# Patient Record
Sex: MALE | Race: WHITE | NOT HISPANIC OR LATINO
[De-identification: names, ages, dates, MRNs, and addresses within clinical notes are randomized per-mention and may not be internally consistent; named-entity substitution may affect disease eponyms.]

---

## 2017-05-12 ENCOUNTER — RX RENEWAL (OUTPATIENT)
Age: 57
End: 2017-05-12

## 2017-06-02 ENCOUNTER — MEDICATION RENEWAL (OUTPATIENT)
Age: 57
End: 2017-06-02

## 2017-08-15 ENCOUNTER — RX RENEWAL (OUTPATIENT)
Age: 57
End: 2017-08-15

## 2017-08-18 ENCOUNTER — MEDICATION RENEWAL (OUTPATIENT)
Age: 57
End: 2017-08-18

## 2017-09-01 ENCOUNTER — LABORATORY RESULT (OUTPATIENT)
Age: 57
End: 2017-09-01

## 2017-09-05 ENCOUNTER — APPOINTMENT (OUTPATIENT)
Dept: CARDIOLOGY | Facility: CLINIC | Age: 57
End: 2017-09-05
Payer: MEDICAID

## 2017-09-05 ENCOUNTER — NON-APPOINTMENT (OUTPATIENT)
Age: 57
End: 2017-09-05

## 2017-09-05 VITALS
SYSTOLIC BLOOD PRESSURE: 149 MMHG | OXYGEN SATURATION: 96 % | HEIGHT: 68 IN | HEART RATE: 86 BPM | WEIGHT: 315 LBS | DIASTOLIC BLOOD PRESSURE: 93 MMHG | RESPIRATION RATE: 16 BRPM | BODY MASS INDEX: 47.74 KG/M2

## 2017-09-05 PROCEDURE — 93000 ELECTROCARDIOGRAM COMPLETE: CPT

## 2017-09-05 PROCEDURE — 99215 OFFICE O/P EST HI 40 MIN: CPT

## 2017-09-08 ENCOUNTER — MEDICATION RENEWAL (OUTPATIENT)
Age: 57
End: 2017-09-08

## 2017-09-11 ENCOUNTER — APPOINTMENT (OUTPATIENT)
Dept: CARDIOLOGY | Facility: CLINIC | Age: 57
End: 2017-09-11

## 2017-09-14 ENCOUNTER — RX RENEWAL (OUTPATIENT)
Age: 57
End: 2017-09-14

## 2017-11-14 ENCOUNTER — APPOINTMENT (OUTPATIENT)
Dept: CARDIOLOGY | Facility: CLINIC | Age: 57
End: 2017-11-14

## 2017-12-11 ENCOUNTER — RX RENEWAL (OUTPATIENT)
Age: 57
End: 2017-12-11

## 2018-02-12 ENCOUNTER — RX RENEWAL (OUTPATIENT)
Age: 58
End: 2018-02-12

## 2018-02-15 ENCOUNTER — RX RENEWAL (OUTPATIENT)
Age: 58
End: 2018-02-15

## 2018-02-16 ENCOUNTER — RX RENEWAL (OUTPATIENT)
Age: 58
End: 2018-02-16

## 2018-04-11 ENCOUNTER — CLINICAL ADVICE (OUTPATIENT)
Age: 58
End: 2018-04-11

## 2018-06-18 ENCOUNTER — RX RENEWAL (OUTPATIENT)
Age: 58
End: 2018-06-18

## 2018-06-22 ENCOUNTER — MEDICATION RENEWAL (OUTPATIENT)
Age: 58
End: 2018-06-22

## 2018-07-09 ENCOUNTER — RX RENEWAL (OUTPATIENT)
Age: 58
End: 2018-07-09

## 2018-07-11 ENCOUNTER — MEDICATION RENEWAL (OUTPATIENT)
Age: 58
End: 2018-07-11

## 2018-07-16 ENCOUNTER — APPOINTMENT (OUTPATIENT)
Dept: CARDIOLOGY | Facility: CLINIC | Age: 58
End: 2018-07-16
Payer: MEDICAID

## 2018-07-16 ENCOUNTER — NON-APPOINTMENT (OUTPATIENT)
Age: 58
End: 2018-07-16

## 2018-07-16 VITALS
WEIGHT: 315 LBS | OXYGEN SATURATION: 95 % | DIASTOLIC BLOOD PRESSURE: 86 MMHG | HEART RATE: 78 BPM | RESPIRATION RATE: 17 BRPM | SYSTOLIC BLOOD PRESSURE: 163 MMHG | BODY MASS INDEX: 47.74 KG/M2 | HEIGHT: 68 IN

## 2018-07-16 PROCEDURE — 99214 OFFICE O/P EST MOD 30 MIN: CPT

## 2018-07-16 PROCEDURE — 93000 ELECTROCARDIOGRAM COMPLETE: CPT

## 2018-08-06 ENCOUNTER — FORM ENCOUNTER (OUTPATIENT)
Age: 58
End: 2018-08-06

## 2018-08-07 ENCOUNTER — LABORATORY RESULT (OUTPATIENT)
Age: 58
End: 2018-08-07

## 2018-08-07 ENCOUNTER — OUTPATIENT (OUTPATIENT)
Dept: OUTPATIENT SERVICES | Facility: HOSPITAL | Age: 58
LOS: 1 days | End: 2018-08-07
Payer: MEDICAID

## 2018-08-07 DIAGNOSIS — I25.10 ATHEROSCLEROTIC HEART DISEASE OF NATIVE CORONARY ARTERY WITHOUT ANGINA PECTORIS: ICD-10-CM

## 2018-08-07 DIAGNOSIS — I50.22 CHRONIC SYSTOLIC (CONGESTIVE) HEART FAILURE: ICD-10-CM

## 2018-08-07 PROCEDURE — A9560: CPT

## 2018-08-07 PROCEDURE — 78472 GATED HEART PLANAR SINGLE: CPT

## 2018-08-14 ENCOUNTER — RX RENEWAL (OUTPATIENT)
Age: 58
End: 2018-08-14

## 2018-08-20 ENCOUNTER — APPOINTMENT (OUTPATIENT)
Dept: CARDIOLOGY | Facility: CLINIC | Age: 58
End: 2018-08-20
Payer: MEDICAID

## 2018-08-20 ENCOUNTER — NON-APPOINTMENT (OUTPATIENT)
Age: 58
End: 2018-08-20

## 2018-08-20 VITALS
HEART RATE: 83 BPM | WEIGHT: 315 LBS | BODY MASS INDEX: 47.74 KG/M2 | SYSTOLIC BLOOD PRESSURE: 151 MMHG | RESPIRATION RATE: 17 BRPM | DIASTOLIC BLOOD PRESSURE: 88 MMHG | OXYGEN SATURATION: 96 % | HEIGHT: 68 IN

## 2018-08-20 PROCEDURE — 93000 ELECTROCARDIOGRAM COMPLETE: CPT

## 2018-08-20 PROCEDURE — 99215 OFFICE O/P EST HI 40 MIN: CPT

## 2018-08-20 RX ORDER — CARVEDILOL 6.25 MG/1
6.25 TABLET, FILM COATED ORAL
Qty: 60 | Refills: 3 | Status: DISCONTINUED | COMMUNITY
Start: 2017-09-05 | End: 2018-08-20

## 2018-08-20 RX ORDER — METOPROLOL SUCCINATE 25 MG/1
25 TABLET, EXTENDED RELEASE ORAL DAILY
Qty: 30 | Refills: 1 | Status: DISCONTINUED | COMMUNITY
Start: 2018-07-16 | End: 2018-08-20

## 2018-08-20 RX ORDER — LISINOPRIL 40 MG/1
40 TABLET ORAL DAILY
Qty: 90 | Refills: 0 | Status: DISCONTINUED | COMMUNITY
Start: 2017-11-15 | End: 2018-08-20

## 2018-08-23 ENCOUNTER — RX RENEWAL (OUTPATIENT)
Age: 58
End: 2018-08-23

## 2018-10-01 ENCOUNTER — OUTPATIENT (OUTPATIENT)
Dept: OUTPATIENT SERVICES | Facility: HOSPITAL | Age: 58
LOS: 1 days | End: 2018-10-01
Payer: MEDICAID

## 2018-10-18 ENCOUNTER — RX RENEWAL (OUTPATIENT)
Age: 58
End: 2018-10-18

## 2018-10-22 ENCOUNTER — RX RENEWAL (OUTPATIENT)
Age: 58
End: 2018-10-22

## 2018-10-23 ENCOUNTER — MEDICATION RENEWAL (OUTPATIENT)
Age: 58
End: 2018-10-23

## 2018-10-24 ENCOUNTER — INPATIENT (INPATIENT)
Facility: HOSPITAL | Age: 58
LOS: 10 days | Discharge: HOME | DRG: 228 | End: 2018-11-04
Attending: HOSPITALIST | Admitting: HOSPITALIST
Payer: MEDICAID

## 2018-10-24 VITALS
WEIGHT: 311.95 LBS | HEART RATE: 129 BPM | TEMPERATURE: 98 F | HEIGHT: 68 IN | SYSTOLIC BLOOD PRESSURE: 145 MMHG | RESPIRATION RATE: 22 BRPM | DIASTOLIC BLOOD PRESSURE: 112 MMHG

## 2018-10-24 DIAGNOSIS — Z29.9 ENCOUNTER FOR PROPHYLACTIC MEASURES, UNSPECIFIED: ICD-10-CM

## 2018-10-24 DIAGNOSIS — I48.92 UNSPECIFIED ATRIAL FLUTTER: ICD-10-CM

## 2018-10-24 DIAGNOSIS — I50.22 CHRONIC SYSTOLIC (CONGESTIVE) HEART FAILURE: ICD-10-CM

## 2018-10-24 DIAGNOSIS — I25.10 ATHEROSCLEROTIC HEART DISEASE OF NATIVE CORONARY ARTERY WITHOUT ANGINA PECTORIS: ICD-10-CM

## 2018-10-24 DIAGNOSIS — I10 ESSENTIAL (PRIMARY) HYPERTENSION: ICD-10-CM

## 2018-10-24 DIAGNOSIS — N18.3 CHRONIC KIDNEY DISEASE, STAGE 3 (MODERATE): ICD-10-CM

## 2018-10-24 LAB
ALBUMIN SERPL ELPH-MCNC: 3.8 G/DL — SIGNIFICANT CHANGE UP (ref 3.3–5)
ALP SERPL-CCNC: 87 U/L — SIGNIFICANT CHANGE UP (ref 40–120)
ALT FLD-CCNC: 22 U/L — SIGNIFICANT CHANGE UP (ref 10–45)
ANION GAP SERPL CALC-SCNC: 13 MMOL/L — SIGNIFICANT CHANGE UP (ref 5–17)
APTT BLD: 28.5 SEC — SIGNIFICANT CHANGE UP (ref 27.5–37.4)
AST SERPL-CCNC: 23 U/L — SIGNIFICANT CHANGE UP (ref 10–40)
BASOPHILS # BLD AUTO: 0 K/UL — SIGNIFICANT CHANGE UP (ref 0–0.2)
BASOPHILS NFR BLD AUTO: 0 % — SIGNIFICANT CHANGE UP (ref 0–2)
BILIRUB SERPL-MCNC: 1.2 MG/DL — SIGNIFICANT CHANGE UP (ref 0.2–1.2)
BUN SERPL-MCNC: 25 MG/DL — HIGH (ref 7–23)
CALCIUM SERPL-MCNC: 9.1 MG/DL — SIGNIFICANT CHANGE UP (ref 8.4–10.5)
CHLORIDE SERPL-SCNC: 100 MMOL/L — SIGNIFICANT CHANGE UP (ref 96–108)
CO2 SERPL-SCNC: 28 MMOL/L — SIGNIFICANT CHANGE UP (ref 22–31)
CREAT SERPL-MCNC: 1.42 MG/DL — HIGH (ref 0.5–1.3)
EOSINOPHIL # BLD AUTO: 0.2 K/UL — SIGNIFICANT CHANGE UP (ref 0–0.5)
EOSINOPHIL NFR BLD AUTO: 2.6 % — SIGNIFICANT CHANGE UP (ref 0–6)
GAS PNL BLDV: SIGNIFICANT CHANGE UP
GLUCOSE SERPL-MCNC: 135 MG/DL — HIGH (ref 70–99)
HCT VFR BLD CALC: 48.2 % — SIGNIFICANT CHANGE UP (ref 39–50)
HGB BLD-MCNC: 15.8 G/DL — SIGNIFICANT CHANGE UP (ref 13–17)
INR BLD: 1.3 RATIO — HIGH (ref 0.88–1.16)
LYMPHOCYTES # BLD AUTO: 0.9 K/UL — LOW (ref 1–3.3)
LYMPHOCYTES # BLD AUTO: 12.7 % — LOW (ref 13–44)
MCHC RBC-ENTMCNC: 30.4 PG — SIGNIFICANT CHANGE UP (ref 27–34)
MCHC RBC-ENTMCNC: 32.8 GM/DL — SIGNIFICANT CHANGE UP (ref 32–36)
MCV RBC AUTO: 92.6 FL — SIGNIFICANT CHANGE UP (ref 80–100)
MONOCYTES # BLD AUTO: 0.8 K/UL — SIGNIFICANT CHANGE UP (ref 0–0.9)
MONOCYTES NFR BLD AUTO: 10.9 % — SIGNIFICANT CHANGE UP (ref 2–14)
NEUTROPHILS # BLD AUTO: 5.1 K/UL — SIGNIFICANT CHANGE UP (ref 1.8–7.4)
NEUTROPHILS NFR BLD AUTO: 73.8 % — SIGNIFICANT CHANGE UP (ref 43–77)
NT-PROBNP SERPL-SCNC: 2043 PG/ML — HIGH (ref 0–300)
PLATELET # BLD AUTO: 229 K/UL — SIGNIFICANT CHANGE UP (ref 150–400)
POTASSIUM SERPL-MCNC: 4.2 MMOL/L — SIGNIFICANT CHANGE UP (ref 3.5–5.3)
POTASSIUM SERPL-SCNC: 4.2 MMOL/L — SIGNIFICANT CHANGE UP (ref 3.5–5.3)
PROT SERPL-MCNC: 7.3 G/DL — SIGNIFICANT CHANGE UP (ref 6–8.3)
PROTHROM AB SERPL-ACNC: 14.2 SEC — HIGH (ref 9.8–12.7)
RBC # BLD: 5.2 M/UL — SIGNIFICANT CHANGE UP (ref 4.2–5.8)
RBC # FLD: 13.2 % — SIGNIFICANT CHANGE UP (ref 10.3–14.5)
SODIUM SERPL-SCNC: 141 MMOL/L — SIGNIFICANT CHANGE UP (ref 135–145)
TROPONIN T, HIGH SENSITIVITY RESULT: 37 NG/L — SIGNIFICANT CHANGE UP (ref 0–51)
TROPONIN T, HIGH SENSITIVITY RESULT: 40 NG/L — SIGNIFICANT CHANGE UP (ref 0–51)
WBC # BLD: 7 K/UL — SIGNIFICANT CHANGE UP (ref 3.8–10.5)
WBC # FLD AUTO: 7 K/UL — SIGNIFICANT CHANGE UP (ref 3.8–10.5)

## 2018-10-24 PROCEDURE — 99285 EMERGENCY DEPT VISIT HI MDM: CPT

## 2018-10-24 PROCEDURE — 71045 X-RAY EXAM CHEST 1 VIEW: CPT | Mod: 26

## 2018-10-24 PROCEDURE — 99223 1ST HOSP IP/OBS HIGH 75: CPT

## 2018-10-24 RX ORDER — METOPROLOL TARTRATE 50 MG
1 TABLET ORAL
Qty: 0 | Refills: 0 | COMMUNITY

## 2018-10-24 RX ORDER — ASPIRIN/CALCIUM CARB/MAGNESIUM 324 MG
81 TABLET ORAL DAILY
Qty: 0 | Refills: 0 | Status: DISCONTINUED | OUTPATIENT
Start: 2018-10-24 | End: 2018-11-04

## 2018-10-24 RX ORDER — LISINOPRIL 2.5 MG/1
20 TABLET ORAL DAILY
Qty: 0 | Refills: 0 | Status: DISCONTINUED | OUTPATIENT
Start: 2018-10-24 | End: 2018-10-26

## 2018-10-24 RX ORDER — LISINOPRIL 2.5 MG/1
1 TABLET ORAL
Qty: 0 | Refills: 0 | COMMUNITY

## 2018-10-24 RX ORDER — SIMVASTATIN 20 MG/1
20 TABLET, FILM COATED ORAL AT BEDTIME
Qty: 0 | Refills: 0 | Status: DISCONTINUED | OUTPATIENT
Start: 2018-10-24 | End: 2018-11-04

## 2018-10-24 RX ORDER — FUROSEMIDE 40 MG
40 TABLET ORAL DAILY
Qty: 0 | Refills: 0 | Status: DISCONTINUED | OUTPATIENT
Start: 2018-10-24 | End: 2018-10-26

## 2018-10-24 RX ORDER — HYDRALAZINE HCL 50 MG
1 TABLET ORAL
Qty: 0 | Refills: 0 | COMMUNITY

## 2018-10-24 RX ORDER — APIXABAN 2.5 MG/1
5 TABLET, FILM COATED ORAL EVERY 12 HOURS
Qty: 0 | Refills: 0 | Status: DISCONTINUED | OUTPATIENT
Start: 2018-10-24 | End: 2018-10-27

## 2018-10-24 RX ORDER — HYDRALAZINE HCL 50 MG
50 TABLET ORAL THREE TIMES A DAY
Qty: 0 | Refills: 0 | Status: DISCONTINUED | OUTPATIENT
Start: 2018-10-24 | End: 2018-11-03

## 2018-10-24 RX ADMIN — SIMVASTATIN 20 MILLIGRAM(S): 20 TABLET, FILM COATED ORAL at 22:22

## 2018-10-24 RX ADMIN — Medication 50 MILLIGRAM(S): at 22:22

## 2018-10-24 RX ADMIN — Medication 81 MILLIGRAM(S): at 22:22

## 2018-10-24 NOTE — H&P ADULT - PROBLEM SELECTOR PLAN 5
CKD stage 3 - Cr 1.4-1.6 as outpt   monitor Cr function closely   avoid nephrotoxins and renally dose medications   counseled on stopping NSAIDs

## 2018-10-24 NOTE — H&P ADULT - PROBLEM SELECTOR PLAN 4
BP at goal  c/w lisinopril 20 BID and hydralazine 50 TID  adding on Cardizem for rate control - monitor BP closely in setting of another antiHTN, can decrease hydralazine dosing if needed

## 2018-10-24 NOTE — ED PROVIDER NOTE - PROGRESS NOTE DETAILS
Spoke w/ Dr. Zimmer admitting MD regarding admission for aflutter. Discussed w/ him anticoagulating pt at this time given risk factors, he advised holding off for now and his team will decide. MD aware. Admission in. - Randy Oliveira PA-C

## 2018-10-24 NOTE — ED ADULT NURSE NOTE - OBJECTIVE STATEMENT
58 year old male presents to the ED ambulatory through waiting room complaining of 4 days of SOB and palpitations. PMH of HTN, CAD s/p cardiac cath, hypercholesteremia. Patient also reports abdominal discomfort/bloating for a week and a half. Patient states he was monitoring his HR at home, noticed it was high, took "beta blockers," which lowered it but worsened his SOB. Pt. denies chest pain, dizziness, fever, chills, nausea, vomiting, dysuria, recent travel. 20g peripheral IV placed in L ac and labs drawn. Patient undressed and placed into gown, call bell in hand and side rails up with bed in lowest position for safety. blanket provided. Comfort and safety provided.

## 2018-10-24 NOTE — ED PROVIDER NOTE - LOWER EXTREMITY EXAM, LEFT
EDY is juliano LLE with +swelling and erythema without calf tenderness or asymmetry. 2+ pitting edema, 2+ DP pulses.

## 2018-10-24 NOTE — H&P ADULT - NSHPPHYSICALEXAM_GEN_ALL_CORE
Vital Signs Last 24 Hrs  T(C): 36.8 (24 Oct 2018 18:50), Max: 36.9 (24 Oct 2018 14:48)  T(F): 98.2 (24 Oct 2018 18:50), Max: 98.5 (24 Oct 2018 15:23)  HR: 98 (24 Oct 2018 17:23) (82 - 129)  BP: 110/70 (24 Oct 2018 18:50) (106/75 - 150/115)  BP(mean): --  RR: 18 (24 Oct 2018 18:50) (18 - 24)  SpO2: 98% (24 Oct 2018 18:50) (96% - 98%) Vital Signs Last 24 Hrs  T(C): 36.8 (24 Oct 2018 18:50), Max: 36.9 (24 Oct 2018 14:48)  T(F): 98.2 (24 Oct 2018 18:50), Max: 98.5 (24 Oct 2018 15:23)  HR: 98 (24 Oct 2018 17:23) (82 - 129)  BP: 110/70 (24 Oct 2018 18:50) (106/75 - 150/115)  BP(mean): --  RR: 18 (24 Oct 2018 18:50) (18 - 24)  SpO2: 98% (24 Oct 2018 18:50) (96% - 98%)    PHYSICAL EXAM:  GENERAL: NAD, well-developed  HEAD:  Atraumatic, normocephalic  EYES: EOMI, sclera clear  NECK: Supple, no JVD  CHEST/LUNG: Clear to auscultation bilaterally; no wheezing or rales  HEART: irregular, +tachycardic, S1 S2, no murmurs   ABDOMEN: Soft, nontender, +distended, bowel sounds present  EXTREMITIES: 2-3+ pitting edema in b/l LE with chronic skin changes, open lesions on RLE   PSYCH: calm affect, not anxious  NEUROLOGY: non-focal, AAOx3  SKIN: chronic skin discoloration of b/l LE with 3 lesions on RLE    MUSCULOSKELETAL: no back pain, moving all extremities

## 2018-10-24 NOTE — ED PROVIDER NOTE - MEDICAL DECISION MAKING DETAILS
GLORIA Mo MD: Pt is a 57 yo male with PMHx HTN, HLD, CAD s/p CABGx 3-4V?, lichen planus, chronic LE edema who presents to ED c/o dyspnea on exertion progressively worsening over the last week with tachycardia and palpitations GLORIA Mo MD: Pt is a 57 yo male with PMHx HTN, HLD, CAD s/p CABGx 3-4V?, lichen planus, chronic LE edema who presents to ED c/o dyspnea and palpitations. States that he began having Dyspnea on exertion and palpitations 4-5 days ago, progressively worsening over the last week. Noticed HR seemed elevated, took an extra B-blocker a few days ago which transiently helped sx, then sx recurred. No recent medication changes. Has chronic LE edema which he says is at his baseline. Denies LE pain, travel, trauma, immobilization, CP. States he takes lasix and has been urinating less than usual. Admits to increased abdominal "distension" without abd pain, no n/v/d. Ddx: arrhythmia, chf exacerbation, acs, metabolic d/o, electrolyte abnormality, infectious d/o. Less likely PE as no provoking factors, however, this can be explored as a dx if no other explanation of his sx and no improvement with tx. Plan: basic labs, cardiac labs, cxr, cardizem for rate control of AFlutter on EKG, likely TBA, begin AC

## 2018-10-24 NOTE — H&P ADULT - PROBLEM SELECTOR PLAN 2
CHF with EF 40%, chronic LE edema and no rales on exam, not acutely exacerbated   will c/w lasix 40 PO for now  MUGA in 8/2018  cardiology c/s in AM  c/w ACEi, pt intolerant of BB

## 2018-10-24 NOTE — H&P ADULT - NSHPLABSRESULTS_GEN_ALL_CORE
Labs personally reviewed and interpreted by me - normal WBC, Hb, Cr 1.42 (baseline ~1.5), glucose 135. trop 40-->37, BNP 2043  Imaging personally reviewed and interpreted by me - cardiomegaly, no focal consolidation.   EKG personally reviewed and interpreted by me -                           15.8   7.0   )-----------( 229      ( 24 Oct 2018 15:34 )             48.2     10-24    141  |  100  |  25<H>  ----------------------------<  135<H>  4.2   |  28  |  1.42<H>    Ca    9.1      24 Oct 2018 15:34    TPro  7.3  /  Alb  3.8  /  TBili  1.2  /  DBili  x   /  AST  23  /  ALT  22  /  AlkPhos  87  10-24      PT/INR - ( 24 Oct 2018 15:39 )   PT: 14.2 sec;   INR: 1.30 ratio    PTT - ( 24 Oct 2018 15:39 )  PTT:28.5 sec    Serum Pro-Brain Natriuretic Peptide: 2043 pg/mL (10.24.18 @ 15:34)    Troponin T, High Sensitivity (10.24.18 @ 15:34)    Troponin T, High Sensitivity Result: 40 --> 37    < from: Xray Chest 1 View- PORTABLE-Urgent (10.24.18 @ 15:44) >  Impression:  The heart is markedlyenlarged. The lungs are clear. Status post   sternotomy. Degenerative changes of the left shoulder. The right shoulder   is only partially seen on the current study.    < from: NM MUGA Scan (08.07.18 @ 12:54) >  Findings:  The left ventricular cavity was normal in size and shape.  There is hypokinesis of the distal anterior wall and anteroapex and apex   of the left ventricle.  Global left ventricular ejection fraction is 40%.  Right ventricular systolic function appears normal.    IMPRESSION:  Abnormal LV ejection fraction at rest  LVEF  40  %   with   regional wall motion abnormalities noted, compatible with CAD. Labs personally reviewed and interpreted by me - normal WBC, Hb, Cr 1.42 (baseline ~1.5), glucose 135. trop 40-->37, BNP 2043  Imaging personally reviewed and interpreted by me - cardiomegaly, no focal consolidation.   EKG personally reviewed and interpreted by me - atrial flutter to 130s, no acute ischemic changes notes.                            15.8   7.0   )-----------( 229      ( 24 Oct 2018 15:34 )             48.2     10-24    141  |  100  |  25<H>  ----------------------------<  135<H>  4.2   |  28  |  1.42<H>    Ca    9.1      24 Oct 2018 15:34    TPro  7.3  /  Alb  3.8  /  TBili  1.2  /  DBili  x   /  AST  23  /  ALT  22  /  AlkPhos  87  10-24      PT/INR - ( 24 Oct 2018 15:39 )   PT: 14.2 sec;   INR: 1.30 ratio    PTT - ( 24 Oct 2018 15:39 )  PTT:28.5 sec    Serum Pro-Brain Natriuretic Peptide: 2043 pg/mL (10.24.18 @ 15:34)    Troponin T, High Sensitivity (10.24.18 @ 15:34)    Troponin T, High Sensitivity Result: 40 --> 37    < from: Xray Chest 1 View- PORTABLE-Urgent (10.24.18 @ 15:44) >  Impression:  The heart is markedlyenlarged. The lungs are clear. Status post   sternotomy. Degenerative changes of the left shoulder. The right shoulder   is only partially seen on the current study.    < from: NM MUGA Scan (08.07.18 @ 12:54) >  Findings:  The left ventricular cavity was normal in size and shape.  There is hypokinesis of the distal anterior wall and anteroapex and apex   of the left ventricle.  Global left ventricular ejection fraction is 40%.  Right ventricular systolic function appears normal.    IMPRESSION:  Abnormal LV ejection fraction at rest  LVEF  40  %   with   regional wall motion abnormalities noted, compatible with CAD.

## 2018-10-24 NOTE — H&P ADULT - HISTORY OF PRESENT ILLNESS
58M with PMH of HTN, CAD s/p CABG, lichen planus, chronic LE edema p/w VELÁSQUEZ. 58M with PMH of CAD s/p CABG/stents (stents 2000, CABG 2012), HFrEF (EF 40%), HTN, lichen planus with chronic LE edema p/w VELÁSQUEZ and palpitations. Pt states he was in his usual state until about 4d ago, when he started noticing intermittent VELÁSQUEZ; felt no SOB at rest, but any exertional activity would leave him winded. Associated with feeling like his heart was racing for same period of time; about 2-3d ago, felt his heart racing very fast and decided to take old metorpolol he had (50mg ER), which helped with his symptoms. The day prior to admission, pt was out doing grocery shopping and couldn't walk even 15 feet without having to stop 2/2 SOB, felt lightheaded and again had palpitations and heart racing. He felt slightly better after rest, but symptoms were not abating and decided to come to ED. No CP, no headache, +lightheadedness during one episode of VELÁSQUEZ, no confusion, syncope, no cough, URI symptoms, +abdominal bloating, no nausea/vomiting, no abd pain, no dysuria, no diarrhea, +decreased urination, +LE edema (chronic, not currently worse than usual), no changes in skin, no new rash. Has never been told he had an abnormal heart rhythm in the past. Saw cardiology (Dr Miranda) in 8/2018 - was supposed to be on BB (metoprolol 25ER) but pt has orthopnea whenever he takes it, so has not been taking it for months; had MUGA in 8/2018 showing EF 40%.     Family history (father) of CAD with CABG, no family history of abnormal heart rhythms.

## 2018-10-24 NOTE — H&P ADULT - ASSESSMENT
58M with PMH of CAD s/p CABG/stents (stents 2000, CABG 2012), HFrEF (EF 40%), HTN, lichen planus with chronic LE edema p/w VELÁSQUEZ and palpitations, admitted with new aflutter.

## 2018-10-24 NOTE — ED PROVIDER NOTE - MUSCULOSKELETAL MINIMAL EXAM
Full AROM all upper and lower extremities bilaterally with 5/5 muscle strength/atraumatic/normal range of motion

## 2018-10-24 NOTE — ED PROVIDER NOTE - NS ED NOTE AC HIGH RISK COUNTRIES
1. Drink plenty of fluids.  2. May use tylenol 1000 mg every 8 hours or ibuprofen 600 mg every 6 hours for any discomfort you are having.  3. Use Neti pot or nasal saline if you are having nasal or sinus congestion  4. For cough, dextromethorphan/guaifenesin combinations help loosen secretions and suppress cough safely without significant risk of sedation.   5. For nasal congestion and sinus pressure, pseudoephedrine (Sudafed) or phenylephrine is often helpful but it can cause elevations in blood pressure and insomnia.  Use Coricidin as a decongestant if you have a history of hypertension.  6. For runny nose and nasal congestion, use over-the-counter oxymetazoline (i.e. Afrin - use 2 sprays of 0.05% in each nostril every 12 hours; stop after 3-5 days)  7. Suggest humidifier in room at night  8. Call clinic if no improvement in 1 week        Thank you for choosing [facility].  You may be receiving a survey in the mail from [facility] regarding your visit today.  Please take a few minutes to complete and return the survey to let us know how we are doing.      If you have questions or concerns, please contact us via [employer] or you can contact your care team at [phone].    Our Clinic hours are:  Monday 6:40 am  to 7:00 pm  Tuesday -Friday 6:40 am to 5:00 pm    The [place] outpatient lab hours are:  Monday - Friday 6:10 am to 4:45 pm  Saturdays 7:00 am to 11:00 am  Appointments are required, call [phone]    If you have clinical questions after hours or would like to schedule an appointment,  call the clinic at [phone].     No

## 2018-10-24 NOTE — H&P ADULT - PROBLEM SELECTOR PLAN 1
pt with VELÁSQUEZ and palpitations, EKG showing new Aflutter with RVR to 130s, s/p cardizem in ED with symptomatic relief   hs trop (-) x2   monitor on tele - currently still aflutter 110-130s   pt with intolerance to BB - will start on cardizem 60 q8h, titrate up as needed   vitals q4h for now   CHADSVASC pt with VELÁSQUEZ and palpitations, EKG showing new Aflutter with RVR to 130s, s/p cardizem in ED with symptomatic relief   hs trop (-) x2   monitor on tele - currently still aflutter 110-130s   pt with intolerance to BB - will start on cardizem 60 q8h, titrate up as needed   vitals q4h for now   CHADSVASC 3 - would benefit from AC; discussed risks/benefits of starting full AC with pt, pt understands and is agreeable to starting - will place on Eliquis for now   cardiology consult in AM  check TSH  check TTE  f/u with CM in AM regarding insurance coverage for AC   will decrease ASA full dose to 81mg   pt counseled on stopping daily NSAID use pt with VELÁSQUEZ and palpitations, EKG showing new Aflutter with RVR to 130s, s/p cardizem in ED with symptomatic relief   hs trop (-) x2   monitor on tele - currently still aflutter 110-130s   pt with intolerance to BB - will start on cardizem 30 q6h, titrate up as needed   vitals q4h for now   CHADSVASC 3 - would benefit from AC; discussed risks/benefits of starting full AC with pt, pt understands and is agreeable to starting - will place on Eliquis for now   cardiology consult in AM  check TSH  check TTE  f/u with CM in AM regarding insurance coverage for AC   will decrease ASA full dose to 81mg   pt counseled on stopping daily NSAID use

## 2018-10-24 NOTE — ED PROVIDER NOTE - SKIN WOUND TYPE
+medium sized palpable purple papules noted to distal bilateral LE with + superficial ulceritic wound noted to distal anterior right tibia +medium sized papular purpura noted to distal bilateral LE with + superficial ulceritic wound noted to distal anterior right tibia

## 2018-10-24 NOTE — H&P ADULT - NSHPREVIEWOFSYSTEMS_GEN_ALL_CORE
REVIEW OF SYSTEMS:    CONSTITUTIONAL: No weakness, fevers, chills  EYES/ENT: No visual changes;  no vertigo or throat pain   NECK: No pain or stiffness  RESPIRATORY: No cough, wheezing, +shortness of breath on exertion   CARDIOVASCULAR: No chest pain, +palpitations (as per HPI)  GASTROINTESTINAL: no nausea, vomiting, no abdominal pain, +bloating   GENITOURINARY: no polyuria, no dysuria  NEUROLOGICAL: no numbness, no headaches, no confusion   MUSCULOSKELETAL: +chronic back pain, no weakness   SKIN: No itching, burning, +RLE skin lesions 2/2 lichen planus   PSYCH: no anxiety, depression  HEME: no gum bleeding, no bruising

## 2018-10-24 NOTE — ED PROVIDER NOTE - OBJECTIVE STATEMENT
57 yo male PMHx HTN, HLD, CAD s/p CABGx 3-4? presents to ED c/o dyspnea on exertion progressively worsening over the last week with tachycardia and palpitations 57 yo male PMHx HTN, HLD, CAD s/p CABGx 3-4? presents to ED c/o dyspnea on exertion progressively worsening over the last week with tachycardia and palpitations. 59 yo male PMHx HTN, HLD, lichen planus w/ chronic lower extremity swelling CAD s/p CABGx 3-4? presents to ED c/o dyspnea on exertion progressively worsening over the last week with tachycardia and palpitations. States has been told in the past he has a fast heart rate, is on metoprolol daily and took an extra dose 2 days prior with symptoms and had mild relief. Today symptoms continued prompting ED visit. Additionally feels his abdomen is bloated. Denies chest pain, dizziness, fever/chills, cough, hemoptysis, abdominal pain, fatigue, orthopnea,

## 2018-10-24 NOTE — ED PROVIDER NOTE - LOWER EXTREMITY EXAM, RIGHT
RLE with + swelling and erythema without calf tenderness or asymmetry. 2+pitting edema. 2+ DP pulses bilaterally.

## 2018-10-25 LAB
ANION GAP SERPL CALC-SCNC: 13 MMOL/L — SIGNIFICANT CHANGE UP (ref 5–17)
BUN SERPL-MCNC: 24 MG/DL — HIGH (ref 7–23)
CALCIUM SERPL-MCNC: 9 MG/DL — SIGNIFICANT CHANGE UP (ref 8.4–10.5)
CHLORIDE SERPL-SCNC: 100 MMOL/L — SIGNIFICANT CHANGE UP (ref 96–108)
CO2 SERPL-SCNC: 26 MMOL/L — SIGNIFICANT CHANGE UP (ref 22–31)
CREAT SERPL-MCNC: 1.36 MG/DL — HIGH (ref 0.5–1.3)
GLUCOSE SERPL-MCNC: 103 MG/DL — HIGH (ref 70–99)
HCT VFR BLD CALC: 46.9 % — SIGNIFICANT CHANGE UP (ref 39–50)
HGB BLD-MCNC: 15 G/DL — SIGNIFICANT CHANGE UP (ref 13–17)
MAGNESIUM SERPL-MCNC: 2.1 MG/DL — SIGNIFICANT CHANGE UP (ref 1.6–2.6)
MCHC RBC-ENTMCNC: 30.4 PG — SIGNIFICANT CHANGE UP (ref 27–34)
MCHC RBC-ENTMCNC: 32 GM/DL — SIGNIFICANT CHANGE UP (ref 32–36)
MCV RBC AUTO: 94.9 FL — SIGNIFICANT CHANGE UP (ref 80–100)
PHOSPHATE SERPL-MCNC: 3.2 MG/DL — SIGNIFICANT CHANGE UP (ref 2.5–4.5)
PLATELET # BLD AUTO: 232 K/UL — SIGNIFICANT CHANGE UP (ref 150–400)
POTASSIUM SERPL-MCNC: 4.1 MMOL/L — SIGNIFICANT CHANGE UP (ref 3.5–5.3)
POTASSIUM SERPL-SCNC: 4.1 MMOL/L — SIGNIFICANT CHANGE UP (ref 3.5–5.3)
RBC # BLD: 4.94 M/UL — SIGNIFICANT CHANGE UP (ref 4.2–5.8)
RBC # FLD: 13.8 % — SIGNIFICANT CHANGE UP (ref 10.3–14.5)
SODIUM SERPL-SCNC: 139 MMOL/L — SIGNIFICANT CHANGE UP (ref 135–145)
TSH SERPL-MCNC: 0.89 UIU/ML — SIGNIFICANT CHANGE UP (ref 0.27–4.2)
WBC # BLD: 7.62 K/UL — SIGNIFICANT CHANGE UP (ref 3.8–10.5)
WBC # FLD AUTO: 7.62 K/UL — SIGNIFICANT CHANGE UP (ref 3.8–10.5)

## 2018-10-25 PROCEDURE — 93306 TTE W/DOPPLER COMPLETE: CPT | Mod: 26

## 2018-10-25 PROCEDURE — 99222 1ST HOSP IP/OBS MODERATE 55: CPT

## 2018-10-25 PROCEDURE — 99223 1ST HOSP IP/OBS HIGH 75: CPT | Mod: GC

## 2018-10-25 PROCEDURE — 99233 SBSQ HOSP IP/OBS HIGH 50: CPT

## 2018-10-25 RX ORDER — POLYETHYLENE GLYCOL 3350 17 G/17G
17 POWDER, FOR SOLUTION ORAL
Qty: 0 | Refills: 0 | Status: DISCONTINUED | OUTPATIENT
Start: 2018-10-25 | End: 2018-11-04

## 2018-10-25 RX ORDER — DOCUSATE SODIUM 100 MG
100 CAPSULE ORAL DAILY
Qty: 0 | Refills: 0 | Status: DISCONTINUED | OUTPATIENT
Start: 2018-10-25 | End: 2018-11-04

## 2018-10-25 RX ADMIN — APIXABAN 5 MILLIGRAM(S): 2.5 TABLET, FILM COATED ORAL at 12:45

## 2018-10-25 RX ADMIN — APIXABAN 5 MILLIGRAM(S): 2.5 TABLET, FILM COATED ORAL at 00:35

## 2018-10-25 RX ADMIN — POLYETHYLENE GLYCOL 3350 17 GRAM(S): 17 POWDER, FOR SOLUTION ORAL at 18:04

## 2018-10-25 RX ADMIN — Medication 50 MILLIGRAM(S): at 14:00

## 2018-10-25 RX ADMIN — APIXABAN 5 MILLIGRAM(S): 2.5 TABLET, FILM COATED ORAL at 23:42

## 2018-10-25 RX ADMIN — Medication 10 MILLIGRAM(S): at 23:42

## 2018-10-25 RX ADMIN — SIMVASTATIN 20 MILLIGRAM(S): 20 TABLET, FILM COATED ORAL at 21:23

## 2018-10-25 RX ADMIN — LISINOPRIL 20 MILLIGRAM(S): 2.5 TABLET ORAL at 05:31

## 2018-10-25 RX ADMIN — Medication 50 MILLIGRAM(S): at 05:31

## 2018-10-25 RX ADMIN — Medication 40 MILLIGRAM(S): at 05:31

## 2018-10-25 RX ADMIN — Medication 81 MILLIGRAM(S): at 12:45

## 2018-10-25 RX ADMIN — Medication 50 MILLIGRAM(S): at 21:23

## 2018-10-25 NOTE — CONSULT NOTE ADULT - ATTENDING COMMENTS
58 year old man with ischemic cardiomyopathy, coronary revascularization surgery 5 years ago and EF 35-40% at that time. Has few days of breathlessness, palpitations and admitted with atrial flutter ventricular rate 130 and with volume overload congestive heart failure. Rates better controlled and symptomatically improved and anticoagulated with apixaban. Discussed with EP and plan for electrical cardioversion tomorrow, maintaining rate control with diltiazem. After cardioversion need to stop diltiazem and replace with beta blocker, probably carvedilol and optimize LV dysfunction management.

## 2018-10-25 NOTE — PROGRESS NOTE ADULT - PROBLEM SELECTOR PLAN 4
Diastolic BP above goal  c/w lisinopril 20 BID and hydralazine 50 TID  c/w Cardizem for rate control - monitor BP closely in setting of another antiHTN, can decrease hydralazine dosing if needed

## 2018-10-25 NOTE — PROGRESS NOTE ADULT - PROBLEM SELECTOR PLAN 1
EKG showing new Aflutter with RVR to 130s, s/p cardizem in ED with symptomatic relief and placed on Cardizem 30mg Q6. Currently still aflutter 110-130s.   Uptitrate Cardizem to 60mg Q6. Increase as needed.   CHADSVASC 3 started on Eliquis   cardiology consult.   TSH pending  check TTE  c/w tele monitoring  Maintain K>4, Mg>2 EKG showing new Aflutter with RVR to 130s, s/p cardizem in ED with symptomatic relief and placed on Cardizem 30mg Q6. Currently still aflutter 110-130s.   Uptitrate Cardizem to 60mg Q6. Increase as needed.   CHADSVASC 3 started on Eliquis   cardiology consult. May require dccv cardioversion  TSH pending  check TTE  c/w tele monitoring  Maintain K>4, Mg>2

## 2018-10-25 NOTE — CONSULT NOTE ADULT - SUBJECTIVE AND OBJECTIVE BOX
Patient seen and evaluated at bedside    Chief Complaint:    HPI:  58M with PMH of CAD s/p CABG/stents (stents 2000, CABG 2012), HFrEF (EF 40%), HTN, lichen planus with chronic LE edema p/w VELÁSQUEZ and palpitations. Pt states he was in his usual state until about 4d ago, when he started noticing intermittent VELÁSQUEZ; felt no SOB at rest, but any exertional activity would leave him winded. Associated with feeling like his heart was racing for same period of time; about 2-3d ago, felt his heart racing very fast and decided to take old metorpolol he had (50mg ER), which helped with his symptoms. The day prior to admission, pt was out doing grocery shopping and couldn't walk even 15 feet without having to stop 2/2 SOB, felt lightheaded and again had palpitations and heart racing. He felt slightly better after rest, but symptoms were not abating and decided to come to ED. No CP, no headache, +lightheadedness during one episode of VELÁSQUEZ, no confusion, syncope, no cough, URI symptoms, +abdominal bloating, no nausea/vomiting, no abd pain, no dysuria, no diarrhea, +decreased urination, +LE edema (chronic, not currently worse than usual), no changes in skin, no new rash. Has never been told he had an abnormal heart rhythm in the past. Saw cardiology (Dr Miranda) in 8/2018 - was supposed to be on BB (metoprolol 25ER) but pt has orthopnea whenever he takes it, so has not been taking it for months; had MUGA in 8/2018 showing EF 40%.     Family history (father) of CAD with CABG, no family history of abnormal heart rhythms. (24 Oct 2018 20:15)      PMH:   Hypercholesteremia  Obesity  H/O heart artery stent  CAD (coronary artery disease)      PSH:   History of elbow surgery  History of tonsillectomy      Medications:   apixaban 5 milliGRAM(s) Oral every 12 hours  aspirin  chewable 81 milliGRAM(s) Oral daily  diltiazem    Tablet 60 milliGRAM(s) Oral every 6 hours  furosemide    Tablet 40 milliGRAM(s) Oral daily  hydrALAZINE 50 milliGRAM(s) Oral three times a day  lisinopril 20 milliGRAM(s) Oral daily  simvastatin 20 milliGRAM(s) Oral at bedtime      Allergies:  No Known Allergies      FAMILY HISTORY:  No pertinent family history in first degree relatives      Social History:  Smoking History:  Alcohol Use:  Drug Use:    Review of Systems:  REVIEW OF SYSTEMS:  CONSTITUTIONAL: No weakness, fevers or chills  EYES/ENT: No visual changes;  No dysphagia  NECK: No pain or stiffness  RESPIRATORY: No cough, wheezing, hemoptysis; No shortness of breath  CARDIOVASCULAR: No chest pain or palpitations; No lower extremity edema  GASTROINTESTINAL: No abdominal or epigastric pain. No nausea, vomiting, or hematemesis; No diarrhea or constipation. No melena or hematochezia.  BACK: No back pain  GENITOURINARY: No dysuria, frequency or hematuria  NEUROLOGICAL: No numbness or weakness  SKIN: No itching, burning, rashes, or lesions   All other review of systems is negative unless indicated above.    Physical Exam:  T(F): 97.7 (10-25), Max: 98.5 (10-24)  HR: 120 (10-25) (82 - 129)  BP: 149/106 (10-25) (106/75 - 150/115)  RR: 16 (10-25)  SpO2: 96% (10-25)  GENERAL: No acute distress, well-developed  HEAD:  Atraumatic, Normocephalic  ENT: EOMI, PERRLA, conjunctiva and sclera clear, Neck supple, No JVD, moist mucosa  CHEST/LUNG: Clear to auscultation bilaterally; No wheeze, equal breath sounds bilaterally   BACK: No spinal tenderness  HEART: Regular rate and rhythm; No murmurs, rubs, or gallops  ABDOMEN: Soft, Nontender, Nondistended; Bowel sounds present  EXTREMITIES:  No clubbing, cyanosis, or edema  PSYCH: Nl behavior, nl affect  NEUROLOGY: AAOx3, non-focal, cranial nerves intact  SKIN: Normal color, No rashes or lesions  LINES:    Cardiovascular Diagnostic Testing:    ECG: Personally reviewed    Echo:    Stress Testing:    Cath:    Interpretation of Telemetry:    Imaging:    Labs: Personally reviewed                        15.0   7.62  )-----------( 232      ( 25 Oct 2018 07:42 )             46.9     10-25    139  |  100  |  24<H>  ----------------------------<  103<H>  4.1   |  26  |  1.36<H>    Ca    9.0      25 Oct 2018 05:45  Phos  3.2     10-25  Mg     2.1     10-25    TPro  7.3  /  Alb  3.8  /  TBili  1.2  /  DBili  x   /  AST  23  /  ALT  22  /  AlkPhos  87  10-24    PT/INR - ( 24 Oct 2018 15:39 )   PT: 14.2 sec;   INR: 1.30 ratio         PTT - ( 24 Oct 2018 15:39 )  PTT:28.5 sec      Serum Pro-Brain Natriuretic Peptide: 2043 pg/mL (10-24 @ 15:34) Chief Complaint:  VELÁSQUEZ    HPI:  58M with PMH of CAD s/p CABG/stents (stents 2000, CABG 2012), HFrEF (EF 40%), HTN, lichen planus with chronic LE edema p/w VELÁSQUEZ and palpitations. Pt states he was in his usual state until about 4d ago, when he started noticing intermittent VELÁSQUEZ; felt no SOB at rest, but any exertional activity would leave him winded. Associated with feeling like his heart was racing for same period of time; about 2-3d ago, felt his heart racing very fast and decided to take old metorpolol he had (50mg ER), which helped with his symptoms. The day prior to admission, pt was out doing grocery shopping and couldn't walk even 15 feet without having to stop 2/2 SOB, felt lightheaded and again had palpitations and heart racing. He felt slightly better after rest, but symptoms were not abating and decided to come to ED. No CP, no headache, +lightheadedness during one episode of VELÁSQUEZ, no confusion, syncope, no cough, URI symptoms, +abdominal bloating, no nausea/vomiting, no abd pain, no dysuria, no diarrhea, +decreased urination, +LE edema (chronic, not currently worse than usual), no changes in skin, no new rash. Has never been told he had an abnormal heart rhythm in the past. Saw cardiology (Dr Miranda) in 8/2018 - was supposed to be on BB (metoprolol 25ER) but pt has orthopnea whenever he takes it, so has not been taking it for months; had MUGA in 8/2018 showing EF 40%.     Family history (father) of CAD with CABG, no family history of abnormal heart rhythms. (24 Oct 2018 20:15)      PMH:   Hypercholesteremia  Obesity  H/O heart artery stent  CAD (coronary artery disease)      PSH:   History of elbow surgery  History of tonsillectomy      Medications:   apixaban 5 milliGRAM(s) Oral every 12 hours  aspirin  chewable 81 milliGRAM(s) Oral daily  diltiazem    Tablet 60 milliGRAM(s) Oral every 6 hours  furosemide    Tablet 40 milliGRAM(s) Oral daily  hydrALAZINE 50 milliGRAM(s) Oral three times a day  lisinopril 20 milliGRAM(s) Oral daily  simvastatin 20 milliGRAM(s) Oral at bedtime      Allergies:  No Known Allergies      FAMILY HISTORY:  No pertinent family history in first degree relatives      Social History:  Smoking History:  Alcohol Use:  Drug Use:    Review of Systems:  REVIEW OF SYSTEMS:  CONSTITUTIONAL: No weakness, fevers or chills  EYES/ENT: No visual changes;  No dysphagia  NECK: No pain or stiffness  RESPIRATORY: No cough, wheezing, hemoptysis; No shortness of breath  CARDIOVASCULAR: No chest pain or palpitations; No lower extremity edema  GASTROINTESTINAL: No abdominal or epigastric pain. No nausea, vomiting, or hematemesis; No diarrhea or constipation. No melena or hematochezia.  BACK: No back pain  GENITOURINARY: No dysuria, frequency or hematuria  NEUROLOGICAL: No numbness or weakness  SKIN: No itching, burning, rashes, or lesions   All other review of systems is negative unless indicated above.    Physical Exam:  T(F): 97.7 (10-25), Max: 98.5 (10-24)  HR: 120 (10-25) (82 - 129)  BP: 149/106 (10-25) (106/75 - 150/115)  RR: 16 (10-25)  SpO2: 96% (10-25)  GENERAL: No acute distress, well-developed  HEAD:  Atraumatic, Normocephalic  ENT: EOMI, PERRLA, conjunctiva and sclera clear, Neck supple, No JVD, moist mucosa  CHEST/LUNG: Clear to auscultation bilaterally; No wheeze, equal breath sounds bilaterally   BACK: No spinal tenderness  HEART: Regular rate and rhythm; No murmurs, rubs, or gallops  ABDOMEN: Soft, Nontender, Nondistended; Bowel sounds present  EXTREMITIES:  Severe LE edema, changes of chronic edema  PSYCH: Nl behavior, nl affect  NEUROLOGY: AAOx3, non-focal, cranial nerves intact    Cardiovascular Diagnostic Testing:    TTE:  Conclusions:  1. Aortic valve not well visualized; appears calcified.  Peak transaortic valve gradient equals 21 mm Hg, aortic  valve velocity time integral equals 39 cm. Dimensionless  Index = 0.5, consistent with mild aortic stenosis. The LVOT  diameter is not well visualized. Minimal aortic  regurgitation.  2. Mild concentric left ventricular hypertrophy.  3. Severe global left ventricular systolic dysfunction.  Endocardial visualization enhanced with intravenous  injection of Ultrasonic Enhancing Agent (Definity). No left  ventricular thrombus.  4. The right ventricle is not well visualized.  *** Compared with echocardiogram of 6/11/2013, mild aortic  stenosis is now seen.    Labs: Personally reviewed                        15.0   7.62  )-----------( 232      ( 25 Oct 2018 07:42 )             46.9     10-25    139  |  100  |  24<H>  ----------------------------<  103<H>  4.1   |  26  |  1.36<H>    Ca    9.0      25 Oct 2018 05:45  Phos  3.2     10-25  Mg     2.1     10-25    TPro  7.3  /  Alb  3.8  /  TBili  1.2  /  DBili  x   /  AST  23  /  ALT  22  /  AlkPhos  87  10-24    PT/INR - ( 24 Oct 2018 15:39 )   PT: 14.2 sec;   INR: 1.30 ratio         PTT - ( 24 Oct 2018 15:39 )  PTT:28.5 sec      Serum Pro-Brain Natriuretic Peptide: 2043 pg/mL (10-24 @ 15:34)

## 2018-10-25 NOTE — PROGRESS NOTE ADULT - SUBJECTIVE AND OBJECTIVE BOX
Aidan Morel MD  Division of Hospital Medicine  Pager [phone]      BERTHA WARD  58y  Male      Patient is a 58y old  Male who presents with a chief complaint of VELÁSQUEZ (24 Oct 2018 20:15)      INTERVAL HPI/OVERNIGHT EVENTS:  HR remains 100-120. Reported orthopnea overnight. No chest pain       REVIEW OF SYSTEMS: 14 point ROS negative unless listed above    T(C): 36.5 (10-25-18 @ 11:24), Max: 36.9 (10-24-18 @ 14:48)  HR: 120 (10-25-18 @ 11:24) (82 - 129)  BP: 149/106 (10-25-18 @ 11:24) (106/75 - 150/115)  RR: 16 (10-25-18 @ 11:24) (16 - 24)  SpO2: 96% (10-25-18 @ 11:24) (93% - 98%)  Wt(kg): --Vital Signs Last 24 Hrs  T(C): 36.5 (25 Oct 2018 11:24), Max: 36.9 (24 Oct 2018 14:48)  T(F): 97.7 (25 Oct 2018 11:24), Max: 98.5 (24 Oct 2018 15:23)  HR: 120 (25 Oct 2018 11:24) (82 - 129)  BP: 149/106 (25 Oct 2018 11:24) (106/75 - 150/115)  BP(mean): --  RR: 16 (25 Oct 2018 11:24) (16 - 24)  SpO2: 96% (25 Oct 2018 11:24) (93% - 98%)    PHYSICAL EXAM:  GENERAL: NAD, well-developed  HEAD:  Atraumatic, normocephalic  EYES: EOMI, sclera clear  NECK: Supple, no JVD  CHEST/LUNG: Clear to auscultation bilaterally; no wheezing or rales  HEART: irregular, +tachycardic, S1 S2, no murmurs   ABDOMEN: Soft, nontender, +distended, bowel sounds present  EXTREMITIES: 2-3+ pitting edema in b/l LE with chronic skin changes, open lesions on RLE   PSYCH: calm affect, not anxious  NEUROLOGY: non-focal, AAOx3  SKIN: chronic skin discoloration of b/l LE with 3 lesions on RLE     Consultant(s) Notes Reviewed:  [x ] YES  [ ] NO  Care Discussed with Consultants/Other Providers [ x] YES  [ ] NO    LABS:                        15.0   7.62  )-----------( 232      ( 25 Oct 2018 07:42 )             46.9     10-25    139  |  100  |  24<H>  ----------------------------<  103<H>  4.1   |  26  |  1.36<H>    Ca    9.0      25 Oct 2018 05:45  Phos  3.2     10-25  Mg     2.1     10-25    TPro  7.3  /  Alb  3.8  /  TBili  1.2  /  DBili  x   /  AST  23  /  ALT  22  /  AlkPhos  87  10-24    PT/INR - ( 24 Oct 2018 15:39 )   PT: 14.2 sec;   INR: 1.30 ratio         PTT - ( 24 Oct 2018 15:39 )  PTT:28.5 sec    CAPILLARY BLOOD GLUCOSE                RADIOLOGY & ADDITIONAL TESTS:    Imaging Personally Reviewed:  [x ] YES  [ ] NO

## 2018-10-25 NOTE — CONSULT NOTE ADULT - SUBJECTIVE AND OBJECTIVE BOX
CHIEF COMPLAINT: AFL    HISTORY OF PRESENT ILLNESS:      Allergies  No Known Allergies  	    MEDICATIONS:  apixaban 5 milliGRAM(s) Oral every 12 hours  aspirin  chewable 81 milliGRAM(s) Oral daily  diltiazem    Tablet 60 milliGRAM(s) Oral every 6 hours  furosemide    Tablet 40 milliGRAM(s) Oral daily  hydrALAZINE 50 milliGRAM(s) Oral three times a day  lisinopril 20 milliGRAM(s) Oral daily  simvastatin 20 milliGRAM(s) Oral at bedtime        PAST MEDICAL & SURGICAL HISTORY:  Hypercholesteremia  Obesity  H/O heart artery stent  CAD (coronary artery disease)  History of elbow surgery  History of tonsillectomy      FAMILY HISTORY:  No pertinent family history in first degree relatives      SOCIAL HISTORY:    No toxic habits      REVIEW OF SYSTEMS:  See HPI. Otherwise, 10 point ROS done and otherwise negative.    PHYSICAL EXAM:  T(C): 36.5 (10-25-18 @ 11:24), Max: 36.8 (10-24-18 @ 18:50)  HR: 120 (10-25-18 @ 11:24) (98 - 120)  BP: 149/106 (10-25-18 @ 11:24) (110/70 - 149/106)  RR: 16 (10-25-18 @ 11:24) (16 - 22)  SpO2: 96% (10-25-18 @ 11:24) (93% - 98%)  Wt(kg): --  I&O's Summary    25 Oct 2018 07:01  -  25 Oct 2018 17:06  --------------------------------------------------------  IN: 840 mL / OUT: 0 mL / NET: 840 mL        Appearance: Alert. Obese. NAD	  HEENT:   NC/AT	  Cardiovascular: +S1S2 reg, tachy  Respiratory: Decreased BS 	  Psychiatry: A & O x 3, Mood & affect appropriate  Gastrointestinal:  Soft, NT.ND., + BS	  Neurologic: Non-focal  Extremities: 2+ pitting edema BLE; chronic venous stasis changes; +lichen planus lesions  Vascular: Peripheral pulses palpable 2+ bilaterally      LABS:	 	    CBC Full  -  ( 25 Oct 2018 07:42 )  WBC Count : 7.62 K/uL  Hemoglobin : 15.0 g/dL  Hematocrit : 46.9 %  Platelet Count - Automated : 232 K/uL  Mean Cell Volume : 94.9 fl  Mean Cell Hemoglobin : 30.4 pg  Mean Cell Hemoglobin Concentration : 32.0 gm/dL    10-25    139  |  100  |  24<H>  ----------------------------<  103<H>  4.1   |  26  |  1.36<H>  10-24    141  |  100  |  25<H>  ----------------------------<  135<H>  4.2   |  28  |  1.42<H>    Ca    9.0      25 Oct 2018 05:45  Ca    9.1      24 Oct 2018 15:34  Phos  3.2     10-25  Mg     2.1     10-25    TPro  7.3  /  Alb  3.8  /  TBili  1.2  /  DBili  x   /  AST  23  /  ALT  22  /  AlkPhos  87  10-24    TELEMETRY: 	    ECG:  	  RADIOLOGY:  OTHER: 	    PREVIOUS DIAGNOSTIC TESTING:    Echocardiogram:    Catheterization:    Stress Test:  	  	  ASSESSMENT/PLAN: CHIEF COMPLAINT: AFL    HISTORY OF PRESENT ILLNESS:  59y/o male h/o morbid obesity, CAD s/p PCI (2000) s/p CABG (2012), HTN, lichen planus with chronic LE edema, systolic CHF w/ EF currently 25-30% (was 40% earlier this year) p/w acute CHF exacerbation in setting of newly diagnosed typical AFL w/ RVR. Patient reports increase abdominal girth and LE edema with worsening SOB and palps for ~1week. Patient denies CP, near syncope or syncope. He states in the past post CABG he was on Toprol but self d/c'ed it secondary to him stating it made him orthopneic.  EKG/telemetry shows typical AFL w/ VR ~ bpm.    Allergies  No Known Allergies  	    MEDICATIONS:  apixaban 5 milliGRAM(s) Oral every 12 hours  aspirin  chewable 81 milliGRAM(s) Oral daily  diltiazem    Tablet 60 milliGRAM(s) Oral every 6 hours  furosemide    Tablet 40 milliGRAM(s) Oral daily  hydrALAZINE 50 milliGRAM(s) Oral three times a day  lisinopril 20 milliGRAM(s) Oral daily  simvastatin 20 milliGRAM(s) Oral at bedtime        PAST MEDICAL & SURGICAL HISTORY:  Hypercholesteremia  Obesity  H/O heart artery stent  CAD (coronary artery disease)  History of elbow surgery  History of tonsillectomy      FAMILY HISTORY:  No pertinent family history in first degree relatives      SOCIAL HISTORY:    No toxic habits      REVIEW OF SYSTEMS:  See HPI. Otherwise, 10 point ROS done and otherwise negative.    PHYSICAL EXAM:  T(C): 36.5 (10-25-18 @ 11:24), Max: 36.8 (10-24-18 @ 18:50)  HR: 120 (10-25-18 @ 11:24) (98 - 120)  BP: 149/106 (10-25-18 @ 11:24) (110/70 - 149/106)  RR: 16 (10-25-18 @ 11:24) (16 - 22)  SpO2: 96% (10-25-18 @ 11:24) (93% - 98%)  Wt(kg): --  I&O's Summary    25 Oct 2018 07:01  -  25 Oct 2018 17:06  --------------------------------------------------------  IN: 840 mL / OUT: 0 mL / NET: 840 mL        Appearance: Alert. Obese. NAD	  HEENT:   NC/AT	  Cardiovascular: +S1S2 reg, tachy; +JVP  Respiratory: Decreased BS B/L 	  Psychiatry: A & O x 3, Mood & affect appropriate  Gastrointestinal:  Soft, NT. +distention, +BS	  Neurologic: Non-focal  Extremities: 2+ pitting edema BLE; chronic venous stasis changes; +lichen planus lesions  Vascular: Peripheral pulses palpable 2+ bilaterally      LABS:	 	    CBC Full  -  ( 25 Oct 2018 07:42 )  WBC Count : 7.62 K/uL  Hemoglobin : 15.0 g/dL  Hematocrit : 46.9 %  Platelet Count - Automated : 232 K/uL  Mean Cell Volume : 94.9 fl  Mean Cell Hemoglobin : 30.4 pg  Mean Cell Hemoglobin Concentration : 32.0 gm/dL    10-25    139  |  100  |  24<H>  ----------------------------<  103<H>  4.1   |  26  |  1.36<H>  10-24    141  |  100  |  25<H>  ----------------------------<  135<H>  4.2   |  28  |  1.42<H>    Ca    9.0      25 Oct 2018 05:45  Ca    9.1      24 Oct 2018 15:34  Phos  3.2     10-25  Mg     2.1     10-25    TPro  7.3  /  Alb  3.8  /  TBili  1.2  /  DBili  x   /  AST  23  /  ALT  22  /  AlkPhos  87  10-24    TELEMETRY: AFL w/ VR 90-140bpm  	    ECG:  typical AFL w/ VR ~140bpm  	  CXR:  The heart is markedly enlarged. The lungs are clear. Status post   sternotomy. Degenerative changes of the left shoulder. The right shoulder   is only partially seen on the current study.    Reviewed CXR--+pulmonary congestion on CXR    PREVIOUS DIAGNOSTIC TESTING:    Echocardiogram(10/25/18):  Conclusions:  1. Aortic valve not well visualized; appears calcified.  Peak transaortic valve gradient equals 21 mm Hg, aortic  valve velocity time integral equals 39 cm. Dimensionless  Index = 0.5, consistent with mild aortic stenosis. The LVOT  diameter is not well visualized. Minimal aortic  regurgitation.  2. Mild concentric left ventricular hypertrophy.  3. Severe global left ventricular systolic dysfunction.  Endocardial visualization enhanced with intravenous  injection of Ultrasonic Enhancing Agent (Definity). No left  ventricular thrombus.  4. The right ventricle is not well visualized.  *** Compared with echocardiogram of 6/11/2013, mild aortic  stenosis is now seen.    MUGA Scan (8/2018):  Findings:  The left ventricular cavity was normal in size and shape.    There is hypokinesis of the distal anterior wall and anteroapex and apex   of the left ventricle.    Global left ventricular ejection fraction is 40%.    Right ventricular systolic function appears normal.    	  	  ASSESSMENT/PLAN: 	  59y/o male h/o morbid obesity, CAD s/p PCI (2000) s/p CABG (2012), HTN, lichen planus with chronic LE edema, systolic CHF w/ EF currently 25-30% (was 40% earlier this year) p/w acute CHF exacerbation in setting of newly diagnosed typical AFL w/ RVR  --Recommend continue diureses. Would change lasix from po to IV  --Continue Eliquis  --Given patient in acute CHF and to allow for better diuresis, recommend STANISLAW/DCCV tomorrow. Once in SR and more euvolemic then recommend an AFL ablation--possibly Monday if patient amenable, otherwise as outpatient. Discussed with patient plan including risks, benefits and alternatives. All questions asked and answered.  --Keep NPO for STANISLAW/DCCV tomorrow. Please make sure patient receives his Eliquis in am  --Continue to monitor on telemetry    Rosa Cervantes PA-C  [id number]

## 2018-10-25 NOTE — PROGRESS NOTE ADULT - PROBLEM SELECTOR PLAN 2
CHF with EF 40%, chronic LE edema and no rales on exam, not in exacerbation currently   c/w lasix 40 PO  cardiology consult  c/w ACEi, pt reports intolerance to BB

## 2018-10-26 DIAGNOSIS — I50.23 ACUTE ON CHRONIC SYSTOLIC (CONGESTIVE) HEART FAILURE: ICD-10-CM

## 2018-10-26 LAB
ANION GAP SERPL CALC-SCNC: 12 MMOL/L — SIGNIFICANT CHANGE UP (ref 5–17)
BUN SERPL-MCNC: 24 MG/DL — HIGH (ref 7–23)
CALCIUM SERPL-MCNC: 9.3 MG/DL — SIGNIFICANT CHANGE UP (ref 8.4–10.5)
CHLORIDE SERPL-SCNC: 96 MMOL/L — SIGNIFICANT CHANGE UP (ref 96–108)
CO2 SERPL-SCNC: 28 MMOL/L — SIGNIFICANT CHANGE UP (ref 22–31)
CREAT SERPL-MCNC: 1.23 MG/DL — SIGNIFICANT CHANGE UP (ref 0.5–1.3)
GLUCOSE SERPL-MCNC: 110 MG/DL — HIGH (ref 70–99)
HCT VFR BLD CALC: 46.6 % — SIGNIFICANT CHANGE UP (ref 39–50)
HGB BLD-MCNC: 15.5 G/DL — SIGNIFICANT CHANGE UP (ref 13–17)
MAGNESIUM SERPL-MCNC: 2 MG/DL — SIGNIFICANT CHANGE UP (ref 1.6–2.6)
MCHC RBC-ENTMCNC: 30.3 PG — SIGNIFICANT CHANGE UP (ref 27–34)
MCHC RBC-ENTMCNC: 33.3 GM/DL — SIGNIFICANT CHANGE UP (ref 32–36)
MCV RBC AUTO: 91 FL — SIGNIFICANT CHANGE UP (ref 80–100)
PHOSPHATE SERPL-MCNC: 2.9 MG/DL — SIGNIFICANT CHANGE UP (ref 2.5–4.5)
PLATELET # BLD AUTO: 249 K/UL — SIGNIFICANT CHANGE UP (ref 150–400)
POTASSIUM SERPL-MCNC: 3.8 MMOL/L — SIGNIFICANT CHANGE UP (ref 3.5–5.3)
POTASSIUM SERPL-SCNC: 3.8 MMOL/L — SIGNIFICANT CHANGE UP (ref 3.5–5.3)
RBC # BLD: 5.12 M/UL — SIGNIFICANT CHANGE UP (ref 4.2–5.8)
RBC # FLD: 13.7 % — SIGNIFICANT CHANGE UP (ref 10.3–14.5)
SODIUM SERPL-SCNC: 136 MMOL/L — SIGNIFICANT CHANGE UP (ref 135–145)
WBC # BLD: 8.17 K/UL — SIGNIFICANT CHANGE UP (ref 3.8–10.5)
WBC # FLD AUTO: 8.17 K/UL — SIGNIFICANT CHANGE UP (ref 3.8–10.5)

## 2018-10-26 PROCEDURE — 99233 SBSQ HOSP IP/OBS HIGH 50: CPT

## 2018-10-26 PROCEDURE — 93325 DOPPLER ECHO COLOR FLOW MAPG: CPT | Mod: 26

## 2018-10-26 PROCEDURE — 99232 SBSQ HOSP IP/OBS MODERATE 35: CPT

## 2018-10-26 PROCEDURE — 93010 ELECTROCARDIOGRAM REPORT: CPT

## 2018-10-26 PROCEDURE — 93320 DOPPLER ECHO COMPLETE: CPT | Mod: 26

## 2018-10-26 PROCEDURE — 93312 ECHO TRANSESOPHAGEAL: CPT | Mod: 26

## 2018-10-26 PROCEDURE — 99233 SBSQ HOSP IP/OBS HIGH 50: CPT | Mod: GC

## 2018-10-26 RX ORDER — LISINOPRIL 2.5 MG/1
20 TABLET ORAL
Qty: 0 | Refills: 0 | Status: DISCONTINUED | OUTPATIENT
Start: 2018-10-26 | End: 2018-11-04

## 2018-10-26 RX ORDER — METOPROLOL TARTRATE 50 MG
25 TABLET ORAL DAILY
Qty: 0 | Refills: 0 | Status: DISCONTINUED | OUTPATIENT
Start: 2018-10-26 | End: 2018-10-26

## 2018-10-26 RX ORDER — FAMOTIDINE 10 MG/ML
20 INJECTION INTRAVENOUS
Qty: 0 | Refills: 0 | Status: DISCONTINUED | OUTPATIENT
Start: 2018-10-26 | End: 2018-11-04

## 2018-10-26 RX ORDER — DIGOXIN 250 MCG
0.12 TABLET ORAL DAILY
Qty: 0 | Refills: 0 | Status: DISCONTINUED | OUTPATIENT
Start: 2018-10-26 | End: 2018-11-04

## 2018-10-26 RX ORDER — INFLUENZA VIRUS VACCINE 15; 15; 15; 15 UG/.5ML; UG/.5ML; UG/.5ML; UG/.5ML
0.5 SUSPENSION INTRAMUSCULAR ONCE
Qty: 0 | Refills: 0 | Status: DISCONTINUED | OUTPATIENT
Start: 2018-10-26 | End: 2018-11-04

## 2018-10-26 RX ORDER — FUROSEMIDE 40 MG
40 TABLET ORAL
Qty: 0 | Refills: 0 | Status: DISCONTINUED | OUTPATIENT
Start: 2018-10-26 | End: 2018-10-30

## 2018-10-26 RX ADMIN — Medication 0.12 MILLIGRAM(S): at 18:42

## 2018-10-26 RX ADMIN — APIXABAN 5 MILLIGRAM(S): 2.5 TABLET, FILM COATED ORAL at 23:03

## 2018-10-26 RX ADMIN — LISINOPRIL 20 MILLIGRAM(S): 2.5 TABLET ORAL at 05:23

## 2018-10-26 RX ADMIN — Medication 81 MILLIGRAM(S): at 12:14

## 2018-10-26 RX ADMIN — LISINOPRIL 20 MILLIGRAM(S): 2.5 TABLET ORAL at 17:35

## 2018-10-26 RX ADMIN — Medication 50 MILLIGRAM(S): at 17:35

## 2018-10-26 RX ADMIN — Medication 40 MILLIGRAM(S): at 18:42

## 2018-10-26 RX ADMIN — SIMVASTATIN 20 MILLIGRAM(S): 20 TABLET, FILM COATED ORAL at 23:03

## 2018-10-26 RX ADMIN — Medication 40 MILLIGRAM(S): at 05:23

## 2018-10-26 RX ADMIN — FAMOTIDINE 20 MILLIGRAM(S): 10 INJECTION INTRAVENOUS at 18:42

## 2018-10-26 RX ADMIN — APIXABAN 5 MILLIGRAM(S): 2.5 TABLET, FILM COATED ORAL at 12:14

## 2018-10-26 RX ADMIN — Medication 50 MILLIGRAM(S): at 05:23

## 2018-10-26 RX ADMIN — Medication 50 MILLIGRAM(S): at 23:03

## 2018-10-26 NOTE — PROGRESS NOTE ADULT - SUBJECTIVE AND OBJECTIVE BOX
Overnight Events:   Had 3.8s pause on tele, asymptomatic.    Review of Systems:  REVIEW OF SYSTEMS:  CONSTITUTIONAL: No weakness, fevers or chills  EYES/ENT: No visual changes;  No dysphagia  NECK: No pain or stiffness  RESPIRATORY: No cough, wheezing, hemoptysis; No shortness of breath  CARDIOVASCULAR: No chest pain or palpitations; No lower extremity edema  GASTROINTESTINAL: No abdominal or epigastric pain. No nausea, vomiting, or hematemesis; No diarrhea or constipation. No melena or hematochezia.  BACK: No back pain  GENITOURINARY: No dysuria, frequency or hematuria  NEUROLOGICAL: No numbness or weakness  SKIN: No itching, burning, rashes, or lesions   All other review of systems is negative unless indicated above.            Medications:  apixaban 5 milliGRAM(s) Oral every 12 hours  aspirin  chewable 81 milliGRAM(s) Oral daily  diltiazem    Tablet 30 milliGRAM(s) Oral every 6 hours  docusate sodium 100 milliGRAM(s) Oral daily  furosemide    Tablet 40 milliGRAM(s) Oral daily  hydrALAZINE 50 milliGRAM(s) Oral three times a day  influenza   Vaccine 0.5 milliLiter(s) IntraMuscular once  lisinopril 20 milliGRAM(s) Oral daily  polyethylene glycol 3350 17 Gram(s) Oral two times a day  simvastatin 20 milliGRAM(s) Oral at bedtime      PAST MEDICAL & SURGICAL HISTORY:  Hypercholesteremia  Obesity  H/O heart artery stent  CAD (coronary artery disease)  History of elbow surgery  History of tonsillectomy      Vitals:  T(F): 98 (10-26), Max: 98.1 (10-25)  HR: 89 (10-26) (89 - 120)  BP: 125/90 (10-26) (125/90 - 159/98)  RR: 18 (10-26)  SpO2: 93% (10-26)  I&O's Summary    25 Oct 2018 07:01  -  26 Oct 2018 06:46  --------------------------------------------------------  IN: 1080 mL / OUT: 0 mL / NET: 1080 mL        Physical Exam:  Appearance: No acute distress; well appearing  Eyes: PERRL, EOMI, pink conjunctiva  HENT: Normal oral muscosa  Cardiovascular: RRR, S1, S2, no murmurs, rubs, or gallops; no edema; no JVD  Respiratory: Clear to auscultation bilaterally  Gastrointestinal: soft, non-tender, non-distended with normal bowel sounds  Musculoskeletal: No clubbing; no joint deformity   Neurologic: Non-focal  Lymphatic: No lymphadenopathy  Psychiatry: AAOx3, mood & affect appropriate  Skin: No rashes, ecchymoses, or cyanosis                          15.0   7.62  )-----------( 232      ( 25 Oct 2018 07:42 )             46.9     10-25    139  |  100  |  24<H>  ----------------------------<  103<H>  4.1   |  26  |  1.36<H>    Ca    9.0      25 Oct 2018 05:45  Phos  3.2     10-25  Mg     2.1     10-25    TPro  7.3  /  Alb  3.8  /  TBili  1.2  /  DBili  x   /  AST  23  /  ALT  22  /  AlkPhos  87  10-24    PT/INR - ( 24 Oct 2018 15:39 )   PT: 14.2 sec;   INR: 1.30 ratio         PTT - ( 24 Oct 2018 15:39 )  PTT:28.5 sec      Serum Pro-Brain Natriuretic Peptide: 2043 pg/mL (10-24 @ 15:34)          New ECG(s): Personally reviewed    Echo:    Stress Testing:     Cath:    Imaging:    Interpretation of Telemetry: Overnight Events:   Had 3.8s pause on tele, asymptomatic. Feeling well, no chest pain or dyspnea.    Review of Systems:  REVIEW OF SYSTEMS:  CONSTITUTIONAL: No weakness, fevers or chills  EYES/ENT: No visual changes;  No dysphagia  NECK: No pain or stiffness  RESPIRATORY: No cough, wheezing, hemoptysis; No shortness of breath  CARDIOVASCULAR: No chest pain or palpitations; No lower extremity edema  GASTROINTESTINAL: No abdominal or epigastric pain. No nausea, vomiting, or hematemesis; No diarrhea or constipation. No melena or hematochezia.  BACK: No back pain  GENITOURINARY: No dysuria, frequency or hematuria  NEUROLOGICAL: No numbness or weakness  SKIN: No itching, burning, rashes, or lesions   All other review of systems is negative unless indicated above.            Medications:  apixaban 5 milliGRAM(s) Oral every 12 hours  aspirin  chewable 81 milliGRAM(s) Oral daily  diltiazem    Tablet 30 milliGRAM(s) Oral every 6 hours  docusate sodium 100 milliGRAM(s) Oral daily  furosemide    Tablet 40 milliGRAM(s) Oral daily  hydrALAZINE 50 milliGRAM(s) Oral three times a day  influenza   Vaccine 0.5 milliLiter(s) IntraMuscular once  lisinopril 20 milliGRAM(s) Oral daily  polyethylene glycol 3350 17 Gram(s) Oral two times a day  simvastatin 20 milliGRAM(s) Oral at bedtime      PAST MEDICAL & SURGICAL HISTORY:  Hypercholesteremia  Obesity  H/O heart artery stent  CAD (coronary artery disease)  History of elbow surgery  History of tonsillectomy      Vitals:  T(F): 98 (10-26), Max: 98.1 (10-25)  HR: 89 (10-26) (89 - 120)  BP: 125/90 (10-26) (125/90 - 159/98)  RR: 18 (10-26)  SpO2: 93% (10-26)  I&O's Summary    25 Oct 2018 07:01  -  26 Oct 2018 06:46  --------------------------------------------------------  IN: 1080 mL / OUT: 0 mL / NET: 1080 mL        Physical Exam:  Appearance: No acute distress; well appearing  Eyes: PERRL, EOMI, pink conjunctiva  HENT: Normal oral muscosa  Cardiovascular: RRR, S1, S2, no murmurs, rubs, or gallops; no edema; no JVD  Respiratory: Clear to auscultation bilaterally  Gastrointestinal: soft, non-tender, non-distended with normal bowel sounds  Musculoskeletal: No clubbing; no joint deformity   Neurologic: Non-focal  Lymphatic: No lymphadenopathy  Psychiatry: AAOx3, mood & affect appropriate  Skin: No rashes, ecchymoses, or cyanosis                          15.0   7.62  )-----------( 232      ( 25 Oct 2018 07:42 )             46.9     10-25    139  |  100  |  24<H>  ----------------------------<  103<H>  4.1   |  26  |  1.36<H>    Ca    9.0      25 Oct 2018 05:45  Phos  3.2     10-25  Mg     2.1     10-25    TPro  7.3  /  Alb  3.8  /  TBili  1.2  /  DBili  x   /  AST  23  /  ALT  22  /  AlkPhos  87  10-24    PT/INR - ( 24 Oct 2018 15:39 )   PT: 14.2 sec;   INR: 1.30 ratio         PTT - ( 24 Oct 2018 15:39 )  PTT:28.5 sec      Serum Pro-Brain Natriuretic Peptide: 2043 pg/mL (10-24 @ 15:34)          New ECG(s): Personally reviewed    Echo:    Stress Testing:     Cath:    Imaging:    Interpretation of Telemetry:

## 2018-10-26 NOTE — CHART NOTE - NSCHARTNOTEFT_GEN_A_CORE
RN notified PA that patient is refusing Toprol XL. Pt seen and assessed at bedside, reports that he is intolerant to beta blockers. States that in the past while taking BB and lying flat he experiences severe difficulty breathing and SOB which persists for days after taking and stopping the medication.     Vital Signs Last 24 Hrs  T(C): 36.7 (26 Oct 2018 17:49), Max: 36.7 (26 Oct 2018 04:26)  T(F): 98.1 (26 Oct 2018 17:49), Max: 98.1 (26 Oct 2018 17:49)  HR: 125 (26 Oct 2018 18:40) (85 - 125)  BP: 133/88 (26 Oct 2018 18:40) (120/85 - 159/98)  BP(mean): --  RR: 18 (26 Oct 2018 17:49) (18 - 18)  SpO2: 92% (26 Oct 2018 17:49) (91% - 98%)    Mr. Waller is a 57 y/o M with PMHx CHF on PO Lasix; EF: 25-30%, CAD s/p stents 2000, CABG in 2012, HTN, chronic LE edema, lichen planus presenting with new Aflutter w/ RVR. Pt was planned for STANISLAW/DCCV today but upon reviewing STANISLAW results, pt was noted to have LA appendage thrombus. Therefore, DCCV cancelled along with possible ablation. EP recommended starting .125mg Digoxin and 25mg Toprol XL.     Discussed case with Esperanza JAY) from EP   - D/C beta blocker at this time.   - Continue Cardizem 30mg q6hrs and .125mg Digoxin QD.   - f/u Cardiology recs    Kiley Vega PA-C   Dept of Medicine   Spectra: [phone]

## 2018-10-26 NOTE — PROGRESS NOTE ADULT - SUBJECTIVE AND OBJECTIVE BOX
24H hour events: No over night events. Denies c/o CP, palpitations or SOB.     MEDICATIONS:  apixaban 5 milliGRAM(s) Oral every 12 hours  aspirin  chewable 81 milliGRAM(s) Oral daily  diltiazem    Tablet 30 milliGRAM(s) Oral every 6 hours  furosemide   Injectable 40 milliGRAM(s) IV Push two times a day  hydrALAZINE 50 milliGRAM(s) Oral three times a day  lisinopril 20 milliGRAM(s) Oral daily    docusate sodium 100 milliGRAM(s) Oral daily  polyethylene glycol 3350 17 Gram(s) Oral two times a day    simvastatin 20 milliGRAM(s) Oral at bedtime    influenza   Vaccine 0.5 milliLiter(s) IntraMuscular once      REVIEW OF SYSTEMS:  Complete 10point ROS negative.    PHYSICAL EXAM:  T(C): 36.4 (10-26-18 @ 08:39), Max: 36.7 (10-26-18 @ 04:26)  HR: 85 (10-26-18 @ 08:39) (85 - 120)  BP: 120/85 (10-26-18 @ 08:39) (120/85 - 159/98)  RR: 18 (10-26-18 @ 08:39) (16 - 18)  SpO2: 98% (10-26-18 @ 08:39) (93% - 98%)    25 Oct 2018 07:01  -  26 Oct 2018 07:00  --------------------------------------------------------  IN: 1080 mL / OUT: 0 mL / NET: 1080 mL    Appearance: Normal	  Cardiovascular: Normal S1 S2, regular. No JVD, No murmurs   Respiratory: Lungs clear to auscultation	  Psychiatry: A & O x 3, Mood & affect appropriate  Gastrointestinal:  Soft, Non-tender, + BS	  Skin: No rashes, No ecchymoses, No cyanosis	  Neurologic: Non-focal  Extremities: +3 BL/LE edema with chronic venous stasis discoloration.    Vascular: Peripheral pulses palpable 2+ bilaterally    LABS:	 	    CBC Full  -  ( 26 Oct 2018 07:57 )  WBC Count : 8.17 K/uL  Hemoglobin : 15.5 g/dL  Hematocrit : 46.6 %  Platelet Count - Automated : 249 K/uL  Mean Cell Volume : 91.0 fl  Mean Cell Hemoglobin : 30.3 pg  Mean Cell Hemoglobin Concentration : 33.3 gm/dL  Auto Neutrophil # : x  Auto Lymphocyte # : x  Auto Monocyte # : x  Auto Eosinophil # : x  Auto Basophil # : x  Auto Neutrophil % : x  Auto Lymphocyte % : x  Auto Monocyte % : x  Auto Eosinophil % : x  Auto Basophil % : x    10-26    136  |  96  |  24<H>  ----------------------------<  110<H>  3.8   |  28  |  1.23  10-25    139  |  100  |  24<H>  ----------------------------<  103<H>  4.1   |  26  |  1.36<H>    Ca    9.3      26 Oct 2018 06:27  Ca    9.0      25 Oct 2018 05:45  Phos  2.9     10-26  Phos  3.2     10-25  Mg     2.0     10-26  Mg     2.1     10-25    TPro  7.3  /  Alb  3.8  /  TBili  1.2  /  DBili  x   /  AST  23  /  ALT  22  /  AlkPhos  87  10-24      proBNP: Serum Pro-Brain Natriuretic Peptide: 2043 pg/mL (10-24 @ 15:34)    TELEMETRY: Atrial Flutter 's

## 2018-10-26 NOTE — PROGRESS NOTE ADULT - PROBLEM SELECTOR PLAN 4
c/w lisinopril 20 BID and hydralazine 50 TID  c/w Cardizem for rate control - monitor BP closely in setting of another antiHTN, can decrease hydralazine dosing if needed

## 2018-10-26 NOTE — PROGRESS NOTE ADULT - PROBLEM SELECTOR PLAN 2
CHF with EF 40%, chronic LE edema and no rales on exam, BNP elevated c/w likely exacerbation  start lasix 40mg IV BID  Strict I/O  Daily Weight  cardiology following  c/w ACEi, plan to possibly add beta blocker post DCCV cardioversion

## 2018-10-26 NOTE — PROGRESS NOTE ADULT - PROBLEM SELECTOR PLAN 1
With new Aflutter with RVR to 130s in ED  Currently still aflutter.   On Cardizem to 30mg Q6. Will likely plan to transition to Beta blocker after ablation given CHF  CHADSVASC 3 started on Eliquis   cardiology and EP consulted. Plan for dccv cardioversion today. Possible ablation once more euvolemic possibly Monday or as outpatient  TTE EF 25-30%  c/w tele monitoring  TSH 0.89  Maintain K>4, Mg>2

## 2018-10-26 NOTE — CHART NOTE - NSCHARTNOTEFT_GEN_A_CORE
CC: Sinus Pause    Notified by RN; pt. had a 3.8 second pause on telemetry. Pt. seen and examined at bedside. Pt. was briefly bradycardic as well but HR returned back to 80's-110's. Pt. denies all complaints. Denies chest pain, sob, abdominal pain, lightheadedness/dizziness, HA, palpitations, N/V, and syncope. Pt. originally admitted w/ Aflutter w/ RVR and is scheduled for cardioversion in AM. Pt. is on Cardizem 60mg q6h which was uptritrated from 30mg in AM secondary to continued elevated HR.     Allergies  No Known Allergies    FAMILY HISTORY:  No pertinent family history in first degree relatives    PAST MEDICAL & SURGICAL HISTORY:  Hypercholesteremia  Obesity  H/O heart artery stent  CAD (coronary artery disease)  History of elbow surgery  History of tonsillectomy    REVIEW OF SYSTEMS:  CONSTITUTIONAL: No fever, weight loss, or fatigue  EYES: No eye pain, visual disturbances, or discharge  ENMT:  No difficulty hearing, tinnitus, vertigo; No sinus or throat pain  NECK: No pain or stiffness  BREASTS: No pain, masses, or nipple discharge  RESPIRATORY: No cough, wheezing, chills or hemoptysis; No shortness of breath  CARDIOVASCULAR: No chest pain, palpitations, dizziness, or leg swelling  GASTROINTESTINAL: No abdominal or epigastric pain. No nausea, vomiting, or hematemesis; No diarrhea or constipation. No melena or hematochezia.  GENITOURINARY: No dysuria, frequency, hematuria, or incontinence  NEUROLOGICAL: No headaches, memory loss, loss of strength, numbness, or tremors  SKIN: No itching, burning, rashes, or lesions   LYMPH NODES: No enlarged glands  ENDOCRINE: No heat or cold intolerance; No hair loss  MUSCULOSKELETAL: No joint pain or swelling; No muscle, back, or extremity pain  PSYCHIATRIC: No depression, anxiety, mood swings, or difficulty sleeping  HEME/LYMPH: No easy bruising, or bleeding gums  ALLERGY AND IMMUNOLOGIC: No hives or eczema    Vital Signs Last 24 Hrs  T(C): 36.6 (25 Oct 2018 21:23), Max: 36.8 (25 Oct 2018 05:18)  T(F): 97.8 (25 Oct 2018 21:23), Max: 98.3 (25 Oct 2018 05:18)  HR: 92 (25 Oct 2018 21:23) (92 - 120)  BP: 159/98 (25 Oct 2018 21:23) (136/85 - 159/98)  RR: 18 (25 Oct 2018 21:23) (16 - 18)  SpO2: 94% (25 Oct 2018 21:23) (93% - 96%)    PHYSICAL EXAM:  GENERAL: NAD, well-groomed, well-developed  HEAD:  Atraumatic, Normocephalic  EYES: EOMI, PERRLA, conjunctiva and sclera clear  ENMT: No tonsillar erythema, exudates, or enlargement; Moist mucous membranes, Good dentition, No lesions  NECK: Supple, No JVD, Normal thyroid  NERVOUS SYSTEM:  Alert & Oriented X3, Good concentration; Motor Strength 5/5 B/L upper and lower extremities; DTRs 2+ intact and symmetric  CHEST/LUNG: Clear to percussion bilaterally; No rales, rhonchi, wheezing, or rubs  HEART: Tachycardic/irregular; No murmurs, rubs, or gallops  ABDOMEN: Soft, Nontender, Nondistended; Bowel sounds present  EXTREMITIES:  2+ Peripheral Pulses, No clubbing, cyanosis, or edema  LYMPH: No lymphadenopathy noted  SKIN: No rashes or lesions    LABS:                        15.0   7.62  )-----------( 232      ( 25 Oct 2018 07:42 )             46.9     10-25    139  |  100  |  24<H>  ----------------------------<  103<H>  4.1   |  26  |  1.36<H>    Ca    9.0      25 Oct 2018 05:45  Phos  3.2     10-25  Mg     2.1     10-25    TPro  7.3  /  Alb  3.8  /  TBili  1.2  /  DBili  x   /  AST  23  /  ALT  22  /  AlkPhos  87  10-24    PT/INR - ( 24 Oct 2018 15:39 )   PT: 14.2 sec;   INR: 1.30 ratio         PTT - ( 24 Oct 2018 15:39 )  PTT:28.5 sec      ASSESSMENT/PLAN  Pt. is a 58M with PMH of CAD s/p CABG/stents (stents 2000, CABG 2012), HFrEF (EF 40%), HTN, lichen planus with chronic LE edema that was originally admitted w/ Aflutter w/ RVR is now being seen for a sinus pause on telemetry    1)Sinus Pause x 3.8 sec; secondary to Cardizem   -Pt. had one episode of bradycardia in 30's and one pause for 3.8 sec briefly.   -Current rhythm is Aflutter 80's-110s on telemetry   -EKG performed shows Aflutter w/ HR at 85bpm.   -Currently, pt. is on Cardizem 60mg q6h. Will decrease dose back to 30mg q6h for now. F/u morning's HR and telemetry trend.   -Pt. is scheduled for STANISLAW/DCCV in AM.  -F/u cardiology reccs in AM.  -F/u EP reccs in AM.  -Continue to monitor pt. throughout the night on telemetry.  -F/u w/ primary team in AM.     -Tomasz Colby PA-C. #[id number]

## 2018-10-26 NOTE — PROGRESS NOTE ADULT - ASSESSMENT
58M with PMH of CAD s/p CABG/stents (stents 2000, CABG 2012), HFrEF (EF 40%), HTN, lichen planus with chronic LE edema p/w VELÁSQUEZ and palpitations, found to be in typical aflutter. Now rate controlled with diltiazem PO TID. Patient has not been taking metoprolol at home. On apixaban already.    -continue apixaban  -plan for TTE/DCCV in AM, appreciate EP recs  -would continue diltiazem overnight and after cardioversion would transition from diltiazem to BB  -agree with IV diuresis    Terri Lawrence MD  Cardiology Fellow PGY-5  [id number] 58M with PMH of CAD s/p CABG/stents (stents 2000, CABG 2012), HFrEF (EF 40%), HTN, lichen planus with chronic LE edema p/w VELÁSQUEZ and palpitations, found to be in typical aflutter. Now rate controlled with diltiazem PO TID. Patient has not been taking metoprolol at home. On apixaban already.    -continue apixaban  -plan for TTE/DCCV in AM, appreciate EP recs  -would continue diltiazem TID (decreased to 30mg TID due to pause) and after cardioversion would transition from diltiazem to BB  -agree with IV diuresis    Terri Lawrence MD  Cardiology Fellow PGY-5  [phone]

## 2018-10-26 NOTE — PROGRESS NOTE ADULT - PROBLEM SELECTOR PLAN 2
-Maintain NPO for STANISLAW/DCCV today  -Continue on Eliquis  -Once in NSR and more euvolemic then recommend an Aflutter ablation--possibly Monday if patient is euvolemic and able to lay flat.     BRENDA Campoverde NP [id number] -Maintain NPO for STANISLAW/DCCV today  -Continue on Eliquis  -Once in NSR and more euvolemic then recommend an Aflutter ablation--possibly Monday if patient is euvolemic and able to lay flat.   -Patient will need repeat TTE to monitor EF once in NSR with normal ventricular rates    BRENDA Campoverde NP [id number] -Maintain NPO for STANISLAW/DCCV today  -Continue on Eliquis  -Once in NSR and more euvolemic then recommend an Aflutter ablation--possibly Monday if patient is euvolemic and able to lay flat.   -Patient will need repeat TTE to monitor EF once in NSR with normal ventricular rates    BRENDA Nirali NP [id number]    Addendum 1758: Patient is s/p STANISLAW revealing Spontaneous echo contrast in the left atrial appendage which would not clear.  Administration of Definity continued to show "filling defect" within the left atrial appendage suggestive of thrombus.  Plan is to continue Eliquis and would recommend adding Digoxin and Betablocker for better rate control.  Above discussed with Medicine PA.

## 2018-10-26 NOTE — PROGRESS NOTE ADULT - ASSESSMENT
58 year old male with h/o morbid obesity, CAD s/p PCI (2000) s/p CABG (2012), HTN, lichen planus with chronic LE edema, systolic CHF w/ EF currently 25-30% (was 40% earlier this year) p/w acute on chronic systolic HF exacerbation in setting of newly diagnosed typical Aflutter w/ RVR.

## 2018-10-26 NOTE — PROGRESS NOTE ADULT - SUBJECTIVE AND OBJECTIVE BOX
Aidan Morel MD  Division of Hospital Medicine  Pager [phone]      BERTHA WARD  58y  Male      Patient is a 58y old  Male who presents with a chief complaint of VELÁSQUEZ (26 Oct 2018 11:19)      INTERVAL HPI/OVERNIGHT EVENTS:  Seen at bedside. Stated that he was able to sleep better last night.       REVIEW OF SYSTEMS: 14 point ROS negative unless listed above    T(C): 36.6 (10-26-18 @ 11:32), Max: 36.7 (10-26-18 @ 04:26)  HR: 120 (10-26-18 @ 11:32) (85 - 120)  BP: 132/91 (10-26-18 @ 11:32) (120/85 - 159/98)  RR: 18 (10-26-18 @ 11:32) (18 - 18)  SpO2: 91% (10-26-18 @ 11:32) (91% - 98%)  Wt(kg): --Vital Signs Last 24 Hrs  T(C): 36.6 (26 Oct 2018 11:32), Max: 36.7 (26 Oct 2018 04:26)  T(F): 97.9 (26 Oct 2018 11:32), Max: 98 (26 Oct 2018 04:26)  HR: 120 (26 Oct 2018 11:32) (85 - 120)  BP: 132/91 (26 Oct 2018 11:32) (120/85 - 159/98)  BP(mean): --  RR: 18 (26 Oct 2018 11:32) (18 - 18)  SpO2: 91% (26 Oct 2018 11:32) (91% - 98%)    PHYSICAL EXAM:  GENERAL: NAD, well-developed  HEAD:  Atraumatic, normocephalic  EYES: EOMI, sclera clear  NECK: Supple, no JVD  CHEST/LUNG: Clear to auscultation bilaterally; no wheezing or rales  HEART: irregular, S1 S2, no murmurs   ABDOMEN: Soft, nontender, +distended, bowel sounds present  EXTREMITIES: 2-3+ pitting edema in b/l LE with chronic skin changes, open lesions on RLE   PSYCH: calm affect, not anxious  NEUROLOGY: non-focal, AAOx3  SKIN: chronic skin discoloration of b/l LE with 3 lesions on RLE    Consultant(s) Notes Reviewed:  [x ] YES  [ ] NO  Care Discussed with Consultants/Other Providers [ x] YES  [ ] NO    LABS:                        15.5   8.17  )-----------( 249      ( 26 Oct 2018 07:57 )             46.6     10-26    136  |  96  |  24<H>  ----------------------------<  110<H>  3.8   |  28  |  1.23    Ca    9.3      26 Oct 2018 06:27  Phos  2.9     10-26  Mg     2.0     10-26    TPro  7.3  /  Alb  3.8  /  TBili  1.2  /  DBili  x   /  AST  23  /  ALT  22  /  AlkPhos  87  10-24    PT/INR - ( 24 Oct 2018 15:39 )   PT: 14.2 sec;   INR: 1.30 ratio         PTT - ( 24 Oct 2018 15:39 )  PTT:28.5 sec    CAPILLARY BLOOD GLUCOSE                RADIOLOGY & ADDITIONAL TESTS:    Imaging Personally Reviewed:  [x ] YES  [ ] NO

## 2018-10-26 NOTE — PROVIDER CONTACT NOTE (OTHER) - ACTION/TREATMENT ORDERED:
EKG done . will  continue to monitor and assess pt HR before AM cardizem and reassess dosage strength.

## 2018-10-27 LAB
ANION GAP SERPL CALC-SCNC: 14 MMOL/L — SIGNIFICANT CHANGE UP (ref 5–17)
BUN SERPL-MCNC: 23 MG/DL — SIGNIFICANT CHANGE UP (ref 7–23)
CALCIUM SERPL-MCNC: 8.8 MG/DL — SIGNIFICANT CHANGE UP (ref 8.4–10.5)
CHLORIDE SERPL-SCNC: 98 MMOL/L — SIGNIFICANT CHANGE UP (ref 96–108)
CO2 SERPL-SCNC: 26 MMOL/L — SIGNIFICANT CHANGE UP (ref 22–31)
CREAT SERPL-MCNC: 1.25 MG/DL — SIGNIFICANT CHANGE UP (ref 0.5–1.3)
GLUCOSE SERPL-MCNC: 102 MG/DL — HIGH (ref 70–99)
HCT VFR BLD CALC: 45.5 % — SIGNIFICANT CHANGE UP (ref 39–50)
HGB BLD-MCNC: 14.8 G/DL — SIGNIFICANT CHANGE UP (ref 13–17)
MAGNESIUM SERPL-MCNC: 2 MG/DL — SIGNIFICANT CHANGE UP (ref 1.6–2.6)
MCHC RBC-ENTMCNC: 29.8 PG — SIGNIFICANT CHANGE UP (ref 27–34)
MCHC RBC-ENTMCNC: 32.5 GM/DL — SIGNIFICANT CHANGE UP (ref 32–36)
MCV RBC AUTO: 91.7 FL — SIGNIFICANT CHANGE UP (ref 80–100)
PHOSPHATE SERPL-MCNC: 2.9 MG/DL — SIGNIFICANT CHANGE UP (ref 2.5–4.5)
PLATELET # BLD AUTO: 244 K/UL — SIGNIFICANT CHANGE UP (ref 150–400)
POTASSIUM SERPL-MCNC: 3.5 MMOL/L — SIGNIFICANT CHANGE UP (ref 3.5–5.3)
POTASSIUM SERPL-SCNC: 3.5 MMOL/L — SIGNIFICANT CHANGE UP (ref 3.5–5.3)
RBC # BLD: 4.96 M/UL — SIGNIFICANT CHANGE UP (ref 4.2–5.8)
RBC # FLD: 13.9 % — SIGNIFICANT CHANGE UP (ref 10.3–14.5)
SODIUM SERPL-SCNC: 138 MMOL/L — SIGNIFICANT CHANGE UP (ref 135–145)
WBC # BLD: 8.24 K/UL — SIGNIFICANT CHANGE UP (ref 3.8–10.5)
WBC # FLD AUTO: 8.24 K/UL — SIGNIFICANT CHANGE UP (ref 3.8–10.5)

## 2018-10-27 PROCEDURE — 99233 SBSQ HOSP IP/OBS HIGH 50: CPT | Mod: GC

## 2018-10-27 PROCEDURE — 99232 SBSQ HOSP IP/OBS MODERATE 35: CPT

## 2018-10-27 PROCEDURE — 99233 SBSQ HOSP IP/OBS HIGH 50: CPT

## 2018-10-27 RX ORDER — WARFARIN SODIUM 2.5 MG/1
5 TABLET ORAL ONCE
Qty: 0 | Refills: 0 | Status: DISCONTINUED | OUTPATIENT
Start: 2018-10-27 | End: 2018-10-27

## 2018-10-27 RX ORDER — HEPARIN SODIUM 5000 [USP'U]/ML
5000 INJECTION INTRAVENOUS; SUBCUTANEOUS EVERY 6 HOURS
Qty: 0 | Refills: 0 | Status: DISCONTINUED | OUTPATIENT
Start: 2018-10-27 | End: 2018-10-27

## 2018-10-27 RX ORDER — HEPARIN SODIUM 5000 [USP'U]/ML
10000 INJECTION INTRAVENOUS; SUBCUTANEOUS EVERY 6 HOURS
Qty: 0 | Refills: 0 | Status: DISCONTINUED | OUTPATIENT
Start: 2018-10-27 | End: 2018-10-27

## 2018-10-27 RX ORDER — HEPARIN SODIUM 5000 [USP'U]/ML
5000 INJECTION INTRAVENOUS; SUBCUTANEOUS EVERY 6 HOURS
Qty: 0 | Refills: 0 | Status: DISCONTINUED | OUTPATIENT
Start: 2018-10-27 | End: 2018-11-01

## 2018-10-27 RX ORDER — HEPARIN SODIUM 5000 [USP'U]/ML
10000 INJECTION INTRAVENOUS; SUBCUTANEOUS EVERY 6 HOURS
Qty: 0 | Refills: 0 | Status: DISCONTINUED | OUTPATIENT
Start: 2018-10-27 | End: 2018-11-01

## 2018-10-27 RX ORDER — HEPARIN SODIUM 5000 [USP'U]/ML
INJECTION INTRAVENOUS; SUBCUTANEOUS
Qty: 25000 | Refills: 0 | Status: DISCONTINUED | OUTPATIENT
Start: 2018-10-27 | End: 2018-10-27

## 2018-10-27 RX ORDER — HEPARIN SODIUM 5000 [USP'U]/ML
INJECTION INTRAVENOUS; SUBCUTANEOUS
Qty: 25000 | Refills: 0 | Status: DISCONTINUED | OUTPATIENT
Start: 2018-10-28 | End: 2018-10-30

## 2018-10-27 RX ADMIN — SIMVASTATIN 20 MILLIGRAM(S): 20 TABLET, FILM COATED ORAL at 23:10

## 2018-10-27 RX ADMIN — Medication 0.12 MILLIGRAM(S): at 05:24

## 2018-10-27 RX ADMIN — Medication 50 MILLIGRAM(S): at 13:08

## 2018-10-27 RX ADMIN — APIXABAN 5 MILLIGRAM(S): 2.5 TABLET, FILM COATED ORAL at 11:40

## 2018-10-27 RX ADMIN — Medication 50 MILLIGRAM(S): at 23:10

## 2018-10-27 RX ADMIN — LISINOPRIL 20 MILLIGRAM(S): 2.5 TABLET ORAL at 05:24

## 2018-10-27 RX ADMIN — Medication 40 MILLIGRAM(S): at 05:25

## 2018-10-27 RX ADMIN — Medication 81 MILLIGRAM(S): at 11:40

## 2018-10-27 RX ADMIN — FAMOTIDINE 20 MILLIGRAM(S): 10 INJECTION INTRAVENOUS at 05:24

## 2018-10-27 RX ADMIN — POLYETHYLENE GLYCOL 3350 17 GRAM(S): 17 POWDER, FOR SOLUTION ORAL at 11:39

## 2018-10-27 RX ADMIN — Medication 50 MILLIGRAM(S): at 05:24

## 2018-10-27 RX ADMIN — FAMOTIDINE 20 MILLIGRAM(S): 10 INJECTION INTRAVENOUS at 17:29

## 2018-10-27 RX ADMIN — Medication 40 MILLIGRAM(S): at 17:29

## 2018-10-27 RX ADMIN — LISINOPRIL 20 MILLIGRAM(S): 2.5 TABLET ORAL at 17:29

## 2018-10-27 RX ADMIN — Medication 100 MILLIGRAM(S): at 11:39

## 2018-10-27 NOTE — PROGRESS NOTE ADULT - SUBJECTIVE AND OBJECTIVE BOX
24H hour events: No acute events overnight. Pt did have several pauses, longest ~ 5.5 seconds, for which he was asymptomatic.     MEDICATIONS:  apixaban 5 milliGRAM(s) Oral every 12 hours  aspirin  chewable 81 milliGRAM(s) Oral daily  digoxin     Tablet 0.125 milliGRAM(s) Oral daily  diltiazem    Tablet 30 milliGRAM(s) Oral every 6 hours  furosemide   Injectable 40 milliGRAM(s) IV Push two times a day  hydrALAZINE 50 milliGRAM(s) Oral three times a day  lisinopril 20 milliGRAM(s) Oral two times a day          docusate sodium 100 milliGRAM(s) Oral daily  famotidine    Tablet 20 milliGRAM(s) Oral two times a day  polyethylene glycol 3350 17 Gram(s) Oral two times a day    simvastatin 20 milliGRAM(s) Oral at bedtime    influenza   Vaccine 0.5 milliLiter(s) IntraMuscular once      REVIEW OF SYSTEMS:  General: no fatigue/malaise, weight loss/gain.  Skin: no rashes.  Ophthalmologic: no blurred vision, no loss of vision. 	  ENT: no sore throat, rhinorrhea, sinus congestion.  Respiratory: no SOB, cough or wheeze.  Gastrointestinal:  no N/V/D, no melena/hematemesis/hematochezia.  Genitourinary: no dysuria/hesitancy or hematuria.  Musculoskeletal: no myalgias or arthralgias.  Neurological: no changes in vision or hearing, no lightheadedness/dizziness, no syncope/near syncope	  Psychiatric: no unusual stress/anxiety.   Hematology/Lymphatics: no unusual bleeding, bruising and no lymphadenopathy.  Endocrine: no unusual thirst.   All others negative except as stated above and in HPI.    PHYSICAL EXAM:  T(C): 36.6 (10-27-18 @ 03:46), Max: 36.7 (10-26-18 @ 17:49)  HR: 125 (10-27-18 @ 06:35) (118 - 126)  BP: 122/66 (10-27-18 @ 06:35) (119/76 - 145/98)  RR: 18 (10-27-18 @ 06:35) (17 - 18)  SpO2: 91% (10-27-18 @ 06:35) (91% - 92%)  Wt(kg): --  I&O's Summary    26 Oct 2018 07:01  -  27 Oct 2018 07:00  --------------------------------------------------------  IN: 480 mL / OUT: 300 mL / NET: 180 mL        Appearance: Normal	  HEENT:   Normal oral mucosa  Cardiovascular: normal rate, irregularly irregular rhythm, audible S1 and S2, elevated JVP   Respiratory: decreased BS	  Psychiatry: Mood & affect appropriate  Gastrointestinal:  Soft  Skin: No rashes, No ecchymoses, No cyanosis	  Neurologic: Non-focal  Extremities: 2+ LE pitting edema with chronic skin changes suggestive of Venous Stasis   Vascular: Peripheral pulses palpable         LABS:	 	    CBC Full  -  ( 27 Oct 2018 08:33 )  WBC Count : 8.24 K/uL  Hemoglobin : 14.8 g/dL  Hematocrit : 45.5 %  Platelet Count - Automated : 244 K/uL  Mean Cell Volume : 91.7 fl  Mean Cell Hemoglobin : 29.8 pg  Mean Cell Hemoglobin Concentration : 32.5 gm/dL  Auto Neutrophil # : x  Auto Lymphocyte # : x  Auto Monocyte # : x  Auto Eosinophil # : x  Auto Basophil # : x  Auto Neutrophil % : x  Auto Lymphocyte % : x  Auto Monocyte % : x  Auto Eosinophil % : x  Auto Basophil % : x    10-27    138  |  98  |  23  ----------------------------<  102<H>  3.5   |  26  |  1.25  10-26    136  |  96  |  24<H>  ----------------------------<  110<H>  3.8   |  28  |  1.23    Ca    8.8      27 Oct 2018 06:12  Ca    9.3      26 Oct 2018 06:27  Phos  2.9     10-27  Phos  2.9     10-26  Mg     2.0     10-27  Mg     2.0     10-26    proBNP: Serum Pro-Brain Natriuretic Peptide: 2043 pg/mL (10-24-18 @ 15:34)    TELEMETRY: A-Flutter in 100s - 130s, several pauses (longest ~ 5.5 seconds)    	  ASSESSMENT/PLAN: 57 y/o man with h/o CAD s/p CABG/stents (stents 2000, CABG 2012), HFrEF (EF 40%), HTN, lichen planus with chronic LE edema p/w VELÁSQUEZ and palpitations, found to be in typical A-Flutter,    1) A-Flutter complicated by LA Thrombus - continue AC with Apixaban  - DCCV/Ablation deferred for now in setting of Thrombus  - would continue Diltiazem 30 mg PO Q6H (would not increase this dose), though if he continues to have long pauses, may need to stop, in which case BB could be an alternative if HR remains persistently elevated  - continue Digoxin  - appreciate EP input regarding rate control strategy     1) Acute on Chronic Systolic HF - still with evidence of elevated filling pressures  - continue diuresis with Furosemide 40 mg IV BID and check I/Os/daily weights -> if not 1 - 2 L neg/day, would increase Furosemide to 80 IV BID	        Owen Crawford  Cardiology Fellow  Consult Fellow carries in-house phone [phone]) from 7:30 am - 5:00 pm M - F  For non-urgent issues outside of the above time period, please feel free to contact me directly by text or call at [phone]

## 2018-10-27 NOTE — PROVIDER CONTACT NOTE (OTHER) - BACKGROUND
Patient admitted with new onset Aflutter on cardizem for rate control. H/o 3.8 sec pause (cardizem was reduced from 60mg Q6 to 30 mg Q6).

## 2018-10-27 NOTE — PROGRESS NOTE ADULT - PROBLEM SELECTOR PLAN 2
CHF with EF 40%, chronic LE edema and no rales on exam, BNP elevated c/w likely exacerbation  c/w lasix 40mg IV BID  Strict I/O, pt has not been accurately recording urine, he is now aware that he should be using the urinal and not the toilet.  Daily Weight  cardiology following  c/w ACEi, plan to possibly add beta blocker post DCCV cardioversion

## 2018-10-27 NOTE — PROGRESS NOTE ADULT - PROBLEM SELECTOR PLAN 2
-Unable to perform Cardioversion secondary to thrombus seen in MARV on STANISLAW  -Recommend stop Eliquis and start Coumadin with IV Heparin with goal PTT 80 in attempt to dissolve clot.  -Will repeat STANISLAW likely Thursday to assess clot, if clot is no longer seen would perform cardioversion Friday   -Patient will need repeat TTE to evaluate EF once in NSR with normal ventricular rates  -Monitor creatine closely on Digoxin 0.125mg daily, Continue Cardizem at dose of 30mg Q 6.  Would not uptitrate as patient has low EF and pauses.   Unable to take Betablocker as patient has intolerance/ dyspnea in the past on BB.   -EP will continue to monitor     BRENDA Campoverde -[phone] -Unable to perform Cardioversion secondary to thrombus seen in MARV on STANISLAW  -Recommend stop Eliquis and start Coumadin with IV Heparin with goal PTT 80 in attempt to dissolve clot.  -Will repeat STANISLAW likely Thursday to assess clot, if clot is no longer seen would perform cardioversion Friday   -Patient will need repeat TTE to evaluate EF once in NSR with normal ventricular rates  -Monitor creatine closely on Digoxin 0.125mg daily, Continue Cardizem at dose of 30mg Q 6.  Would not uptitrate as patient has low EF and pauses.   Unable to take Betablocker as patient has intolerance/ dyspnea in the past on BB.   -Would obtain Pulmonary Eval. for Sleep Apnea/ CPAP evaluation for HS as this is likely cause of pauses seen on telemetry   -EP will continue to monitor     BRENDA Campoverde -[phone]

## 2018-10-27 NOTE — PROVIDER CONTACT NOTE (OTHER) - ASSESSMENT
Patient experienced 5.5 sec pause on tele. Patient VSS. Asymptomatic. No c/o chest pain / palpitations.

## 2018-10-27 NOTE — CHART NOTE - NSCHARTNOTEFT_GEN_A_CORE
Notified by RN; pt. had a 5 second pause on telemetry. Pt. seen and examined at bedside. Pt. is asymptomatic and denies all complaints. Denies chest pain, sob, abdominal pain, lightheadedness/dizziness, HA, palpitations, N/V, and syncope. Pt. was originally admitted w/ Aflutter and was on Cardizem 60mg q6h. Pt. had a 3 second pause the previous day. Cardizem was then decreased to 30mg q6h. Pt. was scheduled for DCCV but STANISLAW was positive for LA thrombus. Digoxin added as pt. has a BB intolerance and HR is still elevated. HR returned back to 120's AF s/p pause. Labs drawn and will follow up electrolytes. HR still elevated and uncontrolled despite brief pause episodes. Will continue current dosage of medication and follow up cardiology reccs in AM. Continue to monitor pt. throughout the night on telemetry. F/u w/ primary team in AM.    -Tomasz Colby PA-C. #[id number]

## 2018-10-27 NOTE — PROGRESS NOTE ADULT - SUBJECTIVE AND OBJECTIVE BOX
24H hour events: Patient with asymptomatic 3.2 and 5.5 second pause over night during sleep. Denies c/o CP, palpitations, dizziness, lightheadedness, or SOB.     MEDICATIONS:  apixaban 5 milliGRAM(s) Oral every 12 hours  aspirin  chewable 81 milliGRAM(s) Oral daily  digoxin     Tablet 0.125 milliGRAM(s) Oral daily  diltiazem    Tablet 30 milliGRAM(s) Oral every 6 hours  furosemide   Injectable 40 milliGRAM(s) IV Push two times a day  hydrALAZINE 50 milliGRAM(s) Oral three times a day  lisinopril 20 milliGRAM(s) Oral two times a day      docusate sodium 100 milliGRAM(s) Oral daily  famotidine    Tablet 20 milliGRAM(s) Oral two times a day  polyethylene glycol 3350 17 Gram(s) Oral two times a day    simvastatin 20 milliGRAM(s) Oral at bedtime    influenza   Vaccine 0.5 milliLiter(s) IntraMuscular once      REVIEW OF SYSTEMS:  Complete 10point ROS negative.    PHYSICAL EXAM:  T(C): 36.4 (10-27-18 @ 11:06), Max: 36.7 (10-26-18 @ 17:49)  HR: 127 (10-27-18 @ 11:06) (118 - 127)  BP: 128/88 (10-27-18 @ 11:06) (119/76 - 145/98)  RR: 18 (10-27-18 @ 11:06) (17 - 18)  SpO2: 91% (10-27-18 @ 11:06) (91% - 92%)    26 Oct 2018 07:01  -  27 Oct 2018 07:00  --------------------------------------------------------  IN: 480 mL / OUT: 300 mL / NET: 180 mL    27 Oct 2018 07:01  -  27 Oct 2018 12:18  --------------------------------------------------------  IN: 240 mL / OUT: 0 mL / NET: 240 mL      Appearance: Normal	  Cardiovascular: Normal S1 S2, regular.   Respiratory: Lungs clear to auscultation	  Psychiatry: A & O x 3, Mood & affect appropriate  Gastrointestinal:  Soft, Obese, Non-tender, + BS	  Extremities: + 3 BL/LE edema, chronic venous stasis discoloration, Normal range of motion   Vascular: Peripheral pulses palpable 2+ bilaterally      LABS:	 	    CBC Full  -  ( 27 Oct 2018 08:33 )  WBC Count : 8.24 K/uL  Hemoglobin : 14.8 g/dL  Hematocrit : 45.5 %  Platelet Count - Automated : 244 K/uL  Mean Cell Volume : 91.7 fl  Mean Cell Hemoglobin : 29.8 pg  Mean Cell Hemoglobin Concentration : 32.5 gm/dL  Auto Neutrophil # : x  Auto Lymphocyte # : x  Auto Monocyte # : x  Auto Eosinophil # : x  Auto Basophil # : x  Auto Neutrophil % : x  Auto Lymphocyte % : x  Auto Monocyte % : x  Auto Eosinophil % : x  Auto Basophil % : x    10-27    138  |  98  |  23  ----------------------------<  102<H>  3.5   |  26  |  1.25  10-26    136  |  96  |  24<H>  ----------------------------<  110<H>  3.8   |  28  |  1.23    Ca    8.8      27 Oct 2018 06:12  Ca    9.3      26 Oct 2018 06:27  Phos  2.9     10-27  Phos  2.9     10-26  Mg     2.0     10-27  Mg     2.0     10-26        proBNP: Serum Pro-Brain Natriuretic Peptide: 2043 pg/mL (10-24 @ 15:34)      TELEMETRY: 	  Atrial flutter 100- 120's

## 2018-10-27 NOTE — PROGRESS NOTE ADULT - PROBLEM SELECTOR PLAN 1
CHaDVasc - 3.  Cardioversion/Ablation attempted on 10/26 but unable to be completed because of L atrial appendage thrombus.  Pt now started on a Hep gtt and will also start on coumadin per EP recommendations.

## 2018-10-27 NOTE — PROGRESS NOTE ADULT - SUBJECTIVE AND OBJECTIVE BOX
Patient is a 58y old  Male who presents with a chief complaint of VELÁSQUEZ (27 Oct 2018 12:17)      SUBJECTIVE / OVERNIGHT EVENTS:    No acute overnight events. Patient without any complaints. Pt reports urinating in the toilet and not the Urinal so he has not been accurately been recording the values.   tele: aflutter, 5.5 sec pause, 3 sec pause.  ROS: ( - ) Fever, ( - )Chills,  ( - )Nausea/Vomiting, ( - ) Cough, ( - )Shortness of breath, ( - )Chest Pain    MEDICATIONS  (STANDING):  aspirin  chewable 81 milliGRAM(s) Oral daily  digoxin     Tablet 0.125 milliGRAM(s) Oral daily  diltiazem    Tablet 30 milliGRAM(s) Oral every 6 hours  docusate sodium 100 milliGRAM(s) Oral daily  famotidine    Tablet 20 milliGRAM(s) Oral two times a day  furosemide   Injectable 40 milliGRAM(s) IV Push two times a day  hydrALAZINE 50 milliGRAM(s) Oral three times a day  influenza   Vaccine 0.5 milliLiter(s) IntraMuscular once  lisinopril 20 milliGRAM(s) Oral two times a day  polyethylene glycol 3350 17 Gram(s) Oral two times a day  simvastatin 20 milliGRAM(s) Oral at bedtime    MEDICATIONS  (PRN):  heparin  Injectable 11511 Unit(s) IV Push every 6 hours PRN For aPTT less than 40  heparin  Injectable 5000 Unit(s) IV Push every 6 hours PRN For aPTT between 40 - 57      T(C): 36.4 (10-27 @ 11:06), Max: 36.7 (10-27 @ 00:12)   HR: 99   BP: 124/69   RR: 18   SpO2: 91%    GENERAL: NAD, well-developed  HEAD:  Atraumatic, normocephalic  EYES: EOMI, sclera clear  NECK: Supple, no JVD  CHEST/LUNG: Clear to auscultation bilaterally; no wheezing or rales  HEART: irregular, S1 S2, no murmurs   ABDOMEN: Soft, nontender, +distended, bowel sounds present  EXTREMITIES: 2-3+ pitting edema in b/l LE with chronic skin changes, open lesions on RLE   PSYCH: calm affect, not anxious  NEUROLOGY: non-focal, AAOx3  SKIN: chronic skin discoloration of b/l LE with 3 lesions on RLE    LABS:                        14.8   8.24  )-----------( 244      ( 27 Oct 2018 08:33 )             45.5      10-27    138  |  98  |  23  ----------------------------<  102<H>  3.5   |  26  |  1.25    Ca    8.8      27 Oct 2018 06:12  Phos  2.9     10-27  Mg     2.0     10-27         CAPILLARY BLOOD GLUCOSE          RADIOLOGY & ADDITIONAL TESTS:    Imaging Personally Reviewed:  Consultant(s) Notes Reviewed:  Cardiology and EP  Care Discussed with Consultants/Other Providers:

## 2018-10-28 LAB
ANION GAP SERPL CALC-SCNC: 12 MMOL/L — SIGNIFICANT CHANGE UP (ref 5–17)
APTT BLD: 70.8 SEC — HIGH (ref 27.5–37.4)
APTT BLD: 96.6 SEC — HIGH (ref 27.5–37.4)
BUN SERPL-MCNC: 25 MG/DL — HIGH (ref 7–23)
CALCIUM SERPL-MCNC: 9 MG/DL — SIGNIFICANT CHANGE UP (ref 8.4–10.5)
CHLORIDE SERPL-SCNC: 95 MMOL/L — LOW (ref 96–108)
CO2 SERPL-SCNC: 29 MMOL/L — SIGNIFICANT CHANGE UP (ref 22–31)
CREAT SERPL-MCNC: 1.31 MG/DL — HIGH (ref 0.5–1.3)
GLUCOSE SERPL-MCNC: 136 MG/DL — HIGH (ref 70–99)
HCT VFR BLD CALC: 48.9 % — SIGNIFICANT CHANGE UP (ref 39–50)
HGB BLD-MCNC: 15.1 G/DL — SIGNIFICANT CHANGE UP (ref 13–17)
INR BLD: 1.4 RATIO — HIGH (ref 0.88–1.16)
MCHC RBC-ENTMCNC: 28.6 PG — SIGNIFICANT CHANGE UP (ref 27–34)
MCHC RBC-ENTMCNC: 31 GM/DL — LOW (ref 32–36)
MCV RBC AUTO: 92.4 FL — SIGNIFICANT CHANGE UP (ref 80–100)
PLATELET # BLD AUTO: 248 K/UL — SIGNIFICANT CHANGE UP (ref 150–400)
POTASSIUM SERPL-MCNC: 3.4 MMOL/L — LOW (ref 3.5–5.3)
POTASSIUM SERPL-SCNC: 3.4 MMOL/L — LOW (ref 3.5–5.3)
PROTHROM AB SERPL-ACNC: 15.9 SEC — HIGH (ref 10–13.1)
RBC # BLD: 5.3 M/UL — SIGNIFICANT CHANGE UP (ref 4.2–5.8)
RBC # FLD: 13.2 % — SIGNIFICANT CHANGE UP (ref 10.3–14.5)
SODIUM SERPL-SCNC: 136 MMOL/L — SIGNIFICANT CHANGE UP (ref 135–145)
WBC # BLD: 7.1 K/UL — SIGNIFICANT CHANGE UP (ref 3.8–10.5)
WBC # FLD AUTO: 7.1 K/UL — SIGNIFICANT CHANGE UP (ref 3.8–10.5)

## 2018-10-28 PROCEDURE — 99233 SBSQ HOSP IP/OBS HIGH 50: CPT | Mod: GC

## 2018-10-28 PROCEDURE — 99233 SBSQ HOSP IP/OBS HIGH 50: CPT

## 2018-10-28 PROCEDURE — 93010 ELECTROCARDIOGRAM REPORT: CPT

## 2018-10-28 PROCEDURE — 99223 1ST HOSP IP/OBS HIGH 75: CPT

## 2018-10-28 RX ORDER — POTASSIUM CHLORIDE 20 MEQ
20 PACKET (EA) ORAL
Qty: 0 | Refills: 0 | Status: COMPLETED | OUTPATIENT
Start: 2018-10-28 | End: 2018-10-28

## 2018-10-28 RX ORDER — METOPROLOL TARTRATE 50 MG
12.5 TABLET ORAL
Qty: 0 | Refills: 0 | Status: DISCONTINUED | OUTPATIENT
Start: 2018-10-28 | End: 2018-10-28

## 2018-10-28 RX ORDER — GLYCERIN ADULT
1 SUPPOSITORY, RECTAL RECTAL ONCE
Qty: 0 | Refills: 0 | Status: DISCONTINUED | OUTPATIENT
Start: 2018-10-28 | End: 2018-11-04

## 2018-10-28 RX ORDER — WARFARIN SODIUM 2.5 MG/1
7.5 TABLET ORAL ONCE
Qty: 0 | Refills: 0 | Status: COMPLETED | OUTPATIENT
Start: 2018-10-28 | End: 2018-10-28

## 2018-10-28 RX ADMIN — HEPARIN SODIUM 2400 UNIT(S)/HR: 5000 INJECTION INTRAVENOUS; SUBCUTANEOUS at 07:15

## 2018-10-28 RX ADMIN — FAMOTIDINE 20 MILLIGRAM(S): 10 INJECTION INTRAVENOUS at 06:23

## 2018-10-28 RX ADMIN — Medication 20 MILLIEQUIVALENT(S): at 17:19

## 2018-10-28 RX ADMIN — Medication 40 MILLIGRAM(S): at 06:23

## 2018-10-28 RX ADMIN — FAMOTIDINE 20 MILLIGRAM(S): 10 INJECTION INTRAVENOUS at 17:20

## 2018-10-28 RX ADMIN — Medication 0.12 MILLIGRAM(S): at 06:23

## 2018-10-28 RX ADMIN — POLYETHYLENE GLYCOL 3350 17 GRAM(S): 17 POWDER, FOR SOLUTION ORAL at 06:23

## 2018-10-28 RX ADMIN — Medication 20 MILLIEQUIVALENT(S): at 11:34

## 2018-10-28 RX ADMIN — Medication 50 MILLIGRAM(S): at 14:35

## 2018-10-28 RX ADMIN — HEPARIN SODIUM 2400 UNIT(S)/HR: 5000 INJECTION INTRAVENOUS; SUBCUTANEOUS at 00:10

## 2018-10-28 RX ADMIN — HEPARIN SODIUM 2400 UNIT(S)/HR: 5000 INJECTION INTRAVENOUS; SUBCUTANEOUS at 14:37

## 2018-10-28 RX ADMIN — Medication 40 MILLIGRAM(S): at 17:19

## 2018-10-28 RX ADMIN — SIMVASTATIN 20 MILLIGRAM(S): 20 TABLET, FILM COATED ORAL at 22:16

## 2018-10-28 RX ADMIN — Medication 100 MILLIGRAM(S): at 11:34

## 2018-10-28 RX ADMIN — LISINOPRIL 20 MILLIGRAM(S): 2.5 TABLET ORAL at 06:23

## 2018-10-28 RX ADMIN — LISINOPRIL 20 MILLIGRAM(S): 2.5 TABLET ORAL at 17:20

## 2018-10-28 RX ADMIN — WARFARIN SODIUM 7.5 MILLIGRAM(S): 2.5 TABLET ORAL at 22:16

## 2018-10-28 RX ADMIN — Medication 81 MILLIGRAM(S): at 11:34

## 2018-10-28 RX ADMIN — POLYETHYLENE GLYCOL 3350 17 GRAM(S): 17 POWDER, FOR SOLUTION ORAL at 17:20

## 2018-10-28 RX ADMIN — Medication 50 MILLIGRAM(S): at 06:23

## 2018-10-28 RX ADMIN — Medication 20 MILLIEQUIVALENT(S): at 14:35

## 2018-10-28 RX ADMIN — Medication 50 MILLIGRAM(S): at 22:16

## 2018-10-28 NOTE — CONSULT NOTE ADULT - SUBJECTIVE AND OBJECTIVE BOX
CHIEF COMPLAINT: Dyspnea    HPI: 59 y/o morbidly obese M, lifelong nonsmoker, w/CAD s/p CABG, HFrEF admitted for dyspnea and palpitations. Patient found to have new onset Aflutter w/RVR and breathing has improved following rate control and diuresis. Patient currently planned for cardioversion, however awaiting resolution of LA thrombus prior to cardioversion. Pulmonary consulted for evaluation for possible sleep apnea. Patient reports poor sleep at night. Has difficulty falling asleep. Tired during the day and occasionally falls asleep in the afternoon. Reports snoring at night. Does not think he ever stops breathing at night. Occasionally wakes up gasping for air, however attributes this to BBlocker therapy.     Review of Systems: See above. ROS otherwise negative except for lower extremity swelling    PAST MEDICAL & SURGICAL HISTORY:  Hypercholesteremia  Morbid Obesity  H/O heart artery stent  CAD (coronary artery disease)  History of elbow surgery  History of tonsillectomy      FAMILY HISTORY:  No pertinent family history in first degree relatives      SOCIAL HISTORY:  Nonsmoker    Allergies    No Known Allergies    Intolerances        HOME MEDICATIONS:  Home Medications:  aspirin 325 mg oral tablet: 1 tab(s) orally once a day (24 Oct 2018 21:21)  furosemide 40 mg oral tablet: 1 tab(s) orally once a day (24 Oct 2018 21:21)  hydrALAZINE 50 mg oral tablet: 1 tab(s) orally 3 times a day (24 Oct 2018 21:21)  lisinopril 20 mg oral tablet: 1 tab(s) orally 2 times a day (24 Oct 2018 21:21)  simvastatin 20 mg oral tablet: 1 tab(s) orally once a day (at bedtime) (24 Oct 2018 21:21)          OBJECTIVE:  ICU Vital Signs Last 24 Hrs  T(C): 36.6 (28 Oct 2018 04:09), Max: 36.6 (27 Oct 2018 20:44)  T(F): 97.9 (28 Oct 2018 04:09), Max: 97.9 (28 Oct 2018 04:09)  HR: 126 (28 Oct 2018 04:09) (99 - 127)  BP: 126/79 (28 Oct 2018 04:09) (121/88 - 128/89)  BP(mean): --  ABP: --  ABP(mean): --  RR: 18 (28 Oct 2018 04:09) (18 - 18)  SpO2: 92% (28 Oct 2018 04:09) (91% - 93%)        10-27 @ 07:01  -  10-28 @ 07:00  --------------------------------------------------------  IN: 1104 mL / OUT: 2700 mL / NET: -1596 mL    10-28 @ 07:01  -  10-28 @ 09:45  --------------------------------------------------------  IN: 240 mL / OUT: 300 mL / NET: -60 mL      CAPILLARY BLOOD GLUCOSE          PHYSICAL EXAM:  General: Morbidly obese male sitting comfortably in chair, NAD  HEENT: NC/AT sclerae anicteric  Neck: Supple  Respiratory: No increased WOB, CTAB  Cardiovascular: Tachycardic, S1, S2  Abdomen: Soft, +BS  Extremities: + edema  Skin: Venous stasis changes and sores on lower extremities  Neurological: Awake, alert, follows commands, moves all extremities  Psychiatry: Appropriate affect    HOSPITAL MEDICATIONS:  Standing Meds:  aspirin  chewable 81 milliGRAM(s) Oral daily  digoxin     Tablet 0.125 milliGRAM(s) Oral daily  diltiazem    Tablet 30 milliGRAM(s) Oral every 6 hours  docusate sodium 100 milliGRAM(s) Oral daily  famotidine    Tablet 20 milliGRAM(s) Oral two times a day  furosemide   Injectable 40 milliGRAM(s) IV Push two times a day  heparin  Infusion.  Unit(s)/Hr IV Continuous <Continuous>  hydrALAZINE 50 milliGRAM(s) Oral three times a day  influenza   Vaccine 0.5 milliLiter(s) IntraMuscular once  lisinopril 20 milliGRAM(s) Oral two times a day  polyethylene glycol 3350 17 Gram(s) Oral two times a day  simvastatin 20 milliGRAM(s) Oral at bedtime      PRN Meds:  glycerin Suppository - Adult 1 Suppository(s) Rectal once PRN  heparin  Injectable 08287 Unit(s) IV Push every 6 hours PRN  heparin  Injectable 5000 Unit(s) IV Push every 6 hours PRN      LABS:                        15.1   7.1   )-----------( 248      ( 28 Oct 2018 06:40 )             48.9     Hgb Trend: 15.1<--, 14.8<--, 15.5<--, 15.0<--, 15.8<--  10-28    136  |  95<L>  |  25<H>  ----------------------------<  136<H>  3.4<L>   |  29  |  1.31<H>    Ca    9.0      28 Oct 2018 06:40  Phos  2.9     10-27  Mg     2.0     10-27      Creatinine Trend: 1.31<--, 1.25<--, 1.23<--, 1.36<--, 1.42<--  PT/INR - ( 28 Oct 2018 08:37 )   PT: 15.9 sec;   INR: 1.40 ratio         PTT - ( 28 Oct 2018 06:40 )  PTT:70.8 sec          MICROBIOLOGY:       RADIOLOGY:  [x ] Reviewed and interpreted by me    PULMONARY FUNCTION TESTS:    EKG:

## 2018-10-28 NOTE — PROVIDER CONTACT NOTE (OTHER) - BACKGROUND
Patient admitted with new onset Aflutter. Had pauses yesterday morning (10/27) as well with longest pause being 5.5 sec.

## 2018-10-28 NOTE — PROGRESS NOTE ADULT - SUBJECTIVE AND OBJECTIVE BOX
Patient is a 58y old  Male who presents with a chief complaint of VELÁSQUEZ (28 Oct 2018 09:57)      SUBJECTIVE / OVERNIGHT EVENTS:    No new complaints. pt continuing to have cardiac pause on telemetry the longest being 4.8 sec  tele: Aflutter     ROS: ( - ) Fever, ( - )Chills,  ( - )Nausea/Vomiting, ( - ) Cough, ( - )Shortness of breath, ( - )Chest Pain    MEDICATIONS  (STANDING):  aspirin  chewable 81 milliGRAM(s) Oral daily  digoxin     Tablet 0.125 milliGRAM(s) Oral daily  diltiazem    Tablet 30 milliGRAM(s) Oral every 6 hours  docusate sodium 100 milliGRAM(s) Oral daily  famotidine    Tablet 20 milliGRAM(s) Oral two times a day  furosemide   Injectable 40 milliGRAM(s) IV Push two times a day  heparin  Infusion.  Unit(s)/Hr (24 mL/Hr) IV Continuous <Continuous>  hydrALAZINE 50 milliGRAM(s) Oral three times a day  influenza   Vaccine 0.5 milliLiter(s) IntraMuscular once  lisinopril 20 milliGRAM(s) Oral two times a day  polyethylene glycol 3350 17 Gram(s) Oral two times a day  simvastatin 20 milliGRAM(s) Oral at bedtime  warfarin 7.5 milliGRAM(s) Oral once    MEDICATIONS  (PRN):  glycerin Suppository - Adult 1 Suppository(s) Rectal once PRN Constipation  heparin  Injectable 94485 Unit(s) IV Push every 6 hours PRN For aPTT less than 40  heparin  Injectable 5000 Unit(s) IV Push every 6 hours PRN For aPTT between 40 - 57      T(C): 37 (10-28 @ 16:22), Max: 37 (10-28 @ 16:22)   HR: 91   BP: 111/80   RR: 16   SpO2: 93%    GENERAL: NAD, well-developed  HEAD:  Atraumatic, normocephalic  EYES: EOMI, sclera clear  NECK: Supple, no JVD  CHEST/LUNG: Clear to auscultation bilaterally; no wheezing or rales  HEART: irregular, S1 S2, no murmurs   ABDOMEN: Soft, nontender, +distended, bowel sounds present  EXTREMITIES: 2+ pitting edema in b/l LE with chronic skin changes, open lesions on RLE   PSYCH: calm affect, not anxious  NEUROLOGY: non-focal, AAOx3  SKIN: chronic skin discoloration of b/l LE with 3 lesions on RLE    LABS:                        15.1   7.1   )-----------( 248      ( 28 Oct 2018 06:40 )             48.9      10-28    136  |  95<L>  |  25<H>  ----------------------------<  136<H>  3.4<L>   |  29  |  1.31<H>    Ca    9.0      28 Oct 2018 06:40  Phos  2.9     10-27  Mg     2.0     10-27         CAPILLARY BLOOD GLUCOSE          RADIOLOGY & ADDITIONAL TESTS:    Imaging Personally Reviewed:  Consultant(s) Notes Reviewed:    Care Discussed with Consultants/Other Providers:

## 2018-10-28 NOTE — PROVIDER CONTACT NOTE (OTHER) - RECOMMENDATIONS
Administer morning medications as per order, perform EKG and place R2 pads on patient as per NP request.

## 2018-10-28 NOTE — CHART NOTE - NSCHARTNOTEFT_GEN_A_CORE
MEDICINE NP    BERTHA WARD  58y Male    Patient is a 58y old  Male who presents with a chief complaint of VELÁSQUEZ (27 Oct 2018 18:11)       > Event Summary:  Notified by RN, Patient with recurrent pause with multiple episodes on Tele </= 4.8ses (4.82s /3.8s / 3.8s/ 3.2secs), HR 40-50's, now back to -120's.    RN states Patient was asleep and asymptomatic.   Patient seen at bedside, AAOX3, Denies chest pain, palpitations, dizziness, sob.    Patient also c/o constipation for 3 days.      -Vital Signs Last 24 Hrs  T(C): 36.6 (28 Oct 2018 04:09), Max: 36.6 (27 Oct 2018 20:44)  T(F): 97.9 (28 Oct 2018 04:09), Max: 97.9 (28 Oct 2018 04:09)  HR: 126 (28 Oct 2018 04:09) (99 - 127)  BP: 126/79 (28 Oct 2018 04:09) (121/88 - 128/89)  RR: 18 (28 Oct 2018 04:09) (18 - 18)  SpO2: 92% (28 Oct 2018 04:09) (91% - 93%)    > Physical Assessment:  General: A&Ox3, nonfocal  CV: +S1S2, RRR, Tachy, Irregular.  +chronic LE edema  Respiratory: Even, unlabored.  CTA B/L.    Abdomen:  +Obese abdomen.  +BS.  Soft, NT, ND.  No palpable mass  MSK: MITCHELL x4.   Skin: Warm and Dry         > Assessment & Plan:  - HPI: 58 year old Male with h/o morbid Obesity, CAD s/p PCI (2000) s/p CABG (2012), HTN, Lichen planus with chronic LE edema, systolic CHF w/ EF currently 25-30% (was 40% earlier this year).  Admitted with acute on chronic systolic HF exacerbation in setting of newly diagnosed typical Aflutter w/ RVR c/b MARV Thrombus on STANISLAW on Heparin gtt.  Now with recurrent Pauses.    1) Multiple Pauses on Tele : </= 4.82 secs   -ECG: Aflutter, 127bpm.  No acute st changes  -D/W EPS above, Dr. Lozoya, and recommends to c/w current management of Digoxin and Cardizem and team to f/u today  -F/u Pulmonary eval for concern of WILFRED  -Planced on continuous Pulse Ox overnight  -R2 Pads on patient ordered  -Attending to follow  -Will endorse to day team           Chasity Boyce, BILLY-BC  Medicine Department  #[id number]

## 2018-10-28 NOTE — CONSULT NOTE ADULT - ASSESSMENT
59 y/o morbidly obese M w/CAD s/p CABG, HFrEF admitted for dyspnea and palpitations. Dyspnea likely primarily secondary to exacerbation of HFrEF in setting of A flutter w/RVR. Patient improving w/diuresis. Patient is at high risk for having undiagnosed WILFRED. It is reasonable to try empiric treatment with CPAP while patient is in the hospital.    - Continue diuresis  - Rate control/cardioversion as per Cardiology  - Trial of CPAP at 8cm H2O  - Outpatient sleep study for evaluation for WILFRED and CPAP titration if needed  - Weight loss
58M with PMH of CAD s/p CABG/stents (stents 2000, CABG 2012), HFrEF (EF 40%), HTN, lichen planus with chronic LE edema p/w VELÁSQUEZ and palpitations, found to be in typical aflutter. Now rate controlled with diltiazem PO TID. Patient has not been taking metoprolol at home. On apixaban already.    -continue apixaban  -plan for TTE/DCCV in AM, appreciate EP recs  -would continue diltiazem overnight and after cardioversion would transition from diltiazem to BB  -will reassess for need for diuresis after cardioversion    Terri Lawrence MD  Cardiology Fellow PGY-5  [phone]

## 2018-10-28 NOTE — PROGRESS NOTE ADULT - PROBLEM SELECTOR PLAN 2
CHF with EF 40%, chronic LE edema and no rales on exam, BNP elevated c/w likely exacerbation  c/w lasix 40mg IV BID  Strict I/O,--> net negative 1.6 liters today.  Daily Weight  cardiology following  c/w ACEi, plan to possibly add beta blocker post DCCV cardioversion

## 2018-10-28 NOTE — PROGRESS NOTE ADULT - SUBJECTIVE AND OBJECTIVE BOX
No acute events overnight. Pt did have, asymptomatic, pauses, longest ~ 4.8 this morning.  No other new/acute complaints      MEDICATIONS:  apixaban 5 milliGRAM(s) Oral every 12 hours  aspirin  chewable 81 milliGRAM(s) Oral daily  digoxin     Tablet 0.125 milliGRAM(s) Oral daily  diltiazem    Tablet 30 milliGRAM(s) Oral every 6 hours  furosemide   Injectable 40 milliGRAM(s) IV Push two times a day  hydrALAZINE 50 milliGRAM(s) Oral three times a day  lisinopril 20 milliGRAM(s) Oral two times a day          docusate sodium 100 milliGRAM(s) Oral daily  famotidine    Tablet 20 milliGRAM(s) Oral two times a day  polyethylene glycol 3350 17 Gram(s) Oral two times a day    simvastatin 20 milliGRAM(s) Oral at bedtime    influenza   Vaccine 0.5 milliLiter(s) IntraMuscular once      REVIEW OF SYSTEMS:  General: no fatigue/malaise, weight loss/gain.  Skin: no rashes.  Ophthalmologic: no blurred vision, no loss of vision. 	  ENT: no sore throat, rhinorrhea, sinus congestion.  Respiratory: no SOB, cough or wheeze.  Gastrointestinal:  no N/V/D, no melena/hematemesis/hematochezia.  Genitourinary: no dysuria/hesitancy or hematuria.  Musculoskeletal: no myalgias or arthralgias.  Neurological: no changes in vision or hearing, no lightheadedness/dizziness, no syncope/near syncope	  Psychiatric: no unusual stress/anxiety.   Hematology/Lymphatics: no unusual bleeding, bruising and no lymphadenopathy.  Endocrine: no unusual thirst.   All others negative except as stated above and in HPI.    PHYSICAL EXAM:  T(C): 36.6 (10-27-18 @ 03:46), Max: 36.7 (10-26-18 @ 17:49)  HR: 125 (10-27-18 @ 06:35) (118 - 126)  BP: 122/66 (10-27-18 @ 06:35) (119/76 - 145/98)  RR: 18 (10-27-18 @ 06:35) (17 - 18)  SpO2: 91% (10-27-18 @ 06:35) (91% - 92%)  Wt(kg): --  I&O's Summary    26 Oct 2018 07:01  -  27 Oct 2018 07:00  --------------------------------------------------------  IN: 480 mL / OUT: 300 mL / NET: 180 mL        Appearance: Normal	  HEENT:   Normal oral mucosa  Cardiovascular: normal rate, irregularly irregular rhythm, audible S1 and S2, elevated JVP ~ 11 cm at 30 deg    Respiratory: decreased BS w/ crackles 	  Psychiatry: Mood & affect appropriate  Gastrointestinal:  Soft  Skin: No rashes, No ecchymoses, No cyanosis	  Neurologic: Non-focal  Extremities: 2+ LE pitting edema with chronic skin changes suggestive of Venous Stasis   Vascular: Peripheral pulses palpable         LABS:	 	    CBC Full  -  ( 27 Oct 2018 08:33 )  WBC Count : 8.24 K/uL  Hemoglobin : 14.8 g/dL  Hematocrit : 45.5 %  Platelet Count - Automated : 244 K/uL  Mean Cell Volume : 91.7 fl  Mean Cell Hemoglobin : 29.8 pg  Mean Cell Hemoglobin Concentration : 32.5 gm/dL  Auto Neutrophil # : x  Auto Lymphocyte # : x  Auto Monocyte # : x  Auto Eosinophil # : x  Auto Basophil # : x  Auto Neutrophil % : x  Auto Lymphocyte % : x  Auto Monocyte % : x  Auto Eosinophil % : x  Auto Basophil % : x    10-27    138  |  98  |  23  ----------------------------<  102<H>  3.5   |  26  |  1.25  10-26    136  |  96  |  24<H>  ----------------------------<  110<H>  3.8   |  28  |  1.23    Ca    8.8      27 Oct 2018 06:12  Ca    9.3      26 Oct 2018 06:27  Phos  2.9     10-27  Phos  2.9     10-26  Mg     2.0     10-27  Mg     2.0     10-26    proBNP: Serum Pro-Brain Natriuretic Peptide: 2043 pg/mL (10-24-18 @ 15:34)    TELEMETRY: A-Flutter in 100s - 130s, several pauses (longest ~ 5.5 seconds)    	  ASSESSMENT/PLAN: 59 y/o man with h/o CAD s/p CABG/stents (stents 2000, CABG 2012), HFrEF (EF 40%), HTN, lichen planus with chronic LE edema p/w VELÁSQUEZ and palpitations, found to be in typical A-Flutter.  Now w/ MARV thrombus.    Still in rapid aflutter.  On diltiazem, which could be dangerous in the setting of severe LV dysfunction (neg ionotropic therapy)  Spoke to patient regarding BB side effects and specified that he experienced fatigue/dyspnea but no bronchospasms or anaphylactic reaction     //Rapid A-Flutter complicated by MARV Thrombus   - DCCV/Ablation aborted in the setting of MARV Thrombus  - cont hep GTT  - start coumadin 7.5 mg daily (give dose tonight) as recommended by EP service as well w/ INR goal 2-3   - repeat STANISLAW later on this upcoming week  - continue Digoxin  - d/c diltiazem; start low dose BB (metoprolol tartrate 12.5 mg BID) and uptitrate slowly         1) Acute on Chronic Systolic HF - still with evidence of elevated filling pressures  - continue diuresis with Furosemide 40 mg IV BID and check I/Os/daily weights   - net neg now (-1.5 L) 	        LINNEA Estrada MD   [facility] [id number] Cardiology Consult Progress Note  Referring: Dr. Bower  Reason for Consult: HF/Rapid HR      No acute events overnight. Pt did have, asymptomatic, pauses, longest ~ 4.8 this morning.  No other new/acute complaints      MEDICATIONS:  apixaban 5 milliGRAM(s) Oral every 12 hours  aspirin  chewable 81 milliGRAM(s) Oral daily  digoxin     Tablet 0.125 milliGRAM(s) Oral daily  diltiazem    Tablet 30 milliGRAM(s) Oral every 6 hours  furosemide   Injectable 40 milliGRAM(s) IV Push two times a day  hydrALAZINE 50 milliGRAM(s) Oral three times a day  lisinopril 20 milliGRAM(s) Oral two times a day          docusate sodium 100 milliGRAM(s) Oral daily  famotidine    Tablet 20 milliGRAM(s) Oral two times a day  polyethylene glycol 3350 17 Gram(s) Oral two times a day    simvastatin 20 milliGRAM(s) Oral at bedtime    influenza   Vaccine 0.5 milliLiter(s) IntraMuscular once      REVIEW OF SYSTEMS:  General: no fatigue/malaise, weight loss/gain.  Skin: no rashes.  Ophthalmologic: no blurred vision, no loss of vision. 	  ENT: no sore throat, rhinorrhea, sinus congestion.  Respiratory: no SOB, cough or wheeze.  Gastrointestinal:  no N/V/D, no melena/hematemesis/hematochezia.  Genitourinary: no dysuria/hesitancy or hematuria.  Musculoskeletal: no myalgias or arthralgias.  Neurological: no changes in vision or hearing, no lightheadedness/dizziness, no syncope/near syncope	  Psychiatric: no unusual stress/anxiety.   Hematology/Lymphatics: no unusual bleeding, bruising and no lymphadenopathy.  Endocrine: no unusual thirst.   All others negative except as stated above and in HPI.    PHYSICAL EXAM:  T(C): 36.6 (10-27-18 @ 03:46), Max: 36.7 (10-26-18 @ 17:49)  HR: 125 (10-27-18 @ 06:35) (118 - 126)  BP: 122/66 (10-27-18 @ 06:35) (119/76 - 145/98)  RR: 18 (10-27-18 @ 06:35) (17 - 18)  SpO2: 91% (10-27-18 @ 06:35) (91% - 92%)  Wt(kg): --  I&O's Summary    26 Oct 2018 07:01  -  27 Oct 2018 07:00  --------------------------------------------------------  IN: 480 mL / OUT: 300 mL / NET: 180 mL        Appearance: Normal	  HEENT:   Normal oral mucosa  Cardiovascular: normal rate, irregularly irregular rhythm, audible S1 and S2, elevated JVP ~ 11 cm at 30 deg    Respiratory: decreased BS w/ crackles 	  Psychiatry: Mood & affect appropriate  Gastrointestinal:  Soft  Skin: No rashes, No ecchymoses, No cyanosis	  Neurologic: Non-focal  Extremities: 2+ LE pitting edema with chronic skin changes suggestive of Venous Stasis   Vascular: Peripheral pulses palpable         LABS:	 	Labs Personally Reviewed 10/28 /2018      CBC Full  -  ( 27 Oct 2018 08:33 )  WBC Count : 8.24 K/uL  Hemoglobin : 14.8 g/dL  Hematocrit : 45.5 %  Platelet Count - Automated : 244 K/uL  Mean Cell Volume : 91.7 fl  Mean Cell Hemoglobin : 29.8 pg  Mean Cell Hemoglobin Concentration : 32.5 gm/dL  Auto Neutrophil # : x  Auto Lymphocyte # : x  Auto Monocyte # : x  Auto Eosinophil # : x  Auto Basophil # : x  Auto Neutrophil % : x  Auto Lymphocyte % : x  Auto Monocyte % : x  Auto Eosinophil % : x  Auto Basophil % : x    10-27    138  |  98  |  23  ----------------------------<  102<H>  3.5   |  26  |  1.25  10-26    136  |  96  |  24<H>  ----------------------------<  110<H>  3.8   |  28  |  1.23    Ca    8.8      27 Oct 2018 06:12  Ca    9.3      26 Oct 2018 06:27  Phos  2.9     10-27  Phos  2.9     10-26  Mg     2.0     10-27  Mg     2.0     10-26    proBNP: Serum Pro-Brain Natriuretic Peptide: 2043 pg/mL (10-24-18 @ 15:34)    TELEMETRY: A-Flutter in 100s - 130s, several pauses (longest ~ 5.5 seconds)    	  ASSESSMENT/PLAN: 57 y/o man with h/o CAD s/p CABG/stents (stents 2000, CABG 2012), HFrEF (EF 40%), HTN, lichen planus with chronic LE edema p/w VELÁSQUEZ and palpitations, found to be in typical A-Flutter.  Now w/ MARV thrombus.    Still in rapid aflutter.  On diltiazem, which could be dangerous in the setting of severe LV dysfunction (neg ionotropic therapy)  Spoke to patient regarding BB side effects and specified that he experienced fatigue/dyspnea but no bronchospasms or anaphylactic reaction     //Rapid A-Flutter complicated by MARV Thrombus   - DCCV/Ablation aborted in the setting of MARV Thrombus  - cont hep GTT  - start coumadin 7.5 mg daily (give dose tonight) as recommended by EP service as well w/ INR goal 2-3   - repeat STANISLAW later on this upcoming week  - continue Digoxin  - d/c diltiazem; start low dose BB (metoprolol tartrate 12.5 mg BID) and uptitrate slowly         1) Acute on Chronic Systolic HF - still with evidence of elevated filling pressures  - continue diuresis with Furosemide 40 mg IV BID and check I/Os/daily weights   - net neg now (-1.5 L) 	        LINNEA Estrada MD   [place] [facility] [id number]

## 2018-10-28 NOTE — PROGRESS NOTE ADULT - PROBLEM SELECTOR PLAN 1
CHaDVasc - 3.  Cardioversion/Ablation attempted on 10/26 but unable to be completed because of L atrial appendage thrombus.  c/w Hep gtt and coumadin per EP recommendations.  pt having cardiac pauses on telemetry  - Pulm Eval appreciated - will try patient on CPAP overnight to see of WILFRED is a cause of the patients pauses overnight.  - cardiology would like to change patient over to lopressor BID but patient is reluctant to changing cardizem today, he would first like to see how the CPAP helps with his Cardiac pauses first before making any medication adjustments. Pt did not like how lopressor made him feel in the past.

## 2018-10-28 NOTE — PROVIDER CONTACT NOTE (OTHER) - ACTION/TREATMENT ORDERED:
EKG performed and placed in chart, discussed with NP. R2 pads placed on patient. Medications given. Day RN notified.

## 2018-10-29 DIAGNOSIS — Z71.89 OTHER SPECIFIED COUNSELING: ICD-10-CM

## 2018-10-29 LAB
APTT BLD: 150.4 SEC — CRITICAL HIGH (ref 27.5–37.4)
APTT BLD: 86.1 SEC — HIGH (ref 27.5–37.4)
HCT VFR BLD CALC: 50.3 % — HIGH (ref 39–50)
HGB BLD-MCNC: 16.5 G/DL — SIGNIFICANT CHANGE UP (ref 13–17)
INR BLD: 1.34 RATIO — HIGH (ref 0.88–1.16)
MCHC RBC-ENTMCNC: 30.7 PG — SIGNIFICANT CHANGE UP (ref 27–34)
MCHC RBC-ENTMCNC: 32.8 GM/DL — SIGNIFICANT CHANGE UP (ref 32–36)
MCV RBC AUTO: 93.5 FL — SIGNIFICANT CHANGE UP (ref 80–100)
PLATELET # BLD AUTO: 265 K/UL — SIGNIFICANT CHANGE UP (ref 150–400)
PROTHROM AB SERPL-ACNC: 14.6 SEC — HIGH (ref 9.8–12.7)
RBC # BLD: 5.38 M/UL — SIGNIFICANT CHANGE UP (ref 4.2–5.8)
RBC # FLD: 13.8 % — SIGNIFICANT CHANGE UP (ref 10.3–14.5)
WBC # BLD: 7.51 K/UL — SIGNIFICANT CHANGE UP (ref 3.8–10.5)
WBC # FLD AUTO: 7.51 K/UL — SIGNIFICANT CHANGE UP (ref 3.8–10.5)

## 2018-10-29 PROCEDURE — 99233 SBSQ HOSP IP/OBS HIGH 50: CPT

## 2018-10-29 RX ORDER — METOPROLOL TARTRATE 50 MG
12.5 TABLET ORAL DAILY
Qty: 0 | Refills: 0 | Status: DISCONTINUED | OUTPATIENT
Start: 2018-10-29 | End: 2018-10-30

## 2018-10-29 RX ORDER — WARFARIN SODIUM 2.5 MG/1
5 TABLET ORAL ONCE
Qty: 0 | Refills: 0 | Status: COMPLETED | OUTPATIENT
Start: 2018-10-29 | End: 2018-10-29

## 2018-10-29 RX ADMIN — Medication 12.5 MILLIGRAM(S): at 14:40

## 2018-10-29 RX ADMIN — LISINOPRIL 20 MILLIGRAM(S): 2.5 TABLET ORAL at 09:59

## 2018-10-29 RX ADMIN — Medication 40 MILLIGRAM(S): at 17:58

## 2018-10-29 RX ADMIN — Medication 50 MILLIGRAM(S): at 22:18

## 2018-10-29 RX ADMIN — Medication 0.12 MILLIGRAM(S): at 06:12

## 2018-10-29 RX ADMIN — HEPARIN SODIUM 0 UNIT(S)/HR: 5000 INJECTION INTRAVENOUS; SUBCUTANEOUS at 22:19

## 2018-10-29 RX ADMIN — FAMOTIDINE 20 MILLIGRAM(S): 10 INJECTION INTRAVENOUS at 06:12

## 2018-10-29 RX ADMIN — Medication 40 MILLIGRAM(S): at 06:12

## 2018-10-29 RX ADMIN — Medication 50 MILLIGRAM(S): at 17:58

## 2018-10-29 RX ADMIN — HEPARIN SODIUM 2400 UNIT(S)/HR: 5000 INJECTION INTRAVENOUS; SUBCUTANEOUS at 12:53

## 2018-10-29 RX ADMIN — LISINOPRIL 20 MILLIGRAM(S): 2.5 TABLET ORAL at 17:59

## 2018-10-29 RX ADMIN — FAMOTIDINE 20 MILLIGRAM(S): 10 INJECTION INTRAVENOUS at 17:58

## 2018-10-29 RX ADMIN — SIMVASTATIN 20 MILLIGRAM(S): 20 TABLET, FILM COATED ORAL at 22:18

## 2018-10-29 RX ADMIN — Medication 81 MILLIGRAM(S): at 14:37

## 2018-10-29 RX ADMIN — WARFARIN SODIUM 5 MILLIGRAM(S): 2.5 TABLET ORAL at 22:18

## 2018-10-29 RX ADMIN — Medication 50 MILLIGRAM(S): at 09:59

## 2018-10-29 NOTE — PROGRESS NOTE ADULT - SUBJECTIVE AND OBJECTIVE BOX
CHIEF COMPLAINT: Arrhythmia     Interval Events: Pt seen and examined at bedside. No acute events overnight. Used CPAP for a few hours but had a lot of trouble with the "machine beeping" and air leaking around the face mask.     REVIEW OF SYSTEMS:  Constitutional: [x ] negative [ ] fevers [ ] chills [ ] weight loss [ ] weight gain  HEENT: [ x] negative [ ] dry eyes [ ] eye irritation [ ] postnasal drip [ ] nasal congestion  CV: [ ] negative  [ ] chest pain [ ] orthopnea [x ] palpitations [ ] murmur  Resp: [ ] negative [ ] cough [ ] shortness of breath [ ] dyspnea [ ] wheezing [ ] sputum [ ] hemoptysis  GI: [ ] negative [ ] nausea [ ] vomiting [ ] diarrhea [ ] constipation [ ] abd pain [ ] dysphagia   : [ ] negative [ ] dysuria [ ] nocturia [ ] hematuria [ ] increased urinary frequency  Musculoskeletal: [ ] negative [ ] back pain [ ] myalgias [ ] arthralgias [ ] fracture  Skin: [ ] negative [ ] rash [ ] itch  Neurological: [ ] negative [ ] headache [ ] dizziness [ ] syncope [ ] weakness [ ] numbness  Psychiatric: [ ] negative [ ] anxiety [ ] depression  Endocrine: [ ] negative [ ] diabetes [ ] thyroid problem  Hematologic/Lymphatic: [ ] negative [ ] anemia [ ] bleeding problem  Allergic/Immunologic: [ ] negative [ ] itchy eyes [ ] nasal discharge [ ] hives [ ] angioedema  [x ] All other systems negative  [ ] Unable to assess ROS because ________    OBJECTIVE:  ICU Vital Signs Last 24 Hrs  T(C): 36.9 (29 Oct 2018 05:57), Max: 37.1 (29 Oct 2018 01:04)  T(F): 98.4 (29 Oct 2018 05:57), Max: 98.7 (29 Oct 2018 01:04)  HR: 130 (29 Oct 2018 09:45) (57 - 130)  BP: 118/85 (29 Oct 2018 09:45) (101/83 - 128/88)  BP(mean): --  ABP: --  ABP(mean): --  RR: 19 (29 Oct 2018 04:21) (16 - 19)  SpO2: 95% (29 Oct 2018 09:03) (93% - 95%)        10-28 @ 07:01  -  10-29 @ 07:00  --------------------------------------------------------  IN: 1498 mL / OUT: 3350 mL / NET: -1852 mL      CAPILLARY BLOOD GLUCOSE    General: Morbidly obese male sitting comfortably in chair, NAD  HEENT: NC/AT, JARRET/EOMI  Neck: Supple, (-) thyromegaly or tracheal deviation   Respiratory: No increased WOB, CTAB  Cardiovascular: RRR, S1, S2  Abdomen: Soft, +BS  Extremities: + b/l LE edema with chronic skin hyperpigmentation and lichenification   Skin: Venous stasis changes and sores on lower extremities  Neurological: Nonfocal findings   Psychiatry: Appropriate affect    HOSPITAL MEDICATIONS:  MEDICATIONS  (STANDING):  aspirin  chewable 81 milliGRAM(s) Oral daily  digoxin     Tablet 0.125 milliGRAM(s) Oral daily  diltiazem    Tablet 30 milliGRAM(s) Oral every 6 hours  docusate sodium 100 milliGRAM(s) Oral daily  famotidine    Tablet 20 milliGRAM(s) Oral two times a day  furosemide   Injectable 40 milliGRAM(s) IV Push two times a day  heparin  Infusion.  Unit(s)/Hr (24 mL/Hr) IV Continuous <Continuous>  hydrALAZINE 50 milliGRAM(s) Oral three times a day  influenza   Vaccine 0.5 milliLiter(s) IntraMuscular once  lisinopril 20 milliGRAM(s) Oral two times a day  polyethylene glycol 3350 17 Gram(s) Oral two times a day  simvastatin 20 milliGRAM(s) Oral at bedtime    MEDICATIONS  (PRN):  glycerin Suppository - Adult 1 Suppository(s) Rectal once PRN Constipation  heparin  Injectable 07259 Unit(s) IV Push every 6 hours PRN For aPTT less than 40  heparin  Injectable 5000 Unit(s) IV Push every 6 hours PRN For aPTT between 40 - 57      LABS:                        16.5   7.51  )-----------( 265      ( 29 Oct 2018 09:33 )             50.3     Hgb Trend: 16.5<--, 15.1<--, 14.8<--, 15.5<--, 15.0<--  10-28    136  |  95<L>  |  25<H>  ----------------------------<  136<H>  3.4<L>   |  29  |  1.31<H>    Ca    9.0      28 Oct 2018 06:40      Creatinine Trend: 1.31<--, 1.25<--, 1.23<--, 1.36<--, 1.42<--  PT/INR - ( 28 Oct 2018 08:37 )   PT: 15.9 sec;   INR: 1.40 ratio         PTT - ( 29 Oct 2018 09:29 )  PTT:86.1 sec          MICROBIOLOGY:       RADIOLOGY:  [x ] Reviewed and interpreted by me    PULMONARY FUNCTION TESTS: None    EKG:

## 2018-10-29 NOTE — PROGRESS NOTE ADULT - PROBLEM SELECTOR PLAN 1
CHADVasc - 3.  Cardioversion/Ablation attempted on 10/26 but unable to be completed because of L atrial appendage thrombus.  c/w Hep gtt and coumadin per EP recommendations.  had cardiac pauses on telemetry, believed to be 2/2 WILFRED, started on CPAP. No pauses overnight  - Pulm Eval appreciated - trialing patient on CPAP overnight to see of WILFRED is a cause of the patients pauses overnight. Will need outpatient sleep study  - Given negative ionotropic effects of cardizem will transition to Metoprolol 12.5mg and assess response. Tolerated medication in past but reported fatigue after CABG when taking Metoprolol  - Plan for repeat TTE on Thursday to assess thrombus. If negative will plan for cardioversion Friday  - c/w digoxin

## 2018-10-29 NOTE — PROGRESS NOTE ADULT - PROBLEM SELECTOR PLAN 4
c/w lisinopril 20 BID and hydralazine 50 TID  Will transition Cardizem to metoprolol   can decrease hydralazine dosing if needed

## 2018-10-29 NOTE — PROGRESS NOTE ADULT - PROBLEM SELECTOR PLAN 2
CHF with EF 40%, chronic LE edema and no rales on exam, BNP elevated c/w likely exacerbation  c/w lasix 40mg IV BID  Strict I/O,--> net negative 1.8 liters today.  Daily Weight  cardiology following  c/w ACEi, trial Metoprolol today. Uptitrate slowly as tolerated

## 2018-10-29 NOTE — PROVIDER CONTACT NOTE (OTHER) - BACKGROUND
Pt admitted with Aflutter w/RVR. Dyspnea on exertion progressively worsening with tachycardia and palpitations.

## 2018-10-29 NOTE — PROGRESS NOTE ADULT - PROBLEM SELECTOR PLAN 2
-Continue on Coumadin/ IV Heparin with goal PTT 80 in attempt to dissolve thrombus.  -Will repeat STANISLAW likely Thursday to assess thrombus, if clot is no longer seen would perform cardioversion Friday   -Patient will need repeat TTE to evaluate EF once in NSR with normal ventricular rates  -Monitor creatine closely on Digoxin 0.125mg daily, Continue Cardizem at dose of 30mg Q 6.  Would not uptitrate as patient has low EF and pauses.   -Would obtain Pulmonary Eval. for Sleep Apnea/ currently using CPAP QHS   -EP will continue to monitor     [place] [id number]

## 2018-10-29 NOTE — PROGRESS NOTE ADULT - SUBJECTIVE AND OBJECTIVE BOX
24H hour events: Resting comfortably in chair, denies c/o CP, palpitations or SOB.     MEDICATIONS:  aspirin  chewable 81 milliGRAM(s) Oral daily  digoxin     Tablet 0.125 milliGRAM(s) Oral daily  diltiazem    Tablet 30 milliGRAM(s) Oral every 6 hours  furosemide   Injectable 40 milliGRAM(s) IV Push two times a day  heparin  Infusion.  Unit(s)/Hr IV Continuous <Continuous>  heparin  Injectable 28677 Unit(s) IV Push every 6 hours PRN  heparin  Injectable 5000 Unit(s) IV Push every 6 hours PRN  hydrALAZINE 50 milliGRAM(s) Oral three times a day  lisinopril 20 milliGRAM(s) Oral two times a day    docusate sodium 100 milliGRAM(s) Oral daily  famotidine    Tablet 20 milliGRAM(s) Oral two times a day  glycerin Suppository - Adult 1 Suppository(s) Rectal once PRN  polyethylene glycol 3350 17 Gram(s) Oral two times a day    simvastatin 20 milliGRAM(s) Oral at bedtime    influenza   Vaccine 0.5 milliLiter(s) IntraMuscular once      REVIEW OF SYSTEMS:  Complete 10point ROS negative.    PHYSICAL EXAM:  T(C): 36.9 (10-29-18 @ 05:57), Max: 37.1 (10-29-18 @ 01:04)  HR: 78 (10-29-18 @ 09:03) (57 - 129)  BP: 104/75 (10-29-18 @ 05:57) (101/83 - 128/88)  RR: 19 (10-29-18 @ 04:21) (16 - 19)  SpO2: 95% (10-29-18 @ 09:03) (93% - 95%)    28 Oct 2018 07:01  -  29 Oct 2018 07:00  --------------------------------------------------------  IN: 1498 mL / OUT: 3350 mL / NET: -1852 mL    Appearance: Normal	  Cardiovascular: Normal S1 S2, regular.    Respiratory: Lungs clear to auscultation	  Psychiatry: A & O x 3, Mood & affect appropriate  Gastrointestinal:  Obese, Soft, Non-tender, + BS	  Skin: No rashes, No ecchymoses, No cyanosis	  Neurologic: Non-focal  Extremities: +3 with chronic venous stasis changes BL/LE. Normal range of motion       LABS:	 	    CBC Full  -  ( 29 Oct 2018 09:33 )  WBC Count : 7.51 K/uL  Hemoglobin : 16.5 g/dL  Hematocrit : 50.3 %  Platelet Count - Automated : 265 K/uL  Mean Cell Volume : 93.5 fl  Mean Cell Hemoglobin : 30.7 pg  Mean Cell Hemoglobin Concentration : 32.8 gm/dL  Auto Neutrophil # : x  Auto Lymphocyte # : x  Auto Monocyte # : x  Auto Eosinophil # : x  Auto Basophil # : x  Auto Neutrophil % : x  Auto Lymphocyte % : x  Auto Monocyte % : x  Auto Eosinophil % : x  Auto Basophil % : x    10-28    136  |  95<L>  |  25<H>  ----------------------------<  136<H>  3.4<L>   |  29  |  1.31<H>    Ca    9.0      28 Oct 2018 06:40        proBNP: Serum Pro-Brain Natriuretic Peptide: 2043 pg/mL (10-24 @ 15:34)        TELEMETRY: 	  Atrial flutter 110-130. Bradycardia briefly 30's during sleep. Patient had 4.8 second pause during sleep

## 2018-10-29 NOTE — PROGRESS NOTE ADULT - ASSESSMENT
58M with PMHx morbid obesity, CAD s/p CABG, and HFrEF admitted for dyspnea and palpitations. Dyspnea likely primarily secondary to exacerbation of HFrEF in setting of Aflutter w/RVR. Patient improving w/diuresis. Pulmonary consulted for r/o WILFRED. He does not show evidence of significant CO2 retention and has a low [place] Sleepiness Scale.  -Although patient is at high risk for having undiagnosed WILFRED and untreated WILFRED could further exacerbate arrhythmias and CHF, we would need an outpatient sleep study with CPAP titration to appropriate diagnose and treat.    -Please perform overnight pulse oximetry while in hospital off of non-invasive ventilation 58M with PMHx morbid obesity, CAD s/p CABG, and HFrEF admitted for dyspnea and palpitations. Dyspnea likely primarily secondary to exacerbation of HFrEF in setting of Aflutter w/RVR. Patient improving w/diuresis. Pulmonary consulted for r/o WILFRED. He does not show evidence of significant CO2 retention and has a low [place] Sleepiness Scale.  -Although patient is at high risk for having undiagnosed WILFRED and untreated WILFRED could further exacerbate arrhythmias and CHF, we would need an outpatient sleep study with CPAP titration to appropriate diagnose and treat.    -Please perform overnight pulse oximetry while in hospital off of non-invasive ventilation  -ABG prior to discharge 58M with PMHx morbid obesity, CAD s/p CABG, and HFrEF admitted for dyspnea and palpitations. Dyspnea likely primarily secondary to exacerbation of HFrEF in setting of Aflutter w/RVR. Patient improving w/diuresis. Pulmonary consulted for r/o WILFRED. He does not show evidence of significant CO2 retention and has a low [place] Sleepiness Scale.  -Although patient is at high risk for having undiagnosed WILFRED and untreated WILFRED could further exacerbate arrhythmias and CHF, we would need an outpatient sleep study with CPAP titration to appropriate diagnose and treat.    -No indication for CPAP at this time. Please d/c as likely not helping   -Please perform overnight pulse oximetry while in hospital off of non-invasive ventilation  -ABG prior to discharge

## 2018-10-29 NOTE — PROGRESS NOTE ADULT - ASSESSMENT
59 y/o man with h/o CAD s/p CABG/stents (stents 2000, CABG 2012), HFrEF (EF 40%), HTN, lichen planus with chronic LE edema p/w VELÁSQUEZ and palpitations, found to be in typical A-Flutter.  Now w/ MARV thrombus.    Still in rapid aflutter.  On diltiazem, which could be dangerous in the setting of severe LV dysfunction (neg ionotropic therapy)  Spoke to patient regarding BB side effects and specified that he experienced fatigue/dyspnea but no bronchospasms or anaphylactic reaction     //Rapid A-Flutter complicated by MARV Thrombus   - DCCV/Ablation aborted in the setting of MARV Thrombus  - cont hep gtt with bridge to   - repeat STANISLAW later on this week (Thursday per EP)  - continue Digoxin  - would favor transitioning dilt to metoprolol, however patient with fatigue/dyspnea with BB in past      1) Acute on Chronic Systolic HF - still with evidence of elevated filling pressures  - check I/Os/daily weights, K>4, Mg>2

## 2018-10-29 NOTE — PROGRESS NOTE ADULT - SUBJECTIVE AND OBJECTIVE BOX
Overnight Events:     Review of Systems:  REVIEW OF SYSTEMS:  CONSTITUTIONAL: No weakness, fevers or chills  EYES/ENT: No visual changes;  No dysphagia  NECK: No pain or stiffness  RESPIRATORY: No cough, wheezing, hemoptysis; No shortness of breath  CARDIOVASCULAR: No chest pain or palpitations; No lower extremity edema  GASTROINTESTINAL: No abdominal or epigastric pain. No nausea, vomiting, or hematemesis; No diarrhea or constipation. No melena or hematochezia.  BACK: No back pain  GENITOURINARY: No dysuria, frequency or hematuria  NEUROLOGICAL: No numbness or weakness  SKIN: No itching, burning, rashes, or lesions   All other review of systems is negative unless indicated above.            Medications:  aspirin  chewable 81 milliGRAM(s) Oral daily  digoxin     Tablet 0.125 milliGRAM(s) Oral daily  diltiazem    Tablet 30 milliGRAM(s) Oral every 6 hours  docusate sodium 100 milliGRAM(s) Oral daily  famotidine    Tablet 20 milliGRAM(s) Oral two times a day  furosemide   Injectable 40 milliGRAM(s) IV Push two times a day  glycerin Suppository - Adult 1 Suppository(s) Rectal once PRN  heparin  Infusion.  Unit(s)/Hr IV Continuous <Continuous>  heparin  Injectable 86160 Unit(s) IV Push every 6 hours PRN  heparin  Injectable 5000 Unit(s) IV Push every 6 hours PRN  hydrALAZINE 50 milliGRAM(s) Oral three times a day  influenza   Vaccine 0.5 milliLiter(s) IntraMuscular once  lisinopril 20 milliGRAM(s) Oral two times a day  polyethylene glycol 3350 17 Gram(s) Oral two times a day  simvastatin 20 milliGRAM(s) Oral at bedtime      PAST MEDICAL & SURGICAL HISTORY:  Hypercholesteremia  Obesity  H/O heart artery stent  CAD (coronary artery disease)  History of elbow surgery  History of tonsillectomy      Vitals:  T(F): 98.4 (10-29), Max: 98.7 (10-29)  HR: 57 (10-29) (57 - 129)  BP: 104/75 (10-29) (101/83 - 139/78)  RR: 19 (10-29)  SpO2: 94% (10-29)  I&O's Summary    28 Oct 2018 07:01  -  29 Oct 2018 07:00  --------------------------------------------------------  IN: 1498 mL / OUT: 3350 mL / NET: -1852 mL        Physical Exam:  Appearance: No acute distress; well appearing  Eyes: PERRL, EOMI, pink conjunctiva  HENT: Normal oral muscosa  Cardiovascular: RRR, S1, S2, no murmurs, rubs, or gallops; no edema; no JVD  Respiratory: Clear to auscultation bilaterally  Gastrointestinal: soft, non-tender, non-distended with normal bowel sounds  Musculoskeletal: No clubbing; no joint deformity   Neurologic: Non-focal  Lymphatic: No lymphadenopathy  Psychiatry: AAOx3, mood & affect appropriate  Skin: No rashes, ecchymoses, or cyanosis                          15.1   7.1   )-----------( 248      ( 28 Oct 2018 06:40 )             48.9     10-28    136  |  95<L>  |  25<H>  ----------------------------<  136<H>  3.4<L>   |  29  |  1.31<H>    Ca    9.0      28 Oct 2018 06:40      PT/INR - ( 28 Oct 2018 08:37 )   PT: 15.9 sec;   INR: 1.40 ratio         PTT - ( 28 Oct 2018 13:10 )  PTT:96.6 sec      Serum Pro-Brain Natriuretic Peptide: 2043 pg/mL (10-24 @ 15:34)

## 2018-10-29 NOTE — PROGRESS NOTE ADULT - SUBJECTIVE AND OBJECTIVE BOX
Aidan Morel MD  Division of Hospital Medicine  Pager [phone]      BERTHA WARD  58y  Male      Patient is a 58y old  Male who presents with a chief complaint of VELÁSQUEZ (29 Oct 2018 11:45)      INTERVAL HPI/OVERNIGHT EVENTS:  Seen at bedside. Slept with CPAP on overnight, stated he slept well. Denied chest pain/palpitations.       REVIEW OF SYSTEMS: 14 point ROS negative unless listed above    T(C): 36.9 (10-29-18 @ 05:57), Max: 37.1 (10-29-18 @ 01:04)  HR: 130 (10-29-18 @ 09:45) (57 - 130)  BP: 118/85 (10-29-18 @ 09:45) (101/83 - 128/88)  RR: 19 (10-29-18 @ 04:21) (16 - 19)  SpO2: 95% (10-29-18 @ 09:03) (93% - 95%)  Wt(kg): --Vital Signs Last 24 Hrs  T(C): 36.9 (29 Oct 2018 05:57), Max: 37.1 (29 Oct 2018 01:04)  T(F): 98.4 (29 Oct 2018 05:57), Max: 98.7 (29 Oct 2018 01:04)  HR: 130 (29 Oct 2018 09:45) (57 - 130)  BP: 118/85 (29 Oct 2018 09:45) (101/83 - 128/88)  BP(mean): --  RR: 19 (29 Oct 2018 04:21) (16 - 19)  SpO2: 95% (29 Oct 2018 09:03) (93% - 95%)    PHYSICAL EXAM:  GENERAL: NAD, well-developed  HEAD:  Atraumatic, normocephalic  EYES: EOMI, sclera clear  NECK: Supple, no JVD  CHEST/LUNG: Clear to auscultation bilaterally; no wheezing or rales  HEART: irregular, S1 S2, no murmurs   ABDOMEN: Soft, nontender, +distended, bowel sounds present  EXTREMITIES: 2+ pitting edema in b/l LE with chronic skin changes, open lesions on RLE   PSYCH: calm affect, not anxious  NEUROLOGY: non-focal, AAOx3  SKIN: chronic skin discoloration of b/l LE with 3 lesions on RLE    Consultant(s) Notes Reviewed:  [x ] YES  [ ] NO  Care Discussed with Consultants/Other Providers [ x] YES  [ ] NO    LABS:                        16.5   7.51  )-----------( 265      ( 29 Oct 2018 09:33 )             50.3     10-28    136  |  95<L>  |  25<H>  ----------------------------<  136<H>  3.4<L>   |  29  |  1.31<H>    Ca    9.0      28 Oct 2018 06:40      PT/INR - ( 28 Oct 2018 08:37 )   PT: 15.9 sec;   INR: 1.40 ratio         PTT - ( 29 Oct 2018 09:29 )  PTT:86.1 sec    CAPILLARY BLOOD GLUCOSE                RADIOLOGY & ADDITIONAL TESTS:    Imaging Personally Reviewed:  [x ] YES  [ ] NO

## 2018-10-30 LAB
ANION GAP SERPL CALC-SCNC: 11 MMOL/L — SIGNIFICANT CHANGE UP (ref 5–17)
APTT BLD: 121.1 SEC — CRITICAL HIGH (ref 27.5–36.3)
APTT BLD: 63.7 SEC — HIGH (ref 27.5–36.3)
BUN SERPL-MCNC: 29 MG/DL — HIGH (ref 7–23)
CALCIUM SERPL-MCNC: 9 MG/DL — SIGNIFICANT CHANGE UP (ref 8.4–10.5)
CHLORIDE SERPL-SCNC: 95 MMOL/L — LOW (ref 96–108)
CO2 SERPL-SCNC: 30 MMOL/L — SIGNIFICANT CHANGE UP (ref 22–31)
CREAT SERPL-MCNC: 1.39 MG/DL — HIGH (ref 0.5–1.3)
GLUCOSE SERPL-MCNC: 103 MG/DL — HIGH (ref 70–99)
HCT VFR BLD CALC: 45 % — SIGNIFICANT CHANGE UP (ref 39–50)
HGB BLD-MCNC: 15.4 G/DL — SIGNIFICANT CHANGE UP (ref 13–17)
INR BLD: 1.33 RATIO — HIGH (ref 0.88–1.16)
MAGNESIUM SERPL-MCNC: 2.2 MG/DL — SIGNIFICANT CHANGE UP (ref 1.6–2.6)
MCHC RBC-ENTMCNC: 31.2 PG — SIGNIFICANT CHANGE UP (ref 27–34)
MCHC RBC-ENTMCNC: 34.2 GM/DL — SIGNIFICANT CHANGE UP (ref 32–36)
MCV RBC AUTO: 91.3 FL — SIGNIFICANT CHANGE UP (ref 80–100)
PHOSPHATE SERPL-MCNC: 3.5 MG/DL — SIGNIFICANT CHANGE UP (ref 2.5–4.5)
PLATELET # BLD AUTO: 249 K/UL — SIGNIFICANT CHANGE UP (ref 150–400)
POTASSIUM SERPL-MCNC: 3.5 MMOL/L — SIGNIFICANT CHANGE UP (ref 3.5–5.3)
POTASSIUM SERPL-SCNC: 3.5 MMOL/L — SIGNIFICANT CHANGE UP (ref 3.5–5.3)
PROTHROM AB SERPL-ACNC: 15 SEC — HIGH (ref 10–13.1)
RBC # BLD: 4.93 M/UL — SIGNIFICANT CHANGE UP (ref 4.2–5.8)
RBC # FLD: 14 % — SIGNIFICANT CHANGE UP (ref 10.3–14.5)
SODIUM SERPL-SCNC: 136 MMOL/L — SIGNIFICANT CHANGE UP (ref 135–145)
WBC # BLD: 6.74 K/UL — SIGNIFICANT CHANGE UP (ref 3.8–10.5)
WBC # FLD AUTO: 6.74 K/UL — SIGNIFICANT CHANGE UP (ref 3.8–10.5)

## 2018-10-30 PROCEDURE — 99232 SBSQ HOSP IP/OBS MODERATE 35: CPT

## 2018-10-30 PROCEDURE — 99233 SBSQ HOSP IP/OBS HIGH 50: CPT | Mod: GC

## 2018-10-30 PROCEDURE — 99233 SBSQ HOSP IP/OBS HIGH 50: CPT

## 2018-10-30 RX ORDER — METOPROLOL TARTRATE 50 MG
25 TABLET ORAL DAILY
Qty: 0 | Refills: 0 | Status: DISCONTINUED | OUTPATIENT
Start: 2018-10-31 | End: 2018-10-31

## 2018-10-30 RX ORDER — WARFARIN SODIUM 2.5 MG/1
7.5 TABLET ORAL ONCE
Qty: 0 | Refills: 0 | Status: COMPLETED | OUTPATIENT
Start: 2018-10-30 | End: 2018-10-30

## 2018-10-30 RX ORDER — HEPARIN SODIUM 5000 [USP'U]/ML
2000 INJECTION INTRAVENOUS; SUBCUTANEOUS
Qty: 25000 | Refills: 0 | Status: DISCONTINUED | OUTPATIENT
Start: 2018-10-30 | End: 2018-11-01

## 2018-10-30 RX ORDER — FUROSEMIDE 40 MG
40 TABLET ORAL
Qty: 0 | Refills: 0 | Status: DISCONTINUED | OUTPATIENT
Start: 2018-10-30 | End: 2018-11-04

## 2018-10-30 RX ADMIN — Medication 81 MILLIGRAM(S): at 11:17

## 2018-10-30 RX ADMIN — FAMOTIDINE 20 MILLIGRAM(S): 10 INJECTION INTRAVENOUS at 17:30

## 2018-10-30 RX ADMIN — POLYETHYLENE GLYCOL 3350 17 GRAM(S): 17 POWDER, FOR SOLUTION ORAL at 17:30

## 2018-10-30 RX ADMIN — Medication 50 MILLIGRAM(S): at 22:24

## 2018-10-30 RX ADMIN — Medication 100 MILLIGRAM(S): at 11:17

## 2018-10-30 RX ADMIN — HEPARIN SODIUM 1700 UNIT(S)/HR: 5000 INJECTION INTRAVENOUS; SUBCUTANEOUS at 19:33

## 2018-10-30 RX ADMIN — LISINOPRIL 20 MILLIGRAM(S): 2.5 TABLET ORAL at 17:31

## 2018-10-30 RX ADMIN — Medication 40 MILLIGRAM(S): at 11:18

## 2018-10-30 RX ADMIN — WARFARIN SODIUM 7.5 MILLIGRAM(S): 2.5 TABLET ORAL at 22:24

## 2018-10-30 RX ADMIN — Medication 40 MILLIGRAM(S): at 22:23

## 2018-10-30 RX ADMIN — Medication 50 MILLIGRAM(S): at 11:17

## 2018-10-30 RX ADMIN — HEPARIN SODIUM 2000 UNIT(S)/HR: 5000 INJECTION INTRAVENOUS; SUBCUTANEOUS at 17:26

## 2018-10-30 RX ADMIN — Medication 12.5 MILLIGRAM(S): at 06:07

## 2018-10-30 RX ADMIN — Medication 0.12 MILLIGRAM(S): at 06:07

## 2018-10-30 RX ADMIN — HEPARIN SODIUM 0 UNIT(S)/HR: 5000 INJECTION INTRAVENOUS; SUBCUTANEOUS at 13:54

## 2018-10-30 RX ADMIN — FAMOTIDINE 20 MILLIGRAM(S): 10 INJECTION INTRAVENOUS at 06:07

## 2018-10-30 RX ADMIN — SIMVASTATIN 20 MILLIGRAM(S): 20 TABLET, FILM COATED ORAL at 22:24

## 2018-10-30 NOTE — PROGRESS NOTE ADULT - PROBLEM SELECTOR PLAN 2
CHF with EF 40%, chronic LE edema and no rales on exam, BNP elevated c/w likely exacerbation  Appears more euvolemic will transition to PO Lasix today  Strict I/O.  Daily Weight  cardiology following  c/w ACEi, c/w Toprol XL uptitrate as tolerated

## 2018-10-30 NOTE — DIETITIAN INITIAL EVALUATION ADULT. - NS AS NUTRI INTERV ED CONTENT
Discussed healthful eating and low salt for CHF, as well as for weight control./Recommended modifications

## 2018-10-30 NOTE — PROGRESS NOTE ADULT - SUBJECTIVE AND OBJECTIVE BOX
24H hour events: No over night events. Denies c/o CP, palpitations, dizziness or lightheadedness.     MEDICATIONS:  aspirin  chewable 81 milliGRAM(s) Oral daily  digoxin     Tablet 0.125 milliGRAM(s) Oral daily  furosemide    Tablet 40 milliGRAM(s) Oral two times a day  heparin  Infusion.  Unit(s)/Hr IV Continuous <Continuous>  heparin  Injectable 19927 Unit(s) IV Push every 6 hours PRN  heparin  Injectable 5000 Unit(s) IV Push every 6 hours PRN  hydrALAZINE 50 milliGRAM(s) Oral three times a day  lisinopril 20 milliGRAM(s) Oral two times a day  warfarin 7.5 milliGRAM(s) Oral once    docusate sodium 100 milliGRAM(s) Oral daily  famotidine    Tablet 20 milliGRAM(s) Oral two times a day  glycerin Suppository - Adult 1 Suppository(s) Rectal once PRN  polyethylene glycol 3350 17 Gram(s) Oral two times a day    simvastatin 20 milliGRAM(s) Oral at bedtime    influenza   Vaccine 0.5 milliLiter(s) IntraMuscular once    REVIEW OF SYSTEMS:  Complete 10point ROS negative.    PHYSICAL EXAM:  T(C): 36.9 (10-30-18 @ 11:06), Max: 36.9 (10-29-18 @ 21:42)  HR: 116 (10-30-18 @ 11:06) (116 - 131)  BP: 103/68 (10-30-18 @ 11:06) (103/68 - 126/95)  RR: 18 (10-30-18 @ 11:06) (17 - 18)  SpO2: 94% (10-30-18 @ 11:06) (91% - 96%)    29 Oct 2018 07:01  -  30 Oct 2018 07:00  --------------------------------------------------------  IN: 1020 mL / OUT: 950 mL / NET: 70 mL    30 Oct 2018 07:01  -  30 Oct 2018 12:19  --------------------------------------------------------  IN: 0 mL / OUT: 300 mL / NET: -300 mL    Appearance: Normal	  Cardiovascular: Normal S1 S2, regular. No JVD, No murmurs, No edema  Respiratory: Lungs clear to auscultation	  Psychiatry: A & O x 3, Mood & affect appropriate  Gastrointestinal:  Soft, Non-tender, + BS	  Skin: No rashes, No ecchymoses, No cyanosis	  Neurologic: Non-focal  Extremities: +3 BL/LE edema. Normal range of motion   Vascular: Peripheral pulses palpable 2+ bilaterally    LABS:	 	    CBC Full  -  ( 30 Oct 2018 08:07 )  WBC Count : 6.74 K/uL  Hemoglobin : 15.4 g/dL  Hematocrit : 45.0 %  Platelet Count - Automated : 249 K/uL  Mean Cell Volume : 91.3 fl  Mean Cell Hemoglobin : 31.2 pg  Mean Cell Hemoglobin Concentration : 34.2 gm/dL  Auto Neutrophil # : x  Auto Lymphocyte # : x  Auto Monocyte # : x  Auto Eosinophil # : x  Auto Basophil # : x  Auto Neutrophil % : x  Auto Lymphocyte % : x  Auto Monocyte % : x  Auto Eosinophil % : x  Auto Basophil % : x    10-30    136  |  95<L>  |  29<H>  ----------------------------<  103<H>  3.5   |  30  |  1.39<H>    Ca    9.0      30 Oct 2018 07:03  Phos  3.5     10-30  Mg     2.2     10-30      TELEMETRY: 	  Phyllis 100-130

## 2018-10-30 NOTE — DIETITIAN INITIAL EVALUATION ADULT. - ENERGY NEEDS
HT 68 inches,  pounds,  pounds.  BMI 47,   Dxd new Afib on coumadin, Systolic HF, chronic b/l leg edema. Fluids per team.   Skin is noted with b/l discoloration, 3 lesions noted to right leg.  Generally well nourished.  Obesity

## 2018-10-30 NOTE — PROGRESS NOTE ADULT - PROBLEM SELECTOR PLAN 4
c/w lisinopril 20 BID and hydralazine 50 TID  Uptitrate metoprolol as tolerated  can decrease hydralazine dosing if needed

## 2018-10-30 NOTE — DIETITIAN INITIAL EVALUATION ADULT. - PROBLEM SELECTOR PLAN 1
pt with VELÁSQUEZ and palpitations, EKG showing new Aflutter with RVR to 130s, s/p cardizem in ED with symptomatic relief   hs trop (-) x2   monitor on tele - currently still aflutter 110-130s   pt with intolerance to BB - will start on cardizem 30 q6h, titrate up as needed   vitals q4h for now   CHADSVASC 3 - would benefit from AC; discussed risks/benefits of starting full AC with pt, pt understands and is agreeable to starting - will place on Eliquis for now   cardiology consult in AM  check TSH  check TTE  f/u with CM in AM regarding insurance coverage for AC   will decrease ASA full dose to 81mg   pt counseled on stopping daily NSAID use

## 2018-10-30 NOTE — DIETITIAN INITIAL EVALUATION ADULT. - NS AS NUTRI INTERV ED CONTENT3
Discussed food and drug interactions with coumadin and vit K/Nutrition relationship to health/disease

## 2018-10-30 NOTE — PROGRESS NOTE ADULT - ASSESSMENT
58 year old male with h/o morbid obesity, CAD s/p PCI (2000) s/p CABG (2012), HTN, lichen planus with chronic LE edema, systolic CHF w/ EF currently 25-30% (was 40% earlier this year) p/w acute on chronic systolic HF exacerbation in setting of newly diagnosed typical Aflutter w/ RVR.  Telemetry with no further pauses noted since 10/28.

## 2018-10-30 NOTE — DIETITIAN INITIAL EVALUATION ADULT. - SIGNS/SYMPTOMS
BMI 47, inconsistent meal planning, skipping meals.  lack of understanding of healthful diet. New Afib with coumadin

## 2018-10-30 NOTE — PROGRESS NOTE ADULT - SUBJECTIVE AND OBJECTIVE BOX
Aidan Morel MD  Division of Hospital Medicine  Pager [phone]      BERTHA WARD  58y  Male      Patient is a 58y old  Male who presents with a chief complaint of VELÁSQUEZ (30 Oct 2018 09:41)      INTERVAL HPI/OVERNIGHT EVENTS:  Seen at bedside. Tolerated beta blocker yesterday. Denies palpitations. Reports some fatigue when walking otherwise is without complaints.       REVIEW OF SYSTEMS: 14 point ROS negative unless listed above    T(C): 36.9 (10-30-18 @ 11:06), Max: 36.9 (10-29-18 @ 21:42)  HR: 116 (10-30-18 @ 11:06) (116 - 131)  BP: 103/68 (10-30-18 @ 11:06) (103/68 - 126/95)  RR: 18 (10-30-18 @ 11:06) (17 - 18)  SpO2: 94% (10-30-18 @ 11:06) (91% - 96%)  Wt(kg): --Vital Signs Last 24 Hrs  T(C): 36.9 (30 Oct 2018 11:06), Max: 36.9 (29 Oct 2018 21:42)  T(F): 98.4 (30 Oct 2018 11:06), Max: 98.4 (29 Oct 2018 21:42)  HR: 116 (30 Oct 2018 11:06) (116 - 131)  BP: 103/68 (30 Oct 2018 11:06) (103/68 - 126/95)  BP(mean): --  RR: 18 (30 Oct 2018 11:06) (17 - 18)  SpO2: 94% (30 Oct 2018 11:06) (91% - 96%)    PHYSICAL EXAM:  GENERAL: NAD, well-developed  HEAD:  Atraumatic, normocephalic  EYES: EOMI, sclera clear  NECK: Supple, no JVD  CHEST/LUNG: Clear to auscultation bilaterally; no wheezing or rales  HEART: irregular, S1 S2, no murmurs   ABDOMEN: Soft, nontender, +distended, bowel sounds present  EXTREMITIES: 1+ pitting edema in b/l LE with chronic skin changes, open lesions on RLE   PSYCH: calm affect, not anxious  NEUROLOGY: non-focal, AAOx3  SKIN: chronic skin discoloration of b/l LE with 3 lesions on RLE    Consultant(s) Notes Reviewed:  [x ] YES  [ ] NO  Care Discussed with Consultants/Other Providers [ x] YES  [ ] NO    LABS:                        15.4   6.74  )-----------( 249      ( 30 Oct 2018 08:07 )             45.0     10-30    136  |  95<L>  |  29<H>  ----------------------------<  103<H>  3.5   |  30  |  1.39<H>    Ca    9.0      30 Oct 2018 07:03  Phos  3.5     10-30  Mg     2.2     10-30      PT/INR - ( 30 Oct 2018 09:51 )   PT: 15.0 sec;   INR: 1.33 ratio         PTT - ( 30 Oct 2018 09:51 )  PTT:63.7 sec    CAPILLARY BLOOD GLUCOSE                RADIOLOGY & ADDITIONAL TESTS:    Imaging Personally Reviewed:  [ x] YES  [ ] NO

## 2018-10-30 NOTE — CHART NOTE - NSCHARTNOTEFT_GEN_A_CORE
Upon Nutritional Assessment by the Registered Dietitian your patient was determined to meet criteria / has evidence of the following diagnosis/diagnoses:          [ ]  Mild Protein Calorie Malnutrition        [ ]  Moderate Protein Calorie Malnutrition        [ ] Severe Protein Calorie Malnutrition        [ ] Unspecified Protein Calorie Malnutrition        [ ] Underweight / BMI <19        [ x] Morbid Obesity / BMI > 40      Findings as based on:  [ x] Comprehensive nutrition assessment   [ ] Nutrition Focused Physical Exam  [ x] Other: 47.4      Nutrition Plan/Recommendations:  DASH diet, suggest checking Vit D levels, nutrition education.        PROVIDER Section:     By signing this assessment you are acknowledging and agree with the diagnosis/diagnoses assigned by the Registered Dietitian    Comments: Attempted to contact Aarti at [phone] but unable.

## 2018-10-30 NOTE — PROGRESS NOTE ADULT - SUBJECTIVE AND OBJECTIVE BOX
[facility] Progress Note    Interval History:   Mildly hypotensive with SBP in low 100s this AM. Patient is otherwise feeling well. Denies lightheadedness, chest pain, dyspnea.     Review of Systems:  CONSTITUTIONAL: No fevers, chills, fatigue, recent weight change  HEENT: No visual changes, vertigo or throat pain  NECK: No pain or stiffness  RESPIRATORY: No cough, wheezing, hemoptysis or shortness of breath  CARDIOVASCULAR: No chest pain or palpitations  GASTROINTESTINAL: No abdominal or epigastric pain; no nausea, vomiting, or hematemesis; no diarrhea or constipation; no melena or hematochezia  GENITOURINARY: No dysuria, urinary frequency or hematuria  NEUROLOGICAL: No headache, no numbness or weakness  SKIN: No rashes or lesions   All other review of systems is negative unless indicated above.     CURRENT MEDICATIONS:   MEDICATIONS  (STANDING):  aspirin  chewable 81 milliGRAM(s) Oral daily  digoxin     Tablet 0.125 milliGRAM(s) Oral daily  docusate sodium 100 milliGRAM(s) Oral daily  famotidine    Tablet 20 milliGRAM(s) Oral two times a day  furosemide   Injectable 40 milliGRAM(s) IV Push two times a day  heparin  Infusion.  Unit(s)/Hr (24 mL/Hr) IV Continuous <Continuous>  hydrALAZINE 50 milliGRAM(s) Oral three times a day  influenza   Vaccine 0.5 milliLiter(s) IntraMuscular once  lisinopril 20 milliGRAM(s) Oral two times a day  metoprolol succinate ER 12.5 milliGRAM(s) Oral daily  polyethylene glycol 3350 17 Gram(s) Oral two times a day  simvastatin 20 milliGRAM(s) Oral at bedtime    MEDICATIONS  (PRN):  glycerin Suppository - Adult 1 Suppository(s) Rectal once PRN Constipation  heparin  Injectable 25477 Unit(s) IV Push every 6 hours PRN For aPTT less than 40  heparin  Injectable 5000 Unit(s) IV Push every 6 hours PRN For aPTT between 40 - 57    ALLERGIES:   No Known Allergies    VITAL SIGNS:   T(C): 35.4 (30 Oct 2018 06:00), Max: 36.9 (29 Oct 2018 21:42)  T(F): 95.7 (30 Oct 2018 06:00), Max: 98.4 (29 Oct 2018 21:42)  HR: 128 (30 Oct 2018 09:10) (128 - 131)  BP: 103/69 (30 Oct 2018 06:00) (103/69 - 126/95)  RR: 18 (30 Oct 2018 06:00) (17 - 18)  SpO2: 94% (30 Oct 2018 09:10) (91% - 96%)    I&O's Summary    29 Oct 2018 07:01  -  30 Oct 2018 07:00  --------------------------------------------------------  IN: 1020 mL / OUT: 950 mL / NET: 70 mL    PHYSICAL EXAM:  General: Well-appearing, NAD  HEENT: PERRL, EOMI, normal oral mucosa  Neck: Supple, no JVD, normal thyroid  Lungs: CTA b/l, no wheezing, rales or rhonchi  Heart: RRR, normal S1, S2, no murmurs  Abdomen: Soft, ND, NT, normoactive bowel sounds  Skin: Warm, well-perfused, no rashes or lesions  Neurologic: A&Ox3, no focal deficits  Extremities: Peripheral pulses 2+ b/l, no edema, cyanosis or clubbing    LABS:   CBC Full  -  ( 30 Oct 2018 08:07 )  WBC Count : 6.74 K/uL  Hemoglobin : 15.4 g/dL  Hematocrit : 45.0 %  Platelet Count - Automated : 249 K/uL  Mean Cell Volume : 91.3 fl  Mean Cell Hemoglobin : 31.2 pg  Mean Cell Hemoglobin Concentration : 34.2 gm/dL  Auto Neutrophil # : x  Auto Lymphocyte # : x  Auto Monocyte # : x  Auto Eosinophil # : x  Auto Basophil # : x  Auto Neutrophil % : x  Auto Lymphocyte % : x  Auto Monocyte % : x  Auto Eosinophil % : x  Auto Basophil % : x    10-30    136  |  95<L>  |  29<H>  ----------------------------<  103<H>  3.5   |  30  |  1.39<H>    Ca    9.0      30 Oct 2018 07:03  Phos  3.5     10-30  Mg     2.2     10-30    TELEMETRY: Aflutter 100s-120s    ECG: N/a    RADIOLOGY: N/a [facility] Progress Note    Interval History:   Hypotensive with SBP in low 100s this AM. Patient is otherwise feeling well. Denies lightheadedness, chest pain, dyspnea.     Review of Systems:  CONSTITUTIONAL: No fevers, chills, fatigue, recent weight change  HEENT: No visual changes, vertigo or throat pain  NECK: No pain or stiffness  RESPIRATORY: No cough, wheezing, hemoptysis or shortness of breath  CARDIOVASCULAR: No chest pain or palpitations  GASTROINTESTINAL: No abdominal or epigastric pain; no nausea, vomiting, or hematemesis; no diarrhea or constipation; no melena or hematochezia  GENITOURINARY: No dysuria, urinary frequency or hematuria  NEUROLOGICAL: No headache, no numbness or weakness  SKIN: No rashes or lesions   All other review of systems is negative unless indicated above.     CURRENT MEDICATIONS:   MEDICATIONS  (STANDING):  aspirin  chewable 81 milliGRAM(s) Oral daily  digoxin     Tablet 0.125 milliGRAM(s) Oral daily  docusate sodium 100 milliGRAM(s) Oral daily  famotidine    Tablet 20 milliGRAM(s) Oral two times a day  furosemide   Injectable 40 milliGRAM(s) IV Push two times a day  heparin  Infusion.  Unit(s)/Hr (24 mL/Hr) IV Continuous <Continuous>  hydrALAZINE 50 milliGRAM(s) Oral three times a day  influenza   Vaccine 0.5 milliLiter(s) IntraMuscular once  lisinopril 20 milliGRAM(s) Oral two times a day  metoprolol succinate ER 12.5 milliGRAM(s) Oral daily  polyethylene glycol 3350 17 Gram(s) Oral two times a day  simvastatin 20 milliGRAM(s) Oral at bedtime    MEDICATIONS  (PRN):  glycerin Suppository - Adult 1 Suppository(s) Rectal once PRN Constipation  heparin  Injectable 31005 Unit(s) IV Push every 6 hours PRN For aPTT less than 40  heparin  Injectable 5000 Unit(s) IV Push every 6 hours PRN For aPTT between 40 - 57    ALLERGIES:   No Known Allergies    VITAL SIGNS:   T(C): 35.4 (30 Oct 2018 06:00), Max: 36.9 (29 Oct 2018 21:42)  T(F): 95.7 (30 Oct 2018 06:00), Max: 98.4 (29 Oct 2018 21:42)  HR: 128 (30 Oct 2018 09:10) (128 - 131)  BP: 103/69 (30 Oct 2018 06:00) (103/69 - 126/95)  RR: 18 (30 Oct 2018 06:00) (17 - 18)  SpO2: 94% (30 Oct 2018 09:10) (91% - 96%)    I&O's Summary    29 Oct 2018 07:01  -  30 Oct 2018 07:00  --------------------------------------------------------  IN: 1020 mL / OUT: 950 mL / NET: 70 mL    PHYSICAL EXAM:  General: Well-appearing, NAD  HEENT: PERRL, EOMI, normal oral mucosa  Neck: Supple, no JVD, normal thyroid  Lungs: CTA b/l, no wheezing, rales or rhonchi  Heart: RRR, normal S1, S2, no murmurs  Abdomen: Soft, ND, NT, normoactive bowel sounds  Skin: Warm, well-perfused, no rashes or lesions  Neurologic: A&Ox3, no focal deficits  Extremities: Peripheral pulses 2+ b/l, no edema, cyanosis or clubbing    LABS:   CBC Full  -  ( 30 Oct 2018 08:07 )  WBC Count : 6.74 K/uL  Hemoglobin : 15.4 g/dL  Hematocrit : 45.0 %  Platelet Count - Automated : 249 K/uL  Mean Cell Volume : 91.3 fl  Mean Cell Hemoglobin : 31.2 pg  Mean Cell Hemoglobin Concentration : 34.2 gm/dL  Auto Neutrophil # : x  Auto Lymphocyte # : x  Auto Monocyte # : x  Auto Eosinophil # : x  Auto Basophil # : x  Auto Neutrophil % : x  Auto Lymphocyte % : x  Auto Monocyte % : x  Auto Eosinophil % : x  Auto Basophil % : x    10-30    136  |  95<L>  |  29<H>  ----------------------------<  103<H>  3.5   |  30  |  1.39<H>    Ca    9.0      30 Oct 2018 07:03  Phos  3.5     10-30  Mg     2.2     10-30    TELEMETRY: Aflutter 120s-130s    ECG: N/a    RADIOLOGY: N/a

## 2018-10-30 NOTE — DIETITIAN INITIAL EVALUATION ADULT. - PERTINENT MEDS FT
MEDICATIONS  (STANDING):  aspirin  chewable 81 milliGRAM(s) Oral daily  digoxin     Tablet 0.125 milliGRAM(s) Oral daily  docusate sodium 100 milliGRAM(s) Oral daily  famotidine    Tablet 20 milliGRAM(s) Oral two times a day  furosemide    Tablet 40 milliGRAM(s) Oral two times a day  heparin  Infusion. 2000 Unit(s)/Hr (20 mL/Hr) IV Continuous <Continuous>  hydrALAZINE 50 milliGRAM(s) Oral three times a day  influenza   Vaccine 0.5 milliLiter(s) IntraMuscular once  lisinopril 20 milliGRAM(s) Oral two times a day  polyethylene glycol 3350 17 Gram(s) Oral two times a day  simvastatin 20 milliGRAM(s) Oral at bedtime  warfarin 7.5 milliGRAM(s) Oral once    MEDICATIONS  (PRN):  glycerin Suppository - Adult 1 Suppository(s) Rectal once PRN Constipation  heparin  Injectable 40654 Unit(s) IV Push every 6 hours PRN For aPTT less than 40  heparin  Injectable 5000 Unit(s) IV Push every 6 hours PRN For aPTT between 40 - 57

## 2018-10-30 NOTE — PROGRESS NOTE ADULT - PROBLEM SELECTOR PLAN 1
Cardioversion/Ablation attempted on 10/26 but unable to be completed because of L atrial appendage thrombus.  c/w Hep gtt and coumadin per EP recommendation  - Pulm Eval appreciated - trialing patient on CPAP overnight to see of WILFRED is a cause of the patients pauses overnight. WILFRED likely culprit as pauses resolved with CPAP. Will need outpatient sleep study  - Transitioned to Metoprolol. Tolerating medication, Uptitrate to Toprol XL 25mg   - Plan for repeat TTE on Thursday to assess thrombus. If negative will plan for cardioversion Friday  - c/w digoxin

## 2018-10-30 NOTE — DIETITIAN INITIAL EVALUATION ADULT. - OTHER INFO
Patient seen for routine LOS assessment.  Patient noted with a BMI of 47.  Also noted with Lichen Planus and with 3 lesions on right leg. Patient reports to be eating fine. No GI complaints.  New to coumadin.  Patient reports that he is familiar with coumadin as his mother was on it.  Patient admits to not being a great meal planner.  Lives alone and does own shopping and cooking.  Breakfast is minimal and skip lunch and eats some chicken and vegetables at dinner. Reinforced need for consistent meal planning and importance of protein for healing.  Reviewed Vit K foods and patient to eat a consistent amount at every dinner. Noted with positive fluid weight loss in house and now down to 301 pounds.  Reinforced need for strict compliance to low salt diet and CHF.

## 2018-10-30 NOTE — PROGRESS NOTE ADULT - PROBLEM SELECTOR PLAN 2
-Continue on Coumadin/ IV Heparin with goal PTT 80 in attempt to dissolve thrombus.  -Will repeat STANISLAW likely Thursday to assess thrombus, if clot is no longer seen would perform cardioversion Friday   -Monitor creatinine closely on Digoxin   -Patient will need repeat TTE to evaluate EF once in NSR with normal ventricular rates  -Would obtain out patient Sleep Study to evaluate for Sleep Apnea   -EP will continue to monitor     BRENDA Campoverde -[phone]

## 2018-10-30 NOTE — PROGRESS NOTE ADULT - ASSESSMENT
58M with PMH CAD s/p CABG/stents (stents 2000, CABG 2012), HFrEF (EF 40%), HTN, lichen planus with chronic LE edema p/w VELÁSQUEZ and palpitations, found to be in typical A-Flutter and now with MARV thrombus.     1. Rapid A-Flutter complicated by MARV Thrombus:   - Remains in rapid aflutter on telemetry with rates in 120s. Currently asymptomatic.   - Diltiazem discontinued in setting of severe LV dysfunction  - Continue metoprolol succinate, titrate as BP tolerates  - Continue digoxin  - DCCV/Ablation aborted in the setting of MARV Thrombus  - Continue heparin gtt with bridge to Coumadin; monitor INR  - Will need repeat STANISLAW later on this week (Thursday per EP)    2. Acute on Chronic Systolic HF  - Appears more euvolemic  - Transition to PO Lasix  - Check I/Os/daily weights; monitor electrolytes to maintain K>4, Mg>2 58M with PMH CAD s/p CABG/stents (stents 2000, CABG 2012), HFrEF (EF 40%), HTN, lichen planus with chronic LE edema p/w VELÁSQUEZ and palpitations, found to be in typical A-Flutter and now with MARV thrombus.     1. Rapid A-Flutter complicated by MARV Thrombus:   - Remains in rapid aflutter on telemetry with rates in 120s-130s. Currently asymptomatic, however, BP on lower end.   - Diltiazem discontinued in setting of severe LV dysfunction  - Continue metoprolol succinate, titrate as BP tolerates  - Continue digoxin  - DCCV/Ablation aborted in the setting of MARV Thrombus  - Continue heparin gtt with bridge to Coumadin; monitor INR  - Will need repeat STANISLAW later on this week (Thursday per EP)    2. Acute on Chronic Systolic HF  - Appears more euvolemic  - Transition to PO Lasix  - Check I/Os/daily weights; monitor electrolytes to maintain K>4, Mg>2

## 2018-10-31 LAB
ANION GAP SERPL CALC-SCNC: 11 MMOL/L — SIGNIFICANT CHANGE UP (ref 5–17)
APTT BLD: 69.4 SEC — HIGH (ref 27.5–36.3)
APTT BLD: 76.2 SEC — HIGH (ref 27.5–36.3)
BUN SERPL-MCNC: 28 MG/DL — HIGH (ref 7–23)
CALCIUM SERPL-MCNC: 8.8 MG/DL — SIGNIFICANT CHANGE UP (ref 8.4–10.5)
CHLORIDE SERPL-SCNC: 96 MMOL/L — SIGNIFICANT CHANGE UP (ref 96–108)
CO2 SERPL-SCNC: 29 MMOL/L — SIGNIFICANT CHANGE UP (ref 22–31)
CREAT SERPL-MCNC: 1.37 MG/DL — HIGH (ref 0.5–1.3)
GLUCOSE SERPL-MCNC: 99 MG/DL — SIGNIFICANT CHANGE UP (ref 70–99)
HCT VFR BLD CALC: 44.9 % — SIGNIFICANT CHANGE UP (ref 39–50)
HCT VFR BLD CALC: 47.2 % — SIGNIFICANT CHANGE UP (ref 39–50)
HGB BLD-MCNC: 14.8 G/DL — SIGNIFICANT CHANGE UP (ref 13–17)
HGB BLD-MCNC: 15.4 G/DL — SIGNIFICANT CHANGE UP (ref 13–17)
INR BLD: 1.56 RATIO — HIGH (ref 0.88–1.16)
MCHC RBC-ENTMCNC: 30.1 PG — SIGNIFICANT CHANGE UP (ref 27–34)
MCHC RBC-ENTMCNC: 30.1 PG — SIGNIFICANT CHANGE UP (ref 27–34)
MCHC RBC-ENTMCNC: 32.7 GM/DL — SIGNIFICANT CHANGE UP (ref 32–36)
MCHC RBC-ENTMCNC: 33 GM/DL — SIGNIFICANT CHANGE UP (ref 32–36)
MCV RBC AUTO: 91.3 FL — SIGNIFICANT CHANGE UP (ref 80–100)
MCV RBC AUTO: 92.2 FL — SIGNIFICANT CHANGE UP (ref 80–100)
PLATELET # BLD AUTO: 252 K/UL — SIGNIFICANT CHANGE UP (ref 150–400)
PLATELET # BLD AUTO: 255 K/UL — SIGNIFICANT CHANGE UP (ref 150–400)
POTASSIUM SERPL-MCNC: 3.9 MMOL/L — SIGNIFICANT CHANGE UP (ref 3.5–5.3)
POTASSIUM SERPL-SCNC: 3.9 MMOL/L — SIGNIFICANT CHANGE UP (ref 3.5–5.3)
PROTHROM AB SERPL-ACNC: 18 SEC — HIGH (ref 10–12.9)
RBC # BLD: 4.92 M/UL — SIGNIFICANT CHANGE UP (ref 4.2–5.8)
RBC # BLD: 5.12 M/UL — SIGNIFICANT CHANGE UP (ref 4.2–5.8)
RBC # FLD: 12.5 % — SIGNIFICANT CHANGE UP (ref 10.3–14.5)
RBC # FLD: 13.6 % — SIGNIFICANT CHANGE UP (ref 10.3–14.5)
SODIUM SERPL-SCNC: 136 MMOL/L — SIGNIFICANT CHANGE UP (ref 135–145)
WBC # BLD: 6.67 K/UL — SIGNIFICANT CHANGE UP (ref 3.8–10.5)
WBC # BLD: 7.3 K/UL — SIGNIFICANT CHANGE UP (ref 3.8–10.5)
WBC # FLD AUTO: 6.67 K/UL — SIGNIFICANT CHANGE UP (ref 3.8–10.5)
WBC # FLD AUTO: 7.3 K/UL — SIGNIFICANT CHANGE UP (ref 3.8–10.5)

## 2018-10-31 PROCEDURE — 99232 SBSQ HOSP IP/OBS MODERATE 35: CPT

## 2018-10-31 PROCEDURE — 99233 SBSQ HOSP IP/OBS HIGH 50: CPT

## 2018-10-31 PROCEDURE — 99233 SBSQ HOSP IP/OBS HIGH 50: CPT | Mod: GC

## 2018-10-31 RX ORDER — METOPROLOL TARTRATE 50 MG
50 TABLET ORAL DAILY
Qty: 0 | Refills: 0 | Status: DISCONTINUED | OUTPATIENT
Start: 2018-11-01 | End: 2018-11-01

## 2018-10-31 RX ORDER — WARFARIN SODIUM 2.5 MG/1
7.5 TABLET ORAL ONCE
Qty: 0 | Refills: 0 | Status: COMPLETED | OUTPATIENT
Start: 2018-10-31 | End: 2018-10-31

## 2018-10-31 RX ADMIN — Medication 100 MILLIGRAM(S): at 14:13

## 2018-10-31 RX ADMIN — WARFARIN SODIUM 7.5 MILLIGRAM(S): 2.5 TABLET ORAL at 21:45

## 2018-10-31 RX ADMIN — POLYETHYLENE GLYCOL 3350 17 GRAM(S): 17 POWDER, FOR SOLUTION ORAL at 18:04

## 2018-10-31 RX ADMIN — LISINOPRIL 20 MILLIGRAM(S): 2.5 TABLET ORAL at 05:54

## 2018-10-31 RX ADMIN — LISINOPRIL 20 MILLIGRAM(S): 2.5 TABLET ORAL at 19:05

## 2018-10-31 RX ADMIN — Medication 40 MILLIGRAM(S): at 05:54

## 2018-10-31 RX ADMIN — HEPARIN SODIUM 1700 UNIT(S)/HR: 5000 INJECTION INTRAVENOUS; SUBCUTANEOUS at 03:20

## 2018-10-31 RX ADMIN — Medication 50 MILLIGRAM(S): at 05:54

## 2018-10-31 RX ADMIN — Medication 50 MILLIGRAM(S): at 14:13

## 2018-10-31 RX ADMIN — Medication 25 MILLIGRAM(S): at 05:54

## 2018-10-31 RX ADMIN — FAMOTIDINE 20 MILLIGRAM(S): 10 INJECTION INTRAVENOUS at 18:05

## 2018-10-31 RX ADMIN — SIMVASTATIN 20 MILLIGRAM(S): 20 TABLET, FILM COATED ORAL at 21:46

## 2018-10-31 RX ADMIN — Medication 0.12 MILLIGRAM(S): at 05:54

## 2018-10-31 RX ADMIN — HEPARIN SODIUM 1700 UNIT(S)/HR: 5000 INJECTION INTRAVENOUS; SUBCUTANEOUS at 11:09

## 2018-10-31 RX ADMIN — Medication 40 MILLIGRAM(S): at 18:05

## 2018-10-31 RX ADMIN — Medication 50 MILLIGRAM(S): at 21:45

## 2018-10-31 RX ADMIN — FAMOTIDINE 20 MILLIGRAM(S): 10 INJECTION INTRAVENOUS at 05:54

## 2018-10-31 RX ADMIN — Medication 81 MILLIGRAM(S): at 14:13

## 2018-10-31 NOTE — PROGRESS NOTE ADULT - PROBLEM SELECTOR PLAN 1
Cardioversion/Ablation attempted on 10/26 but unable to be completed because of L atrial appendage thrombus.    - c/w Hep gtt and coumadin per EP recommendation  - Transitioned to Metoprolol. Tolerating medication, c/w Toprol XL 25mg, uptitrate as tolerated   - Plan for repeat TTE on Thursday to assess thrombus. If negative will plan for cardioversion Friday. NPO after midnight tonight  - c/w digoxin

## 2018-10-31 NOTE — PROGRESS NOTE ADULT - SUBJECTIVE AND OBJECTIVE BOX
24H hour events:   Seen sitting at bedside; no c/o noted at this time; no events overnight    MEDICATIONS:  aspirin  chewable 81 milliGRAM(s) Oral daily  digoxin   Tablet 0.125 milliGRAM(s) Oral daily  furosemide  Tablet 40 milliGRAM(s) Oral two times a day  heparin  Infusion. 2000 Unit(s)/Hr IV Continuous <Continuous>  heparin  Injectable 06272 Unit(s) IV Push every 6 hours PRN  heparin  Injectable 5000 Unit(s) IV Push every 6 hours PRN  hydrALAZINE 50 milliGRAM(s) Oral three times a day  lisinopril 20 milliGRAM(s) Oral two times a day  metoprolol succinate ER 25 milliGRAM(s) Oral daily  docusate sodium 100 milliGRAM(s) Oral daily  famotidine    Tablet 20 milliGRAM(s) Oral two times a day  glycerin Suppository - Adult 1 Suppository(s) Rectal once PRN  polyethylene glycol 3350 17 Gram(s) Oral two times a day  simvastatin 20 milliGRAM(s) Oral at bedtime  influenza   Vaccine 0.5 milliLiter(s) IntraMuscular once      REVIEW OF SYSTEMS:  Constitutional: no fever or chills  Neuro: no dizziness or lightheadedness  Respiratory: no sob or cough  Cardiac: no chest pain or palpitations; occ tachycardia  GI: no nausea or vomiting  : no dysuria or hematuria  Ext: no tingling or numbness    PHYSICAL EXAM:  T(C): 36.6 (10-31-18 @ 08:14), Max: 37 (10-30-18 @ 22:14)  HR: 110 (10-31-18 @ 08:14) (110 - 121)  BP: 130/96 (10-31-18 @ 08:14) (94/67 - 130/96)  RR: 18 (10-31-18 @ 08:14) (18 - 18)  SpO2: 96% (10-31-18 @ 08:14) (94% - 96%)  Wt(kg): --  I&O's Summary    30 Oct 2018 07:01  -  31 Oct 2018 07:00  --------------------------------------------------------  IN: 780 mL / OUT: 1450 mL / NET: -670 mL        Appearance: Normal, well develop  HEENT: 	Normocephalic, atraumatic, normal oral mucosa  Lymphatic: No lymphadenopathy  Cardiovascular: irregular S1S2, No murmurs, (+) BL LE edema  Respiratory: Lungs clear to auscultation	  Psychiatry: A & O x 3, Mood & affect appropriate  Gastrointestinal:  Soft, Non-tender, + BS	  Skin: No rashes, No ecchymoses, No cyanosis	  Neurologic: Non-focal  Extremities: Normal range of motion, No clubbing, cyanosis   Vascular: Peripheral pulses palpable 2+ bilaterally        LABS:	 	    CBC Full  -  ( 31 Oct 2018 07:56 )  WBC Count : 6.67 K/uL  Hemoglobin : 14.8 g/dL  Hematocrit : 44.9 %  Platelet Count - Automated : 252 K/uL  Mean Cell Volume : 91.3 fl  Mean Cell Hemoglobin : 30.1 pg  Mean Cell Hemoglobin Concentration : 33.0 gm/dL  Auto Neutrophil # : x  Auto Lymphocyte # : x  Auto Monocyte # : x  Auto Eosinophil # : x  Auto Basophil # : x  Auto Neutrophil % : x  Auto Lymphocyte % : x  Auto Monocyte % : x  Auto Eosinophil % : x  Auto Basophil % : x    10-31    136  |  96  |  28<H>  ----------------------------<  99  3.9   |  29  |  1.37<H>  10-30    136  |  95<L>  |  29<H>  ----------------------------<  103<H>  3.5   |  30  |  1.39<H>    Ca    8.8      31 Oct 2018 06:01  Ca    9.0      30 Oct 2018 07:03  Phos  3.5     10-30  Mg     2.2     10-30      TELEMETRY: Atrial Fibrillation 's      Study Date: 10/26/2018      PROCEDURE: Transesophageal (STANISLAW) was performed.  Informed  consent was first obtained for STANISLAW. The patient was sedated  - see anesthesia record.  The procedure was monitored with  automatic blood pressure monitoring, ECG tracings and pulse  oximetry. The transesophageal probe was placed in the  esophagus posterior to the heart without complications.  INDICATION: Unspecified atrial flutter (I48.92)  ------------------------------------------------------------------------  Observations:  Mitral Valve: Normal mitral valve.  Aortic Valve/Aorta: Calcified trileaflet aortic valve with  decreased opening. Estimated aortic valve area equals 1.5  sqcm (by planimetry), consistent with moderate aortic  stenosis. Mild aortic regurgitation.  LVOT diameter: 2.4 cm.  Left Atrium: Spontaneous echo contrast in the left atrial  appendage which would not clear.  Administration of  Definity continued to show "filling defect" within the left  atrial appendage suggestive of thrombus.  Cardioversion cancelled.  Left Ventricle: Severe global left ventricular systolic  dysfunction. Left ventricular enlargement.  Right Heart: Right atrial enlargement. Right ventricular  enlargement with decreased right ventricular systolic  function. Normal tricuspid valve. Mild-moderate tricuspid  regurgitation. Normal pulmonic valve.  Pericardium/Pleura: Normal pericardium with no pericardial  effusion.  Hemodynamic: Estimated right atrial pressure is 8 mm Hg.  Estimated right ventricular systolic pressure equals 54 mm  Hg, assuming right atrial pressure equals 8 mm Hg,  consistent with moderate pulmonary hypertension. Agitated  saline injection and color flow Doppler demonstrates no  evidence of a patent foramen ovale.  ------------------------------------------------------------------------  Conclusions:  1. Calcified trileaflet aortic valve with decreased  opening. Estimated aortic valve area equals 1.5 sqcm (by  planimetry), consistent with moderate aortic stenosis.  2. Spontaneous echo contrast in the left atrial appendage  which would not clear.  Administration of Definity  continued to show "filling defect" within the left atrial  appendage suggestive of thrombus.  Cardioversion cancelled.  3. Left ventricular enlargement.  4. Severe global left ventricular systolic dysfunction.  5. Right atrial enlargement.  6. Right ventricular enlargement with decreased right  ventricular systolic function.  7. Normal tricuspid valve. Mild-moderate tricuspid  regurgitation.  8. Estimated pulmonary artery systolic pressure equals 54  mm Hg, assuming right atrial pressure equals 8 mm Hg,  consistent with moderate pulmonary pressures.  9. Agitated saline injection and color flow Doppler  demonstrates no evidence of a patent foramen ovale.  ------------------------------------------------------------------------  Confirmed on  10/26/2018 - 17:27:39 by Rahat Carl M.D.  ------------------------------------------------------------------------

## 2018-10-31 NOTE — PROGRESS NOTE ADULT - PROBLEM SELECTOR PLAN 2
Bridge Heparin to Coumadin, monitor coags, keep PTT 80  Continue Metoprolol 25mg qd, may gently uptitrate for rate control as BP can tolerate  On Digoxin 0.125mg qd  Plan for repeat STANISLAW tomorrow 11/1/18  If persistent clot, will plan for Micra PPM to allow for medication adjustment   If clot not visible on STANISLAW, will plan for CV friday 11/2/18  Rec outpatient Sleep Study to evaluate for Sleep Apnea

## 2018-10-31 NOTE — PROGRESS NOTE ADULT - ASSESSMENT
58M with PMHx morbid obesity, CAD s/p CABG, and HFrEF admitted for dyspnea and palpitations. Dyspnea likely primarily secondary to exacerbation of HFrEF in setting of Aflutter w/RVR. Pulmonary consulted for r/o WILFRED. He does not show evidence of significant CO2 retention and has a low [place] Sleepiness Scale. However, pt is at high risk for having undiagnosed WILFRED and untreated WILFRED could further exacerbate arrhythmias and CHF, especially if he is to undergo cardioversion this week. Untreated WILFRED can increase risk of arrhythmia recurrence. 58M with PMHx morbid obesity, CAD s/p CABG, and HFrEF admitted for dyspnea and palpitations. Dyspnea likely primarily secondary to exacerbation of HFrEF in setting of Aflutter w/RVR. Pulmonary consulted for r/o WILFRED. He does not show evidence of significant CO2 retention and has a low [place] Sleepiness Scale. However, pt is at high risk for having undiagnosed WILFRED and untreated WILFRED could further exacerbate arrhythmias and CHF, especially if he is to undergo cardioversion this week. Untreated WILFRED can increase risk of arrhythmia recurrence.   -Provided pt with at bedside sleep study machine from office today and taught both pt and RN how to put it on and use it. Pt should put it on ~9pm today and leave it on overnight. We will  and analyze tomorrow.

## 2018-10-31 NOTE — PROGRESS NOTE ADULT - SUBJECTIVE AND OBJECTIVE BOX
Aidan Morel MD  Division of Hospital Medicine  Pager [phone]      BERTHA WARD  58y  Male      Patient is a 58y old  Male who presents with a chief complaint of VELÁSQUEZ (31 Oct 2018 10:13)      INTERVAL HPI/OVERNIGHT EVENTS:  Seen sitting in chair. Had difficulty sleeping in bed last night, slept in chair. Denies palpitations, chest pain.        REVIEW OF SYSTEMS: 14 point ROS negative unless listed above    T(C): 36.6 (10-31-18 @ 08:14), Max: 37 (10-30-18 @ 22:14)  HR: 110 (10-31-18 @ 08:14) (110 - 121)  BP: 130/96 (10-31-18 @ 08:14) (94/67 - 130/96)  RR: 18 (10-31-18 @ 08:14) (18 - 18)  SpO2: 96% (10-31-18 @ 08:14) (94% - 96%)  Wt(kg): --Vital Signs Last 24 Hrs  T(C): 36.6 (31 Oct 2018 08:14), Max: 37 (30 Oct 2018 22:14)  T(F): 97.8 (31 Oct 2018 08:14), Max: 98.6 (30 Oct 2018 22:14)  HR: 110 (31 Oct 2018 08:14) (110 - 121)  BP: 130/96 (31 Oct 2018 08:14) (94/67 - 130/96)  BP(mean): --  RR: 18 (31 Oct 2018 08:14) (18 - 18)  SpO2: 96% (31 Oct 2018 08:14) (94% - 96%)    PHYSICAL EXAM:  GENERAL: NAD, well-developed  HEAD:  Atraumatic, normocephalic  EYES: EOMI, sclera clear  NECK: Supple, no JVD  CHEST/LUNG: Clear to auscultation bilaterally; no wheezing or rales  HEART: irregular, S1 S2, no murmurs   ABDOMEN: Soft, nontender, +distended, bowel sounds present  EXTREMITIES: 1+ pitting edema in b/l LE with chronic skin changes, open lesions on RLE   PSYCH: calm affect, not anxious  NEUROLOGY: non-focal, AAOx3  SKIN: chronic skin discoloration of b/l LE with 3 lesions on RLE    Consultant(s) Notes Reviewed:  [x ] YES  [ ] NO  Care Discussed with Consultants/Other Providers [ x] YES  [ ] NO    LABS:                        14.8   6.67  )-----------( 252      ( 31 Oct 2018 07:56 )             44.9     10-31    136  |  96  |  28<H>  ----------------------------<  99  3.9   |  29  |  1.37<H>    Ca    8.8      31 Oct 2018 06:01  Phos  3.5     10-30  Mg     2.2     10-30      PT/INR - ( 30 Oct 2018 09:51 )   PT: 15.0 sec;   INR: 1.33 ratio         PTT - ( 31 Oct 2018 01:18 )  PTT:76.2 sec    CAPILLARY BLOOD GLUCOSE                RADIOLOGY & ADDITIONAL TESTS:    Imaging Personally Reviewed:  [x ] YES  [ ] NO

## 2018-10-31 NOTE — PROGRESS NOTE ADULT - ASSESSMENT
58M with PMH CAD s/p CABG/stents (stents 2000, CABG 2012), HFrEF (EF 40%), HTN, lichen planus with chronic LE edema p/w VELÁSQUEZ and palpitations, found to be in typical A-Flutter and now with MARV thrombus on heparin bridge to Coumadin    1. Rapid AFlutter complicated by MARV Thrombus:   - Remains in rapid aflutter on telemetry with rates in 110-130. Currently asymptomatic, hemodynamically stable.   - Diltiazem initially started on admission due to history of intolerance to BB (dyspnea), however, now discontinued in setting of severe LV dysfunction  - Continue metoprolol succinate  - Continue digoxin  - DCCV/Ablation aborted in the setting of MARV Thrombus  - Continue heparin gtt with bridge to Coumadin; monitor INR  - Will need repeat STANISLAW, likely Thursday per EP    2. Acute on Chronic Systolic HF:  - Continue Lasix PO BID  - Check I/Os/daily weights; monitor electrolytes to maintain K>4, Mg>2 58M with PMH CAD s/p CABG/stents (stents 2000, CABG 2012), HFrEF (EF 40%), HTN, lichen planus with chronic LE edema p/w VELÁSQUEZ and palpitations, found to be in typical A-Flutter with RVR. Course c/b discovery of MARV thrombus, on heparin bridge to Coumadin.     1. AFlutter with RVR complicated by MARV Thrombus:   - Remains in rapid aflutter on telemetry with rates in 110-130. Currently asymptomatic, hemodynamically stable.   - Diltiazem initially started on admission due to history of intolerance to BB (dyspnea), however, now discontinued in setting of severe LV dysfunction  - Continue metoprolol succinate, titrate as tolerated for rate control  - Continue digoxin  - Cardioversion/ablation aborted in setting of MARV thrombus on 10/26  - To go for repeat STANISLAW Thursday 10/31 (tentative) to assess thrombus; possible cardioversion Friday per EP if clot dissolved  - Continue heparin gtt with bridge to Coumadin; monitor INR and PTT    2. Acute on Chronic Systolic HF:  - Continue Lasix PO daily  - Continue ACEi, BB  - Check I/Os/daily weights; monitor electrolytes to maintain K>4, Mg>2 venita iqbal

## 2018-10-31 NOTE — PROGRESS NOTE ADULT - SUBJECTIVE AND OBJECTIVE BOX
[facility] Progress Note    Interval History:   No events overnight. Remains in rapid aflutter, however, currently asymptomatic with stable BP. Denies lightheadedness, chest pain, shortness of breath.     Review of Systems:  Constitutional: No fever, chills, fatigue, recent weight change  HEENT: No vision changes, eye pain, vertigo, sore throat  Respiratory: No cough, wheezing, shortness of breath  Cardiovascular: No chest pain, palpitations, VELÁSQUEZ, PND, orthopnea  Gastrointestinal: No abdominal pain, nausea, vomiting, diarrhea, constipation, melena  Genitourinary: No hematuria, dysuria, nocturia, incontinence  Extremities: No swelling, joint pain, muscle weakness  Neurologic: No focal weakness, paresthesias, headache, syncope  Skin: No rashes, ecchymoses, wounds    CURRENT MEDICATIONS:   MEDICATIONS  (STANDING):  aspirin  chewable 81 milliGRAM(s) Oral daily  digoxin     Tablet 0.125 milliGRAM(s) Oral daily  docusate sodium 100 milliGRAM(s) Oral daily  famotidine    Tablet 20 milliGRAM(s) Oral two times a day  furosemide    Tablet 40 milliGRAM(s) Oral two times a day  heparin  Infusion. 2000 Unit(s)/Hr (20 mL/Hr) IV Continuous <Continuous>  hydrALAZINE 50 milliGRAM(s) Oral three times a day  influenza   Vaccine 0.5 milliLiter(s) IntraMuscular once  lisinopril 20 milliGRAM(s) Oral two times a day  metoprolol succinate ER 25 milliGRAM(s) Oral daily  polyethylene glycol 3350 17 Gram(s) Oral two times a day  simvastatin 20 milliGRAM(s) Oral at bedtime    MEDICATIONS  (PRN):  glycerin Suppository - Adult 1 Suppository(s) Rectal once PRN Constipation  heparin  Injectable 96219 Unit(s) IV Push every 6 hours PRN For aPTT less than 40  heparin  Injectable 5000 Unit(s) IV Push every 6 hours PRN For aPTT between 40 - 57    ALLERGIES:   No Known Allergies    VITAL SIGNS:   T(C): 36.6 (31 Oct 2018 08:14), Max: 37 (30 Oct 2018 22:14)  T(F): 97.8 (31 Oct 2018 08:14), Max: 98.6 (30 Oct 2018 22:14)  HR: 110 (31 Oct 2018 08:14) (110 - 121)  BP: 130/96 (31 Oct 2018 08:14) (94/67 - 130/96)  RR: 18 (31 Oct 2018 08:14) (18 - 18)  SpO2: 96% (31 Oct 2018 08:14) (94% - 96%)    I&O's Summary    30 Oct 2018 07:01  -  31 Oct 2018 07:00  --------------------------------------------------------  IN: 780 mL / OUT: 1450 mL / NET: -670 mL    PHYSICAL EXAM:  General: Well-appearing, NAD  HEENT: PERRL, EOMI, normal oral mucosa  Neck: Supple, no JVD, normal thyroid  Lungs: CTA b/l, no wheezing, rales or rhonchi  Heart: Tachycardic, regular rhythm, no murmurs  Abdomen: Soft, ND, NT, normoactive bowel sounds  Skin: Warm, well-perfused, no rashes or lesions  Neurologic: A&Ox3, no focal deficits  Extremities: Peripheral pulses 2+ b/l, no edema, cyanosis or clubbing    LABS:   CBC Full  -  ( 31 Oct 2018 07:56 )  WBC Count : 6.67 K/uL  Hemoglobin : 14.8 g/dL  Hematocrit : 44.9 %  Platelet Count - Automated : 252 K/uL  Mean Cell Volume : 91.3 fl  Mean Cell Hemoglobin : 30.1 pg  Mean Cell Hemoglobin Concentration : 33.0 gm/dL  Auto Neutrophil # : x  Auto Lymphocyte # : x  Auto Monocyte # : x  Auto Eosinophil # : x  Auto Basophil # : x  Auto Neutrophil % : x  Auto Lymphocyte % : x  Auto Monocyte % : x  Auto Eosinophil % : x  Auto Basophil % : x    10-31    136  |  96  |  28<H>  ----------------------------<  99  3.9   |  29  |  1.37<H>    Ca    8.8      31 Oct 2018 06:01  Phos  3.5     10-30  Mg     2.2     10-30    TELEMETRY: aflutter 110-130    ECG:     RADIOLOGY: [facility] Progress Note    Interval History:   No events overnight. Remains in rapid aflutter, however, currently asymptomatic with stable BP. Denies lightheadedness, chest pain, shortness of breath.     Review of Systems:  Constitutional: No fever, chills, fatigue, recent weight change  HEENT: No vision changes, eye pain, vertigo, sore throat  Respiratory: No cough, wheezing, shortness of breath  Cardiovascular: No chest pain, palpitations, VELÁSQUEZ, PND, orthopnea  Gastrointestinal: No abdominal pain, nausea, vomiting, diarrhea, constipation, melena  Genitourinary: No hematuria, dysuria, nocturia, incontinence  Extremities: No swelling, joint pain, muscle weakness  Neurologic: No focal weakness, paresthesias, headache, syncope  Skin: No rashes, ecchymoses, wounds    CURRENT MEDICATIONS:   MEDICATIONS  (STANDING):  aspirin  chewable 81 milliGRAM(s) Oral daily  digoxin     Tablet 0.125 milliGRAM(s) Oral daily  docusate sodium 100 milliGRAM(s) Oral daily  famotidine    Tablet 20 milliGRAM(s) Oral two times a day  furosemide    Tablet 40 milliGRAM(s) Oral two times a day  heparin  Infusion. 2000 Unit(s)/Hr (20 mL/Hr) IV Continuous <Continuous>  hydrALAZINE 50 milliGRAM(s) Oral three times a day  influenza   Vaccine 0.5 milliLiter(s) IntraMuscular once  lisinopril 20 milliGRAM(s) Oral two times a day  metoprolol succinate ER 25 milliGRAM(s) Oral daily  polyethylene glycol 3350 17 Gram(s) Oral two times a day  simvastatin 20 milliGRAM(s) Oral at bedtime    MEDICATIONS  (PRN):  glycerin Suppository - Adult 1 Suppository(s) Rectal once PRN Constipation  heparin  Injectable 28359 Unit(s) IV Push every 6 hours PRN For aPTT less than 40  heparin  Injectable 5000 Unit(s) IV Push every 6 hours PRN For aPTT between 40 - 57    ALLERGIES:   No Known Allergies    VITAL SIGNS:   T(C): 36.6 (31 Oct 2018 08:14), Max: 37 (30 Oct 2018 22:14)  T(F): 97.8 (31 Oct 2018 08:14), Max: 98.6 (30 Oct 2018 22:14)  HR: 110 (31 Oct 2018 08:14) (110 - 121)  BP: 130/96 (31 Oct 2018 08:14) (94/67 - 130/96)  RR: 18 (31 Oct 2018 08:14) (18 - 18)  SpO2: 96% (31 Oct 2018 08:14) (94% - 96%)    I&O's Summary    30 Oct 2018 07:01  -  31 Oct 2018 07:00  --------------------------------------------------------  IN: 780 mL / OUT: 1450 mL / NET: -670 mL    PHYSICAL EXAM:  General: Well-appearing, NAD  HEENT: PERRL, EOMI, normal oral mucosa  Neck: Supple, no JVD, normal thyroid  Lungs: CTA b/l, no wheezing, rales or rhonchi  Heart: Tachycardic, regular rhythm, no murmurs  Abdomen: Soft, ND, NT, normoactive bowel sounds  Skin: Warm, well-perfused  Neurologic: A&Ox3, no focal deficits  Extremities: Peripheral pulses 2+ b/l, 1+ pitting LE edema with chronic skin discoloration of overlying skin    LABS:   CBC Full  -  ( 31 Oct 2018 07:56 )  WBC Count : 6.67 K/uL  Hemoglobin : 14.8 g/dL  Hematocrit : 44.9 %  Platelet Count - Automated : 252 K/uL  Mean Cell Volume : 91.3 fl  Mean Cell Hemoglobin : 30.1 pg  Mean Cell Hemoglobin Concentration : 33.0 gm/dL  Auto Neutrophil # : x  Auto Lymphocyte # : x  Auto Monocyte # : x  Auto Eosinophil # : x  Auto Basophil # : x  Auto Neutrophil % : x  Auto Lymphocyte % : x  Auto Monocyte % : x  Auto Eosinophil % : x  Auto Basophil % : x    10-31    136  |  96  |  28<H>  ----------------------------<  99  3.9   |  29  |  1.37<H>    Ca    8.8      31 Oct 2018 06:01  Phos  3.5     10-30  Mg     2.2     10-30    TELEMETRY: aflutter 110-130    ECG: --    < from: Transesophageal Echocardiogram w/o TTE (10.26.18 @ 16:37) >  Patient name: BERTHA WARD  YOB: 1960   Age: 58 (M)   MR#: [id number]  Study Date: 10/26/2018  Location: [address] [place], [place] [id number]: Windy Palacios M.D.  Study quality: Technically difficult  Referring Physician: Jh Morel MD  Blood Pressure: 133/99 mmHg  Height: 173 cm  Weight: 142 kg  BSA: 2.5 m2  ------------------------------------------------------------------------  PROCEDURE: Transesophageal (STANISLAW) was performed.  Informed  consent was first obtained for STANISLAW. The patient was sedated  - see anesthesia record.  The procedure was monitored with  automatic blood pressure monitoring, ECG tracings and pulse  oximetry. The transesophageal probe was placed in the  esophagus posterior to the heart without complications.  INDICATION: Unspecified atrial flutter (I48.92)  ------------------------------------------------------------------------  Observations:  Mitral Valve: Normal mitral valve.  Aortic Valve/Aorta: Calcified trileaflet aortic valve with  decreased opening. Estimated aortic valve area equals 1.5  sqcm (by planimetry), consistent with moderate aortic  stenosis. Mild aortic regurgitation.  LVOT diameter: 2.4 cm.  Left Atrium: Spontaneous echo contrast in the left atrial  appendage which would not clear.  Administration of  Definity continued to show "filling defect" within the left  atrial appendage suggestive of thrombus.  Cardioversion cancelled.  Left Ventricle: Severe global left ventricular systolic  dysfunction. Left ventricular enlargement.  Right Heart: Right atrial enlargement. Right ventricular  enlargement with decreased right ventricular systolic  function. Normal tricuspid valve. Mild-moderate tricuspid  regurgitation. Normal pulmonic valve.  Pericardium/Pleura: Normal pericardium with no pericardial  effusion.  Hemodynamic: Estimated right atrial pressure is 8 mm Hg.  Estimated right ventricular systolic pressure equals 54 mm  Hg, assuming right atrial pressure equals 8 mm Hg,  consistent with moderate pulmonary hypertension. Agitated  saline injection and color flow Doppler demonstrates no  evidence of a patent foramen ovale.  ------------------------------------------------------------------------  Conclusions:  1. Calcified trileaflet aortic valve with decreased  opening. Estimated aortic valve area equals 1.5 sqcm (by  planimetry), consistent with moderate aortic stenosis.  2. Spontaneous echo contrast in the left atrial appendage  which would not clear.  Administration of Definity  continued to show "filling defect" within the left atrial  appendage suggestive of thrombus.  Cardioversion cancelled.  3. Left ventricular enlargement.  4. Severe global left ventricular systolic dysfunction.  5. Right atrial enlargement.  6. Right ventricular enlargement with decreased right  ventricular systolic function.  7. Normal tricuspid valve. Mild-moderate tricuspid  regurgitation.  8. Estimated pulmonary artery systolic pressure equals 54  mm Hg, assuming right atrial pressure equals 8 mm Hg,  consistent with moderate pulmonary pressures.  9. Agitated saline injection and color flow Doppler  demonstrates no evidence of a patent foramen ovale.  ------------------------------------------------------------------------  Confirmed on  10/26/2018 - 17:27:39 by Rahat Carl M.D.  ------------------------------------------------------------------------

## 2018-10-31 NOTE — PROGRESS NOTE ADULT - PROBLEM SELECTOR PLAN 2
CHF with EF 40%, chronic LE edema and no rales on exam, BNP elevated c/w likely exacerbation  c/w PO Lasix 40mg BID  Strict I/O.  Daily Weight  cardiology following  c/w ACEi, c/w Toprol XL uptitrate as tolerated

## 2018-10-31 NOTE — PROGRESS NOTE ADULT - SUBJECTIVE AND OBJECTIVE BOX
CHIEF COMPLAINT: AFlutter    Interval Events: Pt seen and examined at bedside. Overnight pulse ox performed on 2LNC.    REVIEW OF SYSTEMS:  Constitutional: [x ] negative [ ] fevers [ ] chills [ ] weight loss [ ] weight gain  HEENT: [ x] negative [ ] dry eyes [ ] eye irritation [ ] postnasal drip [ ] nasal congestion  CV: [ ] negative  [ ] chest pain [ ] orthopnea [x ] palpitations [ ] murmur  Resp: [ ] negative [ ] cough [ ] shortness of breath [ ] dyspnea [ ] wheezing [ ] sputum [ ] hemoptysis  GI: [ ] negative [ ] nausea [ ] vomiting [ ] diarrhea [ ] constipation [ ] abd pain [ ] dysphagia   : [ ] negative [ ] dysuria [ ] nocturia [ ] hematuria [ ] increased urinary frequency  Musculoskeletal: [ ] negative [ ] back pain [ ] myalgias [ ] arthralgias [ ] fracture  Skin: [ ] negative [ ] rash [ ] itch  Neurological: [ ] negative [ ] headache [ ] dizziness [ ] syncope [ ] weakness [ ] numbness  Psychiatric: [ ] negative [ ] anxiety [ ] depression  Endocrine: [ ] negative [ ] diabetes [ ] thyroid problem  Hematologic/Lymphatic: [ ] negative [ ] anemia [ ] bleeding problem  Allergic/Immunologic: [ ] negative [ ] itchy eyes [ ] nasal discharge [ ] hives [ ] angioedema  [x ] All other systems negative  [ ] Unable to assess ROS because ________      OBJECTIVE:  ICU Vital Signs Last 24 Hrs  T(C): 36.6 (31 Oct 2018 08:14), Max: 37 (30 Oct 2018 22:14)  T(F): 97.8 (31 Oct 2018 08:14), Max: 98.6 (30 Oct 2018 22:14)  HR: 110 (31 Oct 2018 08:14) (110 - 128)  BP: 130/96 (31 Oct 2018 08:14) (94/67 - 130/96)  BP(mean): --  ABP: --  ABP(mean): --  RR: 18 (31 Oct 2018 08:14) (18 - 18)  SpO2: 96% (31 Oct 2018 08:14) (94% - 96%)        10-30 @ 07:01  -  10-31 @ 07:00  --------------------------------------------------------  IN: 780 mL / OUT: 1450 mL / NET: -670 mL      CAPILLARY BLOOD GLUCOSE        General: Morbidly obese male sitting comfortably in chair, NAD  HEENT: NC/AT, JARRET/EOMI  Neck: Supple, (-) thyromegaly or tracheal deviation   Respiratory: No increased WOB, CTAB  Cardiovascular: RRR, S1, S2  Abdomen: Soft, +BS  Extremities: + b/l LE edema with chronic skin hyperpigmentation and lichenification   Skin: Venous stasis changes and sores on lower extremities  Neurological: Nonfocal findings   Psychiatry: Appropriate affect    HOSPITAL MEDICATIONS:  MEDICATIONS  (STANDING):  aspirin  chewable 81 milliGRAM(s) Oral daily  digoxin     Tablet 0.125 milliGRAM(s) Oral daily  docusate sodium 100 milliGRAM(s) Oral daily  famotidine    Tablet 20 milliGRAM(s) Oral two times a day  furosemide    Tablet 40 milliGRAM(s) Oral two times a day  heparin  Infusion. 2000 Unit(s)/Hr (20 mL/Hr) IV Continuous <Continuous>  hydrALAZINE 50 milliGRAM(s) Oral three times a day  influenza   Vaccine 0.5 milliLiter(s) IntraMuscular once  lisinopril 20 milliGRAM(s) Oral two times a day  metoprolol succinate ER 25 milliGRAM(s) Oral daily  polyethylene glycol 3350 17 Gram(s) Oral two times a day  simvastatin 20 milliGRAM(s) Oral at bedtime    MEDICATIONS  (PRN):  glycerin Suppository - Adult 1 Suppository(s) Rectal once PRN Constipation  heparin  Injectable 96384 Unit(s) IV Push every 6 hours PRN For aPTT less than 40  heparin  Injectable 5000 Unit(s) IV Push every 6 hours PRN For aPTT between 40 - 57      LABS:                        14.8   6.67  )-----------( 252      ( 31 Oct 2018 07:56 )             44.9     Hgb Trend: 14.8<--, 15.4<--, 15.4<--, 16.5<--, 15.1<--  10-31    136  |  96  |  28<H>  ----------------------------<  99  3.9   |  29  |  1.37<H>    Ca    8.8      31 Oct 2018 06:01  Phos  3.5     10-30  Mg     2.2     10-30      Creatinine Trend: 1.37<--, 1.39<--, 1.31<--, 1.25<--, 1.23<--, 1.36<--  PT/INR - ( 30 Oct 2018 09:51 )   PT: 15.0 sec;   INR: 1.33 ratio         PTT - ( 31 Oct 2018 01:18 )  PTT:76.2 sec          MICROBIOLOGY:       RADIOLOGY:  [ ] Reviewed and interpreted by me    PULMONARY FUNCTION TESTS:    EKG:

## 2018-11-01 LAB
ANION GAP SERPL CALC-SCNC: 10 MMOL/L — SIGNIFICANT CHANGE UP (ref 5–17)
APTT BLD: 192.6 SEC — CRITICAL HIGH (ref 27.5–36.3)
APTT BLD: 43 SEC — HIGH (ref 27.5–36.3)
BUN SERPL-MCNC: 27 MG/DL — HIGH (ref 7–23)
CALCIUM SERPL-MCNC: 9 MG/DL — SIGNIFICANT CHANGE UP (ref 8.4–10.5)
CHLORIDE SERPL-SCNC: 95 MMOL/L — LOW (ref 96–108)
CO2 SERPL-SCNC: 29 MMOL/L — SIGNIFICANT CHANGE UP (ref 22–31)
CREAT SERPL-MCNC: 1.37 MG/DL — HIGH (ref 0.5–1.3)
GLUCOSE SERPL-MCNC: 99 MG/DL — SIGNIFICANT CHANGE UP (ref 70–99)
HCT VFR BLD CALC: 45.4 % — SIGNIFICANT CHANGE UP (ref 39–50)
HGB BLD-MCNC: 14.6 G/DL — SIGNIFICANT CHANGE UP (ref 13–17)
INR BLD: 1.64 RATIO — HIGH (ref 0.88–1.16)
INR BLD: 2.03 RATIO — HIGH (ref 0.88–1.16)
MAGNESIUM SERPL-MCNC: 2 MG/DL — SIGNIFICANT CHANGE UP (ref 1.6–2.6)
MCHC RBC-ENTMCNC: 30.4 PG — SIGNIFICANT CHANGE UP (ref 27–34)
MCHC RBC-ENTMCNC: 32.2 GM/DL — SIGNIFICANT CHANGE UP (ref 32–36)
MCV RBC AUTO: 94.4 FL — SIGNIFICANT CHANGE UP (ref 80–100)
PHOSPHATE SERPL-MCNC: 3.5 MG/DL — SIGNIFICANT CHANGE UP (ref 2.5–4.5)
PLATELET # BLD AUTO: 244 K/UL — SIGNIFICANT CHANGE UP (ref 150–400)
POTASSIUM SERPL-MCNC: 4 MMOL/L — SIGNIFICANT CHANGE UP (ref 3.5–5.3)
POTASSIUM SERPL-SCNC: 4 MMOL/L — SIGNIFICANT CHANGE UP (ref 3.5–5.3)
PROTHROM AB SERPL-ACNC: 18.6 SEC — HIGH (ref 10–13.1)
PROTHROM AB SERPL-ACNC: 23.7 SEC — HIGH (ref 10–12.9)
RBC # BLD: 4.81 M/UL — SIGNIFICANT CHANGE UP (ref 4.2–5.8)
RBC # FLD: 13.5 % — SIGNIFICANT CHANGE UP (ref 10.3–14.5)
SODIUM SERPL-SCNC: 134 MMOL/L — LOW (ref 135–145)
WBC # BLD: 6.23 K/UL — SIGNIFICANT CHANGE UP (ref 3.8–10.5)
WBC # FLD AUTO: 6.23 K/UL — SIGNIFICANT CHANGE UP (ref 3.8–10.5)

## 2018-11-01 PROCEDURE — 99233 SBSQ HOSP IP/OBS HIGH 50: CPT

## 2018-11-01 PROCEDURE — 93312 ECHO TRANSESOPHAGEAL: CPT | Mod: 26

## 2018-11-01 PROCEDURE — 93325 DOPPLER ECHO COLOR FLOW MAPG: CPT | Mod: 26

## 2018-11-01 PROCEDURE — 99232 SBSQ HOSP IP/OBS MODERATE 35: CPT

## 2018-11-01 PROCEDURE — 92960 CARDIOVERSION ELECTRIC EXT: CPT

## 2018-11-01 PROCEDURE — 99233 SBSQ HOSP IP/OBS HIGH 50: CPT | Mod: GC

## 2018-11-01 PROCEDURE — 93320 DOPPLER ECHO COMPLETE: CPT | Mod: 26

## 2018-11-01 RX ORDER — WARFARIN SODIUM 2.5 MG/1
5 TABLET ORAL ONCE
Qty: 0 | Refills: 0 | Status: COMPLETED | OUTPATIENT
Start: 2018-11-01 | End: 2018-11-01

## 2018-11-01 RX ORDER — HEPARIN SODIUM 5000 [USP'U]/ML
5000 INJECTION INTRAVENOUS; SUBCUTANEOUS ONCE
Qty: 0 | Refills: 0 | Status: COMPLETED | OUTPATIENT
Start: 2018-11-01 | End: 2018-11-01

## 2018-11-01 RX ORDER — HEPARIN SODIUM 5000 [USP'U]/ML
5000 INJECTION INTRAVENOUS; SUBCUTANEOUS EVERY 6 HOURS
Qty: 0 | Refills: 0 | Status: DISCONTINUED | OUTPATIENT
Start: 2018-11-01 | End: 2018-11-02

## 2018-11-01 RX ORDER — HEPARIN SODIUM 5000 [USP'U]/ML
10000 INJECTION INTRAVENOUS; SUBCUTANEOUS EVERY 6 HOURS
Qty: 0 | Refills: 0 | Status: DISCONTINUED | OUTPATIENT
Start: 2018-11-01 | End: 2018-11-02

## 2018-11-01 RX ORDER — METOPROLOL TARTRATE 50 MG
75 TABLET ORAL DAILY
Qty: 0 | Refills: 0 | Status: DISCONTINUED | OUTPATIENT
Start: 2018-11-02 | End: 2018-11-02

## 2018-11-01 RX ORDER — HEPARIN SODIUM 5000 [USP'U]/ML
1800 INJECTION INTRAVENOUS; SUBCUTANEOUS
Qty: 25000 | Refills: 0 | Status: DISCONTINUED | OUTPATIENT
Start: 2018-11-01 | End: 2018-11-02

## 2018-11-01 RX ADMIN — Medication 50 MILLIGRAM(S): at 21:24

## 2018-11-01 RX ADMIN — FAMOTIDINE 20 MILLIGRAM(S): 10 INJECTION INTRAVENOUS at 21:23

## 2018-11-01 RX ADMIN — LISINOPRIL 20 MILLIGRAM(S): 2.5 TABLET ORAL at 21:22

## 2018-11-01 RX ADMIN — Medication 50 MILLIGRAM(S): at 05:59

## 2018-11-01 RX ADMIN — Medication 40 MILLIGRAM(S): at 05:59

## 2018-11-01 RX ADMIN — Medication 81 MILLIGRAM(S): at 11:30

## 2018-11-01 RX ADMIN — LISINOPRIL 20 MILLIGRAM(S): 2.5 TABLET ORAL at 05:59

## 2018-11-01 RX ADMIN — FAMOTIDINE 20 MILLIGRAM(S): 10 INJECTION INTRAVENOUS at 05:59

## 2018-11-01 RX ADMIN — Medication 50 MILLIGRAM(S): at 15:32

## 2018-11-01 RX ADMIN — Medication 50 MILLIGRAM(S): at 06:02

## 2018-11-01 RX ADMIN — Medication 40 MILLIGRAM(S): at 21:22

## 2018-11-01 RX ADMIN — Medication 0.12 MILLIGRAM(S): at 05:59

## 2018-11-01 RX ADMIN — Medication 100 MILLIGRAM(S): at 21:22

## 2018-11-01 RX ADMIN — WARFARIN SODIUM 5 MILLIGRAM(S): 2.5 TABLET ORAL at 21:22

## 2018-11-01 RX ADMIN — HEPARIN SODIUM 1800 UNIT(S)/HR: 5000 INJECTION INTRAVENOUS; SUBCUTANEOUS at 21:04

## 2018-11-01 RX ADMIN — POLYETHYLENE GLYCOL 3350 17 GRAM(S): 17 POWDER, FOR SOLUTION ORAL at 21:23

## 2018-11-01 RX ADMIN — HEPARIN SODIUM 5000 UNIT(S): 5000 INJECTION INTRAVENOUS; SUBCUTANEOUS at 11:29

## 2018-11-01 RX ADMIN — HEPARIN SODIUM 2000 UNIT(S)/HR: 5000 INJECTION INTRAVENOUS; SUBCUTANEOUS at 11:27

## 2018-11-01 RX ADMIN — SIMVASTATIN 20 MILLIGRAM(S): 20 TABLET, FILM COATED ORAL at 21:22

## 2018-11-01 NOTE — PROGRESS NOTE ADULT - SUBJECTIVE AND OBJECTIVE BOX
[facility] Progress Note    Interval History:       Review of Systems:  Constitutional: No fever, chills, fatigue, recent weight change  HEENT: No vision changes, eye pain, vertigo, sore throat  Respiratory: No cough, wheezing, shortness of breath  Cardiovascular: No chest pain, palpitations, VELÁSQUEZ, PND, orthopnea  Gastrointestinal: No abdominal pain, nausea, vomiting, diarrhea, constipation, melena  Genitourinary: No hematuria, dysuria, nocturia, incontinence  Extremities: No swelling, joint pain, muscle weakness  Neurologic: No focal weakness, paresthesias, headache, syncope  Skin: No rashes, ecchymoses, wounds    CURRENT MEDICATIONS:   MEDICATIONS  (STANDING):  aspirin  chewable 81 milliGRAM(s) Oral daily  digoxin     Tablet 0.125 milliGRAM(s) Oral daily  docusate sodium 100 milliGRAM(s) Oral daily  famotidine    Tablet 20 milliGRAM(s) Oral two times a day  furosemide    Tablet 40 milliGRAM(s) Oral two times a day  heparin  Infusion. 2000 Unit(s)/Hr (20 mL/Hr) IV Continuous <Continuous>  hydrALAZINE 50 milliGRAM(s) Oral three times a day  influenza   Vaccine 0.5 milliLiter(s) IntraMuscular once  lisinopril 20 milliGRAM(s) Oral two times a day  metoprolol succinate ER 50 milliGRAM(s) Oral daily  polyethylene glycol 3350 17 Gram(s) Oral two times a day  simvastatin 20 milliGRAM(s) Oral at bedtime    MEDICATIONS  (PRN):  glycerin Suppository - Adult 1 Suppository(s) Rectal once PRN Constipation  heparin  Injectable 60037 Unit(s) IV Push every 6 hours PRN For aPTT less than 40  heparin  Injectable 5000 Unit(s) IV Push every 6 hours PRN For aPTT between 40 - 57    ALLERGIES:   No Known Allergies    VITAL SIGNS:   T(C): 36.4 (01 Nov 2018 09:05), Max: 37.6 (31 Oct 2018 12:05)  T(F): 97.5 (01 Nov 2018 09:05), Max: 99.6 (31 Oct 2018 12:05)  HR: 120 (01 Nov 2018 09:05) (120 - 127)  BP: 106/78 (01 Nov 2018 09:05) (106/71 - 124/88)  RR: 18 (01 Nov 2018 09:05) (18 - 18)  SpO2: 98% (01 Nov 2018 09:05) (93% - 98%)    I&O's Summary    31 Oct 2018 07:01  -  01 Nov 2018 07:00  --------------------------------------------------------  IN: 684 mL / OUT: 900 mL / NET: -216 mL    PHYSICAL EXAM:  General: Well-appearing, NAD  HEENT: PERRL, EOMI, normal oral mucosa  Neck: Supple, no JVD, normal thyroid  Lungs: CTA b/l, no wheezing, rales or rhonchi  Heart: RRR, normal S1, S2, no murmurs  Abdomen: Soft, ND, NT, normoactive bowel sounds  Skin: Warm, well-perfused, no rashes or lesions  Neurologic: A&Ox3, no focal deficits  Extremities: Peripheral pulses 2+ b/l, no edema, cyanosis or clubbing    LABS:   CBC Full  -  ( 01 Nov 2018 06:57 )  WBC Count : 6.23 K/uL  Hemoglobin : 14.6 g/dL  Hematocrit : 45.4 %  Platelet Count - Automated : 244 K/uL  Mean Cell Volume : 94.4 fl  Mean Cell Hemoglobin : 30.4 pg  Mean Cell Hemoglobin Concentration : 32.2 gm/dL  Auto Neutrophil # : x  Auto Lymphocyte # : x  Auto Monocyte # : x  Auto Eosinophil # : x  Auto Basophil # : x  Auto Neutrophil % : x  Auto Lymphocyte % : x  Auto Monocyte % : x  Auto Eosinophil % : x  Auto Basophil % : x    11-01    134<L>  |  95<L>  |  27<H>  ----------------------------<  99  4.0   |  29  |  1.37<H>    Ca    9.0      01 Nov 2018 05:35  Phos  3.5     11-01  Mg     2.0     11-01    TELEMETRY:     ECG:     RADIOLOGY: [facility] Progress Note    Interval History:   Doing well this morning. Denies complaints.     Review of Systems:  Constitutional: No fever, chills, fatigue, recent weight change  HEENT: No vision changes, eye pain, vertigo, sore throat  Respiratory: No cough, wheezing, shortness of breath  Cardiovascular: No chest pain, palpitations, VELÁSQUEZ, PND, orthopnea  Gastrointestinal: No abdominal pain, nausea, vomiting, diarrhea, constipation, melena  Genitourinary: No hematuria, dysuria, nocturia, incontinence  Extremities: No swelling, joint pain, muscle weakness  Neurologic: No focal weakness, paresthesias, headache, syncope  Skin: No rashes, ecchymoses, wounds    CURRENT MEDICATIONS:   MEDICATIONS  (STANDING):  aspirin  chewable 81 milliGRAM(s) Oral daily  digoxin     Tablet 0.125 milliGRAM(s) Oral daily  docusate sodium 100 milliGRAM(s) Oral daily  famotidine    Tablet 20 milliGRAM(s) Oral two times a day  furosemide    Tablet 40 milliGRAM(s) Oral two times a day  heparin  Infusion. 2000 Unit(s)/Hr (20 mL/Hr) IV Continuous <Continuous>  hydrALAZINE 50 milliGRAM(s) Oral three times a day  influenza   Vaccine 0.5 milliLiter(s) IntraMuscular once  lisinopril 20 milliGRAM(s) Oral two times a day  metoprolol succinate ER 50 milliGRAM(s) Oral daily  polyethylene glycol 3350 17 Gram(s) Oral two times a day  simvastatin 20 milliGRAM(s) Oral at bedtime    MEDICATIONS  (PRN):  glycerin Suppository - Adult 1 Suppository(s) Rectal once PRN Constipation  heparin  Injectable 81872 Unit(s) IV Push every 6 hours PRN For aPTT less than 40  heparin  Injectable 5000 Unit(s) IV Push every 6 hours PRN For aPTT between 40 - 57    ALLERGIES:   No Known Allergies    VITAL SIGNS:   T(C): 36.4 (01 Nov 2018 09:05), Max: 37.6 (31 Oct 2018 12:05)  T(F): 97.5 (01 Nov 2018 09:05), Max: 99.6 (31 Oct 2018 12:05)  HR: 120 (01 Nov 2018 09:05) (120 - 127)  BP: 106/78 (01 Nov 2018 09:05) (106/71 - 124/88)  RR: 18 (01 Nov 2018 09:05) (18 - 18)  SpO2: 98% (01 Nov 2018 09:05) (93% - 98%)    I&O's Summary    31 Oct 2018 07:01  -  01 Nov 2018 07:00  --------------------------------------------------------  IN: 684 mL / OUT: 900 mL / NET: -216 mL    PHYSICAL EXAM:  General: Obese middle-aged gentleman resting comfortably in chair, NAD  HEENT: PERRL, EOMI, normal oral mucosa  Neck: Supple, no JVD  Lungs: CTA b/l, no wheezing, rales or rhonchi  Heart: RRR, normal S1, S2, no murmurs  Abdomen: Soft, ND, NT, normoactive bowel sounds  Skin: Warm, well-perfused, chronic skin changes on LE bilaterally  Neurologic: A&Ox3, no focal deficits  Extremities: Peripheral pulses 2+ b/l, +1 LE edema bilaterally    LABS:   CBC Full  -  ( 01 Nov 2018 06:57 )  WBC Count : 6.23 K/uL  Hemoglobin : 14.6 g/dL  Hematocrit : 45.4 %  Platelet Count - Automated : 244 K/uL  Mean Cell Volume : 94.4 fl  Mean Cell Hemoglobin : 30.4 pg  Mean Cell Hemoglobin Concentration : 32.2 gm/dL  Auto Neutrophil # : x  Auto Lymphocyte # : x  Auto Monocyte # : x  Auto Eosinophil # : x  Auto Basophil # : x  Auto Neutrophil % : x  Auto Lymphocyte % : x  Auto Monocyte % : x  Auto Eosinophil % : x  Auto Basophil % : x    11-01    134<L>  |  95<L>  |  27<H>  ----------------------------<  99  4.0   |  29  |  1.37<H>    Ca    9.0      01 Nov 2018 05:35  Phos  3.5     11-01  Mg     2.0     11-01    TELEMETRY: Aflutter with HR 110s-130s    ECG: Personally reviewed    RADIOLOGY: --

## 2018-11-01 NOTE — PROGRESS NOTE ADULT - SUBJECTIVE AND OBJECTIVE BOX
Aidan Morel MD  Division of Hospital Medicine  Pager [phone]      BERTHA WARD  58y  Male      Patient is a 58y old  Male who presents with a chief complaint of VELÁSQUEZ (01 Nov 2018 10:12)      INTERVAL HPI/OVERNIGHT EVENTS:  Seen at bedside. Went for STANISLAW this am, however PTT not therapeutic so procedure was not performed. Is otherwise without complaints      REVIEW OF SYSTEMS: 14 point ROS negative unless listed above    T(C): 36.6 (11-01-18 @ 11:27), Max: 37.5 (10-31-18 @ 18:02)  HR: 117 (11-01-18 @ 11:27) (117 - 127)  BP: 114/82 (11-01-18 @ 11:27) (106/71 - 124/88)  RR: 18 (11-01-18 @ 11:27) (18 - 18)  SpO2: 93% (11-01-18 @ 11:27) (93% - 98%)  Wt(kg): --Vital Signs Last 24 Hrs  T(C): 36.6 (01 Nov 2018 11:27), Max: 37.5 (31 Oct 2018 18:02)  T(F): 97.9 (01 Nov 2018 11:27), Max: 99.5 (31 Oct 2018 18:02)  HR: 117 (01 Nov 2018 11:27) (117 - 127)  BP: 114/82 (01 Nov 2018 11:27) (106/71 - 124/88)  BP(mean): --  RR: 18 (01 Nov 2018 11:27) (18 - 18)  SpO2: 93% (01 Nov 2018 11:27) (93% - 98%)    PHYSICAL EXAM:  GENERAL: NAD, well-developed  HEAD:  Atraumatic, normocephalic  EYES: EOMI, sclera clear  NECK: Supple, no JVD  CHEST/LUNG: Clear to auscultation bilaterally; no wheezing or rales  HEART: irregular, S1 S2, no murmurs   ABDOMEN: Soft, nontender, +distended, bowel sounds present  EXTREMITIES: 1+ pitting edema in b/l LE with chronic skin changes, open lesions on RLE   PSYCH: calm affect, not anxious  NEUROLOGY: non-focal, AAOx3  SKIN: chronic skin discoloration of b/l LE with 3 lesions on RLE    Consultant(s) Notes Reviewed:  [x ] YES  [ ] NO  Care Discussed with Consultants/Other Providers [ x] YES  [ ] NO    LABS:                        14.6   6.23  )-----------( 244      ( 01 Nov 2018 06:57 )             45.4     11-01    134<L>  |  95<L>  |  27<H>  ----------------------------<  99  4.0   |  29  |  1.37<H>    Ca    9.0      01 Nov 2018 05:35  Phos  3.5     11-01  Mg     2.0     11-01      PT/INR - ( 01 Nov 2018 07:47 )   PT: 18.6 sec;   INR: 1.64 ratio         PTT - ( 01 Nov 2018 07:47 )  PTT:43.0 sec    CAPILLARY BLOOD GLUCOSE                RADIOLOGY & ADDITIONAL TESTS:    Imaging Personally Reviewed:  [x ] YES  [ ] NO

## 2018-11-01 NOTE — PROGRESS NOTE ADULT - PROBLEM SELECTOR PLAN 1
Bridge Heparin to Coumadin, monitor coags, goal PTT 80  PTT this morning 43, STANISLAW this morning cancelled  Heparin drip increased and bolus of 5,000 Units IVP given  Check repeat PT/INR in 3 hrs for poss STANISLAW again this afternoon  Continue Metoprolol 25mg qd, may gently uptitrate for rate control as BP can tolerate  On Digoxin 0.125mg qd  Will plan for Micra PPM to allow for medication adjustment if persistent clot vs DC CV tomorrow 11/2/18 if clot not visible on STANISLAW  Rec outpatient Sleep Study to evaluate for Sleep Apnea.

## 2018-11-01 NOTE — PROGRESS NOTE ADULT - ASSESSMENT
58M with PMH CAD s/p CABG/stents (stents 2000, CABG 2012), HFrEF (EF 40%), HTN, lichen planus with chronic LE edema p/w VELÁSQUEZ and palpitations, found to be in typical A-Flutter with RVR. Course c/b discovery of MARV thrombus, on heparin bridge to Coumadin.     1. AFlutter with RVR complicated by MARV Thrombus:   - Remains in rapid aflutter on telemetry with rates in 110-130. Currently asymptomatic, hemodynamically stable.   - Diltiazem initially started on admission due to history of intolerance to BB (dyspnea), however, now discontinued in setting of severe LV dysfunction  - Continue metoprolol succinate, titrate as tolerated for rate control  - Continue digoxin  - Cardioversion/ablation aborted in setting of MARV thrombus on 10/26; currently NPO for repeat STANISLAW today (10/31) to assess thrombus; possible cardioversion Friday per EP if clot dissolved  - Continue heparin gtt with bridge to Coumadin; monitor INR and PTT    2. Acute on Chronic Systolic HF:  - Continue Lasix PO daily  - Continue ACEi, BB  - Check I/Os/daily weights; monitor electrolytes to maintain K>4, Mg>2 58M with PMH CAD s/p CABG/stents (stents 2000, CABG 2012), HFrEF (EF 40%), HTN, lichen planus with chronic LE edema p/w VELÁSQUEZ and palpitations, found to be in typical A-Flutter with RVR. Course c/b discovery of MARV thrombus, on heparin bridge to Coumadin.     1. AFlutter with RVR complicated by MARV Thrombus:   - Remains in aflutter  with RVR on telemetry although asymptomatic, hemodynamically stable  - Diltiazem initially started on admission due to history of intolerance to BB (dyspnea), however, now discontinued in setting of severe LV dysfunction  - Continue metoprolol succinate, titrate as tolerated for rate control  - Continue digoxin  - Cardioversion/ablation aborted in setting of MARV thrombus on 10/26; currently NPO for repeat STANISLAW today (10/31) to assess thrombus; possible cardioversion Friday per EP if clot dissolved  - Continue heparin gtt with bridge to Coumadin; monitor INR and PTT    2. Acute on Chronic Systolic HF:  - Continue Lasix PO daily  - Continue ACEi, BB  - Check I/Os/daily weights; monitor electrolytes to maintain K>4, Mg>2

## 2018-11-01 NOTE — PROGRESS NOTE ADULT - SUBJECTIVE AND OBJECTIVE BOX
CHIEF COMPLAINT: AFlutter    Interval Events: Pt seen and examined at bedside. Pt wore home sleep study machine overnight.     REVIEW OF SYSTEMS:  Constitutional: [x ] negative [ ] fevers [ ] chills [ ] weight loss [ ] weight gain  HEENT: [ x] negative [ ] dry eyes [ ] eye irritation [ ] postnasal drip [ ] nasal congestion  CV: [ ] negative  [ ] chest pain [ ] orthopnea [x ] palpitations [ ] murmur  Resp: [ ] negative [ ] cough [ ] shortness of breath [ ] dyspnea [ ] wheezing [ ] sputum [ ] hemoptysis  GI: [ ] negative [ ] nausea [ ] vomiting [ ] diarrhea [ ] constipation [ ] abd pain [ ] dysphagia   : [ ] negative [ ] dysuria [ ] nocturia [ ] hematuria [ ] increased urinary frequency  Musculoskeletal: [ ] negative [ ] back pain [ ] myalgias [ ] arthralgias [ ] fracture  Skin: [ ] negative [ ] rash [ ] itch  Neurological: [ ] negative [ ] headache [ ] dizziness [ ] syncope [ ] weakness [ ] numbness  Psychiatric: [ ] negative [ ] anxiety [ ] depression  Endocrine: [ ] negative [ ] diabetes [ ] thyroid problem  Hematologic/Lymphatic: [ ] negative [ ] anemia [ ] bleeding problem  Allergic/Immunologic: [ ] negative [ ] itchy eyes [ ] nasal discharge [ ] hives [ ] angioedema  [x ] All other systems negative  [ ] Unable to assess ROS because ________      OBJECTIVE:  ICU Vital Signs Last 24 Hrs  T(C): 36.4 (01 Nov 2018 09:05), Max: 37.6 (31 Oct 2018 12:05)  T(F): 97.5 (01 Nov 2018 09:05), Max: 99.6 (31 Oct 2018 12:05)  HR: 120 (01 Nov 2018 09:05) (120 - 127)  BP: 106/78 (01 Nov 2018 09:05) (106/71 - 124/88)  BP(mean): --  ABP: --  ABP(mean): --  RR: 18 (01 Nov 2018 09:05) (18 - 18)  SpO2: 98% (01 Nov 2018 09:05) (93% - 98%)        10-31 @ 07:01  -  11-01 @ 07:00  --------------------------------------------------------  IN: 684 mL / OUT: 900 mL / NET: -216 mL      CAPILLARY BLOOD GLUCOSE      General: Morbidly obese male sitting comfortably in chair, NAD  HEENT: NC/AT, JARRET/EOMI  Neck: Supple, (-) thyromegaly or tracheal deviation   Respiratory: No increased WOB, CTAB  Cardiovascular: RRR, S1, S2  Abdomen: Soft, +BS  Extremities: + b/l LE edema with chronic skin hyperpigmentation and lichenification   Skin: Venous stasis changes and sores on lower extremities  Neurological: Nonfocal findings   Psychiatry: Appropriate affect    HOSPITAL MEDICATIONS:  MEDICATIONS  (STANDING):  aspirin  chewable 81 milliGRAM(s) Oral daily  digoxin     Tablet 0.125 milliGRAM(s) Oral daily  docusate sodium 100 milliGRAM(s) Oral daily  famotidine    Tablet 20 milliGRAM(s) Oral two times a day  furosemide    Tablet 40 milliGRAM(s) Oral two times a day  heparin  Infusion. 2000 Unit(s)/Hr (20 mL/Hr) IV Continuous <Continuous>  hydrALAZINE 50 milliGRAM(s) Oral three times a day  influenza   Vaccine 0.5 milliLiter(s) IntraMuscular once  lisinopril 20 milliGRAM(s) Oral two times a day  metoprolol succinate ER 50 milliGRAM(s) Oral daily  polyethylene glycol 3350 17 Gram(s) Oral two times a day  simvastatin 20 milliGRAM(s) Oral at bedtime    MEDICATIONS  (PRN):  glycerin Suppository - Adult 1 Suppository(s) Rectal once PRN Constipation  heparin  Injectable 38749 Unit(s) IV Push every 6 hours PRN For aPTT less than 40  heparin  Injectable 5000 Unit(s) IV Push every 6 hours PRN For aPTT between 40 - 57      LABS:                        14.6   6.23  )-----------( 244      ( 01 Nov 2018 06:57 )             45.4     Hgb Trend: 14.6<--, 14.8<--, 15.4<--, 15.4<--, 16.5<--  11-01    134<L>  |  95<L>  |  27<H>  ----------------------------<  99  4.0   |  29  |  1.37<H>    Ca    9.0      01 Nov 2018 05:35  Phos  3.5     11-01  Mg     2.0     11-01      Creatinine Trend: 1.37<--, 1.37<--, 1.39<--, 1.31<--, 1.25<--, 1.23<--  PT/INR - ( 31 Oct 2018 18:29 )   PT: 18.0 sec;   INR: 1.56 ratio         PTT - ( 31 Oct 2018 10:28 )  PTT:69.4 sec          MICROBIOLOGY:       RADIOLOGY:  [ ] Reviewed and interpreted by me    PULMONARY FUNCTION TESTS:    EKG:

## 2018-11-01 NOTE — PROGRESS NOTE ADULT - PROBLEM SELECTOR PLAN 1
Cardioversion attempted on 10/26 but unable to be completed because of L atrial appendage thrombus. Remains on Heparin with bridge to coumadin    - Transitioned to Metoprolol. Tolerating medication, c/w Toprol XL, uptitrate to 75mg tomorrow  - c/w Hep gtt and coumadin per EP recommendation  - Plan for repeat TTE on Today to assess thrombus. Given Heparin bolus, to recheck PTT prior to STANISLAW.   - If STANISLAW negative for thrombus will plan for cardioversion Friday. If thrombus remains will plan for micra PPM  - c/w digoxin

## 2018-11-01 NOTE — PROGRESS NOTE ADULT - PROBLEM SELECTOR PLAN 2
CHF with EF 40%, chronic LE edema and no rales on exam, BNP elevated c/w likely exacerbation. Transitioned to oral diuretics  c/w PO Lasix 40mg BID  Strict I/O.  Daily Weight  cardiology following  c/w ACEi, c/w Toprol XL uptitrate as tolerated

## 2018-11-01 NOTE — PROVIDER CONTACT NOTE (CRITICAL VALUE NOTIFICATION) - ACTION/TREATMENT ORDERED:
PA made aware, Followed heparin nomogram,  decrease to 17ml/hr . Next PTT in 6 hrs. will continue to monitor.
Np made aware, Followed heparin nomogram, stopped for 1 hour then decrease to 20ml/hr from 24ml/hr
Inform RN in echo lab of result. Await patient return from STANISLAW procedure.

## 2018-11-01 NOTE — PROVIDER CONTACT NOTE (CRITICAL VALUE NOTIFICATION) - ASSESSMENT
pt is asymptomatic, denies cp, sob, dizziness.
pt is asymptomatic, denies cp, sob, dizziness.
Patient in echo lab for STANISLAW procedure.

## 2018-11-01 NOTE — PROVIDER CONTACT NOTE (CRITICAL VALUE NOTIFICATION) - BACKGROUND
pt admitted with Atrial Flutter, Dyspnea on exertion progressively worsening with tachycardia and palpitations.
pt admitted with Atrial Flutter, Dyspnea on exertion progressively worsening with tchycardia and palpitations.
Admitted for AFlutter with RVR

## 2018-11-01 NOTE — PROGRESS NOTE ADULT - SUBJECTIVE AND OBJECTIVE BOX
24H hour events:   Ambulating at bedside, no c/o noted at this time    MEDICATIONS:  aspirin  chewable 81 milliGRAM(s) Oral daily  digoxin  Tablet 0.125 milliGRAM(s) Oral daily  furosemide  Tablet 40 milliGRAM(s) Oral two times a day  heparin  Infusion. 2000 Unit(s)/Hr IV Continuous <Continuous>  heparin  Injectable 63408 Unit(s) IV Push every 6 hours PRN  heparin  Injectable 5000 Unit(s) IV Push every 6 hours PRN  hydrALAZINE 50 milliGRAM(s) Oral three times a day  lisinopril 20 milliGRAM(s) Oral two times a day  docusate sodium 100 milliGRAM(s) Oral daily  famotidine   Tablet 20 milliGRAM(s) Oral two times a day  glycerin Suppository - Adult 1 Suppository(s) Rectal once PRN  polyethylene glycol 3350 17 Gram(s) Oral two times a day  simvastatin 20 milliGRAM(s) Oral at bedtime  influenza   Vaccine 0.5 milliLiter(s) IntraMuscular once      REVIEW OF SYSTEMS:  Constitutional: in NAD, no fever or chills; ambulating without difficulty  Neuro:  no dizziness or lightheadedness  Respiratory: no sob or cough  Cardiac: no chest pain or palpitations, tachycardia  GI: no N/V, abdominal pain  : no dysuria or hematuria  Ext: no tingling or numbness    PHYSICAL EXAM:  T(C): 36.6 (11-01-18 @ 11:27), Max: 37.5 (10-31-18 @ 18:02)  HR: 117 (11-01-18 @ 11:27) (117 - 127)  BP: 114/82 (11-01-18 @ 11:27) (106/71 - 124/88)  RR: 18 (11-01-18 @ 11:27) (18 - 18)  SpO2: 93% (11-01-18 @ 11:27) (93% - 98%)  Wt(kg): --  I&O's Summary    31 Oct 2018 07:01  -  01 Nov 2018 07:00  --------------------------------------------------------  IN: 684 mL / OUT: 900 mL / NET: -216 mL    01 Nov 2018 07:01  -  01 Nov 2018 12:55  --------------------------------------------------------  IN: 0 mL / OUT: 500 mL / NET: -500 mL        Appearance: Normal, well develop  HEENT: Normocephalic, atraumatic, normal oral mucosa	  Lymphatic: No lymphadenopathy  Cardiovascular: Normal S1 S2, No JVD, No murmurs, No edema  Respiratory: Lungs clear to auscultation	  Psychiatry: A & O x 3, Mood & affect appropriate  Gastrointestinal:  Soft, Non-tender, + BS	  Skin: No rashes, No ecchymoses, No cyanosis	  Neurologic: Non-focal  Extremities: Normal range of motion, No clubbing, cyanosis or edema  Vascular: Peripheral pulses palpable 2+ bilaterally        LABS:	 	    CBC Full  -  ( 01 Nov 2018 06:57 )  WBC Count : 6.23 K/uL  Hemoglobin : 14.6 g/dL  Hematocrit : 45.4 %  Platelet Count - Automated : 244 K/uL  Mean Cell Volume : 94.4 fl  Mean Cell Hemoglobin : 30.4 pg  Mean Cell Hemoglobin Concentration : 32.2 gm/dL  Auto Neutrophil # : x  Auto Lymphocyte # : x  Auto Monocyte # : x  Auto Eosinophil # : x  Auto Basophil # : x  Auto Neutrophil % : x  Auto Lymphocyte % : x  Auto Monocyte % : x  Auto Eosinophil % : x  Auto Basophil % : x    11-01    134<L>  |  95<L>  |  27<H>  ----------------------------<  99  4.0   |  29  |  1.37<H>  10-31    136  |  96  |  28<H>  ----------------------------<  99  3.9   |  29  |  1.37<H>    Ca    9.0      01 Nov 2018 05:35  Ca    8.8      31 Oct 2018 06:01  Phos  3.5     11-01  Mg     2.0     11-01      TELEMETRY: Atrial Flutter 100-120's        Study Date: 10/26/2018    PROCEDURE: Transesophageal (STANISLAW) was performed.  Informed  consent was first obtained for STANISLAW. The patient was sedated  - see anesthesia record.  The procedure was monitored with  automatic blood pressure monitoring, ECG tracings and pulse  oximetry. The transesophageal probe was placed in the  esophagus posterior to the heart without complications.  INDICATION: Unspecified atrial flutter (I48.92)  ------------------------------------------------------------------------  Observations:  Mitral Valve: Normal mitral valve.  Aortic Valve/Aorta: Calcified trileaflet aortic valve with  decreased opening. Estimated aortic valve area equals 1.5  sqcm (by planimetry), consistent with moderate aortic  stenosis. Mild aortic regurgitation.  LVOT diameter: 2.4 cm.  Left Atrium: Spontaneous echo contrast in the left atrial  appendage which would not clear.  Administration of  Definity continued to show "filling defect" within the left  atrial appendage suggestive of thrombus.  Cardioversion cancelled.  Left Ventricle: Severe global left ventricular systolic  dysfunction. Left ventricular enlargement.  Right Heart: Right atrial enlargement. Right ventricular  enlargement with decreased right ventricular systolic  function. Normal tricuspid valve. Mild-moderate tricuspid  regurgitation. Normal pulmonic valve.  Pericardium/Pleura: Normal pericardium with no pericardial  effusion.  Hemodynamic: Estimated right atrial pressure is 8 mm Hg.  Estimated right ventricular systolic pressure equals 54 mm  Hg, assuming right atrial pressure equals 8 mm Hg,  consistent with moderate pulmonary hypertension. Agitated  saline injection and color flow Doppler demonstrates no  evidence of a patent foramen ovale.  ------------------------------------------------------------------------  Conclusions:  1. Calcified trileaflet aortic valve with decreased  opening. Estimated aortic valve area equals 1.5 sqcm (by  planimetry), consistent with moderate aortic stenosis.  2. Spontaneous echo contrast in the left atrial appendage  which would not clear.  Administration of Definity  continued to show "filling defect" within the left atrial  appendage suggestive of thrombus.  Cardioversion cancelled.  3. Left ventricular enlargement.  4. Severe global left ventricular systolic dysfunction.  5. Right atrial enlargement.  6. Right ventricular enlargement with decreased right  ventricular systolic function.  7. Normal tricuspid valve. Mild-moderate tricuspid  regurgitation.  8. Estimated pulmonary artery systolic pressure equals 54  mm Hg, assuming right atrial pressure equals 8 mm Hg,  consistent with moderate pulmonary pressures.  9. Agitated saline injection and color flow Doppler  demonstrates no evidence of a patent foramen ovale.  ------------------------------------------------------------------------  Confirmed on  10/26/2018 - 17:27:39 by Rahat Carl M.D.

## 2018-11-01 NOTE — PROGRESS NOTE ADULT - ASSESSMENT
58M with PMHx morbid obesity, CAD s/p CABG, and HFrEF admitted for dyspnea and palpitations. Dyspnea likely primarily secondary to exacerbation of HFrEF in setting of Aflutter w/RVR. Pulmonary consulted for r/o WILFRED. He does not show evidence of significant CO2 retention and has a low Barry Sleepiness Scale. However, pt is at high risk for having undiagnosed WILFRED and untreated WILFRED could further exacerbate arrhythmias and CHF, especially if he is to undergo cardioversion this week. Untreated WILFRED can increase risk of arrhythmia recurrence. Provided pt with at bedside home sleep apnea testing equipment from office yesterday. Will analyze data today for the presence and severity of WILFRED. If present, will titration AVAPS for initiation of therapy prior to planned cardioversion.

## 2018-11-02 LAB
ANION GAP SERPL CALC-SCNC: 12 MMOL/L — SIGNIFICANT CHANGE UP (ref 5–17)
APTT BLD: 59.7 SEC — HIGH (ref 27.5–36.3)
BLD GP AB SCN SERPL QL: NEGATIVE — SIGNIFICANT CHANGE UP
BUN SERPL-MCNC: 32 MG/DL — HIGH (ref 7–23)
CALCIUM SERPL-MCNC: 9.2 MG/DL — SIGNIFICANT CHANGE UP (ref 8.4–10.5)
CHLORIDE SERPL-SCNC: 96 MMOL/L — SIGNIFICANT CHANGE UP (ref 96–108)
CO2 SERPL-SCNC: 27 MMOL/L — SIGNIFICANT CHANGE UP (ref 22–31)
CREAT SERPL-MCNC: 1.51 MG/DL — HIGH (ref 0.5–1.3)
GLUCOSE SERPL-MCNC: 100 MG/DL — HIGH (ref 70–99)
HCT VFR BLD CALC: 41.6 % — SIGNIFICANT CHANGE UP (ref 39–50)
HGB BLD-MCNC: 13.8 G/DL — SIGNIFICANT CHANGE UP (ref 13–17)
INR BLD: 2.5 RATIO — HIGH (ref 0.88–1.16)
MAGNESIUM SERPL-MCNC: 2 MG/DL — SIGNIFICANT CHANGE UP (ref 1.6–2.6)
MCHC RBC-ENTMCNC: 30.5 PG — SIGNIFICANT CHANGE UP (ref 27–34)
MCHC RBC-ENTMCNC: 33.1 GM/DL — SIGNIFICANT CHANGE UP (ref 32–36)
MCV RBC AUTO: 92.2 FL — SIGNIFICANT CHANGE UP (ref 80–100)
PHOSPHATE SERPL-MCNC: 3.9 MG/DL — SIGNIFICANT CHANGE UP (ref 2.5–4.5)
PLATELET # BLD AUTO: 252 K/UL — SIGNIFICANT CHANGE UP (ref 150–400)
POTASSIUM SERPL-MCNC: 4 MMOL/L — SIGNIFICANT CHANGE UP (ref 3.5–5.3)
POTASSIUM SERPL-SCNC: 4 MMOL/L — SIGNIFICANT CHANGE UP (ref 3.5–5.3)
PROTHROM AB SERPL-ACNC: 29.3 SEC — HIGH (ref 10–12.9)
RBC # BLD: 4.52 M/UL — SIGNIFICANT CHANGE UP (ref 4.2–5.8)
RBC # FLD: 12.7 % — SIGNIFICANT CHANGE UP (ref 10.3–14.5)
RH IG SCN BLD-IMP: POSITIVE — SIGNIFICANT CHANGE UP
SODIUM SERPL-SCNC: 135 MMOL/L — SIGNIFICANT CHANGE UP (ref 135–145)
WBC # BLD: 7.2 K/UL — SIGNIFICANT CHANGE UP (ref 3.8–10.5)
WBC # FLD AUTO: 7.2 K/UL — SIGNIFICANT CHANGE UP (ref 3.8–10.5)

## 2018-11-02 PROCEDURE — 99233 SBSQ HOSP IP/OBS HIGH 50: CPT | Mod: GC

## 2018-11-02 PROCEDURE — 99233 SBSQ HOSP IP/OBS HIGH 50: CPT

## 2018-11-02 PROCEDURE — 33212 INSERT PULSE GEN SNGL LEAD: CPT

## 2018-11-02 PROCEDURE — 71045 X-RAY EXAM CHEST 1 VIEW: CPT | Mod: 26

## 2018-11-02 RX ORDER — METOPROLOL TARTRATE 50 MG
50 TABLET ORAL AT BEDTIME
Qty: 0 | Refills: 0 | Status: DISCONTINUED | OUTPATIENT
Start: 2018-11-02 | End: 2018-11-04

## 2018-11-02 RX ORDER — METOPROLOL TARTRATE 50 MG
100 TABLET ORAL DAILY
Qty: 0 | Refills: 0 | Status: DISCONTINUED | OUTPATIENT
Start: 2018-11-02 | End: 2018-11-04

## 2018-11-02 RX ORDER — CEFAZOLIN SODIUM 1 G
1000 VIAL (EA) INJECTION EVERY 8 HOURS
Qty: 0 | Refills: 0 | Status: COMPLETED | OUTPATIENT
Start: 2018-11-02 | End: 2018-11-03

## 2018-11-02 RX ORDER — WARFARIN SODIUM 2.5 MG/1
4 TABLET ORAL ONCE
Qty: 0 | Refills: 0 | Status: COMPLETED | OUTPATIENT
Start: 2018-11-02 | End: 2018-11-02

## 2018-11-02 RX ADMIN — Medication 40 MILLIGRAM(S): at 06:20

## 2018-11-02 RX ADMIN — WARFARIN SODIUM 4 MILLIGRAM(S): 2.5 TABLET ORAL at 22:06

## 2018-11-02 RX ADMIN — POLYETHYLENE GLYCOL 3350 17 GRAM(S): 17 POWDER, FOR SOLUTION ORAL at 22:04

## 2018-11-02 RX ADMIN — Medication 50 MILLIGRAM(S): at 06:19

## 2018-11-02 RX ADMIN — Medication 100 MILLIGRAM(S): at 16:05

## 2018-11-02 RX ADMIN — FAMOTIDINE 20 MILLIGRAM(S): 10 INJECTION INTRAVENOUS at 18:35

## 2018-11-02 RX ADMIN — FAMOTIDINE 20 MILLIGRAM(S): 10 INJECTION INTRAVENOUS at 06:20

## 2018-11-02 RX ADMIN — LISINOPRIL 20 MILLIGRAM(S): 2.5 TABLET ORAL at 22:05

## 2018-11-02 RX ADMIN — HEPARIN SODIUM 1800 UNIT(S)/HR: 5000 INJECTION INTRAVENOUS; SUBCUTANEOUS at 06:21

## 2018-11-02 RX ADMIN — Medication 81 MILLIGRAM(S): at 16:05

## 2018-11-02 RX ADMIN — Medication 0.12 MILLIGRAM(S): at 06:20

## 2018-11-02 RX ADMIN — Medication 50 MILLIGRAM(S): at 16:06

## 2018-11-02 RX ADMIN — LISINOPRIL 20 MILLIGRAM(S): 2.5 TABLET ORAL at 06:19

## 2018-11-02 RX ADMIN — Medication 50 MILLIGRAM(S): at 22:06

## 2018-11-02 RX ADMIN — SIMVASTATIN 20 MILLIGRAM(S): 20 TABLET, FILM COATED ORAL at 22:05

## 2018-11-02 RX ADMIN — Medication 75 MILLIGRAM(S): at 06:19

## 2018-11-02 RX ADMIN — Medication 40 MILLIGRAM(S): at 18:35

## 2018-11-02 RX ADMIN — Medication 100 MILLIGRAM(S): at 22:05

## 2018-11-02 NOTE — PROGRESS NOTE ADULT - PROBLEM SELECTOR PLAN 1
Cardioversion attempted on 10/26 but unable to be completed because of L atrial appendage thrombus. Bridged to coumadin  - Repeat TTE with LA thrombus, has not dissolved. Thus cardioversion again unable to be performed.  - Plan for Micra ppm placement today to allow for medication titration given previous pauses seen on tele  - Transitioned to Metoprolol. Reporting some SOB this am, has had this in past with Metoprolol. Will decrease dose to 50mg QD  - c/w  coumadin per EP recommendation  - c/w digoxin Cardioversion attempted on 10/26 but unable to be completed because of L atrial appendage thrombus. Bridged to coumadin  - Repeat TTE with LA thrombus, has not dissolved. Thus cardioversion again unable to be performed.  - Plan for Micra ppm placement today to allow for medication titration given previous pauses seen on tele  - Transitioned to Metoprolol XL. Appears to be tolerating. Uptitrated to 75mg today. Continue to uptitrate as tolerated  - c/w coumadin per EP recommendation  - c/w digoxin

## 2018-11-02 NOTE — PROGRESS NOTE ADULT - PROBLEM SELECTOR PLAN 4
c/w lisinopril 20 BID and hydralazine 50 TID  c/w metoprolol  can decrease hydralazine dosing if needed c/w lisinopril 20 BID and hydralazine 50 TID  c/w metoprolol xl  can decrease hydralazine dosing if needed

## 2018-11-02 NOTE — PROGRESS NOTE ADULT - SUBJECTIVE AND OBJECTIVE BOX
CHIEF COMPLAINT: Dyspnea    Interval Events: Pt seen and examined at bedside. No acute events overnight. Pt used CPAP ~4 hrs and slept well.     REVIEW OF SYSTEMS:  Constitutional: [x ] negative [ ] fevers [ ] chills [ ] weight loss [ ] weight gain  HEENT: [ x] negative [ ] dry eyes [ ] eye irritation [ ] postnasal drip [ ] nasal congestion  CV: [ ] negative  [ ] chest pain [ ] orthopnea [x ] palpitations [ ] murmur  Resp: [ ] negative [ ] cough [ ] shortness of breath [ ] dyspnea [ ] wheezing [ ] sputum [ ] hemoptysis  GI: [ ] negative [ ] nausea [ ] vomiting [ ] diarrhea [ ] constipation [ ] abd pain [ ] dysphagia   : [ ] negative [ ] dysuria [ ] nocturia [ ] hematuria [ ] increased urinary frequency  Musculoskeletal: [ ] negative [ ] back pain [ ] myalgias [ ] arthralgias [ ] fracture  Skin: [ ] negative [ ] rash [ ] itch  Neurological: [ ] negative [ ] headache [ ] dizziness [ ] syncope [ ] weakness [ ] numbness  Psychiatric: [ ] negative [ ] anxiety [ ] depression  Endocrine: [ ] negative [ ] diabetes [ ] thyroid problem  Hematologic/Lymphatic: [ ] negative [ ] anemia [ ] bleeding problem  Allergic/Immunologic: [ ] negative [ ] itchy eyes [ ] nasal discharge [ ] hives [ ] angioedema  [x ] All other systems negative  [ ] Unable to assess ROS because ________    OBJECTIVE:  ICU Vital Signs Last 24 Hrs  T(C): 37.1 (02 Nov 2018 04:17), Max: 37.1 (02 Nov 2018 04:17)  T(F): 98.8 (02 Nov 2018 04:17), Max: 98.8 (02 Nov 2018 04:17)  HR: 125 (02 Nov 2018 04:17) (117 - 125)  BP: 144/90 (02 Nov 2018 04:17) (109/65 - 144/90)  BP(mean): --  ABP: --  ABP(mean): --  RR: 18 (02 Nov 2018 04:17) (18 - 19)  SpO2: 94% (02 Nov 2018 04:17) (93% - 94%)        11-01 @ 07:01  -  11-02 @ 07:00  --------------------------------------------------------  IN: 565 mL / OUT: 1400 mL / NET: -835 mL      CAPILLARY BLOOD GLUCOSE        General: Morbidly obese male sitting comfortably in chair, NAD  HEENT: NC/AT, JARRET/EOMI  Neck: Supple, (-) thyromegaly or tracheal deviation   Respiratory: No increased WOB, CTAB  Cardiovascular: RRR, S1, S2  Abdomen: Soft, +BS  Extremities: + b/l LE edema with chronic skin hyperpigmentation and lichenification   Skin: Venous stasis changes and sores on lower extremities  Neurological: Nonfocal findings   Psychiatry: Appropriate affect    HOSPITAL MEDICATIONS:  MEDICATIONS  (STANDING):  aspirin  chewable 81 milliGRAM(s) Oral daily  digoxin     Tablet 0.125 milliGRAM(s) Oral daily  docusate sodium 100 milliGRAM(s) Oral daily  famotidine    Tablet 20 milliGRAM(s) Oral two times a day  furosemide    Tablet 40 milliGRAM(s) Oral two times a day  heparin  Infusion. 1800 Unit(s)/Hr (18 mL/Hr) IV Continuous <Continuous>  hydrALAZINE 50 milliGRAM(s) Oral three times a day  influenza   Vaccine 0.5 milliLiter(s) IntraMuscular once  lisinopril 20 milliGRAM(s) Oral two times a day  metoprolol succinate ER 75 milliGRAM(s) Oral daily  polyethylene glycol 3350 17 Gram(s) Oral two times a day  simvastatin 20 milliGRAM(s) Oral at bedtime    MEDICATIONS  (PRN):  glycerin Suppository - Adult 1 Suppository(s) Rectal once PRN Constipation  heparin  Injectable 80484 Unit(s) IV Push every 6 hours PRN For aPTT less than 40  heparin  Injectable 5000 Unit(s) IV Push every 6 hours PRN For aPTT between 40 - 57      LABS:                        13.8   7.2   )-----------( 252      ( 02 Nov 2018 05:07 )             41.6     Hgb Trend: 13.8<--, 14.6<--, 14.8<--, 15.4<--, 15.4<--  11-02    135  |  96  |  32<H>  ----------------------------<  100<H>  4.0   |  27  |  1.51<H>    Ca    9.2      02 Nov 2018 05:07  Phos  3.9     11-02  Mg     2.0     11-02      Creatinine Trend: 1.51<--, 1.37<--, 1.37<--, 1.39<--, 1.31<--, 1.25<--  PT/INR - ( 02 Nov 2018 05:07 )   PT: 29.3 sec;   INR: 2.50 ratio         PTT - ( 02 Nov 2018 05:07 )  PTT:59.7 sec          MICROBIOLOGY:       RADIOLOGY:  [ ] Reviewed and interpreted by me    PULMONARY FUNCTION TESTS:    EKG:

## 2018-11-02 NOTE — PROGRESS NOTE ADULT - PROBLEM SELECTOR PLAN 1
3 Bridge Heparin to Coumadin, monitor coags, goal PTT 80  PTT this morning 43, STANISLAW this morning cancelled  Heparin drip increased and bolus of 5,000 Units IVP given  Check repeat PT/INR in 3 hrs for poss STANISLAW again this afternoon  Continue Metoprolol 25mg qd, may gently uptitrate for rate control as BP can tolerate  On Digoxin 0.125mg qd  Will plan for Micra PPM to allow for medication adjustment if persistent clot vs DC CV tomorrow 11/2/18 if clot not visible on STANISLAW  Rec outpatient Sleep Study to evaluate for Sleep Apnea. Tele: AFL, HR 80- 110s up to 130 overnight.  Maintain NPO status pt for Micra implantation today.  Continue with Coumadin regimen, INR 2.5 today  Continue Metoprolol 75mg qd for rate control  On Digoxin 0.125mg qd        [phone]

## 2018-11-02 NOTE — PROGRESS NOTE ADULT - SUBJECTIVE AND OBJECTIVE BOX
INTERVAL HPI/OVERNIGHT EVENTS: Pt seen resting comfortably  out in chair. Tele: AFL, HR 80- 110s up to 130 overnight.    MEDICATIONS  (STANDING):  aspirin  chewable 81 milliGRAM(s) Oral daily  digoxin     Tablet 0.125 milliGRAM(s) Oral daily  docusate sodium 100 milliGRAM(s) Oral daily  famotidine    Tablet 20 milliGRAM(s) Oral two times a day  furosemide    Tablet 40 milliGRAM(s) Oral two times a day  heparin  Infusion. 1800 Unit(s)/Hr (18 mL/Hr) IV Continuous <Continuous>  hydrALAZINE 50 milliGRAM(s) Oral three times a day  influenza   Vaccine 0.5 milliLiter(s) IntraMuscular once  lisinopril 20 milliGRAM(s) Oral two times a day  metoprolol succinate ER 75 milliGRAM(s) Oral daily  polyethylene glycol 3350 17 Gram(s) Oral two times a day  simvastatin 20 milliGRAM(s) Oral at bedtime    MEDICATIONS  (PRN):  glycerin Suppository - Adult 1 Suppository(s) Rectal once PRN Constipation  heparin  Injectable 05915 Unit(s) IV Push every 6 hours PRN For aPTT less than 40  heparin  Injectable 5000 Unit(s) IV Push every 6 hours PRN For aPTT between 40 - 57      Allergies    No Known Allergies    Intolerances      ROS:  General: Pt denies fever/chills  Cardiovascular: denies chest pain/palpitations/leg edema  Respiratory and Thorax: denies SOB/cough/wheezing  Gastrointestinal: denies abdominal pain/diarrhea/constipation/bloody stool  Musculoskeletal: denies joint pain or swelling, denies restricted motion  Hematologic: denies abnormal bleeding    Vital Signs Last 24 Hrs  T(C): 37.1 (02 Nov 2018 04:17), Max: 37.1 (02 Nov 2018 04:17)  T(F): 98.8 (02 Nov 2018 04:17), Max: 98.8 (02 Nov 2018 04:17)  HR: 125 (02 Nov 2018 04:17) (117 - 125)  BP: 144/90 (02 Nov 2018 04:17) (109/65 - 144/90)  BP(mean): --  RR: 18 (02 Nov 2018 04:17) (18 - 19)  SpO2: 94% (02 Nov 2018 04:17) (93% - 94%)    Physical Exam:  Neurological: Alert & Oriented x 3,  no focal deficits  Respiratory: CTA B/L, No wheezing/crackles/rhonchi  Cardiovascular: (+) S1 & S2, RRR  Gastrointestinal: soft, NT, , obese abdomen + BS x 4   Extremities: Chronic bilateral pedal edema,             LABS:                        13.8   7.2   )-----------( 252      ( 02 Nov 2018 05:07 )             41.6     11-02    135  |  96  |  32<H>  ----------------------------<  100<H>  4.0   |  27  |  1.51<H>    Ca    9.2      02 Nov 2018 05:07  Phos  3.9     11-02  Mg     2.0     11-02      PT/INR - ( 02 Nov 2018 05:07 )   PT: 29.3 sec;   INR: 2.50 ratio         PTT - ( 02 Nov 2018 05:07 )  PTT:59.7 sec INTERVAL HPI/OVERNIGHT EVENTS: Pt seen resting comfortably  out in chair. Tele: AFL, HR 80- 110s up to 130 overnight.    MEDICATIONS  (STANDING):  aspirin  chewable 81 milliGRAM(s) Oral daily  digoxin     Tablet 0.125 milliGRAM(s) Oral daily  docusate sodium 100 milliGRAM(s) Oral daily  famotidine    Tablet 20 milliGRAM(s) Oral two times a day  furosemide    Tablet 40 milliGRAM(s) Oral two times a day  heparin  Infusion. 1800 Unit(s)/Hr (18 mL/Hr) IV Continuous <Continuous>  hydrALAZINE 50 milliGRAM(s) Oral three times a day  influenza   Vaccine 0.5 milliLiter(s) IntraMuscular once  lisinopril 20 milliGRAM(s) Oral two times a day  metoprolol succinate ER 75 milliGRAM(s) Oral daily  polyethylene glycol 3350 17 Gram(s) Oral two times a day  simvastatin 20 milliGRAM(s) Oral at bedtime    MEDICATIONS  (PRN):  glycerin Suppository - Adult 1 Suppository(s) Rectal once PRN Constipation  heparin  Injectable 69620 Unit(s) IV Push every 6 hours PRN For aPTT less than 40  heparin  Injectable 5000 Unit(s) IV Push every 6 hours PRN For aPTT between 40 - 57      Allergies    No Known Allergies    Intolerances      ROS:  General: Pt denies fever/chills  Cardiovascular: denies chest pain/palpitations/leg edema  Respiratory and Thorax: denies SOB/cough/wheezing  Gastrointestinal: denies abdominal pain/diarrhea/constipation/bloody stool  Musculoskeletal: denies joint pain or swelling, denies restricted motion  Hematologic: denies abnormal bleeding    Vital Signs Last 24 Hrs  T(C): 37.1 (02 Nov 2018 04:17), Max: 37.1 (02 Nov 2018 04:17)  T(F): 98.8 (02 Nov 2018 04:17), Max: 98.8 (02 Nov 2018 04:17)  HR: 125 (02 Nov 2018 04:17) (117 - 125)  BP: 144/90 (02 Nov 2018 04:17) (109/65 - 144/90)  BP(mean): --  RR: 18 (02 Nov 2018 04:17) (18 - 19)  SpO2: 94% (02 Nov 2018 04:17) (93% - 94%)    Physical Exam:  Neurological: Alert & Oriented x 3,  no focal deficits  Respiratory: CTA B/L, No wheezing/crackles/rhonchi  Cardiovascular: (+) S1 & S2, RRR  Gastrointestinal: soft, NT, , obese abdomen + BS x 4   Extremities: Chronic bilateral pedal edema            LABS:                        13.8   7.2   )-----------( 252      ( 02 Nov 2018 05:07 )             41.6     11-02    135  |  96  |  32<H>  ----------------------------<  100<H>  4.0   |  27  |  1.51<H>    Ca    9.2      02 Nov 2018 05:07  Phos  3.9     11-02  Mg     2.0     11-02      PT/INR - ( 02 Nov 2018 05:07 )   PT: 29.3 sec;   INR: 2.50 ratio         PTT - ( 02 Nov 2018 05:07 )  PTT:59.7 sec

## 2018-11-02 NOTE — PROGRESS NOTE ADULT - SUBJECTIVE AND OBJECTIVE BOX
Aidan Morel MD  Division of Hospital Medicine  Pager [phone]      BERTHA WARD  58y  Male      Patient is a 58y old  Male who presents with a chief complaint of VELÁSQUEZ (02 Nov 2018 10:15)      INTERVAL HPI/OVERNIGHT EVENTS:  Sitting at bedside. Went for STANISLAW yesterday, still with clot. Reporting some SOB this am. NPO for micra implantation       REVIEW OF SYSTEMS: 14 point ROS negative unless listed above    T(C): 37.1 (11-02-18 @ 04:17), Max: 37.1 (11-02-18 @ 04:17)  HR: 125 (11-02-18 @ 04:17) (125 - 125)  BP: 144/90 (11-02-18 @ 04:17) (109/65 - 144/90)  RR: 18 (11-02-18 @ 04:17) (18 - 19)  SpO2: 94% (11-02-18 @ 04:17) (93% - 94%)  Wt(kg): --Vital Signs Last 24 Hrs  T(C): 37.1 (02 Nov 2018 04:17), Max: 37.1 (02 Nov 2018 04:17)  T(F): 98.8 (02 Nov 2018 04:17), Max: 98.8 (02 Nov 2018 04:17)  HR: 125 (02 Nov 2018 04:17) (125 - 125)  BP: 144/90 (02 Nov 2018 04:17) (109/65 - 144/90)  BP(mean): --  RR: 18 (02 Nov 2018 04:17) (18 - 19)  SpO2: 94% (02 Nov 2018 04:17) (93% - 94%)    PHYSICAL EXAM:  GENERAL: NAD, well-developed  HEAD:  Atraumatic, normocephalic  EYES: EOMI, sclera clear  NECK: Supple, no JVD  CHEST/LUNG: Clear to auscultation bilaterally; no wheezing or rales  HEART: irregular, S1 S2, no murmurs   ABDOMEN: Soft, nontender, +distended, bowel sounds present  EXTREMITIES: 1+ pitting edema in b/l LE with chronic skin changes, open lesions on RLE   PSYCH: calm affect, not anxious  NEUROLOGY: non-focal, AAOx3  SKIN: chronic skin discoloration of b/l LE with 3 lesions on RLE      Consultant(s) Notes Reviewed:  [x ] YES  [ ] NO  Care Discussed with Consultants/Other Providers [ x] YES  [ ] NO    LABS:                        13.8   7.2   )-----------( 252      ( 02 Nov 2018 05:07 )             41.6     11-02    135  |  96  |  32<H>  ----------------------------<  100<H>  4.0   |  27  |  1.51<H>    Ca    9.2      02 Nov 2018 05:07  Phos  3.9     11-02  Mg     2.0     11-02      PT/INR - ( 02 Nov 2018 05:07 )   PT: 29.3 sec;   INR: 2.50 ratio         PTT - ( 02 Nov 2018 05:07 )  PTT:59.7 sec    CAPILLARY BLOOD GLUCOSE                RADIOLOGY & ADDITIONAL TESTS:    Imaging Personally Reviewed:  [x ] YES  [ ] NO

## 2018-11-02 NOTE — PROGRESS NOTE ADULT - ASSESSMENT
58M with PMHx morbid obesity, CAD s/p CABG, and HFrEF admitted for dyspnea and palpitations. Dyspnea likely primarily secondary to exacerbation of HFrEF in setting of Aflutter w/RVR. Pulmonary consulted for r/o WILFRDE. He does not show evidence of significant CO2 retention and has a low Kake Sleepiness Scale. However, pt is at high risk for having undiagnosed WILFRED and untreated WILFRED could further exacerbate arrhythmias and CHF, especially if he is to undergo cardioversion this week. Untreated WILFRED can increase risk of arrhythmia recurrence. Provided pt with at bedside home sleep apnea testing equipment from office yesterday. Performed in house sleep titration study but pt did not wear it for long enough to gather enough data.   -Pt no longer undergoing cardioversion today, will receive Micra implantation with EP.   -Since not enough data was obtained for accurate CPAP titration while in hospital, pt will need outpatient in-lab sleep study

## 2018-11-02 NOTE — PROGRESS NOTE ADULT - PROBLEM SELECTOR PLAN 2
CHF with EF 40%, chronic LE edema and no rales on exam, BNP elevated c/w likely exacerbation. Transitioned to oral diuretics  c/w PO Lasix 40mg BID  Strict I/O.  Daily Weight  cardiology following  c/w ACEi, c/w Toprol XL

## 2018-11-02 NOTE — PROGRESS NOTE ADULT - ATTENDING COMMENTS
Home sleep apnea test confirms presence of moderate WILFRED with an SRUTHI (surrogate for AHI) of 21.3/hr. Salazar saturation was 75% and T90 was 9.2% (report is in chart). Patient warrants treatment for WILFRED given his underlying co-morbidities, particularly afib/flutter and is planned for cardioversion. Will perform APAP testing today to determine optimal CPAP pressure.
Patient used CPAP for 3.5 hours but insufficient time to make an accurate assessment of pressure requirements.   Residual AHI was 19.6/hr. He will need an in-lab CPAP titration once discharged from the hospital.   He can follow up with me on discharge at [address] [place] [address], [address], [phone].  Please call with questions.
Home sleep apnea testing equipment delivered to patient's bedside and instructed patient and RN on how to use.  Will review data in AM an assess for the presence and severity of WILFRED.  If present, will perform APAP titration to initiate therapy as patient is planned for cardioversion. Untreated WILFRED leads to higher risk of reverting to a-fib/flutter after cardioversion.
58 year old man with ischemic cardiomyopathy, coronary revascularization surgery 5 years ago and EF 35-40% at that time. Has few days of breathlessness, palpitations and admitted with atrial flutter ventricular rate 130 and with volume overload congestive heart failure. Rates better controlled and symptomatically improved and anticoagulated with apixaban. Discussed with EP and plan for electrical cardioversion today, maintaining rate control with diltiazem. After cardioversion need to stop diltiazem and replace with beta blocker, probably carvedilol and optimize LV dysfunction management. Possible atrial flutter ablation next week, or possible discharge and return for procedure electively.
Patient seen and examined. Agree with assessment and plan as outlined above.
Patient seen and examined. Agree with assessment and plan as outlined above. Plan for repeat STANISLAW today. Continue present therapies for now. Next steps determined by results of STANISLAW.
Patient seen and examined. Agree with assessment and plan as outlined above. Volume status improving. Continue PO lasix. Strict I and O. Started on metoprolol and tolerating this far. Rates still elevated at times. Continue present medical therapy for now. Plan for repeat STANISLAW to assess LA thrombus. If resolved will attempt cardioversion.
Patient with probable WILFRED with possible OHS.  Was empirically initiated on CPAP over the weekend but pressure was likely insufficient.  In addition, would not be able to qualify him for CPAP without sleep study.   Can check nocturnal oximetry on 2L and ABG (on RA) to assess for OHS and may be able to qualify for bilevel.   There are plans for cardioversion but there are higher rates of recurrence if WILFRED is untreated.   Will discuss with primary team/cardiology.   Will follow with you.
Andre Reyes, M.D.  [facility] Medicine Attending  Office: [phone]   Pager: [phone]
Andre Reyes, M.D.  [facility] Medicine Attending  Office: [phone]   Pager: [phone]

## 2018-11-03 ENCOUNTER — TRANSCRIPTION ENCOUNTER (OUTPATIENT)
Age: 58
End: 2018-11-03

## 2018-11-03 LAB
ALBUMIN SERPL ELPH-MCNC: 3.2 G/DL — LOW (ref 3.3–5)
ALP SERPL-CCNC: 85 U/L — SIGNIFICANT CHANGE UP (ref 40–120)
ALT FLD-CCNC: 16 U/L — SIGNIFICANT CHANGE UP (ref 10–45)
ANION GAP SERPL CALC-SCNC: 12 MMOL/L — SIGNIFICANT CHANGE UP (ref 5–17)
AST SERPL-CCNC: 17 U/L — SIGNIFICANT CHANGE UP (ref 10–40)
BILIRUB SERPL-MCNC: 0.5 MG/DL — SIGNIFICANT CHANGE UP (ref 0.2–1.2)
BUN SERPL-MCNC: 30 MG/DL — HIGH (ref 7–23)
CALCIUM SERPL-MCNC: 9.3 MG/DL — SIGNIFICANT CHANGE UP (ref 8.4–10.5)
CHLORIDE SERPL-SCNC: 97 MMOL/L — SIGNIFICANT CHANGE UP (ref 96–108)
CO2 SERPL-SCNC: 27 MMOL/L — SIGNIFICANT CHANGE UP (ref 22–31)
CREAT SERPL-MCNC: 1.35 MG/DL — HIGH (ref 0.5–1.3)
GLUCOSE SERPL-MCNC: 89 MG/DL — SIGNIFICANT CHANGE UP (ref 70–99)
HCT VFR BLD CALC: 43.1 % — SIGNIFICANT CHANGE UP (ref 39–50)
HGB BLD-MCNC: 13.9 G/DL — SIGNIFICANT CHANGE UP (ref 13–17)
INR BLD: 1.98 RATIO — HIGH (ref 0.88–1.16)
MCHC RBC-ENTMCNC: 29.6 PG — SIGNIFICANT CHANGE UP (ref 27–34)
MCHC RBC-ENTMCNC: 32.3 GM/DL — SIGNIFICANT CHANGE UP (ref 32–36)
MCV RBC AUTO: 91.7 FL — SIGNIFICANT CHANGE UP (ref 80–100)
PLATELET # BLD AUTO: 222 K/UL — SIGNIFICANT CHANGE UP (ref 150–400)
POTASSIUM SERPL-MCNC: 4.1 MMOL/L — SIGNIFICANT CHANGE UP (ref 3.5–5.3)
POTASSIUM SERPL-SCNC: 4.1 MMOL/L — SIGNIFICANT CHANGE UP (ref 3.5–5.3)
PROT SERPL-MCNC: 7.3 G/DL — SIGNIFICANT CHANGE UP (ref 6–8.3)
PROTHROM AB SERPL-ACNC: 23.1 SEC — HIGH (ref 10–13.1)
RBC # BLD: 4.7 M/UL — SIGNIFICANT CHANGE UP (ref 4.2–5.8)
RBC # FLD: 13.4 % — SIGNIFICANT CHANGE UP (ref 10.3–14.5)
SODIUM SERPL-SCNC: 136 MMOL/L — SIGNIFICANT CHANGE UP (ref 135–145)
WBC # BLD: 6.53 K/UL — SIGNIFICANT CHANGE UP (ref 3.8–10.5)
WBC # FLD AUTO: 6.53 K/UL — SIGNIFICANT CHANGE UP (ref 3.8–10.5)

## 2018-11-03 PROCEDURE — 93010 ELECTROCARDIOGRAM REPORT: CPT

## 2018-11-03 PROCEDURE — 99233 SBSQ HOSP IP/OBS HIGH 50: CPT

## 2018-11-03 PROCEDURE — 71046 X-RAY EXAM CHEST 2 VIEWS: CPT | Mod: 26

## 2018-11-03 RX ORDER — WARFARIN SODIUM 2.5 MG/1
7.5 TABLET ORAL ONCE
Qty: 0 | Refills: 0 | Status: COMPLETED | OUTPATIENT
Start: 2018-11-03 | End: 2018-11-03

## 2018-11-03 RX ORDER — HYDRALAZINE HCL 50 MG
25 TABLET ORAL EVERY 8 HOURS
Qty: 0 | Refills: 0 | Status: DISCONTINUED | OUTPATIENT
Start: 2018-11-03 | End: 2018-11-04

## 2018-11-03 RX ADMIN — Medication 81 MILLIGRAM(S): at 12:41

## 2018-11-03 RX ADMIN — LISINOPRIL 20 MILLIGRAM(S): 2.5 TABLET ORAL at 05:54

## 2018-11-03 RX ADMIN — WARFARIN SODIUM 7.5 MILLIGRAM(S): 2.5 TABLET ORAL at 23:19

## 2018-11-03 RX ADMIN — Medication 50 MILLIGRAM(S): at 23:20

## 2018-11-03 RX ADMIN — Medication 50 MILLIGRAM(S): at 05:54

## 2018-11-03 RX ADMIN — FAMOTIDINE 20 MILLIGRAM(S): 10 INJECTION INTRAVENOUS at 05:54

## 2018-11-03 RX ADMIN — Medication 0.12 MILLIGRAM(S): at 05:54

## 2018-11-03 RX ADMIN — Medication 100 MILLIGRAM(S): at 12:41

## 2018-11-03 RX ADMIN — Medication 100 MILLIGRAM(S): at 12:40

## 2018-11-03 RX ADMIN — Medication 100 MILLIGRAM(S): at 05:10

## 2018-11-03 RX ADMIN — SIMVASTATIN 20 MILLIGRAM(S): 20 TABLET, FILM COATED ORAL at 23:19

## 2018-11-03 RX ADMIN — FAMOTIDINE 20 MILLIGRAM(S): 10 INJECTION INTRAVENOUS at 18:42

## 2018-11-03 RX ADMIN — LISINOPRIL 20 MILLIGRAM(S): 2.5 TABLET ORAL at 18:42

## 2018-11-03 RX ADMIN — Medication 40 MILLIGRAM(S): at 18:42

## 2018-11-03 RX ADMIN — Medication 40 MILLIGRAM(S): at 05:53

## 2018-11-03 RX ADMIN — Medication 25 MILLIGRAM(S): at 23:20

## 2018-11-03 RX ADMIN — Medication 50 MILLIGRAM(S): at 00:17

## 2018-11-03 NOTE — PROGRESS NOTE ADULT - SUBJECTIVE AND OBJECTIVE BOX
Patient is a 58y old  Male who presents with a chief complaint of VELÁSQUEZ (03 Nov 2018 13:57)      SUBJECTIVE / OVERNIGHT EVENTS: No overnight events. Feels well. no cp, sob, lightheadedness.     MEDICATIONS  (STANDING):  aspirin  chewable 81 milliGRAM(s) Oral daily  digoxin     Tablet 0.125 milliGRAM(s) Oral daily  docusate sodium 100 milliGRAM(s) Oral daily  famotidine    Tablet 20 milliGRAM(s) Oral two times a day  furosemide    Tablet 40 milliGRAM(s) Oral two times a day  hydrALAZINE 50 milliGRAM(s) Oral three times a day  influenza   Vaccine 0.5 milliLiter(s) IntraMuscular once  lisinopril 20 milliGRAM(s) Oral two times a day  metoprolol succinate  milliGRAM(s) Oral daily  metoprolol succinate ER 50 milliGRAM(s) Oral at bedtime  polyethylene glycol 3350 17 Gram(s) Oral two times a day  simvastatin 20 milliGRAM(s) Oral at bedtime  warfarin 7.5 milliGRAM(s) Oral once    MEDICATIONS  (PRN):  glycerin Suppository - Adult 1 Suppository(s) Rectal once PRN Constipation      Vital Signs Last 24 Hrs  T(C): 36.9 (03 Nov 2018 11:11), Max: 37 (02 Nov 2018 15:27)  T(F): 98.4 (03 Nov 2018 11:11), Max: 98.6 (02 Nov 2018 15:27)  HR: 94 (03 Nov 2018 12:42) (94 - 105)  BP: 111/76 (03 Nov 2018 12:42) (92/64 - 127/86)  BP(mean): --  RR: 17 (03 Nov 2018 11:11) (17 - 18)  SpO2: 92% (03 Nov 2018 11:11) (91% - 97%)  CAPILLARY BLOOD GLUCOSE        I&O's Summary    02 Nov 2018 07:01  -  03 Nov 2018 07:00  --------------------------------------------------------  IN: 100 mL / OUT: 1275 mL / NET: -1175 mL    03 Nov 2018 08:01  -  03 Nov 2018 14:48  --------------------------------------------------------  IN: 980 mL / OUT: 0 mL / NET: 980 mL        PHYSICAL EXAM:  GENERAL: NAD, well-developed  HEAD:  Atraumatic, Normocephalic  NECK: Supple, No JVD  CHEST/LUNG: Clear to auscultation bilaterally; No wheeze  HEART: Irregular  ABDOMEN: Soft, Nontender, Nondistended; Bowel sounds present  EXTREMITIES:  2+ Peripheral Pulses, No clubbing, cyanosis, or edema  PSYCH: AAOx3      LABS:                        13.9   6.53  )-----------( 222      ( 03 Nov 2018 08:44 )             43.1     11-03    136  |  97  |  30<H>  ----------------------------<  89  4.1   |  27  |  1.35<H>    Ca    9.3      03 Nov 2018 07:01  Phos  3.9     11-02  Mg     2.0     11-02    TPro  7.3  /  Alb  3.2<L>  /  TBili  0.5  /  DBili  x   /  AST  17  /  ALT  16  /  AlkPhos  85  11-03    PT/INR - ( 03 Nov 2018 08:35 )   PT: 23.1 sec;   INR: 1.98 ratio         PTT - ( 02 Nov 2018 05:07 )  PTT:59.7 sec              RADIOLOGY & ADDITIONAL TESTS:    tele reviewed: Aflutter 90-120s    Imaging Personally Reviewed:    Consultant(s) Notes Reviewed:  EP, cards, pulm    Care Discussed with Consultants/Other Providers: EP

## 2018-11-03 NOTE — DISCHARGE NOTE ADULT - ADDITIONAL INSTRUCTIONS
Follow up in the EP clinic for device and access site check on 11/21/18 at 8:35 am  (the appointment has already been made for you) Follow up in the EP clinic for device and access site check on 11/21/18 at 8:35 am  (the appointment has already been made for you)  You must have your INR blood level monitored regularly - Have one of your primary physicians (primary or cardiologist) monitor it - Have it checked by the middle of next week (the week of 11/5/18 - 11/9/18  Bring all discharge paperwork including discharge medication list to follow up appointments  Any new or changed prescriptions have already been sent to your pharmacy - if you require refills on previous prescriptions or the current medications - contact your private physician Follow up in the EP clinic for device and access site check on 11/21/18 at 8:35 am  (the appointment has already been made for you)  You must have your INR blood level monitored regularly - Have one of your primary physicians (primary or cardiologist) monitor it - Have it checked by the middle of next week (the week of 11/5/18 - 11/9/18  Bring all discharge paperwork including discharge medication list to follow up appointments  Any new or changed prescriptions have already been sent to the [facility] ([facility]) - 30 day supply of each - if you require refills on previous prescriptions or the current medications - contact your private physician Follow up in the EP clinic for device and access site check on 11/21/18 at 8:35 am  (the appointment has already been made for you)  You must have your INR blood level monitored regularly - Have one of your primary physicians (primary or cardiologist) monitor it - Have it checked by TUESDAY 11/6.  Bring all discharge paperwork including discharge medication list to follow up appointments  Any new or changed prescriptions have already been sent to the [facility] ([facility] - 30 day supply of each - if you require refills on previous prescriptions or the current medications - contact your private physician

## 2018-11-03 NOTE — DISCHARGE NOTE ADULT - CARE PLAN
Principal Discharge DX:	Atrial flutter with rapid ventricular response  Goal:	rate control  Assessment and plan of treatment:	Follow up with cardiology / Electrophysiology  Take all medications as prescribed  Secondary Diagnosis:	Left atrial thrombus  Goal:	resolved  Assessment and plan of treatment:	continue anticoagulation with coumadin  This medication is used to thin the blood, to prevent and treat blood clots.  Take this medication Daily as prescribed by your Health Care Provider.  Tell your Dentist, Surgeon, and other Doctors that you are on this drug.  This medication requires PT/INR Blood work monitoring,  Secondary Diagnosis:	Chronic systolic heart failure  Goal:	symptom management  Assessment and plan of treatment:	Weigh yourself daily.  If you gain 3lbs in 3 days, or 5lbs in a week call your Health Care Provider.  Do not eat or drink foods containing more than 2000mg of salt (sodium) in your diet every day.  Call your Health Care Provider if you have any swelling or increased swelling in your feet, ankles, and/or stomach.  Take all of your medication as directed.  If you become dizzy call your Health Care Provider. Principal Discharge DX:	Atrial flutter with rapid ventricular response  Goal:	rate control  Assessment and plan of treatment:	Follow up with cardiology / Electrophysiology  Take all medications as prescribed  TAKE COUMADIN 6MG EACH NIGHT UNTIL YOUR INR CHECK  You MUST GET YOU INR CHECKED WITH YOUR CARDIOLOGIST OR PRIMARY CARE BY TUESDAY 11/6/18.  Secondary Diagnosis:	Left atrial thrombus  Goal:	resolved  Assessment and plan of treatment:	continue anticoagulation with coumadin  This medication is used to thin the blood, to prevent and treat blood clots.  Take this medication Daily as prescribed by your Health Care Provider.  Tell your Dentist, Surgeon, and other Doctors that you are on this drug.  This medication requires PT/INR Blood work monitoring,  TAKE COUMADIN 6MG EACH NIGHT UNTIL YOUR INR CHECK  You MUST GET YOU INR CHECKED WITH YOUR CARDIOLOGIST OR PRIMARY CARE BY TUESDAY 11/6/18.  Secondary Diagnosis:	Chronic systolic heart failure  Goal:	symptom management  Assessment and plan of treatment:	Weigh yourself daily.  If you gain 3lbs in 3 days, or 5lbs in a week call your Health Care Provider.  Do not eat or drink foods containing more than 2000mg of salt (sodium) in your diet every day.  Call your Health Care Provider if you have any swelling or increased swelling in your feet, ankles, and/or stomach.  Take all of your medication as directed.  If you become dizzy call your Health Care Provider.

## 2018-11-03 NOTE — DISCHARGE NOTE ADULT - HOSPITAL COURSE
58M with PMH of CAD s/p CABG/stents (stents 2000, CABG 2012), HFrEF (EF 40%), HTN, lichen planus with chronic LE edema p/w VELÁSQUEZ and palpitations, admitted with new aflutter.    Atrial flutter with rapid ventricular response -  Cardioversion attempted on 10/26 but aborted due to L atrial appendage thrombus. Bridged to coumadin  - Repeat TTE with LA thrombus, has not dissolved. Thus cardioversion again unable to be performed.  - had pauses on tele now s/p micra PPM on 11/2, HR uncontrolled overnight to 120s, discussed with EP, rec increasing Toprol to 100mg AM, 50mg pm uptitrate as tolerated  CHF with EF 40%, chronic LE edema and no rales on exam, BNP elevated c/w likely exacerbation. Transitioned to oral diuretics  c/w ACEi, c/w Toprol XL.      Problem/Plan - 3:  ·  Problem: Coronary artery disease involving native coronary artery of native heart without angina pectoris.  Plan: CAD s/p stents, CABG  c/w ASA 81mg, c/w statin.    Essential hypertension - BP low side in hospital,   decrease hydralazine to 25mg q8 as Betablocker uptitrated and bp soft  c/w lisinopril 20 BID   c/w metoprolol xl as above.    CKD (chronic kidney disease) stage 3, GFR 30-59 ml/min.  Plan: CKD stage 3 - Cr 1.4-1.6 as outpt 58M with PMH of CAD s/p CABG/stents (stents 2000, CABG 2012), HFrEF (EF 40%), HTN, lichen planus with chronic LE edema p/w VELÁSQUEZ and palpitations, admitted with new aflutter.    Atrial flutter with rapid ventricular response -  Cardioversion attempted on 10/26 but aborted due to L atrial appendage thrombus. Bridged to coumadin  - Repeat TTE with LA thrombus, has not dissolved. Thus cardioversion again unable to be performed.  - had pauses on tele now s/p micra PPM on 11/2, HR uncontrolled overnight to 120s, discussed with EP, rec increasing Toprol to 100mg AM, 50mg pm uptitrate as tolerated  CHF with EF 40%, chronic LE edema and no rales on exam, BNP elevated c/w likely exacerbation. Transitioned to oral diuretics  c/w ACEi, c/w Toprol XL.      Coronary artery disease involving native coronary artery of native heart without angina pectoris.CAD s/p stents, CABG  c/w ASA 81mg, c/w statin.    Essential hypertension - BP low side in hospital,   decrease hydralazine to 25mg q8 as Betablocker uptitrated and bp soft  c/w lisinopril 20 BID   c/w metoprolol xl as above.    CKD (chronic kidney disease) stage 3, GFR 30-59 ml/min.  Plan: CKD stage 3 - Cr 1.4-1.6 as outpt 58M with PMH of CAD s/p CABG/stents (stents 2000, CABG 2012), HFrEF (EF 40%), HTN, lichen planus with chronic LE edema p/w VELÁSQUEZ and palpitations, admitted with new aflutter.    Atrial flutter with rapid ventricular response -  Cardioversion attempted on 10/26 but aborted due to L atrial appendage thrombus. Bridged to coumadin  - Repeat TTE with LA thrombus, has not dissolved. Thus cardioversion again unable to be performed.  - had pauses on tele now s/p micra PPM on 11/2, HR uncontrolled overnight to 120s, discussed with EP, rec increasing Toprol to 100mg AM, 50mg pm uptitrate as tolerated  CHF with EF 40%, chronic LE edema and no rales on exam, BNP elevated c/w likely exacerbation. Transitioned to oral diuretics  c/w ACEi, c/w Toprol XL.      Coronary artery disease involving native coronary artery of native heart without angina pectoris.CAD s/p stents, CABG  c/w ASA 81mg, c/w statin.    Essential hypertension - BP low side in hospital,   C/W hydralazine to 25mg q8   c/w lisinopril 20 BID   c/w metoprolol xl as above.    CKD (chronic kidney disease) stage 3, GFR 30-59 ml/min.  Plan: CKD stage 3 - Cr 1.4-1.6 as outpt     For afib and left atrial thrombus INR 2.39 on discharge, will discharge on coumadin 6mg nightly, must get INR check by 11/6 with cards or primary care - patient full aware and adamant about being discharged today. Discussed with cards, can discharge today with outpatient cards, EP, PCP

## 2018-11-03 NOTE — DISCHARGE NOTE ADULT - PROVIDER TOKENS
TOKEN:'[id number]:'[id number]' TOKEN:'[id number]:'[id number]'

## 2018-11-03 NOTE — PROGRESS NOTE ADULT - PROBLEM SELECTOR PLAN 1
Cardioversion attempted on 10/26 but aborted due to L atrial appendage thrombus. Bridged to coumadin  - Repeat TTE with LA thrombus, has not dissolved. Thus cardioversion again unable to be performed.  - INR 1.96 downtrending, dose coumadin 7.5mg daily, monitor INR in AM  - had pauses on tele now s/p micra PPM on 11/2, HR uncontrolled overnight to 120s, discussed with EP, rec increasing Toprol to 100mg AM, 50mg pm uptitrate as tolerated  -monitor on tele  - c/w digoxin

## 2018-11-03 NOTE — DISCHARGE NOTE ADULT - CARE PROVIDERS DIRECT ADDRESSES
,[email or web address]

## 2018-11-03 NOTE — PROGRESS NOTE ADULT - PROBLEM SELECTOR PLAN 4
BP low side,   decrease hydralazine to 25mg q8 as Betablocker uptitrated and bp soft  c/w lisinopril 20 BID   c/w metoprolol xl as above

## 2018-11-03 NOTE — DISCHARGE NOTE ADULT - CARE PROVIDER_API CALL
Kevyn Philip), Cardiac Electrophysiology; Cardiology; Internal Medicine  [address] [place] [address]  Phone: [phone]    Russel Wheeler), Cardiovascular Disease; Internal Medicine; Nuclear Cardiology  [address] [place] [address]  Phone: [phone]  Fax: [phone] Kevyn Philip), Cardiac Electrophysiology; Cardiology; Internal Medicine  [address] [place] [address]  Phone: [phone]    Russel Wheeler), Cardiovascular Disease; Internal Medicine; Nuclear Cardiology  [address] [place] [address]  Phone: [phone]  Fax: [phone]    Luis Sorto), Medicine  [address] [id number]  Phone: [phone]  Fax: [phone]

## 2018-11-03 NOTE — DISCHARGE NOTE ADULT - PATIENT PORTAL LINK FT
You can access the [facility] Patient Portal, offered by [facility], by registering with the following website: [email or web address]

## 2018-11-03 NOTE — PROGRESS NOTE ADULT - SUBJECTIVE AND OBJECTIVE BOX
INTERVAL HPI/OVERNIGHT EVENTS: No significant overnight cardiac events reported.     MEDICATIONS  (STANDING):  aspirin  chewable 81 milliGRAM(s) Oral daily  digoxin     Tablet 0.125 milliGRAM(s) Oral daily  docusate sodium 100 milliGRAM(s) Oral daily  famotidine    Tablet 20 milliGRAM(s) Oral two times a day  furosemide    Tablet 40 milliGRAM(s) Oral two times a day  hydrALAZINE 50 milliGRAM(s) Oral three times a day  influenza   Vaccine 0.5 milliLiter(s) IntraMuscular once  lisinopril 20 milliGRAM(s) Oral two times a day  metoprolol succinate  milliGRAM(s) Oral daily  metoprolol succinate ER 50 milliGRAM(s) Oral at bedtime  polyethylene glycol 3350 17 Gram(s) Oral two times a day  simvastatin 20 milliGRAM(s) Oral at bedtime    MEDICATIONS  (PRN):  glycerin Suppository - Adult 1 Suppository(s) Rectal once PRN Constipation      Allergies  No Known Allergies    ROS:  General: Pt denies fever/chills  Cardiovascular: denies chest pain/palpitations/dizziness.   Respiratory and Thorax: episode of sob overnight; denies current SOB/cough.   Gastrointestinal: denies abdominal pain/diarrhea/constipation/bloody stool  Genitourinary: denies dysuria/ hematuria.   Hematologic: denies abnormal bleeding    Vital Signs Last 24 Hrs  T(C): 36.9 (03 Nov 2018 11:11), Max: 37 (02 Nov 2018 15:27)  T(F): 98.4 (03 Nov 2018 11:11), Max: 98.6 (02 Nov 2018 15:27)  HR: 100 (03 Nov 2018 11:11) (95 - 105)  BP: 96/69 (03 Nov 2018 11:11) (92/64 - 127/86)  BP(mean): --  RR: 17 (03 Nov 2018 11:11) (17 - 18)  SpO2: 92% (03 Nov 2018 11:11) (91% - 97%)    Physical Exam:  Constitutional: well developed, well nourished and in  no acute distress  Neurological: Alert & Oriented x 3,  no focal deficits  Respiratory: Breathing nonlabored; CTA bilaterally   Cardiovascular: (+) S1 & S2, RRR  Gastrointestinal: soft, obese,  NT/ no rebound,  (+) BS  Extremities: +2 bilateral radial, +1 bilateral DP pulses.   Skin: Right groin access site soft/ no hematoma. No active external bleed. very mild ecchymosis. No surrounding erythema.           + bilateral lower extremity hyperpigmentation.         LABS:                        13.9   6.53  )-----------( 222      ( 03 Nov 2018 08:44 )             43.1     11-03    136  |  97  |  30<H>  ----------------------------<  89  4.1   |  27  |  1.35<H>    Ca    9.3      03 Nov 2018 07:01  Phos  3.9     11-02  Mg     2.0     11-02    TPro  7.3  /  Alb  3.2<L>  /  TBili  0.5  /  DBili  x   /  AST  17  /  ALT  16  /  AlkPhos  85  11-03    PT/INR - ( 03 Nov 2018 08:35 )   PT: 23.1 sec;   INR: 1.98 ratio         PTT - ( 02 Nov 2018 05:07 )  PTT:59.7 sec      TELE: Aflutter 90's-low 100's  up to 120's at times

## 2018-11-03 NOTE — DISCHARGE NOTE ADULT - MEDICATION SUMMARY - MEDICATIONS TO CHANGE
I will SWITCH the dose or number of times a day I take the medications listed below when I get home from the hospital:    furosemide 40 mg oral tablet  -- 1 tab(s) by mouth once a day    hydrALAZINE 50 mg oral tablet  -- 1 tab(s) by mouth 3 times a day I will SWITCH the dose or number of times a day I take the medications listed below when I get home from the hospital:    furosemide 40 mg oral tablet  -- 1 tab(s) by mouth once a day    hydrALAZINE 50 mg oral tablet  -- 1 tab(s) by mouth 3 times a day    aspirin 325 mg oral tablet  -- 1 tab(s) by mouth once a day

## 2018-11-03 NOTE — DISCHARGE NOTE ADULT - MEDICATION SUMMARY - MEDICATIONS TO TAKE
I will START or STAY ON the medications listed below when I get home from the hospital:    aspirin 325 mg oral tablet  -- 1 tab(s) by mouth once a day  -- Indication: For Coronary artery disease       lisinopril 20 mg oral tablet  -- 1 tab(s) by mouth 2 times a day  -- Indication: For Chronic systolic heart failure    digoxin 125 mcg (0.125 mg) oral tablet  -- 1 tab(s) by mouth once a day  -- Indication: For Atrial flutter    Coumadin 7.5 mg oral tablet  -- 1 tab(s) by mouth once a day   -- Do not take this drug if you are pregnant.  It is very important that you take or use this exactly as directed.  Do not skip doses or discontinue unless directed by your doctor.  Obtain medical advice before taking any non-prescription drugs as some may affect the action of this medication.    -- Indication: For Atrial flutter    simvastatin 20 mg oral tablet  -- 1 tab(s) by mouth once a day (at bedtime)  -- Indication: For hyperlipidemia    metoprolol succinate 100 mg oral tablet, extended release  -- 1 tab(s) by mouth once a day  -- Indication: For Chronic systolic heart failure    metoprolol succinate 50 mg oral tablet, extended release  -- 1 tab(s) by mouth once a day (at bedtime)  -- Indication: For Chronic systolic heart failure    furosemide 40 mg oral tablet  -- 1 tab(s) by mouth 2 times a day  -- Indication: For diuretic    famotidine 20 mg oral tablet  -- 1 tab(s) by mouth 2 times a day  -- Indication: For Need for prophylactic measure    docusate sodium 100 mg oral capsule  -- 1 cap(s) by mouth once a day  -- Indication: For Laxative    hydrALAZINE 25 mg oral tablet  -- 1 tab(s) by mouth every 8 hours  -- Indication: For Cardiovascular disease I will START or STAY ON the medications listed below when I get home from the hospital:    aspirin 81 mg oral tablet, chewable  -- 1 tab(s) by mouth once a day  -- Indication: For Coronary artery disease involving native coronary artery of native heart without angina pectoris    lisinopril 20 mg oral tablet  -- 1 tab(s) by mouth 2 times a day  -- Indication: For Chronic systolic heart failure    digoxin 125 mcg (0.125 mg) oral tablet  -- 1 tab(s) by mouth once a day  -- Indication: For Atrial flutter    Coumadin 6 mg oral tablet  -- 1 tab(s) by mouth once a day (at bedtime)   -- Do not take this drug if you are pregnant.  It is very important that you take or use this exactly as directed.  Do not skip doses or discontinue unless directed by your doctor.  Obtain medical advice before taking any non-prescription drugs as some may affect the action of this medication.    -- Indication: For Atrial flutter    simvastatin 20 mg oral tablet  -- 1 tab(s) by mouth once a day (at bedtime)  -- Indication: For hyperlipidemia    metoprolol succinate 100 mg oral tablet, extended release  -- 1 tab(s) by mouth once a day  -- Indication: For Chronic systolic heart failure    metoprolol succinate 50 mg oral tablet, extended release  -- 1 tab(s) by mouth once a day (at bedtime)  -- Indication: For Chronic systolic heart failure    furosemide 40 mg oral tablet  -- 1 tab(s) by mouth 2 times a day  -- Indication: For diuretic    famotidine 20 mg oral tablet  -- 1 tab(s) by mouth 2 times a day  -- Indication: For Need for prophylactic measure    docusate sodium 100 mg oral capsule  -- 1 cap(s) by mouth once a day  -- Indication: For Laxative    hydrALAZINE 25 mg oral tablet  -- 1 tab(s) by mouth every 8 hours  -- Indication: For Cardiovascular disease

## 2018-11-03 NOTE — PROGRESS NOTE ADULT - ASSESSMENT
57 y/o  male with h/o morbid obesity, CAD s/p PCI (2000) s/p CABG (2012), HTN, lichen planus with chronic LE edema, systolic CHF w/ EF currently 25-30% (was 40% earlier this year) p/w acute on chronic systolic HF exacerbation in setting of newly diagnosed typical Aflutter w/ RVR with episodes of pauses up to 5.5 seconds on telemetry.  Telemetry with no further pauses noted since 10/28. Patient now s/p leadless PPM on 11/2/18.    # Tachybrady syndrome (Aflutter w/ RVR and pause episodes)  #MARV Thrombus  # HFrEf    - Device check and pairing performed by MDT rep and revealed normal fxn  - Post procedure Abx course completed.   - Continue with Toprol 100mg Qam and 50mg Qpm for heart rate control   - Continue coumadin for goal INR 2-3.   - PA/ LAT Cxray pending   - Post micra PPM  teaching reinforced with patient who verbalizes understanding   - Patient to f/u in EP clinic for device and access site check on 11/21/18 at 8:35 am    -He was advised that if fever or swelling/redness/discharge from surgical site, he should call EP clinic at [phone]. [phone] 59 y/o  male with h/o morbid obesity, CAD s/p PCI (2000) s/p CABG (2012), HTN, lichen planus with chronic LE edema, systolic CHF w/ EF currently 25-30% (was 40% earlier this year) p/w acute on chronic systolic HF exacerbation in setting of newly diagnosed typical Aflutter w/ RVR with episodes of pauses up to 5.5 seconds on telemetry.  Telemetry with no further pauses noted since 10/28. Patient now s/p leadless PPM on 11/2/18.    # Tachybrady syndrome (Aflutter w/ RVR and pause episodes)  #MARV Thrombus  # HFrEf    - Device check and pairing performed by MDT rep and revealed normal fxn  - Post procedure Abx course completed.   - Continue with Toprol 100mg Qam and 50mg Qpm for heart rate control   - Continue coumadin for goal INR 2-3.   - PA/ LAT Cxray reveals micra in good position  - Post micra PPM  teaching reinforced with patient who verbalizes understanding   - Patient to f/u in EP clinic for device and access site check on 11/21/18 at 8:35 am    -He was advised that if fever or swelling/redness/discharge from surgical site, he should call EP clinic at [phone]. [phone]

## 2018-11-03 NOTE — DISCHARGE NOTE ADULT - PLAN OF CARE
rate control Follow up with cardiology / Electrophysiology  Take all medications as prescribed resolved continue anticoagulation with coumadin  This medication is used to thin the blood, to prevent and treat blood clots.  Take this medication Daily as prescribed by your Health Care Provider.  Tell your Dentist, Surgeon, and other Doctors that you are on this drug.  This medication requires PT/INR Blood work monitoring, symptom management Weigh yourself daily.  If you gain 3lbs in 3 days, or 5lbs in a week call your Health Care Provider.  Do not eat or drink foods containing more than 2000mg of salt (sodium) in your diet every day.  Call your Health Care Provider if you have any swelling or increased swelling in your feet, ankles, and/or stomach.  Take all of your medication as directed.  If you become dizzy call your Health Care Provider. Follow up with cardiology / Electrophysiology  Take all medications as prescribed  TAKE COUMADIN 6MG EACH NIGHT UNTIL YOUR INR CHECK  You MUST GET YOU INR CHECKED WITH YOUR CARDIOLOGIST OR PRIMARY CARE BY TUESDAY 11/6/18. continue anticoagulation with coumadin  This medication is used to thin the blood, to prevent and treat blood clots.  Take this medication Daily as prescribed by your Health Care Provider.  Tell your Dentist, Surgeon, and other Doctors that you are on this drug.  This medication requires PT/INR Blood work monitoring,  TAKE COUMADIN 6MG EACH NIGHT UNTIL YOUR INR CHECK  You MUST GET YOU INR CHECKED WITH YOUR CARDIOLOGIST OR PRIMARY CARE BY TUESDAY 11/6/18.

## 2018-11-04 VITALS — HEART RATE: 91 BPM | DIASTOLIC BLOOD PRESSURE: 88 MMHG | SYSTOLIC BLOOD PRESSURE: 125 MMHG

## 2018-11-04 LAB
ANION GAP SERPL CALC-SCNC: 10 MMOL/L — SIGNIFICANT CHANGE UP (ref 5–17)
APTT BLD: 37.9 SEC — HIGH (ref 27.5–36.3)
BUN SERPL-MCNC: 30 MG/DL — HIGH (ref 7–23)
CALCIUM SERPL-MCNC: 9.4 MG/DL — SIGNIFICANT CHANGE UP (ref 8.4–10.5)
CHLORIDE SERPL-SCNC: 97 MMOL/L — SIGNIFICANT CHANGE UP (ref 96–108)
CO2 SERPL-SCNC: 30 MMOL/L — SIGNIFICANT CHANGE UP (ref 22–31)
CREAT SERPL-MCNC: 1.29 MG/DL — SIGNIFICANT CHANGE UP (ref 0.5–1.3)
GLUCOSE SERPL-MCNC: 94 MG/DL — SIGNIFICANT CHANGE UP (ref 70–99)
HCT VFR BLD CALC: 42.8 % — SIGNIFICANT CHANGE UP (ref 39–50)
HGB BLD-MCNC: 14.4 G/DL — SIGNIFICANT CHANGE UP (ref 13–17)
INR BLD: 2.39 RATIO — HIGH (ref 0.88–1.16)
MAGNESIUM SERPL-MCNC: 2 MG/DL — SIGNIFICANT CHANGE UP (ref 1.6–2.6)
MCHC RBC-ENTMCNC: 30.6 PG — SIGNIFICANT CHANGE UP (ref 27–34)
MCHC RBC-ENTMCNC: 33.6 GM/DL — SIGNIFICANT CHANGE UP (ref 32–36)
MCV RBC AUTO: 91.1 FL — SIGNIFICANT CHANGE UP (ref 80–100)
PLATELET # BLD AUTO: 236 K/UL — SIGNIFICANT CHANGE UP (ref 150–400)
POTASSIUM SERPL-MCNC: 4.1 MMOL/L — SIGNIFICANT CHANGE UP (ref 3.5–5.3)
POTASSIUM SERPL-SCNC: 4.1 MMOL/L — SIGNIFICANT CHANGE UP (ref 3.5–5.3)
PROTHROM AB SERPL-ACNC: 27.5 SEC — HIGH (ref 10–13.1)
RBC # BLD: 4.7 M/UL — SIGNIFICANT CHANGE UP (ref 4.2–5.8)
RBC # FLD: 13.1 % — SIGNIFICANT CHANGE UP (ref 10.3–14.5)
SODIUM SERPL-SCNC: 137 MMOL/L — SIGNIFICANT CHANGE UP (ref 135–145)
WBC # BLD: 5.99 K/UL — SIGNIFICANT CHANGE UP (ref 3.8–10.5)
WBC # FLD AUTO: 5.99 K/UL — SIGNIFICANT CHANGE UP (ref 3.8–10.5)

## 2018-11-04 PROCEDURE — 99239 HOSP IP/OBS DSCHRG MGMT >30: CPT

## 2018-11-04 RX ORDER — FAMOTIDINE 10 MG/ML
1 INJECTION INTRAVENOUS
Qty: 60 | Refills: 0 | OUTPATIENT
Start: 2018-11-04 | End: 2018-12-03

## 2018-11-04 RX ORDER — WARFARIN SODIUM 2.5 MG/1
1 TABLET ORAL
Qty: 0 | Refills: 0 | COMMUNITY
Start: 2018-11-04

## 2018-11-04 RX ORDER — ASPIRIN/CALCIUM CARB/MAGNESIUM 324 MG
1 TABLET ORAL
Qty: 0 | Refills: 0 | COMMUNITY
Start: 2018-11-04

## 2018-11-04 RX ORDER — WARFARIN SODIUM 2.5 MG/1
1 TABLET ORAL
Qty: 5 | Refills: 0 | OUTPATIENT
Start: 2018-11-04

## 2018-11-04 RX ORDER — FUROSEMIDE 40 MG
1 TABLET ORAL
Qty: 0 | Refills: 0 | DISCHARGE
Start: 2018-11-04 | End: 2018-12-03

## 2018-11-04 RX ORDER — DIGOXIN 250 MCG
1 TABLET ORAL
Qty: 0 | Refills: 0 | COMMUNITY
Start: 2018-11-04 | End: 2018-12-03

## 2018-11-04 RX ORDER — DOCUSATE SODIUM 100 MG
1 CAPSULE ORAL
Qty: 0 | Refills: 0 | COMMUNITY
Start: 2018-11-04

## 2018-11-04 RX ORDER — FUROSEMIDE 40 MG
1 TABLET ORAL
Qty: 60 | Refills: 0 | OUTPATIENT
Start: 2018-11-04 | End: 2018-12-03

## 2018-11-04 RX ORDER — METOPROLOL TARTRATE 50 MG
1 TABLET ORAL
Qty: 30 | Refills: 0 | OUTPATIENT
Start: 2018-11-04 | End: 2018-12-03

## 2018-11-04 RX ORDER — ASPIRIN/CALCIUM CARB/MAGNESIUM 324 MG
1 TABLET ORAL
Qty: 0 | Refills: 0 | COMMUNITY

## 2018-11-04 RX ORDER — FUROSEMIDE 40 MG
1 TABLET ORAL
Qty: 0 | Refills: 0 | COMMUNITY

## 2018-11-04 RX ORDER — HYDRALAZINE HCL 50 MG
1 TABLET ORAL
Qty: 0 | Refills: 0 | COMMUNITY

## 2018-11-04 RX ORDER — DIGOXIN 250 MCG
1 TABLET ORAL
Qty: 0 | Refills: 0 | DISCHARGE
Start: 2018-11-04 | End: 2018-12-03

## 2018-11-04 RX ORDER — DIGOXIN 250 MCG
1 TABLET ORAL
Qty: 30 | Refills: 0 | OUTPATIENT
Start: 2018-11-04 | End: 2018-12-03

## 2018-11-04 RX ORDER — WARFARIN SODIUM 2.5 MG/1
1 TABLET ORAL
Qty: 30 | Refills: 0 | OUTPATIENT
Start: 2018-11-04 | End: 2018-12-03

## 2018-11-04 RX ORDER — HYDRALAZINE HCL 50 MG
1 TABLET ORAL
Qty: 90 | Refills: 0
Start: 2018-11-04 | End: 2018-12-03

## 2018-11-04 RX ORDER — WARFARIN SODIUM 2.5 MG/1
7.5 TABLET ORAL ONCE
Qty: 0 | Refills: 0 | Status: DISCONTINUED | OUTPATIENT
Start: 2018-11-04 | End: 2018-11-04

## 2018-11-04 RX ADMIN — Medication 81 MILLIGRAM(S): at 12:14

## 2018-11-04 RX ADMIN — POLYETHYLENE GLYCOL 3350 17 GRAM(S): 17 POWDER, FOR SOLUTION ORAL at 05:31

## 2018-11-04 RX ADMIN — LISINOPRIL 20 MILLIGRAM(S): 2.5 TABLET ORAL at 05:31

## 2018-11-04 RX ADMIN — Medication 25 MILLIGRAM(S): at 14:31

## 2018-11-04 RX ADMIN — Medication 100 MILLIGRAM(S): at 12:15

## 2018-11-04 RX ADMIN — Medication 40 MILLIGRAM(S): at 05:31

## 2018-11-04 RX ADMIN — Medication 100 MILLIGRAM(S): at 12:14

## 2018-11-04 RX ADMIN — Medication 0.12 MILLIGRAM(S): at 05:31

## 2018-11-04 RX ADMIN — Medication 40 MILLIGRAM(S): at 16:44

## 2018-11-04 RX ADMIN — LISINOPRIL 20 MILLIGRAM(S): 2.5 TABLET ORAL at 16:44

## 2018-11-04 RX ADMIN — Medication 25 MILLIGRAM(S): at 05:31

## 2018-11-04 RX ADMIN — FAMOTIDINE 20 MILLIGRAM(S): 10 INJECTION INTRAVENOUS at 05:31

## 2018-11-04 RX ADMIN — FAMOTIDINE 20 MILLIGRAM(S): 10 INJECTION INTRAVENOUS at 16:44

## 2018-11-04 NOTE — PROGRESS NOTE ADULT - REASON FOR ADMISSION
VELÁSQUEZ

## 2018-11-04 NOTE — PROGRESS NOTE ADULT - PROBLEM SELECTOR PLAN 6
on therapeutic anticoagulation.  DASH diet  dispo: consider discharge home shilo if HR controlled and INR not downtrending
on therapeutic anticoagulation.  DASH diet  NPO after midnight for STANISLAW
therapeutic on Eliquis
on therapeutic anticoagulation.
on therapeutic anticoagulation.  DASH diet  NPO Micra PPM placement today
on therapeutic anticoagulation.  DASH diet  NPO for STANISLAW
on therapeutic anticoagulation.  DASH diet  dispo: per EP, can discharge home today with outpatient cards, EP followup. Needs INR check by 11/6 Tuesday, pt will go to his cardiologist office.     spent 45 minutes on discharge process time
therapeutic on Eliquis

## 2018-11-04 NOTE — PROGRESS NOTE ADULT - SUBJECTIVE AND OBJECTIVE BOX
Patient is a 58y old  Male who presents with a chief complaint of VELÁSQUEZ (03 Nov 2018 14:48)      SUBJECTIVE / OVERNIGHT EVENTS: No overnight events. FEels well, denies cp, sob, palpitations, n/v. wants to go home today.     MEDICATIONS  (STANDING):  aspirin  chewable 81 milliGRAM(s) Oral daily  digoxin     Tablet 0.125 milliGRAM(s) Oral daily  docusate sodium 100 milliGRAM(s) Oral daily  famotidine    Tablet 20 milliGRAM(s) Oral two times a day  furosemide    Tablet 40 milliGRAM(s) Oral two times a day  hydrALAZINE 25 milliGRAM(s) Oral every 8 hours  influenza   Vaccine 0.5 milliLiter(s) IntraMuscular once  lisinopril 20 milliGRAM(s) Oral two times a day  metoprolol succinate  milliGRAM(s) Oral daily  metoprolol succinate ER 50 milliGRAM(s) Oral at bedtime  polyethylene glycol 3350 17 Gram(s) Oral two times a day  simvastatin 20 milliGRAM(s) Oral at bedtime  warfarin 7.5 milliGRAM(s) Oral once    MEDICATIONS  (PRN):  glycerin Suppository - Adult 1 Suppository(s) Rectal once PRN Constipation      Vital Signs Last 24 Hrs  T(C): 36.4 (04 Nov 2018 11:26), Max: 36.8 (04 Nov 2018 00:40)  T(F): 97.6 (04 Nov 2018 11:26), Max: 98.3 (04 Nov 2018 00:40)  HR: 101 (04 Nov 2018 14:32) (83 - 101)  BP: 141/96 (04 Nov 2018 14:32) (102/71 - 141/96)  BP(mean): --  RR: 16 (04 Nov 2018 11:26) (16 - 17)  SpO2: 93% (04 Nov 2018 11:26) (93% - 98%)  CAPILLARY BLOOD GLUCOSE        I&O's Summary    03 Nov 2018 08:01  -  04 Nov 2018 07:00  --------------------------------------------------------  IN: 1220 mL / OUT: 1825 mL / NET: -605 mL    04 Nov 2018 07:01  -  04 Nov 2018 14:46  --------------------------------------------------------  IN: 980 mL / OUT: 950 mL / NET: 30 mL            PHYSICAL EXAM:  GENERAL: NAD, well-developed  HEAD:  Atraumatic, Normocephalic  NECK: Supple, No JVD  CHEST/LUNG: Clear to auscultation bilaterally; No wheeze  HEART: Irregular  ABDOMEN: Soft, Nontender, Nondistended; Bowel sounds present  EXTREMITIES:  2+ Peripheral Pulses, No clubbing, cyanosis, or edema  PSYCH: AAOx3  Skin: b/l LE chronic venous changes    LABS:                        14.4   5.99  )-----------( 236      ( 04 Nov 2018 08:14 )             42.8     11-04    137  |  97  |  30<H>  ----------------------------<  94  4.1   |  30  |  1.29    Ca    9.4      04 Nov 2018 06:01  Mg     2.0     11-04    TPro  7.3  /  Alb  3.2<L>  /  TBili  0.5  /  DBili  x   /  AST  17  /  ALT  16  /  AlkPhos  85  11-03    PT/INR - ( 04 Nov 2018 08:53 )   PT: 27.5 sec;   INR: 2.39 ratio         PTT - ( 04 Nov 2018 08:53 )  PTT:37.9 sec              RADIOLOGY & ADDITIONAL TESTS:    tele reviewed: Aflutter 80-110s, pvcs    Imaging Personally Reviewed:    Consultant(s) Notes Reviewed:  cards/EP     Care Discussed with Consultants/Other Providers: EP below

## 2018-11-04 NOTE — PROGRESS NOTE ADULT - PROBLEM SELECTOR PLAN 5
CKD stage 3 - Cr 1.4-1.6 as outpt   monitor Cr function closely   avoid nephrotoxins and renally dose medications

## 2018-11-04 NOTE — PROGRESS NOTE ADULT - PROBLEM SELECTOR PROBLEM 5
CKD (chronic kidney disease) stage 3, GFR 30-59 ml/min

## 2018-11-04 NOTE — PROGRESS NOTE ADULT - PROVIDER SPECIALTY LIST ADULT
Cardiology
Electrophysiology
Hospitalist
Internal Medicine
Pulmonology
Hospitalist
Internal Medicine

## 2018-11-04 NOTE — PROGRESS NOTE ADULT - PROBLEM SELECTOR PLAN 3
CAD s/p stents, CABG  c/w ASA 81mg, c/w statin
CAD s/p stents, CABG  c/w ASA 81mg, c/w statin
CAD s/p stents, CABG  decreased to ASA 81mg, c/w statin
CAD s/p stents, CABG  c/w ASA 81mg, c/w statin
CAD s/p stents, CABG  decreased to ASA 81mg, c/w statin

## 2018-11-04 NOTE — PROGRESS NOTE ADULT - ASSESSMENT
58M with PMH of CAD s/p CABG/stents (stents 2000, CABG 2012), HFrEF (EF 40%), HTN, lichen planus with chronic LE edema p/w VELÁSQUEZ and palpitations, admitted with new aflutter found to have pauses now s/p PPM

## 2018-11-04 NOTE — PROGRESS NOTE ADULT - PROBLEM SELECTOR PROBLEM 1
Acute on chronic systolic (congestive) heart failure
Atrial flutter with rapid ventricular response

## 2018-11-04 NOTE — PROGRESS NOTE ADULT - PROBLEM SELECTOR PROBLEM 2
Atrial flutter with rapid ventricular response
Chronic systolic heart failure

## 2018-11-04 NOTE — CHART NOTE - NSCHARTNOTEFT_GEN_A_CORE
Informed by Dr Ponce that the patient is medically cleared for discharge - instructed to complete discharge paperwork.  Following paperwork completion, Coumadin dose was changed by Dr Ponce - [facility] was contacted by me to confirm new Coumadin dose (6mg) is only dose patient will receive at this time.

## 2018-11-04 NOTE — PROGRESS NOTE ADULT - NSHPATTENDINGPLANDISCUSS_GEN_ALL_CORE
patient
patient
Patient, RN
To reach Cardiology Attending call during weekdays [employer] [phone] or Fellow [phone].
patient
Salvador Naik

## 2018-11-04 NOTE — PROGRESS NOTE ADULT - PROBLEM SELECTOR PLAN 1
Cardioversion attempted on 10/26 but aborted due to L atrial appendage thrombus. Bridged to coumadin  - Repeat TTE with LA thrombus, has not dissolved. Thus cardioversion again unable to be performed.  - INR 2.3, will discharge on coumadin 6mg qhs, explained risk of bleeding, diet interactions and that he must get his INR checked with cardiologist by Tuesday 11/6  - had pauses on tele now s/p micra PPM on 11/2  -discussed with EP fellow, can discharge on Toprol to 100mg AM, 50mg pm, no further need for monitoring can be discharged today  - c/w digoxin

## 2018-11-06 ENCOUNTER — APPOINTMENT (OUTPATIENT)
Dept: CARDIOLOGY | Facility: CLINIC | Age: 58
End: 2018-11-06
Payer: MEDICAID

## 2018-11-06 ENCOUNTER — NON-APPOINTMENT (OUTPATIENT)
Age: 58
End: 2018-11-06

## 2018-11-06 VITALS
HEART RATE: 83 BPM | BODY MASS INDEX: 45.47 KG/M2 | DIASTOLIC BLOOD PRESSURE: 82 MMHG | WEIGHT: 300 LBS | SYSTOLIC BLOOD PRESSURE: 121 MMHG | OXYGEN SATURATION: 94 % | RESPIRATION RATE: 17 BRPM | HEIGHT: 68 IN

## 2018-11-06 LAB
INR PPP: 3.5 RATIO
POCT-PROTHROMBIN TIME: 41.6 SECS
QUALITY CONTROL: YES

## 2018-11-06 PROCEDURE — 99215 OFFICE O/P EST HI 40 MIN: CPT

## 2018-11-06 PROCEDURE — 85610 PROTHROMBIN TIME: CPT | Mod: QW

## 2018-11-06 PROCEDURE — ZZZZZ: CPT

## 2018-11-06 PROCEDURE — 93000 ELECTROCARDIOGRAM COMPLETE: CPT

## 2018-11-06 NOTE — DISCUSSION/SUMMARY
[FreeTextEntry1] : 58-year-old man with complex cardiac history. CAD, status post remote CABG. He has known ischemic cardiomyopathy. Status post recent hospitalization for symptomatic palpitations and dyspnea on exertion, new diagnosis of atrial flutter. Atrial flutter was rate controlled with metoprolol and digoxin... though he did have pauses and a leadless pacemaker was placed. There were plans for cardioversion, however, STANISLAW demonstrated left atrial appendage thrombus and cardioversion plans were subsequently canceled. The plan now is for him to be anticoagulated for one month, with repeat attempt at STANISLAW guided cardioversion and possible ablation. In addition he has been diagnosed with sleep apnea.\par Fingerstick INR is 3.5\par The following is recommended.\par \par Plan\par 1. Current medication list is reviewed and appears to be appropriate. He is on long acting beta blocker and ACE-I and warfarin .\par 2. Target INR between 2 and 3. Dose of warfarin is adjusted, and the patient will come to the office later this week for repeat INR.\par 3. Followup in the office in 2 weeks.\par 4. I will discuss further with electrophysiologist regarding future plans for atrial flutter.\par 5. Cardiac issues were discussed with him in detail. All of his questions were answered to his satisfaction.\par

## 2018-11-06 NOTE — HISTORY OF PRESENT ILLNESS
[FreeTextEntry1] : Since his last visit with me, 2 weeks ago, he was hospitalized at [facility]. He had complaints of palpitations, felt that his heart was beating fast and irregularly. He took extra metoprolol at  home, and felt that his heart rate did slow down but he was having symptoms of dyspnea with mild to moderate activities. After 2 days,he became concerned, and his brother drove him to the emergency room at [facility]. The complete details of the hospitalization are currently not known to me. I was not notified of his hospitalization at that time.\par He was diagnosed with rapid atrial flutter. Heart rate was slowed with beta blockers and digoxin. Electrophysiology consultation was obtained, and there were plans made for STANISLAW guided cardioversion. Anticoagulation with heparin and warfarin was initiated. STANISLAW, however, demonstrated left atrial appendage clot and severe cardiomyopathy. Plans for cardioversion were then canceled and new plans were made for him to have repeat STANISLAW guided cardioversion in about one month after full anticoagulation. He was discharged home on warfarin. He had prolonged pauses, possibly related to sleep apnea and he had a leadless pacemaker placed.\par Symptomatically, he did improve though he does not feel "back to normal". He does occasionally get dyspneic after moderate activities. Denies any resting dyspnea. He is not aware of tachycardia. He continues to have chronic lower extremity edema. No complaints of chest pain or chest pressure. No dizziness, lightheadedness, syncope or near syncope.\par He was also diagnosed with sleep apnea during hospitalization and he has plans to follow up with a sleep specialist.\par Fingerstick INR is 3.5\par \par

## 2018-11-06 NOTE — REASON FOR VISIT
[FreeTextEntry1] : Cardiology followup visit for evaluation and management, status post recent hospital discharge for symptomatic atrial flutter. He has history of CAD, status post remote CABG, ischemic cardiomyopathy, hyperlipidemia, hypertension.\par \par

## 2018-11-06 NOTE — REVIEW OF SYSTEMS
[Feeling Fatigued] : feeling fatigued [Shortness Of Breath] : shortness of breath [Lower Ext Edema] : lower extremity edema [Palpitations] : palpitations [Negative] : Heme/Lymph

## 2018-11-06 NOTE — PHYSICAL EXAM
[General Appearance - Well Developed] : well developed [Well Groomed] : well groomed [General Appearance - Well Nourished] : well nourished [No Deformities] : no deformities [General Appearance - In No Acute Distress] : no acute distress [Normal Conjunctiva] : the conjunctiva exhibited no abnormalities [No Oral Pallor] : no oral pallor [Normal Oropharynx] : normal oropharynx [Normal Jugular Venous V Waves Present] : normal jugular venous V waves present [Respiration, Rhythm And Depth] : normal respiratory rhythm and effort [Auscultation Breath Sounds / Voice Sounds] : lungs were clear to auscultation bilaterally [No Precordial Heave] : no precordial heave was noted [Normal Rate] : normal [Irregularly Irregular] : irregularly irregular [Normal S1] : normal S1 [Normal S2] : normal S2 [No Gallop] : no gallop heard [I] : a grade 1 [2+] : left 2+ [___ +] : bilateral [unfilled]U+ pitting edema to the ankles [Bowel Sounds] : normal bowel sounds [Abdomen Soft] : soft [Abdomen Tenderness] : non-tender [Abnormal Walk] : normal gait [Nail Clubbing] : no clubbing of the fingernails [Cyanosis, Localized] : no localized cyanosis [Skin Color & Pigmentation] : normal skin color and pigmentation [] : no rash [Oriented To Time, Place, And Person] : oriented to person, place, and time [No Anxiety] : not feeling anxious [FreeTextEntry1] : obese  [S3] : no S3 [S4] : no S4 [Click] : no click [Pericardial Rub] : no pericardial rub [Right Carotid Bruit] : no bruit heard over the right carotid [Left Carotid Bruit] : no bruit heard over the left carotid

## 2018-11-09 ENCOUNTER — MEDICATION RENEWAL (OUTPATIENT)
Age: 58
End: 2018-11-09

## 2018-11-09 ENCOUNTER — APPOINTMENT (OUTPATIENT)
Dept: CARDIOLOGY | Facility: CLINIC | Age: 58
End: 2018-11-09
Payer: MEDICAID

## 2018-11-09 PROCEDURE — 93793 ANTICOAG MGMT PT WARFARIN: CPT

## 2018-11-09 PROCEDURE — 85610 PROTHROMBIN TIME: CPT | Mod: QW

## 2018-11-12 ENCOUNTER — MEDICATION RENEWAL (OUTPATIENT)
Age: 58
End: 2018-11-12

## 2018-11-12 ENCOUNTER — APPOINTMENT (OUTPATIENT)
Dept: PULMONOLOGY | Facility: CLINIC | Age: 58
End: 2018-11-12
Payer: MEDICAID

## 2018-11-12 VITALS
RESPIRATION RATE: 15 BRPM | BODY MASS INDEX: 45.47 KG/M2 | WEIGHT: 300 LBS | OXYGEN SATURATION: 95 % | TEMPERATURE: 97.3 F | SYSTOLIC BLOOD PRESSURE: 114 MMHG | DIASTOLIC BLOOD PRESSURE: 80 MMHG | HEIGHT: 68 IN | HEART RATE: 94 BPM

## 2018-11-12 PROCEDURE — 99214 OFFICE O/P EST MOD 30 MIN: CPT

## 2018-11-13 ENCOUNTER — APPOINTMENT (OUTPATIENT)
Dept: CARDIOLOGY | Facility: CLINIC | Age: 58
End: 2018-11-13
Payer: MEDICAID

## 2018-11-13 PROCEDURE — 93793 ANTICOAG MGMT PT WARFARIN: CPT

## 2018-11-13 PROCEDURE — 85610 PROTHROMBIN TIME: CPT | Mod: QW

## 2018-11-16 LAB
INR PPP: 4.4 RATIO
POCT-PROTHROMBIN TIME: 52.7 SECS
QUALITY CONTROL: YES

## 2018-11-19 ENCOUNTER — APPOINTMENT (OUTPATIENT)
Dept: CARDIOLOGY | Facility: CLINIC | Age: 58
End: 2018-11-19
Payer: MEDICAID

## 2018-11-19 LAB
INR PPP: 2.6 RATIO
POCT-PROTHROMBIN TIME: 31.3 SECS
QUALITY CONTROL: YES

## 2018-11-19 PROCEDURE — 93793 ANTICOAG MGMT PT WARFARIN: CPT

## 2018-11-19 PROCEDURE — 85610 PROTHROMBIN TIME: CPT | Mod: QW

## 2018-11-20 ENCOUNTER — APPOINTMENT (OUTPATIENT)
Dept: CARDIOLOGY | Facility: CLINIC | Age: 58
End: 2018-11-20
Payer: MEDICAID

## 2018-11-20 ENCOUNTER — NON-APPOINTMENT (OUTPATIENT)
Age: 58
End: 2018-11-20

## 2018-11-20 VITALS
OXYGEN SATURATION: 95 % | HEART RATE: 81 BPM | WEIGHT: 304 LBS | DIASTOLIC BLOOD PRESSURE: 67 MMHG | HEIGHT: 68 IN | SYSTOLIC BLOOD PRESSURE: 113 MMHG | RESPIRATION RATE: 17 BRPM | BODY MASS INDEX: 46.07 KG/M2

## 2018-11-20 DIAGNOSIS — I51.3 INTRACARDIAC THROMBOSIS, NOT ELSEWHERE CLASSIFIED: ICD-10-CM

## 2018-11-20 PROCEDURE — 99215 OFFICE O/P EST HI 40 MIN: CPT

## 2018-11-20 PROCEDURE — 93000 ELECTROCARDIOGRAM COMPLETE: CPT

## 2018-11-21 ENCOUNTER — NON-APPOINTMENT (OUTPATIENT)
Age: 58
End: 2018-11-21

## 2018-11-21 ENCOUNTER — APPOINTMENT (OUTPATIENT)
Dept: ELECTROPHYSIOLOGY | Facility: CLINIC | Age: 58
End: 2018-11-21
Payer: MEDICAID

## 2018-11-21 VITALS
BODY MASS INDEX: 46.07 KG/M2 | WEIGHT: 304 LBS | HEIGHT: 68 IN | HEART RATE: 101 BPM | DIASTOLIC BLOOD PRESSURE: 85 MMHG | SYSTOLIC BLOOD PRESSURE: 124 MMHG | OXYGEN SATURATION: 95 %

## 2018-11-21 PROCEDURE — 99024 POSTOP FOLLOW-UP VISIT: CPT

## 2018-11-27 ENCOUNTER — CHART COPY (OUTPATIENT)
Age: 58
End: 2018-11-27

## 2018-12-03 ENCOUNTER — APPOINTMENT (OUTPATIENT)
Dept: CARDIOLOGY | Facility: CLINIC | Age: 58
End: 2018-12-03
Payer: MEDICAID

## 2018-12-04 ENCOUNTER — APPOINTMENT (OUTPATIENT)
Dept: CARDIOLOGY | Facility: CLINIC | Age: 58
End: 2018-12-04
Payer: MEDICAID

## 2018-12-04 VITALS — SYSTOLIC BLOOD PRESSURE: 135 MMHG | HEART RATE: 90 BPM | OXYGEN SATURATION: 96 % | DIASTOLIC BLOOD PRESSURE: 99 MMHG

## 2018-12-04 LAB
INR PPP: 2.3 RATIO
POCT-PROTHROMBIN TIME: 27.9 SECS
QUALITY CONTROL: YES

## 2018-12-04 PROCEDURE — 85610 PROTHROMBIN TIME: CPT | Mod: QW

## 2018-12-04 PROCEDURE — 93793 ANTICOAG MGMT PT WARFARIN: CPT

## 2018-12-06 ENCOUNTER — APPOINTMENT (OUTPATIENT)
Dept: FAMILY MEDICINE | Facility: CLINIC | Age: 58
End: 2018-12-06

## 2018-12-11 ENCOUNTER — APPOINTMENT (OUTPATIENT)
Dept: CV DIAGNOSITCS | Facility: HOSPITAL | Age: 58
End: 2018-12-11

## 2018-12-11 ENCOUNTER — OUTPATIENT (OUTPATIENT)
Dept: OUTPATIENT SERVICES | Facility: HOSPITAL | Age: 58
LOS: 1 days | End: 2018-12-11
Payer: MEDICAID

## 2018-12-11 VITALS
OXYGEN SATURATION: 96 % | TEMPERATURE: 98 F | RESPIRATION RATE: 20 BRPM | HEIGHT: 68 IN | SYSTOLIC BLOOD PRESSURE: 115 MMHG | WEIGHT: 199.96 LBS | DIASTOLIC BLOOD PRESSURE: 83 MMHG | HEART RATE: 113 BPM

## 2018-12-11 DIAGNOSIS — I48.91 UNSPECIFIED ATRIAL FIBRILLATION: Chronic | ICD-10-CM

## 2018-12-11 DIAGNOSIS — G47.33 OBSTRUCTIVE SLEEP APNEA (ADULT) (PEDIATRIC): Chronic | ICD-10-CM

## 2018-12-11 DIAGNOSIS — Z90.89 ACQUIRED ABSENCE OF OTHER ORGANS: Chronic | ICD-10-CM

## 2018-12-11 DIAGNOSIS — I49.9 CARDIAC ARRHYTHMIA, UNSPECIFIED: ICD-10-CM

## 2018-12-11 DIAGNOSIS — Z95.0 PRESENCE OF CARDIAC PACEMAKER: Chronic | ICD-10-CM

## 2018-12-11 DIAGNOSIS — Z95.1 PRESENCE OF AORTOCORONARY BYPASS GRAFT: Chronic | ICD-10-CM

## 2018-12-11 DIAGNOSIS — Z95.5 PRESENCE OF CORONARY ANGIOPLASTY IMPLANT AND GRAFT: Chronic | ICD-10-CM

## 2018-12-11 DIAGNOSIS — I42.9 CARDIOMYOPATHY, UNSPECIFIED: Chronic | ICD-10-CM

## 2018-12-11 DIAGNOSIS — E66.01 MORBID (SEVERE) OBESITY DUE TO EXCESS CALORIES: Chronic | ICD-10-CM

## 2018-12-11 LAB
ANION GAP SERPL CALC-SCNC: 14 MMOL/L — SIGNIFICANT CHANGE UP (ref 5–17)
BUN SERPL-MCNC: 22 MG/DL — SIGNIFICANT CHANGE UP (ref 7–23)
CALCIUM SERPL-MCNC: 9 MG/DL — SIGNIFICANT CHANGE UP (ref 8.4–10.5)
CHLORIDE SERPL-SCNC: 96 MMOL/L — SIGNIFICANT CHANGE UP (ref 96–108)
CO2 SERPL-SCNC: 29 MMOL/L — SIGNIFICANT CHANGE UP (ref 22–31)
CREAT SERPL-MCNC: 1.37 MG/DL — HIGH (ref 0.5–1.3)
GLUCOSE SERPL-MCNC: 115 MG/DL — HIGH (ref 70–99)
HCT VFR BLD CALC: 44.7 % — SIGNIFICANT CHANGE UP (ref 39–50)
HGB BLD-MCNC: 14.9 G/DL — SIGNIFICANT CHANGE UP (ref 13–17)
INR BLD: 2.53 RATIO — HIGH (ref 0.88–1.16)
MCHC RBC-ENTMCNC: 30.3 PG — SIGNIFICANT CHANGE UP (ref 27–34)
MCHC RBC-ENTMCNC: 33.4 GM/DL — SIGNIFICANT CHANGE UP (ref 32–36)
MCV RBC AUTO: 90.8 FL — SIGNIFICANT CHANGE UP (ref 80–100)
PLATELET # BLD AUTO: 287 K/UL — SIGNIFICANT CHANGE UP (ref 150–400)
POTASSIUM SERPL-MCNC: 3.8 MMOL/L — SIGNIFICANT CHANGE UP (ref 3.5–5.3)
POTASSIUM SERPL-SCNC: 3.8 MMOL/L — SIGNIFICANT CHANGE UP (ref 3.5–5.3)
PROTHROM AB SERPL-ACNC: 29.9 SEC — HIGH (ref 10–12.9)
RBC # BLD: 4.92 M/UL — SIGNIFICANT CHANGE UP (ref 4.2–5.8)
RBC # FLD: 13.5 % — SIGNIFICANT CHANGE UP (ref 10.3–14.5)
SODIUM SERPL-SCNC: 139 MMOL/L — SIGNIFICANT CHANGE UP (ref 135–145)
WBC # BLD: 7 K/UL — SIGNIFICANT CHANGE UP (ref 3.8–10.5)
WBC # FLD AUTO: 7 K/UL — SIGNIFICANT CHANGE UP (ref 3.8–10.5)

## 2018-12-11 PROCEDURE — 92960 CARDIOVERSION ELECTRIC EXT: CPT

## 2018-12-11 PROCEDURE — 85610 PROTHROMBIN TIME: CPT

## 2018-12-11 PROCEDURE — 93320 DOPPLER ECHO COMPLETE: CPT

## 2018-12-11 PROCEDURE — 93312 ECHO TRANSESOPHAGEAL: CPT

## 2018-12-11 PROCEDURE — 93010 ELECTROCARDIOGRAM REPORT: CPT | Mod: 59

## 2018-12-11 PROCEDURE — 93325 DOPPLER ECHO COLOR FLOW MAPG: CPT

## 2018-12-11 PROCEDURE — 93005 ELECTROCARDIOGRAM TRACING: CPT

## 2018-12-11 PROCEDURE — 99203 OFFICE O/P NEW LOW 30 MIN: CPT

## 2018-12-11 PROCEDURE — 85027 COMPLETE CBC AUTOMATED: CPT

## 2018-12-11 PROCEDURE — 93312 ECHO TRANSESOPHAGEAL: CPT | Mod: 26

## 2018-12-11 PROCEDURE — 80048 BASIC METABOLIC PNL TOTAL CA: CPT

## 2018-12-11 PROCEDURE — 93320 DOPPLER ECHO COMPLETE: CPT | Mod: 26

## 2018-12-11 PROCEDURE — 93010 ELECTROCARDIOGRAM REPORT: CPT | Mod: 77,59

## 2018-12-11 PROCEDURE — 93325 DOPPLER ECHO COLOR FLOW MAPG: CPT | Mod: 26

## 2018-12-11 RX ORDER — AMIODARONE HYDROCHLORIDE 400 MG/1
200 TABLET ORAL
Qty: 0 | Refills: 0 | Status: DISCONTINUED | OUTPATIENT
Start: 2018-12-18 | End: 2018-12-26

## 2018-12-11 RX ORDER — SODIUM CHLORIDE 9 MG/ML
3 INJECTION INTRAMUSCULAR; INTRAVENOUS; SUBCUTANEOUS EVERY 8 HOURS
Qty: 0 | Refills: 0 | Status: DISCONTINUED | OUTPATIENT
Start: 2018-12-11 | End: 2018-12-26

## 2018-12-11 RX ORDER — AMIODARONE HYDROCHLORIDE 400 MG/1
400 TABLET ORAL
Qty: 0 | Refills: 0 | Status: DISCONTINUED | OUTPATIENT
Start: 2018-12-11 | End: 2018-12-26

## 2018-12-11 RX ORDER — AMIODARONE HYDROCHLORIDE 400 MG/1
200 TABLET ORAL DAILY
Qty: 0 | Refills: 0 | Status: DISCONTINUED | OUTPATIENT
Start: 2018-12-25 | End: 2018-12-26

## 2018-12-11 NOTE — H&P CARDIOLOGY - HISTORY OF PRESENT ILLNESS
57y/o obese M with PMH of CAD s/p CABG/stents (stents 2000, CABG 2012), HFrEF (EF 40%), HTN, HLD, Afib on warfarin, MARV thrombus- resolved, leadless PPM, ICM, lichen planus with chronic LE edema, WILFRED, p/w VELÁSQUEZ and palpitations, fatigue, denies chest pain or dizziness, seen & evaluated by cardiologist & now referred for TTE/ cardioversion.

## 2018-12-11 NOTE — H&P CARDIOLOGY - PSH
Cardiac pacemaker  leadless  History of elbow surgery    History of tonsillectomy    S/P CABG (coronary artery bypass graft) Cardiac pacemaker  leadless  History of adenoidectomy    History of coronary artery stent placement    History of elbow surgery    S/P CABG (coronary artery bypass graft)

## 2018-12-11 NOTE — H&P CARDIOLOGY - PMH
Atrial fibrillation    CAD (coronary artery disease)    CHF (congestive heart failure)    HTN (hypertension)    Hypercholesteremia    Ischemic cardiomyopathy    Obesity    WILFRED (obstructive sleep apnea)    Peripheral edema  chronic  Thrombus of left atrial appendage Atrial fibrillation    CAD (coronary artery disease)    CHF (congestive heart failure)    HTN (hypertension)    Hypercholesteremia    Ischemic cardiomyopathy    Lichen planus    Obesity    WILFRED (obstructive sleep apnea)    Peripheral edema  chronic  Thrombus of left atrial appendage

## 2018-12-13 ENCOUNTER — APPOINTMENT (OUTPATIENT)
Dept: FAMILY MEDICINE | Facility: CLINIC | Age: 58
End: 2018-12-13

## 2018-12-13 PROBLEM — R60.9 EDEMA, UNSPECIFIED: Chronic | Status: ACTIVE | Noted: 2018-12-11

## 2018-12-13 PROBLEM — I25.5 ISCHEMIC CARDIOMYOPATHY: Chronic | Status: ACTIVE | Noted: 2018-12-11

## 2018-12-13 PROBLEM — I10 ESSENTIAL (PRIMARY) HYPERTENSION: Chronic | Status: ACTIVE | Noted: 2018-12-11

## 2018-12-17 ENCOUNTER — APPOINTMENT (OUTPATIENT)
Dept: CARDIOLOGY | Facility: CLINIC | Age: 58
End: 2018-12-17

## 2018-12-17 ENCOUNTER — OUTPATIENT (OUTPATIENT)
Dept: OUTPATIENT SERVICES | Facility: HOSPITAL | Age: 58
LOS: 1 days | End: 2018-12-17
Payer: MEDICAID

## 2018-12-17 VITALS
SYSTOLIC BLOOD PRESSURE: 122 MMHG | HEART RATE: 63 BPM | RESPIRATION RATE: 16 BRPM | DIASTOLIC BLOOD PRESSURE: 85 MMHG | WEIGHT: 300.05 LBS | TEMPERATURE: 98 F | HEIGHT: 68 IN | OXYGEN SATURATION: 93 %

## 2018-12-17 DIAGNOSIS — Z01.818 ENCOUNTER FOR OTHER PREPROCEDURAL EXAMINATION: ICD-10-CM

## 2018-12-17 DIAGNOSIS — Z95.5 PRESENCE OF CORONARY ANGIOPLASTY IMPLANT AND GRAFT: Chronic | ICD-10-CM

## 2018-12-17 DIAGNOSIS — Z95.0 PRESENCE OF CARDIAC PACEMAKER: Chronic | ICD-10-CM

## 2018-12-17 DIAGNOSIS — Z90.89 ACQUIRED ABSENCE OF OTHER ORGANS: Chronic | ICD-10-CM

## 2018-12-17 DIAGNOSIS — I48.4 ATYPICAL ATRIAL FLUTTER: ICD-10-CM

## 2018-12-17 DIAGNOSIS — Z95.1 PRESENCE OF AORTOCORONARY BYPASS GRAFT: Chronic | ICD-10-CM

## 2018-12-17 LAB
ANION GAP SERPL CALC-SCNC: 12 MMOL/L — SIGNIFICANT CHANGE UP (ref 5–17)
APTT BLD: 38.6 SEC — HIGH (ref 27.5–36.3)
BLD GP AB SCN SERPL QL: NEGATIVE — SIGNIFICANT CHANGE UP
BUN SERPL-MCNC: 22 MG/DL — SIGNIFICANT CHANGE UP (ref 7–23)
CALCIUM SERPL-MCNC: 9.3 MG/DL — SIGNIFICANT CHANGE UP (ref 8.4–10.5)
CHLORIDE SERPL-SCNC: 97 MMOL/L — SIGNIFICANT CHANGE UP (ref 96–108)
CO2 SERPL-SCNC: 29 MMOL/L — SIGNIFICANT CHANGE UP (ref 22–31)
CREAT SERPL-MCNC: 1.38 MG/DL — HIGH (ref 0.5–1.3)
GLUCOSE SERPL-MCNC: 113 MG/DL — HIGH (ref 70–99)
HCT VFR BLD CALC: 48 % — SIGNIFICANT CHANGE UP (ref 39–50)
HGB BLD-MCNC: 15.6 G/DL — SIGNIFICANT CHANGE UP (ref 13–17)
INR BLD: 2.36 RATIO — HIGH (ref 0.88–1.16)
MCHC RBC-ENTMCNC: 29.6 PG — SIGNIFICANT CHANGE UP (ref 27–34)
MCHC RBC-ENTMCNC: 32.6 GM/DL — SIGNIFICANT CHANGE UP (ref 32–36)
MCV RBC AUTO: 90.8 FL — SIGNIFICANT CHANGE UP (ref 80–100)
PLATELET # BLD AUTO: 283 K/UL — SIGNIFICANT CHANGE UP (ref 150–400)
POTASSIUM SERPL-MCNC: 4.1 MMOL/L — SIGNIFICANT CHANGE UP (ref 3.5–5.3)
POTASSIUM SERPL-SCNC: 4.1 MMOL/L — SIGNIFICANT CHANGE UP (ref 3.5–5.3)
PROTHROM AB SERPL-ACNC: 27.6 SEC — HIGH (ref 10–12.9)
RBC # BLD: 5.28 M/UL — SIGNIFICANT CHANGE UP (ref 4.2–5.8)
RBC # FLD: 13.9 % — SIGNIFICANT CHANGE UP (ref 10.3–14.5)
RH IG SCN BLD-IMP: POSITIVE — SIGNIFICANT CHANGE UP
SODIUM SERPL-SCNC: 138 MMOL/L — SIGNIFICANT CHANGE UP (ref 135–145)
WBC # BLD: 8.6 K/UL — SIGNIFICANT CHANGE UP (ref 3.8–10.5)
WBC # FLD AUTO: 8.6 K/UL — SIGNIFICANT CHANGE UP (ref 3.8–10.5)

## 2018-12-17 PROCEDURE — 86901 BLOOD TYPING SEROLOGIC RH(D): CPT

## 2018-12-17 PROCEDURE — 80048 BASIC METABOLIC PNL TOTAL CA: CPT

## 2018-12-17 PROCEDURE — 93010 ELECTROCARDIOGRAM REPORT: CPT

## 2018-12-17 PROCEDURE — 86850 RBC ANTIBODY SCREEN: CPT

## 2018-12-17 PROCEDURE — 85027 COMPLETE CBC AUTOMATED: CPT

## 2018-12-17 PROCEDURE — 85610 PROTHROMBIN TIME: CPT

## 2018-12-17 PROCEDURE — 85730 THROMBOPLASTIN TIME PARTIAL: CPT

## 2018-12-17 PROCEDURE — 93005 ELECTROCARDIOGRAM TRACING: CPT

## 2018-12-17 PROCEDURE — 86900 BLOOD TYPING SEROLOGIC ABO: CPT

## 2018-12-17 RX ORDER — WARFARIN SODIUM 2.5 MG/1
1 TABLET ORAL
Qty: 0 | Refills: 0 | COMMUNITY

## 2018-12-17 NOTE — H&P CARDIOLOGY - PSH
Cardiac pacemaker  leadless  History of adenoidectomy    History of coronary artery stent placement    History of elbow surgery    S/P CABG (coronary artery bypass graft)

## 2018-12-17 NOTE — H&P CARDIOLOGY - HISTORY OF PRESENT ILLNESS
58 year old  male with PMH of WILFRED, HTN, HLD, CAD s/p CABG stents  ischemic cardiomyopathy, A Fib/Flutter -on warfarin, s/p DCCV , PPM presents today for PST for A Flutter ablation scheduled for 12/18/18. 58 year old  male with PMH of WILFRED, obesity, HTN, HLD, MI, CAD s/p CABG 3 vessel in 2012, stents x 3 in  2010,  ischemic cardiomyopathy, A Fib/Flutter -on warfarin, PPM and Lichens Planus presents today for PST for A Flutter ablation scheduled for 12/18/18. Patient reports admission to hospital with shortness of breath and palpitations 10/20/18 where he was found to have new onset A Fib/flutter. STANISLAW was done showing decreased LA appendage velocities noted but no obvious LA appendage thrombus seen. Severe LV global dysfunction. Started on Heparin and discharged on warfarin. Returned for DCCV 1 week ago and converted to NSR. On Digoxin, Metoprolol and Amiodarone. Now scheduled for A Flutter ablation. Patient feeling better but still reports VELÁSQUEZ. No chest pain 58 year old  male with PMH of WILFRED, obesity, HTN, HLD, MI, CAD s/p CABG 3 vessel in 2012, stents x 3 in  2010,  ischemic cardiomyopathy, HFrEF 40% w chronic LE edema, A Fib/Flutter -on warfarin, micra PPM 11/18 and Lichens Planus presents today for PST for A Flutter ablation scheduled for 12/18/18. Patient reports admission to hospital with shortness of breath and palpitations 10/20/18 where he was found to have new onset A Fib/flutter. STANISLAW was done showing decreased LA appendage velocities noted but no obvious LA appendage thrombus seen. Severe LV global dysfunction. Started on Heparin and discharged on warfarin. Returned for DCCV 1 week ago and converted to NSR. On Digoxin, Metoprolol and Amiodarone. Now scheduled for A Flutter ablation. Patient feeling better but still reports VELÁSQUEZ. No chest pain.

## 2018-12-17 NOTE — H&P CARDIOLOGY - PMH
Atrial fibrillation    Atrial flutter    CAD (coronary artery disease)    CHF (congestive heart failure)    HTN (hypertension)    Hypercholesteremia    Ischemic cardiomyopathy    Lichen planus    Obesity    WILFRED (obstructive sleep apnea)    Peripheral edema  chronic  Thrombus of left atrial appendage Atrial fibrillation    Atrial flutter    CAD (coronary artery disease)    CHF (congestive heart failure)    HTN (hypertension)    Hypercholesteremia    Ischemic cardiomyopathy    Lichen planus    MI (myocardial infarction)    Obesity    WILFRED (obstructive sleep apnea)    Peripheral edema  chronic  Thrombus of left atrial appendage

## 2018-12-18 ENCOUNTER — INPATIENT (INPATIENT)
Facility: HOSPITAL | Age: 58
LOS: 0 days | Discharge: HOME | DRG: 274 | End: 2018-12-19
Attending: INTERNAL MEDICINE | Admitting: INTERNAL MEDICINE
Payer: MEDICAID

## 2018-12-18 VITALS
DIASTOLIC BLOOD PRESSURE: 74 MMHG | HEART RATE: 77 BPM | TEMPERATURE: 98 F | SYSTOLIC BLOOD PRESSURE: 112 MMHG | RESPIRATION RATE: 15 BRPM | OXYGEN SATURATION: 95 %

## 2018-12-18 DIAGNOSIS — Z01.818 ENCOUNTER FOR OTHER PREPROCEDURAL EXAMINATION: ICD-10-CM

## 2018-12-18 DIAGNOSIS — Z90.89 ACQUIRED ABSENCE OF OTHER ORGANS: Chronic | ICD-10-CM

## 2018-12-18 DIAGNOSIS — Z95.5 PRESENCE OF CORONARY ANGIOPLASTY IMPLANT AND GRAFT: Chronic | ICD-10-CM

## 2018-12-18 DIAGNOSIS — Z95.0 PRESENCE OF CARDIAC PACEMAKER: Chronic | ICD-10-CM

## 2018-12-18 DIAGNOSIS — Z95.1 PRESENCE OF AORTOCORONARY BYPASS GRAFT: Chronic | ICD-10-CM

## 2018-12-18 DIAGNOSIS — I48.4 ATYPICAL ATRIAL FLUTTER: ICD-10-CM

## 2018-12-18 LAB
INR BLD: 2.13 RATIO — HIGH (ref 0.88–1.16)
PROTHROM AB SERPL-ACNC: 24.9 SEC — HIGH (ref 10–12.9)

## 2018-12-18 PROCEDURE — 93613 INTRACARDIAC EPHYS 3D MAPG: CPT | Mod: 78

## 2018-12-18 PROCEDURE — 93653 COMPRE EP EVAL TX SVT: CPT | Mod: 78

## 2018-12-18 PROCEDURE — 93623 PRGRMD STIMJ&PACG IV RX NFS: CPT | Mod: 26

## 2018-12-18 PROCEDURE — 93010 ELECTROCARDIOGRAM REPORT: CPT

## 2018-12-18 RX ORDER — ONDANSETRON 8 MG/1
4 TABLET, FILM COATED ORAL ONCE
Qty: 0 | Refills: 0 | Status: DISCONTINUED | OUTPATIENT
Start: 2018-12-18 | End: 2018-12-18

## 2018-12-18 RX ORDER — WARFARIN SODIUM 2.5 MG/1
3 TABLET ORAL ONCE
Qty: 0 | Refills: 0 | Status: COMPLETED | OUTPATIENT
Start: 2018-12-18 | End: 2018-12-18

## 2018-12-18 RX ORDER — METOPROLOL TARTRATE 50 MG
25 TABLET ORAL
Qty: 0 | Refills: 0 | Status: DISCONTINUED | OUTPATIENT
Start: 2018-12-18 | End: 2018-12-19

## 2018-12-18 RX ORDER — FUROSEMIDE 40 MG
40 TABLET ORAL DAILY
Qty: 0 | Refills: 0 | Status: DISCONTINUED | OUTPATIENT
Start: 2018-12-19 | End: 2018-12-19

## 2018-12-18 RX ORDER — LISINOPRIL 2.5 MG/1
20 TABLET ORAL EVERY 12 HOURS
Qty: 0 | Refills: 0 | Status: DISCONTINUED | OUTPATIENT
Start: 2018-12-18 | End: 2018-12-19

## 2018-12-18 RX ORDER — DIGOXIN 250 MCG
0.12 TABLET ORAL DAILY
Qty: 0 | Refills: 0 | Status: DISCONTINUED | OUTPATIENT
Start: 2018-12-18 | End: 2018-12-19

## 2018-12-18 RX ORDER — FUROSEMIDE 40 MG
20 TABLET ORAL ONCE
Qty: 0 | Refills: 0 | Status: COMPLETED | OUTPATIENT
Start: 2018-12-18 | End: 2018-12-18

## 2018-12-18 RX ORDER — HYDRALAZINE HCL 50 MG
25 TABLET ORAL EVERY 8 HOURS
Qty: 0 | Refills: 0 | Status: DISCONTINUED | OUTPATIENT
Start: 2018-12-18 | End: 2018-12-19

## 2018-12-18 RX ORDER — AMIODARONE HYDROCHLORIDE 400 MG/1
400 TABLET ORAL EVERY 12 HOURS
Qty: 0 | Refills: 0 | Status: DISCONTINUED | OUTPATIENT
Start: 2018-12-18 | End: 2018-12-19

## 2018-12-18 RX ORDER — SIMVASTATIN 20 MG/1
20 TABLET, FILM COATED ORAL AT BEDTIME
Qty: 0 | Refills: 0 | Status: DISCONTINUED | OUTPATIENT
Start: 2018-12-18 | End: 2018-12-19

## 2018-12-18 RX ORDER — FAMOTIDINE 10 MG/ML
20 INJECTION INTRAVENOUS
Qty: 0 | Refills: 0 | Status: DISCONTINUED | OUTPATIENT
Start: 2018-12-18 | End: 2018-12-19

## 2018-12-18 RX ADMIN — SIMVASTATIN 20 MILLIGRAM(S): 20 TABLET, FILM COATED ORAL at 21:24

## 2018-12-18 RX ADMIN — FAMOTIDINE 20 MILLIGRAM(S): 10 INJECTION INTRAVENOUS at 18:21

## 2018-12-18 RX ADMIN — Medication 25 MILLIGRAM(S): at 18:21

## 2018-12-18 RX ADMIN — Medication 20 MILLIGRAM(S): at 20:28

## 2018-12-18 RX ADMIN — LISINOPRIL 20 MILLIGRAM(S): 2.5 TABLET ORAL at 18:21

## 2018-12-18 RX ADMIN — AMIODARONE HYDROCHLORIDE 400 MILLIGRAM(S): 400 TABLET ORAL at 18:21

## 2018-12-18 RX ADMIN — WARFARIN SODIUM 3 MILLIGRAM(S): 2.5 TABLET ORAL at 21:24

## 2018-12-18 RX ADMIN — Medication 25 MILLIGRAM(S): at 21:24

## 2018-12-18 RX ADMIN — Medication 0.12 MILLIGRAM(S): at 18:21

## 2018-12-18 NOTE — CHART NOTE - NSCHARTNOTEFT_GEN_A_CORE
s/p a flutter ablation       tolerated procedure well-no CP, shortness of breath, palps or dizziness  tele- SR 60- 70  /77    right groin - soft nontender,   no hematoma, no bleeding +DP  suture removed as per protocol    to resume coumadin tonight as per Dr Philip . PT/INR in am  IV lasix 20 mg IVP x 1 after pt gets OOB this evening

## 2018-12-19 ENCOUNTER — TRANSCRIPTION ENCOUNTER (OUTPATIENT)
Age: 58
End: 2018-12-19

## 2018-12-19 VITALS
SYSTOLIC BLOOD PRESSURE: 120 MMHG | TEMPERATURE: 98 F | OXYGEN SATURATION: 98 % | HEART RATE: 62 BPM | DIASTOLIC BLOOD PRESSURE: 73 MMHG | RESPIRATION RATE: 17 BRPM

## 2018-12-19 LAB
ANION GAP SERPL CALC-SCNC: 12 MMOL/L — SIGNIFICANT CHANGE UP (ref 5–17)
APTT BLD: 35.6 SEC — SIGNIFICANT CHANGE UP (ref 27.5–36.3)
BUN SERPL-MCNC: 22 MG/DL — SIGNIFICANT CHANGE UP (ref 7–23)
CALCIUM SERPL-MCNC: 8.6 MG/DL — SIGNIFICANT CHANGE UP (ref 8.4–10.5)
CHLORIDE SERPL-SCNC: 99 MMOL/L — SIGNIFICANT CHANGE UP (ref 96–108)
CO2 SERPL-SCNC: 26 MMOL/L — SIGNIFICANT CHANGE UP (ref 22–31)
CREAT SERPL-MCNC: 1.41 MG/DL — HIGH (ref 0.5–1.3)
GLUCOSE SERPL-MCNC: 115 MG/DL — HIGH (ref 70–99)
HCT VFR BLD CALC: 44.5 % — SIGNIFICANT CHANGE UP (ref 39–50)
HGB BLD-MCNC: 14.4 G/DL — SIGNIFICANT CHANGE UP (ref 13–17)
INR BLD: 2.44 RATIO — HIGH (ref 0.88–1.16)
MCHC RBC-ENTMCNC: 30 PG — SIGNIFICANT CHANGE UP (ref 27–34)
MCHC RBC-ENTMCNC: 32.4 GM/DL — SIGNIFICANT CHANGE UP (ref 32–36)
MCV RBC AUTO: 92.3 FL — SIGNIFICANT CHANGE UP (ref 80–100)
PLATELET # BLD AUTO: 261 K/UL — SIGNIFICANT CHANGE UP (ref 150–400)
POTASSIUM SERPL-MCNC: 4.5 MMOL/L — SIGNIFICANT CHANGE UP (ref 3.5–5.3)
POTASSIUM SERPL-SCNC: 4.5 MMOL/L — SIGNIFICANT CHANGE UP (ref 3.5–5.3)
PROTHROM AB SERPL-ACNC: 28.9 SEC — HIGH (ref 10–12.9)
RBC # BLD: 4.82 M/UL — SIGNIFICANT CHANGE UP (ref 4.2–5.8)
RBC # FLD: 13.4 % — SIGNIFICANT CHANGE UP (ref 10.3–14.5)
SODIUM SERPL-SCNC: 137 MMOL/L — SIGNIFICANT CHANGE UP (ref 135–145)
WBC # BLD: 8.8 K/UL — SIGNIFICANT CHANGE UP (ref 3.8–10.5)
WBC # FLD AUTO: 8.8 K/UL — SIGNIFICANT CHANGE UP (ref 3.8–10.5)

## 2018-12-19 PROCEDURE — 93306 TTE W/DOPPLER COMPLETE: CPT | Mod: 26,77

## 2018-12-19 PROCEDURE — 93306 TTE W/DOPPLER COMPLETE: CPT | Mod: 26

## 2018-12-19 PROCEDURE — 93010 ELECTROCARDIOGRAM REPORT: CPT

## 2018-12-19 RX ORDER — AMIODARONE HYDROCHLORIDE 400 MG/1
0 TABLET ORAL
Qty: 0 | Refills: 0 | COMMUNITY

## 2018-12-19 RX ORDER — AMIODARONE HYDROCHLORIDE 400 MG/1
200 TABLET ORAL EVERY 12 HOURS
Qty: 0 | Refills: 0 | Status: DISCONTINUED | OUTPATIENT
Start: 2018-12-19 | End: 2018-12-19

## 2018-12-19 RX ORDER — FAMOTIDINE 10 MG/ML
1 INJECTION INTRAVENOUS
Qty: 60 | Refills: 3
Start: 2018-12-19 | End: 2019-04-17

## 2018-12-19 RX ORDER — AMIODARONE HYDROCHLORIDE 400 MG/1
1 TABLET ORAL
Qty: 0 | Refills: 0 | DISCHARGE
Start: 2018-12-19

## 2018-12-19 RX ADMIN — AMIODARONE HYDROCHLORIDE 200 MILLIGRAM(S): 400 TABLET ORAL at 18:13

## 2018-12-19 RX ADMIN — Medication 0.12 MILLIGRAM(S): at 06:23

## 2018-12-19 RX ADMIN — AMIODARONE HYDROCHLORIDE 400 MILLIGRAM(S): 400 TABLET ORAL at 06:23

## 2018-12-19 RX ADMIN — LISINOPRIL 20 MILLIGRAM(S): 2.5 TABLET ORAL at 18:13

## 2018-12-19 RX ADMIN — Medication 40 MILLIGRAM(S): at 06:23

## 2018-12-19 RX ADMIN — Medication 25 MILLIGRAM(S): at 18:13

## 2018-12-19 RX ADMIN — FAMOTIDINE 20 MILLIGRAM(S): 10 INJECTION INTRAVENOUS at 18:13

## 2018-12-19 RX ADMIN — Medication 25 MILLIGRAM(S): at 06:23

## 2018-12-19 RX ADMIN — LISINOPRIL 20 MILLIGRAM(S): 2.5 TABLET ORAL at 06:23

## 2018-12-19 RX ADMIN — Medication 25 MILLIGRAM(S): at 13:53

## 2018-12-19 RX ADMIN — FAMOTIDINE 20 MILLIGRAM(S): 10 INJECTION INTRAVENOUS at 06:23

## 2018-12-19 NOTE — DISCHARGE NOTE ADULT - MEDICATION SUMMARY - MEDICATIONS TO TAKE
I will START or STAY ON the medications listed below when I get home from the hospital:    lisinopril 20 mg oral tablet  -- 1 tab(s) by mouth 2 times a day  -- Indication: For Hypertension    digoxin 125 mcg (0.125 mg) oral tablet  -- 1 tab(s) by mouth once a day  -- Indication: For Antiarrhythmia    amiodarone 200 mg oral tablet  -- 1 tab(s) by mouth once a day start on 12/26  -- Indication: For Antiarrhythmia    amiodarone 200 mg oral tablet  -- 1 tab(s) by mouth every 12 hours x 7 days, first dose tonight  -- Indication: For Antiarrhythmia    Coumadin 3 mg oral tablet  -- 1 tab(s) by mouth once a day (at bedtime)  -- Indication: For blood thinner for afib    simvastatin 20 mg oral tablet  -- 1 tab(s) by mouth once a day (at bedtime)  -- Indication: For Hyperlipidemia    Metoprolol Tartrate 25 mg oral tablet  -- 1 tab(s) by mouth 2 times a day  -- Indication: For blood pressure    furosemide 40 mg oral tablet  -- 1 tab(s) by mouth once a day  -- Indication: For diuretic    famotidine 20 mg oral tablet  -- 1 tab(s) by mouth 2 times a day  -- Indication: For gerd    hydrALAZINE 25 mg oral tablet  -- 1 tab(s) by mouth every 8 hours  -- Indication: For blood pressure I will START or STAY ON the medications listed below when I get home from the hospital:    lisinopril 20 mg oral tablet  -- 1 tab(s) by mouth 2 times a day  -- Indication: For Hypertension    digoxin 125 mcg (0.125 mg) oral tablet  -- 1 tab(s) by mouth once a day  -- Indication: For Antiarrhythmia    amiodarone 200 mg oral tablet  -- 1 tab(s) by mouth once a day start on 12/26  -- Indication: For Antiarrhythmia    amiodarone 200 mg oral tablet  -- 1 tab(s) by mouth every 12 hours x 7 days, first dose tonight  -- Indication: For Antiarrhythmia    Coumadin 3 mg oral tablet  -- 1 tab(s) by mouth once a day (at bedtime)  -- Indication: For blood thinner for afib    simvastatin 20 mg oral tablet  -- 1 tab(s) by mouth once a day (at bedtime)  -- Indication: For Hyperlipidemia    Metoprolol Tartrate 25 mg oral tablet  -- 1 tab(s) by mouth 2 times a day  -- Indication: For blood pressure    furosemide 40 mg oral tablet  -- 1 tab(s) by mouth once a day  -- Indication: For diuretic    famotidine 20 mg oral tablet  -- 1 tab(s) by mouth 2 times a day  -- Indication: For GERD    hydrALAZINE 25 mg oral tablet  -- 1 tab(s) by mouth every 8 hours  -- Indication: For blood pressure

## 2018-12-19 NOTE — DISCHARGE NOTE ADULT - ADDITIONAL INSTRUCTIONS
No heavy lifting, strenuous activity, and driving for 2 days. You may shower 24 hours following procedure but avoid baths and swimming for 1 week. Check groin site for bleeding and/or swelling daily following procedure. Call your doctor/cardiologist immediately for bleeding or swelling or if you have increased/persistent pain or drainage at the site.

## 2018-12-19 NOTE — DISCHARGE NOTE ADULT - PLAN OF CARE
Your heart rate and rhythm will be controlled. Continue with your cardiologist and primary care MD. Continue your current medications. Call your physician for palpitations, feelings of rapid heart beat, lightheadedness, or dizziness. If you are on warfarin (Coumadin), have your blood work drawn (prescription given) on ________________. Ask your nurse for written material about Coumadin and to provide patient education before discharge. Your blood pressure will be controlled. Continue with your blood pressure medications; eat a heart healthy diet with low salt diet; exercise regularly (consult with your physician or cardiologist first); maintain a heart healthy weight; if you smoke - quit (A resource to help you stop smoking is the [facility] – phone number [phone].); include healthy ways to manage stress. Continue to follow with your primary care physician or cardiologist.

## 2018-12-19 NOTE — DISCHARGE NOTE ADULT - PATIENT PORTAL LINK FT
You can access the [facility], offered by [facility], by registering with the following website: [email or web address]

## 2018-12-19 NOTE — DISCHARGE NOTE ADULT - CARE PROVIDER_API CALL
Kevyn Philip), Cardiac Electrophysiology; Cardiology; Internal Medicine  [address] [place] [address]  Phone: [phone] Kevyn Philip), Cardiac Electrophysiology; Cardiology; Internal Medicine  [address] [place] [address]  Phone: [phone]    Bobby Miranda), Cardiology  [address] [facility] [address]  [place] [address]  Phone: [phone]  Fax: [phone]

## 2018-12-19 NOTE — PROGRESS NOTE ADULT - SUBJECTIVE AND OBJECTIVE BOX
Patient is a 58y old  Male who presents with  aflutter (19 Dec 2018 06:45) patient is now s/p aflutter ablation via right femoral vein access.           Allergies    No Known Allergies    Intolerances        Medications:  amiodarone    Tablet 400 milliGRAM(s) Oral every 12 hours  digoxin     Tablet 0.125 milliGRAM(s) Oral daily  famotidine    Tablet 20 milliGRAM(s) Oral two times a day  furosemide    Tablet 40 milliGRAM(s) Oral daily  hydrALAZINE 25 milliGRAM(s) Oral every 8 hours  lisinopril 20 milliGRAM(s) Oral every 12 hours  metoprolol tartrate 25 milliGRAM(s) Oral two times a day  simvastatin 20 milliGRAM(s) Oral at bedtime      Vitals:  T(C): 36.7 (12-19-18 @ 05:05), Max: 36.9 (12-18-18 @ 20:55)  HR: 62 (12-19-18 @ 05:05) (53 - 78)  BP: 113/70 (12-19-18 @ 05:05) (92/57 - 124/81)  BP(mean): 79 (12-18-18 @ 15:00) (78 - 87)  RR: 17 (12-19-18 @ 05:05) (14 - 18)  SpO2: 97% (12-19-18 @ 05:05) (92% - 100%)  Wt(kg): --  Daily Height in cm: 172.72 (18 Dec 2018 15:55)    Daily   I&O's Summary    18 Dec 2018 07:01  -  19 Dec 2018 06:49  --------------------------------------------------------  IN: 720 mL / OUT: 1200 mL / NET: -480 mL          Physical Exam:  Appearance: Normal  HENT: Normal oral mus[place]a, NC/AT  Cardiovascular: S1S2, RRR, No M/R/G, no JVD, No Lower extremity edema  Procedural Access Site: Right femoral vein access. No hematoma, Non-tender to palpation, 2+ pulse, No bruit, No Ecchymosis  Respiratory: Clear to auscultation bilaterally  Gastrointestinal: Soft, Non tender, Normal Bowel Sounds  Musculoskeletal: No clubbing, No joint deformity   Neurologic: Non-focal  Psychiatry: AAOx3, Mood & affect appropriate  Skin: No rashes, No ecchymoses, No cyanosis    12-19    137  |  99  |  22  ----------------------------<  115<H>  4.5   |  26  |  1.41<H>    Ca    8.6      19 Dec 2018 04:00      PT/INR - ( 19 Dec 2018 04:00 )   PT: 28.9 sec;   INR: 2.44 ratio         PTT - ( 19 Dec 2018 04:00 )  PTT:35.6 sec      Interpretation of Telemetry: SB 45-50bpm     ASSESSMENT/PLAN   Patient is a 58y old  Male who presents with  aflutter (19 Dec 2018 06:45) patient is now s/p aflutter ablation via right femoral vein access. Pt tolerated the procedure well, cardiac cath site benign. Post-procedure discharge instructions discussed and questions addressed

## 2018-12-19 NOTE — DISCHARGE NOTE ADULT - CARE PROVIDERS DIRECT ADDRESSES
,[email or web address], [email or web address], [email or web address], [email or web address] by [email or web address], [email or web address], operated by [email or web address]

## 2018-12-19 NOTE — DISCHARGE NOTE ADULT - HOSPITAL COURSE
58 year old  male with PMH of WILFRED, obesity, HTN, HLD, MI, CAD s/p CABG 3 vessel in 2012, stents x 3 in  2010,  ischemic cardiomyopathy, HFrEF 40% w chronic LE edema, A Fib/Flutter -on warfarin, micra PPM 11/18 and Lichens Planus presents today for PST for A Flutter ablation scheduled for 12/18/18. Patient reports admission to hospital with shortness of breath and palpitations 10/20/18 where he was found to have new onset A Fib/flutter. STANISLAW was done showing decreased LA appendage velocities noted but no obvious LA appendage thrombus seen. Severe LV global dysfunction. Started on Heparin and discharged on warfarin. Returned for DCCV 1 week ago and converted to NSR. On Digoxin, Metoprolol and Amiodarone. Now scheduled for A Flutter ablation. Patient feeling better but still reports VELÁSQUEZ. No chest pain.   s/p aflutter ablation on 12/18, patient tolerated procedure well, right groin - soft nontender, no hematoma, no bleeding +DP,suture removed as per protocol. TTE post procedure: no pericardial effusion EF  Case discussed with Dr. Philip, patient stable for discharge today 58 year old  male with PMH of WILFRED, obesity, HTN, HLD, MI, CAD s/p CABG 3 vessel in 2012, stents x 3 in  2010,  ischemic cardiomyopathy, HFrEF 40% w chronic LE edema, A Fib/Flutter -on warfarin, micra PPM 11/18 and Lichens Planus presents today for PST for A Flutter ablation scheduled for 12/18/18. Patient reports admission to hospital with shortness of breath and palpitations 10/20/18 where he was found to have new onset A Fib/flutter. STANISLAW was done showing decreased LA appendage velocities noted but no obvious LA appendage thrombus seen. Severe LV global dysfunction. Started on Heparin and discharged on warfarin. Returned for DCCV 1 week ago and converted to NSR. On Digoxin, Metoprolol and Amiodarone. Now scheduled for A Flutter ablation. Patient feeling better but still reports VELÁSQUEZ. No chest pain.   s/p aflutter ablation on 12/18, patient tolerated procedure well, right groin - soft nontender, no hematoma, no bleeding +DP,suture removed as per protocol. TTE post procedure: no pericardial effusion.  Case discussed with Dr. Philip, TTE reviewed- as per Cedrick, patient is stable for discharge today

## 2018-12-21 ENCOUNTER — MOBILE ON CALL (OUTPATIENT)
Age: 58
End: 2018-12-21

## 2018-12-24 ENCOUNTER — APPOINTMENT (OUTPATIENT)
Dept: FAMILY MEDICINE | Facility: CLINIC | Age: 58
End: 2018-12-24
Payer: MEDICAID

## 2018-12-24 VITALS — BODY MASS INDEX: 44.85 KG/M2 | RESPIRATION RATE: 16 BRPM | HEIGHT: 68 IN

## 2018-12-24 VITALS
HEART RATE: 80 BPM | SYSTOLIC BLOOD PRESSURE: 142 MMHG | OXYGEN SATURATION: 91 % | BODY MASS INDEX: 44.85 KG/M2 | DIASTOLIC BLOOD PRESSURE: 86 MMHG | WEIGHT: 295 LBS | TEMPERATURE: 98.2 F

## 2018-12-24 PROCEDURE — 99205 OFFICE O/P NEW HI 60 MIN: CPT

## 2018-12-24 RX ORDER — METOPROLOL SUCCINATE 100 MG/1
100 TABLET, EXTENDED RELEASE ORAL
Refills: 0 | Status: COMPLETED | COMMUNITY
End: 2018-12-24

## 2018-12-24 RX ORDER — HYDRALAZINE HYDROCHLORIDE 50 MG/1
50 TABLET ORAL
Qty: 90 | Refills: 0 | Status: COMPLETED | COMMUNITY
Start: 2018-08-20

## 2018-12-24 RX ORDER — AMIODARONE HYDROCHLORIDE 200 MG/1
200 TABLET ORAL
Qty: 60 | Refills: 1 | Status: COMPLETED | COMMUNITY
End: 2018-12-24

## 2018-12-24 NOTE — REVIEW OF SYSTEMS
[Fatigue] : fatigue [Chest Pain] : no chest pain [Palpitations] : palpitations [Lower Ext Edema] : lower extremity edema [Orthopena] : no orthopnea [Paroysmal Nocturnal Dyspnea] : no paroysmal nocturnal dyspnea [Shortness Of Breath] : no shortness of breath [Wheezing] : no wheezing [Cough] : cough [Dyspnea on Exertion] : dyspnea on exertion [Itching] : itching [Negative] : Heme/Lymph [de-identified] : Stasis dermatitis

## 2018-12-24 NOTE — HEALTH RISK ASSESSMENT
[Fair] : ~his/her~ current health as fair  [Good] : ~his/her~  mood as  good [] : Yes [No falls in past year] : Patient reported no falls in the past year [0] : 2) Feeling down, depressed, or hopeless: Not at all (0) [de-identified] : Social event [[id number]] : 0 [Patient declined colonoscopy] : Patient declined colonoscopy [Change in mental status noted] : No change in mental status noted [Language] : denies difficulty with language [Behavior] : denies difficulty with behavior [Learning/Retaining New Information] : denies difficulty learning/retaining new information [Handling Complex Tasks] : denies difficulty handling complex tasks [Reasoning] : denies difficulty with reasoning [Spatial Ability and Orientation] : denies difficulty with spatial ability and orientation [Behavioral] : behavioral [Alone] : lives alone [Unemployed] : unemployed [College] : College [Single] : single [Feels Safe at Home] : Feels safe at home [Independent] : managing finances [Some assistance needed] : housekeeping [Discussed at today's visit] : Advance Directives Discussed at today's visit [Patient's preferences updated] : Patient's preferences updated  [Designated Healthcare Proxy] : Designated healthcare proxy [Name: ___] : Health Care Proxy's Name: [unfilled]  [Relationship: ___] : Relationship: [unfilled] [Aggressive treatment] : aggressive treatment

## 2018-12-24 NOTE — ASSESSMENT
[FreeTextEntry1] : Patient is a 58-year-old gentleman multiple medical problems. We will continue present medical management. Unfortunately, patient has chronic dyspnea, especially dyspnea with exertion, palpitations unable to go upstairs without assistance. Patient has history of ischemic heart disease, paroxysmal atrial flutter, fibrillation, status post ablation, chronic systolic congestive heart failure. Continue furosemide, and hypertension. Excellent blood pressure control. No change in present medical management. Patient will be following up with cardiology. In my opinion. Patient presently cannot maintain a full time job due to to his medical condition, which are multiple.Face to face with patient 60 minutes.

## 2018-12-24 NOTE — HISTORY OF PRESENT ILLNESS
[FreeTextEntry1] : See history of present illness [de-identified] : Patient is a 58-year-old gentleman, who presents today to establish himself as a patient patient recently had new onset atrial fibrillation, flutter patient also had atrial clot. Patient is presently anticoagulated followed very closely by cardiology. History is significant for ischemic heart disease, status post bypass surgery, chronic systolic congestive heart failure. Presently, the patient is doing well with present medical management, hyperlipidemia, hypertension, and elevated BMI.\par \par Patient is awake, alert, and oriented x3, presently in no acute distress. He is calm, cooperative, well-groomed, and nourished.

## 2018-12-24 NOTE — PHYSICAL EXAM
[No Acute Distress] : no acute distress [Well Nourished] : well nourished [Well Developed] : well developed [Well-Appearing] : well-appearing [Normal Voice/Communication] : normal voice/communication [Normal Sclera/Conjunctiva] : normal sclera/conjunctiva [PERRL] : pupils equal round and reactive to light [EOMI] : extraocular movements intact [Normal Outer Ear/Nose] : the outer ears and nose were normal in appearance [Normal Oropharynx] : the oropharynx was normal [No JVD] : no jugular venous distention [Supple] : supple [No Lymphadenopathy] : no lymphadenopathy [Thyroid Normal, No Nodules] : the thyroid was normal and there were no nodules present [No Respiratory Distress] : no respiratory distress  [Clear to Auscultation] : lungs were clear to auscultation bilaterally [No Accessory Muscle Use] : no accessory muscle use [Normal Rate] : normal rate  [Regular Rhythm] : with a regular rhythm [Normal S1, S2] : normal S1 and S2 [No Carotid Bruits] : no carotid bruits [No Abdominal Bruit] : a ~M bruit was not heard ~T in the abdomen [No Palpable Aorta] : no palpable aorta [Soft] : abdomen soft [Non Tender] : non-tender [No Masses] : no abdominal mass palpated [No HSM] : no HSM [Normal Bowel Sounds] : normal bowel sounds [Normal Posterior Cervical Nodes] : no posterior cervical lymphadenopathy [Normal Anterior Cervical Nodes] : no anterior cervical lymphadenopathy [No CVA Tenderness] : no CVA  tenderness [No Spinal Tenderness] : no spinal tenderness [No Joint Swelling] : no joint swelling [Grossly Normal Strength/Tone] : grossly normal strength/tone [Normal Gait] : normal gait [Coordination Grossly Intact] : coordination grossly intact [No Focal Deficits] : no focal deficits [Deep Tendon Reflexes (DTR)] : deep tendon reflexes were 2+ and symmetric [Speech Grossly Normal] : speech grossly normal [Memory Grossly Normal] : memory grossly normal [Normal Affect] : the affect was normal [Alert and Oriented x3] : oriented to person, place, and time [Normal Mood] : the mood was normal [Normal Insight/Judgement] : insight and judgment were intact [de-identified] : Systolic ejection murmur 2/6 [de-identified] : Peripheral vascular disease, stasis dermatitis lichen planus [de-identified] : obese [de-identified] : Lichen planus

## 2018-12-27 ENCOUNTER — OUTPATIENT (OUTPATIENT)
Dept: OUTPATIENT SERVICES | Facility: HOSPITAL | Age: 58
LOS: 1 days | End: 2018-12-27
Payer: MEDICAID

## 2018-12-27 ENCOUNTER — APPOINTMENT (OUTPATIENT)
Dept: SLEEP CENTER | Facility: CLINIC | Age: 58
End: 2018-12-27
Payer: MEDICAID

## 2018-12-27 DIAGNOSIS — Z95.5 PRESENCE OF CORONARY ANGIOPLASTY IMPLANT AND GRAFT: Chronic | ICD-10-CM

## 2018-12-27 DIAGNOSIS — Z95.0 PRESENCE OF CARDIAC PACEMAKER: Chronic | ICD-10-CM

## 2018-12-27 DIAGNOSIS — Z95.1 PRESENCE OF AORTOCORONARY BYPASS GRAFT: Chronic | ICD-10-CM

## 2018-12-27 DIAGNOSIS — Z90.89 ACQUIRED ABSENCE OF OTHER ORGANS: Chronic | ICD-10-CM

## 2018-12-27 PROCEDURE — 95811 POLYSOM 6/>YRS CPAP 4/> PARM: CPT | Mod: 26

## 2018-12-27 PROCEDURE — 95811 POLYSOM 6/>YRS CPAP 4/> PARM: CPT

## 2018-12-28 DIAGNOSIS — G47.33 OBSTRUCTIVE SLEEP APNEA (ADULT) (PEDIATRIC): ICD-10-CM

## 2019-01-07 ENCOUNTER — NON-APPOINTMENT (OUTPATIENT)
Age: 59
End: 2019-01-07

## 2019-01-07 ENCOUNTER — APPOINTMENT (OUTPATIENT)
Dept: CARDIOLOGY | Facility: CLINIC | Age: 59
End: 2019-01-07
Payer: MEDICAID

## 2019-01-07 VITALS
RESPIRATION RATE: 15 BRPM | OXYGEN SATURATION: 93 % | SYSTOLIC BLOOD PRESSURE: 150 MMHG | BODY MASS INDEX: 44.56 KG/M2 | DIASTOLIC BLOOD PRESSURE: 99 MMHG | HEIGHT: 68 IN | HEART RATE: 80 BPM | WEIGHT: 294 LBS

## 2019-01-07 LAB
INR PPP: 2 RATIO
POCT-PROTHROMBIN TIME: 24.3 SECS
QUALITY CONTROL: YES

## 2019-01-07 PROCEDURE — 85610 PROTHROMBIN TIME: CPT | Mod: QW

## 2019-01-07 PROCEDURE — 93000 ELECTROCARDIOGRAM COMPLETE: CPT

## 2019-01-07 PROCEDURE — 99215 OFFICE O/P EST HI 40 MIN: CPT

## 2019-01-09 PROCEDURE — C1894: CPT

## 2019-01-09 PROCEDURE — 84132 ASSAY OF SERUM POTASSIUM: CPT

## 2019-01-09 PROCEDURE — 93325 DOPPLER ECHO COLOR FLOW MAPG: CPT

## 2019-01-09 PROCEDURE — 82435 ASSAY OF BLOOD CHLORIDE: CPT

## 2019-01-09 PROCEDURE — C1786: CPT

## 2019-01-09 PROCEDURE — 84100 ASSAY OF PHOSPHORUS: CPT

## 2019-01-09 PROCEDURE — C8925: CPT

## 2019-01-09 PROCEDURE — 33212 INSERT PULSE GEN SNGL LEAD: CPT

## 2019-01-09 PROCEDURE — 84443 ASSAY THYROID STIM HORMONE: CPT

## 2019-01-09 PROCEDURE — 85610 PROTHROMBIN TIME: CPT

## 2019-01-09 PROCEDURE — 85014 HEMATOCRIT: CPT

## 2019-01-09 PROCEDURE — 83735 ASSAY OF MAGNESIUM: CPT

## 2019-01-09 PROCEDURE — 94660 CPAP INITIATION&MGMT: CPT

## 2019-01-09 PROCEDURE — 93320 DOPPLER ECHO COMPLETE: CPT

## 2019-01-09 PROCEDURE — 82803 BLOOD GASES ANY COMBINATION: CPT

## 2019-01-09 PROCEDURE — 71046 X-RAY EXAM CHEST 2 VIEWS: CPT

## 2019-01-09 PROCEDURE — 80048 BASIC METABOLIC PNL TOTAL CA: CPT

## 2019-01-09 PROCEDURE — 71045 X-RAY EXAM CHEST 1 VIEW: CPT

## 2019-01-09 PROCEDURE — 86850 RBC ANTIBODY SCREEN: CPT

## 2019-01-09 PROCEDURE — 92960 CARDIOVERSION ELECTRIC EXT: CPT

## 2019-01-09 PROCEDURE — 82330 ASSAY OF CALCIUM: CPT

## 2019-01-09 PROCEDURE — 83605 ASSAY OF LACTIC ACID: CPT

## 2019-01-09 PROCEDURE — 83880 ASSAY OF NATRIURETIC PEPTIDE: CPT

## 2019-01-09 PROCEDURE — 85730 THROMBOPLASTIN TIME PARTIAL: CPT

## 2019-01-09 PROCEDURE — 80053 COMPREHEN METABOLIC PANEL: CPT

## 2019-01-09 PROCEDURE — 93005 ELECTROCARDIOGRAM TRACING: CPT

## 2019-01-09 PROCEDURE — 85027 COMPLETE CBC AUTOMATED: CPT

## 2019-01-09 PROCEDURE — 96374 THER/PROPH/DIAG INJ IV PUSH: CPT

## 2019-01-09 PROCEDURE — C8929: CPT

## 2019-01-09 PROCEDURE — C1769: CPT

## 2019-01-09 PROCEDURE — C1730: CPT

## 2019-01-09 PROCEDURE — C1766: CPT

## 2019-01-09 PROCEDURE — 93306 TTE W/DOPPLER COMPLETE: CPT

## 2019-01-09 PROCEDURE — 86901 BLOOD TYPING SEROLOGIC RH(D): CPT

## 2019-01-09 PROCEDURE — 86900 BLOOD TYPING SEROLOGIC ABO: CPT

## 2019-01-09 PROCEDURE — 93312 ECHO TRANSESOPHAGEAL: CPT

## 2019-01-09 PROCEDURE — 82947 ASSAY GLUCOSE BLOOD QUANT: CPT

## 2019-01-09 PROCEDURE — C1732: CPT

## 2019-01-09 PROCEDURE — 84295 ASSAY OF SERUM SODIUM: CPT

## 2019-01-09 PROCEDURE — 84484 ASSAY OF TROPONIN QUANT: CPT

## 2019-01-09 PROCEDURE — 99285 EMERGENCY DEPT VISIT HI MDM: CPT | Mod: 25

## 2019-01-21 ENCOUNTER — APPOINTMENT (OUTPATIENT)
Dept: CARDIOLOGY | Facility: CLINIC | Age: 59
End: 2019-01-21
Payer: MEDICAID

## 2019-01-21 LAB
INR PPP: 1.8 RATIO
POCT-PROTHROMBIN TIME: 21.8 SECS
QUALITY CONTROL: YES

## 2019-01-21 PROCEDURE — 85610 PROTHROMBIN TIME: CPT | Mod: QW

## 2019-01-21 PROCEDURE — 93793 ANTICOAG MGMT PT WARFARIN: CPT

## 2019-02-04 ENCOUNTER — APPOINTMENT (OUTPATIENT)
Dept: CARDIOLOGY | Facility: CLINIC | Age: 59
End: 2019-02-04
Payer: MEDICAID

## 2019-02-04 LAB
INR PPP: 1.8 RATIO
POCT-PROTHROMBIN TIME: 21.7 SECS
QUALITY CONTROL: YES

## 2019-02-04 PROCEDURE — 99211 OFF/OP EST MAY X REQ PHY/QHP: CPT | Mod: 25

## 2019-02-04 PROCEDURE — 85610 PROTHROMBIN TIME: CPT | Mod: QW

## 2019-02-06 ENCOUNTER — NON-APPOINTMENT (OUTPATIENT)
Age: 59
End: 2019-02-06

## 2019-02-06 ENCOUNTER — APPOINTMENT (OUTPATIENT)
Dept: ELECTROPHYSIOLOGY | Facility: CLINIC | Age: 59
End: 2019-02-06
Payer: MEDICAID

## 2019-02-06 VITALS
OXYGEN SATURATION: 90 % | WEIGHT: 294 LBS | HEART RATE: 95 BPM | SYSTOLIC BLOOD PRESSURE: 158 MMHG | BODY MASS INDEX: 44.56 KG/M2 | HEIGHT: 68 IN | DIASTOLIC BLOOD PRESSURE: 101 MMHG

## 2019-02-06 PROCEDURE — 99214 OFFICE O/P EST MOD 30 MIN: CPT

## 2019-02-06 PROCEDURE — 93000 ELECTROCARDIOGRAM COMPLETE: CPT | Mod: 59

## 2019-02-06 PROCEDURE — 93288 INTERROG EVL PM/LDLS PM IP: CPT

## 2019-02-06 NOTE — HISTORY OF PRESENT ILLNESS
[FreeTextEntry1] : Referring Physician: Bobby Miranda MD\par \par Dear Bobby:\par \par Mr. Luis Waller was seen in the [facility] today. For our records, please allow me to summarize the history and my findings.\par \par This pleasant 58 year old man has a cardiovascular history significant for  HTN, HL, morbid obesity, and mild non-ischemic LV dysfunction who initially  presented in October 2018 with worsening shortness of breath and edema and found to be in rapid atrial flutter. He was brought for STANISLAW/DCCV but was found to have a MARV thrombus. He was heparinized with repeat STANISLAW 1 week later with persistent thrombus. He then underwent leadless PPM implant for backup pacing during meds titration due to 6-7 second nocturnal pauses. One month later he retured for STANISLAW/DCCV following 1 month of OAC, and subsequently for atrial flutter ablation.\par \par He has since done well without further palpitations. He is awaiting repeat TTE for LV function assessment. Mr. Waller denies any recent history of chest pain, shortness of breath, dizziness, or syncope.

## 2019-02-06 NOTE — DISCUSSION/SUMMARY
[FreeTextEntry1] : In summary, this is a 58 year old man with a severe tachycardia-induced cardiomyopathy complicated by MARV thrombus and long nocturnal pauses during sleep requiring Micra implantation for titration of AV karlee blockers. He is now s/p flutter ablation and maintaining sinus rhythm. I suspect on the basis of clinical response that there has been some improvement in LV function, though we discussed the possibility of ICD implantation should LV function remain severely reduced. We also discussed referral the bariatric surgery clinic for further evaluation, though he would like further time to consider his options.\par \par Mr. Waller appeared to understand the whole discussion and verbalized that all of his questions were answered to his satisfaction. He will follow up in 3 months.\par \par Thank you for allowing me to be involved in the care of this pleasant man. Please feel free to contact me with any questions.

## 2019-02-21 ENCOUNTER — APPOINTMENT (OUTPATIENT)
Dept: CARDIOLOGY | Facility: CLINIC | Age: 59
End: 2019-02-21
Payer: MEDICAID

## 2019-02-21 LAB
INR PPP: 1.9 RATIO
POCT-PROTHROMBIN TIME: 22.7 SECS
QUALITY CONTROL: YES

## 2019-02-21 PROCEDURE — 85610 PROTHROMBIN TIME: CPT | Mod: QW

## 2019-02-21 PROCEDURE — 99211 OFF/OP EST MAY X REQ PHY/QHP: CPT | Mod: 25

## 2019-02-22 ENCOUNTER — APPOINTMENT (OUTPATIENT)
Dept: FAMILY MEDICINE | Facility: CLINIC | Age: 59
End: 2019-02-22
Payer: MEDICAID

## 2019-02-22 VITALS
DIASTOLIC BLOOD PRESSURE: 92 MMHG | BODY MASS INDEX: 44.09 KG/M2 | SYSTOLIC BLOOD PRESSURE: 144 MMHG | WEIGHT: 290 LBS | HEART RATE: 81 BPM | OXYGEN SATURATION: 90 % | TEMPERATURE: 97.8 F

## 2019-02-22 VITALS — RESPIRATION RATE: 16 BRPM

## 2019-02-22 DIAGNOSIS — G47.33 OBSTRUCTIVE SLEEP APNEA (ADULT) (PEDIATRIC): ICD-10-CM

## 2019-02-22 DIAGNOSIS — I87.2 VENOUS INSUFFICIENCY (CHRONIC) (PERIPHERAL): ICD-10-CM

## 2019-02-22 PROCEDURE — 36415 COLL VENOUS BLD VENIPUNCTURE: CPT

## 2019-02-22 PROCEDURE — 99214 OFFICE O/P EST MOD 30 MIN: CPT | Mod: 25

## 2019-02-22 RX ORDER — DIGOXIN 125 UG/1
125 TABLET ORAL
Qty: 30 | Refills: 3 | Status: COMPLETED | COMMUNITY
End: 2019-02-22

## 2019-02-22 NOTE — HISTORY OF PRESENT ILLNESS
[FreeTextEntry1] : See history of present illness [de-identified] : She is a 58-year-old gentleman, who presents today for followup appointment. Patient does have multiple medical problems ischemic heart disease, chronic systolic congestive heart failure, atrial flutter recently had ablation hyperlipidemia, hypertension, ischemic cardiomyopathy, elevated BMI. Obstructive sleep apnea, and peripheral vascular disease.\par \par Patient is awake, alert, and oriented x3, presently in no acute distress. He denies any chest pain at this time, but does admit to dyspnea with exertion.

## 2019-02-22 NOTE — HEALTH RISK ASSESSMENT
[] : No [No falls in past year] : Patient reported no falls in the past year [0] : 2) Feeling down, depressed, or hopeless: Not at all (0) [de-identified] : Only at social events [[id number]] : 0

## 2019-02-22 NOTE — ASSESSMENT
[FreeTextEntry1] : Assessment and plan:\par \par 1. Flutter presently, the patient is in sinus rhythm, status post ablation.\par \par 2. Hypertension. Repeat blood pressure 138/82. Continue present medical management without change.\par \par 3. Chronic systolic congestive heart failure appears to have improved. Status post ablation. Continue furosemide 40 mg p.o. daily.\par \par 4. Hyperlipidemia. Continue simvastatin 20 mg p.o. daily at bedtime.\par \par 5. Obstructive sleep apnea continue BiPAP. Patient tolerating and doing well.\par \par 6. Comprehensive blood work obtained at this visit

## 2019-02-22 NOTE — PHYSICAL EXAM
[No Acute Distress] : no acute distress [Well Nourished] : well nourished [Well Developed] : well developed [Well-Appearing] : well-appearing [Normal Voice/Communication] : normal voice/communication [Normal Sclera/Conjunctiva] : normal sclera/conjunctiva [PERRL] : pupils equal round and reactive to light [EOMI] : extraocular movements intact [Normal Outer Ear/Nose] : the outer ears and nose were normal in appearance [Normal Oropharynx] : the oropharynx was normal [No JVD] : no jugular venous distention [Supple] : supple [No Lymphadenopathy] : no lymphadenopathy [Thyroid Normal, No Nodules] : the thyroid was normal and there were no nodules present [No Respiratory Distress] : no respiratory distress  [Clear to Auscultation] : lungs were clear to auscultation bilaterally [No Accessory Muscle Use] : no accessory muscle use [Normal Rate] : normal rate  [Regular Rhythm] : with a regular rhythm [Normal S1, S2] : normal S1 and S2 [No Carotid Bruits] : no carotid bruits [No Abdominal Bruit] : a ~M bruit was not heard ~T in the abdomen [No Palpable Aorta] : no palpable aorta [Soft] : abdomen soft [Non Tender] : non-tender [No Masses] : no abdominal mass palpated [No HSM] : no HSM [Normal Bowel Sounds] : normal bowel sounds [Normal Posterior Cervical Nodes] : no posterior cervical lymphadenopathy [Normal Anterior Cervical Nodes] : no anterior cervical lymphadenopathy [No CVA Tenderness] : no CVA  tenderness [No Spinal Tenderness] : no spinal tenderness [No Joint Swelling] : no joint swelling [Grossly Normal Strength/Tone] : grossly normal strength/tone [Normal Gait] : normal gait [Coordination Grossly Intact] : coordination grossly intact [No Focal Deficits] : no focal deficits [Deep Tendon Reflexes (DTR)] : deep tendon reflexes were 2+ and symmetric [Speech Grossly Normal] : speech grossly normal [Memory Grossly Normal] : memory grossly normal [Normal Affect] : the affect was normal [Alert and Oriented x3] : oriented to person, place, and time [Normal Mood] : the mood was normal [Normal Insight/Judgement] : insight and judgment were intact [de-identified] : Systolic ejection murmur 2/6 [de-identified] : Peripheral vascular disease, stasis dermatitis lichen planus [de-identified] : obese [de-identified] : Lichen planus,stasis dermatitis

## 2019-02-22 NOTE — REVIEW OF SYSTEMS
[Fatigue] : fatigue [Chest Pain] : no chest pain [Palpitations] : no palpitations [Lower Ext Edema] : lower extremity edema [Orthopena] : no orthopnea [Paroysmal Nocturnal Dyspnea] : no paroysmal nocturnal dyspnea [Shortness Of Breath] : no shortness of breath [Wheezing] : no wheezing [Cough] : no cough [Dyspnea on Exertion] : dyspnea on exertion [Itching] : itching [Negative] : Heme/Lymph [de-identified] : Stasis dermatitis

## 2019-02-22 NOTE — COUNSELING
[Weight management counseling provided] : Weight management [Healthy eating counseling provided] : healthy eating [Activity counseling provided] : activity [Behavioral health counseling provided] : behavioral health  [Fall prevention counseling provided] : fall prevention  [Sleep ___ hours/day] : Sleep [unfilled] hours/day [Engage in a relaxing activity] : Engage in a relaxing activity [Plan in advance] : Plan in advance [Adequate lighting] : Adequate lighting [No throw rugs] : No throw rugs [Use proper foot wear] : Use proper foot wear

## 2019-02-23 LAB
ALBUMIN SERPL ELPH-MCNC: 4.1 G/DL
ALP BLD-CCNC: 109 U/L
ALT SERPL-CCNC: 9 U/L
ANION GAP SERPL CALC-SCNC: 18 MMOL/L
AST SERPL-CCNC: 15 U/L
BASOPHILS # BLD AUTO: 0.04 K/UL
BASOPHILS NFR BLD AUTO: 0.6 %
BILIRUB SERPL-MCNC: 0.4 MG/DL
BUN SERPL-MCNC: 18 MG/DL
CALCIUM SERPL-MCNC: 9.5 MG/DL
CHLORIDE SERPL-SCNC: 95 MMOL/L
CHOLEST SERPL-MCNC: 171 MG/DL
CHOLEST/HDLC SERPL: 4.4 RATIO
CO2 SERPL-SCNC: 29 MMOL/L
CREAT SERPL-MCNC: 1.26 MG/DL
EOSINOPHIL # BLD AUTO: 0.28 K/UL
EOSINOPHIL NFR BLD AUTO: 3.9 %
GLUCOSE SERPL-MCNC: 95 MG/DL
HBA1C MFR BLD HPLC: 5.8 %
HCT VFR BLD CALC: 49.2 %
HDLC SERPL-MCNC: 39 MG/DL
HGB BLD-MCNC: 14.3 G/DL
IMM GRANULOCYTES NFR BLD AUTO: 0.3 %
LDLC SERPL CALC-MCNC: 114 MG/DL
LYMPHOCYTES # BLD AUTO: 0.77 K/UL
LYMPHOCYTES NFR BLD AUTO: 10.7 %
MAN DIFF?: NORMAL
MCHC RBC-ENTMCNC: 27.2 PG
MCHC RBC-ENTMCNC: 29.1 GM/DL
MCV RBC AUTO: 93.5 FL
MONOCYTES # BLD AUTO: 0.66 K/UL
MONOCYTES NFR BLD AUTO: 9.2 %
NEUTROPHILS # BLD AUTO: 5.43 K/UL
NEUTROPHILS NFR BLD AUTO: 75.3 %
PLATELET # BLD AUTO: 340 K/UL
POTASSIUM SERPL-SCNC: 4.3 MMOL/L
PROT SERPL-MCNC: 8 G/DL
RBC # BLD: 5.26 M/UL
RBC # FLD: 14.6 %
SODIUM SERPL-SCNC: 142 MMOL/L
T4 FREE SERPL-MCNC: 1.4 NG/DL
TRIGL SERPL-MCNC: 89 MG/DL
TSH SERPL-ACNC: 1.88 UIU/ML
WBC # FLD AUTO: 7.2 K/UL

## 2019-03-08 ENCOUNTER — MEDICATION RENEWAL (OUTPATIENT)
Age: 59
End: 2019-03-08

## 2019-03-11 ENCOUNTER — NON-APPOINTMENT (OUTPATIENT)
Age: 59
End: 2019-03-11

## 2019-03-11 ENCOUNTER — APPOINTMENT (OUTPATIENT)
Dept: CARDIOLOGY | Facility: CLINIC | Age: 59
End: 2019-03-11
Payer: MEDICAID

## 2019-03-11 VITALS
HEART RATE: 76 BPM | WEIGHT: 290 LBS | RESPIRATION RATE: 16 BRPM | SYSTOLIC BLOOD PRESSURE: 123 MMHG | OXYGEN SATURATION: 94 % | DIASTOLIC BLOOD PRESSURE: 87 MMHG | BODY MASS INDEX: 43.95 KG/M2 | HEIGHT: 68 IN

## 2019-03-11 LAB
INR PPP: 2.2 RATIO
POCT-PROTHROMBIN TIME: 26.6 SECS
QUALITY CONTROL: YES

## 2019-03-11 PROCEDURE — 85610 PROTHROMBIN TIME: CPT | Mod: QW

## 2019-03-11 PROCEDURE — 93000 ELECTROCARDIOGRAM COMPLETE: CPT

## 2019-03-11 PROCEDURE — 99215 OFFICE O/P EST HI 40 MIN: CPT

## 2019-03-19 ENCOUNTER — APPOINTMENT (OUTPATIENT)
Dept: ELECTROPHYSIOLOGY | Facility: CLINIC | Age: 59
End: 2019-03-19

## 2019-03-21 ENCOUNTER — APPOINTMENT (OUTPATIENT)
Dept: ELECTROPHYSIOLOGY | Facility: CLINIC | Age: 59
End: 2019-03-21

## 2019-04-01 ENCOUNTER — RX RENEWAL (OUTPATIENT)
Age: 59
End: 2019-04-01

## 2019-04-05 ENCOUNTER — RX RENEWAL (OUTPATIENT)
Age: 59
End: 2019-04-05

## 2019-04-09 ENCOUNTER — APPOINTMENT (OUTPATIENT)
Dept: CARDIOLOGY | Facility: CLINIC | Age: 59
End: 2019-04-09
Payer: MEDICAID

## 2019-04-09 ENCOUNTER — MEDICATION RENEWAL (OUTPATIENT)
Age: 59
End: 2019-04-09

## 2019-04-09 PROCEDURE — 93306 TTE W/DOPPLER COMPLETE: CPT

## 2019-04-09 PROCEDURE — 99211 OFF/OP EST MAY X REQ PHY/QHP: CPT | Mod: 25

## 2019-04-09 PROCEDURE — 85610 PROTHROMBIN TIME: CPT | Mod: QW

## 2019-04-11 LAB
INR PPP: 2 RATIO
POCT-PROTHROMBIN TIME: 23.9 SECS
QUALITY CONTROL: YES

## 2019-04-23 ENCOUNTER — MEDICATION RENEWAL (OUTPATIENT)
Age: 59
End: 2019-04-23

## 2019-05-03 DIAGNOSIS — Z12.12 ENCOUNTER FOR SCREENING FOR MALIGNANT NEOPLASM OF COLON: ICD-10-CM

## 2019-05-03 DIAGNOSIS — Z12.11 ENCOUNTER FOR SCREENING FOR MALIGNANT NEOPLASM OF COLON: ICD-10-CM

## 2019-05-07 ENCOUNTER — APPOINTMENT (OUTPATIENT)
Dept: CARDIOLOGY | Facility: CLINIC | Age: 59
End: 2019-05-07
Payer: MEDICAID

## 2019-05-07 VITALS — HEART RATE: 74 BPM | DIASTOLIC BLOOD PRESSURE: 97 MMHG | SYSTOLIC BLOOD PRESSURE: 140 MMHG | OXYGEN SATURATION: 96 %

## 2019-05-07 LAB
INR PPP: 2.5 RATIO
POCT-PROTHROMBIN TIME: 29.7 SECS
QUALITY CONTROL: YES

## 2019-05-07 PROCEDURE — 85610 PROTHROMBIN TIME: CPT | Mod: QW

## 2019-05-07 PROCEDURE — 99211 OFF/OP EST MAY X REQ PHY/QHP: CPT | Mod: 25

## 2019-05-08 NOTE — DISCHARGE NOTE ADULT - NS MD DC FALL RISK RISK
Recommended observation. For information on Fall & Injury Prevention, visit [email or web address]

## 2019-05-24 ENCOUNTER — APPOINTMENT (OUTPATIENT)
Dept: FAMILY MEDICINE | Facility: CLINIC | Age: 59
End: 2019-05-24
Payer: MEDICAID

## 2019-05-24 VITALS
HEART RATE: 86 BPM | SYSTOLIC BLOOD PRESSURE: 126 MMHG | TEMPERATURE: 97.5 F | OXYGEN SATURATION: 99 % | HEIGHT: 66 IN | BODY MASS INDEX: 47.09 KG/M2 | DIASTOLIC BLOOD PRESSURE: 86 MMHG | WEIGHT: 293 LBS

## 2019-05-24 VITALS — RESPIRATION RATE: 14 BRPM

## 2019-05-24 PROCEDURE — 99214 OFFICE O/P EST MOD 30 MIN: CPT

## 2019-05-24 NOTE — HEALTH RISK ASSESSMENT
[] : No [No falls in past year] : Patient reported no falls in the past year [[id number]] : 0 [0] : 2) Feeling down, depressed, or hopeless: Not at all (0)

## 2019-05-24 NOTE — COUNSELING
[Weight management counseling provided] : Weight management [Behavioral health counseling provided] : behavioral health  [Healthy eating counseling provided] : healthy eating [Activity counseling provided] : activity [Weight Self Once Weekly] : Weight self once weekly [Target Wt Loss Goal ___] : Target weight loss goal [unfilled] lbs [Low Fat Diet] : Low fat diet [Decrease Portions] : Decrease food portions [Low Salt Diet] : Low salt diet [___ min/wk activity recommended] : [unfilled] min/wk activity recommended [Keep Food Diary] : Keep food diary [Walking] : Walking [Engage in a relaxing activity] : Engage in a relaxing activity [Sleep ___ hours/day] : Sleep [unfilled] hours/day [None] : None [Good understanding] : Patient has a good understanding of lifestyle changes and the steps needed to achieve self management goals [Plan in advance] : Plan in advance

## 2019-05-24 NOTE — HISTORY OF PRESENT ILLNESS
[FreeTextEntry1] : Breakthrough reflux [de-identified] : Patient is a 59-year-old gentleman, who presents today for followup appointment. Patient has multiple medical problems. He was recently seen by cardiology. He presently denies any chest pain. Does admit to exertional dyspnea.\par \par Medical history is significant for aortic stenosis, ischemic heart disease with ischemic cardiomyopathy, and chronic systolic congestive heart failure, peripheral edema, hyperlipidemia, hypertension, morbid obesity and obstructive sleep apnea. Patient not tolerating CPAP.\par \par Patient does complain of epigastric discomfort postprandial. He describes it as a burning sensation that radiates up to his neck. He denies any neck, arm or jaw discomfort. He does have a history of gastroesophageal reflux disease, presently on H2 blocker famotidine, but still has breakthrough reflux.\par \par Patient is awake, alert, and oriented x3. He is in no acute distress. He is calm, cooperative, well-groomed, and nourished. I will review with patient. His most recent visit with cardiology, which included electrocardiogram and echocardiogram.

## 2019-05-24 NOTE — PHYSICAL EXAM
[Well Nourished] : well nourished [Well Developed] : well developed [No Acute Distress] : no acute distress [Well-Appearing] : well-appearing [Normal Voice/Communication] : normal voice/communication [Normal Sclera/Conjunctiva] : normal sclera/conjunctiva [PERRL] : pupils equal round and reactive to light [EOMI] : extraocular movements intact [Normal Outer Ear/Nose] : the outer ears and nose were normal in appearance [No JVD] : no jugular venous distention [Normal Oropharynx] : the oropharynx was normal [Normal TMs] : both tympanic membranes were normal [No Respiratory Distress] : no respiratory distress  [Thyroid Normal, No Nodules] : the thyroid was normal and there were no nodules present [No Lymphadenopathy] : no lymphadenopathy [Supple] : supple [Clear to Auscultation] : lungs were clear to auscultation bilaterally [Normal Rate] : normal rate  [Regular Rhythm] : with a regular rhythm [No Accessory Muscle Use] : no accessory muscle use [No Palpable Aorta] : no palpable aorta [No Abdominal Bruit] : a ~M bruit was not heard ~T in the abdomen [Normal S1, S2] : normal S1 and S2 [No Carotid Bruits] : no carotid bruits [Non Tender] : non-tender [Soft] : abdomen soft [No Masses] : no abdominal mass palpated [Normal Posterior Cervical Nodes] : no posterior cervical lymphadenopathy [No HSM] : no HSM [Normal Bowel Sounds] : normal bowel sounds [No CVA Tenderness] : no CVA  tenderness [Normal Anterior Cervical Nodes] : no anterior cervical lymphadenopathy [No Spinal Tenderness] : no spinal tenderness [Grossly Normal Strength/Tone] : grossly normal strength/tone [No Joint Swelling] : no joint swelling [Normal Gait] : normal gait [No Focal Deficits] : no focal deficits [Deep Tendon Reflexes (DTR)] : deep tendon reflexes were 2+ and symmetric [Coordination Grossly Intact] : coordination grossly intact [Speech Grossly Normal] : speech grossly normal [Memory Grossly Normal] : memory grossly normal [Normal Affect] : the affect was normal [Normal Mood] : the mood was normal [Alert and Oriented x3] : oriented to person, place, and time [de-identified] : Systolic ejection murmur 2/6 [Normal Insight/Judgement] : insight and judgment were intact [de-identified] : Peripheral vascular disease, stasis dermatitis, lichen planus [de-identified] : obese [de-identified] : Lichen planus,stasis dermatitis

## 2019-05-24 NOTE — ASSESSMENT
[FreeTextEntry1] : Assessment and plan:\par \par 1. Flutter presently, the patient is in sinus rhythm, status post ablation.\par \par 2. Hypertension Good control. No change in present medical management\par \par 3. Chronic systolic congestive heart failure appears to have improved. Status post ablation. Continue furosemide 40 mg p.o. daily.Most recent studies show that the patient has an ejection fraction of 40%\par \par 4. Hyperlipidemia. Continue simvastatin 20 mg p.o. daily at bedtime.\par \par 5. Obstructive sleep apnea continue cPAP. Patient Not tolerating it well at this time. He was tolerating it better in the past.\par \par 6. Gastroesophageal reflux disease discussed with patient diet and start patient on Carafate one p.o. 4 times a day. If reflux persist. Patient will require EGD.\par \par 7. Elevated BMI. Detailed discussion with patient regarding weight loss program.\par \par 8. Aortic stenosis of moderate degree. We will keep a close observation of the aortic valve. Patient may require replacement in the future.\par \par 9. Lower extremity edema with stasis dermatitis and lichen planus. Patient is followed by dermatology.

## 2019-05-24 NOTE — REVIEW OF SYSTEMS
[Fatigue] : fatigue [Chest Pain] : no chest pain [Lower Ext Edema] : lower extremity edema [Palpitations] : no palpitations [Orthopena] : no orthopnea [Paroysmal Nocturnal Dyspnea] : no paroysmal nocturnal dyspnea [Shortness Of Breath] : no shortness of breath [Dyspnea on Exertion] : dyspnea on exertion [Cough] : no cough [Wheezing] : no wheezing [Nausea] : no nausea [Abdominal Pain] : no abdominal pain [Constipation] : no constipation [Diarrhea] : no diarrhea [Vomiting] : no vomiting [Heartburn] : heartburn [Itching] : itching [Melena] : no melena [Negative] : Heme/Lymph [de-identified] : Stasis dermatitis,Lichen planus

## 2019-06-04 ENCOUNTER — APPOINTMENT (OUTPATIENT)
Dept: CARDIOLOGY | Facility: CLINIC | Age: 59
End: 2019-06-04
Payer: MEDICAID

## 2019-06-04 LAB
INR PPP: 2.7 RATIO
POCT-PROTHROMBIN TIME: 31.9 SECS
QUALITY CONTROL: YES

## 2019-06-04 PROCEDURE — 85610 PROTHROMBIN TIME: CPT | Mod: QW

## 2019-06-04 PROCEDURE — 99211 OFF/OP EST MAY X REQ PHY/QHP: CPT | Mod: 25

## 2019-06-09 ENCOUNTER — RX RENEWAL (OUTPATIENT)
Age: 59
End: 2019-06-09

## 2019-06-10 ENCOUNTER — APPOINTMENT (OUTPATIENT)
Dept: ELECTROPHYSIOLOGY | Facility: CLINIC | Age: 59
End: 2019-06-10
Payer: MEDICAID

## 2019-06-10 VITALS
HEIGHT: 66 IN | BODY MASS INDEX: 47.09 KG/M2 | WEIGHT: 293 LBS | DIASTOLIC BLOOD PRESSURE: 90 MMHG | OXYGEN SATURATION: 95 % | SYSTOLIC BLOOD PRESSURE: 139 MMHG | HEART RATE: 85 BPM

## 2019-06-10 PROCEDURE — 93000 ELECTROCARDIOGRAM COMPLETE: CPT | Mod: 59

## 2019-06-10 PROCEDURE — 93288 INTERROG EVL PM/LDLS PM IP: CPT

## 2019-06-10 PROCEDURE — 99214 OFFICE O/P EST MOD 30 MIN: CPT

## 2019-06-10 NOTE — PHYSICAL EXAM
[General Appearance - Well Developed] : well developed [Normal Appearance] : normal appearance [General Appearance - Well Nourished] : well nourished [Well Groomed] : well groomed [No Deformities] : no deformities [General Appearance - In No Acute Distress] : no acute distress [Normal Conjunctiva] : the conjunctiva exhibited no abnormalities [Eyelids - No Xanthelasma] : the eyelids demonstrated no xanthelasmas [Normal Oral Mucosa] : normal oral mucosa [No Oral Cyanosis] : no oral cyanosis [No Oral Pallor] : no oral pallor [Normal Jugular Venous V Waves Present] : normal jugular venous V waves present [Normal Jugular Venous A Waves Present] : normal jugular venous A waves present [No Jugular Venous Aguirre A Waves] : no jugular venous aguirre A waves [Respiration, Rhythm And Depth] : normal respiratory rhythm and effort [Exaggerated Use Of Accessory Muscles For Inspiration] : no accessory muscle use [Auscultation Breath Sounds / Voice Sounds] : lungs were clear to auscultation bilaterally [Heart Rate And Rhythm] : heart rate and rhythm were normal [Heart Sounds] : normal S1 and S2 [Murmurs] : no murmurs present [Abdomen Soft] : soft [Abdomen Tenderness] : non-tender [Abdomen Mass (___ Cm)] : no abdominal mass palpated [Gait - Sufficient For Exercise Testing] : the gait was sufficient for exercise testing [Nail Clubbing] : no clubbing of the fingernails [Abnormal Walk] : normal gait [Cyanosis, Localized] : no localized cyanosis [Petechial Hemorrhages (___cm)] : no petechial hemorrhages [Skin Color & Pigmentation] : normal skin color and pigmentation [No Venous Stasis] : no venous stasis [] : no rash [Skin Lesions] : no skin lesions [No Skin Ulcers] : no skin ulcer [No Xanthoma] : no  xanthoma was observed [Oriented To Time, Place, And Person] : oriented to person, place, and time [Affect] : the affect was normal [No Anxiety] : not feeling anxious [Mood] : the mood was normal

## 2019-06-11 NOTE — HISTORY OF PRESENT ILLNESS
[FreeTextEntry1] : Referring Physician: Bobby Miranda MD\par \par Dear Bobby:\par \par Mr. Luis Waller was seen in the [facility] today. For our records, please allow me to summarize the history and my findings.\par \par This pleasant 59 year old man has a cardiovascular history significant for  HTN, HL, morbid obesity, and CAD s/p CABG, and mild LV dysfunction who initially  presented in October 2018 with worsening shortness of breath and edema and found to be in rapid atrial flutter. He was brought for STANISLAW/DCCV but was found to have a MARV thrombus. He was heparinized with repeat STANISLAW 1 week later with persistent thrombus. He then underwent leadless PPM implant for backup pacing during meds titration due to 6-7 second nocturnal pauses. One month later he returned for STANISLAW/DCCV following 1 month of OAC, and subsequently for atrial flutter ablation.\par \par He has since done well without further palpitations. He underwent repeat TTE (4/19) showing now moderate segmental LV dysfunction in the LAD distribution. Today he notes burning substernal chest pain with exertion that has gradually been getting worse, now occurring predictably at 1/2-1 block. He has no rest symptoms.\par \par Device interrogation shows < 1% pacing at vvi 40.

## 2019-06-11 NOTE — DISCUSSION/SUMMARY
[FreeTextEntry1] : In summary, this is a 58 year old man with a severe tachycardia-induced cardiomyopathy complicated by MARV thrombus and long nocturnal pauses during sleep requiring Micra implantation for titration of AV karlee blockers. He is now s/p flutter ablation and maintaining sinus rhythm. He has done well from a rhythm perspective but today complains of symptoms consistent with crescendo angina with focal WMA on TTE. I advised him that he return for an angiogram for further evaluation, and he was amenable to this plan.\par \par We discussed the possibility of ICD implantation should LV function remain remain persistently less than 35% despite rhythm control and, possibly, impending revascularization. We also discussed referral the bariatric surgery clinic for further evaluation, though he would like further time to consider his options.\par \par Mr. Waller appeared to understand the whole discussion and verbalized that all of his questions were answered to his satisfaction. He will follow up in 3 months.\par \par Thank you for allowing me to be involved in the care of this pleasant man. Please feel free to contact me with any questions.

## 2019-06-19 ENCOUNTER — RX RENEWAL (OUTPATIENT)
Age: 59
End: 2019-06-19

## 2019-06-21 ENCOUNTER — TRANSCRIPTION ENCOUNTER (OUTPATIENT)
Age: 59
End: 2019-06-21

## 2019-06-21 ENCOUNTER — INPATIENT (INPATIENT)
Facility: HOSPITAL | Age: 59
LOS: 0 days | Discharge: HOME | DRG: 247 | End: 2019-06-22
Attending: INTERNAL MEDICINE | Admitting: INTERNAL MEDICINE
Payer: MEDICAID

## 2019-06-21 VITALS
WEIGHT: 291.89 LBS | HEIGHT: 68 IN | RESPIRATION RATE: 16 BRPM | OXYGEN SATURATION: 95 % | SYSTOLIC BLOOD PRESSURE: 150 MMHG | HEART RATE: 85 BPM | DIASTOLIC BLOOD PRESSURE: 100 MMHG | TEMPERATURE: 98 F

## 2019-06-21 DIAGNOSIS — I48.92 UNSPECIFIED ATRIAL FLUTTER: ICD-10-CM

## 2019-06-21 DIAGNOSIS — Z95.5 PRESENCE OF CORONARY ANGIOPLASTY IMPLANT AND GRAFT: Chronic | ICD-10-CM

## 2019-06-21 DIAGNOSIS — Z90.89 ACQUIRED ABSENCE OF OTHER ORGANS: Chronic | ICD-10-CM

## 2019-06-21 DIAGNOSIS — Z95.1 PRESENCE OF AORTOCORONARY BYPASS GRAFT: Chronic | ICD-10-CM

## 2019-06-21 DIAGNOSIS — I10 ESSENTIAL (PRIMARY) HYPERTENSION: ICD-10-CM

## 2019-06-21 DIAGNOSIS — I35.0 NONRHEUMATIC AORTIC (VALVE) STENOSIS: ICD-10-CM

## 2019-06-21 DIAGNOSIS — Z95.0 PRESENCE OF CARDIAC PACEMAKER: Chronic | ICD-10-CM

## 2019-06-21 DIAGNOSIS — Z98.890 OTHER SPECIFIED POSTPROCEDURAL STATES: Chronic | ICD-10-CM

## 2019-06-21 LAB
ALBUMIN SERPL ELPH-MCNC: 4.1 G/DL — SIGNIFICANT CHANGE UP (ref 3.3–5)
ALP SERPL-CCNC: 118 U/L — SIGNIFICANT CHANGE UP (ref 40–120)
ALT FLD-CCNC: 12 U/L — SIGNIFICANT CHANGE UP (ref 10–45)
ANION GAP SERPL CALC-SCNC: 13 MMOL/L — SIGNIFICANT CHANGE UP (ref 5–17)
APTT BLD: 37.2 SEC — HIGH (ref 27.5–36.3)
AST SERPL-CCNC: 13 U/L — SIGNIFICANT CHANGE UP (ref 10–40)
BILIRUB SERPL-MCNC: 0.6 MG/DL — SIGNIFICANT CHANGE UP (ref 0.2–1.2)
BUN SERPL-MCNC: 21 MG/DL — SIGNIFICANT CHANGE UP (ref 7–23)
CALCIUM SERPL-MCNC: 9.3 MG/DL — SIGNIFICANT CHANGE UP (ref 8.4–10.5)
CHLORIDE SERPL-SCNC: 99 MMOL/L — SIGNIFICANT CHANGE UP (ref 96–108)
CO2 SERPL-SCNC: 25 MMOL/L — SIGNIFICANT CHANGE UP (ref 22–31)
CREAT SERPL-MCNC: 1.18 MG/DL — SIGNIFICANT CHANGE UP (ref 0.5–1.3)
GLUCOSE SERPL-MCNC: 115 MG/DL — HIGH (ref 70–99)
HCT VFR BLD CALC: 44.1 % — SIGNIFICANT CHANGE UP (ref 39–50)
HGB BLD-MCNC: 14.6 G/DL — SIGNIFICANT CHANGE UP (ref 13–17)
INR BLD: 1.58 RATIO — HIGH (ref 0.88–1.16)
MCHC RBC-ENTMCNC: 28.5 PG — SIGNIFICANT CHANGE UP (ref 27–34)
MCHC RBC-ENTMCNC: 33.1 GM/DL — SIGNIFICANT CHANGE UP (ref 32–36)
MCV RBC AUTO: 86.2 FL — SIGNIFICANT CHANGE UP (ref 80–100)
PLATELET # BLD AUTO: 275 K/UL — SIGNIFICANT CHANGE UP (ref 150–400)
POTASSIUM SERPL-MCNC: 4.1 MMOL/L — SIGNIFICANT CHANGE UP (ref 3.5–5.3)
POTASSIUM SERPL-SCNC: 4.1 MMOL/L — SIGNIFICANT CHANGE UP (ref 3.5–5.3)
PROT SERPL-MCNC: 8.2 G/DL — SIGNIFICANT CHANGE UP (ref 6–8.3)
PROTHROM AB SERPL-ACNC: 18.4 SEC — HIGH (ref 10–12.9)
RBC # BLD: 5.11 M/UL — SIGNIFICANT CHANGE UP (ref 4.2–5.8)
RBC # FLD: 15 % — HIGH (ref 10.3–14.5)
SODIUM SERPL-SCNC: 137 MMOL/L — SIGNIFICANT CHANGE UP (ref 135–145)
WBC # BLD: 7 K/UL — SIGNIFICANT CHANGE UP (ref 3.8–10.5)
WBC # FLD AUTO: 7 K/UL — SIGNIFICANT CHANGE UP (ref 3.8–10.5)

## 2019-06-21 PROCEDURE — 99222 1ST HOSP IP/OBS MODERATE 55: CPT

## 2019-06-21 PROCEDURE — 93567 NJX CAR CTH SPRVLV AORTGRPHY: CPT | Mod: GC

## 2019-06-21 PROCEDURE — 93010 ELECTROCARDIOGRAM REPORT: CPT | Mod: 77,59

## 2019-06-21 PROCEDURE — 93010 ELECTROCARDIOGRAM REPORT: CPT | Mod: 59

## 2019-06-21 PROCEDURE — 93459 L HRT ART/GRFT ANGIO: CPT | Mod: 26,59,GC

## 2019-06-21 PROCEDURE — 99152 MOD SED SAME PHYS/QHP 5/>YRS: CPT | Mod: GC

## 2019-06-21 PROCEDURE — 92928 PRQ TCAT PLMT NTRAC ST 1 LES: CPT | Mod: RC,GC

## 2019-06-21 RX ORDER — LISINOPRIL 2.5 MG/1
20 TABLET ORAL DAILY
Refills: 0 | Status: DISCONTINUED | OUTPATIENT
Start: 2019-06-21 | End: 2019-06-22

## 2019-06-21 RX ORDER — FUROSEMIDE 40 MG
40 TABLET ORAL DAILY
Refills: 0 | Status: DISCONTINUED | OUTPATIENT
Start: 2019-06-21 | End: 2019-06-22

## 2019-06-21 RX ORDER — METOPROLOL TARTRATE 50 MG
25 TABLET ORAL DAILY
Refills: 0 | Status: DISCONTINUED | OUTPATIENT
Start: 2019-06-21 | End: 2019-06-22

## 2019-06-21 RX ORDER — DIGOXIN 250 MCG
0.12 TABLET ORAL DAILY
Refills: 0 | Status: DISCONTINUED | OUTPATIENT
Start: 2019-06-21 | End: 2019-06-21

## 2019-06-21 RX ORDER — DIGOXIN 250 MCG
0.12 TABLET ORAL DAILY
Refills: 0 | Status: DISCONTINUED | OUTPATIENT
Start: 2019-06-21 | End: 2019-06-22

## 2019-06-21 RX ORDER — SIMVASTATIN 20 MG/1
20 TABLET, FILM COATED ORAL AT BEDTIME
Refills: 0 | Status: DISCONTINUED | OUTPATIENT
Start: 2019-06-21 | End: 2019-06-22

## 2019-06-21 RX ORDER — CLOPIDOGREL BISULFATE 75 MG/1
75 TABLET, FILM COATED ORAL DAILY
Refills: 0 | Status: DISCONTINUED | OUTPATIENT
Start: 2019-06-22 | End: 2019-06-22

## 2019-06-21 RX ORDER — WARFARIN SODIUM 2.5 MG/1
1 TABLET ORAL
Qty: 0 | Refills: 0 | DISCHARGE

## 2019-06-21 RX ORDER — METOPROLOL TARTRATE 50 MG
0.5 TABLET ORAL
Qty: 0 | Refills: 0 | DISCHARGE

## 2019-06-21 RX ORDER — METOPROLOL TARTRATE 50 MG
12.5 TABLET ORAL DAILY
Refills: 0 | Status: DISCONTINUED | OUTPATIENT
Start: 2019-06-21 | End: 2019-06-22

## 2019-06-21 RX ORDER — FAMOTIDINE 10 MG/ML
20 INJECTION INTRAVENOUS
Refills: 0 | Status: DISCONTINUED | OUTPATIENT
Start: 2019-06-21 | End: 2019-06-22

## 2019-06-21 RX ORDER — SODIUM CHLORIDE 9 MG/ML
3 INJECTION INTRAMUSCULAR; INTRAVENOUS; SUBCUTANEOUS EVERY 8 HOURS
Refills: 0 | Status: DISCONTINUED | OUTPATIENT
Start: 2019-06-21 | End: 2019-06-22

## 2019-06-21 RX ORDER — METOPROLOL TARTRATE 50 MG
1 TABLET ORAL
Qty: 0 | Refills: 0 | DISCHARGE

## 2019-06-21 RX ORDER — CLOPIDOGREL BISULFATE 75 MG/1
1 TABLET, FILM COATED ORAL
Qty: 30 | Refills: 11
Start: 2019-06-21 | End: 2020-06-14

## 2019-06-21 RX ORDER — AMIODARONE HYDROCHLORIDE 400 MG/1
1 TABLET ORAL
Qty: 0 | Refills: 0 | DISCHARGE

## 2019-06-21 RX ORDER — HYDRALAZINE HCL 50 MG
25 TABLET ORAL EVERY 8 HOURS
Refills: 0 | Status: DISCONTINUED | OUTPATIENT
Start: 2019-06-21 | End: 2019-06-22

## 2019-06-21 RX ADMIN — Medication 40 MILLIGRAM(S): at 16:59

## 2019-06-21 RX ADMIN — SIMVASTATIN 20 MILLIGRAM(S): 20 TABLET, FILM COATED ORAL at 21:50

## 2019-06-21 RX ADMIN — Medication 25 MILLIGRAM(S): at 21:50

## 2019-06-21 RX ADMIN — Medication 1 APPLICATION(S): at 21:50

## 2019-06-21 RX ADMIN — Medication 1.25 MILLIGRAM(S): at 11:49

## 2019-06-21 RX ADMIN — Medication 25 MILLIGRAM(S): at 10:35

## 2019-06-21 RX ADMIN — FAMOTIDINE 20 MILLIGRAM(S): 10 INJECTION INTRAVENOUS at 17:00

## 2019-06-21 RX ADMIN — SODIUM CHLORIDE 3 MILLILITER(S): 9 INJECTION INTRAMUSCULAR; INTRAVENOUS; SUBCUTANEOUS at 12:49

## 2019-06-21 RX ADMIN — SODIUM CHLORIDE 3 MILLILITER(S): 9 INJECTION INTRAMUSCULAR; INTRAVENOUS; SUBCUTANEOUS at 21:50

## 2019-06-21 NOTE — CONSULT NOTE ADULT - ASSESSMENT
59 year old  male with PMH of WILFRED no on CPAP, obesity, HTN, HLD, MI, CAD s/p CABG 3 vessel in 2012, stents x 3 in  2010,  ischemic cardiomyopathy, HFrEF 40% with chronic bilateral LE edema, A Fib/Flutter (s/p ablation in 12/2018) on warfarin (last dose on 6/17), micra PPM 11/18 and Lichens Planus underwent PCI and LUDIVINA placement across RCA stenosis.  He is non toxic, afebrile with normal WBC  No LE trauma  Chronic LE changes without recent change  physical exam consistent with chronic venous stasis with previous intradermal hemorrhage  obesity, WILFRED, CHF contribute to LE venous hypertension    no cellulitis, no indication for antibiotics at present    discussed with NP  Please re-consult ID as needed    thank you

## 2019-06-21 NOTE — DISCHARGE NOTE PROVIDER - NSDCHC_MEDRECSTATUS_GEN_ALL_CORE
Admission Reconciliation is Not Complete  Discharge Reconciliation is Completed Admission Reconciliation is Completed  Discharge Reconciliation is Not Complete Admission Reconciliation is Completed  Discharge Reconciliation is Completed

## 2019-06-21 NOTE — CONSULT NOTE ADULT - SUBJECTIVE AND OBJECTIVE BOX
Patient is a 59y old  Male who presents with a chief complaint of   HPI:  59 year old  male with PMH of WILFRED no on CPAP, obesity, HTN, HLD, MI, CAD s/p CABG 3 vessel in 2012, stents x 3 in  2010,  ischemic cardiomyopathy, HFrEF 40% with chronic bilateral LE edema, A Fib/Flutter (s/p ablation in 12/2018) on warfarin (last dose on 6/17), micra PPM 11/18 and Lichens Planus, presents today cardiac cath. Patient reports he has been feeling VELÁSQUEZ after feeling burning sensation of his chest over a week ago, evaluated Dr. Miranda and Cedrick then referred for cath. No chest pain or dizziness reported.     < from: TTE with Doppler (w/Cont) (12.19.18 @ 14:19) >  1. Eccentric left ventricular hypertrophy (dilated left ventricle with normal relative wall thickness).  2. Endocardial visualization enhanced with intravenous injection of Ultrasonic Enhancing Agent (Definity).  Moderate to severe left ventricular dysfunction. The mid to distal anteroseptum is akinetic and the apex is dyskinetic. No obvious evidence of left ventricular thrombus.  3. No pericardial effusion. *** Compared with echocardiogram of 12/11/2018, no significant changes noted. < end of copied text > (21 Jun 2019 06:49)    This afternoon he feels comfortable after Cath/PCI with DUE placed in RCA 99% lesion. He notes recent increased bilateral LE edema over this past week with his chest burning and VELÁSQUEZ. No fever, chills, LE trauma. He treats his "lichen planus" on his legs with topical Tumeric - he describes being subject to LE blisters and fluid weeping. His legs are discolored - he denies any recent change      PAST MEDICAL & SURGICAL HISTORY:  Stented coronary artery  MI (myocardial infarction)  Atrial flutter  Lichen planus  Peripheral edema: chronic  CHF (congestive heart failure)  Atrial fibrillation  HTN (hypertension)  WILFRED (obstructive sleep apnea)  Ischemic cardiomyopathy  Thrombus of left atrial appendage  Hypercholesteremia  Obesity  CAD (coronary artery disease)  H/O atrioventricular karlee ablation  History of adenoidectomy  History of coronary artery stent placement  Cardiac pacemaker: leadless  S/P CABG (coronary artery bypass graft)  History of elbow surgery    Social history: disabled, never smoker    FAMILY HISTORY:  Family history of stroke  Family history of heart attack      REVIEW OF SYSTEMS:  CONSTITUTIONAL: No weakness, fevers or chills  EYES/ENT: No visual changes;  No vertigo or throat pain   NECK: No pain or stiffness  RESPIRATORY: No cough, wheezing, hemoptysis; No shortness of breath  CARDIOVASCULAR: recent chest pain none currently  GASTROINTESTINAL: No abdominal or epigastric pain. No nausea, vomiting, or hematemesis; No diarrhea or constipation. No melena or hematochezia.  GENITOURINARY: No dysuria, frequency or hematuria  NEUROLOGICAL: No numbness or weakness  SKIN: No itching, burning, rashes, or lesions   All other review of systems is negative unless indicated above    Allergies  No Known Allergies    Antimicrobials:      Vital Signs Last 24 Hrs  T(C): 36.9 (21 Jun 2019 09:15), Max: 36.9 (21 Jun 2019 09:15)  T(F): 98.5 (21 Jun 2019 09:15), Max: 98.5 (21 Jun 2019 09:15)  HR: 94 (21 Jun 2019 14:50) (84 - 96)  BP: 138/95 (21 Jun 2019 14:50) (138/95 - 179/101)  BP(mean): 116 (21 Jun 2019 06:49) (116 - 116)  RR: 16 (21 Jun 2019 14:50) (16 - 16)  SpO2: 96% (21 Jun 2019 14:50) (64% - 97%)    PHYSICAL EXAM:  General: WN/WD NAD, Non-toxic, personable  Neurology: A&Ox3, nonfocal  Respiratory: Clear to auscultation bilaterally  CV: RRR, S1S2, no murmurs, rubs or gallops  Abdominal: Soft, Non-tender, non-distended, normal bowel sounds  Extremities: 2+ LE edema, dull violacious discloration consistent with chronic venous stasis with previous intradermal hemorrhage  Line Sites: Clear  Skin: No rash                        14.6   7.0   )-----------( 275      ( 21 Jun 2019 07:04 )             44.1     06-21    137  |  99  |  21  ----------------------------<  115<H>  4.1   |  25  |  1.18    Ca    9.3      21 Jun 2019 07:04    TPro  8.2  /  Alb  4.1  /  TBili  0.6  /  DBili  x   /  AST  13  /  ALT  12  /  AlkPhos  118  06-21    < from: Cardiac Cath Lab - Adult (06.21.19 @ 08:07) >  Local anesthetic given. Right femoral artery access. Left heart  catheterization. Left coronary artery angiography. The vessel was injected  utilizing a catheter. Right coronary artery angiography. The vessel was  injected utilizing a catheter. Graft angiography. The vessel was injected  utilizing a catheter. Aortography. A catheter was placed and contrast was  injected. Sonosite - Diagnostic. RADIATION EXPOSURE: 24.6 min. A  successful drug-eluting stent was performed on the 90 % lesion in the mid  RCA. Following intervention there was a 1 % residual stenosis. According  to the ACC/AHA classification system, this lesion was a type C lesion.  There was SANTY 3 flow before the procedure and SANTY 3 flow after the  procedure. Vessel setup was performed. A 6FR JR4 LAUNCHER guiding catheter  was used to intubate the vessel.Vessel setup was performed. A BMW  UNIVERSAL 190CM wire was used to cross the lesion. Balloon angioplasty was  performed, using a 3.0 X 20 EUPHORA balloon, with 3 inflations and a  maximum inflation pressure of 14 nidia. A 5.00 X 26 VINCENT drug-eluting stent  was placed across the lesion and deployed at a maximum inflation pressure  of 16 nidia. Sheath Exchange for Intervention.  CONTRAST GIVEN: Omnipaque 90 ml. Omnipaque 50 ml.  MEDICATIONS GIVEN: Fentanyl, 25 mcg, IV. Midazolam, 1 mg, IV. Heparin,  56932 units, IV. Aspirin, 81 mg, PO. Clopidogrel (Plavix), 600 mg, PO.  CORONARY VESSELS: The coronary circulation is right dominant.  LAD:   --  Proximal LAD: There was a 100 % stenosis.  CX:   --  Proximal circumflex: There was a 100 % stenosis.  RCA:   --  Mid RCA:  GRAFTS:   --  Graft to the mid LAD: The graft was a LIMA. Graft angiography  showed minor luminal irregularities.  AORTA: The root exhibited mild fibrocalcific change. No grafts were  visualized.    < end of copied text >        Adrián Giraldo MD; Division of Infectious Disease; Pager: [phone]; nights and weekends: [phone]

## 2019-06-21 NOTE — DISCHARGE NOTE PROVIDER - NSDCDCMDCOMP_GEN_ALL_CORE
This document is complete and the patient is ready for discharge.
no Active ROM deficits were identified

## 2019-06-21 NOTE — H&P CARDIOLOGY - PSH
Cardiac pacemaker  leadless  History of adenoidectomy    History of coronary artery stent placement    History of elbow surgery    S/P CABG (coronary artery bypass graft) Cardiac pacemaker  leadless  H/O atrioventricular karlee ablation    History of adenoidectomy    History of coronary artery stent placement    History of elbow surgery    S/P CABG (coronary artery bypass graft)

## 2019-06-21 NOTE — CHART NOTE - NSCHARTNOTEFT_GEN_A_CORE
HPI:  59 year old  male with PMH of WILFRED no on CPAP, obesity, HTN, HLD, MI, CAD s/p CABG 3 vessel in 2012, stents x 3 in  2010,  ischemic cardiomyopathy, HFrEF 40% with chronic bilateral LE edema, A Fib/Flutter (s/p ablation in 12/2018) on warfarin (last dose on 6/17), micra PPM 11/18 and Lichens Planus, presents today cardiac cath. Patient reports he has been feeling VELÁSQUEZ after feeling burning sensation of his chest over a week ago, evaluated Dr. Miranda and Cedrick then referred for cath. No chest pain or dizziness reported.     < from: TTE with Doppler (w/Cont) (12.19.18 @ 14:19) >  1. Eccentric left ventricular hypertrophy (dilated left ventricle with normal relative wall thickness).  2. Endocardial visualization enhanced with intravenous injection of Ultrasonic Enhancing Agent (Definity).  Moderate to severe left ventricular dysfunction. The mid to distal anteroseptum is akinetic and the apex is dyskinetic. No obvious evidence of left ventricular thrombus.  3. No pericardial effusion. *** Compared with echocardiogram of 12/11/2018, no significant changes noted. < end of copied text > (21 Jun 2019 06:49)    Pt. s/p LUDIVINA Mid RCA 99%. Per Dr. Ghotra pt. to be sent home on Plavix/Coumadin not necessary to be on ASA. Patient underwent a PCI procedure and is being admitted as they are at increased risk for major adverse cardiac and vascular events if discharged due to the following high risk characteristics: morbidly obese, heart failure    Pt. s/p LUDIVINA Mid RCA 99%. Per Dr. Ghotra pt. to be sent home on Plavix/Coumadin not necessary to be on ASA.

## 2019-06-21 NOTE — H&P CARDIOLOGY - HISTORY OF PRESENT ILLNESS
59 year old  male with PMH of WILFRED no on CPAP, obesity, HTN, HLD, MI, CAD s/p CABG 3 vessel in 2012, stents x 3 in  2010,  ischemic cardiomyopathy, HFrEF 40% with chronic bilateral LE edema, A Fib/Flutter (s/p ablation in 12/2018) on warfarin (last dose on 6/17), micra PPM 11/18 and Lichens Planus, presents today cardiac cath. Patient reports he has been feeling VELÁSQUEZ after feeling burning sensation of his chest over a week ago, evaluated Dr. Miranda and Cedrick then referred for cath. No chest pain or dizziness reported.     < from: TTE with Doppler (w/Cont) (12.19.18 @ 14:19) >  1. Eccentric left ventricular hypertrophy (dilated left ventricle with normal relative wall thickness).  2. Endocardial visualization enhanced with intravenous injection of Ultrasonic Enhancing Agent (Definity).  Moderate to severe left ventricular dysfunction. The mid to distal anteroseptum is akinetic and the apex is dyskinetic. No obvious evidence of left ventricular thrombus.  3. No pericardial effusion. *** Compared with echocardiogram of 12/11/2018, no significant changes noted. < end of copied text >

## 2019-06-21 NOTE — DISCHARGE NOTE PROVIDER - CARE PROVIDER_API CALL
Kevyn Philip (MD)  Cardiac Electrophysiology; Cardiology; Internal Medicine  [address] [place] [address]  Phone: [phone]  Follow Up Time:

## 2019-06-21 NOTE — H&P CARDIOLOGY - FAMILY HISTORY
No pertinent family history in first degree relatives Father  Still living? No  Family history of heart attack, Age at diagnosis: Age Unknown  Family history of stroke, Age at diagnosis: Age Unknown     Mother  Still living? No  Family history of stroke, Age at diagnosis: Age Unknown

## 2019-06-21 NOTE — H&P CARDIOLOGY - PMH
Atrial fibrillation    Atrial flutter    CAD (coronary artery disease)    CHF (congestive heart failure)    HTN (hypertension)    Hypercholesteremia    Ischemic cardiomyopathy    Lichen planus    MI (myocardial infarction)    Obesity    WILFRED (obstructive sleep apnea)    Peripheral edema  chronic  Thrombus of left atrial appendage Atrial fibrillation    Atrial flutter    CAD (coronary artery disease)    CHF (congestive heart failure)    HTN (hypertension)    Hypercholesteremia    Ischemic cardiomyopathy    Lichen planus    MI (myocardial infarction)    Obesity    WILFRED (obstructive sleep apnea)    Peripheral edema  chronic  Stented coronary artery    Thrombus of left atrial appendage

## 2019-06-21 NOTE — DISCHARGE NOTE PROVIDER - HOSPITAL COURSE
59 year old  male with PMH of WILFRED no on CPAP, obesity, HTN, HLD, MI, CAD s/p CABG 3 vessel in 2012, stents x 3 in  2010,  ischemic cardiomyopathy, HFrEF 40% with chronic bilateral LE edema, A Fib/Flutter (s/p ablation in 12/2018) on warfarin (last dose on 6/17), micra PPM 11/18 and Lichens Planus, presents today cardiac cath. Patient reports he has been feeling VELÁSQUEZ after feeling burning sensation of his chest over a week ago, evaluated Dr. Miranda and Cedrick then referred for cath. No chest pain or dizziness reported. Pt is now s/p C LUDIVINA x 1 mRCA (99%), LCx 100%, SVG to OM down, %, LIMA up. Pt tolerated the orocedure well, site benign. Post-procedure discharge instuctions discussed and questions addressed

## 2019-06-21 NOTE — DISCHARGE NOTE PROVIDER - NSDCCPTREATMENT_GEN_ALL_CORE_FT
PRINCIPAL PROCEDURE  Procedure: Left heart cardiac catheterization  Findings and Treatment: LUDIVINA x 1 mRCA (99%)

## 2019-06-21 NOTE — CHART NOTE - NSCHARTNOTEFT_GEN_A_CORE
Removal of Femoral Sheath    Pulses in the (right) lower extremity are (palpable). The patient was placed in the supine position. The insertion site was identified and the sutures were removed per protocol.  The ___6_ [place] citizen femoral sheath was then removed. Direct pressure was applied for  _20_____ minutes. by VARGAS Ardon Anp-c    Monitoring of the (right) groin and both lower extremities including neuro-vascular checks and vital signs every 15 minutes x 4, then every 30 minutes x 2, then every 1 hour was ordered.    Complications: None    Comments:

## 2019-06-22 ENCOUNTER — TRANSCRIPTION ENCOUNTER (OUTPATIENT)
Age: 59
End: 2019-06-22

## 2019-06-22 VITALS
HEART RATE: 85 BPM | RESPIRATION RATE: 17 BRPM | DIASTOLIC BLOOD PRESSURE: 72 MMHG | TEMPERATURE: 98 F | SYSTOLIC BLOOD PRESSURE: 104 MMHG | OXYGEN SATURATION: 95 %

## 2019-06-22 LAB
ANION GAP SERPL CALC-SCNC: 11 MMOL/L — SIGNIFICANT CHANGE UP (ref 5–17)
BUN SERPL-MCNC: 18 MG/DL — SIGNIFICANT CHANGE UP (ref 7–23)
CALCIUM SERPL-MCNC: 9.5 MG/DL — SIGNIFICANT CHANGE UP (ref 8.4–10.5)
CHLORIDE SERPL-SCNC: 97 MMOL/L — SIGNIFICANT CHANGE UP (ref 96–108)
CO2 SERPL-SCNC: 28 MMOL/L — SIGNIFICANT CHANGE UP (ref 22–31)
CREAT SERPL-MCNC: 1.16 MG/DL — SIGNIFICANT CHANGE UP (ref 0.5–1.3)
GLUCOSE SERPL-MCNC: 98 MG/DL — SIGNIFICANT CHANGE UP (ref 70–99)
HCT VFR BLD CALC: 42.6 % — SIGNIFICANT CHANGE UP (ref 39–50)
HGB BLD-MCNC: 14.3 G/DL — SIGNIFICANT CHANGE UP (ref 13–17)
MCHC RBC-ENTMCNC: 28.7 PG — SIGNIFICANT CHANGE UP (ref 27–34)
MCHC RBC-ENTMCNC: 33.6 GM/DL — SIGNIFICANT CHANGE UP (ref 32–36)
MCV RBC AUTO: 85.5 FL — SIGNIFICANT CHANGE UP (ref 80–100)
PLATELET # BLD AUTO: 266 K/UL — SIGNIFICANT CHANGE UP (ref 150–400)
POTASSIUM SERPL-MCNC: 4.1 MMOL/L — SIGNIFICANT CHANGE UP (ref 3.5–5.3)
POTASSIUM SERPL-SCNC: 4.1 MMOL/L — SIGNIFICANT CHANGE UP (ref 3.5–5.3)
RBC # BLD: 4.98 M/UL — SIGNIFICANT CHANGE UP (ref 4.2–5.8)
RBC # FLD: 15 % — HIGH (ref 10.3–14.5)
SODIUM SERPL-SCNC: 136 MMOL/L — SIGNIFICANT CHANGE UP (ref 135–145)
WBC # BLD: 8.5 K/UL — SIGNIFICANT CHANGE UP (ref 3.8–10.5)
WBC # FLD AUTO: 8.5 K/UL — SIGNIFICANT CHANGE UP (ref 3.8–10.5)

## 2019-06-22 PROCEDURE — 99238 HOSP IP/OBS DSCHRG MGMT 30/<: CPT

## 2019-06-22 RX ORDER — WARFARIN SODIUM 2.5 MG/1
3 TABLET ORAL ONCE
Refills: 0 | Status: COMPLETED | OUTPATIENT
Start: 2019-06-22 | End: 2019-06-22

## 2019-06-22 RX ORDER — DIGOXIN 250 MCG
1 TABLET ORAL
Qty: 30 | Refills: 3
Start: 2019-06-22 | End: 2019-10-19

## 2019-06-22 RX ADMIN — Medication 0.12 MILLIGRAM(S): at 05:53

## 2019-06-22 RX ADMIN — SODIUM CHLORIDE 3 MILLILITER(S): 9 INJECTION INTRAMUSCULAR; INTRAVENOUS; SUBCUTANEOUS at 06:49

## 2019-06-22 RX ADMIN — WARFARIN SODIUM 3 MILLIGRAM(S): 2.5 TABLET ORAL at 01:37

## 2019-06-22 RX ADMIN — CLOPIDOGREL BISULFATE 75 MILLIGRAM(S): 75 TABLET, FILM COATED ORAL at 05:56

## 2019-06-22 RX ADMIN — Medication 25 MILLIGRAM(S): at 05:53

## 2019-06-22 RX ADMIN — LISINOPRIL 20 MILLIGRAM(S): 2.5 TABLET ORAL at 05:53

## 2019-06-22 RX ADMIN — Medication 1 APPLICATION(S): at 05:53

## 2019-06-22 RX ADMIN — Medication 40 MILLIGRAM(S): at 05:53

## 2019-06-22 RX ADMIN — FAMOTIDINE 20 MILLIGRAM(S): 10 INJECTION INTRAVENOUS at 05:53

## 2019-06-22 NOTE — PROGRESS NOTE ADULT - SUBJECTIVE AND OBJECTIVE BOX
Patient is a 59y old  Male who presents with chest pain (2019 19:38) now s/p C LUDIVINA x 1 mRCA via RFA access.           Allergies    No Known Allergies    Intolerances        Medications:  clobetasol 0.05% Cream 1 Application(s) Topical two times a day  clopidogrel Tablet 75 milliGRAM(s) Oral daily  digoxin     Tablet 0.125 milliGRAM(s) Oral daily  famotidine    Tablet 20 milliGRAM(s) Oral two times a day  furosemide    Tablet 40 milliGRAM(s) Oral daily  hydrALAZINE 25 milliGRAM(s) Oral every 8 hours  lisinopril 20 milliGRAM(s) Oral daily  metoprolol succinate ER 12.5 milliGRAM(s) Oral daily  metoprolol succinate ER 25 milliGRAM(s) Oral daily  simvastatin 20 milliGRAM(s) Oral at bedtime  sodium chloride 0.9% lock flush 3 milliLiter(s) IV Push every 8 hours      Vitals:  T(C): 36.6 (19 @ 19:30), Max: 36.9 (19 @ 09:15)  HR: 89 (19 @ 19:30) (84 - 96)  BP: 141/93 (19 @ 19:30) (138/95 - 179/101)  BP(mean): 116 (19 @ 06:49) (116 - 116)  RR: 16 (19 @ 19:30) (16 - 16)  SpO2: 97% (19 @ 19:30) (64% - 97%)  Wt(kg): --  Daily Height in cm: 172.72 (2019 09:15)    Daily Weight in k.4 (2019 06:49)  I&O's Summary    2019 07:01  -  2019 03:43  --------------------------------------------------------  IN: 180 mL / OUT: 100 mL / NET: 80 mL          Physical Exam:  Appearance: Normal  Eyes: PERRL, EOMI  HENT: Normal oral muscosa, NC/AT  Cardiovascular: S1S2, RRR, No M/R/G, no JVD, No Lower extremity edema  Procedural Access Site: No hematoma, Non-tender to palpation, 2+ pulse, No bruit, No Ecchymosis  Respiratory: Clear to auscultation bilaterally  Gastrointestinal: Soft, Non tender, Normal Bowel Sounds  Musculoskeletal: No clubbing, No joint deformity   Neurologic: Non-focal  Lymphatic: No lymphadenopathy  Psychiatry: AAOx3, Mood & affect appropriate  Skin: No rashes, No ecchymoses, No cyanosis        136  |  97  |  18  ----------------------------<  98  4.1   |  28  |  1.16    Ca    9.5      2019 00:11    TPro  8.2  /  Alb  4.1  /  TBili  0.6  /  DBili  x   /  AST  13  /  ALT  12  /  AlkPhos  118  06-21    PT/INR - ( 2019 07:04 )   PT: 18.4 sec;   INR: 1.58 ratio         PTT - ( 2019 07:04 )  PTT:37.2 sec        Interpretation of Telemetry: Patient is a 59y old  Male who presents with chest pain (2019 19:38) now s/p C LUDIVINA x 1 mRCA via RFA access.           Allergies    No Known Allergies    Intolerances        Medications:  clobetasol 0.05% Cream 1 Application(s) Topical two times a day  clopidogrel Tablet 75 milliGRAM(s) Oral daily  digoxin     Tablet 0.125 milliGRAM(s) Oral daily  famotidine    Tablet 20 milliGRAM(s) Oral two times a day  furosemide    Tablet 40 milliGRAM(s) Oral daily  hydrALAZINE 25 milliGRAM(s) Oral every 8 hours  lisinopril 20 milliGRAM(s) Oral daily  metoprolol succinate ER 12.5 milliGRAM(s) Oral daily  metoprolol succinate ER 25 milliGRAM(s) Oral daily  simvastatin 20 milliGRAM(s) Oral at bedtime  sodium chloride 0.9% lock flush 3 milliLiter(s) IV Push every 8 hours      Vitals:  T(C): 36.6 (19 @ 19:30), Max: 36.9 (19 @ 09:15)  HR: 89 (19 @ 19:30) (84 - 96)  BP: 141/93 (19 @ 19:30) (138/95 - 179/101)  BP(mean): 116 (19 @ 06:49) (116 - 116)  RR: 16 (19 @ 19:30) (16 - 16)  SpO2: 97% (19 @ 19:30) (64% - 97%)  Wt(kg): --  Daily Height in cm: 172.72 (2019 09:15)    Daily Weight in k.4 (2019 06:49)  I&O's Summary    2019 07:01  -  2019 03:43  --------------------------------------------------------  IN: 180 mL / OUT: 100 mL / NET: 80 mL          Physical Exam:  Appearance: Normal  Eyes: PERRL, EOMI  HENT: Normal oral muscosa, NC/AT  Cardiovascular: S1S2, RRR, No M/R/G, no JVD, No Lower extremity edema  Procedural Access Site: No hematoma, Non-tender to palpation, 2+ pulse, No bruit, No Ecchymosis  Respiratory: Clear to auscultation bilaterally  Gastrointestinal: Soft, Non tender, Normal Bowel Sounds  Musculoskeletal: No clubbing, No joint deformity   Neurologic: Non-focal  Lymphatic: No lymphadenopathy  Psychiatry: AAOx3, Mood & affect appropriate  Skin: No rashes, No ecchymoses, No cyanosis        136  |  97  |  18  ----------------------------<  98  4.1   |  28  |  1.16    Ca    9.5      2019 00:11    TPro  8.2  /  Alb  4.1  /  TBili  0.6  /  DBili  x   /  AST  13  /  ALT  12  /  AlkPhos  118  06-21    PT/INR - ( 2019 07:04 )   PT: 18.4 sec;   INR: 1.58 ratio         PTT - ( 2019 07:04 )  PTT:37.2 sec        Interpretation of Telemetry: 80-90bpm with PVCs

## 2019-06-22 NOTE — PROGRESS NOTE ADULT - ASSESSMENT
Patient is a 59y old  Male who presents with chest pain (21 Jun 2019 19:38) now s/p [facility] LUDIVINA x 1 mRCA via RFA access. Pt tolerated the procedure well, site benign, overnight remained uneventful. Post-procedure discharge instructions discussed and questions addressed

## 2019-06-22 NOTE — DISCHARGE NOTE NURSING/CASE MANAGEMENT/SOCIAL WORK - NSDCDPATPORTLINK_GEN_ALL_CORE
You can access the [facility] Patient Portal, offered by [facility], by registering with the following website: [email or web address]

## 2019-06-25 ENCOUNTER — APPOINTMENT (OUTPATIENT)
Dept: CARDIOLOGY | Facility: CLINIC | Age: 59
End: 2019-06-25
Payer: MEDICAID

## 2019-06-25 ENCOUNTER — NON-APPOINTMENT (OUTPATIENT)
Age: 59
End: 2019-06-25

## 2019-06-25 VITALS
RESPIRATION RATE: 17 BRPM | DIASTOLIC BLOOD PRESSURE: 96 MMHG | WEIGHT: 294 LBS | HEIGHT: 66 IN | SYSTOLIC BLOOD PRESSURE: 147 MMHG | HEART RATE: 82 BPM | OXYGEN SATURATION: 94 % | BODY MASS INDEX: 47.25 KG/M2

## 2019-06-25 PROCEDURE — 93000 ELECTROCARDIOGRAM COMPLETE: CPT

## 2019-06-25 PROCEDURE — 99215 OFFICE O/P EST HI 40 MIN: CPT

## 2019-06-26 NOTE — DISCHARGE NOTE PROVIDER - NSDCCPCAREPLAN_GEN_ALL_CORE_FT
PRINCIPAL DISCHARGE DIAGNOSIS  Diagnosis: CAD (coronary artery disease)  Assessment and Plan of Treatment: Pt remains chest pain free and understands post cath discharge instructions   No heavy lifting, strenuous activity, bending, straining or unnecessary stair climbing  for 2 weeks. No sex for 1 week.  No driving for 2 days. You may shower 24 hours following procedure but avoid baths and swimming for 1 week. Check groin site for bleeding and/or swelling daily following procedure. Call your doctor/cardiologist immediately should it occur or if you have increased/persistent pain at the site. Follow up with your cardiologist in 1- 2 weeks. You may call [facility] at [phone] or [phone] after office hours and weekends  with any questions or concerns following your procedure. Take medications as prescribed.      SECONDARY DISCHARGE DIAGNOSES  Diagnosis: HTN (hypertension)  Assessment and Plan of Treatment: Your blood pressure will be controlled.   Continue with your blood pressure medications; eat a heart healthy diet with low salt diet; exercise regularly (consult with your physician or cardiologist first); maintain a heart healthy weight; if you smoke - quit (A resource to help you stop smoking is the [facility] [employer] – phone number [phone].); include healthy ways to manage stress. Continue to follow with your primary care physician or cardiologist.    Diagnosis: HLD (hyperlipidemia)  Assessment and Plan of Treatment: Your LDL cholesterol will be less than 70mg/dL   Continue with your cholesterol medications. Eat a heart healthy diet that is low in saturated fats and salt, and includes whole grains, fruits, vegetables and lean protein; exercise regularly (consult with your physician or cardiologist first); maintain a heart healthy weight. Continue to follow with your primary physician or cardiologist for treatment goals, continue medication, have liver function testing every 3 months as anti lipid medications can cause liver irritation. If you smoke - quit (A resource to help you stop smoking is the [facility] [employer] – phone number 360-366-3604.).
no (get order)

## 2019-07-02 ENCOUNTER — APPOINTMENT (OUTPATIENT)
Dept: CARDIOLOGY | Facility: CLINIC | Age: 59
End: 2019-07-02

## 2019-07-08 ENCOUNTER — APPOINTMENT (OUTPATIENT)
Dept: CARDIOLOGY | Facility: CLINIC | Age: 59
End: 2019-07-08
Payer: MEDICAID

## 2019-07-08 VITALS
HEART RATE: 72 BPM | RESPIRATION RATE: 17 BRPM | HEIGHT: 66 IN | BODY MASS INDEX: 47.25 KG/M2 | DIASTOLIC BLOOD PRESSURE: 81 MMHG | OXYGEN SATURATION: 95 % | WEIGHT: 294 LBS | SYSTOLIC BLOOD PRESSURE: 131 MMHG

## 2019-07-08 LAB — INR PPP: 1.8 RATIO

## 2019-07-08 PROCEDURE — 85610 PROTHROMBIN TIME: CPT | Mod: QW

## 2019-07-08 PROCEDURE — 99211 OFF/OP EST MAY X REQ PHY/QHP: CPT

## 2019-07-10 ENCOUNTER — RX RENEWAL (OUTPATIENT)
Age: 59
End: 2019-07-10

## 2019-07-10 PROCEDURE — 85730 THROMBOPLASTIN TIME PARTIAL: CPT

## 2019-07-10 PROCEDURE — 80053 COMPREHEN METABOLIC PANEL: CPT

## 2019-07-10 PROCEDURE — C9600: CPT | Mod: RC

## 2019-07-10 PROCEDURE — C1894: CPT

## 2019-07-10 PROCEDURE — C1725: CPT

## 2019-07-10 PROCEDURE — 93459 L HRT ART/GRFT ANGIO: CPT | Mod: 59

## 2019-07-10 PROCEDURE — 93567 NJX CAR CTH SPRVLV AORTGRPHY: CPT

## 2019-07-10 PROCEDURE — C1887: CPT

## 2019-07-10 PROCEDURE — 85027 COMPLETE CBC AUTOMATED: CPT

## 2019-07-10 PROCEDURE — 99153 MOD SED SAME PHYS/QHP EA: CPT

## 2019-07-10 PROCEDURE — 80048 BASIC METABOLIC PNL TOTAL CA: CPT

## 2019-07-10 PROCEDURE — C1769: CPT

## 2019-07-10 PROCEDURE — 99152 MOD SED SAME PHYS/QHP 5/>YRS: CPT

## 2019-07-10 PROCEDURE — C1874: CPT

## 2019-07-10 PROCEDURE — 93005 ELECTROCARDIOGRAM TRACING: CPT

## 2019-07-10 PROCEDURE — 85610 PROTHROMBIN TIME: CPT

## 2019-07-29 ENCOUNTER — APPOINTMENT (OUTPATIENT)
Dept: CARDIOLOGY | Facility: CLINIC | Age: 59
End: 2019-07-29
Payer: MEDICAID

## 2019-07-29 LAB
INR PPP: 1.8 RATIO
POCT-PROTHROMBIN TIME: 21.4 SECS
QUALITY CONTROL: YES

## 2019-07-29 PROCEDURE — 99211 OFF/OP EST MAY X REQ PHY/QHP: CPT | Mod: 25

## 2019-07-29 PROCEDURE — 85610 PROTHROMBIN TIME: CPT | Mod: QW

## 2019-08-08 ENCOUNTER — RX RENEWAL (OUTPATIENT)
Age: 59
End: 2019-08-08

## 2019-08-13 ENCOUNTER — APPOINTMENT (OUTPATIENT)
Dept: CARDIOLOGY | Facility: CLINIC | Age: 59
End: 2019-08-13
Payer: MEDICAID

## 2019-08-13 ENCOUNTER — RX RENEWAL (OUTPATIENT)
Age: 59
End: 2019-08-13

## 2019-08-13 LAB
INR PPP: 1.8 RATIO
POCT-PROTHROMBIN TIME: 21.8 SECS
QUALITY CONTROL: YES

## 2019-08-13 PROCEDURE — 99211 OFF/OP EST MAY X REQ PHY/QHP: CPT | Mod: 25

## 2019-08-13 PROCEDURE — 85610 PROTHROMBIN TIME: CPT | Mod: QW

## 2019-08-22 ENCOUNTER — APPOINTMENT (OUTPATIENT)
Dept: FAMILY MEDICINE | Facility: CLINIC | Age: 59
End: 2019-08-22

## 2019-08-27 ENCOUNTER — APPOINTMENT (OUTPATIENT)
Dept: CARDIOLOGY | Facility: CLINIC | Age: 59
End: 2019-08-27
Payer: MEDICAID

## 2019-08-27 PROCEDURE — 85610 PROTHROMBIN TIME: CPT | Mod: QW

## 2019-08-27 PROCEDURE — 99211 OFF/OP EST MAY X REQ PHY/QHP: CPT | Mod: 25

## 2019-08-28 LAB
INR PPP: 2.3 RATIO
POCT-PROTHROMBIN TIME: 27.8 SECS
QUALITY CONTROL: YES

## 2019-09-13 ENCOUNTER — RX RENEWAL (OUTPATIENT)
Age: 59
End: 2019-09-13

## 2019-09-16 ENCOUNTER — APPOINTMENT (OUTPATIENT)
Dept: ELECTROPHYSIOLOGY | Facility: CLINIC | Age: 59
End: 2019-09-16
Payer: MEDICAID

## 2019-09-16 VITALS
HEIGHT: 66 IN | WEIGHT: 300 LBS | SYSTOLIC BLOOD PRESSURE: 134 MMHG | DIASTOLIC BLOOD PRESSURE: 89 MMHG | OXYGEN SATURATION: 95 % | BODY MASS INDEX: 48.21 KG/M2 | HEART RATE: 89 BPM

## 2019-09-16 PROCEDURE — 93288 INTERROG EVL PM/LDLS PM IP: CPT

## 2019-09-16 PROCEDURE — 99214 OFFICE O/P EST MOD 30 MIN: CPT

## 2019-09-16 PROCEDURE — 93000 ELECTROCARDIOGRAM COMPLETE: CPT | Mod: 59

## 2019-09-16 RX ORDER — METOPROLOL SUCCINATE 50 MG/1
50 TABLET, EXTENDED RELEASE ORAL
Qty: 45 | Refills: 3 | Status: DISCONTINUED | COMMUNITY
Start: 2019-06-09 | End: 2019-09-16

## 2019-09-16 RX ORDER — SUCRALFATE 1 G/1
1 TABLET ORAL 4 TIMES DAILY
Qty: 120 | Refills: 3 | Status: DISCONTINUED | COMMUNITY
Start: 2019-05-24 | End: 2019-09-16

## 2019-09-16 RX ORDER — METOPROLOL SUCCINATE 25 MG/1
25 TABLET, EXTENDED RELEASE ORAL
Qty: 45 | Refills: 3 | Status: DISCONTINUED | COMMUNITY
End: 2019-09-16

## 2019-09-16 NOTE — HISTORY OF PRESENT ILLNESS
[FreeTextEntry1] : Referring Physician: Bobby Miranda MD\par \par Dear Bobby:\par \par Mr. Luis Waller was seen in the [facility] today. For our records, please allow me to summarize the history and my findings.\par \par This pleasant 59 year old man has a cardiovascular history significant for  HTN, HL, morbid obesity, and CAD s/p CABG, and mild LV dysfunction who initially  presented in October 2018 with worsening shortness of breath and edema and found to be in rapid atrial flutter. He was brought for STANISLAW/DCCV but was found to have a MARV thrombus. He was heparinized with repeat STANISLAW 1 week later with persistent thrombus. He then underwent leadless PPM implant for backup pacing during meds titration due to 6-7 second nocturnal pauses. One month later he returned for STANISLAW/DCCV following 1 month of OAC, and subsequently for atrial flutter ablation.\par \par He has since done well without further palpitations. He underwent repeat TTE (4/19) showing now moderate segmental LV dysfunction in the LAD distribution. At last visit he was sent for angiogram after complaining of exertional chest pain and was found to have patent grafts without need for intervention.\par \par Device interrogation shows < 1% pacing at vvi 40.

## 2019-09-16 NOTE — PHYSICAL EXAM
[Normal Appearance] : normal appearance [General Appearance - Well Developed] : well developed [Well Groomed] : well groomed [General Appearance - Well Nourished] : well nourished [No Deformities] : no deformities [General Appearance - In No Acute Distress] : no acute distress [Normal Conjunctiva] : the conjunctiva exhibited no abnormalities [Eyelids - No Xanthelasma] : the eyelids demonstrated no xanthelasmas [Normal Oral Mucosa] : normal oral mucosa [No Oral Pallor] : no oral pallor [No Oral Cyanosis] : no oral cyanosis [Normal Jugular Venous A Waves Present] : normal jugular venous A waves present [Normal Jugular Venous V Waves Present] : normal jugular venous V waves present [No Jugular Venous Aguirre A Waves] : no jugular venous aguirre A waves [Respiration, Rhythm And Depth] : normal respiratory rhythm and effort [Exaggerated Use Of Accessory Muscles For Inspiration] : no accessory muscle use [Auscultation Breath Sounds / Voice Sounds] : lungs were clear to auscultation bilaterally [Heart Rate And Rhythm] : heart rate and rhythm were normal [Heart Sounds] : normal S1 and S2 [Murmurs] : no murmurs present [Abdomen Soft] : soft [Abdomen Tenderness] : non-tender [Abdomen Mass (___ Cm)] : no abdominal mass palpated [Abnormal Walk] : normal gait [Gait - Sufficient For Exercise Testing] : the gait was sufficient for exercise testing [Cyanosis, Localized] : no localized cyanosis [Nail Clubbing] : no clubbing of the fingernails [Petechial Hemorrhages (___cm)] : no petechial hemorrhages [Skin Color & Pigmentation] : normal skin color and pigmentation [] : no rash [No Venous Stasis] : no venous stasis [Skin Lesions] : no skin lesions [No Xanthoma] : no  xanthoma was observed [No Skin Ulcers] : no skin ulcer [Oriented To Time, Place, And Person] : oriented to person, place, and time [Affect] : the affect was normal [Mood] : the mood was normal [No Anxiety] : not feeling anxious

## 2019-09-16 NOTE — DISCUSSION/SUMMARY
[FreeTextEntry1] : In summary, this is a 58 year old man with a severe tachycardia-induced cardiomyopathy complicated by MARV thrombus and long nocturnal pauses during sleep requiring Micra implantation for titration of AV karlee blockers. He is now s/p flutter ablation and maintaining sinus rhythm. \par \par We discussed the possibility of ICD implantation should LV function remain remain persistently less than 35% despite rhythm control. He is pending repeat TTE tomorrow in your office. We also discussed referral the bariatric surgery clinic for further evaluation, though he would like further time to consider his options.\par \par Mr. Waller appeared to understand the whole discussion and verbalized that all of his questions were answered to his satisfaction. He will follow up in 6 months.\par \par Thank you for allowing me to be involved in the care of this pleasant man. Please feel free to contact me with any questions.

## 2019-09-17 ENCOUNTER — APPOINTMENT (OUTPATIENT)
Dept: CARDIOLOGY | Facility: CLINIC | Age: 59
End: 2019-09-17
Payer: MEDICAID

## 2019-09-17 ENCOUNTER — NON-APPOINTMENT (OUTPATIENT)
Age: 59
End: 2019-09-17

## 2019-09-17 VITALS
HEART RATE: 81 BPM | BODY MASS INDEX: 47.89 KG/M2 | OXYGEN SATURATION: 97 % | DIASTOLIC BLOOD PRESSURE: 91 MMHG | RESPIRATION RATE: 17 BRPM | HEIGHT: 66 IN | SYSTOLIC BLOOD PRESSURE: 137 MMHG | WEIGHT: 298 LBS

## 2019-09-17 LAB
INR PPP: 2.2 RATIO
POCT-PROTHROMBIN TIME: 25.8 SECS
QUALITY CONTROL: YES

## 2019-09-17 PROCEDURE — 85610 PROTHROMBIN TIME: CPT | Mod: QW

## 2019-09-17 PROCEDURE — 99215 OFFICE O/P EST HI 40 MIN: CPT | Mod: 25

## 2019-09-17 PROCEDURE — 93000 ELECTROCARDIOGRAM COMPLETE: CPT

## 2019-09-17 PROCEDURE — 93306 TTE W/DOPPLER COMPLETE: CPT

## 2019-09-20 ENCOUNTER — APPOINTMENT (OUTPATIENT)
Dept: FAMILY MEDICINE | Facility: CLINIC | Age: 59
End: 2019-09-20
Payer: MEDICAID

## 2019-09-20 VITALS — DIASTOLIC BLOOD PRESSURE: 88 MMHG | SYSTOLIC BLOOD PRESSURE: 134 MMHG

## 2019-09-20 VITALS
DIASTOLIC BLOOD PRESSURE: 102 MMHG | HEIGHT: 66 IN | BODY MASS INDEX: 48.21 KG/M2 | WEIGHT: 300 LBS | SYSTOLIC BLOOD PRESSURE: 156 MMHG

## 2019-09-20 PROCEDURE — 99214 OFFICE O/P EST MOD 30 MIN: CPT

## 2019-09-20 NOTE — COUNSELING
[Fall prevention counseling provided] : Fall prevention counseling provided [Adequate lighting] : Adequate lighting [No throw rugs] : No throw rugs [Use proper foot wear] : Use proper foot wear [Behavioral health counseling provided] : Behavioral health counseling provided [Sleep ___ hours/day] : Sleep [unfilled] hours/day [Engage in a relaxing activity] : Engage in a relaxing activity [Plan in advance] : Plan in advance [AUDIT-C Screening administered and reviewed] : AUDIT-C Screening administered and reviewed [Potential consequences of obesity discussed] : Potential consequences of obesity discussed [Benefits of weight loss discussed] : Benefits of weight loss discussed [Structured Weight Management Program suggested:] : Structured weight management program suggested [Encouraged to maintain food diary] : Encouraged to maintain food diary [Encouraged to increase physical activity] : Encouraged to increase physical activity [Encouraged to use exercise tracking device] : Encouraged to use exercise tracking device [Target Wt Loss Goal ___] : Weight Loss Goals: Target weight loss goal [unfilled] lbs [Weigh Self Weekly] : weigh self weekly [Decrease Portions] : decrease portions [____ min/wk Activity] : [unfilled] min/wk activity [Keep Food Diary] : keep food diary

## 2019-09-20 NOTE — REVIEW OF SYSTEMS
[Fatigue] : fatigue [Chest Pain] : no chest pain [Palpitations] : no palpitations [Lower Ext Edema] : lower extremity edema [Orthopena] : no orthopnea [Paroysmal Nocturnal Dyspnea] : no paroysmal nocturnal dyspnea [Shortness Of Breath] : no shortness of breath [Wheezing] : no wheezing [Cough] : no cough [Dyspnea on Exertion] : dyspnea on exertion [Abdominal Pain] : no abdominal pain [Nausea] : no nausea [Constipation] : no constipation [Diarrhea] : no diarrhea [Vomiting] : no vomiting [Heartburn] : heartburn [Melena] : no melena [Itching] : itching [Negative] : Heme/Lymph [de-identified] : Stasis dermatitis,Lichen planus

## 2019-09-20 NOTE — PHYSICAL EXAM
[No Acute Distress] : no acute distress [Well Nourished] : well nourished [Well Developed] : well developed [Well-Appearing] : well-appearing [Normal Voice/Communication] : normal voice/communication [Normal Rate] : normal rate  [Regular Rhythm] : with a regular rhythm [Normal S1, S2] : normal S1 and S2 [Soft] : abdomen soft [Non Tender] : non-tender [No Masses] : no abdominal mass palpated [Normal Bowel Sounds] : normal bowel sounds [Speech Grossly Normal] : speech grossly normal [Memory Grossly Normal] : memory grossly normal [Normal Affect] : the affect was normal [Alert and Oriented x3] : oriented to person, place, and time [Normal Mood] : the mood was normal [Normal Insight/Judgement] : insight and judgment were intact [Normal] : affect was normal and insight and judgment were intact [de-identified] : LARUA [de-identified] : ++2 edema [de-identified] : obese [de-identified] : left shoulder ROM decreased due to pain [de-identified] : stasis

## 2019-09-20 NOTE — ASSESSMENT
[FreeTextEntry1] : Assessment and plan:\par \par 1. Had a long and detailed discussion with patient regarding the findings of cardiology and also electrophysiology.\par \par 2. Ischemic heart disease continue anticoagulation and sublingual nitroglycerin as needed.\par \par 3. Hypertension. Blood pressure normalized after approximately 30 minutes continue lisinopril 20 mg twice a day, hydralazine 25 mg 3 times a day\par \par 4. Hyperlipidemia. Continue simvastatin 20 mg p.o. daily, low-fat, low-cholesterol diet.\par \par 5. Morbid obesity. Detailed discussion with patient regarding weight loss program and the possibility of bariatric surgery.\par \par 6. Obstructive sleep apnea. Unfortunately, patient does not tolerate CPAP, weight loss program would definitely improve. The obstructive sleep apnea.\par \par 7. Health maintenance issues discussed with patient patient declines all vaccines.

## 2019-09-20 NOTE — HISTORY OF PRESENT ILLNESS
[FreeTextEntry1] : Please see history of present illness [de-identified] : Patient is a 59-year-old gentleman, who presents today for followup appointment recently seen by cardiology and electrophysiology, status post ablation. Patient's medical history is significant for aortic stenosis, ischemic heart disease, atrial flutter, ischemic cardiomyopathy, history of chronic systolic congestive heart failure, hypertension, hyperlipidemia, elevated BMI. Obstructive sleep apnea unfortunately, the patient does not tolerate CPAP. Patient is on chronic anticoagulation.\par \par Patient states, that he's feeling a little better, but still feeling fatigued. He presently denies any chest pain. He admits to shortness of breath with exertion.

## 2019-10-15 ENCOUNTER — RX RENEWAL (OUTPATIENT)
Age: 59
End: 2019-10-15

## 2019-10-17 ENCOUNTER — APPOINTMENT (OUTPATIENT)
Dept: CARDIOLOGY | Facility: CLINIC | Age: 59
End: 2019-10-17
Payer: MEDICAID

## 2019-10-17 LAB
INR PPP: 2.3 RATIO
POCT-PROTHROMBIN TIME: 27.3 SECS
QUALITY CONTROL: YES

## 2019-10-17 PROCEDURE — 99211 OFF/OP EST MAY X REQ PHY/QHP: CPT | Mod: 25

## 2019-10-17 PROCEDURE — 85610 PROTHROMBIN TIME: CPT | Mod: QW

## 2019-11-18 ENCOUNTER — APPOINTMENT (OUTPATIENT)
Dept: CARDIOLOGY | Facility: CLINIC | Age: 59
End: 2019-11-18
Payer: MEDICAID

## 2019-11-18 PROCEDURE — 85610 PROTHROMBIN TIME: CPT | Mod: QW

## 2019-11-18 PROCEDURE — 99211 OFF/OP EST MAY X REQ PHY/QHP: CPT | Mod: 25

## 2019-11-19 LAB
INR PPP: 2.3 RATIO
POCT-PROTHROMBIN TIME: 27.6 SECS
QUALITY CONTROL: NO

## 2019-11-27 ENCOUNTER — INBOUND DOCUMENT (OUTPATIENT)
Age: 59
End: 2019-11-27

## 2019-12-03 NOTE — CONSULT NOTE ADULT - CONSULT REASON
Please allow up to one week for your provider to contact you with your results. If you have not received a call or letter from our office after one week, please contact us.    Please note:  -If you have an upcoming appointment, test results will be reviewed at the office visit.  -Your test results may start appearing on [facility] before the provider has reviewed them.  -In the case of multiple tests, your provider may wait for all test results before contacting you.    Don't have an account? You can sign up at [email or web address] and click on \"[place] Now\" to get started.     new onset aflutter

## 2019-12-10 ENCOUNTER — RX RENEWAL (OUTPATIENT)
Age: 59
End: 2019-12-10

## 2019-12-17 ENCOUNTER — APPOINTMENT (OUTPATIENT)
Dept: CARDIOLOGY | Facility: CLINIC | Age: 59
End: 2019-12-17

## 2019-12-24 ENCOUNTER — APPOINTMENT (OUTPATIENT)
Dept: CARDIOLOGY | Facility: CLINIC | Age: 59
End: 2019-12-24
Payer: MEDICAID

## 2019-12-24 ENCOUNTER — NON-APPOINTMENT (OUTPATIENT)
Age: 59
End: 2019-12-24

## 2019-12-24 VITALS
SYSTOLIC BLOOD PRESSURE: 146 MMHG | OXYGEN SATURATION: 98 % | WEIGHT: 309 LBS | DIASTOLIC BLOOD PRESSURE: 76 MMHG | HEIGHT: 66 IN | HEART RATE: 81 BPM | BODY MASS INDEX: 49.66 KG/M2 | RESPIRATION RATE: 16 BRPM

## 2019-12-24 LAB
INR PPP: 3 RATIO
POCT-PROTHROMBIN TIME: 36.2 SECS
QUALITY CONTROL: YES

## 2019-12-24 PROCEDURE — 93000 ELECTROCARDIOGRAM COMPLETE: CPT

## 2019-12-24 PROCEDURE — 99215 OFFICE O/P EST HI 40 MIN: CPT | Mod: 25

## 2019-12-24 PROCEDURE — 85610 PROTHROMBIN TIME: CPT | Mod: QW

## 2020-01-07 ENCOUNTER — APPOINTMENT (OUTPATIENT)
Dept: CARDIOLOGY | Facility: CLINIC | Age: 60
End: 2020-01-07
Payer: MEDICAID

## 2020-01-07 LAB
INR PPP: 2.6 RATIO
POCT-PROTHROMBIN TIME: 31.1 SECS
QUALITY CONTROL: YES

## 2020-01-07 PROCEDURE — 85610 PROTHROMBIN TIME: CPT | Mod: QW

## 2020-01-07 PROCEDURE — 99211 OFF/OP EST MAY X REQ PHY/QHP: CPT | Mod: 25

## 2020-01-08 ENCOUNTER — RX RENEWAL (OUTPATIENT)
Age: 60
End: 2020-01-08

## 2020-01-10 ENCOUNTER — RX RENEWAL (OUTPATIENT)
Age: 60
End: 2020-01-10

## 2020-01-13 ENCOUNTER — APPOINTMENT (OUTPATIENT)
Dept: FAMILY MEDICINE | Facility: CLINIC | Age: 60
End: 2020-01-13
Payer: MEDICAID

## 2020-01-13 VITALS
OXYGEN SATURATION: 98 % | BODY MASS INDEX: 50.14 KG/M2 | DIASTOLIC BLOOD PRESSURE: 90 MMHG | WEIGHT: 312 LBS | HEART RATE: 81 BPM | HEIGHT: 66 IN | SYSTOLIC BLOOD PRESSURE: 134 MMHG | TEMPERATURE: 98 F

## 2020-01-13 PROCEDURE — 99214 OFFICE O/P EST MOD 30 MIN: CPT

## 2020-01-13 NOTE — PHYSICAL EXAM
[No Acute Distress] : no acute distress [Well Nourished] : well nourished [Well Developed] : well developed [Well-Appearing] : well-appearing [Normal Voice/Communication] : normal voice/communication [Normal Rate] : normal rate  [Regular Rhythm] : with a regular rhythm [Normal S1, S2] : normal S1 and S2 [Soft] : abdomen soft [Non Tender] : non-tender [No Masses] : no abdominal mass palpated [Normal Bowel Sounds] : normal bowel sounds [Speech Grossly Normal] : speech grossly normal [Memory Grossly Normal] : memory grossly normal [Normal Affect] : the affect was normal [Alert and Oriented x3] : oriented to person, place, and time [Normal Mood] : the mood was normal [Normal Insight/Judgement] : insight and judgment were intact [Normal] : affect was normal and insight and judgment were intact [de-identified] : ++2 edema [de-identified] : LAURA [de-identified] : obese [de-identified] : left shoulder ROM decreased due to pain,still has not seen ortho [de-identified] : stasis dermatitis,hyperpimented and areas escoriation of lower ext.

## 2020-01-13 NOTE — HISTORY OF PRESENT ILLNESS
[FreeTextEntry1] : Please see history of present illness [de-identified] : This is a 59-year-old gentleman, who presents today for followup and disease management. Patient states, that he's been in his usual state of health. He does admit to exertional dyspnea recently seen by cardiology. Medical history is significant for ischemic heart disease, atrial flutter/atrial fibrillation. Patient is on anticoagulation, history of bypass surgery, chronic systolic congestive heart failure, hyperlipidemia, hypertension, and elevated BMI. Patient also has the diagnosis of obstructive sleep apnea unfortunately, did not tolerate CPAP

## 2020-01-13 NOTE — REVIEW OF SYSTEMS
[Fatigue] : fatigue [Chest Pain] : no chest pain [Palpitations] : no palpitations [Lower Ext Edema] : lower extremity edema [Orthopena] : no orthopnea [Shortness Of Breath] : no shortness of breath [Wheezing] : no wheezing [Cough] : no cough [Dyspnea on Exertion] : dyspnea on exertion [Abdominal Pain] : no abdominal pain [Nausea] : no nausea [Constipation] : no constipation [Diarrhea] : no diarrhea [Vomiting] : no vomiting [Heartburn] : no heartburn [Itching] : itching [Melena] : no melena [Negative] : Heme/Lymph [de-identified] : Stasis dermatitis,Lichen planus

## 2020-01-13 NOTE — HEALTH RISK ASSESSMENT
[] : No [No] : In the past 12 months have you used drugs other than those required for medical reasons? No [No falls in past year] : Patient reported no falls in the past year [0] : 2) Feeling down, depressed, or hopeless: Not at all (0) [Audit-CScore] : 0 [IWB0Oiauf] : 0

## 2020-01-13 NOTE — ASSESSMENT
[FreeTextEntry1] : Assessment and plan:\par \par 1. Atrial flutter, heart rate is well controlled. Continue anticoagulation with warfarin 6 mg alternating with 3 mg.\par \par 2. Chronic congestive heart failure. Presently, the patient is well controlled. Continue furosemide 40 mg p.o. daily, and ACE inhibitor.\par \par 3. Hypertension good blood pressure control. Continue the combination, furosemide 40 mg daily, hydralazine 25 mg 3 times a day, lisinopril 20 mg p.o. twice a day.\par \par 4. Ischemic heart disease. Presently, the patient is chest pain-free. Patient has not had the need for sublingual nitroglycerin.\par \par 5. Continue Plavix 75 mg p.o. daily.\par \par 6. Detailed discussion with patient regarding care management. Patient is very agreeable.\par \par 7. Elevated BMI. Discussed with patient weight loss program. He is very, very agreeable and is looking forward to participate with care management.\par \par Patient will be having comprehensive blood work drawn at lab

## 2020-01-13 NOTE — COUNSELING
[Fall prevention counseling provided] : Fall prevention counseling provided [Adequate lighting] : Adequate lighting [Use proper foot wear] : Use proper foot wear [No throw rugs] : No throw rugs [Sleep ___ hours/day] : Sleep [unfilled] hours/day [Plan in advance] : Plan in advance [Engage in a relaxing activity] : Engage in a relaxing activity [AUDIT-C Screening administered and reviewed] : AUDIT-C Screening administered and reviewed [Potential consequences of obesity discussed] : Potential consequences of obesity discussed [Benefits of weight loss discussed] : Benefits of weight loss discussed [Structured Weight Management Program suggested:] : Structured weight management program suggested [Encouraged to maintain food diary] : Encouraged to maintain food diary [Encouraged to increase physical activity] : Encouraged to increase physical activity [Encouraged to use exercise tracking device] : Encouraged to use exercise tracking device [Target Wt Loss Goal ___] : Weight Loss Goals: Target weight loss goal [unfilled] lbs [Weigh Self Weekly] : weigh self weekly [Decrease Portions] : decrease portions [____ min/wk Activity] : [unfilled] min/wk activity [Keep Food Diary] : keep food diary [FreeTextEntry3] : Patient does admit that it is extremely difficult to increase physical activity [Patient motivation] : Patient motivation [Good understanding] : Patient has a good understanding of lifestyle changes and steps needed to achieve self management goal

## 2020-02-04 ENCOUNTER — APPOINTMENT (OUTPATIENT)
Dept: CARDIOLOGY | Facility: CLINIC | Age: 60
End: 2020-02-04
Payer: MEDICAID

## 2020-02-04 VITALS — HEART RATE: 81 BPM | SYSTOLIC BLOOD PRESSURE: 136 MMHG | DIASTOLIC BLOOD PRESSURE: 93 MMHG | OXYGEN SATURATION: 97 %

## 2020-02-04 LAB
INR PPP: 3.1 RATIO
POCT-PROTHROMBIN TIME: 37 SECS
QUALITY CONTROL: YES

## 2020-02-04 PROCEDURE — 99211 OFF/OP EST MAY X REQ PHY/QHP: CPT | Mod: 25

## 2020-02-04 PROCEDURE — 85610 PROTHROMBIN TIME: CPT | Mod: QW

## 2020-02-10 ENCOUNTER — RX RENEWAL (OUTPATIENT)
Age: 60
End: 2020-02-10

## 2020-02-14 ENCOUNTER — LABORATORY RESULT (OUTPATIENT)
Age: 60
End: 2020-02-14

## 2020-02-18 ENCOUNTER — APPOINTMENT (OUTPATIENT)
Dept: CARDIOLOGY | Facility: CLINIC | Age: 60
End: 2020-02-18
Payer: MEDICAID

## 2020-02-18 ENCOUNTER — NON-APPOINTMENT (OUTPATIENT)
Age: 60
End: 2020-02-18

## 2020-02-18 VITALS
SYSTOLIC BLOOD PRESSURE: 131 MMHG | HEART RATE: 74 BPM | BODY MASS INDEX: 50.3 KG/M2 | DIASTOLIC BLOOD PRESSURE: 87 MMHG | OXYGEN SATURATION: 97 % | RESPIRATION RATE: 16 BRPM | HEIGHT: 66 IN | WEIGHT: 313 LBS

## 2020-02-18 VITALS
BODY MASS INDEX: 50.3 KG/M2 | WEIGHT: 313 LBS | DIASTOLIC BLOOD PRESSURE: 87 MMHG | HEART RATE: 74 BPM | SYSTOLIC BLOOD PRESSURE: 131 MMHG | HEIGHT: 66 IN | RESPIRATION RATE: 16 BRPM

## 2020-02-18 PROCEDURE — 93000 ELECTROCARDIOGRAM COMPLETE: CPT

## 2020-02-18 PROCEDURE — 99215 OFFICE O/P EST HI 40 MIN: CPT

## 2020-02-25 ENCOUNTER — APPOINTMENT (OUTPATIENT)
Dept: CARDIOLOGY | Facility: CLINIC | Age: 60
End: 2020-02-25
Payer: MEDICAID

## 2020-02-25 LAB
INR PPP: 2.3 RATIO
POCT-PROTHROMBIN TIME: 27.5 SECS
QUALITY CONTROL: YES

## 2020-02-25 PROCEDURE — 85610 PROTHROMBIN TIME: CPT | Mod: QW

## 2020-02-25 PROCEDURE — 99211 OFF/OP EST MAY X REQ PHY/QHP: CPT | Mod: 25

## 2020-03-16 ENCOUNTER — RX RENEWAL (OUTPATIENT)
Age: 60
End: 2020-03-16

## 2020-03-16 ENCOUNTER — APPOINTMENT (OUTPATIENT)
Dept: ELECTROPHYSIOLOGY | Facility: CLINIC | Age: 60
End: 2020-03-16

## 2020-03-24 ENCOUNTER — APPOINTMENT (OUTPATIENT)
Dept: CARDIOLOGY | Facility: CLINIC | Age: 60
End: 2020-03-24

## 2020-04-02 ENCOUNTER — RX RENEWAL (OUTPATIENT)
Age: 60
End: 2020-04-02

## 2020-04-02 ENCOUNTER — APPOINTMENT (OUTPATIENT)
Dept: CARDIOLOGY | Facility: CLINIC | Age: 60
End: 2020-04-02
Payer: MEDICAID

## 2020-04-02 PROCEDURE — 99442: CPT

## 2020-05-03 ENCOUNTER — RX RENEWAL (OUTPATIENT)
Age: 60
End: 2020-05-03

## 2020-05-07 ENCOUNTER — LABORATORY RESULT (OUTPATIENT)
Age: 60
End: 2020-05-07

## 2020-05-07 ENCOUNTER — APPOINTMENT (OUTPATIENT)
Dept: FAMILY MEDICINE | Facility: CLINIC | Age: 60
End: 2020-05-07
Payer: MEDICAID

## 2020-05-07 VITALS
WEIGHT: 315 LBS | OXYGEN SATURATION: 97 % | DIASTOLIC BLOOD PRESSURE: 100 MMHG | BODY MASS INDEX: 50.62 KG/M2 | TEMPERATURE: 97.4 F | SYSTOLIC BLOOD PRESSURE: 154 MMHG | HEART RATE: 94 BPM | HEIGHT: 66 IN

## 2020-05-07 VITALS — DIASTOLIC BLOOD PRESSURE: 78 MMHG | SYSTOLIC BLOOD PRESSURE: 128 MMHG

## 2020-05-07 DIAGNOSIS — E66.2 MORBID (SEVERE) OBESITY WITH ALVEOLAR HYPOVENTILATION: ICD-10-CM

## 2020-05-07 DIAGNOSIS — L43.9 LICHEN PLANUS, UNSPECIFIED: ICD-10-CM

## 2020-05-07 PROCEDURE — 99214 OFFICE O/P EST MOD 30 MIN: CPT | Mod: 25

## 2020-05-07 PROCEDURE — 36415 COLL VENOUS BLD VENIPUNCTURE: CPT

## 2020-05-07 NOTE — REVIEW OF SYSTEMS
[Fatigue] : fatigue [Lower Ext Edema] : lower extremity edema [Dyspnea on Exertion] : dyspnea on exertion [Skin Rash] : skin rash [Itching] : itching [Negative] : Heme/Lymph [Palpitations] : no palpitations [Shortness Of Breath] : no shortness of breath [Chest Pain] : no chest pain [Orthopena] : no orthopnea [Cough] : no cough [Wheezing] : no wheezing [Abdominal Pain] : no abdominal pain [Nausea] : no nausea [Diarrhea] : no diarrhea [Vomiting] : no vomiting [Constipation] : no constipation [de-identified] : Stasis dermatitis,Lichen planus [Melena] : no melena [Heartburn] : no heartburn

## 2020-05-07 NOTE — PHYSICAL EXAM
[No Acute Distress] : no acute distress [Well Developed] : well developed [Well Nourished] : well nourished [Well-Appearing] : well-appearing [Normal Voice/Communication] : normal voice/communication [Normal Rate] : normal rate  [Regular Rhythm] : with a regular rhythm [Non Tender] : non-tender [Normal S1, S2] : normal S1 and S2 [Soft] : abdomen soft [No Masses] : no abdominal mass palpated [Normal Bowel Sounds] : normal bowel sounds [Speech Grossly Normal] : speech grossly normal [Memory Grossly Normal] : memory grossly normal [Normal Affect] : the affect was normal [Alert and Oriented x3] : oriented to person, place, and time [Normal Insight/Judgement] : insight and judgment were intact [Normal Mood] : the mood was normal [de-identified] : LAURA [de-identified] : ++3edema [Normal] : affect was normal and insight and judgment were intact [de-identified] : left shoulder ROM decreased due to pain,still has not seen ortho [de-identified] : obese [de-identified] : stasis dermatitis,hyperpimented and areas escoriation of lower ext.

## 2020-05-07 NOTE — COUNSELING
[Fall prevention counseling provided] : Fall prevention counseling provided [Adequate lighting] : Adequate lighting [No throw rugs] : No throw rugs [Use proper foot wear] : Use proper foot wear [Behavioral health counseling provided] : Behavioral health counseling provided [Sleep ___ hours/day] : Sleep [unfilled] hours/day [Plan in advance] : Plan in advance [Engage in a relaxing activity] : Engage in a relaxing activity [Potential consequences of obesity discussed] : Potential consequences of obesity discussed [AUDIT-C Screening administered and reviewed] : AUDIT-C Screening administered and reviewed [Benefits of weight loss discussed] : Benefits of weight loss discussed [Structured Weight Management Program suggested:] : Structured weight management program suggested [Encouraged to maintain food diary] : Encouraged to maintain food diary [Encouraged to increase physical activity] : Encouraged to increase physical activity [Weigh Self Weekly] : weigh self weekly [Encouraged to use exercise tracking device] : Encouraged to use exercise tracking device [____ min/wk Activity] : [unfilled] min/wk activity [Decrease Portions] : decrease portions [Keep Food Diary] : keep food diary [Good understanding] : Patient has a good understanding of lifestyle changes and steps needed to achieve self management goal [Patient motivation] : Patient motivation

## 2020-05-07 NOTE — HEALTH RISK ASSESSMENT
[No] : In the past 12 months have you used drugs other than those required for medical reasons? No [No falls in past year] : Patient reported no falls in the past year [0] : 2) Feeling down, depressed, or hopeless: Not at all (0) [] : No [Audit-CScore] : 0 [[id number]] : 0

## 2020-05-07 NOTE — HISTORY OF PRESENT ILLNESS
[FreeTextEntry1] : See HPI [de-identified] : Patient is a 59-year-old gentleman who presents today for follow-up and disease management patient does have multiple medical problems which include ischemic heart disease atrial flutter chronic anticoagulation history of bypass surgery and pacemaker patient has chronic renal insufficiency stage III most likely medication induced secondary to diuretics history of chronic systolic congestive heart failure with a history of aortic stenosis patient will require echocardiogram to evaluate valve and also cardiac function that his ejection fraction patient does admit to dyspnea with exertion he also has underlying gastroesophageal reflux disease which presently is controlled hyperlipidemia hypertension and chronic lichen planus of lower extremities patient also has peripheral vascular disease with chronic edema.\par \par Presently the patient is awake alert and oriented x3 in no acute distress except for the chief complaint of exertional dyspnea.  Patient has no recollection of being exposed to anyone with proven COVID 19 but that still remains an unknown.

## 2020-05-07 NOTE — ASSESSMENT
[FreeTextEntry1] : Assessment and plan:\par \par 1. Atrial flutter, heart rate is well controlled. Continue anticoagulation with warfarin 6 mg alternating with 3 mg.  Apprehensive blood work drawn in office by examiner check PT/INR\par \par 2. Chronic congestive heart failure. Presently, the patient is well controlled. Continue furosemide 40 mg p.o. daily, and ACE inhibitor.  Unfortunately patient still complaining of exertional dyspnea does have appointment with cardiology recommendations are echocardiogram to evaluate aortic valve and ejection fraction.  Continue present medical management.\par \par 3. Hypertension good blood pressure control. Continue the combination, furosemide 40 mg daily, hydralazine 25 mg 3 times a day, lisinopril 20 mg p.o. twice a day.  Patient's blood pressure was slightly elevated when first taken approximately 30 minutes thereafter it dropped to 120/78 which actually is excellent control unfortunately with exertion patient's blood pressure did increase.\par \par 4. Ischemic heart disease. Presently, the patient is chest pain-free. Patient has not had the need for sublingual nitroglycerin.\par \par 5. Continue Plavix 75 mg p.o. daily.\par \par 6. Detailed discussion with patient regarding care management. Patient is very agreeable.\par \par 7. Elevated BMI. Discussed with patient weight loss program. He is very, very agreeable and is looking forward to participate with care management.\par \par  comprehensive blood work drawn in office by examiner.

## 2020-05-08 DIAGNOSIS — D55.0 ANEMIA DUE TO GLUCOSE-6-PHOSPHATE DEHYDROGENASE [G6PD] DEFICIENCY: ICD-10-CM

## 2020-05-08 DIAGNOSIS — D64.9 ANEMIA, UNSPECIFIED: ICD-10-CM

## 2020-05-08 LAB
ALBUMIN SERPL ELPH-MCNC: 4.3 G/DL
ALP BLD-CCNC: 103 U/L
ALT SERPL-CCNC: 17 U/L
ANION GAP SERPL CALC-SCNC: 13 MMOL/L
AST SERPL-CCNC: 18 U/L
BASOPHILS # BLD AUTO: 0.03 K/UL
BASOPHILS NFR BLD AUTO: 0.4 %
BILIRUB SERPL-MCNC: 0.5 MG/DL
BUN SERPL-MCNC: 26 MG/DL
CALCIUM SERPL-MCNC: 9.1 MG/DL
CHLORIDE SERPL-SCNC: 97 MMOL/L
CHOLEST SERPL-MCNC: 171 MG/DL
CHOLEST/HDLC SERPL: 5.1 RATIO
CO2 SERPL-SCNC: 29 MMOL/L
CREAT SERPL-MCNC: 1.45 MG/DL
EOSINOPHIL # BLD AUTO: 0.32 K/UL
EOSINOPHIL NFR BLD AUTO: 4.4 %
ESTIMATED AVERAGE GLUCOSE: 126 MG/DL
GLUCOSE SERPL-MCNC: 106 MG/DL
HBA1C MFR BLD HPLC: 6 %
HCT VFR BLD CALC: 44 %
HDLC SERPL-MCNC: 34 MG/DL
HGB BLD-MCNC: 12.9 G/DL
IMM GRANULOCYTES NFR BLD AUTO: 0.6 %
INR PPP: 2.51 RATIO
LDLC SERPL CALC-MCNC: 113 MG/DL
LYMPHOCYTES # BLD AUTO: 0.85 K/UL
LYMPHOCYTES NFR BLD AUTO: 11.7 %
MAN DIFF?: NORMAL
MCHC RBC-ENTMCNC: 25.2 PG
MCHC RBC-ENTMCNC: 29.3 GM/DL
MCV RBC AUTO: 86.1 FL
MONOCYTES # BLD AUTO: 0.85 K/UL
MONOCYTES NFR BLD AUTO: 11.7 %
NEUTROPHILS # BLD AUTO: 5.17 K/UL
NEUTROPHILS NFR BLD AUTO: 71.2 %
PLATELET # BLD AUTO: 300 K/UL
POTASSIUM SERPL-SCNC: 4.4 MMOL/L
PROT SERPL-MCNC: 7.7 G/DL
PT BLD: 29.4 SEC
RBC # BLD: 5.11 M/UL
RBC # FLD: 16.1 %
SODIUM SERPL-SCNC: 138 MMOL/L
TRIGL SERPL-MCNC: 124 MG/DL
WBC # FLD AUTO: 7.26 K/UL

## 2020-05-18 ENCOUNTER — APPOINTMENT (OUTPATIENT)
Dept: CARDIOLOGY | Facility: CLINIC | Age: 60
End: 2020-05-18

## 2020-05-29 ENCOUNTER — APPOINTMENT (OUTPATIENT)
Dept: CARDIOLOGY | Facility: CLINIC | Age: 60
End: 2020-05-29
Payer: MEDICAID

## 2020-05-29 ENCOUNTER — NON-APPOINTMENT (OUTPATIENT)
Age: 60
End: 2020-05-29

## 2020-05-29 VITALS
DIASTOLIC BLOOD PRESSURE: 110 MMHG | HEIGHT: 66 IN | SYSTOLIC BLOOD PRESSURE: 163 MMHG | BODY MASS INDEX: 50.62 KG/M2 | OXYGEN SATURATION: 93 % | RESPIRATION RATE: 14 BRPM | WEIGHT: 315 LBS | HEART RATE: 89 BPM

## 2020-05-29 PROCEDURE — 93306 TTE W/DOPPLER COMPLETE: CPT

## 2020-05-29 PROCEDURE — 93000 ELECTROCARDIOGRAM COMPLETE: CPT

## 2020-05-29 PROCEDURE — 99215 OFFICE O/P EST HI 40 MIN: CPT

## 2020-06-12 ENCOUNTER — NON-APPOINTMENT (OUTPATIENT)
Age: 60
End: 2020-06-12

## 2020-06-12 ENCOUNTER — APPOINTMENT (OUTPATIENT)
Dept: CARDIOLOGY | Facility: CLINIC | Age: 60
End: 2020-06-12
Payer: MEDICAID

## 2020-06-12 ENCOUNTER — LABORATORY RESULT (OUTPATIENT)
Age: 60
End: 2020-06-12

## 2020-06-12 VITALS
SYSTOLIC BLOOD PRESSURE: 155 MMHG | HEART RATE: 88 BPM | OXYGEN SATURATION: 93 % | WEIGHT: 315 LBS | HEIGHT: 66 IN | BODY MASS INDEX: 50.62 KG/M2 | DIASTOLIC BLOOD PRESSURE: 96 MMHG | RESPIRATION RATE: 14 BRPM

## 2020-06-12 PROCEDURE — 93000 ELECTROCARDIOGRAM COMPLETE: CPT

## 2020-06-12 PROCEDURE — 99215 OFFICE O/P EST HI 40 MIN: CPT

## 2020-06-29 ENCOUNTER — APPOINTMENT (OUTPATIENT)
Dept: CARDIOLOGY | Facility: CLINIC | Age: 60
End: 2020-06-29
Payer: MEDICAID

## 2020-06-29 PROCEDURE — 93015 CV STRESS TEST SUPVJ I&R: CPT

## 2020-06-29 PROCEDURE — 78452 HT MUSCLE IMAGE SPECT MULT: CPT

## 2020-06-29 PROCEDURE — A9500: CPT

## 2020-07-02 ENCOUNTER — APPOINTMENT (OUTPATIENT)
Dept: CARDIOLOGY | Facility: CLINIC | Age: 60
End: 2020-07-02
Payer: MEDICAID

## 2020-07-02 PROCEDURE — A9500: CPT

## 2020-07-02 PROCEDURE — 78452 HT MUSCLE IMAGE SPECT MULT: CPT

## 2020-07-05 ENCOUNTER — RX RENEWAL (OUTPATIENT)
Age: 60
End: 2020-07-05

## 2020-07-09 ENCOUNTER — RX RENEWAL (OUTPATIENT)
Age: 60
End: 2020-07-09

## 2020-07-22 ENCOUNTER — APPOINTMENT (OUTPATIENT)
Dept: HEART FAILURE | Facility: CLINIC | Age: 60
End: 2020-07-22
Payer: MEDICAID

## 2020-07-22 VITALS
HEIGHT: 66 IN | OXYGEN SATURATION: 93 % | SYSTOLIC BLOOD PRESSURE: 134 MMHG | WEIGHT: 315 LBS | HEART RATE: 99 BPM | RESPIRATION RATE: 12 BRPM | DIASTOLIC BLOOD PRESSURE: 86 MMHG | TEMPERATURE: 99.5 F | BODY MASS INDEX: 50.62 KG/M2

## 2020-07-22 DIAGNOSIS — R06.02 SHORTNESS OF BREATH: ICD-10-CM

## 2020-07-22 PROCEDURE — 99205 OFFICE O/P NEW HI 60 MIN: CPT

## 2020-07-22 NOTE — DISCUSSION/SUMMARY
[FreeTextEntry1] : Congestive heart failure:\par - Stop Lisinopril\par - Start Entresto 97/103 mg twice daily. \par - Depending on his response to Entresto, he may need augmentation of diuretics at the next clinic visit and will have spironolactone added unless hyperkalemia precludes its use. \par - When he is euvolemic, we will retrial beta-blockers, which he has not tolerated in the past. If he does not tolerate beta-blockers, we will consider ivabradine. \par Chronic kidney diease\par - Repeat blood work in one week to assess potassium levels (BMP, NT-proBNP)\par Atrial flutter/Coronary artery disease\par - I discussed his use of aspirin, clopidogrel and warfarin with Dr. Miranda. He agreed that the aspirin can be stopped. \par Hyperlipidemia\par - I discussed his sub-optimal LDL cholesterol control with Dr Miranda, who will adjust his statin.

## 2020-07-22 NOTE — HISTORY OF PRESENT ILLNESS
[FreeTextEntry1] : Ms. Waller is a 60 year old man with a history of coronary artery disease who underwent CABG in December of 2012. He underwent PCI of the RCA in June of 2019. He has a history of atrial flutter and has undergone an ablation in the past and is s/p Medtronic Micra pacemaker implantation. His other comorbidities include a long time history of hypertension. He has never been admitted with heart failure and has never had an acute heart attack to his knowledge. He has had progressive swelling in legs over many years and has developed chronic skin changes in his legs, which has been described as consistent with lichen planus. He has a history of obstructive sleep apnea, but could not tolerate CPAP. \par \par He currently notes dyspnea on exertion and is not able to walk significant distances due to dyspnea. His dyspnea has progressively worsened over the last several months and is currently moderate in severity. He is not short of breath at rest. He has no PND or orthopnea. He has no chest pain or pressure. He is not dizzy and has had no falls. He has never had syncope. He notes no abdominal pain. He is intolerance of beta blockers in the past, primarily due to shortness of breath when he would lie flat. \par \par I reviewed the following studies:\par \par Echo from 5/29/20, showing normal LV cavity size with septum of 1.2 cm and posterior wall thickness of 1.3 cm. There was mild mitral stenosis (calcific) and moderate aortic stenosis with aortic valve area of 1.2 cm squared. There was at least mild to moderate global LV systolic dysfunction with regional wall motion abnormalities. There was mild diastolic dysfunction. There was no estimate of PASP. \par \par EKG from 6/12/20, showing sinus rhythm with poor R wave progression and non-specific ST changes.\par \par Nuclear stress test from 6/29/2020 showing an enlarged left ventricle with large, moderate to severe defects in the anterior and anterolateral, anteroapical, apical, inferoseptal, and septal walls that are predominantly fixed, consistent with infarct. The estimated LVEF was 32%. \par \par Cath report from 6/21/2019 showing  a 100% proximal LAD stenosis, 100% proximal circumflex stenosis, a patent LIMA to the mid LAD with minor luminal irregularities. He underwent PCI of a 90% RCA lesion. \par \par Blood work from 6/12/20 showing sodium of 139 with creatinine of 1.4. LDL was 113 and HDL was 34 on May 8, 2020.

## 2020-07-22 NOTE — ASSESSMENT
[FreeTextEntry1] : Mr. Waller is a 60 year old man with an ischemic cardiomyopathy with ACC/AHA stage C chronic systolic heart failure. He is currently hypertensive and volume overloaded. He has NYHA class III symptoms. He is not on medical therapy. He is not on a beta-blocker due to prior intolerance. He has uncorrected WILFRED, which is possibly related to his heart failure and may improve with treatment.

## 2020-07-22 NOTE — PHYSICAL EXAM
[General Appearance - Well Developed] : well developed [Normal Conjunctiva] : the conjunctiva exhibited no abnormalities [General Appearance - Well Nourished] : well nourished [Normal Oral Mucosa] : normal oral mucosa [Normal Oropharynx] : normal oropharynx [Arterial Pulses Normal] : the arterial pulses were normal [Heart Sounds] : normal S1 and S2 [Heart Rate And Rhythm] : heart rate and rhythm were normal [Auscultation Breath Sounds / Voice Sounds] : lungs were clear to auscultation bilaterally [Respiration, Rhythm And Depth] : normal respiratory rhythm and effort [] : no hepato-splenomegaly [Bowel Sounds] : normal bowel sounds [Abdomen Soft] : soft [Abnormal Walk] : normal gait [Oriented To Time, Place, And Person] : oriented to person, place, and time [Nail Clubbing] : no clubbing of the fingernails [Impaired Insight] : insight and judgment were intact [No Anxiety] : not feeling anxious [FreeTextEntry1] : Thick plaques over legs bilaterally.

## 2020-08-03 ENCOUNTER — RX RENEWAL (OUTPATIENT)
Age: 60
End: 2020-08-03

## 2020-08-11 ENCOUNTER — NON-APPOINTMENT (OUTPATIENT)
Age: 60
End: 2020-08-11

## 2020-08-11 ENCOUNTER — APPOINTMENT (OUTPATIENT)
Dept: CARDIOLOGY | Facility: CLINIC | Age: 60
End: 2020-08-11
Payer: MEDICAID

## 2020-08-11 VITALS
DIASTOLIC BLOOD PRESSURE: 84 MMHG | BODY MASS INDEX: 50.14 KG/M2 | WEIGHT: 312 LBS | RESPIRATION RATE: 19 BRPM | OXYGEN SATURATION: 96 % | TEMPERATURE: 97.9 F | HEART RATE: 100 BPM | SYSTOLIC BLOOD PRESSURE: 121 MMHG | HEIGHT: 66 IN

## 2020-08-11 PROCEDURE — 93000 ELECTROCARDIOGRAM COMPLETE: CPT

## 2020-08-11 PROCEDURE — 99215 OFFICE O/P EST HI 40 MIN: CPT

## 2020-08-11 RX ORDER — ASPIRIN 500 MG
325 TABLET ORAL
Refills: 3 | Status: DISCONTINUED | COMMUNITY
Start: 2019-09-16 | End: 2020-08-11

## 2020-08-18 ENCOUNTER — APPOINTMENT (OUTPATIENT)
Dept: HEART FAILURE | Facility: CLINIC | Age: 60
End: 2020-08-18

## 2020-08-19 LAB
ANION GAP SERPL CALC-SCNC: 15 MMOL/L
BUN SERPL-MCNC: 41 MG/DL
CALCIUM SERPL-MCNC: 9.5 MG/DL
CHLORIDE SERPL-SCNC: 100 MMOL/L
CO2 SERPL-SCNC: 27 MMOL/L
CREAT SERPL-MCNC: 1.73 MG/DL
GLUCOSE SERPL-MCNC: 101 MG/DL
NT-PROBNP SERPL-MCNC: 328 PG/ML
POTASSIUM SERPL-SCNC: 6 MMOL/L
SODIUM SERPL-SCNC: 142 MMOL/L

## 2020-08-24 ENCOUNTER — APPOINTMENT (OUTPATIENT)
Dept: FAMILY MEDICINE | Facility: CLINIC | Age: 60
End: 2020-08-24
Payer: MEDICAID

## 2020-08-24 VITALS
BODY MASS INDEX: 50.46 KG/M2 | HEART RATE: 98 BPM | OXYGEN SATURATION: 95 % | HEIGHT: 66 IN | WEIGHT: 314 LBS | DIASTOLIC BLOOD PRESSURE: 85 MMHG | SYSTOLIC BLOOD PRESSURE: 126 MMHG

## 2020-08-24 DIAGNOSIS — K04.7 PERIAPICAL ABSCESS W/OUT SINUS: ICD-10-CM

## 2020-08-24 PROCEDURE — 99214 OFFICE O/P EST MOD 30 MIN: CPT

## 2020-08-24 NOTE — COUNSELING
[Fall prevention counseling provided] : Fall prevention counseling provided [No throw rugs] : No throw rugs [Adequate lighting] : Adequate lighting [Use proper foot wear] : Use proper foot wear [Behavioral health counseling provided] : Behavioral health counseling provided [Sleep ___ hours/day] : Sleep [unfilled] hours/day [Engage in a relaxing activity] : Engage in a relaxing activity [Plan in advance] : Plan in advance [Potential consequences of obesity discussed] : Potential consequences of obesity discussed [AUDIT-C Screening administered and reviewed] : AUDIT-C Screening administered and reviewed [Structured Weight Management Program suggested:] : Structured weight management program suggested [Benefits of weight loss discussed] : Benefits of weight loss discussed [Encouraged to increase physical activity] : Encouraged to increase physical activity [Encouraged to use exercise tracking device] : Encouraged to use exercise tracking device [Encouraged to maintain food diary] : Encouraged to maintain food diary [Decrease Portions] : decrease portions [Weigh Self Weekly] : weigh self weekly [____ min/wk Activity] : [unfilled] min/wk activity [Keep Food Diary] : keep food diary [None] : None [Good understanding] : Patient has a good understanding of lifestyle changes and steps needed to achieve self management goal

## 2020-08-24 NOTE — HISTORY OF PRESENT ILLNESS
[FreeTextEntry1] : See HPI [de-identified] : Patient is a 60-year-old gentleman who presents today for follow-up and disease management.  Patient is complaining of formation of dental abscess has appointment with dentist but the pain and swelling has worsened.  We will treat empirically until the patient gets to see his dentist with Augmentin.\par \par At this visit we will be addressing ischemic heart disease, atrial flutter patient is anticoagulated, chronic congestive systolic failure patient recently seen by cardiology, hyperglycemia, hyperlipidemia, hypertension and elevated BMI.  Patient has history of obstructive sleep apnea unfortunately did not tolerate CPAP at all.

## 2020-08-24 NOTE — HEALTH RISK ASSESSMENT
[] : No [Yes] : Yes [Monthly or less (1 pt)] : Monthly or less (1 point) [1 or 2 (0 pts)] : 1 or 2 (0 points) [Never (0 pts)] : Never (0 points) [No falls in past year] : Patient reported no falls in the past year [No] : In the past 12 months have you used drugs other than those required for medical reasons? No [0] : 1) Little interest or pleasure doing things: Not at all (0) [Audit-CScore] : 1 [[id number]] : 0

## 2020-08-24 NOTE — ASSESSMENT
[FreeTextEntry1] : Assessment and plan:\par \par 1. Atrial flutter, heart rate is well controlled. Continue anticoagulation with warfarin 6 mg alternating with 3 mg. \par \par 2. Chronic congestive heart failure. Presently, the patient is well controlled. Continue furosemide 40 mg p.o. daily, and Entresto .\par \par 3. Hypertension good blood pressure control. Continue the combination, furosemide 40 mg daily, hydralazine 25 mg 3 times a day..  Patient's blood pressure was slightly elevated when first taken approximately 30 minutes thereafter it dropped to 120/78 which actually is excellent control unfortunately with exertion patient's blood pressure did increase.\par \par 4. Ischemic heart disease. Presently, the patient is chest pain-free. Patient has not had the need for sublingual nitroglycerin.\par \par 5. Continue Plavix 75 mg p.o. daily.\par \par 6. Elevated BMI. Discussed with patient weight loss program. He is very, very agreeable and is looking forward to participate with care management.\par \par 7.  Dental abscess treat empirically with Augmentin until seen by dentistry.\par \par  comprehensive blood work reviewed with patient.

## 2020-08-24 NOTE — REVIEW OF SYSTEMS
[Fatigue] : fatigue [Chest Pain] : no chest pain [Orthopena] : no orthopnea [Lower Ext Edema] : lower extremity edema [Palpitations] : no palpitations [Wheezing] : no wheezing [Shortness Of Breath] : no shortness of breath [Cough] : no cough [Abdominal Pain] : no abdominal pain [Nausea] : no nausea [Dyspnea on Exertion] : dyspnea on exertion [Constipation] : no constipation [Diarrhea] : no diarrhea [Heartburn] : no heartburn [Melena] : no melena [Vomiting] : no vomiting [Itching] : itching [Skin Rash] : skin rash [FreeTextEntry4] : dental pain [Negative] : Heme/Lymph [de-identified] : Stasis dermatitis,Lichen planus

## 2020-08-24 NOTE — PHYSICAL EXAM
[No Acute Distress] : no acute distress [Well-Appearing] : well-appearing [Well Developed] : well developed [Well Nourished] : well nourished [Normal Voice/Communication] : normal voice/communication [Normal Rate] : normal rate  [Regular Rhythm] : with a regular rhythm [Non Tender] : non-tender [Soft] : abdomen soft [Normal S1, S2] : normal S1 and S2 [No Masses] : no abdominal mass palpated [Normal Bowel Sounds] : normal bowel sounds [Memory Grossly Normal] : memory grossly normal [Normal Affect] : the affect was normal [Speech Grossly Normal] : speech grossly normal [Alert and Oriented x3] : oriented to person, place, and time [Normal Mood] : the mood was normal [Normal] : affect was normal and insight and judgment were intact [Normal Insight/Judgement] : insight and judgment were intact [de-identified] : LAURA [de-identified] : ++3edema [de-identified] : obese [de-identified] : left shoulder ROM decreased due to pain,still has not seen ortho [de-identified] : stasis dermatitis,hyperpimented and areas escoriation of lower ext.

## 2020-08-25 ENCOUNTER — APPOINTMENT (OUTPATIENT)
Dept: CARDIOLOGY | Facility: CLINIC | Age: 60
End: 2020-08-25
Payer: MEDICAID

## 2020-08-25 LAB
INR PPP: 2.1 RATIO
POCT-PROTHROMBIN TIME: 24.6 SECS
QUALITY CONTROL: YES

## 2020-08-25 PROCEDURE — 99211 OFF/OP EST MAY X REQ PHY/QHP: CPT | Mod: 25

## 2020-08-25 PROCEDURE — 85610 PROTHROMBIN TIME: CPT | Mod: QW

## 2020-08-26 ENCOUNTER — APPOINTMENT (OUTPATIENT)
Dept: HEART FAILURE | Facility: CLINIC | Age: 60
End: 2020-08-26
Payer: MEDICAID

## 2020-08-26 VITALS
OXYGEN SATURATION: 94 % | HEIGHT: 66 IN | RESPIRATION RATE: 12 BRPM | TEMPERATURE: 97.8 F | DIASTOLIC BLOOD PRESSURE: 84 MMHG | HEART RATE: 91 BPM | SYSTOLIC BLOOD PRESSURE: 125 MMHG | WEIGHT: 314 LBS | BODY MASS INDEX: 50.46 KG/M2

## 2020-08-26 LAB
ALBUMIN SERPL ELPH-MCNC: 4.4 G/DL
ALP BLD-CCNC: 105 U/L
ALT SERPL-CCNC: 20 U/L
ANION GAP SERPL CALC-SCNC: 12 MMOL/L
AST SERPL-CCNC: 23 U/L
BILIRUB SERPL-MCNC: 0.3 MG/DL
BUN SERPL-MCNC: 38 MG/DL
CALCIUM SERPL-MCNC: 9.3 MG/DL
CHLORIDE SERPL-SCNC: 103 MMOL/L
CO2 SERPL-SCNC: 26 MMOL/L
CREAT SERPL-MCNC: 1.81 MG/DL
GLUCOSE SERPL-MCNC: 111 MG/DL
MAGNESIUM SERPL-MCNC: 2 MG/DL
NT-PROBNP SERPL-MCNC: 446 PG/ML
POTASSIUM SERPL-SCNC: 4.6 MMOL/L
PROT SERPL-MCNC: 7.5 G/DL
SODIUM SERPL-SCNC: 142 MMOL/L

## 2020-08-26 PROCEDURE — 99214 OFFICE O/P EST MOD 30 MIN: CPT

## 2020-08-28 NOTE — HISTORY OF PRESENT ILLNESS
[FreeTextEntry1] : Ms. Waller is a 60 year old man with a history of coronary artery disease who underwent CABG in December of 2012. He underwent PCI of the RCA in June of 2019. He has a history of atrial flutter and has undergone an ablation in the past and is s/p Medtronic Micra pacemaker implantation. His other comorbidities include a long time history of hypertension. He has never been admitted with heart failure and has never had an acute heart attack to his knowledge. He has had progressive swelling in legs over many years and has developed chronic skin changes in his legs, which has been described as consistent with lichen planus. He has a history of obstructive sleep apnea, but could not tolerate CPAP. \par \par Following transition to high dose Entresto at his last visit, he notes no meaningful improvement in his dyspnea with mild - moderate exertion. He tells me he was able to walk around [place] slowly last week, without SOB. He was prescribed Lokelma when repeat labs on 8/18 noted K of 6 but had not yet started it as his pharmacy was awaiting delivery of the medication. In the interim he has been reducing his consumption of potassium rich foods. Weight today in clinic is down 4 lbs. He does not have a working scale at home. He continues to sleep in his recliner for comfort, which is not new for him. The LE swelling and ABD bloating is largely unchanged. He denies SOB at rest, CP, palpitations, demetrice orthopnea, PND, cough, LH/dizziness. Of note, historically intolerant of beta blockers in the past, primarily due to shortness of breath when he would lie flat. \par \par Pertinent Cardiac Studies:\par \par Echo from 5/29/20, showing normal LV cavity size with septum of 1.2 cm and posterior wall thickness of 1.3 cm. There was mild mitral stenosis (calcific) and moderate aortic stenosis with aortic valve area of 1.2 cm squared. There was at least mild to moderate global LV systolic dysfunction with regional wall motion abnormalities. There was mild diastolic dysfunction. There was no estimate of PASP. \par \par EKG from 6/12/20, showing sinus rhythm with poor R wave progression and non-specific ST changes.\par \par Nuclear stress test from 6/29/2020 showing an enlarged left ventricle with large, moderate to severe defects in the anterior and anterolateral, anteroapical, apical, inferoseptal, and septal walls that are predominantly fixed, consistent with infarct. The estimated LVEF was 32%. \par \par Cath report from 6/21/2019 showing a 100% proximal LAD stenosis, 100% proximal circumflex stenosis, a patent LIMA to the mid LAD with minor luminal irregularities. He underwent PCI of a 90% RCA lesion. \par \par Blood work from 6/12/20 showing sodium of 139 with creatinine of 1.4. LDL was 113 and HDL was 34 on May 8, 2020.

## 2020-08-28 NOTE — PHYSICAL EXAM
[General Appearance - Well Nourished] : well nourished [General Appearance - Well Developed] : well developed [Normal Conjunctiva] : the conjunctiva exhibited no abnormalities [JVD Elevated _____cm] : JVD elevated [unfilled] ~U cm above clavicle [Respiration, Rhythm And Depth] : normal respiratory rhythm and effort [] : no respiratory distress [Heart Rate And Rhythm] : heart rate and rhythm were normal [Auscultation Breath Sounds / Voice Sounds] : lungs were clear to auscultation bilaterally [Arterial Pulses Normal] : the arterial pulses were normal [Heart Sounds] : normal S1 and S2 [Bowel Sounds] : normal bowel sounds [Abdomen Soft] : soft [Abnormal Walk] : normal gait [Nail Clubbing] : no clubbing of the fingernails [Impaired Insight] : insight and judgment were intact [Oriented To Time, Place, And Person] : oriented to person, place, and time [No Anxiety] : not feeling anxious [General Appearance - In No Acute Distress] : no acute distress [No Oral Cyanosis] : no oral cyanosis [Abdomen Tenderness] : non-tender [Cyanosis, Localized] : no localized cyanosis [FreeTextEntry1] : Thick hyperpigmented plaques over legs bilaterally.

## 2020-08-28 NOTE — ASSESSMENT
[FreeTextEntry1] : Mr. Waller is a 60 year old man with an ischemic cardiomyopathy with ACC/AHA stage C chronic systolic heart failure and is currently endorsing NYHA class III symptoms. He remains volume expanded on exam despite recent initiation of high dose ARNI. Reassuringly, he has room for further uptitration of neurohormonal blockade and is not tachycardic. He is on suboptimal medical therapy, off a beta-blocker due to prior intolerance. He has uncorrected WILFRED, which is possibly related to his heart failure and may improve with treatment.

## 2020-08-28 NOTE — DISCUSSION/SUMMARY
[With ___] : with [unfilled] [___ Week(s)] : [unfilled] week(s) [FreeTextEntry1] : Congestive heart failure:\par - Increase HDZN to 50 mg TID\par - Given data of recent DAPA-HF trial, will initiate Farxiga 10 mg daily (PA approved), and repeat labs in one week. \par - Continue Entresto 97/103 mg twice daily\par - Continue Lasix 80 mg daily\par - Will defer initiation of MRA for now, with consideration at following visits should K continue to be stable \par - When he is euvolemic, we will retrial beta-blockers, which he has not tolerated in the past. If he does not tolerate beta-blockers, we will consider ivabradine. \par - Labs today with Na 142, K 4.6, Mg 2, normal LFTs and mild rise in pro-BNP of 446 compared to 328 on 8/18. Continue to reduce consumption of potassium rich foods. \par \par Chronic kidney disease\par - Slight rise in SCr, now 1.81 from 1.73 on 8/18 but BUN is relatively stable\par \par Atrial flutter/Coronary artery disease\par - Continue ASA, Plavix and Warfarin. ASA previously discontinued following discussion with Dr. Miranda \par \par Hyperlipidemia\par - Sub-optimal LDL cholesterol control, would adjust current dose of Simvastatin 20 mg for better control

## 2020-09-03 ENCOUNTER — RX RENEWAL (OUTPATIENT)
Age: 60
End: 2020-09-03

## 2020-09-21 ENCOUNTER — RX RENEWAL (OUTPATIENT)
Age: 60
End: 2020-09-21

## 2020-09-22 ENCOUNTER — APPOINTMENT (OUTPATIENT)
Dept: CARDIOLOGY | Facility: CLINIC | Age: 60
End: 2020-09-22

## 2020-09-24 ENCOUNTER — RX RENEWAL (OUTPATIENT)
Age: 60
End: 2020-09-24

## 2020-09-24 ENCOUNTER — APPOINTMENT (OUTPATIENT)
Dept: CARDIOLOGY | Facility: CLINIC | Age: 60
End: 2020-09-24
Payer: MEDICAID

## 2020-09-24 LAB
INR PPP: 3.8 RATIO
POCT-PROTHROMBIN TIME: 45.5 SECS
QUALITY CONTROL: YES

## 2020-09-24 PROCEDURE — 99211 OFF/OP EST MAY X REQ PHY/QHP: CPT | Mod: 25

## 2020-09-24 PROCEDURE — 85610 PROTHROMBIN TIME: CPT | Mod: QW

## 2020-10-01 ENCOUNTER — RX RENEWAL (OUTPATIENT)
Age: 60
End: 2020-10-01

## 2020-10-09 RX ORDER — HYDRALAZINE HYDROCHLORIDE 25 MG/1
25 TABLET ORAL
Qty: 90 | Refills: 1 | Status: DISCONTINUED | COMMUNITY
Start: 2020-09-03 | End: 2020-10-09

## 2020-10-09 RX ORDER — AMOXICILLIN AND CLAVULANATE POTASSIUM 500; 125 MG/1; MG/1
500-125 TABLET, FILM COATED ORAL
Qty: 20 | Refills: 0 | Status: DISCONTINUED | COMMUNITY
Start: 2020-08-24 | End: 2020-10-09

## 2020-10-12 ENCOUNTER — APPOINTMENT (OUTPATIENT)
Dept: HEART FAILURE | Facility: CLINIC | Age: 60
End: 2020-10-12
Payer: MEDICAID

## 2020-10-12 VITALS
WEIGHT: 310 LBS | TEMPERATURE: 98.1 F | HEIGHT: 66 IN | RESPIRATION RATE: 12 BRPM | BODY MASS INDEX: 49.82 KG/M2 | SYSTOLIC BLOOD PRESSURE: 122 MMHG | OXYGEN SATURATION: 96 % | HEART RATE: 84 BPM | DIASTOLIC BLOOD PRESSURE: 78 MMHG

## 2020-10-12 PROCEDURE — 99214 OFFICE O/P EST MOD 30 MIN: CPT

## 2020-10-12 NOTE — HISTORY OF PRESENT ILLNESS
[FreeTextEntry1] : Ms. Waller is a 60 year old man with a history of coronary artery disease who underwent CABG in December of 2012. He underwent PCI of the RCA in June of 2019. He has a history of atrial flutter and has undergone an ablation in the past and is s/p [employer] Micra pacemaker implantation. His other comorbidities include a long time history of hypertension. He has never been admitted with heart failure and has never had an acute heart attack to his knowledge. He has had progressive swelling in legs over many years and has developed chronic skin changes in his legs, which has been described as consistent with lichen planus. He has a history of obstructive sleep apnea, but could not tolerate CPAP. \par \par Since his last clinic visit with me, he has had progressive escalation in vasodilators and addition of Farxiga. He has not taken the metolazone very frequently. He notes that he has had improvement in his lower extremity edema and his shoes fit more easily. However, he has had no change in his dyspnea. He notes dyspnea on exertion and is not able to walk significant distances due to dyspnea. His dyspnea has progressively worsened over the last several months and is currently moderate in severity. He is not short of breath at rest. He has no PND or orthopnea. He has no chest pain or pressure. He is not dizzy and has had no falls. He has never had syncope. He notes no abdominal pain. He is intolerance of beta blockers in the past, primarily due to shortness of breath when he would lie flat. \par \par I reviewed the following studies:\par \par Echo from 5/29/20, showing normal LV cavity size with septum of 1.2 cm and posterior wall thickness of 1.3 cm. There was mild mitral stenosis (calcific) and moderate aortic stenosis with aortic valve area of 1.2 cm squared. There was at least mild to moderate global LV systolic dysfunction with regional wall motion abnormalities. There was mild diastolic dysfunction. There was no estimate of PASP. \par \par EKG from 6/12/20, showing sinus rhythm with poor R wave progression and non-specific ST changes.\par \par Nuclear stress test from 6/29/2020 showing an enlarged left ventricle with large, moderate to severe defects in the anterior and anterolateral, anteroapical, apical, inferoseptal, and septal walls that are predominantly fixed, consistent with infarct. The estimated LVEF was 32%. \par \par Cath report from 6/21/2019 showing  a 100% proximal LAD stenosis, 100% proximal circumflex stenosis, a patent LIMA to the mid LAD with minor luminal irregularities. He underwent PCI of a 90% RCA lesion. \par \par Blood work from 6/12/20 showing sodium of 139 with creatinine of 1.4. LDL was 113 and HDL was 34 on May 8, 2020.

## 2020-10-12 NOTE — DISCUSSION/SUMMARY
[FreeTextEntry1] : Congestive heart failure:\par - Continue Entresto 97/103 mg twice daily and hydralazine. I have encouraged him to keep a log of his weights. I would like him to continue to gradually loose fluid with his current diuretics. While ischemic heart disease and hypertension likely explain his cardiomyopathy, he has low voltage on EKG and increase in LV wall thickness, potentially suggestive of amyloid. At his next clinic visit, a pyrophosphate scan should be ordered. \par - When he is euvolemic, we will retrial beta-blockers, which he has not tolerated in the past. If he does not tolerate beta-blockers, we will consider ivabradine. \par Chronic kidney disease: Stable. Continue to monitor. \par I explained to him that I am leaving my current position and that we will transfer his care to a heart failure cardiologist in this group. He will follow-up with the NP clinic in three weeks.

## 2020-10-12 NOTE — ASSESSMENT
[FreeTextEntry1] : Mr. Waller is a 60 year old man with an ischemic cardiomyopathy with ACC/AHA stage C chronic systolic heart failure. He is currently hypertensive and volume overloaded. He has NYHA class III symptoms. He is not on a beta-blocker due to prior intolerance. He has uncorrected WILFRED, which is possibly related to his heart failure and may improve with treatment.

## 2020-10-12 NOTE — PHYSICAL EXAM
[General Appearance - Well Developed] : well developed [General Appearance - Well Nourished] : well nourished [Normal Conjunctiva] : the conjunctiva exhibited no abnormalities [Normal Oral Mucosa] : normal oral mucosa [Normal Oropharynx] : normal oropharynx [Respiration, Rhythm And Depth] : normal respiratory rhythm and effort [Auscultation Breath Sounds / Voice Sounds] : lungs were clear to auscultation bilaterally [Heart Rate And Rhythm] : heart rate and rhythm were normal [Heart Sounds] : normal S1 and S2 [Arterial Pulses Normal] : the arterial pulses were normal [Bowel Sounds] : normal bowel sounds [Abdomen Soft] : soft [] : no hepato-splenomegaly [Abnormal Walk] : normal gait [Nail Clubbing] : no clubbing of the fingernails [Oriented To Time, Place, And Person] : oriented to person, place, and time [Impaired Insight] : insight and judgment were intact [No Anxiety] : not feeling anxious [FreeTextEntry1] : Thick plaques over legs bilaterally.

## 2020-10-13 ENCOUNTER — NON-APPOINTMENT (OUTPATIENT)
Age: 60
End: 2020-10-13

## 2020-10-13 ENCOUNTER — APPOINTMENT (OUTPATIENT)
Dept: CARDIOLOGY | Facility: CLINIC | Age: 60
End: 2020-10-13
Payer: MEDICAID

## 2020-10-13 VITALS
TEMPERATURE: 96.8 F | OXYGEN SATURATION: 98 % | DIASTOLIC BLOOD PRESSURE: 70 MMHG | RESPIRATION RATE: 89 BRPM | HEART RATE: 84 BPM | WEIGHT: 310 LBS | BODY MASS INDEX: 49.82 KG/M2 | SYSTOLIC BLOOD PRESSURE: 91 MMHG | HEIGHT: 66 IN

## 2020-10-13 LAB
INR PPP: 2.1 RATIO
POCT-PROTHROMBIN TIME: 25.1 SECS
QUALITY CONTROL: YES

## 2020-10-13 PROCEDURE — 93000 ELECTROCARDIOGRAM COMPLETE: CPT

## 2020-10-13 PROCEDURE — 85610 PROTHROMBIN TIME: CPT | Mod: QW

## 2020-10-13 PROCEDURE — 99215 OFFICE O/P EST HI 40 MIN: CPT

## 2020-10-13 RX ORDER — METOLAZONE 5 MG/1
5 TABLET ORAL
Qty: 15 | Refills: 4 | Status: DISCONTINUED | COMMUNITY
Start: 2020-06-12 | End: 2020-10-13

## 2020-10-13 RX ORDER — SODIUM ZIRCONIUM CYCLOSILICATE 5 G/5G
5 POWDER, FOR SUSPENSION ORAL
Qty: 30 | Refills: 0 | Status: DISCONTINUED | COMMUNITY
Start: 2020-08-19 | End: 2020-10-13

## 2020-10-13 RX ORDER — SPIRONOLACTONE 25 MG/1
25 TABLET ORAL DAILY
Qty: 15 | Refills: 1 | Status: DISCONTINUED | COMMUNITY
Start: 2020-08-26 | End: 2020-10-13

## 2020-10-19 ENCOUNTER — RX RENEWAL (OUTPATIENT)
Age: 60
End: 2020-10-19

## 2020-10-26 ENCOUNTER — RX RENEWAL (OUTPATIENT)
Age: 60
End: 2020-10-26

## 2020-10-29 ENCOUNTER — RX RENEWAL (OUTPATIENT)
Age: 60
End: 2020-10-29

## 2020-11-10 ENCOUNTER — APPOINTMENT (OUTPATIENT)
Dept: CARDIOLOGY | Facility: CLINIC | Age: 60
End: 2020-11-10
Payer: MEDICAID

## 2020-11-10 LAB
INR PPP: 5.1 RATIO
POCT-PROTHROMBIN TIME: 61.3 SECS
QUALITY CONTROL: YES

## 2020-11-10 PROCEDURE — 99072 ADDL SUPL MATRL&STAF TM PHE: CPT

## 2020-11-10 PROCEDURE — 85610 PROTHROMBIN TIME: CPT | Mod: QW

## 2020-11-10 PROCEDURE — 99211 OFF/OP EST MAY X REQ PHY/QHP: CPT | Mod: 25

## 2020-11-15 LAB
ANION GAP SERPL CALC-SCNC: 13 MMOL/L
BUN SERPL-MCNC: 56 MG/DL
CALCIUM SERPL-MCNC: 9 MG/DL
CHLORIDE SERPL-SCNC: 102 MMOL/L
CO2 SERPL-SCNC: 23 MMOL/L
CREAT SERPL-MCNC: 2.03 MG/DL
GLUCOSE SERPL-MCNC: 164 MG/DL
POTASSIUM SERPL-SCNC: 5.2 MMOL/L
SODIUM SERPL-SCNC: 138 MMOL/L

## 2020-11-24 ENCOUNTER — APPOINTMENT (OUTPATIENT)
Dept: CARDIOLOGY | Facility: CLINIC | Age: 60
End: 2020-11-24
Payer: MEDICAID

## 2020-11-24 LAB
INR PPP: 4.3 RATIO
POCT-PROTHROMBIN TIME: 51.8 SECS
QUALITY CONTROL: YES

## 2020-11-24 PROCEDURE — 85610 PROTHROMBIN TIME: CPT | Mod: QW

## 2020-11-24 PROCEDURE — 99211 OFF/OP EST MAY X REQ PHY/QHP: CPT | Mod: 25

## 2020-11-30 NOTE — DISCHARGE NOTE ADULT - CARE PLAN
Detail Level: Simple Principal Discharge DX:	Atrial flutter  Goal:	Your heart rate and rhythm will be controlled.  Assessment and plan of treatment:	Continue with your cardiologist and primary care MD. Continue your current medications. Call your physician for palpitations, feelings of rapid heart beat, lightheadedness, or dizziness. If you are on warfarin (Coumadin), have your blood work drawn (prescription given) on ________________. Ask your nurse for written material about Coumadin and to provide patient education before discharge.  Secondary Diagnosis:	HTN (hypertension)  Goal:	Your blood pressure will be controlled.  Assessment and plan of treatment:	Continue with your blood pressure medications; eat a heart healthy diet with low salt diet; exercise regularly (consult with your physician or cardiologist first); maintain a heart healthy weight; if you smoke - quit (A resource to help you stop smoking is the [facility] – phone number [phone].); include healthy ways to manage stress. Continue to follow with your primary care physician or cardiologist.

## 2020-12-01 ENCOUNTER — APPOINTMENT (OUTPATIENT)
Dept: CARDIOLOGY | Facility: CLINIC | Age: 60
End: 2020-12-01
Payer: MEDICAID

## 2020-12-01 ENCOUNTER — RX RENEWAL (OUTPATIENT)
Age: 60
End: 2020-12-01

## 2020-12-01 LAB
INR PPP: 3.8 RATIO
POCT-PROTHROMBIN TIME: 45.5 SECS
QUALITY CONTROL: YES

## 2020-12-01 PROCEDURE — 85610 PROTHROMBIN TIME: CPT | Mod: QW

## 2020-12-01 PROCEDURE — 99072 ADDL SUPL MATRL&STAF TM PHE: CPT

## 2020-12-01 PROCEDURE — 99211 OFF/OP EST MAY X REQ PHY/QHP: CPT | Mod: 25

## 2020-12-14 ENCOUNTER — APPOINTMENT (OUTPATIENT)
Dept: FAMILY MEDICINE | Facility: CLINIC | Age: 60
End: 2020-12-14
Payer: MEDICAID

## 2020-12-14 VITALS
SYSTOLIC BLOOD PRESSURE: 140 MMHG | WEIGHT: 315 LBS | TEMPERATURE: 97.6 F | DIASTOLIC BLOOD PRESSURE: 90 MMHG | RESPIRATION RATE: 16 BRPM | BODY MASS INDEX: 50.62 KG/M2 | HEART RATE: 92 BPM | HEIGHT: 66 IN | OXYGEN SATURATION: 94 %

## 2020-12-14 PROCEDURE — G0008: CPT

## 2020-12-14 PROCEDURE — 90686 IIV4 VACC NO PRSV 0.5 ML IM: CPT

## 2020-12-14 PROCEDURE — 99214 OFFICE O/P EST MOD 30 MIN: CPT | Mod: 25

## 2020-12-14 PROCEDURE — 99072 ADDL SUPL MATRL&STAF TM PHE: CPT

## 2020-12-14 NOTE — PHYSICAL EXAM
[No Acute Distress] : no acute distress [Well Nourished] : well nourished [Well Developed] : well developed [Well-Appearing] : well-appearing [Normal Voice/Communication] : normal voice/communication [Normal Rate] : normal rate  [Regular Rhythm] : with a regular rhythm [Normal S1, S2] : normal S1 and S2 [Soft] : abdomen soft [Non Tender] : non-tender [No Masses] : no abdominal mass palpated [Normal Bowel Sounds] : normal bowel sounds [Speech Grossly Normal] : speech grossly normal [Memory Grossly Normal] : memory grossly normal [Normal Affect] : the affect was normal [Alert and Oriented x3] : oriented to person, place, and time [Normal Mood] : the mood was normal [Normal Insight/Judgement] : insight and judgment were intact [Normal] : affect was normal and insight and judgment were intact [de-identified] : LAURA [de-identified] : ++3edema [de-identified] : obese [de-identified] : left shoulder ROM decreased due to pain,still has not seen ortho [de-identified] : stasis dermatitis,hyperpimented and areas escoriation of lower ext.

## 2020-12-14 NOTE — REVIEW OF SYSTEMS
[Fatigue] : fatigue [Chest Pain] : chest pain [Lower Ext Edema] : lower extremity edema [Dyspnea on Exertion] : dyspnea on exertion [Itching] : itching [Skin Rash] : skin rash [Negative] : Heme/Lymph [Palpitations] : no palpitations [Orthopena] : no orthopnea [Shortness Of Breath] : no shortness of breath [Wheezing] : no wheezing [Cough] : no cough [Abdominal Pain] : no abdominal pain [Nausea] : no nausea [Constipation] : no constipation [Diarrhea] : no diarrhea [Vomiting] : no vomiting [Heartburn] : no heartburn [Melena] : no melena [de-identified] : Stasis dermatitis,Lichen planus

## 2020-12-14 NOTE — ASSESSMENT
[FreeTextEntry1] : Assessment and plan:\par \par 1. Atrial flutter, heart rate is well controlled. Continue anticoagulation with warfarin 6 mg alternating with 3 mg. \par \par 2. Chronic congestive heart failure. Presently, the patient is well controlled. Continue furosemide 40 mg p.o. daily, and Entresto .\par \par 3. Hypertension good blood pressure control. Continue the combination, furosemide 40 mg daily, hydralazine 25 mg 3 times a day..  Patient's blood pressure was slightly elevated when first taken approximately 30 minutes thereafter it dropped to 120/78 which actually is excellent control unfortunately with exertion patient's blood pressure did increase.\par \par 4. Ischemic heart disease. Presently, the patient is chest pain-free. Patient has not had the need for sublingual nitroglycerin.\par \par 5. Continue Plavix 75 mg p.o. daily.\par \par 6. Elevated BMI. Discussed with patient weight loss program. He is very, very agreeable and is looking forward to participate with care management.\par \par 7.  Dental abscess resolved but patient has not seen dentist yet.\par \par 8.  Influenza vaccine administered left deltoid, prescription for Shingrix 2 shot series given patient will go to local pharmacy.

## 2020-12-14 NOTE — HEALTH RISK ASSESSMENT
[No] : In the past 12 months have you used drugs other than those required for medical reasons? No [No falls in past year] : Patient reported no falls in the past year [0] : 2) Feeling down, depressed, or hopeless: Not at all (0) [] : No [Audit-CScore] : 0 [[id number]] : 0

## 2020-12-14 NOTE — COUNSELING
[Fall prevention counseling provided] : Fall prevention counseling provided [Adequate lighting] : Adequate lighting [No throw rugs] : No throw rugs [Use proper foot wear] : Use proper foot wear [Behavioral health counseling provided] : Behavioral health counseling provided [Sleep ___ hours/day] : Sleep [unfilled] hours/day [Engage in a relaxing activity] : Engage in a relaxing activity [Plan in advance] : Plan in advance [AUDIT-C Screening administered and reviewed] : AUDIT-C Screening administered and reviewed [Potential consequences of obesity discussed] : Potential consequences of obesity discussed [Benefits of weight loss discussed] : Benefits of weight loss discussed [Structured Weight Management Program suggested:] : Structured weight management program suggested [Encouraged to maintain food diary] : Encouraged to maintain food diary [Encouraged to increase physical activity] : Encouraged to increase physical activity [Encouraged to use exercise tracking device] : Encouraged to use exercise tracking device [Weigh Self Weekly] : weigh self weekly [Decrease Portions] : decrease portions [____ min/wk Activity] : [unfilled] min/wk activity [Keep Food Diary] : keep food diary [Patient motivation] : Patient motivation [Good understanding] : Patient has a good understanding of lifestyle changes and steps needed to achieve self management goal

## 2020-12-14 NOTE — HISTORY OF PRESENT ILLNESS
[FreeTextEntry1] : See HPI [de-identified] : Patient is a 60-year-old gentleman who presents today for follow-up and disease management. \par \par At this visit we will be addressing ischemic heart disease, atrial flutter patient is anticoagulated, chronic congestive systolic failure patient recently seen by cardiology, hyperglycemia, hyperlipidemia, hypertension and elevated BMI.  Patient has history of obstructive sleep apnea unfortunately did not tolerate CPAP at all.

## 2020-12-15 ENCOUNTER — APPOINTMENT (OUTPATIENT)
Dept: CARDIOLOGY | Facility: CLINIC | Age: 60
End: 2020-12-15
Payer: MEDICAID

## 2020-12-15 LAB
INR PPP: 1.3 RATIO
POCT-PROTHROMBIN TIME: 15.4 SECS
QUALITY CONTROL: YES

## 2020-12-15 PROCEDURE — 99211 OFF/OP EST MAY X REQ PHY/QHP: CPT | Mod: 25

## 2020-12-15 PROCEDURE — 99072 ADDL SUPL MATRL&STAF TM PHE: CPT

## 2020-12-15 PROCEDURE — 85610 PROTHROMBIN TIME: CPT | Mod: QW

## 2020-12-17 ENCOUNTER — RX RENEWAL (OUTPATIENT)
Age: 60
End: 2020-12-17

## 2020-12-18 ENCOUNTER — NON-APPOINTMENT (OUTPATIENT)
Age: 60
End: 2020-12-18

## 2020-12-18 LAB
ALBUMIN SERPL ELPH-MCNC: 4.2 G/DL
ALP BLD-CCNC: 110 U/L
ALT SERPL-CCNC: 22 U/L
ANION GAP SERPL CALC-SCNC: 11 MMOL/L
AST SERPL-CCNC: 23 U/L
BILIRUB SERPL-MCNC: 0.5 MG/DL
BUN SERPL-MCNC: 32 MG/DL
CALCIUM SERPL-MCNC: 9.8 MG/DL
CHLORIDE SERPL-SCNC: 95 MMOL/L
CO2 SERPL-SCNC: 32 MMOL/L
CREAT SERPL-MCNC: 1.93 MG/DL
GLUCOSE SERPL-MCNC: 110 MG/DL
MAGNESIUM SERPL-MCNC: 2.3 MG/DL
NT-PROBNP SERPL-MCNC: 1616 PG/ML
POTASSIUM SERPL-SCNC: 3.8 MMOL/L
PROT SERPL-MCNC: 7.7 G/DL
SODIUM SERPL-SCNC: 139 MMOL/L

## 2020-12-21 ENCOUNTER — RX RENEWAL (OUTPATIENT)
Age: 60
End: 2020-12-21

## 2021-01-04 ENCOUNTER — RX RENEWAL (OUTPATIENT)
Age: 61
End: 2021-01-04

## 2021-01-05 ENCOUNTER — APPOINTMENT (OUTPATIENT)
Dept: CARDIOLOGY | Facility: CLINIC | Age: 61
End: 2021-01-05
Payer: MEDICAID

## 2021-01-05 LAB
INR PPP: 1.6 RATIO
POCT-PROTHROMBIN TIME: 19.6 SECS
QUALITY CONTROL: YES

## 2021-01-05 PROCEDURE — 99072 ADDL SUPL MATRL&STAF TM PHE: CPT

## 2021-01-05 PROCEDURE — 99211 OFF/OP EST MAY X REQ PHY/QHP: CPT | Mod: 25

## 2021-01-05 PROCEDURE — 85610 PROTHROMBIN TIME: CPT | Mod: QW

## 2021-01-22 ENCOUNTER — NON-APPOINTMENT (OUTPATIENT)
Age: 61
End: 2021-01-22

## 2021-01-22 ENCOUNTER — APPOINTMENT (OUTPATIENT)
Dept: CARDIOLOGY | Facility: CLINIC | Age: 61
End: 2021-01-22
Payer: MEDICAID

## 2021-01-22 VITALS
WEIGHT: 313 LBS | HEIGHT: 66 IN | BODY MASS INDEX: 50.3 KG/M2 | HEART RATE: 82 BPM | DIASTOLIC BLOOD PRESSURE: 79 MMHG | TEMPERATURE: 97.4 F | SYSTOLIC BLOOD PRESSURE: 117 MMHG | OXYGEN SATURATION: 98 % | RESPIRATION RATE: 16 BRPM

## 2021-01-22 PROCEDURE — 99072 ADDL SUPL MATRL&STAF TM PHE: CPT

## 2021-01-22 PROCEDURE — 99214 OFFICE O/P EST MOD 30 MIN: CPT | Mod: 25

## 2021-01-22 PROCEDURE — 93000 ELECTROCARDIOGRAM COMPLETE: CPT

## 2021-01-22 PROCEDURE — 85610 PROTHROMBIN TIME: CPT | Mod: QW

## 2021-01-31 ENCOUNTER — RX RENEWAL (OUTPATIENT)
Age: 61
End: 2021-01-31

## 2021-02-04 ENCOUNTER — APPOINTMENT (OUTPATIENT)
Dept: CARDIOLOGY | Facility: CLINIC | Age: 61
End: 2021-02-04
Payer: MEDICAID

## 2021-02-04 LAB
INR PPP: 4.9 RATIO
POCT-PROTHROMBIN TIME: 58.5 SECS
QUALITY CONTROL: YES

## 2021-02-04 PROCEDURE — 85610 PROTHROMBIN TIME: CPT | Mod: QW

## 2021-02-04 PROCEDURE — 99072 ADDL SUPL MATRL&STAF TM PHE: CPT

## 2021-02-04 PROCEDURE — 99211 OFF/OP EST MAY X REQ PHY/QHP: CPT | Mod: 25

## 2021-02-18 ENCOUNTER — APPOINTMENT (OUTPATIENT)
Dept: CARDIOLOGY | Facility: CLINIC | Age: 61
End: 2021-02-18
Payer: MEDICAID

## 2021-02-18 LAB
INR PPP: 3.3 RATIO
POCT-PROTHROMBIN TIME: 39.6 SECS
QUALITY CONTROL: YES

## 2021-02-18 PROCEDURE — 85610 PROTHROMBIN TIME: CPT | Mod: QW

## 2021-02-18 PROCEDURE — 99072 ADDL SUPL MATRL&STAF TM PHE: CPT

## 2021-02-18 PROCEDURE — 99211 OFF/OP EST MAY X REQ PHY/QHP: CPT | Mod: 25

## 2021-03-04 ENCOUNTER — RX RENEWAL (OUTPATIENT)
Age: 61
End: 2021-03-04

## 2021-03-11 ENCOUNTER — APPOINTMENT (OUTPATIENT)
Dept: CARDIOLOGY | Facility: CLINIC | Age: 61
End: 2021-03-11
Payer: MEDICAID

## 2021-03-11 LAB
INR PPP: 2.7 RATIO
POCT-PROTHROMBIN TIME: 32.1 SECS
QUALITY CONTROL: YES

## 2021-03-11 PROCEDURE — 99211 OFF/OP EST MAY X REQ PHY/QHP: CPT | Mod: 25

## 2021-03-11 PROCEDURE — 85610 PROTHROMBIN TIME: CPT | Mod: QW

## 2021-03-11 PROCEDURE — 99072 ADDL SUPL MATRL&STAF TM PHE: CPT

## 2021-03-30 ENCOUNTER — RX RENEWAL (OUTPATIENT)
Age: 61
End: 2021-03-30

## 2021-04-08 ENCOUNTER — APPOINTMENT (OUTPATIENT)
Dept: CARDIOLOGY | Facility: CLINIC | Age: 61
End: 2021-04-08
Payer: MEDICAID

## 2021-04-08 LAB
INR PPP: 5.8 RATIO
POCT-PROTHROMBIN TIME: 70.1 SECS
QUALITY CONTROL: YES

## 2021-04-08 PROCEDURE — 99211 OFF/OP EST MAY X REQ PHY/QHP: CPT | Mod: 25

## 2021-04-08 PROCEDURE — 99072 ADDL SUPL MATRL&STAF TM PHE: CPT

## 2021-04-08 PROCEDURE — 85610 PROTHROMBIN TIME: CPT | Mod: QW

## 2021-04-12 ENCOUNTER — APPOINTMENT (OUTPATIENT)
Dept: FAMILY MEDICINE | Facility: CLINIC | Age: 61
End: 2021-04-12
Payer: MEDICAID

## 2021-04-12 VITALS
OXYGEN SATURATION: 84 % | WEIGHT: 307.19 LBS | SYSTOLIC BLOOD PRESSURE: 104 MMHG | HEART RATE: 47 BPM | RESPIRATION RATE: 16 BRPM | DIASTOLIC BLOOD PRESSURE: 69 MMHG | HEIGHT: 66 IN | BODY MASS INDEX: 49.37 KG/M2 | TEMPERATURE: 97.9 F

## 2021-04-12 DIAGNOSIS — R11.0 NAUSEA: ICD-10-CM

## 2021-04-12 DIAGNOSIS — I50.23 ACUTE ON CHRONIC SYSTOLIC (CONGESTIVE) HEART FAILURE: ICD-10-CM

## 2021-04-12 PROCEDURE — 99214 OFFICE O/P EST MOD 30 MIN: CPT | Mod: 25

## 2021-04-12 PROCEDURE — 99072 ADDL SUPL MATRL&STAF TM PHE: CPT

## 2021-04-12 NOTE — HISTORY OF PRESENT ILLNESS
[FreeTextEntry1] : See HPI. [de-identified] : 60-year-old gentleman who presents today for follow-up and disease management patient's medical history is significant for a flutter A. fib patient is on anticoagulation status post ablation status post permanent pacemaker, chronic congestive heart failure presently well controlled recently seen by cardiology, hypertension, ischemic heart disease status post CABG and elevated BMI.  Patient's history is also significant for hyperlipidemia patient stopped statins I will be checking comprehensive blood work and we will check a lipid profile and hyperglycemia.\par \par Patient's chief complaint today are dyspepsia type symptoms abdominal discomfort without pain he denies any blood in his stool or urine but does admit to feeling slightly nauseous at times.  Patient does have medical history significant for gastroesophageal reflux disease presently on famotidine which he takes twice a day.

## 2021-04-12 NOTE — ASSESSMENT
[FreeTextEntry1] : Assessment and plan:\par \par 1.  Dyspeptic type symptoms with nausea and constipation physical exam was absolutely unremarkable there was no guarding no rebound and normal bowel sounds.  Patient states that the dyspeptic symptoms had started when he received his COVID-19 vaccine the first 1 subsequently after getting the first vaccination everything subsided in a couple of days recently had a second vaccine and the symptoms have returned except this time it appears that is lasting longer he was vaccinated less than 2 weeks ago.  This may be correlated with the vaccination as a possible side effect or might be just a simple viral gastroenteritis since his physical exam was absolutely normal.  I recommend patient continue Pepcid, I added Zofran for the nausea and I told the patient to have Mylanta or Maalox available.  I also instructed the patient to call me if symptoms persist or change in character and if he develops severe abdominal pain he is to call 911 and go to the emergency department.\par \par 2.  Atrial flutter/atrial fibrillation status post ablation and permanent pacemaker patient will continue anticoagulation with warfarin 6 mg 1 tablet alternating with a half a tab INR is followed by cardiology.\par \par 3.  Chronic congestive heart failure presently stable patient will continue Entresto, spironolactone and furosemide.\par \par 4.  Hypertension excellent blood pressure control in fact today's blood pressure it is low for the patient but he is asymptomatic therefore continue present medical management no change.\par \par 5.  Coronary artery disease status post CABG patient does have nitroglycerin 0.4 mg sublingual tabs available he states that he has not had the need to use them and patient will also continue clopidogrel 75 mg p.o. daily.\par \par 6.  Elevated BMI detailed discussion with patient regarding weight loss program but since his last visit patient has lost 7 pounds.\par \par 7.  Comprehensive blood work drawn in office by examiner.

## 2021-04-12 NOTE — REVIEW OF SYSTEMS
[Fatigue] : fatigue [Lower Ext Edema] : lower extremity edema [Dyspnea on Exertion] : dyspnea on exertion [Nausea] : nausea [Heartburn] : heartburn [Itching] : itching [Skin Rash] : skin rash [Negative] : Heme/Lymph [Chest Pain] : no chest pain [Palpitations] : no palpitations [Orthopena] : no orthopnea [Shortness Of Breath] : no shortness of breath [Wheezing] : no wheezing [Cough] : no cough [Abdominal Pain] : no abdominal pain [Constipation] : no constipation [Diarrhea] : no diarrhea [Vomiting] : no vomiting [Melena] : no melena [de-identified] : Stasis dermatitis,Lichen planus

## 2021-04-12 NOTE — PHYSICAL EXAM
[Well Nourished] : well nourished [Well Developed] : well developed [Well-Appearing] : well-appearing [Normal Voice/Communication] : normal voice/communication [Normal Rate] : normal rate  [Regular Rhythm] : with a regular rhythm [Normal S1, S2] : normal S1 and S2 [Soft] : abdomen soft [Non Tender] : non-tender [No Masses] : no abdominal mass palpated [Normal Bowel Sounds] : normal bowel sounds [Speech Grossly Normal] : speech grossly normal [Memory Grossly Normal] : memory grossly normal [Normal Affect] : the affect was normal [Alert and Oriented x3] : oriented to person, place, and time [Normal Mood] : the mood was normal [Normal Insight/Judgement] : insight and judgment were intact [Normal] : affect was normal and insight and judgment were intact [de-identified] : LAURA [de-identified] : edema [de-identified] : obese, no guarding or rebound [de-identified] : left shoulder ROM decreased due to pain,still has not seen ortho [de-identified] : stasis dermatitis,hyperpimented and areas escoriation of lower ext.

## 2021-04-13 LAB
ALBUMIN SERPL ELPH-MCNC: 4.3 G/DL
ALP BLD-CCNC: 103 U/L
ALT SERPL-CCNC: 14 U/L
ANION GAP SERPL CALC-SCNC: 14 MMOL/L
AST SERPL-CCNC: 10 U/L
BASOPHILS # BLD AUTO: 0.05 K/UL
BASOPHILS NFR BLD AUTO: 0.6 %
BILIRUB SERPL-MCNC: 0.4 MG/DL
BUN SERPL-MCNC: 121 MG/DL
CALCIUM SERPL-MCNC: 9.4 MG/DL
CHLORIDE SERPL-SCNC: 103 MMOL/L
CHOLEST SERPL-MCNC: 248 MG/DL
CO2 SERPL-SCNC: 16 MMOL/L
CREAT SERPL-MCNC: 4.45 MG/DL
EOSINOPHIL # BLD AUTO: 0.35 K/UL
EOSINOPHIL NFR BLD AUTO: 4.4 %
ESTIMATED AVERAGE GLUCOSE: 120 MG/DL
FERRITIN SERPL-MCNC: 344 NG/ML
FOLATE SERPL-MCNC: 14.5 NG/ML
GLUCOSE SERPL-MCNC: 104 MG/DL
HBA1C MFR BLD HPLC: 5.8 %
HCT VFR BLD CALC: 44.3 %
HCV AB SER QL: NONREACTIVE
HCV S/CO RATIO: 0.21 S/CO
HDLC SERPL-MCNC: 28 MG/DL
HGB BLD-MCNC: 14.2 G/DL
IMM GRANULOCYTES NFR BLD AUTO: 0.3 %
IRON SATN MFR SERPL: 58 %
IRON SERPL-MCNC: 146 UG/DL
LDLC SERPL CALC-MCNC: 173 MG/DL
LYMPHOCYTES # BLD AUTO: 0.96 K/UL
LYMPHOCYTES NFR BLD AUTO: 12.2 %
MAN DIFF?: NORMAL
MCHC RBC-ENTMCNC: 30.1 PG
MCHC RBC-ENTMCNC: 32.1 GM/DL
MCV RBC AUTO: 93.9 FL
MONOCYTES # BLD AUTO: 0.66 K/UL
MONOCYTES NFR BLD AUTO: 8.4 %
NEUTROPHILS # BLD AUTO: 5.84 K/UL
NEUTROPHILS NFR BLD AUTO: 74.1 %
NONHDLC SERPL-MCNC: 219 MG/DL
PLATELET # BLD AUTO: 318 K/UL
POTASSIUM SERPL-SCNC: 5.5 MMOL/L
PROT SERPL-MCNC: 8.1 G/DL
RBC # BLD: 4.72 M/UL
RBC # FLD: 12.6 %
SODIUM SERPL-SCNC: 134 MMOL/L
T4 FREE SERPL-MCNC: 1.2 NG/DL
TIBC SERPL-MCNC: 253 UG/DL
TRIGL SERPL-MCNC: 232 MG/DL
TSH SERPL-ACNC: 1.02 UIU/ML
UIBC SERPL-MCNC: 107 UG/DL
VIT B12 SERPL-MCNC: 605 PG/ML
WBC # FLD AUTO: 7.88 K/UL

## 2021-04-14 ENCOUNTER — RESULT REVIEW (OUTPATIENT)
Age: 61
End: 2021-04-14

## 2021-04-16 ENCOUNTER — OUTPATIENT (OUTPATIENT)
Dept: OUTPATIENT SERVICES | Facility: HOSPITAL | Age: 61
LOS: 1 days | End: 2021-04-16
Payer: MEDICAID

## 2021-04-16 ENCOUNTER — APPOINTMENT (OUTPATIENT)
Dept: CARDIOLOGY | Facility: CLINIC | Age: 61
End: 2021-04-16
Payer: MEDICAID

## 2021-04-16 ENCOUNTER — APPOINTMENT (OUTPATIENT)
Dept: ULTRASOUND IMAGING | Facility: HOSPITAL | Age: 61
End: 2021-04-16
Payer: MEDICAID

## 2021-04-16 DIAGNOSIS — Z95.0 PRESENCE OF CARDIAC PACEMAKER: Chronic | ICD-10-CM

## 2021-04-16 DIAGNOSIS — Z95.5 PRESENCE OF CORONARY ANGIOPLASTY IMPLANT AND GRAFT: Chronic | ICD-10-CM

## 2021-04-16 DIAGNOSIS — Z98.890 OTHER SPECIFIED POSTPROCEDURAL STATES: Chronic | ICD-10-CM

## 2021-04-16 DIAGNOSIS — Z95.1 PRESENCE OF AORTOCORONARY BYPASS GRAFT: Chronic | ICD-10-CM

## 2021-04-16 DIAGNOSIS — Z90.89 ACQUIRED ABSENCE OF OTHER ORGANS: Chronic | ICD-10-CM

## 2021-04-16 DIAGNOSIS — N17.9 ACUTE KIDNEY FAILURE, UNSPECIFIED: ICD-10-CM

## 2021-04-16 LAB
INR PPP: 1.9 RATIO
POCT-PROTHROMBIN TIME: 22.6 SECS
QUALITY CONTROL: YES

## 2021-04-16 PROCEDURE — 76770 US EXAM ABDO BACK WALL COMP: CPT | Mod: 26

## 2021-04-16 PROCEDURE — 99211 OFF/OP EST MAY X REQ PHY/QHP: CPT | Mod: 25

## 2021-04-16 PROCEDURE — 76770 US EXAM ABDO BACK WALL COMP: CPT

## 2021-04-16 PROCEDURE — 85610 PROTHROMBIN TIME: CPT | Mod: QW

## 2021-04-16 PROCEDURE — 99072 ADDL SUPL MATRL&STAF TM PHE: CPT

## 2021-04-19 ENCOUNTER — INPATIENT (INPATIENT)
Facility: HOSPITAL | Age: 61
LOS: 3 days | Discharge: HOME | DRG: 683 | End: 2021-04-23
Attending: INTERNAL MEDICINE | Admitting: STUDENT IN AN ORGANIZED HEALTH CARE EDUCATION/TRAINING PROGRAM
Payer: MEDICAID

## 2021-04-19 VITALS
DIASTOLIC BLOOD PRESSURE: 61 MMHG | OXYGEN SATURATION: 97 % | WEIGHT: 298.95 LBS | HEART RATE: 96 BPM | SYSTOLIC BLOOD PRESSURE: 94 MMHG | RESPIRATION RATE: 18 BRPM | HEIGHT: 68 IN | TEMPERATURE: 98 F

## 2021-04-19 DIAGNOSIS — Z95.5 PRESENCE OF CORONARY ANGIOPLASTY IMPLANT AND GRAFT: Chronic | ICD-10-CM

## 2021-04-19 DIAGNOSIS — Z90.89 ACQUIRED ABSENCE OF OTHER ORGANS: Chronic | ICD-10-CM

## 2021-04-19 DIAGNOSIS — Z95.0 PRESENCE OF CARDIAC PACEMAKER: Chronic | ICD-10-CM

## 2021-04-19 DIAGNOSIS — Z95.1 PRESENCE OF AORTOCORONARY BYPASS GRAFT: Chronic | ICD-10-CM

## 2021-04-19 DIAGNOSIS — Z98.890 OTHER SPECIFIED POSTPROCEDURAL STATES: Chronic | ICD-10-CM

## 2021-04-19 DIAGNOSIS — N17.9 ACUTE KIDNEY FAILURE, UNSPECIFIED: ICD-10-CM

## 2021-04-19 LAB
ALBUMIN SERPL ELPH-MCNC: 3.6 G/DL — SIGNIFICANT CHANGE UP (ref 3.3–5)
ALBUMIN SERPL ELPH-MCNC: 4.3 G/DL
ALP BLD-CCNC: 97 U/L
ALP SERPL-CCNC: 92 U/L — SIGNIFICANT CHANGE UP (ref 40–120)
ALT FLD-CCNC: 31 U/L — SIGNIFICANT CHANGE UP (ref 10–45)
ALT SERPL-CCNC: 20 U/L
ANION GAP SERPL CALC-SCNC: 10 MMOL/L — SIGNIFICANT CHANGE UP (ref 5–17)
ANION GAP SERPL CALC-SCNC: 12 MMOL/L — SIGNIFICANT CHANGE UP (ref 5–17)
ANION GAP SERPL CALC-SCNC: 13 MMOL/L
APTT BLD: 42.3 SEC — HIGH (ref 27.5–35.5)
AST SERPL-CCNC: 12 U/L
AST SERPL-CCNC: 14 U/L — SIGNIFICANT CHANGE UP (ref 10–40)
BASOPHILS # BLD AUTO: 0.05 K/UL — SIGNIFICANT CHANGE UP (ref 0–0.2)
BASOPHILS NFR BLD AUTO: 0.7 % — SIGNIFICANT CHANGE UP (ref 0–2)
BILIRUB SERPL-MCNC: 0.3 MG/DL
BILIRUB SERPL-MCNC: 0.4 MG/DL — SIGNIFICANT CHANGE UP (ref 0.2–1.2)
BUN SERPL-MCNC: 113 MG/DL
BUN SERPL-MCNC: 122 MG/DL — HIGH (ref 7–23)
BUN SERPL-MCNC: 125 MG/DL — HIGH (ref 7–23)
CALCIUM SERPL-MCNC: 9 MG/DL — SIGNIFICANT CHANGE UP (ref 8.4–10.5)
CALCIUM SERPL-MCNC: 9.3 MG/DL — SIGNIFICANT CHANGE UP (ref 8.4–10.5)
CALCIUM SERPL-MCNC: 9.4 MG/DL
CHLORIDE SERPL-SCNC: 104 MMOL/L
CHLORIDE SERPL-SCNC: 106 MMOL/L — SIGNIFICANT CHANGE UP (ref 96–108)
CHLORIDE SERPL-SCNC: 107 MMOL/L — SIGNIFICANT CHANGE UP (ref 96–108)
CO2 SERPL-SCNC: 17 MMOL/L
CO2 SERPL-SCNC: 18 MMOL/L — LOW (ref 22–31)
CO2 SERPL-SCNC: 20 MMOL/L — LOW (ref 22–31)
CREAT SERPL-MCNC: 3.64 MG/DL — HIGH (ref 0.5–1.3)
CREAT SERPL-MCNC: 4.03 MG/DL — HIGH (ref 0.5–1.3)
CREAT SERPL-MCNC: 4.23 MG/DL
EOSINOPHIL # BLD AUTO: 0.32 K/UL — SIGNIFICANT CHANGE UP (ref 0–0.5)
EOSINOPHIL NFR BLD AUTO: 4.2 % — SIGNIFICANT CHANGE UP (ref 0–6)
GLUCOSE SERPL-MCNC: 107 MG/DL
GLUCOSE SERPL-MCNC: 108 MG/DL — HIGH (ref 70–99)
GLUCOSE SERPL-MCNC: 109 MG/DL — HIGH (ref 70–99)
HCT VFR BLD CALC: 39 % — SIGNIFICANT CHANGE UP (ref 39–50)
HGB BLD-MCNC: 12.9 G/DL — LOW (ref 13–17)
IMM GRANULOCYTES NFR BLD AUTO: 0.4 % — SIGNIFICANT CHANGE UP (ref 0–1.5)
INR BLD: 1.91 RATIO — HIGH (ref 0.88–1.16)
LYMPHOCYTES # BLD AUTO: 0.9 K/UL — LOW (ref 1–3.3)
LYMPHOCYTES # BLD AUTO: 11.7 % — LOW (ref 13–44)
MAGNESIUM SERPL-MCNC: 2.8 MG/DL — HIGH (ref 1.6–2.6)
MAGNESIUM SERPL-MCNC: 2.8 MG/DL — HIGH (ref 1.6–2.6)
MCHC RBC-ENTMCNC: 31 PG — SIGNIFICANT CHANGE UP (ref 27–34)
MCHC RBC-ENTMCNC: 33.1 GM/DL — SIGNIFICANT CHANGE UP (ref 32–36)
MCV RBC AUTO: 93.8 FL — SIGNIFICANT CHANGE UP (ref 80–100)
MONOCYTES # BLD AUTO: 0.77 K/UL — SIGNIFICANT CHANGE UP (ref 0–0.9)
MONOCYTES NFR BLD AUTO: 10 % — SIGNIFICANT CHANGE UP (ref 2–14)
NEUTROPHILS # BLD AUTO: 5.62 K/UL — SIGNIFICANT CHANGE UP (ref 1.8–7.4)
NEUTROPHILS NFR BLD AUTO: 73 % — SIGNIFICANT CHANGE UP (ref 43–77)
NRBC # BLD: 0 /100 WBCS — SIGNIFICANT CHANGE UP (ref 0–0)
NT-PROBNP SERPL-SCNC: 510 PG/ML — HIGH (ref 0–300)
PLATELET # BLD AUTO: 289 K/UL — SIGNIFICANT CHANGE UP (ref 150–400)
POTASSIUM SERPL-MCNC: 5.4 MMOL/L — HIGH (ref 3.5–5.3)
POTASSIUM SERPL-MCNC: 5.7 MMOL/L — HIGH (ref 3.5–5.3)
POTASSIUM SERPL-SCNC: 5.4 MMOL/L — HIGH (ref 3.5–5.3)
POTASSIUM SERPL-SCNC: 5.7 MMOL/L — HIGH (ref 3.5–5.3)
POTASSIUM SERPL-SCNC: 6 MMOL/L
PROT SERPL-MCNC: 7.9 G/DL
PROT SERPL-MCNC: 8.5 G/DL — HIGH (ref 6–8.3)
PROTHROM AB SERPL-ACNC: 22.4 SEC — HIGH (ref 10.6–13.6)
RBC # BLD: 4.16 M/UL — LOW (ref 4.2–5.8)
RBC # FLD: 12.4 % — SIGNIFICANT CHANGE UP (ref 10.3–14.5)
SARS-COV-2 RNA SPEC QL NAA+PROBE: SIGNIFICANT CHANGE UP
SODIUM SERPL-SCNC: 135 MMOL/L
SODIUM SERPL-SCNC: 136 MMOL/L — SIGNIFICANT CHANGE UP (ref 135–145)
SODIUM SERPL-SCNC: 137 MMOL/L — SIGNIFICANT CHANGE UP (ref 135–145)
TROPONIN I SERPL-MCNC: 0.1 NG/ML — HIGH (ref 0.02–0.06)
TROPONIN I SERPL-MCNC: 0.69 NG/ML — HIGH (ref 0.02–0.06)
TROPONIN I SERPL-MCNC: 1.1 NG/ML — HIGH (ref 0.02–0.06)
WBC # BLD: 7.69 K/UL — SIGNIFICANT CHANGE UP (ref 3.8–10.5)
WBC # FLD AUTO: 7.69 K/UL — SIGNIFICANT CHANGE UP (ref 3.8–10.5)

## 2021-04-19 PROCEDURE — 99285 EMERGENCY DEPT VISIT HI MDM: CPT

## 2021-04-19 PROCEDURE — 71045 X-RAY EXAM CHEST 1 VIEW: CPT | Mod: 26

## 2021-04-19 PROCEDURE — 71045 X-RAY EXAM CHEST 1 VIEW: CPT | Mod: 26,77

## 2021-04-19 PROCEDURE — 99223 1ST HOSP IP/OBS HIGH 75: CPT

## 2021-04-19 PROCEDURE — 74176 CT ABD & PELVIS W/O CONTRAST: CPT | Mod: 26

## 2021-04-19 RX ORDER — WARFARIN SODIUM 2.5 MG/1
1 TABLET ORAL
Qty: 0 | Refills: 0 | DISCHARGE

## 2021-04-19 RX ORDER — DEXTROSE 50 % IN WATER 50 %
50 SYRINGE (ML) INTRAVENOUS ONCE
Refills: 0 | Status: COMPLETED | OUTPATIENT
Start: 2021-04-19 | End: 2021-04-19

## 2021-04-19 RX ORDER — SODIUM POLYSTYRENE SULFONATE 4.1 MEQ/G
30 POWDER, FOR SUSPENSION ORAL ONCE
Refills: 0 | Status: COMPLETED | OUTPATIENT
Start: 2021-04-19 | End: 2021-04-19

## 2021-04-19 RX ORDER — CLOPIDOGREL BISULFATE 75 MG/1
75 TABLET, FILM COATED ORAL ONCE
Refills: 0 | Status: COMPLETED | OUTPATIENT
Start: 2021-04-19 | End: 2021-04-19

## 2021-04-19 RX ORDER — INSULIN HUMAN 100 [IU]/ML
10 INJECTION, SOLUTION SUBCUTANEOUS ONCE
Refills: 0 | Status: COMPLETED | OUTPATIENT
Start: 2021-04-19 | End: 2021-04-19

## 2021-04-19 RX ORDER — METOPROLOL TARTRATE 50 MG
1 TABLET ORAL
Qty: 0 | Refills: 0 | DISCHARGE

## 2021-04-19 RX ORDER — ASPIRIN/CALCIUM CARB/MAGNESIUM 324 MG
324 TABLET ORAL ONCE
Refills: 0 | Status: COMPLETED | OUTPATIENT
Start: 2021-04-19 | End: 2021-04-19

## 2021-04-19 RX ORDER — HEPARIN SODIUM 5000 [USP'U]/ML
5000 INJECTION INTRAVENOUS; SUBCUTANEOUS EVERY 8 HOURS
Refills: 0 | Status: DISCONTINUED | OUTPATIENT
Start: 2021-04-19 | End: 2021-04-20

## 2021-04-19 RX ORDER — METOPROLOL TARTRATE 50 MG
0.5 TABLET ORAL
Qty: 0 | Refills: 0 | DISCHARGE

## 2021-04-19 RX ORDER — CLOPIDOGREL BISULFATE 75 MG/1
75 TABLET, FILM COATED ORAL DAILY
Refills: 0 | Status: DISCONTINUED | OUTPATIENT
Start: 2021-04-20 | End: 2021-04-23

## 2021-04-19 RX ORDER — CALCIUM GLUCONATE 100 MG/ML
1 VIAL (ML) INTRAVENOUS ONCE
Refills: 0 | Status: COMPLETED | OUTPATIENT
Start: 2021-04-19 | End: 2021-04-19

## 2021-04-19 RX ORDER — SIMVASTATIN 20 MG/1
1 TABLET, FILM COATED ORAL
Qty: 0 | Refills: 0 | DISCHARGE

## 2021-04-19 RX ORDER — LISINOPRIL 2.5 MG/1
1 TABLET ORAL
Qty: 0 | Refills: 0 | DISCHARGE

## 2021-04-19 RX ORDER — SODIUM POLYSTYRENE SULFONATE 4.1 MEQ/G
15 POWDER, FOR SUSPENSION ORAL ONCE
Refills: 0 | Status: COMPLETED | OUTPATIENT
Start: 2021-04-19 | End: 2021-04-19

## 2021-04-19 RX ADMIN — Medication 1 GRAM(S): at 16:40

## 2021-04-19 RX ADMIN — SODIUM POLYSTYRENE SULFONATE 15 GRAM(S): 4.1 POWDER, FOR SUSPENSION ORAL at 21:56

## 2021-04-19 RX ADMIN — Medication 100 GRAM(S): at 16:07

## 2021-04-19 RX ADMIN — Medication 324 MILLIGRAM(S): at 16:22

## 2021-04-19 RX ADMIN — CLOPIDOGREL BISULFATE 75 MILLIGRAM(S): 75 TABLET, FILM COATED ORAL at 21:56

## 2021-04-19 RX ADMIN — INSULIN HUMAN 10 UNIT(S): 100 INJECTION, SOLUTION SUBCUTANEOUS at 15:53

## 2021-04-19 RX ADMIN — Medication 50 MILLILITER(S): at 15:53

## 2021-04-19 RX ADMIN — SODIUM POLYSTYRENE SULFONATE 30 GRAM(S): 4.1 POWDER, FOR SUSPENSION ORAL at 15:53

## 2021-04-19 RX ADMIN — HEPARIN SODIUM 5000 UNIT(S): 5000 INJECTION INTRAVENOUS; SUBCUTANEOUS at 21:55

## 2021-04-19 NOTE — CONSULT NOTE ADULT - PROBLEM SELECTOR RECOMMENDATION 9
- no EKG changes, has been given kayexleate, D50/ insulin, calcium  -would check non-con CT a/p to R/O obstruction, stone or other pathology  -place lopez  -repeat K later tonight  -await nephro input.   -Can go to telemetry, however if nephrology wishes for HD tonight, can take to ICU.  - D/W ICU ACP.  - patient seen on camera and elavtaed, exam as per ER ACP.

## 2021-04-19 NOTE — ED PROVIDER NOTE - CONSULTANT FREE TEXT FOR MDM DISCUSSED CASE WITH QUESTION
Dr. David Dr. David, she states pt does not need emergent dialysis and can go to the floor. She will see pt later today

## 2021-04-19 NOTE — ED PROVIDER NOTE - CARE PLAN
Principal Discharge DX:	ANGELICA (acute kidney injury)   Principal Discharge DX:	Acute renal failure, unspecified acute renal failure type  Secondary Diagnosis:	Hyperkalemia   Principal Discharge DX:	Acute renal failure, unspecified acute renal failure type  Secondary Diagnosis:	Hyperkalemia  Secondary Diagnosis:	Hypermagnesemia   Principal Discharge DX:	Acute renal failure, unspecified acute renal failure type  Secondary Diagnosis:	Hyperkalemia  Secondary Diagnosis:	Hypermagnesemia  Secondary Diagnosis:	Cardiac enzymes elevated

## 2021-04-19 NOTE — CONSULT NOTE ADULT - SUBJECTIVE AND OBJECTIVE BOX
NEPHROLOGY CONSULTATION    CHIEF COMPLAINT:    HPI:  Pt is 61 yo M with PMH obesity, HTN, diastolic CHF, CAD s/p CABG, A fib on coumadin (s/p ablation) presents to the ER w/worsening renal failure. Patient reports he had a blood work done a few days ago and  was advised to come to the ER for evaluation. He underwent a renal ultrasound which was unremarkable.  Patient reports overall he feels well. He does admit to some chronic VELÁSQUEZ after walking 3-4 blocks. Also reports that he is urinating more frequently than normal but with less urine output. No fevers, chills, chest pain, SOB, palpitations, nausea, vomiting abdominal pain, change to bowel function. Told by PMD that he needs to start HD.     ROS:  as above    Allergies:  No Known Allergies    PAST MEDICAL & SURGICAL HISTORY:  CAD (coronary artery disease) s/p CABG  Obesity  Hypercholesteremia  Thrombus of left atrial appendage  Ischemic cardiomyopathy  WILFRED (obstructive sleep apnea)  HTN (hypertension)  Atrial fibrillation  CHF (congestive heart failure)  Peripheral edema  Lichen planus  Atrial flutter s/p ablation  MI (myocardial infarction)  History of elbow surgery  Cardiac pacemaker leadless  History of coronary artery stent placement  History of adenoidectomy    SOCIAL HISTORY:  negative    FAMILY HISTORY:  Family history of heart attack  Family history of stroke    MEDICATIONS  (STANDING):  heparin   Injectable 5000 Unit(s) SubCutaneous every 8 hours    Home Medications:  Coumadin 6 mg oral tablet: 1 tab(s) orally once a day  Takes 6 mg alternating with 3 mg daily  (19 Apr 2021 17:23)  furosemide 40 mg oral tablet: 1 tab(s) orally once a day (19 Apr 2021 15:19)  hydrALAZINE 50 mg oral tablet: orally 3 times a day (19 Apr 2021 16:51)  Plavix 75 mg oral tablet: 1 tab(s) orally once a day (19 Apr 2021 17:22)    Vital Signs Last 24 Hrs  T(C): 36.7 (04-19-21 @ 14:04), Max: 36.7 (04-19-21 @ 14:04)  T(F): 98 (04-19-21 @ 14:04), Max: 98 (04-19-21 @ 14:04)  HR: 97 (04-19-21 @ 17:45) (94 - 98)  BP: 105/66 (04-19-21 @ 17:45) (82/45 - 105/66)  BP(mean): 71 (04-19-21 @ 17:45) (53 - 71)  RR: 20 (04-19-21 @ 17:45) (17 - 24)  SpO2: 96% (04-19-21 @ 17:45) (96% - 99%)    s1s2  b/l air entry  soft  edema    LABS:                        12.9   7.69  )-----------( 289      ( 19 Apr 2021 14:50 )             39.0     04-19    136  |  106  |  125<H>  ----------------------------<  108<H>  5.7<H>   |  18<L>  |  4.03<H>    Ca    9.0      19 Apr 2021 14:50  Mg     2.8     04-19    TPro  8.5<H>  /  Alb  3.6  /  TBili  0.4  /  DBili  x   /  AST  14  /  ALT  31  /  AlkPhos  92  04-19    LIVER FUNCTIONS - ( 19 Apr 2021 14:50 )  Alb: 3.6 g/dL / Pro: 8.5 g/dL / ALK PHOS: 92 U/L / ALT: 31 U/L / AST: 14 U/L / GGT: x           PT/INR - ( 19 Apr 2021 14:50 )   PT: 22.4 sec;   INR: 1.91 ratio      PTT - ( 19 Apr 2021 14:50 )  PTT:42.3 sec    A/P:             NEPHROLOGY CONSULTATION    CHIEF COMPLAINT: abn labs    HPI:  Pt is 59 yo M with PMH obesity, HTN, diastolic CHF, CAD s/p CABG, A fib on coumadin (s/p ablation) presents to the ER w/worsening renal failure. Patient reports he had a blood work done a few days ago and  was advised to come to the ER for evaluation. He underwent a renal ultrasound which was unremarkable.  Patient reports overall he feels well. He does admit to some chronic VELÁSQUEZ after walking 3-4 blocks. Also reports that he is urinating more frequently than normal but with less urine output. No fevers, chills, chest pain, SOB, palpitations, nausea, vomiting abdominal pain, change to bowel function. Told by PMD that he needs to start HD. Was taking NSAID's as op. Awake, alert. C/o diarrhea.    ROS:  as above    Allergies:  No Known Allergies    PAST MEDICAL & SURGICAL HISTORY:  CAD (coronary artery disease) s/p CABG  Obesity  Hypercholesteremia  Thrombus of left atrial appendage  Ischemic cardiomyopathy  WILFRED (obstructive sleep apnea)  HTN (hypertension)  Atrial fibrillation  CHF (congestive heart failure)  Peripheral edema  Lichen planus  Atrial flutter s/p ablation  MI (myocardial infarction)  History of elbow surgery  Cardiac pacemaker leadless  History of coronary artery stent placement  History of adenoidectomy    SOCIAL HISTORY:  negative    FAMILY HISTORY:  Family history of heart attack  Family history of stroke    MEDICATIONS  (STANDING):  heparin   Injectable 5000 Unit(s) SubCutaneous every 8 hours    Home Medications:  Coumadin 6 mg oral tablet: 1 tab(s) orally once a day  Takes 6 mg alternating with 3 mg daily  (19 Apr 2021 17:23)  furosemide 40 mg oral tablet: 1 tab(s) orally once a day (19 Apr 2021 15:19)  hydrALAZINE 50 mg oral tablet: orally 3 times a day (19 Apr 2021 16:51)  Plavix 75 mg oral tablet: 1 tab(s) orally once a day (19 Apr 2021 17:22)    Vital Signs Last 24 Hrs  T(C): 36.7 (04-19-21 @ 14:04), Max: 36.7 (04-19-21 @ 14:04)  T(F): 98 (04-19-21 @ 14:04), Max: 98 (04-19-21 @ 14:04)  HR: 97 (04-19-21 @ 17:45) (94 - 98)  BP: 105/66 (04-19-21 @ 17:45) (82/45 - 105/66)  BP(mean): 71 (04-19-21 @ 17:45) (53 - 71)  RR: 20 (04-19-21 @ 17:45) (17 - 24)  SpO2: 96% (04-19-21 @ 17:45) (96% - 99%)    s1s2  b/l air entry  soft  edema    LABS:                        12.9   7.69  )-----------( 289      ( 19 Apr 2021 14:50 )             39.0     04-19    136  |  106  |  125<H>  ----------------------------<  108<H>  5.7<H>   |  18<L>  |  4.03<H>    Ca    9.0      19 Apr 2021 14:50  Mg     2.8     04-19    TPro  8.5<H>  /  Alb  3.6  /  TBili  0.4  /  DBili  x   /  AST  14  /  ALT  31  /  AlkPhos  92  04-19    LIVER FUNCTIONS - ( 19 Apr 2021 14:50 )  Alb: 3.6 g/dL / Pro: 8.5 g/dL / ALK PHOS: 92 U/L / ALT: 31 U/L / AST: 14 U/L / GGT: x           PT/INR - ( 19 Apr 2021 14:50 )   PT: 22.4 sec;   INR: 1.91 ratio      PTT - ( 19 Apr 2021 14:50 )  PTT:42.3 sec    A/P:    CM, DM  ANGELICA/CKD can be multifactorial: hypotensive/hemodynamic, NSAID's related (Cr 1.93 - 12/14/20)  SONO w/o hydro  Hold BP lowering meds  Avoid nephrotoxins  Alba, I/Os  BMP in am  Renal diet  No emergent indications for RRT, may be able to avoid on this adm  Will f/u Echo  No NSAID's, d/w pt    [phone]

## 2021-04-19 NOTE — H&P ADULT - NSHPPHYSICALEXAM_GEN_ALL_CORE
T(C): 36.7 (04-19-21 @ 14:04), Max: 36.7 (04-19-21 @ 14:04)  HR: 96 (04-19-21 @ 16:49) (94 - 98)  BP: 94/58 (04-19-21 @ 16:49) (82/45 - 94/61)  RR: 24 (04-19-21 @ 16:49) (17 - 24)  SpO2: 99% (04-19-21 @ 16:49) (97% - 99%)  Wt(kg): --Vital Signs Last 24 Hrs  T(C): 36.7 (19 Apr 2021 14:04), Max: 36.7 (19 Apr 2021 14:04)  T(F): 98 (19 Apr 2021 14:04), Max: 98 (19 Apr 2021 14:04)  HR: 96 (19 Apr 2021 16:49) (94 - 98)  BP: 94/58 (19 Apr 2021 16:49) (82/45 - 94/61)  BP(mean): 67 (19 Apr 2021 16:49) (53 - 67)  RR: 24 (19 Apr 2021 16:49) (17 - 24)  SpO2: 99% (19 Apr 2021 16:49) (97% - 99%)    PHYSICAL EXAM:  GENERAL: NAD, well-groomed, well-developed  HEAD:  Atraumatic, Normocephalic  EYES: EOMI, PERRLA, conjunctiva and sclera clear  ENMT: No tonsillar erythema, exudates, or enlargement; Moist mucous membranes, Good dentition, No lesions  NECK: Supple, No JVD, Normal thyroid  NERVOUS SYSTEM:  Alert & Oriented X3, Good concentration; Motor Strength 5/5 B/L upper and lower extremities; DTRs 2+ intact and symmetric  CHEST/LUNG: Clear to percussion bilaterally; No rales, rhonchi, wheezing, or rubs  HEART: Regular rate and rhythm; No murmurs, rubs, or gallops  ABDOMEN: Soft, obese Nontender, Nondistended; Bowel sounds present  EXTREMITIES:  2+ Peripheral Pulses, 1+ LE edema bilaterally   LYMPH: No lymphadenopathy noted  SKIN: LE chronic venous stasis T(C): 36.7 (04-19-21 @ 14:04), Max: 36.7 (04-19-21 @ 14:04)  HR: 96 (04-19-21 @ 16:49) (94 - 98)  BP: 94/58 (04-19-21 @ 16:49) (82/45 - 94/61)  RR: 24 (04-19-21 @ 16:49) (17 - 24)  SpO2: 99% (04-19-21 @ 16:49) (97% - 99%)  Wt(kg): --Vital Signs Last 24 Hrs  T(C): 36.7 (19 Apr 2021 14:04), Max: 36.7 (19 Apr 2021 14:04)  T(F): 98 (19 Apr 2021 14:04), Max: 98 (19 Apr 2021 14:04)  HR: 96 (19 Apr 2021 16:49) (94 - 98)  BP: 94/58 (19 Apr 2021 16:49) (82/45 - 94/61)  BP(mean): 67 (19 Apr 2021 16:49) (53 - 67)  RR: 24 (19 Apr 2021 16:49) (17 - 24)  SpO2: 99% (19 Apr 2021 16:49) (97% - 99%)    PHYSICAL EXAM:  GENERAL: obese male, NAD, well-groomed, well-developed  HEAD:  Atraumatic, Normocephalic  EYES: EOMI, PERRLA, conjunctiva and sclera clear  ENMT: No tonsillar erythema, exudates, or enlargement; Moist mucous membranes, Good dentition, No lesions  NECK: Supple, No JVD, Normal thyroid  NERVOUS SYSTEM:  Alert & Oriented X3, Good concentration; Motor Strength 5/5 B/L upper and lower extremities; DTRs 2+ intact and symmetric  CHEST/LUNG: Clear to percussion bilaterally; No rales, rhonchi, wheezing, or rubs  HEART: Regular rate and rhythm; No murmurs, rubs, or gallops  ABDOMEN: Soft, obese Nontender, Nondistended; Bowel sounds present  EXTREMITIES:  2+ Peripheral Pulses, 1+ LE edema bilaterally   LYMPH: No lymphadenopathy noted  SKIN: LE chronic venous stasis

## 2021-04-19 NOTE — H&P ADULT - HISTORY OF PRESENT ILLNESS
59 yo M with PMHx obesity, HTN, diastolic CHF, CAD s/p CABG, A fib on coumadin(s/p ablation) presents to the ER in renal failure. Patient reports he had a blood work done a few days ago and  was advised to come to the ER for evaluation of renal failure. He underwent a renal ultrasound which was unremarkable.  Patient reports overall he feels well. He does admit to some chronic VELÁSQUEZ after walking 3-4 blocks. Also reports that he is urinating more frequently than normal but with less urine output.    Denies any fevers, chills, chest pain, SOB, palpitations, nausea, vomiting abdominal pain, change to bowel function.    In ED, BP 94/58, HR 96.  O2 sat 99% on RA.  Labs notable for Hgb 12.9, K 5.7, creatnine 4.03, troponin 0.099, , Mg 2.8.  EKG with SR, first degree heart block. I. Given D50, insulin, kayexelate in the ER. Dr David was consulted.  A shiley catheter was placed in the ED with plans for dialysis tomorrow. E-ICU was consulted in the ER but no emergent need to start HD overnight. Patient admitted to hospitalist for further management. 61 yo M with PMHx obesity, HTN, diastolic CHF, CAD s/p CABG, A fib on coumadin(s/p ablation), prediabetes (hgbA1c 5.8) presents to the ER in renal failure. Patient reports he had a blood work done a few days ago and  was advised to come to the ER for evaluation of renal failure. Last creatnine prior to this month was 1.9 in 2020. He underwent a renal ultrasound which was unremarkable.  Patient reports overall he feels well. He does admit to some chronic VELÁSQUEZ after walking 3-4 blocks. Also reports that he is urinating more frequently than normal but with less urine output. Patient does admit to recently starting Farxiga but stopping when he found out his kidney function was worsening .   Denies any fevers, chills, chest pain, SOB, palpitations, nausea, vomiting abdominal pain, change to bowel function.    In ED, BP 94/58, HR 96.  O2 sat 99% on RA.  Labs notable for Hgb 12.9, K 5.7, creatnine 4.03, troponin 0.099, , Mg 2.8.  EKG with SR, first degree heart block. I. Given D50, insulin, kayexelate in the ER. Dr David was consulted.  A shiley catheter was placed in the ED with plans for dialysis tomorrow. E-ICU was consulted in the ER but no emergent need to start HD overnight. Patient admitted to hospitalist for further management. 59 yo M with PMHx obesity, HTN, diastolic CHF, CAD s/p CABG, A fib on coumadin(s/p ablation), prediabetes (hgbA1c 5.8) presents to the ER in renal failure. Patient reports he had a blood work done a few days ago and  was advised to come to the ER for evaluation of renal failure. Last creatinine prior to this month was 1.9 in 2020. He underwent a renal ultrasound which was unremarkable.  Patient reports overall he feels well. He does admit to some chronic VELÁSQUEZ after walking 3-4 blocks. Also reports that he is urinating more frequently than normal but with less urine output. Patient does admit to recently starting Farxiga but stopping when he found out his kidney function was worsening .   Denies any fevers, chills, chest pain, SOB, palpitations, nausea, vomiting abdominal pain, change to bowel function.    In ED, BP 94/58, HR 96.  O2 sat 99% on RA.  Labs notable for Hgb 12.9, K 5.7, creatnine 4.03, troponin 0.099, , Mg 2.8.  EKG with SR, first degree heart block. I. Given D50, insulin, kayexelate in the ER. Dr David was consulted.  A shiley catheter was placed in the ED with plans for dialysis tomorrow. E-ICU was consulted in the ER but no emergent need to start HD overnight. Patient admitted to hospitalist for further management. 61 yo M with PMHx obesity, HTN, diastolic CHF, CAD s/p CABG, A fib on coumadin(s/p ablation), prediabetes (hgbA1c 5.8) presents to the ER in renal failure. Patient reports he had a blood work done a few days ago and  was advised to come to the ER for evaluation of renal failure. Last creatinine prior to this month was 1.9 in 2020. He underwent a renal ultrasound which was unremarkable.  Patient reports overall he feels well. He does admit to some chronic VELÁSQUEZ after walking 3-4 blocks. Also reports that he is urinating more frequently than normal but with less urine output. Patient does admit to recently starting Farxiga but stopping when he found out his kidney function was worsening .   Denies any fevers, chills, chest pain, SOB, palpitations, nausea, vomiting abdominal pain.  pt has been having watery diarrhea since his COVID vaccine on 4/1/2021, but believes it to be improving.     In ED, BP 94/58, HR 96.  O2 sat 99% on RA.  Labs notable for Hgb 12.9, K 5.7, creatnine 4.03, troponin 0.099, , Mg 2.8.  EKG with SR, first degree heart block. I. Given D50, insulin, kayexelate in the ER. Dr David was consulted.  A shiley catheter was placed in the ED with plans for dialysis tomorrow. E-ICU was consulted in the ER but no emergent need to start HD overnight. Patient admitted to hospitalist for further management.

## 2021-04-19 NOTE — H&P ADULT - NSHPREVIEWOFSYSTEMS_GEN_ALL_CORE
REVIEW OF SYSTEMS:  CONSTITUTIONAL: No fever, weight loss, or fatigue  EYES: No eye pain, visual disturbances, or discharge  ENMT:  No difficulty hearing, tinnitus, vertigo; No sinus or throat pain  NECK: No pain or stiffness  BREASTS: No pain, masses, or nipple discharge  RESPIRATORY: No cough, wheezing, chills or hemoptysis; No shortness of breath  CARDIOVASCULAR: No chest pain, palpitations, dizziness, or leg swelling  GASTROINTESTINAL: No abdominal or epigastric pain. No nausea, vomiting, or hematemesis; No diarrhea or constipation. No melena or hematochezia.  GENITOURINARY: see hpi   NEUROLOGICAL: No headaches, memory loss, loss of strength, numbness, or tremors  SKIN: No itching, burning, rashes, or lesions   LYMPH NODES: No enlarged glands  ENDOCRINE: No heat or cold intolerance; No hair loss  MUSCULOSKELETAL: No joint pain or swelling; No muscle, back, or extremity pain  PSYCHIATRIC: No depression, anxiety, mood swings, or difficulty sleeping  HEME/LYMPH: No easy bruising, or bleeding gums  ALLERGY AND IMMUNOLOGIC: No hives or eczema    ALL ROS REVIEWED AND NORMAL EXCEPT AS STATED ABOVE

## 2021-04-19 NOTE — H&P ADULT - NSHPLABSRESULTS_GEN_ALL_CORE
LABS:                        12.9   7.69  )-----------( 289      ( 19 Apr 2021 14:50 )             39.0     04-19    136  |  106  |  125<H>  ----------------------------<  108<H>  5.7<H>   |  18<L>  |  4.03<H>    Ca    9.0      19 Apr 2021 14:50  Mg     2.8     04-19    TPro  8.5<H>  /  Alb  3.6  /  TBili  0.4  /  DBili  x   /  AST  14  /  ALT  31  /  AlkPhos  92  04-19    PT/INR - ( 19 Apr 2021 14:50 )   PT: 22.4 sec;   INR: 1.91 ratio         PTT - ( 19 Apr 2021 14:50 )  PTT:42.3 sec     CAPILLARY BLOOD GLUCOSE      POCT Blood Glucose.: 124 mg/dL (19 Apr 2021 16:34)  POCT Blood Glucose.: 89 mg/dL (19 Apr 2021 15:51)            RADIOLOGY & ADDITIONAL TESTS:  [x] EKG: HR 96, SR with 1st degree heart block     Consultant(s) Notes Reviewed:  [x ] YES  [ ] NO  Care Discussed with Consultants/Other Providers [ x] YES  [ ] NO  Imaging Personally Reviewed:  [ ] YES  [ ] NO

## 2021-04-19 NOTE — ED PROVIDER NOTE - MUSCULOSKELETAL, MLM
Spine appears normal, range of motion is not limited, no muscle or joint tenderness. 1+ pitting edema BLE, no calf ttp
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
## 2021-04-19 NOTE — ED ADULT NURSE NOTE - COMFORT/ACCEPTABLE PAIN LEVEL (0-10)
0 5-Fu Counseling: 5-Fluorouracil Counseling:  I discussed with the patient the risks of 5-fluorouracil including but not limited to erythema, scaling, itching, weeping, crusting, and pain.

## 2021-04-19 NOTE — ED ADULT NURSE NOTE - NSIMPLEMENTINTERV_GEN_ALL_ED
Implemented All Fall Risk Interventions:  [place] to call system. Call bell, personal items and telephone within reach. Instruct patient to call for assistance. Room bathroom lighting operational. Non-slip footwear when patient is off stretcher. Physically safe environment: no spills, clutter or unnecessary equipment. Stretcher in lowest position, wheels locked, appropriate side rails in place. Provide visual cue, wrist band, yellow gown, etc. Monitor gait and stability. Monitor for mental status changes and reorient to person, place, and time. Review medications for side effects contributing to fall risk. Reinforce activity limits and safety measures with patient and family.

## 2021-04-19 NOTE — ED PROVIDER NOTE - PSH
Cardiac pacemaker  leadless  H/O atrioventricular karlee ablation    History of adenoidectomy    History of coronary artery stent placement    History of elbow surgery    S/P CABG (coronary artery bypass graft)

## 2021-04-19 NOTE — H&P ADULT - ASSESSMENT
61 yo M with PMHx obesity, HTN, diastolic CHF, CAD s/p CABG, A fib on coumadin presents to [place] ER with renal failure. Shiley catheter placed in ED and plan to start HD.    *Acute Renal Failure:  Patient with h/o CKD but now with renal failure  K 5.7, Creatnine 4.03,   s/p D50, insulin, kayexelate in the ER.  Repeat BMP tonight  Renal - Dr David consulted-will plan for HD to start tomorrow--shiley catheter placed in ED--patient will need permacath placement-spoke with Dr Rodriguez who is not available at this time--spoke with IR at [facility] () and spoke with PA who recommends the patient be medically optimized and cleared by cardiology prior to scheduling permacath placement (plavix will have to be on hold 4 hrs prior to procedure.)  Willis catheter    *Hyperkalemia:  K 5.7  Given D50, insulin, kayexelate in the ER.  Repeat BMP at 7 PM   Monitor on tele   EKG with no acute changes    *Elevated troponin:  suspect demand due to renal failure  EKG nonischemic  Cont to trend trops    Dispo: will need Case Mgmt for outpatient HD placement  DVT ppx: [facility]       Case d/w Dr David and Dr Rodriguez     *Paroxysmal Afib:  s/p ablation  On coumadin at home - INR 1.95--will hold coumadin for possible permacath placement (CHADS Vasc 2.2%)    *HTN:  Patient hypotensive in ER--most recent BP 94/58 (MAP 67)  Hold hydralazine for now  Consider midodrine if remains hypotensive     *Diastolic CHF:  TTE 2020 with EF 60%  Hold lasix in setting of acute renal failure  Entresto recebtly d/phyllis due to renal failure     *CAD s/p CABG:  Cont plavix (spoke with IR PA who reports plavix to be held 4 hrs prior to permacat)    *Obesity:  BMI 45.5  weight loss and exercise encouraged   59 yo M with PMHx obesity, HTN, diastolic CHF, CAD s/p CABG, A fib on coumadin presents to [place] ER with renal failure. Shiley catheter placed in ED and plan to start HD.    *Acute Renal Failure:  Patient with h/o CKD but now with renal failure  K 5.7, Creatnine 4.03,   s/p D50, insulin, kayexelate in the ER.  Repeat BMP tonight  Renal - Dr David consulted-will plan for HD to start tomorrow--shiley catheter placed in ED--patient will need permacath placement-spoke with Dr Rodriguez who is not available at this time--spoke with IR at [facility] () and spoke with PA who recommends the patient be medically optimized and cleared by cardiology prior to scheduling permacath placement (plavix will have to be on hold 4 hrs prior to procedure.)  Willis catheter  Obtain CTAP to rule out obstruction    *Hyperkalemia:  K 5.7  Given D50, insulin, kayexelate in the ER.  Repeat BMP at 7 PM   Monitor on tele   EKG with no acute changes    *Elevated troponin:  suspect demand due to renal failure  EKG nonischemic  Cont to trend trops  Cardio to consult--spoke with Dr Miranda who confirms Dr Conley will see patient tomorrow  TTE    *Paroxysmal Afib:  s/p ablation  On coumadin at home - INR 1.95--will hold coumadin for possible permacath placement (CHADS Vasc 2.2%)-Dr Miranda aware     *HTN:  Patient hypotensive in ER--most recent BP 94/58 (MAP 67)  Hold hydralazine for now  Consider midodrine if remains hypotensive     *Diastolic CHF:  TTE 2020 with EF 60%  Hold lasix in setting of acute renal failure  Entresto recebtly d/phyllis due to renal failure     *CAD s/p CABG:  Cont plavix (spoke with IR PA who reports plavix to be held 4 hrs prior to permacat)    *Obesity:  BMI 45.5  weight loss and exercise encouraged    Prediabetes:  HgbA1C 5.8 this past month  Weight loss encouraged    Dispo: will need Case Mgmt for outpatient HD placement  DVT ppx: [facility]       Case d/w Dr David, Dr Miranda, Dr Johnson and Dr Rodriguez    61 yo M with PMHx obesity, HTN, diastolic CHF, CAD s/p CABG, A fib on coumadin presents to [place] ER with renal failure. Shiley catheter placed in ED and plan to start HD.    *Acute Renal Failure:  Patient with h/o CKD but now with renal failure  K 5.7, Creatnine 4.03,  (Creatnine 1.9 in 12/2020)  s/p D50, insulin, kayexelate in the ER.  Repeat BMP tonight  Renal - Dr David consulted-will plan for HD to start tomorrow--shiley catheter placed in ED--patient will need permacath placement-spoke with Dr Rodriguez who is not available at this time--spoke with IR at [facility] () and spoke with PA who recommends the patient be medically optimized and cleared by cardiology prior to scheduling permacath placement (plavix will have to be on hold 4 hrs prior to procedure.)  Willis catheter  Obtain CTAP to rule out obstruction    *Hyperkalemia:  K 5.7  Given D50, insulin, kayexelate in the ER.  Repeat BMP at 7 PM   Monitor on tele   EKG with no acute changes    *Elevated troponin:  suspect demand due to renal failure  EKG nonischemic  Cont to trend trops  Cardio to consult--spoke with Dr Miranda who confirms Dr Conley will see patient tomorrow  TTE    *Paroxysmal Afib:  s/p ablation  On coumadin at home - INR 1.95--will hold coumadin for possible permacath placement (CHADS Vasc 2.2%)-Dr Miranda aware     *HTN:  Patient hypotensive in ER--most recent BP 94/58 (MAP 67)  Hold hydralazine for now  Consider midodrine if remains hypotensive     *Diastolic CHF:  TTE 2020 with EF 60%  Hold lasix in setting of acute renal failure  Entresto recebtly d/phyllis due to renal failure     *CAD s/p CABG:  Cont plavix (spoke with IR PA who reports plavix to be held 4 hrs prior to permacat)    *Obesity:  BMI 45.5  weight loss and exercise encouraged    Prediabetes:  HgbA1C 5.8 this past month  Weight loss encouraged    Dispo: will need Case Mgmt for outpatient HD placement  DVT ppx: [facility]       Case d/w Dr David, Dr Miranda, Dr Johnson and Dr Rodriguez    59 yo M with PMHx obesity, HTN, diastolic CHF, CAD s/p CABG, A fib on coumadin presents to [place] ER with renal failure. Shiley catheter placed in ED and plan to start HD.    *Acute kidney injury on CKD st. [facility]  Patient with h/o CKD but now with renal failure  K 5.7, Creatinine 4.03,  (Creatnine 1.9 in 12/2020)  s/p D50, insulin, kayexelate in the ER.  Repeat BMP tonight  Renal - Dr David consulted. shiley catheter placed in ED should pt need --patient will need permacath placement-spoke with Dr Rodriguez who is not available at this time--spoke with IR at [facility] () and spoke with PA who recommends the patient be medically optimized and cleared by cardiology prior to scheduling permacath placement (plavix will have to be on hold 4 hrs prior to procedure.)  Willis catheter for strict intake and output.   Obtain CTAP to rule out acute findings.     *Hyperkalemia:  K 5.7  Given D50, insulin, kayexelate in the ER.  Repeat BMP at 7 PM   Monitor on tele   EKG with no acute changes    Elevated magnesium  likely due to mylanta use.   - cont to monitor, trend    *Elevated troponin:  suspect demand due to renal failure  EKG nonischemic  Cont to trend trops  Cardio to consult--spoke with Dr Miranda who confirms Dr Conley will see patient tomorrow  TTE    *History of Paroxysmal Afib/a flutter s/p ablation and pacemaker placement, currently in NSR.   On coumadin at home - INR 1.95--will hold coumadin for possible permacath placement. Pt very low stroke risk (CHADS Vasc 2 points, stroke risk 2.2% per year. )- discussed w/ patient. Dr Miranda aware     *HTN:  Patient hypotensive in ER--most recent BP 94/58 (MAP 67)  Hold hydralazine for now  Consider midodrine if remains hypotensive     *Subacute diarrhea  Pt reports somewhat chronic diarrhea since the COVID vaccine. He has been on pepcid He says overall it's been improving.   - cont to monitor.     *Diastolic CHF due to ischemic cardiomyopathy  TTE 2020 with EF 60%  Hold lasix, spironolactone in setting of acute renal failure  Entresto recently d/phyllis due to renal failure   - repeat echocardiogram.     *CAD s/p CABG:  Cont plavix (spoke with IR PA who reports plavix to be held 4 hrs prior to permacath)    *Morbid Obesity:  BMI 45.5  weight loss and exercise encouraged    Prediabetes:  HgbA1C 5.8 this past month  previously on farxiga, now stopped.   Weight loss encouraged    Dispo: will need Case Mgmt for outpatient HD placement  DVT ppx: [facility]   FULL CODE  Diet: renal restricted.     Case d/w Dr David, Dr Miranda, Dr Johnson and Dr Rodriguez    59 yo M with PMHx obesity, HTN, diastolic CHF, CAD s/p CABG, A fib on coumadin presents to [place] ER with renal failure. Shiley catheter placed in ED and plan to start HD.    *Acute kidney injury on CKD st. [facility]  Patient with h/o CKD but now with renal failure.   - Could be pre-renal from diarrhea, intrinsic renal etiology from NSAID use, or cardiorenal from CHF.  - K 5.7, Creatinine 4.03,  (Creatinine 1.9 in 12/2020)  - s/p D50, insulin, kayexelate in the ER.  - Repeat BMP tonight  - Renal - Dr David consulted. shiley catheter placed in ED should pt need HD.  - Dr Rodriguez who is not available at this time to put in PermaCath--spoke with IR at [facility] () and spoke with PA who recommends the patient be medically optimized and cleared by cardiology prior to scheduling permacath placement (plavix will have to be on hold 4 hrs prior to procedure.)  - Willis catheter for strict intake and output.   - Obtain CTAP to rule out acute findings. Had a recent normal Renal U/S.   - No NSAIDs,ACE-i/ ARBs, contrast, or diuretics.    *Hyperkalemia:  K 5.7  Given D50, insulin, kayexelate in the ER.  Repeat BMP at 7 PM   Monitor on tele   EKG with no acute changes    Elevated magnesium  likely due to mylanta use.   - cont to monitor, trend    *Elevated troponin:  suspect demand due to renal failure  EKG nonischemic  Cont to trend trops  Cardio to consult--spoke with Dr Miranda who confirms Dr Conley will see patient tomorrow  TTE    *History of Paroxysmal Afib/a flutter s/p ablation and pacemaker placement, currently in NSR.   On coumadin at home - INR 1.95--will hold coumadin for possible permacath placement. Pt very low stroke risk (CHADS Vasc 2 points, stroke risk 2.2% per year. )- discussed w/ patient. Dr Miranda aware  - restart coumadin if no plans for permacath.     *HTN:  Patient hypotensive in ER--most recent BP 94/58 (MAP 67)  Hold hydralazine for now  Consider midodrine if remains hypotensive     *Subacute diarrhea  Pt reports somewhat chronic diarrhea since the COVID vaccine. He says overall it's been improving.   - cont to monitor.     *Diastolic CHF due to ischemic cardiomyopathy  TTE 2020 with EF 60%  Hold lasix, spironolactone in setting of acute renal failure  Entresto recently d/phyllis due to renal failure   - repeat echocardiogram.     *CAD s/p CABG:  Cont plavix (spoke with IR PA who reports plavix to be held 4 hrs prior to permacath)    *Morbid Obesity:  BMI 45.5  weight loss and exercise encouraged    Prediabetes:  HgbA1C 5.8 this past month  previously on farxiga, now stopped.   Weight loss encouraged    Dispo: will need Case Mgmt for outpatient HD placement  DVT ppx: [facility]   FULL CODE  Diet: renal restricted.     Case d/w Dr David, Dr Miranda, Dr Johnson and Dr Rodriguez    59 yo M with PMHx obesity, HTN, diastolic CHF, CAD s/p CABG, A fib on coumadin presents to [place] ER with renal failure. Shiley catheter placed in ED and plan to start HD.    *Acute kidney injury on CKD st. [facility]  Patient with h/o CKD but now with renal failure. Suspect multifactorial causes.  - Could be pre-renal from diarrhea, intrinsic renal etiology from NSAID use, or cardiorenal from CHF.  - K 5.7, Creatinine 4.03,  (Creatinine 1.9 in 12/2020)  - s/p D50, insulin, kayexelate in the ER.  - Repeat BMP tonight  - Renal - Dr David consulted. shiley catheter placed in ED should pt need HD.  - Dr Rodriguez is not available at this time to put in PermaCath--spoke with IR at [facility] () and spoke with PA who recommends the patient be medically optimized and cleared by cardiology prior to scheduling permacath placement (plavix will have to be on hold 4 hrs prior to procedure.)  - Willis catheter for strict intake and output.   - Obtain CTAP to rule out acute findings. Had a recent normal Renal U/S.   - No NSAIDs,ACE-i/ ARBs, contrast, or diuretics.    *Hyperkalemia:  K 5.7  Given D50, insulin, kayexelate in the ER.  Repeat BMP at 7 PM   Monitor on tele   EKG with no acute changes    Elevated magnesium  likely due to mylanta use.   - cont to monitor, trend    *Elevated troponin:  suspect demand due to renal failure  EKG nonischemic  Cont to trend trops  Cardio to consult--spoke with Dr Miranda who confirms Dr Conley will see patient tomorrow  TTE    *History of Paroxysmal Afib/a flutter s/p ablation and pacemaker placement, currently in NSR.   On coumadin at home - INR 1.95--will hold coumadin for possible permacath placement. Pt very low stroke risk (CHADS Vasc 2 points, stroke risk 2.2% per year. )- discussed w/ patient. Dr Miranda aware  - restart coumadin if no plans for permacath.     *HTN:  Patient hypotensive in ER--most recent BP 94/58 (MAP 67)  Hold hydralazine for now  Consider midodrine if remains hypotensive     *Subacute diarrhea  Pt reports somewhat chronic diarrhea since the COVID vaccine. He says overall it's been improving.   - cont to monitor.     *Diastolic CHF due to ischemic cardiomyopathy  TTE 2020 with EF 60%  Hold lasix, spironolactone in setting of acute renal failure  Entresto recently d/phyllis due to renal failure   - repeat echocardiogram.     *CAD s/p CABG:  Cont plavix (spoke with IR PA who reports plavix to be held 4 hrs prior to permacath)    *Morbid Obesity:  BMI 45.5  weight loss and exercise encouraged    Prediabetes:  HgbA1C 5.8 this past month  previously on farxiga, now stopped.   Weight loss encouraged    Dispo: will need Case Mgmt for outpatient HD placement  DVT ppx: [facility]   FULL CODE  Diet: renal restricted.     Case d/w Dr David, Dr Miranda, Dr Johnson and Dr Rodriguez

## 2021-04-19 NOTE — ED ADULT NURSE NOTE - OBJECTIVE STATEMENT
Pt presents to ED as sent by PMD and nephrologist for abnormal labs.  Pt states had recent lab work completed and had abnormal Creatinine, Magnesium, and Potassium levels.  Endorses recent normal u/a results.  Pt is AAOX3, speaking in full clear sentences.  No SOB noted at rest.  Endorses SOB while climbing stairs.  BLE noted to have +1 pitting edema with discoloration of skin.  Pt states has had discoloration for "years" alongside SOB.  Noted to be hypotensive in ED with BP of 94/55.  Pt states has been hypotensive due to medication, which has recently been discontinued by his PMD.  Denies any chest pain, palpitations, dizziness.  EKG completed.  Continuous cardiac and spO2 monitoring in place.

## 2021-04-19 NOTE — H&P ADULT - ATTENDING COMMENTS
Mr. Waller is a morbidly obese middle aged man w/ many co morbidities including ischemic cardiomyopathy, CAD s/p CABG, a fib/flutter s/p ablation and pacemaker placement, worsening CKD, who presents at the direction of his Nephrologist for ANGELICA on CKD st. v/ESRD, hyperkalemia and hypermagnesemia.     #ANGELICA on CKD st. V/ESRD - had shiley placed in ER. He has been taking NSAIDs and has had profuse diarrhea sinc ehis COVID vaccine on 4/1/2021. He has been hypotensive. Could have pre-renal component. NO NSAIDs, Arb, and holding diuretics. Reassess BMP in AM. F/u TTE   #hyperkalemia - Given D50, insulin, kayexelate in the ER. monitor on tele. ECG without acute changes.    Discussed case with Dr. David.  ECG with NSR. Mr. Waller is a morbidly obese middle aged man w/ many co morbidities including ischemic cardiomyopathy, CAD s/p CABG, a fib/flutter s/p ablation and pacemaker placement, worsening CKD, who presents at the direction of his Nephrologist for ANGELICA on CKD st. V, hyperkalemia and hypermagnesemia.     #ANGELICA on CKD st. V/ESRD  -Differential including pre-renal (diarrhea or cardiorenal) or intrinsic renal (NSAID use)  - pt had shiley placed in ER. He has been taking NSAIDs and has had profuse diarrhea since his COVID vaccine on 4/1/2021. He has been hypotensive. This could be reversible - Pt has had an acute decline in renal function just in the past month. NO NSAIDs, ace-i/Arb, and holding diuretics.  - Reassess BMP in AM. F/u TTE. If EF normal, may benefit from a trial of fluids. Appreciate Renal assistance.    - f/u CT abd/pelvis to assess for any other acute etiologies.    #hyperkalemia - Given D50, insulin, kayexelate in the ER. monitor on tele. ECG without acute changes.    Discussed case with Dr. David.  ECG with NSR. Mr. Waller is a morbidly obese middle aged man w/ many co morbidities including ischemic cardiomyopathy, CAD s/p CABG, a fib/flutter s/p ablation and pacemaker placement, worsening CKD, who presents at the direction of his Nephrologist for ANGELICA on CKD,, hyperkalemia and hypermagnesemia.     #ANGELICA on CKD st. V/ESRD, ANGELICA likely multifactorial in etiology  -Differential including pre-renal (diarrhea or cardiorenal) or intrinsic renal (NSAID use)  - pt had shiley placed in ER. He has been taking NSAIDs and has had profuse diarrhea since his COVID vaccine on 4/1/2021. He has been hypotensive. ANGELICA could be reversible - Pt has had an acute decline in renal function just in the past month. NO NSAIDs, ace-i/Arb, and holding diuretics. Will f/u labs in AM and reassess need for HD.   - Reassess BMP in AM. F/u TTE. If EF normal, may benefit from a trial of fluids. Appreciate Renal assistance.    - f/u CT abd/pelvis to assess for any other acute etiologies.    #hyperkalemia - Given D50, insulin, kayexelate in the ER. monitor on tele. ECG without acute changes.    Discussed case with Dr. David.  ECG with NSR.

## 2021-04-19 NOTE — ED PROVIDER NOTE - CLINICAL SUMMARY MEDICAL DECISION MAKING FREE TEXT BOX
Pt here for new onset ANGELICA, will check ekg, labs, lytes and admission if needs HD. Pt has never received HD before. Pt here for new onset ANGELICA, will check ekg, labs, lytes and admission if needs HD. Pt has never received HD before.  D/w eICU and ICU PA and nephrology Dr. David. Recommended lopez and CT non con - will obtain here and inpt team to f/u results. Per Renal pt does not need emergent dialysis thus can be admitted to tele. ICU PA d/w nursing supervisor to ensure that new helen can go to tele. ICU PA to place [email or web address] in the ED.

## 2021-04-19 NOTE — CONSULT NOTE ADULT - SUBJECTIVE AND OBJECTIVE BOX
60 M CKD, last Cr was 1.16 in 2019 sent by PMD with acute renal failure , Cr 4.03. Calinms to be making urine. K 5.7 Hco3 18 no EKG changes per ER staff. Denies Cp, SPB, fever, sx or urinary retention or UTI. Given D50, insulin, kayexelate in the ER. Dr David consulted.   Per ER staff exam is unrevealing

## 2021-04-19 NOTE — ED PROVIDER NOTE - OBJECTIVE STATEMENT
Dr. Shafer: 60M h/o obesity, Afib on Coumadin, CHF, CAD, HTN, HLD, ischemic cardiomyopathy, had routine blood work done last week and told to come to the ED for dialysis for creatinine 5 and Mag/K elevated. Pt has chronic VELÁSQUEZ. No chest pain. Pt is vaccinated against COVID. No fevers or chills.

## 2021-04-19 NOTE — H&P ADULT - NSHPOUTPATIENTPROVIDERS_GEN_ALL_CORE
PMD Dr Johnson-notified   Renal Dr Stringer  Cardio Dr Miranda PMD Dr Johnson-notified   Renal Dr Stringer  Cardio Dr Miranda-notified

## 2021-04-19 NOTE — PROCEDURE NOTE - NSICDXPROCEDURE_GEN_ALL_CORE_FT
PROCEDURES:  Insertion, catheter, hemodialysis, Brian Split, Hemosplit, or Tesio 19-Apr-2021 18:03:44  Dmitry Rowe

## 2021-04-19 NOTE — ED PROVIDER NOTE - PMH
Atrial fibrillation    Atrial flutter    CAD (coronary artery disease)    CHF (congestive heart failure)    HTN (hypertension)    Hypercholesteremia    Ischemic cardiomyopathy    Lichen planus    MI (myocardial infarction)    Obesity    WILFRED (obstructive sleep apnea)    Peripheral edema  chronic  Stented coronary artery    Thrombus of left atrial appendage

## 2021-04-19 NOTE — ED PROVIDER NOTE - ATTENDING CONTRIBUTION TO CARE
Dr. Shafer: I performed a face to face bedside interview with patient regarding history of present illness, review of symptoms and past medical history. I completed an independent physical exam.  I have discussed patient's plan of care with PA.   I agree with note as stated above, having amended the EMR as needed to reflect my findings.   This includes HISTORY OF PRESENT ILLNESS, HIV, PAST MEDICAL/SURGICAL/FAMILY/SOCIAL HISTORY, ALLERGIES AND HOME MEDICATIONS, REVIEW OF SYSTEMS, PHYSICAL EXAM, and any PROGRESS NOTES during the time I functioned as the attending physician for this patient.    Dr. Shafer: This H&P has been written by myself in its entirety

## 2021-04-19 NOTE — H&P ADULT - NSICDXPASTSURGICALHX_GEN_ALL_CORE_FT
PAST SURGICAL HISTORY:  Cardiac pacemaker leadless    H/O atrioventricular karlee ablation     History of adenoidectomy     History of coronary artery stent placement     History of elbow surgery     S/P CABG (coronary artery bypass graft)

## 2021-04-19 NOTE — ED PROVIDER NOTE - PROGRESS NOTE DETAILS
ANALIA Levine: I participated in the care/evaluation of this patient ANALIA Levine: Spoke with ICU attending Dr. Salgado, he reccds lopez placement to monitor ins/outs and noncontrast abd CT.

## 2021-04-19 NOTE — H&P ADULT - NSICDXPASTMEDICALHX_GEN_ALL_CORE_FT
PAST MEDICAL HISTORY:  Atrial fibrillation     Atrial flutter s/p ablation    CAD (coronary artery disease) s/p CABG    CHF (congestive heart failure)     HTN (hypertension)     Hypercholesteremia     Ischemic cardiomyopathy     Lichen planus     MI (myocardial infarction)     Obesity     WILFRED (obstructive sleep apnea)     Peripheral edema chronic    Stented coronary artery     Thrombus of left atrial appendage

## 2021-04-20 LAB
% ALBUMIN: 51.9 % — SIGNIFICANT CHANGE UP
% ALPHA 1: 3.9 % — SIGNIFICANT CHANGE UP
% ALPHA 2: 10.3 % — SIGNIFICANT CHANGE UP
% BETA: 11.3 % — SIGNIFICANT CHANGE UP
% GAMMA: 22.6 % — SIGNIFICANT CHANGE UP
ALBUMIN SERPL ELPH-MCNC: 4.4 G/DL — SIGNIFICANT CHANGE UP (ref 3.6–5.5)
ALBUMIN/GLOB SERPL ELPH: 1.1 RATIO — SIGNIFICANT CHANGE UP
ALPHA1 GLOB SERPL ELPH-MCNC: 0.3 G/DL — SIGNIFICANT CHANGE UP (ref 0.1–0.4)
ALPHA2 GLOB SERPL ELPH-MCNC: 0.9 G/DL — SIGNIFICANT CHANGE UP (ref 0.5–1)
ANION GAP SERPL CALC-SCNC: 9 MMOL/L — SIGNIFICANT CHANGE UP (ref 5–17)
APTT BLD: 40.4 SEC — HIGH (ref 27.5–35.5)
B-GLOBULIN SERPL ELPH-MCNC: 1 G/DL — SIGNIFICANT CHANGE UP (ref 0.5–1)
BUN SERPL-MCNC: 105 MG/DL — HIGH (ref 7–23)
C3 SERPL-MCNC: 120 MG/DL — SIGNIFICANT CHANGE UP (ref 81–157)
C4 SERPL-MCNC: 31 MG/DL — SIGNIFICANT CHANGE UP (ref 13–39)
CALCIUM SERPL-MCNC: 8.8 MG/DL — SIGNIFICANT CHANGE UP (ref 8.4–10.5)
CHLORIDE SERPL-SCNC: 107 MMOL/L — SIGNIFICANT CHANGE UP (ref 96–108)
CK MB BLD-MCNC: 4.8 % — HIGH (ref 0–3.5)
CK MB CFR SERPL CALC: 4.5 NG/ML — SIGNIFICANT CHANGE UP (ref 0.5–10)
CK SERPL-CCNC: 94 U/L — SIGNIFICANT CHANGE UP (ref 30–200)
CO2 SERPL-SCNC: 23 MMOL/L — SIGNIFICANT CHANGE UP (ref 22–31)
COVID-19 SPIKE DOMAIN AB INTERP: POSITIVE
COVID-19 SPIKE DOMAIN ANTIBODY RESULT: >250 U/ML — HIGH
CREAT SERPL-MCNC: 3.05 MG/DL — HIGH (ref 0.5–1.3)
GAMMA GLOBULIN: 1.9 G/DL — HIGH (ref 0.6–1.6)
GLUCOSE BLDC GLUCOMTR-MCNC: 122 MG/DL — HIGH (ref 70–99)
GLUCOSE BLDC GLUCOMTR-MCNC: 93 MG/DL — SIGNIFICANT CHANGE UP (ref 70–99)
GLUCOSE SERPL-MCNC: 96 MG/DL — SIGNIFICANT CHANGE UP (ref 70–99)
HCT VFR BLD CALC: 37.1 % — LOW (ref 39–50)
HCV AB S/CO SERPL IA: 0.58 S/CO — SIGNIFICANT CHANGE UP (ref 0–0.99)
HCV AB SERPL-IMP: SIGNIFICANT CHANGE UP
HGB BLD-MCNC: 12.4 G/DL — LOW (ref 13–17)
INR BLD: 2.31 RATIO — HIGH (ref 0.88–1.16)
MAGNESIUM SERPL-MCNC: 2.7 MG/DL — HIGH (ref 1.6–2.6)
MCHC RBC-ENTMCNC: 30.8 PG — SIGNIFICANT CHANGE UP (ref 27–34)
MCHC RBC-ENTMCNC: 33.4 GM/DL — SIGNIFICANT CHANGE UP (ref 32–36)
MCV RBC AUTO: 92.1 FL — SIGNIFICANT CHANGE UP (ref 80–100)
NRBC # BLD: 0 /100 WBCS — SIGNIFICANT CHANGE UP (ref 0–0)
PLATELET # BLD AUTO: 271 K/UL — SIGNIFICANT CHANGE UP (ref 150–400)
POTASSIUM SERPL-MCNC: 4.7 MMOL/L — SIGNIFICANT CHANGE UP (ref 3.5–5.3)
POTASSIUM SERPL-MCNC: 5.8 MMOL/L — HIGH (ref 3.5–5.3)
POTASSIUM SERPL-SCNC: 4.7 MMOL/L — SIGNIFICANT CHANGE UP (ref 3.5–5.3)
POTASSIUM SERPL-SCNC: 5.8 MMOL/L — HIGH (ref 3.5–5.3)
PROT PATTERN SERPL ELPH-IMP: SIGNIFICANT CHANGE UP
PROT SERPL-MCNC: 8.5 G/DL — HIGH (ref 6–8.3)
PROTHROM AB SERPL-ACNC: 26.9 SEC — HIGH (ref 10.6–13.6)
RBC # BLD: 4.03 M/UL — LOW (ref 4.2–5.8)
RBC # FLD: 12.3 % — SIGNIFICANT CHANGE UP (ref 10.3–14.5)
SARS-COV-2 IGG+IGM SERPL QL IA: >250 U/ML — HIGH
SARS-COV-2 IGG+IGM SERPL QL IA: POSITIVE
SODIUM SERPL-SCNC: 139 MMOL/L — SIGNIFICANT CHANGE UP (ref 135–145)
TROPONIN I SERPL-MCNC: 1.02 NG/ML — HIGH (ref 0.02–0.06)
TROPONIN I SERPL-MCNC: 1.22 NG/ML — HIGH (ref 0.02–0.06)
URATE SERPL-MCNC: 9.9 MG/DL — HIGH (ref 3.4–8.8)
WBC # BLD: 7.59 K/UL — SIGNIFICANT CHANGE UP (ref 3.8–10.5)
WBC # FLD AUTO: 7.59 K/UL — SIGNIFICANT CHANGE UP (ref 3.8–10.5)

## 2021-04-20 PROCEDURE — 99223 1ST HOSP IP/OBS HIGH 75: CPT

## 2021-04-20 PROCEDURE — 93306 TTE W/DOPPLER COMPLETE: CPT | Mod: 26

## 2021-04-20 PROCEDURE — 93010 ELECTROCARDIOGRAM REPORT: CPT

## 2021-04-20 PROCEDURE — 99233 SBSQ HOSP IP/OBS HIGH 50: CPT

## 2021-04-20 RX ORDER — SODIUM ZIRCONIUM CYCLOSILICATE 10 G/10G
15 POWDER, FOR SUSPENSION ORAL ONCE
Refills: 0 | Status: COMPLETED | OUTPATIENT
Start: 2021-04-20 | End: 2021-04-20

## 2021-04-20 RX ORDER — CALCIUM GLUCONATE 100 MG/ML
1 VIAL (ML) INTRAVENOUS ONCE
Refills: 0 | Status: COMPLETED | OUTPATIENT
Start: 2021-04-20 | End: 2021-04-20

## 2021-04-20 RX ORDER — ALLOPURINOL 300 MG
100 TABLET ORAL DAILY
Refills: 0 | Status: DISCONTINUED | OUTPATIENT
Start: 2021-04-20 | End: 2021-04-23

## 2021-04-20 RX ORDER — INSULIN HUMAN 100 [IU]/ML
10 INJECTION, SOLUTION SUBCUTANEOUS ONCE
Refills: 0 | Status: COMPLETED | OUTPATIENT
Start: 2021-04-20 | End: 2021-04-20

## 2021-04-20 RX ORDER — DEXTROSE 50 % IN WATER 50 %
50 SYRINGE (ML) INTRAVENOUS ONCE
Refills: 0 | Status: COMPLETED | OUTPATIENT
Start: 2021-04-20 | End: 2021-04-20

## 2021-04-20 RX ORDER — SODIUM ZIRCONIUM CYCLOSILICATE 10 G/10G
10 POWDER, FOR SUSPENSION ORAL ONCE
Refills: 0 | Status: DISCONTINUED | OUTPATIENT
Start: 2021-04-20 | End: 2021-04-20

## 2021-04-20 RX ADMIN — HEPARIN SODIUM 5000 UNIT(S): 5000 INJECTION INTRAVENOUS; SUBCUTANEOUS at 13:41

## 2021-04-20 RX ADMIN — SODIUM ZIRCONIUM CYCLOSILICATE 15 GRAM(S): 10 POWDER, FOR SUSPENSION ORAL at 04:57

## 2021-04-20 RX ADMIN — CLOPIDOGREL BISULFATE 75 MILLIGRAM(S): 75 TABLET, FILM COATED ORAL at 12:30

## 2021-04-20 RX ADMIN — Medication 100 MILLIGRAM(S): at 13:40

## 2021-04-20 RX ADMIN — HEPARIN SODIUM 5000 UNIT(S): 5000 INJECTION INTRAVENOUS; SUBCUTANEOUS at 05:28

## 2021-04-20 RX ADMIN — Medication 100 GRAM(S): at 01:28

## 2021-04-20 RX ADMIN — Medication 50 MILLILITER(S): at 04:54

## 2021-04-20 RX ADMIN — INSULIN HUMAN 10 UNIT(S): 100 INJECTION, SOLUTION SUBCUTANEOUS at 05:01

## 2021-04-20 NOTE — PROVIDER CONTACT NOTE (OTHER) - ACTION/TREATMENT ORDERED:
regular Insulin 10 units IV push administer concurrently with Dextrose 50% at 0500. regular Insulin 10 units IV push administer concurrently with Dextrose 50% IV push at 0500. Lokelma 15gm Po x1 at 0500 regular Insulin 10 units IV push administer concurrently with Dextrose 50% IV push at 0500. Lokelma 15gm Po x1 at 0500. Calcium gluconate 1gm IVx1 given

## 2021-04-20 NOTE — PROGRESS NOTE ADULT - SUBJECTIVE AND OBJECTIVE BOX
No distress    Vital Signs Last 24 Hrs  T(C): 37 (04-20-21 @ 16:48), Max: 37 (04-20-21 @ 16:48)  T(F): 98.6 (04-20-21 @ 16:48), Max: 98.6 (04-20-21 @ 16:48)  HR: 84 (04-20-21 @ 16:48) (74 - 97)  BP: 105/71 (04-20-21 @ 16:48) (82/48 - 107/65)  BP(mean): 55 (04-19-21 @ 18:40) (55 - 71)  RR: 16 (04-20-21 @ 16:48) (16 - 20)  SpO2: 98% (04-20-21 @ 16:48) (96% - 100%)    I&O's Detail    19 Apr 2021 07:01  -  20 Apr 2021 07:00  --------------------------------------------------------  OUT:    Voided (mL): 1500 mL  Total OUT: 1500 mL    20 Apr 2021 07:01  -  20 Apr 2021 17:27  --------------------------------------------------------  OUT:    Voided (mL): 1000 mL  Total OUT: 1000 mL    s1s2  b/l air entry  soft  edema, chronic per the pt and unchanged                         12.4   7.59  )-----------( 271      ( 20 Apr 2021 05:30 )             37.1     20 Apr 2021 05:30    139    |  107    |  105    ----------------------------<  96     4.7     |  23     |  3.05     Ca    8.8        20 Apr 2021 05:30  Mg     2.7       20 Apr 2021 05:30    TPro  8.5    /  Alb  3.6    /  TBili  0.4    /  DBili  x      /  AST  14     /  ALT  31     /  AlkPhos  92     19 Apr 2021 14:50    LIVER FUNCTIONS - ( 19 Apr 2021 14:50 )  Alb: 3.6 g/dL / Pro: 8.5 g/dL / ALK PHOS: 92 U/L / ALT: 31 U/L / AST: 14 U/L / GGT: x           PT/INR - ( 20 Apr 2021 05:30 )   PT: 26.9 sec;   INR: 2.31 ratio      CARDIAC MARKERS ( 20 Apr 2021 15:55 )  1.018 ng/mL / x     / 94 U/L / x     / 4.5 ng/mL  CARDIAC MARKERS ( 20 Apr 2021 05:30 )  1.220 ng/mL / x     / x     / x     / x      CARDIAC MARKERS ( 19 Apr 2021 22:00 )  1.102 ng/mL / x     / x     / x     / x      CARDIAC MARKERS ( 19 Apr 2021 20:13 )  .690 ng/mL / x     / x     / x     / x      CARDIAC MARKERS ( 19 Apr 2021 14:50 )  .099 ng/mL / x     / x     / x     / x        allopurinol 100 milliGRAM(s) Oral daily  clopidogrel Tablet 75 milliGRAM(s) Oral daily    A/P:    CM, DM, EF 40-45%, mod AS  ANGELICA/CKD 3 multifactorial: hypotensive/hemodynamic, NSAID's related +/- obstructive (Cr 1.93 - 12/14/20)  Cr is improving from peak of 4.45 on 4/12/21  SONO w/o hydro  Hold BP lowering meds  Avoid nephrotoxins  Continue lopez, I/Os  BMP in am  Renal diet  No indications for RRT on this adm  No NSAID's now or in the future, d/w pt  Troponin noted, cardiology following    [phone]

## 2021-04-20 NOTE — PROGRESS NOTE ADULT - ASSESSMENT
59 yo M with PMHx obesity, HTN, diastolic CHF, CAD s/p CABG, A fib on coumadin presents to [facility] with renal failure. Shiley catheter placed in ED for likely future dialysis.     *Acute kidney injury on CKD [facility]  Patient with h/o CKD but now with renal failure. Improved since yesterday. Suspect multifactorial causes.  - Could be pre-renal from diarrhea, intrinsic renal etiology from NSAID use, or cardiorenal from CHF.  - Originally with hyperkalemia, 5.7, Creatinine 4.03,  (Creatinine 1.9 in 12/2020). Labs now improving   - s/p D50, insulin, kayexelate   - Appreciate renal recs. shiley catheter placed in ED should pt need HD.  - Dr Rodriguez is not available at this time to put in PermaCath--prior discussion with IR at [facility] () and spoke with PA who recommends the patient be medically optimized and cleared by cardiology prior to scheduling permacath placement (plavix will have to be on hold 4 hrs prior to procedure.)  - Willis catheter removed   - CTAP: no hydro or obstruction. Had a recent normal Renal U/S.   - No NSAIDs,ACE-i/ ARBs, contrast, or diuretics.    *Hyperkalemia:  Improved, K+ 4.7 in am   Given D50, insulin, kayexelate in the ER.  Monitor on tele   BMP in am     Elevated magnesium  likely due to mylanta use.   - cont to monitor, trend    *Elevated troponin:  suspect demand due to renal failure  EKG nonischemic  troponin increasing   Cardio recs   TTE: EF 40-45%, Grade III diastolic dysfunction. Severe aortic stenosis. See above for further detail       *History of Paroxysmal Afib/a flutter s/p ablation and pacemaker placement, currently in NSR.   On coumadin at home  INR --will hold coumadin for possible permacath placement. Pt very low stroke risk (CHADS Vasc 2 points, stroke risk 2.2% per year. )- discussed w/ patient.   - restart coumadin if no plans for permacath.     *HTN:  Patient hypotensive in ER  Hold hydralazine for now  Consider midodrine if remains hypotensive     *Subacute diarrhea  Pt reports somewhat chronic diarrhea since the COVID vaccine. He says overall it's been improving.   - cont to monitor.     *Diastolic CHF due to ischemic cardiomyopathy  TTE 2020 with EF 60% now worsening to 40-45%  Hold lasix, spironolactone in setting of acute renal failure  Entresto recently d/phyllis due to renal failure        *CAD s/p CABG:  Cont plavix (previous discussion with REJI HELMS who reports plavix to be held 4 hrs prior to permacath)    *Morbid Obesity:  BMI 45.5  weight loss and exercise encouraged    Prediabetes:  HgbA1C 5.8 this past month  previously on farxiga, now stopped.   Weight loss encouraged    Dispo: will need Case Mgmt for outpatient HD placement  DVT ppx: SQH   FULL CODE  Diet: renal restricted.

## 2021-04-20 NOTE — PROGRESS NOTE ADULT - ATTENDING COMMENTS
Mr. Waller is a morbidly obese middle aged man w/ many co morbidities including ischemic cardiomyopathy, CAD s/p CABG, a fib/flutter s/p ablation and pacemaker placement, worsening CKD, who presents at the direction of his Nephrologist for ANGELICA on CKD,, hyperkalemia and hypermagnesemia.     #ANGELICA on CKD st. [facility]/ESRD, ANGELICA likely multifactorial in etiology  -Differential including pre-renal (diarrhea or cardiorenal) or intrinsic renal (NSAID use)  - pt had shiley placed in ER. He has been taking NSAIDs and has had profuse diarrhea since his COVID vaccine on 4/1/2021. He has been hypotensive. ANGELICA could be reversible - Pt has had an acute decline in renal function just in the past month. NO NSAIDs, ace-i/Arb, and holding diuretics. Will f/u labs in AM and reassess need for HD.   - Reassess BMP in AM. F/u TTE. If EF normal, may benefit from a trial of fluids. Appreciate Renal assistance.    - f/u CT abd/pelvis to assess for any other acute etiologies.    #hyperkalemia - Given D50, insulin, kayexelate in the ER. monitor on tele. ECG without acute changes.    Discussed case with Dr. David.  ECG with NSR. Mr. Waller is a morbidly obese middle aged man w/ many co morbidities including ischemic cardiomyopathy, CAD s/p CABG, a fib/flutter s/p ablation and pacemaker placement, worsening CKD, who presents at the direction of his Nephrologist for ANGELICA on CKD,, hyperkalemia and hypermagnesemia.     Seen and examined. Chart reviewed.   reported no complaints.  ROS: VELÁSQUEZ. all other neg  Exma : morbid obesity. SM+. ortherwise NAD      #ANGELICA on CKD 4,   # hyperK  - ANGELICA likely multifactorial in etiology  -Differential including pre-renal (diarrhea or cardiorenal) or intrinsic renal (NSAID use)  - RIJ line placed. shiley removed  - K normalized with meds  - renal cx noted. no need RRT at this time.   - K normalized    Elevated Trop  - demand Ischaemia vs MI  - cardio cx noted. likely demand as patient is asymptomatic  - trend CE and EKG  - Echo: severe LV dysfunction. WMA, mod AS, mod Pul HTN    rest as above

## 2021-04-20 NOTE — PROGRESS NOTE ADULT - SUBJECTIVE AND OBJECTIVE BOX
Patient is a 60y old  Male who presents with a chief complaint of renal failure     Patient seen and examined at bedside. States he is feeling better denies overnight events or current complaints including chest pain, shortness of breath, dizziness, nausea, vomiting, diarrhea, fever or chills.      ALLERGIES:  No Known Allergies    MEDICATIONS  (STANDING):  allopurinol 100 milliGRAM(s) Oral daily  clopidogrel Tablet 75 milliGRAM(s) Oral daily  heparin   Injectable 5000 Unit(s) SubCutaneous every 8 hours    MEDICATIONS  (PRN):    Vital Signs Last 24 Hrs  T(F): 98.2 (20 Apr 2021 12:32), Max: 98.3 (19 Apr 2021 20:18)  HR: 74 (20 Apr 2021 12:32) (74 - 98)  BP: 103/67 (20 Apr 2021 12:32) (82/45 - 107/65)  RR: 18 (20 Apr 2021 12:32) (17 - 24)  SpO2: 97% (20 Apr 2021 12:32) (96% - 100%)  I&O's Summary    19 Apr 2021 07:01  -  20 Apr 2021 07:00  --------------------------------------------------------  IN: 0 mL / OUT: 1500 mL / NET: -1500 mL    20 Apr 2021 07:01  -  20 Apr 2021 14:16  --------------------------------------------------------  IN: 120 mL / OUT: 0 mL / NET: 120 mL      PHYSICAL EXAM:  General: NAD, A/O x 3  ENT: MMM, no oral thrush   Neck: Central line in superior vena cava, Supple, No JVD  Lungs: Clear to auscultation bilaterally, non labored breathing  Cardio: RRR, S1/S2, No murmurs  Abdomen: Soft, Nontender, Nondistended; Bowel sounds present  Extremities: No calf tenderness, No pitting edema    LABS:                        12.4   7.59  )-----------( 271      ( 20 Apr 2021 05:30 )             37.1     04-20    139  |  107  |  105  ----------------------------<  96  4.7   |  23  |  3.05    Ca    8.8      20 Apr 2021 05:30  Mg     2.7     04-20    TPro  8.5  /  Alb  3.6  /  TBili  0.4  /  DBili  x   /  AST  14  /  ALT  31  /  AlkPhos  92  04-19    eGFR if Non African American: 21 mL/min/1.73M2 (04-20-21 @ 05:30)  eGFR if : 24 mL/min/1.73M2 (04-20-21 @ 05:30)    PT/INR - ( 20 Apr 2021 05:30 )   PT: 26.9 sec;   INR: 2.31 ratio         PTT - ( 20 Apr 2021 05:30 )  PTT:40.4 sec    CARDIAC MARKERS ( 20 Apr 2021 05:30 )  1.220 ng/mL / x     / x     / x     / x      CARDIAC MARKERS ( 19 Apr 2021 22:00 )  1.102 ng/mL / x     / x     / x     / x      CARDIAC MARKERS ( 19 Apr 2021 20:13 )  .690 ng/mL / x     / x     / x     / x      CARDIAC MARKERS ( 19 Apr 2021 14:50 )  .099 ng/mL / x     / x     / x     / x                        POCT Blood Glucose.: 122 mg/dL (20 Apr 2021 05:47)  POCT Blood Glucose.: 93 mg/dL (20 Apr 2021 04:50)  POCT Blood Glucose.: 124 mg/dL (19 Apr 2021 16:34)  POCT Blood Glucose.: 89 mg/dL (19 Apr 2021 15:51)            RADIOLOGY & ADDITIONAL TESTS:    < from: CT Abdomen and Pelvis No Cont (04.19.21 @ 19:03) >  IMPRESSION: No hydronephrosis.                    RUCHI MENDOZA MD; Attending Radiologist  This document has been electronically signed. Apr 19 2021  7:53PM    < end of copied text >  < from: Xray Chest 1 View- PORTABLE-Urgent (Xray Chest 1 View- PORTABLE-Urgent .) (04.19.21 @ 18:01) >  INTERPRETATION:  Views:1  Comparison: 12/20/12  History: Renal failure, line placement    The lungs are clear of infiltrates and effusions. A dialysis catheter is in placed by the right jugular approach. Tip projects over the SVC. There is no pneumothorax. There is been sternotomy and loop recorder placement. The cardiac and mediastinal contours appear unremarkable.    Impression:  1. Line placement as above                  CHA BEGUM MD; Attending Radiologist  This document has been electronically signed. Apr 20 2021 10:06AM    < end of copied text >  < from: Xray Chest 1 View- PORTABLE-Urgent (Xray Chest 1 View- PORTABLE-Urgent .) (04.19.21 @ 14:43) >    IMPRESSION:  No active pulmonary disease.    Thank you for the courtesy of this referral.              LEILANI GARCÍA MD; Attending Interventional Radiologist  This document has been electronically signed. Apr 20 2021  7:49AM    < end of copied text >      < from: TTE Echo Complete w/o Contrast w/ Doppler (04.20.21 @ 08:20) >    Summary:   1. Left ventricular ejection fraction, by visual estimation, is 40 to 45%.   2. Moderately decreased global left ventricular systolic function.   3. Mid and apical anterior septum, apex, mid inferoseptal segment, and apical lateral segment are abnormal as described above.   4. Normal left ventricular internal cavity size.   5. Spectral Doppler shows restrictive pattern of left ventricular myocardial filling (Grade III diastolic dysfunction).   6. Mildly enlarged left atrium.   7. Mild mitral annular calcification.   8. No evidence of mitral valve regurgitation.   9. Thickening and calcification of the anterior and posterior mitral valve leaflets.  10. Mild to moderate aortic valve stenosis.  11. Patient underwent ultrasound enhanced echocardiography with definity.  12. Dvi by tvi is .3 consistent with borderline severe aortic stenosis.  13. Endocardial visualization was enhanced with intravenous echo contrast.  14. Peak transaortic gradient equals 20.9 mmHg, mean transaortic gradient equals 9.6 mmHg, the calculated aortic valve area equals 1.06 cm² by the continuity equation consistent with moderate aortic stenosis.  15. The Dimesionless Index value is 0.30.    [id number] Jasmyne Giron , Electronically signed on 4/20/2021 at 11:45:36 AM        *** Final ***                JASMYNE GIRON   This document has been electronically signed. Apr 20 2021  8:20AM    < end of copied text >    Care Discussed with Consultants/Other Providers:

## 2021-04-20 NOTE — CONSULT NOTE ADULT - SUBJECTIVE AND OBJECTIVE BOX
Chief Complaint:    yo M with PMHx obesity, HTN, diastolic CHF, CAD s/p CABG, A fib on coumadin(s/p ablation), prediabetes (hgbA1c 5.8) presents to the ER in renal failure. Patient reports he had a blood work done a few days ago and  was advised to come to the ER for evaluation of renal failure. Last creatinine prior to this month was 1.9 in 2020. He underwent a renal ultrasound which was unremarkable.  Patient reports overall he feels well. He does admit to some chronic VELÁSQUEZ after walking 3-4 blocks. Also reports that he is urinating more frequently than normal but with less urine output. Patient does admit to recently starting Farxiga but stopping when he found out his kidney function was worsening . Denies any fevers, chills, chest pain, SOB, palpitations, nausea, vomiting abdominal pain.pt has been having watery diarrhea since his COVID vaccine on 4/1/2021, but believes it to be improving. In ED, BP 94/58, HR 96.  O2 sat 99% on RA.  Labs notable for Hgb 12.9, K 5.7, creatnine 4.03, troponin 0.099, , Mg 2.8.  EKG with SR, first degree heart block. I. Given D50, insulin, kayexelate in the ER. Dr David was consulted.  A shiley catheter was placed in the ED with plans for dialysis tomorrow. E-ICU was consulted in the ER but no emergent need to start HD overnight. Patient admitted to hospitalist for further management. seen by dr amador 1/22/21 who advised heart failure evaluation at [place]     HPI:    PMH:   CAD (coronary artery disease)    H/O heart artery stent    Obesity    Hypercholesteremia    Thrombus of left atrial appendage    Ischemic cardiomyopathy    WILFRED (obstructive sleep apnea)    HTN (hypertension)    Atrial fibrillation    CHF (congestive heart failure)    Peripheral edema    Lichen planus    Atrial flutter    MI (myocardial infarction)    Stented coronary artery      PSH:   History of tonsillectomy    History of elbow surgery    Atrial fibrillation    Cardiomyopathy    Morbid obesity    Obstructive sleep apnea syndrome    S/P CABG (coronary artery bypass graft)    Cardiac pacemaker    History of coronary artery stent placement    History of adenoidectomy    H/O atrioventricular karlee ablation      Family History:  FAMILY HISTORY:  No pertinent family history in first degree relatives    Family history of heart attack    Family history of stroke        Social History:  Smoking:  Alcohol:  Drugs:    Allergies:  No Known Allergies      Medications:  allopurinol 100 milliGRAM(s) Oral daily  clopidogrel Tablet 75 milliGRAM(s) Oral daily      REVIEW OF SYSTEMS:  CONSTITUTIONAL: No fever, weight loss, or fatigue  EYES: No eye pain, visual disturbances, or discharge  ENMT:  No difficulty hearing, tinnitus, vertigo; No sinus or throat pain  NECK: No pain or stiffness  BREASTS: No pain, masses, or nipple discharge  RESPIRATORY: No cough, wheezing, chills or hemoptysis; No shortness of breath  CARDIOVASCULAR: No chest pain, palpitations, dizziness, or leg swelling  GASTROINTESTINAL: No abdominal or epigastric pain. No nausea, vomiting, or hematemesis; No diarrhea or constipation. No melena or hematochezia.  GENITOURINARY: No dysuria, frequency, hematuria, or incontinence  NEUROLOGICAL: No headaches, memory loss, loss of strength, numbness, or tremors  SKIN: No itching, burning, rashes, or lesions   LYMPH NODES: No enlarged glands  ENDOCRINE: No heat or cold intolerance; No hair loss  MUSCULOSKELETAL: No joint pain or swelling; No muscle, back, or extremity pain  PSYCHIATRIC: No depression, anxiety, mood swings, or difficulty sleeping  HEME/LYMPH: No easy bruising, or bleeding gums  ALLERY AND IMMUNOLOGIC: No hives or eczema    Physical Exam:  T(C): 36.8 (04-20-21 @ 12:32), Max: 36.8 (04-19-21 @ 20:18)  HR: 74 (04-20-21 @ 12:32) (74 - 98)  BP: 103/67 (04-20-21 @ 12:32) (82/45 - 107/65)  RR: 18 (04-20-21 @ 12:32) (17 - 24)  SpO2: 97% (04-20-21 @ 12:32) (96% - 100%)  Wt(kg): --    GENERAL: NAD, moderately overweight  HEAD:  Atraumatic, Normocephalic  EYES: EOMI, conjunctiva and sclera clear  ENT: Moist mucous membranes,  NECK: Supple, No JVD, no bruits  CHEST/LUNG: Clear to percussion bilaterally; No rales, rhonchi, wheezing, or rubs  HEART: Regular rate and rhythm; No murmurs, rubs, or gallops PMI non displaced.  ABDOMEN: Soft, Nontender, Nondistended; Bowel sounds present  EXTREMITIES:  2+ Peripheral Pulses, No clubbing, cyanosis, or edema  SKIN: No rashes or lesions  NERVOUS SYSTEM:  Cranial Nerves II-XII intact     Cardiovascular Diagnostic Testing:  ECG:      < from: 12 Lead ECG (04.19.21 @ 14:24) >    Diagnosis Line Sinus rhythm with 1st degree AV block  Right axis deviation    No previous ECGs available  Confirmed by DEANN MANZANO MD ([id number]) on 4/19/2021 3:03:47 PM    < end of copied text >    ECHO:    < from: TTE Echo Complete w/o Contrast w/ Doppler (04.20.21 @ 08:20) >  cular ejection fraction, by visual estimation, is 40 to 45%.   2. Moderately decreased global left ventricular systolic function.   3. Mid and apical anterior septum, apex, mid inferoseptal segment, and apical lateral segment are abnormal as described above.   4. Normal left ventricular internal cavity size.   5. Spectral Doppler shows restrictive pattern of left ventricular myocardial filling (Grade III diastolic dysfunction).   6. Mildly enlarged left atrium.   7. Mild mitral annular calcification.   8. No evidence of mitral valve regurgitation.   9. Thickening and calcification of the anterior and posterior mitral valve leaflets.  10. Mild to moderate aortic valve stenosis.  11. Patient underwent ultrasound enhanced echocardiography with definity.  12. Dvi by tvi is .3 consistent with borderline severe aortic stenosis.  13. Endocardial visualization was enhanced with intravenous echo contrast.  14. Peak transaortic gradient equals 20.9 mmHg, mean transaortic gradient equals 9.6 mmHg, the calculated aortic valve area equals 1.06 cm² by the continuity equation consistent with moderate aortic stenosis.  15. The Dimesionless Index value is 0.30.    [id number] Jasmyne Giron , Electronically signed on 4/20/2021 at 11:45:36 AM      *** Final ***      JASMYNE GIRON   This document has been electronically signed. Apr 20 2021  8:20AM    < end of copied text >  < from: TTE Echo Complete w/o Contrast w/ Doppler (04.20.21 @ 08:20) >  Summary:   1. Left ventricular ejection fraction, by visual estimation, is 40 to 45%.   2. Moderately decreased global left ventricular systolic function.   3. Mid and apical anterior septum, apex, mid inferoseptal segment, and apical lateral segment are abnormal as described above.   4. Normal left ventricular internal cavity size.   5. Spectral Doppler shows restrictive pattern of left ventricular myocardial filling (Grade III diastolic dysfunction).   6. Mildly enlarged left atrium.   7. Mild mitral annular calcification.   8. No evidence of mitral valve regurgitation.   9. Thickening and calcification of the anterior and posterior mitral valve leaflets.  10. Mild to moderate aortic valve stenosis.  11. Patient underwent ultrasound enhanced echocardiography with definity.  12. Dvi by tvi is .3 consistent with borderline severe aortic stenosis.  13. Endocardial visualization was enhanced with intravenous echo contrast.  14. Peak transaortic gradient equals 20.9 mmHg, mean transaortic gradient equals 9.6 mmHg, the calculated aortic valve area equals 1.06 cm² by the continuity equation consistent with moderate aortic stenosis.  15. The Dimesionless Index value is 0.30.    [id number] Jasmyne Giron , Electronically signed on 4/20/2021 at 11:45:36 AM      *** Final ***      JASMYNE GIRON   This document has been electronically signed. Apr 20 2021  8:20AM    < end of copied text >      Labs:                        12.4   7.59  )-----------( 271      ( 20 Apr 2021 05:30 )             37.1     04-20    139  |  107  |  105<H>  ----------------------------<  96  4.7   |  23  |  3.05<H>    Ca    8.8      20 Apr 2021 05:30  Mg     2.7     04-20    TPro  8.5<H>  /  Alb  3.6  /  TBili  0.4  /  DBili  x   /  AST  14  /  ALT  31  /  AlkPhos  92  04-19    PT/INR - ( 20 Apr 2021 05:30 )   PT: 26.9 sec;   INR: 2.31 ratio         PTT - ( 20 Apr 2021 05:30 )  PTT:40.4 sec  CARDIAC MARKERS ( 20 Apr 2021 05:30 )  1.220 ng/mL / x     / x     / x     / x      CARDIAC MARKERS ( 19 Apr 2021 22:00 )  1.102 ng/mL / x     / x     / x     / x      CARDIAC MARKERS ( 19 Apr 2021 20:13 )  .690 ng/mL / x     / x     / x     / x      CARDIAC MARKERS ( 19 Apr 2021 14:50 )  .099 ng/mL / x     / x     / x     / x          Serum Pro-Brain Natriuretic Peptide: 510 pg/mL (04-19 @ 14:50)    Imaging:  < from: TTE Echo Complete w/o Contrast w/ Doppler (04.20.21 @ 08:20) >  Summary:   1. Left ventricular ejection fraction, by visual estimation, is 40 to 45%.   2. Moderately decreased global left ventricular systolic function.   3. Mid and apical anterior septum, apex, mid inferoseptal segment, and apical lateral segment are abnormal as described above.   4. Normal left ventricular internal cavity size.   5. Spectral Doppler shows restrictive pattern of left ventricular myocardial filling (Grade III diastolic dysfunction).   6. Mildly enlarged left atrium.   7. Mild mitral annular calcification.   8. No evidence of mitral valve regurgitation.   9. Thickening and calcification of the anterior and posterior mitral valve leaflets.  10. Mild to moderate aortic valve stenosis.  11. Patient underwent ultrasound enhanced echocardiography with definity.  12. Dvi by tvi is .3 consistent with borderline severe aortic stenosis.  13. Endocardial visualization was enhanced with intravenous echo contrast.  14. Peak transaortic gradient equals 20.9 mmHg, mean transaortic gradient equals 9.6 mmHg, the calculated aortic valve area equals 1.06 cm² by the continuity equation consistent with moderate aortic stenosis.  15. The Dimesionless Index value is 0.30.    [id number] Jasmyne Giron , Electronically signed on 4/20/2021 at 11:45:36 AM    *** Final ***    JASMYNE GIRON   This document has been electronically signed. Apr 20 2021  8:20AM    < end of copied text >    `< from: NM MUGA Scan (08.07.18 @ 12:54) >  IMPRESSION:  Abnormal LV ejection fraction at rest  LVEF  40  %   with   regional wall motion abnormalities noted, compatible with CAD.      UNRULY ESCOTO M.D., ATTENDING CARDIOLOGIST  This document has been electronically signed. Aug  8 2018  9:55AM    < end of copied text >      `< from: Xray Chest 1 View- PORTABLE-Urgent (Xray Chest 1 View- PORTABLE-Urgent .) (04.19.21 @ 18:01) >  he lungs are clear of infiltrates and effusions. A dialysis catheter is in placed by the right jugular approach. Tip projects over the SVC. There is no pneumothorax. There is been sternotomy and loop recorder placement. The cardiac and mediastinal contours appear unremarkable.    Impression:  1. Line placement as above      CHA BEGUM MD; Attending Radiologist  This document has been electronically signed. Apr 20 2021 10:06AM    < end of copied text >      impression  cardiomyopathy  acute on chronic systolic and diastolic heart failure ace arb arni contraindicated with renal failure consider long term beta blocker or carvedilol spirolactone contraindicated with renal failure patient is high risk of an invasive strategy given renal failure     elevation in [place]  nay reflect t a" demand ischemia absent ACS symptoms    renal failure  ? plans for dialysis       billy carnes Chief Complaint:    yo M with PMHx obesity, HTN, diastolic CHF, CAD s/p CABG, A fib on coumadin(s/p ablation), prediabetes (hgbA1c 5.8) presents to the ER in renal failure. Patient reports he had a blood work done a few days ago and  was advised to come to the ER for evaluation of renal failure. Last creatinine prior to this month was 1.9 in 2020. He underwent a renal ultrasound which was unremarkable.  Patient reports overall he feels well. He does admit to some chronic VELÁSQUEZ after walking 3-4 blocks. Also reports that he is urinating more frequently than normal but with less urine output. Patient does admit to recently starting Farxiga but stopping when he found out his kidney function was worsening . Denies any fevers, chills, chest pain, SOB, palpitations, nausea, vomiting abdominal pain.pt has been having watery diarrhea since his COVID vaccine on 4/1/2021, but believes it to be improving. In ED, BP 94/58, HR 96.  O2 sat 99% on RA.  Labs notable for Hgb 12.9, K 5.7, creatnine 4.03, troponin 0.099, , Mg 2.8.  EKG with SR, first degree heart block. I. Given D50, insulin, kayexelate in the ER. Dr David was consulted.  A shiley catheter was placed in the ED with plans for dialysis tomorrow. E-ICU was consulted in the ER but no emergent need to start HD overnight. Patient admitted to hospitalist for further management. seen by dr amador 1/22/21 who advised heart failure evaluation at [place]     HPI:    PMH:   CAD (coronary artery disease)    H/O heart artery stent    Obesity    Hypercholesteremia    Thrombus of left atrial appendage    Ischemic cardiomyopathy    WILFRED (obstructive sleep apnea)    HTN (hypertension)    Atrial fibrillation    CHF (congestive heart failure)    Peripheral edema    Lichen planus    Atrial flutter    MI (myocardial infarction)    Stented coronary artery      PSH:   History of tonsillectomy    History of elbow surgery    Atrial fibrillation    Cardiomyopathy    Morbid obesity    Obstructive sleep apnea syndrome    S/P CABG (coronary artery bypass graft)    Cardiac pacemaker    History of coronary artery stent placement    History of adenoidectomy    H/O atrioventricular karlee ablation      Family History:  FAMILY HISTORY:  No pertinent family history in first degree relatives    Family history of heart attack    Family history of stroke        Social History:  Smoking:  Alcohol:  Drugs:    Allergies:  No Known Allergies      Medications:  allopurinol 100 milliGRAM(s) Oral daily  clopidogrel Tablet 75 milliGRAM(s) Oral daily      REVIEW OF SYSTEMS:  CONSTITUTIONAL: No fever, weight loss, or fatigue  EYES: No eye pain, visual disturbances, or discharge  ENMT:  No difficulty hearing, tinnitus, vertigo; No sinus or throat pain  NECK: No pain or stiffness  BREASTS: No pain, masses, or nipple discharge  RESPIRATORY: No cough, wheezing, chills or hemoptysis; No shortness of breath  CARDIOVASCULAR: No chest pain, palpitations, dizziness, or leg swelling  GASTROINTESTINAL: No abdominal or epigastric pain. No nausea, vomiting, or hematemesis; No diarrhea or constipation. No melena or hematochezia.  GENITOURINARY: No dysuria, frequency, hematuria, or incontinence  NEUROLOGICAL: No headaches, memory loss, loss of strength, numbness, or tremors  SKIN: No itching, burning, rashes, or lesions   LYMPH NODES: No enlarged glands  ENDOCRINE: No heat or cold intolerance; No hair loss  MUSCULOSKELETAL: No joint pain or swelling; No muscle, back, or extremity pain  PSYCHIATRIC: No depression, anxiety, mood swings, or difficulty sleeping  HEME/LYMPH: No easy bruising, or bleeding gums  ALLERY AND IMMUNOLOGIC: No hives or eczema    Physical Exam:  T(C): 36.8 (04-20-21 @ 12:32), Max: 36.8 (04-19-21 @ 20:18)  HR: 74 (04-20-21 @ 12:32) (74 - 98)  BP: 103/67 (04-20-21 @ 12:32) (82/45 - 107/65)  RR: 18 (04-20-21 @ 12:32) (17 - 24)  SpO2: 97% (04-20-21 @ 12:32) (96% - 100%)  Wt(kg): --    GENERAL: NAD, moderately overweight  HEAD:  Atraumatic, Normocephalic  EYES: EOMI, conjunctiva and sclera clear  ENT: Moist mucous membranes,  NECK: Supple, No JVD, no bruits  CHEST/LUNG: Clear to percussion bilaterally; No rales, rhonchi, wheezing, or rubs  HEART: Regular rate and rhythm; No murmurs, rubs, or gallops PMI non displaced.  ABDOMEN: Soft, Nontender, Nondistended; Bowel sounds present  EXTREMITIES:  2+ Peripheral Pulses, No clubbing, cyanosis, or edema  SKIN: No rashes or lesions  NERVOUS SYSTEM:  Cranial Nerves II-XII intact     Cardiovascular Diagnostic Testing:  ECG:      < from: 12 Lead ECG (04.19.21 @ 14:24) >    Diagnosis Line Sinus rhythm with 1st degree AV block  Right axis deviation    No previous ECGs available  Confirmed by DEANN MANZANO MD ([id number]) on 4/19/2021 3:03:47 PM    < end of copied text >    ECHO:    < from: TTE Echo Complete w/o Contrast w/ Doppler (04.20.21 @ 08:20) >  cular ejection fraction, by visual estimation, is 40 to 45%.   2. Moderately decreased global left ventricular systolic function.   3. Mid and apical anterior septum, apex, mid inferoseptal segment, and apical lateral segment are abnormal as described above.   4. Normal left ventricular internal cavity size.   5. Spectral Doppler shows restrictive pattern of left ventricular myocardial filling (Grade III diastolic dysfunction).   6. Mildly enlarged left atrium.   7. Mild mitral annular calcification.   8. No evidence of mitral valve regurgitation.   9. Thickening and calcification of the anterior and posterior mitral valve leaflets.  10. Mild to moderate aortic valve stenosis.  11. Patient underwent ultrasound enhanced echocardiography with definity.  12. Dvi by tvi is .3 consistent with borderline severe aortic stenosis.  13. Endocardial visualization was enhanced with intravenous echo contrast.  14. Peak transaortic gradient equals 20.9 mmHg, mean transaortic gradient equals 9.6 mmHg, the calculated aortic valve area equals 1.06 cm² by the continuity equation consistent with moderate aortic stenosis.  15. The Dimesionless Index value is 0.30.    [id number] Jasmyne Giron , Electronically signed on 4/20/2021 at 11:45:36 AM      *** Final ***      JASMYNE GIRON   This document has been electronically signed. Apr 20 2021  8:20AM    < end of copied text >  < from: TTE Echo Complete w/o Contrast w/ Doppler (04.20.21 @ 08:20) >  Summary:   1. Left ventricular ejection fraction, by visual estimation, is 40 to 45%.   2. Moderately decreased global left ventricular systolic function.   3. Mid and apical anterior septum, apex, mid inferoseptal segment, and apical lateral segment are abnormal as described above.   4. Normal left ventricular internal cavity size.   5. Spectral Doppler shows restrictive pattern of left ventricular myocardial filling (Grade III diastolic dysfunction).   6. Mildly enlarged left atrium.   7. Mild mitral annular calcification.   8. No evidence of mitral valve regurgitation.   9. Thickening and calcification of the anterior and posterior mitral valve leaflets.  10. Mild to moderate aortic valve stenosis.  11. Patient underwent ultrasound enhanced echocardiography with definity.  12. Dvi by tvi is .3 consistent with borderline severe aortic stenosis.  13. Endocardial visualization was enhanced with intravenous echo contrast.  14. Peak transaortic gradient equals 20.9 mmHg, mean transaortic gradient equals 9.6 mmHg, the calculated aortic valve area equals 1.06 cm² by the continuity equation consistent with moderate aortic stenosis.  15. The Dimesionless Index value is 0.30.    [id number] Jasmyne Giron , Electronically signed on 4/20/2021 at 11:45:36 AM      *** Final ***      JASMYNE GIRON   This document has been electronically signed. Apr 20 2021  8:20AM    < end of copied text >      Labs:                        12.4   7.59  )-----------( 271      ( 20 Apr 2021 05:30 )             37.1     04-20    139  |  107  |  105<H>  ----------------------------<  96  4.7   |  23  |  3.05<H>    Ca    8.8      20 Apr 2021 05:30  Mg     2.7     04-20    TPro  8.5<H>  /  Alb  3.6  /  TBili  0.4  /  DBili  x   /  AST  14  /  ALT  31  /  AlkPhos  92  04-19    PT/INR - ( 20 Apr 2021 05:30 )   PT: 26.9 sec;   INR: 2.31 ratio         PTT - ( 20 Apr 2021 05:30 )  PTT:40.4 sec  CARDIAC MARKERS ( 20 Apr 2021 05:30 )  1.220 ng/mL / x     / x     / x     / x      CARDIAC MARKERS ( 19 Apr 2021 22:00 )  1.102 ng/mL / x     / x     / x     / x      CARDIAC MARKERS ( 19 Apr 2021 20:13 )  .690 ng/mL / x     / x     / x     / x      CARDIAC MARKERS ( 19 Apr 2021 14:50 )  .099 ng/mL / x     / x     / x     / x          Serum Pro-Brain Natriuretic Peptide: 510 pg/mL (04-19 @ 14:50)    Imaging:  < from: TTE Echo Complete w/o Contrast w/ Doppler (04.20.21 @ 08:20) >  Summary:   1. Left ventricular ejection fraction, by visual estimation, is 40 to 45%.   2. Moderately decreased global left ventricular systolic function.   3. Mid and apical anterior septum, apex, mid inferoseptal segment, and apical lateral segment are abnormal as described above.   4. Normal left ventricular internal cavity size.   5. Spectral Doppler shows restrictive pattern of left ventricular myocardial filling (Grade III diastolic dysfunction).   6. Mildly enlarged left atrium.   7. Mild mitral annular calcification.   8. No evidence of mitral valve regurgitation.   9. Thickening and calcification of the anterior and posterior mitral valve leaflets.  10. Mild to moderate aortic valve stenosis.  11. Patient underwent ultrasound enhanced echocardiography with definity.  12. Dvi by tvi is .3 consistent with borderline severe aortic stenosis.  13. Endocardial visualization was enhanced with intravenous echo contrast.  14. Peak transaortic gradient equals 20.9 mmHg, mean transaortic gradient equals 9.6 mmHg, the calculated aortic valve area equals 1.06 cm² by the continuity equation consistent with moderate aortic stenosis.  15. The Dimesionless Index value is 0.30.    [id number] Jasmyne Giron , Electronically signed on 4/20/2021 at 11:45:36 AM    *** Final ***    JASMYNE GIRON   This document has been electronically signed. Apr 20 2021  8:20AM    < end of copied text >    `< from: NM MUGA Scan (08.07.18 @ 12:54) >  IMPRESSION:  Abnormal LV ejection fraction at rest  LVEF  40  %   with   regional wall motion abnormalities noted, compatible with CAD.      UNRULY ESCOTO M.D., ATTENDING CARDIOLOGIST  This document has been electronically signed. Aug  8 2018  9:55AM    < end of copied text >      `< from: Xray Chest 1 View- PORTABLE-Urgent (Xray Chest 1 View- PORTABLE-Urgent .) (04.19.21 @ 18:01) >  he lungs are clear of infiltrates and effusions. A dialysis catheter is in placed by the right jugular approach. Tip projects over the SVC. There is no pneumothorax. There is been sternotomy and loop recorder placement. The cardiac and mediastinal contours appear unremarkable.    Impression:  1. Line placement as above      CHA BEGUM MD; Attending Radiologist  This document has been electronically signed. Apr 20 2021 10:06AM    < end of copied text >      impression  cardiomyopathy  acute on chronic systolic and diastolic heart failure ace arb arni contraindicated with renal failure consider long term beta blocker or carvedilol spirolactone contraindicated with renal failure patient is high risk of an invasive strategy given renal failure     elevation in [place]  nay reflect t a" demand ischemia absent ACS symptoms    renal failure  ? plans for dialysis       billy carnes    addendum  730pm  troponins are now markedly elevated would consider patient for type 2 MI ? candidate for an invasive strategy shelbie coordinate with primary cardiology dr amador and dr David renal service

## 2021-04-21 LAB
ANA PAT FLD IF-IMP: ABNORMAL
ANA TITR SER: ABNORMAL
ANION GAP SERPL CALC-SCNC: 7 MMOL/L — SIGNIFICANT CHANGE UP (ref 5–17)
AUTO DIFF PNL BLD: ABNORMAL
BUN SERPL-MCNC: 76 MG/DL — HIGH (ref 7–23)
C-ANCA SER-ACNC: NEGATIVE — SIGNIFICANT CHANGE UP
CALCIUM SERPL-MCNC: 8.9 MG/DL — SIGNIFICANT CHANGE UP (ref 8.4–10.5)
CHLORIDE SERPL-SCNC: 108 MMOL/L — SIGNIFICANT CHANGE UP (ref 96–108)
CO2 SERPL-SCNC: 23 MMOL/L — SIGNIFICANT CHANGE UP (ref 22–31)
CREAT SERPL-MCNC: 2.14 MG/DL — HIGH (ref 0.5–1.3)
GLUCOSE SERPL-MCNC: 106 MG/DL — HIGH (ref 70–99)
HCT VFR BLD CALC: 36.3 % — LOW (ref 39–50)
HGB BLD-MCNC: 12.3 G/DL — LOW (ref 13–17)
INR BLD: 1.83 RATIO — HIGH (ref 0.88–1.16)
MCHC RBC-ENTMCNC: 31.5 PG — SIGNIFICANT CHANGE UP (ref 27–34)
MCHC RBC-ENTMCNC: 33.9 GM/DL — SIGNIFICANT CHANGE UP (ref 32–36)
MCV RBC AUTO: 92.8 FL — SIGNIFICANT CHANGE UP (ref 80–100)
MPO AB + PR3 PNL SER: SIGNIFICANT CHANGE UP
NRBC # BLD: 0 /100 WBCS — SIGNIFICANT CHANGE UP (ref 0–0)
P-ANCA SER-ACNC: NEGATIVE — SIGNIFICANT CHANGE UP
PLATELET # BLD AUTO: 237 K/UL — SIGNIFICANT CHANGE UP (ref 150–400)
POTASSIUM SERPL-MCNC: 4.9 MMOL/L — SIGNIFICANT CHANGE UP (ref 3.5–5.3)
POTASSIUM SERPL-SCNC: 4.9 MMOL/L — SIGNIFICANT CHANGE UP (ref 3.5–5.3)
PROTHROM AB SERPL-ACNC: 21.5 SEC — HIGH (ref 10.6–13.6)
RBC # BLD: 3.91 M/UL — LOW (ref 4.2–5.8)
RBC # FLD: 12.1 % — SIGNIFICANT CHANGE UP (ref 10.3–14.5)
SODIUM SERPL-SCNC: 138 MMOL/L — SIGNIFICANT CHANGE UP (ref 135–145)
WBC # BLD: 6.55 K/UL — SIGNIFICANT CHANGE UP (ref 3.8–10.5)
WBC # FLD AUTO: 6.55 K/UL — SIGNIFICANT CHANGE UP (ref 3.8–10.5)

## 2021-04-21 PROCEDURE — 99233 SBSQ HOSP IP/OBS HIGH 50: CPT

## 2021-04-21 RX ORDER — METOPROLOL TARTRATE 50 MG
12.5 TABLET ORAL DAILY
Refills: 0 | Status: DISCONTINUED | OUTPATIENT
Start: 2021-04-21 | End: 2021-04-23

## 2021-04-21 RX ORDER — WARFARIN SODIUM 2.5 MG/1
6 TABLET ORAL ONCE
Refills: 0 | Status: COMPLETED | OUTPATIENT
Start: 2021-04-21 | End: 2021-04-21

## 2021-04-21 RX ADMIN — CLOPIDOGREL BISULFATE 75 MILLIGRAM(S): 75 TABLET, FILM COATED ORAL at 12:13

## 2021-04-21 RX ADMIN — Medication 12.5 MILLIGRAM(S): at 18:15

## 2021-04-21 RX ADMIN — WARFARIN SODIUM 6 MILLIGRAM(S): 2.5 TABLET ORAL at 22:18

## 2021-04-21 RX ADMIN — Medication 100 MILLIGRAM(S): at 12:13

## 2021-04-21 NOTE — PROGRESS NOTE ADULT - SUBJECTIVE AND OBJECTIVE BOX
No distress    Vital Signs Last 24 Hrs  T(C): 37.3 (04-21-21 @ 16:30), Max: 37.3 (04-21-21 @ 16:30)  T(F): 99.1 (04-21-21 @ 16:30), Max: 99.1 (04-21-21 @ 16:30)  HR: 79 (04-21-21 @ 16:30) (75 - 87)  BP: 116/74 (04-21-21 @ 16:30) (96/68 - 116/74)  RR: 15 (04-21-21 @ 16:30) (15 - 17)  SpO2: 95% (04-21-21 @ 16:30) (95% - 98%)    I&O's Detail    20 Apr 2021 07:01  -  21 Apr 2021 07:00  --------------------------------------------------------  OUT:    Indwelling Catheter - Urethral (mL): 1400 mL    Voided (mL): 1200 mL  Total OUT: 2600 mL    21 Apr 2021 07:01  -  21 Apr 2021 21:35  --------------------------------------------------------  OUT:    Indwelling Catheter - Urethral (mL): 1000 mL  Total OUT: 1000 mL    s1s2  b/l air entry  soft  edema, chronic per the pt and unchanged                                  12.3   6.55  )-----------( 237      ( 21 Apr 2021 06:30 )             36.3     21 Apr 2021 06:30    138    |  108    |  76     ----------------------------<  106    4.9     |  23     |  2.14     Ca    8.9        21 Apr 2021 06:30  Mg     2.7       20 Apr 2021 05:30    TPro  x      /  Alb  4.4    /  TBili  x      /  DBili  x      /  AST  x      /  ALT  x      /  AlkPhos  x      20 Apr 2021 05:30    LIVER FUNCTIONS - ( 20 Apr 2021 05:30 )  Alb: 4.4 g/dL / Pro: x     / ALK PHOS: x     / ALT: x     / AST: x     / GGT: x           PT/INR - ( 21 Apr 2021 06:30 )   PT: 21.5 sec;   INR: 1.83 ratio      allopurinol 100 milliGRAM(s) Oral daily  clopidogrel Tablet 75 milliGRAM(s) Oral daily  metoprolol succinate ER 12.5 milliGRAM(s) Oral daily  warfarin 6 milliGRAM(s) Oral once    A/P:    CM, DM, EF 40-45%, mod AS  ANGELICA/CKD 3 multifactorial: hypotensive/hemodynamic, NSAID's related +/- obstructive (Cr 1.93 - 12/14/20)  Cr is improving from peak of 4.45 on 4/12/21 and is presently close to December baseline  SONO w/o hydro  Avoid nephrotoxins  BMP in am  Renal diet  No indications for RRT on this adm  No NSAID's now or in the future, d/w pt  Troponin noted, cardiology following  Renal f/u as op, d/w pt    [phone]

## 2021-04-21 NOTE — PROGRESS NOTE ADULT - SUBJECTIVE AND OBJECTIVE BOX
Patient is a 60y old  Male who presents with a chief complaint of renal failure (20 Apr 2021 17:26)      Patient seen and examined at bedside.    ALLERGIES:  No Known Allergies    MEDICATIONS  (STANDING):  allopurinol 100 milliGRAM(s) Oral daily  clopidogrel Tablet 75 milliGRAM(s) Oral daily    MEDICATIONS  (PRN):    Vital Signs Last 24 Hrs  T(F): 98.1 (21 Apr 2021 05:49), Max: 98.6 (20 Apr 2021 14:10)  HR: 80 (21 Apr 2021 05:49) (74 - 87)  BP: 102/76 (21 Apr 2021 05:49) (96/68 - 105/71)  RR: 17 (21 Apr 2021 05:49) (16 - 18)  SpO2: 96% (21 Apr 2021 05:49) (96% - 98%)  I&O's Summary    20 Apr 2021 07:01  -  21 Apr 2021 07:00  --------------------------------------------------------  IN: 320 mL / OUT: 2600 mL / NET: -2280 mL      PHYSICAL EXAM:  General: NAD, A/O x 3  ENT: MMM  Neck: Supple, No JVD  Lungs: Clear to auscultation bilaterally, Non labored breathing   Cardio: RRR, S1/S2, No murmurs  Abdomen: Soft, Nontender, Nondistended; Bowel sounds present  Extremities: No calf tenderness, No pitting edema    LABS:                        12.4   7.59  )-----------( 271      ( 20 Apr 2021 05:30 )             37.1     04-20    139  |  107  |  105  ----------------------------<  96  4.7   |  23  |  3.05    Ca    8.8      20 Apr 2021 05:30  Mg     2.7     04-20    TPro  x   /  Alb  4.4  /  TBili  x   /  DBili  x   /  AST  x   /  ALT  x   /  AlkPhos  x   04-20    eGFR if Non African American: 21 mL/min/1.73M2 (04-20-21 @ 05:30)  eGFR if : 24 mL/min/1.73M2 (04-20-21 @ 05:30)    PT/INR - ( 20 Apr 2021 05:30 )   PT: 26.9 sec;   INR: 2.31 ratio         PTT - ( 20 Apr 2021 05:30 )  PTT:40.4 sec    CARDIAC MARKERS ( 20 Apr 2021 15:55 )  1.018 ng/mL / x     / 94 U/L / x     / 4.5 ng/mL  CARDIAC MARKERS ( 20 Apr 2021 05:30 )  1.220 ng/mL / x     / x     / x     / x      CARDIAC MARKERS ( 19 Apr 2021 22:00 )  1.102 ng/mL / x     / x     / x     / x      CARDIAC MARKERS ( 19 Apr 2021 20:13 )  .690 ng/mL / x     / x     / x     / x      CARDIAC MARKERS ( 19 Apr 2021 14:50 )  .099 ng/mL / x     / x     / x     / x                                RADIOLOGY & ADDITIONAL TESTS:    Care Discussed with Consultants/Other Providers:    Patient is a 60y old  Male who presents with a chief complaint of renal failure (20 Apr 2021 17:26)      Patient seen and examined at bedside.  Pt without complaints had a good night , no CP or SOB     ALLERGIES:  No Known Allergies    MEDICATIONS  (STANDING):  allopurinol 100 milliGRAM(s) Oral daily  clopidogrel Tablet 75 milliGRAM(s) Oral daily    MEDICATIONS  (PRN):    Vital Signs Last 24 Hrs  T(F): 98.1 (21 Apr 2021 05:49), Max: 98.6 (20 Apr 2021 14:10)  HR: 80 (21 Apr 2021 05:49) (74 - 87)  BP: 102/76 (21 Apr 2021 05:49) (96/68 - 105/71)  RR: 17 (21 Apr 2021 05:49) (16 - 18)  SpO2: 96% (21 Apr 2021 05:49) (96% - 98%)  I&O's Summary    20 Apr 2021 07:01  -  21 Apr 2021 07:00  --------------------------------------------------------  IN: 320 mL / OUT: 2600 mL / NET: -2280 mL      PHYSICAL EXAM:  General: 61 y/o obese male in NAD, A/O x 3  ENT: MMM  Neck: Supple, No JVD  Lungs: Clear to auscultation bilaterally, Non labored breathing   Cardio: RRR, S1/S2, 2/6 murmur   Abdomen: Soft, Nontender, Nondistended; Bowel sounds present  Extremities: No calf tenderness, bilat pitting edema/ venous stasis changes chronic     LABS:                        12.4   7.59  )-----------( 271      ( 20 Apr 2021 05:30 )             37.1     04-20    139  |  107  |  105  ----------------------------<  96  4.7   |  23  |  3.05    Ca    8.8      20 Apr 2021 05:30  Mg     2.7     04-20    TPro  x   /  Alb  4.4  /  TBili  x   /  DBili  x   /  AST  x   /  ALT  x   /  AlkPhos  x   04-20    eGFR if Non African American: 21 mL/min/1.73M2 (04-20-21 @ 05:30)  eGFR if : 24 mL/min/1.73M2 (04-20-21 @ 05:30)    PT/INR - ( 20 Apr 2021 05:30 )   PT: 26.9 sec;   INR: 2.31 ratio         PTT - ( 20 Apr 2021 05:30 )  PTT:40.4 sec    CARDIAC MARKERS ( 20 Apr 2021 15:55 )  1.018 ng/mL / x     / 94 U/L / x     / 4.5 ng/mL  CARDIAC MARKERS ( 20 Apr 2021 05:30 )  1.220 ng/mL / x     / x     / x     / x      CARDIAC MARKERS ( 19 Apr 2021 22:00 )  1.102 ng/mL / x     / x     / x     / x      CARDIAC MARKERS ( 19 Apr 2021 20:13 )  .690 ng/mL / x     / x     / x     / x      CARDIAC MARKERS ( 19 Apr 2021 14:50 )  .099 ng/mL / x     / x     / x     / x                                RADIOLOGY & ADDITIONAL TESTS:    Care Discussed with Consultants/Other Providers:

## 2021-04-21 NOTE — PROGRESS NOTE ADULT - ATTENDING COMMENTS
Mr. Waller is a morbidly obese middle aged man w/ many co morbidities including ischemic cardiomyopathy, CAD s/p CABG, a fib/flutter s/p ablation and pacemaker placement, worsening CKD, who presents at the direction of his Nephrologist for ANGELICA on CKD,, hyperkalemia and hypermagnesemia.     Seen and examined. Chart reviewed.   reported no complaints.  ROS: VELÁSQUEZ. all other neg  Exma : morbid obesity. SM+. ortherwise NAD      #ANGELICA on CKD 4,   # hyperK  - ANGELICA likely multifactorial in etiology  -Differential including pre-renal (diarrhea or cardiorenal) or intrinsic renal (NSAID use)  - RIJ removed  - K normalized with meds  - renal cx noted. no need RRT at this time.   - K normalized  - resume coumadin if no plan ofor Permcath    Elevated Trop  - demand Ischaemia vs MI  - cardio cx noted. likely demand as patient is asymptomatic  - Echo: severe LV dysfunction. WMA, mod AS, mod Pul HTN  - discussed with cardio: for stress test tomorrow. cannot do cath due to renal dysfunction    rest as above

## 2021-04-21 NOTE — PROGRESS NOTE ADULT - ASSESSMENT
60 year old man admitted for abnormal blood work... renal failure.  Medical problems include morbid obesity, CAD, s/p remote CABG and coronary intervention, aortic stenosis, cardiomyopathy, atrial flutter s/p ablation and pacemaker (Micra)  Echo ... moderate-severe cardiomyopathy, moderate-severe aortic stenosis  BP has been borderline low  Elevated troponin in absence of cardiac symptoms is of unclear significance ... possible demand ischemia   Entresto on hold due to renal failure     Plan  - add long acting beta blocker... metoprolol succinated 12.5 mg daily and uptitrate dose as tolerable   - resume hydralazine/nitrates if BP allows  - will discuss with heart failure team at [facility] to see about further treatment/invasive options (ie cath or TAVR evaluation)  .... though currently limited by renal failure  - will eventually need to resume anticoagulation   - renal evaluation in progress... no current plans for dialysis     discussed with patient and with Medicine team      60 year old man admitted for abnormal blood work... renal failure.  Medical problems include morbid obesity, CAD, s/p remote CABG and coronary intervention, aortic stenosis, cardiomyopathy, atrial flutter s/p ablation and pacemaker (Micra)  Echo ... moderate-severe cardiomyopathy, moderate-severe aortic stenosis  BP has been borderline low  Elevated troponin in absence of cardiac symptoms is of unclear significance ... possible demand ischemia   Entresto on hold due to renal failure     Plan  - add long acting beta blocker... metoprolol succinated 12.5 mg daily and uptitrate dose as tolerable   - resume hydralazine/nitrates if BP allows  - will discuss with heart failure team at [facility] to see about further treatment/invasive options (ie cath or TAVR evaluation)  .... though currently limited by renal failure  - will eventually need to resume anticoagulation   - renal evaluation in progress... no current plans for dialysis     discussed with patient and with Medicine team       Addendum  Discussed with heart failure attending at [facility].  He agrees with concerns for patient to have an intravenous contrast dye study... ie cath or TAVR evaluation.  Therefore, favor pre discharge pharmacologic nuclear stress test for risk stratification.  I discussed with patient and he agrees to proceed.  Discussed with Hospitalist as well.   Patient to followup with me and with heart failure team and structural heart team post discharge.

## 2021-04-21 NOTE — PROGRESS NOTE ADULT - SUBJECTIVE AND OBJECTIVE BOX
SUBJ:  Patient is a 60y old  Male who presents with a chief complaint of renal failure (21 Apr 2021 07:40)  patient seen and examined  chart is reviewed  patient is known to me from office  he is comfortable in bed... he's nervous and worried about current medical problems      PAST MEDICAL & SURGICAL HISTORY:  CAD (coronary artery disease)  s/p CABG    Obesity    Hypercholesteremia    Thrombus of left atrial appendage    Ischemic cardiomyopathy    WILFRED (obstructive sleep apnea)    HTN (hypertension)    Atrial fibrillation    CHF (congestive heart failure)    Peripheral edema  chronic    Lichen planus    Atrial flutter  s/p ablation    MI (myocardial infarction)    Stented coronary artery    History of elbow surgery    S/P CABG (coronary artery bypass graft)    Cardiac pacemaker  leadless    History of coronary artery stent placement    History of adenoidectomy    H/O atrioventricular karlee ablation      Home Medications:  Coumadin 6 mg oral tablet: 1 tab(s) orally once a day  Takes 6 mg alternating with 3 mg daily  (19 Apr 2021 17:23)  furosemide 40 mg oral tablet: 1 tab(s) orally once a day (19 Apr 2021 15:19)  hydrALAZINE 50 mg oral tablet: orally 3 times a day (19 Apr 2021 16:51)  Plavix 75 mg oral tablet: 1 tab(s) orally once a day (19 Apr 2021 17:22)    MEDICATIONS  (STANDING):  allopurinol 100 milliGRAM(s) Oral daily  clopidogrel Tablet 75 milliGRAM(s) Oral daily    MEDICATIONS  (PRN):          Vital Signs Last 24 Hrs  T(C): 36.7 (21 Apr 2021 09:55), Max: 37 (20 Apr 2021 14:10)  T(F): 98.1 (21 Apr 2021 09:55), Max: 98.6 (20 Apr 2021 14:10)  HR: 75 (21 Apr 2021 09:55) (74 - 87)  BP: 104/72 (21 Apr 2021 09:55) (96/68 - 105/71)  BP(mean): --  RR: 16 (21 Apr 2021 09:55) (16 - 18)  SpO2: 97% (21 Apr 2021 09:55) (96% - 98%)    REVIEW OF SYSTEMS:  CONSTITUTIONAL: fatigue   RESPIRATORY: No cough, wheezing, chills or hemoptysis; No shortness of breath  CARDIOVASCULAR: No chest pain or chest pressure.  No shortness of breath or dyspnea on exertion.  No palpitations, dizziness, light headedness, syncope or near syncope.  No orthopnea.   NEUROLOGICAL: No headaches, memory loss, loss of strength, numbness, or tremors      PHYSICAL EXAM  Constitutional:  WDWN. No acute distress  HEENT: normocephalic, atraumatic.  PERRLA. EOMI  Neck : No JVD. no carotid bruits  Lungs:  clear to auscultation bilaterally. no rhonchi. no wheezing  Heart:  S1 and S2. No S3, S4. llI/VI systolic murmur.  Abdomen:  soft, non tender.  Extremities: 3+LE edema   Nuerologic:  A+O x 3. No focal deficits      LABS:                        12.3   6.55  )-----------( 237      ( 21 Apr 2021 06:30 )             36.3     04-21    138  |  108  |  76<H>  ----------------------------<  106<H>  4.9   |  23  |  2.14<H>    Ca    8.9      21 Apr 2021 06:30  Mg     2.7     04-20    TPro  x   /  Alb  4.4  /  TBili  x   /  DBili  x   /  AST  x   /  ALT  x   /  AlkPhos  x   04-20    CARDIAC MARKERS ( 20 Apr 2021 15:55 )  1.018 ng/mL / x     / 94 U/L / x     / 4.5 ng/mL  CARDIAC MARKERS ( 20 Apr 2021 05:30 )  1.220 ng/mL / x     / x     / x     / x      CARDIAC MARKERS ( 19 Apr 2021 22:00 )  1.102 ng/mL / x     / x     / x     / x      CARDIAC MARKERS ( 19 Apr 2021 20:13 )  .690 ng/mL / x     / x     / x     / x      CARDIAC MARKERS ( 19 Apr 2021 14:50 )  .099 ng/mL / x     / x     / x     / x          CARDIAC MARKERS ( 20 Apr 2021 15:55 )  1.018 ng/mL / x     / 94 U/L / x     / 4.5 ng/mL  CARDIAC MARKERS ( 20 Apr 2021 05:30 )  1.220 ng/mL / x     / x     / x     / x      CARDIAC MARKERS ( 19 Apr 2021 22:00 )  1.102 ng/mL / x     / x     / x     / x      CARDIAC MARKERS ( 19 Apr 2021 20:13 )  .690 ng/mL / x     / x     / x     / x      CARDIAC MARKERS ( 19 Apr 2021 14:50 )  .099 ng/mL / x     / x     / x     / x          allopurinol 100 milliGRAM(s) Oral daily  clopidogrel Tablet 75 milliGRAM(s) Oral daily    I&O's Summary    20 Apr 2021 07:01  -  21 Apr 2021 07:00  --------------------------------------------------------  IN: 320 mL / OUT: 2600 mL / NET: -2280 mL      BNP  Troponin I, Serum: 1.018 ng/mL (04-20-21 @ 15:55)      Troponin I, Serum: 1.018 ng/mL (04-20-21 @ 15:55)    < from: TTE Echo Complete w/o Contrast w/ Doppler (04.20.21 @ 08:20) >   1. Left ventricular ejection fraction, by visual estimation, is 40 to 45%.   2. Moderately decreased global left ventricular systolic function.   3. Mid and apical anterior septum, apex, mid inferoseptal segment, and apical lateral segment are abnormal as described above.   4. Normal left ventricular internal cavity size.   5. Spectral Doppler shows restrictive pattern of left ventricular myocardial filling (Grade III diastolic dysfunction).   6. Mildly enlarged left atrium.   7. Mild mitral annular calcification.   8. No evidence of mitral valve regurgitation.   9. Thickening and calcification of the anterior and posterior mitral valve leaflets.  10. Mild to moderate aortic valve stenosis.  11. Patient underwent ultrasound enhanced echocardiography with definity.  12. Dvi by tvi is .3 consistent with borderline severe aortic stenosis.  13. Endocardial visualization was enhanced with intravenous echo contrast.  14. Peak transaortic gradient equals 20.9 mmHg, mean transaortic gradient equals 9.6 mmHg, the calculated aortic valve area equals 1.06 cm² by the continuity equation consistent with moderate aortic stenosis.  15. The Dimesionless Index value is 0.30.      < end of copied text >    < from: 12 Lead ECG (04.20.21 @ 16:48) >  Sinus rhythm withpremature supraventricular complexes  Right axis deviation  Abnormal ECG  When compared with ECG of 19-APR-2021 14:24,  premature supraventricular complexes are now present    < end of copied text >

## 2021-04-21 NOTE — PROGRESS NOTE ADULT - ASSESSMENT
61 yo M with PMHx obesity, HTN, diastolic CHF, CAD s/p CABG, A fib on coumadin presents to [place] ER with renal failure. Shiley catheter placed in ED for likely future dialysis.     *Acute kidney injury on CKD [facility]  Patient with h/o CKD but now with renal failure. Improved since yesterday. Suspect multifactorial causes.  - Could be pre-renal from diarrhea, intrinsic renal etiology from NSAID use, or cardiorenal from CHF.  - Originally with hyperkalemia, 5.7, Creatinine 4.03,  (Creatinine 1.9 in 12/2020). Labs now improving   - s/p D50, insulin, kayexelate   - Appreciate renal recs. shiley catheter placed in ED should pt need HD.  - Dr Rodriguez is not available at this time to put in PermaCath--prior discussion with IR at [facility] () and spoke with PA who recommends the patient be medically optimized and cleared by cardiology prior to scheduling permacath placement (plavix will have to be on hold 4 hrs prior to procedure.)  - Willis catheter removed   - CTAP: no hydro or obstruction. Had a recent normal Renal U/S.   - No NSAIDs,ACE-i/ ARBs, contrast, or diuretics.    *Hyperkalemia:  Improved, K+ 4.7 in am   Given D50, insulin, kayexelate in the ER.  Monitor on tele   BMP in am     Elevated magnesium  likely due to mylanta use.   - cont to monitor, trend    *Elevated troponin:  suspect demand due to renal failure  EKG nonischemic  troponin increasing   Cardio recs   TTE: EF 40-45%, Grade III diastolic dysfunction. Severe aortic stenosis. See above for further detail       *History of Paroxysmal Afib/a flutter s/p ablation and pacemaker placement, currently in NSR.   On coumadin at home  INR --will hold coumadin for possible permacath placement. Pt very low stroke risk (CHADS Vasc 2 points, stroke risk 2.2% per year. )- discussed w/ patient.   - restart coumadin if no plans for permacath.     *HTN:  Patient hypotensive in ER  Hold hydralazine for now  Consider midodrine if remains hypotensive     *Subacute diarrhea  Pt reports somewhat chronic diarrhea since the COVID vaccine. He says overall it's been improving.   - cont to monitor.     *Diastolic CHF due to ischemic cardiomyopathy  TTE 2020 with EF 60% now worsening to 40-45%  Hold lasix, spironolactone in setting of acute renal failure  Entresto recently d/phyllis due to renal failure        *CAD s/p CABG:  Cont plavix (previous discussion with REJI HELMS who reports plavix to be held 4 hrs prior to permacath)    *Morbid Obesity:  BMI 45.5  weight loss and exercise encouraged    Prediabetes:  HgbA1C 5.8 this past month  previously on farxiga, now stopped.   Weight loss encouraged    Dispo: will need Case Mgmt for outpatient HD placement  DVT ppx: SQH   FULL CODE  Diet: renal restricted.        61 yo M with PMHx obesity, HTN, diastolic CHF, CAD s/p CABG, A fib on coumadin presents to [place] ER with renal failure. Shiley catheter placed in ED for likely future dialysis.     *Acute kidney injury on CKD [facility]  Patient with h/o CKD but now with renal failure. Continues Improving  . Suspect multifactorial causes.  - Could be pre-renal from diarrhea, intrinsic renal etiology from NSAID use, or cardiorenal from CHF.  - Originally with hyperkalemia, 5.7, Creatinine 4.03,  (Creatinine 1.9 in 12/2020). Labs now improving  currently BUN at 76 and creatinine 2.14  - s/p D50, insulin, kayexelate   - Appreciate renal recs. shiley catheter placed in ED should pt need HD.  - Dr Rodriguez is not available at this time to put in PermaCath--prior discussion with IR at [facility] () and spoke with PA who recommends the patient be medically optimized and cleared by cardiology prior to scheduling permacath placement (plavix will have to be on hold 4 hrs prior to procedure.)  - Willis catheter removed today   - CTAP: no hydro or obstruction. Had a recent normal Renal U/S.   - No NSAIDs,ACE-i/ ARBs, contrast, or diuretics.    *Hyperkalemia:  Improved, K+ 4.9 in am   Given D50, insulin, kayexelate in the ER.  Monitor on tele   continue to follow BMP in am     Elevated magnesium  likely due to mylanta use.   - cont to monitor, trend    *Elevated troponin:  suspect demand due to renal failure  EKG nonischemic  troponin increasing   cardio recommands long acting beta blocker if BP tolerates   Cardio recs   TTE: EF 40-45%, Grade III diastolic dysfunction. Severe aortic stenosis. See above for further detail       *History of Paroxysmal Afib/a flutter s/p ablation and pacemaker placement, currently in NSR.   On coumadin at home  INR --will hold coumadin for possible permacath placement. Pt very low stroke risk (CHADS Vasc 2 points, stroke risk 2.2% per year. )- discussed w/ patient.   - restart coumadin if no plans for permacath.     *HTN:  Patient hypotensive in ER  Hold hydralazine for now  Consider midodrine if remains hypotensive     *Subacute diarrhea  Pt reports somewhat chronic diarrhea since the COVID vaccine. He says overall it's been improving.   - cont to monitor.     *Diastolic CHF due to ischemic cardiomyopathy  TTE 2020 with EF 60% now worsening to 40-45%  Hold lasix, spironolactone in setting of acute renal failure  Entresto recently d/phyllis due to renal failure        *CAD s/p CABG:  Cont plavix no consideration for permicath at this time   possible resume coumadine tonight    *Morbid Obesity:  BMI 45.5  weight loss and exercise encouraged    Prediabetes:  HgbA1C 5.8 this past month  previously on farxiga, now stopped.   Weight loss encouraged    Dispo: plan discharge home mostlikely no needs   DVT ppx: SQH   FULL CODE  Diet: renal restricted.        59 yo M with PMHx obesity, HTN, diastolic CHF, CAD s/p CABG, A fib on coumadin presents to [place] ER with renal failure. Shiley catheter placed in ED for likely future dialysis.     *Acute kidney injury on CKD [facility]  Patient with h/o CKD but now with renal failure. Continues Improving  . Suspect multifactorial causes.  - Could be pre-renal from diarrhea, intrinsic renal etiology from NSAID use, or cardiorenal from CHF.  - Originally with hyperkalemia, 5.7, Creatinine 4.03,  (Creatinine 1.9 in 12/2020). Labs now improving  currently BUN at 76 and creatinine 2.14  - s/p D50, insulin, kayexelate   - Appreciate renal recs. shiley catheter placed in ED should pt need HD but at this time should not need dialysis. (Dr Rodriguez- not available  for PermaCath--prior discussion with IR at [facility] () and spoke with PA who recommends the patient be medically optimized and cleared by cardiology prior to scheduling permacath placement (plavix will have to be on hold 4 hrs prior to procedure.)  - Willis catheter removed today   - CTAP: no hydro or obstruction. Had a recent normal Renal U/S.   - No NSAIDs,ACE-i/ ARBs, contrast, or diuretics.    *Hyperkalemia:  Improved, K+ 4.9 in am   Given D50, insulin, kayexelate in the ER.  continue to monitor  BMP      Elevated magnesium  likely due to mylanta use.   - cont to monitor    *Elevated troponin:  suspect demand due to renal failure  EKG nonischemic  troponin increasing   cardio recommands long acting beta blocker if BP tolerates   Cardio recs appreciated   TTE: EF 40-45%, Grade III diastolic dysfunction. Severe aortic stenosis. See above for further detail       *History of Paroxysmal Afib/a flutter s/p ablation and pacemaker placement, currently in NSR.   On coumadin at home- coumadin was held for possible permicath but now will resume as dialysis and permicath are not necessary   Pt very low stroke risk (CHADS Vasc 2 points, stroke risk 2.2% per year)  - restart coumadin tonight     *HTN:  Patient hypotensive in ER pt has been currently maintaining his bp will continue to monitor   continue to hold  hydralazine for now  Consider midodrine if hypotension remains a problem especially with initiation of beta blocker     *Subacute diarrhea  Pt reports somewhat chronic diarrhea since the COVID vaccine. He says overall it's been improving.   - cont to monitor.     *Diastolic CHF due to ischemic cardiomyopathy  TTE 2020 with EF 60% now worsening to 40-45%  Hold lasix, spironolactone in setting of acute renal failure  Entresto recently d/phyllis due to renal failure   continue to follow labs        *CAD s/p CABG:  Cont plavix no consideration for permicath at this time   possible resume coumadin  tonight    *Morbid Obesity:  BMI 45.5  weight loss and exercise encouraged    Prediabetes:  HgbA1C 5.8 this past month  previously on farxiga, now stopped.   Weight loss encouraged    Dispo: plan discharge home mostlikely no needs   DVT ppx: SQH   FULL CODE  Diet: renal restricted.

## 2021-04-21 NOTE — PHARMACOTHERAPY INTERVENTION NOTE - COMMENTS
Met with patient and reviewed current and new medications. Pt reports no issues taking medications. He endorses his INR has been labile recently due to inconsistent vegetable intake. Pt is aware most of his medications are currently on hold, including his warfarin and BP medications. Reviewed indication and possible side effects of new medication allopurinol. Pt verbalized understanding and all questions answered.

## 2021-04-22 LAB
ANION GAP SERPL CALC-SCNC: 7 MMOL/L — SIGNIFICANT CHANGE UP (ref 5–17)
BUN SERPL-MCNC: 59 MG/DL — HIGH (ref 7–23)
CALCIUM SERPL-MCNC: 9.1 MG/DL — SIGNIFICANT CHANGE UP (ref 8.4–10.5)
CHLORIDE SERPL-SCNC: 106 MMOL/L — SIGNIFICANT CHANGE UP (ref 96–108)
CO2 SERPL-SCNC: 24 MMOL/L — SIGNIFICANT CHANGE UP (ref 22–31)
CREAT SERPL-MCNC: 1.81 MG/DL — HIGH (ref 0.5–1.3)
GBM IGG SER-ACNC: 9 — SIGNIFICANT CHANGE UP (ref 0–20)
GLUCOSE SERPL-MCNC: 103 MG/DL — HIGH (ref 70–99)
HCT VFR BLD CALC: 37.2 % — LOW (ref 39–50)
HGB BLD-MCNC: 12.6 G/DL — LOW (ref 13–17)
MAGNESIUM SERPL-MCNC: 2.2 MG/DL — SIGNIFICANT CHANGE UP (ref 1.6–2.6)
MCHC RBC-ENTMCNC: 31 PG — SIGNIFICANT CHANGE UP (ref 27–34)
MCHC RBC-ENTMCNC: 33.9 GM/DL — SIGNIFICANT CHANGE UP (ref 32–36)
MCV RBC AUTO: 91.4 FL — SIGNIFICANT CHANGE UP (ref 80–100)
NRBC # BLD: 0 /100 WBCS — SIGNIFICANT CHANGE UP (ref 0–0)
PLATELET # BLD AUTO: 242 K/UL — SIGNIFICANT CHANGE UP (ref 150–400)
POTASSIUM SERPL-MCNC: 4.9 MMOL/L — SIGNIFICANT CHANGE UP (ref 3.5–5.3)
POTASSIUM SERPL-SCNC: 4.9 MMOL/L — SIGNIFICANT CHANGE UP (ref 3.5–5.3)
RBC # BLD: 4.07 M/UL — LOW (ref 4.2–5.8)
RBC # FLD: 12 % — SIGNIFICANT CHANGE UP (ref 10.3–14.5)
SODIUM SERPL-SCNC: 137 MMOL/L — SIGNIFICANT CHANGE UP (ref 135–145)
WBC # BLD: 7.69 K/UL — SIGNIFICANT CHANGE UP (ref 3.8–10.5)
WBC # FLD AUTO: 7.69 K/UL — SIGNIFICANT CHANGE UP (ref 3.8–10.5)

## 2021-04-22 PROCEDURE — 99233 SBSQ HOSP IP/OBS HIGH 50: CPT

## 2021-04-22 PROCEDURE — 93018 CV STRESS TEST I&R ONLY: CPT

## 2021-04-22 PROCEDURE — 93016 CV STRESS TEST SUPVJ ONLY: CPT

## 2021-04-22 PROCEDURE — 78452 HT MUSCLE IMAGE SPECT MULT: CPT | Mod: 26

## 2021-04-22 PROCEDURE — 99232 SBSQ HOSP IP/OBS MODERATE 35: CPT | Mod: 25

## 2021-04-22 RX ADMIN — Medication 100 MILLIGRAM(S): at 11:43

## 2021-04-22 RX ADMIN — CLOPIDOGREL BISULFATE 75 MILLIGRAM(S): 75 TABLET, FILM COATED ORAL at 11:43

## 2021-04-22 NOTE — PROGRESS NOTE ADULT - ATTENDING COMMENTS
Mr. Waller is a morbidly obese middle aged man w/ many co morbidities including ischemic cardiomyopathy, CAD s/p CABG, a fib/flutter s/p ablation and pacemaker placement, worsening CKD, who presents at the direction of his Nephrologist for ANGELICA on CKD,, hyperkalemia and hypermagnesemia.     Seen and examined. Chart reviewed.   reported no complaints.  ROS: VELÁSQUEZ. all other neg  Exma : morbid obesity. SM+. ortherwise NAD      #ANGELICA on CKD 4,   # hyperK  - ANGELICA likely multifactorial in etiology  - Differential including pre-renal (diarrhea or cardiorenal) or intrinsic renal (NSAID use)  - RIJ removed 4/21  - K normalized with meds  - renal cx noted. no need RRT at this time. so no permCath  - K normalized  - renal f/u OP  - no NSAIDs    Elevated Trop  - demand Ischaemia vs MI  - cardio cx noted. likely demand as patient is asymptomatic  - Echo: severe LV dysfunction. WMA, mod AS, mod Pul HTN  - discussed with cardio: for stress test. cannot do cath due to renal dysfunction. s/p stress test part 1 today 4/22. second part tomorrow    Afib  - coumadin held for procedure  - needs to resume coumadin if stress negative. discuss with cardio if patient needs lovenox bridge for coumadin.   - target INR 2-3    Dispo: DC or transfer tomorrow depending on Stress test result.     rest as above

## 2021-04-22 NOTE — PROGRESS NOTE ADULT - ASSESSMENT
59 yo M with PMHx obesity, HTN, diastolic CHF, CAD s/p CABG, A fib on coumadin presents to [place] ER with renal failure. Shiley catheter placed in ED for likely future dialysis.     *Acute kidney injury on CKD [facility]  Patient with h/o CKD but now with renal failure. Continues Improving  . Suspect multifactorial causes.  - Could be pre-renal from diarrhea, intrinsic renal etiology from NSAID use, or cardiorenal from CHF.  - Originally with hyperkalemia, 5.7, Creatinine 4.03,  (Creatinine 1.9 in 12/2020). Labs now improving  currently BUN at 76 and creatinine 2.14  - s/p D50, insulin, kayexelate   - Appreciate renal recs. shiley catheter placed in ED should pt need HD but at this time should not need dialysis. (Dr Rodriguez- not available  for PermaCath--prior discussion with IR at [facility] () and spoke with PA who recommends the patient be medically optimized and cleared by cardiology prior to scheduling permacath placement (plavix will have to be on hold 4 hrs prior to procedure.)  - Willis catheter removed today   - CTAP: no hydro or obstruction. Had a recent normal Renal U/S.   - No NSAIDs,ACE-i/ ARBs, contrast, or diuretics.    *Hyperkalemia:  Improved, K+ 4.9 in am   Given D50, insulin, kayexelate in the ER.  continue to monitor  BMP      Elevated magnesium  likely due to mylanta use.   - cont to monitor    *Elevated troponin:  suspect demand due to renal failure  EKG nonischemic  troponin increasing   cardio recommands long acting beta blocker if BP tolerates   Cardio recs appreciated   TTE: EF 40-45%, Grade III diastolic dysfunction. Severe aortic stenosis. See above for further detail       *History of Paroxysmal Afib/a flutter s/p ablation and pacemaker placement, currently in NSR.   On coumadin at home- coumadin was held for possible permicath but now will resume as dialysis and permicath are not necessary   Pt very low stroke risk (CHADS Vasc 2 points, stroke risk 2.2% per year)  - restart coumadin tonight     *HTN:  Patient hypotensive in ER pt has been currently maintaining his bp will continue to monitor   continue to hold  hydralazine for now  Consider midodrine if hypotension remains a problem especially with initiation of beta blocker     *Subacute diarrhea  Pt reports somewhat chronic diarrhea since the COVID vaccine. He says overall it's been improving.   - cont to monitor.     *Diastolic CHF due to ischemic cardiomyopathy  TTE 2020 with EF 60% now worsening to 40-45%  Hold lasix, spironolactone in setting of acute renal failure  Entresto recently d/phyllis due to renal failure   continue to follow labs        *CAD s/p CABG:  Cont plavix no consideration for permicath at this time   possible resume coumadin  tonight    *Morbid Obesity:  BMI 45.5  weight loss and exercise encouraged    Prediabetes:  HgbA1C 5.8 this past month  previously on farxiga, now stopped.   Weight loss encouraged    Dispo: plan discharge home mostlikely no needs   DVT ppx: SQH   FULL CODE  Diet: renal restricted.        61 yo M with PMHx obesity, HTN, diastolic CHF, CAD s/p CABG, A fib on coumadin presents to [place] ER with renal failure. Shiley catheter removed yesterday no need for  dialysis.     *Acute kidney injury on CKD [facility]  Patient with h/o CKD but now with renal failure. Continues Improving  . Suspect multifactorial causes.  - Could be pre-renal from diarrhea, intrinsic renal etiology from NSAID use, or cardiorenal from CHF.  - Originally with hyperkalemia, 5.7, Creatinine 4.03,  (Creatinine 1.9 in 12/2020). Labs now improving  currently BUN 59 and cr 1.89  - s/p D50, insulin, kayexelate in ER  - Appreciate renal recs. shiley catheter removed 4/21  - Willis catheter removed 4/21  - CTAP: no hydro or obstruction. Had a recent normal Renal U/S.   - No NSAIDs,ACE-i/ ARBs, contrast, or diuretics.    *Hyperkalemia:  Improved, K+ 4.9   Given D50, insulin, kayexelate in the ER.  continue to monitor  BMP      Elevated magnesium- resolved   likely due to mylanta use.   - cont to monitor-2.2    *Elevated troponin:  suspect demand due to renal failure  EKG nonischemic  troponin increasing   cardio recommands long acting beta blocker if BP tolerates - also for nuclear stress today pending pt fitting into machine due weight   Cardio recs appreciated   TTE: EF 40-45%, Grade III diastolic dysfunction. Severe aortic stenosis. See above for further detail       *History of Paroxysmal Afib/a flutter s/p ablation and pacemaker placement, currently in NSR.   On coumadin at home- coumadin was held for possible permicath  will continue to hold pending nuclear stress   Pt very low stroke risk (CHADS Vasc 2 points, stroke risk 2.2% per year)  - restart coumadin after results of nuclear stress     *HTN:  Patient hypotensive in ER pt has been currently maintaining his bp will continue to monitor   continue to hold  hydralazine for now  Consider midodrine if hypotension remains a problem especially with initiation of beta blocker-  currently tolerating beta blocker and maintaing BP without midodrine     *Subacute diarrhea  Pt reports somewhat chronic diarrhea since the COVID vaccine vs increased use of mylanta and elevated mag level. Improved   - cont to monitor.     *Diastolic CHF due to ischemic cardiomyopathy  TTE 2020 with EF 60% now worsening to 40-45%  Hold lasix, spironolactone in setting of acute renal failure  Entresto recently d/phyllis due to renal failure   continue to follow labs        *CAD s/p CABG:  Cont plavix no consideration for permicath at this time   resume coumadin after nuclear stress test     *Morbid Obesity:  BMI 45.5  weight loss and exercise program  encouraged    Prediabetes:  HgbA1C 5.8 this past month  previously on farxiga, now stopped.   Weight loss encouraged    Dispo: plan discharge home mostlikely no needs   DVT ppx: SQH   FULL CODE  Diet: renal restricted.

## 2021-04-22 NOTE — PROGRESS NOTE ADULT - NUTRITIONAL ASSESSMENT
impression  ischemic retinopathy  obvious multiple cardiac abnormalities including ischemic heart disease a cardiomyopathy with severe aortic stenosis it remains unclear as to whether or not the aortic stenosis is secondary to a low-flow low gradient aortic stenosis due to an impaired ventricle the elevation in a biomarker is noted. I would consider patient for outpatient versus invasive approaches based upon the results of a myocardial perfusion imaging study care is coordinated with Dr. Riggs guarded prognosis given renal failure according myopathy and severe aortic stenosis with an elevated biomarker all routes remain hazardous. Nuclear medicine perfusion imaging scan is pending.

## 2021-04-22 NOTE — PROGRESS NOTE ADULT - SUBJECTIVE AND OBJECTIVE BOX
No distress    Vital Signs Last 24 Hrs  T(C): 37.2 (04-22-21 @ 16:39), Max: 37.2 (04-22-21 @ 00:02)  T(F): 99 (04-22-21 @ 16:39), Max: 99 (04-22-21 @ 00:02)  HR: 88 (04-22-21 @ 16:39) (80 - 88)  BP: 143/91 (04-22-21 @ 16:39) (105/72 - 143/91)  RR: 18 (04-22-21 @ 16:39) (16 - 19)  SpO2: 98% (04-22-21 @ 16:39) (97% - 98%)    I&O's Detail    21 Apr 2021 07:01  -  22 Apr 2021 07:00  --------------------------------------------------------  OUT:    Indwelling Catheter - Urethral (mL): 1000 mL    Voided (mL): 500 mL  Total OUT: 1500 mL    s1s2  b/l air entry  soft  edema, chronic per the pt and unchanged                                           12.6   7.69  )-----------( 242      ( 22 Apr 2021 05:00 )             37.2     22 Apr 2021 05:00    137    |  106    |  59     ----------------------------<  103    4.9     |  24     |  1.81     Ca    9.1        22 Apr 2021 05:00  Mg     2.2       22 Apr 2021 05:00    PT/INR - ( 21 Apr 2021 06:30 )   PT: 21.5 sec;   INR: 1.83 ratio      allopurinol 100 milliGRAM(s) Oral daily  clopidogrel Tablet 75 milliGRAM(s) Oral daily  metoprolol succinate ER 12.5 milliGRAM(s) Oral daily    A/P:    CM, DM, EF 40-45%, mod AS  ANGELICA/CKD 3 multifactorial: hypotensive/hemodynamic, NSAID's related +/- obstructive (Cr 1.93 - 12/14/20)  Cr is improving from peak of 4.45 on 4/12/21 and is presently near December baseline  SONO w/o hydro  Avoid nephrotoxins  BMP in am  Renal diet  No NSAID's now or in the future, d/w pt  Renal f/u as op, d/w pt    [phone]

## 2021-04-22 NOTE — PROGRESS NOTE ADULT - SUBJECTIVE AND OBJECTIVE BOX
Follow up for  SUBJ:    comfortable patient is seen in nuclear med dept  PMH  CAD (coronary artery disease)    H/O heart artery stent    Obesity    Hypercholesteremia    Thrombus of left atrial appendage    Ischemic cardiomyopathy    WILFRED (obstructive sleep apnea)    HTN (hypertension)    Atrial fibrillation    CHF (congestive heart failure)    Peripheral edema    Lichen planus    Atrial flutter    MI (myocardial infarction)    Stented coronary artery        MEDICATIONS  (STANDING):  allopurinol 100 milliGRAM(s) Oral daily  clopidogrel Tablet 75 milliGRAM(s) Oral daily  metoprolol succinate ER 12.5 milliGRAM(s) Oral daily    MEDICATIONS  (PRN):        PHYSICAL EXAM:  Vital Signs Last 24 Hrs  T(C): 36.9 (22 Apr 2021 08:10), Max: 37.3 (21 Apr 2021 16:30)  T(F): 98.4 (22 Apr 2021 08:10), Max: 99.1 (21 Apr 2021 16:30)  HR: 81 (22 Apr 2021 08:10) (79 - 88)  BP: 105/76 (22 Apr 2021 08:10) (105/72 - 116/74)  BP(mean): --  RR: 18 (22 Apr 2021 08:10) (15 - 19)  SpO2: 98% (22 Apr 2021 08:10) (95% - 98%)    GENERAL: NAD, well-groomed, well-developed  HEAD:  Atraumatic, Normocephalic  EYES: EOMI, PERRLA, conjunctiva and sclera clear  ENT: Moist mucous membranes,  NECK: Supple, No JVD, no bruits  CHEST/LUNG: Clear to percussion bilaterally; No rales, rhonchi, wheezing, or rubs  HEART: Regular rate and rhythm; No murmurs, rubs, or gallops PMI non displaced.  ABDOMEN: Soft, Nontender, Nondistended; Bowel sounds present  EXTREMITIES:  2+ Peripheral Pulses, No clubbing, cyanosis, or edema  SKIN: No rashes or lesions  NERVOUS SYSTEM:  Cranial Nerves II-XII intact      TELEMETRY:    ECG:  ECHO:    LABS:                        12.6   7.69  )-----------( 242      ( 22 Apr 2021 05:00 )             37.2     04-22    137  |  106  |  59<H>  ----------------------------<  103<H>  4.9   |  24  |  1.81<H>    Ca    9.1      22 Apr 2021 05:00  Mg     2.2     04-22      CARDIAC MARKERS ( 20 Apr 2021 15:55 )  1.018 ng/mL / x     / 94 U/L / x     / 4.5 ng/mL      PT/INR - ( 21 Apr 2021 06:30 )   PT: 21.5 sec;   INR: 1.83 ratio             I&O's Summary    21 Apr 2021 07:01  -  22 Apr 2021 07:00  --------------------------------------------------------  IN: 240 mL / OUT: 1500 mL / NET: -1260 mL      BNP    RADIOLOGY & ADDITIONAL STUDIES:    ECHO:       Follow up for  SUBJ:    comfortable patient is seen in nuclear med dept      PMH  CAD (coronary artery disease)    H/O heart artery stent    Obesity    Hypercholesteremia    Thrombus of left atrial appendage    Ischemic cardiomyopathy    WILFRED (obstructive sleep apnea)    HTN (hypertension)    Atrial fibrillation    CHF (congestive heart failure)    Peripheral edema    Lichen planus    Atrial flutter    MI (myocardial infarction)    Stented coronary artery        MEDICATIONS  (STANDING):  allopurinol 100 milliGRAM(s) Oral daily  clopidogrel Tablet 75 milliGRAM(s) Oral daily  metoprolol succinate ER 12.5 milliGRAM(s) Oral daily    MEDICATIONS  (PRN):        PHYSICAL EXAM:  Vital Signs Last 24 Hrs  T(C): 36.9 (22 Apr 2021 08:10), Max: 37.3 (21 Apr 2021 16:30)  T(F): 98.4 (22 Apr 2021 08:10), Max: 99.1 (21 Apr 2021 16:30)  HR: 81 (22 Apr 2021 08:10) (79 - 88)  BP: 105/76 (22 Apr 2021 08:10) (105/72 - 116/74)  BP(mean): --  RR: 18 (22 Apr 2021 08:10) (15 - 19)  SpO2: 98% (22 Apr 2021 08:10) (95% - 98%)    GENERAL: NAD, well-groomed, well-developed  HEAD:  Atraumatic, Normocephalic  EYES: EOMI, PERRLA, conjunctiva and sclera clear  ENT: Moist mucous membranes,  NECK: Supple, No JVD, no bruits  CHEST/LUNG: Clear to percussion bilaterally; No rales, rhonchi, wheezing, or rubs  HEART: Regular rate and rhythm; No murmurs, rubs, or gallops PMI non displaced.  ABDOMEN: Soft, Nontender, Nondistended; Bowel sounds present  EXTREMITIES:  2+ Peripheral Pulses, No clubbing, cyanosis, or edema  SKIN: No rashes or lesions  NERVOUS SYSTEM:  Cranial Nerves II-XII intact      TELEMETRY:    ECG:  ECHO:    LABS:                        12.6   7.69  )-----------( 242      ( 22 Apr 2021 05:00 )             37.2     04-22    137  |  106  |  59<H>  ----------------------------<  103<H>  4.9   |  24  |  1.81<H>    Ca    9.1      22 Apr 2021 05:00  Mg     2.2     04-22      CARDIAC MARKERS ( 20 Apr 2021 15:55 )  1.018 ng/mL / x     / 94 U/L / x     / 4.5 ng/mL      PT/INR - ( 21 Apr 2021 06:30 )   PT: 21.5 sec;   INR: 1.83 ratio             I&O's Summary    21 Apr 2021 07:01  -  22 Apr 2021 07:00  --------------------------------------------------------  IN: 240 mL / OUT: 1500 mL / NET: -1260 mL      BNP    RADIOLOGY & ADDITIONAL STUDIES:        ECHO:

## 2021-04-22 NOTE — PROGRESS NOTE ADULT - SUBJECTIVE AND OBJECTIVE BOX
Patient is a 60y old  Male who presents with a chief complaint of renal failure (21 Apr 2021 21:35)      Patient seen and examined at bedside.    ALLERGIES:  No Known Allergies    MEDICATIONS  (STANDING):  allopurinol 100 milliGRAM(s) Oral daily  clopidogrel Tablet 75 milliGRAM(s) Oral daily  metoprolol succinate ER 12.5 milliGRAM(s) Oral daily    MEDICATIONS  (PRN):    Vital Signs Last 24 Hrs  T(F): 98.2 (22 Apr 2021 05:53), Max: 99.1 (21 Apr 2021 16:30)  HR: 83 (22 Apr 2021 05:53) (75 - 88)  BP: 107/72 (22 Apr 2021 05:53) (104/72 - 116/74)  RR: 19 (22 Apr 2021 05:53) (15 - 19)  SpO2: 97% (22 Apr 2021 05:53) (95% - 98%)  I&O's Summary    21 Apr 2021 07:01  -  22 Apr 2021 07:00  --------------------------------------------------------  IN: 240 mL / OUT: 1500 mL / NET: -1260 mL      PHYSICAL EXAM:  General: NAD, A/O x 3  ENT: MMM  Neck: Supple, No JVD  Lungs: Clear to auscultation bilaterally, Non labored breathing   Cardio: RRR, S1/S2, No murmurs  Abdomen: Soft, Nontender, Nondistended; Bowel sounds present  Extremities: No calf tenderness, No pitting edema    LABS:                        12.3   6.55  )-----------( 237      ( 21 Apr 2021 06:30 )             36.3     04-21    138  |  108  |  76  ----------------------------<  106  4.9   |  23  |  2.14    Ca    8.9      21 Apr 2021 06:30  Mg     2.7     04-20    TPro  x   /  Alb  4.4  /  TBili  x   /  DBili  x   /  AST  x   /  ALT  x   /  AlkPhos  x   04-20    eGFR if Non African American: 32 mL/min/1.73M2 (04-21-21 @ 06:30)  eGFR if African American: 37 mL/min/1.73M2 (04-21-21 @ 06:30)    PT/INR - ( 21 Apr 2021 06:30 )   PT: 21.5 sec;   INR: 1.83 ratio         PTT - ( 20 Apr 2021 05:30 )  PTT:40.4 sec    CARDIAC MARKERS ( 20 Apr 2021 15:55 )  1.018 ng/mL / x     / 94 U/L / x     / 4.5 ng/mL  CARDIAC MARKERS ( 20 Apr 2021 05:30 )  1.220 ng/mL / x     / x     / x     / x      CARDIAC MARKERS ( 19 Apr 2021 22:00 )  1.102 ng/mL / x     / x     / x     / x      CARDIAC MARKERS ( 19 Apr 2021 20:13 )  .690 ng/mL / x     / x     / x     / x      CARDIAC MARKERS ( 19 Apr 2021 14:50 )  .099 ng/mL / x     / x     / x     / x                                RADIOLOGY & ADDITIONAL TESTS:    Care Discussed with Consultants/Other Providers:    Patient is a 60y old  Male who presents with a chief complaint of renal failure (21 Apr 2021 21:35)      Patient seen and examined at bedside.  Pt without complaints this am, had a good night .  Possible for nuclear stress today - weight issue     ALLERGIES:  No Known Allergies    MEDICATIONS  (STANDING):  allopurinol 100 milliGRAM(s) Oral daily  clopidogrel Tablet 75 milliGRAM(s) Oral daily  metoprolol succinate ER 12.5 milliGRAM(s) Oral daily    MEDICATIONS  (PRN):    Vital Signs Last 24 Hrs  T(F): 98.2 (22 Apr 2021 05:53), Max: 99.1 (21 Apr 2021 16:30)  HR: 83 (22 Apr 2021 05:53) (75 - 88)  BP: 107/72 (22 Apr 2021 05:53) (104/72 - 116/74)  RR: 19 (22 Apr 2021 05:53) (15 - 19)  SpO2: 97% (22 Apr 2021 05:53) (95% - 98%)  I&O's Summary    21 Apr 2021 07:01  -  22 Apr 2021 07:00  --------------------------------------------------------  IN: 240 mL / OUT: 1500 mL / NET: -1260 mL      PHYSICAL EXAM:  General: 61 y/o obese male in NAD, A/O x 3  ENT: MMM  Neck: Supple, No JVD  Lungs: Clear to auscultation bilaterally, Non labored breathing   Cardio: RRR, S1/S2, No murmurs  Abdomen: Soft, Nontender, Nondistended; Bowel sounds present  Extremities: No calf tenderness, No pitting edema chronic venous stasis changes     LABS:                        12.3   6.55  )-----------( 237      ( 21 Apr 2021 06:30 )             36.3     04-21    138  |  108  |  76  ----------------------------<  106  4.9   |  23  |  2.14    Ca    8.9      21 Apr 2021 06:30  Mg     2.7     04-20    TPro  x   /  Alb  4.4  /  TBili  x   /  DBili  x   /  AST  x   /  ALT  x   /  AlkPhos  x   04-20    eGFR if Non African American: 32 mL/min/1.73M2 (04-21-21 @ 06:30)  eGFR if African American: 37 mL/min/1.73M2 (04-21-21 @ 06:30)    PT/INR - ( 21 Apr 2021 06:30 )   PT: 21.5 sec;   INR: 1.83 ratio         PTT - ( 20 Apr 2021 05:30 )  PTT:40.4 sec    CARDIAC MARKERS ( 20 Apr 2021 15:55 )  1.018 ng/mL / x     / 94 U/L / x     / 4.5 ng/mL  CARDIAC MARKERS ( 20 Apr 2021 05:30 )  1.220 ng/mL / x     / x     / x     / x      CARDIAC MARKERS ( 19 Apr 2021 22:00 )  1.102 ng/mL / x     / x     / x     / x      CARDIAC MARKERS ( 19 Apr 2021 20:13 )  .690 ng/mL / x     / x     / x     / x      CARDIAC MARKERS ( 19 Apr 2021 14:50 )  .099 ng/mL / x     / x     / x     / x                                RADIOLOGY & ADDITIONAL TESTS:    Care Discussed with Consultants/Other Providers:

## 2021-04-23 ENCOUNTER — TRANSCRIPTION ENCOUNTER (OUTPATIENT)
Age: 61
End: 2021-04-23

## 2021-04-23 ENCOUNTER — RX RENEWAL (OUTPATIENT)
Age: 61
End: 2021-04-23

## 2021-04-23 VITALS
RESPIRATION RATE: 17 BRPM | OXYGEN SATURATION: 97 % | HEART RATE: 82 BPM | DIASTOLIC BLOOD PRESSURE: 67 MMHG | TEMPERATURE: 99 F | SYSTOLIC BLOOD PRESSURE: 106 MMHG

## 2021-04-23 LAB
ANION GAP SERPL CALC-SCNC: 6 MMOL/L — SIGNIFICANT CHANGE UP (ref 5–17)
BUN SERPL-MCNC: 48 MG/DL — HIGH (ref 7–23)
CALCIUM SERPL-MCNC: 9.1 MG/DL — SIGNIFICANT CHANGE UP (ref 8.4–10.5)
CHLORIDE SERPL-SCNC: 106 MMOL/L — SIGNIFICANT CHANGE UP (ref 96–108)
CO2 SERPL-SCNC: 27 MMOL/L — SIGNIFICANT CHANGE UP (ref 22–31)
CREAT SERPL-MCNC: 1.84 MG/DL — HIGH (ref 0.5–1.3)
GLUCOSE SERPL-MCNC: 103 MG/DL — HIGH (ref 70–99)
HCT VFR BLD CALC: 36.3 % — LOW (ref 39–50)
HGB BLD-MCNC: 12 G/DL — LOW (ref 13–17)
INR BLD: 1.53 RATIO — HIGH (ref 0.88–1.16)
MCHC RBC-ENTMCNC: 30.4 PG — SIGNIFICANT CHANGE UP (ref 27–34)
MCHC RBC-ENTMCNC: 33.1 GM/DL — SIGNIFICANT CHANGE UP (ref 32–36)
MCV RBC AUTO: 91.9 FL — SIGNIFICANT CHANGE UP (ref 80–100)
NRBC # BLD: 0 /100 WBCS — SIGNIFICANT CHANGE UP (ref 0–0)
PLATELET # BLD AUTO: 257 K/UL — SIGNIFICANT CHANGE UP (ref 150–400)
POTASSIUM SERPL-MCNC: 5.1 MMOL/L — SIGNIFICANT CHANGE UP (ref 3.5–5.3)
POTASSIUM SERPL-SCNC: 5.1 MMOL/L — SIGNIFICANT CHANGE UP (ref 3.5–5.3)
PROTHROM AB SERPL-ACNC: 18.1 SEC — HIGH (ref 10.6–13.6)
RBC # BLD: 3.95 M/UL — LOW (ref 4.2–5.8)
RBC # FLD: 12 % — SIGNIFICANT CHANGE UP (ref 10.3–14.5)
SODIUM SERPL-SCNC: 139 MMOL/L — SIGNIFICANT CHANGE UP (ref 135–145)
URATE SERPL-MCNC: 6.9 MG/DL — SIGNIFICANT CHANGE UP (ref 3.4–8.8)
WBC # BLD: 8.43 K/UL — SIGNIFICANT CHANGE UP (ref 3.8–10.5)
WBC # FLD AUTO: 8.43 K/UL — SIGNIFICANT CHANGE UP (ref 3.8–10.5)

## 2021-04-23 PROCEDURE — 82553 CREATINE MB FRACTION: CPT

## 2021-04-23 PROCEDURE — 86160 COMPLEMENT ANTIGEN: CPT

## 2021-04-23 PROCEDURE — 93005 ELECTROCARDIOGRAM TRACING: CPT

## 2021-04-23 PROCEDURE — 96365 THER/PROPH/DIAG IV INF INIT: CPT

## 2021-04-23 PROCEDURE — 84155 ASSAY OF PROTEIN SERUM: CPT

## 2021-04-23 PROCEDURE — A9500: CPT

## 2021-04-23 PROCEDURE — 99232 SBSQ HOSP IP/OBS MODERATE 35: CPT

## 2021-04-23 PROCEDURE — 86038 ANTINUCLEAR ANTIBODIES: CPT

## 2021-04-23 PROCEDURE — 84132 ASSAY OF SERUM POTASSIUM: CPT

## 2021-04-23 PROCEDURE — 84550 ASSAY OF BLOOD/URIC ACID: CPT

## 2021-04-23 PROCEDURE — 99239 HOSP IP/OBS DSCHRG MGMT >30: CPT

## 2021-04-23 PROCEDURE — 74176 CT ABD & PELVIS W/O CONTRAST: CPT

## 2021-04-23 PROCEDURE — 83516 IMMUNOASSAY NONANTIBODY: CPT

## 2021-04-23 PROCEDURE — 96375 TX/PRO/DX INJ NEW DRUG ADDON: CPT

## 2021-04-23 PROCEDURE — 99285 EMERGENCY DEPT VISIT HI MDM: CPT | Mod: 25

## 2021-04-23 PROCEDURE — 85025 COMPLETE CBC W/AUTO DIFF WBC: CPT

## 2021-04-23 PROCEDURE — 80053 COMPREHEN METABOLIC PANEL: CPT

## 2021-04-23 PROCEDURE — 80048 BASIC METABOLIC PNL TOTAL CA: CPT

## 2021-04-23 PROCEDURE — 83735 ASSAY OF MAGNESIUM: CPT

## 2021-04-23 PROCEDURE — 85610 PROTHROMBIN TIME: CPT

## 2021-04-23 PROCEDURE — 84484 ASSAY OF TROPONIN QUANT: CPT

## 2021-04-23 PROCEDURE — 85027 COMPLETE CBC AUTOMATED: CPT

## 2021-04-23 PROCEDURE — 71045 X-RAY EXAM CHEST 1 VIEW: CPT

## 2021-04-23 PROCEDURE — 86803 HEPATITIS C AB TEST: CPT

## 2021-04-23 PROCEDURE — 82550 ASSAY OF CK (CPK): CPT

## 2021-04-23 PROCEDURE — 83880 ASSAY OF NATRIURETIC PEPTIDE: CPT

## 2021-04-23 PROCEDURE — 86036 ANCA SCREEN EACH ANTIBODY: CPT

## 2021-04-23 PROCEDURE — 84165 PROTEIN E-PHORESIS SERUM: CPT

## 2021-04-23 PROCEDURE — 78452 HT MUSCLE IMAGE SPECT MULT: CPT

## 2021-04-23 PROCEDURE — 36415 COLL VENOUS BLD VENIPUNCTURE: CPT

## 2021-04-23 PROCEDURE — 82962 GLUCOSE BLOOD TEST: CPT

## 2021-04-23 PROCEDURE — 87635 SARS-COV-2 COVID-19 AMP PRB: CPT

## 2021-04-23 PROCEDURE — 93017 CV STRESS TEST TRACING ONLY: CPT

## 2021-04-23 PROCEDURE — 93306 TTE W/DOPPLER COMPLETE: CPT

## 2021-04-23 PROCEDURE — 86769 SARS-COV-2 COVID-19 ANTIBODY: CPT

## 2021-04-23 PROCEDURE — 85730 THROMBOPLASTIN TIME PARTIAL: CPT

## 2021-04-23 RX ORDER — HYDRALAZINE HCL 50 MG
0 TABLET ORAL
Qty: 0 | Refills: 0 | DISCHARGE

## 2021-04-23 RX ORDER — METOPROLOL TARTRATE 50 MG
0.5 TABLET ORAL
Qty: 30 | Refills: 0
Start: 2021-04-23

## 2021-04-23 RX ORDER — METOPROLOL TARTRATE 50 MG
25 TABLET ORAL
Qty: 0 | Refills: 0 | DISCHARGE
Start: 2021-04-23

## 2021-04-23 RX ORDER — WARFARIN SODIUM 2.5 MG/1
5 TABLET ORAL ONCE
Refills: 0 | Status: COMPLETED | OUTPATIENT
Start: 2021-04-23 | End: 2021-04-23

## 2021-04-23 RX ORDER — HYDRALAZINE HCL 50 MG
1 TABLET ORAL
Qty: 0 | Refills: 0 | DISCHARGE
Start: 2021-04-23

## 2021-04-23 RX ORDER — ALLOPURINOL 300 MG
2 TABLET ORAL
Qty: 0 | Refills: 0 | DISCHARGE
Start: 2021-04-23

## 2021-04-23 RX ORDER — ALLOPURINOL 300 MG
1 TABLET ORAL
Qty: 0 | Refills: 0 | DISCHARGE
Start: 2021-04-23

## 2021-04-23 RX ADMIN — Medication 100 MILLIGRAM(S): at 12:02

## 2021-04-23 RX ADMIN — CLOPIDOGREL BISULFATE 75 MILLIGRAM(S): 75 TABLET, FILM COATED ORAL at 12:02

## 2021-04-23 RX ADMIN — WARFARIN SODIUM 5 MILLIGRAM(S): 2.5 TABLET ORAL at 15:34

## 2021-04-23 NOTE — PROGRESS NOTE ADULT - ASSESSMENT
Moderate AS  CAD,  Renal disease  Minimal periinfarct ischemia  elevated Tn noted during hospital stay difficult to interpret with ANGELICA, doubt ACS process  AF      cardiologically stable for discharge outpatient f/u in office

## 2021-04-23 NOTE — DISCHARGE NOTE NURSING/CASE MANAGEMENT/SOCIAL WORK - NSDCFUADDAPPT_GEN_ALL_CORE_FT
Cardiology Dr Miranda  PCP Follow-up appointment with Dr. Sorto's office on Monday 4/26/21 at 5:30pm      Cardiology Dr Miranda

## 2021-04-23 NOTE — DISCHARGE NOTE PROVIDER - HOSPITAL COURSE
Hospital Course  HPI:  59 yo M with PMHx obesity, HTN, diastolic CHF, CAD s/p CABG, A fib on coumadin(s/p ablation), prediabetes (hgbA1c 5.8) presents to the ER in renal failure. Patient reports he had a blood work done a few days ago and  was advised to come to the ER for evaluation of renal failure. Last creatinine prior to this month was 1.9 in 2020. He underwent a renal ultrasound which was unremarkable.  Patient reports overall he feels well. He does admit to some chronic VEL[place]SQUE[place] after walking 3-4 blocks. Also reports that he is urinating more frequently than normal but with less urine output. Patient does admit to recently starting Farxiga but stopping when he found out his kidney function was worsening .   Denies any fevers, chills, chest pain, SOB, palpitations, nausea, vomiting abdominal pain.  pt has been having watery diarrhea since his COVID vaccine on 4/1/2021, but believes it to be improving.     In ED, BP 94/58, HR 96.  O2 sat 99% on RA.  Labs notable for Hgb 12.9, K 5.7, creatnine 4.03, troponin 0.099, , Mg 2.8.  EKG with SR, first degree heart block. I. Given D50, insulin, kayexelate in the ER. Dr David was consulted.  A shiley catheter was placed in the ED with plans for dialysis tomorrow. E-ICU was consulted in the ER but no emergent need to start HD overnight. Patient admitted to hospitalist for further management.     PT was given IVF.  His creatinine has improved with IVF.  He was noted to have troponin elevation.  Cardiology was consulted.  TTE showed EF 40% with severe AS and disastolic dysfunction.  Stress test was done showing mild ischemia.  Cardiology has cleared patient for discharge  He is to follow up with PCP, cardioilogy and renal in one week.

## 2021-04-23 NOTE — PROGRESS NOTE ADULT - SUBJECTIVE AND OBJECTIVE BOX
No distress    Vital Signs Last 24 Hrs  T(C): 37.3 (04-23-21 @ 15:30), Max: 37.3 (04-23-21 @ 15:30)  T(F): 99.1 (04-23-21 @ 15:30), Max: 99.1 (04-23-21 @ 15:30)  HR: 82 (04-23-21 @ 15:30) (76 - 88)  BP: 106/67 (04-23-21 @ 15:30) (100/58 - 143/91)  RR: 17 (04-23-21 @ 15:30) (17 - 25)  SpO2: 97% (04-23-21 @ 15:30) (95% - 98%)    s1s2  b/l air entry  soft  edema, chronic per the pt and unchanged                                                   12.0   8.43  )-----------( 257      ( 23 Apr 2021 06:00 )             36.3     23 Apr 2021 06:00    139    |  106    |  48     ----------------------------<  103    5.1     |  27     |  1.84     Ca    9.1        23 Apr 2021 06:00  Mg     2.2       22 Apr 2021 05:00    PT/INR - ( 23 Apr 2021 06:00 )   PT: 18.1 sec;   INR: 1.53 ratio      allopurinol 100 milliGRAM(s) Oral daily  clopidogrel Tablet 75 milliGRAM(s) Oral daily  metoprolol succinate ER 12.5 milliGRAM(s) Oral daily    A/P:    CM, DM, EF 40-45%, mod AS  ANGELICA/CKD 3 multifactorial: hypotensive/hemodynamic, NSAID's related (Cr 1.93 - 12/14/20)  Cr has improved from peak of 4.45 - 4/12/21 and is presently near December baseline  SONO w/o hydro  Avoid nephrotoxins  Renal diet  No NSAID's now or in the future, d/w pt  Renal f/u as op, d/w pt    [phone]

## 2021-04-23 NOTE — PROGRESS NOTE ADULT - SUBJECTIVE AND OBJECTIVE BOX
Patient is a 60y old  Male who presents with a chief complaint of renal failure (22 Apr 2021 19:58)    No events overnight  Denies chest pain, SOB  NPO for second part of stress test today    Patient seen and examined at bedside.    ALLERGIES:  No Known Allergies    MEDICATIONS  (STANDING):  allopurinol 100 milliGRAM(s) Oral daily  clopidogrel Tablet 75 milliGRAM(s) Oral daily  metoprolol succinate ER 12.5 milliGRAM(s) Oral daily    MEDICATIONS  (PRN):    Vital Signs Last 24 Hrs  T(F): 98.8 (23 Apr 2021 07:59), Max: 99 (22 Apr 2021 16:39)  HR: 82 (23 Apr 2021 11:56) (76 - 88)  BP: 117/81 (23 Apr 2021 11:56) (100/58 - 143/91)  RR: 18 (23 Apr 2021 07:59) (18 - 25)  SpO2: 97% (23 Apr 2021 11:56) (95% - 98%)  I&O's Summary    22 Apr 2021 07:01  -  23 Apr 2021 07:00  --------------------------------------------------------  IN: 200 mL / OUT: 200 mL / NET: 0 mL    23 Apr 2021 07:01  -  23 Apr 2021 13:20  --------------------------------------------------------  IN: 240 mL / OUT: 0 mL / NET: 240 mL      BMI (kg/m2): 46.3 (04-19-21 @ 20:18)  PHYSICAL EXAM:  General: NAD, A/O x 3, Sitting up in bed  ENT: MMM, no scleral icterus  Neck: Supple, No JVD, no thyroidomegaly  Lungs: Clear to auscultation bilaterally, no wheezes, no rales, no rhonchi, good inspiratory effort  Cardio: RRR, S1/S2, No murmurs  Abdomen: Soft, Nontender, Nondistended; Bowel sounds present  Extremities: No calf tenderness, No pitting edema, no skin changes    LABS:                        12.0   8.43  )-----------( 257      ( 23 Apr 2021 06:00 )             36.3       04-23    139  |  106  |  48  ----------------------------<  103  5.1   |  27  |  1.84    Ca    9.1      23 Apr 2021 06:00  Mg     2.2     04-22       eGFR if Non African American: 39 mL/min/1.73M2 (04-23-21 @ 06:00)  eGFR if African American: 45 mL/min/1.73M2 (04-23-21 @ 06:00)    PT/INR - ( 23 Apr 2021 06:00 )   PT: 18.1 sec;   INR: 1.53 ratio      CARDIAC MARKERS ( 20 Apr 2021 15:55 )  1.018 ng/mL / x     / 94 U/L / x     / 4.5 ng/mL    COVID-19 PCR: NotDetec (04-19-21 @ 14:50)    RADIOLOGY & ADDITIONAL TESTS:  CT Abdomen and Pelvis No Cont (04.19.21 @ 19:03) >  IMPRESSION: No hydronephrosis.        Care Discussed with Consultants/Other Providers:   Cardiology: Waiting on stress test

## 2021-04-23 NOTE — DISCHARGE NOTE NURSING/CASE MANAGEMENT/SOCIAL WORK - PATIENT PORTAL LINK FT
You can access the FollowMyHealth Patient Portal offered by [facility] by registering at the following website: [email or web address]. By joining [employer]’s FollowMyHealth portal, you will also be able to view your health information using other applications (apps) compatible with our system.

## 2021-04-23 NOTE — PROGRESS NOTE ADULT - ASSESSMENT
59 yo M with PMHx obesity, HTN, diastolic CHF, CAD s/p CABG, A fib on coumadin presents to [place] ER with renal failure. Shiley catheter removed yesterday no need for  dialysis.     *Acute kidney injury on CKD [facility]  Patient with h/o CKD but now with renal failure. Continues Improving  . Suspect multifactorial causes.  - Could be pre-renal from diarrhea, intrinsic renal etiology from NSAID use, or cardiorenal from CHF.  - Originally with hyperkalemia, 5.7, Creatinine 4.03,  (Creatinine 1.9 in 12/2020). Labs now improving  currently BUN 59 and cr 1.89  - s/p D50, insulin, kayexelate in ER  - Appreciate renal recs. shiley catheter removed 4/21  - Willis catheter removed 4/21  - CTAP: no hydro or obstruction. Had a recent normal Renal U/S.   - No NSAIDs,ACE-i/ ARBs, contrast, or diuretics.    *Hyperkalemia, resolved  Improved, K+ 4.9   Given D50, insulin, kayexelate in the ER.  continue to monitor  BMP      Elevated magnesium- resolved   likely due to mylanta use.   - cont to monitor-2.2    *Elevated troponin, suspect demand due to renal failure  EKG nonischemic  troponin increasing   cardio recommands long acting beta blocker if BP tolerates  Nuclear stress today, part 2   Cardio recs appreciated   TTE: EF 40-45%, Grade III diastolic dysfunction. Severe aortic stenosis. See above for further detail     *History of Paroxysmal Afib/a flutter s/p ablation and pacemaker placement, currently in NSR.   On coumadin at home- coumadin was held for possible permicath  will continue to hold pending nuclear stress   Pt very low stroke risk (CHADS Vasc 2 points, stroke risk 2.2% per year)  - restart coumadin after results of nuclear stress; INR 1.53 today    *HTN:  Patient hypotensive in ER pt has been currently maintaining his bp will continue to monitor   continue to hold  hydralazine for now  Consider midodrine if hypotension remains a problem especially with initiation of beta blocker-  currently tolerating beta blocker and maintaing BP without midodrine     *Subacute diarrhea  Pt reports somewhat chronic diarrhea since the COVID vaccine vs increased use of mylanta and elevated mag level. Improved   - cont to monitor.     *Diastolic CHF due to ischemic cardiomyopathy  TTE 2020 with EF 60% now worsening to 40-45%  C/w metoprolol 12.5mg daily  Hold lasix, spironolactone in setting of acute renal failure  Entresto recently d/phyllis due to renal failure   continue to follow labs        *CAD s/p CABG:  Cont plavix no consideration for permicath at this time   resume coumadin after nuclear stress test     *Morbid Obesity:  BMI 45.5  weight loss and exercise program  encouraged    Prediabetes:  HgbA1C 5.8 this past month  previously on farxiga, now stopped.   Weight loss encouraged    Dispo: plan discharge home today after cardiology clearance  DVT ppx: SQH   AM labs  FULL CODE  Diet: renal restricted.

## 2021-04-23 NOTE — PROGRESS NOTE ADULT - REASON FOR ADMISSION
renal failure

## 2021-04-23 NOTE — DISCHARGE NOTE PROVIDER - NSDCMRMEDTOKEN_GEN_ALL_CORE_FT
allopurinol 100 mg oral tablet: 1 tab(s) orally once a day  Coumadin 6 mg oral tablet: 1 tab(s) orally once a day  Takes 6 mg alternating with 3 mg daily   furosemide 40 mg oral tablet: 1 tab(s) orally once a day  metoprolol: 25 milligram(s) orally once a day  Plavix 75 mg oral tablet: 1 tab(s) orally once a day   allopurinol 100 mg oral tablet: 1 tab(s) orally once a day  Coumadin 6 mg oral tablet: 1 tab(s) orally once a day  Takes 6 mg alternating with 3 mg daily   furosemide 40 mg oral tablet: 1 tab(s) orally once a day  Metoprolol Succinate ER 25 mg oral tablet, extended release: 0.5 tab(s) orally once a day  Plavix 75 mg oral tablet: 1 tab(s) orally once a day   allopurinol 100 mg oral tablet: 1 tab(s) orally once a day  Coumadin 6 mg oral tablet: 1 tab(s) orally once a day  Takes 6 mg alternating with 3 mg daily   hydrALAZINE 50 mg oral tablet: 1 tab(s) orally 3 times a day  Lasix 20 mg oral tablet: 1 tab(s) orally once a day  Plavix 75 mg oral tablet: 1 tab(s) orally once a day

## 2021-04-23 NOTE — DISCHARGE NOTE PROVIDER - NSDCFUSCHEDAPPT_GEN_ALL_CORE_FT
[facility] ; [facility] ; [facility] ; 04/29/[facility]21 ; NPP Cardio [facility] ; 07/14/2021 ; NPP [facility] [address] [place] [address] Av [facility] ; 04/26/2021 ; [facility] ; 04/27/2021 ; NPP Cardio [facility] ; 04/29/2021 ; NPP Cardio [facility] ; 07/14/2021 ; [address]

## 2021-04-23 NOTE — PROGRESS NOTE ADULT - PROVIDER SPECIALTY LIST ADULT
Nephrology
Cardiology
Cardiology
Hospitalist
Hospitalist
Nephrology
Nephrology
Hospitalist
Nephrology
Cardiology
Hospitalist

## 2021-04-23 NOTE — PROGRESS NOTE ADULT - SUBJECTIVE AND OBJECTIVE BOX
Follow up for as, cad    SUBJ:   feels ok, no c/p, sob. completed nuclear stress    PMH  CAD (coronary artery disease)    H/O heart artery stent    Obesity    Hypercholesteremia    Thrombus of left atrial appendage    Ischemic cardiomyopathy    WILFRDE (obstructive sleep apnea)    HTN (hypertension)    Atrial fibrillation    CHF (congestive heart failure)    Peripheral edema    Lichen planus    Atrial flutter    MI (myocardial infarction)    Stented coronary artery        MEDICATIONS  (STANDING):  allopurinol 100 milliGRAM(s) Oral daily  clopidogrel Tablet 75 milliGRAM(s) Oral daily  metoprolol succinate ER 12.5 milliGRAM(s) Oral daily    MEDICATIONS  (PRN):        PHYSICAL EXAM:  Vital Signs Last 24 Hrs  T(C): 37.3 (2021 15:30), Max: 37.3 (2021 15:30)  T(F): 99.1 (2021 15:30), Max: 99.1 (2021 15:30)  HR: 82 (2021 15:30) (76 - 88)  BP: 106/67 (2021 15:30) (100/58 - 143/91)  BP(mean): --  RR: 17 (2021 15:30) (17 - 25)  SpO2: 97% (2021 15:30) (95% - 98%)    GENERAL: NAD, well-groomed, well-developed  HEAD:  Atraumatic, Normocephalic  EYES:  conjunctiva and sclera clear  ENT: Moist mucous membranes,  NECK: Supple, No JVD, no bruits  CHEST/LUNG: Clear to auscultation bilaterally; No rales, rhonchi, wheezing, or rubs  HEART: Regular rate and rhythm; No  rubs, or gallops PMI non displaced. +LAURA  ABDOMEN: Soft, Nontender, Nondistended; Bowel sounds present  EXTREMITIES:  No clubbing, cyanosis, or edema  SKIN: No rashes or lesions  NERVOUS SYSTEM:  Alert       TELEMETRY:   sinus        LABS:                        12.0   8.43  )-----------( 257      ( 2021 06:00 )             36.3     04-    139  |  106  |  48<H>  ----------------------------<  103<H>  5.1   |  27  |  1.84<H>    Ca    9.1      2021 06:00  Mg     2.2     -22          PT/INR - ( 2021 06:00 )   PT: 18.1 sec;   INR: 1.53 ratio             I&O's Summary    2021 07:01  -  2021 07:00  --------------------------------------------------------  IN: 200 mL / OUT: 200 mL / NET: 0 mL    2021 07:01  -  2021 16:00  --------------------------------------------------------  IN: 480 mL / OUT: 0 mL / NET: 480 mL    ECHO:    < from: TTE Echo Complete w/o Contrast w/ Doppler (21 @ 08:20) >    EXAM:  ECHO TTE WO CON COMP W DOPP      PROCEDURE DATE:  2021        INTERPRETATION:  REPORT:  TRANSTHORACIC ECHOCARDIOGRAM REPORT        Patient Name:   BERTHA WARD Patient Location: [address] [place] [address] Rec #:  [id number]        Accession #: [id number]  Account #:      [id number]          Height:           68.0 in 172.7 cm  YOB: 1960       Weight:           298.9 lb 135.60 kg  Patient Age:    60 years        BSA:              2.42 m²  Patient Gender: M               BP:       100/69 mmHg      Date of Exam:        2021 8:20:16 AM  Sonographer:         Raul Betts  Referring Physician: AKOSUA SHRESTHA    Procedure:     2D Echo/Doppler/Color Doppler Complete and Echocardiogram with                 Definity Contrast.  Indications:   Abnormal electrocardiogram [ECG] [EKG] - R94.31  Diagnosis:     Abnormal electrocardiogram [ECG] [EKG] - R94.31  Study Details: Technically difficult study. Study quality was adversely affected                 due to patient obesity and lung interference.        2D AND M-MODE MEASUREMENTS (normal ranges within parentheses):  Aorta/Left Atrium:             Normal  Aortic Root (Mmode): 3.80 cm (2.4-3.7)  AoV Cusp Separation: 1.14 cm (1.5-2.6)  Left Atrium (Mmode): 4.14 cm (1.9-4.0)    LV SYSTOLIC FUNCTION BY 2D PLANIMETRY (MOD):  EF-A4C View: 43.7 %    LV DIASTOLIC FUNCTION:  MV Peak E: 1.24 m/s Decel Time: 264 msec  MV Peak A: 1.36 m/s  E/A Ratio: 0.91    SPECTRAL DOPPLER ANALYSIS (where applicable):  Mitral Valve:  MV P1/2 Time: 76.51 msec  MV Area, PHT: 2.88 cm²    Aortic Valve: AoV Max Bird: 2.29 m/s AoV Peak P.9 mmHg AoV Mean P.6 mmHg    LVOT Vmax: 0.74 m/s LVOT VTI: 0.170 m LVOT Diameter: 2.11 cm    AoV Area, Vmax: 1.14 cm² AoV Area, VTI: 1.06 cm² AoV Area, Vmn: 1.08 cm²  Ao VTI: 0.564  Tricuspid Valve and PA/RV Systolic Pressure: TR Max Velocity: 1.89 m/s RA Pressure: 10 mmHg RVSP/PASP: 24.4 mmHg      PHYSICIAN INTERPRETATION:  Left Ventricle: Endocardial visualization was enhanced with intravenousecho contrast. The left ventricular internal cavity size is normal.  Global LV systolic function was moderately decreased. Left ventricular ejection fraction, by visual estimation, is 40 to 45%. Spectral Doppler shows restrictive pattern of left ventricular myocardial filling (Grade III diastolic dysfunction). 2mL of definity and saline was used. Lot#[id number] EXP 10/10/21.      LV Wall Scoring:  The mid and apical anterior septum and apex are akinetic. The mid inferoseptal  segment and apical lateralsegment are hypokinetic. All remaining scored  segments are normal.    Right Ventricle: Normal right ventricular size and function.  Left Atrium: Mildly enlarged left atrium.  Right Atrium: The right atrium was not well visualized.  Pericardium: There is no evidence of pericardial effusion.  Mitral Valve: Thickening and calcification of the anterior and posterior mitral valve leaflets. There is mild mitral annular calcification. No evidence of mitral valve regurgitation is seen.  Tricuspid Valve:The tricuspid valve is normal in structure. Trivial tricuspid regurgitation is visualized.  Aortic Valve: The aortic valve is trileaflet. Mild to moderate aortic stenosis is present. Peak transaortic gradient equals 20.9 mmHg, mean transaortic gradient equals 9.6 mmHg, the calculated aortic valve area equals 1.06 cm² by the continuity equation consistent with moderate aortic stenosis. No evidence of aortic valve regurgitation is seen. The Dimesionless Index value is 0.30.  Pulmonic Valve: The pulmonic valve was not well visualized.  Aorta: The aortic root is normal in size and structure.  Pulmonary Artery: The pulmonary artery is of normal size and origin.  Venous: The inferior vena cava is normal. The inferior vena cava was normal sized, with respiratory size variation greater than 50%.      Summary:   1. Left ventricular ejection fraction, by visual estimation, is 40 to 45%.   2. Moderately decreased global left ventricular systolic function.   3. Mid and apical anterior septum, apex, mid inferoseptal segment, and apical lateral segment are abnormal as described above.   4. Normal left ventricular internal cavity size.   5. Spectral Doppler shows restrictive pattern of left ventricular myocardial filling (Grade III diastolic dysfunction).   6. Mildly enlarged left atrium.   7. Mild mitral annular calcification.   8. No evidence of mitral valve regurgitation.   9. Thickening and calcification of the anterior and posterior mitral valve leaflets.  10. Mild to moderate aortic valve stenosis.  11. Patient underwent ultrasound enhanced echocardiography with definity.  12. Dvi by tvi is .3 consistent with borderline severe aortic stenosis.  13. Endocardial visualization was enhanced with intravenous echo contrast.  14. Peak transaortic gradient equals 20.9 mmHg, mean transaortic gradient equals 9.6 mmHg, the calculated aortic valve area equals 1.06 cm² by the continuity equation consistent with moderate aortic stenosis.  15. The Dimesionless Index value is 0.30.    < end of copied text >    < from: NM Multiple Day Procedure (21 @ 11:50) >  EXAM:  NM NUCLEAR STRESS MULTI PHARM    EXAM:  NM MULTI DAY PROCEDURE      PROCEDURE DATE:  2021        INTERPRETATION:  INTERPRETATION: RADIOPHARMACEUTICAL: 29.7 mCi Tc-99m ? Sestamibi IV at Rest and   29.1 mCi Tc99m- Sestamibi (Regadenoson)IV at Stress  2 day protocol    CLINICAL INFORMATION: The patient is a 60 year old man with CAD, referred for MPI.      PATIENT PREPARATION: NPO after midnight, no caffeine.    STRESS PROTOCOL: Intravenous Regadenoson 5cc (0.4mg) was given rapidly over 10 seconds. Immediately thereafter Tc99m- Sestamibi was injected.    A 12 lead EKG was recorded every minute and blood pressure was also recorded during the test. The study was stopped when the protocol was completed.        TECHNIQUE:  At rest, 29.7 mCi Tc99m- sestamibi was injected intravenously and SPECT myocardial perfusion imaging was performed approximately 1 hour later. Immediately following the intravenous injection of Regadenoson, 29.1 mCi of Tc99m- Sestamibi was injected intravenouslyand gated SPECT myocardial perfusion imaging was performed approximately 1 hour later. Data were reconstructed in the cardiac standard short axis, horizontal long axis and vertical long axis projections.    FINDINGS: The technical quality was of the resting and post stress perfusion images was good.    Review of resting and post stress myocardial scintigrams revealed abnormal left ventricular perfusion.  The left ventricle appears dilated. There is severe defect involving the distal septum and apex, and a severe large defect involving the mid and inferior segments of the lateral wall. There is nearly absent uptake in the apex. The resting study shows minimal improvement in tracer activity in the lateral wall.      Gated wall motion study revealed abnormal left ventricular wall motion. Transient ischemic dilation (TID) was absent. There is reduced thickening and brightening involving the lateral wall and apex and inferoapex as well as anteroapex.    LV systolic performance is moderately impaired with ejection fraction of 37%..      IMPRESSION: Abnormal study the left ventricle is dilated and there are severe predominantly fixed defects involving the anterior wall apex inferoapex and lateral wall compatible with infarction. There may beminimal kleber-infarction ischemia in the lateral wall. Ejection fraction is moderately decreased at 37%.    Please refer to cardiac stress test report.    Comparison: No prior study available.    UNRULY ESCOTO M.D., ATTENDING CARDIOLOGIST  This document has been electronically signed. 2021  1:25PM    < end of copied text >

## 2021-04-26 ENCOUNTER — NON-APPOINTMENT (OUTPATIENT)
Age: 61
End: 2021-04-26

## 2021-04-26 ENCOUNTER — APPOINTMENT (OUTPATIENT)
Dept: FAMILY MEDICINE | Facility: CLINIC | Age: 61
End: 2021-04-26
Payer: MEDICAID

## 2021-04-26 VITALS
WEIGHT: 303 LBS | RESPIRATION RATE: 15 BRPM | HEIGHT: 66 IN | HEART RATE: 104 BPM | SYSTOLIC BLOOD PRESSURE: 130 MMHG | TEMPERATURE: 97.9 F | DIASTOLIC BLOOD PRESSURE: 70 MMHG | OXYGEN SATURATION: 97 % | BODY MASS INDEX: 48.7 KG/M2

## 2021-04-26 DIAGNOSIS — E87.5 HYPERKALEMIA: ICD-10-CM

## 2021-04-26 DIAGNOSIS — E79.0 HYPERURICEMIA W/OUT SIGNS OF INFLAMMATORY ARTHRITIS AND TOPHACEOUS DISEASE: ICD-10-CM

## 2021-04-26 PROCEDURE — 99496 TRANSJ CARE MGMT HIGH F2F 7D: CPT | Mod: 25

## 2021-04-26 PROCEDURE — 99072 ADDL SUPL MATRL&STAF TM PHE: CPT

## 2021-04-26 RX ORDER — DAPAGLIFLOZIN 10 MG/1
10 TABLET, FILM COATED ORAL DAILY
Refills: 0 | Status: COMPLETED | COMMUNITY
Start: 2020-08-26 | End: 2021-04-26

## 2021-04-26 RX ORDER — SACUBITRIL AND VALSARTAN 97; 103 MG/1; MG/1
97-103 TABLET, FILM COATED ORAL TWICE DAILY
Qty: 60 | Refills: 3 | Status: COMPLETED | COMMUNITY
Start: 2020-07-22 | End: 2021-04-26

## 2021-04-26 RX ORDER — SPIRONOLACTONE 25 MG/1
25 TABLET ORAL
Qty: 30 | Refills: 5 | Status: COMPLETED | COMMUNITY
Start: 2020-12-18 | End: 2021-04-26

## 2021-04-26 RX ORDER — ONDANSETRON 4 MG/1
4 TABLET ORAL
Qty: 60 | Refills: 2 | Status: COMPLETED | COMMUNITY
Start: 2021-04-12 | End: 2021-04-26

## 2021-04-26 RX ORDER — NITROGLYCERIN 0.4 MG/1
0.4 TABLET SUBLINGUAL
Qty: 90 | Refills: 3 | Status: COMPLETED | COMMUNITY
Start: 2019-06-10 | End: 2021-04-26

## 2021-04-26 NOTE — PHYSICAL EXAM
[Normal Rate] : normal rate  [Regular Rhythm] : with a regular rhythm [Normal] : affect was normal and insight and judgment were intact [de-identified] : LAURA [de-identified] : lower extrmity edema  [10221 - High Complexity requires an extensive number of possible diagnoses and/or the management options, extensive complexity of the medical data (tests, etc.) to be reviewed, and a high risk of significant complications, morbidity, and/or mortality as w] : High Complexity

## 2021-04-26 NOTE — PHYSICAL EXAM
[Normal Rate] : normal rate  [Regular Rhythm] : with a regular rhythm [Normal] : affect was normal and insight and judgment were intact [de-identified] : LAURA [de-identified] : lower extrmity edema  [33275 - High Complexity requires an extensive number of possible diagnoses and/or the management options, extensive complexity of the medical data (tests, etc.) to be reviewed, and a high risk of significant complications, morbidity, and/or mortality as w] : High Complexity

## 2021-04-26 NOTE — PHYSICAL EXAM
[Normal Rate] : normal rate  [Regular Rhythm] : with a regular rhythm [Normal] : affect was normal and insight and judgment were intact [de-identified] : LAURA [de-identified] : lower extrmity edema  [22517 - High Complexity requires an extensive number of possible diagnoses and/or the management options, extensive complexity of the medical data (tests, etc.) to be reviewed, and a high risk of significant complications, morbidity, and/or mortality as w] : High Complexity

## 2021-04-27 ENCOUNTER — NON-APPOINTMENT (OUTPATIENT)
Age: 61
End: 2021-04-27

## 2021-04-27 ENCOUNTER — APPOINTMENT (OUTPATIENT)
Dept: CARDIOLOGY | Facility: CLINIC | Age: 61
End: 2021-04-27
Payer: MEDICAID

## 2021-04-27 VITALS
OXYGEN SATURATION: 100 % | WEIGHT: 303 LBS | TEMPERATURE: 98.2 F | HEART RATE: 92 BPM | HEIGHT: 66 IN | SYSTOLIC BLOOD PRESSURE: 107 MMHG | DIASTOLIC BLOOD PRESSURE: 73 MMHG | BODY MASS INDEX: 48.7 KG/M2 | RESPIRATION RATE: 19 BRPM

## 2021-04-27 PROCEDURE — 99072 ADDL SUPL MATRL&STAF TM PHE: CPT

## 2021-04-27 PROCEDURE — 93000 ELECTROCARDIOGRAM COMPLETE: CPT

## 2021-04-27 PROCEDURE — 99215 OFFICE O/P EST HI 40 MIN: CPT

## 2021-04-28 ENCOUNTER — TRANSCRIPTION ENCOUNTER (OUTPATIENT)
Age: 61
End: 2021-04-28

## 2021-04-29 ENCOUNTER — APPOINTMENT (OUTPATIENT)
Dept: CARDIOLOGY | Facility: CLINIC | Age: 61
End: 2021-04-29

## 2021-05-02 LAB
ALBUMIN SERPL ELPH-MCNC: 4 G/DL
ALP BLD-CCNC: 101 U/L
ALT SERPL-CCNC: 26 U/L
ANION GAP SERPL CALC-SCNC: 10 MMOL/L
AST SERPL-CCNC: 21 U/L
BASOPHILS # BLD AUTO: 0.05 K/UL
BASOPHILS NFR BLD AUTO: 0.6 %
BILIRUB SERPL-MCNC: 0.3 MG/DL
BUN SERPL-MCNC: 37 MG/DL
CALCIUM SERPL-MCNC: 9.4 MG/DL
CHLORIDE SERPL-SCNC: 107 MMOL/L
CO2 SERPL-SCNC: 22 MMOL/L
CREAT SERPL-MCNC: 1.98 MG/DL
EOSINOPHIL # BLD AUTO: 0.41 K/UL
EOSINOPHIL NFR BLD AUTO: 4.8 %
GLUCOSE SERPL-MCNC: 91 MG/DL
HCT VFR BLD CALC: 37.8 %
HGB BLD-MCNC: 12.5 G/DL
IMM GRANULOCYTES NFR BLD AUTO: 0.4 %
INR PPP: 1.67 RATIO
LYMPHOCYTES # BLD AUTO: 0.97 K/UL
LYMPHOCYTES NFR BLD AUTO: 11.5 %
MAGNESIUM SERPL-MCNC: 2 MG/DL
MAN DIFF?: NORMAL
MCHC RBC-ENTMCNC: 31 PG
MCHC RBC-ENTMCNC: 33.1 GM/DL
MCV RBC AUTO: 93.8 FL
MONOCYTES # BLD AUTO: 0.83 K/UL
MONOCYTES NFR BLD AUTO: 9.8 %
NEUTROPHILS # BLD AUTO: 6.18 K/UL
NEUTROPHILS NFR BLD AUTO: 72.9 %
PHOSPHATE SERPL-MCNC: 3.1 MG/DL
PLATELET # BLD AUTO: 311 K/UL
POTASSIUM SERPL-SCNC: 4.5 MMOL/L
PROT SERPL-MCNC: 7.4 G/DL
PT BLD: 19.4 SEC
RBC # BLD: 4.03 M/UL
RBC # FLD: 12.5 %
SODIUM SERPL-SCNC: 139 MMOL/L
WBC # FLD AUTO: 8.47 K/UL

## 2021-05-04 RX ORDER — FLUVASTATIN 20 MG/1
20 CAPSULE ORAL
Qty: 90 | Refills: 1 | Status: DISCONTINUED | COMMUNITY
Start: 2020-12-02 | End: 2021-05-04

## 2021-05-05 DIAGNOSIS — Z23 ENCOUNTER FOR IMMUNIZATION: ICD-10-CM

## 2021-05-05 DIAGNOSIS — R76.8 OTHER SPECIFIED ABNORMAL IMMUNOLOGICAL FINDINGS IN SERUM: ICD-10-CM

## 2021-05-06 ENCOUNTER — NON-APPOINTMENT (OUTPATIENT)
Age: 61
End: 2021-05-06

## 2021-05-06 ENCOUNTER — APPOINTMENT (OUTPATIENT)
Dept: CARDIOLOGY | Facility: CLINIC | Age: 61
End: 2021-05-06
Payer: MEDICAID

## 2021-05-06 VITALS
WEIGHT: 300 LBS | HEART RATE: 91 BPM | BODY MASS INDEX: 45.47 KG/M2 | OXYGEN SATURATION: 95 % | HEIGHT: 68 IN | RESPIRATION RATE: 18 BRPM | DIASTOLIC BLOOD PRESSURE: 86 MMHG | SYSTOLIC BLOOD PRESSURE: 123 MMHG

## 2021-05-06 PROCEDURE — 99215 OFFICE O/P EST HI 40 MIN: CPT

## 2021-05-08 ENCOUNTER — NON-APPOINTMENT (OUTPATIENT)
Age: 61
End: 2021-05-08

## 2021-05-08 NOTE — HISTORY OF PRESENT ILLNESS
[Dyslipidemia] : Dyslipidemia [Hypertension] : Hypertension [Peripheral Artery Disease] : Peripheral Artery Disease [Prior Myocardial Infarction] : Prior Myocardial Infarction  [> 21 days] : more than 21 days ago [Heart Failure within 2 Weeks] : Heart Failure in last 2 weeks [Class II] : Class II [Prior Heart Failure] : Prior Heart Failure [Arrhythmia] : Arrhythmia [Remote (>= 2 wks)] : in remote past, greater than 2 wks ago [A fib / A flutter] : A fib / A flutter [FreeTextEntry1] : The patient is a 61 y/o male with a past medical history of AS, HTN, ANGELICA, CABG 2012, AFlutter with ablation, Chronic Systolic Heart Failure, HLD, PPM, referred by Dr. Miranda for evaluation of his Aortic Stenosis.\par \par Today he presents stating he has been having VELÁSQUEZ and occasional chest pain. He notes some SOB after climbing 12 steps in his house, or walking for  distance. He became fatigued when walking in the supermarket. He notes pedal edema also with lichen planus around both ankles. He is not taking as much furosemide because of his recent elevation in creatinine. He denies orthopnea. He lives alone in a house, and is fully independent in his ADL's.  No chest pain/tightness/discomfort, fevers, chills or sweats.  He feels his weight has been stable. Sleeps in a recliner.  Denies any changes in orthopnea or paroxysmal nocturnal dyspnea.  Denies any palpitations.  Feels overall, her is symptoms are stable in nature.  He is very concerned about his kidney function and potential need for dialysis in the future.  He is following a strict low potassium diet and is keeping a food log.

## 2021-05-08 NOTE — REVIEW OF SYSTEMS
[Chills] : chills [Feeling Tired] : feeling tired [Chest Pain] : chest pain [Lower Ext Edema] : lower extremity edema [Shortness Of Breath] : shortness of breath [SOB on Exertion] : shortness of breath during exertion [Diarrhea] : diarrhea [Fever] : no fever [Feeling Poorly] : not feeling poorly [Recent Weight Gain (___ Lbs)] : no recent weight gain [Heart Rate Is Slow] : the heart rate was not slow [Heart Rate Is Fast] : the heart rate was not fast [Palpitations] : no palpitations [Cough] : no cough [Leg Claudication] : no intermittent leg claudication [Orthopnea] : no orthopnea [PND] : no PND [Abdominal Pain] : no abdominal pain [Vomiting] : no vomiting [Heartburn] : no heartburn [Negative] : Heme/Lymph [FreeTextEntry2] : rare chills [de-identified] : Lichen Planus to lower extremities

## 2021-05-08 NOTE — PHYSICAL EXAM
[General Appearance - Alert] : alert [General Appearance - Well Nourished] : well nourished [Sclera] : the sclera and conjunctiva were normal [PERRL With Normal Accommodation] : pupils were equal in size, round, and reactive to light [Extraocular Movements] : extraocular movements were intact [Outer Ear] : the ears and nose were normal in appearance [Oropharynx] : the oropharynx was normal [Neck Appearance] : the appearance of the neck was normal [Neck Cervical Mass (___cm)] : no neck mass was observed [Jugular Venous Distention Increased] : there was no jugular-venous distention [Thyroid Diffuse Enlargement] : the thyroid was not enlarged [Thyroid Nodule] : there were no palpable thyroid nodules [Respiration, Rhythm And Depth] : normal respiratory rhythm and effort [Exaggerated Use Of Accessory Muscles For Inspiration] : no accessory muscle use [Auscultation Breath Sounds / Voice Sounds] : lungs were clear to auscultation bilaterally [Heart Sounds] : normal S1 and S2 [Apical Impulse] : the apical impulse was normal [Systolic grade ___/6] : A grade [unfilled]/6 systolic murmur was heard. [Arterial Pulses Carotid] : carotid pulses were normal with no bruits [Arterial Pulses Femoral] : femoral pulses were normal without bruits [Nonpitting Edema] : nonpitting edema present [___ +] : bilateral [unfilled]+ pretibial pitting edema [Bowel Sounds] : normal bowel sounds [Abdomen Tenderness] : non-tender [Abnormal Walk] : normal gait [Nail Clubbing] : no clubbing  or cyanosis of the fingernails [Musculoskeletal - Swelling] : no joint swelling seen [Motor Tone] : muscle strength and tone were normal [Sensation] : the sensory exam was normal to light touch and pinprick [Motor Exam] : the motor exam was normal [No Focal Deficits] : no focal deficits [Oriented To Time, Place, And Person] : oriented to person, place, and time [Impaired Insight] : insight and judgment were intact [General Appearance - In No Acute Distress] : in no acute distress [General Appearance - Well-Appearing] : healthy appearing [General Appearance - Well Developed] : well developed [FreeTextEntry1] : 2+ edema bilaterally above his knees [Cervical Lymph Nodes Enlarged Posterior Bilaterally] : posterior cervical [Cervical Lymph Nodes Enlarged Anterior Bilaterally] : anterior cervical [No CVA Tenderness] : no ~M costovertebral angle tenderness [No Spinal Tenderness] : no spinal tenderness [Skin Color & Pigmentation] : normal skin color and pigmentation [Skin Turgor] : normal skin turgor [] : no rash

## 2021-05-08 NOTE — ASSESSMENT
[FreeTextEntry1] : Aortic Stenosis\par --Discussed with the patient what is aortic valve stenosis.  The natural progression of aortic valve stenosis was reviewed.  The symptoms of severe aortic valve stenosis was gone.  Review of patients TTE suggest that he has moderate to severe aortic valve stenosis.  It is not clear that his symptoms are secondary to his aortic valve stenosis.  The patient has multiple conditions that may be contributing to his symptoms including but not limited to morbid obesity, ischemic cardiomyopathy, coronary artery disease, atrial flutter and renal insuffiency.\par --At this time it is recommended that he get regular follow-up and TTEs. If he develops and worsening signs and symptoms he should be reassess at which time consideration for repeat TTE.  Discussed with patient indications and details of aortic valve replacement.  The different treatment options were gone over including TAVR and SAVR.  The differences between the treatment options and their pros and cons were explained.\par --If worsening anginal symptoms would recommend cardiac catheterization.  He would prefer to avoid any contrast administration at this time and be treated aggressively medically.\par --Continue hydralazine 50mg PO TID, clopidogrel 75mg daily, atorvastatin 20mg daily and coumadin with goal INR between 2-3.\par --Fluid restrict to less than 1.5L a day.  Recommend he keep a daily standing weight.\par --Recommend repeat TTE in 6 months and close follow-up care with Dr. Miranda.  Agree, patient may benefit from assistance from heart failure team.\par \par All questions and concerns were addressed.\par \par EKG, laboratory studies and imaging including cardiac catheterization studies were personally assessed.\par \par

## 2021-05-10 NOTE — HISTORY OF PRESENT ILLNESS
[Post-hospitalization from ___ Hospital] : Post-hospitalization from [unfilled] Hospital [Admitted on: ___] : The patient was admitted on [unfilled] [Discharged on ___] : discharged on [unfilled] [Discharge Summary] : discharge summary [Pertinent Labs] : pertinent labs [Radiology Findings] : radiology findings [Discharge Med List] : discharge medication list [Med Reconciliation] : medication reconciliation has been completed [Patient Contacted By: ____] : and contacted by [unfilled] [FreeTextEntry2] : 60 yom with pmh of CM, DM, ef-40-45, mod AS, CKD stage III was sent to the ed by patient's nephrologist for ARF, and hyperkalemia. \par \par Patient did not require dialysis. KIdney injury was likely from heavy NSAID use and hypovolemia. Renal US reviewed- no hydronephrosis noted. Was treated with fluids, and Kayexalate. CR and GFR returned to baseline before discharge. Reports that he will make an appointment to follow up with Dr. William. \par \par Patient had elevated troponins at time of admission ( demand ischemia vs ANGELICA vs ACS). NUclear stress test did show mild ischemia.  Has appointment with cardiologist tomorrow. He continue to have exertional chest pains that he had at the time of discharge. Pains are not worsening. Continues to have moderate VELÁSQUEZ. \par \par  [de-identified] : Has fu with dr paul.\par \par feeling better questionable ischemia. \par \par sob with exertion gets worse \par

## 2021-05-10 NOTE — HISTORY OF PRESENT ILLNESS
[Post-hospitalization from ___ Hospital] : Post-hospitalization from [unfilled] Hospital [Admitted on: ___] : The patient was admitted on [unfilled] [Discharged on ___] : discharged on [unfilled] [Discharge Summary] : discharge summary [Pertinent Labs] : pertinent labs [Radiology Findings] : radiology findings [Discharge Med List] : discharge medication list [Med Reconciliation] : medication reconciliation has been completed [Patient Contacted By: ____] : and contacted by [unfilled] [FreeTextEntry2] : 60 yom with pmh of CM, DM, ef-40-45, mod AS, CKD stage III was sent to the ed by patient's nephrologist for ARF, and hyperkalemia. \par \par Patient did not require dialysis. KIdney injury was likely from heavy NSAID use and hypovolemia. Renal US reviewed- no hydronephrosis noted. Was treated with fluids, and Kayexalate. CR and GFR returned to baseline before discharge. Reports that he will make an appointment to follow up with Dr. William. \par \par Patient had elevated troponins at time of admission ( demand ischemia vs ANGELICA vs ACS). NUclear stress test did show mild ischemia.  Has appointment with cardiologist tomorrow. He continue to have exertional chest pains that he had at the time of discharge. Pains are not worsening. Continues to have moderate VELÁSQUEZ. \par \par  [de-identified] : Has fu with dr paul.\par \par feeling better questionable ischemia. \par \par sob with exertion gets worse \par

## 2021-05-10 NOTE — HISTORY OF PRESENT ILLNESS
[Post-hospitalization from ___ Hospital] : Post-hospitalization from [unfilled] Hospital [Admitted on: ___] : The patient was admitted on [unfilled] [Discharged on ___] : discharged on [unfilled] [Discharge Summary] : discharge summary [Pertinent Labs] : pertinent labs [Radiology Findings] : radiology findings [Discharge Med List] : discharge medication list [Med Reconciliation] : medication reconciliation has been completed [Patient Contacted By: ____] : and contacted by [unfilled] [FreeTextEntry2] : 60 yom with pmh of CM, DM, ef-40-45, mod AS, CKD stage III was sent to the ed by patient's nephrologist for ARF, and hyperkalemia. \par \par Patient did not require dialysis. KIdney injury was likely from heavy NSAID use and hypovolemia. Renal US reviewed- no hydronephrosis noted. Was treated with fluids, and Kayexalate. CR and GFR returned to baseline before discharge. Reports that he will make an appointment to follow up with Dr. William. \par \par Patient had elevated troponins at time of admission ( demand ischemia vs ANGELICA vs ACS). NUclear stress test did show mild ischemia.  Has appointment with cardiologist tomorrow. He continue to have exertional chest pains that he had at the time of discharge. Pains are not worsening. Continues to have moderate VELÁSQUEZ. \par \par  [de-identified] : Has fu with dr paul.\par \par feeling better questionable ischemia. \par \par sob with exertion gets worse \par

## 2021-05-25 ENCOUNTER — NON-APPOINTMENT (OUTPATIENT)
Age: 61
End: 2021-05-25

## 2021-05-25 ENCOUNTER — APPOINTMENT (OUTPATIENT)
Dept: CARDIOLOGY | Facility: CLINIC | Age: 61
End: 2021-05-25
Payer: MEDICAID

## 2021-05-25 VITALS
RESPIRATION RATE: 20 BRPM | DIASTOLIC BLOOD PRESSURE: 73 MMHG | OXYGEN SATURATION: 96 % | WEIGHT: 300 LBS | BODY MASS INDEX: 45.47 KG/M2 | HEART RATE: 85 BPM | TEMPERATURE: 98.2 F | SYSTOLIC BLOOD PRESSURE: 112 MMHG | HEIGHT: 68 IN

## 2021-05-25 LAB
INR PPP: 3 RATIO
POCT-PROTHROMBIN TIME: 35.8 SECS
QUALITY CONTROL: YES

## 2021-05-25 PROCEDURE — 93000 ELECTROCARDIOGRAM COMPLETE: CPT

## 2021-05-25 PROCEDURE — 85610 PROTHROMBIN TIME: CPT | Mod: QW

## 2021-05-25 PROCEDURE — 99213 OFFICE O/P EST LOW 20 MIN: CPT | Mod: 25

## 2021-05-27 ENCOUNTER — RX RENEWAL (OUTPATIENT)
Age: 61
End: 2021-05-27

## 2021-06-02 ENCOUNTER — RX RENEWAL (OUTPATIENT)
Age: 61
End: 2021-06-02

## 2021-06-21 ENCOUNTER — APPOINTMENT (OUTPATIENT)
Dept: CARDIOLOGY | Facility: CLINIC | Age: 61
End: 2021-06-21
Payer: MEDICAID

## 2021-06-21 ENCOUNTER — NON-APPOINTMENT (OUTPATIENT)
Age: 61
End: 2021-06-21

## 2021-06-21 VITALS
WEIGHT: 292 LBS | SYSTOLIC BLOOD PRESSURE: 138 MMHG | DIASTOLIC BLOOD PRESSURE: 92 MMHG | BODY MASS INDEX: 44.25 KG/M2 | RESPIRATION RATE: 16 BRPM | TEMPERATURE: 97.3 F | HEIGHT: 68 IN | HEART RATE: 90 BPM | OXYGEN SATURATION: 96 %

## 2021-06-21 LAB
INR PPP: 4 RATIO
POCT-PROTHROMBIN TIME: 48 SECS
QUALITY CONTROL: YES

## 2021-06-21 PROCEDURE — 99214 OFFICE O/P EST MOD 30 MIN: CPT | Mod: 25

## 2021-06-21 PROCEDURE — 85610 PROTHROMBIN TIME: CPT | Mod: QW

## 2021-06-21 PROCEDURE — 93000 ELECTROCARDIOGRAM COMPLETE: CPT

## 2021-07-06 ENCOUNTER — APPOINTMENT (OUTPATIENT)
Dept: CARDIOLOGY | Facility: CLINIC | Age: 61
End: 2021-07-06
Payer: MEDICAID

## 2021-07-06 LAB
INR PPP: 1.8 RATIO
POCT-PROTHROMBIN TIME: 26.9 SECS
QUALITY CONTROL: YES

## 2021-07-06 PROCEDURE — 85610 PROTHROMBIN TIME: CPT | Mod: QW

## 2021-07-06 PROCEDURE — 99211 OFF/OP EST MAY X REQ PHY/QHP: CPT | Mod: 25

## 2021-07-14 ENCOUNTER — APPOINTMENT (OUTPATIENT)
Dept: FAMILY MEDICINE | Facility: CLINIC | Age: 61
End: 2021-07-14
Payer: MEDICAID

## 2021-07-14 VITALS
OXYGEN SATURATION: 94 % | HEIGHT: 68 IN | TEMPERATURE: 98.1 F | SYSTOLIC BLOOD PRESSURE: 145 MMHG | WEIGHT: 290 LBS | HEART RATE: 82 BPM | BODY MASS INDEX: 43.95 KG/M2 | RESPIRATION RATE: 16 BRPM | DIASTOLIC BLOOD PRESSURE: 90 MMHG

## 2021-07-14 PROCEDURE — 99396 PREV VISIT EST AGE 40-64: CPT | Mod: 25

## 2021-07-14 NOTE — REVIEW OF SYSTEMS
[Fatigue] : fatigue [Lower Ext Edema] : lower extremity edema [Dyspnea on Exertion] : dyspnea on exertion [Itching] : itching [Skin Rash] : skin rash [Negative] : Heme/Lymph [Chest Pain] : no chest pain [Palpitations] : no palpitations [Orthopena] : no orthopnea [Shortness Of Breath] : no shortness of breath [Wheezing] : no wheezing [Cough] : no cough [Abdominal Pain] : no abdominal pain [Nausea] : no nausea [Constipation] : no constipation [Diarrhea] : no diarrhea [Vomiting] : no vomiting [Heartburn] : no heartburn [Melena] : no melena [de-identified] : Stasis dermatitis,Lichen planus

## 2021-07-14 NOTE — PHYSICAL EXAM
[No Acute Distress] : no acute distress [Well Nourished] : well nourished [Well Developed] : well developed [Well-Appearing] : well-appearing [Normal Voice/Communication] : normal voice/communication [Normal Rate] : normal rate  [Regular Rhythm] : with a regular rhythm [Normal S1, S2] : normal S1 and S2 [Soft] : abdomen soft [Non Tender] : non-tender [No Masses] : no abdominal mass palpated [Normal Bowel Sounds] : normal bowel sounds [Declined Rectal Exam] : declined rectal exam [Speech Grossly Normal] : speech grossly normal [Memory Grossly Normal] : memory grossly normal [Normal Affect] : the affect was normal [Alert and Oriented x3] : oriented to person, place, and time [Normal Mood] : the mood was normal [Normal Insight/Judgement] : insight and judgment were intact [Normal] : affect was normal and insight and judgment were intact [de-identified] : LAURA 2/6 [de-identified] : edema [de-identified] : obese, no guarding or rebound [de-identified] : left shoulder ROM decreased due to pain,still has not seen ortho [de-identified] : stasis dermatitis,hyperpimented and areas escoriation of lower ext.

## 2021-07-14 NOTE — HISTORY OF PRESENT ILLNESS
[de-identified] : 61-year-old gentleman who presents today for annual wellness exam patient has multiple medical problems underlying, aortic stenosis, ischemic heart disease status post CABG, atrial flutter with anticoagulation, chronic renal insufficiency stage III which was recently diagnosed patient is under the care of nephrology, chronic systolic congestive heart failure presently stable patient was on Entresto unfortunately due to his kidney functions it has been discontinued and patient has been placed on nitrates isosorbide mononitrate, peripheral vascular disease with chronic edema, hyperlipidemia, hypertension and elevated BMI.

## 2021-07-14 NOTE — HEALTH RISK ASSESSMENT
[Good] : ~his/her~ current health as good [Very Good] : ~his/her~  mood as very good [No] : In the past 12 months have you used drugs other than those required for medical reasons? No [One fall no injury in past year] : Patient reported one fall in the past year without injury [0] : 2) Feeling down, depressed, or hopeless: Not at all (0) [PHQ-2 Negative - No further assessment needed] : PHQ-2 Negative - No further assessment needed [Patient declined colonoscopy] : Patient declined colonoscopy [HIV test declined] : HIV test declined [None] : None [Alone] : lives alone [Unemployed] : unemployed [Single] : single [Fully functional (bathing, dressing, toileting, transferring, walking, feeding)] : Fully functional (bathing, dressing, toileting, transferring, walking, feeding) [Fully functional (using the telephone, shopping, preparing meals, housekeeping, doing laundry, using] : Fully functional and needs no help or supervision to perform IADLs (using the telephone, shopping, preparing meals, housekeeping, doing laundry, using transportation, managing medications and managing finances) [Reports normal functional visual acuity (ie: able to read med bottle)] : Reports normal functional visual acuity [Smoke Detector] : smoke detector [Carbon Monoxide Detector] : carbon monoxide detector [Safety elements used in home] : safety elements used in home [Seat Belt] :  uses seat belt [Sunscreen] : uses sunscreen [With Patient/Caregiver] : , with patient/caregiver [Designated Healthcare Proxy] : Designated healthcare proxy [Name: ___] : Health Care Proxy's Name: [unfilled]  [Relationship: ___] : Relationship: [unfilled] [Aggressive treatment] : aggressive treatment [I will adhere to the patient's wishes.] : I will adhere to the patient's wishes. [] : No [Audit-CScore] : 0 [[id number]] : 0 [Change in mental status noted] : No change in mental status noted [Language] : denies difficulty with language [Behavior] : denies difficulty with behavior [Learning/Retaining New Information] : denies difficulty learning/retaining new information [Handling Complex Tasks] : denies difficulty handling complex tasks [Reasoning] : denies difficulty with reasoning [Spatial Ability and Orientation] : denies difficulty with spatial ability and orientation [Reports changes in hearing] : Reports no changes in hearing [Reports changes in vision] : Reports no changes in vision [Reports changes in dental health] : Reports no changes in dental health [Travel to Developing Areas] : does not  travel to developing areas [TB Exposure] : is not being exposed to tuberculosis [Caregiver Concerns] : does not have caregiver concerns [AdvancecareDate] : 07/21

## 2021-07-14 NOTE — ASSESSMENT
[FreeTextEntry1] : Assessment and plan:\par \par 1.  Status post hospitalization for acute on chronic renal insufficiency presently the patient is being followed very closely by nephrology his glomerular filtration rate according to the patient has gone up to 43 at his last blood work.  Comprehensive blood work will be obtained at this visit.\par \par 2.  Atrial flutter/atrial fibrillation status post ablation and permanent pacemaker patient will continue anticoagulation with warfarin 6 mg 1 tablet alternating with a half a tab INR is followed by cardiology.\par \par 3.  Chronic congestive heart failure due to patient's renal insufficiency Entresto has been discontinued patient has been placed on isosorbide mono nitrate..\par \par 4.  Hypertension excellent blood pressure control in fact today's blood pressure it is low for the patient but he is asymptomatic therefore continue present medical management no change.\par \par 5.  Coronary artery disease status post CABG patient does have nitroglycerin 0.4 mg sublingual tabs available he states that he has not had the need to use them and patient will also continue clopidogrel 75 mg p.o. daily.\par \par 6.  Elevated BMI detailed discussion with patient regarding weight loss program but since his last visit patient has lost 2 pounds but since last year patient has lost a total of 28 pounds I encouraged the patient to continue doing what he is doing.\par \par 7.  Comprehensive blood work drawn in office by examiner.

## 2021-07-15 DIAGNOSIS — Z00.00 ENCOUNTER FOR GENERAL ADULT MEDICAL EXAMINATION W/OUT ABNORMAL FINDINGS: ICD-10-CM

## 2021-07-16 LAB
ALBUMIN SERPL ELPH-MCNC: 4 G/DL
ALP BLD-CCNC: 121 U/L
ALT SERPL-CCNC: 9 U/L
ANION GAP SERPL CALC-SCNC: 13 MMOL/L
APPEARANCE: ABNORMAL
AST SERPL-CCNC: 16 U/L
BACTERIA: ABNORMAL
BASOPHILS # BLD AUTO: 0.04 K/UL
BASOPHILS NFR BLD AUTO: 0.6 %
BILIRUB SERPL-MCNC: 0.5 MG/DL
BILIRUBIN URINE: NEGATIVE
BLOOD URINE: NEGATIVE
BUN SERPL-MCNC: 19 MG/DL
CALCIUM SERPL-MCNC: 9.3 MG/DL
CHLORIDE SERPL-SCNC: 100 MMOL/L
CHOLEST SERPL-MCNC: 103 MG/DL
CO2 SERPL-SCNC: 27 MMOL/L
COLOR: YELLOW
CREAT SERPL-MCNC: 1.61 MG/DL
CREAT SPEC-SCNC: 183 MG/DL
EOSINOPHIL # BLD AUTO: 0.3 K/UL
EOSINOPHIL NFR BLD AUTO: 4.4 %
ESTIMATED AVERAGE GLUCOSE: 108 MG/DL
FERRITIN SERPL-MCNC: 87 NG/ML
FOLATE SERPL-MCNC: 8.6 NG/ML
GLUCOSE QUALITATIVE U: NEGATIVE
GLUCOSE SERPL-MCNC: 91 MG/DL
HBA1C MFR BLD HPLC: 5.4 %
HCT VFR BLD CALC: 41.7 %
HDLC SERPL-MCNC: 31 MG/DL
HGB BLD-MCNC: 13.1 G/DL
HYALINE CASTS: 4 /LPF
IMM GRANULOCYTES NFR BLD AUTO: 0.3 %
IRON SATN MFR SERPL: 13 %
IRON SERPL-MCNC: 38 UG/DL
KETONES URINE: NEGATIVE
LDLC SERPL CALC-MCNC: 53 MG/DL
LEUKOCYTE ESTERASE URINE: ABNORMAL
LYMPHOCYTES # BLD AUTO: 0.95 K/UL
LYMPHOCYTES NFR BLD AUTO: 13.8 %
MAGNESIUM SERPL-MCNC: 2.1 MG/DL
MAN DIFF?: NORMAL
MCHC RBC-ENTMCNC: 30 PG
MCHC RBC-ENTMCNC: 31.4 GM/DL
MCV RBC AUTO: 95.4 FL
MICROALBUMIN 24H UR DL<=1MG/L-MCNC: 4.7 MG/DL
MICROALBUMIN/CREAT 24H UR-RTO: 26 MG/G
MICROSCOPIC-UA: NORMAL
MONOCYTES # BLD AUTO: 0.61 K/UL
MONOCYTES NFR BLD AUTO: 8.9 %
NEUTROPHILS # BLD AUTO: 4.95 K/UL
NEUTROPHILS NFR BLD AUTO: 72 %
NITRITE URINE: POSITIVE
NONHDLC SERPL-MCNC: 73 MG/DL
NT-PROBNP SERPL-MCNC: 1585 PG/ML
PH URINE: 7.5
PHOSPHATE SERPL-MCNC: 2.8 MG/DL
PLATELET # BLD AUTO: 309 K/UL
POTASSIUM SERPL-SCNC: 3.7 MMOL/L
PROT SERPL-MCNC: 7.6 G/DL
PROTEIN URINE: ABNORMAL
PSA SERPL-MCNC: 1.65 NG/ML
RBC # BLD: 4.37 M/UL
RBC # FLD: 13.4 %
RED BLOOD CELLS URINE: 1 /HPF
SODIUM SERPL-SCNC: 140 MMOL/L
SPECIFIC GRAVITY URINE: 1.02
SQUAMOUS EPITHELIAL CELLS: 4 /HPF
T4 FREE SERPL-MCNC: 1.5 NG/DL
TIBC SERPL-MCNC: 297 UG/DL
TRIGL SERPL-MCNC: 99 MG/DL
TSH SERPL-ACNC: 0.75 UIU/ML
UIBC SERPL-MCNC: 259 UG/DL
URATE SERPL-MCNC: 6.9 MG/DL
UROBILINOGEN URINE: NORMAL
VIT B12 SERPL-MCNC: 395 PG/ML
WBC # FLD AUTO: 6.87 K/UL
WHITE BLOOD CELLS URINE: 6 /HPF

## 2021-07-19 ENCOUNTER — APPOINTMENT (OUTPATIENT)
Dept: CARDIOLOGY | Facility: CLINIC | Age: 61
End: 2021-07-19
Payer: MEDICAID

## 2021-07-19 ENCOUNTER — RX RENEWAL (OUTPATIENT)
Age: 61
End: 2021-07-19

## 2021-07-19 ENCOUNTER — NON-APPOINTMENT (OUTPATIENT)
Age: 61
End: 2021-07-19

## 2021-07-19 VITALS
WEIGHT: 288 LBS | BODY MASS INDEX: 43.65 KG/M2 | HEART RATE: 91 BPM | SYSTOLIC BLOOD PRESSURE: 133 MMHG | RESPIRATION RATE: 16 BRPM | OXYGEN SATURATION: 96 % | HEIGHT: 68 IN | DIASTOLIC BLOOD PRESSURE: 92 MMHG

## 2021-07-19 LAB
INR PPP: 2.6 RATIO
POCT-PROTHROMBIN TIME: 30.8 SECS
QUALITY CONTROL: YES

## 2021-07-19 PROCEDURE — 85610 PROTHROMBIN TIME: CPT | Mod: QW

## 2021-07-19 PROCEDURE — 93000 ELECTROCARDIOGRAM COMPLETE: CPT

## 2021-07-19 PROCEDURE — 99215 OFFICE O/P EST HI 40 MIN: CPT | Mod: 25

## 2021-07-19 RX ORDER — WARFARIN 6 MG/1
6 TABLET ORAL
Qty: 90 | Refills: 1 | Status: DISCONTINUED | COMMUNITY
Start: 2019-04-01 | End: 2021-07-19

## 2021-07-21 ENCOUNTER — RX RENEWAL (OUTPATIENT)
Age: 61
End: 2021-07-21

## 2021-08-17 ENCOUNTER — RX RENEWAL (OUTPATIENT)
Age: 61
End: 2021-08-17

## 2021-08-31 RX ORDER — AMOXICILLIN AND CLAVULANATE POTASSIUM 500; 125 MG/1; MG/1
500-125 TABLET, FILM COATED ORAL
Qty: 14 | Refills: 0 | Status: COMPLETED | COMMUNITY
Start: 2021-07-15 | End: 2021-08-31

## 2021-09-14 ENCOUNTER — RX RENEWAL (OUTPATIENT)
Age: 61
End: 2021-09-14

## 2021-09-20 ENCOUNTER — APPOINTMENT (OUTPATIENT)
Dept: CARDIOLOGY | Facility: CLINIC | Age: 61
End: 2021-09-20
Payer: MEDICAID

## 2021-09-20 ENCOUNTER — NON-APPOINTMENT (OUTPATIENT)
Age: 61
End: 2021-09-20

## 2021-09-20 VITALS
SYSTOLIC BLOOD PRESSURE: 105 MMHG | HEIGHT: 68 IN | OXYGEN SATURATION: 97 % | DIASTOLIC BLOOD PRESSURE: 73 MMHG | RESPIRATION RATE: 16 BRPM | TEMPERATURE: 97.4 F | HEART RATE: 98 BPM | BODY MASS INDEX: 42.59 KG/M2 | WEIGHT: 281 LBS

## 2021-09-20 PROCEDURE — 99214 OFFICE O/P EST MOD 30 MIN: CPT

## 2021-09-20 PROCEDURE — 93000 ELECTROCARDIOGRAM COMPLETE: CPT

## 2021-09-29 ENCOUNTER — RX RENEWAL (OUTPATIENT)
Age: 61
End: 2021-09-29

## 2021-10-18 ENCOUNTER — RX RENEWAL (OUTPATIENT)
Age: 61
End: 2021-10-18

## 2021-11-02 ENCOUNTER — RX RENEWAL (OUTPATIENT)
Age: 61
End: 2021-11-02

## 2021-12-01 PROCEDURE — G9005: CPT

## 2021-12-06 ENCOUNTER — NON-APPOINTMENT (OUTPATIENT)
Age: 61
End: 2021-12-06

## 2021-12-06 ENCOUNTER — APPOINTMENT (OUTPATIENT)
Dept: CARDIOLOGY | Facility: CLINIC | Age: 61
End: 2021-12-06
Payer: MEDICAID

## 2021-12-06 VITALS
RESPIRATION RATE: 19 BRPM | SYSTOLIC BLOOD PRESSURE: 105 MMHG | WEIGHT: 288 LBS | DIASTOLIC BLOOD PRESSURE: 72 MMHG | TEMPERATURE: 97.4 F | OXYGEN SATURATION: 97 % | HEART RATE: 90 BPM | BODY MASS INDEX: 43.65 KG/M2 | HEIGHT: 68 IN

## 2021-12-06 PROCEDURE — 99215 OFFICE O/P EST HI 40 MIN: CPT

## 2021-12-06 PROCEDURE — 93000 ELECTROCARDIOGRAM COMPLETE: CPT

## 2021-12-09 ENCOUNTER — RX RENEWAL (OUTPATIENT)
Age: 61
End: 2021-12-09

## 2021-12-23 NOTE — ED PROVIDER NOTE - IV ALTEPLASE ADMIN OUTSIDE HIDDEN
show Eucrisa Counseling: Patient may experience a mild burning sensation during topical application. Eucrisa is not approved in children less than 2 years of age.

## 2021-12-30 ENCOUNTER — APPOINTMENT (OUTPATIENT)
Dept: FAMILY MEDICINE | Facility: CLINIC | Age: 61
End: 2021-12-30

## 2022-01-09 ENCOUNTER — RX RENEWAL (OUTPATIENT)
Age: 62
End: 2022-01-09

## 2022-01-19 ENCOUNTER — TRANSCRIPTION ENCOUNTER (OUTPATIENT)
Age: 62
End: 2022-01-19

## 2022-01-25 ENCOUNTER — RX RENEWAL (OUTPATIENT)
Age: 62
End: 2022-01-25

## 2022-02-02 ENCOUNTER — APPOINTMENT (OUTPATIENT)
Dept: FAMILY MEDICINE | Facility: CLINIC | Age: 62
End: 2022-02-02
Payer: MEDICAID

## 2022-02-02 VITALS
SYSTOLIC BLOOD PRESSURE: 123 MMHG | DIASTOLIC BLOOD PRESSURE: 79 MMHG | HEIGHT: 68 IN | OXYGEN SATURATION: 95 % | TEMPERATURE: 97.2 F | WEIGHT: 294 LBS | RESPIRATION RATE: 16 BRPM | HEART RATE: 89 BPM | BODY MASS INDEX: 44.56 KG/M2

## 2022-02-02 DIAGNOSIS — I73.9 PERIPHERAL VASCULAR DISEASE, UNSPECIFIED: ICD-10-CM

## 2022-02-02 DIAGNOSIS — N39.0 URINARY TRACT INFECTION, SITE NOT SPECIFIED: ICD-10-CM

## 2022-02-02 DIAGNOSIS — A49.9 URINARY TRACT INFECTION, SITE NOT SPECIFIED: ICD-10-CM

## 2022-02-02 PROCEDURE — 99214 OFFICE O/P EST MOD 30 MIN: CPT

## 2022-02-02 NOTE — PHYSICAL EXAM
[No Acute Distress] : no acute distress [Well Nourished] : well nourished [Well Developed] : well developed [Well-Appearing] : well-appearing [Normal Voice/Communication] : normal voice/communication [Normal Rate] : normal rate  [Regular Rhythm] : with a regular rhythm [Normal S1, S2] : normal S1 and S2 [Soft] : abdomen soft [Non Tender] : non-tender [No Masses] : no abdominal mass palpated [Normal Bowel Sounds] : normal bowel sounds [Declined Rectal Exam] : declined rectal exam [Speech Grossly Normal] : speech grossly normal [Memory Grossly Normal] : memory grossly normal [Normal Affect] : the affect was normal [Alert and Oriented x3] : oriented to person, place, and time [Normal Mood] : the mood was normal [Normal Insight/Judgement] : insight and judgment were intact [Normal] : affect was normal and insight and judgment were intact [de-identified] : LAURA 2/6 [de-identified] : edema [de-identified] : obese, no guarding or rebound [de-identified] : left shoulder ROM decreased due to pain,still has not seen ortho [de-identified] : stasis dermatitis,hyperpimented and areas escoriation of lower ext.

## 2022-02-02 NOTE — ASSESSMENT
[FreeTextEntry1] : Assessment and plan:\par \par 1.  Chronic kidney disease stage III stable for patient patient is followed very closely by nephrology most recent blood work and consultation reviewed.\par \par 2.  Atrial flutter/atrial fibrillation status post ablation and permanent pacemaker patient will continue anticoagulation with warfarin 6 mg 1 tablet alternating with a half a tab INR is followed by cardiology.\par \par 3.  Chronic congestive heart failure due to patient's renal insufficiency Entresto has been discontinued patient has been placed on isosorbide mono nitrate..\par \par 4.  Hypertension excellent blood pressure control in fact today's blood pressure it is low for the patient but he is asymptomatic therefore continue present medical management no change.\par \par 5.  Coronary artery disease status post CABG patient does have nitroglycerin 0.4 mg sublingual tabs available he states that he has not had the need to use them and patient will also continue clopidogrel 75 mg p.o. daily.\par \par 6.  Elevated BMI detailed discussion with patient regarding weight loss program but since his last visit patient has lost 2 pounds but since last year patient has lost a total of 28 pounds I encouraged the patient to continue doing what he is doing.\par \par 7.  Comprehensive blood work reviewed with patient.\par \par 8. patient is up-to-date with COVID-19 vaccination he did receive his 1st 2 doses of Pfizer he is a candidate for booster.

## 2022-02-02 NOTE — COUNSELING

## 2022-02-02 NOTE — HISTORY OF PRESENT ILLNESS
[FreeTextEntry1] : See HPI. [de-identified] : Patient is a 61-year-old gentleman who presents today for follow-up and disease management patient states that presently he is in his usual state of health recently had urinary tract infection which was treated appropriately with Augmentin and it has resolved patient also recently was seen by cardiology.  Medical history is significant for CKD stage III stable for patient, atrial flutter patient is on anticoagulation, CHF, hypertension, ischemic heart disease, elevated BMI, hyperlipidemia and peripheral vascular disease with stasis dermatitis.\par \par Presently the patient is awake alert and oriented x3 in no acute distress he is calm and cooperative and presently denies any chest pain

## 2022-02-02 NOTE — HEALTH RISK ASSESSMENT

## 2022-02-02 NOTE — REVIEW OF SYSTEMS
[Fatigue] : fatigue [Chest Pain] : no chest pain [Palpitations] : no palpitations [Lower Ext Edema] : lower extremity edema [Orthopena] : no orthopnea [Shortness Of Breath] : no shortness of breath [Wheezing] : no wheezing [Cough] : no cough [Dyspnea on Exertion] : dyspnea on exertion [Abdominal Pain] : no abdominal pain [Nausea] : no nausea [Constipation] : no constipation [Diarrhea] : no diarrhea [Vomiting] : no vomiting [Heartburn] : no heartburn [Melena] : no melena [Itching] : itching [Skin Rash] : skin rash [Negative] : Heme/Lymph [de-identified] : Stasis dermatitis,Lichen planus

## 2022-03-07 ENCOUNTER — NON-APPOINTMENT (OUTPATIENT)
Age: 62
End: 2022-03-07

## 2022-03-07 ENCOUNTER — APPOINTMENT (OUTPATIENT)
Dept: CARDIOLOGY | Facility: CLINIC | Age: 62
End: 2022-03-07
Payer: MEDICAID

## 2022-03-07 VITALS
HEIGHT: 68 IN | RESPIRATION RATE: 16 BRPM | TEMPERATURE: 97.5 F | SYSTOLIC BLOOD PRESSURE: 142 MMHG | OXYGEN SATURATION: 96 % | WEIGHT: 300 LBS | HEART RATE: 90 BPM | DIASTOLIC BLOOD PRESSURE: 93 MMHG | BODY MASS INDEX: 45.47 KG/M2

## 2022-03-07 PROCEDURE — 99215 OFFICE O/P EST HI 40 MIN: CPT

## 2022-03-07 PROCEDURE — 93000 ELECTROCARDIOGRAM COMPLETE: CPT

## 2022-03-08 ENCOUNTER — RX RENEWAL (OUTPATIENT)
Age: 62
End: 2022-03-08

## 2022-03-31 ENCOUNTER — OUTPATIENT (OUTPATIENT)
Dept: OUTPATIENT SERVICES | Facility: HOSPITAL | Age: 62
LOS: 1 days | End: 2022-03-31
Payer: MEDICAID

## 2022-03-31 ENCOUNTER — APPOINTMENT (OUTPATIENT)
Dept: CARDIOLOGY | Facility: CLINIC | Age: 62
End: 2022-03-31
Payer: MEDICAID

## 2022-03-31 VITALS
OXYGEN SATURATION: 96 % | TEMPERATURE: 98 F | HEART RATE: 80 BPM | SYSTOLIC BLOOD PRESSURE: 125 MMHG | RESPIRATION RATE: 22 BRPM | HEIGHT: 68 IN | DIASTOLIC BLOOD PRESSURE: 81 MMHG | BODY MASS INDEX: 45.01 KG/M2 | WEIGHT: 297 LBS

## 2022-03-31 DIAGNOSIS — I35.0 NONRHEUMATIC AORTIC (VALVE) STENOSIS: ICD-10-CM

## 2022-03-31 DIAGNOSIS — Z95.5 PRESENCE OF CORONARY ANGIOPLASTY IMPLANT AND GRAFT: Chronic | ICD-10-CM

## 2022-03-31 DIAGNOSIS — Z95.1 PRESENCE OF AORTOCORONARY BYPASS GRAFT: Chronic | ICD-10-CM

## 2022-03-31 DIAGNOSIS — Z90.89 ACQUIRED ABSENCE OF OTHER ORGANS: Chronic | ICD-10-CM

## 2022-03-31 DIAGNOSIS — Z98.890 OTHER SPECIFIED POSTPROCEDURAL STATES: Chronic | ICD-10-CM

## 2022-03-31 DIAGNOSIS — Z95.0 PRESENCE OF CARDIAC PACEMAKER: Chronic | ICD-10-CM

## 2022-03-31 DIAGNOSIS — R01.1 CARDIAC MURMUR, UNSPECIFIED: ICD-10-CM

## 2022-03-31 PROCEDURE — 99214 OFFICE O/P EST MOD 30 MIN: CPT

## 2022-03-31 PROCEDURE — 93306 TTE W/DOPPLER COMPLETE: CPT | Mod: 26

## 2022-03-31 PROCEDURE — C8929: CPT

## 2022-03-31 RX ORDER — ISOSORBIDE MONONITRATE 30 MG/1
30 TABLET, EXTENDED RELEASE ORAL DAILY
Qty: 90 | Refills: 1 | Status: DISCONTINUED | COMMUNITY
Start: 2021-06-21 | End: 2022-03-31

## 2022-04-04 PROBLEM — R01.1 HEART MURMUR: Status: ACTIVE | Noted: 2022-04-04

## 2022-04-22 ENCOUNTER — RX RENEWAL (OUTPATIENT)
Age: 62
End: 2022-04-22

## 2022-05-16 ENCOUNTER — RX RENEWAL (OUTPATIENT)
Age: 62
End: 2022-05-16

## 2022-05-22 ENCOUNTER — RX RENEWAL (OUTPATIENT)
Age: 62
End: 2022-05-22

## 2022-05-23 ENCOUNTER — RX RENEWAL (OUTPATIENT)
Age: 62
End: 2022-05-23

## 2022-05-25 ENCOUNTER — RX RENEWAL (OUTPATIENT)
Age: 62
End: 2022-05-25

## 2022-06-02 ENCOUNTER — APPOINTMENT (OUTPATIENT)
Dept: FAMILY MEDICINE | Facility: CLINIC | Age: 62
End: 2022-06-02
Payer: MEDICAID

## 2022-06-02 VITALS
WEIGHT: 306 LBS | TEMPERATURE: 98.1 F | HEART RATE: 87 BPM | BODY MASS INDEX: 46.38 KG/M2 | HEIGHT: 68 IN | DIASTOLIC BLOOD PRESSURE: 85 MMHG | RESPIRATION RATE: 16 BRPM | SYSTOLIC BLOOD PRESSURE: 145 MMHG | OXYGEN SATURATION: 95 %

## 2022-06-02 PROCEDURE — 99214 OFFICE O/P EST MOD 30 MIN: CPT

## 2022-06-02 NOTE — HISTORY OF PRESENT ILLNESS
[FreeTextEntry1] : See HPI. [de-identified] : 62-year-old gentleman who presents today for follow-up and disease management.  Patient has a significant medical history which includes chronic renal insufficiency stage III followed closely by nephrology, atrial fibrillation/flutter, chronic systolic congestive heart failure, hypertension, ischemic heart disease, elevated BMI.  Patient also has history of aortic stenosis recently seen by Dr. Suman Montelongo patient was sent there by his cardiologist Dr. Miranda to be evaluated for TAVR.  Patient still getting around congestive heart failure did not worsen therefore presently on surveillance if symptoms worsen in any way patient is on board for the procedure that is TAVR.

## 2022-06-02 NOTE — COUNSELING

## 2022-06-02 NOTE — HEALTH RISK ASSESSMENT

## 2022-06-02 NOTE — ASSESSMENT
[FreeTextEntry1] : Assessment and plan:\par \par 1.  Chronic kidney disease stage III stable for patient patient is followed very closely by nephrology most recent blood work and consultation reviewed.\par \par 2.  Atrial flutter/atrial fibrillation status post ablation and permanent pacemaker patient will continue anticoagulation with warfarin 6 mg 1 tablet alternating with a half a tab INR is followed by cardiology.\par \par 3.  Chronic congestive heart failure due to patient's renal insufficiency Entresto has been discontinued patient has been placed on isosorbide mono nitrate..\par \par 4.  Hypertension excellent blood pressure control in fact today's blood pressure it is low for the patient but he is asymptomatic therefore continue present medical management no change.\par \par 5.  Coronary artery disease status post CABG patient does have nitroglycerin 0.4 mg sublingual tabs available he states that he has not had the need to use them and patient will also continue clopidogrel 75 mg p.o. daily.\par \par 6.  Elevated BMI detailed discussion with patient regarding weight loss program but since his last visit patient has lost 2 pounds but since last year patient has lost a total of 28 pounds I encouraged the patient to continue doing what he is doing.\par \par 7.  Comprehensive blood work reviewed with patient.\par \par 8. patient is up-to-date with COVID-19 vaccination he did receive his 1st 2 doses of Pfizer he is a candidate for booster.  Patient will make appointment for booster.\par \par 9.  Bilateral shoulder pain right greater than left and history of hyperuricemia and underlying osteoarthritis for completeness sake we will have patient evaluated by rheumatology and then subsequently by orthopedics.

## 2022-06-02 NOTE — PHYSICAL EXAM
[No Acute Distress] : no acute distress [Well Nourished] : well nourished [Well Developed] : well developed [Well-Appearing] : well-appearing [Normal Voice/Communication] : normal voice/communication [Normal Rate] : normal rate  [Regular Rhythm] : with a regular rhythm [Normal S1, S2] : normal S1 and S2 [Soft] : abdomen soft [Non Tender] : non-tender [No Masses] : no abdominal mass palpated [Normal Bowel Sounds] : normal bowel sounds [Declined Rectal Exam] : declined rectal exam [Speech Grossly Normal] : speech grossly normal [Memory Grossly Normal] : memory grossly normal [Normal Affect] : the affect was normal [Alert and Oriented x3] : oriented to person, place, and time [Normal Mood] : the mood was normal [Normal Insight/Judgement] : insight and judgment were intact [Normal] : affect was normal and insight and judgment were intact [de-identified] : LAURA 2/6 [de-identified] : edema [de-identified] : obese, no guarding or rebound [de-identified] : left shoulder ROM decreased due to pain,still has not seen ortho [de-identified] : stasis dermatitis,hyperpimented and areas escoriation of lower ext.

## 2022-06-02 NOTE — REVIEW OF SYSTEMS
[Fatigue] : fatigue [Lower Ext Edema] : lower extremity edema [Dyspnea on Exertion] : dyspnea on exertion [Joint Pain] : joint pain [Muscle Pain] : muscle pain [Back Pain] : back pain [Itching] : itching [Skin Rash] : skin rash [Negative] : Heme/Lymph [Chest Pain] : no chest pain [Palpitations] : no palpitations [Orthopena] : no orthopnea [Shortness Of Breath] : no shortness of breath [Wheezing] : no wheezing [Cough] : no cough [Abdominal Pain] : no abdominal pain [Nausea] : no nausea [Constipation] : no constipation [Diarrhea] : no diarrhea [Vomiting] : no vomiting [Heartburn] : no heartburn [Melena] : no melena [Joint Stiffness] : no joint stiffness [Muscle Weakness] : no muscle weakness [Joint Swelling] : no joint swelling [de-identified] : Stasis dermatitis,Lichen planus

## 2022-06-07 ENCOUNTER — APPOINTMENT (OUTPATIENT)
Dept: CARDIOLOGY | Facility: CLINIC | Age: 62
End: 2022-06-07
Payer: MEDICAID

## 2022-06-07 ENCOUNTER — NON-APPOINTMENT (OUTPATIENT)
Age: 62
End: 2022-06-07

## 2022-06-07 VITALS
DIASTOLIC BLOOD PRESSURE: 84 MMHG | RESPIRATION RATE: 16 BRPM | HEART RATE: 86 BPM | SYSTOLIC BLOOD PRESSURE: 125 MMHG | WEIGHT: 306 LBS | BODY MASS INDEX: 46.38 KG/M2 | HEIGHT: 68 IN

## 2022-06-07 VITALS
HEIGHT: 68 IN | TEMPERATURE: 97.6 F | OXYGEN SATURATION: 95 % | BODY MASS INDEX: 46.38 KG/M2 | WEIGHT: 306 LBS | HEART RATE: 86 BPM | RESPIRATION RATE: 16 BRPM | SYSTOLIC BLOOD PRESSURE: 125 MMHG | DIASTOLIC BLOOD PRESSURE: 84 MMHG

## 2022-06-07 PROCEDURE — 93000 ELECTROCARDIOGRAM COMPLETE: CPT

## 2022-06-07 PROCEDURE — 99215 OFFICE O/P EST HI 40 MIN: CPT | Mod: 25

## 2022-06-10 ENCOUNTER — RX RENEWAL (OUTPATIENT)
Age: 62
End: 2022-06-10

## 2022-07-06 ENCOUNTER — APPOINTMENT (OUTPATIENT)
Dept: CARDIOLOGY | Facility: CLINIC | Age: 62
End: 2022-07-06

## 2022-07-06 PROCEDURE — 93288 INTERROG EVL PM/LDLS PM IP: CPT

## 2022-07-06 NOTE — PROCEDURE
[Pacemaker] : pacemaker [VVI] : VVI [Voltage: ___ volts] : Voltage was [unfilled] volts [Longevity: ___ months] : The estimated remaining battery life is [unfilled] months [Lead Imp:  ___ohms] : lead impedance was [unfilled] ohms [Sensing Amplitude ___mv] : sensing amplitude was [unfilled] mv [___V @] : [unfilled] V [___ ms] : [unfilled] ms [Sense ___ %] : Sense [unfilled]% [de-identified] : [employer] [de-identified] : RADHA JENKINS TCP [id number] [de-identified] : 11/2/2018 [de-identified] : 39 [de-identified] : NO EVENTS\par NORMAL FUNCTION

## 2022-09-08 ENCOUNTER — APPOINTMENT (OUTPATIENT)
Dept: CARDIOTHORACIC SURGERY | Facility: CLINIC | Age: 62
End: 2022-09-08

## 2022-09-08 ENCOUNTER — OUTPATIENT (OUTPATIENT)
Dept: OUTPATIENT SERVICES | Facility: HOSPITAL | Age: 62
LOS: 1 days | End: 2022-09-08
Payer: MEDICAID

## 2022-09-08 ENCOUNTER — APPOINTMENT (OUTPATIENT)
Dept: CARDIOLOGY | Facility: CLINIC | Age: 62
End: 2022-09-08

## 2022-09-08 VITALS
DIASTOLIC BLOOD PRESSURE: 78 MMHG | SYSTOLIC BLOOD PRESSURE: 123 MMHG | HEART RATE: 83 BPM | TEMPERATURE: 98 F | RESPIRATION RATE: 17 BRPM | OXYGEN SATURATION: 99 % | HEIGHT: 68 IN | WEIGHT: 305 LBS | BODY MASS INDEX: 46.23 KG/M2

## 2022-09-08 VITALS
TEMPERATURE: 98 F | RESPIRATION RATE: 20 BRPM | HEART RATE: 83 BPM | OXYGEN SATURATION: 97 % | HEIGHT: 68 IN | WEIGHT: 305 LBS | BODY MASS INDEX: 46.23 KG/M2 | DIASTOLIC BLOOD PRESSURE: 78 MMHG | SYSTOLIC BLOOD PRESSURE: 123 MMHG

## 2022-09-08 DIAGNOSIS — R93.1 ABNORMAL FINDINGS ON DIAGNOSTIC IMAGING OF HEART AND CORONARY CIRCULATION: ICD-10-CM

## 2022-09-08 DIAGNOSIS — N17.9 ACUTE KIDNEY FAILURE, UNSPECIFIED: ICD-10-CM

## 2022-09-08 DIAGNOSIS — R06.00 DYSPNEA, UNSPECIFIED: ICD-10-CM

## 2022-09-08 DIAGNOSIS — Z90.89 ACQUIRED ABSENCE OF OTHER ORGANS: Chronic | ICD-10-CM

## 2022-09-08 DIAGNOSIS — Z95.1 PRESENCE OF AORTOCORONARY BYPASS GRAFT: Chronic | ICD-10-CM

## 2022-09-08 DIAGNOSIS — Z23 ENCOUNTER FOR IMMUNIZATION: ICD-10-CM

## 2022-09-08 DIAGNOSIS — Z79.01 LONG TERM (CURRENT) USE OF ANTICOAGULANTS: ICD-10-CM

## 2022-09-08 DIAGNOSIS — Z95.5 PRESENCE OF CORONARY ANGIOPLASTY IMPLANT AND GRAFT: Chronic | ICD-10-CM

## 2022-09-08 DIAGNOSIS — I35.0 NONRHEUMATIC AORTIC (VALVE) STENOSIS: ICD-10-CM

## 2022-09-08 DIAGNOSIS — Z95.0 PRESENCE OF CARDIAC PACEMAKER: Chronic | ICD-10-CM

## 2022-09-08 DIAGNOSIS — Z98.890 OTHER SPECIFIED POSTPROCEDURAL STATES: Chronic | ICD-10-CM

## 2022-09-08 PROCEDURE — 99204 OFFICE O/P NEW MOD 45 MIN: CPT

## 2022-09-08 PROCEDURE — 93306 TTE W/DOPPLER COMPLETE: CPT | Mod: 26

## 2022-09-08 PROCEDURE — C8929: CPT

## 2022-09-08 PROCEDURE — 99215 OFFICE O/P EST HI 40 MIN: CPT

## 2022-09-08 RX ORDER — AMOXICILLIN AND CLAVULANATE POTASSIUM 875; 125 MG/1; MG/1
875-125 TABLET, COATED ORAL
Qty: 20 | Refills: 0 | Status: DISCONTINUED | COMMUNITY
Start: 2022-06-02 | End: 2022-09-08

## 2022-09-08 NOTE — ASSESSMENT
[FreeTextEntry1] : Mr. Waller returns to clinic today for follow up evaluation of aortic stenosis. He was seen last in March with moderate AS on TTE. His symptoms of mild exertional dyspnea were felt to be due to a combination of obesity, ICM, CAD, CKD and Atrial Flutter. Since his last visit he reports an increase in symptoms, exertional dyspnea, chest heaviness not associated with either activity or position, edema. \par \par To review his past medical history includes, AS, HTN, ANGELICA in April 2021 not requiring HD followed by Dr. William now improved, CABG x3 2012 with Dr. Santana, AFlutter with ablation in 2018 (on Eliquis), Chronic Systolic Heart Failure, HLD, and PPM. He has obstructive sleep apnea but is unable to tolerate BiPAP as recommended. He underwent PCI of RCA lesion in 2019. \par \par Repeat echocardiogram today demonstrates progression of aortic stenosis with low flow/ low gradient aortic stenosis.\par \par Plan:\par 1) Structural CT\par 2) Cardiac catherization \par 3) Schedule TAVR for 10/7/2022

## 2022-09-08 NOTE — DATA REVIEWED
[FreeTextEntry1] : Echo: 3/31/22\par LVEF 40's, LIVAN 1.2 sqcm, mGr 15 mmHg, PGr 28 mmHg, DVI 0.29 \par \par Cardiac Cath/PCI: 2019 pLAD 100%, pCx 100%, RCA 90%, \par Graft LIMA-LAD patent\par PCI LUDIVINA to RCA lesion

## 2022-09-08 NOTE — HISTORY OF PRESENT ILLNESS
[FreeTextEntry1] : Mr. Waller returns to clinic today for follow up evaluation of aortic stenosis. He was seen last in March with moderate AS on TTE. His symptoms of mild exertional dyspnea were felt to be due to a combination of obesity, ICM, CAD, CKD and Atrial Flutter. Since his last visit he reports an increase in symptoms, exertional dyspnea, chest heaviness not associated with either activity or position, edema. \par \par To review his past medical history includes, AS, HTN, ANGELICA in April 2021 not requiring HD followed by Dr. William now improved, CABG x3 2012 with Dr. Santana, AFlutter with ablation in 2018 (on Eliquis), Chronic Systolic Heart Failure, HLD, and PPM. He has obstructive sleep apnea but is unable to tolerate BiPAP as recommended. He underwent PCI of RCA lesion in 2019. \par \par Repeat echocardiogram today demonstrates progression of aortic stenosis with low flow/ low gradient aortic stenosis

## 2022-09-08 NOTE — ADDENDUM
[FreeTextEntry1] : I had the pleasure of seeing Mr Sridhar regarding his aortic stenosis together with Dr. Suman Montelongo.\par \par He of course has had previous cardiac surgery, which means his options are TAVR or redo sternotomy and AVR in the setting of a likely patent LIMA.\par \par I think he would be at low to moderate risk of reoperative cardiac surgery. Thus, we will proceed with workup for his AS with a TAVR CT and coronary angiogram and graft study.\par \par Currently, I think both options are in equipoise. As such, if TAVR is easily feasible - with good access and favorable valve morphology - then we should proceed with that route. However, if access is difficult and morphology is such that he is at high risk of PVL, or if angiography demonstrates complex CAD, then redo surgery would be a reasonable consideration.\par \par He is amenable to this and will proceed with TAVR CT and angiography.\par \par Sukhwinder Wagner MD\par Cardiac Surgeon

## 2022-09-08 NOTE — END OF VISIT
[Time Spent: ___ minutes] : I have spent [unfilled] minutes of time on the encounter. [FreeTextEntry3] : I personally performed the services described in the documentation, reviewed the documentation recorded by the scribe in my presence and it accurately and completely records my words and actions.\par \par I, Olga Lentz NP, am scribing for and the presence of Dr. Sukhwinder Wagner, the following sections HISTORY OF PRESENT ILLNESS, PAST MEDICAL/FAMILY/SOCIAL HISTORY; REVIEW OF SYSTEMS; VITAL SIGNS; PHYSICAL EXAM; DISPOSITION.\par

## 2022-09-08 NOTE — PHYSICAL EXAM
[General Appearance - Alert] : alert [General Appearance - In No Acute Distress] : in no acute distress [Sclera] : the sclera and conjunctiva were normal [Jugular Venous Distention Increased] : there was no jugular-venous distention [] : no respiratory distress [Respiration, Rhythm And Depth] : normal respiratory rhythm and effort [Auscultation Breath Sounds / Voice Sounds] : lungs were clear to auscultation bilaterally [___ +] : bilateral [unfilled]U+ pitting edema to the ankles [Examination Of The Chest] : the chest was normal in appearance [Bowel Sounds] : normal bowel sounds [Abdomen Soft] : soft [Cervical Lymph Nodes Enlarged Posterior Bilaterally] : posterior cervical [No CVA Tenderness] : no ~M costovertebral angle tenderness [Involuntary Movements] : no involuntary movements were seen [Skin Color & Pigmentation] : normal skin color and pigmentation [No Focal Deficits] : no focal deficits [Oriented To Time, Place, And Person] : oriented to person, place, and time [Impaired Insight] : insight and judgment were intact [Right Carotid Bruit] : no bruit heard over the right carotid [Left Carotid Bruit] : no bruit heard over the left carotid

## 2022-09-08 NOTE — REVIEW OF SYSTEMS
[Feeling Tired] : feeling tired [Lower Ext Edema] : lower extremity edema [SOB on Exertion] : shortness of breath during exertion [Chest Pain] : no chest pain [Palpitations] : no palpitations [Abdominal Pain] : no abdominal pain [Vomiting] : no vomiting [Constipation] : no constipation [Dysuria] : no dysuria [Joint Swelling] : no joint swelling [Dizziness] : no dizziness [Fainting] : no fainting [Anxiety] : no anxiety [Easy Bleeding] : no tendency for easy bleeding

## 2022-09-09 ENCOUNTER — NON-APPOINTMENT (OUTPATIENT)
Age: 62
End: 2022-09-09

## 2022-09-09 ENCOUNTER — APPOINTMENT (OUTPATIENT)
Dept: RHEUMATOLOGY | Facility: CLINIC | Age: 62
End: 2022-09-09

## 2022-09-09 ENCOUNTER — RESULT REVIEW (OUTPATIENT)
Age: 62
End: 2022-09-09

## 2022-09-09 ENCOUNTER — APPOINTMENT (OUTPATIENT)
Dept: FAMILY MEDICINE | Facility: CLINIC | Age: 62
End: 2022-09-09

## 2022-09-09 ENCOUNTER — RX RENEWAL (OUTPATIENT)
Age: 62
End: 2022-09-09

## 2022-09-09 VITALS
DIASTOLIC BLOOD PRESSURE: 83 MMHG | TEMPERATURE: 98.8 F | WEIGHT: 312 LBS | HEART RATE: 86 BPM | HEIGHT: 68 IN | OXYGEN SATURATION: 97 % | SYSTOLIC BLOOD PRESSURE: 137 MMHG | RESPIRATION RATE: 16 BRPM | BODY MASS INDEX: 47.29 KG/M2

## 2022-09-09 DIAGNOSIS — G47.33 OBSTRUCTIVE SLEEP APNEA (ADULT) (PEDIATRIC): ICD-10-CM

## 2022-09-09 PROCEDURE — 99204 OFFICE O/P NEW MOD 45 MIN: CPT

## 2022-09-09 PROCEDURE — 99214 OFFICE O/P EST MOD 30 MIN: CPT

## 2022-09-09 NOTE — ASSESSMENT
[FreeTextEntry1] : Assessment and plan:\par \par 1.  Chronic kidney disease stage III stable for patient patient is followed very closely by nephrology most recent blood work and consultation reviewed.\par \par 2.  Atrial flutter/atrial fibrillation status post ablation and permanent pacemaker patient will continue anticoagulation with warfarin 6 mg 1 tablet alternating with a half a tab INR is followed by cardiology.\par \par 3.  Chronic congestive heart failure due to patient's renal insufficiency Entresto has been discontinued patient has been placed on isosorbide mono nitrate..\par \par 4.  Hypertension excellent blood pressure control in fact today's blood pressure it is low for the patient but he is asymptomatic therefore continue present medical management no change.\par \par 5.  Coronary artery disease status post CABG patient does have nitroglycerin 0.4 mg sublingual tabs available he states that he has not had the need to use them and patient will also continue clopidogrel 75 mg p.o. daily.\par \par 6.  Elevated BMI detailed discussion with patient regarding weight loss program but since his last visit patient has has gained 7  pounds .\par \par 7.  Aortic stenosis I reviewed most recent consultation and subsequently it is progressing patient is symptomatic therefore he will be having cardiac catheterization and subsequently TAVR.\par \par 8. patient is up-to-date with COVID-19 vaccination he did receive his 1st 2 doses of Pfizer he is a candidate for booster.  Patient will make appointment for booster.\par \par 9.  Bilateral shoulder pain right greater than left and history of hyperuricemia and underlying osteoarthritis for completeness sake we will have patient evaluated by rheumatology patient has appointment for later today and then subsequently by orthopedics.

## 2022-09-09 NOTE — PHYSICAL EXAM
[No Acute Distress] : no acute distress [Well Nourished] : well nourished [Well Developed] : well developed [Well-Appearing] : well-appearing [Normal Voice/Communication] : normal voice/communication [Normal Rate] : normal rate  [Regular Rhythm] : with a regular rhythm [Normal S1, S2] : normal S1 and S2 [Soft] : abdomen soft [Non Tender] : non-tender [No Masses] : no abdominal mass palpated [Normal Bowel Sounds] : normal bowel sounds [Declined Rectal Exam] : declined rectal exam [Speech Grossly Normal] : speech grossly normal [Memory Grossly Normal] : memory grossly normal [Normal Affect] : the affect was normal [Alert and Oriented x3] : oriented to person, place, and time [Normal Mood] : the mood was normal [Normal Insight/Judgement] : insight and judgment were intact [Normal] : affect was normal and insight and judgment were intact [de-identified] : LAURA 2/6 [de-identified] : edema [de-identified] : obese, no guarding or rebound [de-identified] : left and right  shoulder ROM decreased due to pain,still has not seen ortho [de-identified] : stasis dermatitis,hyperpimented and areas escoriation of lower ext.

## 2022-09-09 NOTE — HISTORY OF PRESENT ILLNESS
[FreeTextEntry1] :  see HPI. [de-identified] : Patient is 62-year-old gentleman who presents today for follow-up and disease management patient's medical history is significant for CKD stage IIIb, a flutter, chronic CHF, hypertension, coronary artery disease, elevated BMI, aortic stenosis which is progressing and obstructive sleep apnea.

## 2022-09-09 NOTE — REVIEW OF SYSTEMS
[Fatigue] : fatigue [Chest Pain] : chest pain [Palpitations] : no palpitations [Lower Ext Edema] : lower extremity edema [Orthopena] : no orthopnea [Shortness Of Breath] : no shortness of breath [Wheezing] : no wheezing [Cough] : no cough [Dyspnea on Exertion] : dyspnea on exertion [Abdominal Pain] : no abdominal pain [Nausea] : no nausea [Constipation] : no constipation [Diarrhea] : no diarrhea [Vomiting] : no vomiting [Heartburn] : no heartburn [Melena] : no melena [Joint Pain] : joint pain [Joint Stiffness] : no joint stiffness [Muscle Pain] : muscle pain [Muscle Weakness] : no muscle weakness [Back Pain] : back pain [Joint Swelling] : no joint swelling [Itching] : itching [Skin Rash] : skin rash [Negative] : Heme/Lymph [de-identified] : Stasis dermatitis,Lichen planus

## 2022-09-09 NOTE — HEALTH RISK ASSESSMENT
[Never] : Never [Yes] : Yes [Monthly or less (1 pt)] : Monthly or less (1 point) [1 or 2 (0 pts)] : 1 or 2 (0 points) [Never (0 pts)] : Never (0 points) [No] : In the past 12 months have you used drugs other than those required for medical reasons? No [No falls in past year] : Patient reported no falls in the past year [0] : 2) Feeling down, depressed, or hopeless: Not at all (0) [PHQ-2 Negative - No further assessment needed] : PHQ-2 Negative - No further assessment needed [Audit-CScore] : 1 [[id number]] : 0

## 2022-09-11 NOTE — ASSESSMENT
[FreeTextEntry1] : BERTHA WARD is a 62 year old man who presents with below -- \par \par # b/l shoulder pain chronically but progressive, crepitus and limited active/passive ROM more suggestive for OA\par - get XR \par - PT referral - can start post AVR sx as has a lot to do in next month for pre-op\par - tylenol prn pain, needs to avoid NSAIDs 2/2 A/C \par - if above not effective discussed local injections \par \par # hyperuricemia, no personal hx of gout but + strong FH, CKD and upcoming cardiac sx\par - agree with ULT at current dose\par - if develops a demetrice gout flare will need dose titrated for goal sUA <6 \par \par RTC 2 months

## 2022-09-11 NOTE — HISTORY OF PRESENT ILLNESS
[FreeTextEntry1] : BERTHA WARD is a 62 year old man who presents for eval of below -- \par \par + b/l shoulder pain, R > L x 2 years, chronically x years but progressive over last 6 months, crepitus and limited ROM past level of shoulder - not done PT for this, never had local injections  \par + L spine DJD and scoliosis \par + hyperuricemia managed with ULT in setting of CKD but never had gout flare, + FH of brothers with gout \par + upcoming AVR early Oct \par \par Inflammatory arthritis ROS negative for symmetrical peripheral joint synovitis, prolonged AM stiffness, enthesitis, dactylitis, psoriasis/ rashes, eye inflammation, inflammatory low back pain, IBD. \par \par Denies hip girdle pain or shoulder girdle that improves as day progresses, no new/worsening HA, visual changes, scalp tenderness, jaw claudication, F/C, night sweats, unintentional weight loss, limb weakness or paresthesias.

## 2022-09-11 NOTE — REVIEW OF SYSTEMS
[As noted in HPI] : as noted in HPI [Lower Ext Edema] : lower extremity edema [As Noted in HPI] : as noted in HPI [Arthralgias] : arthralgias [Joint Pain] : joint pain [Joint Swelling] : no joint swelling [Joint Stiffness] : joint stiffness [Negative] : Heme/Lymph

## 2022-09-11 NOTE — PHYSICAL EXAM
[General Appearance - Alert] : alert [General Appearance - In No Acute Distress] : in no acute distress [Sclera] : the sclera and conjunctiva were normal [Extraocular Movements] : extraocular movements were intact [Neck Appearance] : the appearance of the neck was normal [] : no respiratory distress [Auscultation Breath Sounds / Voice Sounds] : lungs were clear to auscultation bilaterally [Heart Rate And Rhythm] : heart rate was normal and rhythm regular [Heart Sounds] : normal S1 and S2 [No Spinal Tenderness] : no spinal tenderness [FreeTextEntry1] : as above  [No Focal Deficits] : no focal deficits [Oriented To Time, Place, And Person] : oriented to person, place, and time [Impaired Insight] : insight and judgment were intact [Affect] : the affect was normal

## 2022-09-13 ENCOUNTER — NON-APPOINTMENT (OUTPATIENT)
Age: 62
End: 2022-09-13

## 2022-09-13 ENCOUNTER — APPOINTMENT (OUTPATIENT)
Dept: CARDIOLOGY | Facility: CLINIC | Age: 62
End: 2022-09-13

## 2022-09-13 VITALS
TEMPERATURE: 97.6 F | SYSTOLIC BLOOD PRESSURE: 128 MMHG | HEART RATE: 86 BPM | RESPIRATION RATE: 18 BRPM | HEIGHT: 68 IN | WEIGHT: 309 LBS | OXYGEN SATURATION: 96 % | DIASTOLIC BLOOD PRESSURE: 90 MMHG | BODY MASS INDEX: 46.83 KG/M2

## 2022-09-13 VITALS
HEART RATE: 86 BPM | HEIGHT: 68 IN | WEIGHT: 309 LBS | BODY MASS INDEX: 46.83 KG/M2 | DIASTOLIC BLOOD PRESSURE: 80 MMHG | RESPIRATION RATE: 18 BRPM | SYSTOLIC BLOOD PRESSURE: 128 MMHG

## 2022-09-13 PROCEDURE — 99215 OFFICE O/P EST HI 40 MIN: CPT | Mod: 25

## 2022-09-13 PROCEDURE — 93000 ELECTROCARDIOGRAM COMPLETE: CPT

## 2022-09-14 PROBLEM — R93.1 ABNORMAL ECHOCARDIOGRAM: Status: ACTIVE | Noted: 2022-04-04

## 2022-09-14 PROBLEM — R06.00 DYSPNEA: Status: ACTIVE | Noted: 2022-04-04

## 2022-09-14 PROBLEM — Z79.01 ANTICOAGULANT LONG-TERM USE: Status: ACTIVE | Noted: 2022-04-04

## 2022-09-14 NOTE — CARDIOLOGY SUMMARY
[de-identified] : 3/31/22\par LVEF 40's, LIVAN 1.2 sqcm, mGr 15 mmHg, PGr 28 mmHg, DVI 0.29 [de-identified] : 2019 pLAD 100%, pCx 100%, RCA 90%, \par Graft LIMA-LAD patent\par PCI LUDIVINA to RCA lesion

## 2022-09-14 NOTE — REVIEW OF SYSTEMS
[Feeling Fatigued] : feeling fatigued [Dyspnea on exertion] : dyspnea during exertion [Chest Discomfort] : chest discomfort [Lower Ext Edema] : lower extremity edema [Change In Color Of Skin] : change in skin color [Negative] : Heme/Lymph [Fever] : no fever [Chills] : no chills [Palpitations] : no palpitations [Orthopnea] : no orthopnea [PND] : no PND [Abdominal Pain] : no abdominal pain [Change in Appetite] : no change in appetite [FreeTextEntry3] : glasses

## 2022-09-14 NOTE — HISTORY OF PRESENT ILLNESS
[FreeTextEntry1] : Mr. Waller returns to clinic today for follow up evaluation of aortic stenosis. He was seen last in March with moderate AS on TTE. His symptoms of mild exertional dyspnea were felt to be due to a combination of obesity, ICM, CAD, CKD and Atrial Flutter. Since his last visit he reports an increase in symptoms, exertional dyspnea, chest heaviness not associated with either activity or position, edema. \par \par To review his past medical history includes, AS, HTN, ANGELICA in April 2021 not requiring HD followed by Dr. William now improved, CABG x3 2012 with Dr. Santana, AFlutter with ablation in 2018 (on Eliquis), Chronic Systolic Heart Failure, HLD, and PPM. He has obstructive sleep apnea but is unable to tolerate BiPAP as recommended. He underwent PCI of RCA lesion in 2019. \par \par Assessment/Plan\par Severe Aortic Valve Stenosis\par --Discussion was had with the patient concerning his clinical symptoms and findings on TTE imaging studies.  He is experiencing worsening dyspnea upon exertion and fatigue.  TTE study was limited in nature.  \par --Explained to the patient potential etiologies of his shortness of breath including worsening aortic valve stenosis, ischemic cardiomyopathy and coronary artery disease.  TTE images were reviewed with Dr. Poe.  There is concern that the patient has low flow low gradient aortic valve stenosis.  What is severe aortic valve stenosis and its associated signs and symptoms were gone over.  The natural pathophysiology of untreated severe aortic valve stenosis was explained.  The various treatment options and their pros/cons and risks/benefits were reviewed including medical therapy, TAVR and SAVR.\par --As part of the patients assessment is recommended that he undergo a STANISLAW, right/left heart catheterization and heart CTA.  Indications and details of these procedures were reviewed.  Benefits and risks were gone over.  Studies will need to be spaced out do to concern for worsening renal insuffiency which was explained to the patient.\par --Patient has underlying pAF on Eliquis and is s/ PPM.\par --He is in acute on chronic diastolic heart failure.  COntinue lasix 80mg PO QAM.\par --Continue atorvastatin 20mg daily.\par --Continue clopidogrel 75mg daily.  Signs and symptoms of bleeding were reviewed.  He was told to avoid all NSAIDs.\par --Continue Entresto 24/26mg PO BID and hydralazine 50mg PO TID.\par \par All questions and concerns of the patient were reviewed.\par \par Findings and plan discussed with cardiology/Dr. Miranda.\par

## 2022-09-14 NOTE — PHYSICAL EXAM
[No Acute Distress] : no acute distress [Obese] : obese [Normal Rate] : normal [Rhythm Regular] : regular [II] : a grade 2 [___ +] : bilateral [unfilled]U+ pretibial pitting edema [Clear Lung Fields] : clear lung fields [Soft] : abdomen soft [Non Tender] : non-tender [Normal Gait] : normal gait [Lethargic] : lethargic [No Carotid Bruit] : no carotid bruit [Elevated JVD ____cm] : elevated JVD ~Vcm [Normal S1, S2] : normal S1, S2 [No Rub] : no rub [Murmur] : murmur [Normal] : soft, non-tender, no masses/organomegaly, normal bowel sounds [de-identified] : II/VI crescendo decrescendo murmur at right upper sternal border radiating to carotids [de-identified] : 2+ BLE edema [de-identified] : Chronic venous stasis changes

## 2022-09-14 NOTE — REASON FOR VISIT
[Structural Heart and Valve Disease] : structural heart and valve disease [Other: ____] : [unfilled] [FreeTextEntry3] : Dr. Miranda

## 2022-09-21 ENCOUNTER — OUTPATIENT (OUTPATIENT)
Dept: OUTPATIENT SERVICES | Facility: HOSPITAL | Age: 62
LOS: 1 days | End: 2022-09-21
Payer: MEDICAID

## 2022-09-21 ENCOUNTER — RESULT REVIEW (OUTPATIENT)
Age: 62
End: 2022-09-21

## 2022-09-21 ENCOUNTER — APPOINTMENT (OUTPATIENT)
Dept: CARDIOLOGY | Facility: CLINIC | Age: 62
End: 2022-09-21

## 2022-09-21 DIAGNOSIS — Z95.5 PRESENCE OF CORONARY ANGIOPLASTY IMPLANT AND GRAFT: Chronic | ICD-10-CM

## 2022-09-21 DIAGNOSIS — Z95.1 PRESENCE OF AORTOCORONARY BYPASS GRAFT: Chronic | ICD-10-CM

## 2022-09-21 DIAGNOSIS — Z00.00 ENCOUNTER FOR GENERAL ADULT MEDICAL EXAMINATION WITHOUT ABNORMAL FINDINGS: ICD-10-CM

## 2022-09-21 DIAGNOSIS — I35.0 NONRHEUMATIC AORTIC (VALVE) STENOSIS: ICD-10-CM

## 2022-09-21 DIAGNOSIS — Z98.890 OTHER SPECIFIED POSTPROCEDURAL STATES: Chronic | ICD-10-CM

## 2022-09-21 DIAGNOSIS — Z90.89 ACQUIRED ABSENCE OF OTHER ORGANS: Chronic | ICD-10-CM

## 2022-09-21 DIAGNOSIS — Z95.0 PRESENCE OF CARDIAC PACEMAKER: Chronic | ICD-10-CM

## 2022-09-21 PROCEDURE — 75572 CT HRT W/3D IMAGE: CPT

## 2022-09-21 PROCEDURE — 75572 CT HRT W/3D IMAGE: CPT | Mod: 26

## 2022-09-29 ENCOUNTER — NON-APPOINTMENT (OUTPATIENT)
Age: 62
End: 2022-09-29

## 2022-09-29 LAB
ALBUMIN SERPL ELPH-MCNC: 4.1 G/DL
ALP BLD-CCNC: 128 U/L
ALT SERPL-CCNC: 12 U/L
ANION GAP SERPL CALC-SCNC: 15 MMOL/L
AST SERPL-CCNC: 14 U/L
BILIRUB SERPL-MCNC: 0.5 MG/DL
BUN SERPL-MCNC: 30 MG/DL
CALCIUM SERPL-MCNC: 9.4 MG/DL
CHLORIDE SERPL-SCNC: 98 MMOL/L
CO2 SERPL-SCNC: 25 MMOL/L
CREAT SERPL-MCNC: 1.64 MG/DL
EGFR: 47 ML/MIN/1.73M2
GLUCOSE SERPL-MCNC: 174 MG/DL
POTASSIUM SERPL-SCNC: 3.8 MMOL/L
PROT SERPL-MCNC: 7.4 G/DL
SODIUM SERPL-SCNC: 139 MMOL/L

## 2022-09-30 NOTE — ED PROVIDER NOTE - TEMPLATE, MLM
Cardiac Cheek-To-Nose Interpolation Flap Text: A decision was made to reconstruct the defect utilizing an interpolation axial flap and a staged reconstruction.  A telfa template was made of the defect.  This telfa template was then used to outline the Cheek-To-Nose Interpolation flap.  The donor area for the pedicle flap was then injected with anesthesia.  The flap was excised through the skin and subcutaneous tissue down to the layer of the underlying musculature.  The interpolation flap was carefully excised within this deep plane to maintain its blood supply.  The edges of the donor site were undermined.   The donor site was closed in a primary fashion.  The pedicle was then rotated into position and sutured.  Once the tube was sutured into place, adequate blood supply was confirmed with blanching and refill.  The pedicle was then wrapped with xeroform gauze and dressed appropriately with a telfa and gauze bandage to ensure continued blood supply and protect the attached pedicle.

## 2022-10-03 ENCOUNTER — APPOINTMENT (OUTPATIENT)
Dept: ULTRASOUND IMAGING | Facility: HOSPITAL | Age: 62
End: 2022-10-03

## 2022-10-25 ENCOUNTER — APPOINTMENT (OUTPATIENT)
Dept: CARDIOLOGY | Facility: CLINIC | Age: 62
End: 2022-10-25

## 2022-10-28 ENCOUNTER — NON-APPOINTMENT (OUTPATIENT)
Age: 62
End: 2022-10-28

## 2022-11-01 ENCOUNTER — OUTPATIENT (OUTPATIENT)
Dept: OUTPATIENT SERVICES | Facility: HOSPITAL | Age: 62
LOS: 1 days | End: 2022-11-01
Payer: MEDICAID

## 2022-11-01 ENCOUNTER — TRANSCRIPTION ENCOUNTER (OUTPATIENT)
Age: 62
End: 2022-11-01

## 2022-11-01 VITALS
TEMPERATURE: 98 F | DIASTOLIC BLOOD PRESSURE: 89 MMHG | SYSTOLIC BLOOD PRESSURE: 145 MMHG | HEIGHT: 68 IN | OXYGEN SATURATION: 95 % | HEART RATE: 98 BPM | WEIGHT: 315 LBS | RESPIRATION RATE: 16 BRPM

## 2022-11-01 VITALS
DIASTOLIC BLOOD PRESSURE: 68 MMHG | SYSTOLIC BLOOD PRESSURE: 122 MMHG | HEART RATE: 87 BPM | RESPIRATION RATE: 16 BRPM | OXYGEN SATURATION: 96 %

## 2022-11-01 DIAGNOSIS — Z90.89 ACQUIRED ABSENCE OF OTHER ORGANS: Chronic | ICD-10-CM

## 2022-11-01 DIAGNOSIS — Z95.5 PRESENCE OF CORONARY ANGIOPLASTY IMPLANT AND GRAFT: Chronic | ICD-10-CM

## 2022-11-01 DIAGNOSIS — Z95.1 PRESENCE OF AORTOCORONARY BYPASS GRAFT: Chronic | ICD-10-CM

## 2022-11-01 DIAGNOSIS — Z98.890 OTHER SPECIFIED POSTPROCEDURAL STATES: Chronic | ICD-10-CM

## 2022-11-01 DIAGNOSIS — I35.0 NONRHEUMATIC AORTIC (VALVE) STENOSIS: ICD-10-CM

## 2022-11-01 DIAGNOSIS — Z95.0 PRESENCE OF CARDIAC PACEMAKER: Chronic | ICD-10-CM

## 2022-11-01 LAB
ANION GAP SERPL CALC-SCNC: 12 MMOL/L — SIGNIFICANT CHANGE UP (ref 5–17)
BUN SERPL-MCNC: 23 MG/DL — SIGNIFICANT CHANGE UP (ref 7–23)
CALCIUM SERPL-MCNC: 9.1 MG/DL — SIGNIFICANT CHANGE UP (ref 8.4–10.5)
CHLORIDE SERPL-SCNC: 96 MMOL/L — SIGNIFICANT CHANGE UP (ref 96–108)
CO2 SERPL-SCNC: 28 MMOL/L — SIGNIFICANT CHANGE UP (ref 22–31)
CREAT SERPL-MCNC: 1.6 MG/DL — HIGH (ref 0.5–1.3)
EGFR: 48 ML/MIN/1.73M2 — LOW
GLUCOSE SERPL-MCNC: 91 MG/DL — SIGNIFICANT CHANGE UP (ref 70–99)
HCT VFR BLD CALC: 44 % — SIGNIFICANT CHANGE UP (ref 39–50)
HGB BLD-MCNC: 14.5 G/DL — SIGNIFICANT CHANGE UP (ref 13–17)
HGB BLDA-MCNC: 14.1 G/DL — SIGNIFICANT CHANGE UP (ref 12.6–17.4)
MCHC RBC-ENTMCNC: 30.5 PG — SIGNIFICANT CHANGE UP (ref 27–34)
MCHC RBC-ENTMCNC: 33 GM/DL — SIGNIFICANT CHANGE UP (ref 32–36)
MCV RBC AUTO: 92.4 FL — SIGNIFICANT CHANGE UP (ref 80–100)
NRBC # BLD: 0 /100 WBCS — SIGNIFICANT CHANGE UP (ref 0–0)
OXYHGB MFR BLDA: 96.5 % — HIGH (ref 90–95)
PLATELET # BLD AUTO: 284 K/UL — SIGNIFICANT CHANGE UP (ref 150–400)
POTASSIUM SERPL-MCNC: 3.8 MMOL/L — SIGNIFICANT CHANGE UP (ref 3.5–5.3)
POTASSIUM SERPL-SCNC: 3.8 MMOL/L — SIGNIFICANT CHANGE UP (ref 3.5–5.3)
RBC # BLD: 4.76 M/UL — SIGNIFICANT CHANGE UP (ref 4.2–5.8)
RBC # FLD: 13 % — SIGNIFICANT CHANGE UP (ref 10.3–14.5)
SAO2 % BLDA: 99.4 % — HIGH (ref 94–98)
SODIUM SERPL-SCNC: 136 MMOL/L — SIGNIFICANT CHANGE UP (ref 135–145)
WBC # BLD: 6.56 K/UL — SIGNIFICANT CHANGE UP (ref 3.8–10.5)
WBC # FLD AUTO: 6.56 K/UL — SIGNIFICANT CHANGE UP (ref 3.8–10.5)

## 2022-11-01 PROCEDURE — 36415 COLL VENOUS BLD VENIPUNCTURE: CPT

## 2022-11-01 PROCEDURE — C1769: CPT

## 2022-11-01 PROCEDURE — 93005 ELECTROCARDIOGRAM TRACING: CPT

## 2022-11-01 PROCEDURE — 93010 ELECTROCARDIOGRAM REPORT: CPT

## 2022-11-01 PROCEDURE — C1889: CPT

## 2022-11-01 PROCEDURE — C1887: CPT

## 2022-11-01 PROCEDURE — 80048 BASIC METABOLIC PNL TOTAL CA: CPT

## 2022-11-01 PROCEDURE — 85027 COMPLETE CBC AUTOMATED: CPT

## 2022-11-01 PROCEDURE — 99152 MOD SED SAME PHYS/QHP 5/>YRS: CPT

## 2022-11-01 PROCEDURE — C1894: CPT

## 2022-11-01 PROCEDURE — 93457 R HRT ART/GRFT ANGIO: CPT | Mod: 26

## 2022-11-01 PROCEDURE — 93457 R HRT ART/GRFT ANGIO: CPT

## 2022-11-01 PROCEDURE — 82803 BLOOD GASES ANY COMBINATION: CPT

## 2022-11-01 RX ORDER — SODIUM CHLORIDE 9 MG/ML
1000 INJECTION INTRAMUSCULAR; INTRAVENOUS; SUBCUTANEOUS
Refills: 0 | Status: DISCONTINUED | OUTPATIENT
Start: 2022-11-01 | End: 2022-11-15

## 2022-11-01 RX ORDER — APIXABAN 2.5 MG/1
1 TABLET, FILM COATED ORAL
Qty: 0 | Refills: 0 | DISCHARGE

## 2022-11-01 RX ORDER — FUROSEMIDE 40 MG
1 TABLET ORAL
Qty: 0 | Refills: 0 | DISCHARGE

## 2022-11-01 RX ORDER — WARFARIN SODIUM 2.5 MG/1
1 TABLET ORAL
Qty: 0 | Refills: 0 | DISCHARGE

## 2022-11-01 NOTE — ASU DISCHARGE PLAN (ADULT/PEDIATRIC) - PROVIDER TOKENS
PROVIDER:[TOKEN:[[id number]],FOLLOWUP:[2 weeks]],PROVIDER:[TOKEN:[[id number]],FOLLOWUP:[2 weeks]]

## 2022-11-01 NOTE — H&P CARDIOLOGY - HISTORY OF PRESENT ILLNESS
62 year old male with PMHx of leadless PPM implant, afib/aflutter (on Eliquis, last dose 10/30 AM) s/p ablation, CAD s/p CABG x3 (LIMA to LAD on 12/2012) and PCI to RCA (6/2019), ischemic CM, aortic stenosis (low flow, low gradient) CHF, HTN, HLD, WILFRED (doesn't tolerate CPAP), renal insufficiency, presented to Dr. Montelongo's office with complaints of worsening VELÁSQUEZ, chest pain and LE edema x3 weeks, which then prompted an evaluation by heart valve team for possible TAVR work up. Recent echo from 9/8/22 demonstrating decreased LV function, moderate AS, Yeyo 1.3 cm2. Stress test from 4/23/21 abnormal with fixed defects in the anterior wall, apex, lateral wall consistent with infarction, possible mild kleber infarct ischemia in the lateral wall. Patient now for [facility] with Dr. Montelongo today for cardiac clearance. He currently denies chest pain, SOB, palpitations, dizziness, N/V, fatigue sick contacts and recent travel.    Cards: Ruth  Referring: Perez Robb

## 2022-11-01 NOTE — ASU DISCHARGE PLAN (ADULT/PEDIATRIC) - NS MD DC FALL RISK RISK
For information on Fall & Injury Prevention, visit: [email or web address] OR  [email or web address] OR  https://www.cdc.gov/steadi/patient.html

## 2022-11-01 NOTE — ASU DISCHARGE PLAN (ADULT/PEDIATRIC) - CARE PROVIDER_API CALL
Bobby Miranda  CARDIOLOGY  [address], [address]  [place] [address]  Phone: [phone]  Fax: [phone]  Follow Up Time: 2 weeks    Suman Montelongo)  Cardiology; Internal Medicine; Interventional Cardiology  [address] [place] [id number]  Phone: [phone]  Fax: [phone]  Follow Up Time: 2 weeks

## 2022-11-01 NOTE — ASU PATIENT PROFILE, ADULT - FALL HARM RISK - UNIVERSAL INTERVENTIONS
Bed in lowest position, wheels locked, appropriate side rails in place/Call bell, personal items and telephone in reach/Instruct patient to call for assistance before getting out of bed or chair/Non-slip footwear when patient is out of bed/[place] to call system/Physically safe environment - no spills, clutter or unnecessary equipment/Purposeful Proactive Rounding/Room/bathroom lighting operational, light cord in reach

## 2022-11-01 NOTE — ASU DISCHARGE PLAN (ADULT/PEDIATRIC) - ASU DC SPECIAL INSTRUCTIONSFT
Wound Care:   the day AFTER your procedure remove bandage GENTLY, and clean using  mild soap and gentle warm, water stream, pat dry. leave OPEN to air. YOU MAY SHOWER   DO NOT apply lotions, creams, ointments, powder, perfumes to your incision site  DO NOT SOAK your site for 1 week (no baths, no pools, no tubs, etc...)  Check  your groin and /or wrist daily. A small amount of bruising, and soreness are normal    ACTIVITY: for 24 hours   - DO NOT DRIVE  - DO NOT make any important decisions or sign legal documents   - DO NOT operate heavy machineries  - you may resume sexual activity in 48 hours, unless otherwise instructed by your cardiologist     If your procedure was done through the WRIST: for the NEXT 3 DAYS:  - avoid pushing, pulling, with that affected wrist   - avoid repeated movement of that hand and wrist (eg: typing, hammering)  - DO NOT LIFT anything more than 5 lbs     If your procedure was done through the GROIN: for the NEXT 5 DAYS  - Limit climbing stairs, DO NOT soak in bathtub or pool  - no strenous activities, pushing, pulling, straining  - Do not lift anything 10lbs or heavier     MEDICATION:   take your medications as explained (see discharge paperwork)   If you received a STENT, you will be taking antiplatelet medications to KEEP YOUR STENT OPEN ( eg: Aspirin, Plavix, Brilinta, Effient, etc).  Take as prescribed DO NOT STOP taking them without consulting with your cardiologist first.     Follow heart healthy diet reccomended by your doctor, , if you smoke STOP SMOKING ( may call [phone] for center of tobacco control if you need assistance)     CALL your doctor to make appointment in 2 WEEKS     ***CALL YOUR DOCTOR***  if you experience: fever, chills, body aches, or severe pain, swelling, redness, heat or yellow discharge at incision site  If you experience Bleeding or excruciating pain at the procedural site, swelling ( golf ball size) at your procedural site  If you experience CHEST PAIN  If you experience extremity numbness, tingling, temperature change ( of your procedural site)   If you are unable to reach your doctor, you may contact:   -Cardiology Office at [facility] at [phone] or   - [facility] [phone]  - [facility] [phone]

## 2022-11-10 ENCOUNTER — NON-APPOINTMENT (OUTPATIENT)
Age: 62
End: 2022-11-10

## 2022-11-10 ENCOUNTER — APPOINTMENT (OUTPATIENT)
Dept: ULTRASOUND IMAGING | Facility: HOSPITAL | Age: 62
End: 2022-11-10

## 2022-11-10 ENCOUNTER — OUTPATIENT (OUTPATIENT)
Dept: OUTPATIENT SERVICES | Facility: HOSPITAL | Age: 62
LOS: 1 days | End: 2022-11-10
Payer: MEDICAID

## 2022-11-10 VITALS
RESPIRATION RATE: 16 BRPM | WEIGHT: 315 LBS | HEART RATE: 94 BPM | HEIGHT: 68 IN | DIASTOLIC BLOOD PRESSURE: 80 MMHG | SYSTOLIC BLOOD PRESSURE: 131 MMHG | OXYGEN SATURATION: 96 % | TEMPERATURE: 98 F

## 2022-11-10 DIAGNOSIS — Z29.9 ENCOUNTER FOR PROPHYLACTIC MEASURES, UNSPECIFIED: ICD-10-CM

## 2022-11-10 DIAGNOSIS — I35.0 NONRHEUMATIC AORTIC (VALVE) STENOSIS: ICD-10-CM

## 2022-11-10 DIAGNOSIS — Z95.0 PRESENCE OF CARDIAC PACEMAKER: ICD-10-CM

## 2022-11-10 DIAGNOSIS — Z90.89 ACQUIRED ABSENCE OF OTHER ORGANS: Chronic | ICD-10-CM

## 2022-11-10 DIAGNOSIS — Z95.1 PRESENCE OF AORTOCORONARY BYPASS GRAFT: Chronic | ICD-10-CM

## 2022-11-10 DIAGNOSIS — Z98.890 OTHER SPECIFIED POSTPROCEDURAL STATES: Chronic | ICD-10-CM

## 2022-11-10 DIAGNOSIS — Z01.818 ENCOUNTER FOR OTHER PREPROCEDURAL EXAMINATION: ICD-10-CM

## 2022-11-10 DIAGNOSIS — I25.10 ATHEROSCLEROTIC HEART DISEASE OF NATIVE CORONARY ARTERY WITHOUT ANGINA PECTORIS: ICD-10-CM

## 2022-11-10 DIAGNOSIS — G47.33 OBSTRUCTIVE SLEEP APNEA (ADULT) (PEDIATRIC): ICD-10-CM

## 2022-11-10 DIAGNOSIS — Z95.5 PRESENCE OF CORONARY ANGIOPLASTY IMPLANT AND GRAFT: Chronic | ICD-10-CM

## 2022-11-10 DIAGNOSIS — Z95.0 PRESENCE OF CARDIAC PACEMAKER: Chronic | ICD-10-CM

## 2022-11-10 LAB
A1C WITH ESTIMATED AVERAGE GLUCOSE RESULT: 5.8 % — HIGH (ref 4–5.6)
ANION GAP SERPL CALC-SCNC: 11 MMOL/L — SIGNIFICANT CHANGE UP (ref 5–17)
BLD GP AB SCN SERPL QL: NEGATIVE — SIGNIFICANT CHANGE UP
BUN SERPL-MCNC: 27 MG/DL — HIGH (ref 7–23)
CALCIUM SERPL-MCNC: 9.3 MG/DL — SIGNIFICANT CHANGE UP (ref 8.4–10.5)
CHLORIDE SERPL-SCNC: 98 MMOL/L — SIGNIFICANT CHANGE UP (ref 96–108)
CO2 SERPL-SCNC: 29 MMOL/L — SIGNIFICANT CHANGE UP (ref 22–31)
CREAT SERPL-MCNC: 1.59 MG/DL — HIGH (ref 0.5–1.3)
EGFR: 48 ML/MIN/1.73M2 — LOW
ESTIMATED AVERAGE GLUCOSE: 120 MG/DL — HIGH (ref 68–114)
GLUCOSE SERPL-MCNC: 104 MG/DL — HIGH (ref 70–99)
HCT VFR BLD CALC: 45.2 % — SIGNIFICANT CHANGE UP (ref 39–50)
HGB BLD-MCNC: 14.6 G/DL — SIGNIFICANT CHANGE UP (ref 13–17)
MCHC RBC-ENTMCNC: 30.4 PG — SIGNIFICANT CHANGE UP (ref 27–34)
MCHC RBC-ENTMCNC: 32.3 GM/DL — SIGNIFICANT CHANGE UP (ref 32–36)
MCV RBC AUTO: 94 FL — SIGNIFICANT CHANGE UP (ref 80–100)
NRBC # BLD: 0 /100 WBCS — SIGNIFICANT CHANGE UP (ref 0–0)
PLATELET # BLD AUTO: 289 K/UL — SIGNIFICANT CHANGE UP (ref 150–400)
POTASSIUM SERPL-MCNC: 3.8 MMOL/L — SIGNIFICANT CHANGE UP (ref 3.5–5.3)
POTASSIUM SERPL-SCNC: 3.8 MMOL/L — SIGNIFICANT CHANGE UP (ref 3.5–5.3)
RBC # BLD: 4.81 M/UL — SIGNIFICANT CHANGE UP (ref 4.2–5.8)
RBC # FLD: 12.6 % — SIGNIFICANT CHANGE UP (ref 10.3–14.5)
RH IG SCN BLD-IMP: POSITIVE — SIGNIFICANT CHANGE UP
SODIUM SERPL-SCNC: 138 MMOL/L — SIGNIFICANT CHANGE UP (ref 135–145)
WBC # BLD: 6.16 K/UL — SIGNIFICANT CHANGE UP (ref 3.8–10.5)
WBC # FLD AUTO: 6.16 K/UL — SIGNIFICANT CHANGE UP (ref 3.8–10.5)

## 2022-11-10 PROCEDURE — 71046 X-RAY EXAM CHEST 2 VIEWS: CPT

## 2022-11-10 PROCEDURE — 93880 EXTRACRANIAL BILAT STUDY: CPT | Mod: 26

## 2022-11-10 PROCEDURE — 71046 X-RAY EXAM CHEST 2 VIEWS: CPT | Mod: 26

## 2022-11-10 PROCEDURE — 86850 RBC ANTIBODY SCREEN: CPT

## 2022-11-10 PROCEDURE — 36415 COLL VENOUS BLD VENIPUNCTURE: CPT

## 2022-11-10 PROCEDURE — 86900 BLOOD TYPING SEROLOGIC ABO: CPT

## 2022-11-10 PROCEDURE — 87640 STAPH A DNA AMP PROBE: CPT

## 2022-11-10 PROCEDURE — 87641 MR-STAPH DNA AMP PROBE: CPT

## 2022-11-10 PROCEDURE — 93880 EXTRACRANIAL BILAT STUDY: CPT

## 2022-11-10 PROCEDURE — 86923 COMPATIBILITY TEST ELECTRIC: CPT

## 2022-11-10 PROCEDURE — 80048 BASIC METABOLIC PNL TOTAL CA: CPT

## 2022-11-10 PROCEDURE — 86901 BLOOD TYPING SEROLOGIC RH(D): CPT

## 2022-11-10 PROCEDURE — 85027 COMPLETE CBC AUTOMATED: CPT

## 2022-11-10 PROCEDURE — 83036 HEMOGLOBIN GLYCOSYLATED A1C: CPT

## 2022-11-10 PROCEDURE — G0463: CPT

## 2022-11-10 RX ORDER — CEFUROXIME AXETIL 250 MG
1500 TABLET ORAL ONCE
Refills: 0 | Status: COMPLETED | OUTPATIENT
Start: 2022-11-18 | End: 2022-11-18

## 2022-11-10 NOTE — H&P PST ADULT - HISTORY OF PRESENT ILLNESS
62 year old male with PMHx of leadless PPM implant, afib/aflutter (on Eliquis, last dose 10/30 AM) s/p ablation, CAD s/p CABG x3 (LIMA to LAD on 12/2012) and PCI to RCA (6/2019), ischemic CM, aortic stenosis (low flow, low gradient) CHF, HTN, HLD, WILFRED (doesn't tolerate CPAP), renal insufficiency, presented to Dr. Montelongo's office with complaints of worsening VELÁSQUEZ, chest pain and LE edema x3 weeks, which then prompted an evaluation by heart valve team for possible TAVR work up. Recent echo from 9/8/22 demonstrating decreased LV function, moderate AS, Yeyo 1.3 cm2.    AS, HTN, ANGELICA in April 2021 not requiring HD followed by Dr. William now improved, CABG x3 2012 with Dr. Santana, Phyllis with ablation in 2018 (on Eliquis), Chronic Systolic Heart Failure, HLD, and PPM. He has obstructive sleep apnea but is unable to tolerate BiPAP as recommended. He underwent PCI of RCA lesion in 2019  He currently denies chest pain, SOB, palpitations, dizziness, N/V, fatigue sick contacts and recent travel.  covid test 11/15 near home   Denies any recent covid infection or exposure    62 year old Morbid obese male with PMHx of afib/aflutter (on Eliquis) s/p ablation, s/p leadless PPM implant ([id number] last interrogation 7/6/2022),, CAD s/p CABG x3 (LIMA to LAD on 12/2012) and PCI to RCA (6/2019), ischemic CM, CHF, HTN, HLD, WILFRED (doesn't tolerate bipap), CKD, chronic LE edema/ hyperpigmentation ( Lichen Planus)of b/L LE with worsening VELÁSQUEZ/ moderate AS planned for TAVR on 11/18/2022    ***He currently denies chest pain, SOB, palpitations, dizziness, N/V, fatigue sick contacts and recent travel.  ***covid test 11/15 near home   ****Denies any recent covid infection or exposure    62 year old Morbid obese male with PMHx of afib/aflutter (on Eliquis) s/p ablation, s/p leadless PPM implant ([employer]/ [id number] last interrogation 7/6/2022), CAD s/p CABG x3 (LIMA to LAD on 12/2012) and PCI to RCA (6/2019), ischemic CM, CHF, HTN, HLD, WILFRED (doesn't tolerate bipap), CKD, chronic LE edema/ hyperpigmentation ( Lichen Planus)of b/L LE with worsening VELÁSQUEZ/ moderate AS planned for TAVR on 11/18/2022      ***covid test 11/15 near home   ****Denies any recent covid infection or exposure

## 2022-11-10 NOTE — H&P PST ADULT - NSICDXPASTMEDICALHX_GEN_ALL_CORE_FT
PAST MEDICAL HISTORY:  ANGELICA (acute kidney injury) 4/2021    AS (aortic stenosis)     Atrial fibrillation 2018    Atrial flutter s/p ablation 2018    CAD (coronary artery disease) s/p CABG    CHF (congestive heart failure) denies any recent exacerbation    Chronic kidney disease, unspecified CKD stage     HTN (hypertension)     Hypercholesteremia     Ischemic cardiomyopathy     Lichen planus chronic ( b/L LE)    MI (myocardial infarction) 2012    Morbid obesity     Obesity     WILFRED (obstructive sleep apnea) can not tolerate bipap at night    Peripheral edema chronic    Status post placement of cardiac pacemaker micraleadless PPM, interrogated on 7/2022    Stented coronary artery 2019    Thrombus of left atrial appendage 2018     PAST MEDICAL HISTORY:  ANGELICA (acute kidney injury) 4/2021    AS (aortic stenosis)     Atrial fibrillation 2018    Atrial flutter s/p ablation 2018    CAD (coronary artery disease) s/p CABG    CHF (congestive heart failure) denies any recent exacerbation    Chronic kidney disease, unspecified CKD stage     H/O scoliosis     HTN (hypertension)     Hypercholesteremia     Ischemic cardiomyopathy     Lichen planus chronic ( b/L LE)    Lower back pain     MI (myocardial infarction) 2012    Morbid obesity     WILFRED (obstructive sleep apnea) can not tolerate bipap at night    Osteoarthritis shoulders, hips, knee    Peripheral edema chronic    Status post placement of cardiac pacemaker micraleadless PPM, [id number] last interrogation 7/6/2022    Stented coronary artery 2019    Thrombus of left atrial appendage 2018

## 2022-11-10 NOTE — H&P PST ADULT - PROBLEM SELECTOR PLAN 1
TAVR  PST labs send, chest xray, carotid doppler   preprocedure surgical scrub instructions discussed

## 2022-11-10 NOTE — H&P PST ADULT - FALL HARM RISK - HARM RISK INTERVENTIONS

## 2022-11-10 NOTE — H&P PST ADULT - ASSESSMENT
ASUNCIONI VTE 2.0 SCORE [CLOT updated 2019]    AGE RELATED RISK FACTORS                                                       MOBILITY RELATED FACTORS  [ ] Age 41-60 years                                            (1 Point)                    [ ] Bed rest                                                        (1 Point)  [ ] Age: 61-74 years                                           (2 Points)                  [ ] Plaster cast                                                   (2 Points)  [ ] Age= 75 years                                              (3 Points)                    [ ] Bed bound for more than 72 hours                 (2 Points)    DISEASE RELATED RISK FACTORS                                               GENDER SPECIFIC FACTORS  [ ] Edema in the lower extremities                       (1 Point)              [ ] Pregnancy                                                     (1 Point)  [ ] Varicose veins                                               (1 Point)                     [ ] Post-partum < 6 weeks                                   (1 Point)             [ ] BMI > 25 Kg/m2                                            (1 Point)                     [ ] Hormonal therapy  or oral contraception          (1 Point)                 [ ] Sepsis (in the previous month)                        (1 Point)               [ ] History of pregnancy complications                 (1 point)  [ ] Pneumonia or serious lung disease                                               [ ] Unexplained or recurrent                     (1 Point)           (in the previous month)                               (1 Point)  [ ] Abnormal pulmonary function test                     (1 Point)                 SURGERY RELATED RISK FACTORS  [ ] Acute myocardial infarction                              (1 Point)               [ ]  Section                                             (1 Point)  [ ] Congestive heart failure (in the previous month)  (1 Point)      [ ] Minor surgery                                                  (1 Point)   [ ] Inflammatory bowel disease                             (1 Point)               [ ] Arthroscopic surgery                                        (2 Points)  [ ] Central venous access                                      (2 Points)                [ ] General surgery lasting more than 45 minutes (2 points)  [ ] Malignancy- Present or previous                   (2 Points)                [ ] Elective arthroplasty                                         (5 points)    [ ] Stroke (in the previous month)                          (5 Points)                                                                                                                                                           HEMATOLOGY RELATED FACTORS                                                 TRAUMA RELATED RISK FACTORS  [ ] Prior episodes of VTE                                     (3 Points)                [ ] Fracture of the hip, pelvis, or leg                       (5 Points)  [ ] Positive family history for VTE                         (3 Points)             [ ] Acute spinal cord injury (in the previous month)  (5 Points)  [ ] Prothrombin 17430 A                                     (3 Points)               [ ] Paralysis  (less than 1 month)                             (5 Points)  [ ] Factor V Leiden                                             (3 Points)                  [ ] Multiple Trauma within 1 month                        (5 Points)  [ ] Lupus anticoagulants                                     (3 Points)                                                           [ ] Anticardiolipin antibodies                               (3 Points)                                                       [ ] High homocysteine in the blood                      (3 Points)                                             [ ] Other congenital or acquired thrombophilia      (3 Points)                                                [ ] Heparin induced thrombocytopenia                  (3 Points)                                     Total Score [          ] ASUNCIONI VTE 2.0 SCORE [CLOT updated 2019]    AGE RELATED RISK FACTORS                                                       MOBILITY RELATED FACTORS  [ ] Age 41-60 years                                            (1 Point)                    [ ] Bed rest                                                        (1 Point)  [x ] Age: 61-74 years                                           (2 Points)                  [ ] Plaster cast                                                   (2 Points)  [ ] Age= 75 years                                              (3 Points)                    [ ] Bed bound for more than 72 hours                 (2 Points)    DISEASE RELATED RISK FACTORS                                               GENDER SPECIFIC FACTORS  [x ] Edema in the lower extremities                       (1 Point)              [ ] Pregnancy                                                     (1 Point)  [ ] Varicose veins                                               (1 Point)                     [ ] Post-partum < 6 weeks                                   (1 Point)             x[ ] BMI > 25 Kg/m2                                            (1 Point)                     [ ] Hormonal therapy  or oral contraception          (1 Point)                 [ ] Sepsis (in the previous month)                        (1 Point)               [ ] History of pregnancy complications                 (1 point)  [ ] Pneumonia or serious lung disease                                               [ ] Unexplained or recurrent                     (1 Point)           (in the previous month)                               (1 Point)  [ ] Abnormal pulmonary function test                     (1 Point)                 SURGERY RELATED RISK FACTORS  [ ] Acute myocardial infarction                              (1 Point)               [ ]  Section                                             (1 Point)  [ ] Congestive heart failure (in the previous month)  (1 Point)      [ ] Minor surgery                                                  (1 Point)   [ ] Inflammatory bowel disease                             (1 Point)               [ ] Arthroscopic surgery                                        (2 Points)  [ ] Central venous access                                      (2 Points)                [x ] General surgery lasting more than 45 minutes (2 points)  [ ] Malignancy- Present or previous                   (2 Points)                [ ] Elective arthroplasty                                         (5 points)    [ ] Stroke (in the previous month)                          (5 Points)                                                                                                                                                           HEMATOLOGY RELATED FACTORS                                                 TRAUMA RELATED RISK FACTORS  [ ] Prior episodes of VTE                                     (3 Points)                [ ] Fracture of the hip, pelvis, or leg                       (5 Points)  [ ] Positive family history for VTE                         (3 Points)             [ ] Acute spinal cord injury (in the previous month)  (5 Points)  [ ] Prothrombin 67915 A                                     (3 Points)               [ ] Paralysis  (less than 1 month)                             (5 Points)  [ ] Factor V Leiden                                             (3 Points)                  [ ] Multiple Trauma within 1 month                        (5 Points)  [ ] Lupus anticoagulants                                     (3 Points)                                                           [ ] Anticardiolipin antibodies                               (3 Points)                                                       [ ] High homocysteine in the blood                      (3 Points)                                             [ ] Other congenital or acquired thrombophilia      (3 Points)                                                [ ] Heparin induced thrombocytopenia                  (3 Points)                                     Total Score [  6        ]

## 2022-11-10 NOTE — H&P PST ADULT - NSICDXPASTSURGICALHX_GEN_ALL_CORE_FT
PAST SURGICAL HISTORY:  Cardiac pacemaker leadless 2018    H/O atrioventricular karlee ablation 2018    History of adenoidectomy     History of coronary artery stent placement 2019 ( one more after cabg)    History of elbow surgery     S/P CABG (coronary artery bypass graft) x3, 2012

## 2022-11-10 NOTE — H&P PST ADULT - SKIN/BREAST COMMENTS
chronic LE edema, Hyperpigmented sick with scabs to b/l LE chronic LE edema, Hyperpigmented skin with scabs to b/l LE

## 2022-11-11 LAB
MRSA PCR RESULT.: SIGNIFICANT CHANGE UP
S AUREUS DNA NOSE QL NAA+PROBE: SIGNIFICANT CHANGE UP

## 2022-11-17 PROBLEM — I48.92 UNSPECIFIED ATRIAL FLUTTER: Chronic | Status: ACTIVE | Noted: 2018-12-17

## 2022-11-17 PROBLEM — I50.9 HEART FAILURE, UNSPECIFIED: Chronic | Status: ACTIVE | Noted: 2018-12-11

## 2022-11-17 PROBLEM — M54.50 LOW BACK PAIN, UNSPECIFIED: Chronic | Status: ACTIVE | Noted: 2022-11-10

## 2022-11-17 PROBLEM — N18.9 CHRONIC KIDNEY DISEASE, UNSPECIFIED: Chronic | Status: ACTIVE | Noted: 2022-11-10

## 2022-11-17 PROBLEM — L43.9 LICHEN PLANUS, UNSPECIFIED: Chronic | Status: ACTIVE | Noted: 2018-12-11

## 2022-11-17 PROBLEM — I51.3 INTRACARDIAC THROMBOSIS, NOT ELSEWHERE CLASSIFIED: Chronic | Status: ACTIVE | Noted: 2018-12-11

## 2022-11-17 PROBLEM — I21.9 ACUTE MYOCARDIAL INFARCTION, UNSPECIFIED: Chronic | Status: ACTIVE | Noted: 2018-12-17

## 2022-11-17 PROBLEM — Z95.5 PRESENCE OF CORONARY ANGIOPLASTY IMPLANT AND GRAFT: Chronic | Status: ACTIVE | Noted: 2019-06-21

## 2022-11-17 PROBLEM — I48.91 UNSPECIFIED ATRIAL FIBRILLATION: Chronic | Status: ACTIVE | Noted: 2018-12-11

## 2022-11-17 PROBLEM — G47.33 OBSTRUCTIVE SLEEP APNEA (ADULT) (PEDIATRIC): Chronic | Status: ACTIVE | Noted: 2018-12-11

## 2022-11-17 PROBLEM — Z87.39 PERSONAL HISTORY OF OTHER DISEASES OF THE MUSCULOSKELETAL SYSTEM AND CONNECTIVE TISSUE: Chronic | Status: ACTIVE | Noted: 2022-11-10

## 2022-11-17 PROBLEM — E66.01 MORBID (SEVERE) OBESITY DUE TO EXCESS CALORIES: Chronic | Status: ACTIVE | Noted: 2022-11-10

## 2022-11-17 PROBLEM — I35.0 NONRHEUMATIC AORTIC (VALVE) STENOSIS: Chronic | Status: ACTIVE | Noted: 2022-11-10

## 2022-11-17 PROBLEM — N17.9 ACUTE KIDNEY FAILURE, UNSPECIFIED: Chronic | Status: ACTIVE | Noted: 2022-11-10

## 2022-11-17 PROBLEM — M19.90 UNSPECIFIED OSTEOARTHRITIS, UNSPECIFIED SITE: Chronic | Status: ACTIVE | Noted: 2022-11-10

## 2022-11-17 PROBLEM — Z95.0 PRESENCE OF CARDIAC PACEMAKER: Chronic | Status: ACTIVE | Noted: 2022-11-10

## 2022-11-18 ENCOUNTER — INPATIENT (INPATIENT)
Facility: HOSPITAL | Age: 62
LOS: 0 days | Discharge: HOME | DRG: 267 | End: 2022-11-19
Attending: STUDENT IN AN ORGANIZED HEALTH CARE EDUCATION/TRAINING PROGRAM | Admitting: STUDENT IN AN ORGANIZED HEALTH CARE EDUCATION/TRAINING PROGRAM
Payer: MEDICAID

## 2022-11-18 ENCOUNTER — APPOINTMENT (OUTPATIENT)
Dept: CARDIOTHORACIC SURGERY | Facility: CLINIC | Age: 62
End: 2022-11-18

## 2022-11-18 ENCOUNTER — APPOINTMENT (OUTPATIENT)
Dept: CARDIOTHORACIC SURGERY | Facility: HOSPITAL | Age: 62
End: 2022-11-18

## 2022-11-18 VITALS
WEIGHT: 315 LBS | RESPIRATION RATE: 16 BRPM | OXYGEN SATURATION: 96 % | SYSTOLIC BLOOD PRESSURE: 131 MMHG | DIASTOLIC BLOOD PRESSURE: 80 MMHG | HEIGHT: 68 IN | HEART RATE: 94 BPM | TEMPERATURE: 98 F

## 2022-11-18 DIAGNOSIS — Z95.2 PRESENCE OF PROSTHETIC HEART VALVE: ICD-10-CM

## 2022-11-18 DIAGNOSIS — Z95.1 PRESENCE OF AORTOCORONARY BYPASS GRAFT: Chronic | ICD-10-CM

## 2022-11-18 DIAGNOSIS — Z98.890 OTHER SPECIFIED POSTPROCEDURAL STATES: Chronic | ICD-10-CM

## 2022-11-18 DIAGNOSIS — Z95.5 PRESENCE OF CORONARY ANGIOPLASTY IMPLANT AND GRAFT: Chronic | ICD-10-CM

## 2022-11-18 DIAGNOSIS — Z95.0 PRESENCE OF CARDIAC PACEMAKER: Chronic | ICD-10-CM

## 2022-11-18 DIAGNOSIS — I35.0 NONRHEUMATIC AORTIC (VALVE) STENOSIS: ICD-10-CM

## 2022-11-18 DIAGNOSIS — Z90.89 ACQUIRED ABSENCE OF OTHER ORGANS: Chronic | ICD-10-CM

## 2022-11-18 LAB
GLUCOSE BLDC GLUCOMTR-MCNC: 100 MG/DL — HIGH (ref 70–99)
GLUCOSE BLDC GLUCOMTR-MCNC: 101 MG/DL — HIGH (ref 70–99)

## 2022-11-18 PROCEDURE — 93010 ELECTROCARDIOGRAM REPORT: CPT

## 2022-11-18 PROCEDURE — 33361 REPLACE AORTIC VALVE PERQ: CPT | Mod: Q0,62

## 2022-11-18 PROCEDURE — 33361 REPLACE AORTIC VALVE PERQ: CPT | Mod: 62,Q0

## 2022-11-18 PROCEDURE — 93306 TTE W/DOPPLER COMPLETE: CPT | Mod: 26

## 2022-11-18 DEVICE — INTRO SHEATH PERC 6FRX45CM: Type: IMPLANTABLE DEVICE | Status: FUNCTIONAL

## 2022-11-18 DEVICE — CATH VASCULAR EXPO VENTRICULAR PIGTAIL CURVE (PIG) 0.045" X 5FR X 100CM: Type: IMPLANTABLE DEVICE | Status: FUNCTIONAL

## 2022-11-18 DEVICE — SHEATH INTRODUCER TERUMO PINNACLE CORONARY 8FR X 10CM X 0.038" MINI WIRE: Type: IMPLANTABLE DEVICE | Status: FUNCTIONAL

## 2022-11-18 DEVICE — SHEATH INTRODUCER TERUMO PINNACLE CORONARY 6FR X 10CM X 0.038" MINI WIRE: Type: IMPLANTABLE DEVICE | Status: FUNCTIONAL

## 2022-11-18 DEVICE — SUT PERCLOSE PROGLIDE 6FR: Type: IMPLANTABLE DEVICE | Status: FUNCTIONAL

## 2022-11-18 DEVICE — SHEATH INTRODUCER TERUMO PINNACLE CORONARY 7FR X 10CM X 0.038" MINI WIRE: Type: IMPLANTABLE DEVICE | Status: FUNCTIONAL

## 2022-11-18 DEVICE — VLV HEART SAPIEN 3 ULTRA W/COMMANDER SYS 26MM: Type: IMPLANTABLE DEVICE | Status: FUNCTIONAL

## 2022-11-18 DEVICE — PACER KT BLLN FLW DIR 5FR: Type: IMPLANTABLE DEVICE | Status: FUNCTIONAL

## 2022-11-18 DEVICE — SYS VASCADE MVP VASCULAR CLOSURE 6-12F: Type: IMPLANTABLE DEVICE | Status: FUNCTIONAL

## 2022-11-18 DEVICE — GWIRE ANGL .038X150 STD: Type: IMPLANTABLE DEVICE | Status: FUNCTIONAL

## 2022-11-18 DEVICE — CATH VASCULAR EXPO EXPO AMPLATZ LEFT CURVE (AL1) 0.045" X 5FR X 100CM: Type: IMPLANTABLE DEVICE | Status: FUNCTIONAL

## 2022-11-18 DEVICE — GWIRE GUID  0.035INX150CM: Type: IMPLANTABLE DEVICE | Status: FUNCTIONAL

## 2022-11-18 DEVICE — WIRE GUIDE EXCHANGE J TIP 3MM X 260CM: Type: IMPLANTABLE DEVICE | Status: FUNCTIONAL

## 2022-11-18 RX ORDER — ALLOPURINOL 300 MG
200 TABLET ORAL DAILY
Refills: 0 | Status: DISCONTINUED | OUTPATIENT
Start: 2022-11-19 | End: 2022-11-19

## 2022-11-18 RX ORDER — ATORVASTATIN CALCIUM 80 MG/1
20 TABLET, FILM COATED ORAL AT BEDTIME
Refills: 0 | Status: DISCONTINUED | OUTPATIENT
Start: 2022-11-18 | End: 2022-11-19

## 2022-11-18 RX ORDER — ACETAMINOPHEN 500 MG
650 TABLET ORAL EVERY 6 HOURS
Refills: 0 | Status: DISCONTINUED | OUTPATIENT
Start: 2022-11-18 | End: 2022-11-19

## 2022-11-18 RX ORDER — APIXABAN 2.5 MG/1
1 TABLET, FILM COATED ORAL
Qty: 0 | Refills: 0 | DISCHARGE

## 2022-11-18 RX ORDER — CLOPIDOGREL BISULFATE 75 MG/1
75 TABLET, FILM COATED ORAL DAILY
Refills: 0 | Status: DISCONTINUED | OUTPATIENT
Start: 2022-11-19 | End: 2022-11-19

## 2022-11-18 RX ORDER — INFLUENZA VIRUS VACCINE 15; 15; 15; 15 UG/.5ML; UG/.5ML; UG/.5ML; UG/.5ML
0.5 SUSPENSION INTRAMUSCULAR ONCE
Refills: 0 | Status: DISCONTINUED | OUTPATIENT
Start: 2022-11-18 | End: 2022-11-19

## 2022-11-18 RX ORDER — APIXABAN 2.5 MG/1
5 TABLET, FILM COATED ORAL EVERY 12 HOURS
Refills: 0 | Status: DISCONTINUED | OUTPATIENT
Start: 2022-11-19 | End: 2022-11-19

## 2022-11-18 RX ORDER — ALBUMIN HUMAN 25 %
50 VIAL (ML) INTRAVENOUS ONCE
Refills: 0 | Status: DISCONTINUED | OUTPATIENT
Start: 2022-11-18 | End: 2022-11-18

## 2022-11-18 RX ORDER — SODIUM CHLORIDE 9 MG/ML
250 INJECTION INTRAMUSCULAR; INTRAVENOUS; SUBCUTANEOUS ONCE
Refills: 0 | Status: COMPLETED | OUTPATIENT
Start: 2022-11-18 | End: 2022-11-18

## 2022-11-18 RX ORDER — CLOPIDOGREL BISULFATE 75 MG/1
1 TABLET, FILM COATED ORAL
Qty: 0 | Refills: 0 | DISCHARGE

## 2022-11-18 RX ORDER — SODIUM CHLORIDE 9 MG/ML
3 INJECTION INTRAMUSCULAR; INTRAVENOUS; SUBCUTANEOUS EVERY 8 HOURS
Refills: 0 | Status: DISCONTINUED | OUTPATIENT
Start: 2022-11-18 | End: 2022-11-18

## 2022-11-18 RX ORDER — FAMOTIDINE 10 MG/ML
1 INJECTION INTRAVENOUS
Qty: 0 | Refills: 0 | DISCHARGE

## 2022-11-18 RX ORDER — LIDOCAINE HCL 20 MG/ML
0.2 VIAL (ML) INJECTION ONCE
Refills: 0 | Status: DISCONTINUED | OUTPATIENT
Start: 2022-11-18 | End: 2022-11-18

## 2022-11-18 RX ORDER — ATORVASTATIN CALCIUM 80 MG/1
1 TABLET, FILM COATED ORAL
Qty: 0 | Refills: 0 | DISCHARGE

## 2022-11-18 RX ORDER — CEFAZOLIN SODIUM 1 G
3000 VIAL (EA) INJECTION EVERY 8 HOURS
Refills: 0 | Status: COMPLETED | OUTPATIENT
Start: 2022-11-18 | End: 2022-11-19

## 2022-11-18 RX ADMIN — Medication 200 MILLIGRAM(S): at 21:49

## 2022-11-18 RX ADMIN — ATORVASTATIN CALCIUM 20 MILLIGRAM(S): 80 TABLET, FILM COATED ORAL at 21:48

## 2022-11-18 RX ADMIN — SODIUM CHLORIDE 750 MILLILITER(S): 9 INJECTION INTRAMUSCULAR; INTRAVENOUS; SUBCUTANEOUS at 15:00

## 2022-11-18 NOTE — PRE-OP CHECKLIST - BP NONINVASIVE DIASTOLIC (MM HG)
Name: Ganga Wade  Date: 3/25/2021    CHIEF COMPLAINT   No chief complaint on file. HISTORY OF PRESENT ILLNESS   Ganga Wade is a 39year old male who presents for follow up on diabetes management.    In the past 3 months DM has been well con no  History of Nephropathy: no     ASSOCIATED COMPLICATIONS:   HTN: no   Hyperlipidemia: no   Coronary Artery Disease: no   Cerebrovascular Disease: no   Peripheral Vascular Disease: no     DIETARY COMPLIANCE:  Good; continues to follow Paleo diet     EXER Continuous Blood Gluc Transmit (DEXCOM G6 TRANSMITTER) Does not apply Misc, Use as directed for continuous glucose monitoring - change every 3 months., Disp: 1 each, Rfl: 3  •  ALPRAZolam 0.25 MG Oral Tab, Take 1 tablet by mouth 5 hours before flight and t Transportation (Medical):       Lack of Transportation (Non-Medical):   Physical Activity:       Days of Exercise per Week:       Minutes of Exercise per Session:   Stress:       Feeling of Stress :   Social Connections:       Frequency of Communication wi mass index is 32.57 kg/m². General Appearance:  alert, well developed, in no acute distress  Eyes:  pupils are equal, round and reactive. Normal conjunctivae and normal sclera   Throat/Neck:  normal sound to voice. Respiratory: non-labored.  no incre and Tri on 10/28/2020      RTC in 6 months   Patient instructed to call sooner if they develop Blood glucose readings <75 and/or if they have readings persistently >200. The risks and benefits of my recommendations.  Questions were also answered to 80

## 2022-11-18 NOTE — PROGRESS NOTE ADULT - ASSESSMENT
62 year old Morbid obese male with PMHx of afib/aflutter (on Eliquis) s/p ablation, s/p leadless PPM implant (Medtronic/ [id number] last interrogation 7/6/2022), CAD s/p CABG x3 (LIMA to LAD on 12/2012) and PCI to RCA (6/2019), ischemic CM, CHF, HTN, HLD, WILFRED (doesn't tolerate bipap), CKD, chronic LE edema/ hyperpigmentation ( Lichen Planus)of b/L LE with worsening VELÁSQUEZ/ moderate AS planned for TAVR on 11/18/2022 11/18 s/p TAVR #26 altagracia, post procedure hypotension s/p IV bolus. NSR 80s

## 2022-11-18 NOTE — CHART NOTE - NSCHARTNOTEFT_GEN_A_CORE
s/p TAVR #26 altagracia  R/L makenna saldanalosed   Eliquis and Plavix to start tomorrow   Echo in AM s/p TAVR #26 altagracia  R/L groin perclosed   SBP 80s - IV saline bolus given   Eliquis and Plavix to start tomorrow   Echo in AM s/p TAVR #26 altagracia  R/L groin perclosed   SBP 80s - IV saline bolus given   SBP 90s pt feels well denies chest pain bilateral groin stable no hematoma/ no bleeding    Eliquis and Plavix to start tomorrow if groins stable   Echo in AM

## 2022-11-18 NOTE — PATIENT PROFILE ADULT - FALL HARM RISK - HARM RISK INTERVENTIONS

## 2022-11-18 NOTE — PATIENT PROFILE ADULT - STATED REASON FOR ADMISSION
Cardiology: [facility], Dr. Manas Sanchez, Dr. José  PCP: Dr. Hoang  Nephrology: Dr. Jose
Surgery

## 2022-11-18 NOTE — PATIENT PROFILE ADULT - FUNCTIONAL ASSESSMENT - DAILY ACTIVITY SCORE.
Patient calling in regarding her colonoscopy today and was told that she wasn't clean enough. Patient stated that she took the prep correctly, but when she had her first BM, she was still solid.     Patient would like to know if she could be rescheduled for sooner date.     Please advise    24

## 2022-11-18 NOTE — PROGRESS NOTE ADULT - PROBLEM SELECTOR PLAN 1
plavix and eliquis in AM   POD#1 TTE  daily EKG  pt has micra PPM   anti-HTN meds on hold pending hemodynamic stability   home entresto and diuretics to be resumed when stable

## 2022-11-19 ENCOUNTER — TRANSCRIPTION ENCOUNTER (OUTPATIENT)
Age: 62
End: 2022-11-19

## 2022-11-19 VITALS
SYSTOLIC BLOOD PRESSURE: 133 MMHG | DIASTOLIC BLOOD PRESSURE: 86 MMHG | OXYGEN SATURATION: 95 % | TEMPERATURE: 97 F | RESPIRATION RATE: 18 BRPM | HEART RATE: 95 BPM

## 2022-11-19 LAB
ALBUMIN SERPL ELPH-MCNC: 3.3 G/DL — SIGNIFICANT CHANGE UP (ref 3.3–5)
ALP SERPL-CCNC: 106 U/L — SIGNIFICANT CHANGE UP (ref 40–120)
ALT FLD-CCNC: 7 U/L — LOW (ref 10–45)
ANION GAP SERPL CALC-SCNC: 9 MMOL/L — SIGNIFICANT CHANGE UP (ref 5–17)
AST SERPL-CCNC: 14 U/L — SIGNIFICANT CHANGE UP (ref 10–40)
BASOPHILS # BLD AUTO: 0.03 K/UL — SIGNIFICANT CHANGE UP (ref 0–0.2)
BASOPHILS NFR BLD AUTO: 0.4 % — SIGNIFICANT CHANGE UP (ref 0–2)
BILIRUB SERPL-MCNC: 0.4 MG/DL — SIGNIFICANT CHANGE UP (ref 0.2–1.2)
BUN SERPL-MCNC: 22 MG/DL — SIGNIFICANT CHANGE UP (ref 7–23)
CALCIUM SERPL-MCNC: 8.8 MG/DL — SIGNIFICANT CHANGE UP (ref 8.4–10.5)
CHLORIDE SERPL-SCNC: 100 MMOL/L — SIGNIFICANT CHANGE UP (ref 96–108)
CO2 SERPL-SCNC: 28 MMOL/L — SIGNIFICANT CHANGE UP (ref 22–31)
CREAT SERPL-MCNC: 1.41 MG/DL — HIGH (ref 0.5–1.3)
EGFR: 56 ML/MIN/1.73M2 — LOW
EOSINOPHIL # BLD AUTO: 0.15 K/UL — SIGNIFICANT CHANGE UP (ref 0–0.5)
EOSINOPHIL NFR BLD AUTO: 2.1 % — SIGNIFICANT CHANGE UP (ref 0–6)
GLUCOSE SERPL-MCNC: 113 MG/DL — HIGH (ref 70–99)
HCT VFR BLD CALC: 41.5 % — SIGNIFICANT CHANGE UP (ref 39–50)
HGB BLD-MCNC: 13.2 G/DL — SIGNIFICANT CHANGE UP (ref 13–17)
IMM GRANULOCYTES NFR BLD AUTO: 0.4 % — SIGNIFICANT CHANGE UP (ref 0–0.9)
LYMPHOCYTES # BLD AUTO: 0.5 K/UL — LOW (ref 1–3.3)
LYMPHOCYTES # BLD AUTO: 6.9 % — LOW (ref 13–44)
MCHC RBC-ENTMCNC: 30 PG — SIGNIFICANT CHANGE UP (ref 27–34)
MCHC RBC-ENTMCNC: 31.8 GM/DL — LOW (ref 32–36)
MCV RBC AUTO: 94.3 FL — SIGNIFICANT CHANGE UP (ref 80–100)
MONOCYTES # BLD AUTO: 0.67 K/UL — SIGNIFICANT CHANGE UP (ref 0–0.9)
MONOCYTES NFR BLD AUTO: 9.2 % — SIGNIFICANT CHANGE UP (ref 2–14)
NEUTROPHILS # BLD AUTO: 5.87 K/UL — SIGNIFICANT CHANGE UP (ref 1.8–7.4)
NEUTROPHILS NFR BLD AUTO: 81 % — HIGH (ref 43–77)
NRBC # BLD: 0 /100 WBCS — SIGNIFICANT CHANGE UP (ref 0–0)
PLATELET # BLD AUTO: 220 K/UL — SIGNIFICANT CHANGE UP (ref 150–400)
POTASSIUM SERPL-MCNC: 4.2 MMOL/L — SIGNIFICANT CHANGE UP (ref 3.5–5.3)
POTASSIUM SERPL-SCNC: 4.2 MMOL/L — SIGNIFICANT CHANGE UP (ref 3.5–5.3)
PROT SERPL-MCNC: 7.2 G/DL — SIGNIFICANT CHANGE UP (ref 6–8.3)
RBC # BLD: 4.4 M/UL — SIGNIFICANT CHANGE UP (ref 4.2–5.8)
RBC # FLD: 12.6 % — SIGNIFICANT CHANGE UP (ref 10.3–14.5)
SODIUM SERPL-SCNC: 137 MMOL/L — SIGNIFICANT CHANGE UP (ref 135–145)
WBC # BLD: 7.25 K/UL — SIGNIFICANT CHANGE UP (ref 3.8–10.5)
WBC # FLD AUTO: 7.25 K/UL — SIGNIFICANT CHANGE UP (ref 3.8–10.5)

## 2022-11-19 PROCEDURE — 99233 SBSQ HOSP IP/OBS HIGH 50: CPT

## 2022-11-19 PROCEDURE — 93306 TTE W/DOPPLER COMPLETE: CPT | Mod: 26

## 2022-11-19 PROCEDURE — C1887: CPT

## 2022-11-19 PROCEDURE — C1889: CPT

## 2022-11-19 PROCEDURE — C1760: CPT

## 2022-11-19 PROCEDURE — 93010 ELECTROCARDIOGRAM REPORT: CPT

## 2022-11-19 PROCEDURE — C1894: CPT

## 2022-11-19 PROCEDURE — L8699: CPT

## 2022-11-19 PROCEDURE — C1769: CPT

## 2022-11-19 PROCEDURE — 76000 FLUOROSCOPY <1 HR PHYS/QHP: CPT

## 2022-11-19 PROCEDURE — 82962 GLUCOSE BLOOD TEST: CPT

## 2022-11-19 PROCEDURE — 85025 COMPLETE CBC W/AUTO DIFF WBC: CPT

## 2022-11-19 PROCEDURE — 93005 ELECTROCARDIOGRAM TRACING: CPT

## 2022-11-19 PROCEDURE — 93306 TTE W/DOPPLER COMPLETE: CPT

## 2022-11-19 PROCEDURE — 99238 HOSP IP/OBS DSCHRG MGMT 30/<: CPT

## 2022-11-19 PROCEDURE — 80053 COMPREHEN METABOLIC PANEL: CPT

## 2022-11-19 RX ORDER — POTASSIUM CHLORIDE 20 MEQ
1 PACKET (EA) ORAL
Qty: 30 | Refills: 0
Start: 2022-11-19 | End: 2022-12-18

## 2022-11-19 RX ORDER — BUMETANIDE 0.25 MG/ML
1 INJECTION INTRAMUSCULAR; INTRAVENOUS
Qty: 30 | Refills: 0
Start: 2022-11-19 | End: 2022-12-18

## 2022-11-19 RX ORDER — ACETAMINOPHEN 500 MG
2 TABLET ORAL
Qty: 0 | Refills: 0 | DISCHARGE
Start: 2022-11-19

## 2022-11-19 RX ORDER — POTASSIUM CHLORIDE 20 MEQ
20 PACKET (EA) ORAL DAILY
Refills: 0 | Status: DISCONTINUED | OUTPATIENT
Start: 2022-11-19 | End: 2022-11-19

## 2022-11-19 RX ORDER — FUROSEMIDE 40 MG
2 TABLET ORAL
Qty: 0 | Refills: 0 | DISCHARGE

## 2022-11-19 RX ORDER — SACUBITRIL AND VALSARTAN 24; 26 MG/1; MG/1
1 TABLET, FILM COATED ORAL
Qty: 0 | Refills: 0 | DISCHARGE

## 2022-11-19 RX ORDER — BUMETANIDE 0.25 MG/ML
1 INJECTION INTRAMUSCULAR; INTRAVENOUS DAILY
Refills: 0 | Status: DISCONTINUED | OUTPATIENT
Start: 2022-11-19 | End: 2022-11-19

## 2022-11-19 RX ADMIN — APIXABAN 5 MILLIGRAM(S): 2.5 TABLET, FILM COATED ORAL at 17:00

## 2022-11-19 RX ADMIN — Medication 20 MILLIEQUIVALENT(S): at 12:46

## 2022-11-19 RX ADMIN — CLOPIDOGREL BISULFATE 75 MILLIGRAM(S): 75 TABLET, FILM COATED ORAL at 08:14

## 2022-11-19 RX ADMIN — BUMETANIDE 1 MILLIGRAM(S): 0.25 INJECTION INTRAMUSCULAR; INTRAVENOUS at 12:46

## 2022-11-19 RX ADMIN — Medication 200 MILLIGRAM(S): at 08:13

## 2022-11-19 RX ADMIN — Medication 200 MILLIGRAM(S): at 06:30

## 2022-11-19 RX ADMIN — APIXABAN 5 MILLIGRAM(S): 2.5 TABLET, FILM COATED ORAL at 06:29

## 2022-11-19 NOTE — DISCHARGE NOTE PROVIDER - NSDCACTIVITY_GEN_ALL_CORE
PROCEDURE NOTE: Lucentis 0.5mg PFS OS. Diagnosis: Neovascular AMD with Active CNV. Anesthesia: Subconjunctival. Prep: Betadine Flush. Prior to injection, risks/benefits/alternatives discussed including but not limited to infection, loss of vision or eye, hemorrhage, cataract, glaucoma, retinal tears or detachment. The patient wished to proceed with treatment. Topical anesthesia was induced with Alcaine. Additional anesthesia was achieved using drop(s) or injection checked above. A drop of Povidone-iodine 5% ophthalmic solution was instilled over the injection site and in the inferior fornix. Betadine prep was performed. A single use prefilled syringe of intravitreal Lucentis 0.5mg/0.05ml was used and excess discarded. The needle was passed 3.0 mm posterior to the limbus in pseudophakic patients, and 3.5 mm posterior to the limbus in phakic patients. The remainder of the Lucentis 0.5mg in the single-use vial was then discarded in a medical waste disposal container. The eye was irrigated with sterile irrigating solution. Patient tolerated the procedure well. There were no complications. Post procedure instructions given. CF vision checked. Injection Time 302. The patient was instructed to return for re-evaluation in approximately 4-12 weeks depending on his/her condition and was told to call immediately if vision decreases and/or if his/her eye becomes red, painful, and/or light sensitive. The patient was instructed to go to the emergency room or call 911 if unable to reach the doctor within an hour or two of trying or calling. Kenyatta Olivera
Do not drive or operate machinery/Showering allowed/Stairs allowed/Walking - Indoors allowed/No heavy lifting/straining/Walking - Outdoors allowed

## 2022-11-19 NOTE — DISCHARGE NOTE PROVIDER - CARE PROVIDERS DIRECT ADDRESSES
,DirectAddress_Unknown,[email or web address]

## 2022-11-19 NOTE — DISCHARGE NOTE NURSING/CASE MANAGEMENT/SOCIAL WORK - NSDCPEFALRISK_GEN_ALL_CORE
For information on Fall & Injury Prevention, visit: [email or web address] OR  [email or web address] OR  https://www.cdc.gov/steadi/patient.html

## 2022-11-19 NOTE — DISCHARGE NOTE PROVIDER - NSDCPNSUBOBJ_GEN_ALL_CORE
No apparent distress, alert and oriented x3, appropriate affect  JVD is elevated, supple, no lymphadenopathy  Crackles are heard at the lung bases bilaterally  Regular rate and rhythm with no murmur rub or gallop   Positive bowel sound, soft, nontender, nondistended, no masses guarding rebound tenderness  No clubbing or cyanosis  2+ bilateral lower extremity edema  Right and left groin sites are soft with no ecchymosis/hematoma/bruit  Moving all extremities spontaneously

## 2022-11-19 NOTE — PROGRESS NOTE ADULT - SUBJECTIVE AND OBJECTIVE BOX
This patient has been implanted with a Transcatheter Aortic Valve Implant under the following NCT/STEPHANIE:    TAVR:  Commercial Implant NCT [id number], STEPHANIE N/A and will be placed into the TVT Registry.      ANALIA Grajeda  [id number]
    VITAL SIGNS    Telemetry: NSR 80s     Daily Height in cm: 172.72 (18 Nov 2022 12:30)    Daily       Vital Signs Last 24 Hrs  T(C): 36.4 (11-18-22 @ 16:45), Max: 36.7 (11-18-22 @ 10:30)  T(F): 97.5 (11-18-22 @ 16:45), Max: 98 (11-18-22 @ 10:30)  HR: 73 (11-18-22 @ 16:45) (60 - 94)  BP: 115/74 (11-18-22 @ 16:45) (73/44 - 131/80)  RR: 17 (11-18-22 @ 16:45) (12 - 21)  SpO2: 94% (11-18-22 @ 16:45) (94% - 96%)             I&O's Detail    18 Nov 2022 07:01  -  18 Nov 2022 17:18  --------------------------------------------------------  IN:    Oral Fluid: 180 mL  Total IN: 180 mL    OUT:  Total OUT: 0 mL    Total NET: 180 mL                    GLUCOSE  CAPILLARY BLOOD GLUCOSE      POCT Blood Glucose.: 101 mg/dL (18 Nov 2022 14:40)  POCT Blood Glucose.: 100 mg/dL (18 Nov 2022 10:08)                        PHYSICAL EXAM      General: NAD, well appearing, in no distress  Neurology: A&O x3, non focal, no neuro deficits. Moves all extremities to command.  CV : s1 s2 RRR, no murmurs, gallops, clicks.   Lungs: clear to auscultation  Abdomen: soft, nontender, nondistended, positive bowel sounds  :    voiding      Extremities:    no  edema. + pedal pulses      Incision: L/R groin sites stable  Skin: intact, no lesions        MEDICATIONS  ceFAZolin   IVPB 3000 milliGRAM(s) IV Intermittent every 8 hours  cefuroxime  IVPB 1500 milliGRAM(s) IV Intermittent once        
Subjective  No acute events reported overnight.  The patient reported when he ambulated earlier this morning he did have some shortness of breath/dyspnea upon exertion.  Denies any fevers, chills, sweats, sensation, dizzy or palpitations.  No chest pain/tightness/discomfort at rest or upon exertion.  No pain at his groin sites bilaterally.    Review of systems  14 point review of systems is otherwise unremarkable except what described above in history of present illness    Telemetry  Sinus rhythm with rates in the 80s to 100s with 2-second episode of PAT into the 100s  EKG was reviewed normal sinus rhythm at 70 bpm, , QRS 92, QTc 456    Physical examination   No apparent distress, alert and oriented x3, appropriate affect  JVD is elevated, supple, no lymphadenopathy  Crackles are heard at the lung bases bilaterally  Regular rate and rhythm with no murmur rub or gallop   Positive bowel sound, soft, nontender, nondistended, no masses guarding rebound tenderness  No clubbing or cyanosis  2+ bilateral lower extremity edema  Right and left groin sites are soft with no ecchymosis/hematoma/bruit  Moving all extremities spontaneously    PAST MEDICAL & SURGICAL HISTORY:  CAD (coronary artery disease)  s/p CABG    Hypercholesteremia    Thrombus of left atrial appendage  2018    Ischemic cardiomyopathy    WILFRED (obstructive sleep apnea)  can not tolerate bipap at night    HTN (hypertension)    Atrial fibrillation  2018    CHF (congestive heart failure)  denies any recent exacerbation    Peripheral edema  chronic    Lichen planus  chronic ( b/L LE)    Atrial flutter  s/p ablation 2018    MI (myocardial infarction)  2012    Stented coronary artery  2019    AS (aortic stenosis)    Chronic kidney disease, unspecified CKD stage    ANGELICA (acute kidney injury)  4/2021    Morbid obesity    Status post placement of cardiac pacemaker  micraleadless PPM, [id number] last interrogation 7/6/2022    Osteoarthritis  shoulders, hips, knee    H/O scoliosis    Lower back pain    History of elbow surgery    S/P CABG (coronary artery bypass graft)  x3, 2012    Cardiac pacemaker  leadless 2018    History of coronary artery stent placement  2019 ( one more after cabg)    History of adenoidectomy    H/O atrioventricular karlee ablation  2018    T(C): 36.8 (11-19-22 @ 05:21), Max: 36.8 (11-19-22 @ 05:21)  HR: 91 (11-19-22 @ 05:21) (60 - 94)  BP: 116/74 (11-19-22 @ 05:21) (73/44 - 131/80)  RR: 20 (11-19-22 @ 05:21) (12 - 21)  SpO2: 91% (11-19-22 @ 05:21) (91% - 96%)  Wt(kg): --  11-18 @ 07:01  -  11-19 @ 07:00  --------------------------------------------------------  IN: 500 mL / OUT: 450 mL / NET: 50 mL    11-19 @ 07:01  -  11-19 @ 09:05  --------------------------------------------------------  IN: 360 mL / OUT: 0 mL / NET: 360 mL    MEDICATIONS  (STANDING):  allopurinol 200 milliGRAM(s) Oral daily  apixaban 5 milliGRAM(s) Oral every 12 hours  atorvastatin 20 milliGRAM(s) Oral at bedtime  clopidogrel Tablet 75 milliGRAM(s) Oral daily  influenza   Vaccine 0.5 milliLiter(s) IntraMuscular once    MEDICATIONS  (PRN):  acetaminophen     Tablet .. 650 milliGRAM(s) Oral every 6 hours PRN Mild Pain (1 - 3)    Home Medications:  allopurinol 100 mg oral tablet: 2 tab(s) orally once a day (18 Nov 2022 10:24)  atorvastatin 20 mg oral tablet: 1 tab(s) orally once a day (18 Nov 2022 10:24)  Eliquis 5 mg oral tablet: 1 tab(s) orally 2 times a day  LAST DOSE 11/14/22 (18 Nov 2022 10:24)  Entresto 24 mg-26 mg oral tablet: 1 tab(s) orally 2 times a day (18 Nov 2022 10:24)  famotidine 20 mg oral tablet: 1 tab(s) orally 2 times a day (18 Nov 2022 10:24)  hydrALAZINE 50 mg oral tablet: 1 tab(s) orally 3 times a day (18 Nov 2022 10:24)  Lasix 40 mg oral tablet: 2 tab(s) orally once a day (18 Nov 2022 10:24)  Plavix 75 mg oral tablet: 1 tab(s) orally once a day (18 Nov 2022 10:24)                       13.2   7.25  )-----------( 220      ( 19 Nov 2022 05:57 )             41.5   11-19    137  |  100  |  22  ----------------------------<  113<H>  4.2   |  28  |  1.41<H>    Ca    8.8      19 Nov 2022 05:57    TPro  7.2  /  Alb  3.3  /  TBili  0.4  /  DBili  x   /  AST  14  /  ALT  7<L>  /  AlkPhos  106  11-19    Assessment/plan  62-year-old man with a past medical history significant for    Morbid obesity  Atrial fibrillation/flutter (on Eliquis) status post ablation status post leadless pacemaker last interrogated on 7/6 of 2022  CAD status post three-vessel CABG (LIMA to LAD on 12/2012, SVG to OM1/PDA is occluded) with PCI to RCA on 6/2019  Ischemic cardiomyopathy  Hypertension  Hyperlipidemia line obstructive sleep apnea (not tolerating BiPAP)  Chronic kidney disease  Cardiorenal syndrome    Who on 11/18/2022 underwent placement of a Sapein 3 26mm through the right femoral artery.  The patient tolerated the procedure well.  Noted to have significantly elevated LVEDP.    -- Patient presented with acute on chronic diastolic heart failure.  Would recommend he be administered 2 mg of IV Bumex.  Replete electrolytes with goal potassium greater than 4 magnesium greater than 2.  -- The patient was instructed that as an outpatient he will need to be closely monitored by his primary cardiologist and nephrologist to further assist with medical optimization and diuresis.  -- Repeat TTE is scheduled to be performed later today.  -- Patient will be restarted on Eliquis 5 mg twice a day.  He is on clopidogrel 75 mg daily.  Closely monitor for signs and symptoms of bleeding.  The patient was told to avoid all NSAIDs.  May take Tylenol for discomfort.  -- Due to low blood pressure Entresto 24/26 mg twice a day and hydralazine 50 mg 3 times a day are currently being held.  --Continue atorvastatin 20 mg daily.  --The patient has a Micra pacemaker.  --Continue telemetry monitoring.    All questions and concerns of the patient were addressed.

## 2022-11-19 NOTE — DISCHARGE NOTE PROVIDER - CARE PROVIDER_API CALL
Sukhwinder Wagner)  CTS Surgery  [address] [place] [address]  Phone: [phone]  Fax: [phone]  Scheduled Appointment: 11/22/2022 02:00 PM    Bobby Miranda  CARDIOLOGY  [address] [facility], [address]  [place] [address]  Phone: [phone]  Fax: [phone]  Follow Up Time: Routine

## 2022-11-19 NOTE — DISCHARGE NOTE PROVIDER - PROVIDER TOKENS
PROVIDER:[TOKEN:[[id number]],SCHEDULEDAPPT:[11/22/2022],SCHEDULEDAPPTTIME:[02:00 PM]],PROVIDER:[TOKEN:[[id number]],FOLLOWUP:[Routine]]

## 2022-11-19 NOTE — DISCHARGE NOTE PROVIDER - HOSPITAL COURSE
62 year old Morbid obese male with PMHx of afib/aflutter (on Eliquis) s/p ablation, s/p leadless PPM implant (Medtronic/ [id number] last interrogation 7/6/2022), CAD s/p CABG x3 (LIMA to LAD on 12/2012) and PCI to RCA (6/2019), ischemic CM, CHF, HTN, HLD, WILFRED (doesn't tolerate bipap), CKD, chronic LE edema/ hyperpigmentation ( Lichen Planus)of b/L LE with worsening VELÁSQUEZ/ moderate AS planned for TAVR on 11/18/2022 11/18 s/p TAVR #26 altagracia, post procedure hypotension s/p IV bolus. NSR 80s  11/19 Eliquis 5 mg bid and Bumex 1 mg QD initiated. Echo performed. Plan for discharge today. Due to low blood pressure Entresto 24/26 mg twice a day and hydralazine 50 mg 3 times a day are currently being held. 62 year old Morbid obese male with PMHx of afib/aflutter (on Eliquis) s/p ablation, s/p leadless PPM implant (Medtronic/ [id number] last interrogation 7/6/2022), CAD s/p CABG x3 (LIMA to LAD on 12/2012) and PCI to RCA (6/2019), ischemic CM, CHF, HTN, HLD, WILFRED (doesn't tolerate bipap), CKD, chronic LE edema/ hyperpigmentation ( Lichen Planus)of b/L LE with worsening VELÁSQUEZ/ moderate AS planned for TAVR on 11/18/2022 11/18 s/p TAVR #26 yahaira, post procedure hypotension s/p IV bolus. NSR 80s  11/19 Eliquis 5 mg bid and Bumex 1 mg QD initiated. Echo performed. Plan for discharge today. Due to low blood pressure Entresto 24/26 mg twice a day and hydralazine 50 mg 3 times a day are currently being held.  < from: TTE with Doppler (w/Cont) (11.19.22 @ 14:18) >    Limited  and Difficult TTE  1. Transcatheter aortic valve replacement. # 26mm YAHAIRA  Ultra THV Peak transaortic valve gradient equals 17 mm Hg,  mean transaortic valve gradient equals 9 mm Hg, which is  probably normal in the presence of a transcatheter aortic  valve replacement. No  paravalvular aortic regurgitation.  2. Despite the use of ultra sound enhancing agent,  evaluation of systolic function remains subotpimal and  appears reduced.  Severe anteroseptal, anteroapical and  apical hypokinesis.   Endocardial visualization enhanced  with intravenous injection of Ultrasonic Enhancing Agent  (Lumason).  *** Compared with echocardiogram of 11/18/2022, unable to  compare    < end of copied text >

## 2022-11-19 NOTE — DISCHARGE NOTE PROVIDER - NSDCFUADDINST_GEN_ALL_CORE_FT
Resume activity as tolerated  Resume low cholesterol low sodium weight loss directed diet  Shower daily and wash all incisions with soap and water  Ambulate at least four times daily  Use incentive spirometer every hour during the day  Record daily weight and report changes greater than 3lbs  Please follow up with your cardiologist Dr Miranda in 7-10 days and repeat echo in 1 month  Please follow up with Dr Wagner 11/22 at 2pm  Prophylactic antibiotics recommended prior to invasive procedures or dental work.

## 2022-11-19 NOTE — DISCHARGE NOTE PROVIDER - NSDCMRMEDTOKEN_GEN_ALL_CORE_FT
acetaminophen 325 mg oral tablet: 2 tab(s) orally every 6 hours, As needed, Mild Pain (1 - 3)  allopurinol 100 mg oral tablet: 2 tab(s) orally once a day  atorvastatin 20 mg oral tablet: 1 tab(s) orally once a day  bumetanide 1 mg oral tablet: 1 tab(s) orally once a day  Eliquis 5 mg oral tablet: 1 tab(s) orally 2 times a day  LAST DOSE 11/14/22  famotidine 20 mg oral tablet: 1 tab(s) orally 2 times a day  Plavix 75 mg oral tablet: 1 tab(s) orally once a day  potassium chloride 20 mEq oral tablet, extended release: 1 tab(s) orally once a day

## 2022-11-19 NOTE — DISCHARGE NOTE NURSING/CASE MANAGEMENT/SOCIAL WORK - PATIENT PORTAL LINK FT
You can access the FollowMyHealth Patient Portal offered by [facility] by registering at the following website: [email or web address]. By joining [facility]’s FollowMyHealth portal, you will also be able to view your health information using other applications (apps) compatible with our system.

## 2022-11-19 NOTE — DISCHARGE NOTE PROVIDER - NSDCFUSCHEDAPPT_GEN_ALL_CORE_FT
Sukhwinder Wagner  [facility]  CTSURG [address]  Scheduled Appointment: 11/22/2022    Adrián Salinas  [facility]  CTSURG [address]  Scheduled Appointment: 11/23/2022    Carmen Frazier  [address]  Scheduled Appointment: 12/23/2022

## 2022-11-20 ENCOUNTER — TRANSCRIPTION ENCOUNTER (OUTPATIENT)
Age: 62
End: 2022-11-20

## 2022-11-20 RX ORDER — POTASSIUM CHLORIDE 20 MEQ
1 PACKET (EA) ORAL
Qty: 7 | Refills: 0
Start: 2022-11-20 | End: 2022-11-26

## 2022-11-20 RX ORDER — BUMETANIDE 0.25 MG/ML
1 INJECTION INTRAMUSCULAR; INTRAVENOUS
Qty: 7 | Refills: 0
Start: 2022-11-20 | End: 2022-11-26

## 2022-11-21 ENCOUNTER — NON-APPOINTMENT (OUTPATIENT)
Age: 62
End: 2022-11-21

## 2022-11-21 ENCOUNTER — TRANSCRIPTION ENCOUNTER (OUTPATIENT)
Age: 62
End: 2022-11-21

## 2022-11-21 ENCOUNTER — RX RENEWAL (OUTPATIENT)
Age: 62
End: 2022-11-21

## 2022-11-21 ENCOUNTER — APPOINTMENT (OUTPATIENT)
Dept: CARE COORDINATION | Facility: HOME HEALTH | Age: 62
End: 2022-11-21

## 2022-11-21 VITALS — RESPIRATION RATE: 12 BRPM

## 2022-11-21 RX ORDER — POTASSIUM CHLORIDE 1500 MG/1
20 TABLET, FILM COATED, EXTENDED RELEASE ORAL
Qty: 7 | Refills: 0 | Status: ACTIVE | COMMUNITY
Start: 2022-11-20

## 2022-11-21 RX ORDER — ZOSTER VACCINE RECOMBINANT, ADJUVANTED 50 MCG/0.5
50 KIT INTRAMUSCULAR
Qty: 1 | Refills: 1 | Status: COMPLETED | COMMUNITY
Start: 2021-07-15 | End: 2022-11-21

## 2022-11-21 NOTE — HISTORY OF PRESENT ILLNESS
[Home] : at home, [unfilled] , at the time of the visit. [Other Location: e.g. Home (Enter Location, City,State)___] : at [unfilled] [Verbal consent obtained from patient] : the patient, [unfilled] [FreeTextEntry1] : 62 year old Morbid obese male with PMHx of afib/aflutter (on Eliquis) s/p\par ablation, s/p leadless PPM implant (Medtronic/ [id number] last\par interrogation 7/6/2022), CAD s/p CABG x3 (LIMA to LAD on 12/2012) and PCI to\par RCA (6/2019), ischemic CM, CHF, HTN, HLD, WILFRED (doesn't tolerate bipap), CKD,\par chronic LE edema/ hyperpigmentation ( Lichen Planus)of b/L LE with worsening\par VELÁSQUEZ/ moderate AS planned for TAVR on 11/18/2022\par  \par 11/18 s/p TAVR #26 altagracia, post procedure hypotension s/p IV bolus. NSR 80s\par 11/19 Eliquis 5 mg bid and Bumex 1 mg QD initiated. Echo performed. Plan for\par discharge today. Due to low blood pressure Entresto 24/26 mg twice a day and\par hydralazine 50 mg 3 times a day are currently being held\par 11/21- Seen by [facility] NP for a f/u visit. Emotional support and education provided.\par  All questions answered. Pt overall is recovering well w/o complaints.\par

## 2022-11-21 NOTE — ASSESSMENT
[FreeTextEntry1] : Pt recovering well at home s/p TAVR. MCOT not required as pt has PPM. Reviewed all medications and dosages with pt understanding. Pt has all medications in home and is taking as prescribed. Pain free. Pt is aware of scheduled & recommended follow up appointments as listed below.\par \par

## 2022-11-21 NOTE — PHYSICAL EXAM
[Neck Appearance] : the appearance of the neck was normal [] : no respiratory distress [Respiration, Rhythm And Depth] : normal respiratory rhythm and effort [Exaggerated Use Of Accessory Muscles For Inspiration] : no accessory muscle use [FreeTextEntry1] : TAVR sites without erythema or drainage, with edges well approximated.  B/L LE - minimal edema. [Examination Of The Chest] : the chest was normal in appearance [Chest Visual Inspection Thoracic Asymmetry] : no chest asymmetry [Diminished Respiratory Excursion] : normal chest expansion [Skin Color & Pigmentation] : normal skin color and pigmentation [Sensation] : the sensory exam was normal to light touch and pinprick [Motor Exam] : the motor exam was normal [No Focal Deficits] : no focal deficits [Oriented To Time, Place, And Person] : oriented to person, place, and time [Impaired Insight] : insight and judgment were intact [Affect] : the affect was normal [Mood] : the mood was normal

## 2022-11-21 NOTE — COUNSELING
[Hygeine (Including Daily Shower)] : hygeine (including daily shower) [Importance of Regular Medical Follow-Up] : the importance of regular medical follow-up [No Heavy Lifting] : no heavy lifting (>15-20 lb. for 1 month or 25 lb. for 3 months from date of surgery) [Blood Pressure Control] : blood pressure control [Weight Management] : weight management [S/S of infection] : signs and symptoms of infection (and to whom it should be reported) [Progressive Ambulation/Activity] : progressive ambulation/activity [Medication/Vitamin/Herb/Food Interaction] : medication/vitamin/herb/food interaction [SBE antibiotic prophylaxis] : SBE antibiotic prophylaxis was recommended [Low Fat/Low Cholesterol Diet] : low fat/low cholesterol diet [Blood Sugar Control] : blood sugar control [Stress Management] : stress management

## 2022-11-21 NOTE — REVIEW OF SYSTEMS
[Lower Ext Edema] : lower extremity edema [SOB on Exertion] : shortness of breath during exertion [Negative] : Psychiatric

## 2022-11-21 NOTE — REASON FOR VISIT
[Post Hospitalization] : a post hospitalization visit [FreeTextEntry1] : FOLLOW YOUR HEART - Transitional Care Management Program - [facility]

## 2022-11-22 ENCOUNTER — APPOINTMENT (OUTPATIENT)
Dept: CARDIOTHORACIC SURGERY | Facility: CLINIC | Age: 62
End: 2022-11-22

## 2022-11-22 VITALS
RESPIRATION RATE: 12 BRPM | HEIGHT: 68 IN | SYSTOLIC BLOOD PRESSURE: 132 MMHG | WEIGHT: 297 LBS | DIASTOLIC BLOOD PRESSURE: 80 MMHG | TEMPERATURE: 98.1 F | HEART RATE: 92 BPM | BODY MASS INDEX: 45.01 KG/M2 | OXYGEN SATURATION: 95 %

## 2022-11-22 PROCEDURE — 99214 OFFICE O/P EST MOD 30 MIN: CPT

## 2022-11-23 NOTE — END OF VISIT
[FreeTextEntry3] : Written by Fernando Fleming NP, acting as a scribe for Dr. Wagner\par “The documentation recorded by the scribe accurately reflects the service I personally performed and the decisions made by me.” Signature Sukhwinder Wagner MD.\par \par

## 2022-11-23 NOTE — ADDENDUM
[FreeTextEntry1] : Mr. Waller has made good progress after his TAVR and had an unevevntful recovery.\par \par He will follow up with Dr. Miranda.\par \par Sukhwinedr Wagner\par Cardiac Surgeon

## 2022-11-23 NOTE — ASSESSMENT
[FreeTextEntry1] : Today on exam, patient's lungs are clear bilaterally, normal sinus rhythm and bilateral groins are clean, dry and intact. There is no hematoma. No peripheral edema noted on exam. SBE antibiotic prophylaxis discussed at length.  Patient instructed to continue current medication regimen and followup with cardiology.\par \par Plan:\par - Referred to Dr. Steiner for BL carotid stenosis of > 70%\par - Follow up with cardiologist Dr. Miranda\par - TTE with Dr. Miranda in one month Follow up with structural heart team with TTE in one month\par - SBE antibiotic prophylaxis discussed at length\par - Call with any questions or concerns

## 2022-11-23 NOTE — PHYSICAL EXAM
[] : no respiratory distress [Respiration, Rhythm And Depth] : normal respiratory rhythm and effort [Exaggerated Use Of Accessory Muscles For Inspiration] : no accessory muscle use [Auscultation Breath Sounds / Voice Sounds] : lungs were clear to auscultation bilaterally [Apical Impulse] : the apical impulse was normal [Heart Rate And Rhythm] : heart rate was normal and rhythm regular [Heart Sounds] : normal S1 and S2 [___ +] : bilateral ankle [unfilled]U+ pitting edema [Clean] : clean [Dry] : dry [Healing Well] : healing well [Bleeding] : no active bleeding [Foul Odor] : no foul smell [Purulent Drainage] : no purulent drainage [Serosanguinous Drainage] : no serosanguinous drainage [Erythema] : not erythematous [Warm] : not warm [Tender] : not tender

## 2022-11-23 NOTE — REASON FOR VISIT
[de-identified] : TAVR #26 [place] [de-identified] : 11/18/2022 [de-identified] : 62 year old Morbid obese male with PMHx of afib/aflutter (on Eliquis) s/p ablation, s/p leadless PPM implant (Medtronic/ [id number] last interrogation 7/6/2022), CAD s/p CABG x3 (LIMA to LAD on 12/2012) and PCI to RCA (6/2019), ischemic CM, CHF, HTN, HLD, WILFRED (doesn't tolerate bipap), CKD, chronic LE edema/ hyperpigmentation ( Lichen Planus)of b/L LE with worsening VELÁSQUEZ now sp TAVR on 11/18/2022.  Post procedure hypotension s/p IV bolus. NSR 80s 11/19 Eliquis 5 mg bid and Bumex 1 mg QD initiated. Echo performed without paravalvular AR, EF reduces. Due to hypotension his Entresto 24/26 mg twice a day and hydralazine 50 mg 3 times a day are currently being held. Follows Dr. Miranda for cardiology.\par \par Of note, pt had >70% bilateral carotid stenosis.  WIll refer to Vascular. \par \par He presents today and reports doing well.  Feels better sp TAVR.  Did report some SOB post TAVR that is getting better.  Faisal CP, palpitations, weight gain, cough, fever or chills.  Eating with +BM.  Has PPM.  Following Kevin Ribeiro for nephro and Dr. Deshpande for cardiology next week.

## 2022-11-23 NOTE — CONSULT LETTER
[Dear  ___] : Dear  [unfilled], [Courtesy Letter:] : I had the pleasure of seeing your patient, [unfilled], in my office today. [Please see my note below.] : Please see my note below. [Sincerely,] : Sincerely, [FreeTextEntry2] : Ruth Auguste\par [address], \[place] [address]\par [phone] [FreeTextEntry3] : Sukhwinder Wagner MD\par Attending Surgeon\par Cardiovascular & Thoracic Surgery\par

## 2022-11-29 ENCOUNTER — APPOINTMENT (OUTPATIENT)
Dept: CARDIOLOGY | Facility: CLINIC | Age: 62
End: 2022-11-29

## 2022-11-29 ENCOUNTER — NON-APPOINTMENT (OUTPATIENT)
Age: 62
End: 2022-11-29

## 2022-11-29 VITALS
WEIGHT: 314 LBS | HEART RATE: 92 BPM | OXYGEN SATURATION: 94 % | DIASTOLIC BLOOD PRESSURE: 86 MMHG | RESPIRATION RATE: 16 BRPM | HEIGHT: 68 IN | TEMPERATURE: 97.9 F | SYSTOLIC BLOOD PRESSURE: 165 MMHG | BODY MASS INDEX: 47.59 KG/M2

## 2022-11-29 PROCEDURE — 93000 ELECTROCARDIOGRAM COMPLETE: CPT

## 2022-11-29 PROCEDURE — 99215 OFFICE O/P EST HI 40 MIN: CPT | Mod: 25

## 2022-12-01 ENCOUNTER — TRANSCRIPTION ENCOUNTER (OUTPATIENT)
Age: 62
End: 2022-12-01

## 2022-12-08 ENCOUNTER — RX RENEWAL (OUTPATIENT)
Age: 62
End: 2022-12-08

## 2022-12-09 ENCOUNTER — NON-APPOINTMENT (OUTPATIENT)
Age: 62
End: 2022-12-09

## 2022-12-20 ENCOUNTER — TRANSCRIPTION ENCOUNTER (OUTPATIENT)
Age: 62
End: 2022-12-20

## 2022-12-20 ENCOUNTER — OUTPATIENT (OUTPATIENT)
Dept: OUTPATIENT SERVICES | Facility: HOSPITAL | Age: 62
LOS: 1 days | End: 2022-12-20
Payer: MEDICAID

## 2022-12-20 ENCOUNTER — APPOINTMENT (OUTPATIENT)
Dept: RADIOLOGY | Facility: HOSPITAL | Age: 62
End: 2022-12-20

## 2022-12-20 DIAGNOSIS — M25.511 PAIN IN RIGHT SHOULDER: ICD-10-CM

## 2022-12-20 DIAGNOSIS — Z98.890 OTHER SPECIFIED POSTPROCEDURAL STATES: Chronic | ICD-10-CM

## 2022-12-20 DIAGNOSIS — Z95.5 PRESENCE OF CORONARY ANGIOPLASTY IMPLANT AND GRAFT: Chronic | ICD-10-CM

## 2022-12-20 DIAGNOSIS — Z95.0 PRESENCE OF CARDIAC PACEMAKER: Chronic | ICD-10-CM

## 2022-12-20 DIAGNOSIS — Z95.1 PRESENCE OF AORTOCORONARY BYPASS GRAFT: Chronic | ICD-10-CM

## 2022-12-20 DIAGNOSIS — Z90.89 ACQUIRED ABSENCE OF OTHER ORGANS: Chronic | ICD-10-CM

## 2022-12-20 PROCEDURE — 73030 X-RAY EXAM OF SHOULDER: CPT | Mod: 26,LT,RT

## 2022-12-20 PROCEDURE — 73030 X-RAY EXAM OF SHOULDER: CPT

## 2022-12-23 ENCOUNTER — APPOINTMENT (OUTPATIENT)
Dept: RHEUMATOLOGY | Facility: CLINIC | Age: 62
End: 2022-12-23
Payer: MEDICAID

## 2022-12-23 VITALS
BODY MASS INDEX: 46.68 KG/M2 | OXYGEN SATURATION: 93 % | RESPIRATION RATE: 18 BRPM | DIASTOLIC BLOOD PRESSURE: 62 MMHG | HEIGHT: 68 IN | TEMPERATURE: 97.2 F | WEIGHT: 308 LBS | HEART RATE: 95 BPM | SYSTOLIC BLOOD PRESSURE: 120 MMHG

## 2022-12-23 DIAGNOSIS — M19.012 PRIMARY OSTEOARTHRITIS, RIGHT SHOULDER: ICD-10-CM

## 2022-12-23 DIAGNOSIS — M79.672 PAIN IN LEFT FOOT: ICD-10-CM

## 2022-12-23 DIAGNOSIS — M1A.9XX0 CHRONIC GOUT, UNSPECIFIED, W/OUT TOPHUS (TOPHI): ICD-10-CM

## 2022-12-23 DIAGNOSIS — M25.511 PAIN IN RIGHT SHOULDER: ICD-10-CM

## 2022-12-23 DIAGNOSIS — M25.512 PAIN IN RIGHT SHOULDER: ICD-10-CM

## 2022-12-23 DIAGNOSIS — M19.011 PRIMARY OSTEOARTHRITIS, RIGHT SHOULDER: ICD-10-CM

## 2022-12-23 PROCEDURE — 99213 OFFICE O/P EST LOW 20 MIN: CPT

## 2022-12-24 ENCOUNTER — RX RENEWAL (OUTPATIENT)
Age: 62
End: 2022-12-24

## 2022-12-26 ENCOUNTER — RX RENEWAL (OUTPATIENT)
Age: 62
End: 2022-12-26

## 2022-12-28 ENCOUNTER — NON-APPOINTMENT (OUTPATIENT)
Age: 62
End: 2022-12-28

## 2022-12-28 ENCOUNTER — RX RENEWAL (OUTPATIENT)
Age: 62
End: 2022-12-28

## 2022-12-28 ENCOUNTER — APPOINTMENT (OUTPATIENT)
Dept: CARDIOLOGY | Facility: CLINIC | Age: 62
End: 2022-12-28

## 2022-12-28 VITALS
DIASTOLIC BLOOD PRESSURE: 84 MMHG | TEMPERATURE: 96.5 F | RESPIRATION RATE: 19 BRPM | HEIGHT: 68 IN | HEART RATE: 99 BPM | WEIGHT: 306 LBS | BODY MASS INDEX: 46.38 KG/M2 | OXYGEN SATURATION: 96 % | SYSTOLIC BLOOD PRESSURE: 121 MMHG

## 2022-12-28 LAB — SARS-COV-2 N GENE NPH QL NAA+PROBE: NOT DETECTED

## 2022-12-28 PROCEDURE — 99215 OFFICE O/P EST HI 40 MIN: CPT | Mod: 25

## 2022-12-28 PROCEDURE — 93000 ELECTROCARDIOGRAM COMPLETE: CPT

## 2022-12-28 RX ORDER — FUROSEMIDE 40 MG/1
40 TABLET ORAL
Refills: 0 | Status: DISCONTINUED | COMMUNITY
End: 2022-12-28

## 2022-12-28 NOTE — REASON FOR VISIT
[FreeTextEntry1] : Cardiology follow-up visit for evaluation and management of severe arctic stenosis status post recent TAVR, CAD, status post remote CABG, paroxysmal atrial fibrillation, anticoagulated, atrial flutter, status post permanent pacemaker, ischemic cardiomyopathy, chronic systolic congestive heart failure, hypertension, hyperlipidemia, chronic lower extremity edema, renal insufficiency.\par \par

## 2022-12-28 NOTE — HISTORY OF PRESENT ILLNESS
[FreeTextEntry1] : Since last visit with me, he feels as though he is slowly improving.  Dyspnea has significantly improved.  He is able to do more activities.\par Nephrologist had switched him to Bumex, and he has been noting lessening of lower extremity edema.\par No complaints of chest pain or chest pressure.  He denies palpitations.  No dizziness, lightheadedness, syncope or near syncope.\par Denies any issues related to anticoagulation.  No excessive ecchymosis, bleeding, black or bloody stools, hematuria or epistaxis.\par \par

## 2022-12-28 NOTE — CARDIOLOGY SUMMARY
[___] : [unfilled] [de-identified] : Dec 28, 2022. Sinus  Rhythm  -First degree A-V block  - occasional PACs\par -Left axis -anterior fascicular block.  ABNORMAL  [de-identified] : Nov 1, 2022. Patent LIMA and occluded SVG to OM and RCA... medical therapy ... no intervention indicated\par Nov 18, 2022 s/p TAVR  [de-identified] : Nov 19, 2022. suboptimal study despite use of IV contrast.  LV function appears reduced.  s/p TAVR without para valvular leak\par Sept 8, 2022. @[facility]. Definity used.  LV suboptimally visualized.  There is decreased LV function. Moderate aortic stenosis. Yeyo 1.3cm2

## 2022-12-28 NOTE — DISCUSSION/SUMMARY
[FreeTextEntry1] : 62-year-old man with complex cardiac history.  Status post recent TAVR for severe aortic stenosis.  CAD, status post remote CABG, paroxysmal atrial fibrillation, anticoagulated, atrial flutter, status post permanent pacemaker, ischemic cardiomyopathy, chronic systolic congestive heart failure, hypertension, hyperlipidemia, chronic lower extremity edema, renal insufficiency, intolerance to beta-blockers.\par He has been improving with respect to dyspnea.  Lower extremity edema also improving.\par Blood pressure stable on today's examination.  There is no JVD.  Lung fields are clear.  Edema on physical examination appears to be unchanged from prior.\par EKG similar to prior.  Sinus rhythm, APCs.\par Current cardiac status appears to be stable.\par \par Plan\par 1.  Current medication list is reviewed, no changes.\par 2.  He does have regular blood work done with nephrologist.\par 3.  He will follow-up with me in the office in 3 months.\par 4.  Advised him to monitor for cardiac symptoms and to notify me for any changes or if he has any other concerns.  Cardiac issues were discussed, all questions answered.\par

## 2022-12-28 NOTE — PHYSICAL EXAM
[No Acute Distress] : no acute distress [Obese] : obese [Normal] : alert and oriented, normal memory [de-identified] : ll/Vl systolic murmur  [de-identified] : 3+ edema bilaterally

## 2023-01-06 ENCOUNTER — RX RENEWAL (OUTPATIENT)
Age: 63
End: 2023-01-06

## 2023-02-02 NOTE — ASSESSMENT
[FreeTextEntry1] : BERTHA WARD is a 62 year old man with below -- \par \par # b/l shoulder pain chronically but progressive, crepitus and limited active/passive ROM, XR with advanced OA\par - reviewed XR with him, discussed he may be a candidate for local injections and/or sx with ortho - he prefers to defer for now given recent TAVR but will consider \par - start PT when able to \par - c/w tylenol prn pain, needs to avoid NSAIDs 2/2 A/C \par \par # L foot pain - likely also OA mediated and exacerbated by LE edema\par - elevate leg when seated\par - can try local lidoderm patches \par \par # hyperuricemia, no personal hx of gout but + strong FH, CKD and upcoming cardiac sx\par - agree with ULT at current dose\par - if develops a demetrice gout flare will need dose titrated for goal sUA <6 \par \par RTC prn

## 2023-02-02 NOTE — PHYSICAL EXAM

## 2023-02-02 NOTE — REASON FOR VISIT
[Follow-Up: _____] : a [unfilled] follow-up visit [Initial Evaluation] : an initial evaluation [FreeTextEntry1] : b/l shoulder pain, hyperuricemia

## 2023-02-02 NOTE — REVIEW OF SYSTEMS
[As noted in HPI] : as noted in HPI [Lower Ext Edema] : lower extremity edema [As Noted in HPI] : as noted in HPI [Arthralgias] : arthralgias [Joint Pain] : joint pain [Joint Stiffness] : joint stiffness [Negative] : Heme/Lymph [Joint Swelling] : no joint swelling

## 2023-02-02 NOTE — HISTORY OF PRESENT ILLNESS
[FreeTextEntry1] : BERTHA WARD is a 62 year old man who presents for eval of below -- \par \par + b/l shoulder pain, R > L x 2 years, chronically x years but progressive over last 6 months, crepitus and limited ROM past level of shoulder - not done PT for this, never had local injections  \par + L spine DJD and scoliosis \par + hyperuricemia managed with ULT in setting of CKD but never had gout flare, + FH of brothers with gout \par + upcoming AVR early Oct \par \par Inflammatory arthritis ROS negative for symmetrical peripheral joint synovitis, prolonged AM stiffness, enthesitis, dactylitis, psoriasis/ rashes, eye inflammation, inflammatory low back pain, IBD. \par \par Denies hip girdle pain or shoulder girdle that improves as day progresses, no new/worsening HA, visual changes, scalp tenderness, jaw claudication, F/C, night sweats, unintentional weight loss, limb weakness or paresthesias. \par \par XR shoulders - advanced OA\par ----------\par 12/23/22 -- S/p TAVR, healing well. Has not been able to start PT yet, will do so when can. Shoulder pain stable, some newer worsening of L foot pain but no inflammatory changes, no gout flares, remains on ULT and tolerating well.

## 2023-02-08 ENCOUNTER — RX RENEWAL (OUTPATIENT)
Age: 63
End: 2023-02-08

## 2023-02-13 ENCOUNTER — APPOINTMENT (OUTPATIENT)
Dept: FAMILY MEDICINE | Facility: CLINIC | Age: 63
End: 2023-02-13
Payer: MEDICAID

## 2023-02-13 VITALS
WEIGHT: 307 LBS | HEART RATE: 102 BPM | OXYGEN SATURATION: 96 % | BODY MASS INDEX: 51.15 KG/M2 | HEIGHT: 65 IN | DIASTOLIC BLOOD PRESSURE: 75 MMHG | SYSTOLIC BLOOD PRESSURE: 120 MMHG | RESPIRATION RATE: 19 BRPM | TEMPERATURE: 96.3 F

## 2023-02-13 DIAGNOSIS — M19.90 UNSPECIFIED OSTEOARTHRITIS, UNSPECIFIED SITE: ICD-10-CM

## 2023-02-13 PROCEDURE — 99215 OFFICE O/P EST HI 40 MIN: CPT

## 2023-02-13 RX ORDER — METOLAZONE 2.5 MG/1
2.5 TABLET ORAL
Qty: 15 | Refills: 0 | Status: COMPLETED | COMMUNITY
Start: 2023-01-05

## 2023-02-13 NOTE — REVIEW OF SYSTEMS
[Fatigue] : fatigue [Chest Pain] : chest pain [Lower Ext Edema] : lower extremity edema [Dyspnea on Exertion] : dyspnea on exertion [Joint Pain] : joint pain [Muscle Pain] : muscle pain [Back Pain] : back pain [Itching] : itching [Skin Rash] : skin rash [Negative] : Heme/Lymph [Palpitations] : no palpitations [Orthopena] : no orthopnea [Shortness Of Breath] : no shortness of breath [Wheezing] : no wheezing [Cough] : no cough [Abdominal Pain] : no abdominal pain [Nausea] : no nausea [Constipation] : no constipation [Diarrhea] : no diarrhea [Vomiting] : no vomiting [Heartburn] : no heartburn [Melena] : no melena [Joint Stiffness] : no joint stiffness [Muscle Weakness] : no muscle weakness [Joint Swelling] : no joint swelling [de-identified] : Stasis dermatitis,Lichen planus

## 2023-02-13 NOTE — PHYSICAL EXAM
[No Acute Distress] : no acute distress [Well Nourished] : well nourished [Well Developed] : well developed [Well-Appearing] : well-appearing [Normal Voice/Communication] : normal voice/communication [Normal Rate] : normal rate  [Regular Rhythm] : with a regular rhythm [Normal S1, S2] : normal S1 and S2 [Soft] : abdomen soft [Non Tender] : non-tender [No Masses] : no abdominal mass palpated [Normal Bowel Sounds] : normal bowel sounds [Declined Rectal Exam] : declined rectal exam [Speech Grossly Normal] : speech grossly normal [Memory Grossly Normal] : memory grossly normal [Normal Affect] : the affect was normal [Alert and Oriented x3] : oriented to person, place, and time [Normal Mood] : the mood was normal [Normal Insight/Judgement] : insight and judgment were intact [Normal] : affect was normal and insight and judgment were intact [de-identified] : LAURA 2/6 [de-identified] : edema [de-identified] : obese, no guarding or rebound [de-identified] : left and right  shoulder ROM decreased due to pain,still has not seen ortho [de-identified] : stasis dermatitis,hyperpimented and areas escoriation of lower ext.

## 2023-02-13 NOTE — HISTORY OF PRESENT ILLNESS
[FreeTextEntry1] : See HPI. [de-identified] : Patient is 62-year-old gentleman who presents today for follow-up and disease management patient states that he has been in his usual state of health recently this past November to be exact November 18, 2022 patient had aortic valve replaced that his TAVR patient states that he still has some exertional dyspnea.  Medical history significant for chronic renal insufficiency, a flutter, chronic CHF, hypertension, ischemic heart disease, and elevated BMI patient also has underlying osteoarthritis and hyperuricemia.\par \par Presently the patient is awake alert and oriented x3 in no acute distress calm and cooperative.

## 2023-02-13 NOTE — HEALTH RISK ASSESSMENT
[Yes] : Yes [Monthly or less (1 pt)] : Monthly or less (1 point) [1 or 2 (0 pts)] : 1 or 2 (0 points) [Never (0 pts)] : Never (0 points) [No] : In the past 12 months have you used drugs other than those required for medical reasons? No [No falls in past year] : Patient reported no falls in the past year [0] : 2) Feeling down, depressed, or hopeless: Not at all (0) [PHQ-2 Negative - No further assessment needed] : PHQ-2 Negative - No further assessment needed [Never] : Never [Audit-CScore] : 1 [[id number]] : 0

## 2023-02-13 NOTE — ASSESSMENT
[FreeTextEntry1] : Assessment and plan:\par \par 1.  Status post TAVR patient tolerated procedure well still complaining of dyspnea on exertion.  Patient does have follow-up appointment next month with cardiology.\par \par 2.  Atrial flutter today patient appears to be in sinus rhythm continue Eliquis 5 mg twice a day.\par \par 3.  Ischemic heart disease continue clopidogrel 75 mg p.o. daily.\par \par 4.  Chronic systolic congestive heart failure patient will continue diuretics and Entresto 24–26 1 p.o. twice a day.\par \par 5.  Chronic renal insufficiency stable for patient in fact diuretics were decreased and patient's glomerular filtration rate improved.\par \par 6.  Hyperuricemia continue allopurinol 100 mg 2 tablets daily.\par \par 7.  Gastroesophageal reflux disease continue famotidine 20 mg p.o. twice a day patient still complains of occasional retrosternal burning.\par \par 8.  Hyperlipidemia patient will continue low-fat low-cholesterol diet and atorvastatin 20 mg p.o. daily at bedtime.\par \par 9.  Elevated BMI patient is following a weight loss program ever since TAVR patient has lost 19 pounds.\par \par Total time spent face-to-face and non-face-to-face time the majority of which was spent on counseling and coordination of care

## 2023-03-16 ENCOUNTER — EMERGENCY (EMERGENCY)
Facility: HOSPITAL | Age: 63
LOS: 1 days | Discharge: HOME | End: 2023-03-16
Attending: EMERGENCY MEDICINE | Admitting: EMERGENCY MEDICINE
Payer: MEDICAID

## 2023-03-16 VITALS
HEART RATE: 80 BPM | OXYGEN SATURATION: 98 % | RESPIRATION RATE: 18 BRPM | DIASTOLIC BLOOD PRESSURE: 79 MMHG | SYSTOLIC BLOOD PRESSURE: 145 MMHG

## 2023-03-16 VITALS
HEIGHT: 68 IN | WEIGHT: 309.97 LBS | SYSTOLIC BLOOD PRESSURE: 159 MMHG | HEART RATE: 81 BPM | DIASTOLIC BLOOD PRESSURE: 92 MMHG | RESPIRATION RATE: 18 BRPM | OXYGEN SATURATION: 99 % | TEMPERATURE: 98 F

## 2023-03-16 DIAGNOSIS — Z95.0 PRESENCE OF CARDIAC PACEMAKER: Chronic | ICD-10-CM

## 2023-03-16 DIAGNOSIS — Z90.89 ACQUIRED ABSENCE OF OTHER ORGANS: Chronic | ICD-10-CM

## 2023-03-16 DIAGNOSIS — Z95.1 PRESENCE OF AORTOCORONARY BYPASS GRAFT: Chronic | ICD-10-CM

## 2023-03-16 DIAGNOSIS — Z95.5 PRESENCE OF CORONARY ANGIOPLASTY IMPLANT AND GRAFT: Chronic | ICD-10-CM

## 2023-03-16 DIAGNOSIS — Z98.890 OTHER SPECIFIED POSTPROCEDURAL STATES: Chronic | ICD-10-CM

## 2023-03-16 PROCEDURE — 99284 EMERGENCY DEPT VISIT MOD MDM: CPT

## 2023-03-16 PROCEDURE — 99282 EMERGENCY DEPT VISIT SF MDM: CPT

## 2023-03-16 RX ORDER — PSEUDOEPHEDRINE HCL 30 MG
30 TABLET ORAL ONCE
Refills: 0 | Status: COMPLETED | OUTPATIENT
Start: 2023-03-16 | End: 2023-03-16

## 2023-03-16 RX ADMIN — Medication 30 MILLIGRAM(S): at 02:59

## 2023-03-16 NOTE — ED PROVIDER NOTE - PATIENT PORTAL LINK FT
You can access the FollowMyHealth Patient Portal offered by [facility] by registering at the following website: [email or web address]. By joining [employer]’s FollowMyHealth portal, you will also be able to view your health information using other applications (apps) compatible with our system.

## 2023-03-16 NOTE — ED PROVIDER NOTE - PHYSICAL EXAMINATION
Gen:  Well appearing in NAD  Head:  NC/AT  ENT: Right external auditory canal and TM are clear left external auditory canal is clear TM appears normal there appears to be a effusion behind the tympanic membrane no bulging appreciated   resp: No distress   Ext: no deformities  Skin: warm and dry as visualized

## 2023-03-16 NOTE — ED PROVIDER NOTE - CLINICAL SUMMARY MEDICAL DECISION MAKING FREE TEXT BOX
62-year-old male past medical history of coronary disease hypercholesterolemia cardiomyopathy hypertension atrial fibrillation and CKD presenting with left-sided hearing loss.  The physical exam is relatively unremarkable except for an effusion behind the left tympanic membrane.  This could be related to a sensorineural hearing loss in the inner ear.  There does not appear to be a cerumen impaction.  I will treat with Sudafed to help with the effusion.  I will need to refer to ear nose throat specialist for further examination of the ear.

## 2023-03-16 NOTE — ED PROVIDER NOTE - NSICDXPASTMEDICALHX_GEN_ALL_CORE_FT
PAST MEDICAL HISTORY:  ANGELICA (acute kidney injury) 4/2021    AS (aortic stenosis)     Atrial fibrillation 2018    Atrial flutter s/p ablation 2018    CAD (coronary artery disease) s/p CABG    CHF (congestive heart failure) denies any recent exacerbation    Chronic kidney disease, unspecified CKD stage     H/O scoliosis     HTN (hypertension)     Hypercholesteremia     Ischemic cardiomyopathy     Lichen planus chronic ( b/L LE)    Lower back pain     MI (myocardial infarction) 2012    Morbid obesity     WILFRED (obstructive sleep apnea) can not tolerate bipap at night    Osteoarthritis shoulders, hips, knee    Peripheral edema chronic    Status post placement of cardiac pacemaker micraleadless PPM, [id number] last interrogation 7/6/2022    Stented coronary artery 2019    Thrombus of left atrial appendage 2018

## 2023-03-16 NOTE — ED PROVIDER NOTE - OBJECTIVE STATEMENT
62-year-old male past medical history of coronary disease hypercholesterolemia cardiomyopathy hypertension atrial fibrillation CKD presenting with left sided hearing loss.  States over the last couple of days noticed a little bit of dizziness and then tonight while watching TV had the "sudden" loss of hearing in the left ear there is no pain at this time.  He had a similar problem in the past secondary to wax buildup.  There is no other headache weakness or other complaints at this time.

## 2023-03-16 NOTE — ED PROVIDER NOTE - NSFOLLOWUPINSTRUCTIONS_ED_ALL_ED_FT
Hearing Loss      Hearing loss is a partial or total loss of the ability to hear. This can be temporary or permanent, and it can happen in one or both ears.    There are two types of hearing loss. You can have just one type or both types. You may have a problem with:  •Damage to your hearing nerves (sensorineural hearing loss). This type of hearing loss is more likely to be permanent. A hearing aid is often the best treatment.      •Sound getting to your inner ear (conductive hearing loss). This type of hearing loss can usually be treated medically or surgically.      Medical care is necessary to treat hearing loss properly and to prevent the condition from getting worse. Your hearing may partially or completely come back, depending on what caused your hearing loss and how severe it is. In some cases, hearing loss is permanent.      What are the causes?    Common causes of hearing loss include:  •Too much wax in the ear canal.      •Infection of the ear canal or middle ear.      •Fluid in the middle ear.      •Injury to the ear or surrounding area.      •An object stuck in the ear.      •A history of prolonged exposure to loud sounds, such as music.      Less common causes of hearing loss include:  •Tumors in the ear.      •Viral or bacterial infections, such as meningitis.      •A hole in the eardrum (perforated eardrum).      •Problems with the hearing nerve that sends signals between the brain and the ear.      •Certain medicines.        What are the signs or symptoms?    Symptoms of this condition may include:  •Difficulty telling the difference between sounds.      •Difficulty following a conversation when there is background noise.      •Lack of response to sounds in your environment. This may be most noticeable when you do not respond to startling sounds.      •Needing to turn up the volume on the television, radio, or other devices.      •Ringing in the ears.      •Dizziness.        How is this diagnosed?  A health care provider checks a person's ear using an otoscope. A close-up of the ear and otoscope is also shown.   This condition is diagnosed based on:  •A physical exam.      •A hearing test (audiometry). The test will be performed by a hearing specialist (audiologist).      •Imaging tests, such as an MRI or CT scan.      You may also be referred to an ear, nose, and throat (ENT) specialist (otolaryngologist).      How is this treated?    Treatment for hearing loss may include:  •Earwax removal.      •Medicines to treat or prevent infection (antibiotics).      •Medicines to reduce inflammation (corticosteroids).      •Hearing aids or assistive listening devices.        Follow these instructions at home:    •If you were prescribed an antibiotic medicine, take it as told by your health care provider. Do not stop taking the antibiotic even if you start to feel better.      •Take over-the-counter and prescription medicines only as told by your health care provider.      •Avoid loud noises. Use hearing protection if you are exposed to loud noises or work in a noisy environment.      •Return to your normal activities as told by your health care provider. Ask your health care provider what activities are safe for you.      •Keep all follow-up visits. This is important.        Contact a health care provider if:    •You feel dizzy.      •You develop new symptoms.      •You vomit or feel nauseous.      •You have a fever.        Get help right away if:    •You develop sudden changes in your vision.      •You have severe ear pain.      •You have new or increased weakness.      •You have a severe headache.        Summary    •Hearing loss is a decreased ability to hear sounds around you. It can be temporary or permanent.      •Treatment will depend on the cause of your hearing loss. It may include earwax removal, medicines, or a hearing aid.      •Your hearing may partially or completely come back, depending on what caused your hearing loss and how severe it is.      •Keep all follow-up visits. This is important.    Follow up with an ENT later this week

## 2023-03-16 NOTE — ED ADULT TRIAGE NOTE - PAIN RATING/NUMBER SCALE (0-10): ACTIVITY
Alert-The patient is alert, awake and responds to voice. The patient is oriented to time, place, and person. The triage nurse is able to obtain subjective information.
2 (mild pain)

## 2023-03-29 ENCOUNTER — APPOINTMENT (OUTPATIENT)
Dept: CARDIOLOGY | Facility: CLINIC | Age: 63
End: 2023-03-29
Payer: MEDICAID

## 2023-03-29 ENCOUNTER — NON-APPOINTMENT (OUTPATIENT)
Age: 63
End: 2023-03-29

## 2023-03-29 VITALS
HEIGHT: 65 IN | DIASTOLIC BLOOD PRESSURE: 85 MMHG | TEMPERATURE: 96.2 F | SYSTOLIC BLOOD PRESSURE: 127 MMHG | BODY MASS INDEX: 49.98 KG/M2 | HEART RATE: 81 BPM | RESPIRATION RATE: 19 BRPM | WEIGHT: 300 LBS | OXYGEN SATURATION: 97 %

## 2023-03-29 PROCEDURE — 93000 ELECTROCARDIOGRAM COMPLETE: CPT

## 2023-03-29 PROCEDURE — 99215 OFFICE O/P EST HI 40 MIN: CPT | Mod: 25

## 2023-03-29 NOTE — PHYSICAL EXAM
[No Acute Distress] : no acute distress [Obese] : obese [Normal] : alert and oriented, normal memory [de-identified] : ll/Vl systolic murmur  [de-identified] : 3+ edema bilaterally

## 2023-03-29 NOTE — HISTORY OF PRESENT ILLNESS
[FreeTextEntry1] : Since last visit with been concerned regarding left ear discomfort.  He states that he suddenly felt a popping sensation in his left ear a few days back.  Also been having intermittent episodes of vertigo.\par He has made appointment to see ENT.\par No chest pain or chest pressure.  No shortness of breath.  No palpitations.  No syncope.\par He thinks edema is improving.  No orthopnea, no PND.\par Remains on anticoagulation.  Denies any excessive ecchymosis, bleeding, black or bloody stools, hematuria or epistaxis.\par \par \par \par \par

## 2023-03-29 NOTE — CARDIOLOGY SUMMARY
[___] : [unfilled] [___] : [unfilled] [de-identified] : Dec 28, 2022. Sinus  Rhythm  -First degree A-V block  - occasional PACs\par -Left axis -anterior fascicular block.  ABNORMAL  [de-identified] : Nov 19, 2022. suboptimal study despite use of IV contrast.  LV function appears reduced.  s/p TAVR without para valvular leak\par Sept 8, 2022. @[facility]. Definity used.  LV suboptimally visualized.  There is decreased LV function. Moderate aortic stenosis. Yeyo 1.3cm2  [de-identified] : Nov 1, 2022. Patent LIMA and occluded SVG to OM and RCA... medical therapy ... no intervention indicated\par Nov 18, 2022 s/p TAVR

## 2023-03-29 NOTE — REVIEW OF SYSTEMS
[Feeling Fatigued] : feeling fatigued [Lower Ext Edema] : lower extremity edema [Dizziness] : dizziness [Negative] : Heme/Lymph

## 2023-03-29 NOTE — DISCUSSION/SUMMARY
[FreeTextEntry1] : 62-year-old man with complex cardiac history.  Severe arctic stenosis, status post TAVR, CAD, status post remote CABG, paroxysmal atrial fibrillation, anticoagulated, status post permanent pacemaker, ischemic cardiomyopathy, chronic static CHF, hypertension hyperlipidemia, lower extremity edema and renal insufficiency.\par Blood pressure stable.  There is no JVD.  He appears euvolemic.\par EKG is sinus rhythm.  No significant ST changes.\par \par Plan\par 1.  Current medication list is reviewed, no changes.\par 2.  Elevate lower extremities to what ever extent possible.\par 3.  Follow-up with me in the office in 3 months.\par 4.  The above was reviewed with the patient, all questions answered.\par

## 2023-04-03 NOTE — H&P CARDIOLOGY - NECK DETAILS
supple/no JVD Transposition Flap Text: The defect edges were debeveled with a #15 scalpel blade.  Given the location of the defect and the proximity to free margins a transposition flap was deemed most appropriate.  Using a sterile surgical marker, an appropriate transposition flap was drawn incorporating the defect.    The area thus outlined was incised deep to adipose tissue with a #15 scalpel blade.  The skin margins were undermined to an appropriate distance in all directions utilizing iris scissors.

## 2023-05-16 ENCOUNTER — APPOINTMENT (OUTPATIENT)
Dept: FAMILY MEDICINE | Facility: CLINIC | Age: 63
End: 2023-05-16
Payer: MEDICAID

## 2023-05-16 ENCOUNTER — RX RENEWAL (OUTPATIENT)
Age: 63
End: 2023-05-16

## 2023-05-16 VITALS
TEMPERATURE: 97.2 F | OXYGEN SATURATION: 96 % | DIASTOLIC BLOOD PRESSURE: 72 MMHG | WEIGHT: 312 LBS | SYSTOLIC BLOOD PRESSURE: 108 MMHG | HEIGHT: 65 IN | HEART RATE: 104 BPM | BODY MASS INDEX: 51.98 KG/M2 | RESPIRATION RATE: 16 BRPM

## 2023-05-16 DIAGNOSIS — H91.92 UNSPECIFIED HEARING LOSS, LEFT EAR: ICD-10-CM

## 2023-05-16 DIAGNOSIS — E66.01 MORBID (SEVERE) OBESITY DUE TO EXCESS CALORIES: ICD-10-CM

## 2023-05-16 PROCEDURE — 99215 OFFICE O/P EST HI 40 MIN: CPT

## 2023-05-16 NOTE — ASSESSMENT
[FreeTextEntry1] : Assessment and plan:\par \par 1.  Status post TAVR patient tolerated procedure well still complaining of dyspnea on exertion.  Reviewed most recent cardiology evaluation continue present medical management.\par \par 2.  Atrial flutter today patient appears to be in sinus rhythm continue Eliquis 5 mg twice a day.\par \par 3.  Ischemic heart disease continue clopidogrel 75 mg p.o. daily.\par \par 4.  Chronic systolic congestive heart failure patient will continue diuretics and Entresto 24–26 1 p.o. twice a day.\par \par 5.  Chronic renal insufficiency stable for patient in fact diuretics were decreased and patient's glomerular filtration rate improved.\par \par 6.  Hyperuricemia continue allopurinol 100 mg 2 tablets daily.\par \par 7.  Gastroesophageal reflux disease continue famotidine 20 mg p.o. twice a day patient still complains of occasional retrosternal burning.\par \par 8.  Hyperlipidemia patient will continue low-fat low-cholesterol diet and atorvastatin 20 mg p.o. daily at bedtime.\par \par 9.  Acute hearing loss left ear physical exam was unremarkable no acute pathology noted patient had MRI yesterday report is still pending has follow-up appointment with ear nose and throat specialist.  Most common cause for acute hearing loss is infection, trauma and the patient is being worked up to rule out acoustic neuroma.\par \par 10.  Elevated BMI post TAVR patient had lost 19 pounds since then patient has regained 12 pounds but patient just had a pulse treatment of prednisone for the acute hearing loss.\par \par Total time spent face-to-face and non-face-to-face time 40 minutes, the majority of which was spent on counseling and coordination of care

## 2023-05-16 NOTE — REVIEW OF SYSTEMS
[Fatigue] : fatigue [Hearing Loss] : hearing loss [Chest Pain] : chest pain [Palpitations] : no palpitations [Lower Ext Edema] : lower extremity edema [Orthopena] : no orthopnea [Shortness Of Breath] : no shortness of breath [Wheezing] : no wheezing [Cough] : no cough [Dyspnea on Exertion] : dyspnea on exertion [Abdominal Pain] : no abdominal pain [Nausea] : no nausea [Constipation] : no constipation [Diarrhea] : no diarrhea [Vomiting] : no vomiting [Heartburn] : no heartburn [Melena] : no melena [Joint Pain] : joint pain [Joint Stiffness] : no joint stiffness [Muscle Pain] : muscle pain [Muscle Weakness] : no muscle weakness [Back Pain] : back pain [Joint Swelling] : no joint swelling [Itching] : itching [Skin Rash] : skin rash [Negative] : Heme/Lymph [de-identified] : Stasis dermatitis,Lichen planus

## 2023-05-16 NOTE — HISTORY OF PRESENT ILLNESS
[FreeTextEntry1] : See HPI. [de-identified] : Patient is a 62-year-old gentleman who presents today for follow-up and disease management patient does see multiple subspecialist he has multiple underlying medical problems.  Patient's history is significant for TAVR, A-fib flutter, ischemic heart disease, systolic congestive heart failure, chronic renal insufficiency, hyperuricemia, reflux, hyperlipidemia and elevated BMI.\par \par Patient's chief complaint is that recently he had acute loss of hearing left ear he has been seen by ear nose and throat specialist work-up is in progress in fact patient did have MRI done yesterday.

## 2023-05-16 NOTE — PHYSICAL EXAM
[No Acute Distress] : no acute distress [Well Nourished] : well nourished [Well Developed] : well developed [Well-Appearing] : well-appearing [Normal Voice/Communication] : normal voice/communication [Normal Rate] : normal rate  [Regular Rhythm] : with a regular rhythm [Normal S1, S2] : normal S1 and S2 [Soft] : abdomen soft [Non Tender] : non-tender [No Masses] : no abdominal mass palpated [Normal Bowel Sounds] : normal bowel sounds [Declined Rectal Exam] : declined rectal exam [Speech Grossly Normal] : speech grossly normal [Memory Grossly Normal] : memory grossly normal [Normal Affect] : the affect was normal [Alert and Oriented x3] : oriented to person, place, and time [Normal Mood] : the mood was normal [Normal Insight/Judgement] : insight and judgment were intact [Normal] : affect was normal and insight and judgment were intact [de-identified] : LAURA 2/6 [de-identified] : edema [de-identified] : obese, no guarding or rebound [de-identified] : left and right  shoulder ROM decreased due to pain,still has not seen ortho [de-identified] : stasis dermatitis,hyperpimented and areas escoriation of lower ext.

## 2023-06-27 NOTE — ED PROVIDER NOTE - MDM ORDERS SUBMITTED SELECTION
Anesthesia Pre Eval Note    Anesthesia ROS/Med Hx    Overall Review:  EKG was reviewed     Anesthetic Complication History:  Patient does not have a history of anesthetic complications      Pulmonary Review:  Patient does not have a pulmonary history      Neuro/Psych Review:  Patient does not have a neuro/psych history       Cardiovascular Review:  Exercise tolerance: good (>4 METS)  Positive for hyperlipidemia    GI/HEPATIC/RENAL Review:    Positive for renal disease - nephrolithiasis    End/Other Review:  Patient does not have an endo/other history    Additional Results:     ALLERGIES:  No Known Allergies       Last Labs        Component                Value               Date/Time                  WBC                      8.7                 06/16/2023 1111            RBC                      4.47                06/16/2023 1111            HGB                      13.7                06/16/2023 1111            HCT                      41.2                06/16/2023 1111            MCV                      92.2                06/16/2023 1111            MCH                      30.6                06/16/2023 1111            MCHC                     33.3                06/16/2023 1111            RDW-CV                   11.7                06/16/2023 1111            Sodium                   139                 06/16/2023 1111            Potassium                4.3                 06/16/2023 1111            Chloride                 105                 06/16/2023 1111            Carbon Dioxide           28                  06/16/2023 1111            Glucose                  107 (H)             06/16/2023 1111            BUN                      21 (H)              06/16/2023 1111            Creatinine               0.82                06/16/2023 1111            Glomerular Filtrati*     81                  06/16/2023 1111            Calcium                  9.4                 06/16/2023 1111            PLT                       269                 2023 1111        Past Medical History:  No date: Hyperlipidemia  No date: Kidney stone  No date: Vitamin D deficiency    Past Surgical History:  2005: Ankle surgery; Right  1983:  section, low transverse  2022: Cystourethroscopy  2015: Lipoma resection      Comment:  excision of L thigh lipoma  2020: Pap smear, thin prep       Prior to Admission medications :  Medication VITAMIN D PO, Sig , Start Date , End Date , Taking? Yes, Authorizing Provider Provider, Outside    Medication Multiple Vitamins-Minerals (CENTRUM ADULT PO), Sig , Start Date , End Date , Taking? Yes, Authorizing Provider Provider, Outside    Medication atorvastatin (LIPITOR) 10 MG tablet, Sig Take 1 tablet by mouth daily.  Patient taking differently: Take 10 mg by mouth every evening., Start Date 2/10/23, End Date , Taking? Yes, Authorizing Provider Layla Hussein MD    Medication HYDROcodone-acetaminophen (NORCO) 5-325 MG per tablet, Sig Take 1 tablet by mouth every 6 hours as needed for Pain (moderate-severe pain)., Start Date 23, End Date 23, Taking? , Authorizing Provider Erni Hodgson, DO         Patient Vitals in the past 24 hrs:  23 1412, BP:(!) 156/83, Pulse:71, Resp:15, SpO2:97 %  23 1402, BP:131/75, Pulse:76, Resp:16, SpO2:100 %  23 1351, BP:125/72, Temp:35.8 °C (96.4 °F), Temp src:Temporal, Pulse:62, Resp:(!) 10, SpO2:100 %  23 1251, Temp src:Temporal  23 1126, BP:(!) 147/65, Temp:36.1 °C (97 °F), Temp src:Temporal, Pulse:(!) 56, Resp:16, SpO2:98 %, Height:5' 1\" (1.549 m), Weight:57 kg (125 lb 10.6 oz)      Relevant Problems   No relevant active problems       Physical Exam     Airway   Mallampati: II  TM Distance: <3 FB  Neck ROM: Full  Neck: Non-tender and Able to place in sniff position  TMJ Mobility: Good    Cardiovascular  Cardiovascular exam normal  Cardio Rhythm: Regular  Cardio Rate: Normal    Head Assessment  Head assessment:  Normocephalic and Atraumatic    General Assessment  General Assessment: Alert and oriented and No acute distress    Dental Exam  Dental exam normal    Pulmonary Exam  Pulmonary exam normal  Breath sounds clear to auscultation:  Yes    Abdominal Exam  Abdominal exam normal      Anesthesia Plan:    ASA Status: 2  Anesthesia Type: General    Induction: Intravenous  Preferred Airway Type: LMA  Maintenance: Inhalational    Post-op Pain Management: Per Surgeon      Checklist  Reviewed: NPO Status, Allergies, Medications, Problem list and Past Med History  Consent/Risks Discussed Statement:  The proposed anesthetic plan, including its risks and benefits, have been discussed with the Patient along with the risks and benefits of alternatives. Questions were encouraged and answered and the patient and/or representative understands and agrees to proceed.        I discussed with the patient (and/or patient's legal representative) the risks and benefits of the proposed anesthesia plan, General, which may include services performed by other anesthesia providers.    Alternative anesthesia plans, if available, were reviewed with the patient (and/or patient's legal representative). Discussion has been held with the patient (and/or patient's legal representative) regarding risks of anesthesia, which include vomiting, nausea, dental injury, sore throat and headache and emergent situations that may require change in anesthesia plan.    The patient (and/or patient's legal representative) has indicated understanding, his/her questions have been answered, and he/she wishes to proceed with the planned anesthetic.    Blood Products: Not Anticipated     Labs Labs/EKG/Medications

## 2023-06-28 ENCOUNTER — NON-APPOINTMENT (OUTPATIENT)
Age: 63
End: 2023-06-28

## 2023-06-28 ENCOUNTER — APPOINTMENT (OUTPATIENT)
Dept: CARDIOLOGY | Facility: CLINIC | Age: 63
End: 2023-06-28
Payer: MEDICAID

## 2023-06-28 VITALS
HEART RATE: 83 BPM | DIASTOLIC BLOOD PRESSURE: 70 MMHG | RESPIRATION RATE: 17 BRPM | BODY MASS INDEX: 51.48 KG/M2 | WEIGHT: 309 LBS | SYSTOLIC BLOOD PRESSURE: 108 MMHG | HEIGHT: 65 IN

## 2023-06-28 VITALS
RESPIRATION RATE: 17 BRPM | SYSTOLIC BLOOD PRESSURE: 102 MMHG | DIASTOLIC BLOOD PRESSURE: 75 MMHG | HEIGHT: 65 IN | OXYGEN SATURATION: 97 % | WEIGHT: 309 LBS | BODY MASS INDEX: 51.48 KG/M2 | HEART RATE: 83 BPM

## 2023-06-28 PROCEDURE — 93000 ELECTROCARDIOGRAM COMPLETE: CPT

## 2023-06-28 PROCEDURE — 99215 OFFICE O/P EST HI 40 MIN: CPT | Mod: 25

## 2023-06-28 NOTE — PHYSICAL EXAM
[No Acute Distress] : no acute distress [Obese] : obese [Normal] : alert and oriented, normal memory [de-identified] : ll/Vl systolic murmur  [de-identified] : 3+ edema bilaterally

## 2023-06-28 NOTE — CARDIOLOGY SUMMARY
[___] : [unfilled] [de-identified] : Dec 28, 2022. Sinus  Rhythm  -First degree A-V block  - occasional PACs\par -Left axis -anterior fascicular block.  ABNORMAL  [de-identified] : Nov 19, 2022. suboptimal study despite use of IV contrast.  LV function appears reduced.  s/p TAVR without para valvular leak\par Sept 8, 2022. @[facility]. Definity used.  LV suboptimally visualized.  There is decreased LV function. Moderate aortic stenosis. Yeyo 1.3cm2  [de-identified] : Nov 1, 2022. Patent LIMA and occluded SVG to OM and RCA... medical therapy ... no intervention indicated\par Nov 18, 2022 s/p TAVR

## 2023-06-28 NOTE — DISCUSSION/SUMMARY
[FreeTextEntry1] : 63-year-old man with complex cardiac history.  Recent history of severe arctic stenosis, status post TAVR.  He has CAD, status post remote CABG.  Paroxysmal atrial fibrillation, anticoagulated.  History of atrial flutter, status post permanent pacemaker.\par Chronic ischemic cardiomyopathy, chronic systolic congestive heart failure, hypertension, hyperlipidemia.\par Medical issues include lichen planus, chronic lower extremity edema, renal insufficiency.\par He does report intermittent low blood pressure with intermittent feelings of lightheadedness.\par Blood pressure low normal.  There is no JVD.  Lung fields are clear.\par Edema is unchanged from prior.\par EKG normal sinus rhythm.  First-degree AV block.  Left anterior hemiblock.\par \par Plan\par 1.  Due to concern for low normal blood pressure, reasonable to decrease hydralazine 25 mg 3 times daily.\par 2.  I did discuss with him once again regarding beta-blockers, he is adamantly refusing.\par 3.  No other changes in medications, he will continue with Entresto and Eliquis.\par 4.  Pacemaker interrogation to be arranged.\par 5.  Next visit with me in the office in 3 months.\par 6.  He will continue follow-up with primary care physician and with subspecialist.  He will be establishing care with neurologist regarding back pain and sciatica.\par 7.  Cardiac issues were discussed with him, all questions answered.\par

## 2023-06-28 NOTE — HISTORY OF PRESENT ILLNESS
[FreeTextEntry1] : Since his last visit with me, he was having work-up with ENT, and was diagnosed with idiopathic moderate hearing loss in 1 year.\par He has been having back pain with associated sciatica.\par No complaints of chest pain or chest pressure.\par He describes chronic dyspnea after moderate activities.  No resting dyspnea.  Continues to have lower extremity edema.  No orthopnea.  No PND.  He does have occasional feelings of lightheadedness, which he attributes to low normal blood pressure.  He has cut back on hydralazine to 50 mg twice daily.\par No dizziness, syncope or near syncope.\par Remains on anticoagulation.  Denies any excessive ecchymosis, bleeding, black or bloody stools, hematuria or epistaxis.\par \par

## 2023-06-28 NOTE — REVIEW OF SYSTEMS
[Feeling Fatigued] : feeling fatigued [Hearing Loss] : hearing loss [Dyspnea on exertion] : dyspnea during exertion [Lower Ext Edema] : lower extremity edema [Negative] : Heme/Lymph [FreeTextEntry9] : back pain

## 2023-07-01 ENCOUNTER — RX RENEWAL (OUTPATIENT)
Age: 63
End: 2023-07-01

## 2023-07-06 ENCOUNTER — RX RENEWAL (OUTPATIENT)
Age: 63
End: 2023-07-06

## 2023-07-19 ENCOUNTER — APPOINTMENT (OUTPATIENT)
Dept: CARDIOLOGY | Facility: CLINIC | Age: 63
End: 2023-07-19
Payer: MEDICAID

## 2023-07-19 PROCEDURE — 93288 INTERROG EVL PM/LDLS PM IP: CPT

## 2023-07-19 NOTE — PROCEDURE
[Pacemaker] : pacemaker [VVI] : VVI [Voltage: ___ volts] : Voltage was [unfilled] volts [Longevity: ___ months] : The estimated remaining battery life is [unfilled] months [Lead Imp:  ___ohms] : lead impedance was [unfilled] ohms [Sensing Amplitude ___mv] : sensing amplitude was [unfilled] mv [___V @] : [unfilled] V [___ ms] : [unfilled] ms [Sense ___ %] : Sense [unfilled]% [de-identified] : [employer] [de-identified] : RADHA JENKINS TCP [id number] [de-identified] : 11/2/2018 [de-identified] : 39 [de-identified] : NO EVENTS\par NORMAL FUNCTION

## 2023-07-24 ENCOUNTER — RX RENEWAL (OUTPATIENT)
Age: 63
End: 2023-07-24

## 2023-08-28 ENCOUNTER — RX RENEWAL (OUTPATIENT)
Age: 63
End: 2023-08-28

## 2023-09-11 ENCOUNTER — APPOINTMENT (OUTPATIENT)
Dept: FAMILY MEDICINE | Facility: CLINIC | Age: 63
End: 2023-09-11
Payer: MEDICAID

## 2023-09-11 VITALS
DIASTOLIC BLOOD PRESSURE: 80 MMHG | OXYGEN SATURATION: 96 % | HEART RATE: 105 BPM | HEIGHT: 65 IN | RESPIRATION RATE: 16 BRPM | TEMPERATURE: 97.2 F | SYSTOLIC BLOOD PRESSURE: 124 MMHG | WEIGHT: 311 LBS | BODY MASS INDEX: 51.82 KG/M2

## 2023-09-11 PROCEDURE — 99215 OFFICE O/P EST HI 40 MIN: CPT

## 2023-09-20 ENCOUNTER — APPOINTMENT (OUTPATIENT)
Dept: CARDIOLOGY | Facility: CLINIC | Age: 63
End: 2023-09-20
Payer: MEDICAID

## 2023-09-20 VITALS
HEART RATE: 92 BPM | SYSTOLIC BLOOD PRESSURE: 125 MMHG | WEIGHT: 310 LBS | BODY MASS INDEX: 51.65 KG/M2 | OXYGEN SATURATION: 93 % | HEIGHT: 65 IN | DIASTOLIC BLOOD PRESSURE: 86 MMHG | TEMPERATURE: 95.4 F | RESPIRATION RATE: 19 BRPM

## 2023-09-20 PROCEDURE — 99215 OFFICE O/P EST HI 40 MIN: CPT | Mod: 25

## 2023-09-20 PROCEDURE — 93000 ELECTROCARDIOGRAM COMPLETE: CPT

## 2023-10-31 ENCOUNTER — RX RENEWAL (OUTPATIENT)
Age: 63
End: 2023-10-31

## 2023-12-13 ENCOUNTER — NON-APPOINTMENT (OUTPATIENT)
Age: 63
End: 2023-12-13

## 2023-12-13 ENCOUNTER — APPOINTMENT (OUTPATIENT)
Dept: CARDIOLOGY | Facility: CLINIC | Age: 63
End: 2023-12-13
Payer: MEDICAID

## 2023-12-13 VITALS
RESPIRATION RATE: 18 BRPM | WEIGHT: 303 LBS | SYSTOLIC BLOOD PRESSURE: 107 MMHG | DIASTOLIC BLOOD PRESSURE: 66 MMHG | OXYGEN SATURATION: 93 % | HEIGHT: 65 IN | HEART RATE: 92 BPM | TEMPERATURE: 94.3 F | BODY MASS INDEX: 50.48 KG/M2

## 2023-12-13 PROCEDURE — 99215 OFFICE O/P EST HI 40 MIN: CPT | Mod: 25

## 2023-12-13 PROCEDURE — 93000 ELECTROCARDIOGRAM COMPLETE: CPT

## 2023-12-13 NOTE — DISCUSSION/SUMMARY
[FreeTextEntry1] : 63-year-old man with complex cardiac history. Status post TAVR for severe aortic stenosis, CAD, status post remote CABG, paroxysmal atrial fibrillation, remains in sinus rhythm.  Chronically anticoagulated.  History of atrial flutter, status post leadless pacemaker.  Ischemic cardiomyopathy, chronic systolic congestive heart failure.  Hypertension, hyperlipidemia, chronic lower extremity edema, renal insufficiency. He has chronic symptoms of fatigue, poor stamina, dyspnea on exertion. Medical issues include obesity, renal insufficiency lichen planus in his lower extremities. On physical examination, blood pressure is normal.  There is no JVD.  Lung fields are clear.  2+ lower extremity edema that is chronic. EKG sinus rhythm.  PACs.  Poor R wave progression.  Nonspecific ST changes.  Largely unchanged from prior.  Plan 1.  Current medications reviewed, no changes.  Spironolactone was considered, however, apparently nephrology did not advise.  He will continue with Entresto.  As noted previously, patient has been intolerant of beta-blockers. 2.  Continue with anticoagulation, benefits outweigh the potential risks. 3.  Echocardiogram is ordered, he will have it done at [facility]. 4.  Next visit with me in the office in 3 months. 5.  Advised to monitor for any cardiac symptoms and to report back to me if there are any changes or if any other concerns.  Cardiac issues were discussed, all questions answered.

## 2023-12-13 NOTE — PHYSICAL EXAM
[No Acute Distress] : no acute distress [Obese] : obese [Normal] : alert and oriented, normal memory [de-identified] : ll/Vl systolic murmur  [de-identified] : 3+ edema bilaterally

## 2023-12-13 NOTE — CARDIOLOGY SUMMARY
[___] : [unfilled] [de-identified] : Dec 13 2023. Sinus Rhythm PACs - poor R wave progression  -Nonspecific T-abnormality. ABNORMAL  [de-identified] : Nov 19, 2022. suboptimal study despite use of IV contrast.  LV function appears reduced.  s/p TAVR without para valvular leak\par  Sept 8, 2022. @[facility]. Definity used.  LV suboptimally visualized.  There is decreased LV function. Moderate aortic stenosis. Yeyo 1.3cm2  [de-identified] : Nov 1, 2022. Patent LIMA and occluded SVG to OM and RCA... medical therapy ... no intervention indicated\par  Nov 18, 2022 s/p TAVR

## 2023-12-13 NOTE — HISTORY OF PRESENT ILLNESS
[FreeTextEntry1] : Since last visit with me, he has been under increased personal stress, he recently unexpectedly lost a sister-in-law and he has been grieving.  He thinks he may have had a recent GI infection, he was having loose bowel movements and poor appetite thinks maybe he lost about 5 pounds.  He has poor stamina that is chronic.  If he walks a moderate distance, he feels tired and has shortness of breath.  Symptoms promptly resolve after resting.  No resting dyspnea. No complaints of chest pain or chest pressure.  Denies palpitations.  If he walks a moderate distance, he feels tired and has shortness of breath.  Symptoms promptly resolve after resting.  No resting dyspnea. No complaints of chest pain or chest pressure.  Denies palpitations.  No dizziness or lightheadedness.  No syncope. He has chronic lower extremity edema that is unchanged.  No orthopnea.  No PND. Remains on anticoagulation.  Denies any excessive ecchymosis, bleeding, black or bloody stools, hematuria or epistaxis.

## 2023-12-13 NOTE — REASON FOR VISIT
[FreeTextEntry1] : Cardiology follow-up visit for evaluation and management of severe arctic stenosis status post recent TAVR, CAD, status post remote CABG, paroxysmal atrial fibrillation, anticoagulated, atrial flutter, status post permanent pacemaker, ischemic cardiomyopathy, chronic systolic congestive heart failure, hypertension, hyperlipidemia, chronic lower extremity edema, renal insufficiency.

## 2024-01-06 ENCOUNTER — RX RENEWAL (OUTPATIENT)
Age: 64
End: 2024-01-06

## 2024-01-08 ENCOUNTER — APPOINTMENT (OUTPATIENT)
Dept: FAMILY MEDICINE | Facility: CLINIC | Age: 64
End: 2024-01-08
Payer: MEDICAID

## 2024-01-08 VITALS
BODY MASS INDEX: 51.32 KG/M2 | DIASTOLIC BLOOD PRESSURE: 69 MMHG | SYSTOLIC BLOOD PRESSURE: 109 MMHG | TEMPERATURE: 98.2 F | RESPIRATION RATE: 16 BRPM | HEIGHT: 65 IN | OXYGEN SATURATION: 88 % | HEART RATE: 71 BPM | WEIGHT: 308 LBS

## 2024-01-08 DIAGNOSIS — E66.01 MORBID (SEVERE) OBESITY DUE TO EXCESS CALORIES: ICD-10-CM

## 2024-01-08 DIAGNOSIS — R10.13 EPIGASTRIC PAIN: ICD-10-CM

## 2024-01-08 DIAGNOSIS — G89.29 LOW BACK PAIN, UNSPECIFIED: ICD-10-CM

## 2024-01-08 DIAGNOSIS — R73.9 HYPERGLYCEMIA, UNSPECIFIED: ICD-10-CM

## 2024-01-08 DIAGNOSIS — M54.50 LOW BACK PAIN, UNSPECIFIED: ICD-10-CM

## 2024-01-08 PROCEDURE — 99214 OFFICE O/P EST MOD 30 MIN: CPT

## 2024-01-08 RX ORDER — SACUBITRIL AND VALSARTAN 24; 26 MG/1; MG/1
24-26 TABLET, FILM COATED ORAL
Qty: 60 | Refills: 5 | Status: ACTIVE | COMMUNITY
Start: 2021-08-31 | End: 1900-01-01

## 2024-01-08 RX ORDER — CLOPIDOGREL BISULFATE 75 MG/1
75 TABLET, FILM COATED ORAL
Qty: 30 | Refills: 5 | Status: ACTIVE | COMMUNITY
Start: 2019-06-24 | End: 1900-01-01

## 2024-01-08 RX ORDER — ALLOPURINOL 100 MG/1
100 TABLET ORAL DAILY
Qty: 180 | Refills: 1 | Status: ACTIVE | COMMUNITY
Start: 2021-04-26

## 2024-01-08 NOTE — END OF VISIT
[Time Spent: ___ minutes] : I have spent [unfilled] minutes of time on the encounter. Principal Discharge DX:	Chronic pancreatitis, unspecified pancreatitis type

## 2024-01-08 NOTE — ASSESSMENT
[FreeTextEntry1] : Assessment and plan:  1.  Chronic and recurrent low back pain recommend spine specialist evaluation.  2.  A-fib flutter presently rate is very well-controlled continue anticoagulation with Eliquis 5 mg p.o. twice a day.  3.  Coronary artery disease history of drug-eluting stent and CABG continue clopidogrel 75 mg p.o. daily.  4.  Systolic congestive heart failure patient tolerating and doing well with Entresto 24-26 and hydralazine 25 mg p.o. 3 times a day.  5.  Chronic edema continue bumetanide 1 mg.  6.  Hyperuricemia continue allopurinol 100 mg patient takes 2 tablets daily.  7.  Reflux continue famotidine 20 mg p.o. twice a day.  8.  Hyperlipidemia continue atorvastatin 20 mg daily.  9.  Reviewed with patient comprehensive blood work that was recently done.  No change in present medical management continue all meds as prescribed.

## 2024-01-08 NOTE — REVIEW OF SYSTEMS
[Fatigue] : fatigue [Hearing Loss] : hearing loss [Chest Pain] : chest pain [Palpitations] : no palpitations [Lower Ext Edema] : lower extremity edema [Orthopena] : no orthopnea [Shortness Of Breath] : no shortness of breath [Wheezing] : no wheezing [Cough] : no cough [Dyspnea on Exertion] : dyspnea on exertion [Abdominal Pain] : no abdominal pain [Nausea] : no nausea [Constipation] : no constipation [Diarrhea] : no diarrhea [Vomiting] : no vomiting [Heartburn] : no heartburn [Melena] : no melena [Joint Pain] : joint pain [Joint Stiffness] : no joint stiffness [Muscle Pain] : muscle pain [Muscle Weakness] : no muscle weakness [Back Pain] : back pain [Joint Swelling] : no joint swelling [Itching] : itching [Skin Rash] : skin rash [Negative] : Heme/Lymph [de-identified] : Stasis dermatitis,Lichen planus

## 2024-01-08 NOTE — HEALTH RISK ASSESSMENT
[Yes] : Yes [Monthly or less (1 pt)] : Monthly or less (1 point) [1 or 2 (0 pts)] : 1 or 2 (0 points) [Never (0 pts)] : Never (0 points) [One fall no injury in past year] : Patient reported one fall in the past year without injury [Little interest or pleasure doing things] : 1) Little interest or pleasure doing things [Feeling down, depressed, or hopeless] : 2) Feeling down, depressed, or hopeless [0] : 2) Feeling down, depressed, or hopeless: Not at all (0) [PHQ-2 Negative - No further assessment needed] : PHQ-2 Negative - No further assessment needed [Audit-CScore] : 1 [[id number]] : 0 [Never] : Never

## 2024-01-08 NOTE — HISTORY OF PRESENT ILLNESS
[FreeTextEntry1] : Follow-up and disease management. [de-identified] : Patient is 63-year-old gentleman who presents today for follow-up and disease management patient does have multiple underlying medical problems which include a flutter, a paroxysmal atrial fibrillation, coronary artery disease, chronic systolic congestive heart failure presently well-controlled hyperuricemia, reflux, hyperlipidemia, elevated BMI.  Presently the patient is awake alert and oriented x 3 no acute distress but does complain of having chronic lower back pain.  Mostly on the left side with radiation down left lower extremity.

## 2024-01-08 NOTE — PHYSICAL EXAM
[No Acute Distress] : no acute distress [Well Nourished] : well nourished [Well Developed] : well developed [Normal Voice/Communication] : normal voice/communication [Well-Appearing] : well-appearing [Normal Rate] : normal rate  [Regular Rhythm] : with a regular rhythm [Normal S1, S2] : normal S1 and S2 [Soft] : abdomen soft [Non Tender] : non-tender [No Masses] : no abdominal mass palpated [Normal Bowel Sounds] : normal bowel sounds [Declined Rectal Exam] : declined rectal exam [Normal Posterior Cervical Nodes] : no posterior cervical lymphadenopathy [Normal Anterior Cervical Nodes] : no anterior cervical lymphadenopathy [Speech Grossly Normal] : speech grossly normal [Memory Grossly Normal] : memory grossly normal [Normal Affect] : the affect was normal [Alert and Oriented x3] : oriented to person, place, and time [Normal Mood] : the mood was normal [Normal Insight/Judgement] : insight and judgment were intact [Normal] : affect was normal and insight and judgment were intact [de-identified] : LAURA 2/6 [de-identified] : edema [de-identified] : obese, no guarding or rebound [de-identified] : left and right  shoulder ROM decreased due to pain,still has not seen ortho [de-identified] : stasis dermatitis,hyperpimented and areas escoriation of lower ext.

## 2024-01-08 NOTE — COUNSELING
[Adequate lighting] : Adequate lighting [No throw rugs] : No throw rugs [Use proper foot wear] : Use proper foot wear [Behavioral health counseling provided] : Behavioral health counseling provided [Sleep ___ hours/day] : Sleep [unfilled] hours/day [Engage in a relaxing activity] : Engage in a relaxing activity [Plan in advance] : Plan in advance [AUDIT-C Screening administered and reviewed] : AUDIT-C Screening administered and reviewed [Potential consequences of obesity discussed] : Potential consequences of obesity discussed [Benefits of weight loss discussed] : Benefits of weight loss discussed [Structured Weight Management Program suggested:] : Structured weight management program suggested [Encouraged to maintain food diary] : Encouraged to maintain food diary [Encouraged to increase physical activity] : Encouraged to increase physical activity [Encouraged to use exercise tracking device] : Encouraged to use exercise tracking device [Weigh Self Weekly] : weigh self weekly [Decrease Portions] : decrease portions [____ min/wk Activity] : [unfilled] min/wk activity [Keep Food Diary] : keep food diary [None] : None [Good understanding] : Patient has a good understanding of lifestyle changes and steps needed to achieve self management goal

## 2024-01-24 ENCOUNTER — APPOINTMENT (OUTPATIENT)
Dept: CARDIOLOGY | Facility: CLINIC | Age: 64
End: 2024-01-24
Payer: MEDICAID

## 2024-01-24 PROCEDURE — 93288 INTERROG EVL PM/LDLS PM IP: CPT

## 2024-01-24 NOTE — PROCEDURE
[Pacemaker] : pacemaker [VVI] : VVI [Voltage: ___ volts] : Voltage was [unfilled] volts [Longevity: ___ months] : The estimated remaining battery life is [unfilled] months [Lead Imp:  ___ohms] : lead impedance was [unfilled] ohms [Sensing Amplitude ___mv] : sensing amplitude was [unfilled] mv [___V @] : [unfilled] V [___ ms] : [unfilled] ms [Sense ___ %] : Sense [unfilled]% [de-identified] : [employer] [de-identified] : RADHA JENKINS TCP [id number] [de-identified] : 11/2/2018 [de-identified] : 39 [de-identified] : NO EVENTS\par  NORMAL FUNCTION

## 2024-01-30 NOTE — DATA REVIEWED
[FreeTextEntry1] : Available notes, and pertinent labs & imaging in EMR reviewed.  [FreeTextEntry1] : Transplant Hepatologist: Tiny Contreras DO Transplant Hepatology NP: Julianna Santamaria, [facility]  Jose Enrique Hudson is a 58 y/o male with a PMH of Diverticulitis, polyps, HTN, HLD, Obesity, and abnormal liver tests, here for follow-up.   Patient established care after referral from Dr. Land for evaluation of cirrhosis. In 4/2023 had an annual physical and it was noted that his liver tests were elevated -- of note, liver tests also noted to be abnormal two years prior. He was then referred to Dr. Land in GI and also seen by Dr. Kong. Serological w/u unremarkable for chronic liver disease. Fibroscan c/f cirrhosis and patient subsequently referred to us for further w/u. No prior liver bx. No episodes of liver decompensation. No known family hx of liver disease. No IVDU. No blood transfusions. Professional tattoos. Admits to heavy ETOH use and started drinking at age 14 with regular use starting in his 20s -- in the past 3 years started drinking daily with ~ 5-10 drinks/day (beer/vodka). No DUI. No RPP. Rare cigarette smoking. Longstanding hx of obesity and metabolic syndrome. Born in [place]. Retired  and still working full time. Lives at home with family. Independent in ADLs/iADLs.   - 3/2/23 EGD w/o EV or portal HTN gastropathy - 7/28/23 Liver tests: AST/ALT 83/76 & +ASMA - 7/21/23 US Abd: mild hepatic steatosis and no cirrhosis - 9/12/23 Fibroscan: S3/F4 - 9/2023 CLD w/u and viral screening negative - 10/24/23 MRE w/o steatosis, iron deposition or fibrosis  In the interim since last visit, there have been no interim illnesses or hospitalizations. Has re-engaged with [employer] in efforts of losing weight and optimizing metabolic syndrome -- no weight loss since last visit, currently 220 lbs. Now exercising at the gym a few times per week. Reports rare cigarette use with a few per month. Reports significantly cutting back on ETOH use since last visit, however, is still drinking ETOH ~ 3x/week with multiple mixed drinks/beers (previously was drinking daily). Patient's allergies, medications, past medical, surgical, family, and social histories were reviewed and updated as appropriate. Seen in clinic today, reports that he feels well and is w/o complaints. Denies any recent fevers, chills, cough, lightheadedness, AMS, abdominal pain, n/v, diarrhea, hematochezia, hematemesis, and melena. Denies recreational drug use.

## 2024-02-01 ENCOUNTER — RX RENEWAL (OUTPATIENT)
Age: 64
End: 2024-02-01

## 2024-02-04 ENCOUNTER — RX RENEWAL (OUTPATIENT)
Age: 64
End: 2024-02-04

## 2024-02-04 RX ORDER — APIXABAN 5 MG/1
5 TABLET, FILM COATED ORAL
Qty: 60 | Refills: 5 | Status: ACTIVE | COMMUNITY
Start: 2021-07-19 | End: 1900-01-01

## 2024-02-12 NOTE — PATIENT PROFILE ADULT - NSPRONUTRITIONRISK_GEN_A_NUR
Pat Nance is a 45 year old female.  HPI:     CC:    Chief Complaint   Patient presents with    Lesion     LOV . Pt present with lesion on left eyelid, was addressed in LOV. Spot has grown and pt wants it removed.          Allergies:  Ciprofloxacin, Dermatitis antigen, and Shellfish-derived products    HISTORY:    Past Medical History:   Diagnosis Date    Dyslipidemia     Esophageal reflux Over 10 years ago    Gout     HTN (hypertension), benign     Morbid obesity with BMI of 45.0-49.9, adult (HCC)     Obesity Over 20 years ago      Past Surgical History:   Procedure Laterality Date    LEE LOCALIZATION WIRE 1 SITE LEFT (CPT=19281)      lt nipple ex bx done about 15yrs ago.       25 year old and 15 year old      Family History   Problem Relation Age of Onset    Breast Cancer Paternal Aunt         early 30's    Dementia Maternal Grandmother         Benji Santos Son         Kareen      Social History     Socioeconomic History    Marital status:    Tobacco Use    Smoking status: Never    Smokeless tobacco: Never   Substance and Sexual Activity    Alcohol use: Yes     Alcohol/week: 2.0 standard drinks of alcohol     Types: 1 Glasses of wine, 1 Cans of beer per week     Comment: socially    Drug use: Never   Other Topics Concern    Grew up on a farm No    History of tanning No    Outdoor occupation No    Breast feeding Yes    Reaction to local anesthetic No    Pt has a pacemaker No    Pt has a defibrillator No        Current Outpatient Medications   Medication Sig Dispense Refill    losartan 50 MG Oral Tab Take 1 tablet (50 mg total) by mouth daily.       Allergies:   Allergies   Allergen Reactions    Ciprofloxacin HIVES    Dermatitis Antigen FACE FLUSHING, FATIGUE, HIVES, ITCHING, PAIN, PHOTOSENSITIVITY, RASH and SWELLING    Shellfish-Derived Products RASH     Shrimp  Throat itching       Past Medical History:   Diagnosis Date    Dyslipidemia     Esophageal reflux Over 10 years ago    Gout      HTN (hypertension), benign     Morbid obesity with BMI of 45.0-49.9, adult (HCC)     Obesity Over 20 years ago     Past Surgical History:   Procedure Laterality Date    LEE LOCALIZATION WIRE 1 SITE LEFT (CPT=19281)      lt nipple ex bx done about 15yrs ago.       25 year old and 15 year old     Social History     Socioeconomic History    Marital status:      Spouse name: Not on file    Number of children: Not on file    Years of education: Not on file    Highest education level: Not on file   Occupational History    Not on file   Tobacco Use    Smoking status: Never    Smokeless tobacco: Never   Substance and Sexual Activity    Alcohol use: Yes     Alcohol/week: 2.0 standard drinks of alcohol     Types: 1 Glasses of wine, 1 Cans of beer per week     Comment: socially    Drug use: Never    Sexual activity: Not on file   Other Topics Concern    Grew up on a farm No    History of tanning No    Outdoor occupation No    Breast feeding Yes    Reaction to local anesthetic No    Pt has a pacemaker No    Pt has a defibrillator No   Social History Narrative    Not on file     Social Determinants of Health     Financial Resource Strain: Not on file   Food Insecurity: Not on file   Transportation Needs: Not on file   Physical Activity: Not on file   Stress: Not on file   Social Connections: Not on file   Housing Stability: Not on file     Family History   Problem Relation Age of Onset    Breast Cancer Paternal Aunt         early 30's    Dementia Maternal Grandmother         Benji Santos Son         Kareen       There were no vitals filed for this visit.    HPI:  Chief Complaint   Patient presents with    Lesion     LOV . Pt present with lesion on left eyelid, was addressed in LOV. Spot has grown and pt wants it removed.      Patient is here with eyelid lesions from present for a while not improved with mupirocin.    No personal  or family history of skin cancer      Patient presents with concerns  above.    Patient has been in their usual state of health.     Past notes/ records and appropriate/relevant lab results including pathology and past body maps reviewed. Including outside notes/ PCP notes as appropriate. Updated and new information noted in current visit.     ROS:  Denies other relevant systemic complaints.      History, medications, allergies reviewed as noted.       Physical Examination:     Well-developed well-nourished patient alert oriented in no acute distress.  Exam performed, including scalp, head, neck, face,nails, hair, external eyes, including conjunctival mucosa, eyelids, lips external ears , arms, digits,palms.     Multiple light to medium brown, well marginated, uniformly pigmented, macules and papules 6 mm and less scattered on exam. pigmented lesions examined with dermoscopy benign-appearing patterns.     Waxy tannish keratotic papules scattered, cherry-red vascular papules scattered.    See map today's date for lesions noted .  See assessment and plan below for specific lesions.    Otherwise remarkable for lesions as noted on map.    See A/P  below for additional information:    Assessment / plan:    No orders of the defined types were placed in this encounter.      Meds & Refills for this Visit:  Requested Prescriptions      No prescriptions requested or ordered in this encounter         Encounter Diagnoses   Name Primary?    Xanthelasma of left upper eyelid Yes    Epidermal cyst     Benign neoplasm of skin, unspecified location        Left upper lid with yellow-orange papule, 4 x 6 mm xanthoma, xanthelasma.  Has continue to work on lowering lipids,  lowered lipids including triglycerides and LDL.   Continue management of prediabetes, dyslipidemia  Discussed options patient very frustrated with persistence more irritated.  Has had multiple comments regarding lesion.  Discussed pros and cons of cauterizing lesion aware of risk of scarring, likely recurrence.   This will result in a  permanent scar. Further excision may be necessary, Recurrence may occur, which will require further surgery.  Patient aware of risk of poor cosmesis wishes to proceed with cautery of lesion.  Area anesthetized with epilation cautery the lesion cauterized, gently debrided with gauze, dressed with Vaseline, patient tolerated well postop instructions discussed.    Cystic nodule at lumbar back reassurance.    Ganglion left dorsal wrist cystic nodule observe follow-up with hand surgery if this becomes more problematic not currently causing pain or weakness.  Observe.  Stable    Other benign nevi lentigines no other suspicious lesions      Please refer to map for specific lesions.  See additional diagnoses.  Pros cons of various therapies, risks benefits discussed.Pathophysiology discussed with patient.  Therapeutic options reviewed.  See  Medications in grid.  Instructions reviewed at length.    Benign nevi, seborrheic  keratoses, cherry angiomas:  Reassurance regarding other benign skin lesions.Signs and symptoms of skin cancer, ABCDE's of melanoma discussed with patient. Sunscreen use, sun protection, self exams reviewed.  Followup as noted RTC ---routine checkup    6 mos -one year or p.r.n.    Encounter Times   Including precharting, reviewing chart, prior notes obtaining history: 10 minutes, medical exam :10 minutes, notes on body map, plan, counseling 10minutes My total time spent caring for the patient on the day of the encounter: 30 minutes     The patient indicates understanding of these issues and agrees to the plan.  The patient is asked to return as noted in follow-up/ above.    This note was generated using Dragon voice recognition software.  Please contact me regarding any confusion resulting from errors in recognition..  Note to patient and family: The 21st Century Cures Act makes medical notes like these available to patients. However, be advised this is a medical document. It is intended as [id number]  communication and monitoring of a patient's care needs. It is written in medical language and may contain abbreviations or verbiage that are unfamiliar. It may appear blunt or direct. Medical documents are intended to carry relevant information, facts as evident and the clinical opinion of the practitioner.     No indicators present

## 2024-02-14 NOTE — PATIENT PROFILE ADULT - FALLEN IN THE PAST
"Oncology Rooming Note    February 12, 2024 12:25 PM   Jonelle Hart is a 46 year old female who presents for:    Chief Complaint   Patient presents with    Oncology Clinic Visit     Uterine      Initial Vitals: /77   Pulse 70   Temp 98.1  F (36.7  C)   Resp 15   Ht 1.626 m (5' 4\")   Wt 85.7 kg (189 lb)   SpO2 99%   BMI 32.44 kg/m   Estimated body mass index is 32.44 kg/m  as calculated from the following:    Height as of this encounter: 1.626 m (5' 4\").    Weight as of this encounter: 85.7 kg (189 lb). Body surface area is 1.97 meters squared.  No Pain (0) Comment: Data Unavailable   No LMP recorded. Patient has had a hysterectomy.  Allergies reviewed: Yes  Medications reviewed: Yes    Medications: Medication refills not needed today.  Pharmacy name entered into [facility]: [id number] [place] [address] AT [facility]    Frailty Screening:   Is the patient here for a new oncology consult visit in cancer care? 2. No      Clinical concerns:        Lea Vernon              "
Pre Op Nurse Teaching Template    Relevant Diagnosis: uterine cancer    Teaching Topic: Laparoscopic omental biopsy, peritoneal washings, peritoneal biopsies, possible removal of bilateral ovaries - Bilateral     Person(s) involved in teaching :  Patient  packet mailed to home address, reviewed via phone  Motivation Level:  Asks Questions:    Yes      Eager to Learn:     Yes     Cooperative:          Yes    Receptive (willing. Able to accept information):    Yes      Patient and those who are listed above demonstrates understanding of the following:   Reason for the appointment, diagnosis and treatment plan:   Yes   Knowledge of proper use of medications and conditions for which they are ordered (with special attention to potential side effects or drug interactions): Yes   Which situations necessitate calling provider and whom to contact: Yes         Nutritional needs and diet plan:  Yes      Pain management techniques:     Yes, Pain Scale   Diet:   Yes    Teaching Concerns addressed: Yes    Infection Prevention:  Patient and those who are listed above demonstrate understanding of the following:  Pre-Op CHG Bathing Instructions: Yes  Surgical procedure site care taught:   Yes   Signs and symptoms of infection taught: Yes       Instructional Materials Used/Given:  The Before Your Surgery Booklet  Showering before Surgery instructions & CHG Product  Pain Assessment Tool   Home Care after Surgery  Eating after surgery  Map  Accommodations Brochure  Phone numbers for clinic and on call doctor  Copy of Surgical Consent    Done Today:    Preop Visit needed with PCP    Tests Ordered:  Post Op Visit Scheduled:TBD  Comments:    Surgery date/time:TBD        Consent signed via IPAD and on file in media  Hysterectomy consent signed and sent to scanning  .    
no

## 2024-02-27 NOTE — ED ADULT NURSE NOTE - OBJECTIVE STATEMENT
Price (Do Not Change): 0.00 Instructions: This plan will send the code FBSE to the PM system.  DO NOT or CHANGE the price. Detail Level: Simple pt axo3, c/o ear discomfort. As per pt he woke up with ringing in left ear. pt states he has hx of vertigo. pt denies chest pain or SOB. safety maintained.

## 2024-03-01 ENCOUNTER — RX RENEWAL (OUTPATIENT)
Age: 64
End: 2024-03-01

## 2024-03-05 RX ORDER — BUMETANIDE 1 MG/1
1 TABLET ORAL
Refills: 0 | Status: ACTIVE | COMMUNITY
Start: 2022-11-20

## 2024-03-19 ENCOUNTER — NON-APPOINTMENT (OUTPATIENT)
Age: 64
End: 2024-03-19

## 2024-03-19 ENCOUNTER — APPOINTMENT (OUTPATIENT)
Dept: CARDIOLOGY | Facility: CLINIC | Age: 64
End: 2024-03-19
Payer: MEDICAID

## 2024-03-19 VITALS
OXYGEN SATURATION: 95 % | DIASTOLIC BLOOD PRESSURE: 87 MMHG | BODY MASS INDEX: 51.48 KG/M2 | HEART RATE: 88 BPM | HEIGHT: 65 IN | SYSTOLIC BLOOD PRESSURE: 134 MMHG | WEIGHT: 309 LBS | RESPIRATION RATE: 19 BRPM

## 2024-03-19 DIAGNOSIS — Z95.2 PRESENCE OF PROSTHETIC HEART VALVE: ICD-10-CM

## 2024-03-19 DIAGNOSIS — E78.5 HYPERLIPIDEMIA, UNSPECIFIED: ICD-10-CM

## 2024-03-19 PROCEDURE — G2211 COMPLEX E/M VISIT ADD ON: CPT | Mod: NC,1L

## 2024-03-19 PROCEDURE — 93000 ELECTROCARDIOGRAM COMPLETE: CPT

## 2024-03-19 PROCEDURE — 99215 OFFICE O/P EST HI 40 MIN: CPT | Mod: 25

## 2024-03-19 NOTE — PHYSICAL EXAM
[No Acute Distress] : no acute distress [Obese] : obese [Normal] : moves all extremities, no focal deficits, normal speech [de-identified] : ll/Vl systolic murmur  [de-identified] : 3+ edema bilaterally

## 2024-03-19 NOTE — REVIEW OF SYSTEMS
[Hearing Loss] : hearing loss [Feeling Fatigued] : feeling fatigued [Dyspnea on exertion] : dyspnea during exertion [Lower Ext Edema] : lower extremity edema [Negative] : Heme/Lymph [FreeTextEntry9] : back pain

## 2024-03-19 NOTE — DISCUSSION/SUMMARY
[FreeTextEntry1] : 63-year-old man with complex multiple cardiac issues.  History of severe arctic stenosis, status post TAVR.  CAD, status post remote CABG.  PAF, anticoagulated.  Atrial flutter, status post permanent pacemaker.  Ischemic cardiomyopathy.  Chronic systolic heart failure.  Hypertension.  Hyperlipidemia.  Chronic lower extremity edema. Medical issues include obesity, sedentary lifestyle, renal insufficiency. Blood pressure stable.  There is no JVD.  Lung fields are clear.  Chronic lower extremity edema is present. EKG is sinus rhythm.  Poor R wave progression.  Nonspecific ST changes.  Largely unchanged from prior. The current cardiac status appears to be at its baseline.  Plan 1.  Current medications are reviewed, no changes. 2.  He has blood work done through primary care provider's office, he will forward for me to review. 3.  He did not follow through with previous request for echocardiogram. It is re ordered and will be done at [facility].  4.  Next visit with me in the office in 3 months. 5.  Advised him to monitor for any cardiac symptoms and to report back to me for any changes or if he has any other concerns.  Cardiac issues were discussed with him, all questions have been answered to his satisfaction.   [EKG obtained to assist in diagnosis and management of assessed problem(s)] : EKG obtained to assist in diagnosis and management of assessed problem(s)

## 2024-03-19 NOTE — HISTORY OF PRESENT ILLNESS
[FreeTextEntry1] : Since last visit with me, he has been at his baseline. Continues to have a very sedentary lifestyle. He had a recent gastrointestinal infection that lasted for about a week.  This resolved on its own.  During that time, he thought maybe he was mildly short of breath. Continues to have issues related to back pain and shoulder pain that is chronic. He has not lost any weight. Reports compliance to current medications. No chest pain or chest pressure.  No shortness of breath at rest.  He does get short of breath after moderate to heavy activities.  No orthopnea.  No PND.  Chronic lower extremity edema is present. Remains on anticoagulation.  Denies any excessive ecchymosis, bleeding, black or bloody stools, hematuria or epistaxis.

## 2024-03-19 NOTE — CARDIOLOGY SUMMARY
[___] : [unfilled] [de-identified] : Dec 13 2023. Sinus Rhythm PACs - poor R wave progression  -Nonspecific T-abnormality. ABNORMAL  [de-identified] : Nov 19, 2022. suboptimal study despite use of IV contrast.  LV function appears reduced.  s/p TAVR without para valvular leak\par  Sept 8, 2022. @[facility]. Definity used.  LV suboptimally visualized.  There is decreased LV function. Moderate aortic stenosis. Yeyo 1.3cm2  [de-identified] : Nov 1, 2022. Patent LIMA and occluded SVG to OM and RCA... medical therapy ... no intervention indicated\par  Nov 18, 2022 s/p TAVR

## 2024-04-08 ENCOUNTER — APPOINTMENT (OUTPATIENT)
Dept: FAMILY MEDICINE | Facility: CLINIC | Age: 64
End: 2024-04-08
Payer: MEDICAID

## 2024-04-08 VITALS
BODY MASS INDEX: 51.32 KG/M2 | TEMPERATURE: 98.1 F | HEART RATE: 93 BPM | DIASTOLIC BLOOD PRESSURE: 82 MMHG | SYSTOLIC BLOOD PRESSURE: 124 MMHG | OXYGEN SATURATION: 96 % | RESPIRATION RATE: 16 BRPM | HEIGHT: 65 IN | WEIGHT: 308 LBS

## 2024-04-08 DIAGNOSIS — N18.30 CHRONIC KIDNEY DISEASE, STAGE 3 UNSPECIFIED: ICD-10-CM

## 2024-04-08 DIAGNOSIS — R60.9 EDEMA, UNSPECIFIED: ICD-10-CM

## 2024-04-08 DIAGNOSIS — I25.10 ATHEROSCLEROTIC HEART DISEASE OF NATIVE CORONARY ARTERY W/OUT ANGINA PECTORIS: ICD-10-CM

## 2024-04-08 DIAGNOSIS — E79.0 HYPERURICEMIA W/OUT SIGNS OF INFLAMMATORY ARTHRITIS AND TOPHACEOUS DISEASE: ICD-10-CM

## 2024-04-08 DIAGNOSIS — I50.22 CHRONIC SYSTOLIC (CONGESTIVE) HEART FAILURE: ICD-10-CM

## 2024-04-08 DIAGNOSIS — K21.9 GASTRO-ESOPHAGEAL REFLUX DISEASE W/OUT ESOPHAGITIS: ICD-10-CM

## 2024-04-08 DIAGNOSIS — R35.0 FREQUENCY OF MICTURITION: ICD-10-CM

## 2024-04-08 PROCEDURE — 99214 OFFICE O/P EST MOD 30 MIN: CPT

## 2024-04-08 RX ORDER — CLOTRIMAZOLE AND BETAMETHASONE DIPROPIONATE 10; .5 MG/G; MG/G
1-0.05 CREAM TOPICAL TWICE DAILY
Qty: 1 | Refills: 1 | Status: ACTIVE | COMMUNITY
Start: 2021-07-15 | End: 1900-01-01

## 2024-04-08 NOTE — PHYSICAL EXAM
[No Acute Distress] : no acute distress [Well Nourished] : well nourished [Well Developed] : well developed [Well-Appearing] : well-appearing [Normal Voice/Communication] : normal voice/communication [Normal Rate] : normal rate  [Regular Rhythm] : with a regular rhythm [Normal S1, S2] : normal S1 and S2 [Soft] : abdomen soft [Non Tender] : non-tender [No Masses] : no abdominal mass palpated [Normal Bowel Sounds] : normal bowel sounds [Declined Rectal Exam] : declined rectal exam [Normal Posterior Cervical Nodes] : no posterior cervical lymphadenopathy [Normal Anterior Cervical Nodes] : no anterior cervical lymphadenopathy [Speech Grossly Normal] : speech grossly normal [Memory Grossly Normal] : memory grossly normal [Normal Affect] : the affect was normal [Alert and Oriented x3] : oriented to person, place, and time [Normal Mood] : the mood was normal [Normal Insight/Judgement] : insight and judgment were intact [Normal] : affect was normal and insight and judgment were intact [de-identified] : LAURA 2/6 [de-identified] : edema [de-identified] : obese, no guarding or rebound [de-identified] : left and right  shoulder ROM decreased due to pain,still has not seen ortho [de-identified] : stasis dermatitis,hyperpimented and areas escoriation of lower ext.

## 2024-04-08 NOTE — ASSESSMENT
[FreeTextEntry1] : Assessment and plan:  1.  Chronic and recurrent low back pain persists patient has not had the time yet to be evaluated by spine specialist.  Due to renal insufficiency I recommend patient avoid nonsteroidal anti-inflammatory drugs.  2.  A-fib flutter presently rate is very well-controlled continue anticoagulation with Eliquis 5 mg p.o. twice a day.  3.  Coronary artery disease history of drug-eluting stent and CABG continue clopidogrel 75 mg p.o. daily.  4.  Systolic congestive heart failure patient tolerating and doing well with Entresto 24-26 and hydralazine 25 mg p.o. 3 times a day.  5.  Chronic edema continue bumetanide 1 mg.  6.  Hyperuricemia continue allopurinol 100 mg patient takes 2 tablets daily.  7.  Reflux continue famotidine 20 mg p.o. twice a day.  8.  Hyperlipidemia continue atorvastatin 20 mg daily.  9.  Reviewed with patient comprehensive blood work that was recently done at lab new prescription for blood work given which will contain also lipid profile, prostate-specific antigen and comprehensive metabolic.  10 total time spent face-to-face and non-face-to-face time 35 minutes the majority of which was spent on counseling and coordination of care.  No change in present medical management continue all meds as prescribed.

## 2024-04-08 NOTE — HISTORY OF PRESENT ILLNESS
[FreeTextEntry1] : Follow-up and disease management. [de-identified] : Patient is 63-year-old gentleman who presents today for follow-up and disease management patient does have multiple underlying medical problems which include a flutter, a paroxysmal atrial fibrillation, coronary artery disease, chronic systolic congestive heart failure presently well-controlled hyperuricemia, reflux, hyperlipidemia, elevated BMI.  Presently the patient is awake alert and oriented x 3 no acute distress but does complain of having chronic lower back pain.  Mostly on the left side with radiation down left lower extremity.

## 2024-04-08 NOTE — HEALTH RISK ASSESSMENT
[Yes] : Yes [Monthly or less (1 pt)] : Monthly or less (1 point) [1 or 2 (0 pts)] : 1 or 2 (0 points) [Never (0 pts)] : Never (0 points) [No] : In the past 12 months have you used drugs other than those required for medical reasons? No [No falls in past year] : Patient reported no falls in the past year [Little interest or pleasure doing things] : 1) Little interest or pleasure doing things [Feeling down, depressed, or hopeless] : 2) Feeling down, depressed, or hopeless [0] : 2) Feeling down, depressed, or hopeless: Not at all (0) [PHQ-2 Negative - No further assessment needed] : PHQ-2 Negative - No further assessment needed [Audit-CScore] : 1 [[id number]] : 0 [Never] : Never

## 2024-04-08 NOTE — REVIEW OF SYSTEMS
[Fatigue] : fatigue [Hearing Loss] : hearing loss [Chest Pain] : chest pain [Palpitations] : no palpitations [Lower Ext Edema] : lower extremity edema [Orthopena] : no orthopnea [Shortness Of Breath] : no shortness of breath [Wheezing] : no wheezing [Cough] : no cough [Dyspnea on Exertion] : dyspnea on exertion [Abdominal Pain] : no abdominal pain [Nausea] : no nausea [Constipation] : no constipation [Diarrhea] : no diarrhea [Vomiting] : no vomiting [Heartburn] : no heartburn [Melena] : no melena [Joint Pain] : joint pain [Joint Stiffness] : no joint stiffness [Muscle Pain] : muscle pain [Muscle Weakness] : no muscle weakness [Back Pain] : back pain [Joint Swelling] : no joint swelling [Itching] : itching [Skin Rash] : skin rash [Negative] : Heme/Lymph [de-identified] : Stasis dermatitis,Lichen planus

## 2024-04-08 NOTE — COUNSELING
[Fall prevention counseling provided] : Fall prevention counseling provided [Adequate lighting] : Adequate lighting [No throw rugs] : No throw rugs [Use proper foot wear] : Use proper foot wear [Use recommended devices] : Use recommended devices [Behavioral health counseling provided] : Behavioral health counseling provided [Sleep ___ hours/day] : Sleep [unfilled] hours/day [Engage in a relaxing activity] : Engage in a relaxing activity [Plan in advance] : Plan in advance [AUDIT-C Screening administered and reviewed] : AUDIT-C Screening administered and reviewed [Potential consequences of obesity discussed] : Potential consequences of obesity discussed [Benefits of weight loss discussed] : Benefits of weight loss discussed [Structured Weight Management Program suggested:] : Structured weight management program suggested [Encouraged to maintain food diary] : Encouraged to maintain food diary [Encouraged to increase physical activity] : Encouraged to increase physical activity [Encouraged to use exercise tracking device] : Encouraged to use exercise tracking device [Weigh Self Weekly] : weigh self weekly [Decrease Portions] : decrease portions [____ min/wk Activity] : [unfilled] min/wk activity [Keep Food Diary] : keep food diary [Good understanding] : Patient has a good understanding of disease, goals and obesity follow-up plan [None] : None

## 2024-04-29 ENCOUNTER — RX RENEWAL (OUTPATIENT)
Age: 64
End: 2024-04-29

## 2024-04-29 RX ORDER — FAMOTIDINE 20 MG/1
20 TABLET, FILM COATED ORAL
Qty: 60 | Refills: 3 | Status: ACTIVE | COMMUNITY
Start: 2018-12-19 | End: 1900-01-01

## 2024-05-17 LAB
ALBUMIN SERPL ELPH-MCNC: 4.1 G/DL
ALP BLD-CCNC: 129 U/L
ALT SERPL-CCNC: 17 U/L
ANION GAP SERPL CALC-SCNC: 15 MMOL/L
AST SERPL-CCNC: 19 U/L
BASOPHILS # BLD AUTO: 0.04 K/UL
BASOPHILS NFR BLD AUTO: 0.6 %
BILIRUB SERPL-MCNC: 0.6 MG/DL
BUN SERPL-MCNC: 31 MG/DL
CALCIUM SERPL-MCNC: 9.4 MG/DL
CHLORIDE SERPL-SCNC: 96 MMOL/L
CHOLEST SERPL-MCNC: 132 MG/DL
CO2 SERPL-SCNC: 26 MMOL/L
CREAT SERPL-MCNC: 1.59 MG/DL
EGFR: 48 ML/MIN/1.73M2
EOSINOPHIL # BLD AUTO: 0.26 K/UL
EOSINOPHIL NFR BLD AUTO: 3.9 %
GLUCOSE SERPL-MCNC: 58 MG/DL
HCT VFR BLD CALC: 48 %
HDLC SERPL-MCNC: 32 MG/DL
HGB BLD-MCNC: 15.7 G/DL
IMM GRANULOCYTES NFR BLD AUTO: 0.1 %
LDLC SERPL CALC-MCNC: 72 MG/DL
LYMPHOCYTES # BLD AUTO: 1.05 K/UL
LYMPHOCYTES NFR BLD AUTO: 15.6 %
MAN DIFF?: NORMAL
MCHC RBC-ENTMCNC: 31.3 PG
MCHC RBC-ENTMCNC: 32.7 GM/DL
MCV RBC AUTO: 95.8 FL
MONOCYTES # BLD AUTO: 0.83 K/UL
MONOCYTES NFR BLD AUTO: 12.3 %
NEUTROPHILS # BLD AUTO: 4.54 K/UL
NEUTROPHILS NFR BLD AUTO: 67.5 %
NONHDLC SERPL-MCNC: 99 MG/DL
PLATELET # BLD AUTO: 205 K/UL
POTASSIUM SERPL-SCNC: 4.7 MMOL/L
PROT SERPL-MCNC: 7.5 G/DL
PSA SERPL-MCNC: 1.26 NG/ML
RBC # BLD: 5.01 M/UL
RBC # FLD: 13.2 %
SODIUM SERPL-SCNC: 138 MMOL/L
TRIGL SERPL-MCNC: 157 MG/DL
URATE SERPL-MCNC: 7.1 MG/DL
WBC # FLD AUTO: 6.73 K/UL

## 2024-06-19 ENCOUNTER — NON-APPOINTMENT (OUTPATIENT)
Age: 64
End: 2024-06-19

## 2024-06-19 ENCOUNTER — APPOINTMENT (OUTPATIENT)
Dept: CARDIOLOGY | Facility: CLINIC | Age: 64
End: 2024-06-19
Payer: MEDICARE

## 2024-06-19 VITALS
OXYGEN SATURATION: 93 % | HEIGHT: 65 IN | SYSTOLIC BLOOD PRESSURE: 127 MMHG | DIASTOLIC BLOOD PRESSURE: 90 MMHG | BODY MASS INDEX: 51.32 KG/M2 | HEART RATE: 98 BPM | WEIGHT: 308 LBS

## 2024-06-19 DIAGNOSIS — Z79.01 LONG TERM (CURRENT) USE OF ANTICOAGULANTS: ICD-10-CM

## 2024-06-19 DIAGNOSIS — E78.5 HYPERLIPIDEMIA, UNSPECIFIED: ICD-10-CM

## 2024-06-19 DIAGNOSIS — I25.10 ATHEROSCLEROTIC HEART DISEASE OF NATIVE CORONARY ARTERY W/OUT ANGINA PECTORIS: ICD-10-CM

## 2024-06-19 DIAGNOSIS — Z95.0 PRESENCE OF CARDIAC PACEMAKER: ICD-10-CM

## 2024-06-19 DIAGNOSIS — I10 ESSENTIAL (PRIMARY) HYPERTENSION: ICD-10-CM

## 2024-06-19 DIAGNOSIS — I48.0 PAROXYSMAL ATRIAL FIBRILLATION: ICD-10-CM

## 2024-06-19 DIAGNOSIS — R94.31 ABNORMAL ELECTROCARDIOGRAM [ECG] [EKG]: ICD-10-CM

## 2024-06-19 DIAGNOSIS — I48.92 UNSPECIFIED ATRIAL FLUTTER: ICD-10-CM

## 2024-06-19 DIAGNOSIS — I35.0 NONRHEUMATIC AORTIC (VALVE) STENOSIS: ICD-10-CM

## 2024-06-19 DIAGNOSIS — I25.5 ISCHEMIC CARDIOMYOPATHY: ICD-10-CM

## 2024-06-19 PROCEDURE — 99205 OFFICE O/P NEW HI 60 MIN: CPT

## 2024-06-19 PROCEDURE — 93000 ELECTROCARDIOGRAM COMPLETE: CPT

## 2024-06-19 NOTE — HISTORY OF PRESENT ILLNESS
[FreeTextEntry1] : Since last visit with me, he reports that back pain and shoulder pain have been increasing in intensity. Continues to have lower extremity edema, complaints of dyspnea with exertion. No palpitations.  No syncope.  No orthopnea.  No PND.  No chest pain. Reports compliance to multiple medications. He has blood work done through nephrology, and he reports that blood testing has been stable.

## 2024-06-19 NOTE — PHYSICAL EXAM
[No Acute Distress] : no acute distress [Obese] : obese [Normal] : alert and oriented, normal memory [de-identified] : ll/Vl systolic murmur  [de-identified] : 3+ edema bilaterally

## 2024-06-19 NOTE — DISCUSSION/SUMMARY
[FreeTextEntry1] : 64-year-old man with complex cardiac history.  History of severe aortic stenosis, status post TAVR.  Coronary artery disease, status post remote CABG.  Paroxysmal atrial fibrillation and atrial flutter, anticoagulated.  Status post permanent pacemaker.  Ischemic cardiomyopathy and chronic systolic heart failure.  Hypertension, hyperlipidemia.  Chronic lower extremity edema. Medical issues include obesity, sedentary lifestyle, renal insufficiency and lichen planus of lower extremities. On physical examination, blood pressure stable.  Lower extremity edema is unchanged from prior.  There is no JVD.  Lung fields are clear. EKG is sinus rhythm.  Poor R wave progression.  Nonspecific ST changes.  Plan 1.  Current medications reviewed, no changes. 2.  He did not get echocardiogram done that I had requested at his last visit.  He agrees to do so now. 3.  Lifestyle modifications including appropriate diet and try to maintain an active aerobic lifestyle was discussed with him. 4.  He will follow-up with me in the office in 3 months. 4.  Advised him to monitor for any cardiac symptoms and to report back to me for any changes or if he has any other concerns.  Cardiac issues were discussed with him and all of his questions have been answered to his satisfaction.  [EKG obtained to assist in diagnosis and management of assessed problem(s)] : EKG obtained to assist in diagnosis and management of assessed problem(s)

## 2024-06-19 NOTE — CARDIOLOGY SUMMARY
[___] : [unfilled] [de-identified] : June 19 2024. Sinus tachycardia  - poor R wave progression  -Nonspecific T-abnormality. ABNORMAL  [de-identified] : Nov 19, 2022. suboptimal study despite use of IV contrast.  LV function appears reduced.  s/p TAVR without para valvular leak\par  Sept 8, 2022. @[facility]. Definity used.  LV suboptimally visualized.  There is decreased LV function. Moderate aortic stenosis. Yeyo 1.3cm2  [de-identified] : Nov 1, 2022. Patent LIMA and occluded SVG to OM and RCA... medical therapy ... no intervention indicated\par  Nov 18, 2022 s/p TAVR

## 2024-07-02 ENCOUNTER — RX RENEWAL (OUTPATIENT)
Age: 64
End: 2024-07-02

## 2024-07-03 ENCOUNTER — RX RENEWAL (OUTPATIENT)
Age: 64
End: 2024-07-03

## 2024-07-03 ENCOUNTER — APPOINTMENT (OUTPATIENT)
Dept: CARDIOLOGY | Facility: CLINIC | Age: 64
End: 2024-07-03
Payer: MEDICARE

## 2024-07-03 DIAGNOSIS — Z95.0 PRESENCE OF CARDIAC PACEMAKER: ICD-10-CM

## 2024-07-03 PROCEDURE — 93288 INTERROG EVL PM/LDLS PM IP: CPT

## 2024-07-18 NOTE — ED PROVIDER NOTE - DATE/TIME FOR CONVERSATION WITH CONSULTANT:
Called patient, notified of message, patient expressed verbal understanding and had no questions at this time.   
Pt called in stating that he has been having issues with his bp. Pt states that his medication was changed however his bp is still elevated and not where he'd like it to be. Scheduled 7/25 with Mariela. Please advise.  
19-Apr-2021 15:37

## 2024-07-29 ENCOUNTER — RX RENEWAL (OUTPATIENT)
Age: 64
End: 2024-07-29

## 2024-08-06 ENCOUNTER — APPOINTMENT (OUTPATIENT)
Dept: FAMILY MEDICINE | Facility: CLINIC | Age: 64
End: 2024-08-06

## 2024-08-06 PROBLEM — M54.42 CHRONIC LEFT-SIDED LOW BACK PAIN WITH LEFT-SIDED SCIATICA: Status: ACTIVE | Noted: 2024-08-06

## 2024-08-06 PROBLEM — R26.81 UNSTEADY GAIT: Status: ACTIVE | Noted: 2024-08-06

## 2024-08-06 PROCEDURE — G2211 COMPLEX E/M VISIT ADD ON: CPT

## 2024-08-06 PROCEDURE — 99204 OFFICE O/P NEW MOD 45 MIN: CPT

## 2024-08-06 NOTE — HISTORY OF PRESENT ILLNESS
[FreeTextEntry1] : Worsening lower back pain, unsteady gait. [de-identified] : Patient is 64-year-old gentleman who presents today for follow-up and disease management patient does have multiple underlying medical problems which include a flutter, a paroxysmal atrial fibrillation, coronary artery disease, chronic systolic congestive heart failure presently well-controlled hyperuricemia, reflux, hyperlipidemia, elevated BMI.  Presently the patient is awake alert and oriented x 3 no acute distress but does complain of having chronic lower back pain.  Mostly on the left side with radiation down left lower extremity.

## 2024-08-06 NOTE — REVIEW OF SYSTEMS
[Fatigue] : fatigue [Hearing Loss] : hearing loss [Chest Pain] : chest pain [Palpitations] : no palpitations [Lower Ext Edema] : lower extremity edema [Orthopena] : no orthopnea [Shortness Of Breath] : no shortness of breath [Wheezing] : no wheezing [Cough] : no cough [Dyspnea on Exertion] : dyspnea on exertion [Abdominal Pain] : no abdominal pain [Nausea] : no nausea [Constipation] : no constipation [Diarrhea] : no diarrhea [Vomiting] : no vomiting [Heartburn] : no heartburn [Melena] : no melena [Joint Pain] : joint pain [Joint Stiffness] : no joint stiffness [Muscle Pain] : muscle pain [Muscle Weakness] : no muscle weakness [Back Pain] : back pain [Joint Swelling] : no joint swelling [Itching] : itching [Skin Rash] : skin rash [Negative] : Heme/Lymph [de-identified] : Stasis dermatitis,Lichen planus

## 2024-08-06 NOTE — PHYSICAL EXAM
[No Acute Distress] : no acute distress [Well Nourished] : well nourished [Well Developed] : well developed [Well-Appearing] : well-appearing [Normal Voice/Communication] : normal voice/communication [Normal Rate] : normal rate  [Regular Rhythm] : with a regular rhythm [Normal S1, S2] : normal S1 and S2 [Soft] : abdomen soft [Non Tender] : non-tender [No Masses] : no abdominal mass palpated [Normal Bowel Sounds] : normal bowel sounds [Declined Rectal Exam] : declined rectal exam [Normal Posterior Cervical Nodes] : no posterior cervical lymphadenopathy [Normal Anterior Cervical Nodes] : no anterior cervical lymphadenopathy [Speech Grossly Normal] : speech grossly normal [Memory Grossly Normal] : memory grossly normal [Normal Affect] : the affect was normal [Alert and Oriented x3] : oriented to person, place, and time [Normal Mood] : the mood was normal [Normal Insight/Judgement] : insight and judgment were intact [Normal] : affect was normal and insight and judgment were intact [de-identified] : LAURA 2/6 [de-identified] : edema [de-identified] : obese, no guarding or rebound [de-identified] : left and right  shoulder ROM decreased due to pain,still has not seen ortho [de-identified] : stasis dermatitis,hyperpimented and areas escoriation of lower ext.

## 2024-08-06 NOTE — HEALTH RISK ASSESSMENT
[Yes] : Yes [Monthly or less (1 pt)] : Monthly or less (1 point) [1 or 2 (0 pts)] : 1 or 2 (0 points) [Never (0 pts)] : Never (0 points) [No] : In the past 12 months have you used drugs other than those required for medical reasons? No [No falls in past year] : Patient reported no falls in the past year [Little interest or pleasure doing things] : 1) Little interest or pleasure doing things [Feeling down, depressed, or hopeless] : 2) Feeling down, depressed, or hopeless [0] : 2) Feeling down, depressed, or hopeless: Not at all (0) [PHQ-2 Negative - No further assessment needed] : PHQ-2 Negative - No further assessment needed [Audit-CScore] : 1 [[id number]] : 0 [With Patient/Caregiver] : , with patient/caregiver [Reviewed no changes] : Reviewed, no changes [Designated Healthcare Proxy] : Designated healthcare proxy [Name: ___] : Health Care Proxy's Name: [unfilled]  [Relationship: ___] : Relationship: [unfilled] [I will adhere to the patient's wishes.] : I will adhere to the patient's wishes. [AdvancecareDate] : 08/24 [Never] : Never

## 2024-08-06 NOTE — ASSESSMENT
[FreeTextEntry1] : Assessment and plan:  1.  Chronic and recurrent low back pain persists patient has not had the time yet to be evaluated by spine specialist.  Due to renal insufficiency I recommend patient avoid nonsteroidal anti-inflammatory drugs.  Referral given to patient for orthopedic spine specialist, x-rays of the lumbar spine and referral for physical therapy patient now complaining of unsteady gait due to the severe back discomfort and sciatica.  2.  A-fib flutter presently rate is very well-controlled continue anticoagulation with Eliquis 5 mg p.o. twice a day.  3.  Coronary artery disease history of drug-eluting stent and CABG continue clopidogrel 75 mg p.o. daily.  4.  Systolic congestive heart failure patient tolerating and doing well with Entresto 24-26 and hydralazine 25 mg p.o. 3 times a day.  5.  Chronic edema continue bumetanide 1 mg.  6.  Hyperuricemia continue allopurinol 100 mg patient takes 2 tablets daily.  7.  Reflux continue famotidine 20 mg p.o. twice a day.  8.  Hyperlipidemia continue atorvastatin 20 mg daily.  9.  Reviewed with patient comprehensive blood work that was recently done at lab which was ordered by nephrology.  10 total time spent face-to-face and non-face-to-face time 35 minutes the majority of which was spent on counseling and coordination of care.  No change in present medical management continue all meds as prescribed.

## 2024-09-18 ENCOUNTER — NON-APPOINTMENT (OUTPATIENT)
Age: 64
End: 2024-09-18

## 2024-09-18 ENCOUNTER — APPOINTMENT (OUTPATIENT)
Dept: CARDIOLOGY | Facility: CLINIC | Age: 64
End: 2024-09-18
Payer: MEDICARE

## 2024-09-18 VITALS
RESPIRATION RATE: 16 BRPM | DIASTOLIC BLOOD PRESSURE: 84 MMHG | BODY MASS INDEX: 51.32 KG/M2 | OXYGEN SATURATION: 91 % | HEART RATE: 94 BPM | HEIGHT: 65 IN | WEIGHT: 308 LBS | SYSTOLIC BLOOD PRESSURE: 126 MMHG

## 2024-09-18 DIAGNOSIS — I25.10 ATHEROSCLEROTIC HEART DISEASE OF NATIVE CORONARY ARTERY W/OUT ANGINA PECTORIS: ICD-10-CM

## 2024-09-18 DIAGNOSIS — E78.5 HYPERLIPIDEMIA, UNSPECIFIED: ICD-10-CM

## 2024-09-18 DIAGNOSIS — Z95.2 PRESENCE OF PROSTHETIC HEART VALVE: ICD-10-CM

## 2024-09-18 DIAGNOSIS — I10 ESSENTIAL (PRIMARY) HYPERTENSION: ICD-10-CM

## 2024-09-18 DIAGNOSIS — Z95.0 PRESENCE OF CARDIAC PACEMAKER: ICD-10-CM

## 2024-09-18 DIAGNOSIS — Z79.01 LONG TERM (CURRENT) USE OF ANTICOAGULANTS: ICD-10-CM

## 2024-09-18 DIAGNOSIS — R94.31 ABNORMAL ELECTROCARDIOGRAM [ECG] [EKG]: ICD-10-CM

## 2024-09-18 DIAGNOSIS — R93.1 ABNORMAL FINDINGS ON DIAGNOSTIC IMAGING OF HEART AND CORONARY CIRCULATION: ICD-10-CM

## 2024-09-18 DIAGNOSIS — I25.5 ISCHEMIC CARDIOMYOPATHY: ICD-10-CM

## 2024-09-18 DIAGNOSIS — I35.0 NONRHEUMATIC AORTIC (VALVE) STENOSIS: ICD-10-CM

## 2024-09-18 DIAGNOSIS — I48.0 PAROXYSMAL ATRIAL FIBRILLATION: ICD-10-CM

## 2024-09-18 DIAGNOSIS — I48.92 UNSPECIFIED ATRIAL FLUTTER: ICD-10-CM

## 2024-09-18 PROCEDURE — 93000 ELECTROCARDIOGRAM COMPLETE: CPT

## 2024-09-18 PROCEDURE — 99215 OFFICE O/P EST HI 40 MIN: CPT | Mod: 25

## 2024-09-18 NOTE — HISTORY OF PRESENT ILLNESS
[FreeTextEntry1] :  Since last visit with me, he has been having back pain and sciatica down his left leg. Is becoming uncomfortable for him, primary provider has ordered an x-ray. Otherwise, continues to be at baseline.  He has not lost any weight, continues to have lower extremity edema and does get short of breath with activity which is at his baseline. No chest pain or chest pressure.  No palpitations.  No dizziness.  No syncope.  No orthopnea.  No PND. He did not get echocardiogram done that I had previously ordered. He tells me again that he will not take any beta-blockers, as he is concerned regarding side effects.  Spironolactone previously discontinued by primary care provider. Remains on anticoagulation.  Denies any excessive ecchymosis, bleeding, black or bloody stools, hematuria or epistaxis.

## 2024-09-18 NOTE — DISCUSSION/SUMMARY
[FreeTextEntry1] : 64-year-old man with multiple complicated cardiac issues.  He has history of severe arctic stenosis, status post TAVR.  CAD, status post remote CABG.  Paroxysmal atrial fibrillation, anticoagulated, atrial flutter, status post permanent pacemaker.  Ischemic cardiomyopathy and chronic systolic heart failure.  Hypertension, hyperlipidemia.  Chronic lower extremity edema.  Renal insufficiency. Chronic complaints of lower extremity edema and dyspnea with exertion. On physical examination, blood pressure stable.  There is no JVD.  Lung fields are clear. EKG sinus tachycardia.  PVC.  Nonspecific ST changes.  Plan 1.  Current medication list is reviewed, no changes.  As noted above, patient refusing to take beta-blocker, and spironolactone previously discontinued by nephrology. 2.  Lifestyle modifications including weight loss, eating a heart healthy diet and trying to maintain an active aerobic lifestyle again discussed with him. 3.  He agrees to go for echocardiogram that was previously ordered. 4.  He will follow-up with me in the office in 3 months. 5.  Advised to monitor for any cardiac symptoms and to report back to me if there are any changes or if he has any other concerns.  Cardiac issues were discussed with him and all of his questions were answered to his satisfaction.  [EKG obtained to assist in diagnosis and management of assessed problem(s)] : EKG obtained to assist in diagnosis and management of assessed problem(s)

## 2024-09-18 NOTE — CARDIOLOGY SUMMARY
[___] : [unfilled] [de-identified] : Sept 18 2024.   Sinus Tachycardia -occasional ectopic ventricular beat . Nonspecific ST changes  ABNORMAL [de-identified] : Nov 19, 2022. suboptimal study despite use of IV contrast.  LV function appears reduced.  s/p TAVR without para valvular leak\par  Sept 8, 2022. @[facility]. Definity used.  LV suboptimally visualized.  There is decreased LV function. Moderate aortic stenosis. Yeyo 1.3cm2  [de-identified] : Nov 1, 2022. Patent LIMA and occluded SVG to OM and RCA... medical therapy ... no intervention indicated\par  Nov 18, 2022 s/p TAVR

## 2024-09-18 NOTE — PHYSICAL EXAM
[No Acute Distress] : no acute distress [Obese] : obese [Normal] : alert and oriented, normal memory [de-identified] : ll/Vl systolic murmur  [de-identified] : 3+ edema bilaterally

## 2024-12-06 ENCOUNTER — APPOINTMENT (OUTPATIENT)
Dept: FAMILY MEDICINE | Facility: CLINIC | Age: 64
End: 2024-12-06
Payer: MEDICARE

## 2024-12-06 VITALS
WEIGHT: 293 LBS | HEART RATE: 92 BPM | RESPIRATION RATE: 16 BRPM | DIASTOLIC BLOOD PRESSURE: 74 MMHG | HEIGHT: 65 IN | SYSTOLIC BLOOD PRESSURE: 121 MMHG | TEMPERATURE: 98.5 F | BODY MASS INDEX: 48.82 KG/M2

## 2024-12-06 DIAGNOSIS — E78.5 HYPERLIPIDEMIA, UNSPECIFIED: ICD-10-CM

## 2024-12-06 DIAGNOSIS — I25.10 ATHEROSCLEROTIC HEART DISEASE OF NATIVE CORONARY ARTERY W/OUT ANGINA PECTORIS: ICD-10-CM

## 2024-12-06 DIAGNOSIS — I48.92 UNSPECIFIED ATRIAL FLUTTER: ICD-10-CM

## 2024-12-06 DIAGNOSIS — K21.9 GASTRO-ESOPHAGEAL REFLUX DISEASE W/OUT ESOPHAGITIS: ICD-10-CM

## 2024-12-06 DIAGNOSIS — I50.22 CHRONIC SYSTOLIC (CONGESTIVE) HEART FAILURE: ICD-10-CM

## 2024-12-06 DIAGNOSIS — N18.30 CHRONIC KIDNEY DISEASE, STAGE 3 UNSPECIFIED: ICD-10-CM

## 2024-12-06 DIAGNOSIS — E66.01 MORBID (SEVERE) OBESITY DUE TO EXCESS CALORIES: ICD-10-CM

## 2024-12-06 DIAGNOSIS — Z79.01 LONG TERM (CURRENT) USE OF ANTICOAGULANTS: ICD-10-CM

## 2024-12-06 DIAGNOSIS — I35.0 NONRHEUMATIC AORTIC (VALVE) STENOSIS: ICD-10-CM

## 2024-12-06 PROCEDURE — G2211 COMPLEX E/M VISIT ADD ON: CPT

## 2024-12-06 PROCEDURE — 99214 OFFICE O/P EST MOD 30 MIN: CPT

## 2024-12-09 LAB
ALBUMIN SERPL ELPH-MCNC: 4.1 G/DL
ALP BLD-CCNC: 118 U/L
ALT SERPL-CCNC: 10 U/L
ANION GAP SERPL CALC-SCNC: 10 MMOL/L
AST SERPL-CCNC: 11 U/L
BASOPHILS # BLD AUTO: 0.07 K/UL
BASOPHILS NFR BLD AUTO: 0.9 %
BILIRUB SERPL-MCNC: 0.7 MG/DL
BUN SERPL-MCNC: 36 MG/DL
CALCIUM SERPL-MCNC: 9.1 MG/DL
CHLORIDE SERPL-SCNC: 96 MMOL/L
CHOLEST SERPL-MCNC: 122 MG/DL
CO2 SERPL-SCNC: 30 MMOL/L
CREAT SERPL-MCNC: 1.72 MG/DL
EGFR: 44 ML/MIN/1.73M2
EOSINOPHIL # BLD AUTO: 0.25 K/UL
EOSINOPHIL NFR BLD AUTO: 3.1 %
ESTIMATED AVERAGE GLUCOSE: 114 MG/DL
FERRITIN SERPL-MCNC: 161 NG/ML
FOLATE SERPL-MCNC: 5.2 NG/ML
GLUCOSE SERPL-MCNC: 111 MG/DL
HBA1C MFR BLD HPLC: 5.6 %
HCT VFR BLD CALC: 43.7 %
HCV AB SER QL: NONREACTIVE
HCV S/CO RATIO: 0.27 S/CO
HDLC SERPL-MCNC: 31 MG/DL
HGB BLD-MCNC: 14.1 G/DL
IMM GRANULOCYTES NFR BLD AUTO: 0.2 %
IRON SATN MFR SERPL: 17 %
IRON SERPL-MCNC: 47 UG/DL
LDLC SERPL CALC-MCNC: 71 MG/DL
LYMPHOCYTES # BLD AUTO: 0.92 K/UL
LYMPHOCYTES NFR BLD AUTO: 11.3 %
MAN DIFF?: NORMAL
MCHC RBC-ENTMCNC: 30.3 PG
MCHC RBC-ENTMCNC: 32.3 G/DL
MCV RBC AUTO: 94 FL
MONOCYTES # BLD AUTO: 0.86 K/UL
MONOCYTES NFR BLD AUTO: 10.5 %
NEUTROPHILS # BLD AUTO: 6.04 K/UL
NEUTROPHILS NFR BLD AUTO: 74 %
NONHDLC SERPL-MCNC: 90 MG/DL
NT-PROBNP SERPL-MCNC: 1643 PG/ML
PLATELET # BLD AUTO: 238 K/UL
POTASSIUM SERPL-SCNC: 3.9 MMOL/L
PROT SERPL-MCNC: 7.8 G/DL
PSA SERPL-MCNC: 0.96 NG/ML
RBC # BLD: 4.65 M/UL
RBC # FLD: 13.9 %
SODIUM SERPL-SCNC: 136 MMOL/L
TIBC SERPL-MCNC: 270 UG/DL
TRIGL SERPL-MCNC: 102 MG/DL
TSH SERPL-ACNC: 1.22 UIU/ML
UIBC SERPL-MCNC: 224 UG/DL
URATE SERPL-MCNC: 5.9 MG/DL
VIT B12 SERPL-MCNC: 379 PG/ML
WBC # FLD AUTO: 8.16 K/UL

## 2024-12-11 RX ORDER — METHYLPREDNISOLONE 4 MG/1
4 TABLET ORAL
Qty: 1 | Refills: 0 | Status: ACTIVE | COMMUNITY
Start: 2024-12-11

## 2024-12-17 ENCOUNTER — APPOINTMENT (OUTPATIENT)
Dept: CARDIOLOGY | Facility: CLINIC | Age: 64
End: 2024-12-17

## 2024-12-20 NOTE — PATIENT PROFILE ADULT - NSASFUNCLEVELADLAMBULATE_GEN_A_NUR
Addended by: EVELIN IRELAND on: 12/20/2024 12:46 PM     Modules accepted: Level of Service    
0 = independent

## 2024-12-23 ENCOUNTER — RX RENEWAL (OUTPATIENT)
Age: 64
End: 2024-12-23

## 2025-01-08 ENCOUNTER — APPOINTMENT (OUTPATIENT)
Dept: CARDIOLOGY | Facility: CLINIC | Age: 65
End: 2025-01-08
Payer: MEDICARE

## 2025-01-08 DIAGNOSIS — Z95.0 PRESENCE OF CARDIAC PACEMAKER: ICD-10-CM

## 2025-01-08 PROCEDURE — 93288 INTERROG EVL PM/LDLS PM IP: CPT

## 2025-01-20 ENCOUNTER — RX RENEWAL (OUTPATIENT)
Age: 65
End: 2025-01-20

## 2025-02-05 ENCOUNTER — RX RENEWAL (OUTPATIENT)
Age: 65
End: 2025-02-05

## 2025-02-13 ENCOUNTER — APPOINTMENT (OUTPATIENT)
Dept: FAMILY MEDICINE | Facility: CLINIC | Age: 65
End: 2025-02-13
Payer: MEDICARE

## 2025-02-13 VITALS
RESPIRATION RATE: 20 BRPM | HEART RATE: 114 BPM | HEIGHT: 65 IN | TEMPERATURE: 97.3 F | OXYGEN SATURATION: 92 % | SYSTOLIC BLOOD PRESSURE: 104 MMHG | DIASTOLIC BLOOD PRESSURE: 72 MMHG | BODY MASS INDEX: 45.65 KG/M2 | WEIGHT: 274 LBS

## 2025-02-13 DIAGNOSIS — E66.01 MORBID (SEVERE) OBESITY DUE TO EXCESS CALORIES: ICD-10-CM

## 2025-02-13 DIAGNOSIS — Z79.01 LONG TERM (CURRENT) USE OF ANTICOAGULANTS: ICD-10-CM

## 2025-02-13 DIAGNOSIS — Z95.2 PRESENCE OF PROSTHETIC HEART VALVE: ICD-10-CM

## 2025-02-13 DIAGNOSIS — I25.10 ATHEROSCLEROTIC HEART DISEASE OF NATIVE CORONARY ARTERY W/OUT ANGINA PECTORIS: ICD-10-CM

## 2025-02-13 DIAGNOSIS — I48.92 UNSPECIFIED ATRIAL FLUTTER: ICD-10-CM

## 2025-02-13 DIAGNOSIS — I35.0 NONRHEUMATIC AORTIC (VALVE) STENOSIS: ICD-10-CM

## 2025-02-13 DIAGNOSIS — I73.9 PERIPHERAL VASCULAR DISEASE, UNSPECIFIED: ICD-10-CM

## 2025-02-13 DIAGNOSIS — K21.9 GASTRO-ESOPHAGEAL REFLUX DISEASE W/OUT ESOPHAGITIS: ICD-10-CM

## 2025-02-13 DIAGNOSIS — Z95.0 PRESENCE OF CARDIAC PACEMAKER: ICD-10-CM

## 2025-02-13 DIAGNOSIS — E78.5 HYPERLIPIDEMIA, UNSPECIFIED: ICD-10-CM

## 2025-02-13 DIAGNOSIS — I50.22 CHRONIC SYSTOLIC (CONGESTIVE) HEART FAILURE: ICD-10-CM

## 2025-02-13 DIAGNOSIS — E79.0 HYPERURICEMIA W/OUT SIGNS OF INFLAMMATORY ARTHRITIS AND TOPHACEOUS DISEASE: ICD-10-CM

## 2025-02-13 PROCEDURE — G2211 COMPLEX E/M VISIT ADD ON: CPT

## 2025-02-13 PROCEDURE — 99215 OFFICE O/P EST HI 40 MIN: CPT

## 2025-02-24 ENCOUNTER — APPOINTMENT (OUTPATIENT)
Dept: CARDIOLOGY | Facility: CLINIC | Age: 65
End: 2025-02-24
Payer: MEDICARE

## 2025-02-24 ENCOUNTER — NON-APPOINTMENT (OUTPATIENT)
Age: 65
End: 2025-02-24

## 2025-02-24 VITALS
HEIGHT: 65 IN | WEIGHT: 270 LBS | DIASTOLIC BLOOD PRESSURE: 83 MMHG | BODY MASS INDEX: 44.98 KG/M2 | SYSTOLIC BLOOD PRESSURE: 114 MMHG | OXYGEN SATURATION: 93 % | HEART RATE: 121 BPM

## 2025-02-24 DIAGNOSIS — Z95.0 PRESENCE OF CARDIAC PACEMAKER: ICD-10-CM

## 2025-02-24 DIAGNOSIS — Z95.2 PRESENCE OF PROSTHETIC HEART VALVE: ICD-10-CM

## 2025-02-24 DIAGNOSIS — I48.0 PAROXYSMAL ATRIAL FIBRILLATION: ICD-10-CM

## 2025-02-24 DIAGNOSIS — E78.5 HYPERLIPIDEMIA, UNSPECIFIED: ICD-10-CM

## 2025-02-24 DIAGNOSIS — I35.0 NONRHEUMATIC AORTIC (VALVE) STENOSIS: ICD-10-CM

## 2025-02-24 DIAGNOSIS — I25.5 ISCHEMIC CARDIOMYOPATHY: ICD-10-CM

## 2025-02-24 DIAGNOSIS — Z79.01 LONG TERM (CURRENT) USE OF ANTICOAGULANTS: ICD-10-CM

## 2025-02-24 DIAGNOSIS — I10 ESSENTIAL (PRIMARY) HYPERTENSION: ICD-10-CM

## 2025-02-24 DIAGNOSIS — I25.10 ATHEROSCLEROTIC HEART DISEASE OF NATIVE CORONARY ARTERY W/OUT ANGINA PECTORIS: ICD-10-CM

## 2025-02-24 PROCEDURE — 93000 ELECTROCARDIOGRAM COMPLETE: CPT

## 2025-02-24 PROCEDURE — 99215 OFFICE O/P EST HI 40 MIN: CPT

## 2025-02-24 RX ORDER — METOPROLOL SUCCINATE 25 MG/1
25 TABLET, EXTENDED RELEASE ORAL DAILY
Qty: 1 | Refills: 0 | Status: ACTIVE | COMMUNITY
Start: 2025-02-24 | End: 1900-01-01

## 2025-03-13 ENCOUNTER — APPOINTMENT (OUTPATIENT)
Facility: CLINIC | Age: 65
End: 2025-03-13
Payer: MEDICARE

## 2025-03-13 ENCOUNTER — NON-APPOINTMENT (OUTPATIENT)
Age: 65
End: 2025-03-13

## 2025-03-13 VITALS
OXYGEN SATURATION: 96 % | DIASTOLIC BLOOD PRESSURE: 69 MMHG | HEIGHT: 65 IN | SYSTOLIC BLOOD PRESSURE: 107 MMHG | TEMPERATURE: 97.5 F | BODY MASS INDEX: 44.98 KG/M2 | WEIGHT: 270 LBS

## 2025-03-13 DIAGNOSIS — Z82.49 FAMILY HISTORY OF ISCHEMIC HEART DISEASE AND OTHER DISEASES OF THE CIRCULATORY SYSTEM: ICD-10-CM

## 2025-03-13 DIAGNOSIS — I48.0 PAROXYSMAL ATRIAL FIBRILLATION: ICD-10-CM

## 2025-03-13 DIAGNOSIS — Z78.9 OTHER SPECIFIED HEALTH STATUS: ICD-10-CM

## 2025-03-13 DIAGNOSIS — I25.5 ISCHEMIC CARDIOMYOPATHY: ICD-10-CM

## 2025-03-13 PROCEDURE — 93000 ELECTROCARDIOGRAM COMPLETE: CPT

## 2025-03-13 PROCEDURE — 99204 OFFICE O/P NEW MOD 45 MIN: CPT | Mod: 25

## 2025-03-19 ENCOUNTER — NON-APPOINTMENT (OUTPATIENT)
Age: 65
End: 2025-03-19

## 2025-03-24 ENCOUNTER — APPOINTMENT (OUTPATIENT)
Dept: CARDIOLOGY | Facility: CLINIC | Age: 65
End: 2025-03-24

## 2025-03-25 ENCOUNTER — OUTPATIENT (OUTPATIENT)
Dept: OUTPATIENT SERVICES | Facility: HOSPITAL | Age: 65
LOS: 1 days | End: 2025-03-25
Payer: MEDICARE

## 2025-03-25 ENCOUNTER — TRANSCRIPTION ENCOUNTER (OUTPATIENT)
Age: 65
End: 2025-03-25

## 2025-03-25 VITALS
RESPIRATION RATE: 18 BRPM | SYSTOLIC BLOOD PRESSURE: 125 MMHG | WEIGHT: 270.07 LBS | DIASTOLIC BLOOD PRESSURE: 71 MMHG | HEIGHT: 67 IN | OXYGEN SATURATION: 97 % | HEART RATE: 145 BPM | TEMPERATURE: 98 F

## 2025-03-25 VITALS
HEART RATE: 108 BPM | RESPIRATION RATE: 20 BRPM | SYSTOLIC BLOOD PRESSURE: 120 MMHG | DIASTOLIC BLOOD PRESSURE: 78 MMHG | OXYGEN SATURATION: 95 %

## 2025-03-25 DIAGNOSIS — Z95.0 PRESENCE OF CARDIAC PACEMAKER: Chronic | ICD-10-CM

## 2025-03-25 DIAGNOSIS — Z95.5 PRESENCE OF CORONARY ANGIOPLASTY IMPLANT AND GRAFT: Chronic | ICD-10-CM

## 2025-03-25 DIAGNOSIS — Z98.890 OTHER SPECIFIED POSTPROCEDURAL STATES: Chronic | ICD-10-CM

## 2025-03-25 DIAGNOSIS — Z90.89 ACQUIRED ABSENCE OF OTHER ORGANS: Chronic | ICD-10-CM

## 2025-03-25 DIAGNOSIS — I48.91 UNSPECIFIED ATRIAL FIBRILLATION: ICD-10-CM

## 2025-03-25 DIAGNOSIS — Z95.1 PRESENCE OF AORTOCORONARY BYPASS GRAFT: Chronic | ICD-10-CM

## 2025-03-25 LAB
ALBUMIN SERPL ELPH-MCNC: 3.8 G/DL — SIGNIFICANT CHANGE UP (ref 3.3–5)
ALP SERPL-CCNC: 133 U/L — HIGH (ref 40–120)
ALT FLD-CCNC: 12 U/L — SIGNIFICANT CHANGE UP (ref 10–45)
ANION GAP SERPL CALC-SCNC: 20 MMOL/L — HIGH (ref 5–17)
AST SERPL-CCNC: 16 U/L — SIGNIFICANT CHANGE UP (ref 10–40)
BILIRUB SERPL-MCNC: 1.1 MG/DL — SIGNIFICANT CHANGE UP (ref 0.2–1.2)
BUN SERPL-MCNC: 55 MG/DL — HIGH (ref 7–23)
CALCIUM SERPL-MCNC: 10.2 MG/DL — SIGNIFICANT CHANGE UP (ref 8.4–10.5)
CHLORIDE SERPL-SCNC: 95 MMOL/L — LOW (ref 96–108)
CO2 SERPL-SCNC: 19 MMOL/L — LOW (ref 22–31)
CREAT SERPL-MCNC: 2.76 MG/DL — HIGH (ref 0.5–1.3)
EGFR: 25 ML/MIN/1.73M2 — LOW
EGFR: 25 ML/MIN/1.73M2 — LOW
GLUCOSE SERPL-MCNC: 118 MG/DL — HIGH (ref 70–99)
HCT VFR BLD CALC: 44.9 % — SIGNIFICANT CHANGE UP (ref 39–50)
HGB BLD-MCNC: 14.2 G/DL — SIGNIFICANT CHANGE UP (ref 13–17)
MAGNESIUM SERPL-MCNC: 1.9 MG/DL — SIGNIFICANT CHANGE UP (ref 1.6–2.6)
MCHC RBC-ENTMCNC: 30.3 PG — SIGNIFICANT CHANGE UP (ref 27–34)
MCHC RBC-ENTMCNC: 31.6 G/DL — LOW (ref 32–36)
MCV RBC AUTO: 95.7 FL — SIGNIFICANT CHANGE UP (ref 80–100)
NRBC BLD AUTO-RTO: 0 /100 WBCS — SIGNIFICANT CHANGE UP (ref 0–0)
PLATELET # BLD AUTO: 193 K/UL — SIGNIFICANT CHANGE UP (ref 150–400)
POTASSIUM SERPL-MCNC: 4.9 MMOL/L — SIGNIFICANT CHANGE UP (ref 3.5–5.3)
POTASSIUM SERPL-SCNC: 4.9 MMOL/L — SIGNIFICANT CHANGE UP (ref 3.5–5.3)
PROT SERPL-MCNC: 8.4 G/DL — HIGH (ref 6–8.3)
RBC # BLD: 4.69 M/UL — SIGNIFICANT CHANGE UP (ref 4.2–5.8)
RBC # FLD: 15.8 % — HIGH (ref 10.3–14.5)
SODIUM SERPL-SCNC: 134 MMOL/L — LOW (ref 135–145)
WBC # BLD: 11.29 K/UL — HIGH (ref 3.8–10.5)
WBC # FLD AUTO: 11.29 K/UL — HIGH (ref 3.8–10.5)

## 2025-03-25 PROCEDURE — 83735 ASSAY OF MAGNESIUM: CPT

## 2025-03-25 PROCEDURE — 92960 CARDIOVERSION ELECTRIC EXT: CPT

## 2025-03-25 PROCEDURE — 85027 COMPLETE CBC AUTOMATED: CPT

## 2025-03-25 PROCEDURE — 93010 ELECTROCARDIOGRAM REPORT: CPT

## 2025-03-25 PROCEDURE — 93005 ELECTROCARDIOGRAM TRACING: CPT

## 2025-03-25 PROCEDURE — 80053 COMPREHEN METABOLIC PANEL: CPT

## 2025-03-25 RX ORDER — AMIODARONE HYDROCHLORIDE 50 MG/ML
1 INJECTION, SOLUTION INTRAVENOUS
Qty: 30 | Refills: 0
Start: 2025-03-25 | End: 2025-04-03

## 2025-03-25 RX ORDER — BUMETANIDE 1 MG/1
1 TABLET ORAL
Qty: 0 | Refills: 0 | DISCHARGE
Start: 2025-03-25

## 2025-03-25 RX ORDER — BUMETANIDE 1 MG/1
1 TABLET ORAL
Refills: 0 | DISCHARGE

## 2025-03-25 RX ORDER — FUROSEMIDE 10 MG/ML
20 INJECTION INTRAMUSCULAR; INTRAVENOUS ONCE
Refills: 0 | Status: COMPLETED | OUTPATIENT
Start: 2025-03-25 | End: 2025-03-25

## 2025-03-25 RX ORDER — AMIODARONE HYDROCHLORIDE 50 MG/ML
1 INJECTION, SOLUTION INTRAVENOUS
Qty: 30 | Refills: 1
Start: 2025-03-25 | End: 2025-05-23

## 2025-03-25 RX ADMIN — FUROSEMIDE 20 MILLIGRAM(S): 10 INJECTION INTRAMUSCULAR; INTRAVENOUS at 10:05

## 2025-03-25 NOTE — ASU DISCHARGE PLAN (ADULT/PEDIATRIC) - FINANCIAL ASSISTANCE
[facility] provides services at a reduced cost to those who are determined to be eligible through [facility]’s financial assistance program. Information regarding [facility]’s financial assistance program can be found by going to [email or web address] or by calling [phone].

## 2025-03-25 NOTE — ASU DISCHARGE PLAN (ADULT/PEDIATRIC) - CARE PROVIDER_API CALL
Kevyn Philip  Cardiac Electrophysiology  [address] [place] [id number]  Phone: [phone]  Fax: [phone]  Established Patient  Follow Up Time:

## 2025-03-31 ENCOUNTER — INPATIENT (INPATIENT)
Facility: HOSPITAL | Age: 65
LOS: 27 days | Discharge: NURSING, REHAB OR LONG-TERM CARE | DRG: 293 | End: 2025-04-28
Attending: STUDENT IN AN ORGANIZED HEALTH CARE EDUCATION/TRAINING PROGRAM | Admitting: HOSPITALIST
Payer: MEDICARE

## 2025-03-31 VITALS
OXYGEN SATURATION: 94 % | HEIGHT: 67 IN | TEMPERATURE: 97 F | RESPIRATION RATE: 20 BRPM | HEART RATE: 71 BPM | DIASTOLIC BLOOD PRESSURE: 57 MMHG | WEIGHT: 259.93 LBS | SYSTOLIC BLOOD PRESSURE: 94 MMHG

## 2025-03-31 DIAGNOSIS — Z90.89 ACQUIRED ABSENCE OF OTHER ORGANS: Chronic | ICD-10-CM

## 2025-03-31 DIAGNOSIS — Z95.0 PRESENCE OF CARDIAC PACEMAKER: Chronic | ICD-10-CM

## 2025-03-31 DIAGNOSIS — Z95.5 PRESENCE OF CORONARY ANGIOPLASTY IMPLANT AND GRAFT: Chronic | ICD-10-CM

## 2025-03-31 DIAGNOSIS — Z98.890 OTHER SPECIFIED POSTPROCEDURAL STATES: Chronic | ICD-10-CM

## 2025-03-31 DIAGNOSIS — Z95.1 PRESENCE OF AORTOCORONARY BYPASS GRAFT: Chronic | ICD-10-CM

## 2025-03-31 DIAGNOSIS — I50.23 ACUTE ON CHRONIC SYSTOLIC (CONGESTIVE) HEART FAILURE: ICD-10-CM

## 2025-03-31 LAB
ADD ON TEST-SPECIMEN IN LAB: SIGNIFICANT CHANGE UP
ALBUMIN SERPL ELPH-MCNC: 3.5 G/DL — SIGNIFICANT CHANGE UP (ref 3.3–5)
ALP SERPL-CCNC: 107 U/L — SIGNIFICANT CHANGE UP (ref 40–120)
ALT FLD-CCNC: 11 U/L — SIGNIFICANT CHANGE UP (ref 10–45)
AMORPH CRY # UR COMP ASSIST: PRESENT
ANION GAP SERPL CALC-SCNC: 16 MMOL/L — SIGNIFICANT CHANGE UP (ref 5–17)
APPEARANCE UR: ABNORMAL
APTT BLD: 40.7 SEC — HIGH (ref 24.5–35.6)
AST SERPL-CCNC: 10 U/L — SIGNIFICANT CHANGE UP (ref 10–40)
BACTERIA # UR AUTO: NEGATIVE /HPF — SIGNIFICANT CHANGE UP
BASOPHILS # BLD AUTO: 0.06 K/UL — SIGNIFICANT CHANGE UP (ref 0–0.2)
BASOPHILS NFR BLD AUTO: 0.6 % — SIGNIFICANT CHANGE UP (ref 0–2)
BILIRUB SERPL-MCNC: 1 MG/DL — SIGNIFICANT CHANGE UP (ref 0.2–1.2)
BILIRUB UR-MCNC: ABNORMAL
BUN SERPL-MCNC: 78 MG/DL — HIGH (ref 7–23)
CALCIUM SERPL-MCNC: 9.2 MG/DL — SIGNIFICANT CHANGE UP (ref 8.4–10.5)
CAST: 17 /LPF — HIGH (ref 0–4)
CHLORIDE SERPL-SCNC: 96 MMOL/L — SIGNIFICANT CHANGE UP (ref 96–108)
CK MB CFR SERPL CALC: 4.2 NG/ML — SIGNIFICANT CHANGE UP (ref 0–6.7)
CO2 SERPL-SCNC: 20 MMOL/L — LOW (ref 22–31)
COLOR SPEC: SIGNIFICANT CHANGE UP
CREAT SERPL-MCNC: 5.17 MG/DL — HIGH (ref 0.5–1.3)
DIFF PNL FLD: NEGATIVE — SIGNIFICANT CHANGE UP
EGFR: 12 ML/MIN/1.73M2 — LOW
EGFR: 12 ML/MIN/1.73M2 — LOW
EOSINOPHIL # BLD AUTO: 0.11 K/UL — SIGNIFICANT CHANGE UP (ref 0–0.5)
EOSINOPHIL NFR BLD AUTO: 1 % — SIGNIFICANT CHANGE UP (ref 0–6)
GAS PNL BLDV: SIGNIFICANT CHANGE UP
GLUCOSE SERPL-MCNC: 112 MG/DL — HIGH (ref 70–99)
GLUCOSE UR QL: NEGATIVE MG/DL — SIGNIFICANT CHANGE UP
HCT VFR BLD CALC: 43.7 % — SIGNIFICANT CHANGE UP (ref 39–50)
HGB BLD-MCNC: 13.7 G/DL — SIGNIFICANT CHANGE UP (ref 13–17)
IMM GRANULOCYTES NFR BLD AUTO: 0.4 % — SIGNIFICANT CHANGE UP (ref 0–0.9)
INR BLD: 2.55 RATIO — HIGH (ref 0.85–1.16)
KETONES UR-MCNC: ABNORMAL MG/DL
LEUKOCYTE ESTERASE UR-ACNC: ABNORMAL
LYMPHOCYTES # BLD AUTO: 0.86 K/UL — LOW (ref 1–3.3)
LYMPHOCYTES # BLD AUTO: 8.1 % — LOW (ref 13–44)
MCHC RBC-ENTMCNC: 30 PG — SIGNIFICANT CHANGE UP (ref 27–34)
MCHC RBC-ENTMCNC: 31.4 G/DL — LOW (ref 32–36)
MCV RBC AUTO: 95.8 FL — SIGNIFICANT CHANGE UP (ref 80–100)
MONOCYTES # BLD AUTO: 0.79 K/UL — SIGNIFICANT CHANGE UP (ref 0–0.9)
MONOCYTES NFR BLD AUTO: 7.4 % — SIGNIFICANT CHANGE UP (ref 2–14)
NEUTROPHILS # BLD AUTO: 8.82 K/UL — HIGH (ref 1.8–7.4)
NEUTROPHILS NFR BLD AUTO: 82.5 % — HIGH (ref 43–77)
NITRITE UR-MCNC: NEGATIVE — SIGNIFICANT CHANGE UP
NRBC BLD AUTO-RTO: 0 /100 WBCS — SIGNIFICANT CHANGE UP (ref 0–0)
NT-PROBNP SERPL-SCNC: HIGH PG/ML (ref 0–300)
PH UR: 5 — SIGNIFICANT CHANGE UP (ref 5–8)
PLATELET # BLD AUTO: 170 K/UL — SIGNIFICANT CHANGE UP (ref 150–400)
POTASSIUM SERPL-MCNC: 5.5 MMOL/L — HIGH (ref 3.5–5.3)
POTASSIUM SERPL-SCNC: 5.5 MMOL/L — HIGH (ref 3.5–5.3)
PROT SERPL-MCNC: 7.6 G/DL — SIGNIFICANT CHANGE UP (ref 6–8.3)
PROT UR-MCNC: 100 MG/DL
PROTHROM AB SERPL-ACNC: 29.1 SEC — HIGH (ref 9.9–13.4)
RBC # BLD: 4.56 M/UL — SIGNIFICANT CHANGE UP (ref 4.2–5.8)
RBC # FLD: 16.5 % — HIGH (ref 10.3–14.5)
RBC CASTS # UR COMP ASSIST: 6 /HPF — HIGH (ref 0–4)
REVIEW: SIGNIFICANT CHANGE UP
SODIUM SERPL-SCNC: 132 MMOL/L — LOW (ref 135–145)
SP GR SPEC: 1.02 — SIGNIFICANT CHANGE UP (ref 1–1.03)
SQUAMOUS # UR AUTO: 1 /HPF — SIGNIFICANT CHANGE UP (ref 0–5)
TROPONIN T, HIGH SENSITIVITY RESULT: 225 NG/L — HIGH (ref 0–51)
UROBILINOGEN FLD QL: 1 MG/DL — SIGNIFICANT CHANGE UP (ref 0.2–1)
WBC # BLD: 10.68 K/UL — HIGH (ref 3.8–10.5)
WBC # FLD AUTO: 10.68 K/UL — HIGH (ref 3.8–10.5)
WBC UR QL: 3 /HPF — SIGNIFICANT CHANGE UP (ref 0–5)
YEAST-LIKE CELLS: PRESENT

## 2025-03-31 PROCEDURE — 99291 CRITICAL CARE FIRST HOUR: CPT

## 2025-03-31 PROCEDURE — 71045 X-RAY EXAM CHEST 1 VIEW: CPT | Mod: 26

## 2025-03-31 RX ORDER — SODIUM ZIRCONIUM CYCLOSILICATE 5 G/5G
5 POWDER, FOR SUSPENSION ORAL ONCE
Refills: 0 | Status: COMPLETED | OUTPATIENT
Start: 2025-03-31 | End: 2025-03-31

## 2025-03-31 RX ORDER — BUMETANIDE 1 MG/1
4 TABLET ORAL ONCE
Refills: 0 | Status: COMPLETED | OUTPATIENT
Start: 2025-03-31 | End: 2025-03-31

## 2025-03-31 RX ORDER — BUMETANIDE 1 MG/1
1 TABLET ORAL ONCE
Refills: 0 | Status: COMPLETED | OUTPATIENT
Start: 2025-03-31 | End: 2025-03-31

## 2025-03-31 RX ORDER — BUMETANIDE 1 MG/1
2 TABLET ORAL ONCE
Refills: 0 | Status: COMPLETED | OUTPATIENT
Start: 2025-03-31 | End: 2025-03-31

## 2025-03-31 RX ADMIN — BUMETANIDE 132 MILLIGRAM(S): 1 TABLET ORAL at 23:50

## 2025-03-31 RX ADMIN — SODIUM ZIRCONIUM CYCLOSILICATE 5 GRAM(S): 5 POWDER, FOR SUSPENSION ORAL at 19:08

## 2025-03-31 RX ADMIN — BUMETANIDE 2 MILLIGRAM(S): 1 TABLET ORAL at 20:29

## 2025-03-31 RX ADMIN — BUMETANIDE 1 MILLIGRAM(S): 1 TABLET ORAL at 18:20

## 2025-03-31 NOTE — ED PROCEDURE NOTE - PROCEDURE ADDITIONAL DETAILS
POCUS: Emergency Department Focused Ultrasound performed at patient's bedside.  The complete report will be available in PACS.     Severe LV systolic dysfunction. IVC plethoric POCUS: Emergency Department Focused Ultrasound performed at patient's bedside.  The complete report will be available in PACS.     No pericardial effusion.  Moderate to severe global left ventricular systolic dysfunction.  Scattered B-lines in the bilateral anterior lung fields.  IVC is plethoric at 2.34 cm and demonstrates less than 50% respiratory variation.

## 2025-03-31 NOTE — CONSULT NOTE ADULT - SUBJECTIVE AND OBJECTIVE BOX
Cardiology Consult Note   [Please check [email or web address] password: [email or web address]" for cardiology service schedule and contact information]    HPI:  Mr. Waller is a 63 yo M with a fib (s/p dccv 3/25/25), CHF, CKD, obesity, venous stasis, aortic stenosis s/p TAVR (11/2022), a flutter s/p ablation 2019 and s/p micra implant, HTN HLD, CHF (likely moderately reduced EF, several TTE with poor windows), who presents with sob x 6 d    sob when flat, myers, cp on exertion.       Cardiologist: Dr. Paul    in ed afebrile, pulse 60-70, bp 88-94/50-57, rr 20-36, 98% on 2 L NC  wbc 10, K 5.5, Cr 5 (bl 1.5), lft wnl, trop 225, probnp 93570, vbg 7.3/50/lactate 1.6  cxr pulm edema, R pleural effusion    ekg on admission 1st deg av block, low voltage, poor r wave progression, R axis deviation    bumex 1 mg iv x1 at 6pm, ordered bumex 2 mg iv, [facility] meds:  amio 400 mg q8h for 10 days starting 3/25 then 200 mg qd  eliquis 5 mg bid  atorva 20 mg qd  bumex 1 mg qod  bumex 2 mg qod (alternating with 1 mg)  plavix 75 mg qd  hydral 25 mg tid  metop xl 25 qd  entresto 24/26 mg bid        PAST MEDICAL & SURGICAL HISTORY:  CAD (coronary artery disease)  s/p CABG      Hypercholesteremia      Thrombus of left atrial appendage  2018      Ischemic cardiomyopathy      WILFRED (obstructive sleep apnea)  can not tolerate bipap at night      HTN (hypertension)      Atrial fibrillation  2018      CHF (congestive heart failure)  denies any recent exacerbation      Peripheral edema  chronic      Lichen planus  chronic ( b/L LE)      Atrial flutter  s/p ablation 2018      MI (myocardial infarction)  2012      Stented coronary artery  2019      AS (aortic stenosis)      Chronic kidney disease, unspecified CKD stage      ANGELICA (acute kidney injury)  4/2021      Morbid obesity      Status post placement of cardiac pacemaker  micraleadless PPM, [id number] last interrogation 7/6/2022      Osteoarthritis  shoulders, hips, knee      H/O scoliosis      Lower back pain      History of elbow surgery      S/P CABG (coronary artery bypass graft)  x3, 2012      Cardiac pacemaker  leadless 2018      History of coronary artery stent placement  2019 ( one more after cabg)      History of adenoidectomy      H/O atrioventricular karlee ablation  2018        FAMILY HISTORY:    SOCIAL HISTORY:  unchanged    MEDICATIONS:  buMETAnide Injectable 2 milliGRAM(s) IV Push Once                    -------------------------------------------------------------------------------------------  PHYSICAL EXAM:  T(C): 36.6 (03-31-25 @ 17:50), Max: 36.6 (03-31-25 @ 17:50)  HR: 66 (03-31-25 @ 18:20) (62 - 71)  BP: 88/54 (03-31-25 @ 18:20) (88/54 - 94/57)  RR: 36 (03-31-25 @ 18:20) (20 - 36)  SpO2: 98% (03-31-25 @ 18:20) (94% - 98%)  Wt(kg): --  I&O's Summary      GENERAL: NAD  HEAD: Atraumatic, Normocephalic.  ENT: Moist mucous membranes.  NECK: Supple, No JVD.  CHEST/LUNG: Clear to auscultation bilaterally; No rales, rhonchi, wheezing, or rubs. Unlabored respirations.  HEART: Regular rate and rhythm; No murmurs, rubs, or gallops.  ABDOMEN: Bowel sounds present; Soft, Nontender, Nondistended.   EXTREMITIES:  2+ Peripheral Pulses, brisk capillary refill. No clubbing, cyanosis, or edema.    -------------------------------------------------------------------------------------------  LABS:                          13.7   10.68 )-----------( 170      ( 31 Mar 2025 18:01 )             43.7     03-31    132[L]  |  96  |  78[H]  ----------------------------<  112[H]  5.5[H]   |  20[L]  |  5.17[H]    Ca    9.2      31 Mar 2025 18:01    TPro  7.6  /  Alb  3.5  /  TBili  1.0  /  DBili  x   /  AST  10  /  ALT  11  /  AlkPhos  107  03-31    PT/INR - ( 31 Mar 2025 18:01 )   PT: 29.1 sec;   INR: 2.55 ratio         PTT - ( 31 Mar 2025 18:01 )  PTT:40.7 sec  CARDIAC MARKERS ( 31 Mar 2025 18:01 )  225 ng/L / x     / x     / x     / x     / 4.2 ng/mL                               Cardiology Consult Note   [Please check [email or web address] password: [email or web address]" for cardiology service schedule and contact information]    HPI:  Mr. Waller is a 63 yo M with a fib (s/p dccv 3/25/25), CHF, CKD, obesity, venous stasis, aortic stenosis s/p TAVR (11/2022), a flutter s/p ablation 2019 and s/p micra implant, HTN HLD, CHF (likely moderately reduced EF, several TTE with poor windows), who presents with sob x 6 d    sob when flat, myers, cp on exertion. cp is burning when he exerts himself. lower extremities look the same to him, not more swollen than normal. his nephrologist told him to drink 70 ccs of water, he hasn't the last week. doesn't feel dehydrated. last few days has been alternating sleeping in his bed that is propped up, and the couch because he feels less sob while propped up.    Cardiologist: Dr. Paul    in ed afebrile, pulse 60-70, bp 88-94/50-57, rr 20-36, 98% on 2 L NC  wbc 10, K 5.5, Cr 5 (bl 1.5), lft wnl, trop 225, probnp 27980, vbg 7.3/50/lactate 1.6  cxr pulm edema, R pleural effusion    ekg on admission 1st deg av block, low voltage, poor r wave progression, R axis deviation    bumex 1 mg iv x1 at 6pm, ordered bumex 2 mg iv, [facility] meds:  amio 400 mg q8h for 10 days starting 3/25 then 200 mg qd  eliquis 5 mg bid  atorva 20 mg qd  bumex 1 mg qod  bumex 2 mg qod (alternating with 1 mg)  plavix 75 mg qd  hydral 25 mg tid  metop xl 25 qd (not taking)  entresto 24/26 mg bid        PAST MEDICAL & SURGICAL HISTORY:  CAD (coronary artery disease)  s/p CABG      Hypercholesteremia      Thrombus of left atrial appendage  2018      Ischemic cardiomyopathy      WILFRED (obstructive sleep apnea)  can not tolerate bipap at night      HTN (hypertension)      Atrial fibrillation  2018      CHF (congestive heart failure)  denies any recent exacerbation      Peripheral edema  chronic      Lichen planus  chronic ( b/L LE)      Atrial flutter  s/p ablation 2018      MI (myocardial infarction)  2012      Stented coronary artery  2019      AS (aortic stenosis)      Chronic kidney disease, unspecified CKD stage      ANGELICA (acute kidney injury)  4/2021      Morbid obesity      Status post placement of cardiac pacemaker  micraleadless PPM, [id number] last interrogation 7/6/2022      Osteoarthritis  shoulders, hips, knee      H/O scoliosis      Lower back pain      History of elbow surgery      S/P CABG (coronary artery bypass graft)  x3, 2012      Cardiac pacemaker  leadless 2018      History of coronary artery stent placement  2019 ( one more after cabg)      History of adenoidectomy      H/O atrioventricular karlee ablation  2018        FAMILY HISTORY:    SOCIAL HISTORY:  unchanged    MEDICATIONS:  buMETAnide Injectable 2 milliGRAM(s) IV Push Once                    -------------------------------------------------------------------------------------------  PHYSICAL EXAM:  T(C): 36.6 (03-31-25 @ 17:50), Max: 36.6 (03-31-25 @ 17:50)  HR: 66 (03-31-25 @ 18:20) (62 - 71)  BP: 88/54 (03-31-25 @ 18:20) (88/54 - 94/57)  RR: 36 (03-31-25 @ 18:20) (20 - 36)  SpO2: 98% (03-31-25 @ 18:20) (94% - 98%)  Wt(kg): --  I&O's Summary      GENERAL: NAD  HEAD: Atraumatic, Normocephalic.  ENT: Moist mucous membranes.  NECK: Supple, No JVD.  CHEST/LUNG: Clear to auscultation bilaterally; No rales, rhonchi, wheezing, or rubs. Unlabored respirations.  HEART: Regular rate and rhythm; No murmurs, rubs, or gallops.  ABDOMEN: Bowel sounds present; Soft, Nontender, Nondistended.   EXTREMITIES:  2+ Peripheral Pulses, brisk capillary refill. No clubbing, cyanosis, or edema.    -------------------------------------------------------------------------------------------  LABS:                          13.7   10.68 )-----------( 170      ( 31 Mar 2025 18:01 )             43.7     03-31    132[L]  |  96  |  78[H]  ----------------------------<  112[H]  5.5[H]   |  20[L]  |  5.17[H]    Ca    9.2      31 Mar 2025 18:01    TPro  7.6  /  Alb  3.5  /  TBili  1.0  /  DBili  x   /  AST  10  /  ALT  11  /  AlkPhos  107  03-31    PT/INR - ( 31 Mar 2025 18:01 )   PT: 29.1 sec;   INR: 2.55 ratio         PTT - ( 31 Mar 2025 18:01 )  PTT:40.7 sec  CARDIAC MARKERS ( 31 Mar 2025 18:01 )  225 ng/L / x     / x     / x     / x     / 4.2 ng/mL

## 2025-03-31 NOTE — ED PROVIDER NOTE - CARE PLAN
1 Principal Discharge DX:	Acute on chronic systolic congestive heart failure  Secondary Diagnosis:	Acute kidney injury superimposed on chronic kidney disease

## 2025-03-31 NOTE — ED PROVIDER NOTE - CLINICAL SUMMARY MEDICAL DECISION MAKING FREE TEXT BOX
Attending MD Gilliland: Patient presents to the emergency department with worsening shortness of breath over the past six days. Patient reports extreme difficulty breathing, especially when lying down in bed. Symptoms worsen with exertion, including walking and getting up. Patient also notes chest pain with exertion, localized to the center of the chest. Patient had a cardioversion for atrial fibrillation at this hospital recently, which was reportedly unsuccessful. Patient has a scheduled appointment with Dr. Philip on 04/24/2025 to discuss possible ablation. Patient has been sleeping upright to manage breathing difficulties. Patient denies any associated pain at rest.    Primary Care Doctor: Dr. Alirio Johnson    Cardiologist: Dr. Paul     Past Medical History:  - Atrial fibrillation  - Congestive heart failure  - Chronic kidney disease    Allergies: No known drug allergies. Patient reports problems with beta blockers, causing similar symptoms of shortness of breath when lying down.    Medication History:  - Amiodarone  - Bumetanide (dose unknown)  - Entresto (dose unknown)    Social History: Not provided    Patient's vital signs are notable for systolic blood pressure 94 systolic otherwise nonactionable.  The patient is lying in the stretcher conversational no apparent acute distress.  He is not tachypneic to my eye.  High BMI.  Median sternotomy scar present.  Regular heart sounds anteriorly.  Significant bilateral lower extremity nonpitting edema, both legs are slightly cool to the touch, upper extremities warm to the touch.    ECG recorded at 1704 independently interpreted by me , Dr Angel Gilliland,  at 1740 shows sinus rhythm with first-degree AV block  ms, right bundle branch block right axis deviation Q waves V2 and V3, no ST elevation or ST depression, Q waves lead I and lead aVL    ED POCUS reveals severely depressed LV systolic function.  B-lines are seen in anterior lung fields bilaterally plethoric IVC.    Considerations in this patient include but are not fully limited to acute on chronic systolic heart failure, symptomatic anemia, dysrhythmia, ACS.  Plan at this time will be to obtain screening labs screening chest film, cardiac biomarkers BNP level, anticipate admission for IV diuresis.      *The above represents an initial assessment/impression. Please refer to progress notes for potential changes in patient clinical course*

## 2025-03-31 NOTE — ED ADULT NURSE NOTE - OBJECTIVE STATEMENT
64y Male AOX4 with PMH CHF presents to the ED c/o SOB. Pt endorses x1 week of SOB. Noted to have worsening VELÁQSUEZ and chest pain on exertion, now also endorses SOB with lying down. States he recently has unsuccessful cardioversion  for afib and has been speaking with cardioligst about future ablation. Placed on cardiac monitor - NSR.  Baseline RA O2, currently on 2L NC and tolerating well. Denies N/V, fever/chills. Spontaneous/unlabored respirations, speaking in full sentences. Side rails up, bed in lowest position, oriented to call bell, safety maintained.

## 2025-03-31 NOTE — ED ADULT NURSE REASSESSMENT NOTE - NS ED NURSE REASSESS COMMENT FT1
Pharmacy contacted to send Bumex IVPB to [facility] tube station #[id number]

## 2025-03-31 NOTE — CONSULT NOTE ADULT - ASSESSMENT
TTE 11/2022  Limited  and Difficult TTE  1. Transcatheter aortic valve replacement. # 26mm YAHAIRA  Ultra THV Peak transaortic valve gradient equals 17 mm Hg,  mean transaortic valve gradient equals 9 mm Hg, which is  probably normal in the presence of a transcatheter aortic  valve replacement. No  paravalvular aortic regurgitation.  2. Despite the use of ultra sound enhancing agent,  evaluation of systolic function remains subotpimal and  appears reduced.  Severe anteroseptal, anteroapical and  apical hypokinesis.   Endocardial visualization enhanced  with intravenous injection of Ultrasonic Enhancing Agent  (Lumason).    TTE 3/2022  lvef 40%    [facility] 11/2022  Severe two vesel obstructive coronary artery disease   --Chronic total occlusion of the left anterior descending dartery   --Chronic total occlusion of the proximal left circumflex artery   Mild stenosis of right coronary artery stent   Mild left main coronary artery disease   Patent LIMA to the left anterior descending artery   Occluded SVG to the obtuse marginal artery   Occluded SVG to the right coronary artery         ***note incomplete   Mr. Waller is a 65 yo M with a fib (s/p dccv 3/25/25), CHF, CKD, obesity, venous stasis, aortic stenosis s/p TAVR (11/2022), a flutter s/p ablation 2019 and s/p micra implant, HTN HLD, CHF (likely moderately reduced EF, several TTE with poor windows), CAD (sp cabg), who presents with sob x 6 d      TTE 11/2022  Limited  and Difficult TTE  1. Transcatheter aortic valve replacement. # 26mm YAHAIRA  Ultra THV Peak transaortic valve gradient equals 17 mm Hg,  mean transaortic valve gradient equals 9 mm Hg, which is  probably normal in the presence of a transcatheter aortic  valve replacement. No  paravalvular aortic regurgitation.  2. Despite the use of ultra sound enhancing agent,  evaluation of systolic function remains subotpimal and  appears reduced.  Severe anteroseptal, anteroapical and  apical hypokinesis.   Endocardial visualization enhanced  with intravenous injection of Ultrasonic Enhancing Agent  (Lumason).    TTE 3/2022  lvef 40%    [facility] 11/2022  Severe two vesel obstructive coronary artery disease   --Chronic total occlusion of the left anterior descending dartery   --Chronic total occlusion of the proximal left circumflex artery   Mild stenosis of right coronary artery stent   Mild left main coronary artery disease   Patent LIMA to the left anterior descending artery   Occluded SVG to the obtuse marginal artery   Occluded SVG to the right coronary artery     #HF exacerbation  unclear etiology, could be inadequate home diuretic dose vs diet. lactate not elevated, lft wnl, warm on exam, perfusing well. would continue to diurese. pt takes bumex at home, will need higher diuretic dose bc iv=po bumex dosing  -monitor on telemetry  -daily bmp for Mg>2, K>4  -ekg and trop prn chest pain  -order lipid panel and a1c for risk stratification  -strict i/os  -daily weights  -order TTE  -s/p bumex 1 mg x1, bumex 2 mg x1 (takes bumex 1 mg qd and 2 mg qd alternating days)  -order bumex 4 mg x1  -hold entresto for now iso chon  -hold bb since pt hasn't been taking at home  -hydral 25 mg tid if sbp >100    #a fib  -continue amio 400 mg q8h until 4/4, start 200 mg qd thereafter  -eliquis 5 mg bid    #CAD, sp cabg  -atorva 20 mg qd  -plavix 75 mg qd

## 2025-03-31 NOTE — ED ADULT TRIAGE NOTE - CHIEF COMPLAINT QUOTE
shortness of breath x 2 days  a/w VELÁSQUEZ shortness of breath x 2 days  a/w VELÁSQUEZ, chest discomfort on ambulation

## 2025-03-31 NOTE — CONSULT NOTE ADULT - ATTENDING COMMENTS
Here for dyspnea on exertion and stable angina. Examines volume overloaded with ANGELICA on CKD. Has not responded to bumex IV doses this morning. Will need trial of aggressive diuresis otherwise, if fails to respond, may need dialysis. Give lasix 200mg IV x 1 and start lasix gtt at 15mg/h. Check lytes BID. Strict I/Os, daily weights, and tele.     Ranjeet Forrester MD  Cardiologist, [facility] Cardiology and Cardiovascular Surgery on-service contact/call information, go to [email or web address] and use "Idea Device" to login.    65 minutes were spent on this encounter for extensive review of medical record details including labs and/or imaging studies and/or adjacent care team and consultant records, as well as review and/or reconciliation of current medications. Time was spent on obtaining a history, performing physical examination of patient, and answering patient and/or family questions regarding plan of care. Time was also spent discussing plan of care with patient’s other care team members including primary and/or consulting teams. Time also was spent on documentation of this encounter into the EHR. Here for dyspnea on exertion and stable angina. Examines volume overloaded with ANGELICA on CKD. Has not responded to bumex IV doses this morning. Stop hydralazine. sBP 80-90s rechecked on manual by primary team. Currently mentating well and warm/well perfused. TTE with severe prosthetic AV stenosis as well.  He has exertional chest pain over the last week as well and may need LHC but this is complicated by recent DCCV last week and on uninterrupted eliquis. Needs CICU evaluation for potential inotropic assisted diuresis as well as RHC/swan. Needs structural evaluation. I spoke with CICU team who will evaluate the patient    Ranjeet Forrester MD  Cardiologist, [facility] Cardiology and Cardiovascular Surgery on-service contact/call information, go to [email or web address] and use "[email or web address] - Financial Reconciliation" to login.    65 minutes were spent on this encounter for extensive review of medical record details including labs and/or imaging studies and/or adjacent care team and consultant records, as well as review and/or reconciliation of current medications. Time was spent on obtaining a history, performing physical examination of patient, and answering patient and/or family questions regarding plan of care. Time was also spent discussing plan of care with patient’s other care team members including primary and/or consulting teams. Time also was spent on documentation of this encounter into the EHR.

## 2025-03-31 NOTE — ED PROVIDER NOTE - PROGRESS NOTE DETAILS
Attending MD Gilliland: Patient received 1 mg IV Bumex with minimal urinary output, output of about 80 cc.  Serum creatinine is elevated over baseline at 5.  Suspect that some function of ANGELICA on Kd is related to cardiorenal pathology.  Will provide additional dose of IV Bumex and consult cardiology for assessment. Attending MD Gilliland: Patient has been seen by cardiology fellow, recommend continuing IV diuresis.  At this time they do not believe patient requires ICU level care for inotrope assisted diuresis

## 2025-04-01 ENCOUNTER — RESULT REVIEW (OUTPATIENT)
Age: 65
End: 2025-04-01

## 2025-04-01 DIAGNOSIS — E87.5 HYPERKALEMIA: ICD-10-CM

## 2025-04-01 DIAGNOSIS — I25.10 ATHEROSCLEROTIC HEART DISEASE OF NATIVE CORONARY ARTERY WITHOUT ANGINA PECTORIS: ICD-10-CM

## 2025-04-01 DIAGNOSIS — I50.23 ACUTE ON CHRONIC SYSTOLIC (CONGESTIVE) HEART FAILURE: ICD-10-CM

## 2025-04-01 DIAGNOSIS — N17.9 ACUTE KIDNEY FAILURE, UNSPECIFIED: ICD-10-CM

## 2025-04-01 DIAGNOSIS — E78.5 HYPERLIPIDEMIA, UNSPECIFIED: ICD-10-CM

## 2025-04-01 DIAGNOSIS — R79.89 OTHER SPECIFIED ABNORMAL FINDINGS OF BLOOD CHEMISTRY: ICD-10-CM

## 2025-04-01 DIAGNOSIS — I10 ESSENTIAL (PRIMARY) HYPERTENSION: ICD-10-CM

## 2025-04-01 DIAGNOSIS — I48.91 UNSPECIFIED ATRIAL FIBRILLATION: ICD-10-CM

## 2025-04-01 DIAGNOSIS — J96.01 ACUTE RESPIRATORY FAILURE WITH HYPOXIA: ICD-10-CM

## 2025-04-01 DIAGNOSIS — Z29.9 ENCOUNTER FOR PROPHYLACTIC MEASURES, UNSPECIFIED: ICD-10-CM

## 2025-04-01 LAB
A1C WITH ESTIMATED AVERAGE GLUCOSE RESULT: 5.8 % — HIGH (ref 4–5.6)
ALBUMIN SERPL ELPH-MCNC: 3.2 G/DL — LOW (ref 3.3–5)
ALBUMIN SERPL ELPH-MCNC: 3.5 G/DL — SIGNIFICANT CHANGE UP (ref 3.3–5)
ALBUMIN SERPL ELPH-MCNC: 3.6 G/DL — SIGNIFICANT CHANGE UP (ref 3.3–5)
ALP SERPL-CCNC: 104 U/L — SIGNIFICANT CHANGE UP (ref 40–120)
ALP SERPL-CCNC: 106 U/L — SIGNIFICANT CHANGE UP (ref 40–120)
ALP SERPL-CCNC: 113 U/L — SIGNIFICANT CHANGE UP (ref 40–120)
ALT FLD-CCNC: 10 U/L — SIGNIFICANT CHANGE UP (ref 10–45)
ALT FLD-CCNC: 8 U/L — LOW (ref 10–45)
ALT FLD-CCNC: 9 U/L — LOW (ref 10–45)
ANION GAP SERPL CALC-SCNC: 16 MMOL/L — SIGNIFICANT CHANGE UP (ref 5–17)
ANION GAP SERPL CALC-SCNC: 18 MMOL/L — HIGH (ref 5–17)
ANION GAP SERPL CALC-SCNC: 18 MMOL/L — HIGH (ref 5–17)
ANION GAP SERPL CALC-SCNC: 19 MMOL/L — HIGH (ref 5–17)
APTT BLD: 39 SEC — HIGH (ref 24.5–35.6)
AST SERPL-CCNC: 11 U/L — SIGNIFICANT CHANGE UP (ref 10–40)
AST SERPL-CCNC: 8 U/L — LOW (ref 10–40)
AST SERPL-CCNC: 9 U/L — LOW (ref 10–40)
BASOPHILS # BLD AUTO: 0.03 K/UL — SIGNIFICANT CHANGE UP (ref 0–0.2)
BASOPHILS # BLD AUTO: 0.04 K/UL — SIGNIFICANT CHANGE UP (ref 0–0.2)
BASOPHILS # BLD AUTO: 0.07 K/UL — SIGNIFICANT CHANGE UP (ref 0–0.2)
BASOPHILS NFR BLD AUTO: 0.3 % — SIGNIFICANT CHANGE UP (ref 0–2)
BASOPHILS NFR BLD AUTO: 0.4 % — SIGNIFICANT CHANGE UP (ref 0–2)
BASOPHILS NFR BLD AUTO: 0.5 % — SIGNIFICANT CHANGE UP (ref 0–2)
BILIRUB SERPL-MCNC: 1 MG/DL — SIGNIFICANT CHANGE UP (ref 0.2–1.2)
BLD GP AB SCN SERPL QL: NEGATIVE — SIGNIFICANT CHANGE UP
BUN SERPL-MCNC: 82 MG/DL — HIGH (ref 7–23)
BUN SERPL-MCNC: 85 MG/DL — HIGH (ref 7–23)
BUN SERPL-MCNC: 86 MG/DL — HIGH (ref 7–23)
BUN SERPL-MCNC: 89 MG/DL — HIGH (ref 7–23)
CALCIUM SERPL-MCNC: 8.7 MG/DL — SIGNIFICANT CHANGE UP (ref 8.4–10.5)
CALCIUM SERPL-MCNC: 9 MG/DL — SIGNIFICANT CHANGE UP (ref 8.4–10.5)
CALCIUM SERPL-MCNC: 9 MG/DL — SIGNIFICANT CHANGE UP (ref 8.4–10.5)
CALCIUM SERPL-MCNC: 9.2 MG/DL — SIGNIFICANT CHANGE UP (ref 8.4–10.5)
CHLORIDE SERPL-SCNC: 94 MMOL/L — LOW (ref 96–108)
CHLORIDE SERPL-SCNC: 95 MMOL/L — LOW (ref 96–108)
CHLORIDE SERPL-SCNC: 95 MMOL/L — LOW (ref 96–108)
CHLORIDE SERPL-SCNC: 97 MMOL/L — SIGNIFICANT CHANGE UP (ref 96–108)
CHOLEST SERPL-MCNC: 94 MG/DL — SIGNIFICANT CHANGE UP
CO2 SERPL-SCNC: 14 MMOL/L — LOW (ref 22–31)
CO2 SERPL-SCNC: 17 MMOL/L — LOW (ref 22–31)
CO2 SERPL-SCNC: 20 MMOL/L — LOW (ref 22–31)
CO2 SERPL-SCNC: 20 MMOL/L — LOW (ref 22–31)
CREAT ?TM UR-MCNC: 163 MG/DL — SIGNIFICANT CHANGE UP
CREAT SERPL-MCNC: 5.22 MG/DL — HIGH (ref 0.5–1.3)
CREAT SERPL-MCNC: 5.45 MG/DL — HIGH (ref 0.5–1.3)
CREAT SERPL-MCNC: 5.47 MG/DL — HIGH (ref 0.5–1.3)
CREAT SERPL-MCNC: 5.58 MG/DL — HIGH (ref 0.5–1.3)
EGFR: 11 ML/MIN/1.73M2 — LOW
EGFR: 12 ML/MIN/1.73M2 — LOW
EGFR: 12 ML/MIN/1.73M2 — LOW
EOSINOPHIL # BLD AUTO: 0.05 K/UL — SIGNIFICANT CHANGE UP (ref 0–0.5)
EOSINOPHIL # BLD AUTO: 0.05 K/UL — SIGNIFICANT CHANGE UP (ref 0–0.5)
EOSINOPHIL # BLD AUTO: 0.09 K/UL — SIGNIFICANT CHANGE UP (ref 0–0.5)
EOSINOPHIL NFR BLD AUTO: 0.4 % — SIGNIFICANT CHANGE UP (ref 0–6)
EOSINOPHIL NFR BLD AUTO: 0.5 % — SIGNIFICANT CHANGE UP (ref 0–6)
EOSINOPHIL NFR BLD AUTO: 0.9 % — SIGNIFICANT CHANGE UP (ref 0–6)
ESTIMATED AVERAGE GLUCOSE: 120 MG/DL — HIGH (ref 68–114)
GAS PNL BLDA: SIGNIFICANT CHANGE UP
GAS PNL BLDV: SIGNIFICANT CHANGE UP
GLUCOSE BLDC GLUCOMTR-MCNC: 201 MG/DL — HIGH (ref 70–99)
GLUCOSE BLDC GLUCOMTR-MCNC: 474 MG/DL — CRITICAL HIGH (ref 70–99)
GLUCOSE SERPL-MCNC: 139 MG/DL — HIGH (ref 70–99)
GLUCOSE SERPL-MCNC: 162 MG/DL — HIGH (ref 70–99)
GLUCOSE SERPL-MCNC: 186 MG/DL — HIGH (ref 70–99)
GLUCOSE SERPL-MCNC: 96 MG/DL — SIGNIFICANT CHANGE UP (ref 70–99)
HCT VFR BLD CALC: 41.2 % — SIGNIFICANT CHANGE UP (ref 39–50)
HCT VFR BLD CALC: 41.6 % — SIGNIFICANT CHANGE UP (ref 39–50)
HCT VFR BLD CALC: 44.2 % — SIGNIFICANT CHANGE UP (ref 39–50)
HDLC SERPL-MCNC: 29 MG/DL — LOW
HGB BLD-MCNC: 13.2 G/DL — SIGNIFICANT CHANGE UP (ref 13–17)
HGB BLD-MCNC: 13.5 G/DL — SIGNIFICANT CHANGE UP (ref 13–17)
HGB BLD-MCNC: 14.3 G/DL — SIGNIFICANT CHANGE UP (ref 13–17)
IMM GRANULOCYTES NFR BLD AUTO: 0.4 % — SIGNIFICANT CHANGE UP (ref 0–0.9)
IMM GRANULOCYTES NFR BLD AUTO: 0.5 % — SIGNIFICANT CHANGE UP (ref 0–0.9)
IMM GRANULOCYTES NFR BLD AUTO: 0.6 % — SIGNIFICANT CHANGE UP (ref 0–0.9)
INR BLD: 1.95 RATIO — HIGH (ref 0.85–1.16)
LDLC SERPL-MCNC: 49 MG/DL — SIGNIFICANT CHANGE UP
LIPID PNL WITH DIRECT LDL SERPL: 49 MG/DL — SIGNIFICANT CHANGE UP
LYMPHOCYTES # BLD AUTO: 0.59 K/UL — LOW (ref 1–3.3)
LYMPHOCYTES # BLD AUTO: 0.74 K/UL — LOW (ref 1–3.3)
LYMPHOCYTES # BLD AUTO: 0.82 K/UL — LOW (ref 1–3.3)
LYMPHOCYTES # BLD AUTO: 5.4 % — LOW (ref 13–44)
LYMPHOCYTES # BLD AUTO: 6.1 % — LOW (ref 13–44)
LYMPHOCYTES # BLD AUTO: 7.5 % — LOW (ref 13–44)
MAGNESIUM SERPL-MCNC: 1.9 MG/DL — SIGNIFICANT CHANGE UP (ref 1.6–2.6)
MAGNESIUM SERPL-MCNC: 2 MG/DL — SIGNIFICANT CHANGE UP (ref 1.6–2.6)
MCHC RBC-ENTMCNC: 30.8 PG — SIGNIFICANT CHANGE UP (ref 27–34)
MCHC RBC-ENTMCNC: 30.9 PG — SIGNIFICANT CHANGE UP (ref 27–34)
MCHC RBC-ENTMCNC: 31.2 PG — SIGNIFICANT CHANGE UP (ref 27–34)
MCHC RBC-ENTMCNC: 32 G/DL — SIGNIFICANT CHANGE UP (ref 32–36)
MCHC RBC-ENTMCNC: 32.4 G/DL — SIGNIFICANT CHANGE UP (ref 32–36)
MCHC RBC-ENTMCNC: 32.5 G/DL — SIGNIFICANT CHANGE UP (ref 32–36)
MCV RBC AUTO: 95.5 FL — SIGNIFICANT CHANGE UP (ref 80–100)
MCV RBC AUTO: 96.1 FL — SIGNIFICANT CHANGE UP (ref 80–100)
MCV RBC AUTO: 96.3 FL — SIGNIFICANT CHANGE UP (ref 80–100)
MONOCYTES # BLD AUTO: 0.66 K/UL — SIGNIFICANT CHANGE UP (ref 0–0.9)
MONOCYTES # BLD AUTO: 0.71 K/UL — SIGNIFICANT CHANGE UP (ref 0–0.9)
MONOCYTES # BLD AUTO: 1 K/UL — HIGH (ref 0–0.9)
MONOCYTES NFR BLD AUTO: 6.1 % — SIGNIFICANT CHANGE UP (ref 2–14)
MONOCYTES NFR BLD AUTO: 7.2 % — SIGNIFICANT CHANGE UP (ref 2–14)
MONOCYTES NFR BLD AUTO: 7.5 % — SIGNIFICANT CHANGE UP (ref 2–14)
NEUTROPHILS # BLD AUTO: 11.35 K/UL — HIGH (ref 1.8–7.4)
NEUTROPHILS # BLD AUTO: 8.3 K/UL — HIGH (ref 1.8–7.4)
NEUTROPHILS # BLD AUTO: 9.5 K/UL — HIGH (ref 1.8–7.4)
NEUTROPHILS NFR BLD AUTO: 83.6 % — HIGH (ref 43–77)
NEUTROPHILS NFR BLD AUTO: 85.1 % — HIGH (ref 43–77)
NEUTROPHILS NFR BLD AUTO: 87 % — HIGH (ref 43–77)
NONHDLC SERPL-MCNC: 65 MG/DL — SIGNIFICANT CHANGE UP
NRBC BLD AUTO-RTO: 0 /100 WBCS — SIGNIFICANT CHANGE UP (ref 0–0)
PHOSPHATE SERPL-MCNC: 5.7 MG/DL — HIGH (ref 2.5–4.5)
PHOSPHATE SERPL-MCNC: 5.8 MG/DL — HIGH (ref 2.5–4.5)
PHOSPHATE SERPL-MCNC: 6.5 MG/DL — HIGH (ref 2.5–4.5)
PHOSPHATE SERPL-MCNC: 7.1 MG/DL — HIGH (ref 2.5–4.5)
PLATELET # BLD AUTO: 160 K/UL — SIGNIFICANT CHANGE UP (ref 150–400)
PLATELET # BLD AUTO: 168 K/UL — SIGNIFICANT CHANGE UP (ref 150–400)
PLATELET # BLD AUTO: 186 K/UL — SIGNIFICANT CHANGE UP (ref 150–400)
POTASSIUM SERPL-MCNC: 5.3 MMOL/L — SIGNIFICANT CHANGE UP (ref 3.5–5.3)
POTASSIUM SERPL-MCNC: 5.4 MMOL/L — HIGH (ref 3.5–5.3)
POTASSIUM SERPL-MCNC: 5.4 MMOL/L — HIGH (ref 3.5–5.3)
POTASSIUM SERPL-MCNC: 5.5 MMOL/L — HIGH (ref 3.5–5.3)
POTASSIUM SERPL-SCNC: 5.3 MMOL/L — SIGNIFICANT CHANGE UP (ref 3.5–5.3)
POTASSIUM SERPL-SCNC: 5.4 MMOL/L — HIGH (ref 3.5–5.3)
POTASSIUM SERPL-SCNC: 5.4 MMOL/L — HIGH (ref 3.5–5.3)
POTASSIUM SERPL-SCNC: 5.5 MMOL/L — HIGH (ref 3.5–5.3)
PROT ?TM UR-MCNC: 122 MG/DL — HIGH (ref 0–12)
PROT SERPL-MCNC: 7 G/DL — SIGNIFICANT CHANGE UP (ref 6–8.3)
PROT SERPL-MCNC: 7.4 G/DL — SIGNIFICANT CHANGE UP (ref 6–8.3)
PROT SERPL-MCNC: 8 G/DL — SIGNIFICANT CHANGE UP (ref 6–8.3)
PROT/CREAT UR-RTO: 0.7 RATIO — HIGH (ref 0–0.2)
PROTHROM AB SERPL-ACNC: 22.3 SEC — HIGH (ref 9.9–13.4)
RBC # BLD: 4.28 M/UL — SIGNIFICANT CHANGE UP (ref 4.2–5.8)
RBC # BLD: 4.33 M/UL — SIGNIFICANT CHANGE UP (ref 4.2–5.8)
RBC # BLD: 4.63 M/UL — SIGNIFICANT CHANGE UP (ref 4.2–5.8)
RBC # FLD: 16.4 % — HIGH (ref 10.3–14.5)
RBC # FLD: 16.4 % — HIGH (ref 10.3–14.5)
RBC # FLD: 16.7 % — HIGH (ref 10.3–14.5)
RH IG SCN BLD-IMP: POSITIVE — SIGNIFICANT CHANGE UP
SODIUM SERPL-SCNC: 130 MMOL/L — LOW (ref 135–145)
SODIUM SERPL-SCNC: 130 MMOL/L — LOW (ref 135–145)
SODIUM SERPL-SCNC: 131 MMOL/L — LOW (ref 135–145)
SODIUM SERPL-SCNC: 132 MMOL/L — LOW (ref 135–145)
SODIUM UR-SCNC: 29 MMOL/L — SIGNIFICANT CHANGE UP
TRIGL SERPL-MCNC: 74 MG/DL — SIGNIFICANT CHANGE UP
TROPONIN T, HIGH SENSITIVITY RESULT: 218 NG/L — HIGH (ref 0–51)
WBC # BLD: 10.9 K/UL — HIGH (ref 3.8–10.5)
WBC # BLD: 13.34 K/UL — HIGH (ref 3.8–10.5)
WBC # BLD: 9.92 K/UL — SIGNIFICANT CHANGE UP (ref 3.8–10.5)
WBC # FLD AUTO: 10.9 K/UL — HIGH (ref 3.8–10.5)
WBC # FLD AUTO: 13.34 K/UL — HIGH (ref 3.8–10.5)
WBC # FLD AUTO: 9.92 K/UL — SIGNIFICANT CHANGE UP (ref 3.8–10.5)

## 2025-04-01 PROCEDURE — 71045 X-RAY EXAM CHEST 1 VIEW: CPT | Mod: 26,76

## 2025-04-01 PROCEDURE — 76937 US GUIDE VASCULAR ACCESS: CPT | Mod: 26

## 2025-04-01 PROCEDURE — 93306 TTE W/DOPPLER COMPLETE: CPT | Mod: 26

## 2025-04-01 PROCEDURE — 76770 US EXAM ABDO BACK WALL COMP: CPT | Mod: 26

## 2025-04-01 PROCEDURE — 99222 1ST HOSP IP/OBS MODERATE 55: CPT

## 2025-04-01 PROCEDURE — 12345: CPT | Mod: NC,GC

## 2025-04-01 PROCEDURE — 36556 INSERT NON-TUNNEL CV CATH: CPT

## 2025-04-01 PROCEDURE — 99223 1ST HOSP IP/OBS HIGH 75: CPT | Mod: GC

## 2025-04-01 PROCEDURE — 36620 INSERTION CATHETER ARTERY: CPT

## 2025-04-01 PROCEDURE — 93010 ELECTROCARDIOGRAM REPORT: CPT

## 2025-04-01 PROCEDURE — 93308 TTE F-UP OR LMTD: CPT | Mod: 26

## 2025-04-01 PROCEDURE — 99291 CRITICAL CARE FIRST HOUR: CPT

## 2025-04-01 PROCEDURE — 99223 1ST HOSP IP/OBS HIGH 75: CPT

## 2025-04-01 RX ORDER — VASOPRESSIN 20 [USP'U]/ML
0.04 INJECTION INTRAVENOUS
Qty: 40 | Refills: 0 | Status: DISCONTINUED | OUTPATIENT
Start: 2025-04-01 | End: 2025-04-14

## 2025-04-01 RX ORDER — CLOPIDOGREL BISULFATE 75 MG/1
75 TABLET, FILM COATED ORAL DAILY
Refills: 0 | Status: DISCONTINUED | OUTPATIENT
Start: 2025-04-01 | End: 2025-04-28

## 2025-04-01 RX ORDER — INFLUENZA A VIRUS A/IDAHO/07/2018 (H1N1) ANTIGEN (MDCK CELL DERIVED, PROPIOLACTONE INACTIVATED, INFLUENZA A VIRUS A/INDIANA/08/2018 (H3N2) ANTIGEN (MDCK CELL DERIVED, PROPIOLACTONE INACTIVATED), INFLUENZA B VIRUS B/SINGAPORE/INFTT-16-0610/2016 ANTIGEN (MDCK CELL DERIVED, PROPIOLACTONE INACTIVATED), INFLUENZA B VIRUS B/IOWA/06/2017 ANTIGEN (MDCK CELL DERIVED, PROPIOLACTONE INACTIVATED) 15; 15; 15; 15 UG/.5ML; UG/.5ML; UG/.5ML; UG/.5ML
0.5 INJECTION, SUSPENSION INTRAMUSCULAR ONCE
Refills: 0 | Status: COMPLETED | OUTPATIENT
Start: 2025-04-01 | End: 2025-04-01

## 2025-04-01 RX ORDER — SODIUM ZIRCONIUM CYCLOSILICATE 5 G/5G
10 POWDER, FOR SUSPENSION ORAL ONCE
Refills: 0 | Status: DISCONTINUED | OUTPATIENT
Start: 2025-04-01 | End: 2025-04-01

## 2025-04-01 RX ORDER — BUMETANIDE 1 MG/1
4 TABLET ORAL ONCE
Refills: 0 | Status: COMPLETED | OUTPATIENT
Start: 2025-04-01 | End: 2025-04-01

## 2025-04-01 RX ORDER — FUROSEMIDE 10 MG/ML
200 INJECTION INTRAMUSCULAR; INTRAVENOUS ONCE
Refills: 0 | Status: COMPLETED | OUTPATIENT
Start: 2025-04-01 | End: 2025-04-01

## 2025-04-01 RX ORDER — BUMETANIDE 1 MG/1
4 TABLET ORAL DAILY
Refills: 0 | Status: DISCONTINUED | OUTPATIENT
Start: 2025-04-01 | End: 2025-04-01

## 2025-04-01 RX ORDER — CHLOROTHIAZIDE 250 MG/1
500 TABLET ORAL ONCE
Refills: 0 | Status: COMPLETED | OUTPATIENT
Start: 2025-04-01 | End: 2025-04-01

## 2025-04-01 RX ORDER — MAGNESIUM SULFATE 500 MG/ML
1 SYRINGE (ML) INJECTION ONCE
Refills: 0 | Status: COMPLETED | OUTPATIENT
Start: 2025-04-01 | End: 2025-04-01

## 2025-04-01 RX ORDER — PROPOFOL 10 MG/ML
30 INJECTION, EMULSION INTRAVENOUS
Qty: 1000 | Refills: 0 | Status: DISCONTINUED | OUTPATIENT
Start: 2025-04-01 | End: 2025-04-02

## 2025-04-01 RX ORDER — ATORVASTATIN CALCIUM 80 MG/1
20 TABLET, FILM COATED ORAL AT BEDTIME
Refills: 0 | Status: DISCONTINUED | OUTPATIENT
Start: 2025-04-01 | End: 2025-04-03

## 2025-04-01 RX ORDER — DOBUTAMINE 250 MG/20ML
2.5 INJECTION INTRAVENOUS
Qty: 500 | Refills: 0 | Status: DISCONTINUED | OUTPATIENT
Start: 2025-04-01 | End: 2025-04-02

## 2025-04-01 RX ORDER — SODIUM CHLORIDE 3 G/100ML
150 INJECTION, SOLUTION INTRAVENOUS ONCE
Refills: 0 | Status: COMPLETED | OUTPATIENT
Start: 2025-04-01 | End: 2025-04-01

## 2025-04-01 RX ORDER — AMIODARONE HYDROCHLORIDE 50 MG/ML
400 INJECTION, SOLUTION INTRAVENOUS EVERY 8 HOURS
Refills: 0 | Status: DISCONTINUED | OUTPATIENT
Start: 2025-04-01 | End: 2025-04-03

## 2025-04-01 RX ORDER — SACUBITRIL AND VALSARTAN 6; 6 MG/1; MG/1
1 PELLET ORAL
Refills: 0 | DISCHARGE

## 2025-04-01 RX ORDER — HEPARIN SODIUM 1000 [USP'U]/ML
1500 INJECTION INTRAVENOUS; SUBCUTANEOUS
Qty: 25000 | Refills: 0 | Status: DISCONTINUED | OUTPATIENT
Start: 2025-04-01 | End: 2025-04-11

## 2025-04-01 RX ORDER — BUMETANIDE 1 MG/1
2 TABLET ORAL
Qty: 20 | Refills: 0 | Status: DISCONTINUED | OUTPATIENT
Start: 2025-04-01 | End: 2025-04-02

## 2025-04-01 RX ORDER — FUROSEMIDE 10 MG/ML
15 INJECTION INTRAMUSCULAR; INTRAVENOUS
Qty: 500 | Refills: 0 | Status: DISCONTINUED | OUTPATIENT
Start: 2025-04-01 | End: 2025-04-01

## 2025-04-01 RX ORDER — NOREPINEPHRINE BITARTRATE 8 MG
0.13 SOLUTION INTRAVENOUS
Qty: 16 | Refills: 0 | Status: DISCONTINUED | OUTPATIENT
Start: 2025-04-01 | End: 2025-04-02

## 2025-04-01 RX ORDER — SODIUM ZIRCONIUM CYCLOSILICATE 5 G/5G
10 POWDER, FOR SUSPENSION ORAL ONCE
Refills: 0 | Status: COMPLETED | OUTPATIENT
Start: 2025-04-01 | End: 2025-04-01

## 2025-04-01 RX ORDER — SODIUM BICARBONATE 1 MEQ/ML
650 SYRINGE (ML) INTRAVENOUS THREE TIMES A DAY
Refills: 0 | Status: DISCONTINUED | OUTPATIENT
Start: 2025-04-01 | End: 2025-04-07

## 2025-04-01 RX ORDER — NOREPINEPHRINE BITARTRATE 8 MG
0.05 SOLUTION INTRAVENOUS
Qty: 8 | Refills: 0 | Status: DISCONTINUED | OUTPATIENT
Start: 2025-04-01 | End: 2025-04-01

## 2025-04-01 RX ADMIN — Medication 650 MILLIGRAM(S): at 21:42

## 2025-04-01 RX ADMIN — SODIUM CHLORIDE 300 MILLILITER(S): 3 INJECTION, SOLUTION INTRAVENOUS at 18:30

## 2025-04-01 RX ADMIN — BUMETANIDE 20 MG/HR: 1 TABLET ORAL at 20:04

## 2025-04-01 RX ADMIN — AMIODARONE HYDROCHLORIDE 400 MILLIGRAM(S): 50 INJECTION, SOLUTION INTRAVENOUS at 14:14

## 2025-04-01 RX ADMIN — NOREPINEPHRINE BITARTRATE 11.1 MICROGRAM(S)/KG/MIN: 8 SOLUTION at 20:41

## 2025-04-01 RX ADMIN — ATORVASTATIN CALCIUM 20 MILLIGRAM(S): 80 TABLET, FILM COATED ORAL at 21:42

## 2025-04-01 RX ADMIN — FUROSEMIDE 140 MILLIGRAM(S): 10 INJECTION INTRAMUSCULAR; INTRAVENOUS at 13:29

## 2025-04-01 RX ADMIN — Medication 25 MILLIGRAM(S): at 05:43

## 2025-04-01 RX ADMIN — Medication 20 MILLIGRAM(S): at 13:30

## 2025-04-01 RX ADMIN — HEPARIN SODIUM 15 UNIT(S)/HR: 1000 INJECTION INTRAVENOUS; SUBCUTANEOUS at 18:30

## 2025-04-01 RX ADMIN — HEPARIN SODIUM 15 UNIT(S)/HR: 1000 INJECTION INTRAVENOUS; SUBCUTANEOUS at 20:41

## 2025-04-01 RX ADMIN — BUMETANIDE 132 MILLIGRAM(S): 1 TABLET ORAL at 18:30

## 2025-04-01 RX ADMIN — PROPOFOL 22.3 MICROGRAM(S)/KG/MIN: 10 INJECTION, EMULSION INTRAVENOUS at 20:40

## 2025-04-01 RX ADMIN — NOREPINEPHRINE BITARTRATE 11.1 MICROGRAM(S)/KG/MIN: 8 SOLUTION at 20:05

## 2025-04-01 RX ADMIN — BUMETANIDE 132 MILLIGRAM(S): 1 TABLET ORAL at 05:43

## 2025-04-01 RX ADMIN — BUMETANIDE 20 MG/HR: 1 TABLET ORAL at 18:30

## 2025-04-01 RX ADMIN — CLOPIDOGREL BISULFATE 75 MILLIGRAM(S): 75 TABLET, FILM COATED ORAL at 13:30

## 2025-04-01 RX ADMIN — HEPARIN SODIUM 15 UNIT(S)/HR: 1000 INJECTION INTRAVENOUS; SUBCUTANEOUS at 20:04

## 2025-04-01 RX ADMIN — AMIODARONE HYDROCHLORIDE 400 MILLIGRAM(S): 50 INJECTION, SOLUTION INTRAVENOUS at 21:42

## 2025-04-01 RX ADMIN — AMIODARONE HYDROCHLORIDE 400 MILLIGRAM(S): 50 INJECTION, SOLUTION INTRAVENOUS at 05:43

## 2025-04-01 RX ADMIN — SODIUM ZIRCONIUM CYCLOSILICATE 10 GRAM(S): 5 POWDER, FOR SUSPENSION ORAL at 18:30

## 2025-04-01 RX ADMIN — DOBUTAMINE 18.6 MICROGRAM(S)/KG/MIN: 250 INJECTION INTRAVENOUS at 20:41

## 2025-04-01 RX ADMIN — NOREPINEPHRINE BITARTRATE 11.1 MICROGRAM(S)/KG/MIN: 8 SOLUTION at 13:30

## 2025-04-01 RX ADMIN — NOREPINEPHRINE BITARTRATE 11.1 MICROGRAM(S)/KG/MIN: 8 SOLUTION at 18:30

## 2025-04-01 RX ADMIN — VASOPRESSIN 6 UNIT(S)/MIN: 20 INJECTION INTRAVENOUS at 20:40

## 2025-04-01 RX ADMIN — CHLOROTHIAZIDE 100 MILLIGRAM(S): 250 TABLET ORAL at 20:05

## 2025-04-01 RX ADMIN — Medication 1 APPLICATION(S): at 20:05

## 2025-04-01 RX ADMIN — FUROSEMIDE 7.5 MG/HR: 10 INJECTION INTRAMUSCULAR; INTRAVENOUS at 13:32

## 2025-04-01 RX ADMIN — BUMETANIDE 20 MG/HR: 1 TABLET ORAL at 20:41

## 2025-04-01 RX ADMIN — Medication 100 GRAM(S): at 22:37

## 2025-04-01 NOTE — CONSULT NOTE ADULT - ASSESSMENT
incomplete.  65 y/o M with hx of Afib s/p DCCV 3/25/25, CHF, Aortic stenosis s/p TAVR 11/2022, CAD s/p CABG, Aflutter s/p ablation and Micra implant, CKD 3, Obesity, venous stasis, HTN, HLD presented with 6 days of worsening SOB on exertion associated with orthopnea and chest pain.   Also reports several days decreased appetite. Denies fevers, chills, cough, abdominal pain, NVD, urinary sx, leg swelling. Patient had a cardioversion for atrial fibrillation at this hospital recently, which was reportedly unsuccessful. Patient has a scheduled appointment with Dr. Philip on 04/24/2025 to discuss possible ablation. His outpt cardiologist is Dr. Miranda. (Cardiology)  On arrival to ED, vitals were 97.3F, HR 71, BP 94/57, RR 22, 97% on 2L NC. Labs pertinent for WBC 10.68, INR 2.55, Na 132, K 5.5, bicarb 20, BUN/Cr 78/5.17 (baseline Cr appears ~1.5-1.8), trop 225, BNP 13237, VBG ph 7.3, pco2/po2 wnl. UA without evidence of infection.  nephrology was consulted for management of ANGELICA on CKD. Pt has baseline Scr of ~1.5- 1.8. Scr was 2.7 on 3/25/25. Pt is on Entresto ( started recently) and he mentioned that he was taking Motrin occasionally. No herbal medications/ supplements.  Scr at the time of admission was 5.17 3/31/25 and it further worsened to 5.45 4/1/25.    Pt follows Dr. Stewart Brown (Nephrology)      ANGELICA on CKD:  Pt has hx of CKD 3. Baseline Scr 1.5-1.8.   At the time of admission - Scr was 5.17 and it further worsened to 5.45.   Pt has lopez cath in place.   UA showed trace LE, blood ( Likely due to lopez), casts 17. UPCR 0.7. No hx of DM.   Pt is on entresto.   BP was low.     ANGELICA on CKD likely in setting of HF exacerbation, entresto use and hypotension mediated ATN.    PLAN:  Pt is on low dose norepi and lasix gtt. Consider changing it to bumex   Hold entresto.   Add sodium bicarb 650 TID for now.   Monitor strict I/Os and daily wts.   Get USG kidney and bladder.   No urgent need of HD for now. Will reassess tomorrow.   Avoid nephrotoxins  Dose medications as per eGFR    hyperkalemia likely in setting of Entresto use and renal failure.   Give lokelma 10 TID and check serum K in 6 hours.   Hold entresto.     Wait for the final reccs from the attending.

## 2025-04-01 NOTE — H&P ADULT - NSHPREVIEWOFSYSTEMS_GEN_ALL_CORE
REVIEW OF SYSTEMS:  CONSTITUTIONAL: No weakness. No fevers. No chills.   EYES: No blurry vision. No eye pain.  ENT/NECK:No dysphagia. No sore throat. No Sinusitis/rhinorrhea.  CARDIAC: +chest pain. No palpitations. No lightheadedness.   RESPIRATORY: No cough. +SOB.  GASTROINTESTINAL: No abdominal pain. No nausea. No vomiting. No diarrhea. No constipation  GENITOURINARY: No dysuria. No frequency.   NEUROLOGICAL: No numbness/tingling. No focal weakness. No headache.  EXTREMITIES: No lower extremity edema. Full ROM. No joint pain.  SKIN: No rashes. No other lesions.  All other systems reviewed and are negative unless indicated above.

## 2025-04-01 NOTE — PHYSICAL THERAPY INITIAL EVALUATION ADULT - MANUAL MUSCLE TESTING RESULTS, REHAB EVAL
Denies VB/cramping. Doing well. Reviewed dating scan (preliminary report; patient understands that we need to await the final report.).completed  baseline/initial OB labs. Discussed genetic testing: desires genetic testing.   Time limits discussed, practice dynamics reviewed, prenatal folder discussed all questions answered    AMA==> level 2, MFM    Hx of miscarriage x2===> conceived spontaneously, followed by infertility specialist. On vaginal progesterone to discontinue at end of first trimester, nervous about risk of miscarriage, will repeat US in 10-11 weeks and OB visit for reassurance  5 Ps ==> all no  Declines  flu shot    Reviewed vaginal bleeding, cramping, pelvic pain, and miscarriage precautions  Kierra repeated all of the instructions and states that she understands and agrees with the plan of care.     grossly >3/5 BUE/BLE/grossly assessed due to

## 2025-04-01 NOTE — H&P ADULT - ASSESSMENT
65 yo M with a fib (s/p dccv 3/25/25), CHF (likely moderately reduced EF, several TTE with poor windows), aortic stenosis s/p TAVR (11/2022), a flutter s/p ablation 2019 and s/p micra implant,  CKD3, obesity, venous stasis, HTN, HLD, who presents with 6 days of worsening dyspnea on exertion. Pt also reports associated orthopnea and chest pain on exertion but not at rest which is located in the center of his chest. Pain is described as burning. Denies fevers, chills, cough, abdominal pain, NVD, urinary sx, leg swelling. Patient had a cardioversion for atrial fibrillation at this hospital recently, which was reportedly unsuccessful. Patient has a scheduled appointment with Dr. Philip on 04/24/2025 to discuss possible ablation. His outpt cardiologist is Dr. Paul.     On arrival to ED, vitals were 97.3F, HR 71, BP 94/57, RR 22, 97% on 2L NC. Labs pertinent for WBC 10.68, INR 2.55, Na 132, K 5.5, bicarb 20, BUN/Cr 78/5.17 (baseline Cr appears ~1.5-1.8), trop 225, BNP 89979, VBG ph 7.3, pco2/po2 wnl. UA without evidence of infection. EKG  on admission 1st deg av block, low voltage, poor r wave progression, R axis deviation CXR with diffuse hazy opacities suggest mild pulmonary edema and small right and trace left pleural effusions. Pt was given IV Bumex 1mg x1, IV bumex 2mg x1, IV Bumex 4mg x1, and lokelma 5mg x1, then admitted for further management.   #HF exacerbation  unclear etiology, could be inadequate home diuretic dose vs diet. lactate not elevated, lft wnl, warm on exam, perfusing well. would continue to diurese. pt takes bumex at home, will need higher diuretic dose bc iv=po bumex dosing  -monitor on telemetry  -daily bmp for Mg>2, K>4  -ekg and trop prn chest pain  -order lipid panel and a1c for risk stratification  -strict i/os  -daily weights  -order TTE  -s/p bumex 1 mg x1, bumex 2 mg x1 (takes bumex 1 mg qd and 2 mg qd alternating days)  -order bumex 4 mg x1  -hold entresto for now iso chon        65 yo M with a fib (s/p dccv 3/25/25), CHF (likely moderately reduced EF, several TTE with poor windows), aortic stenosis s/p TAVR (11/2022), a flutter s/p ablation 2019 and s/p micra implant,  CKD3, obesity, venous stasis, HTN, HLD, who presents with 6 days of worsening dyspnea on exertion, admitted for AHRF likely iso CHF exacerbation, also with ANGELICA on CKD c/f cardiorenal syndrome       65 yo M with a fib (s/p dccv 3/25/25), CHF (likely moderately reduced EF, several TTE with poor windows), aortic stenosis s/p TAVR (11/2022), a flutter s/p ablation 2019 and s/p micra implant,  CKD3, obesity, venous stasis, HTN, HLD, who presents with 6 days of worsening dyspnea on exertion. Pt also reports associated orthopnea and chest pain on exertion but not at rest which is located in the center of his chest. Pain is described as burning. Denies fevers, chills, cough, abdominal pain, NVD, urinary sx, leg swelling. Patient had a cardioversion for atrial fibrillation at this hospital recently, which was reportedly unsuccessful. Patient has a scheduled appointment with Dr. Philip on 04/24/2025 to discuss possible ablation. His outpt cardiologist is Dr. Paul.     On arrival to ED, vitals were 97.3F, HR 71, BP 94/57, RR 22, 97% on 2L NC. Labs pertinent for WBC 10.68, INR 2.55, Na 132, K 5.5, bicarb 20, BUN/Cr 78/5.17 (baseline Cr appears ~1.5-1.8), trop 225, BNP 16760, VBG ph 7.3, pco2/po2 wnl. UA without evidence of infection. EKG  on admission 1st deg av block, low voltage, poor r wave progression, R axis deviation CXR with diffuse hazy opacities suggest mild pulmonary edema and small right and trace left pleural effusions. Pt was given IV Bumex 1mg x1, IV bumex 2mg x1, IV Bumex 4mg x1, and lokelma 5mg x1, then admitted for further management.   #HF exacerbation  unclear etiology, could be inadequate home diuretic dose vs diet. lactate not elevated, lft wnl, warm on exam, perfusing well. would continue to diurese. pt takes bumex at home, will need higher diuretic dose bc iv=po bumex dosing    -ekg and trop prn chest pain    -s/p bumex 1 mg x1, bumex 2 mg x1 (takes bumex 1 mg qd and 2 mg qd alternating days)  -order bumex 4 mg x1  -hold entresto for now iso angelica        63 yo M with a fib (s/p dccv 3/25/25), CHF (likely moderately reduced EF, several TTE with poor windows), aortic stenosis s/p TAVR (11/2022), a flutter s/p ablation 2019 and s/p micra implant,  CKD3, obesity, venous stasis, HTN, HLD, who presents with 6 days of worsening dyspnea on exertion, admitted for AHRF likely iso CHF exacerbation, also with ANGELIAC on CKD c/f cardiorenal syndrome.

## 2025-04-01 NOTE — H&P ADULT - NSHPPHYSICALEXAM_GEN_ALL_CORE
PHYSICAL EXAM:   GENERAL: Alert. Not confused. No acute distress  HEAD:  Atraumatic. Normocephalic.  EYES: EOMI. PERRLA. Normal conjunctiva/sclera.  ENT: Neck supple. No JVD. Moist oral mucosa.   CARDIAC: Regular rate. Regular rhythm.   LUNG/CHEST: CTAB. BS equal bilaterally  ABDOMEN: Soft. No tenderness. No distension. Normal bowel sounds.  VASCULAR: +2 b/l radial or ulnar pulses. Palpable DP pulses.  EXTREMITIES:  No clubbing. No cyanosis. No edema. Moving all 4.  NEUROLOGY: A&Ox3. Non-focal exam  PSYCH: Normal behavior. Normal affect.  SKIN: No jaundice. No erythema. No rash/lesion.  Vital Signs Last 24 Hrs  T(C): 36.3 (01 Apr 2025 00:07), Max: 36.6 (31 Mar 2025 17:50)  T(F): 97.4 (01 Apr 2025 00:07), Max: 97.8 (31 Mar 2025 17:50)  HR: 81 (01 Apr 2025 00:07) (60 - 81)  BP: 91/63 (01 Apr 2025 00:07) (85/58 - 94/72)  BP(mean): 79 (31 Mar 2025 20:15) (65 - 79)  ABP: --  ABP(mean): --  RR: 18 (01 Apr 2025 00:07) (18 - 36)  SpO2: 93% (01 Apr 2025 00:07) (93% - 98%)    O2 Parameters below as of 01 Apr 2025 00:07  Patient On (Oxygen Delivery Method): nasal cannula  O2 Flow (L/min): 2        I&O's Summary    31 Mar 2025 07:01  -  01 Apr 2025 02:13  --------------------------------------------------------  IN: 0 mL / OUT: 20 mL / NET: -20 mL PHYSICAL EXAM:   GENERAL: Alert. Not confused. No acute distress. morbidly obese  HEAD:  Atraumatic. Normocephalic.  EYES: EOMI. PERRLA. Normal conjunctiva/sclera.   ENT: Neck supple. unable to evaluate JVD iso body habitus Moist oral mucosa.   CARDIAC: Regular rate. Regular rhythm.   LUNG/CHEST: bibasilar crackles   ABDOMEN: Soft. No tenderness. No distension. Normal bowel sounds.  VASCULAR: +2 b/l radial or ulnar pulses. Palpable DP pulses.  : lopez in place small amount of yellow urine in bag  EXTREMITIES:  No clubbing. No cyanosis. moving all 4, venous stasis of lower extremities with 1-2+ edema  NEUROLOGY: A&Ox3. Non-focal exam  PSYCH: Normal behavior. Normal affect.  SKIN: No jaundice. No erythema. No rash/lesion.  Vital Signs Last 24 Hrs  T(C): 36.3 (01 Apr 2025 00:07), Max: 36.6 (31 Mar 2025 17:50)  T(F): 97.4 (01 Apr 2025 00:07), Max: 97.8 (31 Mar 2025 17:50)  HR: 81 (01 Apr 2025 00:07) (60 - 81)  BP: 91/63 (01 Apr 2025 00:07) (85/58 - 94/72)  BP(mean): 79 (31 Mar 2025 20:15) (65 - 79)  ABP: --  ABP(mean): --  RR: 18 (01 Apr 2025 00:07) (18 - 36)  SpO2: 93% (01 Apr 2025 00:07) (93% - 98%)    O2 Parameters below as of 01 Apr 2025 00:07  Patient On (Oxygen Delivery Method): nasal cannula  O2 Flow (L/min): 2        I&O's Summary    31 Mar 2025 07:01  -  01 Apr 2025 02:13  --------------------------------------------------------  IN: 0 mL / OUT: 20 mL / NET: -20 mL

## 2025-04-01 NOTE — CONSULT NOTE ADULT - SUBJECTIVE AND OBJECTIVE BOX
[facility] DIVISION OF KIDNEY DISEASES AND HYPERTENSION -- [phone]  -- INITIAL CONSULT NOTE  --------------------------------------------------------------------------------  HPI:  65 y/o M with hx of Afib s/p DCCV 3/25/25, CHF, Aortic stenosis s/p TAVR 11/2022, CAD s/p CABG, Aflutter s/p ablation and Micra implant, CKD 3, Obesity, venous stasis, HTN, HLD presented with 6 days of worsening SOB on exertion associated with orthopnea and chest pain.   Also reports several days decreased appetite. Denies fevers, chills, cough, abdominal pain, NVD, urinary sx, leg swelling. Patient had a cardioversion for atrial fibrillation at this hospital recently, which was reportedly unsuccessful. Patient has a scheduled appointment with Dr. Philip on 04/24/2025 to discuss possible ablation. His outpt cardiologist is Dr. Miranda. (Cardiology)  On arrival to ED, vitals were 97.3F, HR 71, BP 94/57, RR 22, 97% on 2L NC. Labs pertinent for WBC 10.68, INR 2.55, Na 132, K 5.5, bicarb 20, BUN/Cr 78/5.17 (baseline Cr appears ~1.5-1.8), trop 225, BNP 49131, VBG ph 7.3, pco2/po2 wnl. UA without evidence of infection.  nephrology was consulted for management of ANGELICA on CKD. Pt has baseline Scr of ~1.5- 1.8. Scr was 2.7 on 3/25/25. Pt is on Entresto ( started recently) and he mentioned that he was taking Motrin occasionally. No herbal medications/ supplements.  At the time of examination, pt was on supplemental oxygen, in mild resp distress. Able to complete the sentences. Denies any chest pain/ nausea/ vomiting/ headache/ dizziness.     PAST HISTORY  --------------------------------------------------------------------------------  PAST MEDICAL & SURGICAL HISTORY:  CAD (coronary artery disease)  s/p CABG      Hypercholesteremia      Thrombus of left atrial appendage  2018      Ischemic cardiomyopathy      WILFRED (obstructive sleep apnea)  can not tolerate bipap at night      HTN (hypertension)      Atrial fibrillation  2018      CHF (congestive heart failure)  denies any recent exacerbation      Peripheral edema  chronic      Lichen planus  chronic ( b/L LE)      Atrial flutter  s/p ablation 2018      MI (myocardial infarction)  2012      Stented coronary artery  2019      AS (aortic stenosis)      Chronic kidney disease, unspecified CKD stage      ANGELICA (acute kidney injury)  4/2021      Morbid obesity      Status post placement of cardiac pacemaker  micraleadless PPM, [id number] last interrogation 7/6/2022      Osteoarthritis  shoulders, hips, knee      H/O scoliosis      Lower back pain      History of elbow surgery      S/P CABG (coronary artery bypass graft)  x3, 2012      Cardiac pacemaker  leadless 2018      History of coronary artery stent placement  2019 ( one more after cabg)      History of adenoidectomy      H/O atrioventricular karlee ablation  2018        FAMILY HISTORY:    PAST SOCIAL HISTORY:    ALLERGIES & MEDICATIONS  --------------------------------------------------------------------------------  Allergies    metoprolol (Short breath)    Intolerances      Standing Inpatient Medications  aMIOdarone    Tablet 400 milliGRAM(s) Oral every 8 hours  atorvastatin 20 milliGRAM(s) Oral at bedtime  chlorhexidine 2% Cloths 1 Application(s) Topical daily  clopidogrel Tablet 75 milliGRAM(s) Oral daily  famotidine    Tablet 20 milliGRAM(s) Oral daily  furosemide Infusion 15 mG/Hr IV Continuous <Continuous>  influenza  Vaccine (HIGH DOSE) 0.5 milliLiter(s) IntraMuscular once  metolazone 2.5 milliGRAM(s) Oral daily  norepinephrine Infusion 0.05 MICROgram(s)/kG/Min IV Continuous <Continuous>    PRN Inpatient Medications      REVIEW OF SYSTEMS  --------------------------------------------------------------------------------  Gen: No fevers/chills  Skin: No rashes  Head/Eyes/Ears: Normal hearing,   Respiratory: SOB +  CV: No chest pain  GI: No abdominal pain, diarrhea  : No dysuria, hematuria  MSK: edema  Heme: No easy bruising or bleeding  Psych: No significant depression    All other systems were reviewed and are negative, except as noted.    VITALS/PHYSICAL EXAM  --------------------------------------------------------------------------------  T(C): 36.5 (04-01-25 @ 13:00), Max: 36.6 (03-31-25 @ 17:50)  HR: 79 (04-01-25 @ 13:00) (60 - 81)  BP: 111/75 (04-01-25 @ 13:00) (85/52 - 111/75)  RR: 30 (04-01-25 @ 13:00) (18 - 36)  SpO2: 95% (04-01-25 @ 13:00) (92% - 98%)  Wt(kg): --  Height (cm): 172.7 (04-01-25 @ 13:00)  Weight (kg): 123.9 (04-01-25 @ 13:00)  BMI (kg/m2): 41.5 (04-01-25 @ 13:00)  BSA (m2): 2.33 (04-01-25 @ 13:00)      03-31-25 @ 07:01  -  04-01-25 @ 07:00  --------------------------------------------------------  IN: 0 mL / OUT: 20 mL / NET: -20 mL      Physical Exam:  	Gen: in mild resp distress.   	HEENT: MMM, supplemental oxygen via NC.   	Pulm: Lower zone decreased breath sounds.  	CV: S1S2  	Abd: Soft, +BS   	Ext: LE edema B/L, lower leg induration.   	Neuro: Awake  	Skin: Warm and dry  	    LABS/STUDIES  --------------------------------------------------------------------------------              13.5   10.90 >-----------<  168      [04-01-25 @ 12:52]              41.6     131  |  95  |  82  ----------------------------<  162      [04-01-25 @ 12:52]  5.4   |  20  |  5.45        Ca     9.0     [04-01-25 @ 12:52]      Mg     2.0     [04-01-25 @ 12:52]      Phos  5.8     [04-01-25 @ 12:52]    TPro  7.4  /  Alb  3.5  /  TBili  1.0  /  DBili  x   /  AST  9   /  ALT  9   /  AlkPhos  104  [04-01-25 @ 12:52]    PT/INR: PT 22.3 , INR 1.95       [04-01-25 @ 12:52]  PTT: 39.0       [04-01-25 @ 12:52]      Creatinine Trend:  SCr 5.45 [04-01 @ 12:52]  SCr 5.22 [04-01 @ 06:18]  SCr 5.17 [03-31 @ 18:01]  SCr 2.76 [03-25 @ 08:17]    Urine Creatinine 163      [04-01-25 @ 09:25]  Urine Protein 122      [04-01-25 @ 09:25]  Urine Sodium 29      [04-01-25 @ 09:25]    Lipid: chol 94, TG 74, HDL 29, LDL --      [04-01-25 @ 06:18]    HCV 0.58, Nonreact      [04-20-21 @ 05:30]    GREG: titer 1:1280, pattern Speckled      [04-20-21 @ 05:30]  C3 Complement 120      [04-20-21 @ 05:30]  C4 Complement 31      [04-20-21 @ 05:30]  ANCA: cANCA Negative, pANCA Negative, atypical ANCA Indeterminate      [04-20-21 @ 05:30]  anti-GBM 9      [04-20-21 @ 05:30]  SPEP Interpretation: Mild Polyclonal Gammopathy      [04-20-21 @ 05:30]

## 2025-04-01 NOTE — PHYSICAL THERAPY INITIAL EVALUATION ADULT - PERTINENT HX OF CURRENT PROBLEM, REHAB EVAL
65 yo M with a fib (s/p dccv 3/25/25), CHF (likely moderately reduced EF, several TTE with poor windows), aortic stenosis s/p TAVR (11/2022), a flutter s/p ablation 2019 and s/p micra implant,  CKD3, obesity, venous stasis, HTN, HLD, who presents with 6 days of worsening dyspnea on exertion, admitted for AHRF likely iso CHF exacerbation, also with ANGELICA on CKD c/f cardiorenal syndrome. Hosp course: XR chest (3/31) Diffuse hazy opacities suggest mild pulmonary edema. Small right and trace left pleural effusions. 65 yo M with a fib (s/p dccv 3/25/25), CHF (likely moderately reduced EF, several TTE with poor windows), aortic stenosis s/p TAVR (11/2022), a flutter s/p ablation 2019 and s/p micra implant,  CKD3, obesity, venous stasis, HTN, HLD, who presents with 6 days of worsening dyspnea on exertion, admitted for AHRF likely iso CHF exacerbation, also with ANGELICA on CKD c/f cardiorenal syndrome. Hosp course: XR chest (3/31) Diffuse hazy opacities suggest mild pulmonary edema. Small right and trace left pleural effusions. NOW s/p TAVR 4/11

## 2025-04-01 NOTE — PROGRESS NOTE ADULT - ASSESSMENT
ASSESSMENT & PLAN:  63 yo M with a fib (s/p dccv 3/25/25), CHF (likely moderately reduced EF, several TTE with poor windows), aortic stenosis s/p TAVR (11/2022), a flutter s/p ablation 2019 and s/p micra implant,  CKD3, obesity, venous stasis, HTN, HLD, who presents with 6 days of worsening dyspnea on exertion, admitted for AHRF likely iso CHF exacerbation, also with ANGELICA on CKD c/f cardiorenal syndrome. RRT on floors for SBP 80s requiring pressors and has been admitted to the CCU.    Plan:  ===NEURO===  #sedated while intubated  - A&O x3 at baseline, lethargic prior to intubation  - continue propofol  - SAT/SBT in am  - continue to monitor mental status per protocol    ===RESPIRATORY===  #Acute hypoxemic and hypercapnic respiratory failure  - Most likely i/s/o pulmonary edema w/ acute on chronic HF exacerbation, required intubation 4/1 for increased work of breathing and hypercapnia  - diuresis w/ bumex gtt  - continue full vent support  - trend abgs, monitor sp02    ===CARDIOVASCULAR===  #acute decompensated heart failure  - hypotensive 4/1 requiring levophed  - noted to have severe prosthetic AS on TTE 4/1 w/ moderately reduced LV function, + RMWAs  - diuresis w/ bumex gtt and bolus  - s/p 3% saline, f/u metolazone/ diuril as needed  - dobutamine @ 5 for inotropic assisted diuresis  - continue levophed and vasopressin for MAP > 65  - continue to trend perfusion indices and lactate    #CAD  -     #Afib  - s/p DCCV/ablation 3/2025   - Continue amio 400 mg q8h until 4/4, start 200 mg qd thereafter  - heparin gtt while inpatient    ===/RENAL===  #ANGELICA on CKD  - likely cardiorenal, worsening i/s/o ADHF  - diuretics as above  - continue strict I&O  - trend renal function daily  - nephrology following, f/u recommendations    ===GI===  - NPO while intubated  - monitor for BM    ===INFECTIOUS DISEASE===  - leukocytosis without fever    ===ENDOCRINE===  - No concerns  - A1c and TSH in the morning    ===HEMATOLOGIC/DVT PPx===  - Heparin SubQ.   ASSESSMENT & PLAN:  65 yo M with a fib (s/p dccv 3/25/25), CHF (likely moderately reduced EF, several TTE with poor windows), aortic stenosis s/p TAVR (11/2022), a flutter s/p ablation 2019 and s/p micra implant,  CKD3, obesity, venous stasis, HTN, HLD, who presents with 6 days of worsening dyspnea on exertion, admitted for AHRF likely iso CHF exacerbation, also with ANGELICA on CKD c/f cardiorenal syndrome. RRT on floors for SBP 80s requiring pressors and has been admitted to the CCU.    Plan:  ===NEURO===  #sedated while intubated  - A&O x3 at baseline, lethargic prior to intubation  - continue propofol  - SAT/SBT in am  - continue to monitor mental status per protocol    ===RESPIRATORY===  #Acute hypoxemic and hypercapnic respiratory failure  - Most likely i/s/o pulmonary edema w/ acute on chronic HF exacerbation, required intubation 4/1 for increased work of breathing and hypercapnia  - diuresis w/ bumex gtt  - continue full vent support  - trend abgs, monitor sp02    ===CARDIOVASCULAR===  #acute decompensated heart failure  - hypotensive 4/1 requiring levophed  - noted to have severe prosthetic AS on TTE 4/1 w/ moderately reduced LV function, + RMWAs  - diuresis w/ bumex gtt and bolus  - s/p 3% saline, f/u metolazone/ diuril as needed  - dobutamine @ 5 for inotropic assisted diuresis  - continue levophed and vasopressin for MAP > 65  - continue to trend perfusion indices and lactate    #hx CAD s/p CABG  - continue plavix    #Afib  - s/p DCCV/ablation 3/2025   - Continue amio 400 mg q8h until 4/4, start 200 mg qd thereafter  - heparin gtt while inpatient    ===/RENAL===  #ANGELICA on CKD  - likely cardiorenal, worsening i/s/o ADHF  - diuretics as above  - continue strict I&O  - trend renal function daily  - nephrology following, f/u recommendations    ===GI===  - NPO while intubated  - monitor for BM    ===INFECTIOUS DISEASE===  - leukocytosis without fever  - continue to trend wbc and fever curve    ===ENDOCRINE===  - A1c 5.8, glucose controlled  - fs q6h while NPO  - f/u TSH    ===HEMATOLOGIC/DVT PPx===  - H/H and platelets stable  - trend daily  #dvt ppx: heparin gtt for a.fib    #full code  Lines:  R IJ Intro 4/1 -     ======================= DISPOSITION  =====================  [X] Critical   [ ] Guarded    [ ] Stable    [X] Maintain in CICU  [ ] Downgrade to Telemetry  [ ] Discharge Home    Patient requires continuous monitoring with bedside rhythm monitoring, pulse ox monitoring, and intermittent blood gas analysis. Care plan discussed with ICU care team. Patient remained critical and at risk for life threatening decompensation.  Patient seen, examined and plan discussed with CCU team during rounds.     I have personally provided 35 minutes of critical care time excluding time spent on separate procedures, in addition to initial critical care time provided by the CICU Attending, Dr. Beltran.     Mickie Petersen, [facility]

## 2025-04-01 NOTE — ADVANCED PRACTICE NURSE CONSULT - ASSESSMENT
Arrived on 6 TOWER on 4/1/25.  Patient is awake, engaging in conversation. Patient was found lying in a low air loss pressure redistribution support surface style bed. Patient with a Willis catheter for urinary diversion. Patient complains of, "loose stools", and "itchiness" of the sacrum and buttocks.     Skin assessment reveals; Sacrum / B/L Buttocks - Skin intact. No open wounds, ulcerations, or breakdown noted. Skin color normal. No erythema, induration, or fluctuance. No palpable masses. No signs of infection. No evidence of pressure injury or other skin pathology. Due to itchiness and fecal incontinent episodes, applied Chelsie moisturizing cream for soothing inflammation related to incontinence, and to block out wetness, which can result in skin erosion. Recommended to add preventative measures, including but not limited to; frequent repositioning, use of pressure-relieving surfaces, and maintaining adequate nutrition and hydration. Discussed importance of regular skin checks.     B/L Heels No open wounds, ulcerations, or breakdown noted.    Once the consult was completed, patient's bed lowered to safe position, with call bell within reach, and side rails up. Discussed plan of care with GADIEL Bain.

## 2025-04-01 NOTE — H&P ADULT - PROBLEM SELECTOR PLAN 2
-holding beta blocker as pt has not been taking at home, holding entresto iso chon  -hydral 25 mg tid if sbp >100 Hx of CHF likely moderately reduced EF, several TTE with poor windows. On entresto, hydral, and bumex at home, pt reports adherence to medication. Outpt cardiologist is Dr. Paul  -respiratory management per above  -monitor on tele  -holding beta blocker as pt has not been taking at home, holding entresto iso chon  -hydral 25 mg tid if sbp >100

## 2025-04-01 NOTE — PHYSICAL THERAPY INITIAL EVALUATION ADULT - TRANSFER TRAINING, PT EVAL
GOAL: Patient will perform sit to stand transfers with supervision at rolling walker with proper hand placement in 2 weeks

## 2025-04-01 NOTE — CONSULT NOTE ADULT - ATTENDING COMMENTS
Pt seen and examined at approx 10:50am  Full consult to follow   called for chon  pt reports baseline Renal fxn with egfr in 40s, followed by outpt nephrologist Dr Stringer  #Baseline CKD3  #admitted with chon in setting of HF and ENtresto and decreased po intake and diarrhea/ hypotension  chon could be cardiorenal +/- ATN  cr seems to have plateaued today  he has a lopez  moniotr UO  check renal sono  #HF  may need bumex drip  # hyperkalemia  lokelma 10g x 1  if still elevated can give tid   #hypotension  monitor BP trends.

## 2025-04-01 NOTE — H&P ADULT - PROBLEM SELECTOR PLAN 4
trop 225 on hospitalization. EKG  with 1st deg av block, low voltage, poor r wave progression, R axis deviation, no obvious ischemic changes. Pt previously having exertional chest pain but none at rest. Has not had any chest pain since coming to the hospital. Suspect trop elevations are iso decompensated heart failure and less likely iso ACS  -cards consulted, appreciate recs  -trend trop q4h  -repeat EKG if pt develops new or worsening chest pain or dyspnea

## 2025-04-01 NOTE — RAPID RESPONSE TEAM SUMMARY - NSADDTLFINDINGSRRT_GEN_ALL_CORE
RRT called for hypotension (SBP 80s). Pt mentating well. Prior to RRT, writer had spoken to Dr. Forrester (cardiology) regarding pressor assisted diuresis. Pt w/ hypervolemia on exam (2+ LE edema, CXR w/ pulm edema). Pt s/p bumex 4 mg @ 6 am and TTE notable for severe prosthetic aortic valve stenosis and thereafter structural cardiology was already consulted. During rapid, remainder of vs notable for O2 90's on 2L NC so increased to 4L NC w/ appropriate response. IV levophed started @ .05 and lasix 200 mg x1 given per writer discussion w/ cardiology. Plan to start lasix gtt @ 15 mg / hr thereafter. CCU consulted and accepted pt. Labs drawn (CBC, CMP, VBG, coags, T&S) RRT called for hypotension (SBP 80s). Pt mentating well. Prior to RRT, writer had spoken to Dr. Forrester (cardiology) regarding pressor assisted diuresis. Pt w/ hypervolemia on exam (2+ LE edema, CXR w/ pulm edema). Pt s/p bumex 4 mg @ 6 am and TTE notable for severe prosthetic aortic valve stenosis for which structural cardiology was already consulted. During rapid, remainder of vs notable for O2 90's on 2L NC so increased to 4L NC w/ appropriate response. IV levophed started @ .05 and lasix 200 mg x1 given per writer discussion w/ cardiology. Plan to start lasix gtt @ 15 mg / hr thereafter. CCU consulted and accepted pt. Labs drawn (CBC, CMP, VBG, coags, T&S)

## 2025-04-01 NOTE — PROGRESS NOTE ADULT - PROBLEM SELECTOR PLAN 2
Hx of CHF likely moderately reduced EF, several TTE with poor windows. On entresto, hydral, and bumex at home, pt reports adherence to medication. Outpt cardiologist is Dr. Paul  -respiratory management per above  -monitor on tele  -holding beta blocker as pt has not been taking at home, holding entresto iso chon  -hydral 25 mg tid if sbp >100

## 2025-04-01 NOTE — H&P ADULT - HISTORY OF PRESENT ILLNESS
65 yo M with a fib (s/p dccv 3/25/25), CHF (likely moderately reduced EF, several TTE with poor windows), aortic stenosis s/p TAVR (11/2022), CAD s/p CABG, a flutter s/p ablation 2019 and s/p micra implant,  CKD3, obesity, venous stasis, HTN, HLD, who presents with 6 days of worsening dyspnea on exertion. Pt also reports associated orthopnea and chest pain on exertion but not at rest which is located in the center of his chest. Pain is described as burning. Denies fevers, chills, cough, abdominal pain, NVD, urinary sx, leg swelling. Patient had a cardioversion for atrial fibrillation at this hospital recently, which was reportedly unsuccessful. Patient has a scheduled appointment with Dr. Philip on 04/24/2025 to discuss possible ablation. His outpt cardiologist is Dr. Paul.     On arrival to ED, vitals were 97.3F, HR 71, BP 94/57, RR 22, 97% on 2L NC. Labs pertinent for WBC 10.68, INR 2.55, Na 132, K 5.5, bicarb 20, BUN/Cr 78/5.17 (baseline Cr appears ~1.5-1.8), trop 225, BNP 15473, VBG ph 7.3, pco2/po2 wnl. UA without evidence of infection. EKG  on admission 1st deg av block, low voltage, poor r wave progression, R axis deviation CXR with diffuse hazy opacities suggest mild pulmonary edema and small right and trace left pleural effusions. Pt was given IV Bumex 1mg x1, IV bumex 2mg x1, IV Bumex 4mg x1, and lokelma 5mg x1, then admitted for further management.     65 yo M with a fib (s/p dccv 3/25/25), CHF (likely moderately reduced EF, several TTE with poor windows), aortic stenosis s/p TAVR (11/2022), CAD s/p CABG, a flutter s/p ablation 2019 and s/p micra implant,  CKD3, obesity, venous stasis, HTN, HLD, who presents with 6 days of worsening dyspnea on exertion. Pt also reports associated orthopnea and chest pain on exertion but not at rest which is located in the center of his chest. Pain is described as burning. Also reports several days decreased appetite. Denies fevers, chills, cough, abdominal pain, NVD, urinary sx, leg swelling. Patient had a cardioversion for atrial fibrillation at this hospital recently, which was reportedly unsuccessful. Patient has a scheduled appointment with Dr. Philip on 04/24/2025 to discuss possible ablation. His outpt cardiologist is Dr. Paul.     On arrival to ED, vitals were 97.3F, HR 71, BP 94/57, RR 22, 97% on 2L NC. Labs pertinent for WBC 10.68, INR 2.55, Na 132, K 5.5, bicarb 20, BUN/Cr 78/5.17 (baseline Cr appears ~1.5-1.8), trop 225, BNP 96078, VBG ph 7.3, pco2/po2 wnl. UA without evidence of infection. EKG  on admission 1st deg av block, low voltage, poor r wave progression, R axis deviation CXR with diffuse hazy opacities suggest mild pulmonary edema and small right and trace left pleural effusions. Pt was given IV Bumex 1mg x1, IV bumex 2mg x1, IV Bumex 4mg x1, and lokelma 5mg x1, then admitted for further management.

## 2025-04-01 NOTE — AIRWAY PLACEMENT NOTE ADULT - AIRWAY COMMENTS:
PT was intubated by anesthesia
On arrival pt on BiPAP 100% in respiratory distress. BP 90s/70s, Spo2 reading in 70s (PaO2 250s on ABG), on dobutamine, levo 0.15, vaso 0.04. Pre-O2 with mask, single attempt intubation, secured at 23 cm at lips with b/l breath sounds. BP post-intubation 120s/90s, HR 90s, SpO2 93%. Remainder of care resumed by CCU team.

## 2025-04-01 NOTE — H&P ADULT - PROBLEM SELECTOR PLAN 6
s/p CABG in ***. on plavix and atorvastatin at home  -atorvastatin 20 mg qd  -plavix 75 mg qd s/p CABG ~10 years ago. on plavix and atorvastatin at home  -atorvastatin 20 mg qd  -plavix 75 mg qd K 5.5 in ED s/p 5mg lokelma.   -trend bmp daily  -lokelma prn for hyperkalemia

## 2025-04-01 NOTE — PROGRESS NOTE ADULT - PROBLEM SELECTOR PLAN 1
P/w 6 days of exertional dyspnea and chest pain requiring 2L NC, on RA at baseline. On exam pt has bibasilar crackles and BLE edema. Labs pertinent for BNP 40549, CXR w/ pulmonary edema and small bilateral effusions. Pt reports no major med changes or dietary changes. Suspect respiratory failure driven by CHF exacerbation. ACS less likely but cannot be excluded given prior cardiac hx and elevated troponin though EKG NSR without obvious ischemic changes. Low suspicion for infection as no fever and only mild leukocytosis. Low suspicion for PE as pt adherent to home AC  -goal SpO2 >92%, wean supplemental oxygen as tolerated  -continue bumex 4mg qd   -start metolazone 2.5mg qd   -cardiology consulted appreciate recs  -f/u TTE  -strict I&Os, daily weights

## 2025-04-01 NOTE — H&P ADULT - PROBLEM SELECTOR PLAN 3
BUN/Cr 78/5.17 (baseline Cr appears ~1.5-1.8) on admission, suggestive of cardiorenal syndrome given c/f CHF exacerbation. May also have pre-renal component iso several days poor PO intake   -nephro consult in AM as pt may require HD if he fails diuresis  -trend Cr, avoid nephrotoxic agents as possible  -strict I&O  -urine studies

## 2025-04-01 NOTE — RAPID RESPONSE TEAM SUMMARY - NSSITUATIONBACKGROUNDRRT_GEN_ALL_CORE
65 yo M with a fib (s/p dccv 3/25/25), CHF (likely moderately reduced EF, several TTE with poor windows), aortic stenosis s/p TAVR (11/2022), a flutter s/p ablation 2019 and s/p micra implant,  CKD3, obesity, venous stasis, HTN, HLD, who presents with 6 days of worsening dyspnea on exertion, admitted for AHRF likely iso CHF exacerbation, also with ANGELICA on CKD c/f cardiorenal syndrome.

## 2025-04-01 NOTE — H&P ADULT - NSHPLABSRESULTS_GEN_ALL_CORE
Personally reviewed old records.  Personally reviewed labs.  Personally reviewed imaging.  Personally reviewed EKG.                          13.7   10.68 )-----------( 170      ( 31 Mar 2025 18:01 )             43.7           132[L]  |  96  |  78[H]  ----------------------------<  112[H]  5.5[H]   |  20[L]  |  5.17[H]    Ca    9.2      31 Mar 2025 18:01    TPro  7.6  /  Alb  3.5  /  TBili  1.0  /  DBili  x   /  AST  10  /  ALT  11  /  AlkPhos  107        CARDIAC MARKERS ( 31 Mar 2025 18:01 )  x     / x     / x     / x     / 4.2 ng/mL        LIVER FUNCTIONS - ( 31 Mar 2025 18:01 )  Alb: 3.5 g/dL / Pro: 7.6 g/dL / ALK PHOS: 107 U/L / ALT: 11 U/L / AST: 10 U/L / GGT: x             PT/INR - ( 31 Mar 2025 18:01 )   PT: 29.1 sec;   INR: 2.55 ratio         PTT - ( 31 Mar 2025 18:01 )  PTT:40.7 sec    Urinalysis Basic - ( 31 Mar 2025 20:37 )    Color: Dark Yellow / Appearance: Cloudy / S.016 / pH: x  Gluc: x / Ketone: Trace mg/dL  / Bili: Small / Urobili: 1.0 mg/dL   Blood: x / Protein: 100 mg/dL / Nitrite: Negative   Leuk Esterase: Trace / RBC: 6 /HPF / WBC 3 /HPF   Sq Epi: x / Non Sq Epi: 1 /HPF / Bacteria: Negative /HPF

## 2025-04-01 NOTE — H&P ADULT - ATTENDING COMMENTS
acute hypoxic respiratory failure due to ADHF, ANGELICA on CKD  - Cardio consult appreciated  - will diurese with Bumex 4mg iv and will add Metolazone  - Hyperkalemia s/p treatment, will follow up repeat K  -  will monitor renal function, electrolytes and urine output  - discussed with Dr. Brice Pérez

## 2025-04-01 NOTE — H&P ADULT - TIME BILLING
reviewing prior documentation, reviewing available recent outpatient records, independently obtaining a history and interpreting results of tests, performing a physical examination, reviewing tests/imaging, discussing the plan with the patient, counseling and educating the patient/family member(s), ordering medications/tests, documenting clinical information in the electronic health record, and coordinating care.  The time spent during this encounter (75 minutes) excludes teaching and separately documented services

## 2025-04-01 NOTE — CHART NOTE - NSCHARTNOTEFT_GEN_A_CORE
Pt seen and examined at approx 10:50am  Full consult to follow   called for chon  pt reports baseline Renal fxn in 40s, followed by outpt nephrologist Dr Stringer  #Baseline CKD3  #admitted with chon in setting of HF and ENtresto and decreased po intake and diarrhea  chon could be cardiorenal +/- ATN  cr seems to have plateaued today  he has a lopez  moniotr UO  check renal sono  #HF  may need bumex drip  # hyperkalemia  lokelma 10g x 1  if still elevated can give tid   #hypotension  monitor BP trends

## 2025-04-01 NOTE — PROGRESS NOTE ADULT - PROBLEM SELECTOR PLAN 7
s/p CABG ~10 years ago. on plavix and atorvastatin at home  -atorvastatin 20 mg qd  -plavix 75 mg qd

## 2025-04-01 NOTE — AIRWAY PLACEMENT NOTE ADULT - POST AIRWAY PLACEMENT ASSESSMENT:
breath sounds bilateral/breath sounds equal/positive end tidal CO2 noted
breath sounds bilateral/breath sounds equal

## 2025-04-01 NOTE — PHYSICAL THERAPY INITIAL EVALUATION ADULT - ADDITIONAL COMMENTS
Pt resides alone in a private house with no stairs to negotiate. PTA, pt reports being able to ambulate short household distances with RW, mainly used motorized scooter for mobility. Pt also owns shower chair. Pt reports being mostly independent with ADLs, his brother and friend check on him ~3x/week to assist as needed.

## 2025-04-01 NOTE — PROGRESS NOTE ADULT - ATTENDING COMMENTS
Patient is a 64M with h/o a fib (s/p dccv 3/25/25), CHF, CKD, obesity, venous stasis, aortic stenosis s/p TAVR (11/2022), a flutter s/p ablation 2019 and s/p micra implant, HTN HLD, CHF (likely moderately reduced EF, several TTE with poor windows) presenting with SOB for last 5-6 days even on diuretics. Has been noticing low urine output also.  In ED he was found to have elevated Scr 5.2 from 2.7, CXR- pulmonary edema, BP is borderline, mildlt elevated trops. Cardiology ealuated and recommended IV Bumex, metolazone, Echo.    1. Cardiogenic shock ( SBP 75-87)  2. Acute on chronic systolic HF ( ischemic CMP)  3. ANGELICA on CKD 3  4. Acute hypoxic resp failure  5. A. fib (s/p dccv 3/25/25)  6. Severe periprosthetic AV stenosis, s/p TAVR    - SBP  SBP 75-87. will start Pressors and diuretic  - Patient at this point need ICU care ( CICU)  - Team poke to Cardiology  - Cardiorenal consult called  - c/w O2 support, AC Patient is a 64M with h/o a fib (s/p dccv 3/25/25), CHF, CKD, obesity, venous stasis, aortic stenosis s/p TAVR (11/2022), a flutter s/p ablation 2019 and s/p micra implant, HTN HLD, CHF (likely moderately reduced EF, several TTE with poor windows) presenting with SOB for last 5-6 days even on diuretics. Has been noticing low urine output also.  In ED he was found to have elevated Scr 5.2 from 2.7, CXR- pulmonary edema, BP is borderline, mildlt elevated trops. Cardiology ealuated and recommended IV Bumex, metolazone, Echo.    1. Cardiogenic shock ( SBP 75-87)  2. Acute on chronic systolic HF ( ischemic CMP)  3. ANGELICA on CKD 3  4. Acute hypoxic resp failure  5. A. fib (s/p dccv 3/25/25)  6. Severe periprosthetic AV stenosis, s/p TAVR    - SBP  SBP 75-87. will start Pressors and diuretic  - Patient at this point need ICU care ( CICU)  - Team spoke to Cardiology  - Cardiorenal consult called  - Also EP need for ablation  - c/w O2 support, AC

## 2025-04-01 NOTE — PROGRESS NOTE ADULT - PROBLEM SELECTOR PLAN 5
Hx of afib s/p ablation now NSR  -continue amio 400 mg q8h until 4/4, start 200 mg qd thereafter  -eliquis 5 mg bid

## 2025-04-01 NOTE — PATIENT PROFILE ADULT - NSTOBACCONEVERSMOKERY/N_GEN_A
ED Consult Note - Ob/Gyn  Traci Sloan   1983  MRN [id number]    CC: vaginal bleeding    HPI: Traci Sloan is a 35 year old  PPD#11 following an  complicated by preeclampsia with severe features and readmission on  for a severe headache who presents today with an episode of heavy vaginal bleeding.  She notes that her bleeding has been slowing over the past couple days.  At 6 PM tonight she started having heavier vaginal bleeding.  She saturated about 6 pads in 2 hours.  She describes the blood as dark red.  Since arriving in the ED the bleeding has slowed.  She denies fever, chills, vision changes, shortness of breath, chest pain, dizziness, worsening lower abdominal pain, breast pain or erythema, nasal congestion, cough, sore throat.  She has continued to take 60 mg of nifedipine XR daily.     Past Medical History:   Diagnosis Date     Abnormal Pap smear of cervix 2018    LSIL, Neg HPV, pregnant     ADHD (attention deficit hyperactivity disorder)      Anemia      Gastric reflux      IBS (irritable bowel syndrome)         Past Surgical History:   Procedure Laterality Date     CYSTOSCOPY, DILATE URETHRA, COMBINED  1999    for frequent UTI'S     KNEE SURGERY  10/1997          No current facility-administered medications on file prior to encounter.   Current Outpatient Medications on File Prior to Encounter:  acetaminophen (TYLENOL) 650 MG CR tablet Take 1 tablet (650 mg) by mouth every 8 hours as needed for mild pain or fever   amphetamine-dextroamphetamine (ADDERALL) 15 MG tablet TAKE 1.5 TAB BY MOUTH IN THE AM AND 1 TAB IN THE PM   ibuprofen (ADVIL/MOTRIN) 600 MG tablet Take 1 tablet (600 mg) by mouth every 6 hours as needed for other (cramping)   NIFEdipine ER OSMOTIC (ADALAT CC) 60 MG 24 hr tablet Take 1 tablet (60 mg) by mouth daily   omeprazole (PRILOSEC) 20 MG DR capsule Take 20 mg by mouth daily   senna-docusate (SENOKOT-S/PERICOLACE) 8.6-50 MG tablet Take 1 tablet by mouth 2  times daily         No Known Allergies     Social Hx:   Lives with  and children.     Objective:  Vitals:    02/26/19 2108 02/26/19 2236   BP: 155/88 144/88   Resp: 16 18   Temp: 97.3  F (36.3  C) 98.3  F (36.8  C)   TempSrc: Oral Oral   SpO2: 98% 99%   Weight: 85.9 kg (189 lb 6.4 oz)     HR 72-77    Gen: Well-appearing, sitting up at the edge of the bed  Heart: RRR  Lungs: CTAB  Abd: Nontender, nondistended, fundus well below umbilicus  : On bimanual exam, tender vagina diffusely without significant cervical motion tenderness, cervix dilated fingertip with minimal bleeding with fundal massage.  No fundal tenderness.  Dark red blood noted.    Labs/Imaging:  Results for orders placed or performed during the hospital encounter of 02/26/19 (from the past 24 hour(s))   CBC with platelets differential   Result Value Ref Range    WBC 12.3 (H) 4.0 - 11.0 10e9/L    RBC Count 3.70 (L) 3.8 - 5.2 10e12/L    Hemoglobin 9.7 (L) 11.7 - 15.7 g/dL    Hematocrit 31.0 (L) 35.0 - 47.0 %    MCV 84 78 - 100 fl    MCH 26.2 (L) 26.5 - 33.0 pg    MCHC 31.3 (L) 31.5 - 36.5 g/dL    RDW 13.3 10.0 - 15.0 %    Platelet Count 531 (H) 150 - 450 10e9/L    Diff Method Automated Method     % Neutrophils 74.7 %    % Lymphocytes 16.3 %    % Monocytes 7.6 %    % Eosinophils 1.1 %    % Basophils 0.2 %    % Immature Granulocytes 0.1 %    Nucleated RBCs 0 0 /100    Absolute Neutrophil 9.2 (H) 1.6 - 8.3 10e9/L    Absolute Lymphocytes 2.0 0.8 - 5.3 10e9/L    Absolute Monocytes 0.9 0.0 - 1.3 10e9/L    Absolute Eosinophils 0.1 0.0 - 0.7 10e9/L    Absolute Basophils 0.0 0.0 - 0.2 10e9/L    Abs Immature Granulocytes 0.0 0 - 0.4 10e9/L    Absolute Nucleated RBC 0.0    Comprehensive metabolic panel   Result Value Ref Range    Sodium 140 133 - 144 mmol/L    Potassium 3.9 3.4 - 5.3 mmol/L    Chloride 107 94 - 109 mmol/L    Carbon Dioxide 25 20 - 32 mmol/L    Anion Gap 8 3 - 14 mmol/L    Glucose 95 70 - 99 mg/dL    Urea Nitrogen 19 7 - 30 mg/dL     Creatinine 0.63 0.52 - 1.04 mg/dL    GFR Estimate >90 >60 mL/min/[1.73_m2]    GFR Estimate If Black >90 >60 mL/min/[1.73_m2]    Calcium 8.5 8.5 - 10.1 mg/dL    Bilirubin Total 0.2 0.2 - 1.3 mg/dL    Albumin 3.2 (L) 3.4 - 5.0 g/dL    Protein Total 7.2 6.8 - 8.8 g/dL    Alkaline Phosphatase 173 (H) 40 - 150 U/L    ALT 23 0 - 50 U/L    AST 12 0 - 45 U/L   INR   Result Value Ref Range    INR 1.00 0.86 - 1.14   Partial thromboplastin time   Result Value Ref Range    PTT 31 22 - 37 sec   ABO/Rh type and screen   Result Value Ref Range    ABO O     RH(D) Pos     Antibody Screen Neg     Test Valid Only At          [facility]    Specimen Expires 03/01/2019    US Pelvis Complete without Transvaginal    Narrative    PELVIC ULTRASOUND   2/26/2019 10:26 PM     HISTORY: Pelvic pain, postpartum bleeding concern for retained  products of conception    COMPARISON: None.    FINDINGS: Uterus measures 13.8 x 6.8 cm in size. There is hypoechoic  material filling the endometrial cavity. This material measures up to  3.1 cm in thickness. It measures up to 10 cm in length. There is a  small area of increased vascularity within the hypoechoic material.  This material probably represents a blood clot but the area of  hypervascularity could be due to retained products of conception.  There is a intramural fibroid in the posterior uterine body measuring  2.1 x 1.6 x 2 cm.      Impression    IMPRESSION: Endometrial cavity is filled with hypoechoic material  which probably represents a blood clot. There is however a small area  of hypervascularity in the fundal region. This could be due to a small  amount of retained products of conception.    JARET DIOP MD   UA reflex to Microscopic and Culture   Result Value Ref Range    Color Urine Yellow     Appearance Urine Clear     Glucose Urine Negative NEG^Negative mg/dL    Bilirubin Urine Negative NEG^Negative    Ketones Urine Negative NEG^Negative mg/dL     Specific Gravity Urine 1.014 1.003 - 1.035    Blood Urine Moderate (A) NEG^Negative    pH Urine 5.5 5.0 - 7.0 pH    Protein Albumin Urine Negative NEG^Negative mg/dL    Urobilinogen mg/dL Normal 0.0 - 2.0 mg/dL    Nitrite Urine Negative NEG^Negative    Leukocyte Esterase Urine Trace (A) NEG^Negative    Source Midstream Urine     RBC Urine 8 (H) 0 - 2 /HPF    WBC Urine 6 (H) 0 - 5 /HPF    Bacteria Urine Few (A) NEG^Negative /HPF    Mucous Urine Present (A) NEG^Negative /LPF       Assessment/Plan: Traci Sloan is a 35 year old  PPD#11 from an  complicated by preeclampsia with severe features who presents today with an episode of new heavy bleeding.  Hemoglobin is stable from , she denies dizziness and is not tachycardic.  She has a slight leukocytosis without any localizing signs of infection.  Blood pressure is mildly elevated with no symptoms of severe features of preeclampsia and normal ALT, AST, creatinine and elevated platelets.  Pelvic ultrasound was done and reveals a endometrial stripe of 3.1 cm. A small area of increased vascularity is present within the hypoechoic material.  We discussed that this could be suggestive of a small amount of retained products of conception in the background of a blood clot.  She strongly desires to avoid a D&C. We discussed a course of oral misoprostol with 400 mcg buccally followed by an additional 400 mcg in 12 hours if still having significant bleeding. If she has recurrent heavy bleeding that lasts longer than 2 hours (more than 2 pads per hour for over 2 hours), heavy bleeding with dizziness, persistent fever greater than 100.4, or increasing lower abdominal pain that does not respond to pain medications, we discussed the likely need for a D&C. She has a follow-up appointment on Friday morning in clinic. She will return to the ED sooner if having any of the warning signs discussed above.    Pt discussed with Dr. Burris.     Bonnie Gomez MD  Ob/Gyn,  PGY3  Pager [phone]      Courtney Burris MD       No

## 2025-04-01 NOTE — H&P ADULT - PROBLEM SELECTOR PLAN 8
-atorvastatin 20 mg qd -hold beta blocker as pt not taking at home, hold entresto iso chon  -continue diuretics

## 2025-04-01 NOTE — PROGRESS NOTE ADULT - SUBJECTIVE AND OBJECTIVE BOX
PATIENT:  BERTHA WARD  [id number]    CHIEF COMPLAINT:  Patient is a 64y old  Male who presents with a chief complaint of dyspnea , chest pain (2025 14:02)      INTERVAL HISTORYOVERNIGHT EVENTS:      REVIEW OF SYSTEMS:    Constitutional:     [ ] negative [ ] fevers [ ] chills [ ] weight loss [ ] weight gain  HEENT:                  [ ] negative [ ] dry eyes [ ] eye irritation [ ] postnasal drip [ ] nasal congestion  CV:                         [ ] negative  [ ] chest pain [ ] orthopnea [ ] palpitations [ ] murmur  Resp:                     [ ] negative [ ] cough [ ] shortness of breath [ ] dyspnea [ ] wheezing [ ] sputum [ ] hemoptysis  GI:                          [ ] negative [ ] nausea [ ] vomiting [ ] diarrhea [ ] constipation [ ] abd pain [ ] dysphagia   :                        [ ] negative [ ] dysuria [ ] nocturia [ ] hematuria [ ] increased urinary frequency  Musculoskeletal: [ ] negative [ ] back pain [ ] myalgias [ ] arthralgias [ ] fracture  Skin:                       [ ] negative [ ] rash [ ] itch  Neurological:        [ ] negative [ ] headache [ ] dizziness [ ] syncope [ ] weakness [ ] numbness  Psychiatric:           [ ] negative [ ] anxiety [ ] depression  Endocrine:            [ ] negative [ ] diabetes [ ] thyroid problem  Heme/Lymph:      [ ] negative [ ] anemia [ ] bleeding problem  Allergic/Immune: [ ] negative [ ] itchy eyes [ ] nasal discharge [ ] hives [ ] angioedema    [ ] All other systems negative  [ ] Unable to assess ROS because ________.    MEDICATIONS:  MEDICATIONS  (STANDING):  aMIOdarone    Tablet 400 milliGRAM(s) Oral every 8 hours  atorvastatin 20 milliGRAM(s) Oral at bedtime  buMETAnide Infusion 4 mG/Hr (20 mL/Hr) IV Continuous <Continuous>  chlorhexidine 2% Cloths 1 Application(s) Topical daily  clopidogrel Tablet 75 milliGRAM(s) Oral daily  DOBUTamine Infusion 5 MICROgram(s)/kG/Min (18.6 mL/Hr) IV Continuous <Continuous>  famotidine    Tablet 20 milliGRAM(s) Oral daily  heparin  Infusion 1500 Unit(s)/Hr (15 mL/Hr) IV Continuous <Continuous>  influenza  Vaccine (HIGH DOSE) 0.5 milliLiter(s) IntraMuscular once  norepinephrine Infusion 0.05 MICROgram(s)/kG/Min (11.1 mL/Hr) IV Continuous <Continuous>  vasopressin Infusion 0.04 Unit(s)/Min (6 mL/Hr) IV Continuous <Continuous>    MEDICATIONS  (PRN):      ALLERGIES:  Allergies    metoprolol (Short breath)    Intolerances        OBJECTIVE:  ICU Vital Signs Last 24 Hrs  T(C): 36.6 (2025 16:15), Max: 36.6 (31 Mar 2025 20:15)  T(F): 97.8 (2025 16:15), Max: 97.8 (31 Mar 2025 20:15)  HR: 71 (2025 19:50) (60 - 101)  BP: 90/59 (2025 16:45) (82/53 - 133/87)  BP(mean): 68 (2025 16:45) (51 - 105)  ABP: 87/60 (2025 19:30) (79/52 - 99/67)  ABP(mean): 71 (2025 19:30) (64 - 80)  RR: 39 (2025 19:30) (18 - 47)  SpO2: 81% (2025 19:50) (81% - 98%)    O2 Parameters below as of 2025 18:00  Patient On (Oxygen Delivery Method): nasal cannula  O2 Flow (L/min): 6          Adult Advanced Hemodynamics Last 24 Hrs  CVP(mm Hg): 22 (2025 19:30) (20 - 23)  CVP(cm H2O): --  CO: --  CI: --  PA: --  PA(mean): --  PCWP: --  SVR: --  SVRI: --  PVR: --  PVRI: --  CAPILLARY BLOOD GLUCOSE      POCT Blood Glucose.: 201 mg/dL (2025 12:04)  POCT Blood Glucose.: 474 mg/dL (2025 12:03)    CAPILLARY BLOOD GLUCOSE      POCT Blood Glucose.: 201 mg/dL (2025 12:04)    I&O's Summary    31 Mar 2025 07:01  -  2025 07:00  --------------------------------------------------------  IN: 0 mL / OUT: 20 mL / NET: -20 mL    2025 07:01  -  2025 20:09  --------------------------------------------------------  IN: 591.6 mL / OUT: 150 mL / NET: 441.6 mL      Daily Height in cm: 172.72 (2025 13:00)    Daily Weight in k (2025 07:22)    PHYSICAL EXAMINATION:  General: WN/WD NAD  HEENT: PERRLA, EOMI, moist mucous membranes  Neurology: A&Ox3, nonfocal, MITCHELL x 4  Respiratory: CTA B/L, normal respiratory effort, no wheezes, crackles, rales  CV: RRR, S1S2, no murmurs, rubs or gallops  Abdominal: Soft, NT, ND +BS, Last BM  Extremities: No edema, + peripheral pulses  Incisions:   Tubes:    LABS:  ABG - ( 2025 19:51 )  pH, Arterial: 7.15  pH, Blood: x     /  pCO2: 57    /  pO2: 277   / HCO3: 20    / Base Excess: -9.6  /  SaO2: 100.0                                   14.3   13.34 )-----------( 186      ( 2025 17:33 )             44.2     04-01    130[L]  |  95[L]  |  89[H]  ----------------------------<  139[H]  5.5[H]   |  17[L]  |  5.58[H]    Ca    9.2      2025 17:33  Phos  6.5     04-01  Mg     2.0     04-01    TPro  8.0  /  Alb  3.6  /  TBili  1.0  /  DBili  x   /  AST  8[L]  /  ALT  10  /  AlkPhos  113  04-01    LIVER FUNCTIONS - ( 2025 17:33 )  Alb: 3.6 g/dL / Pro: 8.0 g/dL / ALK PHOS: 113 U/L / ALT: 10 U/L / AST: 8 U/L / GGT: x           PT/INR - ( 2025 12:52 )   PT: 22.3 sec;   INR: 1.95 ratio         PTT - ( 2025 12:52 )  PTT:39.0 sec    CARDIAC MARKERS ( 31 Mar 2025 18:01 )  x     / x     / x     / x     / 4.2 ng/mL      Urinalysis Basic - ( 2025 17:33 )    Color: x / Appearance: x / SG: x / pH: x  Gluc: 139 mg/dL / Ketone: x  / Bili: x / Urobili: x   Blood: x / Protein: x / Nitrite: x   Leuk Esterase: x / RBC: x / WBC x   Sq Epi: x / Non Sq Epi: x / Bacteria: x        TELEMETRY:     EKG:     IMAGING:       PATIENT:  BERTHA WARD  [id number]    CHIEF COMPLAINT:  Patient is a 64y old  Male who presents with a chief complaint of dyspnea , chest pain (2025 14:02)    INTERVAL HISTORY: admitted to CICU for acute hypoxic respiratory failure and diuresis. on initial assessment patient tachypneic to 38 and hypoxic on 6L nasal cannula, escalated to bipap. fatigued appearing, with abdominal muscle use. decision made to intubate    REVIEW OF SYSTEMS: unable to fully assess    MEDICATIONS:  MEDICATIONS  (STANDING):  aMIOdarone    Tablet 400 milliGRAM(s) Oral every 8 hours  atorvastatin 20 milliGRAM(s) Oral at bedtime  buMETAnide Infusion 4 mG/Hr (20 mL/Hr) IV Continuous <Continuous>  chlorhexidine 2% Cloths 1 Application(s) Topical daily  clopidogrel Tablet 75 milliGRAM(s) Oral daily  DOBUTamine Infusion 5 MICROgram(s)/kG/Min (18.6 mL/Hr) IV Continuous <Continuous>  famotidine    Tablet 20 milliGRAM(s) Oral daily  heparin  Infusion 1500 Unit(s)/Hr (15 mL/Hr) IV Continuous <Continuous>  influenza  Vaccine (HIGH DOSE) 0.5 milliLiter(s) IntraMuscular once  norepinephrine Infusion 0.05 MICROgram(s)/kG/Min (11.1 mL/Hr) IV Continuous <Continuous>  vasopressin Infusion 0.04 Unit(s)/Min (6 mL/Hr) IV Continuous <Continuous>    MEDICATIONS  (PRN):    ALLERGIES:  Allergies    metoprolol (Short breath)    Intolerances    OBJECTIVE:  ICU Vital Signs Last 24 Hrs  T(C): 36.6 (2025 16:15), Max: 36.6 (31 Mar 2025 20:15)  T(F): 97.8 (2025 16:15), Max: 97.8 (31 Mar 2025 20:15)  HR: 71 (2025 19:50) (60 - 101)  BP: 90/59 (2025 16:45) (82/53 - 133/87)  BP(mean): 68 (2025 16:45) (51 - 105)  ABP: 87/60 (2025 19:30) (79/52 - 99/67)  ABP(mean): 71 (2025 19:30) (64 - 80)  RR: 39 (2025 19:30) (18 - 47)  SpO2: 81% (2025 19:50) (81% - 98%)    O2 Parameters below as of 2025 18:00  Patient On (Oxygen Delivery Method): nasal cannula  O2 Flow (L/min): 6    Adult Advanced Hemodynamics Last 24 Hrs  CVP(mm Hg): 22 (2025 19:30) (20 - 23)  CVP(cm H2O): --  CO: --  CI: --  PA: --  PA(mean): --  PCWP: --  SVR: --  SVRI: --  PVR: --  PVRI: --  CAPILLARY BLOOD GLUCOSE      POCT Blood Glucose.: 201 mg/dL (2025 12:04)  POCT Blood Glucose.: 474 mg/dL (2025 12:03)    CAPILLARY BLOOD GLUCOSE    POCT Blood Glucose.: 201 mg/dL (2025 12:04)    I&O's Summary    31 Mar 2025 07:01  -  2025 07:00  --------------------------------------------------------  IN: 0 mL / OUT: 20 mL / NET: -20 mL    2025 07:01  -  2025 20:09  --------------------------------------------------------  IN: 591.6 mL / OUT: 150 mL / NET: 441.6 mL    Daily Height in cm: 172.72 (2025 13:00)    Daily Weight in k (2025 07:22)    PHYSICAL EXAMINATION:  General: WN/WD NAD  HEENT: PERRLA, EOMI, moist mucous membranes  Neurology: responsive to voice, fatigued  Respiratory: tachypnic, diminished  CV: RRR, S1S2, no murmurs  Abdominal: Soft, NT, ND +BS  Extremities: +3 lower extremity edema, bilateral hyperpigmentation  skin: warm, dry    LABS:  ABG - ( 2025 19:51 )  pH, Arterial: 7.15  pH, Blood: x     /  pCO2: 57    /  pO2: 277   / HCO3: 20    / Base Excess: -9.6  /  SaO2: 100.0                         14.3   13.34 )-----------( 186      ( 2025 17:33 )             44.2     04-01    130[L]  |  95[L]  |  89[H]  ----------------------------<  139[H]  5.5[H]   |  17[L]  |  5.58[H]    Ca    9.2      2025 17:33  Phos  6.5     04-  Mg     2.0     04-    TPro  8.0  /  Alb  3.6  /  TBili  1.0  /  DBili  x   /  AST  8[L]  /  ALT  10  /  AlkPhos  113  04-01    LIVER FUNCTIONS - ( 2025 17:33 )  Alb: 3.6 g/dL / Pro: 8.0 g/dL / ALK PHOS: 113 U/L / ALT: 10 U/L / AST: 8 U/L / GGT: x           PT/INR - ( 2025 12:52 )   PT: 22.3 sec;   INR: 1.95 ratio      PTT - ( 2025 12:52 )  PTT:39.0 sec    CARDIAC MARKERS ( 31 Mar 2025 18:01 )  x     / x     / x     / x     / 4.2 ng/mL    Urinalysis Basic - ( 2025 17:33 )    Color: x / Appearance: x / SG: x / pH: x  Gluc: 139 mg/dL / Ketone: x  / Bili: x / Urobili: x   Blood: x / Protein: x / Nitrite: x   Leuk Esterase: x / RBC: x / WBC x   Sq Epi: x / Non Sq Epi: x / Bacteria: x    TELEMETRY:     EKG:     IMAGING:  < from: TTE W or WO Ultrasound Enhancing Agent (25 @ 07:49) >  CONCLUSIONS:      1. Left ventricular cavity is mildly dilated. Left ventricular wall thickness is normal. Left ventricular systolic function is moderately decreased. Regional wall motion abnormalities consistent with ischemic heart disease.   2. Entire apex, mid and apical anterior septum, and mid inferolateral segment are abnormal.   3. A bioprosthetic valve replacement valve replacement is present in the aortic position The prosthetic valve has reduced leaflet opening . Severe prosthetic aortic stenosis.    < end of copied text >         PATIENT:  BERTHA WARD  [id number]    CHIEF COMPLAINT:  Patient is a 64y old  Male who presents with a chief complaint of dyspnea , chest pain (2025 14:02)    INTERVAL HISTORY: admitted to CICU for acute hypoxic respiratory failure and diuresis. on initial assessment patient tachypneic to 38 and hypoxic on 6L nasal cannula, escalated to bipap. fatigued appearing, with abdominal muscle use. decision made to intubate    REVIEW OF SYSTEMS: unable to fully assess    MEDICATIONS:  MEDICATIONS  (STANDING):  aMIOdarone    Tablet 400 milliGRAM(s) Oral every 8 hours  atorvastatin 20 milliGRAM(s) Oral at bedtime  buMETAnide Infusion 4 mG/Hr (20 mL/Hr) IV Continuous <Continuous>  chlorhexidine 2% Cloths 1 Application(s) Topical daily  clopidogrel Tablet 75 milliGRAM(s) Oral daily  DOBUTamine Infusion 5 MICROgram(s)/kG/Min (18.6 mL/Hr) IV Continuous <Continuous>  famotidine    Tablet 20 milliGRAM(s) Oral daily  heparin  Infusion 1500 Unit(s)/Hr (15 mL/Hr) IV Continuous <Continuous>  influenza  Vaccine (HIGH DOSE) 0.5 milliLiter(s) IntraMuscular once  norepinephrine Infusion 0.05 MICROgram(s)/kG/Min (11.1 mL/Hr) IV Continuous <Continuous>  vasopressin Infusion 0.04 Unit(s)/Min (6 mL/Hr) IV Continuous <Continuous>    MEDICATIONS  (PRN):    ALLERGIES:  Allergies    metoprolol (Short breath)    Intolerances    OBJECTIVE:  ICU Vital Signs Last 24 Hrs  T(C): 36.6 (2025 16:15), Max: 36.6 (31 Mar 2025 20:15)  T(F): 97.8 (2025 16:15), Max: 97.8 (31 Mar 2025 20:15)  HR: 71 (2025 19:50) (60 - 101)  BP: 90/59 (2025 16:45) (82/53 - 133/87)  BP(mean): 68 (2025 16:45) (51 - 105)  ABP: 87/60 (2025 19:30) (79/52 - 99/67)  ABP(mean): 71 (2025 19:30) (64 - 80)  RR: 39 (2025 19:30) (18 - 47)  SpO2: 81% (2025 19:50) (81% - 98%)    O2 Parameters below as of 2025 18:00  Patient On (Oxygen Delivery Method): nasal cannula  O2 Flow (L/min): 6    Adult Advanced Hemodynamics Last 24 Hrs  CVP(mm Hg): 22 (2025 19:30) (20 - 23)  CVP(cm H2O): --  CO: --  CI: --  PA: --  PA(mean): --  PCWP: --  SVR: --  SVRI: --  PVR: --  PVRI: --  CAPILLARY BLOOD GLUCOSE      POCT Blood Glucose.: 201 mg/dL (2025 12:04)  POCT Blood Glucose.: 474 mg/dL (2025 12:03)    CAPILLARY BLOOD GLUCOSE    POCT Blood Glucose.: 201 mg/dL (2025 12:04)    I&O's Summary    31 Mar 2025 07:01  -  2025 07:00  --------------------------------------------------------  IN: 0 mL / OUT: 20 mL / NET: -20 mL    2025 07:01  -  2025 20:09  --------------------------------------------------------  IN: 591.6 mL / OUT: 150 mL / NET: 441.6 mL    Daily Height in cm: 172.72 (2025 13:00)    Daily Weight in k (2025 07:22)    PHYSICAL EXAMINATION:  General: WN/WD NAD  HEENT: PERRLA, EOMI, moist mucous membranes  Neurology: responsive to voice, fatigued  Respiratory: tachypneic diminished  CV: RRR, S1S2, no murmurs  Abdominal: Soft, NT, ND +BS  Extremities: +3 lower extremity edema, bilateral hyperpigmentation  skin: warm, dry    LABS:  ABG - ( 2025 19:51 )  pH, Arterial: 7.15  pH, Blood: x     /  pCO2: 57    /  pO2: 277   / HCO3: 20    / Base Excess: -9.6  /  SaO2: 100.0                         14.3   13.34 )-----------( 186      ( 2025 17:33 )             44.2     04-01    130[L]  |  95[L]  |  89[H]  ----------------------------<  139[H]  5.5[H]   |  17[L]  |  5.58[H]    Ca    9.2      2025 17:33  Phos  6.5     04-  Mg     2.0     04-    TPro  8.0  /  Alb  3.6  /  TBili  1.0  /  DBili  x   /  AST  8[L]  /  ALT  10  /  AlkPhos  113  04-01    LIVER FUNCTIONS - ( 2025 17:33 )  Alb: 3.6 g/dL / Pro: 8.0 g/dL / ALK PHOS: 113 U/L / ALT: 10 U/L / AST: 8 U/L / GGT: x           PT/INR - ( 2025 12:52 )   PT: 22.3 sec;   INR: 1.95 ratio      PTT - ( 2025 12:52 )  PTT:39.0 sec    CARDIAC MARKERS ( 31 Mar 2025 18:01 )  x     / x     / x     / x     / 4.2 ng/mL    Urinalysis Basic - ( 2025 17:33 )    Color: x / Appearance: x / SG: x / pH: x  Gluc: 139 mg/dL / Ketone: x  / Bili: x / Urobili: x   Blood: x / Protein: x / Nitrite: x   Leuk Esterase: x / RBC: x / WBC x   Sq Epi: x / Non Sq Epi: x / Bacteria: x    TELEMETRY:     EKG:     IMAGING:  < from: TTE W or WO Ultrasound Enhancing Agent (25 @ 07:49) >  CONCLUSIONS:      1. Left ventricular cavity is mildly dilated. Left ventricular wall thickness is normal. Left ventricular systolic function is moderately decreased. Regional wall motion abnormalities consistent with ischemic heart disease.   2. Entire apex, mid and apical anterior septum, and mid inferolateral segment are abnormal.   3. A bioprosthetic valve replacement valve replacement is present in the aortic position The prosthetic valve has reduced leaflet opening . Severe prosthetic aortic stenosis.    < end of copied text >

## 2025-04-01 NOTE — PHYSICAL THERAPY INITIAL EVALUATION ADULT - NSPTDISCHREC_GEN_A_CORE
TBD pending functional evaluation If pt d/c home would require home PT, assist with all mobility./[facility]acute Rehab

## 2025-04-01 NOTE — H&P ADULT - PROBLEM SELECTOR PLAN 7
-hold beta blocker as pt not taking at home, hold entresto iso chon  -continue diuretics s/p CABG ~10 years ago. on plavix and atorvastatin at home  -atorvastatin 20 mg qd  -plavix 75 mg qd

## 2025-04-01 NOTE — H&P ADULT - PROBLEM SELECTOR PLAN 5
Currently rate controlled  -continue amio 400 mg q8h until 4/4, start 200 mg qd thereafter  -eliquis 5 mg bid Hx of afib s/p ablation now NSR  -continue amio 400 mg q8h until 4/4, start 200 mg qd thereafter  -eliquis 5 mg bid

## 2025-04-01 NOTE — PATIENT PROFILE ADULT - FALL HARM RISK - HARM RISK INTERVENTIONS
Assistance with ambulation/Assistance OOB with selected safe patient handling equipment/Communicate Risk of Fall with Harm to all staff/Discuss with provider need for PT consult/Monitor gait and stability/Reinforce activity limits and safety measures with patient and family/Tailored Fall Risk Interventions/Visual Cue: Yellow wristband and red socks/Bed in lowest position, wheels locked, appropriate side rails in place/Call bell, personal items and telephone in reach/Instruct patient to call for assistance before getting out of bed or chair/Non-slip footwear when patient is out of bed/[place] to call system/Physically safe environment - no spills, clutter or unnecessary equipment/Purposeful Proactive Rounding/Room/bathroom lighting operational, light cord in reach

## 2025-04-01 NOTE — CHART NOTE - NSCHARTNOTEFT_GEN_A_CORE
***Plan not finalized until attending attestation***    Gabriele Barnes MD (PGY-1)  Internal Medicine  Contact via [employer] TEAMS    ******************************************    CCU Accept Note    Transfer from: (X) Medicine    (  ) Telemetry    (  ) RCU                                        (  ) Palliative     (  ) Stroke Unit    (  ) _______________    Accepting Physican: Dr Beltran    [facility] COURSE:   Luis Waller is a 65 yo M with a fib (s/p dccv 3/25/25), CHF (likely moderately reduced EF, several TTE with poor windows), aortic stenosis s/p TAVR (2022), a flutter s/p ablation  and s/p micra implant,  CKD3, obesity, venous stasis, HTN, HLD, who presents with 6 days of worsening dyspnea on exertion, admitted for AHRF likely iso CHF exacerbation, also with ANGELICA on CKD c/f cardiorenal syndrome. On arrival to ED, vitals notable BP 94/57, RR 22, 97% on 2L NC. Labs pertinent for WBC 10.68, INR 2.55, Na 132, K 5.5, bicarb 20, BUN/Cr 78/5.17 (baseline Cr appears ~1.5-1.8), trop 225, BNP 38024, VBG ph 7.3, pco2/po2 wnl. UA without evidence of infection. EKG  on admission 1st deg av block, low voltage, poor r wave progression, R axis deviation CXR with diffuse hazy opacities suggest mild pulmonary edema and small right and trace left pleural effusions. Pt was given IV Bumex 1mg x1, IV bumex 2mg x1, IV Bumex 4mg x1, and lokelma 5mg x1, then admitted for further management. On the floor, RRT called for hypotension SBP 80s. Pt mentating well, remainder of vs notable for O2 90's on 2L NC so increased to 4L NC w/ appropriate response. IV levophed started @ .05 and lasix 200 mg x1 given per writer discussion w/ cardiology. Plan to start lasix gtt @ 15 mg / hr thereafter. CCU was consulted and accepted the patient.    REVIEW OF SYSTEMS:   CONSTITUTIONAL: No weakness, fevers or chills  EYES/ENT: No visual changes;  No vertigo or throat pain   NECK: No pain or stiffness  RESPIRATORY: No cough, wheezing, hemoptysis; No shortness of breath  CARDIOVASCULAR: No chest pain or palpitations. No orthopnea, paroxysmal nocturnal dyspnea, or lower extremity edema  GASTROINTESTINAL: No abdominal or epigastric pain. No nausea, vomiting, or hematemesis; No diarrhea or constipation. No melena or hematochezia.  GENITOURINARY: No dysuria, frequency or hematuria  NEUROLOGICAL: No numbness, weakness, tingling. No headache, no visual changes  SKIN: No itching, rashes    MEDICATIONS  (STANDING):  aMIOdarone    Tablet 400 milliGRAM(s) Oral every 8 hours  atorvastatin 20 milliGRAM(s) Oral at bedtime  chlorhexidine 2% Cloths 1 Application(s) Topical daily  clopidogrel Tablet 75 milliGRAM(s) Oral daily  famotidine    Tablet 20 milliGRAM(s) Oral daily  furosemide   IVPB 200 milliGRAM(s) IV Intermittent once  furosemide Infusion 15 mG/Hr (7.5 mL/Hr) IV Continuous <Continuous>  influenza  Vaccine (HIGH DOSE) 0.5 milliLiter(s) IntraMuscular once  metolazone 2.5 milliGRAM(s) Oral daily  norepinephrine Infusion 0.05 MICROgram(s)/kG/Min (11.1 mL/Hr) IV Continuous <Continuous>    MEDICATIONS  (PRN):      Allergies    metoprolol (Short breath)    Intolerances        Vital Signs Last 24 Hrs  T(C): 36.5 (2025 13:00), Max: 36.6 (31 Mar 2025 17:50)  T(F): 97.7 (2025 13:00), Max: 97.8 (31 Mar 2025 17:50)  HR: 79 (2025 13:00) (60 - 81)  BP: 111/75 (2025 13:00) (85/52 - 111/75)  BP(mean): 88 (2025 13:00) (65 - 88)  RR: 30 (2025 13:00) (18 - 36)  SpO2: 95% (:00) (92% - 98%)    Parameters below as of 2025 13:00  Patient On (Oxygen Delivery Method): nasal cannula  O2 Flow (L/min): 6      I&O's Summary    31 Mar 2025 07:01  -  2025 07:00  --------------------------------------------------------  IN: 0 mL / OUT: 20 mL / NET: -20 mL        Physical Exam:  General: Well-appearing, NAD  HEENT: PERRL, EOMI, normal sclera and conjunctiva, normal oropharynx  Neck: Supple, no JVD, thyroid without masses or enlargement  Chest/Lungs: CTA bilaterally, no wheezing, rales, rhonchi or rub  Heart: RRR, normal S1, S2, no murmurs or gallops  Abdomen: Soft, ND, NTTP, normoactive bowel sounds  Extremities: 2+ peripheral pulses b/l, no edema, clubbing or cyanosis  Skin: Warm, well-perfused, no rashes or lesions  Neurological: A&Ox3, moves all extremities, no focal deficits    LABS:                         13.5   10.90 )-----------( 168      ( 2025 12:52 )             41.6     04-01    132[L]  |  94[L]  |  85[H]  ----------------------------<  96  5.3   |  20[L]  |  5.22[H]    Ca    9.0      2025 06:18  Phos  5.7     04-01  Mg     2.0     04-01    TPro  7.6  /  Alb  3.5  /  TBili  1.0  /  DBili  x   /  AST  10  /  ALT  11  /  AlkPhos  107  03-31    LIVER FUNCTIONS - ( 31 Mar 2025 18:01 )  Alb: 3.5 g/dL / Pro: 7.6 g/dL / ALK PHOS: 107 U/L / ALT: 11 U/L / AST: 10 U/L / GGT: x           PT/INR - ( 31 Mar 2025 18:01 )   PT: 29.1 sec;   INR: 2.55 ratio         PTT - ( 31 Mar 2025 18:01 )  PTT:40.7 sec      Urinalysis Basic - ( 2025 06:18 )    Color: x / Appearance: x / SG: x / pH: x  Gluc: 96 mg/dL / Ketone: x  / Bili: x / Urobili: x   Blood: x / Protein: x / Nitrite: x   Leuk Esterase: x / RBC: x / WBC x   Sq Epi: x / Non Sq Epi: x / Bacteria: x        ECst deg av block, low voltage, poor r wave progression, R axis deviation, no obvious ischemic changes.     Telemetry (24 Hrs):    Echocardiogram:    IMAGING:    ASSESSMENT & PLAN:  65 yo M with a fib (s/p dccv 3/25/25), CHF (likely moderately reduced EF, several TTE with poor windows), aortic stenosis s/p TAVR (2022), a flutter s/p ablation  and s/p micra implant,  CKD3, obesity, venous stasis, HTN, HLD, who presents with 6 days of worsening dyspnea on exertion, admitted for AHRF likely iso CHF exacerbation, also with ANGELICA on CKD c/f cardiorenal syndrome. RRT on floors for SBP 80s requiring pressors and has been admitted to the CCU.    Plan:  ===NEURO===  - AO x 3  - No concerns    ===RESPIRATORY===  #Acute hypoxemic respiratory failure  - Most likely i/s/o acute on chronic HF exacerbation  - Bumex drip 15mg/hr   - 4L NC satting wnl    ===CARDIOVASCULAR===  #Hypotension  - Levophed .05  - Trend lactate    #Acute on chronic HF exacerbation  - Bumex drip 15mg/hr   - F/u TTE    #Afib  #First degree block  - Continue amio 400 mg q8h until , start 200 mg qd thereafter  - Eliquis 5 mg bid.    #Troponemia  - No ischemic changes on EKG  - Most likely i/s/o ANGELICA on CKD    ===/RENAL===  #ANGELICA on CKD  #Cardiorenal syndrome  - Baseline 1.5-1.8  - Most likely i/s/o hypotension and fluid overload  - Renal sono ordered  - Monitor Cr on diuretics  - Nephro following    ===GI/NUTRITION===  - Regular diet  - No concerns    ===SKIN===  - No concerns    ===INFECTIOUS DISEASE===  - Slightly elevated WBC on admission 10.5K  - Afebrile  - F/u urine culture    ===ENDOCRINE===  - No concerns  - A1c and TSH in the morning    ===HEMATOLOGIC/DVT PPx=== ***Plan not finalized until attending attestation***    Gabriele Barnes MD (PGY-1)  Internal Medicine  Contact via [employer] TEAMS    ******************************************    CCU Accept Note    Transfer from: (X) Medicine    (  ) Telemetry    (  ) RCU                                        (  ) Palliative     (  ) Stroke Unit    (  ) _______________    Accepting Physican: Dr Beltran    [facility] COURSE:   Luis Waller is a 63 yo M with a fib (s/p dccv 3/25/25), CHF (likely moderately reduced EF, several TTE with poor windows), aortic stenosis s/p TAVR (2022), a flutter s/p ablation  and s/p micra implant,  CKD3, obesity, venous stasis, HTN, HLD, who presents with 6 days of worsening dyspnea on exertion, admitted for AHRF likely iso CHF exacerbation, also with ANGELICA on CKD c/f cardiorenal syndrome. On arrival to ED, vitals notable BP 94/57, RR 22, 97% on 2L NC. Labs pertinent for WBC 10.68, INR 2.55, Na 132, K 5.5, bicarb 20, BUN/Cr 78/5.17 (baseline Cr appears ~1.5-1.8), trop 225, BNP 71465, VBG ph 7.3, pco2/po2 wnl. UA without evidence of infection. EKG  on admission 1st deg av block, low voltage, poor r wave progression, R axis deviation CXR with diffuse hazy opacities suggest mild pulmonary edema and small right and trace left pleural effusions. Pt was given IV Bumex 1mg x1, IV bumex 2mg x1, IV Bumex 4mg x1, and lokelma 5mg x1, then admitted for further management. On the floor, RRT called for hypotension SBP 80s. Pt mentating well, remainder of vs notable for O2 90's on 2L NC so increased to 4L NC w/ appropriate response. IV levophed started @ .05 and lasix 200 mg x1 given per writer discussion w/ cardiology. Plan to start lasix gtt @ 15 mg / hr thereafter. CCU was consulted and accepted the patient.    REVIEW OF SYSTEMS:   CONSTITUTIONAL: No weakness, fevers or chills  EYES/ENT: No visual changes;  No vertigo or throat pain   NECK: No pain or stiffness  RESPIRATORY: No cough, wheezing, hemoptysis; No shortness of breath  CARDIOVASCULAR: No chest pain or palpitations. No orthopnea, paroxysmal nocturnal dyspnea, or lower extremity edema  GASTROINTESTINAL: No abdominal or epigastric pain. No nausea, vomiting, or hematemesis; No diarrhea or constipation. No melena or hematochezia.  GENITOURINARY: No dysuria, frequency or hematuria  NEUROLOGICAL: No numbness, weakness, tingling. No headache, no visual changes  SKIN: No itching, rashes    MEDICATIONS  (STANDING):  aMIOdarone    Tablet 400 milliGRAM(s) Oral every 8 hours  atorvastatin 20 milliGRAM(s) Oral at bedtime  chlorhexidine 2% Cloths 1 Application(s) Topical daily  clopidogrel Tablet 75 milliGRAM(s) Oral daily  famotidine    Tablet 20 milliGRAM(s) Oral daily  furosemide   IVPB 200 milliGRAM(s) IV Intermittent once  furosemide Infusion 15 mG/Hr (7.5 mL/Hr) IV Continuous <Continuous>  influenza  Vaccine (HIGH DOSE) 0.5 milliLiter(s) IntraMuscular once  metolazone 2.5 milliGRAM(s) Oral daily  norepinephrine Infusion 0.05 MICROgram(s)/kG/Min (11.1 mL/Hr) IV Continuous <Continuous>    MEDICATIONS  (PRN):      Allergies    metoprolol (Short breath)    Intolerances        Vital Signs Last 24 Hrs  T(C): 36.5 (2025 13:00), Max: 36.6 (31 Mar 2025 17:50)  T(F): 97.7 (2025 13:00), Max: 97.8 (31 Mar 2025 17:50)  HR: 79 (2025 13:00) (60 - 81)  BP: 111/75 (2025 13:00) (85/52 - 111/75)  BP(mean): 88 (2025 13:00) (65 - 88)  RR: 30 (2025 13:00) (18 - 36)  SpO2: 95% (:00) (92% - 98%)    Parameters below as of 2025 13:00  Patient On (Oxygen Delivery Method): nasal cannula  O2 Flow (L/min): 6      I&O's Summary    31 Mar 2025 07:01  -  2025 07:00  --------------------------------------------------------  IN: 0 mL / OUT: 20 mL / NET: -20 mL        Physical Exam:  General: Well-appearing, NAD  HEENT: PERRL, EOMI, normal sclera and conjunctiva, normal oropharynx  Neck: Supple, no JVD, thyroid without masses or enlargement  Chest/Lungs: CTA bilaterally, no wheezing, rales, rhonchi or rub  Heart: RRR, normal S1, S2, no murmurs or gallops  Abdomen: Soft, ND, NTTP, normoactive bowel sounds  Extremities: 2+ peripheral pulses b/l, no edema, clubbing or cyanosis  Skin: Warm, well-perfused, no rashes or lesions  Neurological: A&Ox3, moves all extremities, no focal deficits    LABS:                         13.5   10.90 )-----------( 168      ( 2025 12:52 )             41.6     04-01    132[L]  |  94[L]  |  85[H]  ----------------------------<  96  5.3   |  20[L]  |  5.22[H]    Ca    9.0      2025 06:18  Phos  5.7     04-01  Mg     2.0     04-01    TPro  7.6  /  Alb  3.5  /  TBili  1.0  /  DBili  x   /  AST  10  /  ALT  11  /  AlkPhos  107  03-31    LIVER FUNCTIONS - ( 31 Mar 2025 18:01 )  Alb: 3.5 g/dL / Pro: 7.6 g/dL / ALK PHOS: 107 U/L / ALT: 11 U/L / AST: 10 U/L / GGT: x           PT/INR - ( 31 Mar 2025 18:01 )   PT: 29.1 sec;   INR: 2.55 ratio         PTT - ( 31 Mar 2025 18:01 )  PTT:40.7 sec      Urinalysis Basic - ( 2025 06:18 )    Color: x / Appearance: x / SG: x / pH: x  Gluc: 96 mg/dL / Ketone: x  / Bili: x / Urobili: x   Blood: x / Protein: x / Nitrite: x   Leuk Esterase: x / RBC: x / WBC x   Sq Epi: x / Non Sq Epi: x / Bacteria: x        ECst deg av block, low voltage, poor r wave progression, R axis deviation, no obvious ischemic changes.     Telemetry (24 Hrs):    Echocardiogram:    IMAGING:    ASSESSMENT & PLAN:  63 yo M with a fib (s/p dccv 3/25/25), CHF (likely moderately reduced EF, several TTE with poor windows), aortic stenosis s/p TAVR (2022), a flutter s/p ablation  and s/p micra implant,  CKD3, obesity, venous stasis, HTN, HLD, who presents with 6 days of worsening dyspnea on exertion, admitted for AHRF likely iso CHF exacerbation, also with ANGELICA on CKD c/f cardiorenal syndrome. RRT on floors for SBP 80s requiring pressors and has been admitted to the CCU.    Plan:  ===NEURO===  - AO x 3  - No concerns    ===RESPIRATORY===  #Acute hypoxemic respiratory failure  - Most likely i/s/o acute on chronic HF exacerbation  - Lasix drip 15mg/hr   - 4L NC satting wnl    ===CARDIOVASCULAR===  #Hypotension  - Levophed .05  - Trend lactate    #Acute on chronic HF exacerbation  - Lasix drip 15mg/hr   - F/u TTE    #Afib  #First degree block  - Continue amio 400 mg q8h until , start 200 mg qd thereafter  - Eliquis 5 mg bid.    #Troponemia  - No ischemic changes on EKG  - Most likely i/s/o ANGELICA on CKD    ===/RENAL===  #ANGELICA on CKD  #Cardiorenal syndrome  - Baseline 1.5-1.8  - Most likely i/s/o hypotension and fluid overload  - Renal sono ordered  - Monitor Cr on diuretics  - Nephro following    ===GI/NUTRITION===  - Regular diet  - No concerns    ===SKIN===  - No concerns    ===INFECTIOUS DISEASE===  - Slightly elevated WBC on admission 10.5K  - Afebrile  - F/u urine culture    ===ENDOCRINE===  - No concerns  - A1c and TSH in the morning    ===HEMATOLOGIC/DVT PPx=== ***Plan not finalized until attending attestation***    Gabreile Barnes MD (PGY-1)  Internal Medicine  Contact via [employer] TEAMS    ******************************************    CCU Accept Note    Transfer from: (X) Medicine    (  ) Telemetry    (  ) RCU                                        (  ) Palliative     (  ) Stroke Unit    (  ) _______________    Accepting Physican: Dr Beltran    [facility] COURSE:   Luis Waller is a 65 yo M with a fib (s/p dccv 3/25/25), CHF (likely moderately reduced EF, several TTE with poor windows), aortic stenosis s/p TAVR (2022), a flutter s/p ablation  and s/p micra implant,  CKD3, obesity, venous stasis, HTN, HLD, who presents with 6 days of worsening dyspnea on exertion, admitted for AHRF likely iso CHF exacerbation, also with ANGELICA on CKD c/f cardiorenal syndrome. On arrival to ED, vitals notable BP 94/57, RR 22, 97% on 2L NC. Labs pertinent for WBC 10.68, INR 2.55, Na 132, K 5.5, bicarb 20, BUN/Cr 78/5.17 (baseline Cr appears ~1.5-1.8), trop 225, BNP 77036, VBG ph 7.3, pco2/po2 wnl. UA without evidence of infection. EKG  on admission 1st deg av block, low voltage, poor r wave progression, R axis deviation CXR with diffuse hazy opacities suggest mild pulmonary edema and small right and trace left pleural effusions. Pt was given IV Bumex 1mg x1, IV bumex 2mg x1, IV Bumex 4mg x1, and lokelma 5mg x1, then admitted for further management. On the floor, RRT called for hypotension SBP 80s. Pt mentating well, remainder of vs notable for O2 90's on 2L NC so increased to 4L NC w/ appropriate response. IV levophed started @ .05 and lasix 200 mg x1 given per writer discussion w/ cardiology. Plan to start lasix gtt @ 15 mg / hr thereafter. CCU was consulted and accepted the patient.    REVIEW OF SYSTEMS:   CONSTITUTIONAL: No weakness, fevers or chills  EYES/ENT: No visual changes;  No vertigo or throat pain   NECK: No pain or stiffness  RESPIRATORY: No cough, wheezing, hemoptysis; No shortness of breath  CARDIOVASCULAR: No chest pain or palpitations. No orthopnea, paroxysmal nocturnal dyspnea, or lower extremity edema  GASTROINTESTINAL: No abdominal or epigastric pain. No nausea, vomiting, or hematemesis; No diarrhea or constipation. No melena or hematochezia.  GENITOURINARY: No dysuria, frequency or hematuria  NEUROLOGICAL: No numbness, weakness, tingling. No headache, no visual changes  SKIN: No itching, rashes    MEDICATIONS  (STANDING):  aMIOdarone    Tablet 400 milliGRAM(s) Oral every 8 hours  atorvastatin 20 milliGRAM(s) Oral at bedtime  chlorhexidine 2% Cloths 1 Application(s) Topical daily  clopidogrel Tablet 75 milliGRAM(s) Oral daily  famotidine    Tablet 20 milliGRAM(s) Oral daily  furosemide   IVPB 200 milliGRAM(s) IV Intermittent once  furosemide Infusion 15 mG/Hr (7.5 mL/Hr) IV Continuous <Continuous>  influenza  Vaccine (HIGH DOSE) 0.5 milliLiter(s) IntraMuscular once  metolazone 2.5 milliGRAM(s) Oral daily  norepinephrine Infusion 0.05 MICROgram(s)/kG/Min (11.1 mL/Hr) IV Continuous <Continuous>    MEDICATIONS  (PRN):      Allergies    metoprolol (Short breath)    Intolerances        Vital Signs Last 24 Hrs  T(C): 36.5 (2025 13:00), Max: 36.6 (31 Mar 2025 17:50)  T(F): 97.7 (2025 13:00), Max: 97.8 (31 Mar 2025 17:50)  HR: 79 (2025 13:00) (60 - 81)  BP: 111/75 (2025 13:00) (85/52 - 111/75)  BP(mean): 88 (2025 13:00) (65 - 88)  RR: 30 (2025 13:00) (18 - 36)  SpO2: 95% (:00) (92% - 98%)    Parameters below as of 2025 13:00  Patient On (Oxygen Delivery Method): nasal cannula  O2 Flow (L/min): 6      I&O's Summary    31 Mar 2025 07:01  -  2025 07:00  --------------------------------------------------------  IN: 0 mL / OUT: 20 mL / NET: -20 mL        Physical Exam:  General: Well-appearing, NAD  HEENT: PERRL, EOMI, normal sclera and conjunctiva, normal oropharynx  Neck: Supple, no JVD, thyroid without masses or enlargement  Chest/Lungs: CTA bilaterally, no wheezing, rales, rhonchi or rub  Heart: RRR, normal S1, S2, no murmurs or gallops  Abdomen: Soft, ND, NTTP, normoactive bowel sounds  Extremities: 2+ peripheral pulses b/l, no edema, clubbing or cyanosis  Skin: Warm, well-perfused, no rashes or lesions  Neurological: A&Ox3, moves all extremities, no focal deficits    LABS:                         13.5   10.90 )-----------( 168      ( 2025 12:52 )             41.6     04-01    132[L]  |  94[L]  |  85[H]  ----------------------------<  96  5.3   |  20[L]  |  5.22[H]    Ca    9.0      2025 06:18  Phos  5.7     04-01  Mg     2.0     04-01    TPro  7.6  /  Alb  3.5  /  TBili  1.0  /  DBili  x   /  AST  10  /  ALT  11  /  AlkPhos  107  03-31    LIVER FUNCTIONS - ( 31 Mar 2025 18:01 )  Alb: 3.5 g/dL / Pro: 7.6 g/dL / ALK PHOS: 107 U/L / ALT: 11 U/L / AST: 10 U/L / GGT: x           PT/INR - ( 31 Mar 2025 18:01 )   PT: 29.1 sec;   INR: 2.55 ratio         PTT - ( 31 Mar 2025 18:01 )  PTT:40.7 sec      Urinalysis Basic - ( 2025 06:18 )    Color: x / Appearance: x / SG: x / pH: x  Gluc: 96 mg/dL / Ketone: x  / Bili: x / Urobili: x   Blood: x / Protein: x / Nitrite: x   Leuk Esterase: x / RBC: x / WBC x   Sq Epi: x / Non Sq Epi: x / Bacteria: x        ECst deg av block, low voltage, poor r wave progression, R axis deviation, no obvious ischemic changes.     Telemetry (24 Hrs):    Echocardiogram:    IMAGING:    ASSESSMENT & PLAN:  65 yo M with a fib (s/p dccv 3/25/25), CHF (likely moderately reduced EF, several TTE with poor windows), aortic stenosis s/p TAVR (2022), a flutter s/p ablation  and s/p micra implant,  CKD3, obesity, venous stasis, HTN, HLD, who presents with 6 days of worsening dyspnea on exertion, admitted for AHRF likely iso CHF exacerbation, also with ANGELICA on CKD c/f cardiorenal syndrome. RRT on floors for SBP 80s requiring pressors and has been admitted to the CCU.    Plan:  ===NEURO===  - AO x 3  - No concerns    ===RESPIRATORY===  #Acute hypoxemic respiratory failure  - Most likely i/s/o acute on chronic HF exacerbation  - Lasix drip 15mg/hr   - 4L NC satting wnl    ===CARDIOVASCULAR===  #Hypotension  - Levophed .05  - Trend lactate    #Acute on chronic HF exacerbation  - Lasix drip 15mg/hr   - F/u TTE    #Afib  #First degree block  - Continue amio 400 mg q8h until , start 200 mg qd thereafter  - Eliquis 5 mg bid.    #Troponemia  - No ischemic changes on EKG  - Most likely i/s/o ANGELICA on CKD    ===/RENAL===  #ANGELICA on CKD  #Cardiorenal syndrome  - Baseline 1.5-1.8  - Most likely i/s/o hypotension and fluid overload  - Renal sono ordered  - Monitor Cr on diuretics  - Nephro following    ===GI/NUTRITION===  - Regular diet  - No concerns    ===SKIN===  - No concerns    ===INFECTIOUS DISEASE===  - Slightly elevated WBC on admission 10.5K  - Afebrile  - F/u urine culture    ===ENDOCRINE===  - No concerns  - A1c and TSH in the morning    ===HEMATOLOGIC/DVT PPx===  - Heparin SubQ ***Plan not finalized until attending attestation***    Gabriele Barnes MD (PGY-1)  Internal Medicine  Contact via [employer] TEAMS    ******************************************    CCU Accept Note    Transfer from: (X) Medicine    (  ) Telemetry    (  ) RCU                                        (  ) Palliative     (  ) Stroke Unit    (  ) _______________    Accepting Physican: Dr Beltran    [facility] COURSE:   Luis Waller is a 65 yo M with a fib (s/p dccv 3/25/25), CHF (likely moderately reduced EF, several TTE with poor windows), aortic stenosis s/p TAVR (2022), a flutter s/p ablation  and s/p micra implant,  CKD3, obesity, venous stasis, HTN, HLD, who presents with 6 days of worsening dyspnea on exertion, admitted for AHRF likely iso CHF exacerbation, also with ANGELICA on CKD c/f cardiorenal syndrome. On arrival to ED, vitals notable BP 94/57, RR 22, 97% on 2L NC. Labs pertinent for WBC 10.68, INR 2.55, Na 132, K 5.5, bicarb 20, BUN/Cr 78/5.17 (baseline Cr appears ~1.5-1.8), trop 225, BNP 41281, VBG ph 7.3, pco2/po2 wnl. UA without evidence of infection. EKG  on admission 1st deg av block, low voltage, poor r wave progression, R axis deviation CXR with diffuse hazy opacities suggest mild pulmonary edema and small right and trace left pleural effusions. Pt was given IV Bumex 1mg x1, IV bumex 2mg x1, IV Bumex 4mg x1, and lokelma 5mg x1, then admitted for further management. On the floor, RRT called for hypotension SBP 80s. Pt mentating well, remainder of vs notable for O2 90's on 2L NC so increased to 4L NC w/ appropriate response. IV levophed started @ .05 and lasix 200 mg x1 given per writer discussion w/ cardiology. Plan to start lasix gtt @ 15 mg / hr thereafter. CCU was consulted and accepted the patient.    REVIEW OF SYSTEMS:   CONSTITUTIONAL: No weakness, fevers or chills  EYES/ENT: No visual changes;  No vertigo or throat pain   NECK: No pain or stiffness  RESPIRATORY: No cough, wheezing, hemoptysis; No shortness of breath  CARDIOVASCULAR: No chest pain or palpitations. No orthopnea, paroxysmal nocturnal dyspnea, or lower extremity edema  GASTROINTESTINAL: No abdominal or epigastric pain. No nausea, vomiting, or hematemesis; No diarrhea or constipation. No melena or hematochezia.  GENITOURINARY: No dysuria, frequency or hematuria  NEUROLOGICAL: No numbness, weakness, tingling. No headache, no visual changes  SKIN: No itching, rashes    MEDICATIONS  (STANDING):  aMIOdarone    Tablet 400 milliGRAM(s) Oral every 8 hours  atorvastatin 20 milliGRAM(s) Oral at bedtime  chlorhexidine 2% Cloths 1 Application(s) Topical daily  clopidogrel Tablet 75 milliGRAM(s) Oral daily  famotidine    Tablet 20 milliGRAM(s) Oral daily  furosemide   IVPB 200 milliGRAM(s) IV Intermittent once  furosemide Infusion 15 mG/Hr (7.5 mL/Hr) IV Continuous <Continuous>  influenza  Vaccine (HIGH DOSE) 0.5 milliLiter(s) IntraMuscular once  metolazone 2.5 milliGRAM(s) Oral daily  norepinephrine Infusion 0.05 MICROgram(s)/kG/Min (11.1 mL/Hr) IV Continuous <Continuous>    MEDICATIONS  (PRN):      Allergies    metoprolol (Short breath)    Intolerances        Vital Signs Last 24 Hrs  T(C): 36.5 (2025 13:00), Max: 36.6 (31 Mar 2025 17:50)  T(F): 97.7 (2025 13:00), Max: 97.8 (31 Mar 2025 17:50)  HR: 79 (2025 13:00) (60 - 81)  BP: 111/75 (2025 13:00) (85/52 - 111/75)  BP(mean): 88 (2025 13:00) (65 - 88)  RR: 30 (2025 13:00) (18 - 36)  SpO2: 95% (:00) (92% - 98%)    Parameters below as of 2025 13:00  Patient On (Oxygen Delivery Method): nasal cannula  O2 Flow (L/min): 6      I&O's Summary    31 Mar 2025 07:01  -  2025 07:00  --------------------------------------------------------  IN: 0 mL / OUT: 20 mL / NET: -20 mL        Physical Exam:  General: Well-appearing, NAD  HEENT: PERRL, EOMI, normal sclera and conjunctiva, normal oropharynx  Neck: Supple, no JVD, thyroid without masses or enlargement  Chest/Lungs: CTA bilaterally, no wheezing, rales, rhonchi or rub  Heart: RRR, normal S1, S2, no murmurs or gallops  Abdomen: Soft, ND, NTTP, normoactive bowel sounds  Extremities: 2+ peripheral pulses b/l, no edema, clubbing or cyanosis  Skin: Warm, well-perfused, no rashes or lesions  Neurological: A&Ox3, moves all extremities, no focal deficits    LABS:                         13.5   10.90 )-----------( 168      ( 2025 12:52 )             41.6     04-01    132[L]  |  94[L]  |  85[H]  ----------------------------<  96  5.3   |  20[L]  |  5.22[H]    Ca    9.0      2025 06:18  Phos  5.7     04-01  Mg     2.0     04-01    TPro  7.6  /  Alb  3.5  /  TBili  1.0  /  DBili  x   /  AST  10  /  ALT  11  /  AlkPhos  107  03-31    LIVER FUNCTIONS - ( 31 Mar 2025 18:01 )  Alb: 3.5 g/dL / Pro: 7.6 g/dL / ALK PHOS: 107 U/L / ALT: 11 U/L / AST: 10 U/L / GGT: x           PT/INR - ( 31 Mar 2025 18:01 )   PT: 29.1 sec;   INR: 2.55 ratio         PTT - ( 31 Mar 2025 18:01 )  PTT:40.7 sec      Urinalysis Basic - ( 2025 06:18 )    Color: x / Appearance: x / SG: x / pH: x  Gluc: 96 mg/dL / Ketone: x  / Bili: x / Urobili: x   Blood: x / Protein: x / Nitrite: x   Leuk Esterase: x / RBC: x / WBC x   Sq Epi: x / Non Sq Epi: x / Bacteria: x        ECst deg av block, low voltage, poor r wave progression, R axis deviation, no obvious ischemic changes.     Telemetry (24 Hrs):    Echocardiogram:    IMAGING:    ASSESSMENT & PLAN:  65 yo M with a fib (s/p dccv 3/25/25), CHF (likely moderately reduced EF, several TTE with poor windows), aortic stenosis s/p TAVR (2022), a flutter s/p ablation  and s/p micra implant,  CKD3, obesity, venous stasis, HTN, HLD, who presents with 6 days of worsening dyspnea on exertion, admitted for AHRF likely iso CHF exacerbation, also with ANGELICA on CKD c/f cardiorenal syndrome. RRT on floors for SBP 80s requiring pressors and has been admitted to the CCU.    Plan:  ===NEURO===  - AO x 3  - No concerns    ===RESPIRATORY===  #Acute hypoxemic respiratory failure  - Most likely i/s/o acute on chronic HF exacerbation  - Lasix drip 15mg/hr   - 6L NC satting wnl    ===CARDIOVASCULAR===  #Hypotension  - Levophed .05  - Trend lactate    #Acute on chronic HF exacerbation  - Lasix drip 15mg/hr   - F/u TTE    #Afib  #First degree block  - Continue amio 400 mg q8h until , start 200 mg qd thereafter  - Eliquis 5 mg bid.    #Troponemia  - No ischemic changes on EKG  - Most likely i/s/o ANGELICA on CKD    ===/RENAL===  #ANGELICA on CKD  #Cardiorenal syndrome  - Baseline 1.5-1.8  - Most likely i/s/o hypotension and fluid overload  - Renal sono ordered  - Monitor Cr on diuretics  - Nephro following    ===GI/NUTRITION===  - Regular diet  - No concerns    ===SKIN===  - No concerns    ===INFECTIOUS DISEASE===  - Slightly elevated WBC on admission 10.5K  - Afebrile  - F/u urine culture    ===ENDOCRINE===  - No concerns  - A1c and TSH in the morning    ===HEMATOLOGIC/DVT PPx===  - Heparin SubQ ***Plan not finalized until attending attestation***    Gabriele Barnes MD (PGY-1)  Internal Medicine  Contact via [employer] TEAMS    ******************************************    CCU Accept Note    Transfer from: (X) Medicine    (  ) Telemetry    (  ) RCU                                        (  ) Palliative     (  ) Stroke Unit    (  ) _______________    Accepting Physican: Dr Beltran    [facility] [place] COURSE:   Luis Waller is a 63 yo M with a fib (s/p dccv 3/25/25), CHF (likely moderately reduced EF, several TTE with poor windows), aortic stenosis s/p TAVR (2022), a flutter s/p ablation  and s/p micra implant,  CKD3, obesity, venous stasis, HTN, HLD, who presents with 6 days of worsening dyspnea on exertion, admitted for AHRF likely iso CHF exacerbation, also with ANGELICA on CKD c/f cardiorenal syndrome. On arrival to ED, vitals notable BP 94/57, RR 22, 97% on 2L NC. Labs pertinent for WBC 10.68, INR 2.55, Na 132, K 5.5, bicarb 20, BUN/Cr 78/5.17 (baseline Cr appears ~1.5-1.8), trop 225, BNP 59964, VBG ph 7.3, pco2/po2 wnl. UA without evidence of infection. EKG  on admission 1st deg av block, low voltage, poor r wave progression, R axis deviation CXR with diffuse hazy opacities suggest mild pulmonary edema and small right and trace left pleural effusions. Pt was given IV Bumex 1mg x1, IV bumex 2mg x1, IV Bumex 4mg x1, and lokelma 5mg x1, then admitted for further management. On the floor, RRT called for hypotension SBP 80s. Pt mentating well, remainder of vs notable for O2 90's on 2L NC so increased to 4L NC w/ appropriate response. IV levophed started @ .05 and lasix 200 mg x1 given per writer discussion w/ cardiology. Plan to start lasix gtt @ 15 mg / hr thereafter. CCU was consulted and accepted the patient.    REVIEW OF SYSTEMS:   CONSTITUTIONAL: No weakness, fevers or chills  EYES/ENT: No visual changes;  No vertigo or throat pain   NECK: No pain or stiffness  RESPIRATORY: No cough, wheezing, hemoptysis; No shortness of breath  CARDIOVASCULAR: No chest pain or palpitations. No orthopnea, paroxysmal nocturnal dyspnea, or lower extremity edema  GASTROINTESTINAL: No abdominal or epigastric pain. No nausea, vomiting, or hematemesis; No diarrhea or constipation. No melena or hematochezia.  GENITOURINARY: No dysuria, frequency or hematuria  NEUROLOGICAL: No numbness, weakness, tingling. No headache, no visual changes  SKIN: No itching, rashes    MEDICATIONS  (STANDING):  aMIOdarone    Tablet 400 milliGRAM(s) Oral every 8 hours  atorvastatin 20 milliGRAM(s) Oral at bedtime  chlorhexidine 2% Cloths 1 Application(s) Topical daily  clopidogrel Tablet 75 milliGRAM(s) Oral daily  famotidine    Tablet 20 milliGRAM(s) Oral daily  furosemide   IVPB 200 milliGRAM(s) IV Intermittent once  furosemide Infusion 15 mG/Hr (7.5 mL/Hr) IV Continuous <Continuous>  influenza  Vaccine (HIGH DOSE) 0.5 milliLiter(s) IntraMuscular once  metolazone 2.5 milliGRAM(s) Oral daily  norepinephrine Infusion 0.05 MICROgram(s)/kG/Min (11.1 mL/Hr) IV Continuous <Continuous>    MEDICATIONS  (PRN):      Allergies    metoprolol (Short breath)    Intolerances        Vital Signs Last 24 Hrs  T(C): 36.5 (2025 13:00), Max: 36.6 (31 Mar 2025 17:50)  T(F): 97.7 (2025 13:00), Max: 97.8 (31 Mar 2025 17:50)  HR: 79 (2025 13:00) (60 - 81)  BP: 111/75 (2025 13:00) (85/52 - 111/75)  BP(mean): 88 (2025 13:00) (65 - 88)  RR: 30 (2025 13:00) (18 - 36)  SpO2: 95% (:00) (92% - 98%)    Parameters below as of 2025 13:00  Patient On (Oxygen Delivery Method): nasal cannula  O2 Flow (L/min): 6      I&O's Summary    31 Mar 2025 07:01  -  2025 07:00  --------------------------------------------------------  IN: 0 mL / OUT: 20 mL / NET: -20 mL        Physical Exam:  General: Well-appearing, NAD  HEENT: PERRL, EOMI, normal sclera and conjunctiva, normal oropharynx  Neck: Supple, no JVD, thyroid without masses or enlargement  Chest/Lungs: +nasal cannula. CTA bilaterally, no wheezing, rales, rhonchi or rub  Heart: RRR, normal S1, S2, no murmurs or gallops  Abdomen: Soft, ND, NTTP, normoactive bowel sounds  Extremities: +lichen planus bilaterally. +pitting edema. 2+ peripheral pulses b/l, no edema, clubbing or cyanosis  Skin: +lichen planus bilaterally  Neurological: A&Ox3, moves all extremities, no focal deficits    LABS:                         13.5   10.90 )-----------( 168      ( 2025 12:52 )             41.6     04-    132[L]  |  94[L]  |  85[H]  ----------------------------<  96  5.3   |  20[L]  |  5.22[H]    Ca    9.0      2025 06:18  Phos  5.7     04-  Mg     2.0     04-    TPro  7.6  /  Alb  3.5  /  TBili  1.0  /  DBili  x   /  AST  10  /  ALT  11  /  AlkPhos  107  03    LIVER FUNCTIONS - ( 31 Mar 2025 18:01 )  Alb: 3.5 g/dL / Pro: 7.6 g/dL / ALK PHOS: 107 U/L / ALT: 11 U/L / AST: 10 U/L / GGT: x           PT/INR - ( 31 Mar 2025 18:01 )   PT: 29.1 sec;   INR: 2.55 ratio         PTT - ( 31 Mar 2025 18:01 )  PTT:40.7 sec      Urinalysis Basic - ( 2025 06:18 )    Color: x / Appearance: x / SG: x / pH: x  Gluc: 96 mg/dL / Ketone: x  / Bili: x / Urobili: x   Blood: x / Protein: x / Nitrite: x   Leuk Esterase: x / RBC: x / WBC x   Sq Epi: x / Non Sq Epi: x / Bacteria: x        ECst deg av block, low voltage, poor r wave progression, R axis deviation, no obvious ischemic changes.     Telemetry (24 Hrs):    Echocardiogram:    IMAGING:    ASSESSMENT & PLAN:  63 yo M with a fib (s/p dccv 3/25/25), CHF (likely moderately reduced EF, several TTE with poor windows), aortic stenosis s/p TAVR (2022), a flutter s/p ablation  and s/p micra implant,  CKD3, obesity, venous stasis, HTN, HLD, who presents with 6 days of worsening dyspnea on exertion, admitted for AHRF likely iso CHF exacerbation, also with ANGELICA on CKD c/f cardiorenal syndrome. RRT on floors for SBP 80s requiring pressors and has been admitted to the CCU.    Plan:  ===NEURO===  - AO x 3  - No concerns    ===RESPIRATORY===  #Acute hypoxemic respiratory failure  - Most likely i/s/o acute on chronic HF exacerbation  - Lasix drip 15mg/hr   - 6L NC satting wnl    ===CARDIOVASCULAR===  #Hypotension  - Most likely from cardiogenic shock  - Levophed .05  - Wean as tolerated    #Acute on chronic HF exacerbation  - Lasix drip 15mg/hr   - F/u TTE    #Afib  #First degree block  - Continue amio 400 mg q8h until , start 200 mg qd thereafter  - Eliquis 5 mg bid.    #Aortic valve stenosis s/p prosthetic  - TTE shows severe AV stenosis over prostetic  - Possibly inciting factor to HF exac.  - Structural cards consult AM    #Troponemia  - No ischemic changes on EKG  - Most likely i/s/o ANGELICA on CKD    ===/RENAL===  #ANGELICA on CKD  #Cardiorenal syndrome  - Baseline 1.5-1.8  - Most likely i/s/o hypotension and fluid overload  - Renal sono ordered  - Monitor Cr on diuretics  - Nephro following    ===GI/NUTRITION===  - Regular diet  - No concerns    ===SKIN===  - No concerns    ===INFECTIOUS DISEASE===  - Slightly elevated WBC on admission 10.5K  - Afebrile  - F/u urine culture    ===ENDOCRINE===  - No concerns  - A1c and TSH in the morning    ===HEMATOLOGIC/DVT PPx===  - Heparin SubQ

## 2025-04-01 NOTE — PROGRESS NOTE ADULT - SUBJECTIVE AND OBJECTIVE BOX
Patient is a 64y old  Male who presents with a chief complaint of dyspnea , chest pain (01 Apr 2025 01:59)      SUBJECTIVE / OVERNIGHT EVENTS:    MEDICATIONS  (STANDING):  aMIOdarone    Tablet 400 milliGRAM(s) Oral every 8 hours  atorvastatin 20 milliGRAM(s) Oral at bedtime  buMETAnide IVPB 4 milliGRAM(s) IV Intermittent daily  clopidogrel Tablet 75 milliGRAM(s) Oral daily  famotidine    Tablet 20 milliGRAM(s) Oral daily  hydrALAZINE 25 milliGRAM(s) Oral three times a day  influenza  Vaccine (HIGH DOSE) 0.5 milliLiter(s) IntraMuscular once  metolazone 2.5 milliGRAM(s) Oral daily    MEDICATIONS  (PRN):      Vital Signs Last 24 Hrs  T(C): 36.4 (01 Apr 2025 05:06), Max: 36.6 (31 Mar 2025 17:50)  T(F): 97.5 (01 Apr 2025 05:06), Max: 97.8 (31 Mar 2025 17:50)  HR: 67 (01 Apr 2025 05:06) (60 - 81)  BP: 101/71 (01 Apr 2025 05:06) (85/58 - 101/71)  BP(mean): 79 (31 Mar 2025 20:15) (65 - 79)  RR: 18 (01 Apr 2025 05:06) (18 - 36)  SpO2: 92% (01 Apr 2025 05:06) (92% - 98%)    Parameters below as of 01 Apr 2025 05:06  Patient On (Oxygen Delivery Method): nasal cannula  O2 Flow (L/min): 2    CAPILLARY BLOOD GLUCOSE        I&O's Summary    31 Mar 2025 07:01  -  01 Apr 2025 07:00  --------------------------------------------------------  IN: 0 mL / OUT: 20 mL / NET: -20 mL        PHYSICAL EXAM:  GENERAL: NAD, well-developed  HEAD:  Atraumatic, Normocephalic  EYES: EOMI, PERRLA, conjunctiva and sclera clear  NECK: Supple, No JVD  CHEST/LUNG: Clear to auscultation bilaterally; No wheeze  HEART: Regular rate and rhythm; No murmurs, rubs, or gallops  ABDOMEN: Soft, Nontender, Nondistended; Bowel sounds present  EXTREMITIES:  2+ Peripheral Pulses, No clubbing, cyanosis, or edema  PSYCH: AAOx3  NEUROLOGY: non-focal  SKIN: No rashes or lesions    LABS:                        13.2   9.92  )-----------( 160      ( 01 Apr 2025 06:18 )             41.2     04-01    132[L]  |  94[L]  |  85[H]  ----------------------------<  96  5.3   |  20[L]  |  5.22[H]    Ca    9.0      01 Apr 2025 06:18  Phos  5.7     04-01  Mg     2.0     04-01    TPro  7.6  /  Alb  3.5  /  TBili  1.0  /  DBili  x   /  AST  10  /  ALT  11  /  AlkPhos  107  03-31    PT/INR - ( 31 Mar 2025 18:01 )   PT: 29.1 sec;   INR: 2.55 ratio         PTT - ( 31 Mar 2025 18:01 )  PTT:40.7 sec  CARDIAC MARKERS ( 31 Mar 2025 18:01 )  x     / x     / x     / x     / 4.2 ng/mL      Urinalysis Basic - ( 01 Apr 2025 06:18 )    Color: x / Appearance: x / SG: x / pH: x  Gluc: 96 mg/dL / Ketone: x  / Bili: x / Urobili: x   Blood: x / Protein: x / Nitrite: x   Leuk Esterase: x / RBC: x / WBC x   Sq Epi: x / Non Sq Epi: x / Bacteria: x        RADIOLOGY & ADDITIONAL TESTS:    Imaging Personally Reviewed:    Consultant(s) Notes Reviewed:      Care Discussed with Consultants/Other Providers:   Patient is a 64y old  Male who presents with a chief complaint of dyspnea , chest pain (01 Apr 2025 01:59)      SUBJECTIVE / OVERNIGHT EVENTS: Admitted overnight for ADHF. Pt seen and examined at bedside. Pt w/ mild tachypnea. Endorses ongoing dyspnea (stable since admission) and oliguira. Denies fever, nausea, vomiting, CP, changes in BM.    Brief Daily Plan   - C/w IV diuresis, cardio recs  - TTE performed, f/u results  - Strict I/O's; urine cx   - Cardiorenal cs       MEDICATIONS  (STANDING):  aMIOdarone    Tablet 400 milliGRAM(s) Oral every 8 hours  atorvastatin 20 milliGRAM(s) Oral at bedtime  buMETAnide IVPB 4 milliGRAM(s) IV Intermittent daily  clopidogrel Tablet 75 milliGRAM(s) Oral daily  famotidine    Tablet 20 milliGRAM(s) Oral daily  hydrALAZINE 25 milliGRAM(s) Oral three times a day  influenza  Vaccine (HIGH DOSE) 0.5 milliLiter(s) IntraMuscular once  metolazone 2.5 milliGRAM(s) Oral daily    MEDICATIONS  (PRN):      Vital Signs Last 24 Hrs  T(C): 36.4 (01 Apr 2025 05:06), Max: 36.6 (31 Mar 2025 17:50)  T(F): 97.5 (01 Apr 2025 05:06), Max: 97.8 (31 Mar 2025 17:50)  HR: 67 (01 Apr 2025 05:06) (60 - 81)  BP: 101/71 (01 Apr 2025 05:06) (85/58 - 101/71)  BP(mean): 79 (31 Mar 2025 20:15) (65 - 79)  RR: 18 (01 Apr 2025 05:06) (18 - 36)  SpO2: 92% (01 Apr 2025 05:06) (92% - 98%)    Parameters below as of 01 Apr 2025 05:06  Patient On (Oxygen Delivery Method): nasal cannula  O2 Flow (L/min): 2    CAPILLARY BLOOD GLUCOSE        I&O's Summary    31 Mar 2025 07:01  -  01 Apr 2025 07:00  --------------------------------------------------------  IN: 0 mL / OUT: 20 mL / NET: -20 mL        PHYSICAL EXAM:  GENERAL: + mild tachypnea   HEAD:  Atraumatic, Normocephalic  EYES:conjunctiva and sclera clear  NECK: Supple, No JVD  CHEST/LUNG: Clear to auscultation bilaterally; No wheeze  HEART: Regular rate and rhythm; No murmurs, rubs, or gallops  ABDOMEN: Soft, Nontender, Nondistended; Bowel sounds present  EXTREMITIES:  2+ b/le LE edema   PSYCH: AAOx3  NEUROLOGY: non-focal  SKIN: + b/l LE lichen planus     LABS:                        13.2   9.92  )-----------( 160      ( 01 Apr 2025 06:18 )             41.2     04-01    132[L]  |  94[L]  |  85[H]  ----------------------------<  96  5.3   |  20[L]  |  5.22[H]    Ca    9.0      01 Apr 2025 06:18  Phos  5.7     04-01  Mg     2.0     04-01    TPro  7.6  /  Alb  3.5  /  TBili  1.0  /  DBili  x   /  AST  10  /  ALT  11  /  AlkPhos  107  03-31    PT/INR - ( 31 Mar 2025 18:01 )   PT: 29.1 sec;   INR: 2.55 ratio         PTT - ( 31 Mar 2025 18:01 )  PTT:40.7 sec  CARDIAC MARKERS ( 31 Mar 2025 18:01 )  x     / x     / x     / x     / 4.2 ng/mL      Urinalysis Basic - ( 01 Apr 2025 06:18 )    Color: x / Appearance: x / SG: x / pH: x  Gluc: 96 mg/dL / Ketone: x  / Bili: x / Urobili: x   Blood: x / Protein: x / Nitrite: x   Leuk Esterase: x / RBC: x / WBC x   Sq Epi: x / Non Sq Epi: x / Bacteria: x        RADIOLOGY & ADDITIONAL TESTS:    Imaging Personally Reviewed:    Consultant(s) Notes Reviewed:      Care Discussed with Consultants/Other Providers:

## 2025-04-01 NOTE — H&P ADULT - PROBLEM SELECTOR PLAN 1
Suspect respiratory failure driven by CHF exacerbation. Low suspicion for infection as no fever, minimally elevated leukocytosis. Low suspicion for PE as pt adherent to home AC P/w 6 days of exertional dyspnea and chest pain requiring 2L NC, on RA at baseline. On exam pt has bibasilar crackles and BLE edema. Labs pertinent for BNP 47111, CXR w/ pulmonary edema and small bilateral effusions. Pt reports no major med changes or dietary changes. Suspect respiratory failure driven by CHF exacerbation. ACS less likely but cannot be excluded given prior cardiac hx and elevated troponin though EKG NSR without obvious ischemic changes. Low suspicion for infection as no fever and only mild leukocytosis. Low suspicion for PE as pt adherent to home AC  -goal SpO2 >92%, wean supplemental oxygen as tolerated  -continue bumex *** qd   -cardiology consulted appreciate recs  -f/u TTE  -strict I&Os, daily weights P/w 6 days of exertional dyspnea and chest pain requiring 2L NC, on RA at baseline. On exam pt has bibasilar crackles and BLE edema. Labs pertinent for BNP 76201, CXR w/ pulmonary edema and small bilateral effusions. Pt reports no major med changes or dietary changes. Suspect respiratory failure driven by CHF exacerbation. ACS less likely but cannot be excluded given prior cardiac hx and elevated troponin though EKG NSR without obvious ischemic changes. Low suspicion for infection as no fever and only mild leukocytosis. Low suspicion for PE as pt adherent to home AC  -goal SpO2 >92%, wean supplemental oxygen as tolerated  -continue bumex 4mg qd   -start metolazone 2.5mg qd   -cardiology consulted appreciate recs  -f/u TTE  -strict I&Os, daily weights

## 2025-04-01 NOTE — PROCEDURE NOTE - NSASSISTBY_GEN_A_CORE
Myself
Myself
[facility] Pregnancy And Lactation Text: There is insufficient data to evaluate drug-associated risk for major birth defects, miscarriage, or other adverse maternal or fetal outcomes.  There is a pregnancy registry that monitors pregnancy outcomes in pregnant persons exposed to the medication during pregnancy.  It is unknown if this medication is excreted in breast milk.  Do not breastfeed during treatment and for about 4 weeks after the last dose.

## 2025-04-01 NOTE — H&P ADULT - NSICDXPASTMEDICALHX_GEN_ALL_CORE_FT
PAST MEDICAL HISTORY:  ANGELICA (acute kidney injury) 4/2021    AS (aortic stenosis)     Atrial fibrillation 2018    Atrial flutter s/p ablation 2018    CAD (coronary artery disease) s/p CABG    CHF (congestive heart failure) denies any recent exacerbation    Chronic kidney disease, unspecified CKD stage     H/O scoliosis     HTN (hypertension)     Hypercholesteremia     Ischemic cardiomyopathy     Lichen planus chronic ( b/L LE)    Lower back pain     MI (myocardial infarction) 2012    Morbid obesity     WILFRED (obstructive sleep apnea) can not tolerate bipap at night    Osteoarthritis shoulders, hips, knee    Peripheral edema chronic    Status post placement of cardiac pacemaker micraleadless PPM, [id number] last interrogation 7/6/2022    Stented coronary artery 2019    Thrombus of left atrial appendage 2018

## 2025-04-01 NOTE — ADVANCED PRACTICE NURSE CONSULT - RECOMMEDATIONS
Impression    Urinary Incontinence  Fecal Incontinence  Sacrum / Buttocks - Skin intact. No open wounds, ulcerations, or breakdown noted. Skin color normal.  B/L Heels No open wounds, ulcerations, or breakdown noted.    Recommendations    1. Sacrum / B/L Buttocks - B/L Heels No open wounds, ulcerations, or breakdown noted.      Topical therapy - Cleanse with incontinence cleanser, pat dry, and apply Chelsie 2 X Daily & PRN Soiling. Monitor for changes.                        2. Incontinent management - incontinent cleanser, pads, kleber care BID                           3. Maintain on an alternating air with low air loss surface                            4. Turn & reposition every 2 hr; Use positioning pillow to turn and reposition, soft pillow between bony prominences; continue measures to decrease friction/shear/pressure.                           5. Nutrition optimization.             6. Place waffle cushion when out of bed to chair.

## 2025-04-01 NOTE — ADVANCED PRACTICE NURSE CONSULT - REASON FOR CONSULT
Wound consult requested to assess skin status; sacrum, suspected deep tissue injury, present on admission  HPI:  63 yo M with a fib (s/p dccv 3/25/25), CHF (likely moderately reduced EF, several TTE with poor windows), aortic stenosis s/p TAVR (11/2022), CAD s/p CABG, a flutter s/p ablation 2019 and s/p micra implant,  CKD3, obesity, venous stasis, HTN, HLD, who presents with 6 days of worsening dyspnea on exertion. Pt also reports associated orthopnea and chest pain on exertion but not at rest which is located in the center of his chest. Pain is described as burning. Also reports several days decreased appetite. Denies fevers, chills, cough, abdominal pain, NVD, urinary sx, leg swelling. Patient had a cardioversion for atrial fibrillation at this hospital recently, which was reportedly unsuccessful. Patient has a scheduled appointment with Dr. Philip on 04/24/2025 to discuss possible ablation. His outpt cardiologist is Dr. Paul.     On arrival to ED, vitals were 97.3F, HR 71, BP 94/57, RR 22, 97% on 2L NC. Labs pertinent for WBC 10.68, INR 2.55, Na 132, K 5.5, bicarb 20, BUN/Cr 78/5.17 (baseline Cr appears ~1.5-1.8), trop 225, BNP 40823, VBG ph 7.3, pco2/po2 wnl. UA without evidence of infection. EKG  on admission 1st deg av block, low voltage, poor r wave progression, R axis deviation CXR with diffuse hazy opacities suggest mild pulmonary edema and small right and trace left pleural effusions. Pt was given IV Bumex 1mg x1, IV bumex 2mg x1, IV Bumex 4mg x1, and lokelma 5mg x1, then admitted for further management.

## 2025-04-02 ENCOUNTER — APPOINTMENT (OUTPATIENT)
Dept: FAMILY MEDICINE | Facility: CLINIC | Age: 65
End: 2025-04-02

## 2025-04-02 ENCOUNTER — RESULT REVIEW (OUTPATIENT)
Age: 65
End: 2025-04-02

## 2025-04-02 LAB
ALBUMIN SERPL ELPH-MCNC: 2.9 G/DL — LOW (ref 3.3–5)
ALBUMIN SERPL ELPH-MCNC: 2.9 G/DL — LOW (ref 3.3–5)
ALBUMIN SERPL ELPH-MCNC: 3 G/DL — LOW (ref 3.3–5)
ALBUMIN SERPL ELPH-MCNC: 3.1 G/DL — LOW (ref 3.3–5)
ALP SERPL-CCNC: 102 U/L — SIGNIFICANT CHANGE UP (ref 40–120)
ALP SERPL-CCNC: 92 U/L — SIGNIFICANT CHANGE UP (ref 40–120)
ALP SERPL-CCNC: 93 U/L — SIGNIFICANT CHANGE UP (ref 40–120)
ALP SERPL-CCNC: 95 U/L — SIGNIFICANT CHANGE UP (ref 40–120)
ALT FLD-CCNC: 8 U/L — LOW (ref 10–45)
ALT FLD-CCNC: 8 U/L — LOW (ref 10–45)
ALT FLD-CCNC: 9 U/L — LOW (ref 10–45)
ALT FLD-CCNC: 9 U/L — LOW (ref 10–45)
ANION GAP SERPL CALC-SCNC: 16 MMOL/L — SIGNIFICANT CHANGE UP (ref 5–17)
ANION GAP SERPL CALC-SCNC: 16 MMOL/L — SIGNIFICANT CHANGE UP (ref 5–17)
ANION GAP SERPL CALC-SCNC: 17 MMOL/L — SIGNIFICANT CHANGE UP (ref 5–17)
ANION GAP SERPL CALC-SCNC: 20 MMOL/L — HIGH (ref 5–17)
APTT BLD: 54.7 SEC — HIGH (ref 24.5–35.6)
APTT BLD: 62.3 SEC — HIGH (ref 24.5–35.6)
APTT BLD: 82.4 SEC — HIGH (ref 24.5–35.6)
APTT BLD: 83.8 SEC — HIGH (ref 24.5–35.6)
AST SERPL-CCNC: 7 U/L — LOW (ref 10–40)
AST SERPL-CCNC: 8 U/L — LOW (ref 10–40)
BASOPHILS # BLD AUTO: 0.03 K/UL — SIGNIFICANT CHANGE UP (ref 0–0.2)
BASOPHILS # BLD AUTO: 0.04 K/UL — SIGNIFICANT CHANGE UP (ref 0–0.2)
BASOPHILS NFR BLD AUTO: 0.2 % — SIGNIFICANT CHANGE UP (ref 0–2)
BASOPHILS NFR BLD AUTO: 0.4 % — SIGNIFICANT CHANGE UP (ref 0–2)
BILIRUB SERPL-MCNC: 0.8 MG/DL — SIGNIFICANT CHANGE UP (ref 0.2–1.2)
BILIRUB SERPL-MCNC: 0.9 MG/DL — SIGNIFICANT CHANGE UP (ref 0.2–1.2)
BILIRUB SERPL-MCNC: 1 MG/DL — SIGNIFICANT CHANGE UP (ref 0.2–1.2)
BILIRUB SERPL-MCNC: 1.1 MG/DL — SIGNIFICANT CHANGE UP (ref 0.2–1.2)
BUN SERPL-MCNC: 80 MG/DL — HIGH (ref 7–23)
BUN SERPL-MCNC: 84 MG/DL — HIGH (ref 7–23)
BUN SERPL-MCNC: 89 MG/DL — HIGH (ref 7–23)
BUN SERPL-MCNC: 91 MG/DL — HIGH (ref 7–23)
CALCIUM SERPL-MCNC: 8.7 MG/DL — SIGNIFICANT CHANGE UP (ref 8.4–10.5)
CALCIUM SERPL-MCNC: 8.7 MG/DL — SIGNIFICANT CHANGE UP (ref 8.4–10.5)
CALCIUM SERPL-MCNC: 8.8 MG/DL — SIGNIFICANT CHANGE UP (ref 8.4–10.5)
CALCIUM SERPL-MCNC: 8.8 MG/DL — SIGNIFICANT CHANGE UP (ref 8.4–10.5)
CHLORIDE SERPL-SCNC: 94 MMOL/L — LOW (ref 96–108)
CHLORIDE SERPL-SCNC: 94 MMOL/L — LOW (ref 96–108)
CHLORIDE SERPL-SCNC: 95 MMOL/L — LOW (ref 96–108)
CHLORIDE SERPL-SCNC: 95 MMOL/L — LOW (ref 96–108)
CO2 SERPL-SCNC: 15 MMOL/L — LOW (ref 22–31)
CO2 SERPL-SCNC: 18 MMOL/L — LOW (ref 22–31)
CO2 SERPL-SCNC: 20 MMOL/L — LOW (ref 22–31)
CO2 SERPL-SCNC: 21 MMOL/L — LOW (ref 22–31)
CREAT SERPL-MCNC: 4.95 MG/DL — HIGH (ref 0.5–1.3)
CREAT SERPL-MCNC: 5.16 MG/DL — HIGH (ref 0.5–1.3)
CREAT SERPL-MCNC: 5.46 MG/DL — HIGH (ref 0.5–1.3)
CREAT SERPL-MCNC: 5.5 MG/DL — HIGH (ref 0.5–1.3)
CULTURE RESULTS: NO GROWTH — SIGNIFICANT CHANGE UP
EGFR: 11 ML/MIN/1.73M2 — LOW
EGFR: 12 ML/MIN/1.73M2 — LOW
EOSINOPHIL # BLD AUTO: 0.01 K/UL — SIGNIFICANT CHANGE UP (ref 0–0.5)
EOSINOPHIL # BLD AUTO: 0.11 K/UL — SIGNIFICANT CHANGE UP (ref 0–0.5)
EOSINOPHIL NFR BLD AUTO: 0.1 % — SIGNIFICANT CHANGE UP (ref 0–6)
EOSINOPHIL NFR BLD AUTO: 1.1 % — SIGNIFICANT CHANGE UP (ref 0–6)
GAS PNL BLDA: SIGNIFICANT CHANGE UP
GAS PNL BLDV: SIGNIFICANT CHANGE UP
GLUCOSE BLDC GLUCOMTR-MCNC: 128 MG/DL — HIGH (ref 70–99)
GLUCOSE BLDC GLUCOMTR-MCNC: 133 MG/DL — HIGH (ref 70–99)
GLUCOSE BLDC GLUCOMTR-MCNC: 155 MG/DL — HIGH (ref 70–99)
GLUCOSE SERPL-MCNC: 129 MG/DL — HIGH (ref 70–99)
GLUCOSE SERPL-MCNC: 134 MG/DL — HIGH (ref 70–99)
GLUCOSE SERPL-MCNC: 158 MG/DL — HIGH (ref 70–99)
GLUCOSE SERPL-MCNC: 193 MG/DL — HIGH (ref 70–99)
HCT VFR BLD CALC: 37.9 % — LOW (ref 39–50)
HCT VFR BLD CALC: 41.4 % — SIGNIFICANT CHANGE UP (ref 39–50)
HGB BLD-MCNC: 12.3 G/DL — LOW (ref 13–17)
HGB BLD-MCNC: 13.2 G/DL — SIGNIFICANT CHANGE UP (ref 13–17)
IMM GRANULOCYTES NFR BLD AUTO: 0.5 % — SIGNIFICANT CHANGE UP (ref 0–0.9)
IMM GRANULOCYTES NFR BLD AUTO: 0.6 % — SIGNIFICANT CHANGE UP (ref 0–0.9)
INR BLD: 1.76 RATIO — HIGH (ref 0.85–1.16)
INR BLD: 1.76 RATIO — HIGH (ref 0.85–1.16)
LYMPHOCYTES # BLD AUTO: 0.57 K/UL — LOW (ref 1–3.3)
LYMPHOCYTES # BLD AUTO: 0.9 K/UL — LOW (ref 1–3.3)
LYMPHOCYTES # BLD AUTO: 4.3 % — LOW (ref 13–44)
LYMPHOCYTES # BLD AUTO: 9.3 % — LOW (ref 13–44)
MAGNESIUM SERPL-MCNC: 1.9 MG/DL — SIGNIFICANT CHANGE UP (ref 1.6–2.6)
MAGNESIUM SERPL-MCNC: 2 MG/DL — SIGNIFICANT CHANGE UP (ref 1.6–2.6)
MAGNESIUM SERPL-MCNC: 2.1 MG/DL — SIGNIFICANT CHANGE UP (ref 1.6–2.6)
MAGNESIUM SERPL-MCNC: 2.2 MG/DL — SIGNIFICANT CHANGE UP (ref 1.6–2.6)
MCHC RBC-ENTMCNC: 30.4 PG — SIGNIFICANT CHANGE UP (ref 27–34)
MCHC RBC-ENTMCNC: 30.6 PG — SIGNIFICANT CHANGE UP (ref 27–34)
MCHC RBC-ENTMCNC: 31.9 G/DL — LOW (ref 32–36)
MCHC RBC-ENTMCNC: 32.5 G/DL — SIGNIFICANT CHANGE UP (ref 32–36)
MCV RBC AUTO: 93.8 FL — SIGNIFICANT CHANGE UP (ref 80–100)
MCV RBC AUTO: 96.1 FL — SIGNIFICANT CHANGE UP (ref 80–100)
MONOCYTES # BLD AUTO: 0.71 K/UL — SIGNIFICANT CHANGE UP (ref 0–0.9)
MONOCYTES # BLD AUTO: 0.98 K/UL — HIGH (ref 0–0.9)
MONOCYTES NFR BLD AUTO: 7.4 % — SIGNIFICANT CHANGE UP (ref 2–14)
MONOCYTES NFR BLD AUTO: 7.4 % — SIGNIFICANT CHANGE UP (ref 2–14)
NEUTROPHILS # BLD AUTO: 11.61 K/UL — HIGH (ref 1.8–7.4)
NEUTROPHILS # BLD AUTO: 7.82 K/UL — HIGH (ref 1.8–7.4)
NEUTROPHILS NFR BLD AUTO: 81.3 % — HIGH (ref 43–77)
NEUTROPHILS NFR BLD AUTO: 87.4 % — HIGH (ref 43–77)
NRBC BLD AUTO-RTO: 0 /100 WBCS — SIGNIFICANT CHANGE UP (ref 0–0)
NRBC BLD AUTO-RTO: 0 /100 WBCS — SIGNIFICANT CHANGE UP (ref 0–0)
PHOSPHATE SERPL-MCNC: 4.8 MG/DL — HIGH (ref 2.5–4.5)
PHOSPHATE SERPL-MCNC: 5.1 MG/DL — HIGH (ref 2.5–4.5)
PHOSPHATE SERPL-MCNC: 5.8 MG/DL — HIGH (ref 2.5–4.5)
PHOSPHATE SERPL-MCNC: 6.4 MG/DL — HIGH (ref 2.5–4.5)
PLATELET # BLD AUTO: 128 K/UL — LOW (ref 150–400)
PLATELET # BLD AUTO: 134 K/UL — LOW (ref 150–400)
POTASSIUM SERPL-MCNC: 4.4 MMOL/L — SIGNIFICANT CHANGE UP (ref 3.5–5.3)
POTASSIUM SERPL-MCNC: 4.5 MMOL/L — SIGNIFICANT CHANGE UP (ref 3.5–5.3)
POTASSIUM SERPL-MCNC: 4.8 MMOL/L — SIGNIFICANT CHANGE UP (ref 3.5–5.3)
POTASSIUM SERPL-MCNC: 5.1 MMOL/L — SIGNIFICANT CHANGE UP (ref 3.5–5.3)
POTASSIUM SERPL-SCNC: 4.4 MMOL/L — SIGNIFICANT CHANGE UP (ref 3.5–5.3)
POTASSIUM SERPL-SCNC: 4.5 MMOL/L — SIGNIFICANT CHANGE UP (ref 3.5–5.3)
POTASSIUM SERPL-SCNC: 4.8 MMOL/L — SIGNIFICANT CHANGE UP (ref 3.5–5.3)
POTASSIUM SERPL-SCNC: 5.1 MMOL/L — SIGNIFICANT CHANGE UP (ref 3.5–5.3)
PROT SERPL-MCNC: 6.5 G/DL — SIGNIFICANT CHANGE UP (ref 6–8.3)
PROT SERPL-MCNC: 6.6 G/DL — SIGNIFICANT CHANGE UP (ref 6–8.3)
PROT SERPL-MCNC: 6.7 G/DL — SIGNIFICANT CHANGE UP (ref 6–8.3)
PROT SERPL-MCNC: 6.7 G/DL — SIGNIFICANT CHANGE UP (ref 6–8.3)
PROTHROM AB SERPL-ACNC: 20.1 SEC — HIGH (ref 9.9–13.4)
PROTHROM AB SERPL-ACNC: 20.1 SEC — HIGH (ref 9.9–13.4)
RBC # BLD: 4.04 M/UL — LOW (ref 4.2–5.8)
RBC # BLD: 4.31 M/UL — SIGNIFICANT CHANGE UP (ref 4.2–5.8)
RBC # FLD: 15.9 % — HIGH (ref 10.3–14.5)
RBC # FLD: 16.1 % — HIGH (ref 10.3–14.5)
SODIUM SERPL-SCNC: 130 MMOL/L — LOW (ref 135–145)
SODIUM SERPL-SCNC: 131 MMOL/L — LOW (ref 135–145)
SPECIMEN SOURCE: SIGNIFICANT CHANGE UP
TSH SERPL-MCNC: 3.15 UIU/ML — SIGNIFICANT CHANGE UP (ref 0.27–4.2)
WBC # BLD: 13.28 K/UL — HIGH (ref 3.8–10.5)
WBC # BLD: 9.63 K/UL — SIGNIFICANT CHANGE UP (ref 3.8–10.5)
WBC # FLD AUTO: 13.28 K/UL — HIGH (ref 3.8–10.5)
WBC # FLD AUTO: 9.63 K/UL — SIGNIFICANT CHANGE UP (ref 3.8–10.5)

## 2025-04-02 PROCEDURE — 76377 3D RENDER W/INTRP POSTPROCES: CPT | Mod: 26

## 2025-04-02 PROCEDURE — 71045 X-RAY EXAM CHEST 1 VIEW: CPT | Mod: 26

## 2025-04-02 PROCEDURE — 93312 ECHO TRANSESOPHAGEAL: CPT | Mod: 26

## 2025-04-02 PROCEDURE — 93320 DOPPLER ECHO COMPLETE: CPT | Mod: 26

## 2025-04-02 PROCEDURE — 99291 CRITICAL CARE FIRST HOUR: CPT

## 2025-04-02 PROCEDURE — 93325 DOPPLER ECHO COLOR FLOW MAPG: CPT | Mod: 26

## 2025-04-02 PROCEDURE — 99233 SBSQ HOSP IP/OBS HIGH 50: CPT | Mod: GC

## 2025-04-02 PROCEDURE — 99292 CRITICAL CARE ADDL 30 MIN: CPT

## 2025-04-02 RX ORDER — DEXMEDETOMIDINE HYDROCHLORIDE IN SODIUM CHLORIDE 4 UG/ML
0.2 INJECTION INTRAVENOUS
Qty: 200 | Refills: 0 | Status: DISCONTINUED | OUTPATIENT
Start: 2025-04-02 | End: 2025-04-02

## 2025-04-02 RX ORDER — MAGNESIUM SULFATE 500 MG/ML
2 SYRINGE (ML) INJECTION ONCE
Refills: 0 | Status: COMPLETED | OUTPATIENT
Start: 2025-04-02 | End: 2025-04-02

## 2025-04-02 RX ORDER — NOREPINEPHRINE BITARTRATE 8 MG
0.05 SOLUTION INTRAVENOUS
Qty: 8 | Refills: 0 | Status: DISCONTINUED | OUTPATIENT
Start: 2025-04-02 | End: 2025-04-04

## 2025-04-02 RX ORDER — INSULIN LISPRO 100 U/ML
INJECTION, SOLUTION INTRAVENOUS; SUBCUTANEOUS EVERY 6 HOURS
Refills: 0 | Status: DISCONTINUED | OUTPATIENT
Start: 2025-04-02 | End: 2025-04-05

## 2025-04-02 RX ADMIN — HEPARIN SODIUM 14 UNIT(S)/HR: 1000 INJECTION INTRAVENOUS; SUBCUTANEOUS at 12:54

## 2025-04-02 RX ADMIN — PROPOFOL 22.3 MICROGRAM(S)/KG/MIN: 10 INJECTION, EMULSION INTRAVENOUS at 07:16

## 2025-04-02 RX ADMIN — HEPARIN SODIUM 15.5 UNIT(S)/HR: 1000 INJECTION INTRAVENOUS; SUBCUTANEOUS at 05:41

## 2025-04-02 RX ADMIN — HEPARIN SODIUM 15 UNIT(S)/HR: 1000 INJECTION INTRAVENOUS; SUBCUTANEOUS at 09:08

## 2025-04-02 RX ADMIN — VASOPRESSIN 6 UNIT(S)/MIN: 20 INJECTION INTRAVENOUS at 19:25

## 2025-04-02 RX ADMIN — HEPARIN SODIUM 13.5 UNIT(S)/HR: 1000 INJECTION INTRAVENOUS; SUBCUTANEOUS at 13:18

## 2025-04-02 RX ADMIN — HEPARIN SODIUM 13.5 UNIT(S)/HR: 1000 INJECTION INTRAVENOUS; SUBCUTANEOUS at 18:04

## 2025-04-02 RX ADMIN — NOREPINEPHRINE BITARTRATE 11.6 MICROGRAM(S)/KG/MIN: 8 SOLUTION at 22:59

## 2025-04-02 RX ADMIN — Medication 1 APPLICATION(S): at 22:58

## 2025-04-02 RX ADMIN — DEXMEDETOMIDINE HYDROCHLORIDE IN SODIUM CHLORIDE 6.2 MICROGRAM(S)/KG/HR: 4 INJECTION INTRAVENOUS at 12:17

## 2025-04-02 RX ADMIN — VASOPRESSIN 6 UNIT(S)/MIN: 20 INJECTION INTRAVENOUS at 05:41

## 2025-04-02 RX ADMIN — HEPARIN SODIUM 15 UNIT(S)/HR: 1000 INJECTION INTRAVENOUS; SUBCUTANEOUS at 07:15

## 2025-04-02 RX ADMIN — DOBUTAMINE 9.29 MICROGRAM(S)/KG/MIN: 250 INJECTION INTRAVENOUS at 07:16

## 2025-04-02 RX ADMIN — PROPOFOL 22.3 MICROGRAM(S)/KG/MIN: 10 INJECTION, EMULSION INTRAVENOUS at 05:42

## 2025-04-02 RX ADMIN — DEXMEDETOMIDINE HYDROCHLORIDE IN SODIUM CHLORIDE 6.2 MICROGRAM(S)/KG/HR: 4 INJECTION INTRAVENOUS at 07:17

## 2025-04-02 RX ADMIN — Medication 25 GRAM(S): at 18:19

## 2025-04-02 RX ADMIN — Medication 650 MILLIGRAM(S): at 05:40

## 2025-04-02 RX ADMIN — DOBUTAMINE 9.29 MICROGRAM(S)/KG/MIN: 250 INJECTION INTRAVENOUS at 11:31

## 2025-04-02 RX ADMIN — NOREPINEPHRINE BITARTRATE 15.1 MICROGRAM(S)/KG/MIN: 8 SOLUTION at 05:41

## 2025-04-02 RX ADMIN — DEXMEDETOMIDINE HYDROCHLORIDE IN SODIUM CHLORIDE 6.2 MICROGRAM(S)/KG/HR: 4 INJECTION INTRAVENOUS at 05:41

## 2025-04-02 RX ADMIN — AMIODARONE HYDROCHLORIDE 400 MILLIGRAM(S): 50 INJECTION, SOLUTION INTRAVENOUS at 05:40

## 2025-04-02 RX ADMIN — Medication 15 MILLILITER(S): at 05:40

## 2025-04-02 RX ADMIN — BUMETANIDE 10 MG/HR: 1 TABLET ORAL at 13:58

## 2025-04-02 RX ADMIN — DEXMEDETOMIDINE HYDROCHLORIDE IN SODIUM CHLORIDE 6.2 MICROGRAM(S)/KG/HR: 4 INJECTION INTRAVENOUS at 07:56

## 2025-04-02 RX ADMIN — DOBUTAMINE 9.29 MICROGRAM(S)/KG/MIN: 250 INJECTION INTRAVENOUS at 05:41

## 2025-04-02 RX ADMIN — Medication 650 MILLIGRAM(S): at 13:21

## 2025-04-02 RX ADMIN — NOREPINEPHRINE BITARTRATE 15.1 MICROGRAM(S)/KG/MIN: 8 SOLUTION at 07:17

## 2025-04-02 RX ADMIN — VASOPRESSIN 6 UNIT(S)/MIN: 20 INJECTION INTRAVENOUS at 07:17

## 2025-04-02 RX ADMIN — VASOPRESSIN 6 UNIT(S)/MIN: 20 INJECTION INTRAVENOUS at 16:41

## 2025-04-02 RX ADMIN — INSULIN LISPRO 1: 100 INJECTION, SOLUTION INTRAVENOUS; SUBCUTANEOUS at 06:16

## 2025-04-02 RX ADMIN — VASOPRESSIN 6 UNIT(S)/MIN: 20 INJECTION INTRAVENOUS at 16:38

## 2025-04-02 RX ADMIN — AMIODARONE HYDROCHLORIDE 400 MILLIGRAM(S): 50 INJECTION, SOLUTION INTRAVENOUS at 13:21

## 2025-04-02 RX ADMIN — BUMETANIDE 20 MG/HR: 1 TABLET ORAL at 07:15

## 2025-04-02 RX ADMIN — DEXMEDETOMIDINE HYDROCHLORIDE IN SODIUM CHLORIDE 6.2 MICROGRAM(S)/KG/HR: 4 INJECTION INTRAVENOUS at 16:38

## 2025-04-02 RX ADMIN — BUMETANIDE 20 MG/HR: 1 TABLET ORAL at 05:41

## 2025-04-02 RX ADMIN — CLOPIDOGREL BISULFATE 75 MILLIGRAM(S): 75 TABLET, FILM COATED ORAL at 09:55

## 2025-04-02 RX ADMIN — DOBUTAMINE 9.29 MICROGRAM(S)/KG/MIN: 250 INJECTION INTRAVENOUS at 19:26

## 2025-04-02 RX ADMIN — BUMETANIDE 20 MG/HR: 1 TABLET ORAL at 12:30

## 2025-04-02 RX ADMIN — Medication 20 MILLIGRAM(S): at 09:55

## 2025-04-02 NOTE — PROGRESS NOTE ADULT - ASSESSMENT
ASSESSMENT & PLAN:  63 yo M with a fib (s/p dccv 3/25/25), CHF (likely moderately reduced EF, several TTE with poor windows), aortic stenosis s/p TAVR (11/2022), a flutter s/p ablation 2019 and s/p micra implant,  CKD3, obesity, venous stasis, HTN, HLD, who presents with 6 days of worsening dyspnea on exertion, admitted for AHRF likely iso CHF exacerbation, also with ANGELICA on CKD s/p RRT for hypotension, transferred to CICU for diuresis, required intubation for acute hypoxic and hypercapnic respiratory failure 4/1, now extubated.     Plan:  ===NEURO===  - A&O x3 post extubatin  - continue to monitor mental status per protocol    ===RESPIRATORY===  #Acute hypoxemic and hypercapnic respiratory failure  - Most likely i/s/o pulmonary edema w/ acute on chronic HF exacerbation, required intubation 4/1 for increased work of breathing and hypercapnia  - s/p diuresis w/ bumex gtt, extubated to HFNC now on 40L/40%  - wean 02 as tolerated  - trend abgs, monitor sp02    ===CARDIOVASCULAR===  #acute decompensated heart failure  - noted to have severe prosthetic AS on TTE 4/1 w/ moderately reduced LV function, + RMWAs  - diuresis w/ bumex gtt and bolus  - s/p 3% saline, f/u metolazone/ diuril as needed  - dobutamine @ 2.5 for inotropic assisted diuresis, wean as tolerated now extubated  - continue vasopressin for MAP > 65  - continue to trend perfusion indices and lactate    #hx CAD s/p CABG  - continue plavix    #Afib  - s/p DCCV/ablation 3/2025   - Continue amio 400 mg q8h until 4/4, start 200 mg qd thereafter  - heparin gtt while inpatient    ===/RENAL===  #ANGELICA on CKD  - likely cardiorenal, worsening i/s/o ADHF  - diuretics as above  - continue strict I&O  - trend renal function daily  - nephrology following, f/u recommendations    ===GI===  - diet as tolerated post speech and swallow  - monitor for BM    ===INFECTIOUS DISEASE===  - leukocytosis without fever  - continue to trend wbc and fever curve    ===ENDOCRINE===  - A1c 5.8, glucose controlled  - fs q6h while NPO  - f/u TSH    ===HEMATOLOGIC/DVT PPx===  - H/H and platelets stable  - trend daily  #dvt ppx: heparin gtt for a.fib    #full code  Lines:  R IJ Intro 4/1 -     ======================= DISPOSITION  =====================  [X] Critical   [ ] Guarded    [ ] Stable    [X] Maintain in CICU  [ ] Downgrade to Telemetry  [ ] Discharge Home    Patient requires continuous monitoring with bedside rhythm monitoring, pulse ox monitoring, and intermittent blood gas analysis. Care plan discussed with ICU care team. Patient remained critical and at risk for life threatening decompensation.  Patient seen, examined and plan discussed with CCU team during rounds.     I have personally provided 35 minutes of critical care time excluding time spent on separate procedures, in addition to initial critical care time provided by the CICU Attending, Dr. Beltran.     Mickie Petersen, [facility]     ASSESSMENT & PLAN:  65 yo M with a fib (s/p dccv 3/25/25), CHF (likely moderately reduced EF, several TTE with poor windows), aortic stenosis s/p TAVR (11/2022), a flutter s/p ablation 2019 and s/p micra implant,  CKD3, obesity, venous stasis, HTN, HLD, who presents with 6 days of worsening dyspnea on exertion, admitted for AHRF likely iso CHF exacerbation, also with ANGELICA on CKD s/p RRT for hypotension, transferred to CICU for diuresis, required intubation for acute hypoxic and hypercapnic respiratory failure 4/1, now extubated.     Plan:  ===NEURO===  - A&O x3 post extubation  - continue to monitor mental status per protocol    ===RESPIRATORY===  #Acute hypoxemic and hypercapnic respiratory failure  - i/s/o pulmonary edema w/ acute on chronic HF exacerbation, required intubation 4/1 for increased work of breathing and hypercapnia  - s/p diuresis w/ bumex gtt, extubated to HFNC now on 40L/40%  - wean 02 as tolerated  - trend abgs, monitor sp02    ===CARDIOVASCULAR===  #acute decompensated heart failure  - noted to have severe prosthetic AS on TTE 4/1 w/ moderately reduced LV function, + RMWAs  - diuresis w/ bumex gtt and bolus, now d/c  - s/p 3% saline, f/u metolazone/ diuril as needed  - dobutamine @ 2.5 for inotropic assisted diuresis, wean as tolerated now extubated  - continue vasopressin for MAP > 65  - continue to trend perfusion indices and lactate    #hx CAD s/p CABG  - continue plavix    #Afib  - s/p DCCV/ablation 3/2025   - Continue amio 400 mg q8h until 4/4, start 200 mg qd thereafter  - heparin gtt while inpatient    ===/RENAL===  #ANGELICA on CKD  - likely cardiorenal, worsening i/s/o ADHF  - diuretics as above  - continue strict I&O  - trend renal function daily  - nephrology following, f/u recommendations    ===GI===  - diet as tolerated post speech and swallow  - monitor for BM    ===INFECTIOUS DISEASE===  - leukocytosis without fever  - continue to trend wbc and fever curve    ===ENDOCRINE===  - A1c 5.8, glucose controlled  - trend glucose on cmp  - TSH within normal limits    ===HEMATOLOGIC/DVT PPx===  - H/H and platelets stable  - trend daily  #dvt ppx: heparin gtt for a.fib    #full code  Lines:  R IJ Intro 4/1 -   L radial marizol 4/1 -     ======================= DISPOSITION  =====================  [X] Critical   [ ] Guarded    [ ] Stable    [X] Maintain in CICU  [ ] Downgrade to Telemetry  [ ] Discharge Home    Patient requires continuous monitoring with bedside rhythm monitoring, pulse ox monitoring, and intermittent blood gas analysis. Care plan discussed with ICU care team. Patient remained critical and at risk for life threatening decompensation.  Patient seen, examined and plan discussed with CCU team during rounds.     I have personally provided 35 minutes of critical care time excluding time spent on separate procedures, in addition to initial critical care time provided by the CICU Attending, Dr. Beltran.     Mickie Petersen, [facility]

## 2025-04-02 NOTE — PROGRESS NOTE ADULT - ASSESSMENT
65 y/o M with hx of Afib s/p DCCV 3/25/25, CHF, Aortic stenosis s/p TAVR 11/2022, CAD s/p CABG, Aflutter s/p ablation and Micra implant, CKD 3, Obesity, venous stasis, HTN, HLD presented with 6 days of worsening SOB on exertion associated with orthopnea and chest pain.   Also reports several days decreased appetite. Denies fevers, chills, cough, abdominal pain, NVD, urinary sx, leg swelling. Patient had a cardioversion for atrial fibrillation at this hospital recently, which was reportedly unsuccessful. Patient has a scheduled appointment with Dr. Philip on 04/24/2025 to discuss possible ablation. His outpt cardiologist is Dr. Miranda. (Cardiology)  On arrival to ED, vitals were 97.3F, HR 71, BP 94/57, RR 22, 97% on 2L NC. Labs pertinent for WBC 10.68, INR 2.55, Na 132, K 5.5, bicarb 20, BUN/Cr 78/5.17 (baseline Cr appears ~1.5-1.8), trop 225, BNP 49518, VBG ph 7.3, pco2/po2 wnl. UA without evidence of infection.  nephrology was consulted for management of ANGELICA on CKD. Pt has baseline Scr of ~1.5- 1.8. Scr was 2.7 on 3/25/25. Pt is on Entresto ( started recently) and he mentioned that he was taking Motrin occasionally. No herbal medications/ supplements.  Scr at the time of admission was 5.17 3/31/25 and it further worsened to 5.45 4/1/25.    Pt follows Dr. Stewart Brown (Nephrology)      ANGELICA on CKD:  Pt has hx of CKD 3. Baseline Scr 1.5-1.8.   At the time of admission - Scr was 5.17 and it further worsened to 5.45 and today it is stable/ elevated at 5.5  Pt has lopez cath in place. UOP was 2.3L and net negative ~360cc. On bumex 4mg/hr.  UA showed trace LE, blood ( Likely due to lopez), casts 17. UPCR 0.7. No hx of DM.   Pt is on entresto.   BP was low.     ANGELICA on CKD likely in setting of HF exacerbation, entresto use and hypotension mediated ATN.    PLAN:  Pt is on bumex gtt 4mg/hr. Continue for now.  Aim at net negative.   continue to hold entresto.   Continue with sodium bicarb 650 TID for now.   Monitor strict I/Os and daily wts.   Get USG kidney and bladder.   No urgent need of HD for now. Will reassess tomorrow.   Avoid nephrotoxins  Dose medications as per eGFR    hyperkalemia likely in setting of Entresto use and renal failure.   Serum K is better today.   Lokelma 10g PRN for now.   Continue to hold entresto.     Plan communicated with the primary team.   Wait for the final reccs from the attending.

## 2025-04-02 NOTE — PROGRESS NOTE ADULT - ATTENDING COMMENTS
events noted  now intubated    called for chon  pt reports baseline Renal fxn in 40s, followed by outpt nephrologist Dr Stringer  #Baseline CKD3  #admitted with chon in setting of HF and ENtresto and decreased po intake and diarrhea  chon could be cardiorenal +/- ATN  cr seems to have plateaued   nonoliguric on bumex drip  he has a lopez  monitor UO  monitor daily cr trends  lytes stable  no urgent indication for RRT  check renal sono  #HF  bumex drip  # hyperkalemia  if K uptrends can give lokelma 10g  #hypotension  monitor BP trends  on vaso/levo/  d/w CCU team

## 2025-04-02 NOTE — PROGRESS NOTE ADULT - ATTENDING COMMENTS
HFrEF and severe prosthetic valve aortic stenosis  Hypotension and poor urine output with ANGELICA on CKD  Hemodynamic instability requiring pressor support  Consider [place] and inotrope support if needed  Intubated 4/1 for acute respiratory decompensation and increased work of breathing  STANISLAW 4/2     Structural Heart Team evaluation.

## 2025-04-02 NOTE — PROGRESS NOTE ADULT - SUBJECTIVE AND OBJECTIVE BOX
PATIENT:  BERTHA WARD  [id number]    CHIEF COMPLAINT:  Patient is a 64y old  Male who presents with a chief complaint of dyspnea , chest pain (02 Apr 2025 09:51)      INTERVAL HISTORY: extubated on HFNC    REVIEW OF SYSTEMS:  Constitutional:     [x ] negative [ ] fevers [ ] chills [ ] weight loss [ ] weight gain  HEENT:                  [ x] negative [ ] dry eyes [ ] eye irritation [ ] postnasal drip [ ] nasal congestion  CV:                         [ x] negative  [ ] chest pain [ ] orthopnea [ ] palpitations [ ] murmur  Resp:                     [x] negative [ ] cough [ ] shortness of breath [ ] dyspnea [ ] wheezing [ ] sputum [ ] hemoptysis  GI:                          [x ] negative [ ] nausea [ ] vomiting [ ] diarrhea [ ] constipation [ ] abd pain [ ] dysphagia   :                        [ x] negative [ ] dysuria [ ] nocturia [ ] hematuria [ ] increased urinary frequency  Musculoskeletal: [x ] negative [ ] back pain [ ] myalgias [ ] arthralgias [ ] fracture  Skin:                       [ x] negative [ ] rash [ ] itch  Neurological:        [ x] negative [ ] headache [ ] dizziness [ ] syncope [ ] weakness [ ] numbness    MEDICATIONS:  MEDICATIONS  (STANDING):  aMIOdarone    Tablet 400 milliGRAM(s) Oral every 8 hours  atorvastatin 20 milliGRAM(s) Oral at bedtime  chlorhexidine 2% Cloths 1 Application(s) Topical daily  clopidogrel Tablet 75 milliGRAM(s) Oral daily  dexMEDEtomidine Infusion 0.2 MICROgram(s)/kG/Hr (6.2 mL/Hr) IV Continuous <Continuous>  DOBUTamine Infusion 2.5 MICROgram(s)/kG/Min (9.29 mL/Hr) IV Continuous <Continuous>  famotidine    Tablet 20 milliGRAM(s) Oral every 48 hours  heparin  Infusion 1500 Unit(s)/Hr (13.5 mL/Hr) IV Continuous <Continuous>  influenza  Vaccine (HIGH DOSE) 0.5 milliLiter(s) IntraMuscular once  insulin lispro (ADMELOG) corrective regimen sliding scale   SubCutaneous every 6 hours  norepinephrine Infusion 0.13 MICROgram(s)/kG/Min (15.1 mL/Hr) IV Continuous <Continuous>  propofol Infusion 30 MICROgram(s)/kG/Min (22.3 mL/Hr) IV Continuous <Continuous>  sodium bicarbonate 650 milliGRAM(s) Oral three times a day  vasopressin Infusion 0.04 Unit(s)/Min (6 mL/Hr) IV Continuous <Continuous>    MEDICATIONS  (PRN):      ALLERGIES:  Allergies    metoprolol (Short breath)    Intolerances        OBJECTIVE:  ICU Vital Signs Last 24 Hrs  T(C): 36.4 (02 Apr 2025 12:30), Max: 36.5 (02 Apr 2025 04:30)  T(F): 97.6 (02 Apr 2025 12:30), Max: 97.7 (02 Apr 2025 04:30)  HR: 70 (02 Apr 2025 18:30) (56 - 101)  BP: 71/51 (02 Apr 2025 17:00) (71/51 - 91/69)  BP(mean): 56 (02 Apr 2025 17:00) (56 - 77)  ABP: 79/53 (02 Apr 2025 18:30) (76/50 - 113/70)  ABP(mean): 64 (02 Apr 2025 18:30) (62 - 91)  RR: 30 (02 Apr 2025 18:30) (9 - 39)  SpO2: 100% (02 Apr 2025 18:30) (82% - 100%)    O2 Parameters below as of 02 Apr 2025 18:30  Patient On (Oxygen Delivery Method): nasal cannula, high flow          Mode: HFNC  FiO2: 40    Adult Advanced Hemodynamics Last 24 Hrs  CVP(mm Hg): 6 (02 Apr 2025 18:30) (3 - 22)  CVP(cm H2O): --  CO: --  CI: --  PA: --  PA(mean): --  PCWP: --  SVR: --  SVRI: --  PVR: --  PVRI: --  CAPILLARY BLOOD GLUCOSE      POCT Blood Glucose.: 133 mg/dL (02 Apr 2025 17:54)  POCT Blood Glucose.: 128 mg/dL (02 Apr 2025 12:09)  POCT Blood Glucose.: 155 mg/dL (02 Apr 2025 06:14)    CAPILLARY BLOOD GLUCOSE      POCT Blood Glucose.: 133 mg/dL (02 Apr 2025 17:54)    I&O's Summary    01 Apr 2025 07:01  -  02 Apr 2025 07:00  --------------------------------------------------------  IN: 1981.7 mL / OUT: 2350 mL / NET: -368.3 mL    02 Apr 2025 07:01  -  02 Apr 2025 20:03  --------------------------------------------------------  IN: 747.2 mL / OUT: 4110 mL / NET: -3362.8 mL    Daily     PHYSICAL EXAMINATION:  General: WN/WD NAD  HEENT: PERRLA, EOMI, moist mucous membranes  Neurology: A&Ox3, nonfocal, MITCHELL x 4  Respiratory: CTA B/L, normal respiratory effort, no wheezes, crackles, rales  CV: RRR, S1S2  Abdominal: Soft, NT, ND +BS  Extremities: +3 lower extremity edema bilaterally, + peripheral pulses  skin: warm, dry. R IJ intro and L radial marizol c/d/i    LABS:  ABG - ( 02 Apr 2025 17:20 )  pH, Arterial: 7.39  pH, Blood: x     /  pCO2: 44    /  pO2: 161   / HCO3: 27    / Base Excess: 1.3   /  SaO2: 99.9                        12.3   9.63  )-----------( 128      ( 02 Apr 2025 06:29 )             37.9     04-02    131[L]  |  94[L]  |  80[H]  ----------------------------<  129[H]  4.5   |  21[L]  |  4.95[H]    Ca    8.8      02 Apr 2025 17:28  Phos  4.8     04-02  Mg     1.9     04-02    TPro  6.7  /  Alb  3.0[L]  /  TBili  1.1  /  DBili  x   /  AST  8[L]  /  ALT  8[L]  /  AlkPhos  95  04-02    LIVER FUNCTIONS - ( 02 Apr 2025 17:28 )  Alb: 3.0 g/dL / Pro: 6.7 g/dL / ALK PHOS: 95 U/L / ALT: 8 U/L / AST: 8 U/L / GGT: x           PT/INR - ( 02 Apr 2025 06:29 )   PT: 20.1 sec;   INR: 1.76 ratio      PTT - ( 02 Apr 2025 17:28 )  PTT:62.3 sec    Urinalysis Basic - ( 02 Apr 2025 17:28 )    Color: x / Appearance: x / SG: x / pH: x  Gluc: 129 mg/dL / Ketone: x  / Bili: x / Urobili: x   Blood: x / Protein: x / Nitrite: x   Leuk Esterase: x / RBC: x / WBC x   Sq Epi: x / Non Sq Epi: x / Bacteria: x    TELEMETRY:     EKG:     IMAGING:       PATIENT:  BERTHA WARD  [id number]    CHIEF COMPLAINT:  Patient is a 64y old  Male who presents with a chief complaint of dyspnea , chest pain (02 Apr 2025 09:51)    INTERVAL HISTORY: extubated on HFNC    REVIEW OF SYSTEMS:  Constitutional:     [x ] negative [ ] fevers [ ] chills [ ] weight loss [ ] weight gain  HEENT:                  [ x] negative [ ] dry eyes [ ] eye irritation [ ] postnasal drip [ ] nasal congestion  CV:                         [ x] negative  [ ] chest pain [ ] orthopnea [ ] palpitations [ ] murmur  Resp:                     [x] negative [ ] cough [ ] shortness of breath [ ] dyspnea [ ] wheezing [ ] sputum [ ] hemoptysis  GI:                          [x ] negative [ ] nausea [ ] vomiting [ ] diarrhea [ ] constipation [ ] abd pain [ ] dysphagia   :                        [ x] negative [ ] dysuria [ ] nocturia [ ] hematuria [ ] increased urinary frequency  Musculoskeletal: [x ] negative [ ] back pain [ ] myalgias [ ] arthralgias [ ] fracture  Skin:                       [ x] negative [ ] rash [ ] itch  Neurological:        [ x] negative [ ] headache [ ] dizziness [ ] syncope [ ] weakness [ ] numbness    MEDICATIONS:  MEDICATIONS  (STANDING):  aMIOdarone    Tablet 400 milliGRAM(s) Oral every 8 hours  atorvastatin 20 milliGRAM(s) Oral at bedtime  chlorhexidine 2% Cloths 1 Application(s) Topical daily  clopidogrel Tablet 75 milliGRAM(s) Oral daily  dexMEDEtomidine Infusion 0.2 MICROgram(s)/kG/Hr (6.2 mL/Hr) IV Continuous <Continuous>  DOBUTamine Infusion 2.5 MICROgram(s)/kG/Min (9.29 mL/Hr) IV Continuous <Continuous>  famotidine    Tablet 20 milliGRAM(s) Oral every 48 hours  heparin  Infusion 1500 Unit(s)/Hr (13.5 mL/Hr) IV Continuous <Continuous>  influenza  Vaccine (HIGH DOSE) 0.5 milliLiter(s) IntraMuscular once  insulin lispro (ADMELOG) corrective regimen sliding scale   SubCutaneous every 6 hours  norepinephrine Infusion 0.13 MICROgram(s)/kG/Min (15.1 mL/Hr) IV Continuous <Continuous>  propofol Infusion 30 MICROgram(s)/kG/Min (22.3 mL/Hr) IV Continuous <Continuous>  sodium bicarbonate 650 milliGRAM(s) Oral three times a day  vasopressin Infusion 0.04 Unit(s)/Min (6 mL/Hr) IV Continuous <Continuous>    MEDICATIONS  (PRN):      ALLERGIES:  Allergies    metoprolol (Short breath)    Intolerances        OBJECTIVE:  ICU Vital Signs Last 24 Hrs  T(C): 36.4 (02 Apr 2025 12:30), Max: 36.5 (02 Apr 2025 04:30)  T(F): 97.6 (02 Apr 2025 12:30), Max: 97.7 (02 Apr 2025 04:30)  HR: 70 (02 Apr 2025 18:30) (56 - 101)  BP: 71/51 (02 Apr 2025 17:00) (71/51 - 91/69)  BP(mean): 56 (02 Apr 2025 17:00) (56 - 77)  ABP: 79/53 (02 Apr 2025 18:30) (76/50 - 113/70)  ABP(mean): 64 (02 Apr 2025 18:30) (62 - 91)  RR: 30 (02 Apr 2025 18:30) (9 - 39)  SpO2: 100% (02 Apr 2025 18:30) (82% - 100%)    O2 Parameters below as of 02 Apr 2025 18:30  Patient On (Oxygen Delivery Method): nasal cannula, high flow          Mode: HFNC  FiO2: 40    Adult Advanced Hemodynamics Last 24 Hrs  CVP(mm Hg): 6 (02 Apr 2025 18:30) (3 - 22)  CVP(cm H2O): --  CO: --  CI: --  PA: --  PA(mean): --  PCWP: --  SVR: --  SVRI: --  PVR: --  PVRI: --  CAPILLARY BLOOD GLUCOSE      POCT Blood Glucose.: 133 mg/dL (02 Apr 2025 17:54)  POCT Blood Glucose.: 128 mg/dL (02 Apr 2025 12:09)  POCT Blood Glucose.: 155 mg/dL (02 Apr 2025 06:14)    CAPILLARY BLOOD GLUCOSE      POCT Blood Glucose.: 133 mg/dL (02 Apr 2025 17:54)    I&O's Summary    01 Apr 2025 07:01  -  02 Apr 2025 07:00  --------------------------------------------------------  IN: 1981.7 mL / OUT: 2350 mL / NET: -368.3 mL    02 Apr 2025 07:01  -  02 Apr 2025 20:03  --------------------------------------------------------  IN: 747.2 mL / OUT: 4110 mL / NET: -3362.8 mL    Daily     PHYSICAL EXAMINATION:  General: WN/WD NAD  HEENT: PERRLA, EOMI, moist mucous membranes  Neurology: A&Ox3, nonfocal, MITCHELL x 4  Respiratory: CTA B/L, normal respiratory effort, no wheezes, crackles, rales  CV: RRR, S1S2  Abdominal: Soft, NT, ND +BS  Extremities: +3 lower extremity edema bilaterally, + peripheral pulses  skin: warm, dry. R IJ intro and L radial marizol c/d/i    LABS:  ABG - ( 02 Apr 2025 17:20 )  pH, Arterial: 7.39  pH, Blood: x     /  pCO2: 44    /  pO2: 161   / HCO3: 27    / Base Excess: 1.3   /  SaO2: 99.9                        12.3   9.63  )-----------( 128      ( 02 Apr 2025 06:29 )             37.9     04-02    131[L]  |  94[L]  |  80[H]  ----------------------------<  129[H]  4.5   |  21[L]  |  4.95[H]    Ca    8.8      02 Apr 2025 17:28  Phos  4.8     04-02  Mg     1.9     04-02    TPro  6.7  /  Alb  3.0[L]  /  TBili  1.1  /  DBili  x   /  AST  8[L]  /  ALT  8[L]  /  AlkPhos  95  04-02    LIVER FUNCTIONS - ( 02 Apr 2025 17:28 )  Alb: 3.0 g/dL / Pro: 6.7 g/dL / ALK PHOS: 95 U/L / ALT: 8 U/L / AST: 8 U/L / GGT: x           PT/INR - ( 02 Apr 2025 06:29 )   PT: 20.1 sec;   INR: 1.76 ratio      PTT - ( 02 Apr 2025 17:28 )  PTT:62.3 sec    Urinalysis Basic - ( 02 Apr 2025 17:28 )    Color: x / Appearance: x / SG: x / pH: x  Gluc: 129 mg/dL / Ketone: x  / Bili: x / Urobili: x   Blood: x / Protein: x / Nitrite: x   Leuk Esterase: x / RBC: x / WBC x   Sq Epi: x / Non Sq Epi: x / Bacteria: x    TELEMETRY:     EKG:     IMAGING:

## 2025-04-02 NOTE — PROGRESS NOTE ADULT - SUBJECTIVE AND OBJECTIVE BOX
[facility] Division of Kidney Diseases & Hypertension  FOLLOW UP NOTE  [phone]--------------------------------------------------------------------------------  Chief Complaint:Acute on chronic systolic congestive heart failure    24 hour events/subjective:  Pt was seen and examined earlier today. Pt was intubated and is on vent support. He is on Norepi,  and vaso. On bumex gtt and is on precedex gtt for sedation at the time of examination.   ROS is limited due to current clinical status.         PAST HISTORY  --------------------------------------------------------------------------------  No significant changes to PMH, PSH, FHx, SHx, unless otherwise noted    ALLERGIES & MEDICATIONS  --------------------------------------------------------------------------------  Allergies    metoprolol (Short breath)    Intolerances      Standing Inpatient Medications  aMIOdarone    Tablet 400 milliGRAM(s) Oral every 8 hours  atorvastatin 20 milliGRAM(s) Oral at bedtime  buMETAnide Infusion 4 mG/Hr IV Continuous <Continuous>  chlorhexidine 0.12% Liquid 15 milliLiter(s) Oral Mucosa every 12 hours  chlorhexidine 2% Cloths 1 Application(s) Topical daily  clopidogrel Tablet 75 milliGRAM(s) Oral daily  dexMEDEtomidine Infusion 0.2 MICROgram(s)/kG/Hr IV Continuous <Continuous>  DOBUTamine Infusion 2.5 MICROgram(s)/kG/Min IV Continuous <Continuous>  famotidine    Tablet 20 milliGRAM(s) Oral daily  heparin  Infusion 1500 Unit(s)/Hr IV Continuous <Continuous>  influenza  Vaccine (HIGH DOSE) 0.5 milliLiter(s) IntraMuscular once  insulin lispro (ADMELOG) corrective regimen sliding scale   SubCutaneous every 6 hours  norepinephrine Infusion 0.13 MICROgram(s)/kG/Min IV Continuous <Continuous>  propofol Infusion 30 MICROgram(s)/kG/Min IV Continuous <Continuous>  sodium bicarbonate 650 milliGRAM(s) Oral three times a day  vasopressin Infusion 0.04 Unit(s)/Min IV Continuous <Continuous>    PRN Inpatient Medications      REVIEW OF SYSTEMS  --------------------------------------------------------------------------------  ROS is limited due to current clinical status.       All other systems were reviewed and are negative, except as noted.    VITALS/PHYSICAL EXAM  --------------------------------------------------------------------------------  T(C): 35.6 (25 @ 08:00), Max: 36.6 (25 @ 10:52)  HR: 59 (25 @ 09:30) (58 - 101)  BP: 91/69 (25 @ 08:00) (82/53 - 133/87)  RR: 16 (25 @ 09:30) (12 - 47)  SpO2: 100% (25 @ 09:30) (80% - 100%)  Wt(kg): --  Height (cm): 172.7 (25 @ 13:00)  Weight (kg): 123.9 (25 @ 13:00)  BMI (kg/m2): 41.5 (25 @ 13:00)  BSA (m2): 2.33 (25 @ 13:00)      25 @ 07:01  -  25 @ 07:00  --------------------------------------------------------  IN: 1981.7 mL / OUT: 2350 mL / NET: -368.3 mL    25 @ 07:01  -  25 @ 09:51  --------------------------------------------------------  IN: 137.3 mL / OUT: 675 mL / NET: -537.7 mL      Physical Exam:  	Gen: intubated and is on vent support.   	HEENT: PERRL, supple neck, clear oropharynx  	Pulm: Lower zone decreased breath sounds.   	CV: RRR, S1S2;  	Abd: +BS, soft, nontender/nondistended  	: No suprapubic tenderness, lopez in place.                       Extremities: + bilateral LE edema noted.                       Neuro: sedated  	Skin: Warm, Lower extremity chronic changes.   	    LABS/STUDIES  --------------------------------------------------------------------------------              12.3   9.63  >-----------<  128      [25 @ 06:29]              37.9     130  |  95  |  91  ----------------------------<  158      [25 @ 06:29]  4.8   |  18  |  5.50        Ca     8.7     [25 @ 06:29]      Mg     2.1     [25 @ 06:29]      Phos  5.8     [25 @ 06:29]    TPro  6.5  /  Alb  2.9  /  TBili  0.8  /  DBili  x   /  AST  8   /  ALT  9   /  AlkPhos  92  [25 @ 06:29]    PT/INR: PT 20.1 , INR 1.76       [25 @ 06:29]  PTT: 82.4       [25 @ 06:29]      Creatinine Trend:  SCr 5.50 [:29]  SCr 5.46 [ 00:32]  SCr 5.47 [ @ 21:43]  SCr 5.58 [ @ 17:33]  SCr 5.45 [ @ 12:52]    Urine Creatinine 163      [25 @ 09:25]  Urine Protein 122      [25 @ 09:25]  Urine Sodium 29      [25 @ 09:25]    Lipid: chol 94, TG 74, HDL 29, LDL --      [25 @ 06:18]

## 2025-04-02 NOTE — PROGRESS NOTE ADULT - SUBJECTIVE AND OBJECTIVE BOX
***Plan not finalized until attending attestation***    Gabriele Barnes MD (PGY-1)  Internal Medicine  Contact via [employer] TEAMS    ******************************************    CCU PROGRESS NOTE:    BERTHA WARD  MRN-[id number]  Patient is a 64y old  Male who presents with a chief complaint of dyspnea , chest pain (01 Apr 2025 20:09)      INTERVAL HPI/OVERNIGHT EVENTS: No acute events overnight.    SUBJECTIVE: Patient seen and examined at bedside. No acute complaints. Denies chest pain, SOB, palpitations, dizziness/lightheadedness, LE edema.    MEDICATIONS  (STANDING):  aMIOdarone    Tablet 400 milliGRAM(s) Oral every 8 hours  atorvastatin 20 milliGRAM(s) Oral at bedtime  buMETAnide Infusion 4 mG/Hr (20 mL/Hr) IV Continuous <Continuous>  chlorhexidine 0.12% Liquid 15 milliLiter(s) Oral Mucosa every 12 hours  chlorhexidine 2% Cloths 1 Application(s) Topical daily  clopidogrel Tablet 75 milliGRAM(s) Oral daily  dexMEDEtomidine Infusion 0.2 MICROgram(s)/kG/Hr (6.2 mL/Hr) IV Continuous <Continuous>  DOBUTamine Infusion 2.5 MICROgram(s)/kG/Min (9.29 mL/Hr) IV Continuous <Continuous>  famotidine    Tablet 20 milliGRAM(s) Oral daily  heparin  Infusion 1500 Unit(s)/Hr (15 mL/Hr) IV Continuous <Continuous>  influenza  Vaccine (HIGH DOSE) 0.5 milliLiter(s) IntraMuscular once  insulin lispro (ADMELOG) corrective regimen sliding scale   SubCutaneous every 6 hours  norepinephrine Infusion 0.13 MICROgram(s)/kG/Min (15.1 mL/Hr) IV Continuous <Continuous>  propofol Infusion 30 MICROgram(s)/kG/Min (22.3 mL/Hr) IV Continuous <Continuous>  sodium bicarbonate 650 milliGRAM(s) Oral three times a day  vasopressin Infusion 0.04 Unit(s)/Min (6 mL/Hr) IV Continuous <Continuous>    MEDICATIONS  (PRN):      OBJECTIVE:  ICU Vital Signs Last 24 Hrs  T(C): 36.5 (02 Apr 2025 04:30), Max: 36.6 (01 Apr 2025 10:52)  T(F): 97.7 (02 Apr 2025 04:30), Max: 97.8 (01 Apr 2025 10:52)  HR: 59 (02 Apr 2025 07:00) (58 - 101)  BP: 90/59 (01 Apr 2025 16:45) (82/53 - 133/87)  BP(mean): 68 (01 Apr 2025 16:45) (51 - 105)  ABP: 85/55 (02 Apr 2025 07:00) (78/55 - 110/78)  ABP(mean): 67 (02 Apr 2025 07:00) (64 - 91)  RR: 18 (02 Apr 2025 07:00) (12 - 47)  SpO2: 100% (02 Apr 2025 07:00) (80% - 100%)    O2 Parameters below as of 02 Apr 2025 07:00  Patient On (Oxygen Delivery Method): ventilator          Mode: AC/ CMV (Assist Control/ Continuous Mandatory Ventilation), RR (machine): 16, TV (machine): 550, FiO2: 40, PEEP: 6, ITime: 1  CVP(mm Hg): 339 (04-02-25 @ 07:00) (17 - 339)  CO: --  CI: --  PA: --  PA(mean): --  PA(direct): --  PCWP: --  LA: --  RA: --  SVR: --  SVRI: --  PVR: --  PVRI: --  I&O's Summary    01 Apr 2025 07:01  -  02 Apr 2025 07:00  --------------------------------------------------------  IN: 1981.7 mL / OUT: 2000 mL / NET: -18.3 mL        CAPILLARY BLOOD GLUCOSE      GENERAL: NAD, lying in bed comfortably  NECK: no JVD  HEART: S1, S2, regular rate and rhythm, no murmurs, rubs, or gallops  LUNGS: Unlabored respirations.  Clear to auscultation bilaterally, no crackles, wheezing, or rhonchi  ABDOMEN: Soft, nontender, nondistended, +BS  EXTREMITIES: 2+ peripheral pulses bilaterally. No clubbing, cyanosis, or edema    ============================I/O===========================   I&O's Detail    01 Apr 2025 07:01  -  02 Apr 2025 07:00  --------------------------------------------------------  IN:    Bumetanide: 280 mL    Dexmedetomidine: 24.8 mL    DOBUTamine: 167.4 mL    DOBUTamine: 27.9 mL    Enteral Tube Flush: 50 mL    Furosemide: 45 mL    Heparin: 213 mL    IV PiggyBack: 315 mL    Norepinephrine: 306.6 mL    Norepinephrine: 63.8 mL    Oral Fluid: 120 mL    Propofol: 296.2 mL    Vasopressin: 72 mL  Total IN: 1981.7 mL    OUT:    Indwelling Catheter - Urethral (mL): 2000 mL  Total OUT: 2000 mL    Total NET: -18.3 mL        ============================ LABS =========================                        12.3   9.63  )-----------( 128      ( 02 Apr 2025 06:29 )             37.9     04-02    130[L]  |  95[L]  |  91[H]  ----------------------------<  158[H]  4.8   |  18[L]  |  5.50[H]    Ca    8.7      02 Apr 2025 06:29  Phos  5.8     04-02  Mg     2.1     04-02    TPro  6.5  /  Alb  2.9[L]  /  TBili  0.8  /  DBili  x   /  AST  8[L]  /  ALT  9[L]  /  AlkPhos  92  04-02    Troponin T, High Sensitivity Result: 218 ng/L (04-01-25 @ 02:35)  Troponin T, High Sensitivity Result: 225 ng/L (03-31-25 @ 18:01)    CKMB Units: 4.2 ng/mL (03-31-25 @ 18:01)            LIVER FUNCTIONS - ( 02 Apr 2025 06:29 )  Alb: 2.9 g/dL / Pro: 6.5 g/dL / ALK PHOS: 92 U/L / ALT: 9 U/L / AST: 8 U/L / GGT: x           PT/INR - ( 02 Apr 2025 06:29 )   PT: 20.1 sec;   INR: 1.76 ratio         PTT - ( 02 Apr 2025 06:29 )  PTT:82.4 sec  ABG - ( 02 Apr 2025 06:25 )  pH, Arterial: 7.40  pH, Blood: x     /  pCO2: 34    /  pO2: 182   / HCO3: 21    / Base Excess: -3.0  /  SaO2: 99.6              Blood Gas Arterial, Lactate: 1.1 mmol/L (04-02-25 @ 06:25)  Blood Gas Venous - Lactate: 1.1 mmol/L (04-02-25 @ 06:25)  Blood Gas Arterial, Lactate: 1.7 mmol/L (04-02-25 @ 00:07)  Blood Gas Venous - Lactate: 1.6 mmol/L (04-02-25 @ 00:07)  Blood Gas Arterial, Lactate: 1.2 mmol/L (04-01-25 @ 21:36)  Blood Gas Venous - Lactate: 1.3 mmol/L (04-01-25 @ 21:36)  Blood Gas Arterial, Lactate: 1.3 mmol/L (04-01-25 @ 21:00)  Blood Gas Arterial, Lactate: 1.6 mmol/L (04-01-25 @ 19:51)  Blood Gas Arterial, Lactate: 1.1 mmol/L (04-01-25 @ 17:30)  Blood Gas Venous - Lactate: 1.0 mmol/L (04-01-25 @ 17:30)  Blood Gas Venous - Lactate: 1.1 mmol/L (04-01-25 @ 13:24)  Blood Gas Venous - Lactate: 1.2 mmol/L (04-01-25 @ 13:20)  Blood Gas Venous - Lactate: 1.0 mmol/L (04-01-25 @ 13:17)  Blood Gas Venous - Lactate: 1.2 mmol/L (04-01-25 @ 12:20)  Blood Gas Venous - Lactate: 1.6 mmol/L (03-31-25 @ 18:00)    Urinalysis Basic - ( 02 Apr 2025 06:29 )    Color: x / Appearance: x / SG: x / pH: x  Gluc: 158 mg/dL / Ketone: x  / Bili: x / Urobili: x   Blood: x / Protein: x / Nitrite: x   Leuk Esterase: x / RBC: x / WBC x   Sq Epi: x / Non Sq Epi: x / Bacteria: x      ======================MICRO/RAD/CARDIO=================  Telemetry: Reviewed   EKG: Reviewed  CXR: Reviewed  Culture: Reviewed   Echo:   Cath:    ***Plan not finalized until attending attestation***    Gabriele Barnes MD (PGY-1)  Internal Medicine  Contact via [employer] TEAMS    ******************************************    CCU PROGRESS NOTE:    BERTHA WARD  MRN-[id number]  Patient is a 64y old  Male who presents with a chief complaint of dyspnea , chest pain (01 Apr 2025 20:09)      INTERVAL HPI/OVERNIGHT EVENTS:  Intubated overnight    SUBJECTIVE: Patient seen and examined at bedside.  Intubated and sedated. Was weaned and followed commands    MEDICATIONS  (STANDING):  aMIOdarone    Tablet 400 milliGRAM(s) Oral every 8 hours  atorvastatin 20 milliGRAM(s) Oral at bedtime  buMETAnide Infusion 4 mG/Hr (20 mL/Hr) IV Continuous <Continuous>  chlorhexidine 0.12% Liquid 15 milliLiter(s) Oral Mucosa every 12 hours  chlorhexidine 2% Cloths 1 Application(s) Topical daily  clopidogrel Tablet 75 milliGRAM(s) Oral daily  dexMEDEtomidine Infusion 0.2 MICROgram(s)/kG/Hr (6.2 mL/Hr) IV Continuous <Continuous>  DOBUTamine Infusion 2.5 MICROgram(s)/kG/Min (9.29 mL/Hr) IV Continuous <Continuous>  famotidine    Tablet 20 milliGRAM(s) Oral daily  heparin  Infusion 1500 Unit(s)/Hr (15 mL/Hr) IV Continuous <Continuous>  influenza  Vaccine (HIGH DOSE) 0.5 milliLiter(s) IntraMuscular once  insulin lispro (ADMELOG) corrective regimen sliding scale   SubCutaneous every 6 hours  norepinephrine Infusion 0.13 MICROgram(s)/kG/Min (15.1 mL/Hr) IV Continuous <Continuous>  propofol Infusion 30 MICROgram(s)/kG/Min (22.3 mL/Hr) IV Continuous <Continuous>  sodium bicarbonate 650 milliGRAM(s) Oral three times a day  vasopressin Infusion 0.04 Unit(s)/Min (6 mL/Hr) IV Continuous <Continuous>    OBJECTIVE:  ICU Vital Signs Last 24 Hrs  T(C): 36.5 (02 Apr 2025 04:30), Max: 36.6 (01 Apr 2025 10:52)  T(F): 97.7 (02 Apr 2025 04:30), Max: 97.8 (01 Apr 2025 10:52)  HR: 59 (02 Apr 2025 07:00) (58 - 101)  BP: 90/59 (01 Apr 2025 16:45) (82/53 - 133/87)  BP(mean): 68 (01 Apr 2025 16:45) (51 - 105)  ABP: 85/55 (02 Apr 2025 07:00) (78/55 - 110/78)  ABP(mean): 67 (02 Apr 2025 07:00) (64 - 91)  RR: 18 (02 Apr 2025 07:00) (12 - 47)  SpO2: 100% (02 Apr 2025 07:00) (80% - 100%)    O2 Parameters below as of 02 Apr 2025 07:00  Patient On (Oxygen Delivery Method): ventilator      01 Apr 2025 07:01  -  02 Apr 2025 07:00  --------------------------------------------------------  IN: 1981.7 mL / OUT: 2000 mL / NET: -18.3 mL    CAPILLARY BLOOD GLUCOSE    Physical Exam:  General: Well-appearing, NAD  HEENT: PERRL, EOMI, normal sclera and conjunctiva, normal oropharynx  Neck: Supple, no JVD, thyroid without masses or enlargement  Chest/Lungs: +nasal cannula. CTA bilaterally, no wheezing, rales, rhonchi or rub  Heart: RRR, normal S1, S2, no murmurs or gallops  Abdomen: Soft, ND, NTTP, normoactive bowel sounds  Extremities: +lichen planus bilaterally. +pitting edema. 2+ peripheral pulses b/l, no edema, clubbing or cyanosis  Skin: +lichen planus bilaterally  Neurological: A&Ox3, moves all extremities, no focal deficits    ============================I/O===========================   I&O's Detail    01 Apr 2025 07:01  -  02 Apr 2025 07:00  --------------------------------------------------------  IN:    Bumetanide: 280 mL    Dexmedetomidine: 24.8 mL    DOBUTamine: 167.4 mL    DOBUTamine: 27.9 mL    Enteral Tube Flush: 50 mL    Furosemide: 45 mL    Heparin: 213 mL    IV PiggyBack: 315 mL    Norepinephrine: 306.6 mL    Norepinephrine: 63.8 mL    Oral Fluid: 120 mL    Propofol: 296.2 mL    Vasopressin: 72 mL  Total IN: 1981.7 mL    OUT:    Indwelling Catheter - Urethral (mL): 2000 mL  Total OUT: 2000 mL    Total NET: -18.3 mL        ============================ LABS =========================                        12.3   9.63  )-----------( 128      ( 02 Apr 2025 06:29 )             37.9     04-02    130[L]  |  95[L]  |  91[H]  ----------------------------<  158[H]  4.8   |  18[L]  |  5.50[H]    Ca    8.7      02 Apr 2025 06:29  Phos  5.8     04-02  Mg     2.1     04-02    TPro  6.5  /  Alb  2.9[L]  /  TBili  0.8  /  DBili  x   /  AST  8[L]  /  ALT  9[L]  /  AlkPhos  92  04-02    Troponin T, High Sensitivity Result: 218 ng/L (04-01-25 @ 02:35)  Troponin T, High Sensitivity Result: 225 ng/L (03-31-25 @ 18:01)    CKMB Units: 4.2 ng/mL (03-31-25 @ 18:01)            LIVER FUNCTIONS - ( 02 Apr 2025 06:29 )  Alb: 2.9 g/dL / Pro: 6.5 g/dL / ALK PHOS: 92 U/L / ALT: 9 U/L / AST: 8 U/L / GGT: x           PT/INR - ( 02 Apr 2025 06:29 )   PT: 20.1 sec;   INR: 1.76 ratio         PTT - ( 02 Apr 2025 06:29 )  PTT:82.4 sec  ABG - ( 02 Apr 2025 06:25 )  pH, Arterial: 7.40  pH, Blood: x     /  pCO2: 34    /  pO2: 182   / HCO3: 21    / Base Excess: -3.0  /  SaO2: 99.6              Blood Gas Arterial, Lactate: 1.1 mmol/L (04-02-25 @ 06:25)  Blood Gas Venous - Lactate: 1.1 mmol/L (04-02-25 @ 06:25)  Blood Gas Arterial, Lactate: 1.7 mmol/L (04-02-25 @ 00:07)  Blood Gas Venous - Lactate: 1.6 mmol/L (04-02-25 @ 00:07)  Blood Gas Arterial, Lactate: 1.2 mmol/L (04-01-25 @ 21:36)  Blood Gas Venous - Lactate: 1.3 mmol/L (04-01-25 @ 21:36)  Blood Gas Arterial, Lactate: 1.3 mmol/L (04-01-25 @ 21:00)  Blood Gas Arterial, Lactate: 1.6 mmol/L (04-01-25 @ 19:51)  Blood Gas Arterial, Lactate: 1.1 mmol/L (04-01-25 @ 17:30)  Blood Gas Venous - Lactate: 1.0 mmol/L (04-01-25 @ 17:30)  Blood Gas Venous - Lactate: 1.1 mmol/L (04-01-25 @ 13:24)  Blood Gas Venous - Lactate: 1.2 mmol/L (04-01-25 @ 13:20)  Blood Gas Venous - Lactate: 1.0 mmol/L (04-01-25 @ 13:17)  Blood Gas Venous - Lactate: 1.2 mmol/L (04-01-25 @ 12:20)  Blood Gas Venous - Lactate: 1.6 mmol/L (03-31-25 @ 18:00)    Urinalysis Basic - ( 02 Apr 2025 06:29 )    Color: x / Appearance: x / SG: x / pH: x  Gluc: 158 mg/dL / Ketone: x  / Bili: x / Urobili: x   Blood: x / Protein: x / Nitrite: x   Leuk Esterase: x / RBC: x / WBC x   Sq Epi: x / Non Sq Epi: x / Bacteria: x      ======================MICRO/RAD/CARDIO=================  < from: TTE W or WO Ultrasound Enhancing Agent (04.01.25 @ 07:49) >   1. Left ventricular cavity is mildly dilated. Left ventricular wall thickness is normal. Left ventricular systolic function is moderately decreased. Regional wall motion abnormalities consistent with ischemic heart disease.   2. Entire apex, mid and apical anterior septum, and mid inferolateral segment are abnormal.   3. A bioprosthetic valve replacement valve replacement is present in the aortic position The prosthetic valve has reduced leaflet opening . Severe prosthetic aortic stenosis.    < end of copied text >

## 2025-04-02 NOTE — PROGRESS NOTE ADULT - ASSESSMENT
65 yo M with a fib (s/p dccv 3/25/25), CHF (likely moderately reduced EF, several TTE with poor windows), aortic stenosis s/p TAVR (11/2022), a flutter s/p ablation 2019 and s/p micra implant,  CKD3, obesity, venous stasis, HTN, HLD, who presents with 6 days of worsening dyspnea on exertion, admitted for AHRF likely iso CHF exacerbation, also with ANGELICA on CKD c/f cardiorenal syndrome. RRT on floors for SBP 80s requiring pressors and has been admitted to the CCU.    Plan:  ===NEURO===  - AO x 3  - No concerns    ===RESPIRATORY===  #Acute hypoxemic respiratory failure  - Most likely i/s/o acute on chronic HF exacerbation  - Lasix drip 15mg/hr   - 6L NC satting wnl    ===CARDIOVASCULAR===  #Hypotension  - Most likely from cardiogenic shock  - Levophed .05  - Wean as tolerated    #Acute on chronic HF exacerbation  - Lasix drip 15mg/hr   - F/u TTE    #Afib  #First degree block  - Continue amio 400 mg q8h until 4/4, start 200 mg qd thereafter  - Eliquis 5 mg bid.    #Aortic valve stenosis s/p prosthetic  - TTE shows severe AV stenosis over prostetic  - Possibly inciting factor to HF exac.  - Structural cards consult AM    #Troponemia  - No ischemic changes on EKG  - Most likely i/s/o ANGELICA on CKD    ===/RENAL===  #ANGELICA on CKD  #Cardiorenal syndrome  - Baseline 1.5-1.8  - Most likely i/s/o hypotension and fluid overload  - Renal sono ordered  - Monitor Cr on diuretics  - Nephro following    ===GI/NUTRITION===  - Regular diet  - No concerns    ===SKIN===  - No concerns    ===INFECTIOUS DISEASE===  - Slightly elevated WBC on admission 10.5K  - Afebrile  - F/u urine culture    ===ENDOCRINE===  - No concerns  - A1c and TSH in the morning    ===HEMATOLOGIC/DVT PPx===  - Heparin SubQ. 65 yo M with a fib (s/p dccv 3/25/25), CHF (likely moderately reduced EF, several TTE with poor windows), aortic stenosis s/p TAVR (2022), a flutter s/p ablation  and s/p micra implant,  CKD3, obesity, venous stasis, HTN, HLD, who presents with 6 days of worsening dyspnea on exertion, admitted for AHRF likely iso CHF exacerbation, also with ANGELICA on CKD c/f cardiorenal syndrome. RRT on floors for SBP 80s requiring pressors and has been admitted to the CCU.    Plan:  ===NEURO===  - Intubated and sedated  - Prop 45 -> 15  - Precedex 0.3   - Wean as tolerated    ===RESPIRATORY===  #Acute hypoxemic respiratory failure  - Intubated w/ settings 16/550/6/40%  - Most likely i/s/o acute on chronic HF exacerbation  - Lasix drip 15mg/hr   - Wean vent settings as appropriate     ===CARDIOVASCULAR===  #Hypotension  - Most likely from cardiogenic shock  - Levophed .15 -> .04  -  2.5  - Vaso .04  - Wean as tolerated    #Acute on chronic HF exacerbation  - Bumex drip on 4  - Goal 1-2L net neg daily  - Pt has been neg 175-200cc/hr    #Afib  #First degree block  - Continue amio 400 mg q8h until , start 200 mg qd thereafter  - C/w heparin    #Aortic valve stenosis s/p prosthetic  - TTE shows severe AV stenosis over prosthetic  - Possibly inciting factor to HF exac.  - Structural cards consult AM    #Troponemia  - No ischemic changes on EKG  - Most likely i/s/o ANGELICA on CKD    #Hx CABG  - C/w plavix    #HLD  - C/w lipitor    ===/RENAL===  #ANGELICA on CKD  #Cardiorenal syndrome  - Baseline 1.5-1.8  - Most likely i/s/o hypotension and fluid overload  - Renal sono ordered  - Monitor Cr on diuretics  - Nephro following    ===GI/NUTRITION===  - Regular diet  - No concerns    ===SKIN===  - No concerns    ===INFECTIOUS DISEASE===  - Slightly elevated WBC on admission 10.5K  - Afebrile  - F/u urine culture    ===ENDOCRINE===  - No concerns  - A1c and TSH in the morning    ===HEMATOLOGIC/DVT PPx===  - Heparin SubQ. 65 yo M with a fib (s/p dccv 3/25/25), CHF (likely moderately reduced EF, several TTE with poor windows), aortic stenosis s/p TAVR (2022), a flutter s/p ablation  and s/p micra implant,  CKD3, obesity, venous stasis, HTN, HLD, who presents with 6 days of worsening dyspnea on exertion, admitted for AHRF likely iso CHF exacerbation, also with ANGELICA on CKD c/f cardiorenal syndrome. RRT on floors for SBP 80s requiring pressors and has been admitted to the CCU.    Plan:  ===NEURO===  - Intubated, following commands  - Prop weaned  - Precedex 0.3   - Keeping on precedex for possible STANISLAW    ===RESPIRATORY===  #Acute hypoxemic respiratory failure  - Intubated w/ settings 16/550/6/40%  - Most likely i/s/o acute on chronic HF exacerbation  - Bumex drip  - Wean vent settings as appropriate     ===CARDIOVASCULAR===  #Cardiogenic shock  - Levophed 0.1  -  2.5  - Vaso .04  - If need more pressor support will switch to phenylephrine as pt has LVOT obstruction from aortic stenosis   - Wean as tolerated    #Acute on chronic HF exacerbation  - Bumex drip on 4  - Goal 1-2L net neg daily  - Pt has been neg 300cc/hr  - CVP goal 8-12  - Will be careful of overdiuresis given severe AS and preload dependant     #Afib  #First degree block  - Continue amio 400 mg q8h until , start 200 mg qd thereafter  - C/w heparin    #Aortic valve stenosis s/p prosthetic  - TTE shows severe AV stenosis over prosthetic  - Possibly inciting factor to HF exac.  - Structural cards consulted  - Plan for possible STANISLAW    #Troponemia  - No ischemic changes on EKG  - Most likely i/s/o ANGELICA on CKD    #Hx CABG  - C/w plavix    #HLD  - C/w lipitor    ===/RENAL===  #ANGELICA on CKD  #Cardiorenal syndrome  - Baseline 1.5-1.8  - Most likely i/s/o hypotension and fluid overload  - Renal sono ordered  - Monitor Cr on diuretics  - Nephro following    #Hyperkalemia  - Lokelma if K increases into 5s range    ===GI/NUTRITION===  - OG tube in place   - Keep NPO for possible TTEE    ===SKIN===  - Hx lichen planus  - No other concerns    ===INFECTIOUS DISEASE===  - Slightly elevated WBC on admission 10.5K  - Afebrile  - F/u urine culture    ===ENDOCRINE===  - No concerns  - A1c 5.8  - F/u TSH    ===HEMATOLOGIC/DVT PPx===  - On heparin ggt

## 2025-04-03 LAB
ALBUMIN SERPL ELPH-MCNC: 3.1 G/DL — LOW (ref 3.3–5)
ALP SERPL-CCNC: 96 U/L — SIGNIFICANT CHANGE UP (ref 40–120)
ALT FLD-CCNC: 8 U/L — LOW (ref 10–45)
ANION GAP SERPL CALC-SCNC: 15 MMOL/L — SIGNIFICANT CHANGE UP (ref 5–17)
APTT BLD: 58.5 SEC — HIGH (ref 24.5–35.6)
AST SERPL-CCNC: 6 U/L — LOW (ref 10–40)
BASOPHILS # BLD AUTO: 0.05 K/UL — SIGNIFICANT CHANGE UP (ref 0–0.2)
BASOPHILS NFR BLD AUTO: 0.4 % — SIGNIFICANT CHANGE UP (ref 0–2)
BILIRUB SERPL-MCNC: 1.2 MG/DL — SIGNIFICANT CHANGE UP (ref 0.2–1.2)
BUN SERPL-MCNC: 82 MG/DL — HIGH (ref 7–23)
CALCIUM SERPL-MCNC: 8.8 MG/DL — SIGNIFICANT CHANGE UP (ref 8.4–10.5)
CHLORIDE SERPL-SCNC: 93 MMOL/L — LOW (ref 96–108)
CO2 SERPL-SCNC: 22 MMOL/L — SIGNIFICANT CHANGE UP (ref 22–31)
CREAT SERPL-MCNC: 4.75 MG/DL — HIGH (ref 0.5–1.3)
EGFR: 13 ML/MIN/1.73M2 — LOW
EGFR: 13 ML/MIN/1.73M2 — LOW
EOSINOPHIL # BLD AUTO: 0.05 K/UL — SIGNIFICANT CHANGE UP (ref 0–0.5)
EOSINOPHIL NFR BLD AUTO: 0.4 % — SIGNIFICANT CHANGE UP (ref 0–6)
GAS PNL BLDA: SIGNIFICANT CHANGE UP
GAS PNL BLDV: SIGNIFICANT CHANGE UP
GLUCOSE BLDC GLUCOMTR-MCNC: 132 MG/DL — HIGH (ref 70–99)
GLUCOSE BLDC GLUCOMTR-MCNC: 141 MG/DL — HIGH (ref 70–99)
GLUCOSE BLDC GLUCOMTR-MCNC: 148 MG/DL — HIGH (ref 70–99)
GLUCOSE SERPL-MCNC: 151 MG/DL — HIGH (ref 70–99)
HCT VFR BLD CALC: 37.9 % — LOW (ref 39–50)
HGB BLD-MCNC: 12.7 G/DL — LOW (ref 13–17)
IMM GRANULOCYTES NFR BLD AUTO: 0.4 % — SIGNIFICANT CHANGE UP (ref 0–0.9)
INR BLD: 1.41 RATIO — HIGH (ref 0.85–1.16)
LYMPHOCYTES # BLD AUTO: 0.51 K/UL — LOW (ref 1–3.3)
LYMPHOCYTES # BLD AUTO: 4.6 % — LOW (ref 13–44)
MAGNESIUM SERPL-MCNC: 2.2 MG/DL — SIGNIFICANT CHANGE UP (ref 1.6–2.6)
MCHC RBC-ENTMCNC: 31.1 PG — SIGNIFICANT CHANGE UP (ref 27–34)
MCHC RBC-ENTMCNC: 33.5 G/DL — SIGNIFICANT CHANGE UP (ref 32–36)
MCV RBC AUTO: 92.7 FL — SIGNIFICANT CHANGE UP (ref 80–100)
MONOCYTES # BLD AUTO: 0.72 K/UL — SIGNIFICANT CHANGE UP (ref 0–0.9)
MONOCYTES NFR BLD AUTO: 6.4 % — SIGNIFICANT CHANGE UP (ref 2–14)
NEUTROPHILS # BLD AUTO: 9.8 K/UL — HIGH (ref 1.8–7.4)
NEUTROPHILS NFR BLD AUTO: 87.8 % — HIGH (ref 43–77)
NRBC BLD AUTO-RTO: 0 /100 WBCS — SIGNIFICANT CHANGE UP (ref 0–0)
PHOSPHATE SERPL-MCNC: 5.8 MG/DL — HIGH (ref 2.5–4.5)
PLATELET # BLD AUTO: 149 K/UL — LOW (ref 150–400)
POTASSIUM SERPL-MCNC: 4.3 MMOL/L — SIGNIFICANT CHANGE UP (ref 3.5–5.3)
POTASSIUM SERPL-SCNC: 4.3 MMOL/L — SIGNIFICANT CHANGE UP (ref 3.5–5.3)
PROT SERPL-MCNC: 6.8 G/DL — SIGNIFICANT CHANGE UP (ref 6–8.3)
PROTHROM AB SERPL-ACNC: 16 SEC — HIGH (ref 9.9–13.4)
RBC # BLD: 4.09 M/UL — LOW (ref 4.2–5.8)
RBC # FLD: 16.3 % — HIGH (ref 10.3–14.5)
SODIUM SERPL-SCNC: 130 MMOL/L — LOW (ref 135–145)
WBC # BLD: 11.18 K/UL — HIGH (ref 3.8–10.5)
WBC # FLD AUTO: 11.18 K/UL — HIGH (ref 3.8–10.5)

## 2025-04-03 PROCEDURE — 99291 CRITICAL CARE FIRST HOUR: CPT

## 2025-04-03 PROCEDURE — 99223 1ST HOSP IP/OBS HIGH 75: CPT | Mod: 57

## 2025-04-03 PROCEDURE — 99292 CRITICAL CARE ADDL 30 MIN: CPT

## 2025-04-03 PROCEDURE — 93010 ELECTROCARDIOGRAM REPORT: CPT

## 2025-04-03 PROCEDURE — 99233 SBSQ HOSP IP/OBS HIGH 50: CPT | Mod: GC

## 2025-04-03 RX ORDER — AMIODARONE HYDROCHLORIDE 50 MG/ML
1 INJECTION, SOLUTION INTRAVENOUS
Qty: 450 | Refills: 0 | Status: DISCONTINUED | OUTPATIENT
Start: 2025-04-03 | End: 2025-04-03

## 2025-04-03 RX ORDER — ATORVASTATIN CALCIUM 80 MG/1
40 TABLET, FILM COATED ORAL AT BEDTIME
Refills: 0 | Status: DISCONTINUED | OUTPATIENT
Start: 2025-04-03 | End: 2025-04-22

## 2025-04-03 RX ORDER — AMIODARONE HYDROCHLORIDE 50 MG/ML
0.5 INJECTION, SOLUTION INTRAVENOUS
Qty: 450 | Refills: 0 | Status: DISCONTINUED | OUTPATIENT
Start: 2025-04-03 | End: 2025-04-04

## 2025-04-03 RX ORDER — ALBUMIN (HUMAN) 12.5 G/50ML
250 INJECTION, SOLUTION INTRAVENOUS ONCE
Refills: 0 | Status: COMPLETED | OUTPATIENT
Start: 2025-04-03 | End: 2025-04-03

## 2025-04-03 RX ADMIN — VASOPRESSIN 6 UNIT(S)/MIN: 20 INJECTION INTRAVENOUS at 08:14

## 2025-04-03 RX ADMIN — ALBUMIN (HUMAN) 125 MILLILITER(S): 12.5 INJECTION, SOLUTION INTRAVENOUS at 02:16

## 2025-04-03 RX ADMIN — VASOPRESSIN 6 UNIT(S)/MIN: 20 INJECTION INTRAVENOUS at 19:55

## 2025-04-03 RX ADMIN — AMIODARONE HYDROCHLORIDE 33.3 MG/MIN: 50 INJECTION, SOLUTION INTRAVENOUS at 13:16

## 2025-04-03 RX ADMIN — HEPARIN SODIUM 13.5 UNIT(S)/HR: 1000 INJECTION INTRAVENOUS; SUBCUTANEOUS at 08:15

## 2025-04-03 RX ADMIN — NOREPINEPHRINE BITARTRATE 11.6 MICROGRAM(S)/KG/MIN: 8 SOLUTION at 08:14

## 2025-04-03 RX ADMIN — NOREPINEPHRINE BITARTRATE 11.6 MICROGRAM(S)/KG/MIN: 8 SOLUTION at 19:55

## 2025-04-03 RX ADMIN — HEPARIN SODIUM 13.5 UNIT(S)/HR: 1000 INJECTION INTRAVENOUS; SUBCUTANEOUS at 19:54

## 2025-04-03 RX ADMIN — AMIODARONE HYDROCHLORIDE 16.7 MG/MIN: 50 INJECTION, SOLUTION INTRAVENOUS at 19:55

## 2025-04-03 RX ADMIN — HEPARIN SODIUM 13.5 UNIT(S)/HR: 1000 INJECTION INTRAVENOUS; SUBCUTANEOUS at 02:02

## 2025-04-03 NOTE — DIETITIAN INITIAL EVALUATION ADULT - OTHER INFO
GI/INTAKE:   -NPO status x 3 days  -Per SLP, failed bedside swallow eval; plan for FEES tomorrow   -Last BM documented 4/2; no bowel regimen ordered     ENDO:   -? Hx of DM vs new onset of preDM  -Latest A1c: 5.8%   -Sliding scale insulin ordered     RENAL:  -AGNELICA on CKD   -Sodium bicarb ordered   -Hyponatremic   -Hyperphosphatemic     CV:  -Acute on chronic HF exacerbation  GI/INTAKE:   -NPO status x 3 days  -Per SLP, failed bedside swallow eval; plan for FEES tomorrow   -Last BM documented ; no bowel regimen ordered     ENDO:   -? Hx of DM vs new onset of preDM  -Latest A1c: 5.8%   -Sliding scale insulin ordered     RENAL:  -ANGELICA on CKD   -Sodium bicarb ordered   -Hyponatremic   -Hyperphosphatemic     CV:  -Acute on chronic HF exacerbation   -Rapid response: hypotensive ()     RESP:   -Intubated in setting of acute hypoxic resp failure ()   -Extubated ()   -Supplemental O2 via NC     WEIGHT HX:   -Current dosin pounds   -Per Pt, UBW: 315 pounds   -Endorses unintentional weight loss

## 2025-04-03 NOTE — PROGRESS NOTE ADULT - ASSESSMENT
65 y/o M with hx of Afib s/p DCCV 3/25/25, CHF, Aortic stenosis s/p TAVR 11/2022, CAD s/p CABG, Aflutter s/p ablation and Micra implant, CKD 3, Obesity, venous stasis, HTN, HLD presented with 6 days of worsening SOB on exertion associated with orthopnea and chest pain.   Also reports several days decreased appetite. Denies fevers, chills, cough, abdominal pain, NVD, urinary sx, leg swelling. Patient had a cardioversion for atrial fibrillation at this hospital recently, which was reportedly unsuccessful. Patient has a scheduled appointment with Dr. Philip on 04/24/2025 to discuss possible ablation. His outpt cardiologist is Dr. Miranda. (Cardiology)  On arrival to ED, vitals were 97.3F, HR 71, BP 94/57, RR 22, 97% on 2L NC. Labs pertinent for WBC 10.68, INR 2.55, Na 132, K 5.5, bicarb 20, BUN/Cr 78/5.17 (baseline Cr appears ~1.5-1.8), trop 225, BNP 28241, VBG ph 7.3, pco2/po2 wnl. UA without evidence of infection.  nephrology was consulted for management of ANGELICA on CKD. Pt has baseline Scr of ~1.5- 1.8. Scr was 2.7 on 3/25/25. Pt is on Entresto ( started recently) and he mentioned that he was taking Motrin occasionally. No herbal medications/ supplements.  Scr at the time of admission was 5.17 3/31/25 and it further worsened to 5.45 4/1/25.    Pt follows Dr. Stewart Brown (Nephrology)      ANGELICA on CKD:  Pt has hx of CKD 3. Baseline Scr 1.5-1.8.   At the time of admission - Scr was 5.17 and it further worsened to 5.45--> 5.5. Today it improved to 4.75.  Pt has lopez cath in place. UOP was 6.1L and net negative ~4.9Lcc. Was on bumex gtt - stopped on 4/2/25 evening.   UA showed trace LE, blood ( Likely due to lopez), casts 17. UPCR 0.7. No hx of DM.   Pt was on entresto.   BP was low.   On Norepi and vaso gtt.   ANGELICA on CKD likely in setting of HF exacerbation, entresto use and hypotension mediated ATN.    PLAN:  Consider bumex 2mg IV BID today.   continue to hold entresto.   Continue with sodium bicarb 650 TID for now.   Monitor strict I/Os and daily wts.   Get USG kidney and bladder.   No urgent need of HD for now. Will reassess tomorrow.   Avoid nephrotoxins  Dose medications as per eGFR    hyperkalemia likely in setting of Entresto use and renal failure.   Serum K is better today.   Lokelma 10g PRN for now.   Continue to hold entresto.     Hyponatremia is likely in setting of impaired water clearance in setting of worsening renal function and volume overload 2/2 heart failure  Bumex as noted above    Wait for the final reccs from the attending.

## 2025-04-03 NOTE — DIETITIAN INITIAL EVALUATION ADULT - PERTINENT MEDS FT
MEDICATIONS  (STANDING):  aMIOdarone    Tablet 400 milliGRAM(s) Oral every 8 hours  atorvastatin 40 milliGRAM(s) Oral at bedtime  chlorhexidine 2% Cloths 1 Application(s) Topical daily  clopidogrel Tablet 75 milliGRAM(s) Oral daily  famotidine    Tablet 20 milliGRAM(s) Oral every 48 hours  heparin  Infusion 1500 Unit(s)/Hr (13.5 mL/Hr) IV Continuous <Continuous>  influenza  Vaccine (HIGH DOSE) 0.5 milliLiter(s) IntraMuscular once  insulin lispro (ADMELOG) corrective regimen sliding scale   SubCutaneous every 6 hours  norepinephrine Infusion 0.05 MICROgram(s)/kG/Min (11.6 mL/Hr) IV Continuous <Continuous>  sodium bicarbonate 650 milliGRAM(s) Oral three times a day  vasopressin Infusion 0.04 Unit(s)/Min (6 mL/Hr) IV Continuous <Continuous>    MEDICATIONS  (PRN):

## 2025-04-03 NOTE — DIETITIAN INITIAL EVALUATION ADULT - NSICDXPASTMEDICALHX_GEN_ALL_CORE_FT
PAST MEDICAL HISTORY:  ANGELICA (acute kidney injury) 4/2021    AS (aortic stenosis)     Atrial fibrillation 2018    Atrial flutter s/p ablation 2018    CAD (coronary artery disease) s/p CABG    CHF (congestive heart failure) denies any recent exacerbation    Chronic kidney disease, unspecified CKD stage     H/O scoliosis     HTN (hypertension)     Hypercholesteremia     Ischemic cardiomyopathy     Lichen planus chronic ( b/L LE)    Lower back pain     MI (myocardial infarction) 2012    Morbid obesity     WILFRED (obstructive sleep apnea) can not tolerate bipap at night    Osteoarthritis shoulders, hips, knee    Peripheral edema chronic    Status post placement of cardiac pacemaker micraleadless PPM, [id number] last interrogation 7/6/2022    Stented coronary artery 2019    Thrombus of left atrial appendage 2018

## 2025-04-03 NOTE — CONSULT NOTE ADULT - SUBJECTIVE AND OBJECTIVE BOX
HPI:   65 yo M with a fib (s/p dccv 3/25/25), CHF (likely moderately reduced EF, several TTE with poor windows), aortic stenosis s/p TAVR (11/2022), CAD s/p CABG, a flutter s/p ablation 2019 and s/p micra implant,  CKD3, obesity, venous stasis, HTN, HLD, who presents with 6 days of worsening dyspnea on exertion. Pt also reports associated orthopnea and chest pain on exertion but not at rest which is located in the center of his chest. Pain is described as burning. Also reports several days decreased appetite. Denies fevers, chills, cough, abdominal pain, NVD, urinary sx, leg swelling. Patient had a cardioversion for atrial fibrillation at this hospital recently, which was reportedly unsuccessful. Patient has a scheduled appointment with Dr. Philip on 04/24/2025 to discuss possible ablation. His outpt cardiologist is Dr. Paul.     On arrival to ED, vitals were 97.3F, HR 71, BP 94/57, RR 22, 97% on 2L NC. Labs pertinent for WBC 10.68, INR 2.55, Na 132, K 5.5, bicarb 20, BUN/Cr 78/5.17 (baseline Cr appears ~1.5-1.8), trop 225, BNP 45012, VBG ph 7.3, pco2/po2 wnl. UA without evidence of infection. EKG  on admission 1st deg av block, low voltage, poor r wave progression, R axis deviation CXR with diffuse hazy opacities suggest mild pulmonary edema and small right and trace left pleural effusions. Pt was given IV Bumex 1mg x1, IV bumex 2mg x1, IV Bumex 4mg x1, and lokelma 5mg x1, then admitted for further management.    (01 Apr 2025 01:59)    The Structural Team was consulted for evaluation of aortic stenosis of a previously implanted TAVR valve. Mr. Smith is known to our team, he underwent transcatheter aortic valve replacement on 11/18/22 with a 26mm Cullen 3 valve via femoral approach. He has a history of CAD status post three-vessel CABG (LIMA to LAD on 12/2012, SVG to OM1/PDA is occluded) with PCI to RCA on 6/2019. He presents now with acute on chronic CHF, ANGELICA/CKD with hypoxic respiratory requiring intubation and pressor support. He is now extubated, sitting up in bed awake and oriented. He remains on vasopressin and levophed.        PAST MEDICAL & SURGICAL HISTORY:  CAD (coronary artery disease)  s/p CABG      Hypercholesteremia      Thrombus of left atrial appendage  2018      Ischemic cardiomyopathy      WILFRED (obstructive sleep apnea)  can not tolerate bipap at night      HTN (hypertension)      Atrial fibrillation  2018      CHF (congestive heart failure)  denies any recent exacerbation      Peripheral edema  chronic      Lichen planus  chronic ( b/L LE)      Atrial flutter  s/p ablation 2018      MI (myocardial infarction)  2012      Stented coronary artery  2019      AS (aortic stenosis)      Chronic kidney disease, unspecified CKD stage      ANGELICA (acute kidney injury)  4/2021      Morbid obesity      Status post placement of cardiac pacemaker  micraleadless PPM, [id number] last interrogation 7/6/2022      Osteoarthritis  shoulders, hips, knee      H/O scoliosis      Lower back pain      History of elbow surgery      S/P CABG (coronary artery bypass graft)  x3, 2012      Cardiac pacemaker  leadless 2018      History of coronary artery stent placement  2019 ( one more after cabg)      History of adenoidectomy      H/O atrioventricular karlee ablation  2018          REVIEW OF SYSTEMS:    CONSTITUTIONAL: No weakness, fevers or chills  EYES/ENT: No visual changes;  No vertigo or throat pain   NECK: No pain or stiffness  RESPIRATORY: No cough, wheezing, hemoptysis; No shortness of breath  CARDIOVASCULAR: No chest pain or palpitations  GASTROINTESTINAL: No abdominal or epigastric pain. No nausea, vomiting, or hematemesis; No diarrhea or constipation. No melena or hematochezia.  GENITOURINARY: No dysuria, frequency or hematuria  NEUROLOGICAL: No numbness or weakness  SKIN: No itching, rashes      MEDICATIONS  (STANDING):  aMIOdarone    Tablet 400 milliGRAM(s) Oral every 8 hours  atorvastatin 40 milliGRAM(s) Oral at bedtime  chlorhexidine 2% Cloths 1 Application(s) Topical daily  clopidogrel Tablet 75 milliGRAM(s) Oral daily  famotidine    Tablet 20 milliGRAM(s) Oral every 48 hours  heparin  Infusion 1500 Unit(s)/Hr (13.5 mL/Hr) IV Continuous <Continuous>  influenza  Vaccine (HIGH DOSE) 0.5 milliLiter(s) IntraMuscular once  insulin lispro (ADMELOG) corrective regimen sliding scale   SubCutaneous every 6 hours  norepinephrine Infusion 0.05 MICROgram(s)/kG/Min (11.6 mL/Hr) IV Continuous <Continuous>  sodium bicarbonate 650 milliGRAM(s) Oral three times a day  vasopressin Infusion 0.04 Unit(s)/Min (6 mL/Hr) IV Continuous <Continuous>    MEDICATIONS  (PRN):      Allergies    metoprolol (Short breath)    Intolerances        SOCIAL HISTORY:    FAMILY HISTORY:      Vital Signs Last 24 Hrs  T(C): 36.6 (03 Apr 2025 07:15), Max: 36.8 (02 Apr 2025 23:00)  T(F): 97.8 (03 Apr 2025 07:15), Max: 98.2 (02 Apr 2025 23:00)  HR: 67 (03 Apr 2025 11:00) (56 - 76)  BP: 84/54 (02 Apr 2025 22:15) (71/51 - 84/54)  BP(mean): 64 (02 Apr 2025 22:15) (56 - 64)  RR: 43 (03 Apr 2025 11:00) (9 - 43)  SpO2: 90% (03 Apr 2025 11:00) (88% - 100%)    Parameters below as of 03 Apr 2025 11:00  Patient On (Oxygen Delivery Method): nasal cannula  O2 Flow (L/min): 2      Physical Exam  General: A/ox 3, No acute Distress  Neck: Supple, NO JVD  Cardiac: S1 S2, No M/R/G  Pulmonary: CTAB, Breathing unlabored, No Rhonchi/Rales/Wheezing  Abdomen: Soft, Non -tender, +BS x 4 quads  Extremities: No Rashes, No edema  Neuro: A/o x 3, No focal deficits    LABS:                        12.7   11.18 )-----------( 149      ( 03 Apr 2025 01:11 )             37.9     04-03    130[L]  |  93[L]  |  82[H]  ----------------------------<  151[H]  4.3   |  22  |  4.75[H]    Ca    8.8      03 Apr 2025 01:11  Phos  5.8     04-03  Mg     2.2     04-03    TPro  6.8  /  Alb  3.1[L]  /  TBili  1.2  /  DBili  x   /  AST  6[L]  /  ALT  8[L]  /  AlkPhos  96  04-03    PT/INR - ( 03 Apr 2025 01:11 )   PT: 16.0 sec;   INR: 1.41 ratio         PTT - ( 03 Apr 2025 01:11 )  PTT:58.5 sec  Urinalysis Basic - ( 03 Apr 2025 01:11 )    Color: x / Appearance: x / SG: x / pH: x  Gluc: 151 mg/dL / Ketone: x  / Bili: x / Urobili: x   Blood: x / Protein: x / Nitrite: x   Leuk Esterase: x / RBC: x / WBC x   Sq Epi: x / Non Sq Epi: x / Bacteria: x        RADIOLOGY & ADDITIONAL STUDIES: HPI:   65 yo M with a fib (s/p dccv 3/25/25), CHF (likely moderately reduced EF, several TTE with poor windows), aortic stenosis s/p TAVR (11/2022), CAD s/p CABG, a flutter s/p ablation 2019 and s/p micra implant,  CKD3, obesity, venous stasis, HTN, HLD, who presents with 6 days of worsening dyspnea on exertion. Pt also reports associated orthopnea and chest pain on exertion but not at rest which is located in the center of his chest. Pain is described as burning. Also reports several days decreased appetite. Denies fevers, chills, cough, abdominal pain, NVD, urinary sx, leg swelling. Patient had a cardioversion for atrial fibrillation at this hospital recently, which was reportedly unsuccessful. Patient has a scheduled appointment with Dr. Philip on 04/24/2025 to discuss possible ablation. His outpt cardiologist is Dr. Paul.     On arrival to ED, vitals were 97.3F, HR 71, BP 94/57, RR 22, 97% on 2L NC. Labs pertinent for WBC 10.68, INR 2.55, Na 132, K 5.5, bicarb 20, BUN/Cr 78/5.17 (baseline Cr appears ~1.5-1.8), trop 225, BNP 53294, VBG ph 7.3, pco2/po2 wnl. UA without evidence of infection. EKG  on admission 1st deg av block, low voltage, poor r wave progression, R axis deviation CXR with diffuse hazy opacities suggest mild pulmonary edema and small right and trace left pleural effusions. Pt was given IV Bumex 1mg x1, IV bumex 2mg x1, IV Bumex 4mg x1, and lokelma 5mg x1, then admitted for further management.    (01 Apr 2025 01:59)    The Structural Team was consulted for evaluation of aortic stenosis of a previously implanted TAVR valve. Mr. Smith is known to our team, he underwent transcatheter aortic valve replacement on 11/18/22 with a 26mm Cullen 3 valve via femoral approach. He has a history of CAD status post three-vessel CABG (LIMA to LAD on 12/2012, SVG to OM1/PDA is occluded) with PCI to RCA on 6/2019. He presents now with acute on chronic CHF, ANGELICA/CKD with hypoxic respiratory requiring intubation and pressor support. He is now extubated, sitting up in bed awake and oriented. He remains on vasopressin and levophed.        PAST MEDICAL & SURGICAL HISTORY:  CAD (coronary artery disease)  s/p CABG      Hypercholesteremia      Thrombus of left atrial appendage  2018      Ischemic cardiomyopathy      WILFRED (obstructive sleep apnea)  can not tolerate bipap at night      HTN (hypertension)      Atrial fibrillation  2018      CHF (congestive heart failure)  denies any recent exacerbation      Peripheral edema  chronic      Lichen planus  chronic ( b/L LE)      Atrial flutter  s/p ablation 2018      MI (myocardial infarction)  2012      Stented coronary artery  2019      AS (aortic stenosis)      Chronic kidney disease, unspecified CKD stage      ANGELICA (acute kidney injury)  4/2021      Morbid obesity      Status post placement of cardiac pacemaker  micraleadless PPM, [id number] last interrogation 7/6/2022      Osteoarthritis  shoulders, hips, knee      H/O scoliosis      Lower back pain      History of elbow surgery      S/P CABG (coronary artery bypass graft)  x3, 2012      Cardiac pacemaker  leadless 2018      History of coronary artery stent placement  2019 ( one more after cabg)      History of adenoidectomy      H/O atrioventricular karlee ablation  2018          REVIEW OF SYSTEMS:    CONSTITUTIONAL: No weakness, fevers or chills  EYES/ENT: No visual changes;  No vertigo or throat pain   NECK: No pain or stiffness  RESPIRATORY: No cough, wheezing, hemoptysis; No shortness of breath at rest  CARDIOVASCULAR: No chest pain or palpitations, PND, or orthopnea  GASTROINTESTINAL: No abdominal or epigastric pain. No nausea, vomiting, or hematemesis; No diarrhea or constipation. No melena or hematochezia.  GENITOURINARY: No dysuria, frequency or hematuria  NEUROLOGICAL: No numbness or weakness  SKIN: No itching, rashes      MEDICATIONS  (STANDING):  aMIOdarone    Tablet 400 milliGRAM(s) Oral every 8 hours  atorvastatin 40 milliGRAM(s) Oral at bedtime  chlorhexidine 2% Cloths 1 Application(s) Topical daily  clopidogrel Tablet 75 milliGRAM(s) Oral daily  famotidine    Tablet 20 milliGRAM(s) Oral every 48 hours  heparin  Infusion 1500 Unit(s)/Hr (13.5 mL/Hr) IV Continuous <Continuous>  influenza  Vaccine (HIGH DOSE) 0.5 milliLiter(s) IntraMuscular once  insulin lispro (ADMELOG) corrective regimen sliding scale   SubCutaneous every 6 hours  norepinephrine Infusion 0.05 MICROgram(s)/kG/Min (11.6 mL/Hr) IV Continuous <Continuous>  sodium bicarbonate 650 milliGRAM(s) Oral three times a day  vasopressin Infusion 0.04 Unit(s)/Min (6 mL/Hr) IV Continuous <Continuous>    MEDICATIONS  (PRN):      Allergies    metoprolol (Short breath)    Intolerances        SOCIAL HISTORY:    FAMILY HISTORY:      Vital Signs Last 24 Hrs  T(C): 36.6 (03 Apr 2025 07:15), Max: 36.8 (02 Apr 2025 23:00)  T(F): 97.8 (03 Apr 2025 07:15), Max: 98.2 (02 Apr 2025 23:00)  HR: 67 (03 Apr 2025 11:00) (56 - 76)  BP: 84/54 (02 Apr 2025 22:15) (71/51 - 84/54)  BP(mean): 64 (02 Apr 2025 22:15) (56 - 64)  RR: 43 (03 Apr 2025 11:00) (9 - 43)  SpO2: 90% (03 Apr 2025 11:00) (88% - 100%)    Parameters below as of 03 Apr 2025 11:00  Patient On (Oxygen Delivery Method): nasal cannula  O2 Flow (L/min): 2      Physical Exam  Gen: NAD on supplemental oxygen.   HEENT: PERRL, supple neck, clear oropharynx  Pulm: Lower zone decreased breath sounds.   CV: RRR, S1S2; II/VI RUSB murmur  Abd: +BS, soft, nontender/nondistended  : No suprapubic tenderness, lopez in place.   Extremities: + bilateral  Hip edema noted. Pedal edema improved.   Skin: Warm, Lower extremity chronic changes.    LABS:                        12.7   11.18 )-----------( 149      ( 03 Apr 2025 01:11 )             37.9     04-03    130[L]  |  93[L]  |  82[H]  ----------------------------<  151[H]  4.3   |  22  |  4.75[H]    Ca    8.8      03 Apr 2025 01:11  Phos  5.8     04-03  Mg     2.2     04-03    TPro  6.8  /  Alb  3.1[L]  /  TBili  1.2  /  DBili  x   /  AST  6[L]  /  ALT  8[L]  /  AlkPhos  96  04-03    PT/INR - ( 03 Apr 2025 01:11 )   PT: 16.0 sec;   INR: 1.41 ratio         PTT - ( 03 Apr 2025 01:11 )  PTT:58.5 sec  Urinalysis Basic - ( 03 Apr 2025 01:11 )    Color: x / Appearance: x / SG: x / pH: x  Gluc: 151 mg/dL / Ketone: x  / Bili: x / Urobili: x   Blood: x / Protein: x / Nitrite: x   Leuk Esterase: x / RBC: x / WBC x   Sq Epi: x / Non Sq Epi: x / Bacteria: x        RADIOLOGY & ADDITIONAL STUDIES:

## 2025-04-03 NOTE — PROGRESS NOTE ADULT - ATTENDING COMMENTS
events noted   intubated, now extubated     called for chon  pt reports baseline Renal fxn in 40s, followed by outpt nephrologist Dr Stringer  #Baseline CKD3  #admitted with chon in setting of HF and ENtresto and decreased po intake and diarrhea  chon could be cardiorenal +/- ATN  cr seems to have plateaued, and improving now   nonoliguric on bumex drip; if bumex dri is off, change to bumex 2mg IV Bid  he has a lopez  monitor UO  monitor daily cr trends  lytes stable  no urgent indication for RRT  check renal sono  #HF  bumex    # hyperkalemia  if K uptrends can give lokelma 10g  #hypotension  monitor BP trends  on vaso/levo

## 2025-04-03 NOTE — PROGRESS NOTE ADULT - SUBJECTIVE AND OBJECTIVE BOX
[facility] Division of Kidney Diseases & Hypertension  FOLLOW UP NOTE  [phone]--------------------------------------------------------------------------------  Chief Complaint:Acute on chronic systolic congestive heart failure    24 hour events/subjective:  Pt was seen and examined earlier today. Pt was extubated. No other acute overnight events. Pt reports feeling better. Remained on supplemental oxygen.   Pt denies any worsening of SOB/ Constipation/ Diarrhea/ Nausea/ Vomiting/ abdominal pain/ chest pain/ tingling/ numbness.         PAST HISTORY  --------------------------------------------------------------------------------  No significant changes to PMH, PSH, FHx, SHx, unless otherwise noted    ALLERGIES & MEDICATIONS  --------------------------------------------------------------------------------  Allergies    metoprolol (Short breath)    Intolerances      Standing Inpatient Medications  aMIOdarone    Tablet 400 milliGRAM(s) Oral every 8 hours  atorvastatin 40 milliGRAM(s) Oral at bedtime  chlorhexidine 2% Cloths 1 Application(s) Topical daily  clopidogrel Tablet 75 milliGRAM(s) Oral daily  famotidine    Tablet 20 milliGRAM(s) Oral every 48 hours  heparin  Infusion 1500 Unit(s)/Hr IV Continuous <Continuous>  influenza  Vaccine (HIGH DOSE) 0.5 milliLiter(s) IntraMuscular once  insulin lispro (ADMELOG) corrective regimen sliding scale   SubCutaneous every 6 hours  norepinephrine Infusion 0.05 MICROgram(s)/kG/Min IV Continuous <Continuous>  sodium bicarbonate 650 milliGRAM(s) Oral three times a day  vasopressin Infusion 0.04 Unit(s)/Min IV Continuous <Continuous>    PRN Inpatient Medications      REVIEW OF SYSTEMS  --------------------------------------------------------------------------------  as noted above.     VITALS/PHYSICAL EXAM  --------------------------------------------------------------------------------  T(C): 36.6 (04-03-25 @ 07:15), Max: 36.8 (04-02-25 @ 23:00)  HR: 68 (04-03-25 @ 10:00) (56 - 76)  BP: 84/54 (04-02-25 @ 22:15) (71/51 - 84/54)  RR: 28 (04-03-25 @ 10:00) (9 - 39)  SpO2: 91% (04-03-25 @ 10:00) (88% - 100%)  Wt(kg): --  Height (cm): 172.7 (04-01-25 @ 13:00)  Weight (kg): 123.9 (04-01-25 @ 13:00)  BMI (kg/m2): 41.5 (04-01-25 @ 13:00)  BSA (m2): 2.33 (04-01-25 @ 13:00)      04-02-25 @ 07:01  -  04-03-25 @ 07:00  --------------------------------------------------------  IN: 1184.4 mL / OUT: 6120 mL / NET: -2755.6 mL    04-03-25 @ 07:01  -  04-03-25 @ 10:49  --------------------------------------------------------  IN: 120 mL / OUT: 425 mL / NET: -305 mL      Physical Exam:  Gen: NAD on supplemental oxygen.   	HEENT: PERRL, supple neck, clear oropharynx  	Pulm: Lower zone decreased breath sounds.   	CV: RRR, S1S2;  	Abd: +BS, soft, nontender/nondistended  	: No suprapubic tenderness, lopez in place.                Extremities: + bilateral  Hip edema noted. Pedal edema improved.                Skin: Warm, Lower extremity chronic changes.    LABS/STUDIES  --------------------------------------------------------------------------------              12.7   11.18 >-----------<  149      [04-03-25 @ 01:11]              37.9     130  |  93  |  82  ----------------------------<  151      [04-03-25 @ 01:11]  4.3   |  22  |  4.75        Ca     8.8     [04-03-25 @ 01:11]      Mg     2.2     [04-03-25 @ 01:11]      Phos  5.8     [04-03-25 @ 01:11]    TPro  6.8  /  Alb  3.1  /  TBili  1.2  /  DBili  x   /  AST  6   /  ALT  8   /  AlkPhos  96  [04-03-25 @ 01:11]    PT/INR: PT 16.0 , INR 1.41       [04-03-25 @ 01:11]  PTT: 58.5       [04-03-25 @ 01:11]      Creatinine Trend:  SCr 4.75 [04-03 @ 01:11]  SCr 4.95 [04-02 @ 17:28]  SCr 5.16 [04-02 @ 12:22]  SCr 5.50 [04-02 @ 06:29]  SCr 5.46 [04-02 @ 00:32]    Urine Creatinine 163      [04-01-25 @ 09:25]  Urine Protein 122      [04-01-25 @ 09:25]  Urine Sodium 29      [04-01-25 @ 09:25]    TSH 3.15      [04-01-25 @ 21:43]  Lipid: chol 94, TG 74, HDL 29, LDL --      [04-01-25 @ 06:18]

## 2025-04-03 NOTE — DIETITIAN INITIAL EVALUATION ADULT - PROBLEM SELECTOR PLAN 1
[FreeTextEntry1] : Thank you for referring her for cardiovascular evaluation.\par She is a 66-year-old with a history of borderline hyperlipidemia and difficulty sleeping who reports feeling "feathery" on occasions.  She describes feeling of sudden fatigue and may be lightheaded, possible palpitations/fluttering that occurs intermittently over the past few years.  These episodes occur suddenly and are usually in the standing position.  These episodes are not similar to her vertigo, from which she suffers intermittently.\par She denies any associated shortness of breath, chest pains, palpitations or syncope.\par She has no history of coronary artery disease, hypertension (though her elevated systolic pressure is more notable of late) diabetes mellitus, smoking or family history of premature coronary artery disease.  Her father had coronary disease in his 60s, her uncle had heart disease in his 40s and her sister recently had a CVA.\par Mrs. Madison exercises routinely by walking on a treadmill for 4 miles at 4 miles an hour daily for 1 hour.\par During exercise she has no exertional chest pain chest pains lightheadedness dizziness or syncope.\par She reports not drinking much fluids during the day.
P/w 6 days of exertional dyspnea and chest pain requiring 2L NC, on RA at baseline. On exam pt has bibasilar crackles and BLE edema. Labs pertinent for BNP 24010, CXR w/ pulmonary edema and small bilateral effusions. Pt reports no major med changes or dietary changes. Suspect respiratory failure driven by CHF exacerbation. ACS less likely but cannot be excluded given prior cardiac hx and elevated troponin though EKG NSR without obvious ischemic changes. Low suspicion for infection as no fever and only mild leukocytosis. Low suspicion for PE as pt adherent to home AC  -goal SpO2 >92%, wean supplemental oxygen as tolerated  -continue bumex 4mg qd   -start metolazone 2.5mg qd   -cardiology consulted appreciate recs  -f/u TTE  -strict I&Os, daily weights

## 2025-04-03 NOTE — SWALLOW BEDSIDE ASSESSMENT ADULT - H & P REVIEW
63 yo M with a fib (s/p dccv 3/25/25), CHF (likely moderately reduced EF, several TTE with poor windows), aortic stenosis s/p TAVR (11/2022), CAD s/p CABG, a flutter s/p ablation 2019 and s/p micra implant,  CKD3, obesity, venous stasis, HTN, HLD, who presents with 6 days of worsening dyspnea on exertion. Pt also reports associated orthopnea and chest pain on exertion but not at rest which is located in the center of his chest. Pain is described as burning. Also reports several days decreased appetite. Denies fevers, chills, cough, abdominal pain, NVD, urinary sx, leg swelling. Patient had a cardioversion for atrial fibrillation at this hospital recently, which was reportedly unsuccessful. Patient has a scheduled appointment with Dr. Philip on 04/24/2025 to discuss possible ablation. His outpt cardiologist is Dr. Paul./yes

## 2025-04-03 NOTE — CONSULT NOTE ADULT - ASSESSMENT
63 yo M with a fib (s/p dccv 3/25/25), CHF (likely moderately reduced EF, several TTE with poor windows), aortic stenosis s/p TAVR (11/2022), CAD s/p CABG, a flutter s/p ablation 2019 and s/p micra implant,  CKD3, obesity, venous stasis, HTN, HLD, who presents with 6 days of worsening dyspnea on exertion. Pt also reports associated orthopnea and chest pain on exertion but not at rest which is located in the center of his chest. Pain is described as burning. Also reports several days decreased appetite. Denies fevers, chills, cough, abdominal pain, NVD, urinary sx, leg swelling. Patient had a cardioversion for atrial fibrillation at this hospital recently, which was reportedly unsuccessful. Patient has a scheduled appointment with Dr. Philip on 04/24/2025 to discuss possible ablation. His outpt cardiologist is Dr. Paul.     The Structural Team was consulted for evaluation of aortic stenosis of a previously implanted TAVR valve. Mr. Smith is known to our team, he underwent transcatheter aortic valve replacement on 11/18/22 with a 26mm Cullen 3 valve via femoral approach. He has a history of CAD status post three-vessel CABG (LIMA to LAD on 12/2012, SVG to OM1/PDA is occluded) with PCI to RCA on 6/2019. He presents now with acute on chronic CHF, ANGELICA/CKD with hypoxic respiratory requiring intubation and pressor support. He is now extubated, sitting up in bed awake and oriented. He remains on vasopressin and levophed.

## 2025-04-03 NOTE — PROGRESS NOTE ADULT - SUBJECTIVE AND OBJECTIVE BOX
*******************************  Azalea Jimenes, PGY-1  Internal Medicine  Contact via [employer]  *******************************    CCU PROGRESS NOTE:    BERTHA WARD  MRN-[id number]  Patient is a 64y old  Male who presents with a chief complaint of "63 yo M with a fib (s/p dccv 3/25/25), CHF (likely moderately reduced EF, several TTE with poor windows), aortic stenosis s/p TAVR (11/2022), a flutter s/p ablation 2019 and s/p micra implant,  CKD3, obesity, venous stasis, HTN, HLD, who presents with 6 days of worsening dyspnea on exertion, admitted for AHRF likely iso CHF exacerbation, also with ANGELICA on CKD c/f cardiorenal syndrome. RRT on floors for SBP 80s requiring pressors and has been admitted to the CCU. HF mostly likely i/so severe AS. Pt to get structural intervention."     (03 Apr 2025 11:35)      INTERVAL HPI/OVERNIGHT EVENTS: No acute events overnight.    SUBJECTIVE: Patient seen and examined at bedside. No acute complaints. Denies chest pain, SOB, palpitations, dizziness/lightheadedness, LE edema.    MEDICATIONS  (STANDING):  aMIOdarone Infusion 0.5 mG/Min (16.7 mL/Hr) IV Continuous <Continuous>  atorvastatin 40 milliGRAM(s) Oral at bedtime  chlorhexidine 2% Cloths 1 Application(s) Topical daily  clopidogrel Tablet 75 milliGRAM(s) Oral daily  famotidine    Tablet 20 milliGRAM(s) Oral every 48 hours  heparin  Infusion 1500 Unit(s)/Hr (13.5 mL/Hr) IV Continuous <Continuous>  influenza  Vaccine (HIGH DOSE) 0.5 milliLiter(s) IntraMuscular once  insulin lispro (ADMELOG) corrective regimen sliding scale   SubCutaneous every 6 hours  norepinephrine Infusion 0.05 MICROgram(s)/kG/Min (11.6 mL/Hr) IV Continuous <Continuous>  sodium bicarbonate 650 milliGRAM(s) Oral three times a day  vasopressin Infusion 0.04 Unit(s)/Min (6 mL/Hr) IV Continuous <Continuous>    MEDICATIONS  (PRN):      OBJECTIVE:  ICU Vital Signs Last 24 Hrs  T(C): 36.4 (03 Apr 2025 19:00), Max: 36.8 (02 Apr 2025 23:00)  T(F): 97.6 (03 Apr 2025 19:00), Max: 98.2 (02 Apr 2025 23:00)  HR: 69 (03 Apr 2025 22:15) (63 - 76)  BP: --  BP(mean): --  ABP: 102/63 (03 Apr 2025 22:15) (76/51 - 102/63)  ABP(mean): 78 (03 Apr 2025 22:15) (60 - 82)  RR: 18 (03 Apr 2025 22:15) (18 - 43)  SpO2: 94% (03 Apr 2025 22:15) (84% - 100%)    O2 Parameters below as of 03 Apr 2025 22:00  Patient On (Oxygen Delivery Method): nasal cannula  O2 Flow (L/min): 2          CVP(mm Hg): 11 (04-03-25 @ 22:15) (0 - 26)  CO: --  CI: --  PA: --  PA(mean): --  PA(direct): --  PCWP: --  LA: --  RA: --  SVR: --  SVRI: --  PVR: --  PVRI: --  I&O's Summary    02 Apr 2025 07:01  -  03 Apr 2025 07:00  --------------------------------------------------------  IN: 1184.4 mL / OUT: 6120 mL / NET: -4935.6 mL    03 Apr 2025 07:01  -  03 Apr 2025 22:52  --------------------------------------------------------  IN: 820.5 mL / OUT: 1350 mL / NET: -529.5 mL        CAPILLARY BLOOD GLUCOSE      GENERAL: NAD, lying in bed comfortably  NECK: no JVD  HEART: S1, S2, regular rate and rhythm, no murmurs, rubs, or gallops  LUNGS: Unlabored respirations.  Clear to auscultation bilaterally, no crackles, wheezing, or rhonchi  ABDOMEN: Soft, nontender, nondistended, +BS  EXTREMITIES: 2+ peripheral pulses bilaterally. No clubbing, cyanosis, or edema    ============================I/O===========================   I&O's Detail    02 Apr 2025 07:01  -  03 Apr 2025 07:00  --------------------------------------------------------  IN:    Bumetanide: 120 mL    Bumetanide: 30 mL    Dexmedetomidine: 83.7 mL    DOBUTamine: 130.2 mL    Enteral Tube Flush: 120 mL    Heparin: 333 mL    Norepinephrine: 11.7 mL    Norepinephrine: 82.6 mL    Propofol: 21.2 mL    Vasopressin: 252 mL  Total IN: 1184.4 mL    OUT:    Indwelling Catheter - Urethral (mL): 6120 mL  Total OUT: 6120 mL    Total NET: -4935.6 mL      03 Apr 2025 07:01  -  03 Apr 2025 22:52  --------------------------------------------------------  IN:    Amiodarone: 233.1 mL    Amiodarone: 33.4 mL    Heparin: 189 mL    Norepinephrine: 155 mL    Vasopressin: 210 mL  Total IN: 820.5 mL    OUT:    Indwelling Catheter - Urethral (mL): 1350 mL    Oral Fluid: 0 mL  Total OUT: 1350 mL    Total NET: -529.5 mL        ============================ LABS =========================                        12.7   11.18 )-----------( 149      ( 03 Apr 2025 01:11 )             37.9     04-03    130[L]  |  93[L]  |  82[H]  ----------------------------<  151[H]  4.3   |  22  |  4.75[H]    Ca    8.8      03 Apr 2025 01:11  Phos  5.8     04-03  Mg     2.2     04-03    TPro  6.8  /  Alb  3.1[L]  /  TBili  1.2  /  DBili  x   /  AST  6[L]  /  ALT  8[L]  /  AlkPhos  96  04-03    Troponin T, High Sensitivity Result: 218 ng/L (04-01-25 @ 02:35)              LIVER FUNCTIONS - ( 03 Apr 2025 01:11 )  Alb: 3.1 g/dL / Pro: 6.8 g/dL / ALK PHOS: 96 U/L / ALT: 8 U/L / AST: 6 U/L / GGT: x           PT/INR - ( 03 Apr 2025 01:11 )   PT: 16.0 sec;   INR: 1.41 ratio         PTT - ( 03 Apr 2025 01:11 )  PTT:58.5 sec  ABG - ( 03 Apr 2025 01:03 )  pH, Arterial: 7.36  pH, Blood: x     /  pCO2: 47    /  pO2: 114   / HCO3: 27    / Base Excess: 0.6   /  SaO2: 100.0             Blood Gas Venous - Lactate: 0.8 mmol/L (04-03-25 @ 01:46)  Blood Gas Arterial, Lactate: 0.9 mmol/L (04-03-25 @ 01:03)  Blood Gas Venous - Lactate: 0.9 mmol/L (04-02-25 @ 22:28)  Blood Gas Arterial, Lactate: 1.1 mmol/L (04-02-25 @ 17:20)  Blood Gas Venous - Lactate: 1.0 mmol/L (04-02-25 @ 17:20)  Blood Gas Arterial, Lactate: 1.1 mmol/L (04-02-25 @ 06:25)  Blood Gas Venous - Lactate: 1.1 mmol/L (04-02-25 @ 06:25)  Blood Gas Arterial, Lactate: 1.7 mmol/L (04-02-25 @ 00:07)  Blood Gas Venous - Lactate: 1.6 mmol/L (04-02-25 @ 00:07)  Blood Gas Arterial, Lactate: 1.2 mmol/L (04-01-25 @ 21:36)  Blood Gas Venous - Lactate: 1.3 mmol/L (04-01-25 @ 21:36)  Blood Gas Arterial, Lactate: 1.3 mmol/L (04-01-25 @ 21:00)  Blood Gas Arterial, Lactate: 1.6 mmol/L (04-01-25 @ 19:51)  Blood Gas Arterial, Lactate: 1.1 mmol/L (04-01-25 @ 17:30)  Blood Gas Venous - Lactate: 1.0 mmol/L (04-01-25 @ 17:30)  Blood Gas Venous - Lactate: 1.1 mmol/L (04-01-25 @ 13:24)  Blood Gas Venous - Lactate: 1.2 mmol/L (04-01-25 @ 13:20)  Blood Gas Venous - Lactate: 1.0 mmol/L (04-01-25 @ 13:17)  Blood Gas Venous - Lactate: 1.2 mmol/L (04-01-25 @ 12:20)    Urinalysis Basic - ( 03 Apr 2025 01:11 )    Color: x / Appearance: x / SG: x / pH: x  Gluc: 151 mg/dL / Ketone: x  / Bili: x / Urobili: x   Blood: x / Protein: x / Nitrite: x   Leuk Esterase: x / RBC: x / WBC x   Sq Epi: x / Non Sq Epi: x / Bacteria: x      ======================MICRO/RAD/CARDIO=================  Telemetry: Reviewed   EKG: Reviewed  CXR: Reviewed  Culture: Reviewed   Echo:   Cath:    Mine Salinas PA-C  Contact via [employer]    CCU PROGRESS NOTE:    BERTHA WARD  MRN-[id number]  Patient is a 64y old  Male who presents with a chief complaint of "65 yo M with a fib (s/p dccv 3/25/25), CHF (likely moderately reduced EF, several TTE with poor windows), aortic stenosis s/p TAVR (11/2022), a flutter s/p ablation 2019 and s/p micra implant,  CKD3, obesity, venous stasis, HTN, HLD, who presents with 6 days of worsening dyspnea on exertion, admitted for AHRF likely iso CHF exacerbation, also with ANGELICA on CKD c/f cardiorenal syndrome. RRT on floors for SBP 80s requiring pressors and has been admitted to the CCU. HF mostly likely i/so severe AS. Pt to get structural intervention."     (03 Apr 2025 11:35)    INTERVAL HPI/OVERNIGHT EVENTS: No acute events overnight.    SUBJECTIVE: Patient seen and examined at bedside. No acute complaints.     MEDICATIONS  (STANDING):  aMIOdarone Infusion 0.5 mG/Min (16.7 mL/Hr) IV Continuous <Continuous>  atorvastatin 40 milliGRAM(s) Oral at bedtime  chlorhexidine 2% Cloths 1 Application(s) Topical daily  clopidogrel Tablet 75 milliGRAM(s) Oral daily  famotidine    Tablet 20 milliGRAM(s) Oral every 48 hours  heparin  Infusion 1500 Unit(s)/Hr (13.5 mL/Hr) IV Continuous <Continuous>  influenza  Vaccine (HIGH DOSE) 0.5 milliLiter(s) IntraMuscular once  insulin lispro (ADMELOG) corrective regimen sliding scale   SubCutaneous every 6 hours  norepinephrine Infusion 0.05 MICROgram(s)/kG/Min (11.6 mL/Hr) IV Continuous <Continuous>  sodium bicarbonate 650 milliGRAM(s) Oral three times a day  vasopressin Infusion 0.04 Unit(s)/Min (6 mL/Hr) IV Continuous <Continuous>    MEDICATIONS  (PRN):      OBJECTIVE:  ICU Vital Signs Last 24 Hrs  T(C): 36.4 (03 Apr 2025 19:00), Max: 36.8 (02 Apr 2025 23:00)  T(F): 97.6 (03 Apr 2025 19:00), Max: 98.2 (02 Apr 2025 23:00)  HR: 69 (03 Apr 2025 22:15) (63 - 76)  ABP: 102/63 (03 Apr 2025 22:15) (76/51 - 102/63)  ABP(mean): 78 (03 Apr 2025 22:15) (60 - 82)  RR: 18 (03 Apr 2025 22:15) (18 - 43)  SpO2: 94% (03 Apr 2025 22:15) (84% - 100%)    O2 Parameters below as of 03 Apr 2025 22:00  Patient On (Oxygen Delivery Method): nasal cannula  O2 Flow (L/min): 2    CVP(mm Hg): 11 (04-03-25 @ 22:15) (0 - 26)    I&O's Summary    02 Apr 2025 07:01  -  03 Apr 2025 07:00  --------------------------------------------------------  IN: 1184.4 mL / OUT: 6120 mL / NET: -4935.6 mL    03 Apr 2025 07:01  -  03 Apr 2025 22:52  --------------------------------------------------------  IN: 820.5 mL / OUT: 1350 mL / NET: -529.5 mL    CAPILLARY BLOOD GLUCOSE    GENERAL: NAD, lying in bed comfortably  NECK: no JVD  HEART: S1, S2, regular rate and rhythm, no murmurs, rubs, or gallops  LUNGS: Unlabored respirations.  Clear to auscultation bilaterally, no crackles, wheezing, or rhonchi  ABDOMEN: Soft, nontender, nondistended, +BS  EXTREMITIES: 2+ peripheral pulses bilaterally. No clubbing, cyanosis, or edema    ============================I/O===========================   I&O's Detail    02 Apr 2025 07:01  -  03 Apr 2025 07:00  --------------------------------------------------------  IN:    Bumetanide: 120 mL    Bumetanide: 30 mL    Dexmedetomidine: 83.7 mL    DOBUTamine: 130.2 mL    Enteral Tube Flush: 120 mL    Heparin: 333 mL    Norepinephrine: 11.7 mL    Norepinephrine: 82.6 mL    Propofol: 21.2 mL    Vasopressin: 252 mL  Total IN: 1184.4 mL    OUT:    Indwelling Catheter - Urethral (mL): 6120 mL  Total OUT: 6120 mL    Total NET: -4935.6 mL      03 Apr 2025 07:01  -  03 Apr 2025 22:52  --------------------------------------------------------  IN:    Amiodarone: 233.1 mL    Amiodarone: 33.4 mL    Heparin: 189 mL    Norepinephrine: 155 mL    Vasopressin: 210 mL  Total IN: 820.5 mL    OUT:    Indwelling Catheter - Urethral (mL): 1350 mL    Oral Fluid: 0 mL  Total OUT: 1350 mL    Total NET: -529.5 mL        ============================ LABS =========================                        12.7   11.18 )-----------( 149      ( 03 Apr 2025 01:11 )             37.9     04-03    130[L]  |  93[L]  |  82[H]  ----------------------------<  151[H]  4.3   |  22  |  4.75[H]    Ca    8.8      03 Apr 2025 01:11  Phos  5.8     04-03  Mg     2.2     04-03    TPro  6.8  /  Alb  3.1[L]  /  TBili  1.2  /  DBili  x   /  AST  6[L]  /  ALT  8[L]  /  AlkPhos  96  04-03    Troponin T, High Sensitivity Result: 218 ng/L (04-01-25 @ 02:35)              LIVER FUNCTIONS - ( 03 Apr 2025 01:11 )  Alb: 3.1 g/dL / Pro: 6.8 g/dL / ALK PHOS: 96 U/L / ALT: 8 U/L / AST: 6 U/L / GGT: x           PT/INR - ( 03 Apr 2025 01:11 )   PT: 16.0 sec;   INR: 1.41 ratio         PTT - ( 03 Apr 2025 01:11 )  PTT:58.5 sec  ABG - ( 03 Apr 2025 01:03 )  pH, Arterial: 7.36  pH, Blood: x     /  pCO2: 47    /  pO2: 114   / HCO3: 27    / Base Excess: 0.6   /  SaO2: 100.0             Blood Gas Venous - Lactate: 0.8 mmol/L (04-03-25 @ 01:46)  Blood Gas Arterial, Lactate: 0.9 mmol/L (04-03-25 @ 01:03)  Blood Gas Venous - Lactate: 0.9 mmol/L (04-02-25 @ 22:28)  Blood Gas Arterial, Lactate: 1.1 mmol/L (04-02-25 @ 17:20)  Blood Gas Venous - Lactate: 1.0 mmol/L (04-02-25 @ 17:20)  Blood Gas Arterial, Lactate: 1.1 mmol/L (04-02-25 @ 06:25)  Blood Gas Venous - Lactate: 1.1 mmol/L (04-02-25 @ 06:25)  Blood Gas Arterial, Lactate: 1.7 mmol/L (04-02-25 @ 00:07)  Blood Gas Venous - Lactate: 1.6 mmol/L (04-02-25 @ 00:07)  Blood Gas Arterial, Lactate: 1.2 mmol/L (04-01-25 @ 21:36)  Blood Gas Venous - Lactate: 1.3 mmol/L (04-01-25 @ 21:36)  Blood Gas Arterial, Lactate: 1.3 mmol/L (04-01-25 @ 21:00)  Blood Gas Arterial, Lactate: 1.6 mmol/L (04-01-25 @ 19:51)  Blood Gas Arterial, Lactate: 1.1 mmol/L (04-01-25 @ 17:30)  Blood Gas Venous - Lactate: 1.0 mmol/L (04-01-25 @ 17:30)  Blood Gas Venous - Lactate: 1.1 mmol/L (04-01-25 @ 13:24)  Blood Gas Venous - Lactate: 1.2 mmol/L (04-01-25 @ 13:20)  Blood Gas Venous - Lactate: 1.0 mmol/L (04-01-25 @ 13:17)  Blood Gas Venous - Lactate: 1.2 mmol/L (04-01-25 @ 12:20)    Urinalysis Basic - ( 03 Apr 2025 01:11 )    Color: x / Appearance: x / SG: x / pH: x  Gluc: 151 mg/dL / Ketone: x  / Bili: x / Urobili: x   Blood: x / Protein: x / Nitrite: x   Leuk Esterase: x / RBC: x / WBC x   Sq Epi: x / Non Sq Epi: x / Bacteria: x      ======================MICRO/RAD/CARDIO=================  Telemetry: Reviewed   EKG: Reviewed  CXR: Reviewed  Culture: Reviewed   Echo:   Cath:

## 2025-04-03 NOTE — SWALLOW BEDSIDE ASSESSMENT ADULT - SLP GENERAL OBSERVATIONS
Patient encountered semi-supine in bed, on 2L NC, A&Ox0, +hoarse vocal quality, +IV, +ICU monitoring. Patient able to follow commands and conversational throughout encounter. Pt endorsing use of [place]ankauer for intermittent suctioning 2/2 expectoration of phlegm/secretions from throat. Suction needs began s/p extubation. Oral care provided.

## 2025-04-03 NOTE — PROGRESS NOTE ADULT - ASSESSMENT
63 yo M with a fib (s/p dccv 3/25/25), CHF (likely moderately reduced EF, several TTE with poor windows), aortic stenosis s/p TAVR (2022), a flutter s/p ablation  and s/p micra implant,  CKD3, obesity, venous stasis, HTN, HLD, who presents with 6 days of worsening dyspnea on exertion, admitted for AHRF likely iso CHF exacerbation, also with ANGELICA on CKD c/f cardiorenal syndrome. RRT on floors for SBP 80s requiring pressors and has been admitted to the CCU.    Plan:  ===NEURO===  - Intubated, following commands  - Prop weaned  - Precedex 0.3   - Keeping on precedex for possible STANISLAW    ===RESPIRATORY===  #Acute hypoxemic respiratory failure  - Intubated w/ settings 16/550/6/40%  - Most likely i/s/o acute on chronic HF exacerbation  - Bumex drip  - Wean vent settings as appropriate     ===CARDIOVASCULAR===  #Cardiogenic shock  - Levophed 0.1  -  2.5  - Vaso .04  - If need more pressor support will switch to phenylephrine as pt has LVOT obstruction from aortic stenosis   - Wean as tolerated    #Acute on chronic HF exacerbation  - Bumex drip on 4  - Goal 1-2L net neg daily  - Pt has been neg 300cc/hr  - CVP goal 8-12  - Will be careful of overdiuresis given severe AS and preload dependant     #Afib  #First degree block  - Continue amio 400 mg q8h until , start 200 mg qd thereafter  - C/w heparin    #Aortic valve stenosis s/p prosthetic  - TTE shows severe AV stenosis over prosthetic  - Possibly inciting factor to HF exac.  - Structural cards consulted  - Plan for possible STANISLAW    #Troponemia  - No ischemic changes on EKG  - Most likely i/s/o ANGELICA on CKD    #Hx CABG  - C/w plavix    #HLD  - C/w lipitor    ===/RENAL===  #ANGELICA on CKD  #Cardiorenal syndrome  - Baseline 1.5-1.8  - Most likely i/s/o hypotension and fluid overload  - Renal sono ordered  - Monitor Cr on diuretics  - Nephro following    #Hyperkalemia  - Lokelma if K increases into 5s range    ===GI/NUTRITION===  - OG tube in place   - Keep NPO for possible TTEE    ===SKIN===  - Hx lichen planus  - No other concerns    ===INFECTIOUS DISEASE===  - Slightly elevated WBC on admission 10.5K  - Afebrile  - F/u urine culture    ===ENDOCRINE===  - No concerns  - A1c 5.8  - F/u TSH    ===HEMATOLOGIC/DVT PPx===  - On heparin ggt 63 yo M with a fib (s/p dccv 3/25/25), CHF (likely moderately reduced EF, several TTE with poor windows), aortic stenosis s/p TAVR (11/2022), a flutter s/p ablation 2019 and s/p micra implant,  CKD3, obesity, venous stasis, HTN, HLD, who presents with 6 days of worsening dyspnea on exertion, admitted for AHRF likely iso CHF exacerbation, also with ANGELICA on CKD c/f cardiorenal syndrome. RRT on floors for SBP 80s requiring pressors and has been admitted to the CCU. HF mostly likely i/so severe AS. Pt to get structural intervention.     Plan:  ===NEURO===  - AO x 3    ===RESPIRATORY===  #Acute hypoxemic respiratory failure  - 2L NC satting 93  - Improved     ===CARDIOVASCULAR===  #Cardiogenic shock  - Levophed restarted after extubation. Uptitrating.  - Vaso .1, uptitrating.  - If need more pressor support will switch to phenylephrine as pt has LVOT obstruction from aortic stenosis   - Wean as tolerated    #Acute on chronic HF exacerbation  - Off bumex as Pt is -100cc /hr  - Goal 1-2L net neg daily  - CVP goal 8-12  - Will be careful of overdiuresis given severe AS and preload dependant     #Afib  #First degree block  - Continue amio 400 mg q8h until 4/4, start 200 mg qd thereafter  - C/w heparin    #Aortic valve stenosis s/p prosthetic  - S/p STANISLAW showing severe AS stenosis, global hypokinesis and EF 15%  - Structural plans for valve in valve replacement  - Structural wants cath and CT w/ contrast before procedure  - Awaiting SCr improvement    #Troponemia  - No ischemic changes on EKG  - Most likely i/s/o ANGELICA on CKD    #Hx CABG  - C/w Plavix    #HLD  - Increased Lipitor to 40mg from 20mg    ===/RENAL===  #ANGELICA on CKD  #Cardiorenal syndrome  - Baseline 1.5-1.8  - Most likely i/s/o hypotension and fluid overload  - Renal sono showed   - Monitor Cr off diuretics   - Nephro following     #Hyperkalemia  - Lokelma if K increases into 5s range    ===GI/NUTRITION===  - Failed bedside SS  - S&S consulted    ===SKIN===  - Hx lichen planus  - No other concerns    ===INFECTIOUS DISEASE===  - Slightly elevated WBC on admission 10.5K  - Afebrile  - Urine culture -> neg    ===ENDOCRINE===  - No concerns  - A1c 5.8  - TSH wnl    ===HEMATOLOGIC/DVT PPx===  - On heparin ggt

## 2025-04-03 NOTE — DIETITIAN INITIAL EVALUATION ADULT - REASON FOR ADMISSION
"63 yo M with a fib (s/p dccv 3/25/25), CHF (likely moderately reduced EF, several TTE with poor windows), aortic stenosis s/p TAVR (11/2022), a flutter s/p ablation 2019 and s/p micra implant,  CKD3, obesity, venous stasis, HTN, HLD, who presents with 6 days of worsening dyspnea on exertion, admitted for AHRF likely iso CHF exacerbation, also with ANGELICA on CKD c/f cardiorenal syndrome. RRT on floors for SBP 80s requiring pressors and has been admitted to the CCU. HF mostly likely i/so severe AS. Pt to get structural intervention."

## 2025-04-03 NOTE — PROGRESS NOTE ADULT - SUBJECTIVE AND OBJECTIVE BOX
***Plan not finalized until attending attestation***    Gabriele Barnes MD (PGY-1)  Internal Medicine  Contact via [employer] TEAMS    ******************************************    CCU PROGRESS NOTE:    BERTHA WARD  MRN-[id number]  Patient is a 64y old  Male who presents with a chief complaint of dyspnea , chest pain (02 Apr 2025 20:03)    INTERVAL HPI/OVERNIGHT EVENTS: No acute events overnight.    SUBJECTIVE: Patient seen and examined at bedside. No acute complaints. Denies chest pain, SOB, palpitations, dizziness/lightheadedness, LE edema.    MEDICATIONS  (STANDING):  aMIOdarone    Tablet 400 milliGRAM(s) Oral every 8 hours  atorvastatin 20 milliGRAM(s) Oral at bedtime  chlorhexidine 2% Cloths 1 Application(s) Topical daily  clopidogrel Tablet 75 milliGRAM(s) Oral daily  famotidine    Tablet 20 milliGRAM(s) Oral every 48 hours  heparin  Infusion 1500 Unit(s)/Hr (13.5 mL/Hr) IV Continuous <Continuous>  influenza  Vaccine (HIGH DOSE) 0.5 milliLiter(s) IntraMuscular once  insulin lispro (ADMELOG) corrective regimen sliding scale   SubCutaneous every 6 hours  norepinephrine Infusion 0.05 MICROgram(s)/kG/Min (11.6 mL/Hr) IV Continuous <Continuous>  sodium bicarbonate 650 milliGRAM(s) Oral three times a day  vasopressin Infusion 0.04 Unit(s)/Min (6 mL/Hr) IV Continuous <Continuous>    MEDICATIONS  (PRN):      OBJECTIVE:  ICU Vital Signs Last 24 Hrs  T(C): 36.6 (03 Apr 2025 03:00), Max: 36.8 (02 Apr 2025 23:00)  T(F): 97.8 (03 Apr 2025 03:00), Max: 98.2 (02 Apr 2025 23:00)  HR: 70 (03 Apr 2025 07:15) (56 - 75)  BP: 84/54 (02 Apr 2025 22:15) (71/51 - 91/69)  BP(mean): 64 (02 Apr 2025 22:15) (56 - 77)  ABP: 86/57 (03 Apr 2025 07:15) (67/45 - 113/70)  ABP(mean): 69 (03 Apr 2025 07:15) (56 - 87)  RR: 30 (03 Apr 2025 07:15) (9 - 39)  SpO2: 97% (03 Apr 2025 07:15) (88% - 100%)    O2 Parameters below as of 03 Apr 2025 07:15  Patient On (Oxygen Delivery Method): nasal cannula  O2 Flow (L/min): 2        Mode: HFNC, FiO2: 40  CVP(mm Hg): 12 (04-03-25 @ 07:15) (0 - 27)  CO: --  CI: --  PA: --  PA(mean): --  PA(direct): --  PCWP: --  LA: --  RA: --  SVR: --  SVRI: --  PVR: --  PVRI: --  I&O's Summary    02 Apr 2025 07:01  -  03 Apr 2025 07:00  --------------------------------------------------------  IN: 1184.4 mL / OUT: 6120 mL / NET: -4935.6 mL    03 Apr 2025 07:01  -  03 Apr 2025 07:36  --------------------------------------------------------  IN: 40 mL / OUT: 0 mL / NET: 40 mL        CAPILLARY BLOOD GLUCOSE      GENERAL: NAD, lying in bed comfortably  NECK: no JVD  HEART: S1, S2, regular rate and rhythm, no murmurs, rubs, or gallops  LUNGS: Unlabored respirations.  Clear to auscultation bilaterally, no crackles, wheezing, or rhonchi  ABDOMEN: Soft, nontender, nondistended, +BS  EXTREMITIES: 2+ peripheral pulses bilaterally. No clubbing, cyanosis, or edema    ============================I/O===========================   I&O's Detail    02 Apr 2025 07:01  -  03 Apr 2025 07:00  --------------------------------------------------------  IN:    Bumetanide: 120 mL    Bumetanide: 30 mL    Dexmedetomidine: 83.7 mL    DOBUTamine: 130.2 mL    Enteral Tube Flush: 120 mL    Heparin: 333 mL    Norepinephrine: 11.7 mL    Norepinephrine: 82.6 mL    Propofol: 21.2 mL    Vasopressin: 252 mL  Total IN: 1184.4 mL    OUT:    Indwelling Catheter - Urethral (mL): 6120 mL  Total OUT: 6120 mL    Total NET: -4935.6 mL      03 Apr 2025 07:01  -  03 Apr 2025 07:36  --------------------------------------------------------  IN:    Heparin: 13.5 mL    Norepinephrine: 11.5 mL    Vasopressin: 15 mL  Total IN: 40 mL    OUT:  Total OUT: 0 mL    Total NET: 40 mL        ============================ LABS =========================                        12.7   11.18 )-----------( 149      ( 03 Apr 2025 01:11 )             37.9     04-03    130[L]  |  93[L]  |  82[H]  ----------------------------<  151[H]  4.3   |  22  |  4.75[H]    Ca    8.8      03 Apr 2025 01:11  Phos  5.8     04-03  Mg     2.2     04-03    TPro  6.8  /  Alb  3.1[L]  /  TBili  1.2  /  DBili  x   /  AST  6[L]  /  ALT  8[L]  /  AlkPhos  96  04-03    Troponin T, High Sensitivity Result: 218 ng/L (04-01-25 @ 02:35)  Troponin T, High Sensitivity Result: 225 ng/L (03-31-25 @ 18:01)    CKMB Units: 4.2 ng/mL (03-31-25 @ 18:01)            LIVER FUNCTIONS - ( 03 Apr 2025 01:11 )  Alb: 3.1 g/dL / Pro: 6.8 g/dL / ALK PHOS: 96 U/L / ALT: 8 U/L / AST: 6 U/L / GGT: x           PT/INR - ( 03 Apr 2025 01:11 )   PT: 16.0 sec;   INR: 1.41 ratio         PTT - ( 03 Apr 2025 01:11 )  PTT:58.5 sec  ABG - ( 03 Apr 2025 01:03 )  pH, Arterial: 7.36  pH, Blood: x     /  pCO2: 47    /  pO2: 114   / HCO3: 27    / Base Excess: 0.6   /  SaO2: 100.0             Blood Gas Venous - Lactate: 0.8 mmol/L (04-03-25 @ 01:46)  Blood Gas Arterial, Lactate: 0.9 mmol/L (04-03-25 @ 01:03)  Blood Gas Venous - Lactate: 0.9 mmol/L (04-02-25 @ 22:28)  Blood Gas Arterial, Lactate: 1.1 mmol/L (04-02-25 @ 17:20)  Blood Gas Venous - Lactate: 1.0 mmol/L (04-02-25 @ 17:20)  Blood Gas Arterial, Lactate: 1.1 mmol/L (04-02-25 @ 06:25)  Blood Gas Venous - Lactate: 1.1 mmol/L (04-02-25 @ 06:25)  Blood Gas Arterial, Lactate: 1.7 mmol/L (04-02-25 @ 00:07)  Blood Gas Venous - Lactate: 1.6 mmol/L (04-02-25 @ 00:07)  Blood Gas Arterial, Lactate: 1.2 mmol/L (04-01-25 @ 21:36)  Blood Gas Venous - Lactate: 1.3 mmol/L (04-01-25 @ 21:36)  Blood Gas Arterial, Lactate: 1.3 mmol/L (04-01-25 @ 21:00)  Blood Gas Arterial, Lactate: 1.6 mmol/L (04-01-25 @ 19:51)  Blood Gas Arterial, Lactate: 1.1 mmol/L (04-01-25 @ 17:30)  Blood Gas Venous - Lactate: 1.0 mmol/L (04-01-25 @ 17:30)  Blood Gas Venous - Lactate: 1.1 mmol/L (04-01-25 @ 13:24)  Blood Gas Venous - Lactate: 1.2 mmol/L (04-01-25 @ 13:20)  Blood Gas Venous - Lactate: 1.0 mmol/L (04-01-25 @ 13:17)  Blood Gas Venous - Lactate: 1.2 mmol/L (04-01-25 @ 12:20)  Blood Gas Venous - Lactate: 1.6 mmol/L (03-31-25 @ 18:00)    Urinalysis Basic - ( 03 Apr 2025 01:11 )    Color: x / Appearance: x / SG: x / pH: x  Gluc: 151 mg/dL / Ketone: x  / Bili: x / Urobili: x   Blood: x / Protein: x / Nitrite: x   Leuk Esterase: x / RBC: x / WBC x   Sq Epi: x / Non Sq Epi: x / Bacteria: x      ======================MICRO/RAD/CARDIO=================  Telemetry: Reviewed   EKG: Reviewed  CXR: Reviewed  Culture: Reviewed   Echo:   Cath:    ***Plan not finalized until attending attestation***    Gabriele Barnes MD (PGY-1)  Internal Medicine  Contact via [employer] TEAMS    ******************************************    CCU PROGRESS NOTE:    BERTHA WARD  MRN-[id number]  Patient is a 64y old  Male who presents with a chief complaint of dyspnea , chest pain (02 Apr 2025 20:03)    INTERVAL HPI/OVERNIGHT EVENTS:   Extubated, restarted levo    SUBJECTIVE: Patient seen and examined at bedside. No acute complaints. Denies chest pain, SOB, palpitations, dizziness/lightheadedness, LE edema.    MEDICATIONS  (STANDING):  aMIOdarone    Tablet 400 milliGRAM(s) Oral every 8 hours  atorvastatin 20 milliGRAM(s) Oral at bedtime  chlorhexidine 2% Cloths 1 Application(s) Topical daily  clopidogrel Tablet 75 milliGRAM(s) Oral daily  famotidine    Tablet 20 milliGRAM(s) Oral every 48 hours  heparin  Infusion 1500 Unit(s)/Hr (13.5 mL/Hr) IV Continuous <Continuous>  influenza  Vaccine (HIGH DOSE) 0.5 milliLiter(s) IntraMuscular once  insulin lispro (ADMELOG) corrective regimen sliding scale   SubCutaneous every 6 hours  norepinephrine Infusion 0.05 MICROgram(s)/kG/Min (11.6 mL/Hr) IV Continuous <Continuous>  sodium bicarbonate 650 milliGRAM(s) Oral three times a day  vasopressin Infusion 0.04 Unit(s)/Min (6 mL/Hr) IV Continuous <Continuous>    MEDICATIONS  (PRN):      OBJECTIVE:  ICU Vital Signs Last 24 Hrs  T(C): 36.6 (03 Apr 2025 03:00), Max: 36.8 (02 Apr 2025 23:00)  T(F): 97.8 (03 Apr 2025 03:00), Max: 98.2 (02 Apr 2025 23:00)  HR: 70 (03 Apr 2025 07:15) (56 - 75)  BP: 84/54 (02 Apr 2025 22:15) (71/51 - 91/69)  BP(mean): 64 (02 Apr 2025 22:15) (56 - 77)  ABP: 86/57 (03 Apr 2025 07:15) (67/45 - 113/70)  ABP(mean): 69 (03 Apr 2025 07:15) (56 - 87)  RR: 30 (03 Apr 2025 07:15) (9 - 39)  SpO2: 97% (03 Apr 2025 07:15) (88% - 100%)    O2 Parameters below as of 03 Apr 2025 07:15  Patient On (Oxygen Delivery Method): nasal cannula  O2 Flow (L/min): 2        02 Apr 2025 07:01  -  03 Apr 2025 07:00  --------------------------------------------------------  IN: 1184.4 mL / OUT: 6120 mL / NET: -4935.6 mL    03 Apr 2025 07:01  -  03 Apr 2025 07:36  --------------------------------------------------------  IN: 40 mL / OUT: 0 mL / NET: 40 mL        CAPILLARY BLOOD GLUCOSE      GENERAL: NAD, lying in bed comfortably  NECK: +central line  HEART: S1, S2, regular rate and rhythm, no murmurs, rubs, or gallops  LUNGS: Unlabored respirations.  Clear to auscultation bilaterally, no crackles, wheezing, or rhonchi  ABDOMEN: Soft, nontender, nondistended, +BS  EXTREMITIES: 2+ peripheral pulses bilaterally. No clubbing, cyanosis, or edema    ============================I/O===========================   I&O's Detail    02 Apr 2025 07:01  -  03 Apr 2025 07:00  --------------------------------------------------------  IN:    Bumetanide: 120 mL    Bumetanide: 30 mL    Dexmedetomidine: 83.7 mL    DOBUTamine: 130.2 mL    Enteral Tube Flush: 120 mL    Heparin: 333 mL    Norepinephrine: 11.7 mL    Norepinephrine: 82.6 mL    Propofol: 21.2 mL    Vasopressin: 252 mL  Total IN: 1184.4 mL    OUT:    Indwelling Catheter - Urethral (mL): 6120 mL  Total OUT: 6120 mL    Total NET: -4935.6 mL      03 Apr 2025 07:01  -  03 Apr 2025 07:36  --------------------------------------------------------  IN:    Heparin: 13.5 mL    Norepinephrine: 11.5 mL    Vasopressin: 15 mL  Total IN: 40 mL    OUT:  Total OUT: 0 mL    Total NET: 40 mL        ============================ LABS =========================                        12.7   11.18 )-----------( 149      ( 03 Apr 2025 01:11 )             37.9     04-03    130[L]  |  93[L]  |  82[H]  ----------------------------<  151[H]  4.3   |  22  |  4.75[H]    Ca    8.8      03 Apr 2025 01:11  Phos  5.8     04-03  Mg     2.2     04-03    TPro  6.8  /  Alb  3.1[L]  /  TBili  1.2  /  DBili  x   /  AST  6[L]  /  ALT  8[L]  /  AlkPhos  96  04-03    Troponin T, High Sensitivity Result: 218 ng/L (04-01-25 @ 02:35)  Troponin T, High Sensitivity Result: 225 ng/L (03-31-25 @ 18:01)    CKMB Units: 4.2 ng/mL (03-31-25 @ 18:01)            LIVER FUNCTIONS - ( 03 Apr 2025 01:11 )  Alb: 3.1 g/dL / Pro: 6.8 g/dL / ALK PHOS: 96 U/L / ALT: 8 U/L / AST: 6 U/L / GGT: x           PT/INR - ( 03 Apr 2025 01:11 )   PT: 16.0 sec;   INR: 1.41 ratio         PTT - ( 03 Apr 2025 01:11 )  PTT:58.5 sec  ABG - ( 03 Apr 2025 01:03 )  pH, Arterial: 7.36  pH, Blood: x     /  pCO2: 47    /  pO2: 114   / HCO3: 27    / Base Excess: 0.6   /  SaO2: 100.0             Blood Gas Venous - Lactate: 0.8 mmol/L (04-03-25 @ 01:46)  Blood Gas Arterial, Lactate: 0.9 mmol/L (04-03-25 @ 01:03)  Blood Gas Venous - Lactate: 0.9 mmol/L (04-02-25 @ 22:28)  Blood Gas Arterial, Lactate: 1.1 mmol/L (04-02-25 @ 17:20)  Blood Gas Venous - Lactate: 1.0 mmol/L (04-02-25 @ 17:20)  Blood Gas Arterial, Lactate: 1.1 mmol/L (04-02-25 @ 06:25)  Blood Gas Venous - Lactate: 1.1 mmol/L (04-02-25 @ 06:25)  Blood Gas Arterial, Lactate: 1.7 mmol/L (04-02-25 @ 00:07)  Blood Gas Venous - Lactate: 1.6 mmol/L (04-02-25 @ 00:07)  Blood Gas Arterial, Lactate: 1.2 mmol/L (04-01-25 @ 21:36)  Blood Gas Venous - Lactate: 1.3 mmol/L (04-01-25 @ 21:36)  Blood Gas Arterial, Lactate: 1.3 mmol/L (04-01-25 @ 21:00)  Blood Gas Arterial, Lactate: 1.6 mmol/L (04-01-25 @ 19:51)  Blood Gas Arterial, Lactate: 1.1 mmol/L (04-01-25 @ 17:30)  Blood Gas Venous - Lactate: 1.0 mmol/L (04-01-25 @ 17:30)  Blood Gas Venous - Lactate: 1.1 mmol/L (04-01-25 @ 13:24)  Blood Gas Venous - Lactate: 1.2 mmol/L (04-01-25 @ 13:20)  Blood Gas Venous - Lactate: 1.0 mmol/L (04-01-25 @ 13:17)  Blood Gas Venous - Lactate: 1.2 mmol/L (04-01-25 @ 12:20)  Blood Gas Venous - Lactate: 1.6 mmol/L (03-31-25 @ 18:00)    Urinalysis Basic - ( 03 Apr 2025 01:11 )    Color: x / Appearance: x / SG: x / pH: x  Gluc: 151 mg/dL / Ketone: x  / Bili: x / Urobili: x   Blood: x / Protein: x / Nitrite: x   Leuk Esterase: x / RBC: x / WBC x   Sq Epi: x / Non Sq Epi: x / Bacteria: x      ======================MICRO/RAD/CARDIO=================  Telemetry: NSR    < from: STANISLAW W or WO Ultrasound Enhancing Agent (04.02.25 @ 14:46) >  CONCLUSIONS:      1. Multiple segmental abnormalities exist. See findings.   2. Left ventricular cavity is severely dilated.Global left ventricular hypokinesis.   3. Reduced right ventricular systolic function.   4. Left atrium is severely dilated.   5. The right atrium is dilated.   6. Mild to moderate mitral regurgitation.   7. No pericardial effusion seen.   8. No thrombus on TAVR valve. No significant paravalvular AR.   9. Compared to the transthoracic echocardiogram performed on 4/1/2025, there have been no significant interval changes. Findings were discussed with CCU team on 4/2/2025 at 3.40 pm.  10. Left pleural effusion noted.  11. Minimal (grade 1) spontaneous echo contrast located in the left atrial apendage and minimal (grade 1) spontaneous echo contrast located in the left atrium.  12. No evidence of left atrial or left atrial appendage thrombus. The left atrial appendage emptying velocity is low.  13. Severe pulmonary hypertension.    < end of copied text >

## 2025-04-03 NOTE — CONSULT NOTE ADULT - NS ATTEND AMEND GEN_ALL_CORE FT
Events that transpired leading to the patient's current hospitalization along with the patient's hospital course was gone over.  The patient's past medical history/surgical history/family history/social history was gone over.  Agree with 14 point review of systems and physical examination as noted above.    Discussed with patient findings on transthoracic echocardiogram study that demonstrates severe bioprosthetic dysfunction/aortic stenosis of his 26 mm Sapine 3 valve.  Explained to the patient what is severe stenosis of the TAVR valve.  The associated signs and symptoms and the natural pathophysiology was gone over.  The various treatment options were discussed including medical therapy, TAVR valve in valve and surgery.  The pros/cons and risk/benefits of the various treatment options were gone over.    The patient is currently hemodynamically unstable and is on vasopressin and Levophed.  He has been followed closely by nephrology team for acute on chronic kidney disease which may be exacerbated in the setting of Entresto, diarrhea and dehydration.  Patient also notes episodes of chest discomfort on exertion which started a few weeks prior to his presentation.    Based upon patient's clinical status will determine appropriate timing of valvular intervention.  Based upon patient's coronary anatomy/CABG and comorbidities he is felt to be a high risk individual and is being assessed for TAVR valve in valve.  Indications and details of the procedure were reviewed.  Benefits and risks were discussed.  Risks include but not limited to infection, bleeding, arrhythmia, TIA/stroke, hemodynamic instability, vascular injury, worsening renal insufficiency/need for dialysis and death.    Due to patient's new chest discomfort prior to TAVR valve in valve it is recommended that he undergo cardiac catheterization/angiogram.  He has patent LIMA to the LAD and RCA.  Saphenous vein graft to OM and PDA are occluded.  Goal will be to do this procedure in the next few days if his kidney function continues to gradually improve.  Appreciate assistance from nephrology team.    All questions and concerns of the patient were addressed.    Findings and plan were discussed with cardiac surgery/Dr. Wagner and CICU/Dr. Beltran.

## 2025-04-03 NOTE — SWALLOW BEDSIDE ASSESSMENT ADULT - COMMENTS
On arrival to ED, vitals were 97.3F, HR 71, BP 94/57, RR 22, 97% on 2L NC. Labs pertinent for WBC 10.68, INR 2.55, Na 132, K 5.5, bicarb 20, BUN/Cr 78/5.17 (baseline Cr appears ~1.5-1.8), trop 225, BNP 75364, VBG ph 7.3, pco2/po2 wnl. UA without evidence of infection. EKG  on admission 1st deg av block, low voltage, poor r wave progression, R axis deviation CXR with diffuse hazy opacities suggest mild pulmonary edema and small right and trace left pleural effusions. Pt was given IV Bumex 1mg x1, IV bumex 2mg x1, IV Bumex 4mg x1, and lokelma 5mg x1, then admitted for further management.  4/1 RRT for hypotension; CCU consulted and accepted as TTE notable for severe prosthetic aortic valve stenosis for which structural cardiology was already consulted. Nephrology consulted for management of ANGELICA on CKD. Pt became tachypneic to 38 and hypoxic requiring 6L NC w/ escalation to BiPAP. Decision made to intubate. 4/2 STANISLAW completed; pt extubated to [facility]    Imaging:  CXR (4/2)  Lines and tubes in good position and described above. Small right pleural effusion on initial images and moderate pulmonary vascular congestion.    *Pt not previously known to this service*

## 2025-04-03 NOTE — DIETITIAN INITIAL EVALUATION ADULT - ETIOLOGY
related to hx of suspected excessive intake  related to inadequate protein-energy intake in the setting of CHF

## 2025-04-03 NOTE — DIETITIAN INITIAL EVALUATION ADULT - ADD RECOMMEND
1) Defer diet advancement and consistency to Team and SLP   -Recommend low sodium diet with Ensure Max x1 upon advancement   2) Multivitamin daily   3) Monitor diet tolerance, weight trends, labs, GI function, and skin integrity  4) Malnutrition sticker placed     Nathalie Amato MS, RDN, CDN (Teams)

## 2025-04-03 NOTE — SWALLOW BEDSIDE ASSESSMENT ADULT - SWALLOW EVAL: DIAGNOSIS
64Y M w/ Afib, CHF, aortic stenosis s/p TAVR, s/p micra implant, presenting with 6 days worsening dyspnea and AHRF i/s/o CHF exacerbation. 64Y M w/pmhx Afib, CHF, aortic stenosis s/p TAVR, CAD s/p CABG, s/p micra implant, presenting for 6 days of dyspnea. Pt w/ AHRF admitted to CICU and intubated. Patient seen for bedside swallow evaluation s/p extubation. Patient presenting with evidence of a pharyngeal dysphagia. Oral phase overtly functional characterized by timely bolus manipulation and transport. Pharyngeal phase notable for delayed initiation of pharyngeal swallow trigger. S/p ice-chip, pt with change in vocal quality, throat clear and productive cough suggesting pharyngeal residue and/or laryngeal penetration/aspiration. Patient with change in respiratory rate after coughing and suctioning benefiting from additional time to return to baseline. Instrumental evaluation clinically indicated to further assess swallow mechanism.

## 2025-04-03 NOTE — PROGRESS NOTE ADULT - ASSESSMENT
63 yo M with a fib (s/p dccv 3/25/25), CHF (likely moderately reduced EF, several TTE with poor windows), aortic stenosis s/p TAVR (11/2022), a flutter s/p ablation 2019 and s/p micra implant,  CKD3, obesity, venous stasis, HTN, HLD, who presents with 6 days of worsening dyspnea on exertion, admitted for AHRF likely iso CHF exacerbation, also with ANGELICA on CKD c/f cardiorenal syndrome. RRT on floors for SBP 80s requiring pressors and has been admitted to the CCU. HF mostly likely i/so severe AS. Pt to get structural intervention.     Plan:  ===NEURO===  - AO x 3    ===RESPIRATORY===  #Acute hypoxemic respiratory failure  - 2L NC satting 93  - Improved     ===CARDIOVASCULAR===  #Cardiogenic shock  - Levophed restarted after extubation. Uptitrating.  - Vaso .1, uptitrating.  - If need more pressor support will switch to phenylephrine as pt has LVOT obstruction from aortic stenosis   - Wean as tolerated    #Acute on chronic HF exacerbation  - Off bumex as Pt is -100cc /hr  - Goal 1-2L net neg daily  - CVP goal 8-12  - Will be careful of overdiuresis given severe AS and preload dependant     #Afib  #First degree block  - Continue amio 400 mg q8h until 4/4, start 200 mg qd thereafter  - C/w heparin    #Aortic valve stenosis s/p prosthetic  - S/p STANISLAW showing severe AS stenosis, global hypokinesis and EF 15%  - Structural plans for valve in valve replacement  - Structural wants cath and CT w/ contrast before procedure  - Awaiting SCr improvement    #Troponemia  - No ischemic changes on EKG  - Most likely i/s/o ANGELICA on CKD    #Hx CABG  - C/w Plavix    #HLD  - Increased Lipitor to 40mg from 20mg    ===/RENAL===  #ANGELICA on CKD  #Cardiorenal syndrome  - Baseline 1.5-1.8  - Most likely i/s/o hypotension and fluid overload  - Renal sono showed   - Monitor Cr off diuretics   - Nephro following     #Hyperkalemia  - Lokelma if K increases into 5s range    ===GI/NUTRITION===  - Failed bedside SS  - S&S consulted    ===SKIN===  - Hx lichen planus  - No other concerns    ===INFECTIOUS DISEASE===  - Slightly elevated WBC on admission 10.5K  - Afebrile  - Urine culture -> neg    ===ENDOCRINE===  - No concerns  - A1c 5.8  - TSH wnl    ===HEMATOLOGIC/DVT PPx===  - On heparin ggt

## 2025-04-03 NOTE — SWALLOW BEDSIDE ASSESSMENT ADULT - PHARYNGEAL PHASE
Delayed initiation of pharyngeal swallow trigger. Change in vocal quality, throat clearing, and productive cough noted s/p intake suggesting pharyngeal residue and/or laryngeal penetration/aspiration. Pt w/ change in respiratory status after productive cough and suctioning; baseline respiratory rate achieved with rest.

## 2025-04-03 NOTE — DIETITIAN INITIAL EVALUATION ADULT - PERTINENT LABORATORY DATA
04-03    130[L]  |  93[L]  |  82[H]  ----------------------------<  151[H]  4.3   |  22  |  4.75[H]    Ca    8.8      03 Apr 2025 01:11  Phos  5.8     04-03  Mg     2.2     04-03    TPro  6.8  /  Alb  3.1[L]  /  TBili  1.2  /  DBili  x   /  AST  6[L]  /  ALT  8[L]  /  AlkPhos  96  04-03  POCT Blood Glucose.: 132 mg/dL (04-03-25 @ 11:10)  A1C with Estimated Average Glucose Result: 5.8 % (03-31-25 @ 18:01)

## 2025-04-03 NOTE — PROGRESS NOTE ADULT - ATTENDING COMMENTS
HFrEF and severe prosthetic valve aortic stenosis  Hypotension and poor urine output with ANGELICA on CKD  Hemodynamic instability requiring pressor support  Consider [place] and inotrope support if needed  Intubated 4/1 for acute respiratory decompensation and increased work of breathing  STANISLAW 4/2 showing severe prosthetic valve stenosis    Structural Heart Team evaluation.

## 2025-04-03 NOTE — DIETITIAN INITIAL EVALUATION ADULT - ORAL INTAKE PTA/DIET HISTORY
PTA per pt  -Intake: poor PO intake x 3-4 months; follows low salt diet   -Chewing/Swallowing: denies hx of difficulty   -Allergies/Intolerances: NKFA  -Vitamins/Supplements: CO-Q10

## 2025-04-03 NOTE — SWALLOW BEDSIDE ASSESSMENT ADULT - SWALLOW EVAL: CURRENT DIET
NPO except medications NPO except medications; Failed RN administered Dysphagia screen 4/2/25 on 5mL water

## 2025-04-04 LAB
ALBUMIN SERPL ELPH-MCNC: 3.3 G/DL — SIGNIFICANT CHANGE UP (ref 3.3–5)
ALBUMIN SERPL ELPH-MCNC: 3.4 G/DL — SIGNIFICANT CHANGE UP (ref 3.3–5)
ALBUMIN SERPL ELPH-MCNC: 3.5 G/DL — SIGNIFICANT CHANGE UP (ref 3.3–5)
ALP SERPL-CCNC: 106 U/L — SIGNIFICANT CHANGE UP (ref 40–120)
ALP SERPL-CCNC: 90 U/L — SIGNIFICANT CHANGE UP (ref 40–120)
ALP SERPL-CCNC: 94 U/L — SIGNIFICANT CHANGE UP (ref 40–120)
ALT FLD-CCNC: 12 U/L — SIGNIFICANT CHANGE UP (ref 10–45)
ALT FLD-CCNC: 13 U/L — SIGNIFICANT CHANGE UP (ref 10–45)
ALT FLD-CCNC: 19 U/L — SIGNIFICANT CHANGE UP (ref 10–45)
ANION GAP SERPL CALC-SCNC: 18 MMOL/L — HIGH (ref 5–17)
ANION GAP SERPL CALC-SCNC: 18 MMOL/L — HIGH (ref 5–17)
ANION GAP SERPL CALC-SCNC: 19 MMOL/L — HIGH (ref 5–17)
APTT BLD: 51.7 SEC — HIGH (ref 24.5–35.6)
APTT BLD: 54.3 SEC — HIGH (ref 24.5–35.6)
APTT BLD: 54.4 SEC — HIGH (ref 24.5–35.6)
APTT BLD: 55.3 SEC — HIGH (ref 24.5–35.6)
AST SERPL-CCNC: 13 U/L — SIGNIFICANT CHANGE UP (ref 10–40)
AST SERPL-CCNC: 13 U/L — SIGNIFICANT CHANGE UP (ref 10–40)
AST SERPL-CCNC: 19 U/L — SIGNIFICANT CHANGE UP (ref 10–40)
BASOPHILS # BLD AUTO: 0.04 K/UL — SIGNIFICANT CHANGE UP (ref 0–0.2)
BASOPHILS NFR BLD AUTO: 0.4 % — SIGNIFICANT CHANGE UP (ref 0–2)
BILIRUB SERPL-MCNC: 1.7 MG/DL — HIGH (ref 0.2–1.2)
BILIRUB SERPL-MCNC: 1.8 MG/DL — HIGH (ref 0.2–1.2)
BILIRUB SERPL-MCNC: 1.8 MG/DL — HIGH (ref 0.2–1.2)
BUN SERPL-MCNC: 88 MG/DL — HIGH (ref 7–23)
BUN SERPL-MCNC: 88 MG/DL — HIGH (ref 7–23)
BUN SERPL-MCNC: 94 MG/DL — HIGH (ref 7–23)
CALCIUM SERPL-MCNC: 9 MG/DL — SIGNIFICANT CHANGE UP (ref 8.4–10.5)
CALCIUM SERPL-MCNC: 9.2 MG/DL — SIGNIFICANT CHANGE UP (ref 8.4–10.5)
CALCIUM SERPL-MCNC: 9.3 MG/DL — SIGNIFICANT CHANGE UP (ref 8.4–10.5)
CHLORIDE SERPL-SCNC: 89 MMOL/L — LOW (ref 96–108)
CHLORIDE SERPL-SCNC: 90 MMOL/L — LOW (ref 96–108)
CHLORIDE SERPL-SCNC: 90 MMOL/L — LOW (ref 96–108)
CO2 SERPL-SCNC: 22 MMOL/L — SIGNIFICANT CHANGE UP (ref 22–31)
CO2 SERPL-SCNC: 23 MMOL/L — SIGNIFICANT CHANGE UP (ref 22–31)
CO2 SERPL-SCNC: 24 MMOL/L — SIGNIFICANT CHANGE UP (ref 22–31)
CORTIS AM PEAK SERPL-MCNC: 31.3 UG/DL — HIGH (ref 6–18.4)
CREAT SERPL-MCNC: 4.59 MG/DL — HIGH (ref 0.5–1.3)
CREAT SERPL-MCNC: 4.59 MG/DL — HIGH (ref 0.5–1.3)
CREAT SERPL-MCNC: 4.97 MG/DL — HIGH (ref 0.5–1.3)
EGFR: 12 ML/MIN/1.73M2 — LOW
EGFR: 12 ML/MIN/1.73M2 — LOW
EGFR: 13 ML/MIN/1.73M2 — LOW
EOSINOPHIL # BLD AUTO: 0.01 K/UL — SIGNIFICANT CHANGE UP (ref 0–0.5)
EOSINOPHIL NFR BLD AUTO: 0.1 % — SIGNIFICANT CHANGE UP (ref 0–6)
GLUCOSE BLDC GLUCOMTR-MCNC: 133 MG/DL — HIGH (ref 70–99)
GLUCOSE BLDC GLUCOMTR-MCNC: 135 MG/DL — HIGH (ref 70–99)
GLUCOSE BLDC GLUCOMTR-MCNC: 137 MG/DL — HIGH (ref 70–99)
GLUCOSE BLDC GLUCOMTR-MCNC: 152 MG/DL — HIGH (ref 70–99)
GLUCOSE SERPL-MCNC: 139 MG/DL — HIGH (ref 70–99)
GLUCOSE SERPL-MCNC: 144 MG/DL — HIGH (ref 70–99)
GLUCOSE SERPL-MCNC: 149 MG/DL — HIGH (ref 70–99)
HCT VFR BLD CALC: 39 % — SIGNIFICANT CHANGE UP (ref 39–50)
HGB BLD-MCNC: 12.8 G/DL — LOW (ref 13–17)
IMM GRANULOCYTES NFR BLD AUTO: 0.4 % — SIGNIFICANT CHANGE UP (ref 0–0.9)
LYMPHOCYTES # BLD AUTO: 0.79 K/UL — LOW (ref 1–3.3)
LYMPHOCYTES # BLD AUTO: 8.2 % — LOW (ref 13–44)
MAGNESIUM SERPL-MCNC: 2.2 MG/DL — SIGNIFICANT CHANGE UP (ref 1.6–2.6)
MAGNESIUM SERPL-MCNC: 2.2 MG/DL — SIGNIFICANT CHANGE UP (ref 1.6–2.6)
MCHC RBC-ENTMCNC: 30.7 PG — SIGNIFICANT CHANGE UP (ref 27–34)
MCHC RBC-ENTMCNC: 32.8 G/DL — SIGNIFICANT CHANGE UP (ref 32–36)
MCV RBC AUTO: 93.5 FL — SIGNIFICANT CHANGE UP (ref 80–100)
MONOCYTES # BLD AUTO: 0.9 K/UL — SIGNIFICANT CHANGE UP (ref 0–0.9)
MONOCYTES NFR BLD AUTO: 9.4 % — SIGNIFICANT CHANGE UP (ref 2–14)
NEUTROPHILS # BLD AUTO: 7.84 K/UL — HIGH (ref 1.8–7.4)
NEUTROPHILS NFR BLD AUTO: 81.5 % — HIGH (ref 43–77)
NRBC BLD AUTO-RTO: 0 /100 WBCS — SIGNIFICANT CHANGE UP (ref 0–0)
PHOSPHATE SERPL-MCNC: 6.4 MG/DL — HIGH (ref 2.5–4.5)
PHOSPHATE SERPL-MCNC: 6.5 MG/DL — HIGH (ref 2.5–4.5)
PLATELET # BLD AUTO: 169 K/UL — SIGNIFICANT CHANGE UP (ref 150–400)
POTASSIUM SERPL-MCNC: 4 MMOL/L — SIGNIFICANT CHANGE UP (ref 3.5–5.3)
POTASSIUM SERPL-MCNC: 4.1 MMOL/L — SIGNIFICANT CHANGE UP (ref 3.5–5.3)
POTASSIUM SERPL-MCNC: 4.1 MMOL/L — SIGNIFICANT CHANGE UP (ref 3.5–5.3)
POTASSIUM SERPL-SCNC: 4 MMOL/L — SIGNIFICANT CHANGE UP (ref 3.5–5.3)
POTASSIUM SERPL-SCNC: 4.1 MMOL/L — SIGNIFICANT CHANGE UP (ref 3.5–5.3)
POTASSIUM SERPL-SCNC: 4.1 MMOL/L — SIGNIFICANT CHANGE UP (ref 3.5–5.3)
PROT SERPL-MCNC: 7.2 G/DL — SIGNIFICANT CHANGE UP (ref 6–8.3)
PROT SERPL-MCNC: 7.3 G/DL — SIGNIFICANT CHANGE UP (ref 6–8.3)
PROT SERPL-MCNC: 7.5 G/DL — SIGNIFICANT CHANGE UP (ref 6–8.3)
RBC # BLD: 4.17 M/UL — LOW (ref 4.2–5.8)
RBC # FLD: 16.4 % — HIGH (ref 10.3–14.5)
SODIUM SERPL-SCNC: 129 MMOL/L — LOW (ref 135–145)
SODIUM SERPL-SCNC: 131 MMOL/L — LOW (ref 135–145)
SODIUM SERPL-SCNC: 133 MMOL/L — LOW (ref 135–145)
WBC # BLD: 9.62 K/UL — SIGNIFICANT CHANGE UP (ref 3.8–10.5)
WBC # FLD AUTO: 9.62 K/UL — SIGNIFICANT CHANGE UP (ref 3.8–10.5)

## 2025-04-04 PROCEDURE — 99291 CRITICAL CARE FIRST HOUR: CPT

## 2025-04-04 PROCEDURE — 93010 ELECTROCARDIOGRAM REPORT: CPT

## 2025-04-04 PROCEDURE — 99233 SBSQ HOSP IP/OBS HIGH 50: CPT | Mod: 57

## 2025-04-04 PROCEDURE — 99233 SBSQ HOSP IP/OBS HIGH 50: CPT | Mod: GC

## 2025-04-04 RX ORDER — BUMETANIDE 1 MG/1
2 TABLET ORAL ONCE
Refills: 0 | Status: COMPLETED | OUTPATIENT
Start: 2025-04-04 | End: 2025-04-04

## 2025-04-04 RX ORDER — AMIODARONE HYDROCHLORIDE 50 MG/ML
400 INJECTION, SOLUTION INTRAVENOUS EVERY 8 HOURS
Refills: 0 | Status: COMPLETED | OUTPATIENT
Start: 2025-04-04 | End: 2025-04-06

## 2025-04-04 RX ORDER — BUMETANIDE 1 MG/1
2 TABLET ORAL ONCE
Refills: 0 | Status: DISCONTINUED | OUTPATIENT
Start: 2025-04-04 | End: 2025-04-04

## 2025-04-04 RX ORDER — AMIODARONE HYDROCHLORIDE 50 MG/ML
200 INJECTION, SOLUTION INTRAVENOUS DAILY
Refills: 0 | Status: DISCONTINUED | OUTPATIENT
Start: 2025-04-06 | End: 2025-04-28

## 2025-04-04 RX ORDER — AMIODARONE HYDROCHLORIDE 50 MG/ML
INJECTION, SOLUTION INTRAVENOUS
Refills: 0 | Status: DISCONTINUED | OUTPATIENT
Start: 2025-04-06 | End: 2025-04-28

## 2025-04-04 RX ORDER — NOREPINEPHRINE BITARTRATE 8 MG
0.05 SOLUTION INTRAVENOUS
Qty: 8 | Refills: 0 | Status: DISCONTINUED | OUTPATIENT
Start: 2025-04-04 | End: 2025-04-05

## 2025-04-04 RX ORDER — BUMETANIDE 1 MG/1
4 TABLET ORAL
Qty: 20 | Refills: 0 | Status: DISCONTINUED | OUTPATIENT
Start: 2025-04-04 | End: 2025-04-12

## 2025-04-04 RX ADMIN — NOREPINEPHRINE BITARTRATE 11.6 MICROGRAM(S)/KG/MIN: 8 SOLUTION at 08:16

## 2025-04-04 RX ADMIN — CLOPIDOGREL BISULFATE 75 MILLIGRAM(S): 75 TABLET, FILM COATED ORAL at 12:06

## 2025-04-04 RX ADMIN — VASOPRESSIN 6 UNIT(S)/MIN: 20 INJECTION INTRAVENOUS at 22:21

## 2025-04-04 RX ADMIN — Medication 650 MILLIGRAM(S): at 15:27

## 2025-04-04 RX ADMIN — HEPARIN SODIUM 14.5 UNIT(S)/HR: 1000 INJECTION INTRAVENOUS; SUBCUTANEOUS at 01:34

## 2025-04-04 RX ADMIN — BUMETANIDE 10 MG/HR: 1 TABLET ORAL at 22:21

## 2025-04-04 RX ADMIN — NOREPINEPHRINE BITARTRATE 11.6 MICROGRAM(S)/KG/MIN: 8 SOLUTION at 15:27

## 2025-04-04 RX ADMIN — AMIODARONE HYDROCHLORIDE 16.7 MG/MIN: 50 INJECTION, SOLUTION INTRAVENOUS at 08:16

## 2025-04-04 RX ADMIN — NOREPINEPHRINE BITARTRATE 11.6 MICROGRAM(S)/KG/MIN: 8 SOLUTION at 22:21

## 2025-04-04 RX ADMIN — BUMETANIDE 2 MILLIGRAM(S): 1 TABLET ORAL at 11:04

## 2025-04-04 RX ADMIN — AMIODARONE HYDROCHLORIDE 400 MILLIGRAM(S): 50 INJECTION, SOLUTION INTRAVENOUS at 15:57

## 2025-04-04 RX ADMIN — HEPARIN SODIUM 15 UNIT(S)/HR: 1000 INJECTION INTRAVENOUS; SUBCUTANEOUS at 22:21

## 2025-04-04 RX ADMIN — AMIODARONE HYDROCHLORIDE 400 MILLIGRAM(S): 50 INJECTION, SOLUTION INTRAVENOUS at 22:20

## 2025-04-04 RX ADMIN — INSULIN LISPRO 1: 100 INJECTION, SOLUTION INTRAVENOUS; SUBCUTANEOUS at 12:06

## 2025-04-04 RX ADMIN — BUMETANIDE 2 MILLIGRAM(S): 1 TABLET ORAL at 06:22

## 2025-04-04 RX ADMIN — HEPARIN SODIUM 14.5 UNIT(S)/HR: 1000 INJECTION INTRAVENOUS; SUBCUTANEOUS at 08:16

## 2025-04-04 RX ADMIN — VASOPRESSIN 6 UNIT(S)/MIN: 20 INJECTION INTRAVENOUS at 08:16

## 2025-04-04 RX ADMIN — BUMETANIDE 10 MG/HR: 1 TABLET ORAL at 11:04

## 2025-04-04 RX ADMIN — ATORVASTATIN CALCIUM 40 MILLIGRAM(S): 80 TABLET, FILM COATED ORAL at 22:20

## 2025-04-04 RX ADMIN — Medication 1 APPLICATION(S): at 22:22

## 2025-04-04 RX ADMIN — Medication 650 MILLIGRAM(S): at 22:21

## 2025-04-04 RX ADMIN — Medication 20 MILLIGRAM(S): at 12:06

## 2025-04-04 RX ADMIN — HEPARIN SODIUM 15 UNIT(S)/HR: 1000 INJECTION INTRAVENOUS; SUBCUTANEOUS at 13:00

## 2025-04-04 NOTE — PROGRESS NOTE ADULT - SUBJECTIVE AND OBJECTIVE BOX
***Plan not finalized until attending attestation***    Gabriele Barnes MD (PGY-1)  Internal Medicine  Contact via [employer] TEAMS    ******************************************    CCU PROGRESS NOTE:    BERTHA WARD  MRN-[id number]  Patient is a 64y old  Male who presents with a chief complaint of dyspnea , chest pain (03 Apr 2025 22:52)      INTERVAL HPI/OVERNIGHT EVENTS: No acute events overnight.    SUBJECTIVE: Patient seen and examined at bedside. No acute complaints. Denies chest pain, SOB, palpitations, dizziness/lightheadedness, LE edema.    MEDICATIONS  (STANDING):  aMIOdarone Infusion 0.5 mG/Min (16.7 mL/Hr) IV Continuous <Continuous>  atorvastatin 40 milliGRAM(s) Oral at bedtime  chlorhexidine 2% Cloths 1 Application(s) Topical daily  clopidogrel Tablet 75 milliGRAM(s) Oral daily  famotidine    Tablet 20 milliGRAM(s) Oral every 48 hours  heparin  Infusion 1500 Unit(s)/Hr (14.5 mL/Hr) IV Continuous <Continuous>  influenza  Vaccine (HIGH DOSE) 0.5 milliLiter(s) IntraMuscular once  insulin lispro (ADMELOG) corrective regimen sliding scale   SubCutaneous every 6 hours  norepinephrine Infusion 0.05 MICROgram(s)/kG/Min (11.6 mL/Hr) IV Continuous <Continuous>  sodium bicarbonate 650 milliGRAM(s) Oral three times a day  vasopressin Infusion 0.04 Unit(s)/Min (6 mL/Hr) IV Continuous <Continuous>    MEDICATIONS  (PRN):      OBJECTIVE:  ICU Vital Signs Last 24 Hrs  T(C): 36.5 (04 Apr 2025 03:00), Max: 36.7 (03 Apr 2025 17:00)  T(F): 97.7 (04 Apr 2025 03:00), Max: 98 (03 Apr 2025 17:00)  HR: 60 (04 Apr 2025 07:00) (59 - 76)  BP: --  BP(mean): --  ABP: 95/59 (04 Apr 2025 07:00) (78/47 - 103/64)  ABP(mean): 74 (04 Apr 2025 07:00) (60 - 82)  RR: 26 (04 Apr 2025 07:00) (18 - 43)  SpO2: 97% (04 Apr 2025 07:00) (84% - 97%)    O2 Parameters below as of 04 Apr 2025 07:00  Patient On (Oxygen Delivery Method): nasal cannula  O2 Flow (L/min): 2          CVP(mm Hg): 12 (04-04-25 @ 07:00) (0 - 26)  CO: --  CI: --  PA: --  PA(mean): --  PA(direct): --  PCWP: --  LA: --  RA: --  SVR: --  SVRI: --  PVR: --  PVRI: --  I&O's Summary    03 Apr 2025 07:01  -  04 Apr 2025 07:00  --------------------------------------------------------  IN: 1369.2 mL / OUT: 1885 mL / NET: -515.8 mL        CAPILLARY BLOOD GLUCOSE      GENERAL: NAD, lying in bed comfortably  NECK: no JVD  HEART: S1, S2, regular rate and rhythm, no murmurs, rubs, or gallops  LUNGS: Unlabored respirations.  Clear to auscultation bilaterally, no crackles, wheezing, or rhonchi  ABDOMEN: Soft, nontender, nondistended, +BS  EXTREMITIES: 2+ peripheral pulses bilaterally. No clubbing, cyanosis, or edema    ============================I/O===========================   I&O's Detail    03 Apr 2025 07:01  -  04 Apr 2025 07:00  --------------------------------------------------------  IN:    Amiodarone: 233.1 mL    Amiodarone: 200.4 mL    Heparin: 330 mL    Norepinephrine: 245.7 mL    Vasopressin: 360 mL  Total IN: 1369.2 mL    OUT:    Indwelling Catheter - Urethral (mL): 1885 mL    Oral Fluid: 0 mL  Total OUT: 1885 mL    Total NET: -515.8 mL        ============================ LABS =========================                        12.8   9.62  )-----------( 169      ( 04 Apr 2025 00:43 )             39.0     04-04    131[L]  |  90[L]  |  88[H]  ----------------------------<  139[H]  4.1   |  23  |  4.59[H]    Ca    9.3      04 Apr 2025 00:43  Phos  6.4     04-04  Mg     2.2     04-04    TPro  7.5  /  Alb  3.5  /  TBili  1.8[H]  /  DBili  x   /  AST  13  /  ALT  12  /  AlkPhos  94  04-04                LIVER FUNCTIONS - ( 04 Apr 2025 00:43 )  Alb: 3.5 g/dL / Pro: 7.5 g/dL / ALK PHOS: 94 U/L / ALT: 12 U/L / AST: 13 U/L / GGT: x           PT/INR - ( 03 Apr 2025 01:11 )   PT: 16.0 sec;   INR: 1.41 ratio         PTT - ( 04 Apr 2025 07:00 )  PTT:54.4 sec  ABG - ( 03 Apr 2025 01:03 )  pH, Arterial: 7.36  pH, Blood: x     /  pCO2: 47    /  pO2: 114   / HCO3: 27    / Base Excess: 0.6   /  SaO2: 100.0             Blood Gas Venous - Lactate: 0.8 mmol/L (04-03-25 @ 01:46)  Blood Gas Arterial, Lactate: 0.9 mmol/L (04-03-25 @ 01:03)  Blood Gas Venous - Lactate: 0.9 mmol/L (04-02-25 @ 22:28)  Blood Gas Arterial, Lactate: 1.1 mmol/L (04-02-25 @ 17:20)  Blood Gas Venous - Lactate: 1.0 mmol/L (04-02-25 @ 17:20)  Blood Gas Arterial, Lactate: 1.1 mmol/L (04-02-25 @ 06:25)  Blood Gas Venous - Lactate: 1.1 mmol/L (04-02-25 @ 06:25)  Blood Gas Arterial, Lactate: 1.7 mmol/L (04-02-25 @ 00:07)  Blood Gas Venous - Lactate: 1.6 mmol/L (04-02-25 @ 00:07)  Blood Gas Arterial, Lactate: 1.2 mmol/L (04-01-25 @ 21:36)  Blood Gas Venous - Lactate: 1.3 mmol/L (04-01-25 @ 21:36)  Blood Gas Arterial, Lactate: 1.3 mmol/L (04-01-25 @ 21:00)  Blood Gas Arterial, Lactate: 1.6 mmol/L (04-01-25 @ 19:51)  Blood Gas Arterial, Lactate: 1.1 mmol/L (04-01-25 @ 17:30)  Blood Gas Venous - Lactate: 1.0 mmol/L (04-01-25 @ 17:30)  Blood Gas Venous - Lactate: 1.1 mmol/L (04-01-25 @ 13:24)  Blood Gas Venous - Lactate: 1.2 mmol/L (04-01-25 @ 13:20)  Blood Gas Venous - Lactate: 1.0 mmol/L (04-01-25 @ 13:17)  Blood Gas Venous - Lactate: 1.2 mmol/L (04-01-25 @ 12:20)    Urinalysis Basic - ( 04 Apr 2025 00:43 )    Color: x / Appearance: x / SG: x / pH: x  Gluc: 139 mg/dL / Ketone: x  / Bili: x / Urobili: x   Blood: x / Protein: x / Nitrite: x   Leuk Esterase: x / RBC: x / WBC x   Sq Epi: x / Non Sq Epi: x / Bacteria: x      ======================MICRO/RAD/CARDIO=================  Telemetry: Reviewed   EKG: Reviewed  CXR: Reviewed  Culture: Reviewed   Echo:   Cath:    ***Plan not finalized until attending attestation***    Gabriele Barnes MD (PGY-1)  Internal Medicine  Contact via [employer] TEAMS    ******************************************    CCU PROGRESS NOTE:    BERTHA WARD  MRN-[id number]  Patient is a 64y old  Male who presents with a chief complaint of dyspnea , chest pain (03 Apr 2025 22:52)    INTERVAL HPI/OVERNIGHT EVENTS:   CVP elevated to 16 -> got bumex 2mg IV  Increased O2 req    SUBJECTIVE: Patient seen and examined at bedside. No acute complaints. Denies chest pain, SOB, palpitations, dizziness/lightheadedness  MEDICATIONS  (STANDING):  aMIOdarone Infusion 0.5 mG/Min (16.7 mL/Hr) IV Continuous <Continuous>  atorvastatin 40 milliGRAM(s) Oral at bedtime  chlorhexidine 2% Cloths 1 Application(s) Topical daily  clopidogrel Tablet 75 milliGRAM(s) Oral daily  famotidine    Tablet 20 milliGRAM(s) Oral every 48 hours  heparin  Infusion 1500 Unit(s)/Hr (14.5 mL/Hr) IV Continuous <Continuous>  influenza  Vaccine (HIGH DOSE) 0.5 milliLiter(s) IntraMuscular once  insulin lispro (ADMELOG) corrective regimen sliding scale   SubCutaneous every 6 hours  norepinephrine Infusion 0.05 MICROgram(s)/kG/Min (11.6 mL/Hr) IV Continuous <Continuous>  sodium bicarbonate 650 milliGRAM(s) Oral three times a day  vasopressin Infusion 0.04 Unit(s)/Min (6 mL/Hr) IV Continuous <Continuous>      OBJECTIVE:  ICU Vital Signs Last 24 Hrs  T(C): 36.5 (04 Apr 2025 03:00), Max: 36.7 (03 Apr 2025 17:00)  T(F): 97.7 (04 Apr 2025 03:00), Max: 98 (03 Apr 2025 17:00)  HR: 60 (04 Apr 2025 07:00) (59 - 76)  BP: --  BP(mean): --  ABP: 95/59 (04 Apr 2025 07:00) (78/47 - 103/64)  ABP(mean): 74 (04 Apr 2025 07:00) (60 - 82)  RR: 26 (04 Apr 2025 07:00) (18 - 43)  SpO2: 97% (04 Apr 2025 07:00) (84% - 97%)    O2 Parameters below as of 04 Apr 2025 07:00  Patient On (Oxygen Delivery Method): nasal cannula  O2 Flow (L/min): 2        03 Apr 2025 07:01  -  04 Apr 2025 07:00  --------------------------------------------------------  IN: 1369.2 mL / OUT: 1885 mL / NET: -515.8 mL        CAPILLARY BLOOD GLUCOSE      Physical Exam:  General: Well-appearing, NAD  HEENT: PERRL, EOMI, normal sclera and conjunctiva, normal oropharynx  Neck: Supple, no JVD, thyroid without masses or enlargement  Chest/Lungs: +nasal cannula. CTA bilaterally, no wheezing, rales, rhonchi or rub  Heart: +systolic murmur  Abdomen: Soft, ND, NTTP, normoactive bowel sounds  Extremities: +lichen planus bilaterally. +pitting edema. 2+ peripheral pulses b/l, no edema, clubbing or cyanosis  Skin: +lichen planus bilaterally  Neurological: A&Ox3, moves all extremities, no focal deficits    ============================I/O===========================   I&O's Detail    03 Apr 2025 07:01  -  04 Apr 2025 07:00  --------------------------------------------------------  IN:    Amiodarone: 233.1 mL    Amiodarone: 200.4 mL    Heparin: 330 mL    Norepinephrine: 245.7 mL    Vasopressin: 360 mL  Total IN: 1369.2 mL    OUT:    Indwelling Catheter - Urethral (mL): 1885 mL    Oral Fluid: 0 mL  Total OUT: 1885 mL    Total NET: -515.8 mL        ============================ LABS =========================                        12.8   9.62  )-----------( 169      ( 04 Apr 2025 00:43 )             39.0     04-04    131[L]  |  90[L]  |  88[H]  ----------------------------<  139[H]  4.1   |  23  |  4.59[H]    Ca    9.3      04 Apr 2025 00:43  Phos  6.4     04-04  Mg     2.2     04-04    TPro  7.5  /  Alb  3.5  /  TBili  1.8[H]  /  DBili  x   /  AST  13  /  ALT  12  /  AlkPhos  94  04-04                LIVER FUNCTIONS - ( 04 Apr 2025 00:43 )  Alb: 3.5 g/dL / Pro: 7.5 g/dL / ALK PHOS: 94 U/L / ALT: 12 U/L / AST: 13 U/L / GGT: x           PT/INR - ( 03 Apr 2025 01:11 )   PT: 16.0 sec;   INR: 1.41 ratio         PTT - ( 04 Apr 2025 07:00 )  PTT:54.4 sec  ABG - ( 03 Apr 2025 01:03 )  pH, Arterial: 7.36  pH, Blood: x     /  pCO2: 47    /  pO2: 114   / HCO3: 27    / Base Excess: 0.6   /  SaO2: 100.0             Blood Gas Venous - Lactate: 0.8 mmol/L (04-03-25 @ 01:46)  Blood Gas Arterial, Lactate: 0.9 mmol/L (04-03-25 @ 01:03)  Blood Gas Venous - Lactate: 0.9 mmol/L (04-02-25 @ 22:28)  Blood Gas Arterial, Lactate: 1.1 mmol/L (04-02-25 @ 17:20)  Blood Gas Venous - Lactate: 1.0 mmol/L (04-02-25 @ 17:20)  Blood Gas Arterial, Lactate: 1.1 mmol/L (04-02-25 @ 06:25)  Blood Gas Venous - Lactate: 1.1 mmol/L (04-02-25 @ 06:25)  Blood Gas Arterial, Lactate: 1.7 mmol/L (04-02-25 @ 00:07)  Blood Gas Venous - Lactate: 1.6 mmol/L (04-02-25 @ 00:07)  Blood Gas Arterial, Lactate: 1.2 mmol/L (04-01-25 @ 21:36)  Blood Gas Venous - Lactate: 1.3 mmol/L (04-01-25 @ 21:36)  Blood Gas Arterial, Lactate: 1.3 mmol/L (04-01-25 @ 21:00)  Blood Gas Arterial, Lactate: 1.6 mmol/L (04-01-25 @ 19:51)  Blood Gas Arterial, Lactate: 1.1 mmol/L (04-01-25 @ 17:30)  Blood Gas Venous - Lactate: 1.0 mmol/L (04-01-25 @ 17:30)  Blood Gas Venous - Lactate: 1.1 mmol/L (04-01-25 @ 13:24)  Blood Gas Venous - Lactate: 1.2 mmol/L (04-01-25 @ 13:20)  Blood Gas Venous - Lactate: 1.0 mmol/L (04-01-25 @ 13:17)  Blood Gas Venous - Lactate: 1.2 mmol/L (04-01-25 @ 12:20)    Urinalysis Basic - ( 04 Apr 2025 00:43 )    Color: x / Appearance: x / SG: x / pH: x  Gluc: 139 mg/dL / Ketone: x  / Bili: x / Urobili: x   Blood: x / Protein: x / Nitrite: x   Leuk Esterase: x / RBC: x / WBC x   Sq Epi: x / Non Sq Epi: x / Bacteria: x      ======================MICRO/RAD/CARDIO=================  Telemetry: NSR

## 2025-04-04 NOTE — PROGRESS NOTE ADULT - ASSESSMENT
65 y/o M with hx of Afib s/p DCCV 3/25/25, CHF, Aortic stenosis s/p TAVR 11/2022, CAD s/p CABG, Aflutter s/p ablation and Micra implant, CKD 3, Obesity, venous stasis, HTN, HLD presented with 6 days of worsening SOB on exertion associated with orthopnea and chest pain.   Also reports several days decreased appetite. Denies fevers, chills, cough, abdominal pain, NVD, urinary sx, leg swelling. Patient had a cardioversion for atrial fibrillation at this hospital recently, which was reportedly unsuccessful. Patient has a scheduled appointment with Dr. Philip on 04/24/2025 to discuss possible ablation. His outpt cardiologist is Dr. Miranda. (Cardiology)  On arrival to ED, vitals were 97.3F, HR 71, BP 94/57, RR 22, 97% on 2L NC. Labs pertinent for WBC 10.68, INR 2.55, Na 132, K 5.5, bicarb 20, BUN/Cr 78/5.17 (baseline Cr appears ~1.5-1.8), trop 225, BNP 71408, VBG ph 7.3, pco2/po2 wnl. UA without evidence of infection.  nephrology was consulted for management of ANGELICA on CKD. Pt has baseline Scr of ~1.5- 1.8. Scr was 2.7 on 3/25/25. Pt is on Entresto ( started recently) and he mentioned that he was taking Motrin occasionally. No herbal medications/ supplements.  Scr at the time of admission was 5.17 3/31/25 and it further worsened to 5.45 4/1/25.    Pt follows Dr. Stewart Brown (Nephrology)      ANGELICA on CKD:  Pt has hx of CKD 3. Baseline Scr 1.5-1.8.   At the time of admission - Scr was 5.17 and it further worsened to 5.45--> 5.5. Today it improved to 4.5.  Pt has lopez cath in place. UOP was 1.9L (-560) net.   Bumex gtt - stopped on 4/2/25 evening.   UA showed trace LE, blood ( Likely due to lopez), casts 17. UPCR 0.7. No hx of DM.   Pt was on entresto.   BP was low.   On Norepi and vaso gtt.   ANGELICA on CKD likely in setting of HF exacerbation, entresto use and hypotension mediated ATN.    PLAN:  Pt received 2mg IV bumex x1 today. Can consider additional dose to keep more net negative.   continue to hold entresto.   Continue with sodium bicarb 650 TID for now.   Monitor strict I/Os and daily wts.   Get USG kidney and bladder.   No urgent need of HD for now.    Avoid nephrotoxins  Dose medications as per eGFR    hyperkalemia likely in setting of Entresto use and renal failure, resolved.    Continue to hold entresto.     Hyponatremia is likely in setting of impaired water clearance in setting of worsening renal function and volume overload 2/2 heart failure  Bumex as noted above      Please call if you have any questions  Sha Conde, PGY4  Nephrology Fellow  [phone]/Teams preferred  (After 5PM or weekends, reach out to fellow on call)

## 2025-04-04 NOTE — PROGRESS NOTE ADULT - ASSESSMENT
63 yo M with a fib (s/p dccv 3/25/25), CHF (likely moderately reduced EF, several TTE with poor windows), aortic stenosis s/p TAVR (11/2022), a flutter s/p ablation 2019 and s/p micra implant,  CKD3, obesity, venous stasis, HTN, HLD, who presents with 6 days of worsening dyspnea on exertion, admitted for AHRF likely iso CHF exacerbation, also with ANGELICA on CKD c/f cardiorenal syndrome. RRT on floors for SBP 80s requiring pressors and has been admitted to the CCU. HF mostly likely i/so severe AS. Pt to get structural intervention.     Plan:  ===NEURO===  - AO x 3    ===RESPIRATORY===  #Acute hypoxemic respiratory failure  - 2L NC satting 93  - Improved     ===CARDIOVASCULAR===  #Cardiogenic shock  - Levophed restarted after extubation. Uptitrating.  - Vaso .1, uptitrating.  - If need more pressor support will switch to phenylephrine as pt has LVOT obstruction from aortic stenosis   - Wean as tolerated    #Acute on chronic HF exacerbation  - Off bumex as Pt is -100cc /hr  - Goal 1-2L net neg daily  - CVP goal 8-12  - Will be careful of overdiuresis given severe AS and preload dependant     #Afib  #First degree block  - Continue amio 400 mg q8h until 4/4, start 200 mg qd thereafter  - C/w heparin    #Aortic valve stenosis s/p prosthetic  - S/p STANISLAW showing severe AS stenosis, global hypokinesis and EF 15%  - Structural plans for valve in valve replacement  - Structural wants cath and CT w/ contrast before procedure  - Awaiting SCr improvement    #Troponemia  - No ischemic changes on EKG  - Most likely i/s/o ANGELICA on CKD    #Hx CABG  - C/w Plavix    #HLD  - Increased Lipitor to 40mg from 20mg    ===/RENAL===  #ANGELICA on CKD  #Cardiorenal syndrome  - Baseline 1.5-1.8  - Most likely i/s/o hypotension and fluid overload  - Renal sono showed   - Monitor Cr off diuretics   - Nephro following     #Hyperkalemia  - Lokelma if K increases into 5s range    ===GI/NUTRITION===  - Failed bedside SS  - S&S consulted    ===SKIN===  - Hx lichen planus  - No other concerns    ===INFECTIOUS DISEASE===  - Slightly elevated WBC on admission 10.5K  - Afebrile  - Urine culture -> neg    ===ENDOCRINE===  - No concerns  - A1c 5.8  - TSH wnl    ===HEMATOLOGIC/DVT PPx===  - On heparin ggt   65 yo M with a fib (s/p dccv 3/25/25), CHF (likely moderately reduced EF, several TTE with poor windows), aortic stenosis s/p TAVR (11/2022), a flutter s/p ablation 2019 and s/p micra implant,  CKD3, obesity, venous stasis, HTN, HLD, who presents with 6 days of worsening dyspnea on exertion, admitted for AHRF likely iso CHF exacerbation, also with ANGELICA on CKD c/f cardiorenal syndrome. RRT on floors for SBP 80s requiring pressors and has been admitted to the CCU. HF mostly likely i/so severe AS. Pt to get structural intervention.     Plan:  ===NEURO===  - AO x 3    ===RESPIRATORY===  #Acute hypoxemic respiratory failure  - Restarted bumex 2mg drip for elevated CVP and increased O2 req    ===CARDIOVASCULAR===  #Obstructive shock 2/2 AS  - Off levophed  - Vaso .1  - If need more pressor support will switch to phenylephrine as pt has LVOT obstruction from aortic stenosis   - Wean as tolerated    #Acute on chronic HF exacerbation  - Restarted bumex 2mg drip for elevated CVP and increased O2 req  - Goal 1-2L net neg daily  - CVP goal 8-12  - Will be careful of overdiuresis given severe AS and preload dependant     #Afib  #First degree block  - Continue amio IV while NPO  - C/w heparin    #Aortic valve stenosis s/p prosthetic  - S/p STANISLAW showing severe AS stenosis, global hypokinesis and EF 15%  - Structural plans for valve in valve replacement  - Structural wants cath and CT w/ contrast before procedure  - Awaiting GFR improvement    #Troponemia  - No ischemic changes on EKG  - Most likely i/s/o ANGELICA on CKD    #Hx CABG  #PVD  - C/w Plavix    #HLD  - Increased Lipitor to 40mg from 20mg    ===/RENAL===  #ANGELICA on CKD  #Cardiorenal syndrome  - Baseline 1.5-1.8  - Most likely i/s/o hypotension and fluid overload  - Renal sono showed parenchymal disease  - Nephro following     #Hyperkalemia  - Lokelma if K increases into 5s range    ===GI/NUTRITION===  - Failed bedside SS  - S&S consulted -> recommends FEES    ===SKIN===  - Hx lichen planus  - No other concerns    ===INFECTIOUS DISEASE===  - Slightly elevated WBC on admission 10.5K  - Afebrile  - Urine culture -> neg    ===ENDOCRINE===  - No concerns  - A1c 5.8  - TSH wnl    ===HEMATOLOGIC/DVT PPx===  - On heparin ggt

## 2025-04-04 NOTE — PROGRESS NOTE ADULT - ATTENDING COMMENTS
events noted   intubated, now extubated     called for chon  pt reports baseline Renal fxn in 40s, followed by outpt nephrologist Dr Stringer  #Baseline CKD3  #admitted with chon in setting of HF and ENtresto and decreased po intake and diarrhea  chon could be cardiorenal +/- ATN  cr seems to have plateaued, and improving now   now off bumex drip  would give bumex 2mg IV daily   he has a lopez  monitor UO  monitor daily cr trends  lytes stable  no urgent indication for RRT  check renal sono  #HF  bumex  as above  # hyperkalemia  if K uptrends can give lokelma 10g  #met acidosis- on sodium bicarb tabs  monitor bicarb trend daily  #hypotension  monitor BP trends  on vaso/levo  #pt understands may need cardiac cath for tavr eval- if contrast is needed he understands risk of LISA  will monitor few more days for more renal recovery before proceeding if not urgent

## 2025-04-04 NOTE — PROGRESS NOTE ADULT - SUBJECTIVE AND OBJECTIVE BOX
[facility] Division of Kidney Diseases & Hypertension  FOLLOW UP NOTE  [phone]--------------------------------------------------------------------------------  Chief Complaint: Acute on chronic systolic congestive heart failure    24 hour events/subjective: Pt off bumex, made about 1.8L UO. Denies any overnight events.     PAST HISTORY  --------------------------------------------------------------------------------  No significant changes to PMH, PSH, FHx, SHx, unless otherwise noted    ALLERGIES & MEDICATIONS  --------------------------------------------------------------------------------  Allergies    metoprolol (Short breath)    Intolerances      Standing Inpatient Medications  aMIOdarone Infusion 0.5 mG/Min IV Continuous <Continuous>  atorvastatin 40 milliGRAM(s) Oral at bedtime  chlorhexidine 2% Cloths 1 Application(s) Topical daily  clopidogrel Tablet 75 milliGRAM(s) Oral daily  famotidine    Tablet 20 milliGRAM(s) Oral every 48 hours  heparin  Infusion 1500 Unit(s)/Hr IV Continuous <Continuous>  influenza  Vaccine (HIGH DOSE) 0.5 milliLiter(s) IntraMuscular once  insulin lispro (ADMELOG) corrective regimen sliding scale   SubCutaneous every 6 hours  norepinephrine Infusion 0.05 MICROgram(s)/kG/Min IV Continuous <Continuous>  sodium bicarbonate 650 milliGRAM(s) Oral three times a day  vasopressin Infusion 0.04 Unit(s)/Min IV Continuous <Continuous>    PRN Inpatient Medications      REVIEW OF SYSTEMS  --------------------------------------------------------------------------------  Gen: No  fevers/chills  Respiratory: No dyspnea, cough, wheezing, hemoptysis  CV: No chest pain, PND, orthopnea  GI: No abdominal pain, diarrhea, constipation, nausea, vomiting  : No increased frequency, dysuria, hematuria, nocturia  MSK: No joint pain/swelling; no back pain; no edema    All other systems were reviewed and are negative, except as noted.    VITALS/PHYSICAL EXAM  --------------------------------------------------------------------------------  T(C): 36.6 (04-04-25 @ 07:00), Max: 36.7 (04-03-25 @ 17:00)  HR: 63 (04-04-25 @ 09:00) (59 - 75)  BP: --  RR: 36 (04-04-25 @ 09:00) (18 - 43)  SpO2: 91% (04-04-25 @ 09:00) (84% - 97%)  Wt(kg): --        04-03-25 @ 07:01  -  04-04-25 @ 07:00  --------------------------------------------------------  IN: 1369.2 mL / OUT: 1935 mL / NET: -565.8 mL    04-04-25 @ 07:01  -  04-04-25 @ 09:13  --------------------------------------------------------  IN: 69.3 mL / OUT: 75 mL / NET: -5.7 mL      Physical Exam:              Gen: NAD on supplemental oxygen.   	Pulm: No crackles   	CV: RRR, S1S2;  	Abd: +BS, soft, nontender/nondistended  	: Willis in place.                Extremities: Mild LE swelling               Skin: Warm, Lower extremity chronic changes.    LABS/STUDIES  --------------------------------------------------------------------------------              12.8   9.62  >-----------<  169      [04-04-25 @ 00:43]              39.0     131  |  90  |  88  ----------------------------<  139      [04-04-25 @ 00:43]  4.1   |  23  |  4.59        Ca     9.3     [04-04-25 @ 00:43]      Mg     2.2     [04-04-25 @ 00:43]      Phos  6.4     [04-04-25 @ 00:43]    TPro  7.5  /  Alb  3.5  /  TBili  1.8  /  DBili  x   /  AST  13  /  ALT  12  /  AlkPhos  94  [04-04-25 @ 00:43]    PT/INR: PT 16.0 , INR 1.41       [04-03-25 @ 01:11]  PTT: 54.4       [04-04-25 @ 07:00]      Creatinine Trend:  SCr 4.59 [04-04 @ 00:43]  SCr 4.75 [04-03 @ 01:11]  SCr 4.95 [04-02 @ 17:28]  SCr 5.16 [04-02 @ 12:22]  SCr 5.50 [04-02 @ 06:29]    Urinalysis - [04-04-25 @ 00:43]      Color  / Appearance  / SG  / pH       Gluc 139 / Ketone   / Bili  / Urobili        Blood  / Protein  / Leuk Est  / Nitrite       RBC  / WBC  / Hyaline  / Gran  / Sq Epi  / Non Sq Epi  / Bacteria     Urine Creatinine 163      [04-01-25 @ 09:25]  Urine Protein 122      [04-01-25 @ 09:25]  Urine Sodium 29      [04-01-25 @ 09:25]    TSH 3.15      [04-01-25 @ 21:43]  Lipid: chol 94, TG 74, HDL 29, LDL --      [04-01-25 @ 06:18]

## 2025-04-04 NOTE — PROGRESS NOTE ADULT - ASSESSMENT
65 yo M with a fib (s/p dccv 3/25/25), CHF (likely moderately reduced EF, several TTE with poor windows), aortic stenosis s/p TAVR (11/2022), a flutter s/p ablation 2019 and s/p micra implant,  CKD3, obesity, venous stasis, HTN, HLD, who presents with 6 days of worsening dyspnea on exertion, admitted for AHRF likely iso CHF exacerbation, also with ANGELICA on CKD c/f cardiorenal syndrome. RRT on floors for SBP 80s requiring pressors and has been admitted to the CCU. HF mostly likely i/so severe AS. Pt to get structural intervention.     Plan:  ===NEURO===  - AO x 3    ===RESPIRATORY===  #Acute hypoxemic respiratory failure  - Restarted bumex 2mg drip for elevated CVP and increased O2 req    ===CARDIOVASCULAR===  #Obstructive shock 2/2 AS  - Off levophed  - Vaso .1  - If need more pressor support will switch to phenylephrine as pt has LVOT obstruction from aortic stenosis   - Wean as tolerated    #Acute on chronic HF exacerbation  - Restarted bumex 2mg drip for elevated CVP and increased O2 req  - Goal 1-2L net neg daily  - CVP goal 8-12  - Will be careful of overdiuresis given severe AS and preload dependant     #Afib  #First degree block  - Continue amio IV while NPO  - C/w heparin    #Aortic valve stenosis s/p prosthetic  - S/p STANISLAW showing severe AS stenosis, global hypokinesis and EF 15%  - Structural plans for valve in valve replacement  - Structural wants cath and CT w/ contrast before procedure  - Awaiting GFR improvement    #Troponemia  - No ischemic changes on EKG  - Most likely i/s/o ANGELICA on CKD    #Hx CABG  #PVD  - C/w Plavix    #HLD  - Increased Lipitor to 40mg from 20mg    ===/RENAL===  #ANGELICA on CKD  #Cardiorenal syndrome  - Baseline 1.5-1.8  - Most likely i/s/o hypotension and fluid overload  - Renal sono showed parenchymal disease  - Nephro following     #Hyperkalemia  - Lokelma if K increases into 5s range    ===GI/NUTRITION===  - Failed bedside SS  - S&S consulted -> recommends FEES    ===SKIN===  - Hx lichen planus  - No other concerns    ===INFECTIOUS DISEASE===  - Slightly elevated WBC on admission 10.5K  - Afebrile  - Urine culture -> neg    ===ENDOCRINE===  - No concerns  - A1c 5.8  - TSH wnl    ===HEMATOLOGIC/DVT PPx===  - On heparin ggt

## 2025-04-04 NOTE — SWALLOW FEES ASSESSMENT ADULT - ORAL PHASE COMMENTS
intermittent spillage to valleculae - WNL intermittent spillage to valleculae and lateral channels; passive spillage of residue to pyriform sinus, but cleared with spontaneous repeat swallow

## 2025-04-04 NOTE — PROGRESS NOTE ADULT - PROBLEM SELECTOR PLAN 1
in the setting of severe prosthetic aortic stenosis of previously implanted TAVR  Will plan for LHC with Dr. Montelongo on Monday pending renal function over the weekend.   Anticipate Florence TAVR next week.     BRODERICK Gordon  TEAMS  [id number]

## 2025-04-04 NOTE — PROGRESS NOTE ADULT - SUBJECTIVE AND OBJECTIVE BOX
HPI/Interval Hx    Pt. lying in bed, no acute events. Remains on vasopressin, and IV amiodarone while NPO. Structural Team following for severe aortic stenosis of previously implanted TAVR.     MEDICATIONS  (STANDING):  aMIOdarone Infusion 0.5 mG/Min (16.7 mL/Hr) IV Continuous <Continuous>  atorvastatin 40 milliGRAM(s) Oral at bedtime  buMETAnide Infusion 2 mG/Hr (10 mL/Hr) IV Continuous <Continuous>  chlorhexidine 2% Cloths 1 Application(s) Topical daily  clopidogrel Tablet 75 milliGRAM(s) Oral daily  famotidine    Tablet 20 milliGRAM(s) Oral every 48 hours  heparin  Infusion 1500 Unit(s)/Hr (14.5 mL/Hr) IV Continuous <Continuous>  influenza  Vaccine (HIGH DOSE) 0.5 milliLiter(s) IntraMuscular once  insulin lispro (ADMELOG) corrective regimen sliding scale   SubCutaneous every 6 hours  sodium bicarbonate 650 milliGRAM(s) Oral three times a day  vasopressin Infusion 0.04 Unit(s)/Min (6 mL/Hr) IV Continuous <Continuous>    MEDICATIONS  (PRN):      PAST MEDICAL & SURGICAL HISTORY:  CAD (coronary artery disease)  s/p CABG      Hypercholesteremia      Thrombus of left atrial appendage  2018      Ischemic cardiomyopathy      WILFRED (obstructive sleep apnea)  can not tolerate bipap at night      HTN (hypertension)      Atrial fibrillation  2018      CHF (congestive heart failure)  denies any recent exacerbation      Peripheral edema  chronic      Lichen planus  chronic ( b/L LE)      Atrial flutter  s/p ablation 2018      MI (myocardial infarction)  2012      Stented coronary artery  2019      AS (aortic stenosis)      Chronic kidney disease, unspecified CKD stage      ANGELICA (acute kidney injury)  4/2021      Morbid obesity      Status post placement of cardiac pacemaker  micraleadless PPM, [id number] last interrogation 7/6/2022      Osteoarthritis  shoulders, hips, knee      H/O scoliosis      Lower back pain      History of elbow surgery      S/P CABG (coronary artery bypass graft)  x3, 2012      Cardiac pacemaker  leadless 2018      History of coronary artery stent placement  2019 ( one more after cabg)      History of adenoidectomy      H/O atrioventricular karlee ablation  2018          Review of Systems  CONSTITUTIONAL: No weakness, fevers or chills  EYES/ENT: No visual changes;  No vertigo or throat pain   NECK: No pain or stiffness  RESPIRATORY: No cough, wheezing, hemoptysis; No shortness of breath  CARDIOVASCULAR: No chest pain or palpitations  GASTROINTESTINAL: No abdominal or epigastric pain. No nausea, vomiting, or hematemesis; No constipation. No melena or hematochezia.  GENITOURINARY: No dysuria, frequency or hematuria  NEUROLOGICAL: No numbness or weakness  SKIN: No itching, burning, rashes, or lesions   All other review of systems is negative unless indicated above.    Physical Exam  General: A/ox 3, No acute Distress  Neck: Supple, NO JVD  Cardiac: S1 S2, III/VI RUSB/LUSB murmur  Pulmonary: diminished at bases bilaterally Breathing unlabored, No Rhonchi/Rales/Wheezing  Abdomen: Soft, Non -tender, +BS x 4 quads  Extremities: No Rashes, Venous stasis, (+) edema, blistering on left shin  Neuro: A/o x 3, No focal deficits    Vital Signs Last 24 Hrs  T(C): 36.6 (04 Apr 2025 07:00), Max: 36.7 (03 Apr 2025 17:00)  T(F): 97.8 (04 Apr 2025 07:00), Max: 98 (03 Apr 2025 17:00)  HR: 66 (04 Apr 2025 10:30) (59 - 71)  BP: --  BP(mean): --  RR: 33 (04 Apr 2025 10:30) (18 - 43)  SpO2: 92% (04 Apr 2025 10:30) (84% - 97%)    Parameters below as of 04 Apr 2025 09:30  Patient On (Oxygen Delivery Method): nasal cannula  O2 Flow (L/min): 4                          12.8   9.62  )-----------( 169      ( 04 Apr 2025 00:43 )             39.0     04-04    131[L]  |  90[L]  |  88[H]  ----------------------------<  139[H]  4.1   |  23  |  4.59[H]    Ca    9.3      04 Apr 2025 00:43  Phos  6.4     04-04  Mg     2.2     04-04    TPro  7.5  /  Alb  3.5  /  TBili  1.8[H]  /  DBili  x   /  AST  13  /  ALT  12  /  AlkPhos  94  04-04      Telemetry: NSR 66    EKG:< from: 12 Lead ECG (04.03.25 @ 00:37) >  Systolic BP 91 mmHg    Diastolic BP 61 mmHg    Ventricular Rate 67 BPM    Atrial Rate 67 BPM    P-R Interval 288 ms    QRS Duration 108 ms    Q-T Interval 516 ms    QTC Calculation(Bazett) 545 ms    P Axis 50 degrees    R Axis 138 degrees    T Hyne117 degrees    Diagnosis Line SINUS RHYTHM WITH 1ST DEGREE A-V BLOCK WITH PREMATURE ATRIAL COMPLEXES WITH ABERRANT CONDUCTION  ANTEROLATERAL INFARCT , AGE UNDETERMINED  PROLONGED QT  ABNORMAL ECG  WHEN COMPARED WITH 01-APR-2025  no IVCB  Confirmed byCHARLIE Sanchez Zlata ([id number]) on 4/3/2025 7:08:28 PM    < end of copied text >      Other  < from: STANISLAW W or WO Ultrasound Enhancing Agent (04.02.25 @ 14:46) >  TRANSESOPHAGEAL ECHOCARDIOGRAM REPORT  ________________________________________________________________________________                                      _______       Pt. Name:       BERTHA WARD Study Date:    4/2/2025  MRN:            [id number]        YOB: 1960  Accession #:    [id number]         Age:           64 years  Account#:       [id number]      Gender:        M  Heart Rate:                       Height:        68.11 in (173.00 cm)  Rhythm:      Weight:        92.59 lb (42.00 kg)  Blood Pressure: 90/60 mmHg        BSA/BMI:       1.48 m² / 14.03 kg/m²  ________________________________________________________________________________________  Referring Physician:    [id number] Jamaal Beltran  Interpreting Physician: Rahat Carl M.D.  Primary Sonographer:    AM/BS  Fellow (Performing):    Mar Campos MD  Fellow (Interpreting):  Mar Campos MD    CPT:               ECHO 3D RECONSTRUCT W WORKSTATION - 25935.m;ECHO TRANSESOPH              W/O CON - 47985.m;DOPPLER ECHO COMP W SPECT - 77552.m;DOPPL                     ECHO COLOR FLOW - 55275.m  Indication(s):     Presence of prosthetic heart valve - Z95.2  Procedure:         Transesophageal echocardiogram performed with 2D,M-mode and                     complete spectral and color flow Doppler. Full volume                     3-dimensional reconstruction performed at the workstation.  Ordering Location: [facility]  Admission Status:  Inpatient  Study Information: Image quality for this study is excellent.    _______________________________________________________________________________________     CONCLUSIONS:      1. Multiple segmental abnormalities exist. See findings.   2. Left ventricular cavity is severely dilated.Global left ventricular hypokinesis.   3. Reduced right ventricular systolic function.   4. Left atrium is severely dilated.   5. The right atrium is dilated.   6. Mild to moderate mitral regurgitation.   7. No pericardial effusion seen.   8. No thrombus on TAVR valve. No significant paravalvular AR.   9. Compared to the transthoracic echocardiogram performed on 4/1/2025, there have been no significant interval changes. Findings were discussed with CCU team on 4/2/2025 at 3.40 pm.  10. Left pleural effusion noted.  11. Minimal (grade 1) spontaneous echo contrast located in the left atrial apendage and minimal (grade 1) spontaneous echo contrast located in the left atrium.  12. No evidence of left atrial or left atrial appendage thrombus. The left atrial appendage emptying velocity is low.  13. Severe pulmonary hypertension.    ____________________________________________________________________  STANISLAW Procedure:  After discussion of the risks and benefits of the STANISLAW, an informed consent wasobtained. Study was performed with anesthesia (please see anesthesia record).  Images were obtained with the patient in a supine position. Image quality was excellent. The patient's vital signs; including heart rate, blood pressure, and oxygen saturation; remained stable throughout the procedure. The patient tolerated the procedure well and without complications.     ________________________________________________________________________________________  FINDINGS:     Left Ventricle:  The left ventricular cavity is severely dilated. There is global left ventricular hypokinesis.  LV Wall Scoring: The entire apex, mid and apical anterior wall, mid and apical  inferior septum, and mid anterolateral segment are akinetic. The basal and mid  anterior septum, basal and mid inferior wall, basal and mid inferolateral wall,  basal inferoseptal segment, basal anterolateral segment, and basal anterior  segment are hypokinetic.          Right Ventricle:  The right ventricular cavity is enlarged in size and right ventricular systolic function is reduced.     Left Atrium:  The left atrium is severely dilated. There is minimal (grade 1) spontaneous echo contrast located in the left atrial apendage and minimal (grade 1) spontaneous echo contrast located in the left atrium. There is no evidence of left atrial or left atrial appendage thrombus. The left atrial appendage emptying velocity is low.     Right Atrium:  The right atrium is dilated.     Aortic Valve:  A a transcatheter deployed (TAVR) is present in the aortic position. The prosthetic valve has reduced leaflet opening. There is degeneration of the aortic valve prosthesis. There is severe prosthetic aortic stenosis. There is trace intravalvular regurgitation. There is no paravalvular regurgitation. The peak transaortic velocity is 4.29 m/s. The peak transaortic velocity is 4.29 m/s, peak transaortic gradient is 73.6 mmHg and mean transaortic gradient is 45.0 mmHg with an LVOT/aortic valve VTI ratio of 0.14. The aortic valve area is estimated at 0.49 cm² by the continuity equation.     Mitral Valve:  Structurally normal mitral valve with normal leaflet excursion. There is mild to moderate mitral regurgitation.     Tricuspid Valve:  Structurally normal tricuspid valve with normal leaflet excursion. Severe pulmonary hypertension.     Pericardium:  No pericardial effusion seen.     Pleura:  Left pleural effusion noted.    < end of copied text >

## 2025-04-04 NOTE — SWALLOW FEES ASSESSMENT ADULT - COMMENTS
On arrival to ED, vitals were 97.3F, HR 71, BP 94/57, RR 22, 97% on 2L NC. Labs pertinent for WBC 10.68, INR 2.55, Na 132, K 5.5, bicarb 20, BUN/Cr 78/5.17 (baseline Cr appears ~1.5-1.8), trop 225, BNP 49919, VBG ph 7.3, pco2/po2 wnl. UA without evidence of infection. EKG  on admission 1st deg av block, low voltage, poor r wave progression, R axis deviation CXR with diffuse hazy opacities suggest mild pulmonary edema and small right and trace left pleural effusions. Pt was given IV Bumex 1mg x1, IV bumex 2mg x1, IV Bumex 4mg x1, and lokelma 5mg x1, then admitted for further management.  4/1 RRT for hypotension; CCU consulted and accepted as TTE notable for severe prosthetic aortic valve stenosis for which structural cardiology was already consulted. Nephrology consulted for management of ANGELICA on CKD. Pt became tachypneic to 38 and hypoxic requiring 6L NC w/ escalation to BiPAP. Decision made to intubate. 4/2 STANISLAW completed; pt extubated to [facility]    Imaging:  CXR (4/2)  Lines and tubes in good position and described above. Small right pleural effusion on initial images and moderate pulmonary vascular congestion. +hematoma on both vocal folds; R>L and small ?granuloma on laryngeal surface of epiglottis (likely both intubation related)    vocal folds mobile bilaterally

## 2025-04-04 NOTE — PROGRESS NOTE ADULT - ASSESSMENT
63 yo M with a fib (s/p dccv 3/25/25), CHF (likely moderately reduced EF, several TTE with poor windows), aortic stenosis s/p TAVR (11/2022), CAD s/p CABG, a flutter s/p ablation 2019 and s/p micra implant,  CKD3, obesity, venous stasis, HTN, HLD, who presents with 6 days of worsening dyspnea on exertion. Pt also reports associated orthopnea and chest pain on exertion but not at rest which is located in the center of his chest. Pain is described as burning. Also reports several days decreased appetite. Denies fevers, chills, cough, abdominal pain, NVD, urinary sx, leg swelling. Patient had a cardioversion for atrial fibrillation at this hospital recently, which was reportedly unsuccessful. Patient has a scheduled appointment with Dr. Philip on 04/24/2025 to discuss possible ablation. His outpt cardiologist is Dr. Paul.     The Structural Team was consulted for evaluation of aortic stenosis of a previously implanted TAVR valve. Mr. Smith is known to our team, he underwent transcatheter aortic valve replacement on 11/18/22 with a 26mm Cullen 3 valve via femoral approach. He has a history of CAD status post three-vessel CABG (LIMA to LAD on 12/2012, SVG to OM1/PDA is occluded) with PCI to RCA on 6/2019. He presents now with acute on chronic CHF, ANGELICA/CKD with hypoxic respiratory requiring intubation and pressor support. Pt. being evaluated for Florence TAVR.

## 2025-04-04 NOTE — PROGRESS NOTE ADULT - SUBJECTIVE AND OBJECTIVE BOX
*******************************  Azalea Jimenes, PGY-1  Internal Medicine  Contact via [employer] TEAMS  *******************************    CCU PROGRESS NOTE:    BERTHA WARD  MRN-[id number]  Patient is a 64y old  Male who presents with a chief complaint of dyspnea , chest pain (04 Apr 2025 10:51)      INTERVAL HPI/OVERNIGHT EVENTS: No acute events overnight.    SUBJECTIVE: Patient seen and examined at bedside. No acute complaints. Denies chest pain, SOB, palpitations, dizziness/lightheadedness, LE edema.    MEDICATIONS  (STANDING):  aMIOdarone    Tablet   Oral   aMIOdarone    Tablet 400 milliGRAM(s) Oral every 8 hours  atorvastatin 40 milliGRAM(s) Oral at bedtime  buMETAnide Infusion 2 mG/Hr (10 mL/Hr) IV Continuous <Continuous>  chlorhexidine 2% Cloths 1 Application(s) Topical daily  clopidogrel Tablet 75 milliGRAM(s) Oral daily  famotidine    Tablet 20 milliGRAM(s) Oral every 48 hours  heparin  Infusion 1500 Unit(s)/Hr (15 mL/Hr) IV Continuous <Continuous>  influenza  Vaccine (HIGH DOSE) 0.5 milliLiter(s) IntraMuscular once  insulin lispro (ADMELOG) corrective regimen sliding scale   SubCutaneous every 6 hours  norepinephrine Infusion 0.05 MICROgram(s)/kG/Min (11.6 mL/Hr) IV Continuous <Continuous>  sodium bicarbonate 650 milliGRAM(s) Oral three times a day  vasopressin Infusion 0.04 Unit(s)/Min (6 mL/Hr) IV Continuous <Continuous>    MEDICATIONS  (PRN):      OBJECTIVE:  ICU Vital Signs Last 24 Hrs  T(C): 36.8 (04 Apr 2025 19:00), Max: 36.8 (04 Apr 2025 19:00)  T(F): 98.2 (04 Apr 2025 19:00), Max: 98.2 (04 Apr 2025 19:00)  HR: 76 (04 Apr 2025 22:45) (59 - 76)  BP: --  BP(mean): --  ABP: 104/71 (04 Apr 2025 22:45) (81/50 - 113/74)  ABP(mean): 86 (04 Apr 2025 22:45) (61 - 90)  RR: 49 (04 Apr 2025 22:45) (18 - 49)  SpO2: 81% (04 Apr 2025 22:45) (81% - 97%)    O2 Parameters below as of 04 Apr 2025 22:00  Patient On (Oxygen Delivery Method): nasal cannula  O2 Flow (L/min): 4          CVP(mm Hg): 27 (04-04-25 @ 22:45) (0 - 38)  CO: --  CI: --  PA: --  PA(mean): --  PA(direct): --  PCWP: --  LA: --  RA: --  SVR: --  SVRI: --  PVR: --  PVRI: --  I&O's Summary    03 Apr 2025 07:01  -  04 Apr 2025 07:00  --------------------------------------------------------  IN: 1369.2 mL / OUT: 1935 mL / NET: -565.8 mL    04 Apr 2025 07:01  -  04 Apr 2025 23:09  --------------------------------------------------------  IN: 1205.7 mL / OUT: 965 mL / NET: 240.7 mL        CAPILLARY BLOOD GLUCOSE      GENERAL: NAD, lying in bed comfortably  NECK: no JVD  HEART: S1, S2, regular rate and rhythm, no murmurs, rubs, or gallops  LUNGS: Unlabored respirations.  Clear to auscultation bilaterally, no crackles, wheezing, or rhonchi  ABDOMEN: Soft, nontender, nondistended, +BS  EXTREMITIES: 2+ peripheral pulses bilaterally. No clubbing, cyanosis, or edema    ============================I/O===========================   I&O's Detail    03 Apr 2025 07:01  -  04 Apr 2025 07:00  --------------------------------------------------------  IN:    Amiodarone: 233.1 mL    Amiodarone: 200.4 mL    Heparin: 330 mL    Norepinephrine: 245.7 mL    Vasopressin: 360 mL  Total IN: 1369.2 mL    OUT:    Indwelling Catheter - Urethral (mL): 1935 mL    Oral Fluid: 0 mL  Total OUT: 1935 mL    Total NET: -565.8 mL      04 Apr 2025 07:01  -  04 Apr 2025 23:09  --------------------------------------------------------  IN:    Amiodarone: 135.6 mL    Bumetanide: 120 mL    Heparin: 222 mL    Norepinephrine: 6.9 mL    Norepinephrine: 46.2 mL    Oral Fluid: 450 mL    Vasopressin: 225 mL  Total IN: 1205.7 mL    OUT:    Indwelling Catheter - Urethral (mL): 965 mL  Total OUT: 965 mL    Total NET: 240.7 mL        ============================ LABS =========================                        12.8   9.62  )-----------( 169      ( 04 Apr 2025 00:43 )             39.0     04-04    133[L]  |  90[L]  |  88[H]  ----------------------------<  144[H]  4.0   |  24  |  4.97[H]    Ca    9.0      04 Apr 2025 18:38  Phos  6.5     04-04  Mg     2.2     04-04    TPro  7.3  /  Alb  3.4  /  TBili  1.7[H]  /  DBili  x   /  AST  19  /  ALT  19  /  AlkPhos  106  04-04                LIVER FUNCTIONS - ( 04 Apr 2025 18:38 )  Alb: 3.4 g/dL / Pro: 7.3 g/dL / ALK PHOS: 106 U/L / ALT: 19 U/L / AST: 19 U/L / GGT: x           PT/INR - ( 03 Apr 2025 01:11 )   PT: 16.0 sec;   INR: 1.41 ratio         PTT - ( 04 Apr 2025 18:38 )  PTT:55.3 sec  ABG - ( 03 Apr 2025 01:03 )  pH, Arterial: 7.36  pH, Blood: x     /  pCO2: 47    /  pO2: 114   / HCO3: 27    / Base Excess: 0.6   /  SaO2: 100.0             Blood Gas Venous - Lactate: 0.8 mmol/L (04-03-25 @ 01:46)  Blood Gas Arterial, Lactate: 0.9 mmol/L (04-03-25 @ 01:03)  Blood Gas Venous - Lactate: 0.9 mmol/L (04-02-25 @ 22:28)  Blood Gas Arterial, Lactate: 1.1 mmol/L (04-02-25 @ 17:20)  Blood Gas Venous - Lactate: 1.0 mmol/L (04-02-25 @ 17:20)  Blood Gas Arterial, Lactate: 1.1 mmol/L (04-02-25 @ 06:25)  Blood Gas Venous - Lactate: 1.1 mmol/L (04-02-25 @ 06:25)  Blood Gas Arterial, Lactate: 1.7 mmol/L (04-02-25 @ 00:07)  Blood Gas Venous - Lactate: 1.6 mmol/L (04-02-25 @ 00:07)    Urinalysis Basic - ( 04 Apr 2025 18:38 )    Color: x / Appearance: x / SG: x / pH: x  Gluc: 144 mg/dL / Ketone: x  / Bili: x / Urobili: x   Blood: x / Protein: x / Nitrite: x   Leuk Esterase: x / RBC: x / WBC x   Sq Epi: x / Non Sq Epi: x / Bacteria: x      ======================MICRO/RAD/CARDIO=================  Telemetry: Reviewed   EKG: Reviewed  CXR: Reviewed  Culture: Reviewed   Echo:   Cath:    Mine Salinas PA-C  Contact via [employer]    CCU PROGRESS NOTE:    BERTHA WARD  MRN-[id number]  Patient is a 64y old  Male who presents with a chief complaint of dyspnea , chest pain (04 Apr 2025 10:51)    INTERVAL HPI/OVERNIGHT EVENTS: No acute events overnight.    SUBJECTIVE: Patient seen and examined at bedside. No acute complaints. Denies chest pain, SOB, palpitations, dizziness/lightheadedness, LE edema.    MEDICATIONS  (STANDING):  aMIOdarone    Tablet   Oral   aMIOdarone    Tablet 400 milliGRAM(s) Oral every 8 hours  atorvastatin 40 milliGRAM(s) Oral at bedtime  buMETAnide Infusion 2 mG/Hr (10 mL/Hr) IV Continuous <Continuous>  chlorhexidine 2% Cloths 1 Application(s) Topical daily  clopidogrel Tablet 75 milliGRAM(s) Oral daily  famotidine    Tablet 20 milliGRAM(s) Oral every 48 hours  heparin  Infusion 1500 Unit(s)/Hr (15 mL/Hr) IV Continuous <Continuous>  influenza  Vaccine (HIGH DOSE) 0.5 milliLiter(s) IntraMuscular once  insulin lispro (ADMELOG) corrective regimen sliding scale   SubCutaneous every 6 hours  norepinephrine Infusion 0.05 MICROgram(s)/kG/Min (11.6 mL/Hr) IV Continuous <Continuous>  sodium bicarbonate 650 milliGRAM(s) Oral three times a day  vasopressin Infusion 0.04 Unit(s)/Min (6 mL/Hr) IV Continuous <Continuous>    MEDICATIONS  (PRN):      OBJECTIVE:  ICU Vital Signs Last 24 Hrs  T(C): 36.8 (04 Apr 2025 19:00), Max: 36.8 (04 Apr 2025 19:00)  T(F): 98.2 (04 Apr 2025 19:00), Max: 98.2 (04 Apr 2025 19:00)  HR: 76 (04 Apr 2025 22:45) (59 - 76)  BP: --  BP(mean): --  ABP: 104/71 (04 Apr 2025 22:45) (81/50 - 113/74)  ABP(mean): 86 (04 Apr 2025 22:45) (61 - 90)  RR: 49 (04 Apr 2025 22:45) (18 - 49)  SpO2: 81% (04 Apr 2025 22:45) (81% - 97%)    O2 Parameters below as of 04 Apr 2025 22:00  Patient On (Oxygen Delivery Method): nasal cannula  O2 Flow (L/min): 4    CVP(mm Hg): 27 (04-04-25 @ 22:45) (0 - 38)    I&O's Summary    03 Apr 2025 07:01  -  04 Apr 2025 07:00  --------------------------------------------------------  IN: 1369.2 mL / OUT: 1935 mL / NET: -565.8 mL    04 Apr 2025 07:01  -  04 Apr 2025 23:09  --------------------------------------------------------  IN: 1205.7 mL / OUT: 965 mL / NET: 240.7 mL        CAPILLARY BLOOD GLUCOSE    Physical Exam:  General: Well-appearing, NAD  HEENT: PERRL, EOMI, normal sclera and conjunctiva, normal oropharynx  Neck: Supple, no JVD, thyroid without masses or enlargement  Chest/Lungs: +nasal cannula. CTA bilaterally, no wheezing, rales, rhonchi or rub  Heart: +systolic murmur  Abdomen: Soft, ND, NTTP, normoactive bowel sounds  Extremities: +lichen planus bilaterally. +pitting edema. 2+ peripheral pulses b/l, no edema, clubbing or cyanosis  Skin: +lichen planus bilaterally  Neurological: A&Ox3, moves all extremities, no focal deficits    ============================I/O===========================   I&O's Detail    03 Apr 2025 07:01  -  04 Apr 2025 07:00  --------------------------------------------------------  IN:    Amiodarone: 233.1 mL    Amiodarone: 200.4 mL    Heparin: 330 mL    Norepinephrine: 245.7 mL    Vasopressin: 360 mL  Total IN: 1369.2 mL    OUT:    Indwelling Catheter - Urethral (mL): 1935 mL    Oral Fluid: 0 mL  Total OUT: 1935 mL    Total NET: -565.8 mL      04 Apr 2025 07:01  -  04 Apr 2025 23:09  --------------------------------------------------------  IN:    Amiodarone: 135.6 mL    Bumetanide: 120 mL    Heparin: 222 mL    Norepinephrine: 6.9 mL    Norepinephrine: 46.2 mL    Oral Fluid: 450 mL    Vasopressin: 225 mL  Total IN: 1205.7 mL    OUT:    Indwelling Catheter - Urethral (mL): 965 mL  Total OUT: 965 mL    Total NET: 240.7 mL        ============================ LABS =========================                        12.8   9.62  )-----------( 169      ( 04 Apr 2025 00:43 )             39.0     04-04    133[L]  |  90[L]  |  88[H]  ----------------------------<  144[H]  4.0   |  24  |  4.97[H]    Ca    9.0      04 Apr 2025 18:38  Phos  6.5     04-04  Mg     2.2     04-04    TPro  7.3  /  Alb  3.4  /  TBili  1.7[H]  /  DBili  x   /  AST  19  /  ALT  19  /  AlkPhos  106  04-04    LIVER FUNCTIONS - ( 04 Apr 2025 18:38 )  Alb: 3.4 g/dL / Pro: 7.3 g/dL / ALK PHOS: 106 U/L / ALT: 19 U/L / AST: 19 U/L / GGT: x           PT/INR - ( 03 Apr 2025 01:11 )   PT: 16.0 sec;   INR: 1.41 ratio       PTT - ( 04 Apr 2025 18:38 )  PTT:55.3 sec  ABG - ( 03 Apr 2025 01:03 )  pH, Arterial: 7.36  pH, Blood: x     /  pCO2: 47    /  pO2: 114   / HCO3: 27    / Base Excess: 0.6   /  SaO2: 100.0     Blood Gas Venous - Lactate: 0.8 mmol/L (04-03-25 @ 01:46)  Blood Gas Arterial, Lactate: 0.9 mmol/L (04-03-25 @ 01:03)  Blood Gas Venous - Lactate: 0.9 mmol/L (04-02-25 @ 22:28)  Blood Gas Arterial, Lactate: 1.1 mmol/L (04-02-25 @ 17:20)  Blood Gas Venous - Lactate: 1.0 mmol/L (04-02-25 @ 17:20)  Blood Gas Arterial, Lactate: 1.1 mmol/L (04-02-25 @ 06:25)  Blood Gas Venous - Lactate: 1.1 mmol/L (04-02-25 @ 06:25)  Blood Gas Arterial, Lactate: 1.7 mmol/L (04-02-25 @ 00:07)  Blood Gas Venous - Lactate: 1.6 mmol/L (04-02-25 @ 00:07)    Urinalysis Basic - ( 04 Apr 2025 18:38 )    Color: x / Appearance: x / SG: x / pH: x  Gluc: 144 mg/dL / Ketone: x  / Bili: x / Urobili: x   Blood: x / Protein: x / Nitrite: x   Leuk Esterase: x / RBC: x / WBC x   Sq Epi: x / Non Sq Epi: x / Bacteria: x      ======================MICRO/RAD/CARDIO=================  Telemetry: Reviewed   EKG: Reviewed  CXR: Reviewed  Culture: Reviewed   Echo:   Cath:

## 2025-04-05 LAB
ALBUMIN SERPL ELPH-MCNC: 3.3 G/DL — SIGNIFICANT CHANGE UP (ref 3.3–5)
ALBUMIN SERPL ELPH-MCNC: 3.3 G/DL — SIGNIFICANT CHANGE UP (ref 3.3–5)
ALBUMIN SERPL ELPH-MCNC: 3.4 G/DL — SIGNIFICANT CHANGE UP (ref 3.3–5)
ALBUMIN SERPL ELPH-MCNC: 3.4 G/DL — SIGNIFICANT CHANGE UP (ref 3.3–5)
ALP SERPL-CCNC: 106 U/L — SIGNIFICANT CHANGE UP (ref 40–120)
ALP SERPL-CCNC: 109 U/L — SIGNIFICANT CHANGE UP (ref 40–120)
ALP SERPL-CCNC: 113 U/L — SIGNIFICANT CHANGE UP (ref 40–120)
ALP SERPL-CCNC: 115 U/L — SIGNIFICANT CHANGE UP (ref 40–120)
ALT FLD-CCNC: 25 U/L — SIGNIFICANT CHANGE UP (ref 10–45)
ALT FLD-CCNC: 26 U/L — SIGNIFICANT CHANGE UP (ref 10–45)
ALT FLD-CCNC: 26 U/L — SIGNIFICANT CHANGE UP (ref 10–45)
ALT FLD-CCNC: 27 U/L — SIGNIFICANT CHANGE UP (ref 10–45)
ANION GAP SERPL CALC-SCNC: 16 MMOL/L — SIGNIFICANT CHANGE UP (ref 5–17)
ANION GAP SERPL CALC-SCNC: 17 MMOL/L — SIGNIFICANT CHANGE UP (ref 5–17)
ANION GAP SERPL CALC-SCNC: 18 MMOL/L — HIGH (ref 5–17)
ANION GAP SERPL CALC-SCNC: 20 MMOL/L — HIGH (ref 5–17)
APTT BLD: 48.1 SEC — HIGH (ref 24.5–35.6)
APTT BLD: 60.8 SEC — HIGH (ref 24.5–35.6)
APTT BLD: 61.1 SEC — HIGH (ref 24.5–35.6)
AST SERPL-CCNC: 17 U/L — SIGNIFICANT CHANGE UP (ref 10–40)
AST SERPL-CCNC: 17 U/L — SIGNIFICANT CHANGE UP (ref 10–40)
AST SERPL-CCNC: 22 U/L — SIGNIFICANT CHANGE UP (ref 10–40)
AST SERPL-CCNC: 30 U/L — SIGNIFICANT CHANGE UP (ref 10–40)
BASOPHILS # BLD AUTO: 0.05 K/UL — SIGNIFICANT CHANGE UP (ref 0–0.2)
BASOPHILS NFR BLD AUTO: 0.5 % — SIGNIFICANT CHANGE UP (ref 0–2)
BILIRUB SERPL-MCNC: 1.1 MG/DL — SIGNIFICANT CHANGE UP (ref 0.2–1.2)
BILIRUB SERPL-MCNC: 1.4 MG/DL — HIGH (ref 0.2–1.2)
BILIRUB SERPL-MCNC: 1.5 MG/DL — HIGH (ref 0.2–1.2)
BILIRUB SERPL-MCNC: 1.6 MG/DL — HIGH (ref 0.2–1.2)
BUN SERPL-MCNC: 93 MG/DL — HIGH (ref 7–23)
BUN SERPL-MCNC: 94 MG/DL — HIGH (ref 7–23)
BUN SERPL-MCNC: 94 MG/DL — HIGH (ref 7–23)
BUN SERPL-MCNC: 99 MG/DL — HIGH (ref 7–23)
CALCIUM SERPL-MCNC: 8.9 MG/DL — SIGNIFICANT CHANGE UP (ref 8.4–10.5)
CALCIUM SERPL-MCNC: 9.2 MG/DL — SIGNIFICANT CHANGE UP (ref 8.4–10.5)
CHLORIDE SERPL-SCNC: 90 MMOL/L — LOW (ref 96–108)
CHLORIDE SERPL-SCNC: 91 MMOL/L — LOW (ref 96–108)
CO2 SERPL-SCNC: 23 MMOL/L — SIGNIFICANT CHANGE UP (ref 22–31)
CO2 SERPL-SCNC: 24 MMOL/L — SIGNIFICANT CHANGE UP (ref 22–31)
CO2 SERPL-SCNC: 24 MMOL/L — SIGNIFICANT CHANGE UP (ref 22–31)
CO2 SERPL-SCNC: 26 MMOL/L — SIGNIFICANT CHANGE UP (ref 22–31)
CREAT SERPL-MCNC: 4.72 MG/DL — HIGH (ref 0.5–1.3)
CREAT SERPL-MCNC: 4.78 MG/DL — HIGH (ref 0.5–1.3)
CREAT SERPL-MCNC: 5.05 MG/DL — HIGH (ref 0.5–1.3)
CREAT SERPL-MCNC: 5.14 MG/DL — HIGH (ref 0.5–1.3)
EGFR: 12 ML/MIN/1.73M2 — LOW
EGFR: 13 ML/MIN/1.73M2 — LOW
EOSINOPHIL # BLD AUTO: 0.07 K/UL — SIGNIFICANT CHANGE UP (ref 0–0.5)
EOSINOPHIL NFR BLD AUTO: 0.7 % — SIGNIFICANT CHANGE UP (ref 0–6)
GAS PNL BLDA: SIGNIFICANT CHANGE UP
GLUCOSE BLDC GLUCOMTR-MCNC: 132 MG/DL — HIGH (ref 70–99)
GLUCOSE BLDC GLUCOMTR-MCNC: 137 MG/DL — HIGH (ref 70–99)
GLUCOSE BLDC GLUCOMTR-MCNC: 149 MG/DL — HIGH (ref 70–99)
GLUCOSE SERPL-MCNC: 119 MG/DL — HIGH (ref 70–99)
GLUCOSE SERPL-MCNC: 122 MG/DL — HIGH (ref 70–99)
GLUCOSE SERPL-MCNC: 134 MG/DL — HIGH (ref 70–99)
GLUCOSE SERPL-MCNC: 157 MG/DL — HIGH (ref 70–99)
HCT VFR BLD CALC: 39.7 % — SIGNIFICANT CHANGE UP (ref 39–50)
HGB BLD-MCNC: 12.8 G/DL — LOW (ref 13–17)
IMM GRANULOCYTES NFR BLD AUTO: 0.6 % — SIGNIFICANT CHANGE UP (ref 0–0.9)
INR BLD: 1.35 RATIO — HIGH (ref 0.85–1.16)
LYMPHOCYTES # BLD AUTO: 0.88 K/UL — LOW (ref 1–3.3)
LYMPHOCYTES # BLD AUTO: 8.6 % — LOW (ref 13–44)
MAGNESIUM SERPL-MCNC: 2.1 MG/DL — SIGNIFICANT CHANGE UP (ref 1.6–2.6)
MAGNESIUM SERPL-MCNC: 2.1 MG/DL — SIGNIFICANT CHANGE UP (ref 1.6–2.6)
MAGNESIUM SERPL-MCNC: 2.2 MG/DL — SIGNIFICANT CHANGE UP (ref 1.6–2.6)
MAGNESIUM SERPL-MCNC: 2.2 MG/DL — SIGNIFICANT CHANGE UP (ref 1.6–2.6)
MCHC RBC-ENTMCNC: 30.5 PG — SIGNIFICANT CHANGE UP (ref 27–34)
MCHC RBC-ENTMCNC: 32.2 G/DL — SIGNIFICANT CHANGE UP (ref 32–36)
MCV RBC AUTO: 94.7 FL — SIGNIFICANT CHANGE UP (ref 80–100)
MONOCYTES # BLD AUTO: 0.89 K/UL — SIGNIFICANT CHANGE UP (ref 0–0.9)
MONOCYTES NFR BLD AUTO: 8.7 % — SIGNIFICANT CHANGE UP (ref 2–14)
NEUTROPHILS # BLD AUTO: 8.24 K/UL — HIGH (ref 1.8–7.4)
NEUTROPHILS NFR BLD AUTO: 80.9 % — HIGH (ref 43–77)
NRBC BLD AUTO-RTO: 0 /100 WBCS — SIGNIFICANT CHANGE UP (ref 0–0)
PHOSPHATE SERPL-MCNC: 6.2 MG/DL — HIGH (ref 2.5–4.5)
PHOSPHATE SERPL-MCNC: 6.3 MG/DL — HIGH (ref 2.5–4.5)
PHOSPHATE SERPL-MCNC: 6.4 MG/DL — HIGH (ref 2.5–4.5)
PHOSPHATE SERPL-MCNC: 6.7 MG/DL — HIGH (ref 2.5–4.5)
PLATELET # BLD AUTO: 160 K/UL — SIGNIFICANT CHANGE UP (ref 150–400)
POTASSIUM SERPL-MCNC: 3.5 MMOL/L — SIGNIFICANT CHANGE UP (ref 3.5–5.3)
POTASSIUM SERPL-MCNC: 3.7 MMOL/L — SIGNIFICANT CHANGE UP (ref 3.5–5.3)
POTASSIUM SERPL-MCNC: 3.9 MMOL/L — SIGNIFICANT CHANGE UP (ref 3.5–5.3)
POTASSIUM SERPL-MCNC: 4 MMOL/L — SIGNIFICANT CHANGE UP (ref 3.5–5.3)
POTASSIUM SERPL-SCNC: 3.5 MMOL/L — SIGNIFICANT CHANGE UP (ref 3.5–5.3)
POTASSIUM SERPL-SCNC: 3.7 MMOL/L — SIGNIFICANT CHANGE UP (ref 3.5–5.3)
POTASSIUM SERPL-SCNC: 3.9 MMOL/L — SIGNIFICANT CHANGE UP (ref 3.5–5.3)
POTASSIUM SERPL-SCNC: 4 MMOL/L — SIGNIFICANT CHANGE UP (ref 3.5–5.3)
PROT SERPL-MCNC: 7.1 G/DL — SIGNIFICANT CHANGE UP (ref 6–8.3)
PROT SERPL-MCNC: 7.1 G/DL — SIGNIFICANT CHANGE UP (ref 6–8.3)
PROT SERPL-MCNC: 7.2 G/DL — SIGNIFICANT CHANGE UP (ref 6–8.3)
PROT SERPL-MCNC: 7.4 G/DL — SIGNIFICANT CHANGE UP (ref 6–8.3)
PROTHROM AB SERPL-ACNC: 15.3 SEC — HIGH (ref 9.9–13.4)
RBC # BLD: 4.19 M/UL — LOW (ref 4.2–5.8)
RBC # FLD: 16.5 % — HIGH (ref 10.3–14.5)
SODIUM SERPL-SCNC: 131 MMOL/L — LOW (ref 135–145)
SODIUM SERPL-SCNC: 132 MMOL/L — LOW (ref 135–145)
SODIUM SERPL-SCNC: 133 MMOL/L — LOW (ref 135–145)
SODIUM SERPL-SCNC: 135 MMOL/L — SIGNIFICANT CHANGE UP (ref 135–145)
WBC # BLD: 10.19 K/UL — SIGNIFICANT CHANGE UP (ref 3.8–10.5)
WBC # FLD AUTO: 10.19 K/UL — SIGNIFICANT CHANGE UP (ref 3.8–10.5)

## 2025-04-05 PROCEDURE — 93010 ELECTROCARDIOGRAM REPORT: CPT

## 2025-04-05 PROCEDURE — 99232 SBSQ HOSP IP/OBS MODERATE 35: CPT | Mod: GC

## 2025-04-05 PROCEDURE — 99292 CRITICAL CARE ADDL 30 MIN: CPT

## 2025-04-05 PROCEDURE — 99291 CRITICAL CARE FIRST HOUR: CPT

## 2025-04-05 RX ORDER — CHLOROTHIAZIDE 250 MG/1
500 TABLET ORAL ONCE
Refills: 0 | Status: COMPLETED | OUTPATIENT
Start: 2025-04-05 | End: 2025-04-05

## 2025-04-05 RX ORDER — SODIUM CHLORIDE 3 G/100ML
150 INJECTION, SOLUTION INTRAVENOUS ONCE
Refills: 0 | Status: COMPLETED | OUTPATIENT
Start: 2025-04-05 | End: 2025-04-05

## 2025-04-05 RX ORDER — PHENYLEPHRINE HYDROCHLORIDE AND FAT, HARD .00525; 1.86 G/1; G/1
1 SUPPOSITORY RECTAL DAILY
Refills: 0 | Status: DISCONTINUED | OUTPATIENT
Start: 2025-04-05 | End: 2025-04-16

## 2025-04-05 RX ORDER — INSULIN LISPRO 100 U/ML
INJECTION, SOLUTION INTRAVENOUS; SUBCUTANEOUS
Refills: 0 | Status: DISCONTINUED | OUTPATIENT
Start: 2025-04-05 | End: 2025-04-11

## 2025-04-05 RX ORDER — DEXMEDETOMIDINE HYDROCHLORIDE IN SODIUM CHLORIDE 4 UG/ML
0.2 INJECTION INTRAVENOUS
Qty: 200 | Refills: 0 | Status: DISCONTINUED | OUTPATIENT
Start: 2025-04-05 | End: 2025-04-06

## 2025-04-05 RX ORDER — NOREPINEPHRINE BITARTRATE 8 MG
0.05 SOLUTION INTRAVENOUS
Qty: 8 | Refills: 0 | Status: DISCONTINUED | OUTPATIENT
Start: 2025-04-05 | End: 2025-04-05

## 2025-04-05 RX ADMIN — BUMETANIDE 20 MG/HR: 1 TABLET ORAL at 11:59

## 2025-04-05 RX ADMIN — AMIODARONE HYDROCHLORIDE 400 MILLIGRAM(S): 50 INJECTION, SOLUTION INTRAVENOUS at 06:03

## 2025-04-05 RX ADMIN — BUMETANIDE 20 MG/HR: 1 TABLET ORAL at 20:26

## 2025-04-05 RX ADMIN — Medication 650 MILLIGRAM(S): at 22:33

## 2025-04-05 RX ADMIN — CHLOROTHIAZIDE 100 MILLIGRAM(S): 250 TABLET ORAL at 06:04

## 2025-04-05 RX ADMIN — HEPARIN SODIUM 17 UNIT(S)/HR: 1000 INJECTION INTRAVENOUS; SUBCUTANEOUS at 20:26

## 2025-04-05 RX ADMIN — Medication 100 MILLIEQUIVALENT(S): at 12:43

## 2025-04-05 RX ADMIN — AMIODARONE HYDROCHLORIDE 400 MILLIGRAM(S): 50 INJECTION, SOLUTION INTRAVENOUS at 22:33

## 2025-04-05 RX ADMIN — AMIODARONE HYDROCHLORIDE 400 MILLIGRAM(S): 50 INJECTION, SOLUTION INTRAVENOUS at 13:04

## 2025-04-05 RX ADMIN — VASOPRESSIN 6 UNIT(S)/MIN: 20 INJECTION INTRAVENOUS at 11:58

## 2025-04-05 RX ADMIN — PHENYLEPHRINE HYDROCHLORIDE AND FAT, HARD 1 APPLICATION(S): .00525; 1.86 SUPPOSITORY RECTAL at 15:21

## 2025-04-05 RX ADMIN — Medication 650 MILLIGRAM(S): at 06:03

## 2025-04-05 RX ADMIN — SODIUM CHLORIDE 300 MILLILITER(S): 3 INJECTION, SOLUTION INTRAVENOUS at 22:24

## 2025-04-05 RX ADMIN — VASOPRESSIN 6 UNIT(S)/MIN: 20 INJECTION INTRAVENOUS at 20:26

## 2025-04-05 RX ADMIN — DEXMEDETOMIDINE HYDROCHLORIDE IN SODIUM CHLORIDE 6.2 MICROGRAM(S)/KG/HR: 4 INJECTION INTRAVENOUS at 22:17

## 2025-04-05 RX ADMIN — Medication 100 MILLIEQUIVALENT(S): at 13:41

## 2025-04-05 RX ADMIN — ATORVASTATIN CALCIUM 40 MILLIGRAM(S): 80 TABLET, FILM COATED ORAL at 22:33

## 2025-04-05 RX ADMIN — SODIUM CHLORIDE 300 MILLILITER(S): 3 INJECTION, SOLUTION INTRAVENOUS at 11:18

## 2025-04-05 RX ADMIN — Medication 650 MILLIGRAM(S): at 13:04

## 2025-04-05 RX ADMIN — Medication 1 APPLICATION(S): at 22:33

## 2025-04-05 RX ADMIN — HEPARIN SODIUM 17 UNIT(S)/HR: 1000 INJECTION INTRAVENOUS; SUBCUTANEOUS at 12:44

## 2025-04-05 RX ADMIN — CLOPIDOGREL BISULFATE 75 MILLIGRAM(S): 75 TABLET, FILM COATED ORAL at 11:59

## 2025-04-05 NOTE — PROGRESS NOTE ADULT - SUBJECTIVE AND OBJECTIVE BOX
[facility] Division of Kidney Diseases & Hypertension  FOLLOW UP NOTE  [phone]--------------------------------------------------------------------------------  Chief Complaint: Acute on chronic systolic congestive heart failure    24 hour events/subjective: Pt. seen and examined, resting comfortably in bed. Still on supplemental O2. No overnight events. Still on bumex gtt.    PAST HISTORY  --------------------------------------------------------------------------------  No significant changes to PMH, PSH, FHx, SHx, unless otherwise noted    ALLERGIES & MEDICATIONS  --------------------------------------------------------------------------------  Allergies    metoprolol (Short breath)    Intolerances    Standing Inpatient Medications  aMIOdarone    Tablet   Oral   aMIOdarone    Tablet 400 milliGRAM(s) Oral every 8 hours  atorvastatin 40 milliGRAM(s) Oral at bedtime  buMETAnide Infusion 4 mG/Hr IV Continuous <Continuous>  chlorhexidine 2% Cloths 1 Application(s) Topical daily  clopidogrel Tablet 75 milliGRAM(s) Oral daily  famotidine    Tablet 20 milliGRAM(s) Oral every 48 hours  heparin  Infusion 1500 Unit(s)/Hr IV Continuous <Continuous>  influenza  Vaccine (HIGH DOSE) 0.5 milliLiter(s) IntraMuscular once  insulin lispro (ADMELOG) corrective regimen sliding scale   SubCutaneous Before meals and at bedtime  sodium bicarbonate 650 milliGRAM(s) Oral three times a day  vasopressin Infusion 0.04 Unit(s)/Min IV Continuous <Continuous>    PRN Inpatient Medications    REVIEW OF SYSTEMS  --------------------------------------------------------------------------------  Gen: No fevers/chills  Respiratory: No dyspnea  CV: No chest pain  GI: No abdominal pain  : +UOP  MSK: No LE swelling    All other systems were reviewed and are negative, except as noted.    VITALS/PHYSICAL EXAM  --------------------------------------------------------------------------------  T(C): 36.8 (04-04-25 @ 19:00), Max: 36.8 (04-04-25 @ 19:00)  HR: 65 (04-05-25 @ 11:00) (59 - 76)  BP: --  RR: 32 (04-05-25 @ 11:00) (19 - 52)  SpO2: 93% (04-05-25 @ 11:00) (82% - 97%)  Wt(kg): --    04-04-25 @ 07:01  -  04-05-25 @ 07:00  --------------------------------------------------------  IN: 1730.5 mL / OUT: 2005 mL / NET: -274.5 mL    04-05-25 @ 07:01  -  04-05-25 @ 12:06  --------------------------------------------------------  IN: 156 mL / OUT: 575 mL / NET: -419 mL    Physical Exam:  Gen: NAD on supplemental oxygen.   Pulm: No crackles   CV: RRR, S1S2;  Abd: +BS, soft, nontender/nondistended  : Willis in place.   Extremities: Mild LE swelling  Skin: Warm, Lower extremity chronic changes.    LABS/STUDIES  --------------------------------------------------------------------------------              12.8   10.19 >-----------<  160      [04-05-25 @ 00:40]              39.7     132  |  90  |  94  ----------------------------<  119      [04-05-25 @ 06:40]  3.9   |  24  |  5.14        Ca     9.2     [04-05-25 @ 06:40]      Mg     2.2     [04-05-25 @ 06:40]      Phos  6.4     [04-05-25 @ 06:40]    TPro  7.2  /  Alb  3.4  /  TBili  1.5  /  DBili  x   /  AST  22  /  ALT  27  /  AlkPhos  113  [04-05-25 @ 06:40]    PT/INR: PT 15.3 , INR 1.35       [04-05-25 @ 00:40]  PTT: 61.1       [04-05-25 @ 11:48]    Creatinine Trend:  SCr 5.14 [04-05 @ 06:40]  SCr 5.05 [04-05 @ 00:40]  SCr 4.97 [04-04 @ 18:38]  SCr 4.59 [04-04 @ 12:25]  SCr 4.59 [04-04 @ 00:43]    Urine Creatinine 163      [04-01-25 @ 09:25]  Urine Protein 122      [04-01-25 @ 09:25]  Urine Sodium 29      [04-01-25 @ 09:25]    TSH 3.15      [04-01-25 @ 21:43]  Lipid: chol 94, TG 74, HDL 29, LDL --      [04-01-25 @ 06:18]

## 2025-04-05 NOTE — PROGRESS NOTE ADULT - ATTENDING COMMENTS
ANGELICA on CKD   Cardiorenal   Improving renal function on Bumex gtt    Jenna Rogers MD  Off: [phone]  contact me on teams    (After 5 pm or on weekends please page the on-call fellow/attending, can check [email or web address] for schedule. Login is harpreet pinto, schedule under [facility] medicine, psych, derm)

## 2025-04-05 NOTE — PROGRESS NOTE ADULT - ATTENDING COMMENTS
HFrEF and severe prosthetic valve aortic stenosis  Hypotension and poor urine output with ANGELICA on CKD  Hemodynamic instability requiring pressor support  Consider [place] and inotrope support if needed  Intubated 4/1 for acute respiratory decompensation and increased work of breathing  STANISLAW 4/2 showing severe prosthetic valve stenosis    Structural Heart Team evaluation.

## 2025-04-05 NOTE — PROGRESS NOTE ADULT - ASSESSMENT
63 y/o M with hx of Afib s/p DCCV 3/25/25, CHF, Aortic stenosis s/p TAVR 11/2022, CAD s/p CABG, Aflutter s/p ablation and Micra implant, CKD 3, Obesity, venous stasis, HTN, HLD presented with 6 days of worsening SOB on exertion associated with orthopnea and chest pain.   Also reports several days decreased appetite. Denies fevers, chills, cough, abdominal pain, NVD, urinary sx, leg swelling. Patient had a cardioversion for atrial fibrillation at this hospital recently, which was reportedly unsuccessful. Patient has a scheduled appointment with Dr. Philip on 04/24/2025 to discuss possible ablation. His outpt cardiologist is Dr. Miranda. (Cardiology)  On arrival to ED, vitals were 97.3F, HR 71, BP 94/57, RR 22, 97% on 2L NC. Labs pertinent for WBC 10.68, INR 2.55, Na 132, K 5.5, bicarb 20, BUN/Cr 78/5.17 (baseline Cr appears ~1.5-1.8), trop 225, BNP 83114, VBG ph 7.3, pco2/po2 wnl. UA without evidence of infection.  nephrology was consulted for management of ANGELICA on CKD. Pt has baseline Scr of ~1.5- 1.8. Scr was 2.7 on 3/25/25. Pt is on Entresto ( started recently) and he mentioned that he was taking Motrin occasionally. No herbal medications/ supplements.  Scr at the time of admission was 5.17 3/31/25 and it further worsened to 5.45 4/1/25.    Pt follows Dr. Stewart Brown (Nephrology)      ANGELICA on CKD:  Pt has hx of CKD 3. Baseline Scr 1.5-1.8.   At the time of admission - Scr was 5.17 and it further worsened to 5.45--> 5.5. Scr elevated/stable at 5.14 today.  Pt has lopez cath in place. UOP was ~2L   Bumex gtt - restarted 4/4/25  UA showed trace LE, blood ( Likely due to lopez), casts 17. UPCR 0.7. No hx of DM.   Pt was on entresto.   BP was low.   On IV vasopressors  ANGELICA on CKD likely in setting of HF exacerbation, entresto use and hypotension mediated ATN.    PLAN:  Can continue bumex gtt, monitor volume status  continue to hold entresto.   Continue with sodium bicarb 650 TID for now.   Monitor strict I/Os and daily wts.   Get USG kidney and bladder.   No urgent need of HD for now.    Avoid nephrotoxins  Dose medications as per eGFR    hyperkalemia: resolved    Hyponatremia is likely in setting of impaired water clearance in setting of worsening renal function and volume overload 2/2 heart failure  Bumex as noted above    **Recommendations preliminary until attending attestation**  If you have any questions, please feel free to contact me  Gary Robbins  Nephrology Fellow  Page [phone] / [employer] Teams (Preferred)  (After 4pm or on weekends please page the on-call fellow)

## 2025-04-05 NOTE — PROGRESS NOTE ADULT - SUBJECTIVE AND OBJECTIVE BOX
***Plan not finalized until attending attestation***    Gabriele Barnes MD (PGY-1)  Internal Medicine  Contact via [employer] TEAMS    ******************************************    CCU PROGRESS NOTE:    BERTHA WARD  MRN-[id number]  Patient is a 64y old  Male who presents with a chief complaint of dyspnea , chest pain (04 Apr 2025 23:09)      INTERVAL HPI/OVERNIGHT EVENTS:   - Bumex increased to 4mg drip overnight forlow urine output  - Got Dyril ON making -200cc now  - Will give 1 dose hypertonic    SUBJECTIVE: Patient seen and examined at bedside. No acute complaints. Denies chest pain, SOB, palpitations, dizziness/lightheadedness    MEDICATIONS  (STANDING):  aMIOdarone    Tablet   Oral   aMIOdarone    Tablet 400 milliGRAM(s) Oral every 8 hours  atorvastatin 40 milliGRAM(s) Oral at bedtime  buMETAnide Infusion 4 mG/Hr (20 mL/Hr) IV Continuous <Continuous>  chlorhexidine 2% Cloths 1 Application(s) Topical daily  clopidogrel Tablet 75 milliGRAM(s) Oral daily  famotidine    Tablet 20 milliGRAM(s) Oral every 48 hours  heparin  Infusion 1500 Unit(s)/Hr (17 mL/Hr) IV Continuous <Continuous>  influenza  Vaccine (HIGH DOSE) 0.5 milliLiter(s) IntraMuscular once  insulin lispro (ADMELOG) corrective regimen sliding scale   SubCutaneous Before meals and at bedtime  sodium bicarbonate 650 milliGRAM(s) Oral three times a day  sodium chloride 3% Bolus 150 milliLiter(s) IV Bolus once  vasopressin Infusion 0.04 Unit(s)/Min (6 mL/Hr) IV Continuous <Continuous>    MEDICATIONS  (PRN):      OBJECTIVE:  ICU Vital Signs Last 24 Hrs  T(C): 36.8 (04 Apr 2025 19:00), Max: 36.8 (04 Apr 2025 19:00)  T(F): 98.2 (04 Apr 2025 19:00), Max: 98.2 (04 Apr 2025 19:00)  HR: 64 (05 Apr 2025 09:45) (59 - 76)  BP: --  BP(mean): --  ABP: 102/63 (05 Apr 2025 09:45) (81/50 - 118/79)  ABP(mean): 78 (05 Apr 2025 09:45) (61 - 94)  RR: 30 (05 Apr 2025 09:45) (19 - 52)  SpO2: 94% (05 Apr 2025 09:45) (82% - 96%)    O2 Parameters below as of 05 Apr 2025 09:30  Patient On (Oxygen Delivery Method): nasal cannula  O2 Flow (L/min): 4          CVP(mm Hg): 16 (04-05-25 @ 09:45) (9 - 38)  CO: --  CI: --  PA: --  PA(mean): --  PA(direct): --  PCWP: --  LA: --  RA: --  SVR: --  SVRI: --  PVR: --  PVRI: --  I&O's Summary    04 Apr 2025 07:01  -  05 Apr 2025 07:00  --------------------------------------------------------  IN: 1730.5 mL / OUT: 2005 mL / NET: -274.5 mL    05 Apr 2025 07:01  -  05 Apr 2025 09:56  --------------------------------------------------------  IN: 156 mL / OUT: 575 mL / NET: -419 mL        CAPILLARY BLOOD GLUCOSE      GENERAL: NAD, lying in bed comfortably  NECK: no JVD  HEART: S1, S2, regular rate and rhythm, no murmurs, rubs, or gallops  LUNGS: Unlabored respirations.  Clear to auscultation bilaterally, no crackles, wheezing, or rhonchi  ABDOMEN: Soft, nontender, nondistended, +BS  EXTREMITIES: 2+ peripheral pulses bilaterally. No clubbing, cyanosis, or edema    ============================I/O===========================   I&O's Detail    04 Apr 2025 07:01  -  05 Apr 2025 07:00  --------------------------------------------------------  IN:    Amiodarone: 135.6 mL    Bumetanide: 200 mL    Bumetanide: 80 mL    Heparin: 365 mL    IV PiggyBack: 50 mL    Norepinephrine: 6.9 mL    Norepinephrine: 83 mL    Oral Fluid: 450 mL    Vasopressin: 360 mL  Total IN: 1730.5 mL    OUT:    Indwelling Catheter - Urethral (mL): 2005 mL  Total OUT: 2005 mL    Total NET: -274.5 mL      05 Apr 2025 07:01  -  05 Apr 2025 09:56  --------------------------------------------------------  IN:    Bumetanide: 60 mL    Heparin: 51 mL    Vasopressin: 45 mL  Total IN: 156 mL    OUT:    Indwelling Catheter - Urethral (mL): 575 mL  Total OUT: 575 mL    Total NET: -419 mL        ============================ LABS =========================                        12.8   10.19 )-----------( 160      ( 05 Apr 2025 00:40 )             39.7     04-05    132[L]  |  90[L]  |  94[H]  ----------------------------<  119[H]  3.9   |  24  |  5.14[H]    Ca    9.2      05 Apr 2025 06:40  Phos  6.4     04-05  Mg     2.2     04-05    TPro  7.2  /  Alb  3.4  /  TBili  1.5[H]  /  DBili  x   /  AST  22  /  ALT  27  /  AlkPhos  113  04-05                LIVER FUNCTIONS - ( 05 Apr 2025 06:40 )  Alb: 3.4 g/dL / Pro: 7.2 g/dL / ALK PHOS: 113 U/L / ALT: 27 U/L / AST: 22 U/L / GGT: x           PT/INR - ( 05 Apr 2025 00:40 )   PT: 15.3 sec;   INR: 1.35 ratio         PTT - ( 05 Apr 2025 00:40 )  PTT:48.1 sec  ABG - ( 05 Apr 2025 00:28 )  pH, Arterial: 7.31  pH, Blood: x     /  pCO2: 58    /  pO2: 75    / HCO3: 29    / Base Excess: 1.7   /  SaO2: 95.7              Blood Gas Arterial, Lactate: 1.1 mmol/L (04-05-25 @ 00:28)  Blood Gas Venous - Lactate: 0.8 mmol/L (04-03-25 @ 01:46)  Blood Gas Arterial, Lactate: 0.9 mmol/L (04-03-25 @ 01:03)  Blood Gas Venous - Lactate: 0.9 mmol/L (04-02-25 @ 22:28)  Blood Gas Arterial, Lactate: 1.1 mmol/L (04-02-25 @ 17:20)  Blood Gas Venous - Lactate: 1.0 mmol/L (04-02-25 @ 17:20)    Urinalysis Basic - ( 05 Apr 2025 06:40 )    Color: x / Appearance: x / SG: x / pH: x  Gluc: 119 mg/dL / Ketone: x  / Bili: x / Urobili: x   Blood: x / Protein: x / Nitrite: x   Leuk Esterase: x / RBC: x / WBC x   Sq Epi: x / Non Sq Epi: x / Bacteria: x      ======================MICRO/RAD/CARDIO=================  Telemetry: Reviewed   EKG: Reviewed  CXR: Reviewed  Culture: Reviewed   Echo:   Cath:

## 2025-04-05 NOTE — PROGRESS NOTE ADULT - ASSESSMENT
65 yo M with a fib (s/p dccv 3/25/25), CHF (likely moderately reduced EF, several TTE with poor windows), aortic stenosis s/p TAVR (11/2022), a flutter s/p ablation 2019 and s/p micra implant,  CKD3, obesity, venous stasis, HTN, HLD, who presents with 6 days of worsening dyspnea on exertion, admitted for AHRF likely iso CHF exacerbation, also with ANGELICA on CKD c/f cardiorenal syndrome. RRT on floors for SBP 80s requiring pressors and has been admitted to the CCU. HF mostly likely i/so severe AS. Pt to get structural intervention.     Plan:  ===NEURO===  - AO x 3    ===RESPIRATORY===  #Acute hypoxemic respiratory failure  - c/w bumex 4mg drip for elevated CVP and increased O2 req  - HFNC   - diurel, 3% hypertonic, and metolazone PRN     ===CARDIOVASCULAR===  #Obstructive shock 2/2 AS  - Off levophed  - MAPs 70s -> will wean vasopressin  - If need more pressor support will switch to phenylephrine as pt has LVOT obstruction from aortic stenosis   - Wean as tolerated    #Acute on chronic HF exacerbation  - Bumex increased to 4mg drip overnight forlow urine output  - Got Dyril ON making -200cc now  - Will give 1 dose hypertonic  - Goal 1-2L net neg daily  - CVP goal 8-12  - Will be careful of overdiuresis given severe AS and preload dependant     #Afib  #First degree block  - PO amio load   - C/w heparin    #Aortic valve stenosis s/p prosthetic  - S/p STANISLAW showing severe AS stenosis, global hypokinesis and EF 15%  - Structural plans for valve in valve replacement  - Structural wants cath and CT w/ contrast before procedure  - Awaiting GFR improvement    #Troponemia  - No ischemic changes on EKG  - Most likely i/s/o ANGELICA on CKD    #Hx CABG  #PVD  - C/w Plavix    #HLD  - Increased Lipitor to 40mg from 20mg    ===/RENAL===  #ANGELICA on CKD  #Cardiorenal syndrome  - Baseline 1.5-1.8  - Most likely i/s/o hypotension and fluid overload  - Renal sono showed parenchymal disease  - Nephro following     #Hyperkalemia  - Lokelma if K increases into 5s range    ===GI/NUTRITION===  - Failed bedside SS  - S&S consulted -> recommends FEES    ===SKIN===  - Hx lichen planus  - No other concerns    ===INFECTIOUS DISEASE===  - Slightly elevated WBC on admission 10.5K  - Afebrile  - Urine culture -> neg    ===ENDOCRINE===  - No concerns  - A1c 5.8  - TSH wnl    ===HEMATOLOGIC/DVT PPx===  - On heparin ggt    Dispo: Maintain in ICU.    Patient requires continuous monitoring with bedside rhythm monitoring, arterial line, pulse oximetry, ventilator monitoring and intermittent blood gas analysis.  Care plan discussed with ICU care team.  I have spent 35 minutes providing critical care, in addition to initial critical time provided by CICU attending  Dr. Beltran    , re-evaluated multiple times during the day.    Raquel Leon PA-C

## 2025-04-05 NOTE — PROGRESS NOTE ADULT - SUBJECTIVE AND OBJECTIVE BOX
PATIENT:  BERTHA WARD  [id number]    CHIEF COMPLAINT:  Patient is a 64y old  Male who presents with a chief complaint of dyspnea , chest pain (2025 12:05)      INTERVAL HISTORYOVERNIGHT EVENTS:  - SOB and hypoxic, placed on HFNC   - c/w bumex gtt for diuresis     REVIEW OF SYSTEMS:  [x ] All other systems negative    MEDICATIONS:  MEDICATIONS  (STANDING):  aMIOdarone    Tablet   Oral   aMIOdarone    Tablet 400 milliGRAM(s) Oral every 8 hours  atorvastatin 40 milliGRAM(s) Oral at bedtime  buMETAnide Infusion 4 mG/Hr (20 mL/Hr) IV Continuous <Continuous>  chlorhexidine 2% Cloths 1 Application(s) Topical daily  clopidogrel Tablet 75 milliGRAM(s) Oral daily  famotidine    Tablet 20 milliGRAM(s) Oral every 48 hours  hemorrhoidal Ointment 1 Application(s) Rectal daily  heparin  Infusion 1500 Unit(s)/Hr (17 mL/Hr) IV Continuous <Continuous>  influenza  Vaccine (HIGH DOSE) 0.5 milliLiter(s) IntraMuscular once  insulin lispro (ADMELOG) corrective regimen sliding scale   SubCutaneous Before meals and at bedtime  metolazone 10 milliGRAM(s) Oral once  potassium chloride    Tablet ER 40 milliEquivalent(s) Oral once  sodium bicarbonate 650 milliGRAM(s) Oral three times a day  vasopressin Infusion 0.04 Unit(s)/Min (6 mL/Hr) IV Continuous <Continuous>    MEDICATIONS  (PRN):      ALLERGIES:  Allergies    metoprolol (Short breath)    Intolerances        OBJECTIVE:  ICU Vital Signs Last 24 Hrs  T(C): 36.6 (2025 16:00), Max: 36.6 (2025 11:00)  T(F): 97.8 (2025 16:00), Max: 97.9 (2025 11:00)  HR: 66 (2025 19:00) (59 - 76)  BP: --  BP(mean): --  ABP: 94/61 (2025 19:00) (86/54 - 118/79)  ABP(mean): 75 (2025 19:00) (67 - 94)  RR: 30 (2025 19:00) (19 - 53)  SpO2: 93% (2025 19:00) (81% - 97%)    O2 Parameters below as of 2025 19:00  Patient On (Oxygen Delivery Method): nasal cannula  O2 Flow (L/min): 4          Adult Advanced Hemodynamics Last 24 Hrs  CVP(mm Hg): 21 (2025 19:00) (12 - 31)  CVP(cm H2O): --  CO: --  CI: --  PA: --  PA(mean): --  PCWP: --  SVR: --  SVRI: --  PVR: --  PVRI: --  CAPILLARY BLOOD GLUCOSE      POCT Blood Glucose.: 137 mg/dL (2025 16:09)  POCT Blood Glucose.: 132 mg/dL (2025 11:28)    CAPILLARY BLOOD GLUCOSE      POCT Blood Glucose.: 137 mg/dL (2025 16:09)    I&O's Summary    2025 07:01  -  2025 07:00  --------------------------------------------------------  IN: 1730.5 mL / OUT: 2005 mL / NET: -274.5 mL    2025 07:01  -  2025 19:46  --------------------------------------------------------  IN: 1122 mL / OUT: 2000 mL / NET: -878 mL      Daily     Daily Weight in k.2 (2025 05:15)    PHYSICAL EXAMINATION:  General: WN/WD NAD  HEENT: PERRLA, EOMI, moist mucous membranes  Neurology: A&Ox3, nonfocal, MITCHELL x 4  Respiratory: CTA B/L, normal respiratory effort, no wheezes, crackles, rales  CV: RRR, S1S2, no murmurs, rubs or gallops  Abdominal: Soft, NT, ND +BS, Last BM  Extremities: No edema, + peripheral pulses  Incisions:   Tubes:    LABS:  ABG - ( 2025 00:28 )  pH, Arterial: 7.31  pH, Blood: x     /  pCO2: 58    /  pO2: 75    / HCO3: 29    / Base Excess: 1.7   /  SaO2: 95.7                                    12.8   10.19 )-----------( 160      ( 2025 00:40 )             39.7     04-05    133[L]  |  91[L]  |  94[H]  ----------------------------<  157[H]  3.7   |  26  |  4.72[H]    Ca    8.9      2025 18:55  Phos  6.3     04-05  Mg     2.1     04-05    TPro  7.1  /  Alb  3.3  /  TBili  1.1  /  DBili  x   /  AST  17  /  ALT  25  /  AlkPhos  106  04-05    LIVER FUNCTIONS - ( 2025 18:55 )  Alb: 3.3 g/dL / Pro: 7.1 g/dL / ALK PHOS: 106 U/L / ALT: 25 U/L / AST: 17 U/L / GGT: x           PT/INR - ( 2025 00:40 )   PT: 15.3 sec;   INR: 1.35 ratio         PTT - ( 2025 18:55 )  PTT:60.8 sec        Urinalysis Basic - ( 2025 18:55 )    Color: x / Appearance: x / SG: x / pH: x  Gluc: 157 mg/dL / Ketone: x  / Bili: x / Urobili: x   Blood: x / Protein: x / Nitrite: x   Leuk Esterase: x / RBC: x / WBC x   Sq Epi: x / Non Sq Epi: x / Bacteria: x        TELEMETRY:     EKG:     IMAGING:

## 2025-04-06 LAB
ALBUMIN SERPL ELPH-MCNC: 3.3 G/DL — SIGNIFICANT CHANGE UP (ref 3.3–5)
ALBUMIN SERPL ELPH-MCNC: 3.3 G/DL — SIGNIFICANT CHANGE UP (ref 3.3–5)
ALBUMIN SERPL ELPH-MCNC: 3.4 G/DL — SIGNIFICANT CHANGE UP (ref 3.3–5)
ALBUMIN SERPL ELPH-MCNC: 3.4 G/DL — SIGNIFICANT CHANGE UP (ref 3.3–5)
ALP SERPL-CCNC: 102 U/L — SIGNIFICANT CHANGE UP (ref 40–120)
ALP SERPL-CCNC: 105 U/L — SIGNIFICANT CHANGE UP (ref 40–120)
ALP SERPL-CCNC: 107 U/L — SIGNIFICANT CHANGE UP (ref 40–120)
ALP SERPL-CCNC: 107 U/L — SIGNIFICANT CHANGE UP (ref 40–120)
ALT FLD-CCNC: 27 U/L — SIGNIFICANT CHANGE UP (ref 10–45)
ALT FLD-CCNC: 28 U/L — SIGNIFICANT CHANGE UP (ref 10–45)
ANION GAP SERPL CALC-SCNC: 16 MMOL/L — SIGNIFICANT CHANGE UP (ref 5–17)
ANION GAP SERPL CALC-SCNC: 16 MMOL/L — SIGNIFICANT CHANGE UP (ref 5–17)
ANION GAP SERPL CALC-SCNC: 17 MMOL/L — SIGNIFICANT CHANGE UP (ref 5–17)
ANION GAP SERPL CALC-SCNC: 18 MMOL/L — HIGH (ref 5–17)
APTT BLD: 51.9 SEC — HIGH (ref 24.5–35.6)
APTT BLD: 59.7 SEC — HIGH (ref 24.5–35.6)
APTT BLD: 63.5 SEC — HIGH (ref 24.5–35.6)
AST SERPL-CCNC: 16 U/L — SIGNIFICANT CHANGE UP (ref 10–40)
AST SERPL-CCNC: 16 U/L — SIGNIFICANT CHANGE UP (ref 10–40)
AST SERPL-CCNC: 17 U/L — SIGNIFICANT CHANGE UP (ref 10–40)
AST SERPL-CCNC: 18 U/L — SIGNIFICANT CHANGE UP (ref 10–40)
BASOPHILS # BLD AUTO: 0 K/UL — SIGNIFICANT CHANGE UP (ref 0–0.2)
BASOPHILS NFR BLD AUTO: 0 % — SIGNIFICANT CHANGE UP (ref 0–2)
BILIRUB SERPL-MCNC: 1.1 MG/DL — SIGNIFICANT CHANGE UP (ref 0.2–1.2)
BILIRUB SERPL-MCNC: 1.2 MG/DL — SIGNIFICANT CHANGE UP (ref 0.2–1.2)
BILIRUB SERPL-MCNC: 1.2 MG/DL — SIGNIFICANT CHANGE UP (ref 0.2–1.2)
BILIRUB SERPL-MCNC: 1.3 MG/DL — HIGH (ref 0.2–1.2)
BLD GP AB SCN SERPL QL: NEGATIVE — SIGNIFICANT CHANGE UP
BUN SERPL-MCNC: 100 MG/DL — HIGH (ref 7–23)
BUN SERPL-MCNC: 101 MG/DL — HIGH (ref 7–23)
BUN SERPL-MCNC: 94 MG/DL — HIGH (ref 7–23)
BUN SERPL-MCNC: 95 MG/DL — HIGH (ref 7–23)
CALCIUM SERPL-MCNC: 9 MG/DL — SIGNIFICANT CHANGE UP (ref 8.4–10.5)
CALCIUM SERPL-MCNC: 9.1 MG/DL — SIGNIFICANT CHANGE UP (ref 8.4–10.5)
CALCIUM SERPL-MCNC: 9.1 MG/DL — SIGNIFICANT CHANGE UP (ref 8.4–10.5)
CALCIUM SERPL-MCNC: 9.2 MG/DL — SIGNIFICANT CHANGE UP (ref 8.4–10.5)
CHLORIDE SERPL-SCNC: 92 MMOL/L — LOW (ref 96–108)
CHLORIDE SERPL-SCNC: 92 MMOL/L — LOW (ref 96–108)
CHLORIDE SERPL-SCNC: 93 MMOL/L — LOW (ref 96–108)
CHLORIDE SERPL-SCNC: 93 MMOL/L — LOW (ref 96–108)
CO2 SERPL-SCNC: 27 MMOL/L — SIGNIFICANT CHANGE UP (ref 22–31)
CO2 SERPL-SCNC: 27 MMOL/L — SIGNIFICANT CHANGE UP (ref 22–31)
CO2 SERPL-SCNC: 28 MMOL/L — SIGNIFICANT CHANGE UP (ref 22–31)
CO2 SERPL-SCNC: 28 MMOL/L — SIGNIFICANT CHANGE UP (ref 22–31)
CREAT SERPL-MCNC: 4.38 MG/DL — HIGH (ref 0.5–1.3)
CREAT SERPL-MCNC: 4.44 MG/DL — HIGH (ref 0.5–1.3)
CREAT SERPL-MCNC: 4.58 MG/DL — HIGH (ref 0.5–1.3)
CREAT SERPL-MCNC: 4.85 MG/DL — HIGH (ref 0.5–1.3)
EGFR: 13 ML/MIN/1.73M2 — LOW
EGFR: 14 ML/MIN/1.73M2 — LOW
EOSINOPHIL # BLD AUTO: 0.08 K/UL — SIGNIFICANT CHANGE UP (ref 0–0.5)
EOSINOPHIL NFR BLD AUTO: 0.9 % — SIGNIFICANT CHANGE UP (ref 0–6)
GAS PNL BLDA: SIGNIFICANT CHANGE UP
GAS PNL BLDV: SIGNIFICANT CHANGE UP
GLUCOSE BLDC GLUCOMTR-MCNC: 122 MG/DL — HIGH (ref 70–99)
GLUCOSE BLDC GLUCOMTR-MCNC: 128 MG/DL — HIGH (ref 70–99)
GLUCOSE SERPL-MCNC: 124 MG/DL — HIGH (ref 70–99)
GLUCOSE SERPL-MCNC: 137 MG/DL — HIGH (ref 70–99)
GLUCOSE SERPL-MCNC: 152 MG/DL — HIGH (ref 70–99)
GLUCOSE SERPL-MCNC: 160 MG/DL — HIGH (ref 70–99)
HCT VFR BLD CALC: 38 % — LOW (ref 39–50)
HGB BLD-MCNC: 12 G/DL — LOW (ref 13–17)
INR BLD: 1.25 RATIO — HIGH (ref 0.85–1.16)
LYMPHOCYTES # BLD AUTO: 0.44 K/UL — LOW (ref 1–3.3)
LYMPHOCYTES # BLD AUTO: 5.2 % — LOW (ref 13–44)
MAGNESIUM SERPL-MCNC: 2.1 MG/DL — SIGNIFICANT CHANGE UP (ref 1.6–2.6)
MAGNESIUM SERPL-MCNC: 2.1 MG/DL — SIGNIFICANT CHANGE UP (ref 1.6–2.6)
MCHC RBC-ENTMCNC: 30.8 PG — SIGNIFICANT CHANGE UP (ref 27–34)
MCHC RBC-ENTMCNC: 31.6 G/DL — LOW (ref 32–36)
MCV RBC AUTO: 97.4 FL — SIGNIFICANT CHANGE UP (ref 80–100)
MONOCYTES # BLD AUTO: 0.44 K/UL — SIGNIFICANT CHANGE UP (ref 0–0.9)
MONOCYTES NFR BLD AUTO: 5.2 % — SIGNIFICANT CHANGE UP (ref 2–14)
NEUTROPHILS # BLD AUTO: 7.58 K/UL — HIGH (ref 1.8–7.4)
NEUTROPHILS NFR BLD AUTO: 87 % — HIGH (ref 43–77)
PHOSPHATE SERPL-MCNC: 5.4 MG/DL — HIGH (ref 2.5–4.5)
PHOSPHATE SERPL-MCNC: 6.4 MG/DL — HIGH (ref 2.5–4.5)
PLATELET # BLD AUTO: 138 K/UL — LOW (ref 150–400)
POTASSIUM SERPL-MCNC: 3.6 MMOL/L — SIGNIFICANT CHANGE UP (ref 3.5–5.3)
POTASSIUM SERPL-MCNC: 3.6 MMOL/L — SIGNIFICANT CHANGE UP (ref 3.5–5.3)
POTASSIUM SERPL-MCNC: 4 MMOL/L — SIGNIFICANT CHANGE UP (ref 3.5–5.3)
POTASSIUM SERPL-MCNC: 4.2 MMOL/L — SIGNIFICANT CHANGE UP (ref 3.5–5.3)
POTASSIUM SERPL-SCNC: 3.6 MMOL/L — SIGNIFICANT CHANGE UP (ref 3.5–5.3)
POTASSIUM SERPL-SCNC: 3.6 MMOL/L — SIGNIFICANT CHANGE UP (ref 3.5–5.3)
POTASSIUM SERPL-SCNC: 4 MMOL/L — SIGNIFICANT CHANGE UP (ref 3.5–5.3)
POTASSIUM SERPL-SCNC: 4.2 MMOL/L — SIGNIFICANT CHANGE UP (ref 3.5–5.3)
PROT SERPL-MCNC: 7 G/DL — SIGNIFICANT CHANGE UP (ref 6–8.3)
PROT SERPL-MCNC: 7.1 G/DL — SIGNIFICANT CHANGE UP (ref 6–8.3)
PROT SERPL-MCNC: 7.1 G/DL — SIGNIFICANT CHANGE UP (ref 6–8.3)
PROT SERPL-MCNC: 7.4 G/DL — SIGNIFICANT CHANGE UP (ref 6–8.3)
PROTHROM AB SERPL-ACNC: 14.3 SEC — HIGH (ref 9.9–13.4)
RBC # BLD: 3.9 M/UL — LOW (ref 4.2–5.8)
RBC # FLD: 16 % — HIGH (ref 10.3–14.5)
RH IG SCN BLD-IMP: POSITIVE — SIGNIFICANT CHANGE UP
SODIUM SERPL-SCNC: 136 MMOL/L — SIGNIFICANT CHANGE UP (ref 135–145)
SODIUM SERPL-SCNC: 136 MMOL/L — SIGNIFICANT CHANGE UP (ref 135–145)
SODIUM SERPL-SCNC: 137 MMOL/L — SIGNIFICANT CHANGE UP (ref 135–145)
SODIUM SERPL-SCNC: 138 MMOL/L — SIGNIFICANT CHANGE UP (ref 135–145)
WBC # BLD: 8.55 K/UL — SIGNIFICANT CHANGE UP (ref 3.8–10.5)
WBC # FLD AUTO: 8.55 K/UL — SIGNIFICANT CHANGE UP (ref 3.8–10.5)

## 2025-04-06 PROCEDURE — 99292 CRITICAL CARE ADDL 30 MIN: CPT

## 2025-04-06 PROCEDURE — 99291 CRITICAL CARE FIRST HOUR: CPT

## 2025-04-06 RX ORDER — SODIUM CHLORIDE 3 G/100ML
150 INJECTION, SOLUTION INTRAVENOUS ONCE
Refills: 0 | Status: COMPLETED | OUTPATIENT
Start: 2025-04-06 | End: 2025-04-06

## 2025-04-06 RX ORDER — ACETAZOLAMIDE 250 MG/1
500 TABLET ORAL ONCE
Refills: 0 | Status: DISCONTINUED | OUTPATIENT
Start: 2025-04-06 | End: 2025-04-06

## 2025-04-06 RX ORDER — CHLOROTHIAZIDE 250 MG/1
500 TABLET ORAL ONCE
Refills: 0 | Status: COMPLETED | OUTPATIENT
Start: 2025-04-06 | End: 2025-04-06

## 2025-04-06 RX ORDER — ACETAZOLAMIDE 250 MG/1
500 TABLET ORAL ONCE
Refills: 0 | Status: COMPLETED | OUTPATIENT
Start: 2025-04-06 | End: 2025-04-06

## 2025-04-06 RX ORDER — NOREPINEPHRINE BITARTRATE 8 MG
0.05 SOLUTION INTRAVENOUS
Qty: 8 | Refills: 0 | Status: DISCONTINUED | OUTPATIENT
Start: 2025-04-06 | End: 2025-04-06

## 2025-04-06 RX ORDER — TRAZODONE HCL 100 MG
25 TABLET ORAL ONCE
Refills: 0 | Status: COMPLETED | OUTPATIENT
Start: 2025-04-06 | End: 2025-04-06

## 2025-04-06 RX ORDER — MELATONIN 5 MG
5 TABLET ORAL AT BEDTIME
Refills: 0 | Status: DISCONTINUED | OUTPATIENT
Start: 2025-04-06 | End: 2025-04-11

## 2025-04-06 RX ADMIN — BUMETANIDE 20 MG/HR: 1 TABLET ORAL at 02:21

## 2025-04-06 RX ADMIN — Medication 25 MILLIGRAM(S): at 21:53

## 2025-04-06 RX ADMIN — Medication 50 MILLIEQUIVALENT(S): at 04:25

## 2025-04-06 RX ADMIN — BUMETANIDE 20 MG/HR: 1 TABLET ORAL at 19:36

## 2025-04-06 RX ADMIN — AMIODARONE HYDROCHLORIDE 400 MILLIGRAM(S): 50 INJECTION, SOLUTION INTRAVENOUS at 09:03

## 2025-04-06 RX ADMIN — NOREPINEPHRINE BITARTRATE 11.6 MICROGRAM(S)/KG/MIN: 8 SOLUTION at 00:28

## 2025-04-06 RX ADMIN — PHENYLEPHRINE HYDROCHLORIDE AND FAT, HARD 1 APPLICATION(S): .00525; 1.86 SUPPOSITORY RECTAL at 12:28

## 2025-04-06 RX ADMIN — Medication 20 MILLIGRAM(S): at 12:28

## 2025-04-06 RX ADMIN — ACETAZOLAMIDE 500 MILLIGRAM(S): 250 TABLET ORAL at 18:33

## 2025-04-06 RX ADMIN — Medication 1 APPLICATION(S): at 21:51

## 2025-04-06 RX ADMIN — Medication 40 MILLIEQUIVALENT(S): at 16:48

## 2025-04-06 RX ADMIN — Medication 50 MILLIEQUIVALENT(S): at 01:32

## 2025-04-06 RX ADMIN — Medication 650 MILLIGRAM(S): at 09:03

## 2025-04-06 RX ADMIN — Medication 650 MILLIGRAM(S): at 21:54

## 2025-04-06 RX ADMIN — VASOPRESSIN 6 UNIT(S)/MIN: 20 INJECTION INTRAVENOUS at 09:04

## 2025-04-06 RX ADMIN — Medication 5 MILLIGRAM(S): at 21:52

## 2025-04-06 RX ADMIN — ATORVASTATIN CALCIUM 40 MILLIGRAM(S): 80 TABLET, FILM COATED ORAL at 21:54

## 2025-04-06 RX ADMIN — INSULIN LISPRO 1: 100 INJECTION, SOLUTION INTRAVENOUS; SUBCUTANEOUS at 12:09

## 2025-04-06 RX ADMIN — CHLOROTHIAZIDE 100 MILLIGRAM(S): 250 TABLET ORAL at 03:53

## 2025-04-06 RX ADMIN — HEPARIN SODIUM 18 UNIT(S)/HR: 1000 INJECTION INTRAVENOUS; SUBCUTANEOUS at 19:36

## 2025-04-06 RX ADMIN — CLOPIDOGREL BISULFATE 75 MILLIGRAM(S): 75 TABLET, FILM COATED ORAL at 12:27

## 2025-04-06 RX ADMIN — HEPARIN SODIUM 18 UNIT(S)/HR: 1000 INJECTION INTRAVENOUS; SUBCUTANEOUS at 09:05

## 2025-04-06 RX ADMIN — Medication 40 MILLIEQUIVALENT(S): at 13:22

## 2025-04-06 RX ADMIN — HEPARIN SODIUM 18 UNIT(S)/HR: 1000 INJECTION INTRAVENOUS; SUBCUTANEOUS at 01:42

## 2025-04-06 RX ADMIN — VASOPRESSIN 6 UNIT(S)/MIN: 20 INJECTION INTRAVENOUS at 19:36

## 2025-04-06 RX ADMIN — Medication 650 MILLIGRAM(S): at 16:48

## 2025-04-06 RX ADMIN — SODIUM CHLORIDE 300 MILLILITER(S): 3 INJECTION, SOLUTION INTRAVENOUS at 23:17

## 2025-04-06 RX ADMIN — BUMETANIDE 20 MG/HR: 1 TABLET ORAL at 09:04

## 2025-04-06 NOTE — PROGRESS NOTE ADULT - ATTENDING COMMENTS
HFrEF and severe prosthetic valve aortic stenosis  Hypotension and poor urine output with ANGELICA on CKD  Hemodynamic instability requiring pressor support  Diuresis as tolerated  Consider [place] and inotrope support if needed  Intubated 4/1 for acute respiratory decompensation and increased work of breathing  STANISLAW 4/2 showing severe prosthetic valve stenosis    Structural Heart Team evaluation.    Plan for LHC followed by likely TAVR.

## 2025-04-06 NOTE — PROGRESS NOTE ADULT - SUBJECTIVE AND OBJECTIVE BOX
Israel Quiroz MD  [place], October 23, 2023    Dear Dr. Quiroz,    I had the pleasure to see Mr. 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  He also has a history of head and neck cancer status postradiation therapy that was completed in 2020.  In the last visit, he was complaining of dysphonia and dysphagia, and I was not sure to what extent those were MG related versus complications of radiation therapy.  For that reason, I asked for a single-fiber EMG to assess the status of his neuromuscular junction, but he never did it.  He stopped pyridostigmine a few months ago, and he actually feels better off it.  He continues on prednisone 20 mg every other day.  He reports no bothersome ptosis, diplopia, dysphagia, dysarthria, sialorrhea, difficulty chewing, shortness of breath on exertion, fatigue of his arms when brushing teeth or combing hair, or leg fatigue/weakness.      His MG ADL score at this point is 0, and better than prior visit.    /75 (BP Location: Right arm, Patient Position: Right side, Cuff Size: Adult Regular)   Pulse 79   Wt 96.2 kg (212 lb)   SpO2 98%   BMI 31.76 kg/m      EXAM: He does have mild eyelid ptosis bilaterally, more so on the right, after sustained upward gaze for 20 seconds.  I do not see any weakness of orbicularis oculi, or orbicularis oris today.  There is no diplopia in any cardinal gaze position, and eye ductions and versions are normal.  He has no weakness of neck flexion, neck extension, tongue, or jaw.  His speech is a bit nasal, indicating mild velopalatal insufficiency.  His strength is 5/5 in proximal upper and lower extremities.    In summary, Mr. Morales's myasthenia appears to be doing very well now, and he is almost symptom-free.  He feels even better off pyridostigmine.  At this point, I do not think that repetitive nerve  PATIENT:  BERTHA WARD  [id number]    CHIEF COMPLAINT:  Patient is a 64y old  Male who presents with a chief complaint of dyspnea , chest pain (06 Apr 2025 08:20)      INTERVAL HISTORYOVERNIGHT EVENTS:  - intermittently off BIPAP  - s/p metolazone this AM for assisted diuresis, maintain bumex gtt     REVIEW OF SYSTEMS:  [x ] All other systems negative      MEDICATIONS:  MEDICATIONS  (STANDING):  aMIOdarone    Tablet   Oral   aMIOdarone    Tablet 200 milliGRAM(s) Oral daily  atorvastatin 40 milliGRAM(s) Oral at bedtime  buMETAnide Infusion 4 mG/Hr (20 mL/Hr) IV Continuous <Continuous>  chlorhexidine 2% Cloths 1 Application(s) Topical daily  clopidogrel Tablet 75 milliGRAM(s) Oral daily  famotidine    Tablet 20 milliGRAM(s) Oral every 48 hours  hemorrhoidal Ointment 1 Application(s) Rectal daily  heparin  Infusion 1500 Unit(s)/Hr (18 mL/Hr) IV Continuous <Continuous>  influenza  Vaccine (HIGH DOSE) 0.5 milliLiter(s) IntraMuscular once  insulin lispro (ADMELOG) corrective regimen sliding scale   SubCutaneous Before meals and at bedtime  sodium bicarbonate 650 milliGRAM(s) Oral three times a day  vasopressin Infusion 0.04 Unit(s)/Min (6 mL/Hr) IV Continuous <Continuous>    MEDICATIONS  (PRN):      ALLERGIES:  Allergies    metoprolol (Short breath)    Intolerances        OBJECTIVE:  ICU Vital Signs Last 24 Hrs  T(C): 36.4 (06 Apr 2025 15:00), Max: 36.7 (06 Apr 2025 07:00)  T(F): 97.6 (06 Apr 2025 15:00), Max: 98 (06 Apr 2025 07:00)  HR: 61 (06 Apr 2025 19:00) (48 - 67)  BP: 84/64 (05 Apr 2025 20:45) (84/64 - 84/64)  BP(mean): 71 (05 Apr 2025 20:45) (71 - 71)  ABP: 89/54 (06 Apr 2025 19:00) (76/49 - 112/70)  ABP(mean): 69 (06 Apr 2025 19:00) (60 - 86)  RR: 36 (06 Apr 2025 19:00) (15 - 46)  SpO2: 94% (06 Apr 2025 19:00) (76% - 100%)    O2 Parameters below as of 06 Apr 2025 15:00  Patient On (Oxygen Delivery Method): nasal cannula  O2 Flow (L/min): 6          Adult Advanced Hemodynamics Last 24 Hrs  CVP(mm Hg): 14 (06 Apr 2025 19:00) (13 - 348)  CVP(cm H2O): --  CO: --  CI: --  PA: --  PA(mean): --  PCWP: --  SVR: --  SVRI: --  PVR: --  PVRI: --  CAPILLARY BLOOD GLUCOSE      POCT Blood Glucose.: 122 mg/dL (06 Apr 2025 16:24)  POCT Blood Glucose.: 149 mg/dL (05 Apr 2025 22:27)    CAPILLARY BLOOD GLUCOSE      POCT Blood Glucose.: 122 mg/dL (06 Apr 2025 16:24)    I&O's Summary    05 Apr 2025 07:01  -  06 Apr 2025 07:00  --------------------------------------------------------  IN: 2240.3 mL / OUT: 3800 mL / NET: -1559.7 mL    06 Apr 2025 07:01  -  06 Apr 2025 20:20  --------------------------------------------------------  IN: 1355.5 mL / OUT: 2150 mL / NET: -794.5 mL      Daily     Daily     PHYSICAL EXAMINATION:  General: WN/WD NAD  HEENT: PERRLA, EOMI, moist mucous membranes  Neurology: A&Ox3, nonfocal, MITCHELL x 4  Respiratory: CTA B/L, normal respiratory effort, no wheezes, crackles, rales  CV: RRR, S1S2, no murmurs, rubs or gallops  Abdominal: Soft, NT, ND +BS, Last BM  Extremities: No edema, + peripheral pulses  Incisions:   Tubes:    LABS:  ABG - ( 06 Apr 2025 17:58 )  pH, Arterial: 7.41  pH, Blood: x     /  pCO2: 53    /  pO2: 65    / HCO3: 34    / Base Excess: 7.5   /  SaO2: 94.3                                    12.0   8.55  )-----------( 138      ( 06 Apr 2025 00:40 )             38.0     04-06    137  |  93[L]  |  94[H]  ----------------------------<  124[H]  4.2   |  27  |  4.44[H]    Ca    9.1      06 Apr 2025 18:03  Phos  5.4     04-06  Mg     2.1     04-06    TPro  7.4  /  Alb  3.4  /  TBili  1.1  /  DBili  x   /  AST  17  /  ALT  28  /  AlkPhos  105  04-06    LIVER FUNCTIONS - ( 06 Apr 2025 18:03 )  Alb: 3.4 g/dL / Pro: 7.4 g/dL / ALK PHOS: 105 U/L / ALT: 28 U/L / AST: 17 U/L / GGT: x           PT/INR - ( 06 Apr 2025 00:40 )   PT: 14.3 sec;   INR: 1.25 ratio         PTT - ( 06 Apr 2025 14:21 )  PTT:59.7 sec        Urinalysis Basic - ( 06 Apr 2025 18:03 )    Color: x / Appearance: x / SG: x / pH: x  Gluc: 124 mg/dL / Ketone: x  / Bili: x / Urobili: x   Blood: x / Protein: x / Nitrite: x   Leuk Esterase: x / RBC: x / WBC x   Sq Epi: x / Non Sq Epi: x / Bacteria: x        TELEMETRY:     EKG:     IMAGING:       stimulation or single-fiber EMG are strongly needed, and we will defer them.  I would recommend a very slow taper of his prednisone dose as follows: 15 mg every other day for 1 month, then 10 mg every other day, until follow-up in clinic.  He should notify me if any of his previous symptoms, including ptosis, dysphagia, dysphonia, etc. get worse during the steroid taper.  He understands this recommendation.    He had a DEXA bone density scan in February 2023 showing osteopenia and he is on alendronate 35 mg weekly for it. Today we will recheck hemoglobin A1c and basic metabolic panel, for surveillance reasons (chronic steroid use)    Follow-up in clinic in 4 months, sooner if needed.    Billing MDM level 4 (moderate) based on 1) Amount/Complexity: Ordering 2 unique tests today (hemoglobin A1c, BMP), and reviewing the results of 1 unique test (DEXA scan), and 2) Risk: Prescription drug management.    Sincerely,    Eliud Page MD, FAAN

## 2025-04-06 NOTE — PROGRESS NOTE ADULT - SUBJECTIVE AND OBJECTIVE BOX
PATIENT:  BERTHA WARD  [id number]    CHIEF COMPLAINT:  Patient is a 64y old  Male who presents with a chief complaint of dyspnea , chest pain (05 Apr 2025 19:46)      INTERVAL HISTORY/OVERNIGHT EVENTS:  Overnight patient hypercapnic requiring BiPAP. Pulled off because of discomfort, but continued to retain and was placed back on. Given Diuril for improved diuresis. Tele overnight showed no significant changes, sinus josy. Patient seen and examined at bedside this AM. Pt remains afebrile and hemodynamically stable. Patient reports feeling uncomfortable with BiPAP    General: Denies dizziness, fatigue  Eyes: Denies blurry vision  ENMT: Denies rhinorrhea  Respiratory: Denies cough, SOB  Cardiovascular: Denies palpitations, CP  Gastrointestinal: Denies abd pain, N/V/D/C, hematochezia, melena  : Denies dysuria, increased freq  Musculoskeletal: Denies edema, joint pain  Endocrine: Denies increased thirst, increased frequency  Allergic/Immunologic: Denies rashes or hives  Neuro: Denies weakness, numbness  Psych: Denies anxiety, depression  All ROS negative unless indicated above     MEDICATIONS:  MEDICATIONS  (STANDING):  aMIOdarone    Tablet   Oral   aMIOdarone    Tablet 400 milliGRAM(s) Oral every 8 hours  aMIOdarone    Tablet 200 milliGRAM(s) Oral daily  atorvastatin 40 milliGRAM(s) Oral at bedtime  buMETAnide Infusion 4 mG/Hr (20 mL/Hr) IV Continuous <Continuous>  chlorhexidine 2% Cloths 1 Application(s) Topical daily  clopidogrel Tablet 75 milliGRAM(s) Oral daily  famotidine    Tablet 20 milliGRAM(s) Oral every 48 hours  hemorrhoidal Ointment 1 Application(s) Rectal daily  heparin  Infusion 1500 Unit(s)/Hr (18 mL/Hr) IV Continuous <Continuous>  influenza  Vaccine (HIGH DOSE) 0.5 milliLiter(s) IntraMuscular once  insulin lispro (ADMELOG) corrective regimen sliding scale   SubCutaneous Before meals and at bedtime  sodium bicarbonate 650 milliGRAM(s) Oral three times a day  vasopressin Infusion 0.04 Unit(s)/Min (6 mL/Hr) IV Continuous <Continuous>    MEDICATIONS  (PRN):      ALLERGIES:  Allergies    metoprolol (Short breath)    Intolerances        OBJECTIVE:  ICU Vital Signs Last 24 Hrs  T(C): 36.6 (06 Apr 2025 03:00), Max: 36.6 (05 Apr 2025 11:00)  T(F): 97.9 (06 Apr 2025 03:00), Max: 97.9 (05 Apr 2025 11:00)  HR: 52 (06 Apr 2025 07:45) (48 - 71)  BP: 84/64 (05 Apr 2025 20:45) (84/64 - 84/64)  BP(mean): 71 (05 Apr 2025 20:45) (71 - 71)  ABP: 99/61 (06 Apr 2025 07:45) (76/49 - 112/70)  ABP(mean): 75 (06 Apr 2025 07:45) (60 - 86)  RR: 24 (06 Apr 2025 07:45) (15 - 53)  SpO2: 98% (06 Apr 2025 07:45) (81% - 100%)    O2 Parameters below as of 06 Apr 2025 07:00  Patient On (Oxygen Delivery Method): BiPAP/CPAP    O2 Concentration (%): 40        CAPILLARY BLOOD GLUCOSE      POCT Blood Glucose.: 149 mg/dL (05 Apr 2025 22:27)  POCT Blood Glucose.: 137 mg/dL (05 Apr 2025 16:09)  POCT Blood Glucose.: 132 mg/dL (05 Apr 2025 11:28)    CAPILLARY BLOOD GLUCOSE      POCT Blood Glucose.: 149 mg/dL (05 Apr 2025 22:27)    I&O's Summary    05 Apr 2025 07:01  -  06 Apr 2025 07:00  --------------------------------------------------------  IN: 2187.3 mL / OUT: 3800 mL / NET: -1612.7 mL      Daily     Daily     PHYSICAL EXAMINATION:  CONSTITUTIONAL: NAD; well-developed; obese  HEENT: EOMI; conjunctiva clear  RESPIRATORY: Normal respiratory effort; b/l crackles  CARDIOVASCULAR: Regular rate and rhythm, normal S1 and S2, no murmur/rub/gallop; No lower extremity edema; Peripheral pulses are 2+ bilaterally; Cap refill 2+  ABDOMEN: Nontender to palpation, no rebound/guarding; No hepatosplenomegaly  MUSCULOSKELETAL: No clubbing or cyanosis of digits; no joint swelling or tenderness to palpation  NEURO: A&Ox3; no focal deficits   SKIN: No erythema; no rash  PSYCH: Normal mood; Appropriate affect    LABS:  ABG - ( 06 Apr 2025 08:05 )  pH, Arterial: 7.36  pH, Blood: x     /  pCO2: 62    /  pO2: 117   / HCO3: 35    / Base Excess: 7.6   /  SaO2: 100.0                                   12.0   8.55  )-----------( 138      ( 06 Apr 2025 00:40 )             38.0     04-06    138  |  92[L]  |  101[H]  ----------------------------<  137[H]  4.0   |  28  |  4.58[H]    Ca    9.2      06 Apr 2025 06:19  Phos  5.4     04-06  Mg     2.1     04-06    TPro  7.1  /  Alb  3.3  /  TBili  1.3[H]  /  DBili  x   /  AST  16  /  ALT  27  /  AlkPhos  107  04-06    LIVER FUNCTIONS - ( 06 Apr 2025 06:19 )  Alb: 3.3 g/dL / Pro: 7.1 g/dL / ALK PHOS: 107 U/L / ALT: 27 U/L / AST: 16 U/L / GGT: x           PT/INR - ( 06 Apr 2025 00:40 )   PT: 14.3 sec;   INR: 1.25 ratio         PTT - ( 06 Apr 2025 00:40 )  PTT:51.9 sec        Urinalysis Basic - ( 06 Apr 2025 06:19 )    Color: x / Appearance: x / SG: x / pH: x  Gluc: 137 mg/dL / Ketone: x  / Bili: x / Urobili: x   Blood: x / Protein: x / Nitrite: x   Leuk Esterase: x / RBC: x / WBC x   Sq Epi: x / Non Sq Epi: x / Bacteria: x        RADIOLOGY & ADDITIONAL TESTS: Reviewed.

## 2025-04-06 NOTE — PROGRESS NOTE ADULT - ASSESSMENT
65 yo M with a fib (s/p dccv 3/25/25), CHF (likely moderately reduced EF, several TTE with poor windows), aortic stenosis s/p TAVR (11/2022), a flutter s/p ablation 2019 and s/p micra implant,  CKD3, obesity, venous stasis, HTN, HLD, who presents with 6 days of worsening dyspnea on exertion, admitted for AHRF likely iso CHF exacerbation, also with ANGELICA on CKD c/f cardiorenal syndrome. RRT on floors for SBP 80s requiring pressors and has been admitted to the CCU. HF mostly likely i/so severe AS. Pt to get structural intervention.     Plan:  ===NEURO===  - AO x 3    ===RESPIRATORY===  #Acute hypoxemic hypercapnic respiratory failure  - Likely retaining 2/2 volume overload  - c/w bumex 4mg drip for elevated CVP and increased O2 req  - BiPAP break, administer metolazone when eating, likely resume with AVAPs for improved adherence    ===CARDIOVASCULAR===  #Obstructive shock 2/2 AS  - Off levophed  - MAPs 70s -> will wean vasopressin  - If need more pressor support will switch to phenylephrine as pt has LVOT obstruction from aortic stenosis   - Wean as tolerated    #Acute on chronic HF exacerbation  - Bumex increased to 4mg drip overnight for low urine output - augment with below  - Goal 1-2L net neg daily  - CVP goal 8-12  - Diuril, 3% hypertonic, and metolazone PRN   - Will be careful of overdiuresis given severe AS and preload dependant     #Afib  #First degree block  - PO amio load   - c/w heparin    #Aortic valve stenosis s/p prosthetic  - S/p STANISLAW showing severe AS stenosis, global hypokinesis and EF 15%  - Structural plans for valve in valve replacement  - Structural wants cath and CT w/ contrast before procedure  - Awaiting GFR improvement    #Troponemia  - No ischemic changes on EKG  - Most likely i/s/o ANGELICA on CKD    #Hx CABG  #PVD  - C/w Plavix    #HLD  - Increased Lipitor to 40mg from 20mg    ===/RENAL===  #ANGELICA on CKD  #Cardiorenal syndrome  - Baseline 1.5-1.8  - Most likely i/s/o hypotension and fluid overload  - Renal sono showed parenchymal disease  - Nephro following     #Hyperkalemia  - Lokelma if K increases into 5s range    ===GI/NUTRITION===  - Failed bedside SS  - S&S consulted -> recommends FEES    ===SKIN===  - Hx lichen planus  - No other concerns    ===INFECTIOUS DISEASE===  - Slightly elevated WBC on admission 10.5K  - Afebrile  - Urine culture -> neg    ===ENDOCRINE===  - No concerns  - A1c 5.8  - TSH wnl    ===HEMATOLOGIC/DVT PPx===  - On heparin ggt    Dispo: Maintain in ICU.

## 2025-04-06 NOTE — PROGRESS NOTE ADULT - ASSESSMENT
65 yo M with a fib (s/p dccv 3/25/25), CHF (likely moderately reduced EF, several TTE with poor windows), aortic stenosis s/p TAVR (11/2022), a flutter s/p ablation 2019 and s/p micra implant,  CKD3, obesity, venous stasis, HTN, HLD, who presents with 6 days of worsening dyspnea on exertion, admitted for AHRF likely iso CHF exacerbation, also with ANGELICA on CKD c/f cardiorenal syndrome. RRT on floors for SBP 80s requiring pressors and has been admitted to the CCU. HF mostly likely i/so severe AS. Pt to get structural intervention.     Plan:  ===NEURO===  - AO x 3    ===RESPIRATORY===  #Acute hypoxemic hypercapnic respiratory failure  - Likely retaining 2/2 volume overload  - c/w bumex 4mg drip for elevated CVP and increased O2 req  - BiPAP break, administer metolazone when eating, likely resume with AVAPs for improved adherence    ===CARDIOVASCULAR===  #Obstructive shock 2/2 AS  - Off levophed  - MAPs 70s -> will continue to wean vasopressin  - If need more pressor support will switch to phenylephrine as pt has LVOT obstruction from aortic stenosis   - Wean as tolerated    #Acute on chronic HF exacerbation  - Bumex increased to 4mg drip overnight for low urine output - augment with below  - Goal 1-2L net neg daily  - CVP goal 8-12  - Diuril, 3% hypertonic, and metolazone PRN   - Will be careful of overdiuresis given severe AS and preload dependant     #Afib  #First degree block  - PO amio load   - c/w heparin    #Aortic valve stenosis s/p prosthetic  - S/p STANISLAW showing severe AS stenosis, global hypokinesis and EF 15%  - Structural plans for valve in valve replacement  - Structural wants cath and CT w/ contrast before procedure  - Awaiting GFR improvement    #Troponemia  - No ischemic changes on EKG  - Most likely i/s/o ANGELICA on CKD    #Hx CABG  #PVD  - C/w Plavix    #HLD  - Increased Lipitor to 40mg from 20mg    ===/RENAL===  #ANGELICA on CKD  #Cardiorenal syndrome  - Baseline 1.5-1.8  - Most likely i/s/o hypotension and fluid overload  - Renal sono showed parenchymal disease  - Nephro following     #Hyperkalemia  - Lokelma if K increases into 5s range    ===GI/NUTRITION===  - Failed bedside SS  - S&S consulted -> recommends FEES    ===SKIN===  - Hx lichen planus  - No other concerns    ===INFECTIOUS DISEASE===  - Slightly elevated WBC on admission 10.5K  - Afebrile  - Urine culture -> neg    ===ENDOCRINE===  - No concerns  - A1c 5.8  - TSH wnl    ===HEMATOLOGIC/DVT PPx===  - On heparin ggt    Dispo: Maintain in ICU.    Patient requires continuous monitoring with bedside rhythm monitoring, arterial line, pulse oximetry, ventilator monitoring and intermittent blood gas analysis.  Care plan discussed with ICU care team.  I have spent 35 minutes providing critical care, in addition to initial critical time provided by CICU attending      Dr. Beltran      , re-evaluated multiple times during the day.    Raquel Leon PA-C

## 2025-04-07 LAB
ALBUMIN SERPL ELPH-MCNC: 3 G/DL — LOW (ref 3.3–5)
ALBUMIN SERPL ELPH-MCNC: 3.3 G/DL — SIGNIFICANT CHANGE UP (ref 3.3–5)
ALBUMIN SERPL ELPH-MCNC: 3.4 G/DL — SIGNIFICANT CHANGE UP (ref 3.3–5)
ALBUMIN SERPL ELPH-MCNC: 3.5 G/DL — SIGNIFICANT CHANGE UP (ref 3.3–5)
ALP SERPL-CCNC: 100 U/L — SIGNIFICANT CHANGE UP (ref 40–120)
ALP SERPL-CCNC: 94 U/L — SIGNIFICANT CHANGE UP (ref 40–120)
ALP SERPL-CCNC: 95 U/L — SIGNIFICANT CHANGE UP (ref 40–120)
ALP SERPL-CCNC: 98 U/L — SIGNIFICANT CHANGE UP (ref 40–120)
ALT FLD-CCNC: 27 U/L — SIGNIFICANT CHANGE UP (ref 10–45)
ALT FLD-CCNC: 27 U/L — SIGNIFICANT CHANGE UP (ref 10–45)
ALT FLD-CCNC: 28 U/L — SIGNIFICANT CHANGE UP (ref 10–45)
ALT FLD-CCNC: 29 U/L — SIGNIFICANT CHANGE UP (ref 10–45)
ANION GAP SERPL CALC-SCNC: 13 MMOL/L — SIGNIFICANT CHANGE UP (ref 5–17)
ANION GAP SERPL CALC-SCNC: 17 MMOL/L — SIGNIFICANT CHANGE UP (ref 5–17)
ANION GAP SERPL CALC-SCNC: 18 MMOL/L — HIGH (ref 5–17)
ANION GAP SERPL CALC-SCNC: 20 MMOL/L — HIGH (ref 5–17)
APTT BLD: 56 SEC — HIGH (ref 24.5–35.6)
AST SERPL-CCNC: 14 U/L — SIGNIFICANT CHANGE UP (ref 10–40)
AST SERPL-CCNC: 15 U/L — SIGNIFICANT CHANGE UP (ref 10–40)
AST SERPL-CCNC: 17 U/L — SIGNIFICANT CHANGE UP (ref 10–40)
AST SERPL-CCNC: 20 U/L — SIGNIFICANT CHANGE UP (ref 10–40)
BASOPHILS # BLD AUTO: 0.02 K/UL — SIGNIFICANT CHANGE UP (ref 0–0.2)
BASOPHILS NFR BLD AUTO: 0.2 % — SIGNIFICANT CHANGE UP (ref 0–2)
BILIRUB SERPL-MCNC: 1 MG/DL — SIGNIFICANT CHANGE UP (ref 0.2–1.2)
BILIRUB SERPL-MCNC: 1.2 MG/DL — SIGNIFICANT CHANGE UP (ref 0.2–1.2)
BUN SERPL-MCNC: 102 MG/DL — HIGH (ref 7–23)
BUN SERPL-MCNC: 104 MG/DL — HIGH (ref 7–23)
BUN SERPL-MCNC: 95 MG/DL — HIGH (ref 7–23)
BUN SERPL-MCNC: 98 MG/DL — HIGH (ref 7–23)
CALCIUM SERPL-MCNC: 8.9 MG/DL — SIGNIFICANT CHANGE UP (ref 8.4–10.5)
CALCIUM SERPL-MCNC: 9.2 MG/DL — SIGNIFICANT CHANGE UP (ref 8.4–10.5)
CALCIUM SERPL-MCNC: 9.3 MG/DL — SIGNIFICANT CHANGE UP (ref 8.4–10.5)
CALCIUM SERPL-MCNC: 9.3 MG/DL — SIGNIFICANT CHANGE UP (ref 8.4–10.5)
CHLORIDE SERPL-SCNC: 90 MMOL/L — LOW (ref 96–108)
CHLORIDE SERPL-SCNC: 92 MMOL/L — LOW (ref 96–108)
CHLORIDE SERPL-SCNC: 92 MMOL/L — LOW (ref 96–108)
CHLORIDE SERPL-SCNC: 94 MMOL/L — LOW (ref 96–108)
CO2 SERPL-SCNC: 26 MMOL/L — SIGNIFICANT CHANGE UP (ref 22–31)
CO2 SERPL-SCNC: 29 MMOL/L — SIGNIFICANT CHANGE UP (ref 22–31)
CO2 SERPL-SCNC: 30 MMOL/L — SIGNIFICANT CHANGE UP (ref 22–31)
CO2 SERPL-SCNC: 32 MMOL/L — HIGH (ref 22–31)
CREAT SERPL-MCNC: 4 MG/DL — HIGH (ref 0.5–1.3)
CREAT SERPL-MCNC: 4.02 MG/DL — HIGH (ref 0.5–1.3)
CREAT SERPL-MCNC: 4.2 MG/DL — HIGH (ref 0.5–1.3)
CREAT SERPL-MCNC: 4.35 MG/DL — HIGH (ref 0.5–1.3)
EGFR: 14 ML/MIN/1.73M2 — LOW
EGFR: 14 ML/MIN/1.73M2 — LOW
EGFR: 15 ML/MIN/1.73M2 — LOW
EGFR: 15 ML/MIN/1.73M2 — LOW
EGFR: 16 ML/MIN/1.73M2 — LOW
EOSINOPHIL # BLD AUTO: 0.07 K/UL — SIGNIFICANT CHANGE UP (ref 0–0.5)
EOSINOPHIL NFR BLD AUTO: 0.8 % — SIGNIFICANT CHANGE UP (ref 0–6)
GAS PNL BLDA: SIGNIFICANT CHANGE UP
GLUCOSE BLDC GLUCOMTR-MCNC: 124 MG/DL — HIGH (ref 70–99)
GLUCOSE BLDC GLUCOMTR-MCNC: 140 MG/DL — HIGH (ref 70–99)
GLUCOSE BLDC GLUCOMTR-MCNC: 162 MG/DL — HIGH (ref 70–99)
GLUCOSE SERPL-MCNC: 126 MG/DL — HIGH (ref 70–99)
GLUCOSE SERPL-MCNC: 136 MG/DL — HIGH (ref 70–99)
GLUCOSE SERPL-MCNC: 153 MG/DL — HIGH (ref 70–99)
GLUCOSE SERPL-MCNC: 173 MG/DL — HIGH (ref 70–99)
HCT VFR BLD CALC: 35.9 % — LOW (ref 39–50)
HGB BLD-MCNC: 11.2 G/DL — LOW (ref 13–17)
IMM GRANULOCYTES NFR BLD AUTO: 0.3 % — SIGNIFICANT CHANGE UP (ref 0–0.9)
INR BLD: 1.21 RATIO — HIGH (ref 0.85–1.16)
LYMPHOCYTES # BLD AUTO: 0.68 K/UL — LOW (ref 1–3.3)
LYMPHOCYTES # BLD AUTO: 7.8 % — LOW (ref 13–44)
MAGNESIUM SERPL-MCNC: 1.9 MG/DL — SIGNIFICANT CHANGE UP (ref 1.6–2.6)
MAGNESIUM SERPL-MCNC: 2 MG/DL — SIGNIFICANT CHANGE UP (ref 1.6–2.6)
MAGNESIUM SERPL-MCNC: 2.2 MG/DL — SIGNIFICANT CHANGE UP (ref 1.6–2.6)
MAGNESIUM SERPL-MCNC: 2.4 MG/DL — SIGNIFICANT CHANGE UP (ref 1.6–2.6)
MCHC RBC-ENTMCNC: 30.3 PG — SIGNIFICANT CHANGE UP (ref 27–34)
MCHC RBC-ENTMCNC: 31.2 G/DL — LOW (ref 32–36)
MCV RBC AUTO: 97 FL — SIGNIFICANT CHANGE UP (ref 80–100)
MONOCYTES # BLD AUTO: 0.74 K/UL — SIGNIFICANT CHANGE UP (ref 0–0.9)
MONOCYTES NFR BLD AUTO: 8.5 % — SIGNIFICANT CHANGE UP (ref 2–14)
NEUTROPHILS # BLD AUTO: 7.17 K/UL — SIGNIFICANT CHANGE UP (ref 1.8–7.4)
NEUTROPHILS NFR BLD AUTO: 82.4 % — HIGH (ref 43–77)
NRBC BLD AUTO-RTO: 0 /100 WBCS — SIGNIFICANT CHANGE UP (ref 0–0)
PHOSPHATE SERPL-MCNC: 4.2 MG/DL — SIGNIFICANT CHANGE UP (ref 2.5–4.5)
PHOSPHATE SERPL-MCNC: 4.2 MG/DL — SIGNIFICANT CHANGE UP (ref 2.5–4.5)
PHOSPHATE SERPL-MCNC: 4.3 MG/DL — SIGNIFICANT CHANGE UP (ref 2.5–4.5)
PHOSPHATE SERPL-MCNC: 4.5 MG/DL — SIGNIFICANT CHANGE UP (ref 2.5–4.5)
PLATELET # BLD AUTO: 134 K/UL — LOW (ref 150–400)
POTASSIUM SERPL-MCNC: 3.5 MMOL/L — SIGNIFICANT CHANGE UP (ref 3.5–5.3)
POTASSIUM SERPL-MCNC: 3.8 MMOL/L — SIGNIFICANT CHANGE UP (ref 3.5–5.3)
POTASSIUM SERPL-MCNC: 4.1 MMOL/L — SIGNIFICANT CHANGE UP (ref 3.5–5.3)
POTASSIUM SERPL-MCNC: 4.1 MMOL/L — SIGNIFICANT CHANGE UP (ref 3.5–5.3)
POTASSIUM SERPL-SCNC: 3.5 MMOL/L — SIGNIFICANT CHANGE UP (ref 3.5–5.3)
POTASSIUM SERPL-SCNC: 3.8 MMOL/L — SIGNIFICANT CHANGE UP (ref 3.5–5.3)
POTASSIUM SERPL-SCNC: 4.1 MMOL/L — SIGNIFICANT CHANGE UP (ref 3.5–5.3)
POTASSIUM SERPL-SCNC: 4.1 MMOL/L — SIGNIFICANT CHANGE UP (ref 3.5–5.3)
PROT SERPL-MCNC: 6.8 G/DL — SIGNIFICANT CHANGE UP (ref 6–8.3)
PROT SERPL-MCNC: 7.1 G/DL — SIGNIFICANT CHANGE UP (ref 6–8.3)
PROT SERPL-MCNC: 7.3 G/DL — SIGNIFICANT CHANGE UP (ref 6–8.3)
PROT SERPL-MCNC: 7.3 G/DL — SIGNIFICANT CHANGE UP (ref 6–8.3)
PROTHROM AB SERPL-ACNC: 13.8 SEC — HIGH (ref 9.9–13.4)
RBC # BLD: 3.7 M/UL — LOW (ref 4.2–5.8)
RBC # FLD: 15.8 % — HIGH (ref 10.3–14.5)
SODIUM SERPL-SCNC: 137 MMOL/L — SIGNIFICANT CHANGE UP (ref 135–145)
SODIUM SERPL-SCNC: 138 MMOL/L — SIGNIFICANT CHANGE UP (ref 135–145)
SODIUM SERPL-SCNC: 138 MMOL/L — SIGNIFICANT CHANGE UP (ref 135–145)
SODIUM SERPL-SCNC: 140 MMOL/L — SIGNIFICANT CHANGE UP (ref 135–145)
WBC # BLD: 8.71 K/UL — SIGNIFICANT CHANGE UP (ref 3.8–10.5)
WBC # FLD AUTO: 8.71 K/UL — SIGNIFICANT CHANGE UP (ref 3.8–10.5)

## 2025-04-07 PROCEDURE — 99233 SBSQ HOSP IP/OBS HIGH 50: CPT | Mod: GC

## 2025-04-07 PROCEDURE — 99292 CRITICAL CARE ADDL 30 MIN: CPT

## 2025-04-07 PROCEDURE — 93010 ELECTROCARDIOGRAM REPORT: CPT

## 2025-04-07 PROCEDURE — 99233 SBSQ HOSP IP/OBS HIGH 50: CPT | Mod: 57

## 2025-04-07 PROCEDURE — 99291 CRITICAL CARE FIRST HOUR: CPT

## 2025-04-07 RX ORDER — ACETAZOLAMIDE 250 MG/1
500 TABLET ORAL ONCE
Refills: 0 | Status: COMPLETED | OUTPATIENT
Start: 2025-04-07 | End: 2025-04-07

## 2025-04-07 RX ORDER — MAGNESIUM SULFATE 500 MG/ML
1 SYRINGE (ML) INJECTION ONCE
Refills: 0 | Status: COMPLETED | OUTPATIENT
Start: 2025-04-07 | End: 2025-04-07

## 2025-04-07 RX ADMIN — VASOPRESSIN 6 UNIT(S)/MIN: 20 INJECTION INTRAVENOUS at 12:12

## 2025-04-07 RX ADMIN — ACETAZOLAMIDE 500 MILLIGRAM(S): 250 TABLET ORAL at 18:14

## 2025-04-07 RX ADMIN — Medication 100 GRAM(S): at 05:17

## 2025-04-07 RX ADMIN — Medication 100 MILLIEQUIVALENT(S): at 08:18

## 2025-04-07 RX ADMIN — BUMETANIDE 20 MG/HR: 1 TABLET ORAL at 22:43

## 2025-04-07 RX ADMIN — CLOPIDOGREL BISULFATE 75 MILLIGRAM(S): 75 TABLET, FILM COATED ORAL at 12:10

## 2025-04-07 RX ADMIN — VASOPRESSIN 6 UNIT(S)/MIN: 20 INJECTION INTRAVENOUS at 22:42

## 2025-04-07 RX ADMIN — PHENYLEPHRINE HYDROCHLORIDE AND FAT, HARD 1 APPLICATION(S): .00525; 1.86 SUPPOSITORY RECTAL at 12:11

## 2025-04-07 RX ADMIN — Medication 100 MILLIEQUIVALENT(S): at 09:27

## 2025-04-07 RX ADMIN — BUMETANIDE 20 MG/HR: 1 TABLET ORAL at 12:12

## 2025-04-07 RX ADMIN — INSULIN LISPRO 1: 100 INJECTION, SOLUTION INTRAVENOUS; SUBCUTANEOUS at 12:10

## 2025-04-07 RX ADMIN — Medication 1 APPLICATION(S): at 22:43

## 2025-04-07 RX ADMIN — AMIODARONE HYDROCHLORIDE 200 MILLIGRAM(S): 50 INJECTION, SOLUTION INTRAVENOUS at 05:15

## 2025-04-07 RX ADMIN — HEPARIN SODIUM 18 UNIT(S)/HR: 1000 INJECTION INTRAVENOUS; SUBCUTANEOUS at 22:43

## 2025-04-07 RX ADMIN — Medication 650 MILLIGRAM(S): at 05:14

## 2025-04-07 NOTE — PROGRESS NOTE ADULT - ASSESSMENT
65 yo M with a fib (s/p dccv 3/25/25), CHF (likely moderately reduced EF, several TTE with poor windows), aortic stenosis s/p TAVR (11/2022), a flutter s/p ablation 2019 and s/p micra implant,  CKD3, obesity, venous stasis, HTN, HLD, who presents with 6 days of worsening dyspnea on exertion, admitted for AHRF likely iso CHF exacerbation, also with ANGELICA on CKD c/f cardiorenal syndrome. RRT on floors for SBP 80s requiring pressors and has been admitted to the CCU. HF mostly likely i/so severe AS. Pt to get structural intervention.     Plan:  ===NEURO===  - AO x 3    ===RESPIRATORY===  #Acute hypoxemic hypercapnic respiratory failure  - Likely retaining 2/2 volume overload  - c/w bumex 4mg drip for elevated CVP and increased O2 req  - c/w BIPAP for resp distress, 6L NC when off BIPAP     ===CARDIOVASCULAR===  #Obstructive shock 2/2 AS  - Off levophed  - MAPs 70s -> will continue to wean vasopressin  - If need more pressor support will switch to phenylephrine as pt has LVOT obstruction from aortic stenosis   - Wean as tolerated    #Acute on chronic HF exacerbation  - Bumex increased to 4mg drip overnight for low urine output - augment with below  - Goal 1-2L net neg daily  - CVP goal 8-12  - Diuril, 3% hypertonic, and metolazone PRN   - Will be careful of overdiuresis given severe AS and preload dependant     #Afib  #First degree block  - PO amio load   - c/w heparin    #Aortic valve stenosis s/p prosthetic  - S/p STANISLAW showing severe AS stenosis, global hypokinesis and EF 15%  - Structural plans for valve in valve replacement  - Structural wants cath and CT w/ contrast before procedure  - Awaiting GFR improvement    #Troponemia  - No ischemic changes on EKG  - Most likely i/s/o ANGELICA on CKD    #Hx CABG  #PVD  - C/w Plavix    #HLD  - Increased Lipitor to 40mg from 20mg    ===/RENAL===  #ANGELICA on CKD  #Cardiorenal syndrome  - Baseline 1.5-1.8  - Most likely i/s/o hypotension and fluid overload  - Renal sono showed parenchymal disease  - Nephro following c/w bumex gtt, monitor volume status. Stop sodium bicarb 650 TID for now 4/7    #Hyperkalemia  - Lokelma if K increases into 5s range    ===GI/NUTRITION===  - Failed bedside SS  - S&S consulted -> recommends FEES    ===SKIN===  - Hx lichen planus  - No other concerns    ===INFECTIOUS DISEASE===  - Slightly elevated WBC on admission 10.5K  - Afebrile  - Urine culture -> neg    ===ENDOCRINE===  - No concerns  - A1c 5.8  - TSH wnl    ===HEMATOLOGIC/DVT PPx===  - On heparin ggt    Dispo: Maintain in ICU.    Patient requires continuous monitoring with bedside rhythm monitoring, arterial line, pulse oximetry, ventilator monitoring and intermittent blood gas analysis.  Care plan discussed with ICU care team.  I have spent 35 minutes providing critical care, in addition to initial critical time provided by CICU attending    Dr. Maldonado     , re-evaluated multiple times during the day.    Raquel Leon PA-C

## 2025-04-07 NOTE — PROGRESS NOTE ADULT - SUBJECTIVE AND OBJECTIVE BOX
Patient is a 64y old  Male who presents with a chief complaint of dyspnea , chest pain (07 Apr 2025 12:07)    HPI:   63 yo M with a fib (s/p dccv 3/25/25), CHF (likely moderately reduced EF, several TTE with poor windows), aortic stenosis s/p TAVR (11/2022), CAD s/p CABG, a flutter s/p ablation 2019 and s/p micra implant,  CKD3, obesity, venous stasis, HTN, HLD, who presents with 6 days of worsening dyspnea on exertion. Pt also reports associated orthopnea and chest pain on exertion but not at rest which is located in the center of his chest. Pain is described as burning. Also reports several days decreased appetite. Denies fevers, chills, cough, abdominal pain, NVD, urinary sx, leg swelling. Patient had a cardioversion for atrial fibrillation at this hospital recently, which was reportedly unsuccessful. Patient has a scheduled appointment with Dr. Philip on 04/24/2025 to discuss possible ablation. His outpt cardiologist is Dr. Paul.     On arrival to ED, vitals were 97.3F, HR 71, BP 94/57, RR 22, 97% on 2L NC. Labs pertinent for WBC 10.68, INR 2.55, Na 132, K 5.5, bicarb 20, BUN/Cr 78/5.17 (baseline Cr appears ~1.5-1.8), trop 225, BNP 74852, VBG ph 7.3, pco2/po2 wnl. UA without evidence of infection. EKG  on admission 1st deg av block, low voltage, poor r wave progression, R axis deviation CXR with diffuse hazy opacities suggest mild pulmonary edema and small right and trace left pleural effusions. Pt was given IV Bumex 1mg x1, IV bumex 2mg x1, IV Bumex 4mg x1, and lokelma 5mg x1, then admitted for further management.    (01 Apr 2025 01:59)       INTERVAL HPI/OVERNIGHT EVENTS:   No overnight events   Afebrile, hemodynamically stable     Subjective:    ICU Vital Signs Last 24 Hrs  T(C): 36.6 (06 Apr 2025 23:00), Max: 36.6 (06 Apr 2025 23:00)  T(F): 97.8 (06 Apr 2025 23:00), Max: 97.8 (06 Apr 2025 23:00)  HR: 54 (07 Apr 2025 13:45) (48 - 66)  BP: --  BP(mean): --  ABP: 94/56 (07 Apr 2025 13:45) (80/46 - 107/65)  ABP(mean): 71 (07 Apr 2025 13:45) (60 - 83)  RR: 28 (07 Apr 2025 13:45) (14 - 57)  SpO2: 97% (07 Apr 2025 13:45) (76% - 99%)    O2 Parameters below as of 07 Apr 2025 11:00  Patient On (Oxygen Delivery Method): nasal cannula  O2 Flow (L/min): 6        I&O's Summary    06 Apr 2025 07:01  -  07 Apr 2025 07:00  --------------------------------------------------------  IN: 2379.5 mL / OUT: 4800 mL / NET: -2420.5 mL    07 Apr 2025 07:01  -  07 Apr 2025 13:53  --------------------------------------------------------  IN: 501.5 mL / OUT: 800 mL / NET: -298.5 mL          EKG/Telemetry Events: NSR    MEDICATIONS  (STANDING):  aMIOdarone    Tablet   Oral   aMIOdarone    Tablet 200 milliGRAM(s) Oral daily  atorvastatin 40 milliGRAM(s) Oral at bedtime  buMETAnide Infusion 4 mG/Hr (20 mL/Hr) IV Continuous <Continuous>  chlorhexidine 2% Cloths 1 Application(s) Topical daily  clopidogrel Tablet 75 milliGRAM(s) Oral daily  famotidine    Tablet 20 milliGRAM(s) Oral every 48 hours  hemorrhoidal Ointment 1 Application(s) Rectal daily  heparin  Infusion 1500 Unit(s)/Hr (18 mL/Hr) IV Continuous <Continuous>  influenza  Vaccine (HIGH DOSE) 0.5 milliLiter(s) IntraMuscular once  insulin lispro (ADMELOG) corrective regimen sliding scale   SubCutaneous Before meals and at bedtime  melatonin 5 milliGRAM(s) Oral at bedtime  sodium bicarbonate 650 milliGRAM(s) Oral three times a day  vasopressin Infusion 0.04 Unit(s)/Min (6 mL/Hr) IV Continuous <Continuous>    MEDICATIONS  (PRN):      PHYSICAL EXAM:  General: Well-appearing, NAD  HEENT: PERRL, EOMI, normal sclera and conjunctiva, normal oropharynx  Neck: Supple, no JVD, thyroid without masses or enlargement  Chest/Lungs: +nasal cannula. CTA bilaterally, no wheezing, rales, rhonchi or rub  Heart: RRR, normal S1, S2, no murmurs or gallops  Abdomen: Soft, ND, NTTP, normoactive bowel sounds  Extremities: +lichen planus bilaterally. +pitting edema. 2+ peripheral pulses b/l, no edema, clubbing or cyanosis  Skin: +lichen planus bilaterally  Neurological: A&Ox3, moves all extremities, no focal deficits    LABS:                        11.2   8.71  )-----------( 134      ( 07 Apr 2025 00:39 )             35.9     04-07    138  |  90[L]  |  102[H]  ----------------------------<  173[H]  4.1   |  30  |  4.00[H]    Ca    9.3      07 Apr 2025 12:57  Phos  4.2     04-07  Mg     2.2     04-07    TPro  7.3  /  Alb  3.4  /  TBili  1.2  /  DBili  x   /  AST  17  /  ALT  29  /  AlkPhos  100  04-07    LIVER FUNCTIONS - ( 07 Apr 2025 12:57 )  Alb: 3.4 g/dL / Pro: 7.3 g/dL / ALK PHOS: 100 U/L / ALT: 29 U/L / AST: 17 U/L / GGT: x           PT/INR - ( 07 Apr 2025 00:39 )   PT: 13.8 sec;   INR: 1.21 ratio         PTT - ( 07 Apr 2025 00:39 )  PTT:56.0 sec  CAPILLARY BLOOD GLUCOSE      POCT Blood Glucose.: 162 mg/dL (07 Apr 2025 11:41)  POCT Blood Glucose.: 124 mg/dL (07 Apr 2025 07:51)  POCT Blood Glucose.: 128 mg/dL (06 Apr 2025 21:24)  POCT Blood Glucose.: 122 mg/dL (06 Apr 2025 16:24)    ABG - ( 07 Apr 2025 12:50 )  pH, Arterial: 7.37  pH, Blood: x     /  pCO2: 67    /  pO2: 81    / HCO3: 39    / Base Excess: 10.9  /  SaO2: 97.4                    Urinalysis Basic - ( 07 Apr 2025 12:57 )    Color: x / Appearance: x / SG: x / pH: x  Gluc: 173 mg/dL / Ketone: x  / Bili: x / Urobili: x   Blood: x / Protein: x / Nitrite: x   Leuk Esterase: x / RBC: x / WBC x   Sq Epi: x / Non Sq Epi: x / Bacteria: x          RADIOLOGY & ADDITIONAL TESTS:  CXR:        Care Discussed with Consultants/Other Providers [ x] YES  [ ] NO

## 2025-04-07 NOTE — PROGRESS NOTE ADULT - ASSESSMENT
65 y/o M with hx of Afib s/p DCCV 3/25/25, CHF, Aortic stenosis s/p TAVR 11/2022, CAD s/p CABG, Aflutter s/p ablation and Micra implant, CKD 3, Obesity, venous stasis, HTN, HLD presented with 6 days of worsening SOB on exertion associated with orthopnea and chest pain.   Also reports several days decreased appetite. Denies fevers, chills, cough, abdominal pain, NVD, urinary sx, leg swelling. Patient had a cardioversion for atrial fibrillation at this hospital recently, which was reportedly unsuccessful. Patient has a scheduled appointment with Dr. Philip on 04/24/2025 to discuss possible ablation. His outpt cardiologist is Dr. Miranda. (Cardiology)  On arrival to ED, vitals were 97.3F, HR 71, BP 94/57, RR 22, 97% on 2L NC. Labs pertinent for WBC 10.68, INR 2.55, Na 132, K 5.5, bicarb 20, BUN/Cr 78/5.17 (baseline Cr appears ~1.5-1.8), trop 225, BNP 03211, VBG ph 7.3, pco2/po2 wnl. UA without evidence of infection.  nephrology was consulted for management of ANGELICA on CKD. Pt has baseline Scr of ~1.5- 1.8. Scr was 2.7 on 3/25/25. Pt is on Entresto ( started recently) and he mentioned that he was taking Motrin occasionally. No herbal medications/ supplements.  Scr at the time of admission was 5.17 3/31/25 and it further worsened to 5.45 4/1/25.    Pt follows Dr. Stewart Brown (Nephrology)    ANGELICA on CKD:  Pt has hx of CKD 3. Baseline Scr 1.5-1.8.   At the time of admission - Scr was 5.17 and it further worsened to 5.45--> 5.5. Scr elevated/improving to 4.2 today.   Pt has lopez cath in place. UOP was 4.8L with net negative 2.4L in last 24 hours.   Bumex gtt - restarted 4/4/25 - now on 4mg/hr.   UA showed trace LE, blood ( Likely due to lopez), casts 17. UPCR 0.7. No hx of DM.   Pt was on entresto.   BP was low.   On IV vasopressin.   ANGELICA on CKD likely in setting of HF exacerbation, entresto use and hypotension mediated ATN.    PLAN:  Can continue bumex gtt, monitor volume status  continue to hold entresto.   Stop sodium bicarb 650 TID for now.   Monitor strict I/Os and daily wts.   Get USG kidney and bladder.   No urgent need of HD for now.    Avoid nephrotoxins  Dose medications as per eGFR    hyperkalemia: resolved  Today Serum K is 3.5. Monitor closely - replete as needed.     Hyponatremia is likely in setting of impaired water clearance in setting of worsening renal function and volume overload 2/2 heart failure  resolved now.   Bumex as noted above    Spoke with structural heart disease team and CICU team. Plan for LHC tomorrow. Hold bumex gtt ~4 hours before the procedure. Monitor Scr closely after the procedure.   Pt is at high risk of LISA. Pt was explained in detail about the risk of LISA and possible need of dialysis if the kidney function gets worse.   Enoch score 26.1 %- Risk of post PCI - LISA and 1.09% risk of requiring dialysis.     Wait for the final reccs as per the attending.

## 2025-04-07 NOTE — PROGRESS NOTE ADULT - SUBJECTIVE AND OBJECTIVE BOX
Subjective  No acute events reported overnight.  He has been intermittently on BiPAP due to shortness of breath.  The patient reports that he is clinically doing better than he was over the last few days.  He feels his breathing is less labored.  He is currently laying at a 30 degree angle.  Denies any fevers, chills, sweats, lighted sensation, dizziness or palpitations.  Currently on a Bumex drip.  He has received metolazone for assisted diuresis.  Currently on heparin drip, Bumex drip and vasopressin drip.    Review of system  14 point review of systems otherwise unremarkable separate described above in history of present illness    Physical examination  No apparent distress, alert and oriented 3, appropriate affect  JVD is elevated, supple, no lymphadenopathy  Clear to auscultation bilaterally with no wheezing rhonchi crackles  Regular rate and rhythm with 3 out of 6 crescendo decrescendo murmur less on the right upper sternal rating to the carotids  Positive bowel sound, soft, nontender, nondistended, no mass and guarding or rebound tenderness  Chronic venous stasis changes bilaterally, mild bilateral lower extremity edema  1+ DP/PT pulse  No focal deficits    Assessment/plan  64-year-old man with past medical history significant for    A-fib status post cardioversion on 3/25/2025  Ischemic cardiomyopathy  Coronary artery disease status post CABG  Aortic valve stenosis status post TAVR in 11/2022  Atrial flutter status post ablation 2019  Status post Micra implant  Chronic kidney disease stage III  Obesity  Hypertension  Hyperlipidemia  Venous stasis    Who presented with acute on chronic renal failure in the setting of CHF exacerbation rapid response called on the floor due to systolic blood pressure in the 80s.  Patient in cardiogenic shock.    --Discussed with patient findings on transthoracic echocardiogram study that demonstrates severe bioprosthetic dysfunction/aortic stenosis of his 26 mm Sapine 3 valve.  Explained to the patient what is severe stenosis of the TAVR valve.  The associated signs and symptoms and the natural pathophysiology was gone over.  The various treatment options were discussed including medical therapy, TAVR valve in valve and surgery.  The pros/cons and risk/benefits of the various treatment options were gone over.  --The patient is currently hemodynamically unstable and is on vasopressin (Levophed/Dobutamine have been discontinued).  He has been followed closely by nephrology team for acute on chronic kidney disease which may be exacerbated in the setting of Entresto, diarrhea and dehydration.  Patient also notes episodes of chest discomfort on exertion which started a few weeks prior to his presentation.  -- Based upon patient's clinical status and kidney function will determine appropriate timing of coronary angiogram and TAVR valve in valve, After discussion with nephrology team if patient is able to lay flat tomorrow plan is for diagnostic cardiac catheterization on 4/8/2025.   Indications and details of the conronary angiogram procedure werer again reviewed.  Benefits and risks were discussed.  Risks include but not limited to infection, bleeding, arrhythmia, TIA/stroke, worsening renal function/need for dialysis hemodynamic instability, vascular injury, need for urgent surgery and death.  Greatly appreciate assistance from nephrology team. Patent LIMA to the LAD and RCA.  Saphenous vein graft to OM and PDA are occluded.    -- Due to patient's comorbidities he is felt to be a high risk individual and is being assessed for TAVR valve in valve.  Indications and details of the procedure were reviewed.  Benefits and risks were discussed.  Risks include but not limited to infection, bleeding, arrhythmia, TIA/stroke, hemodynamic instability, vascular injury, worsening renal insufficiency/need for dialysis and death.  --Continue Bumex drip.  Patient is on sodium bicarb 650 mg 3 times a day.  --Continue amiodarone 200 mg daily.  --Continue telemetry monitoring.  --Continue atorvastatin 40 mg daily.  --Continue clopidogrel 75 mg daily.  --Continue heparin drip.  Closely monitor for signs and symptoms of bleeding.    All questions and concerns of the patient were addressed.    Findings and plan were discussed with nephrology, cardiac surgery/Dr. Wagner and CICU/Dr. Maldonado.    Time-based billing (NON-critical care).   35 minutes spent on total encounter. The necessity of the time spent during the encounter on this date of service was due to:   education, assessment and coordination of care.

## 2025-04-07 NOTE — PROGRESS NOTE ADULT - ATTENDING COMMENTS
HF  intubated, now extubated   called for chon    #Baseline CKD3= pt reports baseline Renal fxn in 40s, followed by outpt nephrologist Dr Stringer  #  chon in setting of HF and ENtresto and decreased po intake and diarrhea  chon could be cardiorenal +/- ATN  cr seemed to have plateaued, and has been improving now   back on bumex drip  he has a lopez  monitor UO  monitor daily cr trends  lytes stable  no urgent indication for RRT  check renal sono  #HF  bumex drip as above  # hyperkalemia  stable  #met acidosis- on sodium bicarb tabs  monitor bicarb trend daily  #hypotension  monitor BP trends  on vaso   #pt understands need for cardiac cath for tavr eval- understands risk of LISA  Dr Montelongo at bedside  Had IDT discussion with patient re his risk of LISA  At this time in setting of his decreasing cr trend will proceed with the cath  Minimize contrast  Hold bumex drip if possible morning of procedure   will monitor for LISA  pt understands all potential risks including possible risk of dialysis  d/w Dr Montelongo  d/w CCU team

## 2025-04-07 NOTE — PROGRESS NOTE ADULT - SUBJECTIVE AND OBJECTIVE BOX
PATIENT:  BERTHA WARD  [id number]    CHIEF COMPLAINT:  Patient is a 64y old  Male who presents with a chief complaint of dyspnea , chest pain (2025 12:07)      INTERVAL HISTORYOVERNIGHT EVENTS:  - pending [facility] in AM   - CVP 16 this evening     REVIEW OF SYSTEMS:  [ x ] All other systems negative      MEDICATIONS:  MEDICATIONS  (STANDING):  aMIOdarone    Tablet   Oral   aMIOdarone    Tablet 200 milliGRAM(s) Oral daily  atorvastatin 40 milliGRAM(s) Oral at bedtime  buMETAnide Infusion 4 mG/Hr (20 mL/Hr) IV Continuous <Continuous>  chlorhexidine 2% Cloths 1 Application(s) Topical daily  clopidogrel Tablet 75 milliGRAM(s) Oral daily  famotidine    Tablet 20 milliGRAM(s) Oral every 48 hours  hemorrhoidal Ointment 1 Application(s) Rectal daily  heparin  Infusion 1500 Unit(s)/Hr (18 mL/Hr) IV Continuous <Continuous>  influenza  Vaccine (HIGH DOSE) 0.5 milliLiter(s) IntraMuscular once  insulin lispro (ADMELOG) corrective regimen sliding scale   SubCutaneous Before meals and at bedtime  melatonin 5 milliGRAM(s) Oral at bedtime  vasopressin Infusion 0.04 Unit(s)/Min (6 mL/Hr) IV Continuous <Continuous>    MEDICATIONS  (PRN):      ALLERGIES:  Allergies    metoprolol (Short breath)    Intolerances        OBJECTIVE:  ICU Vital Signs Last 24 Hrs  T(C): 36.9 (2025 15:00), Max: 37 (2025 14:00)  T(F): 98.4 (2025 15:00), Max: 98.6 (2025 14:00)  HR: 53 (2025 19:00) (48 - 64)  BP: --  BP(mean): --  ABP: 102/62 (2025 19:00) (80/46 - 107/65)  ABP(mean): 77 (2025 19:00) (60 - 81)  RR: 26 (2025 19:00) (14 - 57)  SpO2: 96% (2025 19:00) (84% - 99%)    O2 Parameters below as of 2025 15:00  Patient On (Oxygen Delivery Method): BiPAP/CPAP      Adult Advanced Hemodynamics Last 24 Hrs  CVP(mm Hg): 18 (2025 19:00) (6 - 322)  CVP(cm H2O): --  CO: --  CI: --  PA: --  PA(mean): --  PCWP: --  SVR: --  SVRI: --  PVR: --  PVRI: --  CAPILLARY BLOOD GLUCOSE      POCT Blood Glucose.: 140 mg/dL (2025 16:23)  POCT Blood Glucose.: 162 mg/dL (2025 11:41)  POCT Blood Glucose.: 124 mg/dL (2025 07:51)  POCT Blood Glucose.: 128 mg/dL (2025 21:24)    CAPILLARY BLOOD GLUCOSE      POCT Blood Glucose.: 140 mg/dL (2025 16:23)    I&O's Summary    2025 07:01  -  2025 07:00  --------------------------------------------------------  IN: 2379.5 mL / OUT: 4800 mL / NET: -2420.5 mL    2025 07:01  -  2025 20:29  --------------------------------------------------------  IN: 1399.5 mL / OUT: 1900 mL / NET: -500.5 mL      Daily     Daily Weight in k.6 (2025 00:00)    PHYSICAL EXAMINATION:  General: WN/WD NAD  HEENT: PERRLA, EOMI, moist mucous membranes  Neurology: A&Ox3, nonfocal, MITCHELL x 4  Respiratory: CTA B/L, normal respiratory effort, no wheezes, crackles, rales  CV: RRR, S1S2, no murmurs, rubs or gallops  Abdominal: Soft, NT, ND +BS, Last BM  Extremities: No edema, + peripheral pulses  Incisions:   Tubes:    LABS:  ABG - ( 2025 20:10 )  pH, Arterial: 7.37  pH, Blood: x     /  pCO2: 69    /  pO2: 93    / HCO3: 40    / Base Excess: 11.9  /  SaO2: 98.3                                    11.2   8.71  )-----------( 134      ( 2025 00:39 )             35.9     04-07    137  |  92[L]  |  98[H]  ----------------------------<  153[H]  3.8   |  32[H]  |  4.02[H]    Ca    9.2      2025 16:53  Phos  4.5     04-07  Mg     2.0     04-07    TPro  7.3  /  Alb  3.5  /  TBili  1.2  /  DBili  x   /  AST  14  /  ALT  27  /  AlkPhos  98  04-07    LIVER FUNCTIONS - ( 2025 16:53 )  Alb: 3.5 g/dL / Pro: 7.3 g/dL / ALK PHOS: 98 U/L / ALT: 27 U/L / AST: 14 U/L / GGT: x           PT/INR - ( 2025 00:39 )   PT: 13.8 sec;   INR: 1.21 ratio         PTT - ( 2025 00:39 )  PTT:56.0 sec        Urinalysis Basic - ( 2025 16:53 )    Color: x / Appearance: x / SG: x / pH: x  Gluc: 153 mg/dL / Ketone: x  / Bili: x / Urobili: x   Blood: x / Protein: x / Nitrite: x   Leuk Esterase: x / RBC: x / WBC x   Sq Epi: x / Non Sq Epi: x / Bacteria: x        TELEMETRY:     EKG:     IMAGING:

## 2025-04-07 NOTE — PROGRESS NOTE ADULT - SUBJECTIVE AND OBJECTIVE BOX
[facility] Division of Kidney Diseases & Hypertension  FOLLOW UP NOTE  [phone]--------------------------------------------------------------------------------  Chief Complaint:Acute on chronic systolic congestive heart failure    24 hour events/subjective:  Pt was seen and examined earlier today. No acute overnight events. No fresh complaints.   Pt denies any worsening of  SOB/ Constipation/ Diarrhea/ Nausea/ Vomiting/ abdominal pain/ chest pain/ tingling/ numbness.         PAST HISTORY  --------------------------------------------------------------------------------  No significant changes to PMH, PSH, FHx, SHx, unless otherwise noted    ALLERGIES & MEDICATIONS  --------------------------------------------------------------------------------  Allergies    metoprolol (Short breath)    Intolerances      Standing Inpatient Medications  aMIOdarone    Tablet   Oral   aMIOdarone    Tablet 200 milliGRAM(s) Oral daily  atorvastatin 40 milliGRAM(s) Oral at bedtime  buMETAnide Infusion 4 mG/Hr IV Continuous <Continuous>  chlorhexidine 2% Cloths 1 Application(s) Topical daily  clopidogrel Tablet 75 milliGRAM(s) Oral daily  famotidine    Tablet 20 milliGRAM(s) Oral every 48 hours  hemorrhoidal Ointment 1 Application(s) Rectal daily  heparin  Infusion 1500 Unit(s)/Hr IV Continuous <Continuous>  influenza  Vaccine (HIGH DOSE) 0.5 milliLiter(s) IntraMuscular once  insulin lispro (ADMELOG) corrective regimen sliding scale   SubCutaneous Before meals and at bedtime  melatonin 5 milliGRAM(s) Oral at bedtime  sodium bicarbonate 650 milliGRAM(s) Oral three times a day  vasopressin Infusion 0.04 Unit(s)/Min IV Continuous <Continuous>    PRN Inpatient Medications      REVIEW OF SYSTEMS  --------------------------------------------------------------------------------  as noted above.     VITALS/PHYSICAL EXAM  --------------------------------------------------------------------------------  T(C): 36.6 (04-06-25 @ 23:00), Max: 36.6 (04-06-25 @ 23:00)  HR: 54 (04-07-25 @ 12:00) (48 - 66)  BP: --  RR: 28 (04-07-25 @ 12:00) (14 - 46)  SpO2: 97% (04-07-25 @ 12:00) (76% - 99%)  Wt(kg): --        04-06-25 @ 07:01  -  04-07-25 @ 07:00  --------------------------------------------------------  IN: 2379.5 mL / OUT: 4800 mL / NET: -2420.5 mL    04-07-25 @ 07:01  -  04-07-25 @ 12:07  --------------------------------------------------------  IN: 450 mL / OUT: 700 mL / NET: -250 mL      Physical Exam:  Gen: NAD on supplemental oxygen.   Pulm: No crackles   CV: RRR, S1S2;  Abd: +BS, soft, nontender/nondistended  : Alba in place.   Extremities: Mild LE swelling  Skin: Warm, Lower extremity chronic changes.    LABS/STUDIES  --------------------------------------------------------------------------------              11.2   8.71  >-----------<  134      [04-07-25 @ 00:39]              35.9     138  |  92  |  95  ----------------------------<  136      [04-07-25 @ 06:46]  3.5   |  29  |  4.20        Ca     9.3     [04-07-25 @ 06:46]      Mg     2.4     [04-07-25 @ 06:46]      Phos  4.2     [04-07-25 @ 06:46]    TPro  7.1  /  Alb  3.3  /  TBili  1.2  /  DBili  x   /  AST  15  /  ALT  28  /  AlkPhos  95  [04-07-25 @ 06:46]    PT/INR: PT 13.8 , INR 1.21       [04-07-25 @ 00:39]  PTT: 56.0       [04-07-25 @ 00:39]      Creatinine Trend:  SCr 4.20 [04-07 @ 06:46]  SCr 4.35 [04-07 @ 00:39]  SCr 4.44 [04-06 @ 18:03]  SCr 4.38 [04-06 @ 11:59]  SCr 4.58 [04-06 @ 06:19]      Urine Creatinine 163      [04-01-25 @ 09:25]  Urine Protein 122      [04-01-25 @ 09:25]  Urine Sodium 29      [04-01-25 @ 09:25]    TSH 3.15      [04-01-25 @ 21:43]  Lipid: chol 94, TG 74, HDL 29, LDL --      [04-01-25 @ 06:18]

## 2025-04-07 NOTE — PROGRESS NOTE ADULT - ATTENDING COMMENTS
63yo M with AFib s/p TAVR HFrEF p/w hypoxic resp failure from ADHF requiring intubation and CS. May need TAVR in TAVR for severe prosthetic AS (LIVAN < 0.5). Plan for TAVR after LHC on 4/8     Neuro no issues   Pulm extubated on NC and intermittently requiring Bipap    CV vaso for mixed shock/ CS. LH pending   Renal Cr high with eGFR < 15 largely unchanged, chance of dialysis post cath   GI s/s eval today   ID no e/o infection   Heme cont heparin for AFib now post cardioversion   Endo BG controlled   Lines RIJ intro (4/1)

## 2025-04-07 NOTE — PROGRESS NOTE ADULT - ASSESSMENT
65 yo M with a fib (s/p dccv 3/25/25), CHF (likely moderately reduced EF, several TTE with poor windows), aortic stenosis s/p TAVR (11/2022), a flutter s/p ablation 2019 and s/p micra implant,  CKD3, obesity, venous stasis, HTN, HLD, who presents with 6 days of worsening dyspnea on exertion, admitted for AHRF likely iso CHF exacerbation, also with ANGELICA on CKD c/f cardiorenal syndrome. RRT on floors for SBP 80s requiring pressors and has been admitted to the CCU. HF mostly likely i/so severe AS. Pt to get structural intervention.     Plan:  ===NEURO===  - AO x 3    ===RESPIRATORY===  #Acute hypoxemic hypercapnic respiratory failure  - Likely retaining 2/2 volume overload  - c/w bumex 4mg drip for elevated CVP and increased O2 req  - BiPAP break, administer metolazone when eating, likely resume with AVAPs for improved adherence    ===CARDIOVASCULAR===  #Obstructive shock 2/2 AS  - Off levophed  - MAPs 70s -> will continue to wean vasopressin  - If need more pressor support will switch to phenylephrine as pt has LVOT obstruction from aortic stenosis   - Wean as tolerated    #Acute on chronic HF exacerbation  - Bumex increased to 4mg drip overnight for low urine output - augment with below  - Goal 1-2L net neg daily  - CVP goal 8-12  - Diuril, 3% hypertonic, and metolazone PRN   - Will be careful of overdiuresis given severe AS and preload dependant     #Afib  #First degree block  - PO amio load   - c/w heparin    #Aortic valve stenosis s/p prosthetic  - S/p STANISLAW showing severe AS stenosis, global hypokinesis and EF 15%  - Structural plans for valve in valve replacement  - Structural wants cath and CT w/ contrast before procedure  - Awaiting GFR improvement    #Troponemia  - No ischemic changes on EKG  - Most likely i/s/o ANGELICA on CKD    #Hx CABG  #PVD  - C/w Plavix    #HLD  - Increased Lipitor to 40mg from 20mg    ===/RENAL===  #ANGELICA on CKD  #Cardiorenal syndrome  - Baseline 1.5-1.8  - Most likely i/s/o hypotension and fluid overload  - Renal sono showed parenchymal disease  - Nephro following     #Hyperkalemia  - Lokelma if K increases into 5s range    ===GI/NUTRITION===  - Failed bedside SS  - S&S consulted -> recommends FEES    ===SKIN===  - Hx lichen planus  - No other concerns    ===INFECTIOUS DISEASE===  - Slightly elevated WBC on admission 10.5K  - Afebrile  - Urine culture -> neg    ===ENDOCRINE===  - No concerns  - A1c 5.8  - TSH wnl    ===HEMATOLOGIC/DVT PPx===  - On heparin ggt    Dispo: Maintain in ICU.    Patient requires continuous monitoring with bedside rhythm monitoring, arterial line, pulse oximetry, ventilator monitoring and intermittent blood gas analysis.  Care plan discussed with ICU care team.  I have spent 35 minutes providing critical care, in addition to initial critical time provided by CICU attending      Dr. Beltran      , re-evaluated multiple times during the day.    Raquel Leon PA-C  65 yo M with a fib (s/p dccv 3/25/25), CHF (likely moderately reduced EF, several TTE with poor windows), aortic stenosis s/p TAVR (11/2022), a flutter s/p ablation 2019 and s/p micra implant,  CKD3, obesity, venous stasis, HTN, HLD, who presents with 6 days of worsening dyspnea on exertion, admitted for AHRF likely iso CHF exacerbation, also with ANGELICA on CKD c/f cardiorenal syndrome. RRT on floors for SBP 80s requiring pressors and has been admitted to the CCU. HF mostly likely i/so severe AS. Pt to get structural intervention.     Plan:  ===NEURO===  - AO x 3    ===RESPIRATORY===  #Acute hypoxemic hypercapnic respiratory failure  - Likely retaining 2/2 volume overload  - c/w bumex 4mg drip for elevated CVP and increased O2 req  - BiPAP break, administer metolazone when eating, likely resume with AVAPs for improved adherence    ===CARDIOVASCULAR===  #Obstructive shock 2/2 AS  - Off levophed  - MAPs 70s -> will continue to wean vasopressin  - If need more pressor support will switch to phenylephrine as pt has LVOT obstruction from aortic stenosis   - Wean as tolerated    #Acute on chronic HF exacerbation  - Bumex increased to 4mg drip overnight for low urine output - augment with below  - Goal 1-2L net neg daily  - CVP goal 8-12  - Diuril, 3% hypertonic, and metolazone PRN   - Will be careful of overdiuresis given severe AS and preload dependant     #Afib  #First degree block  - PO amio load   - c/w heparin    #Aortic valve stenosis s/p prosthetic  - S/p STANISLAW showing severe AS stenosis, global hypokinesis and EF 15%  - Structural plans for valve in valve replacement  - Structural wants cath and CT w/ contrast before procedure  - Awaiting GFR improvement    #Troponemia  - No ischemic changes on EKG  - Most likely i/s/o ANGELICA on CKD    #Hx CABG  #PVD  - C/w Plavix    #HLD  - Increased Lipitor to 40mg from 20mg    ===/RENAL===  #ANGELICA on CKD  #Cardiorenal syndrome  - Baseline 1.5-1.8  - Most likely i/s/o hypotension and fluid overload  - Renal sono showed parenchymal disease  - Nephro following c/w bumex gtt, monitor volume status. Stop sodium bicarb 650 TID for now 4/7    #Hyperkalemia  - Lokelma if K increases into 5s range    ===GI/NUTRITION===  - Failed bedside SS  - S&S consulted -> recommends FEES    ===SKIN===  - Hx lichen planus  - No other concerns    ===INFECTIOUS DISEASE===  - Slightly elevated WBC on admission 10.5K  - Afebrile  - Urine culture -> neg    ===ENDOCRINE===  - No concerns  - A1c 5.8  - TSH wnl    ===HEMATOLOGIC/DVT PPx===  - On heparin ggt    Dispo: Maintain in ICU.    Patient requires continuous monitoring with bedside rhythm monitoring, arterial line, pulse oximetry, ventilator monitoring and intermittent blood gas analysis.  Care plan discussed with ICU care team.  I have spent 35 minutes providing critical care, in addition to initial critical time provided by CICU attending      Dr. Beltran      , re-evaluated multiple times during the day.    Raquel Leon PA-C

## 2025-04-08 LAB
ALBUMIN SERPL ELPH-MCNC: 3.3 G/DL — SIGNIFICANT CHANGE UP (ref 3.3–5)
ALBUMIN SERPL ELPH-MCNC: 3.4 G/DL — SIGNIFICANT CHANGE UP (ref 3.3–5)
ALBUMIN SERPL ELPH-MCNC: 3.6 G/DL — SIGNIFICANT CHANGE UP (ref 3.3–5)
ALP SERPL-CCNC: 95 U/L — SIGNIFICANT CHANGE UP (ref 40–120)
ALP SERPL-CCNC: 95 U/L — SIGNIFICANT CHANGE UP (ref 40–120)
ALP SERPL-CCNC: 99 U/L — SIGNIFICANT CHANGE UP (ref 40–120)
ALT FLD-CCNC: 23 U/L — SIGNIFICANT CHANGE UP (ref 10–45)
ALT FLD-CCNC: 25 U/L — SIGNIFICANT CHANGE UP (ref 10–45)
ALT FLD-CCNC: 26 U/L — SIGNIFICANT CHANGE UP (ref 10–45)
ANION GAP SERPL CALC-SCNC: 15 MMOL/L — SIGNIFICANT CHANGE UP (ref 5–17)
ANION GAP SERPL CALC-SCNC: 15 MMOL/L — SIGNIFICANT CHANGE UP (ref 5–17)
ANION GAP SERPL CALC-SCNC: 16 MMOL/L — SIGNIFICANT CHANGE UP (ref 5–17)
APTT BLD: 76.1 SEC — HIGH (ref 24.5–35.6)
AST SERPL-CCNC: 13 U/L — SIGNIFICANT CHANGE UP (ref 10–40)
AST SERPL-CCNC: 14 U/L — SIGNIFICANT CHANGE UP (ref 10–40)
AST SERPL-CCNC: 14 U/L — SIGNIFICANT CHANGE UP (ref 10–40)
BILIRUB SERPL-MCNC: 1.3 MG/DL — HIGH (ref 0.2–1.2)
BUN SERPL-MCNC: 100 MG/DL — HIGH (ref 7–23)
BUN SERPL-MCNC: 105 MG/DL — HIGH (ref 7–23)
BUN SERPL-MCNC: 98 MG/DL — HIGH (ref 7–23)
CALCIUM SERPL-MCNC: 9.4 MG/DL — SIGNIFICANT CHANGE UP (ref 8.4–10.5)
CALCIUM SERPL-MCNC: 9.4 MG/DL — SIGNIFICANT CHANGE UP (ref 8.4–10.5)
CALCIUM SERPL-MCNC: 9.6 MG/DL — SIGNIFICANT CHANGE UP (ref 8.4–10.5)
CHLORIDE SERPL-SCNC: 89 MMOL/L — LOW (ref 96–108)
CHLORIDE SERPL-SCNC: 91 MMOL/L — LOW (ref 96–108)
CHLORIDE SERPL-SCNC: 91 MMOL/L — LOW (ref 96–108)
CO2 SERPL-SCNC: 30 MMOL/L — SIGNIFICANT CHANGE UP (ref 22–31)
CO2 SERPL-SCNC: 31 MMOL/L — SIGNIFICANT CHANGE UP (ref 22–31)
CO2 SERPL-SCNC: 31 MMOL/L — SIGNIFICANT CHANGE UP (ref 22–31)
CREAT SERPL-MCNC: 3.43 MG/DL — HIGH (ref 0.5–1.3)
CREAT SERPL-MCNC: 3.6 MG/DL — HIGH (ref 0.5–1.3)
CREAT SERPL-MCNC: 3.89 MG/DL — HIGH (ref 0.5–1.3)
EGFR: 16 ML/MIN/1.73M2 — LOW
EGFR: 16 ML/MIN/1.73M2 — LOW
EGFR: 18 ML/MIN/1.73M2 — LOW
EGFR: 18 ML/MIN/1.73M2 — LOW
EGFR: 19 ML/MIN/1.73M2 — LOW
EGFR: 19 ML/MIN/1.73M2 — LOW
GAS PNL BLDA: SIGNIFICANT CHANGE UP
GLUCOSE BLDC GLUCOMTR-MCNC: 140 MG/DL — HIGH (ref 70–99)
GLUCOSE BLDC GLUCOMTR-MCNC: 157 MG/DL — HIGH (ref 70–99)
GLUCOSE BLDC GLUCOMTR-MCNC: 190 MG/DL — HIGH (ref 70–99)
GLUCOSE SERPL-MCNC: 139 MG/DL — HIGH (ref 70–99)
GLUCOSE SERPL-MCNC: 140 MG/DL — HIGH (ref 70–99)
GLUCOSE SERPL-MCNC: 162 MG/DL — HIGH (ref 70–99)
HCT VFR BLD CALC: 37 % — LOW (ref 39–50)
HGB BLD-MCNC: 11.6 G/DL — LOW (ref 13–17)
INR BLD: 1.19 RATIO — HIGH (ref 0.85–1.16)
MAGNESIUM SERPL-MCNC: 1.9 MG/DL — SIGNIFICANT CHANGE UP (ref 1.6–2.6)
MAGNESIUM SERPL-MCNC: 2 MG/DL — SIGNIFICANT CHANGE UP (ref 1.6–2.6)
MAGNESIUM SERPL-MCNC: 2.5 MG/DL — SIGNIFICANT CHANGE UP (ref 1.6–2.6)
MCHC RBC-ENTMCNC: 30.4 PG — SIGNIFICANT CHANGE UP (ref 27–34)
MCHC RBC-ENTMCNC: 31.4 G/DL — LOW (ref 32–36)
MCV RBC AUTO: 96.9 FL — SIGNIFICANT CHANGE UP (ref 80–100)
NRBC BLD AUTO-RTO: 0 /100 WBCS — SIGNIFICANT CHANGE UP (ref 0–0)
PHOSPHATE SERPL-MCNC: 4.4 MG/DL — SIGNIFICANT CHANGE UP (ref 2.5–4.5)
PHOSPHATE SERPL-MCNC: 4.6 MG/DL — HIGH (ref 2.5–4.5)
PHOSPHATE SERPL-MCNC: 4.6 MG/DL — HIGH (ref 2.5–4.5)
PLATELET # BLD AUTO: 120 K/UL — LOW (ref 150–400)
POTASSIUM SERPL-MCNC: 3.5 MMOL/L — SIGNIFICANT CHANGE UP (ref 3.5–5.3)
POTASSIUM SERPL-MCNC: 3.7 MMOL/L — SIGNIFICANT CHANGE UP (ref 3.5–5.3)
POTASSIUM SERPL-MCNC: 4.3 MMOL/L — SIGNIFICANT CHANGE UP (ref 3.5–5.3)
POTASSIUM SERPL-SCNC: 3.5 MMOL/L — SIGNIFICANT CHANGE UP (ref 3.5–5.3)
POTASSIUM SERPL-SCNC: 3.7 MMOL/L — SIGNIFICANT CHANGE UP (ref 3.5–5.3)
POTASSIUM SERPL-SCNC: 4.3 MMOL/L — SIGNIFICANT CHANGE UP (ref 3.5–5.3)
PROT SERPL-MCNC: 7.2 G/DL — SIGNIFICANT CHANGE UP (ref 6–8.3)
PROT SERPL-MCNC: 7.3 G/DL — SIGNIFICANT CHANGE UP (ref 6–8.3)
PROT SERPL-MCNC: 7.7 G/DL — SIGNIFICANT CHANGE UP (ref 6–8.3)
PROTHROM AB SERPL-ACNC: 13.7 SEC — HIGH (ref 9.9–13.4)
RBC # BLD: 3.82 M/UL — LOW (ref 4.2–5.8)
RBC # FLD: 16.3 % — HIGH (ref 10.3–14.5)
SODIUM SERPL-SCNC: 136 MMOL/L — SIGNIFICANT CHANGE UP (ref 135–145)
SODIUM SERPL-SCNC: 136 MMOL/L — SIGNIFICANT CHANGE UP (ref 135–145)
SODIUM SERPL-SCNC: 137 MMOL/L — SIGNIFICANT CHANGE UP (ref 135–145)
WBC # BLD: 9.3 K/UL — SIGNIFICANT CHANGE UP (ref 3.8–10.5)
WBC # FLD AUTO: 9.3 K/UL — SIGNIFICANT CHANGE UP (ref 3.8–10.5)

## 2025-04-08 PROCEDURE — 99291 CRITICAL CARE FIRST HOUR: CPT

## 2025-04-08 PROCEDURE — 93459 L HRT ART/GRFT ANGIO: CPT | Mod: 26

## 2025-04-08 PROCEDURE — 93010 ELECTROCARDIOGRAM REPORT: CPT

## 2025-04-08 PROCEDURE — 99233 SBSQ HOSP IP/OBS HIGH 50: CPT | Mod: 25,57

## 2025-04-08 PROCEDURE — 99292 CRITICAL CARE ADDL 30 MIN: CPT

## 2025-04-08 PROCEDURE — 99152 MOD SED SAME PHYS/QHP 5/>YRS: CPT

## 2025-04-08 PROCEDURE — 99233 SBSQ HOSP IP/OBS HIGH 50: CPT | Mod: GC

## 2025-04-08 RX ORDER — MAGNESIUM SULFATE 500 MG/ML
2 SYRINGE (ML) INJECTION ONCE
Refills: 0 | Status: COMPLETED | OUTPATIENT
Start: 2025-04-08 | End: 2025-04-08

## 2025-04-08 RX ORDER — SODIUM CHLORIDE 3 G/100ML
150 INJECTION, SOLUTION INTRAVENOUS ONCE
Refills: 0 | Status: COMPLETED | OUTPATIENT
Start: 2025-04-08 | End: 2025-04-08

## 2025-04-08 RX ADMIN — BUMETANIDE 20 MG/HR: 1 TABLET ORAL at 07:47

## 2025-04-08 RX ADMIN — Medication 25 GRAM(S): at 10:46

## 2025-04-08 RX ADMIN — Medication 50 MILLIEQUIVALENT(S): at 12:15

## 2025-04-08 RX ADMIN — Medication 50 MILLIEQUIVALENT(S): at 14:24

## 2025-04-08 RX ADMIN — Medication 1 APPLICATION(S): at 21:31

## 2025-04-08 RX ADMIN — Medication 50 MILLIEQUIVALENT(S): at 02:00

## 2025-04-08 RX ADMIN — Medication 50 MILLIEQUIVALENT(S): at 06:45

## 2025-04-08 RX ADMIN — BUMETANIDE 20 MG/HR: 1 TABLET ORAL at 06:47

## 2025-04-08 RX ADMIN — HEPARIN SODIUM 18 UNIT(S)/HR: 1000 INJECTION INTRAVENOUS; SUBCUTANEOUS at 22:22

## 2025-04-08 RX ADMIN — BUMETANIDE 20 MG/HR: 1 TABLET ORAL at 19:29

## 2025-04-08 RX ADMIN — PHENYLEPHRINE HYDROCHLORIDE AND FAT, HARD 1 APPLICATION(S): .00525; 1.86 SUPPOSITORY RECTAL at 12:41

## 2025-04-08 RX ADMIN — INSULIN LISPRO 1: 100 INJECTION, SOLUTION INTRAVENOUS; SUBCUTANEOUS at 14:23

## 2025-04-08 RX ADMIN — BUMETANIDE 20 MG/HR: 1 TABLET ORAL at 13:05

## 2025-04-08 RX ADMIN — SODIUM CHLORIDE 300 MILLILITER(S): 3 INJECTION, SOLUTION INTRAVENOUS at 01:42

## 2025-04-08 RX ADMIN — ATORVASTATIN CALCIUM 40 MILLIGRAM(S): 80 TABLET, FILM COATED ORAL at 21:31

## 2025-04-08 RX ADMIN — CLOPIDOGREL BISULFATE 75 MILLIGRAM(S): 75 TABLET, FILM COATED ORAL at 12:40

## 2025-04-08 RX ADMIN — AMIODARONE HYDROCHLORIDE 200 MILLIGRAM(S): 50 INJECTION, SOLUTION INTRAVENOUS at 06:46

## 2025-04-08 RX ADMIN — VASOPRESSIN 6 UNIT(S)/MIN: 20 INJECTION INTRAVENOUS at 19:28

## 2025-04-08 RX ADMIN — VASOPRESSIN 6 UNIT(S)/MIN: 20 INJECTION INTRAVENOUS at 07:47

## 2025-04-08 RX ADMIN — INSULIN LISPRO 1: 100 INJECTION, SOLUTION INTRAVENOUS; SUBCUTANEOUS at 21:36

## 2025-04-08 RX ADMIN — Medication 5 MILLIGRAM(S): at 21:31

## 2025-04-08 RX ADMIN — Medication 20 MILLIGRAM(S): at 12:40

## 2025-04-08 RX ADMIN — Medication 50 MILLIEQUIVALENT(S): at 04:38

## 2025-04-08 NOTE — PROGRESS NOTE ADULT - ATTENDING COMMENTS
HF  intubated, now extubated   called for chon    #Baseline CKD3= pt reports baseline Renal fxn in 40s, followed by outpt nephrologist Dr Stringer  #  chon in setting of HF and ENtresto and decreased po intake and diarrhea  chon could be cardiorenal +/- ATN  cr seemed to have plateaued, and has been improving now   back on bumex drip  he has a lopez  monitor UO  monitor daily cr trends  lytes stable  no urgent indication for RRT  check renal sono  #HF  bumex drip as above  # hyperkalemia  stable  #met acidosis- on sodium bicarb tabs  monitor bicarb trend daily  #hypotension  monitor BP trends  on vaso   #pt understands need for cardiac cath for tavr eval- understands risk of LISA  Had IDT discussion yesterday with patient re his risk of LISA  At this time in setting of his decreasing cr trend will proceed with the cath  Minimize contrast  Hold bumex drip today morning of procedure / post procedure  will monitor for LISA  pt understands all potential risks including possible risk of dialysis    d/w CCU team .

## 2025-04-08 NOTE — PROGRESS NOTE ADULT - ATTENDING COMMENTS
65yo M with AFib s/p TAVR HFrEF p/w hypoxic resp failure from ADHF requiring intubation and CS. May need TAVR in TAVR for severe prosthetic AS (LIVAN < 0.5). LHC non obstructive. Plan for TAVR 4/11.     Neuro no issues   Pulm extubated on NC and intermittently requiring Bipap but pH compensated    CV vaso for vasodilatory shock/ CS. LHC pending   Renal Cr high, mildly downtrending with eGFR < 15 largely unchanged, chance of dialysis post cath despite optimization   GI s/s eval    ID no e/o infection   Heme cont heparin for AFib now post cardioversion   Endo BG controlled   Lines RIJ intro (4/1) out 4/9

## 2025-04-08 NOTE — CHART NOTE - NSCHARTNOTEFT_GEN_A_CORE
NUTRITION FOLLOW UP NOTE    PATIENT SEEN FOR: malnutrition follow up.    SOURCE: [x] Patient  [x] Current Medical Record  [] RN  [] Family/support person at bedside  [] Patient unavailable/inappropriate  [x] Other: interdisciplinary team    CHART REVIEWED/EVENTS NOTED.  [] No changes to nutrition care plan to note  [x] Nutrition Status:  - CHF exacerbation, also with ANGELICA on CKD concerning for cardiorenal syndrome    DIET ORDER:   Diet, DASH/TLC:   Sodium & Cholesterol Restricted  1500mL Fluid Restriction ([id number])  Supplement Feeding Modality:  Oral  Ensure Max Cans or Servings Per Day:  1       Frequency:  Daily (25)    CURRENT DIET ORDER IS:  [] Appropriate:  [] Inadequate:  [x] Other: See recommendations below.     NUTRITION INTAKE/PROVISION:  [x] PO: Pt states appetite has somewhat improved over admission, reports eating a yogurt and cereal with milk this morning. States he tries to eat at least 50% of his meals. Expresses concerns about overeating as he typically has BMs after eating - states when he got admitted he had diarrhea but that this has improved. Amenable to oral nutrition supplements.  [] Enteral Nutrition:  [] Parenteral Nutrition:    ANTHROPOMETRICS:  Drug Dosing Weight  Height (cm): 172.7 (2025 13:00)  Weight (kg): 123.9 (2025 13:00)  BMI (kg/m2): 41.5 (2025 13:00)  BSA (m2): 2.33 (2025 13:00)    Weights:   Daily Weight in k.3 (), Weight in k.6 (), Weight in k.2 (), Weight in k.7 ()   - Pt on Bumex gtt, weight loss noted, will monitor/trend    MEDICATIONS:  MEDICATIONS  (STANDING):  aMIOdarone    Tablet   Oral   aMIOdarone    Tablet 200 milliGRAM(s) Oral daily  atorvastatin 40 milliGRAM(s) Oral at bedtime  buMETAnide Infusion 4 mG/Hr (20 mL/Hr) IV Continuous <Continuous>  chlorhexidine 2% Cloths 1 Application(s) Topical daily  clopidogrel Tablet 75 milliGRAM(s) Oral daily  famotidine    Tablet 20 milliGRAM(s) Oral every 48 hours  hemorrhoidal Ointment 1 Application(s) Rectal daily  heparin  Infusion 1500 Unit(s)/Hr (18 mL/Hr) IV Continuous <Continuous>  influenza  Vaccine (HIGH DOSE) 0.5 milliLiter(s) IntraMuscular once  insulin lispro (ADMELOG) corrective regimen sliding scale   SubCutaneous Before meals and at bedtime  melatonin 5 milliGRAM(s) Oral at bedtime  potassium chloride  20 mEq/100 mL IVPB 20 milliEquivalent(s) IV Intermittent every 2 hours  vasopressin Infusion 0.04 Unit(s)/Min (6 mL/Hr) IV Continuous <Continuous>    MEDICATIONS  (PRN):    NUTRITIONALLY PERTINENT LABS:   Na137 mmol/L Glu 162 mg/dL[H] K+ 3.7 mmol/L Cr  3.60 mg/dL[H]  mg/dL[H]  Phos 4.4 mg/dL  Alb 3.3 g/dL  Chol 94 mg/dL LDL --    HDL 29 mg/dL[L] Trig 74 mg/dL ALT 25 U/L AST 14 U/L Alkaline Phosphatase 95 U/L  25 @ 18:01 a1c 5.8    A1C with Estimated Average Glucose Result: 5.8 % (25 @ 18:01)    Finger Sticks:  POCT Blood Glucose.: 140 mg/dL ( @ 09:41)  POCT Blood Glucose.: 140 mg/dL ( @ 16:23)      NUTRITIONALLY PERTINENT MEDICATIONS/LABS:  [x] Reviewed  [x] Relevant notes on medications/labs:  - Vasopressin   - BG management with sliding scale insulin; HbA1c 5.8%    EDEMA:  [x] Reviewed  [x] Relevant notes: 1+ generalized; dependent, 2+ left leg;  right leg    GI/ I&O:  [x] Reviewed  [x] Relevant notes: Last BM 4/8 x 3 noted as soft.  [] Other:    SKIN:   [x] No pressure injuries documented, per nursing flowsheet  [] Pressure injury previously noted  [] Change in pressure injury documentation:  [] Other:    ESTIMATED NEEDS:  [x] No change:  [] Updated:  Energy: 5231-9255 kcal/day (33-38 kcal/kg)  Protein: 105-126g/day (1.5-1.8 g/kg)  Fluid:   ml/day or [x] defer to team  Based on: IBW 69.8kg    NUTRITION DIAGNOSIS:  [x] Prior Dx: Severe Acute Malnutrition, Overweight/Obesity  [] New Dx:    EDUCATION:  [x] Yes: Provided recommendations to optimize PO and protein intake, recommended small frequent meals by ordering nutrient-dense snacks and leaving non-perishable food away from tray for later consumption during the day or between meals, to start with protein, and sips of supplement throughout the day; reviewed foods with protein and menu order procedures in hospital.   [] Not appropriate/warranted    NUTRITION CARE PLAN:  1. Continue DASH diet as ordered, defer fluid restriction to team.  2. Added Ensure Max 1 x daily - monitor intake and can increase if pt takes.  3. Consider multivitamin daily.  4: Provide encouragement with PO intake, menu selections, and assistance with meals as needed.     [] Achieved - Continue current nutrition intervention(s)  [] Current medical condition precludes nutrition intervention at this time.    MONITORING AND EVALUATION:   RD remains available upon request and will follow up per protocol.    Rosi Herbert MS, RD, CDN, CNSC  Available on [facility] TEAMS

## 2025-04-08 NOTE — PROGRESS NOTE ADULT - SUBJECTIVE AND OBJECTIVE BOX
BERTHA WARD  MRN-[id number]  Patient is a 64y old  Male who presents with a chief complaint of dyspnea , chest pain (08 Apr 2025 12:03)    HPI:   63 yo M with a fib (s/p dccv 3/25/25), CHF (likely moderately reduced EF, several TTE with poor windows), aortic stenosis s/p TAVR (11/2022), CAD s/p CABG, a flutter s/p ablation 2019 and s/p micra implant,  CKD3, obesity, venous stasis, HTN, HLD, who presents with 6 days of worsening dyspnea on exertion. Pt also reports associated orthopnea and chest pain on exertion but not at rest which is located in the center of his chest. Pain is described as burning. Also reports several days decreased appetite. Denies fevers, chills, cough, abdominal pain, NVD, urinary sx, leg swelling. Patient had a cardioversion for atrial fibrillation at this hospital recently, which was reportedly unsuccessful. Patient has a scheduled appointment with Dr. Philip on 04/24/2025 to discuss possible ablation. His outpt cardiologist is Dr. Paul.     On arrival to ED, vitals were 97.3F, HR 71, BP 94/57, RR 22, 97% on 2L NC. Labs pertinent for WBC 10.68, INR 2.55, Na 132, K 5.5, bicarb 20, BUN/Cr 78/5.17 (baseline Cr appears ~1.5-1.8), trop 225, BNP 99742, VBG ph 7.3, pco2/po2 wnl. UA without evidence of infection. EKG  on admission 1st deg av block, low voltage, poor r wave progression, R axis deviation CXR with diffuse hazy opacities suggest mild pulmonary edema and small right and trace left pleural effusions. Pt was given IV Bumex 1mg x1, IV bumex 2mg x1, IV Bumex 4mg x1, and lokelma 5mg x1, then admitted for further management.    (01 Apr 2025 01:59)    INTERVAL HISTORYOVERNIGHT EVENTS:  - pending [facility] in AM   - CVP 16 this evening     REVIEW OF SYSTEMS:  [ x ] All other systems negative    ICU Vital Signs Last 24 Hrs  T(C): 36.4 (08 Apr 2025 19:00), Max: 36.7 (08 Apr 2025 03:00)  T(F): 97.6 (08 Apr 2025 19:00), Max: 98 (08 Apr 2025 03:00)  HR: 61 (08 Apr 2025 20:54) (48 - 61)  BP: 92/64 (08 Apr 2025 09:37) (92/64 - 92/64)  BP(mean): 75 (08 Apr 2025 09:37) (75 - 75)  ABP: 92/53 (08 Apr 2025 20:00) (85/50 - 122/78)  ABP(mean): 68 (08 Apr 2025 20:00) (64 - 94)  RR: 20 (08 Apr 2025 20:00) (18 - 53)  SpO2: 94% (08 Apr 2025 20:54) (86% - 100%)    O2 Parameters below as of 08 Apr 2025 18:00  Patient On (Oxygen Delivery Method): nasal cannula w/ humidification  O2 Flow (L/min): 5    CVP(mm Hg): 14 (04-08-25 @ 17:30) (6 - 322)    I&O's Summary    07 Apr 2025 07:01  -  08 Apr 2025 07:00  --------------------------------------------------------  IN: 2345.5 mL / OUT: 4665 mL / NET: -2319.5 mL    08 Apr 2025 07:01  -  08 Apr 2025 21:11  --------------------------------------------------------  IN: 1168.5 mL / OUT: 1870 mL / NET: -701.5 mL    CAPILLARY BLOOD GLUCOSE    CAPILLARY BLOOD GLUCOSE    POCT Blood Glucose.: 157 mg/dL (08 Apr 2025 14:21)    PHYSICAL EXAM:  General: WN/WD NAD  HEENT: PERRLA, EOMI, moist mucous membranes  Neurology: A&Ox3, nonfocal, MITCHELL x 4  Respiratory: CTA B/L, normal respiratory effort, no wheezes, crackles, rales  CV: RRR, S1S2, no murmurs, rubs or gallops  Abdominal: Soft, NT, ND +BS, Last BM  Extremities: No edema, + peripheral pulses  Incisions:   Tubes:  ============================I/O===========================   I&O's Detail    07 Apr 2025 07:01  -  08 Apr 2025 07:00  --------------------------------------------------------  IN:    Bumetanide: 480 mL    Heparin: 432 mL    IV PiggyBack: 450 mL    Oral Fluid: 640 mL    Vasopressin: 343.5 mL  Total IN: 2345.5 mL    OUT:    Indwelling Catheter - Urethral (mL): 4665 mL  Total OUT: 4665 mL    Total NET: -2319.5 mL    08 Apr 2025 07:01  -  08 Apr 2025 21:11  --------------------------------------------------------  IN:    Bumetanide: 180 mL    Heparin: 18 mL    IV PiggyBack: 300 mL    Oral Fluid: 480 mL    Vasopressin: 190.5 mL  Total IN: 1168.5 mL    OUT:    Indwelling Catheter - Urethral (mL): 1870 mL  Total OUT: 1870 mL    Total NET: -701.5 mL    ============================ LABS =========================                        11.6   9.30  )-----------( 120      ( 08 Apr 2025 01:24 )             37.0     04-08    136  |  89[L]  |  105[H]  ----------------------------<  139[H]  4.3   |  31  |  3.43[H]    Ca    9.6      08 Apr 2025 17:26  Phos  4.6     04-08  Mg     2.5     04-08    TPro  7.7  /  Alb  3.6  /  TBili  1.3[H]  /  DBili  x   /  AST  13  /  ALT  23  /  AlkPhos  99  04-08    LIVER FUNCTIONS - ( 08 Apr 2025 17:26 )  Alb: 3.6 g/dL / Pro: 7.7 g/dL / ALK PHOS: 99 U/L / ALT: 23 U/L / AST: 13 U/L / GGT: x           PT/INR - ( 08 Apr 2025 01:24 )   PT: 13.7 sec;   INR: 1.19 ratio       PTT - ( 08 Apr 2025 01:24 )  PTT:76.1 sec  ABG - ( 08 Apr 2025 17:20 )  pH, Arterial: 7.38  pH, Blood: x     /  pCO2: 66    /  pO2: 115   / HCO3: 39    / Base Excess: 11.3  /  SaO2: 99.1      Blood Gas Arterial, Lactate: 0.7 mmol/L (04-08-25 @ 17:20)  Blood Gas Arterial, Lactate: 0.7 mmol/L (04-08-25 @ 06:41)  Blood Gas Arterial, Lactate: 0.8 mmol/L (04-08-25 @ 01:18)    Urinalysis Basic - ( 08 Apr 2025 17:26 )    Color: x / Appearance: x / SG: x / pH: x  Gluc: 139 mg/dL / Ketone: x  / Bili: x / Urobili: x   Blood: x / Protein: x / Nitrite: x   Leuk Esterase: x / RBC: x / WBC x   Sq Epi: x / Non Sq Epi: x / Bacteria: x  ======================Micro/Rad/Cardio=================  Telemtry: Reviewed   EKG: Reviewed  CXR: Reviewed  Culture: Reviewed   Echo: Transthoracic Echocardiogram:   Patient name: BERTHA WARD  YOB: 1960   Age: 62 (M)   MR#: [id number]  Study Date: 11/18/2022  Location: O/PSonographer: Elvira Lamas RDCS  Study quality: Technically difficult  Referring Physician: Sukhwinder Wagner MD / Suman Montelongo MD  Blood Pressure: 114/53 mmHg  Height: 173 cm  Weight: 145 kg  BSA: 2.5 m2  Heart Rate: 83 mmHg    ======================================================  PAST MEDICAL & SURGICAL HISTORY:  CAD (coronary artery disease)  s/p CABG      Hypercholesteremia      Thrombus of left atrial appendage  2018      Ischemic cardiomyopathy      WILFRED (obstructive sleep apnea)  can not tolerate bipap at night      HTN (hypertension)      Atrial fibrillation  2018      CHF (congestive heart failure)  denies any recent exacerbation      Peripheral edema  chronic      Lichen planus  chronic ( b/L LE)      Atrial flutter  s/p ablation 2018      MI (myocardial infarction)  2012      Stented coronary artery  2019      AS (aortic stenosis)      Chronic kidney disease, unspecified CKD stage      ANGELICA (acute kidney injury)  4/2021      Morbid obesity      Status post placement of cardiac pacemaker  micraleadless PPM, [id number] last interrogation 7/6/2022      Osteoarthritis  shoulders, hips, knee      H/O scoliosis      Lower back pain      History of elbow surgery      S/P CABG (coronary artery bypass graft)  x3, 2012      Cardiac pacemaker  leadless 2018      History of coronary artery stent placement  2019 ( one more after cabg)      History of adenoidectomy      H/O atrioventricular karlee ablation  2018  ====================ASSESSMENT ==============  63 yo M with a fib (s/p dccv 3/25/25), CHF (likely moderately reduced EF, several TTE with poor windows), aortic stenosis s/p TAVR (11/2022), a flutter s/p ablation 2019 and s/p micra implant,  CKD3, obesity, venous stasis, HTN, HLD, who presents with 6 days of worsening dyspnea on exertion, admitted for AHRF likely iso CHF exacerbation, also with ANGELICA on CKD c/f cardiorenal syndrome. RRT on floors for SBP 80s requiring pressors and has been admitted to the CCU. HF mostly likely i/so severe AS. Pt to get structural intervention.     Plan:  ===NEURO===  - AO x 3    ===RESPIRATORY===  #Acute hypoxemic hypercapnic respiratory failure  - Likely retaining 2/2 volume overload  - c/w bumex 4mg drip for elevated CVP and increased O2 req  - c/w BIPAP for resp distress, 6L NC when off BIPAP     ===CARDIOVASCULAR===  #Obstructive shock 2/2 AS  - Off levophed  - MAPs 70s -> will continue to wean vasopressin  - If need more pressor support will switch to phenylephrine as pt has LVOT obstruction from aortic stenosis   - Wean as tolerated    #Acute on chronic HF exacerbation  - Bumex increased to 4mg drip overnight for low urine output - augment with below  - Goal 1-2L net neg daily  - CVP goal 8-12  - Diuril, 3% hypertonic, and metolazone PRN   - Will be careful of overdiuresis given severe AS and preload dependant     #Afib  #First degree block  - PO amio load   - c/w heparin    #Aortic valve stenosis s/p prosthetic  - S/p STANISLAW showing severe AS stenosis, global hypokinesis and EF 15%  - Structural plans for valve in valve replacement  - Structural wants cath and CT w/ contrast before procedure  - Awaiting GFR improvement    #Troponemia  - No ischemic changes on EKG  - Most likely i/s/o ANGELICA on CKD    #Hx CABG  #PVD  - C/w Plavix    #HLD  - Increased Lipitor to 40mg from 20mg    ===/RENAL===  #ANGELICA on CKD  #Cardiorenal syndrome  - Baseline 1.5-1.8  - Most likely i/s/o hypotension and fluid overload  - Renal sono showed parenchymal disease  - Nephro following c/w bumex gtt, monitor volume status. Stop sodium bicarb 650 TID for now 4/7    #Hyperkalemia  - Lokelma if K increases into 5s range    ===GI/NUTRITION===  - Failed bedside SS  - S&S consulted -> recommends FEES    ===SKIN===  - Hx lichen planus  - No other concerns    ===INFECTIOUS DISEASE===  - Slightly elevated WBC on admission 10.5K  - Afebrile  - Urine culture -> neg    ===ENDOCRINE===  - No concerns  - A1c 5.8  - TSH wnl    ===HEMATOLOGIC/DVT PPx===  - On heparin ggt    Dispo: Maintain in ICU.        Patient requires continuous monitoring with bedside rhythm monitoring, pulse ox monitoring, and intermittent blood gas analysis. Care plan discussed with ICU care team. Patient remained critical and at risk for life threatening decompensation.  Patient seen, examined and plan discussed with CCU team during rounds.     I have personally provided __ minutes of critical care time excluding time spent on separate procedures, in addition to initial critical care time provided by the CICU Attending, Dr. Maldonado.     By signing my name below, I, Evens Garcia, attest that this documentation has been prepared under the direction and in the presence of ANALIA Mccollum.  Electronically signed: Patito Medeiros, 04-08-25 @ 21:11    I, Raquel Leon, personally performed the services described in this documentation. All medical record entries made by the scribe were at my direction and in my presence. I have reviewed the chart and agree that the record reflects my personal performance and is accurate and complete  Electronically signed: ANALIA Mccollum.   BERTHA WARD  MRN-[id number]  Patient is a 64y old  Male who presents with a chief complaint of dyspnea , chest pain (08 Apr 2025 12:03)    HPI:   65 yo M with a fib (s/p dccv 3/25/25), CHF (likely moderately reduced EF, several TTE with poor windows), aortic stenosis s/p TAVR (11/2022), CAD s/p CABG, a flutter s/p ablation 2019 and s/p micra implant,  CKD3, obesity, venous stasis, HTN, HLD, who presents with 6 days of worsening dyspnea on exertion. Pt also reports associated orthopnea and chest pain on exertion but not at rest which is located in the center of his chest. Pain is described as burning. Also reports several days decreased appetite. Denies fevers, chills, cough, abdominal pain, NVD, urinary sx, leg swelling. Patient had a cardioversion for atrial fibrillation at this hospital recently, which was reportedly unsuccessful. Patient has a scheduled appointment with Dr. Philip on 04/24/2025 to discuss possible ablation. His outpt cardiologist is Dr. Paul.     On arrival to ED, vitals were 97.3F, HR 71, BP 94/57, RR 22, 97% on 2L NC. Labs pertinent for WBC 10.68, INR 2.55, Na 132, K 5.5, bicarb 20, BUN/Cr 78/5.17 (baseline Cr appears ~1.5-1.8), trop 225, BNP 67671, VBG ph 7.3, pco2/po2 wnl. UA without evidence of infection. EKG  on admission 1st deg av block, low voltage, poor r wave progression, R axis deviation CXR with diffuse hazy opacities suggest mild pulmonary edema and small right and trace left pleural effusions. Pt was given IV Bumex 1mg x1, IV bumex 2mg x1, IV Bumex 4mg x1, and lokelma 5mg x1, then admitted for further management.    (01 Apr 2025 01:59)    INTERVAL HISTORY OVERNIGHT EVENTS:  - s/p [facility] today  - plan for TAVR Friday     REVIEW OF SYSTEMS:  [ x ] All other systems negative    ICU Vital Signs Last 24 Hrs  T(C): 36.4 (08 Apr 2025 19:00), Max: 36.7 (08 Apr 2025 03:00)  T(F): 97.6 (08 Apr 2025 19:00), Max: 98 (08 Apr 2025 03:00)  HR: 61 (08 Apr 2025 20:54) (48 - 61)  BP: 92/64 (08 Apr 2025 09:37) (92/64 - 92/64)  BP(mean): 75 (08 Apr 2025 09:37) (75 - 75)  ABP: 92/53 (08 Apr 2025 20:00) (85/50 - 122/78)  ABP(mean): 68 (08 Apr 2025 20:00) (64 - 94)  RR: 20 (08 Apr 2025 20:00) (18 - 53)  SpO2: 94% (08 Apr 2025 20:54) (86% - 100%)    O2 Parameters below as of 08 Apr 2025 18:00  Patient On (Oxygen Delivery Method): nasal cannula w/ humidification  O2 Flow (L/min): 5    CVP(mm Hg): 14 (04-08-25 @ 17:30) (6 - 322)    I&O's Summary    07 Apr 2025 07:01  -  08 Apr 2025 07:00  --------------------------------------------------------  IN: 2345.5 mL / OUT: 4665 mL / NET: -2319.5 mL    08 Apr 2025 07:01  -  08 Apr 2025 21:11  --------------------------------------------------------  IN: 1168.5 mL / OUT: 1870 mL / NET: -701.5 mL    CAPILLARY BLOOD GLUCOSE    CAPILLARY BLOOD GLUCOSE    POCT Blood Glucose.: 157 mg/dL (08 Apr 2025 14:21)    PHYSICAL EXAM:  General: WN/WD NAD  HEENT: PERRLA, EOMI, moist mucous membranes  Neurology: A&Ox3, nonfocal, MITCHELL x 4  Respiratory: CTA B/L, normal respiratory effort, no wheezes, crackles, rales  CV: RRR, S1S2, no murmurs, rubs or gallops  Abdominal: Soft, NT, ND +BS, Last BM  Extremities: No edema, + peripheral pulses  Incisions:   Tubes:  ============================I/O===========================   I&O's Detail    07 Apr 2025 07:01  -  08 Apr 2025 07:00  --------------------------------------------------------  IN:    Bumetanide: 480 mL    Heparin: 432 mL    IV PiggyBack: 450 mL    Oral Fluid: 640 mL    Vasopressin: 343.5 mL  Total IN: 2345.5 mL    OUT:    Indwelling Catheter - Urethral (mL): 4665 mL  Total OUT: 4665 mL    Total NET: -2319.5 mL    08 Apr 2025 07:01  -  08 Apr 2025 21:11  --------------------------------------------------------  IN:    Bumetanide: 180 mL    Heparin: 18 mL    IV PiggyBack: 300 mL    Oral Fluid: 480 mL    Vasopressin: 190.5 mL  Total IN: 1168.5 mL    OUT:    Indwelling Catheter - Urethral (mL): 1870 mL  Total OUT: 1870 mL    Total NET: -701.5 mL    ============================ LABS =========================                        11.6   9.30  )-----------( 120      ( 08 Apr 2025 01:24 )             37.0     04-08    136  |  89[L]  |  105[H]  ----------------------------<  139[H]  4.3   |  31  |  3.43[H]    Ca    9.6      08 Apr 2025 17:26  Phos  4.6     04-08  Mg     2.5     04-08    TPro  7.7  /  Alb  3.6  /  TBili  1.3[H]  /  DBili  x   /  AST  13  /  ALT  23  /  AlkPhos  99  04-08    LIVER FUNCTIONS - ( 08 Apr 2025 17:26 )  Alb: 3.6 g/dL / Pro: 7.7 g/dL / ALK PHOS: 99 U/L / ALT: 23 U/L / AST: 13 U/L / GGT: x           PT/INR - ( 08 Apr 2025 01:24 )   PT: 13.7 sec;   INR: 1.19 ratio       PTT - ( 08 Apr 2025 01:24 )  PTT:76.1 sec  ABG - ( 08 Apr 2025 17:20 )  pH, Arterial: 7.38  pH, Blood: x     /  pCO2: 66    /  pO2: 115   / HCO3: 39    / Base Excess: 11.3  /  SaO2: 99.1      Blood Gas Arterial, Lactate: 0.7 mmol/L (04-08-25 @ 17:20)  Blood Gas Arterial, Lactate: 0.7 mmol/L (04-08-25 @ 06:41)  Blood Gas Arterial, Lactate: 0.8 mmol/L (04-08-25 @ 01:18)    Urinalysis Basic - ( 08 Apr 2025 17:26 )    Color: x / Appearance: x / SG: x / pH: x  Gluc: 139 mg/dL / Ketone: x  / Bili: x / Urobili: x   Blood: x / Protein: x / Nitrite: x   Leuk Esterase: x / RBC: x / WBC x   Sq Epi: x / Non Sq Epi: x / Bacteria: x  ======================Micro/Rad/Cardio=================  Telemtry: Reviewed   EKG: Reviewed  CXR: Reviewed  Culture: Reviewed   Echo: Transthoracic Echocardiogram:   Patient name: BERTHA WARD  YOB: 1960   Age: 62 (M)   MR#: [id number]  Study Date: 11/18/2022  Location: O/PSonographer: Elvira Lamas RDCS  Study quality: Technically difficult  Referring Physician: Sukhwinder Wagner MD / Suman Montelongo MD  Blood Pressure: 114/53 mmHg  Height: 173 cm  Weight: 145 kg  BSA: 2.5 m2  Heart Rate: 83 mmHg    ======================================================  PAST MEDICAL & SURGICAL HISTORY:  CAD (coronary artery disease)  s/p CABG      Hypercholesteremia      Thrombus of left atrial appendage  2018      Ischemic cardiomyopathy      WILFRED (obstructive sleep apnea)  can not tolerate bipap at night      HTN (hypertension)      Atrial fibrillation  2018      CHF (congestive heart failure)  denies any recent exacerbation      Peripheral edema  chronic      Lichen planus  chronic ( b/L LE)      Atrial flutter  s/p ablation 2018      MI (myocardial infarction)  2012      Stented coronary artery  2019      AS (aortic stenosis)      Chronic kidney disease, unspecified CKD stage      ANGELICA (acute kidney injury)  4/2021      Morbid obesity      Status post placement of cardiac pacemaker  micraleadless PPM, [id number] last interrogation 7/6/2022      Osteoarthritis  shoulders, hips, knee      H/O scoliosis      Lower back pain      History of elbow surgery      S/P CABG (coronary artery bypass graft)  x3, 2012      Cardiac pacemaker  leadless 2018      History of coronary artery stent placement  2019 ( one more after cabg)      History of adenoidectomy      H/O atrioventricular karlee ablation  2018  ====================ASSESSMENT ==============  65 yo M with a fib (s/p dccv 3/25/25), CHF (likely moderately reduced EF, several TTE with poor windows), aortic stenosis s/p TAVR (11/2022), a flutter s/p ablation 2019 and s/p micra implant,  CKD3, obesity, venous stasis, HTN, HLD, who presents with 6 days of worsening dyspnea on exertion, admitted for AHRF likely iso CHF exacerbation, also with ANGELICA on CKD c/f cardiorenal syndrome. RRT on floors for SBP 80s requiring pressors and has been admitted to the CCU. HF mostly likely i/so severe AS. Pt to get structural intervention.     Plan:  ===NEURO===  - AO x 3    ===RESPIRATORY===  #Acute hypoxemic hypercapnic respiratory failure  - Likely retaining 2/2 volume overload  - c/w bumex 4mg drip for elevated CVP and increased O2 req  - c/w BIPAP for resp distress, 6L NC when off BIPAP     ===CARDIOVASCULAR===  #Obstructive shock 2/2 AS  - Off levophed  - MAPs 70s -> will continue to wean vasopressin  - If need more pressor support will switch to phenylephrine as pt has LVOT obstruction from aortic stenosis   - Wean as tolerated    #Acute on chronic HF exacerbation  - Bumex increased to 4mg drip overnight for low urine output - augment with below  - Goal 1-2L net neg daily  - CVP goal 8-12  - Diuril, 3% hypertonic, and metolazone PRN   - Will be careful of overdiuresis given severe AS and preload dependant     #Afib  #First degree block  - PO amio load   - c/w heparin    #Aortic valve stenosis s/p prosthetic  - S/p STANISLAW showing severe AS stenosis, global hypokinesis and EF 15%  - Structural plans for valve in valve replacement on Friday 4/11  - Cardiac CT reviewed from prior TAVR in 2022, we will NOT repeat CT given worsening renal function    #Troponemia  #CAD   - No ischemic changes on EKG  - Most likely i/s/o ANGELICA on CKD  - 4/8 LHC: patent LIMA-LAD. Closed SVG-OM1/D1    #Hx CABG  #PVD  - C/w Plavix    #HLD  - Increased Lipitor to 40mg from 20mg    ===/RENAL===  #ANGELICA on CKD  #Cardiorenal syndrome  - Baseline 1.5-1.8  - Most likely i/s/o hypotension and fluid overload  - Renal sono showed parenchymal disease  - Nephro following c/w bumex gtt, monitor volume status. Stop sodium bicarb 650 TID for now 4/7    #Hyperkalemia  - Lokelma if K increases into 5s range    ===GI/NUTRITION===  - Failed bedside SS  - S&S consulted -> recommends FEES    ===SKIN===  - Hx lichen planus  - No other concerns    ===INFECTIOUS DISEASE===  - Slightly elevated WBC on admission 10.5K  - Afebrile  - Urine culture -> neg    ===ENDOCRINE===  - No concerns  - A1c 5.8  - TSH wnl    ===HEMATOLOGIC/DVT PPx===  - On heparin ggt    Dispo: Maintain in ICU.    Patient requires continuous monitoring with bedside rhythm monitoring, pulse ox monitoring, and intermittent blood gas analysis. Care plan discussed with ICU care team. Patient remained critical and at risk for life threatening decompensation.  Patient seen, examined and plan discussed with CCU team during rounds.     I have personally provided 35 minutes of critical care time excluding time spent on separate procedures, in addition to initial critical care time provided by the CICU Attending, Dr. Maldonado.     By signing my name below, I, Evens Garcia, attest that this documentation has been prepared under the direction and in the presence of ANALIA Mccollum.  Electronically signed: Patito Medeiros, 04-08-25 @ 21:11    I, Raquel Leon, personally performed the services described in this documentation. All medical record entries made by the scribe were at my direction and in my presence. I have reviewed the chart and agree that the record reflects my personal performance and is accurate and complete  Electronically signed: ANALIA Mccollum.

## 2025-04-08 NOTE — CHART NOTE - NSCHARTNOTEFT_GEN_A_CORE
Removal of Femoral Sheath    Pulses in the (right/left) lower extremity are palpable. The patient was placed in the supine position. The insertion site was identified and the sutures were removed per protocol.  The __5__ [place] femoral sheath was then removed. Direct pressure was applied for  ___20___ minutes.     Monitoring of the right groin and both lower extremities including neuro-vascular checks and vital signs every 15 minutes x 4, then every 30 minutes x 2, then every 1 hour was ordered.    Complications: None

## 2025-04-08 NOTE — PROGRESS NOTE ADULT - SUBJECTIVE AND OBJECTIVE BOX
HPI/Interval Hx    Lying in bed, s/p cardiac catheterization this morning. Structural Team following for prosthetic AS.     MEDICATIONS  (STANDING):  aMIOdarone    Tablet   Oral   aMIOdarone    Tablet 200 milliGRAM(s) Oral daily  atorvastatin 40 milliGRAM(s) Oral at bedtime  buMETAnide Infusion 4 mG/Hr (20 mL/Hr) IV Continuous <Continuous>  chlorhexidine 2% Cloths 1 Application(s) Topical daily  clopidogrel Tablet 75 milliGRAM(s) Oral daily  famotidine    Tablet 20 milliGRAM(s) Oral every 48 hours  hemorrhoidal Ointment 1 Application(s) Rectal daily  heparin  Infusion 1500 Unit(s)/Hr (18 mL/Hr) IV Continuous <Continuous>  influenza  Vaccine (HIGH DOSE) 0.5 milliLiter(s) IntraMuscular once  insulin lispro (ADMELOG) corrective regimen sliding scale   SubCutaneous Before meals and at bedtime  melatonin 5 milliGRAM(s) Oral at bedtime  potassium chloride  20 mEq/100 mL IVPB 20 milliEquivalent(s) IV Intermittent every 2 hours  vasopressin Infusion 0.04 Unit(s)/Min (6 mL/Hr) IV Continuous <Continuous>    MEDICATIONS  (PRN):      PAST MEDICAL & SURGICAL HISTORY:  CAD (coronary artery disease)  s/p CABG      Hypercholesteremia      Thrombus of left atrial appendage  2018      Ischemic cardiomyopathy      WILFRED (obstructive sleep apnea)  can not tolerate bipap at night      HTN (hypertension)      Atrial fibrillation  2018      CHF (congestive heart failure)  denies any recent exacerbation      Peripheral edema  chronic      Lichen planus  chronic ( b/L LE)      Atrial flutter  s/p ablation 2018      MI (myocardial infarction)  2012      Stented coronary artery  2019      AS (aortic stenosis)      Chronic kidney disease, unspecified CKD stage      ANGELICA (acute kidney injury)  4/2021      Morbid obesity      Status post placement of cardiac pacemaker  micraleadless PPM, [id number] last interrogation 7/6/2022      Osteoarthritis  shoulders, hips, knee      H/O scoliosis      Lower back pain      History of elbow surgery      S/P CABG (coronary artery bypass graft)  x3, 2012      Cardiac pacemaker  leadless 2018      History of coronary artery stent placement  2019 ( one more after cabg)      History of adenoidectomy      H/O atrioventricular karlee ablation  2018          Review of Systems  CONSTITUTIONAL: No weakness, fevers or chills  EYES/ENT: No visual changes;  No vertigo or throat pain   NECK: No pain or stiffness  RESPIRATORY: No cough, wheezing, hemoptysis; No shortness of breath  CARDIOVASCULAR: No chest pain or palpitations, (+) orthopnea, (+) exertional dyspnea   GASTROINTESTINAL: No abdominal or epigastric pain. No nausea, vomiting, or hematemesis; No diarrhea or constipation. No melena or hematochezia.  GENITOURINARY: No dysuria, frequency or hematuria  NEUROLOGICAL: No numbness or weakness  SKIN: No itching, burning, rashes, or lesions   All other review of systems is negative unless indicated above.    Physical Exam  General: A/ox 3, No acute Distress  Neck: Supple, NO JVD  Cardiac: S1 S2, III/VI RUSB murmur  Pulmonary: Diminished at bases bilaterally, No Rales/Wheezing  Abdomen: Soft, Non -tender, +BS x 4 quads  Extremities: No Rashes, venous stasis BLE with mild edema  Neuro: A/o x 3, No focal deficits    Vital Signs Last 24 Hrs  T(C): 36.7 (08 Apr 2025 03:00), Max: 37 (07 Apr 2025 14:00)  T(F): 98 (08 Apr 2025 03:00), Max: 98.6 (07 Apr 2025 14:00)  HR: 53 (08 Apr 2025 10:45) (48 - 62)  BP: 92/64 (08 Apr 2025 09:37) (92/64 - 92/64)  BP(mean): 75 (08 Apr 2025 09:37) (75 - 75)  RR: 27 (08 Apr 2025 10:45) (18 - 57)  SpO2: 100% (08 Apr 2025 10:45) (84% - 100%)    Parameters below as of 08 Apr 2025 10:00  Patient On (Oxygen Delivery Method): nasal cannula w/ humidification  O2 Flow (L/min): 5                          11.6   9.30  )-----------( 120      ( 08 Apr 2025 01:24 )             37.0     04-08    137  |  91[L]  |  100[H]  ----------------------------<  162[H]  3.7   |  31  |  3.60[H]    Ca    9.4      08 Apr 2025 06:49  Phos  4.4     04-08  Mg     1.9     04-08    TPro  7.2  /  Alb  3.3  /  TBili  1.3[H]  /  DBili  x   /  AST  14  /  ALT  25  /  AlkPhos  95  04-08      Telemetry: Sinus Bradycardia    EKG; < from: 12 Lead ECG (04.07.25 @ 00:07) >  Systolic  mmHg    Diastolic BP 69 mmHg    Ventricular Rate 57 BPM    Atrial Rate 57 BPM    P-R Interval 246 ms    QRS Duration 104 ms    Q-T Interval 518 ms    QTC Calculation(Bazett) 504 ms    P Axis 72 degrees    R Axis 123 degrees    T Axis 90 degrees    Diagnosis Line SINUS BRADYCARDIA WITH 1ST DEGREE A-V BLOCK  ANTEROLATERAL INFARCT , AGE UNDETERMINED  PROLONGED QT  ABNORMAL ECG  WHEN COMPARED WITH 05-APR-2025  NO SIGNIFICANT CHANGE WAS FOUND  Confirmed by Clarence MCDERMOTT, Rahul ([id number]) on 4/7/2025 10:17:23 AM    < end of copied text >    < from: STANISLAW W or WO Ultrasound Enhancing Agent (04.02.25 @ 14:46) >  TRANSESOPHAGEAL ECHOCARDIOGRAM REPORT  ________________________________________________________________________________                                      _______      Other     Pt. Name:       BERTHA WARD Study Date:    4/2/2025  MRN:            [id number]        YOB: 1960  Accession #:    [id number]         Age:           64 years  Account#:       [id number]      Gender:        M  Heart Rate:                       Height:        68.11 in (173.00 cm)  Rhythm:      Weight:        92.59 lb (42.00 kg)  Blood Pressure: 90/60 mmHg        BSA/BMI:       1.48 m² / 14.03 kg/m²  ________________________________________________________________________________________  Referring Physician:    [id number] Jamaal Beltran  Interpreting Physician: Rahat Carl M.D.  Primary Sonographer:    AM/BS  Fellow (Performing):    Mar Campos MD  Fellow (Interpreting):  Mar Campos MD    CPT:               ECHO 3D RECONSTRUCT W WORKSTATION - 28215.m;ECHO TRANSESOPH              W/O CON - 52777.m;DOPPLER ECHO COMP W SPECT - 23630.m;DOPPL                     ECHO COLOR FLOW - 96846.m  Indication(s):     Presence of prosthetic heart valve - Z95.2  Procedure:         Transesophageal echocardiogram performed with 2D,M-mode and                     complete spectral and color flow Doppler. Full volume                     3-dimensional reconstruction performed at the workstation.  Ordering Location: [facility]  Admission Status:  Inpatient  Study Information: Image quality for this study is excellent.    _______________________________________________________________________________________     CONCLUSIONS:      1. Multiple segmental abnormalities exist. See findings.   2. Left ventricular cavity is severely dilated.Global left ventricular hypokinesis.   3. Reduced right ventricular systolic function.   4. Left atrium is severely dilated.   5. The right atrium is dilated.   6. Mild to moderate mitral regurgitation.   7. No pericardial effusion seen.   8. No thrombus on TAVR valve. No significant paravalvular AR.   9. Compared to the transthoracic echocardiogram performed on 4/1/2025, there have been no significant interval changes. Findings were discussed with CCU team on 4/2/2025 at 3.40 pm.  10. Left pleural effusion noted.  11. Minimal (grade 1) spontaneous echo contrast located in the left atrial apendage and minimal (grade 1) spontaneous echo contrast located in the left atrium.  12. No evidence of left atrial or left atrial appendage thrombus. The left atrial appendage emptying velocity is low.  13. Severe pulmonary hypertension.    ____________________________________________________________________  STANISLAW Procedure:  After discussion of the risks and benefits of the STANISLAW, an informed consent wasobtained. Study was performed with anesthesia (please see anesthesia record).  Images were obtained with the patient in a supine position. Image quality was excellent. The patient's vital signs; including heart rate, blood pressure, and oxygen saturation; remained stable throughout the procedure. The patient tolerated the procedure well and without complications.     ________________________________________________________________________________________  FINDINGS:     Left Ventricle:  The left ventricular cavity is severely dilated. There is global left ventricular hypokinesis.  LV Wall Scoring: The entire apex, mid and apical anterior wall, mid and apical  inferior septum, and mid anterolateral segment are akinetic. The basal and mid  anterior septum, basal and mid inferior wall, basal and mid inferolateral wall,  basal inferoseptal segment, basal anterolateral segment, and basal anterior  segment are hypokinetic.          Right Ventricle:  The right ventricular cavity is enlarged in size and right ventricular systolic function is reduced.     Left Atrium:  The left atrium is severely dilated. There is minimal (grade 1) spontaneous echo contrast located in the left atrial apendage and minimal (grade 1) spontaneous echo contrast located in the left atrium. There is no evidence of left atrial or left atrial appendage thrombus. The left atrial appendage emptying velocity is low.     Right Atrium:  The right atrium is dilated.     Aortic Valve:  A a transcatheter deployed (TAVR) is present in the aortic position. The prosthetic valve has reduced leaflet opening. There is degeneration of the aortic valve prosthesis. There is severe prosthetic aortic stenosis. There is trace intravalvular regurgitation. There is no paravalvular regurgitation. The peak transaortic velocity is 4.29 m/s. The peak transaortic velocity is 4.29 m/s, peak transaortic gradient is 73.6 mmHg and mean transaortic gradient is 45.0 mmHg with an LVOT/aortic valve VTI ratio of 0.14. The aortic valve area is estimated at 0.49 cm² by the continuity equation.     Mitral Valve:  Structurally normal mitral valve with normal leaflet excursion. There is mild to moderate mitral regurgitation.     Tricuspid Valve:  Structurally normal tricuspid valve with normal leaflet excursion. Severe pulmonary hypertension.     Pericardium:  No pericardial effusion seen.     Pleura:  Left pleural effusion noted.    < end of copied text >

## 2025-04-08 NOTE — PROGRESS NOTE ADULT - SUBJECTIVE AND OBJECTIVE BOX
[facility] Division of Kidney Diseases & Hypertension  FOLLOW UP NOTE  [phone]--------------------------------------------------------------------------------  Chief Complaint:Acute on chronic systolic congestive heart failure    24 hour events/subjective:  Pt was seen and examined earlier today. No acute overnight events. No fresh complaints. Pt reports overall feeling better.   Pt denies any worsening of SOB/ Constipation/ Diarrhea/ Nausea/ Vomiting/ abdominal pain/ chest pain/ tingling/ numbness.     PAST HISTORY  --------------------------------------------------------------------------------  No significant changes to PMH, PSH, FHx, SHx, unless otherwise noted    ALLERGIES & MEDICATIONS  --------------------------------------------------------------------------------  Allergies    metoprolol (Short breath)    Intolerances      Standing Inpatient Medications  aMIOdarone    Tablet   Oral   aMIOdarone    Tablet 200 milliGRAM(s) Oral daily  atorvastatin 40 milliGRAM(s) Oral at bedtime  buMETAnide Infusion 4 mG/Hr IV Continuous <Continuous>  chlorhexidine 2% Cloths 1 Application(s) Topical daily  clopidogrel Tablet 75 milliGRAM(s) Oral daily  famotidine    Tablet 20 milliGRAM(s) Oral every 48 hours  hemorrhoidal Ointment 1 Application(s) Rectal daily  heparin  Infusion 1500 Unit(s)/Hr IV Continuous <Continuous>  influenza  Vaccine (HIGH DOSE) 0.5 milliLiter(s) IntraMuscular once  insulin lispro (ADMELOG) corrective regimen sliding scale   SubCutaneous Before meals and at bedtime  melatonin 5 milliGRAM(s) Oral at bedtime  potassium chloride  20 mEq/100 mL IVPB 20 milliEquivalent(s) IV Intermittent every 2 hours  vasopressin Infusion 0.04 Unit(s)/Min IV Continuous <Continuous>    PRN Inpatient Medications      REVIEW OF SYSTEMS  --------------------------------------------------------------------------------  as noted above.     VITALS/PHYSICAL EXAM  --------------------------------------------------------------------------------  T(C): 36.7 (04-08-25 @ 03:00), Max: 37 (04-07-25 @ 14:00)  HR: 53 (04-08-25 @ 10:45) (48 - 62)  BP: 92/64 (04-08-25 @ 09:37) (92/64 - 92/64)  RR: 27 (04-08-25 @ 10:45) (18 - 57)  SpO2: 100% (04-08-25 @ 10:45) (84% - 100%)  Wt(kg): --        04-07-25 @ 07:01  -  04-08-25 @ 07:00  --------------------------------------------------------  IN: 2345.5 mL / OUT: 4665 mL / NET: -2319.5 mL    04-08-25 @ 07:01  -  04-08-25 @ 11:51  --------------------------------------------------------  IN: 203 mL / OUT: 375 mL / NET: -172 mL      Physical Exam:  Gen: NAD on supplemental oxygen.   Pulm: No crackles   CV: RRR, S1S2;  Abd: +BS, soft, nontender/nondistended  : Alba in place.   Extremities: Mild LE swelling  Skin: Warm, Lower extremity chronic changes.    LABS/STUDIES  --------------------------------------------------------------------------------              11.6   9.30  >-----------<  120      [04-08-25 @ 01:24]              37.0     137  |  91  |  100  ----------------------------<  162      [04-08-25 @ 06:49]  3.7   |  31  |  3.60        Ca     9.4     [04-08-25 @ 06:49]      Mg     1.9     [04-08-25 @ 06:49]      Phos  4.4     [04-08-25 @ 06:49]    TPro  7.2  /  Alb  3.3  /  TBili  1.3  /  DBili  x   /  AST  14  /  ALT  25  /  AlkPhos  95  [04-08-25 @ 06:49]    PT/INR: PT 13.7 , INR 1.19       [04-08-25 @ 01:24]  PTT: 76.1       [04-08-25 @ 01:24]      Creatinine Trend:  SCr 3.60 [04-08 @ 06:49]  SCr 3.89 [04-08 @ 01:24]  SCr 4.02 [04-07 @ 16:53]  SCr 4.00 [04-07 @ 12:57]  SCr 4.20 [04-07 @ 06:46]    TSH 3.15      [04-01-25 @ 21:43]

## 2025-04-08 NOTE — PROGRESS NOTE ADULT - PROBLEM SELECTOR PLAN 1
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 Montelongo, known CAD unchanged from prior angiogram  Cardiac CT reviewed from prior TAVR in 2022, we will NOT repeat CT given worsening renal function  Plan for [place] TAVR on Friday 4/11    BRODERICK Gordon NP  [id number]  TEAMS

## 2025-04-08 NOTE — PROGRESS NOTE ADULT - ASSESSMENT
63 yo M with a fib (s/p dccv 3/25/25), CHF (likely moderately reduced EF, several TTE with poor windows), aortic stenosis s/p TAVR (11/2022), a flutter s/p ablation 2019 and s/p micra implant,  CKD3, obesity, venous stasis, HTN, HLD, who presents with 6 days of worsening dyspnea on exertion, admitted for AHRF likely iso CHF exacerbation, also with ANGELICA on CKD c/f cardiorenal syndrome. RRT on floors for SBP 80s requiring pressors and has been admitted to the CCU. HF mostly likely i/so severe AS. Pt to get structural intervention.

## 2025-04-08 NOTE — PROGRESS NOTE ADULT - SUBJECTIVE AND OBJECTIVE BOX
Patient is a 64y old  Male who presents with a chief complaint of dyspnea , chest pain (2025 12:03)    HPI:   65 yo M with a fib (s/p dccv 3/25/25), CHF (likely moderately reduced EF, several TTE with poor windows), aortic stenosis s/p TAVR (2022), CAD s/p CABG, a flutter s/p ablation 2019 and s/p micra implant,  CKD3, obesity, venous stasis, HTN, HLD, who presents with 6 days of worsening dyspnea on exertion. Pt also reports associated orthopnea and chest pain on exertion but not at rest which is located in the center of his chest. Pain is described as burning. Also reports several days decreased appetite. Denies fevers, chills, cough, abdominal pain, NVD, urinary sx, leg swelling. Patient had a cardioversion for atrial fibrillation at this hospital recently, which was reportedly unsuccessful. Patient has a scheduled appointment with Dr. Philip on 2025 to discuss possible ablation. His outpt cardiologist is Dr. Paul.     On arrival to ED, vitals were 97.3F, HR 71, BP 94/57, RR 22, 97% on 2L NC. Labs pertinent for WBC 10.68, INR 2.55, Na 132, K 5.5, bicarb 20, BUN/Cr 78/5.17 (baseline Cr appears ~1.5-1.8), trop 225, BNP 65034, VBG ph 7.3, pco2/po2 wnl. UA without evidence of infection. EKG  on admission 1st deg av block, low voltage, poor r wave progression, R axis deviation CXR with diffuse hazy opacities suggest mild pulmonary edema and small right and trace left pleural effusions. Pt was given IV Bumex 1mg x1, IV bumex 2mg x1, IV Bumex 4mg x1, and lokelma 5mg x1, then admitted for further management.    (2025 01:59)       INTERVAL HPI/OVERNIGHT EVENTS:   No overnight events   Afebrile, hemodynamically stable     Subjective:    ICU Vital Signs Last 24 Hrs  T(C): 36.7 (2025 03:00), Max: 37 (2025 14:00)  T(F): 98 (2025 03:00), Max: 98.6 (2025 14:00)  HR: 53 (2025 10:45) (48 - 59)  BP: 92/64 (2025 09:37) (92/64 - 92/64)  BP(mean): 75 (2025 09:37) (75 - 75)  ABP: 96/56 (2025 10:45) (85/50 - 122/78)  ABP(mean): 71 (2025 10:45) (63 - 94)  RR: 27 (2025 10:45) (18 - 53)  SpO2: 100% (2025 10:45) (84% - 100%)    O2 Parameters below as of 2025 10:00  Patient On (Oxygen Delivery Method): nasal cannula w/ humidification  O2 Flow (L/min): 5        I&O's Summary    2025 07:01  -  2025 07:00  --------------------------------------------------------  IN: 2345.5 mL / OUT: 4665 mL / NET: -2319.5 mL    2025 07:01  -  2025 13:19  --------------------------------------------------------  IN: 203 mL / OUT: 375 mL / NET: -172 mL          Daily     Daily Weight in k.3 (2025 03:00)      EKG/Telemetry Events:    MEDICATIONS  (STANDING):  aMIOdarone    Tablet   Oral   aMIOdarone    Tablet 200 milliGRAM(s) Oral daily  atorvastatin 40 milliGRAM(s) Oral at bedtime  buMETAnide Infusion 4 mG/Hr (20 mL/Hr) IV Continuous <Continuous>  chlorhexidine 2% Cloths 1 Application(s) Topical daily  clopidogrel Tablet 75 milliGRAM(s) Oral daily  famotidine    Tablet 20 milliGRAM(s) Oral every 48 hours  hemorrhoidal Ointment 1 Application(s) Rectal daily  heparin  Infusion 1500 Unit(s)/Hr (18 mL/Hr) IV Continuous <Continuous>  influenza  Vaccine (HIGH DOSE) 0.5 milliLiter(s) IntraMuscular once  insulin lispro (ADMELOG) corrective regimen sliding scale   SubCutaneous Before meals and at bedtime  melatonin 5 milliGRAM(s) Oral at bedtime  potassium chloride  20 mEq/100 mL IVPB 20 milliEquivalent(s) IV Intermittent every 2 hours  vasopressin Infusion 0.04 Unit(s)/Min (6 mL/Hr) IV Continuous <Continuous>    MEDICATIONS  (PRN):      PHYSICAL EXAM:  General: Well-appearing, NAD  HEENT: PERRL, EOMI, normal sclera and conjunctiva, normal oropharynx  Neck: Supple, no JVD, thyroid without masses or enlargement  Chest/Lungs: +nasal cannula. CTA bilaterally, no wheezing, rales, rhonchi or rub  Heart: RRR, normal S1, S2, no murmurs or gallops  Abdomen: Soft, ND, NTTP, normoactive bowel sounds  Extremities: +lichen planus bilaterally. +pitting edema. 2+ peripheral pulses b/l, no edema, clubbing or cyanosis  Skin: +lichen planus bilaterally  Neurological: A&Ox3, moves all extremities, no focal deficits  LABS:                        11.6   9.30  )-----------( 120      ( 2025 01:24 )             37.0     04-08    137  |  91[L]  |  100[H]  ----------------------------<  162[H]  3.7   |  31  |  3.60[H]    Ca    9.4      2025 06:49  Phos  4.4     04-08  Mg     1.9     04-08    TPro  7.2  /  Alb  3.3  /  TBili  1.3[H]  /  DBili  x   /  AST  14  /  ALT  25  /  AlkPhos  95  04-08    LIVER FUNCTIONS - ( 2025 06:49 )  Alb: 3.3 g/dL / Pro: 7.2 g/dL / ALK PHOS: 95 U/L / ALT: 25 U/L / AST: 14 U/L / GGT: x           PT/INR - ( 2025 01:24 )   PT: 13.7 sec;   INR: 1.19 ratio         PTT - ( 2025 01:24 )  PTT:76.1 sec  CAPILLARY BLOOD GLUCOSE      POCT Blood Glucose.: 140 mg/dL (2025 09:41)  POCT Blood Glucose.: 140 mg/dL (2025 16:23)    ABG - ( 2025 06:41 )  pH, Arterial: 7.39  pH, Blood: x     /  pCO2: 68    /  pO2: 171   / HCO3: 41    / Base Excess: 13.4  /  SaO2: 99.6                    Urinalysis Basic - ( 2025 06:49 )    Color: x / Appearance: x / SG: x / pH: x  Gluc: 162 mg/dL / Ketone: x  / Bili: x / Urobili: x   Blood: x / Protein: x / Nitrite: x   Leuk Esterase: x / RBC: x / WBC x   Sq Epi: x / Non Sq Epi: x / Bacteria: x          RADIOLOGY & ADDITIONAL TESTS:  CXR:        Care Discussed with Consultants/Other Providers [ x] YES  [ ] NO

## 2025-04-08 NOTE — PROGRESS NOTE ADULT - ASSESSMENT
63 yo M with a fib (s/p dccv 3/25/25), CHF (likely moderately reduced EF, several TTE with poor windows), aortic stenosis s/p TAVR (11/2022), a flutter s/p ablation 2019 and s/p micra implant,  CKD3, obesity, venous stasis, HTN, HLD, who presents with 6 days of worsening dyspnea on exertion, admitted for AHRF likely iso CHF exacerbation, also with ANGELICA on CKD c/f cardiorenal syndrome. RRT on floors for SBP 80s requiring pressors and has been admitted to the CCU. HF mostly likely i/so severe AS. Pt to get structural intervention.     Plan:  ===NEURO===  - AO x 3    ===RESPIRATORY===  #Acute hypoxemic hypercapnic respiratory failure  - Likely retaining 2/2 volume overload  - c/w bumex 4mg drip for elevated CVP and increased O2 req  - c/w BIPAP for resp distress, 6L NC when off BIPAP     ===CARDIOVASCULAR===  #Obstructive shock 2/2 AS  - Off levophed  - MAPs 70s -> will continue to wean vasopressin  - If need more pressor support will switch to phenylephrine as pt has LVOT obstruction from aortic stenosis   - Wean as tolerated    #Acute on chronic HF exacerbation  - Bumex increased to 4mg drip overnight for low urine output - augment with below  - Goal 1-2L net neg daily  - CVP goal 8-12  - Diuril, 3% hypertonic, and metolazone PRN   - Will be careful of overdiuresis given severe AS and preload dependant     #Afib  #First degree block  - PO amio load   - c/w heparin    #Aortic valve stenosis s/p prosthetic  - S/p STANISLAW showing severe AS stenosis, global hypokinesis and EF 15%  - Structural plans for valve in valve replacement  - Structural wants cath and CT w/ contrast before procedure  - Awaiting GFR improvement    #Troponemia  - No ischemic changes on EKG  - Most likely i/s/o ANGELICA on CKD    #Hx CABG  #PVD  - C/w Plavix    #HLD  - Increased Lipitor to 40mg from 20mg    ===/RENAL===  #ANGELICA on CKD  #Cardiorenal syndrome  - Baseline 1.5-1.8  - Most likely i/s/o hypotension and fluid overload  - Renal sono showed parenchymal disease  - Nephro following c/w bumex gtt, monitor volume status. Stop sodium bicarb 650 TID for now 4/7    #Hyperkalemia  - Lokelma if K increases into 5s range    ===GI/NUTRITION===  - Failed bedside SS  - S&S consulted -> recommends FEES    ===SKIN===  - Hx lichen planus  - No other concerns    ===INFECTIOUS DISEASE===  - Slightly elevated WBC on admission 10.5K  - Afebrile  - Urine culture -> neg    ===ENDOCRINE===  - No concerns  - A1c 5.8  - TSH wnl    ===HEMATOLOGIC/DVT PPx===  - On heparin ggt    Dispo: Maintain in ICU.    Patient requires continuous monitoring with bedside rhythm monitoring, arterial line, pulse oximetry, ventilator monitoring and intermittent blood gas analysis.  Care plan discussed with ICU care team.  I have spent 35 minutes providing critical care, in addition to initial critical time provided by CICU attending    Dr. Maldonado     , re-evaluated multiple times during the day.    Raquel Leon PA-C  63 yo M with a fib (s/p dccv 3/25/25), CHF (likely moderately reduced EF, several TTE with poor windows), aortic stenosis s/p TAVR (11/2022), a flutter s/p ablation 2019 and s/p micra implant,  CKD3, obesity, venous stasis, HTN, HLD, who presents with 6 days of worsening dyspnea on exertion, admitted for AHRF likely iso CHF exacerbation, also with ANGELICA on CKD c/f cardiorenal syndrome. RRT on floors for SBP 80s requiring pressors and has been admitted to the CCU. HF mostly likely i/so severe AS. Pt to get structural intervention.     Plan:  ===NEURO===  - AO x 3    ===RESPIRATORY===  #Acute hypoxemic hypercapnic respiratory failure  - Likely retaining 2/2 volume overload  - c/w bumex 4mg drip for elevated CVP and increased O2 req  - c/w BIPAP for resp distress, 6L NC when off BIPAP     ===CARDIOVASCULAR===  #Obstructive shock 2/2 AS  - Off levophed  - MAPs 70s -> will continue to wean vasopressin  - If need more pressor support will switch to phenylephrine as pt has LVOT obstruction from aortic stenosis   - Wean as tolerated    #Acute on chronic HF exacerbation  - Bumex increased to 4mg drip overnight for low urine output - augment with below  - Goal 1-2L net neg daily  - CVP goal 8-12  - Diuril, 3% hypertonic, and metolazone PRN   - Will be careful of overdiuresis given severe AS and preload dependant     #Afib  #First degree block  - PO amio load   - heparin to restart 6 hours after cath    #Aortic valve stenosis s/p prosthetic  - S/p STANISLAW showing severe AS stenosis, global hypokinesis and EF 15%  - Structural plans for valve in valve replacement  - Structural wants cath and CT w/ contrast before procedure  - Awaiting GFR improvement    #Troponemia  - No ischemic changes on EKG  - Most likely i/s/o ANGELICA on CKD    #Hx CABG  #PVD  - C/w Plavix    #HLD  - Increased Lipitor to 40mg from 20mg    ===/RENAL===  #ANGELICA on CKD  #Cardiorenal syndrome  - Baseline 1.5-1.8  - Most likely i/s/o hypotension and fluid overload  - Renal sono showed parenchymal disease  - Nephro following c/w bumex gtt, monitor volume status. Stop sodium bicarb 650 TID for now 4/7    #Hyperkalemia  - Lokelma if K increases into 5s range    ===GI/NUTRITION===  - Failed bedside SS  - S&S consulted -> recommends FEES    ===SKIN===  - Hx lichen planus  - No other concerns    ===INFECTIOUS DISEASE===  - Slightly elevated WBC on admission 10.5K  - Afebrile  - Urine culture -> neg    ===ENDOCRINE===  - No concerns  - A1c 5.8  - TSH wnl    ===HEMATOLOGIC/DVT PPx===  - On heparin ggt    Dispo: Maintain in ICU.    Patient requires continuous monitoring with bedside rhythm monitoring, arterial line, pulse oximetry, ventilator monitoring and intermittent blood gas analysis.  Care plan discussed with ICU care team.  I have spent 35 minutes providing critical care, in addition to initial critical time provided by CICU attending    Dr. Maldonado     , re-evaluated multiple times during the day.    Raquel Leon PA-C

## 2025-04-08 NOTE — PROGRESS NOTE ADULT - ASSESSMENT
65 y/o M with hx of Afib s/p DCCV 3/25/25, CHF, Aortic stenosis s/p TAVR 11/2022, CAD s/p CABG, Aflutter s/p ablation and Micra implant, CKD 3, Obesity, venous stasis, HTN, HLD presented with 6 days of worsening SOB on exertion associated with orthopnea and chest pain.   Also reports several days decreased appetite. Denies fevers, chills, cough, abdominal pain, NVD, urinary sx, leg swelling. Patient had a cardioversion for atrial fibrillation at this hospital recently, which was reportedly unsuccessful. Patient has a scheduled appointment with Dr. Philip on 04/24/2025 to discuss possible ablation. His outpt cardiologist is Dr. Miranda. (Cardiology)  On arrival to ED, vitals were 97.3F, HR 71, BP 94/57, RR 22, 97% on 2L NC. Labs pertinent for WBC 10.68, INR 2.55, Na 132, K 5.5, bicarb 20, BUN/Cr 78/5.17 (baseline Cr appears ~1.5-1.8), trop 225, BNP 68790, VBG ph 7.3, pco2/po2 wnl. UA without evidence of infection.  nephrology was consulted for management of ANGELICA on CKD. Pt has baseline Scr of ~1.5- 1.8. Scr was 2.7 on 3/25/25. Pt is on Entresto ( started recently) and he mentioned that he was taking Motrin occasionally. No herbal medications/ supplements.  Scr at the time of admission was 5.17 3/31/25 and it further worsened to 5.45 4/1/25.    Pt follows Dr. Stewart Brown (Nephrology)    ANGELICA on CKD:  Pt has hx of CKD 3. Baseline Scr 1.5-1.8.   At the time of admission - Scr was 5.17 and it further worsened to 5.45--> 5.5. Scr elevated/improving to 4.2 today.   Pt has lopez cath in place. UOP was 4.8L with net negative 2.4L in last 24 hours.   Bumex gtt - restarted 4/4/25 - now on 4mg/hr.   UA showed trace LE, blood ( Likely due to lopez), casts 17. UPCR 0.7. No hx of DM.   Pt was on entresto.   BP was low.   On IV vasopressin.   ANGELICA on CKD likely in setting of HF exacerbation, entresto use and hypotension mediated ATN.  USG kidney and bladder - normal sized kidneys. No HN.     PLAN:  Plan for LHC and CT with IV contrast as per structural heart disease team. Pt is on bumex gtt. Hold it before and after procedure at least 4 hours ( depending on volume and respiratory status)  Bicarb is trending up. Consider decreasing bumex gtt to 2mg/hr.   continue to hold entresto.   Monitor strict I/Os and daily wts.   No urgent need of HD for now.    Avoid nephrotoxins  Dose medications as per eGFR    hyperkalemia: resolved  Today Serum K is 3.7. Monitor closely - replete as needed.     Hyponatremia is likely in setting of impaired water clearance in setting of worsening renal function and volume overload 2/2 heart failure  resolved now.   Bumex as noted above    Pt is at high risk of LISA. Pt was explained in detail about the risk of LISA and possible need of dialysis if the kidney function gets worse.   Enoch score 26.1 %- Risk of post PCI - LISA and 1.09% risk of requiring dialysis.     Wait for the final reccs as per the attending.

## 2025-04-09 LAB
ALBUMIN SERPL ELPH-MCNC: 3.4 G/DL — SIGNIFICANT CHANGE UP (ref 3.3–5)
ALBUMIN SERPL ELPH-MCNC: 3.4 G/DL — SIGNIFICANT CHANGE UP (ref 3.3–5)
ALBUMIN SERPL ELPH-MCNC: 3.5 G/DL — SIGNIFICANT CHANGE UP (ref 3.3–5)
ALBUMIN SERPL ELPH-MCNC: 3.6 G/DL — SIGNIFICANT CHANGE UP (ref 3.3–5)
ALP SERPL-CCNC: 93 U/L — SIGNIFICANT CHANGE UP (ref 40–120)
ALP SERPL-CCNC: 94 U/L — SIGNIFICANT CHANGE UP (ref 40–120)
ALP SERPL-CCNC: 96 U/L — SIGNIFICANT CHANGE UP (ref 40–120)
ALP SERPL-CCNC: 98 U/L — SIGNIFICANT CHANGE UP (ref 40–120)
ALT FLD-CCNC: 17 U/L — SIGNIFICANT CHANGE UP (ref 10–45)
ALT FLD-CCNC: 19 U/L — SIGNIFICANT CHANGE UP (ref 10–45)
ALT FLD-CCNC: 19 U/L — SIGNIFICANT CHANGE UP (ref 10–45)
ALT FLD-CCNC: 21 U/L — SIGNIFICANT CHANGE UP (ref 10–45)
ANION GAP SERPL CALC-SCNC: 13 MMOL/L — SIGNIFICANT CHANGE UP (ref 5–17)
ANION GAP SERPL CALC-SCNC: 13 MMOL/L — SIGNIFICANT CHANGE UP (ref 5–17)
ANION GAP SERPL CALC-SCNC: 14 MMOL/L — SIGNIFICANT CHANGE UP (ref 5–17)
ANION GAP SERPL CALC-SCNC: 18 MMOL/L — HIGH (ref 5–17)
APTT BLD: 58.6 SEC — HIGH (ref 24.5–35.6)
AST SERPL-CCNC: 10 U/L — SIGNIFICANT CHANGE UP (ref 10–40)
AST SERPL-CCNC: 12 U/L — SIGNIFICANT CHANGE UP (ref 10–40)
AST SERPL-CCNC: 13 U/L — SIGNIFICANT CHANGE UP (ref 10–40)
AST SERPL-CCNC: 7 U/L — LOW (ref 10–40)
BILIRUB SERPL-MCNC: 1.2 MG/DL — SIGNIFICANT CHANGE UP (ref 0.2–1.2)
BILIRUB SERPL-MCNC: 1.3 MG/DL — HIGH (ref 0.2–1.2)
BILIRUB SERPL-MCNC: 1.4 MG/DL — HIGH (ref 0.2–1.2)
BILIRUB SERPL-MCNC: 1.5 MG/DL — HIGH (ref 0.2–1.2)
BLD GP AB SCN SERPL QL: NEGATIVE — SIGNIFICANT CHANGE UP
BUN SERPL-MCNC: 101 MG/DL — HIGH (ref 7–23)
BUN SERPL-MCNC: 105 MG/DL — HIGH (ref 7–23)
BUN SERPL-MCNC: 89 MG/DL — HIGH (ref 7–23)
BUN SERPL-MCNC: 94 MG/DL — HIGH (ref 7–23)
CALCIUM SERPL-MCNC: 9.3 MG/DL — SIGNIFICANT CHANGE UP (ref 8.4–10.5)
CALCIUM SERPL-MCNC: 9.3 MG/DL — SIGNIFICANT CHANGE UP (ref 8.4–10.5)
CALCIUM SERPL-MCNC: 9.5 MG/DL — SIGNIFICANT CHANGE UP (ref 8.4–10.5)
CALCIUM SERPL-MCNC: 9.6 MG/DL — SIGNIFICANT CHANGE UP (ref 8.4–10.5)
CHLORIDE SERPL-SCNC: 87 MMOL/L — LOW (ref 96–108)
CHLORIDE SERPL-SCNC: 88 MMOL/L — LOW (ref 96–108)
CHLORIDE SERPL-SCNC: 88 MMOL/L — LOW (ref 96–108)
CHLORIDE SERPL-SCNC: 90 MMOL/L — LOW (ref 96–108)
CO2 SERPL-SCNC: 30 MMOL/L — SIGNIFICANT CHANGE UP (ref 22–31)
CO2 SERPL-SCNC: 33 MMOL/L — HIGH (ref 22–31)
CO2 SERPL-SCNC: 33 MMOL/L — HIGH (ref 22–31)
CO2 SERPL-SCNC: 34 MMOL/L — HIGH (ref 22–31)
CREAT SERPL-MCNC: 3.32 MG/DL — HIGH (ref 0.5–1.3)
CREAT SERPL-MCNC: 3.37 MG/DL — HIGH (ref 0.5–1.3)
CREAT SERPL-MCNC: 3.54 MG/DL — HIGH (ref 0.5–1.3)
CREAT SERPL-MCNC: 3.57 MG/DL — HIGH (ref 0.5–1.3)
EGFR: 18 ML/MIN/1.73M2 — LOW
EGFR: 19 ML/MIN/1.73M2 — LOW
EGFR: 19 ML/MIN/1.73M2 — LOW
EGFR: 20 ML/MIN/1.73M2 — LOW
EGFR: 20 ML/MIN/1.73M2 — LOW
GAS PNL BLDA: SIGNIFICANT CHANGE UP
GLUCOSE BLDC GLUCOMTR-MCNC: 131 MG/DL — HIGH (ref 70–99)
GLUCOSE BLDC GLUCOMTR-MCNC: 148 MG/DL — HIGH (ref 70–99)
GLUCOSE BLDC GLUCOMTR-MCNC: 160 MG/DL — HIGH (ref 70–99)
GLUCOSE BLDC GLUCOMTR-MCNC: 171 MG/DL — HIGH (ref 70–99)
GLUCOSE SERPL-MCNC: 128 MG/DL — HIGH (ref 70–99)
GLUCOSE SERPL-MCNC: 133 MG/DL — HIGH (ref 70–99)
GLUCOSE SERPL-MCNC: 149 MG/DL — HIGH (ref 70–99)
GLUCOSE SERPL-MCNC: 174 MG/DL — HIGH (ref 70–99)
HCT VFR BLD CALC: 37.6 % — LOW (ref 39–50)
HGB BLD-MCNC: 11.6 G/DL — LOW (ref 13–17)
INR BLD: 1.14 RATIO — SIGNIFICANT CHANGE UP (ref 0.85–1.16)
MAGNESIUM SERPL-MCNC: 2.2 MG/DL — SIGNIFICANT CHANGE UP (ref 1.6–2.6)
MAGNESIUM SERPL-MCNC: 2.2 MG/DL — SIGNIFICANT CHANGE UP (ref 1.6–2.6)
MAGNESIUM SERPL-MCNC: 2.3 MG/DL — SIGNIFICANT CHANGE UP (ref 1.6–2.6)
MAGNESIUM SERPL-MCNC: 2.4 MG/DL — SIGNIFICANT CHANGE UP (ref 1.6–2.6)
MCHC RBC-ENTMCNC: 30.4 PG — SIGNIFICANT CHANGE UP (ref 27–34)
MCHC RBC-ENTMCNC: 30.9 G/DL — LOW (ref 32–36)
MCV RBC AUTO: 98.7 FL — SIGNIFICANT CHANGE UP (ref 80–100)
NRBC BLD AUTO-RTO: 0 /100 WBCS — SIGNIFICANT CHANGE UP (ref 0–0)
PHOSPHATE SERPL-MCNC: 4.5 MG/DL — SIGNIFICANT CHANGE UP (ref 2.5–4.5)
PHOSPHATE SERPL-MCNC: 4.6 MG/DL — HIGH (ref 2.5–4.5)
PLATELET # BLD AUTO: 119 K/UL — LOW (ref 150–400)
POTASSIUM SERPL-MCNC: 3.5 MMOL/L — SIGNIFICANT CHANGE UP (ref 3.5–5.3)
POTASSIUM SERPL-MCNC: 3.7 MMOL/L — SIGNIFICANT CHANGE UP (ref 3.5–5.3)
POTASSIUM SERPL-MCNC: 3.8 MMOL/L — SIGNIFICANT CHANGE UP (ref 3.5–5.3)
POTASSIUM SERPL-MCNC: 4 MMOL/L — SIGNIFICANT CHANGE UP (ref 3.5–5.3)
POTASSIUM SERPL-SCNC: 3.5 MMOL/L — SIGNIFICANT CHANGE UP (ref 3.5–5.3)
POTASSIUM SERPL-SCNC: 3.7 MMOL/L — SIGNIFICANT CHANGE UP (ref 3.5–5.3)
POTASSIUM SERPL-SCNC: 3.8 MMOL/L — SIGNIFICANT CHANGE UP (ref 3.5–5.3)
POTASSIUM SERPL-SCNC: 4 MMOL/L — SIGNIFICANT CHANGE UP (ref 3.5–5.3)
PROT SERPL-MCNC: 7.3 G/DL — SIGNIFICANT CHANGE UP (ref 6–8.3)
PROT SERPL-MCNC: 7.3 G/DL — SIGNIFICANT CHANGE UP (ref 6–8.3)
PROT SERPL-MCNC: 7.4 G/DL — SIGNIFICANT CHANGE UP (ref 6–8.3)
PROT SERPL-MCNC: 7.5 G/DL — SIGNIFICANT CHANGE UP (ref 6–8.3)
PROTHROM AB SERPL-ACNC: 13 SEC — SIGNIFICANT CHANGE UP (ref 9.9–13.4)
RBC # BLD: 3.81 M/UL — LOW (ref 4.2–5.8)
RBC # FLD: 16.3 % — HIGH (ref 10.3–14.5)
RH IG SCN BLD-IMP: POSITIVE — SIGNIFICANT CHANGE UP
SODIUM SERPL-SCNC: 133 MMOL/L — LOW (ref 135–145)
SODIUM SERPL-SCNC: 135 MMOL/L — SIGNIFICANT CHANGE UP (ref 135–145)
SODIUM SERPL-SCNC: 136 MMOL/L — SIGNIFICANT CHANGE UP (ref 135–145)
SODIUM SERPL-SCNC: 137 MMOL/L — SIGNIFICANT CHANGE UP (ref 135–145)
WBC # BLD: 9.83 K/UL — SIGNIFICANT CHANGE UP (ref 3.8–10.5)
WBC # FLD AUTO: 9.83 K/UL — SIGNIFICANT CHANGE UP (ref 3.8–10.5)

## 2025-04-09 PROCEDURE — 71045 X-RAY EXAM CHEST 1 VIEW: CPT | Mod: 26

## 2025-04-09 PROCEDURE — 99291 CRITICAL CARE FIRST HOUR: CPT

## 2025-04-09 PROCEDURE — 99233 SBSQ HOSP IP/OBS HIGH 50: CPT | Mod: GC

## 2025-04-09 PROCEDURE — 99233 SBSQ HOSP IP/OBS HIGH 50: CPT

## 2025-04-09 PROCEDURE — 93010 ELECTROCARDIOGRAM REPORT: CPT

## 2025-04-09 RX ORDER — TRAZODONE HCL 100 MG
25 TABLET ORAL ONCE
Refills: 0 | Status: COMPLETED | OUTPATIENT
Start: 2025-04-09 | End: 2025-04-09

## 2025-04-09 RX ADMIN — INSULIN LISPRO 1: 100 INJECTION, SOLUTION INTRAVENOUS; SUBCUTANEOUS at 16:40

## 2025-04-09 RX ADMIN — CLOPIDOGREL BISULFATE 75 MILLIGRAM(S): 75 TABLET, FILM COATED ORAL at 11:58

## 2025-04-09 RX ADMIN — Medication 100 MILLIEQUIVALENT(S): at 08:43

## 2025-04-09 RX ADMIN — Medication 1 APPLICATION(S): at 21:37

## 2025-04-09 RX ADMIN — Medication 40 MILLIEQUIVALENT(S): at 13:18

## 2025-04-09 RX ADMIN — BUMETANIDE 20 MG/HR: 1 TABLET ORAL at 20:07

## 2025-04-09 RX ADMIN — Medication 100 MILLIEQUIVALENT(S): at 06:00

## 2025-04-09 RX ADMIN — VASOPRESSIN 6 UNIT(S)/MIN: 20 INJECTION INTRAVENOUS at 07:41

## 2025-04-09 RX ADMIN — Medication 25 MILLIGRAM(S): at 00:33

## 2025-04-09 RX ADMIN — AMIODARONE HYDROCHLORIDE 200 MILLIGRAM(S): 50 INJECTION, SOLUTION INTRAVENOUS at 08:43

## 2025-04-09 RX ADMIN — Medication 5 MILLIGRAM(S): at 21:37

## 2025-04-09 RX ADMIN — PHENYLEPHRINE HYDROCHLORIDE AND FAT, HARD 1 APPLICATION(S): .00525; 1.86 SUPPOSITORY RECTAL at 11:58

## 2025-04-09 RX ADMIN — INSULIN LISPRO 1: 100 INJECTION, SOLUTION INTRAVENOUS; SUBCUTANEOUS at 12:14

## 2025-04-09 RX ADMIN — BUMETANIDE 20 MG/HR: 1 TABLET ORAL at 07:41

## 2025-04-09 RX ADMIN — HEPARIN SODIUM 18 UNIT(S)/HR: 1000 INJECTION INTRAVENOUS; SUBCUTANEOUS at 07:42

## 2025-04-09 RX ADMIN — ATORVASTATIN CALCIUM 40 MILLIGRAM(S): 80 TABLET, FILM COATED ORAL at 21:37

## 2025-04-09 RX ADMIN — HEPARIN SODIUM 18 UNIT(S)/HR: 1000 INJECTION INTRAVENOUS; SUBCUTANEOUS at 20:07

## 2025-04-09 RX ADMIN — VASOPRESSIN 6 UNIT(S)/MIN: 20 INJECTION INTRAVENOUS at 20:07

## 2025-04-09 NOTE — PROGRESS NOTE ADULT - SUBJECTIVE AND OBJECTIVE BOX
[facility] Division of Kidney Diseases & Hypertension  FOLLOW UP NOTE  [phone]--------------------------------------------------------------------------------  Chief Complaint:Acute on chronic systolic congestive heart failure    24 hour events/subjective:  Pt was seen and examined earlier today. No acute overnight events. No fresh complaints. Pt reports feeling well. Pt is on Bumex gtt and Vasopressin now. LHC was done on 4/8  Pt denies any worsening of SOB/ Constipation/ Diarrhea/ Nausea/ Vomiting/ abdominal pain/ chest pain/ tingling/ numbness.         PAST HISTORY  --------------------------------------------------------------------------------  No significant changes to PMH, PSH, FHx, SHx, unless otherwise noted    ALLERGIES & MEDICATIONS  --------------------------------------------------------------------------------  Allergies    metoprolol (Short breath)    Intolerances      Standing Inpatient Medications  aMIOdarone    Tablet   Oral   aMIOdarone    Tablet 200 milliGRAM(s) Oral daily  atorvastatin 40 milliGRAM(s) Oral at bedtime  buMETAnide Infusion 4 mG/Hr IV Continuous <Continuous>  chlorhexidine 2% Cloths 1 Application(s) Topical daily  clopidogrel Tablet 75 milliGRAM(s) Oral daily  famotidine    Tablet 20 milliGRAM(s) Oral every 48 hours  hemorrhoidal Ointment 1 Application(s) Rectal daily  heparin  Infusion 1500 Unit(s)/Hr IV Continuous <Continuous>  influenza  Vaccine (HIGH DOSE) 0.5 milliLiter(s) IntraMuscular once  insulin lispro (ADMELOG) corrective regimen sliding scale   SubCutaneous Before meals and at bedtime  melatonin 5 milliGRAM(s) Oral at bedtime  vasopressin Infusion 0.04 Unit(s)/Min IV Continuous <Continuous>    PRN Inpatient Medications      REVIEW OF SYSTEMS  --------------------------------------------------------------------------------  as noted above.     VITALS/PHYSICAL EXAM  --------------------------------------------------------------------------------  T(C): 36.3 (04-09-25 @ 07:00), Max: 36.6 (04-08-25 @ 23:00)  HR: 53 (04-09-25 @ 09:15) (48 - 74)  BP: --  RR: 24 (04-09-25 @ 09:15) (15 - 33)  SpO2: 100% (04-09-25 @ 09:15) (87% - 100%)  Wt(kg): --        04-08-25 @ 07:01  -  04-09-25 @ 07:00  --------------------------------------------------------  IN: 1937 mL / OUT: 3695 mL / NET: -1758 mL    04-09-25 @ 07:01  -  04-09-25 @ 09:39  --------------------------------------------------------  IN: 203 mL / OUT: 530 mL / NET: -327 mL      Physical Exam:  Gen: NAD on supplemental oxygen.   Pulm: No crackles, lower zone decreased breath sounds.   CV: RRR, S1S2;  Abd: +BS, soft, nontender/nondistended  : Alba in place.   Extremities: Mild LE swelling  Skin: Warm, Lower extremity chronic changes.      LABS/STUDIES  --------------------------------------------------------------------------------              11.6   9.83  >-----------<  119      [04-09-25 @ 00:59]              37.6     137  |  90  |  105  ----------------------------<  128      [04-09-25 @ 00:59]  3.8   |  33  |  3.54        Ca     9.6     [04-09-25 @ 00:59]      Mg     2.4     [04-09-25 @ 00:59]      Phos  4.5     [04-09-25 @ 00:59]    TPro  7.4  /  Alb  3.5  /  TBili  1.4  /  DBili  x   /  AST  12  /  ALT  21  /  AlkPhos  93  [04-09-25 @ 00:59]    PT/INR: PT 13.0 , INR 1.14       [04-09-25 @ 04:30]  PTT: 58.6       [04-09-25 @ 04:30]      Creatinine Trend:  SCr 3.54 [04-09 @ 00:59]  SCr 3.43 [04-08 @ 17:26]  SCr 3.60 [04-08 @ 06:49]  SCr 3.89 [04-08 @ 01:24]  SCr 4.02 [04-07 @ 16:53]

## 2025-04-09 NOTE — PROGRESS NOTE ADULT - ASSESSMENT
63 yo M with a fib (s/p dccv 3/25/25), CHF (likely moderately reduced EF, several TTE with poor windows), aortic stenosis s/p TAVR (11/2022), a flutter s/p ablation 2019 and s/p micra implant,  CKD3, obesity, venous stasis, HTN, HLD, who presents with 6 days of worsening dyspnea on exertion, admitted for AHRF likely iso CHF exacerbation, also with ANGELICA on CKD c/f cardiorenal syndrome. RRT on floors for SBP 80s requiring pressors and has been admitted to the CCU. HF mostly likely i/so severe AS. Pt to get structural intervention.     Plan:  ===NEURO===  - AO x 3    ===RESPIRATORY===  #Acute hypoxemic hypercapnic respiratory failure  - Likely retaining 2/2 volume overload  - c/w bumex 4mg drip for elevated CVP and increased O2 req  - c/w BIPAP for resp distress, 6L NC when off BIPAP     ===CARDIOVASCULAR===  #Obstructive shock 2/2 AS  - Off levophed  - MAPs 70s -> will continue to wean vasopressin .09  - If need more pressor support will switch to phenylephrine as pt has LVOT obstruction from aortic stenosis   - Wean as tolerated    #Acute on chronic HF exacerbation  - Bumex increased to 4mg drip overnight for low urine output - augment with below  - Goal 1-2L net neg daily  - CVP goal 8-12  - Diuril, 3% hypertonic, and metolazone PRN   - Will be careful of overdiuresis given severe AS and preload dependant     #Afib  #First degree block  - PO amio load   - heparin to restart 6 hours after cath    #Aortic valve stenosis s/p prosthetic  - S/p STANISLAW showing severe AS stenosis, global hypokinesis and EF 15%  - Structural plans for valve in valve replacement  - Structural wants cath and CT w/ contrast before procedure  - 4/8 patent LIMA-LAD, closed SVG-OM1/D1, pending TAVR 4/11    #Troponemia  - No ischemic changes on EKG  - Most likely i/s/o ANGELICA on CKD    #Hx CABG  #PVD  - C/w Plavix    #HLD  - Increased Lipitor to 40mg from 20mg    ===/RENAL===  #ANGELICA on CKD  #Cardiorenal syndrome  - Baseline 1.5-1.8  - Most likely i/s/o hypotension and fluid overload  - Renal sono showed parenchymal disease  - Nephro following c/w bumex gtt, monitor volume status. Stop sodium bicarb 650 TID for now 4/7    #Hyperkalemia  - Lokelma if K increases into 5s range    ===GI/NUTRITION===  - Failed bedside SS  - S&S consulted -> recommends FEES    ===SKIN===  - Hx lichen planus  - No other concerns    ===INFECTIOUS DISEASE===  - Slightly elevated WBC on admission 10.5K  - Afebrile  - Urine culture -> neg    ===ENDOCRINE===  - No concerns  - A1c 5.8  - TSH wnl    ===HEMATOLOGIC/DVT PPx===  - On heparin ggt    Dispo: Maintain in ICU.    Patient requires continuous monitoring with bedside rhythm monitoring, arterial line, pulse oximetry, ventilator monitoring and intermittent blood gas analysis.  Care plan discussed with ICU care team.  I have spent 35 minutes providing critical care, in addition to initial critical time provided by CICU attending    Dr. Maldonado     , re-evaluated multiple times during the day.    Raquel Leon PA-C  65 yo M with a fib (s/p dccv 3/25/25), CHF (likely moderately reduced EF, several TTE with poor windows), aortic stenosis s/p TAVR (11/2022), a flutter s/p ablation 2019 and s/p micra implant,  CKD3, WILFRED, obesity, venous stasis, HTN, HLD, who presents with 6 days of worsening dyspnea on exertion, admitted for AHRF likely iso CHF exacerbation, also with ANGELICA on CKD c/f cardiorenal syndrome. RRT on floors for SBP 80s requiring pressors and has been admitted to the CCU. HF mostly likely i/so severe AS. Pt to get structural intervention.     Plan:  ===NEURO===  - AO x 3    ===RESPIRATORY===  #Acute hypoxemic hypercapnic respiratory failure  - Likely retaining 2/2 volume overload  - c/w bumex 4mg drip for elevated CVP and increased O2 req  - c/w BIPAP for resp distress, 6L NC when off BIPAP     ===CARDIOVASCULAR===  #Obstructive shock 2/2 AS  - Off levophed  - MAPs 70s -> will continue to wean vasopressin .09  - If need more pressor support will switch to phenylephrine as pt has LVOT obstruction from aortic stenosis   - Wean as tolerated    #Acute on chronic HF exacerbation  - Bumex increased to 4mg drip overnight for low urine output - augment with below  - Goal 1-2L net neg daily  - CVP goal 8-12  - Diuril, 3% hypertonic, and metolazone PRN   - Will be careful of overdiuresis given severe AS and preload dependant     #Afib  #First degree block  - PO amio load   - heparin to restart 6 hours after cath    #Aortic valve stenosis s/p prosthetic  - S/p STANISLAW showing severe AS stenosis, global hypokinesis and EF 15%  - Structural plans for valve in valve replacement  - Structural wants cath and CT w/ contrast before procedure  - 4/8 patent LIMA-LAD, closed SVG-OM1/D1, pending TAVR 4/11    #Troponemia  - No ischemic changes on EKG  - Most likely i/s/o ANGELICA on CKD    #Hx CABG  #PVD  - C/w Plavix    #HLD  - Increased Lipitor to 40mg from 20mg    ===/RENAL===  #ANGELICA on CKD  #Cardiorenal syndrome  - Baseline 1.5-1.8  - Most likely i/s/o hypotension and fluid overload  - Renal sono showed parenchymal disease  - Nephro following c/w bumex gtt, monitor volume status. Stop sodium bicarb 650 TID for now 4/7    #Hyperkalemia  - Lokelma if K increases into 5s range    ===GI/NUTRITION===  - Failed bedside SS  - S&S consulted -> recommends FEES    ===SKIN===  - Hx lichen planus  - No other concerns    ===INFECTIOUS DISEASE===  - Slightly elevated WBC on admission 10.5K  - Afebrile  - Urine culture -> neg    ===ENDOCRINE===  - No concerns  - A1c 5.8  - TSH wnl    ===HEMATOLOGIC/DVT PPx===  - On heparin ggt    Dispo: Maintain in ICU.    Patient requires continuous monitoring with bedside rhythm monitoring, arterial line, pulse oximetry, ventilator monitoring and intermittent blood gas analysis.  Care plan discussed with ICU care team.  I have spent 35 minutes providing critical care, in addition to initial critical time provided by CICU attending    Dr. Maldonado     , re-evaluated multiple times during the day.    Raquel Leon PA-C

## 2025-04-09 NOTE — PROGRESS NOTE ADULT - ASSESSMENT
65 y/o M with hx of Afib s/p DCCV 3/25/25, CHF, Aortic stenosis s/p TAVR 11/2022, CAD s/p CABG, Aflutter s/p ablation and Micra implant, CKD 3, Obesity, venous stasis, HTN, HLD presented with 6 days of worsening SOB on exertion associated with orthopnea and chest pain.   Also reports several days decreased appetite. Denies fevers, chills, cough, abdominal pain, NVD, urinary sx, leg swelling. Patient had a cardioversion for atrial fibrillation at this hospital recently, which was reportedly unsuccessful. Patient has a scheduled appointment with Dr. Philip on 04/24/2025 to discuss possible ablation. His outpt cardiologist is Dr. Miranda. (Cardiology)  On arrival to ED, vitals were 97.3F, HR 71, BP 94/57, RR 22, 97% on 2L NC. Labs pertinent for WBC 10.68, INR 2.55, Na 132, K 5.5, bicarb 20, BUN/Cr 78/5.17 (baseline Cr appears ~1.5-1.8), trop 225, BNP 46272, VBG ph 7.3, pco2/po2 wnl. UA without evidence of infection.  nephrology was consulted for management of ANGELICA on CKD. Pt has baseline Scr of ~1.5- 1.8. Scr was 2.7 on 3/25/25. Pt is on Entresto ( started recently) and he mentioned that he was taking Motrin occasionally. No herbal medications/ supplements.  Scr at the time of admission was 5.17 3/31/25 and it further worsened to 5.45 4/1/25.    Pt follows Dr. Stewart Brown (Nephrology)  LHC was done on 4/8  Plan for TAVR on 4/9.     ANGELICA on CKD:  Pt has hx of CKD 3. Baseline Scr 1.5-1.8.   At the time of admission - Scr was 5.17 and it further worsened to 5.45--> 5.5. Scr elevated/improving to 3.54 today.   Pt has lopez cath in place. UOP was 2.7L with net negative 1.1L in last 24 hours.   Bumex gtt - restarted 4/4/25 - Briefly held on 4/8 for LHC and is restarted later in the day.  now on 4mg/hr.   Bicarb is elevated but pCO2 is also elevated. pt is on BiPAP at night. Management of WILFRED/OHS as per primary team.   UA showed trace LE, blood ( Likely due to lopez), casts 17. UPCR 0.7. No hx of DM.   Pt was on entresto.   BP was low.   On IV vasopressin.   ANGELICA on CKD likely in setting of HF exacerbation, entresto use and hypotension mediated ATN.  USG kidney and bladder - normal sized kidneys. No HN.     PLAN:  Bicarb is trending up. pCo2 is also elevated. on BiPAP overnight. Management as per primary team. C/w Bumex 4mg/hr.   continue to hold entresto.   Monitor strict I/Os and daily wts.   No urgent need of HD for now.    Avoid nephrotoxins  Dose medications as per eGFR    hyperkalemia: resolved  Today Serum K is 3.8. Monitor closely - replete as needed.     Hyponatremia is likely in setting of impaired water clearance in setting of worsening renal function and volume overload 2/2 heart failure  resolved now.   Bumex as noted above    Pt is at high risk of LISA. Pt was explained in detail about the risk of LISA and possible need of dialysis if the kidney function gets worse.   Enoch score 26.1 %- Risk of post PCI - LISA and 1.09% risk of requiring dialysis.   PLAN for TAVR on 4/11.    Wait for the final reccs as per the attending.

## 2025-04-09 NOTE — PROGRESS NOTE ADULT - SUBJECTIVE AND OBJECTIVE BOX
Patient is a 64y old  Male who presents with a chief complaint of dyspnea , chest pain (2025 11:17)    HPI:   63 yo M with a fib (s/p dccv 3/25/25), CHF (likely moderately reduced EF, several TTE with poor windows), aortic stenosis s/p TAVR (2022), CAD s/p CABG, a flutter s/p ablation 2019 and s/p micra implant,  CKD3, obesity, venous stasis, HTN, HLD, who presents with 6 days of worsening dyspnea on exertion. Pt also reports associated orthopnea and chest pain on exertion but not at rest which is located in the center of his chest. Pain is described as burning. Also reports several days decreased appetite. Denies fevers, chills, cough, abdominal pain, NVD, urinary sx, leg swelling. Patient had a cardioversion for atrial fibrillation at this hospital recently, which was reportedly unsuccessful. Patient has a scheduled appointment with Dr. Philip on 2025 to discuss possible ablation. His outpt cardiologist is Dr. Paul.     On arrival to ED, vitals were 97.3F, HR 71, BP 94/57, RR 22, 97% on 2L NC. Labs pertinent for WBC 10.68, INR 2.55, Na 132, K 5.5, bicarb 20, BUN/Cr 78/5.17 (baseline Cr appears ~1.5-1.8), trop 225, BNP 69308, VBG ph 7.3, pco2/po2 wnl. UA without evidence of infection. EKG  on admission 1st deg av block, low voltage, poor r wave progression, R axis deviation CXR with diffuse hazy opacities suggest mild pulmonary edema and small right and trace left pleural effusions. Pt was given IV Bumex 1mg x1, IV bumex 2mg x1, IV Bumex 4mg x1, and lokelma 5mg x1, then admitted for further management.    (2025 01:59)       INTERVAL HPI/OVERNIGHT EVENTS:   No overnight events   Afebrile, hemodynamically stable     Subjective:    ICU Vital Signs Last 24 Hrs  T(C): 36.4 (2025 11:00), Max: 36.6 (2025 23:00)  T(F): 97.6 (2025 11:00), Max: 97.8 (2025 23:00)  HR: 56 (2025 13:45) (48 - 74)  BP: --  BP(mean): --  ABP: 90/51 (2025 13:45) (44/35 - 107/59)  ABP(mean): 66 (2025 13:45) (39 - 80)  RR: 24 (2025 13:45) (15 - 32)  SpO2: 100% (2025 13:45) (87% - 100%)    O2 Parameters below as of 2025 13:45  Patient On (Oxygen Delivery Method): nasal cannula w/ humidification  O2 Flow (L/min): 3        I&O's Summary    2025 07:01  -  2025 07:00  --------------------------------------------------------  IN: 1937 mL / OUT: 3695 mL / NET: -1758 mL    2025 07:01  -  2025 14:03  --------------------------------------------------------  IN: 940.5 mL / OUT: 965 mL / NET: -24.5 mL          Daily     Daily Weight in k.5 (2025 00:00)    Adult Advanced Hemodynamics Last 24 Hrs  CVP(mm Hg): 14 (2025 17:30) (13 - 18)  CVP(cm H2O): --  CO: --  CI: --  PA: --  PA(mean): --  PCWP: --  SVR: --  SVRI: --  PVR: --  PVRI: --    EKG/Telemetry Events:    MEDICATIONS  (STANDING):  aMIOdarone    Tablet   Oral   aMIOdarone    Tablet 200 milliGRAM(s) Oral daily  atorvastatin 40 milliGRAM(s) Oral at bedtime  buMETAnide Infusion 4 mG/Hr (20 mL/Hr) IV Continuous <Continuous>  chlorhexidine 2% Cloths 1 Application(s) Topical daily  clopidogrel Tablet 75 milliGRAM(s) Oral daily  famotidine    Tablet 20 milliGRAM(s) Oral every 48 hours  hemorrhoidal Ointment 1 Application(s) Rectal daily  heparin  Infusion 1500 Unit(s)/Hr (18 mL/Hr) IV Continuous <Continuous>  influenza  Vaccine (HIGH DOSE) 0.5 milliLiter(s) IntraMuscular once  insulin lispro (ADMELOG) corrective regimen sliding scale   SubCutaneous Before meals and at bedtime  melatonin 5 milliGRAM(s) Oral at bedtime  vasopressin Infusion 0.04 Unit(s)/Min (6 mL/Hr) IV Continuous <Continuous>    MEDICATIONS  (PRN):      PHYSICAL EXAM:  General: Well-appearing, NAD  HEENT: PERRL, EOMI, normal sclera and conjunctiva, normal oropharynx  Neck: Supple, no JVD, thyroid without masses or enlargement  Chest/Lungs: +nasal cannula. CTA bilaterally, no wheezing, rales, rhonchi or rub  Heart: RRR, normal S1, S2, no murmurs or gallops  Abdomen: Soft, ND, NTTP, normoactive bowel sounds  Extremities: +lichen planus bilaterally. +pitting edema. 2+ peripheral pulses b/l, no edema, clubbing or cyanosis  Skin: +lichen planus bilaterally  Neurological: A&Ox3, moves all extremities, no focal deficits        LABS:                        11.6   9.83  )-----------( 119      ( 2025 00:59 )             37.6     -    135  |  88[L]  |  89[H]  ----------------------------<  174[H]  3.5   |  34[H]  |  3.32[H]    Ca    9.3      2025 12:25  Phos  4.5     -  Mg     2.2     -    TPro  7.3  /  Alb  3.4  /  TBili  1.3[H]  /  DBili  x   /  AST  10  /  ALT  19  /  AlkPhos  94  -09    LIVER FUNCTIONS - ( 2025 12:25 )  Alb: 3.4 g/dL / Pro: 7.3 g/dL / ALK PHOS: 94 U/L / ALT: 19 U/L / AST: 10 U/L / GGT: x           PT/INR - ( 2025 04:30 )   PT: 13.0 sec;   INR: 1.14 ratio         PTT - ( 2025 04:30 )  PTT:58.6 sec  CAPILLARY BLOOD GLUCOSE      POCT Blood Glucose.: 171 mg/dL (2025 12:07)  POCT Blood Glucose.: 131 mg/dL (2025 07:57)  POCT Blood Glucose.: 190 mg/dL (2025 21:29)  POCT Blood Glucose.: 157 mg/dL (2025 14:21)    ABG - ( 2025 12:10 )  pH, Arterial: 7.37  pH, Blood: x     /  pCO2: 74    /  pO2: 80    / HCO3: 43    / Base Excess: 14.5  /  SaO2: 97.1                    Urinalysis Basic - ( 2025 12:25 )    Color: x / Appearance: x / SG: x / pH: x  Gluc: 174 mg/dL / Ketone: x  / Bili: x / Urobili: x   Blood: x / Protein: x / Nitrite: x   Leuk Esterase: x / RBC: x / WBC x   Sq Epi: x / Non Sq Epi: x / Bacteria: x          RADIOLOGY & ADDITIONAL TESTS:  CXR:        Care Discussed with Consultants/Other Providers [ x] YES  [ ] NO

## 2025-04-09 NOTE — PROGRESS NOTE ADULT - ATTENDING COMMENTS
HF  intubated, now extubated   called for chon  #Baseline CKD3= pt reports baseline Renal fxn in 40s, followed by outpt nephrologist Dr Stringer  #  chon in setting of HF and ENtresto and decreased po intake and diarrhea  chon could be cardiorenal +  ATN  cr stable now  back on bumex drip  he has a lopez  monitor UO  monitor daily cr trends  lytes stable  no urgent indication for RRT  check renal sono  #HF  bumex drip as above  # hyperkalemia  stable  #met acidosis- on sodium bicarb tabs  monitor bicarb trend daily  #hypotension  monitor BP trends  on vaso   #s/p cardiac cath 4/8-monitor for LISA        d/w CCU team .

## 2025-04-09 NOTE — PROGRESS NOTE ADULT - SUBJECTIVE AND OBJECTIVE BOX
HPI/Interval Hx  MEDICATIONS  (STANDING):  aMIOdarone    Tablet   Oral   aMIOdarone    Tablet 200 milliGRAM(s) Oral daily  atorvastatin 40 milliGRAM(s) Oral at bedtime  buMETAnide Infusion 4 mG/Hr (20 mL/Hr) IV Continuous <Continuous>  chlorhexidine 2% Cloths 1 Application(s) Topical daily  clopidogrel Tablet 75 milliGRAM(s) Oral daily  famotidine    Tablet 20 milliGRAM(s) Oral every 48 hours  hemorrhoidal Ointment 1 Application(s) Rectal daily  heparin  Infusion 1500 Unit(s)/Hr (18 mL/Hr) IV Continuous <Continuous>  influenza  Vaccine (HIGH DOSE) 0.5 milliLiter(s) IntraMuscular once  insulin lispro (ADMELOG) corrective regimen sliding scale   SubCutaneous Before meals and at bedtime  melatonin 5 milliGRAM(s) Oral at bedtime  vasopressin Infusion 0.04 Unit(s)/Min (6 mL/Hr) IV Continuous <Continuous>    MEDICATIONS  (PRN):      PAST MEDICAL & SURGICAL HISTORY:  CAD (coronary artery disease)  s/p CABG      Hypercholesteremia      Thrombus of left atrial appendage  2018      Ischemic cardiomyopathy      WILFRED (obstructive sleep apnea)  can not tolerate bipap at night      HTN (hypertension)      Atrial fibrillation  2018      CHF (congestive heart failure)  denies any recent exacerbation      Peripheral edema  chronic      Lichen planus  chronic ( b/L LE)      Atrial flutter  s/p ablation 2018      MI (myocardial infarction)  2012      Stented coronary artery  2019      AS (aortic stenosis)      Chronic kidney disease, unspecified CKD stage      ANGELICA (acute kidney injury)  4/2021      Morbid obesity      Status post placement of cardiac pacemaker  micraleadless PPM, [id number] last interrogation 7/6/2022      Osteoarthritis  shoulders, hips, knee      H/O scoliosis      Lower back pain      History of elbow surgery      S/P CABG (coronary artery bypass graft)  x3, 2012      Cardiac pacemaker  leadless 2018      History of coronary artery stent placement  2019 ( one more after cabg)      History of adenoidectomy      H/O atrioventricular karlee ablation  2018          Review of Systems  CONSTITUTIONAL: No weakness, fevers or chills  EYES/ENT: No visual changes;  No vertigo or throat pain   NECK: No pain or stiffness  RESPIRATORY: No cough, wheezing, hemoptysis; No shortness of breath  CARDIOVASCULAR: No chest pain or palpitations  GASTROINTESTINAL: No abdominal or epigastric pain. No nausea, vomiting, or hematemesis; No diarrhea or constipation. No melena or hematochezia.  GENITOURINARY: No dysuria, frequency or hematuria  NEUROLOGICAL: No numbness or weakness  SKIN: No itching, burning, rashes, or lesions   All other review of systems is negative unless indicated above.    Physical Exam  General: A/ox 3, No acute Distress  Neck: Supple, NO JVD  Cardiac: S1 S2, No M/R/G  Pulmonary: CTAB, Breathing unlabored, No Rhonchi/Rales/Wheezing  Abdomen: Soft, Non -tender, +BS x 4 quads  Extremities: No Rashes, No edema  Neuro: A/o x 3, No focal deficits  Vital Signs Last 24 Hrs  T(C): 36.3 (09 Apr 2025 07:00), Max: 36.6 (08 Apr 2025 23:00)  T(F): 97.4 (09 Apr 2025 07:00), Max: 97.8 (08 Apr 2025 23:00)  HR: 53 (09 Apr 2025 11:00) (48 - 74)  BP: --  BP(mean): --  RR: 26 (09 Apr 2025 11:00) (15 - 33)  SpO2: 100% (09 Apr 2025 11:00) (87% - 100%)    Parameters below as of 09 Apr 2025 11:00  Patient On (Oxygen Delivery Method): nasal cannula w/ humidification  O2 Flow (L/min): 5                          11.6   9.83  )-----------( 119      ( 09 Apr 2025 00:59 )             37.6     04-09    136  |  88[L]  |  94[H]  ----------------------------<  133[H]  3.7   |  30  |  3.37[H]    Ca    9.5      09 Apr 2025 06:50  Phos  4.6     04-09  Mg     2.3     04-09    TPro  7.3  /  Alb  3.4  /  TBili  1.5[H]  /  DBili  x   /  AST  13  /  ALT  19  /  AlkPhos  96  04-09      Telemetry    EKG    Other

## 2025-04-09 NOTE — PROGRESS NOTE ADULT - SUBJECTIVE AND OBJECTIVE BOX
Mine Salinas PA-C  Contact via [employer]    CCU PROGRESS NOTE:    BERTHA WARD  MRN-[id number]  Patient is a 64y old  Male who presents with a chief complaint of dyspnea , chest pain (09 Apr 2025 11:17)    INTERVAL HPI/OVERNIGHT EVENTS: No acute events overnight.    SUBJECTIVE: Patient seen and examined at bedside. No acute complaints. Denies chest pain, SOB, palpitations, dizziness/lightheadedness, LE edema.    MEDICATIONS  (STANDING):  aMIOdarone    Tablet   Oral   aMIOdarone    Tablet 200 milliGRAM(s) Oral daily  atorvastatin 40 milliGRAM(s) Oral at bedtime  buMETAnide Infusion 4 mG/Hr (20 mL/Hr) IV Continuous <Continuous>  chlorhexidine 2% Cloths 1 Application(s) Topical daily  clopidogrel Tablet 75 milliGRAM(s) Oral daily  famotidine    Tablet 20 milliGRAM(s) Oral every 48 hours  hemorrhoidal Ointment 1 Application(s) Rectal daily  heparin  Infusion 1500 Unit(s)/Hr (18 mL/Hr) IV Continuous <Continuous>  influenza  Vaccine (HIGH DOSE) 0.5 milliLiter(s) IntraMuscular once  insulin lispro (ADMELOG) corrective regimen sliding scale   SubCutaneous Before meals and at bedtime  melatonin 5 milliGRAM(s) Oral at bedtime  vasopressin Infusion 0.04 Unit(s)/Min (6 mL/Hr) IV Continuous <Continuous>    MEDICATIONS  (PRN):      OBJECTIVE:  ICU Vital Signs Last 24 Hrs  T(C): 36.4 (09 Apr 2025 19:00), Max: 36.6 (08 Apr 2025 23:00)  T(F): 97.5 (09 Apr 2025 19:00), Max: 97.9 (09 Apr 2025 15:00)  HR: 57 (09 Apr 2025 20:30) (48 - 66)  ABP: 91/55 (09 Apr 2025 20:30) (44/35 - 107/59)  ABP(mean): 69 (09 Apr 2025 20:30) (39 - 78)  RR: 26 (09 Apr 2025 20:30) (15 - 34)  SpO2: 94% (09 Apr 2025 20:30) (87% - 100%)    O2 Parameters below as of 09 Apr 2025 20:00  Patient On (Oxygen Delivery Method): nasal cannula  O2 Flow (L/min): 3      CVP(mm Hg): 14 (04-08-25 @ 17:30) (11 - 41)    I&O's Summary    08 Apr 2025 07:01  -  09 Apr 2025 07:00  --------------------------------------------------------  IN: 1937 mL / OUT: 3695 mL / NET: -1758 mL    09 Apr 2025 07:01  -  09 Apr 2025 21:16  --------------------------------------------------------  IN: 1553 mL / OUT: 1805 mL / NET: -252 mL    CAPILLARY BLOOD GLUCOSE    PHYSICAL EXAM:  General: Well-appearing, NAD  HEENT: PERRL, EOMI, normal sclera and conjunctiva, normal oropharynx  Neck: Supple, no JVD, thyroid without masses or enlargement  Chest/Lungs: +nasal cannula. CTA bilaterally, no wheezing, rales, rhonchi or rub  Heart: RRR, normal S1, S2, no murmurs or gallops  Abdomen: Soft, ND, NTTP, normoactive bowel sounds  Extremities: +lichen planus bilaterally. +pitting edema. 2+ peripheral pulses b/l, no edema, clubbing or cyanosis  Skin: +lichen planus bilaterally  Neurological: A&Ox3, moves all extremities, no focal deficits    ============================I/O===========================   I&O's Detail    08 Apr 2025 07:01  -  09 Apr 2025 07:00  --------------------------------------------------------  IN:    Bumetanide: 400 mL    Heparin: 198 mL    IV PiggyBack: 400 mL    Oral Fluid: 600 mL    Vasopressin: 339 mL  Total IN: 1937 mL    OUT:    Indwelling Catheter - Urethral (mL): 3695 mL  Total OUT: 3695 mL    Total NET: -1758 mL      09 Apr 2025 07:01  -  09 Apr 2025 21:16  --------------------------------------------------------  IN:    Bumetanide: 280 mL    Heparin: 252 mL    IV PiggyBack: 100 mL    Oral Fluid: 720 mL    Vasopressin: 201 mL  Total IN: 1553 mL    OUT:    Indwelling Catheter - Urethral (mL): 1805 mL  Total OUT: 1805 mL    Total NET: -252 mL    ============================ LABS =========================                        11.6   9.83  )-----------( 119      ( 09 Apr 2025 00:59 )             37.6     04-09    133[L]  |  87[L]  |  101[H]  ----------------------------<  149[H]  4.0   |  33[H]  |  3.57[H]    Ca    9.3      09 Apr 2025 18:45  Phos  4.5     04-09  Mg     2.2     04-09    TPro  7.5  /  Alb  3.6  /  TBili  1.2  /  DBili  x   /  AST  7[L]  /  ALT  17  /  AlkPhos  98  04-09    LIVER FUNCTIONS - ( 09 Apr 2025 18:45 )  Alb: 3.6 g/dL / Pro: 7.5 g/dL / ALK PHOS: 98 U/L / ALT: 17 U/L / AST: 7 U/L / GGT: x           PT/INR - ( 09 Apr 2025 04:30 )   PT: 13.0 sec;   INR: 1.14 ratio       PTT - ( 09 Apr 2025 04:30 )  PTT:58.6 sec  ABG - ( 09 Apr 2025 18:30 )  pH, Arterial: 7.37  pH, Blood: x     /  pCO2: 72    /  pO2: 108   / HCO3: 42    / Base Excess: 13.5  /  SaO2: 98.7      Blood Gas Arterial, Lactate: 0.8 mmol/L (04-09-25 @ 18:30)  Blood Gas Arterial, Lactate: 0.7 mmol/L (04-09-25 @ 12:10)  Blood Gas Arterial, Lactate: 0.7 mmol/L (04-09-25 @ 06:35)  Blood Gas Arterial, Lactate: 0.6 mmol/L (04-09-25 @ 02:45)  Blood Gas Arterial, Lactate: 0.8 mmol/L (04-09-25 @ 00:25)  Blood Gas Arterial, Lactate: 0.7 mmol/L (04-08-25 @ 17:20)  Blood Gas Arterial, Lactate: 0.7 mmol/L (04-08-25 @ 06:41)  Blood Gas Arterial, Lactate: 0.8 mmol/L (04-08-25 @ 01:18)  Blood Gas Arterial, Lactate: 0.7 mmol/L (04-07-25 @ 20:10)  Blood Gas Arterial, Lactate: 0.6 mmol/L (04-07-25 @ 16:50)  Blood Gas Arterial, Lactate: 0.9 mmol/L (04-07-25 @ 15:13)  Blood Gas Arterial, Lactate: 1.2 mmol/L (04-07-25 @ 12:50)  Blood Gas Arterial, Lactate: 0.7 mmol/L (04-07-25 @ 04:29)  Blood Gas Arterial, Lactate: 0.8 mmol/L (04-07-25 @ 00:22)    Urinalysis Basic - ( 09 Apr 2025 18:45 )    Color: x / Appearance: x / SG: x / pH: x  Gluc: 149 mg/dL / Ketone: x  / Bili: x / Urobili: x   Blood: x / Protein: x / Nitrite: x   Leuk Esterase: x / RBC: x / WBC x   Sq Epi: x / Non Sq Epi: x / Bacteria: x      ======================MICRO/RAD/CARDIO=================  Telemetry: Reviewed   EKG: Reviewed  CXR: Reviewed  Culture: Reviewed   Echo:   Cath:

## 2025-04-09 NOTE — PROGRESS NOTE ADULT - NSPROGADDITIONALINFOA_GEN_ALL_CORE
65yo M with AFib s/p TAVR HFrEF p/w hypoxic resp failure from ADHF requiring intubation and CS. Severe prosthetic AS (LIVAN < 0.5). LHC non obstructive. Plan for TAVR 4/11.     Neuro no issues   Pulm on NC and intermittently requiring Bipap but pH compensated appears to be chronic component   CV vaso for vasodilatory shock/ CS. [facility] non obstructive  Renal Cr high, mildly downtrending with eGFR < 15 largely unchanged, chance of dialysis post cath despite optimization.    GI s/s eval    ID no e/o infection   Heme cont heparin for AFib now post cardioversion   Endo BG controlled   Lines no central access

## 2025-04-09 NOTE — PROGRESS NOTE ADULT - ASSESSMENT
65 yo M with a fib (s/p dccv 3/25/25), CHF (likely moderately reduced EF, several TTE with poor windows), aortic stenosis s/p TAVR (11/2022), a flutter s/p ablation 2019 and s/p micra implant,  CKD3, WILFRED, obesity, venous stasis, HTN, HLD, who presents with 6 days of worsening dyspnea on exertion, admitted for AHRF likely iso CHF exacerbation, also with ANGELICA on CKD c/f cardiorenal syndrome. RRT on floors for SBP 80s requiring pressors and has been admitted to the CCU. HF mostly likely i/so severe AS. Pt to get structural intervention.     Plan:  ===NEURO===  - AO x 3    ===RESPIRATORY===  #Acute hypoxemic hypercapnic respiratory failure  - Likely retaining 2/2 volume overload  - c/w bumex 4mg drip for elevated CVP and increased O2 req  - c/w BIPAP for resp distress, 6L NC when off BIPAP     ===CARDIOVASCULAR===  #Obstructive shock 2/2 AS  - Off levophed  - MAPs 70s -> will continue to wean vasopressin .09  - If need more pressor support will switch to phenylephrine as pt has LVOT obstruction from aortic stenosis   - Wean as tolerated    #Acute on chronic HF exacerbation  - Bumex increased to 4mg drip overnight for low urine output - augment with below  - Goal 1-2L net neg daily  - CVP goal 8-12  - Diuril, 3% hypertonic, and metolazone PRN   - Will be careful of overdiuresis given severe AS and preload dependant     #Afib  #First degree block  - PO amio load   - heparin to restart 6 hours after cath    #Aortic valve stenosis s/p prosthetic  - S/p STANISLAW showing severe AS stenosis, global hypokinesis and EF 15%  - Structural plans for valve in valve replacement  - Structural wants cath and CT w/ contrast before procedure  - 4/8 patent LIMA-LAD, closed SVG-OM1/D1, pending TAVR 4/11    #Troponemia  - No ischemic changes on EKG  - Most likely i/s/o ANGELICA on CKD    #Hx CABG  #PVD  - C/w Plavix    #HLD  - Increased Lipitor to 40mg from 20mg    ===/RENAL===  #ANGELICA on CKD  #Cardiorenal syndrome  - Baseline 1.5-1.8  - Most likely i/s/o hypotension and fluid overload  - Renal sono showed parenchymal disease  - Nephro following c/w bumex gtt, monitor volume status. Stop sodium bicarb 650 TID for now 4/7    #Hyperkalemia  - Lokelma if K increases into 5s range    ===GI/NUTRITION===  - Failed bedside SS  - S&S consulted -> recommends FEES    ===SKIN===  - Hx lichen planus  - No other concerns    ===INFECTIOUS DISEASE===  - Slightly elevated WBC on admission 10.5K  - Afebrile  - Urine culture -> neg    ===ENDOCRINE===  - No concerns  - A1c 5.8  - TSH wnl    ===HEMATOLOGIC/DVT PPx===  - On heparin ggt    Dispo: Maintain in ICU.    Patient requires continuous monitoring with bedside rhythm monitoring, arterial line, pulse oximetry, ventilator monitoring and intermittent blood gas analysis.  Care plan discussed with ICU care team.  I have spent 35 minutes providing critical care, in addition to initial critical time provided by CICU attending    Dr. Maldonado     , re-evaluated multiple times during the day.

## 2025-04-10 LAB
ALBUMIN SERPL ELPH-MCNC: 3.1 G/DL — LOW (ref 3.3–5)
ALBUMIN SERPL ELPH-MCNC: 3.4 G/DL — SIGNIFICANT CHANGE UP (ref 3.3–5)
ALBUMIN SERPL ELPH-MCNC: 3.5 G/DL — SIGNIFICANT CHANGE UP (ref 3.3–5)
ALBUMIN SERPL ELPH-MCNC: 3.5 G/DL — SIGNIFICANT CHANGE UP (ref 3.3–5)
ALP SERPL-CCNC: 88 U/L — SIGNIFICANT CHANGE UP (ref 40–120)
ALP SERPL-CCNC: 94 U/L — SIGNIFICANT CHANGE UP (ref 40–120)
ALP SERPL-CCNC: 97 U/L — SIGNIFICANT CHANGE UP (ref 40–120)
ALP SERPL-CCNC: 98 U/L — SIGNIFICANT CHANGE UP (ref 40–120)
ALT FLD-CCNC: 14 U/L — SIGNIFICANT CHANGE UP (ref 10–45)
ALT FLD-CCNC: 15 U/L — SIGNIFICANT CHANGE UP (ref 10–45)
ALT FLD-CCNC: 16 U/L — SIGNIFICANT CHANGE UP (ref 10–45)
ALT FLD-CCNC: 19 U/L — SIGNIFICANT CHANGE UP (ref 10–45)
ANION GAP SERPL CALC-SCNC: 13 MMOL/L — SIGNIFICANT CHANGE UP (ref 5–17)
ANION GAP SERPL CALC-SCNC: 13 MMOL/L — SIGNIFICANT CHANGE UP (ref 5–17)
ANION GAP SERPL CALC-SCNC: 14 MMOL/L — SIGNIFICANT CHANGE UP (ref 5–17)
ANION GAP SERPL CALC-SCNC: 16 MMOL/L — SIGNIFICANT CHANGE UP (ref 5–17)
APTT BLD: 73 SEC — HIGH (ref 24.5–35.6)
AST SERPL-CCNC: 7 U/L — LOW (ref 10–40)
AST SERPL-CCNC: 7 U/L — LOW (ref 10–40)
AST SERPL-CCNC: 8 U/L — LOW (ref 10–40)
AST SERPL-CCNC: 9 U/L — LOW (ref 10–40)
BILIRUB SERPL-MCNC: 1.2 MG/DL — SIGNIFICANT CHANGE UP (ref 0.2–1.2)
BILIRUB SERPL-MCNC: 1.3 MG/DL — HIGH (ref 0.2–1.2)
BILIRUB SERPL-MCNC: 1.4 MG/DL — HIGH (ref 0.2–1.2)
BILIRUB SERPL-MCNC: 1.5 MG/DL — HIGH (ref 0.2–1.2)
BUN SERPL-MCNC: 101 MG/DL — HIGH (ref 7–23)
BUN SERPL-MCNC: 104 MG/DL — HIGH (ref 7–23)
BUN SERPL-MCNC: 99 MG/DL — HIGH (ref 7–23)
BUN SERPL-MCNC: 99 MG/DL — HIGH (ref 7–23)
CALCIUM SERPL-MCNC: 8.3 MG/DL — LOW (ref 8.4–10.5)
CALCIUM SERPL-MCNC: 9.4 MG/DL — SIGNIFICANT CHANGE UP (ref 8.4–10.5)
CALCIUM SERPL-MCNC: 9.4 MG/DL — SIGNIFICANT CHANGE UP (ref 8.4–10.5)
CALCIUM SERPL-MCNC: 9.5 MG/DL — SIGNIFICANT CHANGE UP (ref 8.4–10.5)
CHLORIDE SERPL-SCNC: 86 MMOL/L — LOW (ref 96–108)
CHLORIDE SERPL-SCNC: 87 MMOL/L — LOW (ref 96–108)
CHLORIDE SERPL-SCNC: 87 MMOL/L — LOW (ref 96–108)
CHLORIDE SERPL-SCNC: 92 MMOL/L — LOW (ref 96–108)
CO2 SERPL-SCNC: 32 MMOL/L — HIGH (ref 22–31)
CO2 SERPL-SCNC: 32 MMOL/L — HIGH (ref 22–31)
CO2 SERPL-SCNC: 35 MMOL/L — HIGH (ref 22–31)
CO2 SERPL-SCNC: 35 MMOL/L — HIGH (ref 22–31)
CREAT SERPL-MCNC: 3 MG/DL — HIGH (ref 0.5–1.3)
CREAT SERPL-MCNC: 3.24 MG/DL — HIGH (ref 0.5–1.3)
CREAT SERPL-MCNC: 3.48 MG/DL — HIGH (ref 0.5–1.3)
CREAT SERPL-MCNC: 3.57 MG/DL — HIGH (ref 0.5–1.3)
EGFR: 18 ML/MIN/1.73M2 — LOW
EGFR: 18 ML/MIN/1.73M2 — LOW
EGFR: 19 ML/MIN/1.73M2 — LOW
EGFR: 19 ML/MIN/1.73M2 — LOW
EGFR: 20 ML/MIN/1.73M2 — LOW
EGFR: 20 ML/MIN/1.73M2 — LOW
EGFR: 22 ML/MIN/1.73M2 — LOW
EGFR: 22 ML/MIN/1.73M2 — LOW
GAS PNL BLDA: SIGNIFICANT CHANGE UP
GLUCOSE BLDC GLUCOMTR-MCNC: 120 MG/DL — HIGH (ref 70–99)
GLUCOSE BLDC GLUCOMTR-MCNC: 132 MG/DL — HIGH (ref 70–99)
GLUCOSE BLDC GLUCOMTR-MCNC: 144 MG/DL — HIGH (ref 70–99)
GLUCOSE BLDC GLUCOMTR-MCNC: 149 MG/DL — HIGH (ref 70–99)
GLUCOSE SERPL-MCNC: 133 MG/DL — HIGH (ref 70–99)
GLUCOSE SERPL-MCNC: 141 MG/DL — HIGH (ref 70–99)
GLUCOSE SERPL-MCNC: 153 MG/DL — HIGH (ref 70–99)
GLUCOSE SERPL-MCNC: 177 MG/DL — HIGH (ref 70–99)
HCT VFR BLD CALC: 35 % — LOW (ref 39–50)
HGB BLD-MCNC: 11.1 G/DL — LOW (ref 13–17)
INR BLD: 1.11 RATIO — SIGNIFICANT CHANGE UP (ref 0.85–1.16)
MAGNESIUM SERPL-MCNC: 1.8 MG/DL — SIGNIFICANT CHANGE UP (ref 1.6–2.6)
MAGNESIUM SERPL-MCNC: 2 MG/DL — SIGNIFICANT CHANGE UP (ref 1.6–2.6)
MAGNESIUM SERPL-MCNC: 2.1 MG/DL — SIGNIFICANT CHANGE UP (ref 1.6–2.6)
MAGNESIUM SERPL-MCNC: 2.1 MG/DL — SIGNIFICANT CHANGE UP (ref 1.6–2.6)
MCHC RBC-ENTMCNC: 30.7 PG — SIGNIFICANT CHANGE UP (ref 27–34)
MCHC RBC-ENTMCNC: 31.7 G/DL — LOW (ref 32–36)
MCV RBC AUTO: 97 FL — SIGNIFICANT CHANGE UP (ref 80–100)
NRBC BLD AUTO-RTO: 0 /100 WBCS — SIGNIFICANT CHANGE UP (ref 0–0)
PHOSPHATE SERPL-MCNC: 3.1 MG/DL — SIGNIFICANT CHANGE UP (ref 2.5–4.5)
PHOSPHATE SERPL-MCNC: 3.5 MG/DL — SIGNIFICANT CHANGE UP (ref 2.5–4.5)
PHOSPHATE SERPL-MCNC: 4 MG/DL — SIGNIFICANT CHANGE UP (ref 2.5–4.5)
PHOSPHATE SERPL-MCNC: 4.4 MG/DL — SIGNIFICANT CHANGE UP (ref 2.5–4.5)
PLATELET # BLD AUTO: 111 K/UL — LOW (ref 150–400)
POTASSIUM SERPL-MCNC: 3.3 MMOL/L — LOW (ref 3.5–5.3)
POTASSIUM SERPL-MCNC: 3.5 MMOL/L — SIGNIFICANT CHANGE UP (ref 3.5–5.3)
POTASSIUM SERPL-MCNC: 3.8 MMOL/L — SIGNIFICANT CHANGE UP (ref 3.5–5.3)
POTASSIUM SERPL-MCNC: 4.1 MMOL/L — SIGNIFICANT CHANGE UP (ref 3.5–5.3)
POTASSIUM SERPL-SCNC: 3.3 MMOL/L — LOW (ref 3.5–5.3)
POTASSIUM SERPL-SCNC: 3.5 MMOL/L — SIGNIFICANT CHANGE UP (ref 3.5–5.3)
POTASSIUM SERPL-SCNC: 3.8 MMOL/L — SIGNIFICANT CHANGE UP (ref 3.5–5.3)
POTASSIUM SERPL-SCNC: 4.1 MMOL/L — SIGNIFICANT CHANGE UP (ref 3.5–5.3)
PROT SERPL-MCNC: 6.6 G/DL — SIGNIFICANT CHANGE UP (ref 6–8.3)
PROT SERPL-MCNC: 7.3 G/DL — SIGNIFICANT CHANGE UP (ref 6–8.3)
PROT SERPL-MCNC: 7.4 G/DL — SIGNIFICANT CHANGE UP (ref 6–8.3)
PROT SERPL-MCNC: 7.5 G/DL — SIGNIFICANT CHANGE UP (ref 6–8.3)
PROTHROM AB SERPL-ACNC: 12.8 SEC — SIGNIFICANT CHANGE UP (ref 9.9–13.4)
RBC # BLD: 3.61 M/UL — LOW (ref 4.2–5.8)
RBC # FLD: 16.1 % — HIGH (ref 10.3–14.5)
SODIUM SERPL-SCNC: 134 MMOL/L — LOW (ref 135–145)
SODIUM SERPL-SCNC: 135 MMOL/L — SIGNIFICANT CHANGE UP (ref 135–145)
SODIUM SERPL-SCNC: 136 MMOL/L — SIGNIFICANT CHANGE UP (ref 135–145)
SODIUM SERPL-SCNC: 137 MMOL/L — SIGNIFICANT CHANGE UP (ref 135–145)
WBC # BLD: 10.29 K/UL — SIGNIFICANT CHANGE UP (ref 3.8–10.5)
WBC # FLD AUTO: 10.29 K/UL — SIGNIFICANT CHANGE UP (ref 3.8–10.5)

## 2025-04-10 PROCEDURE — 99291 CRITICAL CARE FIRST HOUR: CPT

## 2025-04-10 PROCEDURE — 99233 SBSQ HOSP IP/OBS HIGH 50: CPT | Mod: GC

## 2025-04-10 PROCEDURE — 93010 ELECTROCARDIOGRAM REPORT: CPT

## 2025-04-10 PROCEDURE — 99233 SBSQ HOSP IP/OBS HIGH 50: CPT | Mod: 57,25

## 2025-04-10 PROCEDURE — 94010 BREATHING CAPACITY TEST: CPT | Mod: 26

## 2025-04-10 RX ORDER — IPRATROPIUM BROMIDE AND ALBUTEROL SULFATE .5; 2.5 MG/3ML; MG/3ML
3 SOLUTION RESPIRATORY (INHALATION) EVERY 6 HOURS
Refills: 0 | Status: DISCONTINUED | OUTPATIENT
Start: 2025-04-10 | End: 2025-04-28

## 2025-04-10 RX ORDER — MAGNESIUM SULFATE 500 MG/ML
1 SYRINGE (ML) INJECTION ONCE
Refills: 0 | Status: COMPLETED | OUTPATIENT
Start: 2025-04-10 | End: 2025-04-10

## 2025-04-10 RX ORDER — CEFUROXIME SODIUM 1.5 G
1500 VIAL (EA) INJECTION ONCE
Refills: 0 | Status: COMPLETED | OUTPATIENT
Start: 2025-04-10 | End: 2025-04-10

## 2025-04-10 RX ADMIN — VASOPRESSIN 6 UNIT(S)/MIN: 20 INJECTION INTRAVENOUS at 22:45

## 2025-04-10 RX ADMIN — ATORVASTATIN CALCIUM 40 MILLIGRAM(S): 80 TABLET, FILM COATED ORAL at 22:44

## 2025-04-10 RX ADMIN — BUMETANIDE 20 MG/HR: 1 TABLET ORAL at 07:33

## 2025-04-10 RX ADMIN — HEPARIN SODIUM 18 UNIT(S)/HR: 1000 INJECTION INTRAVENOUS; SUBCUTANEOUS at 15:14

## 2025-04-10 RX ADMIN — Medication 40 MILLIEQUIVALENT(S): at 13:27

## 2025-04-10 RX ADMIN — Medication 40 MILLIEQUIVALENT(S): at 09:40

## 2025-04-10 RX ADMIN — Medication 20 MILLIEQUIVALENT(S): at 02:22

## 2025-04-10 RX ADMIN — Medication 1 APPLICATION(S): at 22:44

## 2025-04-10 RX ADMIN — IPRATROPIUM BROMIDE AND ALBUTEROL SULFATE 3 MILLILITER(S): .5; 2.5 SOLUTION RESPIRATORY (INHALATION) at 17:29

## 2025-04-10 RX ADMIN — Medication 5 MILLIGRAM(S): at 22:44

## 2025-04-10 RX ADMIN — VASOPRESSIN 6 UNIT(S)/MIN: 20 INJECTION INTRAVENOUS at 07:32

## 2025-04-10 RX ADMIN — IPRATROPIUM BROMIDE AND ALBUTEROL SULFATE 3 MILLILITER(S): .5; 2.5 SOLUTION RESPIRATORY (INHALATION) at 23:13

## 2025-04-10 RX ADMIN — PHENYLEPHRINE HYDROCHLORIDE AND FAT, HARD 1 APPLICATION(S): .00525; 1.86 SUPPOSITORY RECTAL at 12:34

## 2025-04-10 RX ADMIN — Medication 4 MILLILITER(S): at 23:13

## 2025-04-10 RX ADMIN — CLOPIDOGREL BISULFATE 75 MILLIGRAM(S): 75 TABLET, FILM COATED ORAL at 12:33

## 2025-04-10 RX ADMIN — BUMETANIDE 20 MG/HR: 1 TABLET ORAL at 13:27

## 2025-04-10 RX ADMIN — Medication 100 GRAM(S): at 13:27

## 2025-04-10 RX ADMIN — Medication 4 MILLILITER(S): at 17:29

## 2025-04-10 RX ADMIN — HEPARIN SODIUM 18 UNIT(S)/HR: 1000 INJECTION INTRAVENOUS; SUBCUTANEOUS at 22:46

## 2025-04-10 RX ADMIN — Medication 20 MILLIGRAM(S): at 12:33

## 2025-04-10 RX ADMIN — AMIODARONE HYDROCHLORIDE 200 MILLIGRAM(S): 50 INJECTION, SOLUTION INTRAVENOUS at 06:03

## 2025-04-10 RX ADMIN — BUMETANIDE 20 MG/HR: 1 TABLET ORAL at 22:45

## 2025-04-10 NOTE — PROGRESS NOTE ADULT - SUBJECTIVE AND OBJECTIVE BOX
Patient is a 64y old  Male who presents with a chief complaint of dyspnea , chest pain (10 Apr 2025 11:01)    HPI:   63 yo M with a fib (s/p dccv 3/25/25), CHF (likely moderately reduced EF, several TTE with poor windows), aortic stenosis s/p TAVR (2022), CAD s/p CABG, a flutter s/p ablation  and s/p micra implant,  CKD3, obesity, venous stasis, HTN, HLD, who presents with 6 days of worsening dyspnea on exertion. Pt also reports associated orthopnea and chest pain on exertion but not at rest which is located in the center of his chest. Pain is described as burning. Also reports several days decreased appetite. Denies fevers, chills, cough, abdominal pain, NVD, urinary sx, leg swelling. Patient had a cardioversion for atrial fibrillation at this hospital recently, which was reportedly unsuccessful. Patient has a scheduled appointment with Dr. Philip on 2025 to discuss possible ablation. His outpt cardiologist is Dr. Paul.     On arrival to ED, vitals were 97.3F, HR 71, BP 94/57, RR 22, 97% on 2L NC. Labs pertinent for WBC 10.68, INR 2.55, Na 132, K 5.5, bicarb 20, BUN/Cr 78/5.17 (baseline Cr appears ~1.5-1.8), trop 225, BNP 20784, VBG ph 7.3, pco2/po2 wnl. UA without evidence of infection. EKG  on admission 1st deg av block, low voltage, poor r wave progression, R axis deviation CXR with diffuse hazy opacities suggest mild pulmonary edema and small right and trace left pleural effusions. Pt was given IV Bumex 1mg x1, IV bumex 2mg x1, IV Bumex 4mg x1, and lokelma 5mg x1, then admitted for further management.    (2025 01:59)       INTERVAL HPI/OVERNIGHT EVENTS:   No overnight events   Afebrile, hemodynamically stable     Subjective:    ICU Vital Signs Last 24 Hrs  T(C): 36.5 (10 Apr 2025 11:30), Max: 37 (10 Apr 2025 03:00)  T(F): 97.7 (10 Apr 2025 11:30), Max: 98.6 (10 Apr 2025 03:00)  HR: 67 (10 Apr 2025 12:45) (51 - 67)  BP: --  BP(mean): --  ABP: 94/58 (10 Apr 2025 12:45) (74/53 - 115/69)  ABP(mean): 73 (10 Apr 2025 12:45) (58 - 86)  RR: 36 (10 Apr 2025 12:45) (18 - 41)  SpO2: 89% (10 Apr 2025 12:45) (86% - 100%)    O2 Parameters below as of 10 Apr 2025 12:00  Patient On (Oxygen Delivery Method): nasal cannula  O2 Flow (L/min): 2        I&O's Summary    2025 07:01  -  10 Apr 2025 07:00  --------------------------------------------------------  IN: 2169.5 mL / OUT: 3640 mL / NET: -1470.5 mL    10 Apr 2025 07:01  -  10 Apr 2025 12:58  --------------------------------------------------------  IN: 490 mL / OUT: 660 mL / NET: -170 mL          Daily     Daily Weight in k.2 (10 Apr 2025 06:00)      EKG/Telemetry Events:    MEDICATIONS  (STANDING):  aMIOdarone    Tablet 200 milliGRAM(s) Oral daily  aMIOdarone    Tablet   Oral   atorvastatin 40 milliGRAM(s) Oral at bedtime  buMETAnide Infusion 4 mG/Hr (20 mL/Hr) IV Continuous <Continuous>  cefuroxime  IVPB 1500 milliGRAM(s) IV Intermittent once  chlorhexidine 0.12% Liquid 25 milliLiter(s) Swish and Spit once  chlorhexidine 2% Cloths 1 Application(s) Topical daily  chlorhexidine 4% Liquid 1 Application(s) Topical once  clopidogrel Tablet 75 milliGRAM(s) Oral daily  famotidine    Tablet 20 milliGRAM(s) Oral every 48 hours  hemorrhoidal Ointment 1 Application(s) Rectal daily  heparin  Infusion 1500 Unit(s)/Hr (18 mL/Hr) IV Continuous <Continuous>  insulin lispro (ADMELOG) corrective regimen sliding scale   SubCutaneous Before meals and at bedtime  magnesium sulfate  IVPB 1 Gram(s) IV Intermittent once  melatonin 5 milliGRAM(s) Oral at bedtime  potassium chloride    Tablet ER 40 milliEquivalent(s) Oral once  vasopressin Infusion 0.04 Unit(s)/Min (6 mL/Hr) IV Continuous <Continuous>    MEDICATIONS  (PRN):      PHYSICAL EXAM:  General: Well-appearing, NAD  HEENT: PERRL, EOMI, normal sclera and conjunctiva, normal oropharynx  Neck: Supple, no JVD, thyroid without masses or enlargement  Chest/Lungs: +nasal cannula. CTA bilaterally, no wheezing, rales, rhonchi or rub  Heart: RRR, normal S1, S2, no murmurs or gallops  Abdomen: Soft, ND, NTTP, normoactive bowel sounds  Extremities: +lichen planus bilaterally. +pitting edema. 2+ peripheral pulses b/l, no edema, clubbing or cyanosis  Skin: +lichen planus bilaterally  Neurological: A&Ox3, moves all extremities, no focal deficits    LABS:                        11.1   10.29 )-----------( 111      ( 10 Apr 2025 00:33 )             35.0     04-10    137  |  92[L]  |  101[H]  ----------------------------<  153[H]  3.3[L]   |  32[H]  |  3.00[H]    Ca    8.3[L]      10 Apr 2025 12:12  Phos  3.1     04-10  Mg     1.8     04-10    TPro  6.6  /  Alb  3.1[L]  /  TBili  1.2  /  DBili  x   /  AST  8[L]  /  ALT  14  /  AlkPhos  88  04-10    LIVER FUNCTIONS - ( 10 Apr 2025 12:12 )  Alb: 3.1 g/dL / Pro: 6.6 g/dL / ALK PHOS: 88 U/L / ALT: 14 U/L / AST: 8 U/L / GGT: x           PT/INR - ( 10 Apr 2025 00:33 )   PT: 12.8 sec;   INR: 1.11 ratio         PTT - ( 10 Apr 2025 00:33 )  PTT:73.0 sec  CAPILLARY BLOOD GLUCOSE      POCT Blood Glucose.: 120 mg/dL (10 Apr 2025 11:46)  POCT Blood Glucose.: 132 mg/dL (10 Apr 2025 06:13)  POCT Blood Glucose.: 148 mg/dL (2025 21:34)  POCT Blood Glucose.: 160 mg/dL (2025 16:35)    ABG - ( 10 Apr 2025 00:18 )  pH, Arterial: 7.43  pH, Blood: x     /  pCO2: 64    /  pO2: 143   / HCO3: 42    / Base Excess: 15.5  /  SaO2: 99.4                    Urinalysis Basic - ( 10 Apr 2025 12:12 )    Color: x / Appearance: x / SG: x / pH: x  Gluc: 153 mg/dL / Ketone: x  / Bili: x / Urobili: x   Blood: x / Protein: x / Nitrite: x   Leuk Esterase: x / RBC: x / WBC x   Sq Epi: x / Non Sq Epi: x / Bacteria: x          RADIOLOGY & ADDITIONAL TESTS:  CXR:        Care Discussed with Consultants/Other Providers [ x] YES  [ ] NO

## 2025-04-10 NOTE — PROGRESS NOTE ADULT - SUBJECTIVE AND OBJECTIVE BOX
[facility] Division of Kidney Diseases & Hypertension  FOLLOW UP NOTE  [phone]--------------------------------------------------------------------------------  Chief Complaint:Acute on chronic systolic congestive heart failure    24 hour events/subjective:  Pt was seen and examined earlier today. No acute overnight events. No fresh complaints. Pt reports feeling better.   Pt denies SOB/ Constipation/ Diarrhea/ Nausea/ Vomiting/ abdominal pain/ chest pain/ tingling/ numbness.     PAST HISTORY  --------------------------------------------------------------------------------  No significant changes to PMH, PSH, FHx, SHx, unless otherwise noted    ALLERGIES & MEDICATIONS  --------------------------------------------------------------------------------  Allergies    metoprolol (Short breath)    Intolerances      Standing Inpatient Medications  aMIOdarone    Tablet 200 milliGRAM(s) Oral daily  aMIOdarone    Tablet   Oral   atorvastatin 40 milliGRAM(s) Oral at bedtime  buMETAnide Infusion 4 mG/Hr IV Continuous <Continuous>  cefuroxime  IVPB 1500 milliGRAM(s) IV Intermittent once  chlorhexidine 0.12% Liquid 25 milliLiter(s) Swish and Spit once  chlorhexidine 2% Cloths 1 Application(s) Topical daily  chlorhexidine 4% Liquid 1 Application(s) Topical once  clopidogrel Tablet 75 milliGRAM(s) Oral daily  famotidine    Tablet 20 milliGRAM(s) Oral every 48 hours  hemorrhoidal Ointment 1 Application(s) Rectal daily  heparin  Infusion 1500 Unit(s)/Hr IV Continuous <Continuous>  influenza  Vaccine (HIGH DOSE) 0.5 milliLiter(s) IntraMuscular once  insulin lispro (ADMELOG) corrective regimen sliding scale   SubCutaneous Before meals and at bedtime  melatonin 5 milliGRAM(s) Oral at bedtime  vasopressin Infusion 0.04 Unit(s)/Min IV Continuous <Continuous>    PRN Inpatient Medications      REVIEW OF SYSTEMS  --------------------------------------------------------------------------------  as noted above.     VITALS/PHYSICAL EXAM  --------------------------------------------------------------------------------  T(C): 36.4 (04-10-25 @ 08:00), Max: 37 (04-10-25 @ 03:00)  HR: 61 (04-10-25 @ 10:30) (51 - 66)  BP: --  RR: 29 (04-10-25 @ 10:30) (18 - 41)  SpO2: 93% (04-10-25 @ 10:30) (86% - 100%)  Wt(kg): --        04-09-25 @ 07:01  -  04-10-25 @ 07:00  --------------------------------------------------------  IN: 2169.5 mL / OUT: 3640 mL / NET: -1470.5 mL    04-10-25 @ 07:01  -  04-10-25 @ 11:04  --------------------------------------------------------  IN: 390 mL / OUT: 325 mL / NET: 65 mL      Physical Exam:  Gen: NAD on supplemental oxygen.   Pulm: No crackles, lower zone decreased breath sounds.   CV: RRR, S1S2;  Abd: +BS, soft, nontender/nondistended  : Alba in place.   Extremities: Mild LE swelling  Skin: Warm, Lower extremity chronic changes.      LABS/STUDIES  --------------------------------------------------------------------------------              11.1   10.29 >-----------<  111      [04-10-25 @ 00:33]              35.0     136  |  87  |  99  ----------------------------<  133      [04-10-25 @ 06:21]  3.5   |  35  |  3.48        Ca     9.4     [04-10-25 @ 06:21]      Mg     2.0     [04-10-25 @ 06:21]      Phos  4.0     [04-10-25 @ 06:21]    TPro  7.3  /  Alb  3.5  /  TBili  1.5  /  DBili  x   /  AST  7   /  ALT  16  /  AlkPhos  94  [04-10-25 @ 06:21]    PT/INR: PT 12.8 , INR 1.11       [04-10-25 @ 00:33]  PTT: 73.0       [04-10-25 @ 00:33]      Creatinine Trend:  SCr 3.48 [04-10 @ 06:21]  SCr 3.57 [04-10 @ 00:33]  SCr 3.57 [04-09 @ 18:45]  SCr 3.32 [04-09 @ 12:25]  SCr 3.37 [04-09 @ 06:50]

## 2025-04-10 NOTE — PROGRESS NOTE ADULT - ASSESSMENT
65 yo M with a fib (s/p dccv 3/25/25), CHF (likely moderately reduced EF, several TTE with poor windows), aortic stenosis s/p TAVR (11/2022), a flutter s/p ablation 2019 and s/p micra implant,  CKD3, WILFRED, obesity, venous stasis, HTN, HLD, who presents with 6 days of worsening dyspnea on exertion, admitted for AHRF likely iso CHF exacerbation, also with ANGELICA on CKD c/f cardiorenal syndrome. RRT on floors for SBP 80s requiring pressors and has been admitted to the CCU. HF mostly likely i/so severe AS. Pt to get structural intervention.     Plan:  ===NEURO===  - AO x 3    ===RESPIRATORY===  #Acute hypoxemic hypercapnic respiratory failure  - Likely retaining 2/2 volume overload  - c/w bumex 4mg drip for elevated CVP and increased O2 req  - c/w BIPAP for resp distress, 6L NC when off BIPAP     ===CARDIOVASCULAR===  #Obstructive shock 2/2 AS  - Off levophed  - MAPs 70s -> will continue to wean vasopressin .09  - If need more pressor support will switch to phenylephrine as pt has LVOT obstruction from aortic stenosis   - Wean as tolerated    #Acute on chronic HF exacerbation  - Bumex increased to 4mg drip overnight for low urine output - augment with below  - Goal 1-2L net neg daily  - CVP goal 8-12  - Diuril, 3% hypertonic, and metolazone PRN   - Will be careful of overdiuresis given severe AS and preload dependant     #Afib  #First degree block  - PO amio load   - heparin to restart 6 hours after cath    #Aortic valve stenosis s/p prosthetic  - S/p STANISLAW showing severe AS stenosis, global hypokinesis and EF 15%  - Structural plans for valve in valve replacement  - Structural wants cath and CT w/ contrast before procedure  - 4/8 patent LIMA-LAD, closed SVG-OM1/D1, pending TAVR 4/11    #Troponemia  - No ischemic changes on EKG  - Most likely i/s/o NAGELICA on CKD    #Hx CABG  #PVD  - C/w Plavix    #HLD  - Increased Lipitor to 40mg from 20mg    ===/RENAL===  #ANGELICA on CKD  #Cardiorenal syndrome  - Baseline 1.5-1.8  - Most likely i/s/o hypotension and fluid overload  - Renal sono showed parenchymal disease  - Nephro following c/w bumex gtt, monitor volume status. Stop sodium bicarb 650 TID for now 4/7    #Hyperkalemia  - Lokelma if K increases into 5s range    ===GI/NUTRITION===  - Failed bedside SS  - S&S consulted -> recommends FEES    ===SKIN===  - Hx lichen planus  - No other concerns    ===INFECTIOUS DISEASE===  - Slightly elevated WBC on admission 10.5K  - Afebrile  - Urine culture -> neg    ===ENDOCRINE===  - No concerns  - A1c 5.8  - TSH wnl    ===HEMATOLOGIC/DVT PPx===  - On heparin ggt    Dispo: Maintain in ICU.    Patient requires continuous monitoring with bedside rhythm monitoring, arterial line, pulse oximetry, ventilator monitoring and intermittent blood gas analysis.  Care plan discussed with ICU care team.  I have spent 35 minutes providing critical care, in addition to initial critical time provided by CICU attending    Dr. Maldonado     , re-evaluated multiple times during the day.

## 2025-04-10 NOTE — PROGRESS NOTE ADULT - SUBJECTIVE AND OBJECTIVE BOX
HPI/Interval Hx    No acute events overnight. Severe stenosis of prior TAVR valve, awaiting TAV in TAV scheduled for tomorrow     MEDICATIONS  (STANDING):  aMIOdarone    Tablet 200 milliGRAM(s) Oral daily  aMIOdarone    Tablet   Oral   atorvastatin 40 milliGRAM(s) Oral at bedtime  buMETAnide Infusion 4 mG/Hr (20 mL/Hr) IV Continuous <Continuous>  cefuroxime  IVPB 1500 milliGRAM(s) IV Intermittent once  chlorhexidine 0.12% Liquid 25 milliLiter(s) Swish and Spit once  chlorhexidine 2% Cloths 1 Application(s) Topical daily  chlorhexidine 4% Liquid 1 Application(s) Topical once  clopidogrel Tablet 75 milliGRAM(s) Oral daily  famotidine    Tablet 20 milliGRAM(s) Oral every 48 hours  hemorrhoidal Ointment 1 Application(s) Rectal daily  heparin  Infusion 1500 Unit(s)/Hr (18 mL/Hr) IV Continuous <Continuous>  influenza  Vaccine (HIGH DOSE) 0.5 milliLiter(s) IntraMuscular once  insulin lispro (ADMELOG) corrective regimen sliding scale   SubCutaneous Before meals and at bedtime  melatonin 5 milliGRAM(s) Oral at bedtime  vasopressin Infusion 0.04 Unit(s)/Min (6 mL/Hr) IV Continuous <Continuous>    MEDICATIONS  (PRN):      PAST MEDICAL & SURGICAL HISTORY:  CAD (coronary artery disease)  s/p CABG      Hypercholesteremia      Thrombus of left atrial appendage  2018      Ischemic cardiomyopathy      WILFRED (obstructive sleep apnea)  can not tolerate bipap at night      HTN (hypertension)      Atrial fibrillation  2018      CHF (congestive heart failure)  denies any recent exacerbation      Peripheral edema  chronic      Lichen planus  chronic ( b/L LE)      Atrial flutter  s/p ablation 2018      MI (myocardial infarction)  2012      Stented coronary artery  2019      AS (aortic stenosis)      Chronic kidney disease, unspecified CKD stage      ANGELICA (acute kidney injury)  2021      Morbid obesity      Status post placement of cardiac pacemaker  micraleadless PPM, [id number] last interrogation 2022      Osteoarthritis  shoulders, hips, knee      H/O scoliosis      Lower back pain      History of elbow surgery      S/P CABG (coronary artery bypass graft)  x3, 2012      Cardiac pacemaker  leadless 2018      History of coronary artery stent placement  2019 ( one more after cabg)      History of adenoidectomy      H/O atrioventricular karlee ablation  2018          Review of Systems  CONSTITUTIONAL: (+) general malaise  EYES/ENT: No visual changes;  No vertigo or throat pain   NECK: No pain or stiffness  RESPIRATORY: No cough, wheezing, hemoptysis; No shortness of breath at rest   CARDIOVASCULAR: No chest pain or palpitations, PND, or orthopnea  GASTROINTESTINAL: No abdominal or epigastric pain. No nausea, vomiting, or hematemesis; No diarrhea or constipation. No melena or hematochezia.  GENITOURINARY: No dysuria, frequency or hematuria  NEUROLOGICAL: No numbness or weakness  SKIN: No itching, burning, rashes, or lesions   All other review of systems is negative unless indicated above.    Physical Exam  General: A/ox 3, No acute Distress  Neck: Supple, NO JVD  Cardiac: S1 S2, III/VI RUSB murmur  Pulmonary: diminished at bases, No Rhonchi/Rales/Wheezing, breathless when speaking  Abdomen: Soft, Non -tender, +BS x 4 quads  Extremities: No Rashes, venous stasis, edema improving  Neuro: A/o x 3, No focal deficits    Vital Signs Last 24 Hrs  T(C): 36.4 (10 Apr 2025 08:00), Max: 37 (10 Apr 2025 03:00)  T(F): 97.5 (10 Apr 2025 08:00), Max: 98.6 (10 Apr 2025 03:00)  HR: 61 (10 Apr 2025 10:30) (51 - 66)  BP: --  BP(mean): --  RR: 29 (10 Apr 2025 10:30) (18 - 41)  SpO2: 93% (10 Apr 2025 10:30) (86% - 100%)    Parameters below as of 10 Apr 2025 10:00  Patient On (Oxygen Delivery Method): nasal cannula  O2 Flow (L/min): 3                          11.1   10.29 )-----------( 111      ( 10 Apr 2025 00:33 )             35.0     04-10    136  |  87[L]  |  99[H]  ----------------------------<  133[H]  3.5   |  35[H]  |  3.48[H]    Ca    9.4      10 Apr 2025 06:21  Phos  4.0     04-10  Mg     2.0     04-10    TPro  7.3  /  Alb  3.5  /  TBili  1.5[H]  /  DBili  x   /  AST  7[L]  /  ALT  16  /  AlkPhos  94  04-10      Telemetry: NSR 61    EKG: < from: 12 Lead ECG (25 @ 00:07) >  Systolic  mmHg    Diastolic BP 69 mmHg    Ventricular Rate 57 BPM    Atrial Rate 57 BPM    P-R Interval 246 ms    QRS Duration 104 ms    Q-T Interval 518 ms    QTC Calculation(Bazett) 504 ms    P Axis 72 degrees    R Axis 123 degrees    T Axis 90 degrees    Diagnosis Line SINUS BRADYCARDIA WITH 1ST DEGREE A-V BLOCK  ANTEROLATERAL INFARCT , AGE UNDETERMINED  PROLONGED QT  ABNORMAL ECG  WHEN COMPARED WITH 2025  NO SIGNIFICANT CHANGE WAS FOUND  Confirmed by Clarence MCDERMOTT, Rahul ([id number]) on 2025 10:17:23 AM    < end of copied text >      Other  < from: STANISLAW W or WO Ultrasound Enhancing Agent (25 @ 14:46) >  TRANSESOPHAGEAL ECHOCARDIOGRAM REPORT  ________________________________________________________________________________                                      _______       Pt. Name:       BERTHA WARD Study Date:    2025  MRN:            [id number]        YOB: 1960  Accession #:    [id number]         Age:           64 years  Account#:       [id number]      Gender:        M  Heart Rate:                       Height:        68.11 in (173.00 cm)  Rhythm:      Weight:        92.59 lb (42.00 kg)  Blood Pressure: 90/60 mmHg        BSA/BMI:       1.48 m² / 14.03 kg/m²  ________________________________________________________________________________________  Referring Physician:    [id number] Jamaal Beltran  Interpreting Physician: Rahat Carl M.D.  Primary Sonographer:    AM/BS  Fellow (Performing):    Mar Campos MD  Fellow (Interpreting):  Mar Campos MD    CPT:               ECHO 3D RECONSTRUCT W WORKSTATION - 46758.m;ECHO TRANSESOPH              W/O CON - 73158.m;DOPPLER ECHO COMP W SPECT - 17631.m;DOPPL                     ECHO COLOR FLOW - 56462.m  Indication(s):     Presence of prosthetic heart valve - Z95.2  Procedure:         Transesophageal echocardiogram performed with 2D,M-mode and                     complete spectral and color flow Doppler. Full volume                     3-dimensional reconstruction performed at the workstation.  Ordering Location: [facility]  Admission Status:  Inpatient  Study Information: Image quality for this study is excellent.    _______________________________________________________________________________________     CONCLUSIONS:      1. Multiple segmental abnormalities exist. See findings.   2. Left ventricular cavity is severely dilated.Global left ventricular hypokinesis.   3. Reduced right ventricular systolic function.   4. Left atrium is severely dilated.   5. The right atrium is dilated.   6. Mild to moderate mitral regurgitation.   7. No pericardial effusion seen.   8. No thrombus on TAVR valve. No significant paravalvular AR.   9. Compared to the transthoracic echocardiogram performed on 2025, there have been no significant interval changes. Findings were discussed with CCU team on 2025 at 3.40 pm.  10. Left pleural effusion noted.  11. Minimal (grade 1) spontaneous echo contrast located in the left atrial apendage and minimal (grade 1) spontaneous echo contrast located in the left atrium.  12. No evidence of left atrial or left atrial appendage thrombus. The left atrial appendage emptying velocity is low.  13. Severe pulmonary hypertension.    < end of copied text >    < from: Cardiac Catheterization (25 @ 08:34) >  Study Date:     2025   Name:           BERTHA WARD   :            1960   (64 years)   Gender:         male   MR#:            [id number]   [facility]#:           [id number]   Patient Class:  Inpatient     Cath Lab Report    Diagnostic Cardiologist:       Suman Montelongo MD   Referring Physician:           Angel Maldonado MD   Referring Physician:           Bobby Miranda MD     Procedures Performed   Procedures:               1.    Arterial Access - Right Femoral     2.    DiagnosticCoronary Angiography   3.    Left Heart Cath   4.    Diagnostic Graft Angiography   5.    Ultrasound Guided Access     Indications:                Unstable angina   Congestive heart failure, acute on chronic diastolic   Aortic valve disease   Aortic Valve Stenosis   Pre-operative clearance     Lab Visit Indication:      USA, ACDiaCHF, Aortic, AV Sten, PreOpClr     Diagnostic Conclusions:     Severe three vessel obstructive coronary artery disease   Patent LIMA to the left anterior descending artery   Chronic total occlusion of the proximal left anterior descending  artery  Chronic total occlusion of the proximal left circumflex artery   Occluded SVG to the 1st obtuse marginal artery   Occluded SVG to the 1st diagonal artery   Mild left main coronary artery disease   Right dominant system     Total of 16cc of contrast was administered     < end of copied text >

## 2025-04-10 NOTE — PROGRESS NOTE ADULT - ATTENDING COMMENTS
HF  intubated, now extubated   initially called for chon  #Baseline CKD3= pt reports baseline Renal fxn in 40s, followed by outpt nephrologist Dr Stringer  #  chon in setting of HF and ENtresto and decreased po intake and diarrhea  chon could be cardiorenal +  ATN  nonoliguric  cr stable now  back on bumex drip  he has a lopez, monitor UO  monitor daily cr trends  lytes stable  no urgent indication for RRT  check renal sono  #HF  bumex drip as above  # hyperkalemia  stable  #met acidosis- DC sodium bicarb tabs  monitor bicarb trend daily  #hypotension  monitor BP trends  on vaso   #s/p cardiac cath 4/8   plan TAVR per CV        d/w CCU team .

## 2025-04-10 NOTE — PRE PROCEDURE NOTE - PRE PROCEDURE EVALUATION
Cardiac Surgery Pre-op Note:    CC: Patient is a 64y old  Male who presents with a chief complaint of dyspnea , chest pain  hf exacerbation- diuresis with Bumex gtt inotropic support vasopressin now  for TAVR valve in valve 4/11with Dr Wagner and Dr Montelongo      Referring Physician: Dr Maldonado                                                                                                           Surgeon: Dr Wagner    Procedure: TAVR valve in valve 4/11    Allergies  metoprolol (Short breath)  Intolerances        HPI:   65 yo M with a fib (s/p dccv 3/25/25), CHF (likely moderately reduced EF, several TTE with poor windows), aortic stenosis s/p TAVR (11/2022), CAD s/p CABG, a flutter s/p ablation 2019 and s/p micra implant,  CKD3, obesity, venous stasis, HTN, HLD, who presents with 6 days of worsening dyspnea on exertion. Pt also reports associated orthopnea and chest pain on exertion but not at rest which is located in the center of his chest. Pain is described as burning. Also reports several days decreased appetite. Denies fevers, chills, cough, abdominal pain, NVD, urinary sx, leg swelling. Patient had a cardioversion for atrial fibrillation at this hospital recently, which was reportedly unsuccessful. Patient has a scheduled appointment with Dr. Philip on 04/24/2025 to discuss possible ablation. His outpt cardiologist is Dr. Paul.     On arrival to ED, vitals were 97.3F, HR 71, BP 94/57, RR 22, 97% on 2L NC. Labs pertinent for WBC 10.68, INR 2.55, Na 132, K 5.5, bicarb 20, BUN/Cr 78/5.17 (baseline Cr appears ~1.5-1.8), trop 225, BNP 22139, VBG ph 7.3, pco2/po2 wnl. UA without evidence of infection. EKG  on admission 1st deg av block, low voltage, poor r wave progression, R axis deviation CXR with diffuse hazy opacities suggest mild pulmonary edema and small right and trace left pleural effusions. Pt was given IV Bumex 1mg x1, IV bumex 2mg x1, IV Bumex 4mg x1, and lokelma 5mg x1, then admitted for further management.    (01 Apr 2025 01:59)      PAST MEDICAL & SURGICAL HISTORY:  CAD (coronary artery disease)  s/p CABG      Hypercholesteremia      Thrombus of left atrial appendage  2018      Ischemic cardiomyopathy      WILFRED (obstructive sleep apnea)  can not tolerate bipap at night      HTN (hypertension)      Atrial fibrillation  2018      CHF (congestive heart failure)  denies any recent exacerbation      Peripheral edema  chronic      Lichen planus  chronic ( b/L LE)      Atrial flutter  s/p ablation 2018  MI (myocardial infarction)  2012  Stented coronary artery  2019    AS (aortic stenosis)  Chronic kidney disease, unspecified CKD stage  ANGELICA (acute kidney injury)  4/2021  Morbid obesity  Status post placement of cardiac pacemaker  micraleadless PPM, [id number] last interrogation 7/6/2022  Osteoarthritis  shoulders, hips, knee  H/O scoliosis  Lower back pain  History of elbow surgery  S/P CABG (coronary artery bypass graft)  x3, 2012      Cardiac pacemaker  leadless 2018      History of coronary artery stent placement  2019 ( one more after cabg)      History of adenoidectomy      H/O atrioventricular karlee ablation  2018          MEDICATIONS  (STANDING):  aMIOdarone    Tablet 200 milliGRAM(s) Oral daily  aMIOdarone    Tablet   Oral   atorvastatin 40 milliGRAM(s) Oral at bedtime  buMETAnide Infusion 4 mG/Hr (20 mL/Hr) IV Continuous <Continuous>  cefuroxime  IVPB 1500 milliGRAM(s) IV Intermittent once  chlorhexidine 0.12% Liquid 25 milliLiter(s) Swish and Spit once  chlorhexidine 2% Cloths 1 Application(s) Topical daily  chlorhexidine 4% Liquid 1 Application(s) Topical once  clopidogrel Tablet 75 milliGRAM(s) Oral daily  famotidine    Tablet 20 milliGRAM(s) Oral every 48 hours  hemorrhoidal Ointment 1 Application(s) Rectal daily  heparin  Infusion 1500 Unit(s)/Hr (18 mL/Hr) IV Continuous <Continuous>  influenza  Vaccine (HIGH DOSE) 0.5 milliLiter(s) IntraMuscular once  insulin lispro (ADMELOG) corrective regimen sliding scale   SubCutaneous Before meals and at bedtime  melatonin 5 milliGRAM(s) Oral at bedtime  vasopressin Infusion 0.04 Unit(s)/Min (6 mL/Hr) IV Continuous <Continuous>    MEDICATIONS  (PRN):  ICU Vital Signs Last 24 Hrs  T(C): 36.4 (10 Apr 2025 08:00), Max: 37 (10 Apr 2025 03:00)  T(F): 97.5 (10 Apr 2025 08:00), Max: 98.6 (10 Apr 2025 03:00)  HR: 61 (10 Apr 2025 09:45) (51 - 66)  BP: --  BP(mean): --  ABP: 91/56 (10 Apr 2025 09:45) (74/53 - 115/69)  ABP(mean): 70 (10 Apr 2025 09:45) (58 - 86)  RR: 36 (10 Apr 2025 09:30) (18 - 41)  SpO2: 98% (10 Apr 2025 09:45) (86% - 100%)    O2 Parameters below as of 10 Apr 2025 09:15  Patient On (Oxygen Delivery Method): nasal cannula  O2 Flow (L/min): 3              Labs:                        11.1   10.29 )-----------( 111      ( 10 Apr 2025 00:33 )             35.0     04-10    136  |  87[L]  |  99[H]  ----------------------------<  133[H]  3.5   |  35[H]  |  3.48[H]    Ca    9.4      10 Apr 2025 06:21  Phos  4.0     04-10  Mg     2.0     04-10    TPro  7.3  /  Alb  3.5  /  TBili  1.5[H]  /  DBili  x   /  AST  7[L]  /  ALT  16  /  AlkPhos  94  04-10    PT/INR - ( 10 Apr 2025 00:33 )   PT: 12.8 sec;   INR: 1.11 ratio         PTT - ( 10 Apr 2025 00:33 )  PTT:73.0 sec    Blood Type: ABO Interpretation: O (04-09 @ 01:10)    HGB A1C:   Prealbumin:   Pro-BNP:   Thyroid Panel:   MRSA:  / MSSA:   Urinalysis Basic - ( 10 Apr 2025 06:21 )    Color: x / Appearance: x / SG: x / pH: x  Gluc: 133 mg/dL / Ketone: x  / Bili: x / Urobili: x   Blood: x / Protein: x / Nitrite: x   Leuk Esterase: x / RBC: x / WBC x   Sq Epi: x / Non Sq Epi: x / Bacteria: x        CXR:     EKG:    Carotid Duplex:      PFT's:    Echocardiogram:    Cardiac catheterization:    Vein Mapping:    Gen: WN/WD NAD  Neuro: AAOx3, nonfocal  Pulm: CTA B/L  CV: RRR, S1S2  Abd: Soft, NT, ND +BS  Ext: No edema, + peripheral pulses      Pt has AICD/PPM [ ] Yes  [ ] No             Brand Name:  Pre-op Beta Blocker ordered within 24 hrs of surgery (CABG ONLY)?  [ ] Yes  [ ] No  If not, Why?  Type & Cross  [ ] Yes  [ ] No  NPO after Midnight [ ] Yes  [ ] No  Pre-op ABX ordered, to be taped on chart:  [ ] Yes  [ ] No     Hibiclens/Peridex ordered [ ] Yes  [ ] No  Intraop on Hold: PRBCs, CXR, STANISLAW [ ]   Consent obtained  [ ] Yes  [ ] No   Cardiac Surgery Pre-op Note:    CC: Patient is a 64y old  Male who presents with a chief complaint of dyspnea , chest pain  hf exacerbation- diuresis with Bumex gtt inotropic support vasopressin now  for TAVR valve in valve with Dr Wagner and Dr Montelongo      Referring Physician: Dr Maldonado                                                                                                           Surgeon: Dr Wagner    Procedure: TAVR valve in valve     Allergies  metoprolol (Short breath)  Intolerances        HPI:   65 yo M with a fib (s/p dccv 3/25/25), CHF (likely moderately reduced EF, several TTE with poor windows), aortic stenosis s/p TAVR (2022), CAD s/p CABG, a flutter s/p ablation  and s/p micra implant,  CKD3, obesity, venous stasis, HTN, HLD, who presents with 6 days of worsening dyspnea on exertion. Pt also reports associated orthopnea and chest pain on exertion but not at rest which is located in the center of his chest. Pain is described as burning. Also reports several days decreased appetite. Denies fevers, chills, cough, abdominal pain, NVD, urinary sx, leg swelling. Patient had a cardioversion for atrial fibrillation at this hospital recently, which was reportedly unsuccessful. Patient has a scheduled appointment with Dr. Philip on 2025 to discuss possible ablation. His outpt cardiologist is Dr. Paul.     On arrival to ED, vitals were 97.3F, HR 71, BP 94/57, RR 22, 97% on 2L NC. Labs pertinent for WBC 10.68, INR 2.55, Na 132, K 5.5, bicarb 20, BUN/Cr 78/5.17 (baseline Cr appears ~1.5-1.8), trop 225, BNP 26236, VBG ph 7.3, pco2/po2 wnl. UA without evidence of infection. EKG  on admission 1st deg av block, low voltage, poor r wave progression, R axis deviation CXR with diffuse hazy opacities suggest mild pulmonary edema and small right and trace left pleural effusions. Pt was given IV Bumex 1mg x1, IV bumex 2mg x1, IV Bumex 4mg x1, and lokelma 5mg x1, then admitted for further management.    (2025 01:59)      PAST MEDICAL & SURGICAL HISTORY:  CAD (coronary artery disease)  s/p CABG      Hypercholesteremia      Thrombus of left atrial appendage  2018      Ischemic cardiomyopathy      WILFRED (obstructive sleep apnea)  can not tolerate bipap at night      HTN (hypertension)      Atrial fibrillation  2018      CHF (congestive heart failure)  denies any recent exacerbation      Peripheral edema  chronic      Lichen planus  chronic ( b/L LE)      Atrial flutter  s/p ablation 2018  MI (myocardial infarction)    Stented coronary artery  2019    AS (aortic stenosis)  Chronic kidney disease, unspecified CKD stage  ANGELICA (acute kidney injury)  2021  Morbid obesity  Status post placement of cardiac pacemaker  micraleadless PPM, [id number] last interrogation 2022  Osteoarthritis  shoulders, hips, knee  H/O scoliosis  Lower back pain  History of elbow surgery  S/P CABG (coronary artery bypass graft)  x3, 2012      Cardiac pacemaker  leadless 2018      History of coronary artery stent placement  2019 ( one more after cabg)      History of adenoidectomy      H/O atrioventricular karlee ablation  2018          MEDICATIONS  (STANDING):  aMIOdarone    Tablet 200 milliGRAM(s) Oral daily  aMIOdarone    Tablet   Oral   atorvastatin 40 milliGRAM(s) Oral at bedtime  buMETAnide Infusion 4 mG/Hr (20 mL/Hr) IV Continuous <Continuous>  cefuroxime  IVPB 1500 milliGRAM(s) IV Intermittent once  chlorhexidine 0.12% Liquid 25 milliLiter(s) Swish and Spit once  chlorhexidine 2% Cloths 1 Application(s) Topical daily  chlorhexidine 4% Liquid 1 Application(s) Topical once  clopidogrel Tablet 75 milliGRAM(s) Oral daily  famotidine    Tablet 20 milliGRAM(s) Oral every 48 hours  hemorrhoidal Ointment 1 Application(s) Rectal daily  heparin  Infusion 1500 Unit(s)/Hr (18 mL/Hr) IV Continuous <Continuous>  influenza  Vaccine (HIGH DOSE) 0.5 milliLiter(s) IntraMuscular once  insulin lispro (ADMELOG) corrective regimen sliding scale   SubCutaneous Before meals and at bedtime  melatonin 5 milliGRAM(s) Oral at bedtime  vasopressin Infusion 0.04 Unit(s)/Min (6 mL/Hr) IV Continuous <Continuous>    MEDICATIONS  (PRN):  ICU Vital Signs Last 24 Hrs  T(C): 36.4 (10 Apr 2025 08:00), Max: 37 (10 Apr 2025 03:00)  T(F): 97.5 (10 Apr 2025 08:00), Max: 98.6 (10 Apr 2025 03:00)  HR: 61 (10 Apr 2025 09:45) (51 - 66)  ABP: 91/56 (10 Apr 2025 09:45) (74/53 - 115/69)  ABP(mean): 70 (10 Apr 2025 09:45) (58 - 86)  RR: 36 (10 Apr 2025 09:30) (18 - 41)  SpO2: 98% (10 Apr 2025 09:45) (86% - 100%)    O2 Parameters below as of 10 Apr 2025 09:15  Patient On (Oxygen Delivery Method): nasal cannula  O2 Flow (L/min): 3    Daily  172 cm   Daily Weight in k.2 (10 Apr 2025 06:00)            Labs:                        11.1   10.29 )-----------( 111      ( 10 Apr 2025 00:33 )             35.0     04-10    136  |  87[L]  |  99[H]  ----------------------------<  133[H]  3.5   |  35[H]  |  3.48[H]    Ca    9.4      10 Apr 2025 06:21  Phos  4.0     04-10  Mg     2.0     04-10    TPro  7.3  /  Alb  3.5  /  TBili  1.5[H]  /  DBili  x   /  AST  7[L]  /  ALT  16  /  AlkPhos  94  04-10    PT/INR - ( 10 Apr 2025 00:33 )   PT: 12.8 sec;   INR: 1.11 ratio         PTT - ( 10 Apr 2025 00:33 )  PTT:73.0 sec    Blood Type: ABO Interpretation: O ( @ 01:10)    Urinalysis Basic - ( 10 Apr 2025 06:21 )    Color: x / Appearance: x / SG: x / pH: x  Gluc: 133 mg/dL / Ketone: x  / Bili: x / Urobili: x   Blood: x / Protein: x / Nitrite: x   Leuk Esterase: x / RBC: x / WBC x   Sq Epi: x / Non Sq Epi: x / Bacteria: x      < from: Xray Chest 1 View- PORTABLE-Urgent (Xray Chest 1 View- PORTABLE-Urgent .) (25 @ 11:52) >    IMPRESSION:  Diffuse bilateral parenchymal opacities, unchanged.    < end of copied text >  < from: POCUS ED TTE 2D F/U, Limited w/o Cont. (25 @ 13:44) >  IMPRESSION:  No Pericardial Effusion.  Moderate to severe global left ventricular systolic dysfunction.  Hyperechoic linear focus within LVcavity near posterolateral wall (on   parasternal images) and anterolateral wall (on apical images) that likely   arises from mitral cusps. Likely calcification of chordae. Recommend   comprehensive TTE for further evaluation.    < end of copied text >  < from: POCUS ED TTE 2D F/U, Limited w/o Cont. (25 @ 13:44) >  IMPRESSION:  No Pericardial Effusion.  Moderate to severe global left ventricular systolic dysfunction.  Hyperechoic linear focus within LVcavity near posterolateral wall (on   parasternal images) and anterolateral wall (on apical images) that likely   arises from mitral cusps. Likely calcification of chordae. Recommend   comprehensive TTE for further evaluation.    < end of copied text >    CXR:     EKG:< from: 12 Lead ECG (25 @ 00:07) >    Diagnosis Line SINUS BRADYCARDIA WITH 1ST DEGREE A-V BLOCK  ANTEROLATERAL INFARCT , AGE UNDETERMINED  PROLONGED QT  ABNORMAL ECG  WHEN COMPARED WITH 2025  NO SIGNIFICANT CHANGE WAS FOUND    < end of copied text >    PFT's: pending    Echocardiogram:< from: STANISLAW W or WO Ultrasound Enhancing Agent (25 @ 14:46) >     1. Multiple segmental abnormalities exist. See findings.   2. Left ventricular cavity is severely dilated.Global left ventricular hypokinesis.   3. Reduced right ventricular systolic function.   4. Left atrium is severely dilated.   5. The right atrium is dilated.   6. Mild to moderate mitral regurgitation.   7. No pericardial effusion seen.   8. No thrombus on TAVR valve. No significant paravalvular AR.   9. Compared to the transthoracic echocardiogram performed on 2025, there have been no significant interval changes. Findings were discussed with CCU team on 2025 at 3.40 pm.  10. Left pleural effusion noted.  11. Minimal (grade 1) spontaneous echo contrast located in the left atrial apendage and minimal (grade 1) spontaneous echo contrast located in the left atrium.  12. No evidence of left atrial or left atrial appendage thrombus. The left atrial appendage emptying velocity is low.  13. Severe pulmonary hypertension.    _________________________________________________________________    < end of copied text         Gen: WN/WD obese deconditioned  Neuro: AAOx3, nonfocal  Pulm: CTA B/L  CV: RRR, S1S2  Abd: Soft, NT, ND +BS  Ext: No edema, + peripheral pulses      Pt has AICD/PPM [ ] Yes  [ ] No             Brand Name:  Pre-op Beta Blocker ordered within 24 hrs of surgery (CABG ONLY)?  [ ] Yes  [ ] No  If not, Why?  Type & Cross  [ ] Yes  [ ] No  NPO after Midnight [ ] Yes  [ ] No  Pre-op ABX ordered, to be taped on chart:  [ ] Yes  [ ] No     Hibiclens/Peridex ordered [ ] Yes  [ ] No  Intraop on Hold: PRBCs, CXR, STANISLAW [ ]   Consent obtained  [ ] Yes  [ ] No   Cardiac Surgery Pre-op Note:    CC: Patient is a 64y old  Male who presents with a chief complaint of dyspnea , chest pain  hf exacerbation- diuresis with Bumex gtt inotropic support vasopressin now  for TAVR valve in valve with Dr Wagner and Dr Montelongo      Referring Physician: Dr Maldonado                                                                                                           Surgeon: Dr Wagner    Procedure: TAVR valve in valve     Allergies  metoprolol (Short breath)  Intolerances        HPI:   65 yo M with a fib (s/p dccv 3/25/25), CHF (likely moderately reduced EF, several TTE with poor windows), aortic stenosis s/p TAVR (2022), CAD s/p CABG, a flutter s/p ablation  and s/p micra implant,  CKD3, obesity, venous stasis, HTN, HLD, who presents with 6 days of worsening dyspnea on exertion. Pt also reports associated orthopnea and chest pain on exertion but not at rest which is located in the center of his chest. Pain is described as burning. Also reports several days decreased appetite. Denies fevers, chills, cough, abdominal pain, NVD, urinary sx, leg swelling. Patient had a cardioversion for atrial fibrillation at this hospital recently, which was reportedly unsuccessful. Patient has a scheduled appointment with Dr. Philip on 2025 to discuss possible ablation. His outpt cardiologist is Dr. Paul.     On arrival to ED, vitals were 97.3F, HR 71, BP 94/57, RR 22, 97% on 2L NC. Labs pertinent for WBC 10.68, INR 2.55, Na 132, K 5.5, bicarb 20, BUN/Cr 78/5.17 (baseline Cr appears ~1.5-1.8), trop 225, BNP 11800, VBG ph 7.3, pco2/po2 wnl. UA without evidence of infection. EKG  on admission 1st deg av block, low voltage, poor r wave progression, R axis deviation CXR with diffuse hazy opacities suggest mild pulmonary edema and small right and trace left pleural effusions. Pt was given IV Bumex 1mg x1, IV bumex 2mg x1, IV Bumex 4mg x1, and lokelma 5mg x1, then admitted for further management.    (2025 01:59)      PAST MEDICAL & SURGICAL HISTORY:  CAD (coronary artery disease)  s/p CABG      Hypercholesteremia      Thrombus of left atrial appendage  2018      Ischemic cardiomyopathy      WILFRED (obstructive sleep apnea)  can not tolerate bipap at night      HTN (hypertension)      Atrial fibrillation  2018      CHF (congestive heart failure)  denies any recent exacerbation      Peripheral edema  chronic      Lichen planus  chronic ( b/L LE)      Atrial flutter  s/p ablation 2018  MI (myocardial infarction)    Stented coronary artery  2019    AS (aortic stenosis)  Chronic kidney disease, unspecified CKD stage  ANGELICA (acute kidney injury)  2021  Morbid obesity  Status post placement of cardiac pacemaker  micraleadless PPM, [id number] last interrogation 2022  Osteoarthritis  shoulders, hips, knee  H/O scoliosis  Lower back pain  History of elbow surgery  S/P CABG (coronary artery bypass graft)  x3, 2012      Cardiac pacemaker  leadless 2018      History of coronary artery stent placement  2019 ( one more after cabg)      History of adenoidectomy      H/O atrioventricular karlee ablation  2018          MEDICATIONS  (STANDING):  aMIOdarone    Tablet 200 milliGRAM(s) Oral daily  aMIOdarone    Tablet   Oral   atorvastatin 40 milliGRAM(s) Oral at bedtime  buMETAnide Infusion 4 mG/Hr (20 mL/Hr) IV Continuous <Continuous>  cefuroxime  IVPB 1500 milliGRAM(s) IV Intermittent once  chlorhexidine 0.12% Liquid 25 milliLiter(s) Swish and Spit once  chlorhexidine 2% Cloths 1 Application(s) Topical daily  chlorhexidine 4% Liquid 1 Application(s) Topical once  clopidogrel Tablet 75 milliGRAM(s) Oral daily  famotidine    Tablet 20 milliGRAM(s) Oral every 48 hours  hemorrhoidal Ointment 1 Application(s) Rectal daily  heparin  Infusion 1500 Unit(s)/Hr (18 mL/Hr) IV Continuous <Continuous>  influenza  Vaccine (HIGH DOSE) 0.5 milliLiter(s) IntraMuscular once  insulin lispro (ADMELOG) corrective regimen sliding scale   SubCutaneous Before meals and at bedtime  melatonin 5 milliGRAM(s) Oral at bedtime  vasopressin Infusion 0.04 Unit(s)/Min (6 mL/Hr) IV Continuous <Continuous>    MEDICATIONS  (PRN):  ICU Vital Signs Last 24 Hrs  T(C): 36.4 (10 Apr 2025 08:00), Max: 37 (10 Apr 2025 03:00)  T(F): 97.5 (10 Apr 2025 08:00), Max: 98.6 (10 Apr 2025 03:00)  HR: 61 (10 Apr 2025 09:45) (51 - 66)  ABP: 91/56 (10 Apr 2025 09:45) (74/53 - 115/69)  ABP(mean): 70 (10 Apr 2025 09:45) (58 - 86)  RR: 36 (10 Apr 2025 09:30) (18 - 41)  SpO2: 98% (10 Apr 2025 09:45) (86% - 100%)    O2 Parameters below as of 10 Apr 2025 09:15  Patient On (Oxygen Delivery Method): nasal cannula  O2 Flow (L/min): 3    Daily  172 cm   Daily Weight in k.2 (10 Apr 2025 06:00)            Labs:                        11.1   10.29 )-----------( 111      ( 10 Apr 2025 00:33 )             35.0     04-10    136  |  87[L]  |  99[H]  ----------------------------<  133[H]  3.5   |  35[H]  |  3.48[H]    Ca    9.4      10 Apr 2025 06:21  Phos  4.0     04-10  Mg     2.0     04-10    TPro  7.3  /  Alb  3.5  /  TBili  1.5[H]  /  DBili  x   /  AST  7[L]  /  ALT  16  /  AlkPhos  94  04-10    PT/INR - ( 10 Apr 2025 00:33 )   PT: 12.8 sec;   INR: 1.11 ratio         PTT - ( 10 Apr 2025 00:33 )  PTT:73.0 sec    Blood Type: ABO Interpretation: O ( @ 01:10)    Urinalysis Basic - ( 10 Apr 2025 06:21 )    Color: x / Appearance: x / SG: x / pH: x  Gluc: 133 mg/dL / Ketone: x  / Bili: x / Urobili: x   Blood: x / Protein: x / Nitrite: x   Leuk Esterase: x / RBC: x / WBC x   Sq Epi: x / Non Sq Epi: x / Bacteria: x      < from: Xray Chest 1 View- PORTABLE-Urgent (Xray Chest 1 View- PORTABLE-Urgent .) (25 @ 11:52) >    IMPRESSION:  Diffuse bilateral parenchymal opacities, unchanged.    < end of copied text >  < from: POCUS ED TTE 2D F/U, Limited w/o Cont. (25 @ 13:44) >  IMPRESSION:  No Pericardial Effusion.  Moderate to severe global left ventricular systolic dysfunction.  Hyperechoic linear focus within LVcavity near posterolateral wall (on   parasternal images) and anterolateral wall (on apical images) that likely   arises from mitral cusps. Likely calcification of chordae. Recommend   comprehensive TTE for further evaluation.    < end of copied text >  < from: POCUS ED TTE 2D F/U, Limited w/o Cont. (25 @ 13:44) >  IMPRESSION:  No Pericardial Effusion.  Moderate to severe global left ventricular systolic dysfunction.  Hyperechoic linear focus within LVcavity near posterolateral wall (on   parasternal images) and anterolateral wall (on apical images) that likely   arises from mitral cusps. Likely calcification of chordae. Recommend   comprehensive TTE for further evaluation.    < end of copied text >    CXR: < from: Xray Chest 1 View- PORTABLE-Urgent (Xray Chest 1 View- PORTABLE-Urgent .) (25 @ 11:52) >  Diffuse bilateral parenchymal opacities, unchanged.      < end of copied text >      EKG:< from: 12 Lead ECG (25 @ 00:07) >    Diagnosis Line SINUS BRADYCARDIA WITH 1ST DEGREE A-V BLOCK  ANTEROLATERAL INFARCT , AGE UNDETERMINED  PROLONGED QT  ABNORMAL ECG  WHEN COMPARED WITH 2025  NO SIGNIFICANT CHANGE WAS FOUND    < end of copied text >    PFT's: pending    Echocardiogram:< from: STANISLAW W or WO Ultrasound Enhancing Agent (25 @ 14:46) >     1. Multiple segmental abnormalities exist. See findings.   2. Left ventricular cavity is severely dilated.Global left ventricular hypokinesis.   3. Reduced right ventricular systolic function.   4. Left atrium is severely dilated.   5. The right atrium is dilated.   6. Mild to moderate mitral regurgitation.   7. No pericardial effusion seen.   8. No thrombus on TAVR valve. No significant paravalvular AR.   9. Compared to the transthoracic echocardiogram performed on 2025, there have been no significant interval changes. Findings were discussed with CCU team on 2025 at 3.40 pm.  10. Left pleural effusion noted.  11. Minimal (grade 1) spontaneous echo contrast located in the left atrial apendage and minimal (grade 1) spontaneous echo contrast located in the left atrium.  12. No evidence of left atrial or left atrial appendage thrombus. The left atrial appendage emptying velocity is low.  13. Severe pulmonary hypertension.    _________________________________________________________________    < end of copied text         Gen: WN/WD obese deconditioned  Neuro: AAOx3, nonfocal  Pulm: B/L rhonchus  breath sound son 4lnc   CV: RRR, S1S2  Abd:  obese Soft, NT, ND +BS  brent lopez   Ext: + edema, + peripheral pulses  left radial marizol   PVD        Pt has AICD/PPM [ x] Yes  [ ] No             Brand Name:micraleadless PPM, [id number] last interrogation 2022  Pre-op Beta Blocker ordered within 24 hrs of surgery (CABG ONLY)?   TAVR Humza  Type & Cross  [ x] Yes  [ ] No  NPO after Midnight [ x] Yes  [ ] No  Pre-op ABX ordered, to be taped on chart:  [ x] Yes  [ ] No     Hibiclens/Peridex ordered [ x] Yes  [ ] No  Intraop on Hold: PRBCs, CXR, STANISLAW [x ]   Consent obtained  [ x] Yes  [ ] No  Plan  NPO at midnight  hibiclens wash tonight and am   peridex rinse and spit in am   Zinacef on call to OR taped to chart  Care as per CICU team  TAVR HUMZA  with Dr Wagner

## 2025-04-10 NOTE — PROGRESS NOTE ADULT - SUBJECTIVE AND OBJECTIVE BOX
Mine Salinas PA-C  Contact via [email or web address]    CCU PROGRESS NOTE:    BERTHA WARD  MRN-[id number]  Patient is a 64y old  Male who presents with a chief complaint of dyspnea , chest pain (10 Apr 2025 11:01)    INTERVAL HPI/OVERNIGHT EVENTS: No acute events overnight.    SUBJECTIVE: Patient seen and examined at bedside. No acute complaints.     MEDICATIONS  (STANDING):  albuterol/ipratropium for Nebulization 3 milliLiter(s) Nebulizer every 6 hours  aMIOdarone    Tablet 200 milliGRAM(s) Oral daily  aMIOdarone    Tablet   Oral   atorvastatin 40 milliGRAM(s) Oral at bedtime  buMETAnide Infusion 4 mG/Hr (20 mL/Hr) IV Continuous <Continuous>  cefuroxime  IVPB 1500 milliGRAM(s) IV Intermittent once  chlorhexidine 0.12% Liquid 25 milliLiter(s) Swish and Spit once  chlorhexidine 2% Cloths 1 Application(s) Topical daily  clopidogrel Tablet 75 milliGRAM(s) Oral daily  famotidine    Tablet 20 milliGRAM(s) Oral every 48 hours  hemorrhoidal Ointment 1 Application(s) Rectal daily  heparin  Infusion 1500 Unit(s)/Hr (18 mL/Hr) IV Continuous <Continuous>  insulin lispro (ADMELOG) corrective regimen sliding scale   SubCutaneous Before meals and at bedtime  melatonin 5 milliGRAM(s) Oral at bedtime  sodium chloride 3%  Inhalation 4 milliLiter(s) Inhalation every 6 hours  vasopressin Infusion 0.04 Unit(s)/Min (6 mL/Hr) IV Continuous <Continuous>    MEDICATIONS  (PRN):    OBJECTIVE:  ICU Vital Signs Last 24 Hrs  T(C): 36.6 (10 Apr 2025 23:00), Max: 37 (10 Apr 2025 03:00)  T(F): 97.8 (10 Apr 2025 23:00), Max: 98.6 (10 Apr 2025 03:00)  HR: 64 (10 Apr 2025 23:00) (51 - 110)  ABP: 100/63 (10 Apr 2025 23:00) (74/53 - 115/69)  ABP(mean): 79 (10 Apr 2025 23:00) (61 - 86)  RR: 17 (10 Apr 2025 23:00) (17 - 41)  SpO2: 97% (10 Apr 2025 23:00) (86% - 100%)    O2 Parameters below as of 10 Apr 2025 23:00  Patient On (Oxygen Delivery Method): nasal cannula  O2 Flow (L/min): 3    I&O's Summary    09 Apr 2025 07:01  -  10 Apr 2025 07:00  --------------------------------------------------------  IN: 2169.5 mL / OUT: 3640 mL / NET: -1470.5 mL    10 Apr 2025 07:01  -  10 Apr 2025 23:41  --------------------------------------------------------  IN: 1375 mL / OUT: 2725 mL / NET: -1350 mL    CAPILLARY BLOOD GLUCOSE    PHYSICAL EXAM:  General: Well-appearing, NAD  HEENT: PERRL, EOMI, normal sclera and conjunctiva, normal oropharynx  Neck: Supple, no JVD, thyroid without masses or enlargement  Chest/Lungs: +nasal cannula. CTA bilaterally, no wheezing, rales, rhonchi or rub  Heart: RRR, normal S1, S2, no murmurs or gallops  Abdomen: Soft, ND, NTTP, normoactive bowel sounds  Extremities: +lichen planus bilaterally. +pitting edema. 2+ peripheral pulses b/l, no edema, clubbing or cyanosis  Skin: +lichen planus bilaterally  Neurological: A&Ox3, moves all extremities, no focal deficits  ============================I/O===========================   I&O's Detail    09 Apr 2025 07:01  -  10 Apr 2025 07:00  --------------------------------------------------------  IN:    Bumetanide: 480 mL    Heparin: 432 mL    IV PiggyBack: 100 mL    Oral Fluid: 820 mL    Vasopressin: 337.5 mL  Total IN: 2169.5 mL    OUT:    Indwelling Catheter - Urethral (mL): 3640 mL  Total OUT: 3640 mL    Total NET: -1470.5 mL    10 Apr 2025 07:01  -  10 Apr 2025 23:41  --------------------------------------------------------  IN:    Bumetanide: 340 mL    Heparin: 306 mL    Oral Fluid: 510 mL    Vasopressin: 219 mL  Total IN: 1375 mL    OUT:    Indwelling Catheter - Urethral (mL): 2725 mL  Total OUT: 2725 mL    Total NET: -1350 mL    ============================ LABS =========================                        11.1   10.29 )-----------( 111      ( 10 Apr 2025 00:33 )             35.0     04-10    135  |  87[L]  |  99[H]  ----------------------------<  177[H]  4.1   |  32[H]  |  3.24[H]    Ca    9.5      10 Apr 2025 18:36  Phos  3.5     04-10  Mg     2.1     04-10    TPro  7.4  /  Alb  3.5  /  TBili  1.4[H]  /  DBili  x   /  AST  9[L]  /  ALT  15  /  AlkPhos  97  04-10    LIVER FUNCTIONS - ( 10 Apr 2025 18:36 )  Alb: 3.5 g/dL / Pro: 7.4 g/dL / ALK PHOS: 97 U/L / ALT: 15 U/L / AST: 9 U/L / GGT: x           PT/INR - ( 10 Apr 2025 00:33 )   PT: 12.8 sec;   INR: 1.11 ratio       PTT - ( 10 Apr 2025 00:33 )  PTT:73.0 sec  ABG - ( 10 Apr 2025 00:18 )  pH, Arterial: 7.43  pH, Blood: x     /  pCO2: 64    /  pO2: 143   / HCO3: 42    / Base Excess: 15.5  /  SaO2: 99.4      Blood Gas Arterial, Lactate: 0.8 mmol/L (04-10-25 @ 00:18)  Blood Gas Arterial, Lactate: 0.8 mmol/L (04-09-25 @ 18:30)  Blood Gas Arterial, Lactate: 0.7 mmol/L (04-09-25 @ 12:10)  Blood Gas Arterial, Lactate: 0.7 mmol/L (04-09-25 @ 06:35)  Blood Gas Arterial, Lactate: 0.6 mmol/L (04-09-25 @ 02:45)  Blood Gas Arterial, Lactate: 0.8 mmol/L (04-09-25 @ 00:25)  Blood Gas Arterial, Lactate: 0.7 mmol/L (04-08-25 @ 17:20)  Blood Gas Arterial, Lactate: 0.7 mmol/L (04-08-25 @ 06:41)  Blood Gas Arterial, Lactate: 0.8 mmol/L (04-08-25 @ 01:18)    Urinalysis Basic - ( 10 Apr 2025 18:36 )    Color: x / Appearance: x / SG: x / pH: x  Gluc: 177 mg/dL / Ketone: x  / Bili: x / Urobili: x   Blood: x / Protein: x / Nitrite: x   Leuk Esterase: x / RBC: x / WBC x   Sq Epi: x / Non Sq Epi: x / Bacteria: x      ======================MICRO/RAD/CARDIO=================  Telemetry: Reviewed   EKG: Reviewed  CXR: Reviewed  Culture: Reviewed   Echo:   Cath:

## 2025-04-10 NOTE — PROGRESS NOTE ADULT - ASSESSMENT
65 yo M with a fib (s/p dccv 3/25/25), CHF (likely moderately reduced EF, several TTE with poor windows), aortic stenosis s/p TAVR (11/2022), a flutter s/p ablation 2019 and s/p micra implant,  CKD3, WILFRED, obesity, venous stasis, HTN, HLD, who presents with 6 days of worsening dyspnea on exertion, admitted for AHRF likely iso CHF exacerbation, also with ANGELICA on CKD c/f cardiorenal syndrome. RRT on floors for SBP 80s requiring pressors and has been admitted to the CCU. HF mostly likely i/so severe AS. Pt to get structural intervention.     Plan:  ===NEURO===  - AO x 3    ===RESPIRATORY===  #Acute hypoxemic hypercapnic respiratory failure  - Likely retaining 2/2 volume overload  - c/w bumex 4mg drip for elevated CVP and increased O2 req  - c/w BIPAP for resp distress, 6L NC when off BIPAP     ===CARDIOVASCULAR===  #Obstructive shock 2/2 AS  - Off levophed  - MAPs 70s -> will continue to wean vasopressin .09  - If need more pressor support will switch to phenylephrine as pt has LVOT obstruction from aortic stenosis   - Wean as tolerated    #Acute on chronic HF exacerbation  - Bumex increased to 4mg drip overnight for low urine output - augment with below  - Goal 1-2L net neg daily  - CVP goal 8-12  - Diuril, 3% hypertonic, and metolazone PRN   - Will be careful of overdiuresis given severe AS and preload dependant     #Afib  #First degree block  - PO amio load   - heparin to restart 6 hours after cath    #Aortic valve stenosis s/p prosthetic  - S/p STANISLAW showing severe AS stenosis, global hypokinesis and EF 15%  - Structural plans for valve in valve replacement  - Structural wants cath and CT w/ contrast before procedure  - 4/8 patent LIMA-LAD, closed SVG-OM1/D1, pending TAVR 4/11    #Troponemia  - No ischemic changes on EKG  - Most likely i/s/o ANGELICA on CKD    #Hx CABG  #PVD  - C/w Plavix    #HLD  - Increased Lipitor to 40mg from 20mg    ===/RENAL===  #ANGELICA on CKD  #Cardiorenal syndrome  - Baseline 1.5-1.8  - Most likely i/s/o hypotension and fluid overload  - Renal sono showed parenchymal disease  - Nephro following c/w bumex gtt, monitor volume status. Stop sodium bicarb 650 TID for now 4/7    #Hyperkalemia  - Lokelma if K increases into 5s range    ===GI/NUTRITION===  - Failed bedside SS  - S&S consulted -> recommends FEES    ===SKIN===  - Hx lichen planus  - No other concerns    ===INFECTIOUS DISEASE===  - Slightly elevated WBC on admission 10.5K  - Afebrile  - Urine culture -> neg    ===ENDOCRINE===  - No concerns  - A1c 5.8  - TSH wnl    ===HEMATOLOGIC/DVT PPx===  - On heparin ggt    Dispo: Maintain in ICU.    Patient requires continuous monitoring with bedside rhythm monitoring, arterial line, pulse oximetry, ventilator monitoring and intermittent blood gas analysis.  Care plan discussed with ICU care team.  I have spent 35 minutes providing critical care, in addition to initial critical time provided by CICU attending    Dr. Maldonado     , re-evaluated multiple times during the day.    Raquel Leon PA-C

## 2025-04-10 NOTE — PROGRESS NOTE ADULT - ATTENDING COMMENTS
63yo M with AFib s/p TAVR HFrEF p/w hypoxic resp failure from ADHF requiring intubation and CS, severe prosthetic AS (LIVAN < 0.5). LHC non obstructive. Plan for TAVR 4/11.     Neuro no issues   Pulm on NC and intermittently requiring Bipap but pH compensated appears to be chronic component from likely OHS/WILFRED   CV vaso for vasodilatory shock/ CS. [facility] pending   Renal Cr high, mildly downtrending with eGFR < 15 largely unchanged, chance of dialysis post cath despite optimization but no acute indication   GI s/s eval    ID no e/o infection   Heme cont heparin for AFib now post cardioversion   Endo BG controlled   Lines no central access

## 2025-04-10 NOTE — PROGRESS NOTE ADULT - ASSESSMENT
65 yo M with a fib (s/p dccv 3/25/25), CHF (likely moderately reduced EF, several TTE with poor windows), aortic stenosis s/p TAVR (11/2022), a flutter s/p ablation 2019 and s/p micra implant,  CKD3, obesity, venous stasis, HTN, HLD, who presents with 6 days of worsening dyspnea on exertion, admitted for AHRF likely iso CHF exacerbation, also with ANGELICA on CKD c/f cardiorenal syndrome. RRT on floors for SBP 80s requiring pressors and has been admitted to the CCU. HF mostly likely i/so severe AS. Pt to get structural intervention.           Problem/Plan - 1:  ·  Problem: Acute on chronic systolic congestive heart failure.   ·  Plan: Cardiogenic shock, severe prosthetic aortic stenosis of prior TAVR valve  Remains on vasopressin  S/P cardiac cath 4/8 Dr. Montelongo, known CAD unchanged from prior angiogram  Bumex increased to 4mg/hr  Cardiac CT reviewed from prior TAVR in 2022, we will NOT repeat CT given worsening renal function  Scheduled for [place] TAVR on Friday 4/11, again discussed risks/benefits/procedureal plans with patient including possibility of intubation/general anesthesia and the need for HD postoperatively. All questions answered. Pt. would like to proceed as planned.    BRODERICK Gordon NP  [id number]  TEAMS.

## 2025-04-10 NOTE — PROGRESS NOTE ADULT - ASSESSMENT
65 y/o M with hx of Afib s/p DCCV 3/25/25, CHF, Aortic stenosis s/p TAVR 11/2022, CAD s/p CABG, Aflutter s/p ablation and Micra implant, CKD 3, Obesity, venous stasis, HTN, HLD presented with 6 days of worsening SOB on exertion associated with orthopnea and chest pain.   Also reports several days decreased appetite. Denies fevers, chills, cough, abdominal pain, NVD, urinary sx, leg swelling. Patient had a cardioversion for atrial fibrillation at this hospital recently, which was reportedly unsuccessful. Patient has a scheduled appointment with Dr. Philip on 04/24/2025 to discuss possible ablation. His outpt cardiologist is Dr. Miranda. (Cardiology)  On arrival to ED, vitals were 97.3F, HR 71, BP 94/57, RR 22, 97% on 2L NC. Labs pertinent for WBC 10.68, INR 2.55, Na 132, K 5.5, bicarb 20, BUN/Cr 78/5.17 (baseline Cr appears ~1.5-1.8), trop 225, BNP 99507, VBG ph 7.3, pco2/po2 wnl. UA without evidence of infection.  nephrology was consulted for management of ANGELICA on CKD. Pt has baseline Scr of ~1.5- 1.8. Scr was 2.7 on 3/25/25. Pt is on Entresto ( started recently) and he mentioned that he was taking Motrin occasionally. No herbal medications/ supplements.  Scr at the time of admission was 5.17 3/31/25 and it further worsened to 5.45 4/1/25.    Pt follows Dr. Stewart Brown (Nephrology)  LHC was done on 4/8  Plan for TAVR on 4/11.     ANGELICA on CKD:  Pt has hx of CKD 3. Baseline Scr 1.5-1.8.   At the time of admission - Scr was 5.17 and it further worsened to 5.45--> 5.5. Scr elevated/improving to 3.54---> 3.48 today.   Pt has lopez cath in place. UOP was 3.5L with net negative 1.39L in last 24 hours.   Bumex gtt - restarted 4/4/25 - Briefly held on 4/8 for LHC and is restarted later in the day.  Now on 4mg/hr.   Bicarb is elevated but pCO2 is also elevated. pt is on BiPAP at night. Management of WILFRED/OHS as per primary team.   UA showed trace LE, blood ( Likely due to lopez), casts 17. UPCR 0.7. No hx of DM.   Pt was on entresto.   BP was low.   On IV vasopressin.   ANGELICA on CKD likely in setting of HF exacerbation, entresto use and hypotension mediated ATN.  USG kidney and bladder - normal sized kidneys. No HN.     PLAN:  Bicarb is trending up. pCo2 is also elevated. on BiPAP overnight. Management as per primary team. C/w Bumex 4mg/hr. Consider Acetazolamide to assist with diuresis ( particularly with high bicarb state)  continue to hold entresto.   Monitor strict I/Os and daily wts.   No urgent need of HD for now.    Avoid nephrotoxins  Dose medications as per eGFR    hyperkalemia: resolved  Today Serum K is 3.8. Monitor closely - replete as needed.     Hyponatremia is likely in setting of impaired water clearance in setting of worsening renal function and volume overload 2/2 heart failure  resolved now.   Bumex as noted above    Pt is at high risk of LISA. Pt was explained in detail about the risk of LISA and possible need of dialysis if the kidney function gets worse.   Enoch score 26.1 %- Risk of post PCI - LISA and 1.09% risk of requiring dialysis.   PLAN for TAVR on 4/11.    Wait for the final reccs as per the attending.

## 2025-04-11 ENCOUNTER — APPOINTMENT (OUTPATIENT)
Dept: CARDIOTHORACIC SURGERY | Facility: HOSPITAL | Age: 65
End: 2025-04-11

## 2025-04-11 ENCOUNTER — TRANSCRIPTION ENCOUNTER (OUTPATIENT)
Age: 65
End: 2025-04-11

## 2025-04-11 ENCOUNTER — RESULT REVIEW (OUTPATIENT)
Age: 65
End: 2025-04-11

## 2025-04-11 LAB
ALBUMIN SERPL ELPH-MCNC: 3.4 G/DL — SIGNIFICANT CHANGE UP (ref 3.3–5)
ALBUMIN SERPL ELPH-MCNC: 3.4 G/DL — SIGNIFICANT CHANGE UP (ref 3.3–5)
ALBUMIN SERPL ELPH-MCNC: 3.5 G/DL — SIGNIFICANT CHANGE UP (ref 3.3–5)
ALBUMIN SERPL ELPH-MCNC: 3.5 G/DL — SIGNIFICANT CHANGE UP (ref 3.3–5)
ALP SERPL-CCNC: 93 U/L — SIGNIFICANT CHANGE UP (ref 40–120)
ALT FLD-CCNC: 14 U/L — SIGNIFICANT CHANGE UP (ref 10–45)
ALT FLD-CCNC: 14 U/L — SIGNIFICANT CHANGE UP (ref 10–45)
ALT FLD-CCNC: 15 U/L — SIGNIFICANT CHANGE UP (ref 10–45)
ALT FLD-CCNC: 16 U/L — SIGNIFICANT CHANGE UP (ref 10–45)
ANION GAP SERPL CALC-SCNC: 11 MMOL/L — SIGNIFICANT CHANGE UP (ref 5–17)
ANION GAP SERPL CALC-SCNC: 14 MMOL/L — SIGNIFICANT CHANGE UP (ref 5–17)
ANION GAP SERPL CALC-SCNC: 17 MMOL/L — SIGNIFICANT CHANGE UP (ref 5–17)
ANION GAP SERPL CALC-SCNC: 18 MMOL/L — HIGH (ref 5–17)
APTT BLD: 71.3 SEC — HIGH (ref 24.5–35.6)
AST SERPL-CCNC: 10 U/L — SIGNIFICANT CHANGE UP (ref 10–40)
AST SERPL-CCNC: 11 U/L — SIGNIFICANT CHANGE UP (ref 10–40)
AST SERPL-CCNC: 12 U/L — SIGNIFICANT CHANGE UP (ref 10–40)
AST SERPL-CCNC: 9 U/L — LOW (ref 10–40)
BILIRUB SERPL-MCNC: 1.6 MG/DL — HIGH (ref 0.2–1.2)
BILIRUB SERPL-MCNC: 1.6 MG/DL — HIGH (ref 0.2–1.2)
BILIRUB SERPL-MCNC: 1.8 MG/DL — HIGH (ref 0.2–1.2)
BILIRUB SERPL-MCNC: 2 MG/DL — HIGH (ref 0.2–1.2)
BUN SERPL-MCNC: 100 MG/DL — HIGH (ref 7–23)
BUN SERPL-MCNC: 102 MG/DL — HIGH (ref 7–23)
BUN SERPL-MCNC: 94 MG/DL — HIGH (ref 7–23)
BUN SERPL-MCNC: 95 MG/DL — HIGH (ref 7–23)
CALCIUM SERPL-MCNC: 10.5 MG/DL — SIGNIFICANT CHANGE UP (ref 8.4–10.5)
CALCIUM SERPL-MCNC: 9.6 MG/DL — SIGNIFICANT CHANGE UP (ref 8.4–10.5)
CALCIUM SERPL-MCNC: 9.6 MG/DL — SIGNIFICANT CHANGE UP (ref 8.4–10.5)
CALCIUM SERPL-MCNC: 9.7 MG/DL — SIGNIFICANT CHANGE UP (ref 8.4–10.5)
CHLORIDE SERPL-SCNC: 85 MMOL/L — LOW (ref 96–108)
CHLORIDE SERPL-SCNC: 86 MMOL/L — LOW (ref 96–108)
CHLORIDE SERPL-SCNC: 88 MMOL/L — LOW (ref 96–108)
CHLORIDE SERPL-SCNC: 89 MMOL/L — LOW (ref 96–108)
CO2 SERPL-SCNC: 32 MMOL/L — HIGH (ref 22–31)
CO2 SERPL-SCNC: 34 MMOL/L — HIGH (ref 22–31)
CO2 SERPL-SCNC: 35 MMOL/L — HIGH (ref 22–31)
CO2 SERPL-SCNC: 36 MMOL/L — HIGH (ref 22–31)
CREAT SERPL-MCNC: 2.7 MG/DL — HIGH (ref 0.5–1.3)
CREAT SERPL-MCNC: 2.9 MG/DL — HIGH (ref 0.5–1.3)
CREAT SERPL-MCNC: 3.09 MG/DL — HIGH (ref 0.5–1.3)
CREAT SERPL-MCNC: 3.12 MG/DL — HIGH (ref 0.5–1.3)
EGFR: 21 ML/MIN/1.73M2 — LOW
EGFR: 21 ML/MIN/1.73M2 — LOW
EGFR: 22 ML/MIN/1.73M2 — LOW
EGFR: 22 ML/MIN/1.73M2 — LOW
EGFR: 23 ML/MIN/1.73M2 — LOW
EGFR: 23 ML/MIN/1.73M2 — LOW
EGFR: 25 ML/MIN/1.73M2 — LOW
EGFR: 25 ML/MIN/1.73M2 — LOW
GAS PNL BLDA: SIGNIFICANT CHANGE UP
GLUCOSE BLDC GLUCOMTR-MCNC: 141 MG/DL — HIGH (ref 70–99)
GLUCOSE BLDC GLUCOMTR-MCNC: 145 MG/DL — HIGH (ref 70–99)
GLUCOSE SERPL-MCNC: 126 MG/DL — HIGH (ref 70–99)
GLUCOSE SERPL-MCNC: 130 MG/DL — HIGH (ref 70–99)
GLUCOSE SERPL-MCNC: 137 MG/DL — HIGH (ref 70–99)
GLUCOSE SERPL-MCNC: 139 MG/DL — HIGH (ref 70–99)
HCT VFR BLD CALC: 33.1 % — LOW (ref 39–50)
HCT VFR BLD CALC: 33.2 % — LOW (ref 39–50)
HGB BLD-MCNC: 10.5 G/DL — LOW (ref 13–17)
HGB BLD-MCNC: 10.7 G/DL — LOW (ref 13–17)
INR BLD: 1.1 RATIO — SIGNIFICANT CHANGE UP (ref 0.85–1.16)
MAGNESIUM SERPL-MCNC: 1.8 MG/DL — SIGNIFICANT CHANGE UP (ref 1.6–2.6)
MAGNESIUM SERPL-MCNC: 2 MG/DL — SIGNIFICANT CHANGE UP (ref 1.6–2.6)
MAGNESIUM SERPL-MCNC: 2 MG/DL — SIGNIFICANT CHANGE UP (ref 1.6–2.6)
MAGNESIUM SERPL-MCNC: 2.1 MG/DL — SIGNIFICANT CHANGE UP (ref 1.6–2.6)
MCHC RBC-ENTMCNC: 30.2 PG — SIGNIFICANT CHANGE UP (ref 27–34)
MCHC RBC-ENTMCNC: 31.6 G/DL — LOW (ref 32–36)
MCHC RBC-ENTMCNC: 31.7 PG — SIGNIFICANT CHANGE UP (ref 27–34)
MCHC RBC-ENTMCNC: 32.3 G/DL — SIGNIFICANT CHANGE UP (ref 32–36)
MCV RBC AUTO: 95.4 FL — SIGNIFICANT CHANGE UP (ref 80–100)
MCV RBC AUTO: 97.9 FL — SIGNIFICANT CHANGE UP (ref 80–100)
NRBC BLD AUTO-RTO: 0 /100 WBCS — SIGNIFICANT CHANGE UP (ref 0–0)
NRBC BLD AUTO-RTO: 0 /100 WBCS — SIGNIFICANT CHANGE UP (ref 0–0)
PHOSPHATE SERPL-MCNC: 3.3 MG/DL — SIGNIFICANT CHANGE UP (ref 2.5–4.5)
PHOSPHATE SERPL-MCNC: 3.5 MG/DL — SIGNIFICANT CHANGE UP (ref 2.5–4.5)
PHOSPHATE SERPL-MCNC: 3.8 MG/DL — SIGNIFICANT CHANGE UP (ref 2.5–4.5)
PHOSPHATE SERPL-MCNC: 3.8 MG/DL — SIGNIFICANT CHANGE UP (ref 2.5–4.5)
PLATELET # BLD AUTO: 111 K/UL — LOW (ref 150–400)
PLATELET # BLD AUTO: 99 K/UL — LOW (ref 150–400)
POTASSIUM SERPL-MCNC: 3.1 MMOL/L — LOW (ref 3.5–5.3)
POTASSIUM SERPL-MCNC: 3.5 MMOL/L — SIGNIFICANT CHANGE UP (ref 3.5–5.3)
POTASSIUM SERPL-MCNC: 3.9 MMOL/L — SIGNIFICANT CHANGE UP (ref 3.5–5.3)
POTASSIUM SERPL-MCNC: 3.9 MMOL/L — SIGNIFICANT CHANGE UP (ref 3.5–5.3)
POTASSIUM SERPL-SCNC: 3.1 MMOL/L — LOW (ref 3.5–5.3)
POTASSIUM SERPL-SCNC: 3.5 MMOL/L — SIGNIFICANT CHANGE UP (ref 3.5–5.3)
POTASSIUM SERPL-SCNC: 3.9 MMOL/L — SIGNIFICANT CHANGE UP (ref 3.5–5.3)
POTASSIUM SERPL-SCNC: 3.9 MMOL/L — SIGNIFICANT CHANGE UP (ref 3.5–5.3)
PROT SERPL-MCNC: 7.3 G/DL — SIGNIFICANT CHANGE UP (ref 6–8.3)
PROT SERPL-MCNC: 7.3 G/DL — SIGNIFICANT CHANGE UP (ref 6–8.3)
PROT SERPL-MCNC: 7.5 G/DL — SIGNIFICANT CHANGE UP (ref 6–8.3)
PROT SERPL-MCNC: 7.5 G/DL — SIGNIFICANT CHANGE UP (ref 6–8.3)
PROTHROM AB SERPL-ACNC: 12.5 SEC — SIGNIFICANT CHANGE UP (ref 9.9–13.4)
RBC # BLD: 3.38 M/UL — LOW (ref 4.2–5.8)
RBC # BLD: 3.48 M/UL — LOW (ref 4.2–5.8)
RBC # FLD: 16.4 % — HIGH (ref 10.3–14.5)
RBC # FLD: 17 % — HIGH (ref 10.3–14.5)
SODIUM SERPL-SCNC: 135 MMOL/L — SIGNIFICANT CHANGE UP (ref 135–145)
SODIUM SERPL-SCNC: 136 MMOL/L — SIGNIFICANT CHANGE UP (ref 135–145)
SODIUM SERPL-SCNC: 136 MMOL/L — SIGNIFICANT CHANGE UP (ref 135–145)
SODIUM SERPL-SCNC: 138 MMOL/L — SIGNIFICANT CHANGE UP (ref 135–145)
WBC # BLD: 10.74 K/UL — HIGH (ref 3.8–10.5)
WBC # BLD: 14.6 K/UL — HIGH (ref 3.8–10.5)
WBC # FLD AUTO: 10.74 K/UL — HIGH (ref 3.8–10.5)
WBC # FLD AUTO: 14.6 K/UL — HIGH (ref 3.8–10.5)

## 2025-04-11 PROCEDURE — 71045 X-RAY EXAM CHEST 1 VIEW: CPT | Mod: 26

## 2025-04-11 PROCEDURE — 99233 SBSQ HOSP IP/OBS HIGH 50: CPT | Mod: GC

## 2025-04-11 PROCEDURE — 33361 REPLACE AORTIC VALVE PERQ: CPT | Mod: Q0,62

## 2025-04-11 PROCEDURE — 99291 CRITICAL CARE FIRST HOUR: CPT

## 2025-04-11 PROCEDURE — 93306 TTE W/DOPPLER COMPLETE: CPT | Mod: 26

## 2025-04-11 PROCEDURE — 93010 ELECTROCARDIOGRAM REPORT: CPT

## 2025-04-11 PROCEDURE — 33361 REPLACE AORTIC VALVE PERQ: CPT | Mod: 62,Q0

## 2025-04-11 DEVICE — GWIRE GUID  0.035INX150CM: Type: IMPLANTABLE DEVICE | Status: FUNCTIONAL

## 2025-04-11 DEVICE — IMPLANTABLE DEVICE: Type: IMPLANTABLE DEVICE | Status: FUNCTIONAL

## 2025-04-11 DEVICE — GUIDEWIRE AMPLATZ SUPER-STIFF STRAIGHT .035" X 180CM: Type: IMPLANTABLE DEVICE | Status: FUNCTIONAL

## 2025-04-11 DEVICE — GUIDEWIRE GLIDEWIRE TERUMO 0.035" X 180 CM, 3CM ANGLE TIP: Type: IMPLANTABLE DEVICE | Status: FUNCTIONAL

## 2025-04-11 DEVICE — CATH DIAG 5F AL1: Type: IMPLANTABLE DEVICE | Status: FUNCTIONAL

## 2025-04-11 DEVICE — GLDWIRE ADVANTAGE .035X180 CM: Type: IMPLANTABLE DEVICE | Status: FUNCTIONAL

## 2025-04-11 DEVICE — SHEATH INTRODUCER TERUMO PINNACLE CORONARY 8FR X 10CM X 0.038" MINI WIRE: Type: IMPLANTABLE DEVICE | Status: FUNCTIONAL

## 2025-04-11 DEVICE — SHEATH INTRODUCER TERUMO PINNACLE CORONARY 6FR X 10CM X 0.038" MINI WIRE: Type: IMPLANTABLE DEVICE | Status: FUNCTIONAL

## 2025-04-11 DEVICE — CATH DIAG 5F JR4 100CM: Type: IMPLANTABLE DEVICE | Status: FUNCTIONAL

## 2025-04-11 DEVICE — VLV AORTIC EVOLUT FX PLUS 29MM: Type: IMPLANTABLE DEVICE | Status: FUNCTIONAL

## 2025-04-11 DEVICE — SYS VASCADE MVP VASCULAR CLOSURE 6-12F: Type: IMPLANTABLE DEVICE | Status: FUNCTIONAL

## 2025-04-11 DEVICE — CATH TAVR EVOLUT FX 14FR 23-29MM: Type: IMPLANTABLE DEVICE | Status: FUNCTIONAL

## 2025-04-11 DEVICE — SHEATH INTRODUCER TERUMO PINNACLE GUIDING 6FR X 45CM CROSS-CUT STRAIGHT: Type: IMPLANTABLE DEVICE | Status: FUNCTIONAL

## 2025-04-11 DEVICE — WIRE GUIDE EXCHANGE J TIP 3MM X 260CM: Type: IMPLANTABLE DEVICE | Status: FUNCTIONAL

## 2025-04-11 DEVICE — SUT PERCLOSE PROGLIDE 6FR: Type: IMPLANTABLE DEVICE | Status: FUNCTIONAL

## 2025-04-11 DEVICE — PACING CATH PACEL RIGHT HEART CURVE 5FR: Type: IMPLANTABLE DEVICE | Status: FUNCTIONAL

## 2025-04-11 DEVICE — WIRE GD CONFIDA BRECKER CRV .035X260: Type: IMPLANTABLE DEVICE | Status: FUNCTIONAL

## 2025-04-11 DEVICE — CATH VASCULAR EXPO VENTRICULAR PIGTAIL CURVE (PIG 145) 0.045" X 5FR X 10CM: Type: IMPLANTABLE DEVICE | Status: FUNCTIONAL

## 2025-04-11 DEVICE — INTRODUCER SHEATH DRYSEAL FLEX 18FR X 33CM: Type: IMPLANTABLE DEVICE | Status: FUNCTIONAL

## 2025-04-11 DEVICE — KIT CVC 3LUM CATH  7FRX20: Type: IMPLANTABLE DEVICE | Status: FUNCTIONAL

## 2025-04-11 DEVICE — GWIRE STR .038X180 STIFF LONG TAPR: Type: IMPLANTABLE DEVICE | Status: FUNCTIONAL

## 2025-04-11 DEVICE — CATH DXTERITY 5F 110CM PGSTR: Type: IMPLANTABLE DEVICE | Status: FUNCTIONAL

## 2025-04-11 RX ORDER — PROPOFOL 10 MG/ML
10 INJECTION, EMULSION INTRAVENOUS
Qty: 1000 | Refills: 0 | Status: DISCONTINUED | OUTPATIENT
Start: 2025-04-11 | End: 2025-04-12

## 2025-04-11 RX ORDER — DEXMEDETOMIDINE HYDROCHLORIDE IN SODIUM CHLORIDE 4 UG/ML
0.2 INJECTION INTRAVENOUS
Qty: 200 | Refills: 0 | Status: DISCONTINUED | OUTPATIENT
Start: 2025-04-11 | End: 2025-04-12

## 2025-04-11 RX ORDER — INSULIN LISPRO 100 U/ML
INJECTION, SOLUTION INTRAVENOUS; SUBCUTANEOUS EVERY 6 HOURS
Refills: 0 | Status: DISCONTINUED | OUTPATIENT
Start: 2025-04-11 | End: 2025-04-13

## 2025-04-11 RX ORDER — MAGNESIUM SULFATE 500 MG/ML
1 SYRINGE (ML) INJECTION ONCE
Refills: 0 | Status: COMPLETED | OUTPATIENT
Start: 2025-04-11 | End: 2025-04-11

## 2025-04-11 RX ORDER — HEPARIN SODIUM 1000 [USP'U]/ML
1000 INJECTION INTRAVENOUS; SUBCUTANEOUS
Qty: 25000 | Refills: 0 | Status: DISCONTINUED | OUTPATIENT
Start: 2025-04-11 | End: 2025-04-21

## 2025-04-11 RX ORDER — PROPOFOL 10 MG/ML
25 INJECTION, EMULSION INTRAVENOUS
Qty: 1000 | Refills: 0 | Status: DISCONTINUED | OUTPATIENT
Start: 2025-04-11 | End: 2025-04-11

## 2025-04-11 RX ORDER — DOBUTAMINE 250 MG/20ML
3 INJECTION INTRAVENOUS
Qty: 500 | Refills: 0 | Status: DISCONTINUED | OUTPATIENT
Start: 2025-04-11 | End: 2025-04-11

## 2025-04-11 RX ADMIN — IPRATROPIUM BROMIDE AND ALBUTEROL SULFATE 3 MILLILITER(S): .5; 2.5 SOLUTION RESPIRATORY (INHALATION) at 05:51

## 2025-04-11 RX ADMIN — ATORVASTATIN CALCIUM 40 MILLIGRAM(S): 80 TABLET, FILM COATED ORAL at 21:54

## 2025-04-11 RX ADMIN — IPRATROPIUM BROMIDE AND ALBUTEROL SULFATE 3 MILLILITER(S): .5; 2.5 SOLUTION RESPIRATORY (INHALATION) at 17:19

## 2025-04-11 RX ADMIN — Medication 100 GRAM(S): at 18:30

## 2025-04-11 RX ADMIN — HEPARIN SODIUM 18 UNIT(S)/HR: 1000 INJECTION INTRAVENOUS; SUBCUTANEOUS at 07:16

## 2025-04-11 RX ADMIN — Medication 40 MILLIEQUIVALENT(S): at 04:42

## 2025-04-11 RX ADMIN — BUMETANIDE 20 MG/HR: 1 TABLET ORAL at 02:04

## 2025-04-11 RX ADMIN — DEXMEDETOMIDINE HYDROCHLORIDE IN SODIUM CHLORIDE 5.29 MICROGRAM(S)/KG/HR: 4 INJECTION INTRAVENOUS at 19:53

## 2025-04-11 RX ADMIN — AMIODARONE HYDROCHLORIDE 200 MILLIGRAM(S): 50 INJECTION, SOLUTION INTRAVENOUS at 05:41

## 2025-04-11 RX ADMIN — CLOPIDOGREL BISULFATE 75 MILLIGRAM(S): 75 TABLET, FILM COATED ORAL at 21:55

## 2025-04-11 RX ADMIN — Medication 1 APPLICATION(S): at 21:55

## 2025-04-11 RX ADMIN — Medication 50 MILLIEQUIVALENT(S): at 17:46

## 2025-04-11 RX ADMIN — DOBUTAMINE 9.52 MICROGRAM(S)/KG/MIN: 250 INJECTION INTRAVENOUS at 19:57

## 2025-04-11 RX ADMIN — HEPARIN SODIUM 10 UNIT(S)/HR: 1000 INJECTION INTRAVENOUS; SUBCUTANEOUS at 22:15

## 2025-04-11 RX ADMIN — Medication 1 APPLICATION(S): at 05:42

## 2025-04-11 RX ADMIN — Medication 15 MILLILITER(S): at 18:30

## 2025-04-11 RX ADMIN — Medication 25 MILLILITER(S): at 04:42

## 2025-04-11 RX ADMIN — VASOPRESSIN 6 UNIT(S)/MIN: 20 INJECTION INTRAVENOUS at 07:16

## 2025-04-11 RX ADMIN — Medication 50 MILLIEQUIVALENT(S): at 18:37

## 2025-04-11 RX ADMIN — VASOPRESSIN 6 UNIT(S)/MIN: 20 INJECTION INTRAVENOUS at 19:58

## 2025-04-11 RX ADMIN — PROPOFOL 6.35 MICROGRAM(S)/KG/MIN: 10 INJECTION, EMULSION INTRAVENOUS at 21:54

## 2025-04-11 RX ADMIN — PHENYLEPHRINE HYDROCHLORIDE AND FAT, HARD 1 APPLICATION(S): .00525; 1.86 SUPPOSITORY RECTAL at 11:51

## 2025-04-11 RX ADMIN — Medication 4 MILLILITER(S): at 05:51

## 2025-04-11 RX ADMIN — BUMETANIDE 20 MG/HR: 1 TABLET ORAL at 07:15

## 2025-04-11 RX ADMIN — DOBUTAMINE 9.52 MICROGRAM(S)/KG/MIN: 250 INJECTION INTRAVENOUS at 17:46

## 2025-04-11 RX ADMIN — IPRATROPIUM BROMIDE AND ALBUTEROL SULFATE 3 MILLILITER(S): .5; 2.5 SOLUTION RESPIRATORY (INHALATION) at 11:21

## 2025-04-11 RX ADMIN — HEPARIN SODIUM 18 UNIT(S)/HR: 1000 INJECTION INTRAVENOUS; SUBCUTANEOUS at 01:22

## 2025-04-11 RX ADMIN — Medication 4 MILLILITER(S): at 11:21

## 2025-04-11 RX ADMIN — BUMETANIDE 20 MG/HR: 1 TABLET ORAL at 19:57

## 2025-04-11 RX ADMIN — Medication 4 MILLILITER(S): at 17:20

## 2025-04-11 RX ADMIN — Medication 40 MILLIGRAM(S): at 17:46

## 2025-04-11 NOTE — CHART NOTE - NSCHARTNOTEFT_GEN_A_CORE
Pt underwent Florence TAVR using a 29mm [employer] EVOLUT Fx+ THV placed in a 26mm [employer] 3 THV via TF approach.  He is in cardiogenic shock with acute on chronic systolic and diastolic heart failure.  LVEDP pre and post deployment: 39/30  Preop BNP: [id number]    ANALIA Lopez

## 2025-04-11 NOTE — PROGRESS NOTE ADULT - ATTENDING COMMENTS
65yo M with AFib s/p TAVR HFrEF p/w hypoxic resp failure from ADHF requiring intubation and CS, severe prosthetic AS (LIVAN < 0.5). LHC non obstructive. Plan for TAVR 4/11.     Neuro no issues   Pulm on NC and intermittently requiring Bipap but pH compensated appears to be chronic component from likely OHS/WILFRED   CV vaso for vasodilatory shock/ CS. [facility] pending   Renal Cr high, mildly downtrending with eGFR < 15 largely unchanged, chance of dialysis post cath despite optimization but no acute indication   GI NPO for TAVR   ID no e/o infection   Heme cont heparin for AFib now post cardioversion   Endo BG controlled   Lines no central access

## 2025-04-11 NOTE — PROGRESS NOTE ADULT - SUBJECTIVE AND OBJECTIVE BOX
[facility] Division of Kidney Diseases & Hypertension  FOLLOW UP NOTE  [phone]--------------------------------------------------------------------------------  Chief Complaint:Acute on chronic systolic congestive heart failure    24 hour events/subjective:  Pt was seen and examined earlier today. No acute overnight events. No fresh complaints. Pt reports feeling well.   Pt denies SOB/Constipation/ Diarrhea/ Nausea/ Vomiting/ abdominal pain/ chest pain/ tingling/ numbness.         PAST HISTORY  --------------------------------------------------------------------------------  No significant changes to PMH, PSH, FHx, SHx, unless otherwise noted    ALLERGIES & MEDICATIONS  --------------------------------------------------------------------------------  Allergies    metoprolol (Short breath)    Intolerances      Standing Inpatient Medications  albuterol/ipratropium for Nebulization 3 milliLiter(s) Nebulizer every 6 hours  aMIOdarone    Tablet   Oral   aMIOdarone    Tablet 200 milliGRAM(s) Oral daily  atorvastatin 40 milliGRAM(s) Oral at bedtime  buMETAnide Infusion 4 mG/Hr IV Continuous <Continuous>  cefuroxime  IVPB 1500 milliGRAM(s) IV Intermittent once  chlorhexidine 2% Cloths 1 Application(s) Topical daily  clopidogrel Tablet 75 milliGRAM(s) Oral daily  famotidine    Tablet 20 milliGRAM(s) Oral every 48 hours  hemorrhoidal Ointment 1 Application(s) Rectal daily  heparin  Infusion 1500 Unit(s)/Hr IV Continuous <Continuous>  insulin lispro (ADMELOG) corrective regimen sliding scale   SubCutaneous Before meals and at bedtime  melatonin 5 milliGRAM(s) Oral at bedtime  sodium chloride 3%  Inhalation 4 milliLiter(s) Inhalation every 6 hours  vasopressin Infusion 0.04 Unit(s)/Min IV Continuous <Continuous>    PRN Inpatient Medications      REVIEW OF SYSTEMS  --------------------------------------------------------------------------------  as noted above.     VITALS/PHYSICAL EXAM  --------------------------------------------------------------------------------  T(C): 36.7 (04-11-25 @ 08:00), Max: 36.7 (04-11-25 @ 08:00)  HR: 62 (04-11-25 @ 11:15) (59 - 110)  BP: 89/51 (04-11-25 @ 05:31) (89/51 - 89/51)  RR: 27 (04-11-25 @ 11:15) (17 - 38)  SpO2: 97% (04-11-25 @ 11:15) (89% - 100%)  Wt(kg): --  Height (cm): 172.7 (04-11-25 @ 05:31)  Weight (kg): 105.8 (04-11-25 @ 05:31)  BMI (kg/m2): 35.5 (04-11-25 @ 05:31)  BSA (m2): 2.18 (04-11-25 @ 05:31)      04-10-25 @ 07:01  -  04-11-25 @ 07:00  --------------------------------------------------------  IN: 1735.5 mL / OUT: 4550 mL / NET: -2814.5 mL    04-11-25 @ 07:01  -  04-11-25 @ 11:42  --------------------------------------------------------  IN: 206 mL / OUT: 800 mL / NET: -594 mL      Physical Exam:  Gen: NAD on supplemental oxygen.   Pulm: No crackles, lower zone decreased breath sounds.   CV: RRR, S1S2;  Abd: +BS, soft, nontender/nondistended  : Alba in place.   Extremities: Mild LE swelling  Skin: Warm, Lower extremity chronic changes.    LABS/STUDIES  --------------------------------------------------------------------------------              10.5   10.74 >-----------<  111      [04-11-25 @ 03:25]              33.2     135  |  88  |  102  ----------------------------<  137      [04-11-25 @ 06:35]  3.9   |  36  |  3.09        Ca     9.6     [04-11-25 @ 06:35]      Mg     2.0     [04-11-25 @ 06:35]      Phos  3.5     [04-11-25 @ 06:35]    TPro  7.5  /  Alb  3.4  /  TBili  1.6  /  DBili  x   /  AST  10  /  ALT  14  /  AlkPhos  93  [04-11-25 @ 06:35]    PT/INR: PT 12.5 , INR 1.10       [04-11-25 @ 03:25]  PTT: 71.3       [04-11-25 @ 03:25]      Creatinine Trend:  SCr 3.09 [04-11 @ 06:35]  SCr 3.12 [04-11 @ 03:25]  SCr 3.24 [04-10 @ 18:36]  SCr 3.00 [04-10 @ 12:12]  SCr 3.48 [04-10 @ 06:21]

## 2025-04-11 NOTE — PROGRESS NOTE ADULT - ASSESSMENT
63 yo M with a fib (s/p dccv 3/25/25), CHF (likely moderately reduced EF, several TTE with poor windows), aortic stenosis s/p TAVR (11/2022), a flutter s/p ablation 2019 and s/p micra implant,  CKD3, WILFRED, obesity, venous stasis, HTN, HLD, who presents with 6 days of worsening dyspnea on exertion, admitted for AHRF likely iso CHF exacerbation, also with ANGELICA on CKD c/f cardiorenal syndrome. RRT on floors for SBP 80s requiring pressors and has been admitted to the CCU. HF mostly likely i/so severe AS. Pt to get structural intervention.     Plan:  ===NEURO===  - AO x 3    ===RESPIRATORY===  #Acute hypoxemic hypercapnic respiratory failure  - Likely retaining 2/2 volume overload  - c/w bumex 4mg drip for elevated CVP and increased O2 req  - c/w BIPAP for resp distress, 6L NC when off BIPAP     ===CARDIOVASCULAR===  #Obstructive shock 2/2 AS  - Off levophed  - MAPs 70s -> will continue to wean vasopressin .09  - If need more pressor support will switch to phenylephrine as pt has LVOT obstruction from aortic stenosis   - Wean as tolerated    #Acute on chronic HF exacerbation  - Bumex increased to 4mg drip overnight for low urine output - augment with below  - Goal 1-2L net neg daily  - CVP goal 8-12  - Diuril, 3% hypertonic, and metolazone PRN   - Will be careful of overdiuresis given severe AS and preload dependant     #Afib  #First degree block  - PO amio load   - heparin to restart 6 hours after cath    #Aortic valve stenosis s/p prosthetic  - S/p STANISLAW showing severe AS stenosis, global hypokinesis and EF 15%  - Structural plans for valve in valve replacement  - Structural wants cath and CT w/ contrast before procedure  - 4/8 patent LIMA-LAD, closed SVG-OM1/D1, pending TAVR 4/11    #Troponemia  - No ischemic changes on EKG  - Most likely i/s/o ANGELICA on CKD    #Hx CABG  #PVD  - C/w Plavix    #HLD  - Increased Lipitor to 40mg from 20mg    ===/RENAL===  #ANGELICA on CKD  #Cardiorenal syndrome  - Baseline 1.5-1.8  - Most likely i/s/o hypotension and fluid overload  - Renal sono showed parenchymal disease  - Nephro following c/w bumex gtt, monitor volume status. Stop sodium bicarb 650 TID for now 4/7    #Hyperkalemia  - Lokelma if K increases into 5s range    ===GI/NUTRITION===  - Failed bedside SS  - S&S consulted -> recommends FEES    ===SKIN===  - Hx lichen planus  - No other concerns    ===INFECTIOUS DISEASE===  - Slightly elevated WBC on admission 10.5K  - Afebrile  - Urine culture -> neg    ===ENDOCRINE===  - No concerns  - A1c 5.8  - TSH wnl    ===HEMATOLOGIC/DVT PPx===  - On heparin ggt    Dispo: Maintain in ICU.    Patient requires continuous monitoring with bedside rhythm monitoring, arterial line, pulse oximetry, ventilator monitoring and intermittent blood gas analysis.  Care plan discussed with ICU care team.  I have spent 35 minutes providing critical care, in addition to initial critical time provided by CICU attending    Dr. Maldonado     , re-evaluated multiple times during the day.    Raquel Leon PA-C  63 yo M with a fib (s/p dccv 3/25/25), CHF (likely moderately reduced EF, several TTE with poor windows), aortic stenosis s/p TAVR (11/2022), a flutter s/p ablation 2019 and s/p micra implant,  CKD3, WILFRED, obesity, venous stasis, HTN, HLD, who presents with 6 days of worsening dyspnea on exertion, admitted for AHRF likely iso CHF exacerbation, also with ANGELICA on CKD c/f cardiorenal syndrome. RRT on floors for SBP 80s requiring pressors and has been admitted to the CCU. HF mostly likely i/so severe AS. Pt to get structural intervention.     Plan:  ===NEURO===  - AO x 3    ===RESPIRATORY===  #Acute hypoxemic hypercapnic respiratory failure  - Likely retaining 2/2 volume overload  - c/w bumex 4mg drip for elevated CVP and increased O2 req  - c/w BIPAP for resp distress, 6L NC when off BIPAP     ===CARDIOVASCULAR===  #Obstructive shock 2/2 AS  - Off levophed  - MAPs 70s -> will continue to wean vasopressin .09  - If need more pressor support will switch to phenylephrine as pt has LVOT obstruction from aortic stenosis   - Wean as tolerated    #Acute on chronic HF exacerbation  - Bumex increased to 4mg drip overnight for low urine output - augment with below  - Goal 1-2L net neg daily  - CVP goal 8-12  - Diuril, 3% hypertonic, and metolazone PRN   - Will be careful of overdiuresis given severe AS and preload dependant     #Afib  #First degree block  - PO amio load   - heparin to restart 6 hours after cath    #Aortic valve stenosis s/p prosthetic  - S/p STANISLAW showing severe AS stenosis, global hypokinesis and EF 15%  - Structural plans for valve in valve replacement  - Structural wants cath and CT w/ contrast before procedure  - 4/8 patent LIMA-LAD, closed SVG-OM1/D1, pending TAVR 4/11    #Troponemia  - No ischemic changes on EKG  - Most likely i/s/o ANGELICA on CKD    #Hx CABG  #PVD  - C/w Plavix    #HLD  - Increased Lipitor to 40mg from 20mg    ===/RENAL===  #ANGELICA on CKD  #Cardiorenal syndrome  - Baseline 1.5-1.8  - Most likely i/s/o hypotension and fluid overload  - Renal sono showed parenchymal disease  - Nephro following c/w bumex gtt, monitor volume status. Stop sodium bicarb 650 TID for now 4/7  -     #Hyperkalemia  - Lokelma if K increases into 5s range    ===GI/NUTRITION===  - Failed bedside SS  - S&S consulted -> recommends FEES    ===SKIN===  - Hx lichen planus  - No other concerns    ===INFECTIOUS DISEASE===  - Slightly elevated WBC on admission 10.5K  - Afebrile  - Urine culture -> neg    ===ENDOCRINE===  - No concerns  - A1c 5.8  - TSH wnl    ===HEMATOLOGIC/DVT PPx===  - On heparin ggt    Dispo: Maintain in ICU.    Patient requires continuous monitoring with bedside rhythm monitoring, arterial line, pulse oximetry, ventilator monitoring and intermittent blood gas analysis.  Care plan discussed with ICU care team.  I have spent 35 minutes providing critical care, in addition to initial critical time provided by CICU attending    Dr. Maldonado     , re-evaluated multiple times during the day.    Raquel Leon PA-C

## 2025-04-11 NOTE — BRIEF OPERATIVE NOTE - OPERATION/FINDINGS
[place] TAVR with a 29mm [employer] EVOLUT Fx+ placed in a severely degenerated 26mm [employer] 3 THV.

## 2025-04-11 NOTE — PROGRESS NOTE ADULT - SUBJECTIVE AND OBJECTIVE BOX
Patient is a 64y old  Male who presents with a chief complaint of dyspnea , chest pain (11 Apr 2025 11:41)    HPI:   65 yo M with a fib (s/p dccv 3/25/25), CHF (likely moderately reduced EF, several TTE with poor windows), aortic stenosis s/p TAVR (11/2022), CAD s/p CABG, a flutter s/p ablation 2019 and s/p micra implant,  CKD3, obesity, venous stasis, HTN, HLD, who presents with 6 days of worsening dyspnea on exertion. Pt also reports associated orthopnea and chest pain on exertion but not at rest which is located in the center of his chest. Pain is described as burning. Also reports several days decreased appetite. Denies fevers, chills, cough, abdominal pain, NVD, urinary sx, leg swelling. Patient had a cardioversion for atrial fibrillation at this hospital recently, which was reportedly unsuccessful. Patient has a scheduled appointment with Dr. Philip on 04/24/2025 to discuss possible ablation. His outpt cardiologist is Dr. Paul.     On arrival to ED, vitals were 97.3F, HR 71, BP 94/57, RR 22, 97% on 2L NC. Labs pertinent for WBC 10.68, INR 2.55, Na 132, K 5.5, bicarb 20, BUN/Cr 78/5.17 (baseline Cr appears ~1.5-1.8), trop 225, BNP 13396, VBG ph 7.3, pco2/po2 wnl. UA without evidence of infection. EKG  on admission 1st deg av block, low voltage, poor r wave progression, R axis deviation CXR with diffuse hazy opacities suggest mild pulmonary edema and small right and trace left pleural effusions. Pt was given IV Bumex 1mg x1, IV bumex 2mg x1, IV Bumex 4mg x1, and lokelma 5mg x1, then admitted for further management.    (01 Apr 2025 01:59)       INTERVAL HPI/OVERNIGHT EVENTS:   No overnight events   Afebrile, hemodynamically stable     Subjective:    ICU Vital Signs Last 24 Hrs  T(C): 36.6 (11 Apr 2025 12:00), Max: 36.7 (11 Apr 2025 08:00)  T(F): 97.8 (11 Apr 2025 12:00), Max: 98 (11 Apr 2025 08:00)  HR: 67 (11 Apr 2025 13:30) (59 - 110)  BP: 89/51 (11 Apr 2025 11:45) (89/51 - 89/51)  BP(mean): 75 (11 Apr 2025 11:45) (75 - 75)  ABP: 86/47 (11 Apr 2025 13:30) (82/47 - 112/68)  ABP(mean): 64 (11 Apr 2025 13:30) (61 - 86)  RR: 14 (11 Apr 2025 13:30) (7 - 38)  SpO2: 98% (11 Apr 2025 13:30) (90% - 100%)    O2 Parameters below as of 11 Apr 2025 13:00  Patient On (Oxygen Delivery Method): nasal cannula  O2 Flow (L/min): 3        I&O's Summary    10 Apr 2025 07:01  -  11 Apr 2025 07:00  --------------------------------------------------------  IN: 1735.5 mL / OUT: 4550 mL / NET: -2814.5 mL    11 Apr 2025 07:01  -  11 Apr 2025 13:42  --------------------------------------------------------  IN: 309 mL / OUT: 1250 mL / NET: -941 mL          Daily Height in cm: 172.72 (11 Apr 2025 11:45)    Daily     EKG/Telemetry Events: NSR    MEDICATIONS  (STANDING):  albuterol/ipratropium for Nebulization 3 milliLiter(s) Nebulizer every 6 hours  aMIOdarone    Tablet 200 milliGRAM(s) Oral daily  aMIOdarone    Tablet   Oral   atorvastatin 40 milliGRAM(s) Oral at bedtime  buMETAnide Infusion 4 mG/Hr (20 mL/Hr) IV Continuous <Continuous>  cefuroxime  IVPB 1500 milliGRAM(s) IV Intermittent once  chlorhexidine 2% Cloths 1 Application(s) Topical daily  clopidogrel Tablet 75 milliGRAM(s) Oral daily  famotidine    Tablet 20 milliGRAM(s) Oral every 48 hours  hemorrhoidal Ointment 1 Application(s) Rectal daily  heparin  Infusion 1500 Unit(s)/Hr (18 mL/Hr) IV Continuous <Continuous>  insulin lispro (ADMELOG) corrective regimen sliding scale   SubCutaneous Before meals and at bedtime  melatonin 5 milliGRAM(s) Oral at bedtime  sodium chloride 3%  Inhalation 4 milliLiter(s) Inhalation every 6 hours  vasopressin Infusion 0.04 Unit(s)/Min (6 mL/Hr) IV Continuous <Continuous>    MEDICATIONS  (PRN):      PHYSICAL EXAM:  General: Well-appearing, NAD  HEENT: PERRL, EOMI, normal sclera and conjunctiva, normal oropharynx  Neck: Supple, no JVD, thyroid without masses or enlargement  Chest/Lungs: +nasal cannula. CTA bilaterally, no wheezing, rales, rhonchi or rub  Heart: RRR, normal S1, S2, no murmurs or gallops  Abdomen: Soft, ND, NTTP, normoactive bowel sounds  Extremities: +lichen planus bilaterally. +pitting edema. 2+ peripheral pulses b/l, no edema, clubbing or cyanosis  Skin: +lichen planus bilaterally  Neurological: A&Ox3, moves all extremities, no focal deficits    LABS:                        10.5   10.74 )-----------( 111      ( 11 Apr 2025 03:25 )             33.2     04-11    136  |  85[L]  |  94[H]  ----------------------------<  139[H]  3.9   |  34[H]  |  2.90[H]    Ca    9.7      11 Apr 2025 11:29  Phos  3.8     04-11  Mg     2.1     04-11    TPro  7.3  /  Alb  3.5  /  TBili  1.8[H]  /  DBili  x   /  AST  12  /  ALT  16  /  AlkPhos  93  04-11    LIVER FUNCTIONS - ( 11 Apr 2025 11:29 )  Alb: 3.5 g/dL / Pro: 7.3 g/dL / ALK PHOS: 93 U/L / ALT: 16 U/L / AST: 12 U/L / GGT: x           PT/INR - ( 11 Apr 2025 03:25 )   PT: 12.5 sec;   INR: 1.10 ratio         PTT - ( 11 Apr 2025 03:25 )  PTT:71.3 sec  CAPILLARY BLOOD GLUCOSE      POCT Blood Glucose.: 141 mg/dL (11 Apr 2025 11:23)  POCT Blood Glucose.: 144 mg/dL (10 Apr 2025 22:43)  POCT Blood Glucose.: 149 mg/dL (10 Apr 2025 16:34)    ABG - ( 11 Apr 2025 02:36 )  pH, Arterial: 7.46  pH, Blood: x     /  pCO2: 61    /  pO2: 69    / HCO3: 43    / Base Excess: 16.9  /  SaO2: 96.7                    Urinalysis Basic - ( 11 Apr 2025 11:29 )    Color: x / Appearance: x / SG: x / pH: x  Gluc: 139 mg/dL / Ketone: x  / Bili: x / Urobili: x   Blood: x / Protein: x / Nitrite: x   Leuk Esterase: x / RBC: x / WBC x   Sq Epi: x / Non Sq Epi: x / Bacteria: x          RADIOLOGY & ADDITIONAL TESTS:  CXR:        Care Discussed with Consultants/Other Providers [ x] YES  [ ] NO

## 2025-04-11 NOTE — BRIEF OPERATIVE NOTE - NSICDXBRIEFPROCEDURE_GEN_ALL_CORE_FT
PROCEDURES:  Percutaneous transcatheter aortic valve replacement (TAVR) by femoral artery approach 11-Apr-2025 16:43:07  Luis Alfredo Adkins

## 2025-04-11 NOTE — PROGRESS NOTE ADULT - ATTENDING COMMENTS
HF  intubated, now extubated   initially called for chon  #Baseline CKD3= pt reports baseline Renal fxn in 40s, followed by outpt nephrologist Dr Stringer  #  chon in setting of HF and ENtresto and decreased po intake and diarrhea  chon could be cardiorenal +  ATN  nonoliguric  cr stable now  back on bumex drip  he has a lopez, monitor UO  monitor daily cr trends  lytes stable  no urgent indication for RRT  check renal sono  #HF  bumex drip as above  # hyperkalemia  stable  #met acidosis- DC sodium bicarb tabs  monitor bicarb trend daily  #hypotension  monitor BP trends  on vaso   #s/p cardiac cath 4/8   plan TAVR per CV today        d/w CCU team .

## 2025-04-11 NOTE — PROGRESS NOTE ADULT - SUBJECTIVE AND OBJECTIVE BOX
BERTHA WARD  MRN-[id number]  Patient is a 64y old  Male who presents with a chief complaint of dyspnea , chest pain (11 Apr 2025 11:41)    HPI:   63 yo M with a fib (s/p dccv 3/25/25), CHF (likely moderately reduced EF, several TTE with poor windows), aortic stenosis s/p TAVR (11/2022), CAD s/p CABG, a flutter s/p ablation 2019 and s/p micra implant,  CKD3, obesity, venous stasis, HTN, HLD, who presents with 6 days of worsening dyspnea on exertion. Pt also reports associated orthopnea and chest pain on exertion but not at rest which is located in the center of his chest. Pain is described as burning. Also reports several days decreased appetite. Denies fevers, chills, cough, abdominal pain, NVD, urinary sx, leg swelling. Patient had a cardioversion for atrial fibrillation at this hospital recently, which was reportedly unsuccessful. Patient has a scheduled appointment with Dr. Philip on 04/24/2025 to discuss possible ablation. His outpt cardiologist is Dr. Paul.     On arrival to ED, vitals were 97.3F, HR 71, BP 94/57, RR 22, 97% on 2L NC. Labs pertinent for WBC 10.68, INR 2.55, Na 132, K 5.5, bicarb 20, BUN/Cr 78/5.17 (baseline Cr appears ~1.5-1.8), trop 225, BNP 12745, VBG ph 7.3, pco2/po2 wnl. UA without evidence of infection. EKG  on admission 1st deg av block, low voltage, poor r wave progression, R axis deviation CXR with diffuse hazy opacities suggest mild pulmonary edema and small right and trace left pleural effusions. Pt was given IV Bumex 1mg x1, IV bumex 2mg x1, IV Bumex 4mg x1, and lokelma 5mg x1, then admitted for further management.    (01 Apr 2025 01:59)      Hospital Course:    24 HOUR EVENTS:    REVIEW OF SYSTEMS:    CONSTITUTIONAL: No weakness, fevers or chills  EYES/ENT: No visual changes;  No vertigo or throat pain   NECK: No pain or stiffness  RESPIRATORY: No cough, wheezing, hemoptysis; No shortness of breath  CARDIOVASCULAR: No chest pain or palpitations  GASTROINTESTINAL: No abdominal or epigastric pain. No nausea, vomiting, or hematemesis; No diarrhea or constipation. No melena or hematochezia.  GENITOURINARY: No dysuria, frequency or hematuria  NEUROLOGICAL: No numbness or weakness  SKIN: No itching, rashes      ICU Vital Signs Last 24 Hrs  T(C): 35.8 (11 Apr 2025 16:45), Max: 36.7 (11 Apr 2025 08:00)  T(F): 96.5 (11 Apr 2025 16:45), Max: 98 (11 Apr 2025 08:00)  HR: 78 (11 Apr 2025 19:15) (59 - 78)  BP: 89/51 (11 Apr 2025 11:45) (89/51 - 89/51)  BP(mean): 75 (11 Apr 2025 11:45) (75 - 75)  ABP: 102/57 (11 Apr 2025 19:15) (82/47 - 122/65)  ABP(mean): 75 (11 Apr 2025 19:15) (60 - 86)  RR: 25 (11 Apr 2025 19:15) (7 - 31)  SpO2: 100% (11 Apr 2025 19:15) (90% - 100%)    O2 Parameters below as of 11 Apr 2025 18:30  Patient On (Oxygen Delivery Method): ventilator, CPAP 10/6    O2 Concentration (%): 50      Mode: CPAP with PS, FiO2: 50, PEEP: 6  CVP(mm Hg): --  CO: --  CI: --  PA: --  PA(mean): --  PA(direct): --  PCWP: --  LA: --  RA: --  SVR: --  SVRI: --  PVR: --  PVRI: --  I&O's Summary    10 Apr 2025 07:01  -  11 Apr 2025 07:00  --------------------------------------------------------  IN: 1735.5 mL / OUT: 4550 mL / NET: -2814.5 mL    11 Apr 2025 07:01 - 11 Apr 2025 20:06  --------------------------------------------------------  IN: 581.9 mL / OUT: 1825 mL / NET: -1243.1 mL        CAPILLARY BLOOD GLUCOSE    CAPILLARY BLOOD GLUCOSE      POCT Blood Glucose.: 145 mg/dL (11 Apr 2025 17:10)      PHYSICAL EXAM:  GENERAL: No acute distress, well-developed  HEAD:  Atraumatic, Normocephalic  EYES: EOMI, PERRLA, conjunctiva and sclera clear  NECK: Supple, no lymphadenopathy, no JVD  CHEST/LUNG: CTAB; No wheezes, rales, or rhonchi  HEART: Regular rate and rhythm. Normal S1/S2. No murmurs, rubs, or gallops  ABDOMEN: Soft, non-tender, non-distended; normal bowel sounds, no organomegaly  EXTREMITIES:  2+ peripheral pulses b/l, No clubbing, cyanosis, or edema  NEUROLOGY: A&O x 3, no focal deficits  SKIN: No rashes or lesions    ============================I/O===========================   I&O's Detail    10 Apr 2025 07:01  -  11 Apr 2025 07:00  --------------------------------------------------------  IN:    Bumetanide: 480 mL    Heparin: 432 mL    Oral Fluid: 510 mL    Vasopressin: 313.5 mL  Total IN: 1735.5 mL    OUT:    Indwelling Catheter - Urethral (mL): 4550 mL  Total OUT: 4550 mL    Total NET: -2814.5 mL      11 Apr 2025 07:01  -  11 Apr 2025 20:06  --------------------------------------------------------  IN:    Bumetanide: 140 mL    DOBUTamine: 19 mL    Heparin: 108 mL    IV PiggyBack: 100 mL    IV PiggyBack: 100 mL    Propofol: 15.9 mL    Vasopressin: 99 mL  Total IN: 581.9 mL    OUT:    Indwelling Catheter - Urethral (mL): 1825 mL  Total OUT: 1825 mL    Total NET: -1243.1 mL        ============================ LABS =========================                        10.7   14.60 )-----------( 99       ( 11 Apr 2025 17:20 )             33.1     04-11    138  |  89[L]  |  100[H]  ----------------------------<  130[H]  3.1[L]   |  35[H]  |  2.70[H]    Ca    10.5      11 Apr 2025 17:20  Phos  3.8     04-11  Mg     1.8     04-11    TPro  7.5  /  Alb  3.4  /  TBili  2.0[H]  /  DBili  x   /  AST  11  /  ALT  15  /  AlkPhos  93  04-11                LIVER FUNCTIONS - ( 11 Apr 2025 17:20 )  Alb: 3.4 g/dL / Pro: 7.5 g/dL / ALK PHOS: 93 U/L / ALT: 15 U/L / AST: 11 U/L / GGT: x           PT/INR - ( 11 Apr 2025 03:25 )   PT: 12.5 sec;   INR: 1.10 ratio         PTT - ( 11 Apr 2025 03:25 )  PTT:71.3 sec  ABG - ( 11 Apr 2025 19:35 )  pH, Arterial: 7.46  pH, Blood: x     /  pCO2: 63    /  pO2: 133   / HCO3: 45    / Base Excess: 18.1  /  SaO2: 99.0        Blood Gas Arterial, Lactate: 1.1 mmol/L (04-11-25 @ 19:35)  Blood Gas Arterial, Lactate: 0.9 mmol/L (04-11-25 @ 17:10)  Blood Gas Arterial, Lactate: 1.1 mmol/L (04-11-25 @ 14:54)  Blood Gas Arterial, Lactate: 0.8 mmol/L (04-11-25 @ 02:36)  Blood Gas Arterial, Lactate: 0.8 mmol/L (04-10-25 @ 00:18)  Blood Gas Arterial, Lactate: 0.8 mmol/L (04-09-25 @ 18:30)  Blood Gas Arterial, Lactate: 0.7 mmol/L (04-09-25 @ 12:10)  Blood Gas Arterial, Lactate: 0.7 mmol/L (04-09-25 @ 06:35)  Blood Gas Arterial, Lactate: 0.6 mmol/L (04-09-25 @ 02:45)  Blood Gas Arterial, Lactate: 0.8 mmol/L (04-09-25 @ 00:25)    Urinalysis Basic - ( 11 Apr 2025 17:20 )    Color: x / Appearance: x / SG: x / pH: x  Gluc: 130 mg/dL / Ketone: x  / Bili: x / Urobili: x   Blood: x / Protein: x / Nitrite: x   Leuk Esterase: x / RBC: x / WBC x   Sq Epi: x / Non Sq Epi: x / Bacteria: x      ======================Micro/Rad/Cardio=================  Telemtry: Reviewed   EKG: Reviewed  CXR: Reviewed  Culture: Reviewed   ======================================================  PAST MEDICAL & SURGICAL HISTORY:  CAD (coronary artery disease)  s/p CABG      Hypercholesteremia      Thrombus of left atrial appendage  2018      Ischemic cardiomyopathy      WILFRED (obstructive sleep apnea)  can not tolerate bipap at night      HTN (hypertension)      Atrial fibrillation  2018      CHF (congestive heart failure)  denies any recent exacerbation      Peripheral edema  chronic      Lichen planus  chronic ( b/L LE)      Atrial flutter  s/p ablation 2018      MI (myocardial infarction)  2012      Stented coronary artery  2019      AS (aortic stenosis)      Chronic kidney disease, unspecified CKD stage      ANGELICA (acute kidney injury)  4/2021      Morbid obesity      Status post placement of cardiac pacemaker  micraleadless PPM, [id number] last interrogation 7/6/2022      Osteoarthritis  shoulders, hips, knee      H/O scoliosis      Lower back pain      History of elbow surgery      S/P CABG (coronary artery bypass graft)  x3, 2012      Cardiac pacemaker  leadless 2018      History of coronary artery stent placement  2019 ( one more after cabg)      History of adenoidectomy      H/O atrioventricular karlee ablation  2018        ====================ASSESSMENT ==============  63 yo M with a fib (s/p dccv 3/25/25), CHF (likely moderately reduced EF, several TTE with poor windows), aortic stenosis s/p TAVR (11/2022), a flutter s/p ablation 2019 and s/p micra implant,  CKD3, WILFRED, obesity, venous stasis, HTN, HLD, who presents with 6 days of worsening dyspnea on exertion, admitted for AHRF likely iso CHF exacerbation, also with ANGELICA on CKD c/f cardiorenal syndrome. RRT on floors for SBP 80s requiring pressors and has been admitted to the CCU. HF mostly likely i/so severe AS. Pt to get structural intervention.       Plan:  ====================== NEUROLOGY=====================  - AO x 3  - sedated with precedex  - melatonin for sleep     ==================== RESPIRATORY======================  #Acute hypoxemic hypercapnic respiratory failure  - Likely retaining 2/2 volume overload  - c/w bumex 4mg drip for elevated CVP and increased O2 req  - c/w BIPAP for resp distress, 6L NC when off BIPAP   - continue on duonebs     Mechanical Ventilation:  Mode: CPAP with PS  FiO2: 50  PEEP: 6  PS: 10  MAP: 9  PIP: 16      ====================CARDIOVASCULAR==================  #Obstructive shock 2/2 AS  - Off levophed  - MAPs 70s -> will continue to wean vasopressin .09  - If need more pressor support will switch to phenylephrine as pt has LVOT obstruction from aortic stenosis   - Wean as tolerated    #Acute on chronic HF exacerbation  - Bumex increased to 4mg drip overnight for low urine output - augment with below  - Goal 1-2L net neg daily  - CVP goal 8-12  - Diuril, 3% hypertonic, and metolazone PRN   - Will be careful of overdiuresis given severe AS and preload dependant     #Afib  #First degree block  - PO amio load   - heparin to restart 6 hours after cath    #Aortic valve stenosis s/p prosthetic  - S/p STANISLAW showing severe AS stenosis, global hypokinesis and EF 15%  - Structural plans for valve in valve replacement  - Structural wants cath and CT w/ contrast before procedure  - 4/8 patent LIMA-LAD, closed SVG-OM1/D1, pending TAVR 4/11    #Troponemia  - No ischemic changes on EKG  - Most likely i/s/o ANGELICA on CKD    #Hx CABG  #PVD  - C/w Plavix    #HLD  - Increased Lipitor to 40mg from 20mg    ===================HEMATOLOGIC/ONC ===================  - On heparin gtt for DVT ppx    ===================== RENAL =========================  #ANGELICA on CKD  #Cardiorenal syndrome  - Baseline 1.5-1.8  - Most likely i/s/o hypotension and fluid overload  - Renal sono showed parenchymal disease  - Nephro following c/w bumex gtt, monitor volume status. Stop sodium bicarb 650 TID for now 4/7    #Hyperkalemia  - Lokelma if K increases into 5s range    ==================== GASTROINTESTINAL===================  - NPO for now   - Failed bedside SS  - S&S consulted -> recommends FEES  - Protonix for stress ulcer ppx    =======================    ENDOCRINE  =====================  - stress hyperglycemia - ISS     ========================INFECTIOUS DISEASE================  - Slightly elevated WBC on admission 10.5K  - Afebrile  - Urine culture -> neg  - Perioperative coverage with Cefuroxime     =======================  SKIN  =====================  - Hx lichen planus  - No other concerns    =======================  DISPO  =====================  - Maintain in ICU.        Patient requires continuous monitoring with bedside rhythm monitoring, pulse ox monitoring, and intermittent blood gas analysis. Care plan discussed with ICU care team. Patient remained critical and at risk for life threatening decompensation.  Patient seen, examined and plan discussed with CCU team during rounds.     I have personally provided ____ minutes of critical care time excluding time spent on separate procedures, in addition to initial critical care time provided by the CICU Attending, Dr. Maldonado     By signing my name below, I, Satinder Mota, attest that this documentation has been prepared under the direction and in the presence of ANALIA Betancur   Electronically signed: Patito Sousa, 04-11-25 @ 20:07    I, Dilma Stapleton, personally performed the services described in this documentation. all medical record entries made by the sugaribe were at my direction and in my presence. I have reviewed the chart and agree that the record reflects my personal performance and is accurate and complete  Electronically signed: ANALIA Betancur        BERTHA WARD  MRN-[id number]  Patient is a 64y old  Male who presents with a chief complaint of dyspnea , chest pain (11 Apr 2025 11:41)    HPI:   65 yo M with a fib (s/p dccv 3/25/25), CHF (likely moderately reduced EF, several TTE with poor windows), aortic stenosis s/p TAVR (11/2022), CAD s/p CABG, a flutter s/p ablation 2019 and s/p micra implant,  CKD3, obesity, venous stasis, HTN, HLD, who presents with 6 days of worsening dyspnea on exertion. Pt also reports associated orthopnea and chest pain on exertion but not at rest which is located in the center of his chest. Pain is described as burning. Also reports several days decreased appetite. Denies fevers, chills, cough, abdominal pain, NVD, urinary sx, leg swelling. Patient had a cardioversion for atrial fibrillation at this hospital recently, which was reportedly unsuccessful. Patient has a scheduled appointment with Dr. Philip on 04/24/2025 to discuss possible ablation. His outpt cardiologist is Dr. Paul.     On arrival to ED, vitals were 97.3F, HR 71, BP 94/57, RR 22, 97% on 2L NC. Labs pertinent for WBC 10.68, INR 2.55, Na 132, K 5.5, bicarb 20, BUN/Cr 78/5.17 (baseline Cr appears ~1.5-1.8), trop 225, BNP 85714, VBG ph 7.3, pco2/po2 wnl. UA without evidence of infection. EKG  on admission 1st deg av block, low voltage, poor r wave progression, R axis deviation CXR with diffuse hazy opacities suggest mild pulmonary edema and small right and trace left pleural effusions. Pt was given IV Bumex 1mg x1, IV bumex 2mg x1, IV Bumex 4mg x1, and lokelma 5mg x1, then admitted for further management.    (01 Apr 2025 01:59)    24 HOUR EVENTS: s/p TAVR today, remains intubated. Became hypercapnic with SBT, transitioned back to full vent support overnight with plans to attempt SBT again in AM. Dobutamine d/c'd. Heparin gtt to be restarted at 10PM.    ICU Vital Signs Last 24 Hrs  T(C): 35.8 (11 Apr 2025 16:45), Max: 36.7 (11 Apr 2025 08:00)  T(F): 96.5 (11 Apr 2025 16:45), Max: 98 (11 Apr 2025 08:00)  HR: 78 (11 Apr 2025 19:15) (59 - 78)  BP: 89/51 (11 Apr 2025 11:45) (89/51 - 89/51)  BP(mean): 75 (11 Apr 2025 11:45) (75 - 75)  ABP: 102/57 (11 Apr 2025 19:15) (82/47 - 122/65)  ABP(mean): 75 (11 Apr 2025 19:15) (60 - 86)  RR: 25 (11 Apr 2025 19:15) (7 - 31)  SpO2: 100% (11 Apr 2025 19:15) (90% - 100%)    O2 Parameters below as of 11 Apr 2025 18:30  Patient On (Oxygen Delivery Method): ventilator, CPAP 10/6    O2 Concentration (%): 50    I&O's Summary  10 Apr 2025 07:01  -  11 Apr 2025 07:00  --------------------------------------------------------  IN: 1735.5 mL / OUT: 4550 mL / NET: -2814.5 mL    11 Apr 2025 07:01  -  11 Apr 2025 20:06  --------------------------------------------------------  IN: 581.9 mL / OUT: 1825 mL / NET: -1243.1 mL    CAPILLARY BLOOD GLUCOSE  POCT Blood Glucose.: 145 mg/dL (11 Apr 2025 17:10)    ============================I/O===========================   I&O's Detail  10 Apr 2025 07:01  -  11 Apr 2025 07:00  --------------------------------------------------------  IN:    Bumetanide: 480 mL    Heparin: 432 mL    Oral Fluid: 510 mL    Vasopressin: 313.5 mL  Total IN: 1735.5 mL    OUT:    Indwelling Catheter - Urethral (mL): 4550 mL  Total OUT: 4550 mL    Total NET: -2814.5 mL  11 Apr 2025 07:01  -  11 Apr 2025 20:06  --------------------------------------------------------  IN:    Bumetanide: 140 mL    DOBUTamine: 19 mL    Heparin: 108 mL    IV PiggyBack: 100 mL    IV PiggyBack: 100 mL    Propofol: 15.9 mL    Vasopressin: 99 mL  Total IN: 581.9 mL    OUT:    Indwelling Catheter - Urethral (mL): 1825 mL  Total OUT: 1825 mL    Total NET: -1243.1 mL  ============================ LABS =========================                        10.7   14.60 )-----------( 99       ( 11 Apr 2025 17:20 )             33.1     04-11  138  |  89[L]  |  100[H]  ----------------------------<  130[H]  3.1[L]   |  35[H]  |  2.70[H]    Ca    10.5      11 Apr 2025 17:20  Phos  3.8     04-11  Mg     1.8     04-11    TPro  7.5  /  Alb  3.4  /  TBili  2.0[H]  /  DBili  x   /  AST  11  /  ALT  15  /  AlkPhos  93  04-11    LIVER FUNCTIONS - ( 11 Apr 2025 17:20 )  Alb: 3.4 g/dL / Pro: 7.5 g/dL / ALK PHOS: 93 U/L / ALT: 15 U/L / AST: 11 U/L / GGT: x           PT/INR - ( 11 Apr 2025 03:25 )   PT: 12.5 sec;   INR: 1.10 ratio       PTT - ( 11 Apr 2025 03:25 )  PTT:71.3 sec  ABG - ( 11 Apr 2025 19:35 )  pH, Arterial: 7.46  pH, Blood: x     /  pCO2: 63    /  pO2: 133   / HCO3: 45    / Base Excess: 18.1  /  SaO2: 99.0      Blood Gas Arterial, Lactate: 1.1 mmol/L (04-11-25 @ 19:35)  Blood Gas Arterial, Lactate: 0.9 mmol/L (04-11-25 @ 17:10)  Blood Gas Arterial, Lactate: 1.1 mmol/L (04-11-25 @ 14:54)  Blood Gas Arterial, Lactate: 0.8 mmol/L (04-11-25 @ 02:36)  Blood Gas Arterial, Lactate: 0.8 mmol/L (04-10-25 @ 00:18)  Blood Gas Arterial, Lactate: 0.8 mmol/L (04-09-25 @ 18:30)  Blood Gas Arterial, Lactate: 0.7 mmol/L (04-09-25 @ 12:10)  Blood Gas Arterial, Lactate: 0.7 mmol/L (04-09-25 @ 06:35)  Blood Gas Arterial, Lactate: 0.6 mmol/L (04-09-25 @ 02:45)  Blood Gas Arterial, Lactate: 0.8 mmol/L (04-09-25 @ 00:25)    Urinalysis Basic - ( 11 Apr 2025 17:20 )    Color: x / Appearance: x / SG: x / pH: x  Gluc: 130 mg/dL / Ketone: x  / Bili: x / Urobili: x   Blood: x / Protein: x / Nitrite: x   Leuk Esterase: x / RBC: x / WBC x   Sq Epi: x / Non Sq Epi: x / Bacteria: x    ======================================================  PAST MEDICAL & SURGICAL HISTORY:  CAD (coronary artery disease)  s/p CABG  Hypercholesteremia  Thrombus of left atrial appendage  2018  Ischemic cardiomyopathy  WILFRED (obstructive sleep apnea)  can not tolerate bipap at night  HTN (hypertension)  Atrial fibrillation  2018  CHF (congestive heart failure)  denies any recent exacerbation  Peripheral edema  chronic  Lichen planus  chronic ( b/L LE)  Atrial flutter  s/p ablation 2018  MI (myocardial infarction)  2012  Stented coronary artery  2019  AS (aortic stenosis)  Chronic kidney disease, unspecified CKD stage  ANGELICA (acute kidney injury)  4/2021  Morbid obesity  Status post placement of cardiac pacemaker  micraleadless PPM, [id number] last interrogation 7/6/2022  Osteoarthritis  shoulders, hips, knee  H/O scoliosis  Lower back pain  History of elbow surgery  S/P CABG (coronary artery bypass graft)  x3, 2012  Cardiac pacemaker  leadless 2018  History of coronary artery stent placement  2019 ( one more after cabg)  History of adenoidectomy  H/O atrioventricular karlee ablation  2018    ====================ASSESSMENT ==============  65 yo M with a fib (s/p dccv 3/25/25), CHF (likely moderately reduced EF, several TTE with poor windows), aortic stenosis s/p TAVR (11/2022), a flutter s/p ablation 2019 and s/p micra implant,  CKD3, WILFRED, obesity, venous stasis, HTN, HLD, who presents with 6 days of worsening dyspnea on exertion, admitted for AHRF likely iso CHF exacerbation, also with ANGELICA on CKD c/f cardiorenal syndrome. RRT on floors for SBP 80s requiring pressors and has been admitted to the CCU. HF mostly likely i/so severe AS. Pt to get structural intervention.     Plan:  ====================== NEUROLOGY=====================  #Sedation regimen  - sedated with precedex + prop  - Sedation vacation in AM    ==================== RESPIRATORY======================  #Acute hypoxemic hypercapnic respiratory failure  - remains intubated post procedure  - SBT failed for worsening hypercapnia   - Likely retaining 2/2 volume overload  - continue duonebs     Mechanical Ventilation:  Mode: CPAP with PS  FiO2: 50  PEEP: 6  PS: 10  MAP: 9  PIP: 16    ====================CARDIOVASCULAR==================  #Obstructive shock 2/2 AS  - Off levophed  - MAPs 70s -> will continue to wean vasopressin  - Wean as tolerated    #Acute on chronic HF exacerbation  - preload reduction: bumex 4 gtt  - Goal 1-2L net neg daily  - CVP goal 8-12  - Diuril, 3% hypertonic, and metolazone PRN for augmenting diuresis    #Aortic valve stenosis s/p prosthetic  - S/p STANISLAW showing severe AS stenosis, global hypokinesis and EF 15%  - 4/8 patent LIMA-LAD, closed SVG-OM1/D1, pending TAVR 4/11  - s/p TAVR 4/11  - heparin gtt to be restarted at 10PM    #Hx CABG  #PVD  - C/w Plavix    #HLD  - Increased Lipitor to 40mg from 20mg    ===================HEMATOLOGIC/ONC ===================  - On heparin gtt for DVT ppx    ===================== RENAL =========================  #ANGELICA on CKD  #Cardiorenal syndrome  - Baseline 1.5-1.8  - Most likely i/s/o hypotension and fluid overload  - Renal sono showed parenchymal disease  - Nephro following c/w bumex gtt, monitor volume status. Stop sodium bicarb 650 TID for now 4/7    #Hyperkalemia  - Lokelma if K increases into 5s range    ==================== GASTROINTESTINAL===================  - NPO for now   - OGT placed while intubated    =======================    ENDOCRINE  =====================  - stress hyperglycemia - ISS     ========================INFECTIOUS DISEASE================  - Slightly elevated WBC on admission 10.5K  - Afebrile  - Urine culture -> neg  - Perioperative coverage with Cefuroxime     =======================  SKIN  =====================  - Hx lichen planus  - No other concerns    =======================  DISPO  =====================  - Maintain in ICU.      Patient requires continuous monitoring with bedside rhythm monitoring, pulse ox monitoring, and intermittent blood gas analysis. Care plan discussed with ICU care team. Patient remained critical and at risk for life threatening decompensation.  Patient seen, examined and plan discussed with CCU team during rounds.     I have personally provided 30 minutes of critical care time excluding time spent on separate procedures, in addition to initial critical care time provided by the CICU Attending, Dr. Maldonado     By signing my name below, I, Satinder Mota, attest that this documentation has been prepared under the direction and in the presence of ANALIA Betancur   Electronically signed: Patito Sousa, 04-11-25 @ 20:07    I, Dilma Stapleton, personally performed the services described in this documentation. all medical record entries made by the scribe were at my direction and in my presence. I have reviewed the chart and agree that the record reflects my personal performance and is accurate and complete  Electronically signed: ANALIA Betancur

## 2025-04-11 NOTE — PROGRESS NOTE ADULT - ASSESSMENT
65 y/o M with hx of Afib s/p DCCV 3/25/25, CHF, Aortic stenosis s/p TAVR 11/2022, CAD s/p CABG, Aflutter s/p ablation and Micra implant, CKD 3, Obesity, venous stasis, HTN, HLD presented with 6 days of worsening SOB on exertion associated with orthopnea and chest pain.   Also reports several days decreased appetite. Denies fevers, chills, cough, abdominal pain, NVD, urinary sx, leg swelling. Patient had a cardioversion for atrial fibrillation at this hospital recently, which was reportedly unsuccessful. Patient has a scheduled appointment with Dr. Philip on 04/24/2025 to discuss possible ablation. His outpt cardiologist is Dr. Miranda. (Cardiology)  On arrival to ED, vitals were 97.3F, HR 71, BP 94/57, RR 22, 97% on 2L NC. Labs pertinent for WBC 10.68, INR 2.55, Na 132, K 5.5, bicarb 20, BUN/Cr 78/5.17 (baseline Cr appears ~1.5-1.8), trop 225, BNP 50148, VBG ph 7.3, pco2/po2 wnl. UA without evidence of infection.  nephrology was consulted for management of ANGELICA on CKD. Pt has baseline Scr of ~1.5- 1.8. Scr was 2.7 on 3/25/25. Pt is on Entresto ( started recently) and he mentioned that he was taking Motrin occasionally. No herbal medications/ supplements.  Scr at the time of admission was 5.17 3/31/25 and it further worsened to 5.45 4/1/25.    Pt follows Dr. Stewart Brown (Nephrology)  LHC was done on 4/8  Plan for TAVR on 4/11.     ANGELICA on CKD:  Pt has hx of CKD 3. Baseline Scr 1.5-1.8.   At the time of admission - Scr was 5.17 and it further worsened to 5.45--> 5.5. Scr elevated/improving to 3.54---> 3.48--> 3.09 today.   Pt has lopez cath in place. UOP was 4.5L with net negative 2.8L in last 24 hours.   Bumex gtt - restarted 4/4/25 - Briefly held on 4/8 for LHC and is restarted later in the day.  Now on 4mg/hr.   Bicarb is elevated but pCO2 is also elevated. pt is on BiPAP at night. Management of WILFRED/OHS as per primary team.   UA showed trace LE, blood ( Likely due to lopez), casts 17. UPCR 0.7. No hx of DM.   Pt was on entresto.   BP was low.   On IV vasopressin.   ANGELICA on CKD likely in setting of HF exacerbation, entresto use and hypotension mediated ATN.  USG kidney and bladder - normal sized kidneys. No HN.     PLAN:  Bicarb is trending up. pCo2 is also elevated. on BiPAP overnight. Management as per primary team. C/w Bumex 4mg/hr. Consider Acetazolamide to assist with diuresis ( particularly with high bicarb state)  continue to hold entresto.   Monitor strict I/Os and daily wts.   No urgent need of HD for now.    Avoid nephrotoxins  Dose medications as per eGFR    hyperkalemia: resolved  Today Serum K is 3.9. Monitor closely - replete as needed.     Hyponatremia is likely in setting of impaired water clearance in setting of worsening renal function and volume overload 2/2 heart failure  resolved now.   Bumex as noted above      Pt is at high risk of LISA. Pt was explained in detail about the risk of LISA and possible need of dialysis if the kidney function gets worse.   Enoch score 26.1 %- Risk of post PCI - LISA and 1.09% risk of requiring dialysis.   PLAN for TAVR on 4/11 today. Hold Bumex gtt 4 hours before and after procedure. Monitor kidney function closely.     Wait for the final reccs as per the attending.

## 2025-04-12 ENCOUNTER — RESULT REVIEW (OUTPATIENT)
Age: 65
End: 2025-04-12

## 2025-04-12 LAB
ALBUMIN SERPL ELPH-MCNC: 3.3 G/DL — SIGNIFICANT CHANGE UP (ref 3.3–5)
ALBUMIN SERPL ELPH-MCNC: 3.4 G/DL — SIGNIFICANT CHANGE UP (ref 3.3–5)
ALBUMIN SERPL ELPH-MCNC: 3.4 G/DL — SIGNIFICANT CHANGE UP (ref 3.3–5)
ALP SERPL-CCNC: 88 U/L — SIGNIFICANT CHANGE UP (ref 40–120)
ALP SERPL-CCNC: 88 U/L — SIGNIFICANT CHANGE UP (ref 40–120)
ALP SERPL-CCNC: 90 U/L — SIGNIFICANT CHANGE UP (ref 40–120)
ALT FLD-CCNC: 14 U/L — SIGNIFICANT CHANGE UP (ref 10–45)
ALT FLD-CCNC: 15 U/L — SIGNIFICANT CHANGE UP (ref 10–45)
ALT FLD-CCNC: 15 U/L — SIGNIFICANT CHANGE UP (ref 10–45)
ANION GAP SERPL CALC-SCNC: 15 MMOL/L — SIGNIFICANT CHANGE UP (ref 5–17)
ANION GAP SERPL CALC-SCNC: 17 MMOL/L — SIGNIFICANT CHANGE UP (ref 5–17)
ANION GAP SERPL CALC-SCNC: 19 MMOL/L — HIGH (ref 5–17)
APTT BLD: 34 SEC — SIGNIFICANT CHANGE UP (ref 24.5–35.6)
APTT BLD: 37.4 SEC — HIGH (ref 24.5–35.6)
APTT BLD: 45.9 SEC — HIGH (ref 24.5–35.6)
AST SERPL-CCNC: 14 U/L — SIGNIFICANT CHANGE UP (ref 10–40)
AST SERPL-CCNC: 14 U/L — SIGNIFICANT CHANGE UP (ref 10–40)
AST SERPL-CCNC: 15 U/L — SIGNIFICANT CHANGE UP (ref 10–40)
BILIRUB SERPL-MCNC: 2.3 MG/DL — HIGH (ref 0.2–1.2)
BILIRUB SERPL-MCNC: 2.5 MG/DL — HIGH (ref 0.2–1.2)
BILIRUB SERPL-MCNC: 2.5 MG/DL — HIGH (ref 0.2–1.2)
BLD GP AB SCN SERPL QL: NEGATIVE — SIGNIFICANT CHANGE UP
BUN SERPL-MCNC: 94 MG/DL — HIGH (ref 7–23)
BUN SERPL-MCNC: 95 MG/DL — HIGH (ref 7–23)
BUN SERPL-MCNC: 95 MG/DL — HIGH (ref 7–23)
CALCIUM SERPL-MCNC: 10 MG/DL — SIGNIFICANT CHANGE UP (ref 8.4–10.5)
CALCIUM SERPL-MCNC: 10.2 MG/DL — SIGNIFICANT CHANGE UP (ref 8.4–10.5)
CALCIUM SERPL-MCNC: 9.8 MG/DL — SIGNIFICANT CHANGE UP (ref 8.4–10.5)
CHLORIDE SERPL-SCNC: 85 MMOL/L — LOW (ref 96–108)
CHLORIDE SERPL-SCNC: 85 MMOL/L — LOW (ref 96–108)
CHLORIDE SERPL-SCNC: 88 MMOL/L — LOW (ref 96–108)
CO2 SERPL-SCNC: 33 MMOL/L — HIGH (ref 22–31)
CO2 SERPL-SCNC: 35 MMOL/L — HIGH (ref 22–31)
CO2 SERPL-SCNC: 35 MMOL/L — HIGH (ref 22–31)
CREAT SERPL-MCNC: 2.73 MG/DL — HIGH (ref 0.5–1.3)
CREAT SERPL-MCNC: 2.8 MG/DL — HIGH (ref 0.5–1.3)
CREAT SERPL-MCNC: 2.87 MG/DL — HIGH (ref 0.5–1.3)
EGFR: 24 ML/MIN/1.73M2 — LOW
EGFR: 25 ML/MIN/1.73M2 — LOW
EGFR: 25 ML/MIN/1.73M2 — LOW
GAS PNL BLDA: SIGNIFICANT CHANGE UP
GLUCOSE BLDC GLUCOMTR-MCNC: 111 MG/DL — HIGH (ref 70–99)
GLUCOSE BLDC GLUCOMTR-MCNC: 151 MG/DL — HIGH (ref 70–99)
GLUCOSE BLDC GLUCOMTR-MCNC: 152 MG/DL — HIGH (ref 70–99)
GLUCOSE SERPL-MCNC: 140 MG/DL — HIGH (ref 70–99)
GLUCOSE SERPL-MCNC: 145 MG/DL — HIGH (ref 70–99)
GLUCOSE SERPL-MCNC: 150 MG/DL — HIGH (ref 70–99)
HCT VFR BLD CALC: 33.2 % — LOW (ref 39–50)
HGB BLD-MCNC: 10.6 G/DL — LOW (ref 13–17)
INR BLD: 1.21 RATIO — HIGH (ref 0.85–1.16)
MAGNESIUM SERPL-MCNC: 2.1 MG/DL — SIGNIFICANT CHANGE UP (ref 1.6–2.6)
MAGNESIUM SERPL-MCNC: 2.1 MG/DL — SIGNIFICANT CHANGE UP (ref 1.6–2.6)
MAGNESIUM SERPL-MCNC: 2.2 MG/DL — SIGNIFICANT CHANGE UP (ref 1.6–2.6)
MCHC RBC-ENTMCNC: 30.9 PG — SIGNIFICANT CHANGE UP (ref 27–34)
MCHC RBC-ENTMCNC: 31.9 G/DL — LOW (ref 32–36)
MCV RBC AUTO: 96.8 FL — SIGNIFICANT CHANGE UP (ref 80–100)
NRBC BLD AUTO-RTO: 0 /100 WBCS — SIGNIFICANT CHANGE UP (ref 0–0)
PHOSPHATE SERPL-MCNC: 2.2 MG/DL — LOW (ref 2.5–4.5)
PHOSPHATE SERPL-MCNC: 2.8 MG/DL — SIGNIFICANT CHANGE UP (ref 2.5–4.5)
PHOSPHATE SERPL-MCNC: 3.7 MG/DL — SIGNIFICANT CHANGE UP (ref 2.5–4.5)
PLATELET # BLD AUTO: 111 K/UL — LOW (ref 150–400)
POTASSIUM SERPL-MCNC: 3.6 MMOL/L — SIGNIFICANT CHANGE UP (ref 3.5–5.3)
POTASSIUM SERPL-MCNC: 3.6 MMOL/L — SIGNIFICANT CHANGE UP (ref 3.5–5.3)
POTASSIUM SERPL-MCNC: 3.8 MMOL/L — SIGNIFICANT CHANGE UP (ref 3.5–5.3)
POTASSIUM SERPL-SCNC: 3.6 MMOL/L — SIGNIFICANT CHANGE UP (ref 3.5–5.3)
POTASSIUM SERPL-SCNC: 3.6 MMOL/L — SIGNIFICANT CHANGE UP (ref 3.5–5.3)
POTASSIUM SERPL-SCNC: 3.8 MMOL/L — SIGNIFICANT CHANGE UP (ref 3.5–5.3)
PROT SERPL-MCNC: 7.5 G/DL — SIGNIFICANT CHANGE UP (ref 6–8.3)
PROT SERPL-MCNC: 7.6 G/DL — SIGNIFICANT CHANGE UP (ref 6–8.3)
PROT SERPL-MCNC: 7.6 G/DL — SIGNIFICANT CHANGE UP (ref 6–8.3)
PROTHROM AB SERPL-ACNC: 13.9 SEC — HIGH (ref 9.9–13.4)
RBC # BLD: 3.43 M/UL — LOW (ref 4.2–5.8)
RBC # FLD: 17 % — HIGH (ref 10.3–14.5)
RH IG SCN BLD-IMP: POSITIVE — SIGNIFICANT CHANGE UP
SODIUM SERPL-SCNC: 137 MMOL/L — SIGNIFICANT CHANGE UP (ref 135–145)
SODIUM SERPL-SCNC: 137 MMOL/L — SIGNIFICANT CHANGE UP (ref 135–145)
SODIUM SERPL-SCNC: 138 MMOL/L — SIGNIFICANT CHANGE UP (ref 135–145)
WBC # BLD: 10.46 K/UL — SIGNIFICANT CHANGE UP (ref 3.8–10.5)
WBC # FLD AUTO: 10.46 K/UL — SIGNIFICANT CHANGE UP (ref 3.8–10.5)

## 2025-04-12 PROCEDURE — 93306 TTE W/DOPPLER COMPLETE: CPT | Mod: 26

## 2025-04-12 PROCEDURE — 99233 SBSQ HOSP IP/OBS HIGH 50: CPT | Mod: GC

## 2025-04-12 PROCEDURE — 99233 SBSQ HOSP IP/OBS HIGH 50: CPT | Mod: 57

## 2025-04-12 PROCEDURE — 93010 ELECTROCARDIOGRAM REPORT: CPT

## 2025-04-12 PROCEDURE — 99291 CRITICAL CARE FIRST HOUR: CPT | Mod: GC,25

## 2025-04-12 PROCEDURE — 71045 X-RAY EXAM CHEST 1 VIEW: CPT | Mod: 26

## 2025-04-12 PROCEDURE — 99292 CRITICAL CARE ADDL 30 MIN: CPT

## 2025-04-12 RX ORDER — SOD PHOS DI, MONO/K PHOS MONO 250 MG
1 TABLET ORAL ONCE
Refills: 0 | Status: COMPLETED | OUTPATIENT
Start: 2025-04-12 | End: 2025-04-12

## 2025-04-12 RX ORDER — ACETAZOLAMIDE 250 MG/1
500 TABLET ORAL ONCE
Refills: 0 | Status: COMPLETED | OUTPATIENT
Start: 2025-04-12 | End: 2025-04-12

## 2025-04-12 RX ORDER — ACETAZOLAMIDE 250 MG/1
500 TABLET ORAL
Refills: 0 | Status: COMPLETED | OUTPATIENT
Start: 2025-04-12 | End: 2025-04-14

## 2025-04-12 RX ADMIN — PROPOFOL 6.35 MICROGRAM(S)/KG/MIN: 10 INJECTION, EMULSION INTRAVENOUS at 04:24

## 2025-04-12 RX ADMIN — INSULIN LISPRO 1: 100 INJECTION, SOLUTION INTRAVENOUS; SUBCUTANEOUS at 11:00

## 2025-04-12 RX ADMIN — PHENYLEPHRINE HYDROCHLORIDE AND FAT, HARD 1 APPLICATION(S): .00525; 1.86 SUPPOSITORY RECTAL at 11:01

## 2025-04-12 RX ADMIN — HEPARIN SODIUM 14 UNIT(S)/HR: 1000 INJECTION INTRAVENOUS; SUBCUTANEOUS at 11:35

## 2025-04-12 RX ADMIN — CLOPIDOGREL BISULFATE 75 MILLIGRAM(S): 75 TABLET, FILM COATED ORAL at 10:54

## 2025-04-12 RX ADMIN — INSULIN LISPRO 1: 100 INJECTION, SOLUTION INTRAVENOUS; SUBCUTANEOUS at 17:10

## 2025-04-12 RX ADMIN — IPRATROPIUM BROMIDE AND ALBUTEROL SULFATE 3 MILLILITER(S): .5; 2.5 SOLUTION RESPIRATORY (INHALATION) at 11:39

## 2025-04-12 RX ADMIN — HEPARIN SODIUM 16 UNIT(S)/HR: 1000 INJECTION INTRAVENOUS; SUBCUTANEOUS at 19:10

## 2025-04-12 RX ADMIN — VASOPRESSIN 6 UNIT(S)/MIN: 20 INJECTION INTRAVENOUS at 19:10

## 2025-04-12 RX ADMIN — Medication 1 PACKET(S): at 05:01

## 2025-04-12 RX ADMIN — Medication 40 MILLIEQUIVALENT(S): at 02:15

## 2025-04-12 RX ADMIN — VASOPRESSIN 6 UNIT(S)/MIN: 20 INJECTION INTRAVENOUS at 11:01

## 2025-04-12 RX ADMIN — Medication 4 MILLILITER(S): at 06:27

## 2025-04-12 RX ADMIN — HEPARIN SODIUM 12 UNIT(S)/HR: 1000 INJECTION INTRAVENOUS; SUBCUTANEOUS at 04:24

## 2025-04-12 RX ADMIN — Medication 4 MILLILITER(S): at 11:41

## 2025-04-12 RX ADMIN — Medication 1 APPLICATION(S): at 22:22

## 2025-04-12 RX ADMIN — IPRATROPIUM BROMIDE AND ALBUTEROL SULFATE 3 MILLILITER(S): .5; 2.5 SOLUTION RESPIRATORY (INHALATION) at 00:31

## 2025-04-12 RX ADMIN — IPRATROPIUM BROMIDE AND ALBUTEROL SULFATE 3 MILLILITER(S): .5; 2.5 SOLUTION RESPIRATORY (INHALATION) at 06:27

## 2025-04-12 RX ADMIN — Medication 4 MILLILITER(S): at 00:31

## 2025-04-12 RX ADMIN — ACETAZOLAMIDE 500 MILLIGRAM(S): 250 TABLET ORAL at 04:25

## 2025-04-12 RX ADMIN — AMIODARONE HYDROCHLORIDE 200 MILLIGRAM(S): 50 INJECTION, SOLUTION INTRAVENOUS at 05:01

## 2025-04-12 RX ADMIN — IPRATROPIUM BROMIDE AND ALBUTEROL SULFATE 3 MILLILITER(S): .5; 2.5 SOLUTION RESPIRATORY (INHALATION) at 17:24

## 2025-04-12 RX ADMIN — IPRATROPIUM BROMIDE AND ALBUTEROL SULFATE 3 MILLILITER(S): .5; 2.5 SOLUTION RESPIRATORY (INHALATION) at 23:28

## 2025-04-12 RX ADMIN — HEPARIN SODIUM 16 UNIT(S)/HR: 1000 INJECTION INTRAVENOUS; SUBCUTANEOUS at 17:51

## 2025-04-12 RX ADMIN — Medication 40 MILLIGRAM(S): at 11:00

## 2025-04-12 RX ADMIN — Medication 40 MILLIEQUIVALENT(S): at 20:09

## 2025-04-12 RX ADMIN — Medication 15 MILLILITER(S): at 05:02

## 2025-04-12 NOTE — PROGRESS NOTE ADULT - SUBJECTIVE AND OBJECTIVE BOX
Subjective  No acute events reported overnight.  The patient is status post percutaneous transcatheter valve in valve procedure.  He tolerated the procedure well.  The patient is off Bumex and dobutamine drips.  He continues to be intubated and sedated.  Undergoing SBT trials.  Doing well with plan for extubation likely later today.  Chest x-ray was reviewed.    Telemetry  Atrial fibrillation    Review of system  14 point review of systems otherwise unremarkable separate described above in history of present illness    Physical examination  No apparent distress, alert and oriented 3, appropriate affect  JVD is elevated, supple, no lymphadenopathy  Clear to auscultation bilaterally with no wheezing rhonchi crackles  Regular rate and rhythm with 3 out of 6 crescendo decrescendo murmur less on the right upper sternal rating to the carotids  Positive bowel sound, soft, nontender, nondistended, no mass and guarding or rebound tenderness  Right and left groin sites are soft with no ecchymosis/hematoma/bruit  Chronic venous stasis changes bilaterally, mild bilateral lower extremity edema  1+ DP/PT pulse  No focal deficits    Assessment/plan  64-year-old man with past medical history significant for    A-fib status post cardioversion on 3/25/2025  Ischemic cardiomyopathy  Coronary artery disease status post CABG  Aortic valve stenosis status post TAVR in 11/2022  Atrial flutter status post ablation 2019  Status post Micra implant  Chronic kidney disease stage III  Obesity  Hypertension  Hyperlipidemia  Venous stasis    Who presented with acute on chronic renal failure in the setting of CHF exacerbation rapid response called on the floor due to systolic blood pressure in the 80s.  Patient in cardiogenic shock.    --Discussed with patient findings on transthoracic echocardiogram study that demonstrates severe bioprosthetic dysfunction/aortic stenosis of his 26 mm Sapine 3 valve.  Explained to the patient what is severe stenosis of the TAVR valve.  The associated signs and symptoms and the natural pathophysiology was gone over.  The various treatment options were discussed including medical therapy, TAVR valve in valve and surgery.  The pros/cons and risk/benefits of the various treatment options were gone over.  --The patient on 4/11/2025 underwent percutaneous transcatheter mitral valve in valve of his bioprosthetic Cullen 3 26mm during which time a Medtronic Evolut FX+ 29mm valve was implanted.  The patient tolerated the procedure well.  --Patient currently undergoing SBT trial.  If he continues to clinically do well plan for extubation later today.  He has been sedated with Precedex and propofol.  --Plan for patient to get a repeat TTE later today.  Recommend antibiotic prophylaxis prior to any upcoming procedure.  --Wean vasopressin as tolerated.  --Patient responsive to Bumex.  --Continue amiodarone 200 mg daily.  --Continue telemetry monitoring.  --Continue atorvastatin 40 mg daily.  --Continue clopidogrel 75 mg daily.  --Continue heparin drip.  Closely monitor for signs and symptoms of bleeding.  Once the patient is extubated and clinically stable in nature would recommend Coumadin be initiated instead of Eliquis for at least the first 3 to 6 months following valve implantation.    All questions and concerns of the patient's brother and best friend who was at his bedside were addressed.    Findings and plan were discussed with nephrology, cardiac surgery/Dr. Wagner and CICU/Dr. Maldonado.    Time-based billing (NON-critical care).   35 minutes spent on total encounter. The necessity of the time spent during the encounter on this date of service was due to:   education, assessment and coordination of ca

## 2025-04-12 NOTE — PROGRESS NOTE ADULT - ASSESSMENT
65 y/o M with hx of Afib s/p DCCV 3/25/25, CHF, Aortic stenosis s/p TAVR 11/2022, CAD s/p CABG, Aflutter s/p ablation and Micra implant, CKD 3, Obesity, venous stasis, HTN, HLD presented with 6 days of worsening SOB on exertion associated with orthopnea and chest pain.   Also reports several days decreased appetite. Denies fevers, chills, cough, abdominal pain, NVD, urinary sx, leg swelling. Patient had a cardioversion for atrial fibrillation at this hospital recently, which was reportedly unsuccessful. Patient has a scheduled appointment with Dr. Philip on 04/24/2025 to discuss possible ablation. His outpt cardiologist is Dr. Miranda. (Cardiology)  On arrival to ED, vitals were 97.3F, HR 71, BP 94/57, RR 22, 97% on 2L NC. Labs pertinent for WBC 10.68, INR 2.55, Na 132, K 5.5, bicarb 20, BUN/Cr 78/5.17 (baseline Cr appears ~1.5-1.8), trop 225, BNP 46753, VBG ph 7.3, pco2/po2 wnl. UA without evidence of infection.  nephrology was consulted for management of ANGELICA on CKD. Pt has baseline Scr of ~1.5- 1.8. Scr was 2.7 on 3/25/25. Pt is on Entresto ( started recently) and he mentioned that he was taking Motrin occasionally. No herbal medications/ supplements.  Scr at the time of admission was 5.17 3/31/25 and it further worsened to 5.45 4/1/25.    Pt follows Dr. Stewart Brown (Nephrology)  LHC was done on 4/8  Plan for TAVR on 4/11.     ANGELICA on CKD:  Pt has hx of CKD 3. Baseline Scr 1.5-1.8.   At the time of admission - Scr was 5.17 and it further worsened to 5.45--> 5.5. Scr elevated/improving to 3.54---> 3.48--> 3.09--> 2.8 today.   Pt has lopez cath in place. UOP was 4.2L with net negative 2.7L in last 24 hours.   Bumex gtt - restarted 4/4/25 - Briefly held on 4/8 for LHC and is restarted later in the day.  Now off of bumex gtt due to alkalosis ( given a dose of diamox)  Bicarb is elevated but pCO2 is also elevated. pt is on BiPAP at night. Management of WILFRED/OHS as per primary team.   UA showed trace LE, blood ( Likely due to lopez), casts 17. UPCR 0.7. No hx of DM.   Pt was on entresto.   BP was low.   On IV vasopressin.   ANGELICA on CKD likely in setting of HF exacerbation, entresto use and hypotension mediated ATN.  USG kidney and bladder - normal sized kidneys. No HN.     PLAN:  Bicarb is trending up. pCo2 is also elevated. on BiPAP overnight. Management as per primary team.   Can restart bumex gtt ( likely at a lower dose and continue with the acetazolamide)- Acetazolamide works well as a diuretic in alkalotic conditions.   continue to hold entresto.   Monitor strict I/Os and daily wts.   No urgent need of HD for now.    Avoid nephrotoxins  Dose medications as per eGFR    hyperkalemia: resolved  Today Serum K is 3.8.   Monitor closely - replete as needed.     Hyponatremia is likely in setting of impaired water clearance in setting of worsening renal function and volume overload 2/2 heart failure  resolved now.   Bumex as noted above      Pt is at high risk of LSIA. Pt was explained in detail about the risk of LISA and possible need of dialysis if the kidney function gets worse.   Enoch score 26.1 %- Risk of post PCI - LISA and 1.09% risk of requiring dialysis.    TAVR on 4/11 done. Risk of LISA is upto 48- 72 hours. Monitor kidney function closely.     Wait for the final reccs as per the attending.  63 y/o M with hx of Afib s/p DCCV 3/25/25, CHF, Aortic stenosis s/p TAVR 11/2022, CAD s/p CABG, Aflutter s/p ablation and Micra implant, CKD 3, Obesity, venous stasis, HTN, HLD presented with 6 days of worsening SOB on exertion associated with orthopnea and chest pain.   Also reports several days decreased appetite. Denies fevers, chills, cough, abdominal pain, NVD, urinary sx, leg swelling. Patient had a cardioversion for atrial fibrillation at this hospital recently, which was reportedly unsuccessful. Patient has a scheduled appointment with Dr. Philip on 04/24/2025 to discuss possible ablation. His outpt cardiologist is Dr. Miranda. (Cardiology)  On arrival to ED, vitals were 97.3F, HR 71, BP 94/57, RR 22, 97% on 2L NC. Labs pertinent for WBC 10.68, INR 2.55, Na 132, K 5.5, bicarb 20, BUN/Cr 78/5.17 (baseline Cr appears ~1.5-1.8), trop 225, BNP 80432, VBG ph 7.3, pco2/po2 wnl. UA without evidence of infection.  nephrology was consulted for management of ANGELICA on CKD. Pt has baseline Scr of ~1.5- 1.8. Scr was 2.7 on 3/25/25. Pt is on Entresto ( started recently) and he mentioned that he was taking Motrin occasionally. No herbal medications/ supplements.  Scr at the time of admission was 5.17 3/31/25 and it further worsened to 5.45 4/1/25.    Pt follows Dr. Stewart Brwon (Nephrology)  LHC was done on 4/8  Plan for TAVR on 4/11.     ANGELICA on CKD:  Pt has hx of CKD 3. Baseline Scr 1.5-1.8.   At the time of admission - Scr was 5.17 and it further worsened to 5.45--> 5.5. Scr elevated/improving to 3.54---> 3.48--> 3.09--> 2.8 today.   Pt has lopez cath in place. UOP was 4.2L with net negative 2.7L in last 24 hours.   Bumex gtt - restarted 4/4/25 - Briefly held on 4/8 for LHC and is restarted later in the day.  Now off of bumex gtt due to alkalosis ( given a dose of diamox)  Bicarb is elevated but pCO2 is also elevated. pt is on BiPAP at night. Management of WILFRED/OHS as per primary team.   UA showed trace LE, blood ( Likely due to lopez), casts 17. UPCR 0.7. No hx of DM.   Pt was on entresto.   BP was low.   On IV vasopressin.   ANGELICA on CKD likely in setting of HF exacerbation, entresto use and hypotension mediated ATN.  USG kidney and bladder - normal sized kidneys. No HN.     PLAN:  Bicarb is trending up.pCo2 is also elevated. on BiPAP overnight. Management as per primary team.   Can restart bumex gtt ( likely at a lower dose and continue with the acetazolamide)- Acetazolamide works well as a diuretic in alkalotic conditions.   continue to hold entresto.   Monitor strict I/Os and daily wts.   No urgent need of HD for now.    Avoid nephrotoxins  Dose medications as per eGFR    hyperkalemia: resolved  Today Serum K is 3.8.   Monitor closely - replete as needed.     Hyponatremia is likely in setting of impaired water clearance in setting of worsening renal function and volume overload 2/2 heart failure  resolved now.   Bumex as noted above      Pt is at high risk of LISA. Pt was explained in detail about the risk of LISA and possible need of dialysis if the kidney function gets worse.   Enoch score 26.1 %- Risk of post PCI - LISA and 1.09% risk of requiring dialysis.    TAVR on 4/11 done. Risk of LISA is upto 48- 72 hours. Monitor kidney function closely.     Wait for the final reccs as per the attending.

## 2025-04-12 NOTE — AIRWAY REMOVAL NOTE  ADULT & PEDS - RESPIRATORY EXPANSION/ACCESSORY MUSCLES/RETRACTIONS
no use of accessory muscles/expansion symmetric
no use of accessory muscles/no retractions/expansion symmetric

## 2025-04-12 NOTE — PROGRESS NOTE ADULT - ATTENDING COMMENTS
S/p TAVR in the setting of valvular shock  Sedated with Propofol - target RASS of zero for extubation  Hemodynamics acceptable, Dobutamine weaned overnight, no pressors or inotropes  Check TTE post TAVR  Acute hypoxic respiratory failure requiring mechanical ventilation - wean to extubate  NPO  Non-oliguric ANGELICA, overloaded on exam but diuretic responsive - continue to diurese with Bumex, add Diamox   H/H low but acceptable on Heparin drip for afib   Afebrile, no antibiotics  Sugars controlled  [place] 4/1

## 2025-04-12 NOTE — PROGRESS NOTE ADULT - SUBJECTIVE AND OBJECTIVE BOX
[facility] Division of Kidney Diseases & Hypertension  FOLLOW UP NOTE  [phone]--------------------------------------------------------------------------------  Chief Complaint:Acute on chronic systolic congestive heart failure    24 hour events/subjective:  Pt was seen and examined earlier today. Pt is on vent support. Pt got TAVR done on 4/11.   ROS is limited due to current clinical status.       PAST HISTORY  --------------------------------------------------------------------------------  No significant changes to PMH, PSH, FHx, SHx, unless otherwise noted    ALLERGIES & MEDICATIONS  --------------------------------------------------------------------------------  Allergies    metoprolol (Short breath)    Intolerances      Standing Inpatient Medications  acetaZOLAMIDE Injectable 500 milliGRAM(s) IV Push <User Schedule>  albuterol/ipratropium for Nebulization 3 milliLiter(s) Nebulizer every 6 hours  aMIOdarone    Tablet   Oral   aMIOdarone    Tablet 200 milliGRAM(s) Oral daily  atorvastatin 40 milliGRAM(s) Oral at bedtime  chlorhexidine 2% Cloths 1 Application(s) Topical daily  clopidogrel Tablet 75 milliGRAM(s) Oral daily  hemorrhoidal Ointment 1 Application(s) Rectal daily  heparin  Infusion 1000 Unit(s)/Hr IV Continuous <Continuous>  insulin lispro (ADMELOG) corrective regimen sliding scale   SubCutaneous every 6 hours  pantoprazole  Injectable 40 milliGRAM(s) IV Push daily  propofol Infusion 10 MICROgram(s)/kG/Min IV Continuous <Continuous>  sodium chloride 3%  Inhalation 4 milliLiter(s) Inhalation every 6 hours  vasopressin Infusion 0.04 Unit(s)/Min IV Continuous <Continuous>    PRN Inpatient Medications      REVIEW OF SYSTEMS  --------------------------------------------------------------------------------  ROS is limited due to current clinical status.     VITALS/PHYSICAL EXAM  --------------------------------------------------------------------------------  T(C): 36.5 (04-12-25 @ 07:30), Max: 37.2 (04-11-25 @ 20:00)  HR: 55 (04-12-25 @ 09:45) (47 - 81)  BP: 104/62 (04-12-25 @ 09:45) (81/52 - 119/67)  RR: 22 (04-12-25 @ 09:45) (7 - 46)  SpO2: 100% (04-12-25 @ 09:45) (94% - 100%)  Wt(kg): --  Height (cm): 172.7 (04-11-25 @ 11:45)  Weight (kg): 105.8 (04-11-25 @ 11:45)  BMI (kg/m2): 35.5 (04-11-25 @ 11:45)  BSA (m2): 2.18 (04-11-25 @ 11:45)      04-11-25 @ 07:01  -  04-12-25 @ 07:00  --------------------------------------------------------  IN: 1504.6 mL / OUT: 4275 mL / NET: -2770.4 mL    04-12-25 @ 07:01  -  04-12-25 @ 10:54  --------------------------------------------------------  IN: 93.6 mL / OUT: 225 mL / NET: -131.4 mL      Physical Exam:  Gen: on vent support.   Pulm: No crackles, lower zone decreased breath sounds.   CV: RRR, S1S2;  Abd: +BS, soft, nontender/nondistended  : Alba in place.   Extremities: Mild LE swelling  Skin: Warm, Lower extremity chronic changes.    LABS/STUDIES  --------------------------------------------------------------------------------              10.6   10.46 >-----------<  111      [04-12-25 @ 00:37]              33.2     138  |  88  |  94  ----------------------------<  150      [04-12-25 @ 06:19]  3.8   |  35  |  2.80        Ca     10.0     [04-12-25 @ 06:19]      Mg     2.1     [04-12-25 @ 06:19]      Phos  2.8     [04-12-25 @ 06:19]    TPro  7.6  /  Alb  3.4  /  TBili  2.5  /  DBili  x   /  AST  15  /  ALT  15  /  AlkPhos  88  [04-12-25 @ 06:19]    PT/INR: PT 13.9 , INR 1.21       [04-12-25 @ 03:51]  PTT: 34.0       [04-12-25 @ 03:51]      Creatinine Trend:  SCr 2.80 [04-12 @ 06:19]  SCr 2.73 [04-12 @ 00:37]  SCr 2.70 [04-11 @ 17:20]  SCr 2.90 [04-11 @ 11:29]  SCr 3.09 [04-11 @ 06:35]

## 2025-04-12 NOTE — PROGRESS NOTE ADULT - SUBJECTIVE AND OBJECTIVE BOX
Patient is a 64y old  Male who presents with a chief complaint of dyspnea , chest pain (2025 11:28)    INTERVAL HISTORY:  - extubated today to AVAPS  - remains on Vaso 0.1    SUBJECTIVE  - Patient seen and evaluated at bedside.     MEDICATIONS:  MEDICATIONS  (STANDING):  acetaZOLAMIDE Injectable 500 milliGRAM(s) IV Push <User Schedule>  albuterol/ipratropium for Nebulization 3 milliLiter(s) Nebulizer every 6 hours  aMIOdarone    Tablet   Oral   aMIOdarone    Tablet 200 milliGRAM(s) Oral daily  atorvastatin 40 milliGRAM(s) Oral at bedtime  chlorhexidine 2% Cloths 1 Application(s) Topical daily  clopidogrel Tablet 75 milliGRAM(s) Oral daily  hemorrhoidal Ointment 1 Application(s) Rectal daily  heparin  Infusion 1000 Unit(s)/Hr (16 mL/Hr) IV Continuous <Continuous>  insulin lispro (ADMELOG) corrective regimen sliding scale   SubCutaneous every 6 hours  pantoprazole  Injectable 40 milliGRAM(s) IV Push daily  vasopressin Infusion 0.04 Unit(s)/Min (6 mL/Hr) IV Continuous <Continuous>    OBJECTIVE:  ICU Vital Signs Last 24 Hrs  T(C): 36.7 (2025 11:00), Max: 37.2 (2025 20:00)  T(F): 98.1 (2025 11:00), Max: 99 (2025 20:00)  HR: 57 (2025 19:30) (47 - 81)  BP: 100/67 (2025 19:30) (81/52 - 120/67)  BP(mean): 79 (2025 19:30) (61 - 89)  ABP: 92/49 (2025 19:30) (80/45 - 123/66)  ABP(mean): 64 (2025 19:30) (57 - 108)  RR: 26 (2025 19:30) (14 - 46)  SpO2: 95% (2025 19:30) (76% - 100%)    O2 Parameters below as of 2025 18:00  Patient On (Oxygen Delivery Method): room air    Mode: AVAPS  FiO2: 40    CAPILLARY BLOOD GLUCOSE  POCT Blood Glucose.: 151 mg/dL (2025 16:53)  POCT Blood Glucose.: 152 mg/dL (2025 10:27)    CAPILLARY BLOOD GLUCOSE  POCT Blood Glucose.: 151 mg/dL (2025 16:53)    I&O's Summary  2025 07:01  -  2025 07:00  --------------------------------------------------------  IN: 1504.6 mL / OUT: 4275 mL / NET: -2770.4 mL    2025 07:01  -  2025 19:40  --------------------------------------------------------  IN: 525.6 mL / OUT: 900 mL / NET: -374.4 mL    Daily Weight in k.8 (2025 05:00)    PHYSICAL EXAM:  General: NAD  Chest: Clear to auscultation bilaterally  Heart: Regular rate and rhythm; normal S1 and S2  Abd: Soft, nontender, nondistended  Nervous System: AAOX3  Ext: no peripheral LE edema bilaterally    LABS:  ABG - ( 2025 12:00 )  pH, Arterial: 7.46  pH, Blood: x     /  pCO2: 68    /  pO2: 123   / HCO3: 48    / Base Excess: 21.2  /  SaO2: 99.0                        10.6   10.46 )-----------( 111      ( 2025 00:37 )             33.2     04-12    137  |  85[L]  |  95[H]  ----------------------------<  145[H]  3.6   |  35[H]  |  2.87[H]    Ca    9.8      2025 10:50  Phos  3.7     04-12  Mg     2.2     04-12    TPro  7.5  /  Alb  3.4  /  TBili  2.5[H]  /  DBili  x   /  AST  14  /  ALT  14  /  AlkPhos  88  04-12  LIVER FUNCTIONS - ( 2025 10:50 )  Alb: 3.4 g/dL / Pro: 7.5 g/dL / ALK PHOS: 88 U/L / ALT: 14 U/L / AST: 14 U/L / GGT: x         PT/INR - ( 2025 03:51 )   PT: 13.9 sec;   INR: 1.21 ratio    PTT - ( 2025 17:00 )  PTT:45.9 sec    Urinalysis Basic - ( 2025 10:50 )    Color: x / Appearance: x / SG: x / pH: x  Gluc: 145 mg/dL / Ketone: x  / Bili: x / Urobili: x   Blood: x / Protein: x / Nitrite: x   Leuk Esterase: x / RBC: x / WBC x   Sq Epi: x / Non Sq Epi: x / Bacteria: x    Assessment: 64M PMHx AFib (s/p dccv 3/25/25), HFrEF (likely mod red EF, sev TTE), aortic stenosis s/p TAVR (2022), a flutter s/p ablation  and s/p micra implant, CKD3, WILFRED, obesity, venous stasis, HTN, HLD, who presents with 6 days of worsening dyspnea on exertion, admitted for AHRF likely iso CHF exacerbation, also with ANGELICA on CKD c/f cardiorenal syndrome. RRT on floors for SBP 80s requiring pressors and has been admitted to the CCU. Now s/p TAVR .    Plan:  NEURO   A&O3   - s/p extubation  - continue to monitor mental status as per protocol     RESPIRATORY  #Acute hypoxemic hypercapnic respiratory failure  - extubated today to AVAPS  - Likely retaining 2/2 volume overload  - continue duonebs   - monitor ABGs  - continue to monitor SpO2 with goal >94%    CARDIO  #Obstructive shock 2/2 AS  - Off levophed  - MAPs 70s -> will continue to wean vasopressin  - Wean as tolerated    #Acute on chronic HF exacerbation  - preload reduction: s/p bumex gtt  - Goal 1-2L net neg daily  - CVP goal 8-12  - Diuril, 3% hypertonic, and metolazone PRN for augmenting diuresis    #Aortic valve stenosis s/p prosthetic  - S/p STANISLAW showing severe AS stenosis, global hypokinesis and EF 15%  -  patent LIMA-LAD, closed SVG-OM1/D1, pending TAVR   - s/p TAVR   - heparin gtt    #Hx CABG  #PVD  - C/w Plavix    #HLD  - Increased Lipitor to 40mg from 20mg    RENAL/  #ANGELICA on CKD  #Cardiorenal syndrome  - Baseline 1.5-1.8  - Most likely i/s/o hypotension and fluid overload  - Renal sono showed parenchymal disease  - Nephro following c/w bumex gtt, monitor volume status. Stop sodium bicarb 650 TID for now     #Hyperkalemia  - Lokelma if K increases into 5s range  - Continue monitoring urine output, lytes, SCr/ BUN  - replete lytes prn with goal K >4 and Mg >2    GI  Tolerating diet    ENDO  #Hyperglycemia  - ISS    HEME  - Monitor H/H and plts  - DVT PPX: Heparin    ID  Afebrile  - no leukocytosis  - monitor and trend WBC and temperature curve     Dispo: Maintain in ICU.    Patient requires continuous monitoring with bedside rhythm monitoring, arterial line, pulse oximetry, ventilator monitoring and intermittent blood gas analysis.  Care plan discussed with ICU care team.  I have spent 35 minutes providing critical care, in addition to initial critical time provided by CICU attending Dr. Fall, re-evaluated multiple times during the day.    Dilma Stapleton PA-C Patient is a 64y old  Male who presents with a chief complaint of dyspnea , chest pain (2025 11:28)    INTERVAL HISTORY:  - extubated today to AVAPS  - remains on Vaso 0.1, weaning via cuff pressures    SUBJECTIVE  - Patient seen and evaluated at bedside.     MEDICATIONS:  MEDICATIONS  (STANDING):  acetaZOLAMIDE Injectable 500 milliGRAM(s) IV Push <User Schedule>  albuterol/ipratropium for Nebulization 3 milliLiter(s) Nebulizer every 6 hours  aMIOdarone    Tablet   Oral   aMIOdarone    Tablet 200 milliGRAM(s) Oral daily  atorvastatin 40 milliGRAM(s) Oral at bedtime  chlorhexidine 2% Cloths 1 Application(s) Topical daily  clopidogrel Tablet 75 milliGRAM(s) Oral daily  hemorrhoidal Ointment 1 Application(s) Rectal daily  heparin  Infusion 1000 Unit(s)/Hr (16 mL/Hr) IV Continuous <Continuous>  insulin lispro (ADMELOG) corrective regimen sliding scale   SubCutaneous every 6 hours  pantoprazole  Injectable 40 milliGRAM(s) IV Push daily  vasopressin Infusion 0.04 Unit(s)/Min (6 mL/Hr) IV Continuous <Continuous>    OBJECTIVE:  ICU Vital Signs Last 24 Hrs  T(C): 36.7 (2025 11:00), Max: 37.2 (2025 20:00)  T(F): 98.1 (2025 11:00), Max: 99 (2025 20:00)  HR: 57 (2025 19:30) (47 - 81)  BP: 100/67 (2025 19:30) (81/52 - 120/67)  BP(mean): 79 (2025 19:30) (61 - 89)  ABP: 92/49 (2025 19:30) (80/45 - 123/66)  ABP(mean): 64 (2025 19:30) (57 - 108)  RR: 26 (2025 19:30) (14 - 46)  SpO2: 95% (2025 19:30) (76% - 100%)    O2 Parameters below as of 2025 18:00  Patient On (Oxygen Delivery Method): room air    Mode: AVAPS  FiO2: 40    CAPILLARY BLOOD GLUCOSE  POCT Blood Glucose.: 151 mg/dL (2025 16:53)  POCT Blood Glucose.: 152 mg/dL (2025 10:27)    CAPILLARY BLOOD GLUCOSE  POCT Blood Glucose.: 151 mg/dL (2025 16:53)    I&O's Summary  2025 07:01  -  2025 07:00  --------------------------------------------------------  IN: 1504.6 mL / OUT: 4275 mL / NET: -2770.4 mL    2025 07:01  -  2025 19:40  --------------------------------------------------------  IN: 525.6 mL / OUT: 900 mL / NET: -374.4 mL    Daily Weight in k.8 (2025 05:00)    PHYSICAL EXAM:  General: NAD  Chest: Clear to auscultation bilaterally  Heart: Regular rate and rhythm; normal S1 and S2  Abd: Soft, nontender, nondistended  Nervous System: AAOX3  Ext: no peripheral LE edema bilaterally    LABS:  ABG - ( 2025 12:00 )  pH, Arterial: 7.46  pH, Blood: x     /  pCO2: 68    /  pO2: 123   / HCO3: 48    / Base Excess: 21.2  /  SaO2: 99.0                        10.6   10.46 )-----------( 111      ( 2025 00:37 )             33.2     04-12    137  |  85[L]  |  95[H]  ----------------------------<  145[H]  3.6   |  35[H]  |  2.87[H]    Ca    9.8      2025 10:50  Phos  3.7     04-12  Mg     2.2     04-12    TPro  7.5  /  Alb  3.4  /  TBili  2.5[H]  /  DBili  x   /  AST  14  /  ALT  14  /  AlkPhos  88  04-12  LIVER FUNCTIONS - ( 2025 10:50 )  Alb: 3.4 g/dL / Pro: 7.5 g/dL / ALK PHOS: 88 U/L / ALT: 14 U/L / AST: 14 U/L / GGT: x         PT/INR - ( 2025 03:51 )   PT: 13.9 sec;   INR: 1.21 ratio    PTT - ( 2025 17:00 )  PTT:45.9 sec    Urinalysis Basic - ( 2025 10:50 )    Color: x / Appearance: x / SG: x / pH: x  Gluc: 145 mg/dL / Ketone: x  / Bili: x / Urobili: x   Blood: x / Protein: x / Nitrite: x   Leuk Esterase: x / RBC: x / WBC x   Sq Epi: x / Non Sq Epi: x / Bacteria: x    Assessment: 64M PMHx AFib (s/p dccv 3/25/25), HFrEF (likely mod red EF, sev TTE), aortic stenosis s/p TAVR (2022), a flutter s/p ablation  and s/p micra implant, CKD3, WILFRED, obesity, venous stasis, HTN, HLD, who presents with 6 days of worsening dyspnea on exertion, admitted for AHRF likely iso CHF exacerbation, also with ANGELICA on CKD c/f cardiorenal syndrome. RRT on floors for SBP 80s requiring pressors and has been admitted to the CCU. Now s/p TAVR .    Plan:  NEURO   A&O3   - s/p extubation  - continue to monitor mental status as per protocol     RESPIRATORY  #Acute hypoxemic hypercapnic respiratory failure  - extubated today to AVAPS  - Likely retaining 2/2 volume overload  - continue duonebs   - monitor ABGs  - continue to monitor SpO2 with goal >94%    CARDIO  #Obstructive shock 2/2 AS  - Off levophed  - MAPs 70s -> will continue to wean vasopressin  - Wean as tolerated    #Acute on chronic HF exacerbation  - preload reduction: s/p bumex gtt  - Goal 1-2L net neg daily  - CVP goal 8-12  - Diuril, 3% hypertonic, and metolazone PRN for augmenting diuresis    #Aortic valve stenosis s/p prosthetic  - S/p STANISLAW showing severe AS stenosis, global hypokinesis and EF 15%  -  patent LIMA-LAD, closed SVG-OM1/D1, pending TAVR   - s/p TAVR   - heparin gtt    #Hx CABG  #PVD  - C/w Plavix    #HLD  - Increased Lipitor to 40mg from 20mg    RENAL/  #ANGELICA on CKD  #Cardiorenal syndrome  - Baseline 1.5-1.8  - Most likely i/s/o hypotension and fluid overload  - Renal sono showed parenchymal disease  - Nephro following c/w bumex gtt, monitor volume status. Stop sodium bicarb 650 TID for now     #Hyperkalemia  - Lokelma if K increases into 5s range  - Continue monitoring urine output, lytes, SCr/ BUN  - replete lytes prn with goal K >4 and Mg >2    GI  Tolerating diet    ENDO  #Hyperglycemia  - ISS    HEME  - Monitor H/H and plts  - DVT PPX: Heparin    ID  Afebrile  - no leukocytosis  - monitor and trend WBC and temperature curve     Dispo: Maintain in ICU.    Patient requires continuous monitoring with bedside rhythm monitoring, arterial line, pulse oximetry, ventilator monitoring and intermittent blood gas analysis.  Care plan discussed with ICU care team.  I have spent 35 minutes providing critical care, in addition to initial critical time provided by CICU attending Dr. Fall, re-evaluated multiple times during the day.    Dilma Stapleton PA-C

## 2025-04-12 NOTE — AIRWAY REMOVAL NOTE  ADULT & PEDS - RESPIRATORY RHYTHM/PATTERN
rate regular/depth regular/pattern regular
no shortness of breath/rate regular/depth regular/pattern regular/unlabored

## 2025-04-12 NOTE — PROGRESS NOTE ADULT - SUBJECTIVE AND OBJECTIVE BOX
ID:   HPI:   63 yo M with a fib (s/p dccv 3/25/25), CHF (likely moderately reduced EF, several TTE with poor windows), aortic stenosis s/p TAVR (2022), CAD s/p CABG, a flutter s/p ablation  and s/p micra implant,  CKD3, obesity, venous stasis, HTN, HLD, who presents with 6 days of worsening dyspnea on exertion. Pt also reports associated orthopnea and chest pain on exertion but not at rest which is located in the center of his chest. Pain is described as burning. Also reports several days decreased appetite. Denies fevers, chills, cough, abdominal pain, NVD, urinary sx, leg swelling. Patient had a cardioversion for atrial fibrillation at this hospital recently, which was reportedly unsuccessful. Patient has a scheduled appointment with Dr. Philip on 2025 to discuss possible ablation. His outpt cardiologist is Dr. Paul.     On arrival to ED, vitals were 97.3F, HR 71, BP 94/57, RR 22, 97% on 2L NC. Labs pertinent for WBC 10.68, INR 2.55, Na 132, K 5.5, bicarb 20, BUN/Cr 78/5.17 (baseline Cr appears ~1.5-1.8), trop 225, BNP 85779, VBG ph 7.3, pco2/po2 wnl. UA without evidence of infection. EKG  on admission 1st deg av block, low voltage, poor r wave progression, R axis deviation CXR with diffuse hazy opacities suggest mild pulmonary edema and small right and trace left pleural effusions. Pt was given IV Bumex 1mg x1, IV bumex 2mg x1, IV Bumex 4mg x1, and lokelma 5mg x1, then admitted for further management.    (2025 01:59)      Patient seen and examined at bedside.    Overnight Events: NAEON, off bumex and  gtt, remains intubated and sedated, will trial SBT and possible extubation today    Review of Systems: Negative except as per HPI     Current Meds:  albuterol/ipratropium for Nebulization 3 milliLiter(s) Nebulizer every 6 hours  aMIOdarone    Tablet   Oral   aMIOdarone    Tablet 200 milliGRAM(s) Oral daily  atorvastatin 40 milliGRAM(s) Oral at bedtime  chlorhexidine 0.12% Liquid 15 milliLiter(s) Oral Mucosa every 12 hours  chlorhexidine 2% Cloths 1 Application(s) Topical daily  clopidogrel Tablet 75 milliGRAM(s) Oral daily  hemorrhoidal Ointment 1 Application(s) Rectal daily  heparin  Infusion 1000 Unit(s)/Hr IV Continuous <Continuous>  insulin lispro (ADMELOG) corrective regimen sliding scale   SubCutaneous every 6 hours  pantoprazole  Injectable 40 milliGRAM(s) IV Push daily  propofol Infusion 10 MICROgram(s)/kG/Min IV Continuous <Continuous>  sodium chloride 3%  Inhalation 4 milliLiter(s) Inhalation every 6 hours  vasopressin Infusion 0.04 Unit(s)/Min IV Continuous <Continuous>      Vitals:  T(F): 97.7 (), Max: 99 ()  HR: 51 () (47 - 81)  BP: 89/56 () (83/50 - 119/67)  RR: 17 ()  SpO2: 100% ()  I&O's Summary    2025 07: 07:00  --------------------------------------------------------  IN: 1504.6 mL / OUT: 4275 mL / NET: -2770.4 mL    2025 07: 08:20  --------------------------------------------------------  IN: 33.3 mL / OUT: 75 mL / NET: -41.7 mL        Physical Exam:  Appearance: No acute distress; intubated and sedated   Eyes: PERRL, EOMI, pink conjunctiva  HEENT: Normal oral mucosa  Cardiovascular: RRR, S1, S2, no murmurs, rubs, or gallops; no edema; no JVD  Respiratory: Mechanical breath sounds   Gastrointestinal: soft, non-tender, non-distended with normal bowel sounds  Musculoskeletal: No clubbing; no joint deformity   Neurologic: Non-focal  Lymphatic: No lymphadenopathy  Psychiatry: Sedated   Skin: No rashes, ecchymoses, or cyanosis                          10.6   10.46 )-----------( 111      ( 2025 00:37 )             33.2     04-12    138  |  88[L]  |  94[H]  ----------------------------<  150[H]  3.8   |  35[H]  |  2.80[H]    Ca    10.0      2025 06:19  Phos  2.8     04-12  Mg     2.1     04-12    TPro  7.6  /  Alb  3.4  /  TBili  2.5[H]  /  DBili  x   /  AST  15  /  ALT  15  /  AlkPhos  88  04-12    PT/INR - ( 2025 03:51 )   PT: 13.9 sec;   INR: 1.21 ratio         PTT - ( 2025 03:51 )  PTT:34.0 sec      ================ASSESSMENT ==============  63 yo M with a fib (s/p dccv 3/25/25), CHF (likely moderately reduced EF, several TTE with poor windows), aortic stenosis s/p TAVR (2022), a flutter s/p ablation 2019 and s/p micra implant,  CKD3, WILFRED, obesity, venous stasis, HTN, HLD, who presents with 6 days of worsening dyspnea on exertion, admitted for AHRF likely iso CHF exacerbation, also with ANGELICA on CKD c/f cardiorenal syndrome. RRT on floors for SBP 80s requiring pressors and has been admitted to the CCU. HF mostly likely i/so severe AS. Pt to get structural intervention.     Plan:  ====================== NEUROLOGY=====================  #Sedation regimen  - sedated with precedex + prop  - Wean this AM for SBT and possible extubation    ==================== RESPIRATORY======================  #Acute hypoxemic hypercapnic respiratory failure  - remains intubated post procedure  - SBT failed for worsening hypercapnia   - Likely retaining 2/2 volume overload  - continue duonebs     Mechanical Ventilation:  Mode: CPAP with PS  FiO2: 50  PEEP: 6  PS: 10  MAP: 9  PIP: 16    ====================CARDIOVASCULAR==================  #Obstructive shock 2/2 AS  - Off levophed  - MAPs 70s -> will continue to wean vasopressin  - Wean as tolerated    #Acute on chronic HF exacerbation  - preload reduction: bumex 4 gtt  - Goal 1-2L net neg daily  - CVP goal 8-12  - Diuril, 3% hypertonic, and metolazone PRN for augmenting diuresis    #Aortic valve stenosis s/p [place] TAVR   - S/p STANISLAW showing severe AS stenosis, global hypokinesis and EF 15%  -  patent LIMA-LAD, closed SVG-OM1/D1, pending TAVR   - s/p TAVR  (Evolut+ in [place] 3)    #Hx CABG  #PVD  - C/w Plavix    #HLD  - Increased Lipitor to 40mg from 20mg    ===================HEMATOLOGIC/ONC ===================  - On heparin gtt for DVT ppx    ===================== RENAL =========================  #ANGELICA on CKD  #Cardiorenal syndrome  - Baseline 1.5-1.8  - Most likely i/s/o hypotension and fluid overload  - Renal sono showed parenchymal disease  - Nephro following c/w bumex gtt, monitor volume status. Stop sodium bicarb 650 TID for now     #Hyperkalemia  - Lokelma if K increases into 5s range    ==================== GASTROINTESTINAL===================  - NPO for now   - OGT placed while intubated    =======================    ENDOCRINE  =====================  - stress hyperglycemia - ISS     ========================INFECTIOUS DISEASE================  - Slightly elevated WBC on admission 10.5K  - Afebrile  - Urine culture -> neg  - Perioperative coverage with Cefuroxime     =======================  SKIN  =====================  - Hx lichen planus  - No other concerns    =======================  DISPO  =====================  - Maintain in ICU.      Patient requires continuous monitoring with bedside rhythm monitoring, pulse ox monitoring, and intermittent blood gas analysis. Care plan discussed with ICU care team. Patient remained critical and at risk for life threatening decompensation.  Patient seen, examined and plan discussed with CCU team during rounds.     I have personally provided 30 minutes of critical care time excluding time spent on separate procedures, in addition to initial critical care time provided by the CICU Attending, Dr. Fall

## 2025-04-12 NOTE — AIRWAY REMOVAL NOTE  ADULT & PEDS - ARTIFICAL AIRWAY REMOVAL COMMENTS
Written order for extubation verified. The patient was identified by full name and birth date compared to the identification band. Present during the procedure was GADIEL Dang
Written order for extubation verified. The patient was identified by full name and birth date compared to the identification band. Present during the procedure was Emmanuel Mayers RN.

## 2025-04-13 LAB
ALBUMIN SERPL ELPH-MCNC: 3.5 G/DL — SIGNIFICANT CHANGE UP (ref 3.3–5)
ALBUMIN SERPL ELPH-MCNC: 3.5 G/DL — SIGNIFICANT CHANGE UP (ref 3.3–5)
ALP SERPL-CCNC: 86 U/L — SIGNIFICANT CHANGE UP (ref 40–120)
ALP SERPL-CCNC: 86 U/L — SIGNIFICANT CHANGE UP (ref 40–120)
ALT FLD-CCNC: 14 U/L — SIGNIFICANT CHANGE UP (ref 10–45)
ALT FLD-CCNC: 16 U/L — SIGNIFICANT CHANGE UP (ref 10–45)
ANION GAP SERPL CALC-SCNC: 13 MMOL/L — SIGNIFICANT CHANGE UP (ref 5–17)
ANION GAP SERPL CALC-SCNC: 15 MMOL/L — SIGNIFICANT CHANGE UP (ref 5–17)
APTT BLD: 48.1 SEC — HIGH (ref 24.5–35.6)
APTT BLD: 55.5 SEC — HIGH (ref 24.5–35.6)
APTT BLD: 55.8 SEC — HIGH (ref 24.5–35.6)
AST SERPL-CCNC: 10 U/L — SIGNIFICANT CHANGE UP (ref 10–40)
AST SERPL-CCNC: 12 U/L — SIGNIFICANT CHANGE UP (ref 10–40)
BASE EXCESS BLDMV CALC-SCNC: 17.1 MMOL/L — HIGH (ref -3–3)
BILIRUB SERPL-MCNC: 1.7 MG/DL — HIGH (ref 0.2–1.2)
BILIRUB SERPL-MCNC: 1.9 MG/DL — HIGH (ref 0.2–1.2)
BUN SERPL-MCNC: 100 MG/DL — HIGH (ref 7–23)
BUN SERPL-MCNC: 105 MG/DL — HIGH (ref 7–23)
CALCIUM SERPL-MCNC: 9.1 MG/DL — SIGNIFICANT CHANGE UP (ref 8.4–10.5)
CALCIUM SERPL-MCNC: 9.3 MG/DL — SIGNIFICANT CHANGE UP (ref 8.4–10.5)
CHLORIDE SERPL-SCNC: 89 MMOL/L — LOW (ref 96–108)
CHLORIDE SERPL-SCNC: 90 MMOL/L — LOW (ref 96–108)
CO2 BLDMV-SCNC: 47 MMOL/L — CRITICAL HIGH (ref 21–29)
CO2 SERPL-SCNC: 36 MMOL/L — HIGH (ref 22–31)
CO2 SERPL-SCNC: 36 MMOL/L — HIGH (ref 22–31)
CREAT SERPL-MCNC: 3.21 MG/DL — HIGH (ref 0.5–1.3)
CREAT SERPL-MCNC: 3.35 MG/DL — HIGH (ref 0.5–1.3)
EGFR: 20 ML/MIN/1.73M2 — LOW
EGFR: 20 ML/MIN/1.73M2 — LOW
EGFR: 21 ML/MIN/1.73M2 — LOW
EGFR: 21 ML/MIN/1.73M2 — LOW
GAS PNL BLDA: SIGNIFICANT CHANGE UP
GAS PNL BLDMV: SIGNIFICANT CHANGE UP
GLUCOSE BLDC GLUCOMTR-MCNC: 105 MG/DL — HIGH (ref 70–99)
GLUCOSE BLDC GLUCOMTR-MCNC: 111 MG/DL — HIGH (ref 70–99)
GLUCOSE BLDC GLUCOMTR-MCNC: 119 MG/DL — HIGH (ref 70–99)
GLUCOSE BLDC GLUCOMTR-MCNC: 123 MG/DL — HIGH (ref 70–99)
GLUCOSE SERPL-MCNC: 103 MG/DL — HIGH (ref 70–99)
GLUCOSE SERPL-MCNC: 118 MG/DL — HIGH (ref 70–99)
HCO3 BLDMV-SCNC: 45 MMOL/L — CRITICAL HIGH (ref 20–28)
HCT VFR BLD CALC: 33.2 % — LOW (ref 39–50)
HGB BLD-MCNC: 10.1 G/DL — LOW (ref 13–17)
HOROWITZ INDEX BLDMV+IHG-RTO: 36 — SIGNIFICANT CHANGE UP
INR BLD: 1.05 RATIO — SIGNIFICANT CHANGE UP (ref 0.85–1.16)
MAGNESIUM SERPL-MCNC: 2.2 MG/DL — SIGNIFICANT CHANGE UP (ref 1.6–2.6)
MAGNESIUM SERPL-MCNC: 2.3 MG/DL — SIGNIFICANT CHANGE UP (ref 1.6–2.6)
MCHC RBC-ENTMCNC: 30.1 PG — SIGNIFICANT CHANGE UP (ref 27–34)
MCHC RBC-ENTMCNC: 30.4 G/DL — LOW (ref 32–36)
MCV RBC AUTO: 99.1 FL — SIGNIFICANT CHANGE UP (ref 80–100)
NRBC BLD AUTO-RTO: 0 /100 WBCS — SIGNIFICANT CHANGE UP (ref 0–0)
O2 CT VFR BLD CALC: 40 MMHG — SIGNIFICANT CHANGE UP (ref 30–65)
PCO2 BLDMV: 66 MMHG — HIGH (ref 30–65)
PH BLDMV: 7.44 — SIGNIFICANT CHANGE UP (ref 7.32–7.45)
PHOSPHATE SERPL-MCNC: 3.7 MG/DL — SIGNIFICANT CHANGE UP (ref 2.5–4.5)
PHOSPHATE SERPL-MCNC: 4.1 MG/DL — SIGNIFICANT CHANGE UP (ref 2.5–4.5)
PLATELET # BLD AUTO: 146 K/UL — LOW (ref 150–400)
POTASSIUM SERPL-MCNC: 3.6 MMOL/L — SIGNIFICANT CHANGE UP (ref 3.5–5.3)
POTASSIUM SERPL-MCNC: 3.7 MMOL/L — SIGNIFICANT CHANGE UP (ref 3.5–5.3)
POTASSIUM SERPL-SCNC: 3.6 MMOL/L — SIGNIFICANT CHANGE UP (ref 3.5–5.3)
POTASSIUM SERPL-SCNC: 3.7 MMOL/L — SIGNIFICANT CHANGE UP (ref 3.5–5.3)
PROT SERPL-MCNC: 7.5 G/DL — SIGNIFICANT CHANGE UP (ref 6–8.3)
PROT SERPL-MCNC: 7.6 G/DL — SIGNIFICANT CHANGE UP (ref 6–8.3)
PROTHROM AB SERPL-ACNC: 12 SEC — SIGNIFICANT CHANGE UP (ref 9.9–13.4)
RBC # BLD: 3.35 M/UL — LOW (ref 4.2–5.8)
RBC # FLD: 17.2 % — HIGH (ref 10.3–14.5)
SAO2 % BLDMV: 73.2 — SIGNIFICANT CHANGE UP (ref 60–90)
SODIUM SERPL-SCNC: 138 MMOL/L — SIGNIFICANT CHANGE UP (ref 135–145)
SODIUM SERPL-SCNC: 141 MMOL/L — SIGNIFICANT CHANGE UP (ref 135–145)
WBC # BLD: 11.36 K/UL — HIGH (ref 3.8–10.5)
WBC # FLD AUTO: 11.36 K/UL — HIGH (ref 3.8–10.5)

## 2025-04-13 PROCEDURE — 36556 INSERT NON-TUNNEL CV CATH: CPT | Mod: GC,59

## 2025-04-13 PROCEDURE — 93010 ELECTROCARDIOGRAM REPORT: CPT

## 2025-04-13 PROCEDURE — 93503 INSERT/PLACE HEART CATHETER: CPT | Mod: GC

## 2025-04-13 PROCEDURE — 99291 CRITICAL CARE FIRST HOUR: CPT | Mod: GC,25

## 2025-04-13 PROCEDURE — 99233 SBSQ HOSP IP/OBS HIGH 50: CPT | Mod: GC

## 2025-04-13 PROCEDURE — 71045 X-RAY EXAM CHEST 1 VIEW: CPT | Mod: 26

## 2025-04-13 RX ORDER — INSULIN LISPRO 100 U/ML
INJECTION, SOLUTION INTRAVENOUS; SUBCUTANEOUS AT BEDTIME
Refills: 0 | Status: DISCONTINUED | OUTPATIENT
Start: 2025-04-13 | End: 2025-04-28

## 2025-04-13 RX ORDER — INSULIN LISPRO 100 U/ML
INJECTION, SOLUTION INTRAVENOUS; SUBCUTANEOUS
Refills: 0 | Status: DISCONTINUED | OUTPATIENT
Start: 2025-04-13 | End: 2025-04-28

## 2025-04-13 RX ORDER — DOBUTAMINE 250 MG/20ML
1.25 INJECTION INTRAVENOUS
Qty: 500 | Refills: 0 | Status: DISCONTINUED | OUTPATIENT
Start: 2025-04-13 | End: 2025-04-14

## 2025-04-13 RX ORDER — BUMETANIDE 1 MG/1
2 TABLET ORAL
Refills: 0 | Status: DISCONTINUED | OUTPATIENT
Start: 2025-04-13 | End: 2025-04-21

## 2025-04-13 RX ADMIN — AMIODARONE HYDROCHLORIDE 200 MILLIGRAM(S): 50 INJECTION, SOLUTION INTRAVENOUS at 05:36

## 2025-04-13 RX ADMIN — Medication 40 MILLIEQUIVALENT(S): at 05:36

## 2025-04-13 RX ADMIN — Medication 40 MILLIGRAM(S): at 11:41

## 2025-04-13 RX ADMIN — HEPARIN SODIUM 17.5 UNIT(S)/HR: 1000 INJECTION INTRAVENOUS; SUBCUTANEOUS at 07:45

## 2025-04-13 RX ADMIN — IPRATROPIUM BROMIDE AND ALBUTEROL SULFATE 3 MILLILITER(S): .5; 2.5 SOLUTION RESPIRATORY (INHALATION) at 05:40

## 2025-04-13 RX ADMIN — IPRATROPIUM BROMIDE AND ALBUTEROL SULFATE 3 MILLILITER(S): .5; 2.5 SOLUTION RESPIRATORY (INHALATION) at 11:16

## 2025-04-13 RX ADMIN — VASOPRESSIN 6 UNIT(S)/MIN: 20 INJECTION INTRAVENOUS at 07:45

## 2025-04-13 RX ADMIN — IPRATROPIUM BROMIDE AND ALBUTEROL SULFATE 3 MILLILITER(S): .5; 2.5 SOLUTION RESPIRATORY (INHALATION) at 17:49

## 2025-04-13 RX ADMIN — ATORVASTATIN CALCIUM 40 MILLIGRAM(S): 80 TABLET, FILM COATED ORAL at 21:03

## 2025-04-13 RX ADMIN — VASOPRESSIN 6 UNIT(S)/MIN: 20 INJECTION INTRAVENOUS at 05:37

## 2025-04-13 RX ADMIN — Medication 100 MILLIEQUIVALENT(S): at 22:49

## 2025-04-13 RX ADMIN — Medication 100 MILLIEQUIVALENT(S): at 21:03

## 2025-04-13 RX ADMIN — HEPARIN SODIUM 17.5 UNIT(S)/HR: 1000 INJECTION INTRAVENOUS; SUBCUTANEOUS at 02:00

## 2025-04-13 RX ADMIN — DOBUTAMINE 7.94 MICROGRAM(S)/KG/MIN: 250 INJECTION INTRAVENOUS at 10:16

## 2025-04-13 RX ADMIN — IPRATROPIUM BROMIDE AND ALBUTEROL SULFATE 3 MILLILITER(S): .5; 2.5 SOLUTION RESPIRATORY (INHALATION) at 23:17

## 2025-04-13 RX ADMIN — HEPARIN SODIUM 17.5 UNIT(S)/HR: 1000 INJECTION INTRAVENOUS; SUBCUTANEOUS at 14:58

## 2025-04-13 RX ADMIN — Medication 1 APPLICATION(S): at 21:05

## 2025-04-13 RX ADMIN — HEPARIN SODIUM 17.5 UNIT(S)/HR: 1000 INJECTION INTRAVENOUS; SUBCUTANEOUS at 21:04

## 2025-04-13 RX ADMIN — DOBUTAMINE 7.94 MICROGRAM(S)/KG/MIN: 250 INJECTION INTRAVENOUS at 21:04

## 2025-04-13 RX ADMIN — PHENYLEPHRINE HYDROCHLORIDE AND FAT, HARD 1 APPLICATION(S): .00525; 1.86 SUPPOSITORY RECTAL at 11:42

## 2025-04-13 RX ADMIN — BUMETANIDE 2 MILLIGRAM(S): 1 TABLET ORAL at 13:29

## 2025-04-13 RX ADMIN — ACETAZOLAMIDE 500 MILLIGRAM(S): 250 TABLET ORAL at 10:16

## 2025-04-13 RX ADMIN — VASOPRESSIN 6 UNIT(S)/MIN: 20 INJECTION INTRAVENOUS at 21:04

## 2025-04-13 RX ADMIN — CLOPIDOGREL BISULFATE 75 MILLIGRAM(S): 75 TABLET, FILM COATED ORAL at 11:41

## 2025-04-13 NOTE — PROGRESS NOTE ADULT - SUBJECTIVE AND OBJECTIVE BOX
[facility] Division of Kidney Diseases & Hypertension  FOLLOW UP NOTE  [phone]--------------------------------------------------------------------------------  Chief Complaint:Acute on chronic systolic congestive heart failure        24 hour events/subjective:  Pt was seen and examined earlier today. No acute overnight events. No fresh complaints. Pt was extubated yesterday. Reports feeling well.   Pt denies worsening of SOB/ Constipation/ Diarrhea/ Nausea/ Vomiting/ abdominal pain/ chest pain/ tingling/ numbness.         PAST HISTORY  --------------------------------------------------------------------------------  No significant changes to PMH, PSH, FHx, SHx, unless otherwise noted    ALLERGIES & MEDICATIONS  --------------------------------------------------------------------------------  Allergies    metoprolol (Short breath)    Intolerances      Standing Inpatient Medications  acetaZOLAMIDE Injectable 500 milliGRAM(s) IV Push <User Schedule>  albuterol/ipratropium for Nebulization 3 milliLiter(s) Nebulizer every 6 hours  aMIOdarone    Tablet 200 milliGRAM(s) Oral daily  aMIOdarone    Tablet   Oral   atorvastatin 40 milliGRAM(s) Oral at bedtime  buMETAnide Injectable 2 milliGRAM(s) IV Push two times a day  chlorhexidine 2% Cloths 1 Application(s) Topical daily  clopidogrel Tablet 75 milliGRAM(s) Oral daily  DOBUTamine Infusion 2.5 MICROgram(s)/kG/Min IV Continuous <Continuous>  hemorrhoidal Ointment 1 Application(s) Rectal daily  heparin  Infusion 1000 Unit(s)/Hr IV Continuous <Continuous>  insulin lispro (ADMELOG) corrective regimen sliding scale   SubCutaneous every 6 hours  pantoprazole  Injectable 40 milliGRAM(s) IV Push daily  vasopressin Infusion 0.04 Unit(s)/Min IV Continuous <Continuous>    PRN Inpatient Medications      REVIEW OF SYSTEMS  --------------------------------------------------------------------------------  as noted above.     VITALS/PHYSICAL EXAM  --------------------------------------------------------------------------------  T(C): 36.4 (04-13-25 @ 07:00), Max: 36.7 (04-12-25 @ 23:00)  HR: 62 (04-13-25 @ 11:22) (50 - 69)  BP: 98/58 (04-13-25 @ 11:00) (78/50 - 127/71)  RR: 24 (04-13-25 @ 11:00) (19 - 31)  SpO2: 98% (04-13-25 @ 11:22) (91% - 100%)  Wt(kg): --  Height (cm): 172.7 (04-11-25 @ 11:45)  Weight (kg): 105.8 (04-11-25 @ 11:45)  BMI (kg/m2): 35.5 (04-11-25 @ 11:45)  BSA (m2): 2.18 (04-11-25 @ 11:45)      04-12-25 @ 07:01  -  04-13-25 @ 07:00  --------------------------------------------------------  IN: 768.6 mL / OUT: 1585 mL / NET: -816.4 mL    04-13-25 @ 07:01  -  04-13-25 @ 11:42  --------------------------------------------------------  IN: 150.2 mL / OUT: 350 mL / NET: -199.8 mL      Physical Exam:  Gen: on vent support.   Pulm: No crackles, lower zone decreased breath sounds.   CV: RRR, S1S2;  Abd: +BS, soft, nontender/nondistended  : Alba in place.   Extremities: Mild LE swelling  Skin: Warm, Lower extremity chronic changes.    LABS/STUDIES  --------------------------------------------------------------------------------              10.1   11.36 >-----------<  146      [04-13-25 @ 00:46]              33.2     141  |  90  |  100  ----------------------------<  103      [04-13-25 @ 00:47]  3.6   |  36  |  3.21        Ca     9.3     [04-13-25 @ 00:47]      Mg     2.2     [04-13-25 @ 00:47]      Phos  4.1     [04-13-25 @ 00:47]    TPro  7.6  /  Alb  3.5  /  TBili  1.9  /  DBili  x   /  AST  10  /  ALT  16  /  AlkPhos  86  [04-13-25 @ 00:47]    PT/INR: PT 12.0 , INR 1.05       [04-13-25 @ 00:47]  PTT: 55.5       [04-13-25 @ 07:33]      Creatinine Trend:  SCr 3.21 [04-13 @ 00:47]  SCr 2.87 [04-12 @ 10:50]  SCr 2.80 [04-12 @ 06:19]  SCr 2.73 [04-12 @ 00:37]  SCr 2.70 [04-11 @ 17:20]    Urinalysis - [04-13-25 @ 00:47]      Color  / Appearance  / SG  / pH       Gluc 103 / Ketone   / Bili  / Urobili        Blood  / Protein  / Leuk Est  / Nitrite       RBC  / WBC  / Hyaline  / Gran  / Sq Epi  / Non Sq Epi  / Bacteria            [facility] Division of Kidney Diseases & Hypertension  FOLLOW UP NOTE  [phone]--------------------------------------------------------------------------------  Chief Complaint: Acute on chronic systolic congestive heart failure      24 hour events/subjective:  Pt was seen and examined earlier today. No acute overnight events. No fresh complaints. Pt was extubated yesterday. Reports feeling well.   Pt denies worsening of SOB/ Constipation/ Diarrhea/ Nausea/ Vomiting/ abdominal pain/ chest pain/ tingling/ numbness.       PAST HISTORY  --------------------------------------------------------------------------------  No significant changes to PMH, PSH, FHx, SHx, unless otherwise noted    ALLERGIES & MEDICATIONS  --------------------------------------------------------------------------------  Allergies    metoprolol (Short breath)    Intolerances      Standing Inpatient Medications  acetaZOLAMIDE Injectable 500 milliGRAM(s) IV Push <User Schedule>  albuterol/ipratropium for Nebulization 3 milliLiter(s) Nebulizer every 6 hours  aMIOdarone    Tablet 200 milliGRAM(s) Oral daily  aMIOdarone    Tablet   Oral   atorvastatin 40 milliGRAM(s) Oral at bedtime  buMETAnide Injectable 2 milliGRAM(s) IV Push two times a day  chlorhexidine 2% Cloths 1 Application(s) Topical daily  clopidogrel Tablet 75 milliGRAM(s) Oral daily  DOBUTamine Infusion 2.5 MICROgram(s)/kG/Min IV Continuous <Continuous>  hemorrhoidal Ointment 1 Application(s) Rectal daily  heparin  Infusion 1000 Unit(s)/Hr IV Continuous <Continuous>  insulin lispro (ADMELOG) corrective regimen sliding scale   SubCutaneous every 6 hours  pantoprazole  Injectable 40 milliGRAM(s) IV Push daily  vasopressin Infusion 0.04 Unit(s)/Min IV Continuous <Continuous>    PRN Inpatient Medications      REVIEW OF SYSTEMS  --------------------------------------------------------------------------------  as noted above.     VITALS/PHYSICAL EXAM  --------------------------------------------------------------------------------  T(C): 36.4 (04-13-25 @ 07:00), Max: 36.7 (04-12-25 @ 23:00)  HR: 62 (04-13-25 @ 11:22) (50 - 69)  BP: 98/58 (04-13-25 @ 11:00) (78/50 - 127/71)  RR: 24 (04-13-25 @ 11:00) (19 - 31)  SpO2: 98% (04-13-25 @ 11:22) (91% - 100%)  Wt(kg): --  Height (cm): 172.7 (04-11-25 @ 11:45)  Weight (kg): 105.8 (04-11-25 @ 11:45)  BMI (kg/m2): 35.5 (04-11-25 @ 11:45)  BSA (m2): 2.18 (04-11-25 @ 11:45)      04-12-25 @ 07:01  -  04-13-25 @ 07:00  --------------------------------------------------------  IN: 768.6 mL / OUT: 1585 mL / NET: -816.4 mL    04-13-25 @ 07:01  -  04-13-25 @ 11:42  --------------------------------------------------------  IN: 150.2 mL / OUT: 350 mL / NET: -199.8 mL      Physical Exam:  Gen: on vent support.   Pulm: No crackles, lower zone decreased breath sounds.   CV: RRR, S1S2;  Abd: +BS, soft, nontender/nondistended  : Alba in place.   Extremities: Mild LE swelling  Skin: Warm, Lower extremity chronic changes.    LABS/STUDIES  --------------------------------------------------------------------------------              10.1   11.36 >-----------<  146      [04-13-25 @ 00:46]              33.2     141  |  90  |  100  ----------------------------<  103      [04-13-25 @ 00:47]  3.6   |  36  |  3.21        Ca     9.3     [04-13-25 @ 00:47]      Mg     2.2     [04-13-25 @ 00:47]      Phos  4.1     [04-13-25 @ 00:47]    TPro  7.6  /  Alb  3.5  /  TBili  1.9  /  DBili  x   /  AST  10  /  ALT  16  /  AlkPhos  86  [04-13-25 @ 00:47]    PT/INR: PT 12.0 , INR 1.05       [04-13-25 @ 00:47]  PTT: 55.5       [04-13-25 @ 07:33]      Creatinine Trend:  SCr 3.21 [04-13 @ 00:47]  SCr 2.87 [04-12 @ 10:50]  SCr 2.80 [04-12 @ 06:19]  SCr 2.73 [04-12 @ 00:37]  SCr 2.70 [04-11 @ 17:20]

## 2025-04-13 NOTE — PROGRESS NOTE ADULT - ASSESSMENT
65 y/o M with hx of Afib s/p DCCV 3/25/25, CHF, Aortic stenosis s/p TAVR 11/2022, CAD s/p CABG, Aflutter s/p ablation and Micra implant, CKD 3, Obesity, venous stasis, HTN, HLD presented with 6 days of worsening SOB on exertion associated with orthopnea and chest pain.   Also reports several days decreased appetite. Denies fevers, chills, cough, abdominal pain, NVD, urinary sx, leg swelling. Patient had a cardioversion for atrial fibrillation at this hospital recently, which was reportedly unsuccessful. Patient has a scheduled appointment with Dr. Philip on 04/24/2025 to discuss possible ablation. His outpt cardiologist is Dr. Miranda. (Cardiology)  On arrival to ED, vitals were 97.3F, HR 71, BP 94/57, RR 22, 97% on 2L NC. Labs pertinent for WBC 10.68, INR 2.55, Na 132, K 5.5, bicarb 20, BUN/Cr 78/5.17 (baseline Cr appears ~1.5-1.8), trop 225, BNP 89707, VBG ph 7.3, pco2/po2 wnl. UA without evidence of infection.  nephrology was consulted for management of ANGELICA on CKD. Pt has baseline Scr of ~1.5- 1.8. Scr was 2.7 on 3/25/25. Pt is on Entresto ( started recently) and he mentioned that he was taking Motrin occasionally. No herbal medications/ supplements.  Scr at the time of admission was 5.17 3/31/25 and it further worsened to 5.45 4/1/25.    Pt follows Dr. Stewart Brown (Nephrology)  C was done on 4/8  Plan for TAVR on 4/11.     ANGELICA on CKD:  Pt has hx of CKD 3. Baseline Scr 1.5-1.8.   At the time of admission - Scr was 5.17 and it further worsened to 5.45--> 5.5. Scr elevated/improving to 3.54---> 3.48--> 3.09--> 2.8 4/12. Scr worsened today to 3.2 - Likely LISA in setting of TAVR.   Pt has lopez cath in place. UOP was 1.5L with net negative 0.8L in last 24 hours.   Bumex gtt - restarted 4/4/25 - Briefly held on 4/8 for LHC and is restarted later in the day.  Now off of bumex gtt due to alkalosis ( given a dose of diamox on 4/12 with a plan for 3 doses qd - 500 IV)  Bicarb is elevated but pCO2 is also elevated. pt is on BiPAP at night. Management of WILFRED/OHS as per primary team.   UA showed trace LE, blood ( Likely due to lopez), casts 17. UPCR 0.7. No hx of DM.   Pt was on entresto.   BP was low.   On IV vasopressin.   ANGELICA on CKD likely in setting of HF exacerbation, entresto use and hypotension mediated ATN.  USG kidney and bladder - normal sized kidneys. No HN.     PLAN:  Bicarb is trending up.pCo2 is also elevated.   on BiPAP overnight. Management as per primary team.   Pt is started on Bumex 2mg q12 - Acetazolamide works well as a diuretic in alkalotic conditions.   continue to hold entresto.   Monitor strict I/Os and daily wts.   No urgent need of HD for now.    Avoid nephrotoxins  Dose medications as per eGFR    hyperkalemia: resolved  Today Serum K is 3.8.   Monitor closely - replete as needed.     Hyponatremia is likely in setting of impaired water clearance in setting of worsening renal function and volume overload 2/2 heart failure  resolved now.   Bumex as noted above      Pt is at high risk of LISA. Pt was explained in detail about the risk of LISA and possible need of dialysis if the kidney function gets worse.   Enoch score 26.1 %- Risk of post PCI - LISA and 1.09% risk of requiring dialysis.    TAVR on 4/11 done. Risk of LISA is upto 48- 72 hours. Monitor kidney function closely. Scr is trending up now.     Wait for the final reccs as per the attending.

## 2025-04-13 NOTE — PROCEDURE NOTE - ADDITIONAL PROCEDURE DETAILS
Under sterile conditions and with proper draping of the patient, a pulmonary artery catheter was floated through the introducer catheter in the left internal jugular vein.   With the balloon inflated with 1.5ml of air, the PA catheter was advanced while monitoring the pressure tracing from the central venous system to the right ventricle and to the pulmonary artery.   Wedge tracing was observed at 67 cm. The balloon was deflated, PA pressure tracing was noted again. The position of the catheter was verified by CXR.  The PA catheter was secured at 65cm.    The initial readings are:  CVP:  mmHg  PA S/D:  mmHg  PCWP:  mmHg  Mixed SvO2:   Andrew CO/CI:     Complications: None Under sterile conditions and with proper draping of the patient, a pulmonary artery catheter was floated through the introducer catheter in the left internal jugular vein.   With the balloon inflated with 1.5ml of air, the PA catheter was advanced while monitoring the pressure tracing from the central venous system to the right ventricle and to the pulmonary artery.   Wedge tracing was observed at 67 cm. The balloon was deflated, PA pressure tracing was noted again. The position of the catheter was verified by CXR.  The PA catheter was secured at 65cm.    The initial readings are:  CVP:  17mmHg  PA S/D:  53/18/30mmHg  PCWP:  18mmHg  Mixed SvO2: 73.2  Andrew CO/CI: 9.7/4.3  Thermal dilution CO/CI: 4.8/2.2    Complications: None

## 2025-04-13 NOTE — PROGRESS NOTE ADULT - SUBJECTIVE AND OBJECTIVE BOX
HPI:   65 yo M with a fib (s/p dccv 3/25/25), CHF (likely moderately reduced EF, several TTE with poor windows), aortic stenosis s/p TAVR (2022), CAD s/p CABG, a flutter s/p ablation  and s/p micra implant,  CKD3, obesity, venous stasis, HTN, HLD, who presents with 6 days of worsening dyspnea on exertion. Pt also reports associated orthopnea and chest pain on exertion but not at rest which is located in the center of his chest. Pain is described as burning. Also reports several days decreased appetite. Denies fevers, chills, cough, abdominal pain, NVD, urinary sx, leg swelling. Patient had a cardioversion for atrial fibrillation at this hospital recently, which was reportedly unsuccessful. Patient has a scheduled appointment with Dr. Philip on 2025 to discuss possible ablation. His outpt cardiologist is Dr. Paul.     On arrival to ED, vitals were 97.3F, HR 71, BP 94/57, RR 22, 97% on 2L NC. Labs pertinent for WBC 10.68, INR 2.55, Na 132, K 5.5, bicarb 20, BUN/Cr 78/5.17 (baseline Cr appears ~1.5-1.8), trop 225, BNP 82979, VBG ph 7.3, pco2/po2 wnl. UA without evidence of infection. EKG  on admission 1st deg av block, low voltage, poor r wave progression, R axis deviation CXR with diffuse hazy opacities suggest mild pulmonary edema and small right and trace left pleural effusions. Pt was given IV Bumex 1mg x1, IV bumex 2mg x1, IV Bumex 4mg x1, and lokelma 5mg x1, then admitted for further management.    (2025 01:59)      Patient seen and examined at bedside.    Overnight Events: NAEON, off bumex and  gtt  Extubated yesterday  Remains on low dose vaso    Review of Systems: Negative except as per HPI     Current Meds:  albuterol/ipratropium for Nebulization 3 milliLiter(s) Nebulizer every 6 hours  aMIOdarone    Tablet   Oral   aMIOdarone    Tablet 200 milliGRAM(s) Oral daily  atorvastatin 40 milliGRAM(s) Oral at bedtime  chlorhexidine 0.12% Liquid 15 milliLiter(s) Oral Mucosa every 12 hours  chlorhexidine 2% Cloths 1 Application(s) Topical daily  clopidogrel Tablet 75 milliGRAM(s) Oral daily  hemorrhoidal Ointment 1 Application(s) Rectal daily  heparin  Infusion 1000 Unit(s)/Hr IV Continuous <Continuous>  insulin lispro (ADMELOG) corrective regimen sliding scale   SubCutaneous every 6 hours  pantoprazole  Injectable 40 milliGRAM(s) IV Push daily  propofol Infusion 10 MICROgram(s)/kG/Min IV Continuous <Continuous>  sodium chloride 3%  Inhalation 4 milliLiter(s) Inhalation every 6 hours  vasopressin Infusion 0.04 Unit(s)/Min IV Continuous <Continuous>      Vitals:  T(F): 97.7 (), Max: 99 ()  HR: 51 () (47 - 81)  BP: 89/56 () (83/50 - 119/67)  RR: 17 ()  SpO2: 100% ()  I&O's Summary    2025 07: 07:00  --------------------------------------------------------  IN: 1504.6 mL / OUT: 4275 mL / NET: -2770.4 mL    2025 07:01  -  2025 08:20  --------------------------------------------------------  IN: 33.3 mL / OUT: 75 mL / NET: -41.7 mL        Physical Exam:  Appearance: No acute distress; intubated and sedated   Eyes: PERRL, EOMI, pink conjunctiva  HEENT: Normal oral mucosa  Cardiovascular: RRR, S1, S2, no murmurs, rubs, or gallops; no edema; no JVD  Respiratory: Mechanical breath sounds   Gastrointestinal: soft, non-tender, non-distended with normal bowel sounds  Musculoskeletal: No clubbing; no joint deformity   Neurologic: Non-focal  Lymphatic: No lymphadenopathy  Psychiatry: Sedated   Skin: No rashes, ecchymoses, or cyanosis                          10.6   10.46 )-----------( 111      ( 2025 00:37 )             33.2     04-12    138  |  88[L]  |  94[H]  ----------------------------<  150[H]  3.8   |  35[H]  |  2.80[H]    Ca    10.0      2025 06:19  Phos  2.8     04-12  Mg     2.1     04-12    TPro  7.6  /  Alb  3.4  /  TBili  2.5[H]  /  DBili  x   /  AST  15  /  ALT  15  /  AlkPhos  88  04-12    PT/INR - ( 2025 03:51 )   PT: 13.9 sec;   INR: 1.21 ratio         PTT - ( 2025 03:51 )  PTT:34.0 sec      ================ASSESSMENT ==============  65 yo M with a fib (s/p dccv 3/25/25), CHF (likely moderately reduced EF, several TTE with poor windows), aortic stenosis s/p TAVR (2022), a flutter s/p ablation 2019 and s/p micra implant,  CKD3, WILFRED, obesity, venous stasis, HTN, HLD, who presents with 6 days of worsening dyspnea on exertion, admitted for AHRF likely iso CHF exacerbation, also with ANGELICA on CKD c/f cardiorenal syndrome. RRT on floors for SBP 80s requiring pressors and has been admitted to the CCU. HF mostly likely i/so severe AS. Pt to get structural intervention.     Plan:  ====================== NEUROLOGY=====================  No active issues    ==================== RESPIRATORY======================  #Acute hypoxemic hypercapnic respiratory failure- resolved  - extubated 25, on nasal cannula    ====================CARDIOVASCULAR==================  #Obstructive shock 2/2 AS  - Off levophed  - MAPs 70s -> will continue to wean vasopressin  - Wean as tolerated    #Acute on chronic HF exacerbation  - preload reduction: prn diuresis  - Goal 1-2L net neg daily    #Aortic valve stenosis s/p [place] TAVR   - S/p STANISLAW showing severe AS stenosis, global hypokinesis and EF 15%  -  patent LIMA-LAD, closed SVG-OM1/D1, pending TAVR   - s/p TAVR  (Evolut+ in [place] 3)    #Hx CABG  #PVD  - C/w Plavix    #HLD  - Increased Lipitor to 40mg from 20mg    ===================HEMATOLOGIC/ONC ===================  - On heparin gtt for DVT ppx    ===================== RENAL =========================  #ANGELICA on CKD  #Cardiorenal syndrome  - Baseline 1.5-1.8  - Most likely i/s/o hypotension and fluid overload  - Renal sono showed parenchymal disease  - Nephro following    #Hyperkalemia  - Lokelma if K increases into 5s range    ==================== GASTROINTESTINAL===================  -DASH diet    =======================    ENDOCRINE  =====================  - stress hyperglycemia - ISS     ========================INFECTIOUS DISEASE================  - Slightly elevated WBC on admission 10.5K  - Afebrile  - Urine culture -> neg  - Perioperative coverage with Cefuroxime     =======================  SKIN  =====================  - Hx lichen planus  - No other concerns    =======================  DISPO  =====================  - Maintain in ICU.      Patient requires continuous monitoring with bedside rhythm monitoring, pulse ox monitoring, and intermittent blood gas analysis. Care plan discussed with ICU care team. Patient remained critical and at risk for life threatening decompensation.  Patient seen, examined and plan discussed with CCU team during rounds.     I have personally provided 30 minutes of critical care time excluding time spent on separate procedures, in addition to initial critical care time provided by the CICU Attending, Dr. Fall

## 2025-04-13 NOTE — PROGRESS NOTE ADULT - SUBJECTIVE AND OBJECTIVE BOX
Patient is a 64y old  Male who presents with a chief complaint of dyspnea , chest pain (2025 11:42).    INTERVAL HISTORY:  - swan replaced for invasive hemodynamic monitoring  - CVP 17, PA 53/18, PCWP 18-20, mVO2 73%, CI 4.3  - restarted on dobutamine 2.5    SUBJECTIVE  - Patient seen and evaluated at bedside.     MEDICATIONS:  MEDICATIONS  (STANDING):  acetaZOLAMIDE Injectable 500 milliGRAM(s) IV Push <User Schedule>  albuterol/ipratropium for Nebulization 3 milliLiter(s) Nebulizer every 6 hours  aMIOdarone    Tablet 200 milliGRAM(s) Oral daily  aMIOdarone    Tablet   Oral   atorvastatin 40 milliGRAM(s) Oral at bedtime  buMETAnide Injectable 2 milliGRAM(s) IV Push two times a day  chlorhexidine 2% Cloths 1 Application(s) Topical daily  clopidogrel Tablet 75 milliGRAM(s) Oral daily  DOBUTamine Infusion 2.5 MICROgram(s)/kG/Min (7.94 mL/Hr) IV Continuous <Continuous>  hemorrhoidal Ointment 1 Application(s) Rectal daily  heparin  Infusion 1000 Unit(s)/Hr (17.5 mL/Hr) IV Continuous <Continuous>  insulin lispro (ADMELOG) corrective regimen sliding scale   SubCutaneous every 6 hours  pantoprazole  Injectable 40 milliGRAM(s) IV Push daily  vasopressin Infusion 0.04 Unit(s)/Min (6 mL/Hr) IV Continuous <Continuous>    OBJECTIVE:  ICU Vital Signs Last 24 Hrs  T(C): 36.4 (2025 19:00), Max: 36.7 (2025 23:00)  T(F): 97.5 (2025 19:00), Max: 98 (2025 23:00)  HR: 70 (2025 19:15) (50 - 74)  BP: 98/63 (2025 19:15) (78/50 - 146/64)  BP(mean): 75 (2025 19:15) (59 - 92)  ABP: 81/67 (2025 19:15) (80/39 - 116/69)  ABP(mean): 74 (2025 19:15) (54 - 89)  RR: 24 (2025 19:15) (19 - 29)  SpO2: 93% (2025 19:15) (91% - 100%)    O2 Parameters below as of 2025 19:00  Patient On (Oxygen Delivery Method): nasal cannula  O2 Flow (L/min): 2    Adult Advanced Hemodynamics Last 24 Hrs  CVP(mm Hg): 7 (2025 19:15) (6 - 27)  CO: 6.6 (2025 15:30) (6.6 - 6.6)  CI: 3 (2025 15:30) (3 - 3)  PA: 62/23 (2025 19:15) (48/19 - 74/35)  PA(mean): 36 (2025 19:15) (29 - 51)  SVR: 738 (2025 15:30) (738 - 738)  SVRI: 1624 (2025 15:30) (1624 - 1624)    CAPILLARY BLOOD GLUCOSE  POCT Blood Glucose.: 111 mg/dL (2025 18:19)  POCT Blood Glucose.: 119 mg/dL (2025 11:36)  POCT Blood Glucose.: 105 mg/dL (2025 05:34)  POCT Blood Glucose.: 111 mg/dL (2025 23:58)    CAPILLARY BLOOD GLUCOSE  POCT Blood Glucose.: 111 mg/dL (2025 18:19)    I&O's Summary    2025 07:01  -  2025 07:00  --------------------------------------------------------  IN: 768.6 mL / OUT: 1585 mL / NET: -816.4 mL    2025 07:01  -  2025 19:27  --------------------------------------------------------  IN: 350.5 mL / OUT: 1050 mL / NET: -699.5 mL    Daily Weight in k.3 (2025 06:00)    LABS:  ABG - ( 2025 15:15 )  pH, Arterial: 7.49  pH, Blood: x     /  pCO2: 57    /  pO2: 153   / HCO3: 43    / Base Excess: 17.6  /  SaO2: 100.0                     10.1   11.36 )-----------( 146      ( 2025 00:46 )             33.2     04-13  138  |  89[L]  |  105[H]  ----------------------------<  118[H]  3.7   |  36[H]  |  3.35[H]  Ca    9.1      2025 18:32  Phos  3.7     04-13  Mg     2.3     04-13    TPro  7.5  /  Alb  3.5  /  TBili  1.7[H]  /  DBili  x   /  AST  12  /  ALT  14  /  AlkPhos  86  04-13  LIVER FUNCTIONS - ( 2025 18:32 )  Alb: 3.5 g/dL / Pro: 7.5 g/dL / ALK PHOS: 86 U/L / ALT: 14 U/L / AST: 12 U/L / GGT: x           PT/INR - ( 2025 00:47 )   PT: 12.0 sec;   INR: 1.05 ratio    PTT - ( 2025 13:38 )  PTT:55.8 sec  Urinalysis Basic - ( 2025 18:32 )    Color: x / Appearance: x / SG: x / pH: x  Gluc: 118 mg/dL / Ketone: x  / Bili: x / Urobili: x   Blood: x / Protein: x / Nitrite: x   Leuk Esterase: x / RBC: x / WBC x   Sq Epi: x / Non Sq Epi: x / Bacteria: x      Assessment: 64M PMHx AFib (s/p dccv 3/25/25), HFrEF (likely mod red EF, sev TTE), aortic stenosis s/p TAVR (2022), a flutter s/p ablation  and s/p micra implant, CKD3, WILFRED, obesity, venous stasis, HTN, HLD, who presents with 6 days of worsening dyspnea on exertion, admitted for AHRF likely iso CHF exacerbation, also with ANGELICA on CKD c/f cardiorenal syndrome. RRT on floors for SBP 80s requiring pressors and has been admitted to the CCU. Now s/p TAVR .    Plan:  NEURO   A&O3   - s/p extubation  - continue to monitor mental status as per protocol     RESPIRATORY  #Acute hypoxemic hypercapnic respiratory failure  - extubated today to AVAPS  - Likely retaining 2/2 volume overload  - continue duonebs   - monitor ABGs  - continue to monitor SpO2 with goal >94%    CARDIO  #Obstructive shock 2/2 AS  - MAPs 70s -> will continue to wean vasopressin as BP tolerates    #Acute on chronic HF exacerbation  - preload reduction: bumex 1 IV BID  - inotropic support:  2.5  - continue vaso for pressor support  - Goal 1-2L net neg daily  - CVP goal 8-12  - Diuril, 3% hypertonic, diamox, and metolazone PRN for augmenting diuresis    #Aortic valve stenosis s/p prosthetic  - S/p STANISLAW showing severe AS stenosis, global hypokinesis and EF 15%  -  patent LIMA-LAD, closed SVG-OM1/D1, pending TAVR   - s/p TAVR   - heparin gtt    #Hx CABG  #PVD  - C/w Plavix    #HLD  - Increased Lipitor to 40mg from 20mg    RENAL/  #ANGELICA on CKD  #Cardiorenal syndrome  - Baseline 1.5-1.8  - Most likely i/s/o hypotension and fluid overload  - Renal sono showed parenchymal disease  - Nephro following c/w bumex gtt, monitor volume status. Stop sodium bicarb 650 TID for now     #Hyperkalemia  - Lokelma if K increases into 5s range  - Continue monitoring urine output, lytes, SCr/ BUN  - replete lytes prn with goal K >4 and Mg >2    GI  FEEST tomorrow with speech & swallow    ENDO  #Hyperglycemia  - ISS    HEME  - Monitor H/H and plts  - DVT PPX: Heparin    ID  Afebrile  - no leukocytosis  - monitor and trend WBC and temperature curve     Dispo: Maintain in ICU.    Patient requires continuous monitoring with bedside rhythm monitoring, arterial line, pulse oximetry, ventilator monitoring and intermittent blood gas analysis.  Care plan discussed with ICU care team.  I have spent 35 minutes providing critical care, in addition to initial critical time provided by CICU attending Dr. Fall, re-evaluated multiple times during the day.    Dilma Stapleton PA-C

## 2025-04-13 NOTE — CHART NOTE - NSCHARTNOTEFT_GEN_A_CORE
63 yo M with a fib (s/p dccv 3/25/25), CHF (likely moderately reduced EF, several TTE with poor windows), aortic stenosis s/p TAVR (11/2022), CAD s/p CABG, a flutter s/p ablation 2019 and s/p micra implant,  CKD3, obesity, venous stasis, HTN, HLD, who presents with 6 days of worsening VELÁSQUEZ, orthopnea, and chest pain on exertion. CXR w/ mild pulmoary edema, small right and trace left pleural effusions. S/p IV Bumex and lokelma--admitted for further management. S/p RRT for hypotension 4/1. TTE notable for severe prosthetic aortic valve stenosis for which structural cardiology was already consulted. Increased tachypnea and hypoxia requiring intubation 4/1 (x1 day). S/p TAVR 4/11. Extubated 4/12 (x1 day). Swallow re-evaluation ordered.     Swallow Hx: Pt seen for initial bedside swallow evaluation 4/3. Recommendations for NPO w/ alternative means of nutrition/hydration/medication and FEES for further assessment. FEES completed 4/4 w/ recommendations for regular diet/thin liquids. Oropharyngeal swallow intact. Baseline cough unrelated to swallowing.       4/13: Pt seen for bedside swallow re-evaluation given Pt and team reports of dysphagia s/p TAVR 4/11. Encountered Pt asleep in bed on 3L O2 via NC. +A-line. Pt is A&Ox4, verbally responsive, and able to follow simple commands. Mild hoarse vocal quality noted. Pt reports frustration over difficulty clearing phlegm which he feels is in his throat; endorses intermittently utilizing Yankauer to suction secretions/phlegm. SLP repositioned Pt upright in bed. +xerstomia; improved w/ gentle oral care. PO trials of ice chips, puree, soft/bite-sized solids, regular solids, moderately thick liquids, mildly thick liquids, and thin liquids administered. Pt presents w/ a suspected pharyngeal dysphagia characterized by delayed cough w/ intake of thin liquids. Hyolaryngeal excursion is WNL upon palpation. Given recent intubation/surgery, FEES is recommended to objectively assess the swallow mechanism and r/o aspiration. Diet of regular solids and mildly thick liquids is recommended in the interim. SLP d/w GADIEL Lott, Pt, and MD Mary Ellen Shaw who are in agreement w/ current plan.  Exam tentatively scheduled for tomorrow, 4/13.     Recommendations:  1) Regular diet/mildly thick liquids; Ice chips OK  2) Strict aspiration and reflux precautions!  3) FEES to objectively re-assess the swallow mechanism and r/o aspiration  4) D/c plan TBD pending hospital course     Marsha Jarrell MA, CCC-SLP   ext [phone] or TEAMS as needed

## 2025-04-13 NOTE — PROGRESS NOTE ADULT - ATTENDING COMMENTS
S/p TAVR in the setting of valvular shock  A+Ox3  Vasodilatory shock requiring Vasopressin - add Midodrine and wean pressor   Extubated yesterday, SpO2 high 90s on nasal cannula - wean to room air  NPO - check Speech and Swallow  Non-oliguric ANGELICA, alkalotic, overloaded on exam but diuretic responsive - continue to diurese with Bumex, add Diamox   H/H low but acceptable on Heparin drip for afib   Afebrile, no antibiotics  Sugars controlled  Alejandrina 4/1  OOB S/p TAVR in the setting of valvular shock  A+Ox3  Vasodilatory shock requiring Vasopressin - place [place], may need ot be transitioned to Dobutamine  Extubated yesterday, SpO2 high 90s on nasal cannula - wean to room air  NPO - check Speech and Swallow  Non-oliguric ANGELICA, alkalotic, overloaded on exam but diuretic responsive - continue to diurese with Bumex, add Diamox   H/H low but acceptable on Heparin drip for afib   Afebrile, no antibiotics  Sugars controlled  Alejandrina 4/1  OOB S/p TAVR in the setting of valvular shock  A+Ox3  Vasodilatory shock requiring Vasopressin - place [place], may need to be transitioned to Dobutamine  Extubated yesterday, SpO2 high 90s on nasal cannula - wean to room air  NPO - check Speech and Swallow  Non-oliguric ANGELICA, alkalotic, overloaded on exam but diuretic responsive - continue to diurese with Bumex, add Diamox   H/H low but acceptable on Heparin drip for afib   Afebrile, no antibiotics  Sugars controlled  [place] 4/1  OOB

## 2025-04-14 LAB
ALBUMIN SERPL ELPH-MCNC: 3.2 G/DL — LOW (ref 3.3–5)
ALBUMIN SERPL ELPH-MCNC: 3.3 G/DL — SIGNIFICANT CHANGE UP (ref 3.3–5)
ALP SERPL-CCNC: 82 U/L — SIGNIFICANT CHANGE UP (ref 40–120)
ALP SERPL-CCNC: 85 U/L — SIGNIFICANT CHANGE UP (ref 40–120)
ALT FLD-CCNC: 12 U/L — SIGNIFICANT CHANGE UP (ref 10–45)
ALT FLD-CCNC: 13 U/L — SIGNIFICANT CHANGE UP (ref 10–45)
ANION GAP SERPL CALC-SCNC: 14 MMOL/L — SIGNIFICANT CHANGE UP (ref 5–17)
ANION GAP SERPL CALC-SCNC: 14 MMOL/L — SIGNIFICANT CHANGE UP (ref 5–17)
APTT BLD: 49.9 SEC — HIGH (ref 24.5–35.6)
APTT BLD: 60.2 SEC — HIGH (ref 24.5–35.6)
APTT BLD: 60.6 SEC — HIGH (ref 24.5–35.6)
AST SERPL-CCNC: 13 U/L — SIGNIFICANT CHANGE UP (ref 10–40)
AST SERPL-CCNC: 13 U/L — SIGNIFICANT CHANGE UP (ref 10–40)
BASE EXCESS BLDMV CALC-SCNC: 12 MMOL/L — HIGH (ref -3–3)
BASE EXCESS BLDMV CALC-SCNC: 15.1 MMOL/L — HIGH (ref -3–3)
BASE EXCESS BLDMV CALC-SCNC: 17.1 MMOL/L — HIGH (ref -3–3)
BILIRUB SERPL-MCNC: 1.6 MG/DL — HIGH (ref 0.2–1.2)
BILIRUB SERPL-MCNC: 1.7 MG/DL — HIGH (ref 0.2–1.2)
BUN SERPL-MCNC: 91 MG/DL — HIGH (ref 7–23)
BUN SERPL-MCNC: 99 MG/DL — HIGH (ref 7–23)
CALCIUM SERPL-MCNC: 8.8 MG/DL — SIGNIFICANT CHANGE UP (ref 8.4–10.5)
CALCIUM SERPL-MCNC: 9 MG/DL — SIGNIFICANT CHANGE UP (ref 8.4–10.5)
CHLORIDE SERPL-SCNC: 90 MMOL/L — LOW (ref 96–108)
CHLORIDE SERPL-SCNC: 91 MMOL/L — LOW (ref 96–108)
CO2 BLDMV-SCNC: 42 MMOL/L — HIGH (ref 21–29)
CO2 BLDMV-SCNC: 46 MMOL/L — CRITICAL HIGH (ref 21–29)
CO2 BLDMV-SCNC: 48 MMOL/L — CRITICAL HIGH (ref 21–29)
CO2 SERPL-SCNC: 33 MMOL/L — HIGH (ref 22–31)
CO2 SERPL-SCNC: 33 MMOL/L — HIGH (ref 22–31)
CREAT SERPL-MCNC: 3.06 MG/DL — HIGH (ref 0.5–1.3)
CREAT SERPL-MCNC: 3.33 MG/DL — HIGH (ref 0.5–1.3)
EGFR: 20 ML/MIN/1.73M2 — LOW
EGFR: 20 ML/MIN/1.73M2 — LOW
EGFR: 22 ML/MIN/1.73M2 — LOW
EGFR: 22 ML/MIN/1.73M2 — LOW
GAS PNL BLDA: SIGNIFICANT CHANGE UP
GAS PNL BLDA: SIGNIFICANT CHANGE UP
GAS PNL BLDMV: SIGNIFICANT CHANGE UP
GLUCOSE BLDC GLUCOMTR-MCNC: 116 MG/DL — HIGH (ref 70–99)
GLUCOSE BLDC GLUCOMTR-MCNC: 118 MG/DL — HIGH (ref 70–99)
GLUCOSE BLDC GLUCOMTR-MCNC: 120 MG/DL — HIGH (ref 70–99)
GLUCOSE SERPL-MCNC: 110 MG/DL — HIGH (ref 70–99)
GLUCOSE SERPL-MCNC: 197 MG/DL — HIGH (ref 70–99)
HCO3 BLDMV-SCNC: 40 MMOL/L — HIGH (ref 20–28)
HCO3 BLDMV-SCNC: 43 MMOL/L — HIGH (ref 20–28)
HCO3 BLDMV-SCNC: 45 MMOL/L — CRITICAL HIGH (ref 20–28)
HCT VFR BLD CALC: 31.5 % — LOW (ref 39–50)
HGB BLD-MCNC: 9.7 G/DL — LOW (ref 13–17)
HOROWITZ INDEX BLDMV+IHG-RTO: 28 — SIGNIFICANT CHANGE UP
INR BLD: 1.06 RATIO — SIGNIFICANT CHANGE UP (ref 0.85–1.16)
MAGNESIUM SERPL-MCNC: 2.1 MG/DL — SIGNIFICANT CHANGE UP (ref 1.6–2.6)
MAGNESIUM SERPL-MCNC: 2.2 MG/DL — SIGNIFICANT CHANGE UP (ref 1.6–2.6)
MCHC RBC-ENTMCNC: 30.6 PG — SIGNIFICANT CHANGE UP (ref 27–34)
MCHC RBC-ENTMCNC: 30.8 G/DL — LOW (ref 32–36)
MCV RBC AUTO: 99.4 FL — SIGNIFICANT CHANGE UP (ref 80–100)
NRBC BLD AUTO-RTO: 0 /100 WBCS — SIGNIFICANT CHANGE UP (ref 0–0)
O2 CT VFR BLD CALC: 37 MMHG — SIGNIFICANT CHANGE UP (ref 30–65)
O2 CT VFR BLD CALC: 40 MMHG — SIGNIFICANT CHANGE UP (ref 30–65)
O2 CT VFR BLD CALC: 41 MMHG — SIGNIFICANT CHANGE UP (ref 30–65)
PCO2 BLDMV: 68 MMHG — HIGH (ref 30–65)
PCO2 BLDMV: 70 MMHG — HIGH (ref 30–65)
PCO2 BLDMV: 70 MMHG — HIGH (ref 30–65)
PH BLDMV: 7.38 — SIGNIFICANT CHANGE UP (ref 7.32–7.45)
PH BLDMV: 7.4 — SIGNIFICANT CHANGE UP (ref 7.32–7.45)
PH BLDMV: 7.42 — SIGNIFICANT CHANGE UP (ref 7.32–7.45)
PHOSPHATE SERPL-MCNC: 2.9 MG/DL — SIGNIFICANT CHANGE UP (ref 2.5–4.5)
PHOSPHATE SERPL-MCNC: 3.1 MG/DL — SIGNIFICANT CHANGE UP (ref 2.5–4.5)
PLATELET # BLD AUTO: 151 K/UL — SIGNIFICANT CHANGE UP (ref 150–400)
POTASSIUM SERPL-MCNC: 3.5 MMOL/L — SIGNIFICANT CHANGE UP (ref 3.5–5.3)
POTASSIUM SERPL-MCNC: 4.1 MMOL/L — SIGNIFICANT CHANGE UP (ref 3.5–5.3)
POTASSIUM SERPL-SCNC: 3.5 MMOL/L — SIGNIFICANT CHANGE UP (ref 3.5–5.3)
POTASSIUM SERPL-SCNC: 4.1 MMOL/L — SIGNIFICANT CHANGE UP (ref 3.5–5.3)
PROT SERPL-MCNC: 7.5 G/DL — SIGNIFICANT CHANGE UP (ref 6–8.3)
PROT SERPL-MCNC: 7.5 G/DL — SIGNIFICANT CHANGE UP (ref 6–8.3)
PROTHROM AB SERPL-ACNC: 12.2 SEC — SIGNIFICANT CHANGE UP (ref 9.9–13.4)
RBC # BLD: 3.17 M/UL — LOW (ref 4.2–5.8)
RBC # FLD: 17.1 % — HIGH (ref 10.3–14.5)
SAO2 % BLDMV: 62.8 — SIGNIFICANT CHANGE UP (ref 60–90)
SAO2 % BLDMV: 69.3 — SIGNIFICANT CHANGE UP (ref 60–90)
SAO2 % BLDMV: 70.3 — SIGNIFICANT CHANGE UP (ref 60–90)
SODIUM SERPL-SCNC: 137 MMOL/L — SIGNIFICANT CHANGE UP (ref 135–145)
SODIUM SERPL-SCNC: 138 MMOL/L — SIGNIFICANT CHANGE UP (ref 135–145)
WBC # BLD: 10.36 K/UL — SIGNIFICANT CHANGE UP (ref 3.8–10.5)
WBC # FLD AUTO: 10.36 K/UL — SIGNIFICANT CHANGE UP (ref 3.8–10.5)

## 2025-04-14 PROCEDURE — 99291 CRITICAL CARE FIRST HOUR: CPT

## 2025-04-14 PROCEDURE — 71045 X-RAY EXAM CHEST 1 VIEW: CPT | Mod: 26

## 2025-04-14 PROCEDURE — 99233 SBSQ HOSP IP/OBS HIGH 50: CPT | Mod: GC

## 2025-04-14 PROCEDURE — 99233 SBSQ HOSP IP/OBS HIGH 50: CPT | Mod: 57,25

## 2025-04-14 PROCEDURE — 93010 ELECTROCARDIOGRAM REPORT: CPT

## 2025-04-14 RX ORDER — MELATONIN 5 MG
5 TABLET ORAL ONCE
Refills: 0 | Status: COMPLETED | OUTPATIENT
Start: 2025-04-14 | End: 2025-04-14

## 2025-04-14 RX ADMIN — Medication 1 APPLICATION(S): at 21:46

## 2025-04-14 RX ADMIN — CLOPIDOGREL BISULFATE 75 MILLIGRAM(S): 75 TABLET, FILM COATED ORAL at 11:38

## 2025-04-14 RX ADMIN — DOBUTAMINE 3.97 MICROGRAM(S)/KG/MIN: 250 INJECTION INTRAVENOUS at 07:56

## 2025-04-14 RX ADMIN — INSULIN LISPRO 2: 100 INJECTION, SOLUTION INTRAVENOUS; SUBCUTANEOUS at 13:29

## 2025-04-14 RX ADMIN — AMIODARONE HYDROCHLORIDE 200 MILLIGRAM(S): 50 INJECTION, SOLUTION INTRAVENOUS at 05:24

## 2025-04-14 RX ADMIN — BUMETANIDE 2 MILLIGRAM(S): 1 TABLET ORAL at 13:37

## 2025-04-14 RX ADMIN — HEPARIN SODIUM 18 UNIT(S)/HR: 1000 INJECTION INTRAVENOUS; SUBCUTANEOUS at 07:56

## 2025-04-14 RX ADMIN — Medication 20 MILLIGRAM(S): at 19:36

## 2025-04-14 RX ADMIN — BUMETANIDE 2 MILLIGRAM(S): 1 TABLET ORAL at 05:24

## 2025-04-14 RX ADMIN — ATORVASTATIN CALCIUM 40 MILLIGRAM(S): 80 TABLET, FILM COATED ORAL at 21:47

## 2025-04-14 RX ADMIN — DOBUTAMINE 3.97 MICROGRAM(S)/KG/MIN: 250 INJECTION INTRAVENOUS at 00:45

## 2025-04-14 RX ADMIN — HEPARIN SODIUM 18 UNIT(S)/HR: 1000 INJECTION INTRAVENOUS; SUBCUTANEOUS at 19:31

## 2025-04-14 RX ADMIN — VASOPRESSIN 6 UNIT(S)/MIN: 20 INJECTION INTRAVENOUS at 07:57

## 2025-04-14 RX ADMIN — IPRATROPIUM BROMIDE AND ALBUTEROL SULFATE 3 MILLILITER(S): .5; 2.5 SOLUTION RESPIRATORY (INHALATION) at 05:06

## 2025-04-14 RX ADMIN — ACETAZOLAMIDE 500 MILLIGRAM(S): 250 TABLET ORAL at 11:39

## 2025-04-14 RX ADMIN — IPRATROPIUM BROMIDE AND ALBUTEROL SULFATE 3 MILLILITER(S): .5; 2.5 SOLUTION RESPIRATORY (INHALATION) at 17:14

## 2025-04-14 RX ADMIN — IPRATROPIUM BROMIDE AND ALBUTEROL SULFATE 3 MILLILITER(S): .5; 2.5 SOLUTION RESPIRATORY (INHALATION) at 23:27

## 2025-04-14 RX ADMIN — IPRATROPIUM BROMIDE AND ALBUTEROL SULFATE 3 MILLILITER(S): .5; 2.5 SOLUTION RESPIRATORY (INHALATION) at 11:02

## 2025-04-14 RX ADMIN — Medication 5 MILLIGRAM(S): at 23:46

## 2025-04-14 RX ADMIN — Medication 100 MILLIEQUIVALENT(S): at 13:37

## 2025-04-14 RX ADMIN — HEPARIN SODIUM 18 UNIT(S)/HR: 1000 INJECTION INTRAVENOUS; SUBCUTANEOUS at 01:57

## 2025-04-14 NOTE — SWALLOW FEES ASSESSMENT ADULT - RECOMMENDED FEEDING/EATING TECHNIQUES
allow for swallow between intakes/maintain upright posture during/after eating for 30 mins/oral hygiene/position upright (90 degrees)/small sips/bites
maintain upright posture during/after eating for 30 mins

## 2025-04-14 NOTE — SWALLOW FEES ASSESSMENT ADULT - DELAYED INITIATION OF THE PHARYNGEAL SWALLOW
Swallow primarily triggered at the level of the valleculae; Intermittently triggered w/ material spilling to the level of the pyriform sinuses Swallow triggered at the level of the valleculae which is WNL

## 2025-04-14 NOTE — SWALLOW FEES ASSESSMENT ADULT - PHARYNGEAL PHASE COMMENTS
Intermittent cough noted across trials, not related to laryngeal penetration or aspiration Spontaneous repeat swallow improves pharyngeal residue to trace

## 2025-04-14 NOTE — PROGRESS NOTE ADULT - ATTENDING COMMENTS
S/p TAVR in the setting of valvular shock  A+Ox3  Mixed shock requiring Vasopressin and Dobutamine, will wean  as tolerated   on amio s/p DCCV for afib/aflutter   SpO2 high 90s on nasal cannula - wean to room air as tolerated, encouraged incentive tracy   passed FEES today   Non-oliguric ANGELICA, continue to diurese as needed, CVP 6 this am on rounds    H/H low but acceptable on Heparin drip for afib   Afebrile, no antibiotics  Sugars controlled  Alejandrina , [facility] PAC    encouraged ambulation

## 2025-04-14 NOTE — SWALLOW FEES ASSESSMENT ADULT - SPECIFY REASON(S)
to objectively re-assess the swallow mechanism and r/o aspiration
to assess swallow function and determine candidacy for safe oral intake

## 2025-04-14 NOTE — SWALLOW FEES ASSESSMENT ADULT - SLP PERTINENT HISTORY OF CURRENT PROBLEM
65 yo M with a fib (s/p dccv 3/25/25), CHF (likely moderately reduced EF, several TTE with poor windows), aortic stenosis s/p TAVR (11/2022), CAD s/p CABG, a flutter s/p ablation 2019 and s/p micra implant,  CKD3, obesity, venous stasis, HTN, HLD, who presents with 6 days of worsening dyspnea on exertion. Pt also reports associated orthopnea and chest pain on exertion but not at rest which is located in the center of his chest. Pain is described as burning. Also reports several days decreased appetite. Denies fevers, chills, cough, abdominal pain, NVD, urinary sx, leg swelling. Patient had a cardioversion for atrial fibrillation at this hospital recently, which was reportedly unsuccessful. Patient has a scheduled appointment with Dr. Philip on 04/24/2025 to discuss possible ablation. His outpt cardiologist is Dr. Paul.
63 yo M with a fib (s/p dccv 3/25/25), CHF (likely moderately reduced EF, several TTE with poor windows), aortic stenosis s/p TAVR (11/2022), CAD s/p CABG, a flutter s/p ablation 2019 and s/p micra implant,  CKD3, obesity, venous stasis, HTN, HLD, who presents with 6 days of worsening dyspnea on exertion. Pt also reports associated orthopnea and chest pain on exertion but not at rest which is located in the center of his chest. Pain is described as burning. Also reports several days decreased appetite. Denies fevers, chills, cough, abdominal pain, NVD, urinary sx, leg swelling. Patient had a cardioversion for atrial fibrillation at this hospital recently, which was reportedly unsuccessful. Patient has a scheduled appointment with Dr. Philip on 04/24/2025 to discuss possible ablation. His outpt cardiologist is Dr. Paul.

## 2025-04-14 NOTE — PROGRESS NOTE ADULT - SUBJECTIVE AND OBJECTIVE BOX
Patient is a 64y old  Male who presents with a chief complaint of dyspnea , chest pain (14 Apr 2025 11:17)    HPI:   63 yo M with a fib (s/p dccv 3/25/25), CHF (likely moderately reduced EF, several TTE with poor windows), aortic stenosis s/p TAVR (11/2022), CAD s/p CABG, a flutter s/p ablation 2019 and s/p micra implant,  CKD3, obesity, venous stasis, HTN, HLD, who presents with 6 days of worsening dyspnea on exertion. Pt also reports associated orthopnea and chest pain on exertion but not at rest which is located in the center of his chest. Pain is described as burning. Also reports several days decreased appetite. Denies fevers, chills, cough, abdominal pain, NVD, urinary sx, leg swelling. Patient had a cardioversion for atrial fibrillation at this hospital recently, which was reportedly unsuccessful. Patient has a scheduled appointment with Dr. Philip on 04/24/2025 to discuss possible ablation. His outpt cardiologist is Dr. Paul.     On arrival to ED, vitals were 97.3F, HR 71, BP 94/57, RR 22, 97% on 2L NC. Labs pertinent for WBC 10.68, INR 2.55, Na 132, K 5.5, bicarb 20, BUN/Cr 78/5.17 (baseline Cr appears ~1.5-1.8), trop 225, BNP 17092, VBG ph 7.3, pco2/po2 wnl. UA without evidence of infection. EKG  on admission 1st deg av block, low voltage, poor r wave progression, R axis deviation CXR with diffuse hazy opacities suggest mild pulmonary edema and small right and trace left pleural effusions. Pt was given IV Bumex 1mg x1, IV bumex 2mg x1, IV Bumex 4mg x1, and lokelma 5mg x1, then admitted for further management.    (01 Apr 2025 01:59)       INTERVAL HPI/OVERNIGHT EVENTS:   No overnight events   Afebrile, hemodynamically stable     Subjective:    ICU Vital Signs Last 24 Hrs  T(C): 36.8 (14 Apr 2025 11:00), Max: 36.8 (14 Apr 2025 11:00)  T(F): 98.2 (14 Apr 2025 11:00), Max: 98.2 (14 Apr 2025 11:00)  HR: 59 (14 Apr 2025 11:45) (52 - 78)  BP: 88/50 (14 Apr 2025 11:45) (79/52 - 114/71)  BP(mean): 65 (14 Apr 2025 11:45) (57 - 89)  ABP: 89/48 (14 Apr 2025 11:45) (81/67 - 140/148)  ABP(mean): 64 (14 Apr 2025 11:45) (56 - 97)  RR: 19 (14 Apr 2025 11:15) (18 - 32)  SpO2: 96% (14 Apr 2025 11:45) (87% - 100%)    O2 Parameters below as of 14 Apr 2025 11:03  Patient On (Oxygen Delivery Method): nasal cannula          I&O's Summary    13 Apr 2025 07:01  -  14 Apr 2025 07:00  --------------------------------------------------------  IN: 1044 mL / OUT: 2350 mL / NET: -1306 mL    14 Apr 2025 07:01  -  14 Apr 2025 13:18  --------------------------------------------------------  IN: 119 mL / OUT: 620 mL / NET: -501 mL          Daily     Daily     Adult Advanced Hemodynamics Last 24 Hrs  CVP(mm Hg): 7 (14 Apr 2025 11:45) (0 - 30)  CVP(cm H2O): --  CO: 6.6 (13 Apr 2025 15:30) (6.6 - 6.6)  CI: 3 (13 Apr 2025 15:30) (3 - 3)  PA: 62/29 (14 Apr 2025 11:45) (40/13 - 81/43)  PA(mean): 40 (14 Apr 2025 11:45) (21 - 53)  PCWP: --  SVR: 738 (13 Apr 2025 15:30) (738 - 738)  SVRI: 1624 (13 Apr 2025 15:30) (1624 - 1624)  PVR: --  PVRI: --    EKG/Telemetry Events:    MEDICATIONS  (STANDING):  albuterol/ipratropium for Nebulization 3 milliLiter(s) Nebulizer every 6 hours  aMIOdarone    Tablet   Oral   aMIOdarone    Tablet 200 milliGRAM(s) Oral daily  atorvastatin 40 milliGRAM(s) Oral at bedtime  buMETAnide Injectable 2 milliGRAM(s) IV Push two times a day  chlorhexidine 2% Cloths 1 Application(s) Topical daily  clopidogrel Tablet 75 milliGRAM(s) Oral daily  DOBUTamine Infusion 1.25 MICROgram(s)/kG/Min (3.97 mL/Hr) IV Continuous <Continuous>  famotidine    Tablet 20 milliGRAM(s) Oral every 48 hours  hemorrhoidal Ointment 1 Application(s) Rectal daily  heparin  Infusion 1000 Unit(s)/Hr (18 mL/Hr) IV Continuous <Continuous>  insulin lispro (ADMELOG) corrective regimen sliding scale   SubCutaneous at bedtime  insulin lispro (ADMELOG) corrective regimen sliding scale   SubCutaneous three times a day before meals  vasopressin Infusion 0.04 Unit(s)/Min (6 mL/Hr) IV Continuous <Continuous>    MEDICATIONS  (PRN):      PHYSICAL EXAM:  General: Well-appearing, NAD  HEENT: PERRL, EOMI, normal sclera and conjunctiva, normal oropharynx  Neck: Supple, no JVD, thyroid without masses or enlargement  Chest/Lungs: +nasal cannula. CTA bilaterally, no wheezing, rales, rhonchi or rub  Heart: RRR, normal S1, S2, no murmurs or gallops  Abdomen: Soft, ND, NTTP, normoactive bowel sounds  Extremities: +lichen planus bilaterally. +pitting edema. 2+ peripheral pulses b/l, no edema, clubbing or cyanosis  Skin: +lichen planus bilaterally  Neurological: A&Ox3, moves all extremities, no focal deficits    LABS:                        9.7    10.36 )-----------( 151      ( 14 Apr 2025 00:39 )             31.5     04-14    137  |  90[L]  |  x   ----------------------------<  197[H]  3.5   |  33[H]  |  3.06[H]    Ca    8.8      14 Apr 2025 12:05  Phos  2.9     04-14  Mg     2.1     04-14    TPro  7.5  /  Alb  3.2[L]  /  TBili  1.6[H]  /  DBili  x   /  AST  13  /  ALT  13  /  AlkPhos  85  04-14    LIVER FUNCTIONS - ( 14 Apr 2025 12:05 )  Alb: 3.2 g/dL / Pro: 7.5 g/dL / ALK PHOS: 85 U/L / ALT: 13 U/L / AST: 13 U/L / GGT: x           PT/INR - ( 14 Apr 2025 00:39 )   PT: 12.2 sec;   INR: 1.06 ratio         PTT - ( 14 Apr 2025 08:25 )  PTT:60.2 sec  CAPILLARY BLOOD GLUCOSE      POCT Blood Glucose.: 118 mg/dL (14 Apr 2025 08:02)  POCT Blood Glucose.: 123 mg/dL (13 Apr 2025 22:53)  POCT Blood Glucose.: 111 mg/dL (13 Apr 2025 18:19)    ABG - ( 14 Apr 2025 11:45 )  pH, Arterial: 7.44  pH, Blood: x     /  pCO2: 61    /  pO2: 92    / HCO3: 41    / Base Excess: 15.0  /  SaO2: 98.2                    Urinalysis Basic - ( 14 Apr 2025 12:05 )    Color: x / Appearance: x / SG: x / pH: x  Gluc: 197 mg/dL / Ketone: x  / Bili: x / Urobili: x   Blood: x / Protein: x / Nitrite: x   Leuk Esterase: x / RBC: x / WBC x   Sq Epi: x / Non Sq Epi: x / Bacteria: x          RADIOLOGY & ADDITIONAL TESTS:  CXR:        Care Discussed with Consultants/Other Providers [ x] YES  [ ] NO           Patient is a 64y old  Male who presents with a chief complaint of dyspnea , chest pain (14 Apr 2025 11:17)    HPI:   63 yo M with a fib (s/p dccv 3/25/25), CHF (likely moderately reduced EF, several TTE with poor windows), aortic stenosis s/p TAVR (11/2022), CAD s/p CABG, a flutter s/p ablation 2019 and s/p micra implant,  CKD3, obesity, venous stasis, HTN, HLD, who presents with 6 days of worsening dyspnea on exertion. Pt also reports associated orthopnea and chest pain on exertion but not at rest which is located in the center of his chest. Pain is described as burning. Also reports several days decreased appetite. Denies fevers, chills, cough, abdominal pain, NVD, urinary sx, leg swelling. Patient had a cardioversion for atrial fibrillation at this hospital recently, which was reportedly unsuccessful. Patient has a scheduled appointment with Dr. Philip on 04/24/2025 to discuss possible ablation. His outpt cardiologist is Dr. Paul.     On arrival to ED, vitals were 97.3F, HR 71, BP 94/57, RR 22, 97% on 2L NC. Labs pertinent for WBC 10.68, INR 2.55, Na 132, K 5.5, bicarb 20, BUN/Cr 78/5.17 (baseline Cr appears ~1.5-1.8), trop 225, BNP 25203, VBG ph 7.3, pco2/po2 wnl. UA without evidence of infection. EKG  on admission 1st deg av block, low voltage, poor r wave progression, R axis deviation CXR with diffuse hazy opacities suggest mild pulmonary edema and small right and trace left pleural effusions. Pt was given IV Bumex 1mg x1, IV bumex 2mg x1, IV Bumex 4mg x1, and lokelma 5mg x1, then admitted for further management.    (01 Apr 2025 01:59)       INTERVAL HPI/OVERNIGHT EVENTS:   No overnight events   Afebrile, hemodynamically stable     Subjective:    ICU Vital Signs Last 24 Hrs  T(C): 36.8 (14 Apr 2025 11:00), Max: 36.8 (14 Apr 2025 11:00)  T(F): 98.2 (14 Apr 2025 11:00), Max: 98.2 (14 Apr 2025 11:00)  HR: 59 (14 Apr 2025 11:45) (52 - 78)  BP: 88/50 (14 Apr 2025 11:45) (79/52 - 114/71)  BP(mean): 65 (14 Apr 2025 11:45) (57 - 89)  ABP: 89/48 (14 Apr 2025 11:45) (81/67 - 140/148)  ABP(mean): 64 (14 Apr 2025 11:45) (56 - 97)  RR: 19 (14 Apr 2025 11:15) (18 - 32)  SpO2: 96% (14 Apr 2025 11:45) (87% - 100%)    O2 Parameters below as of 14 Apr 2025 11:03  Patient On (Oxygen Delivery Method): nasal cannula          I&O's Summary    13 Apr 2025 07:01  -  14 Apr 2025 07:00  --------------------------------------------------------  IN: 1044 mL / OUT: 2350 mL / NET: -1306 mL    14 Apr 2025 07:01  -  14 Apr 2025 13:18  --------------------------------------------------------  IN: 119 mL / OUT: 620 mL / NET: -501 mL          Daily     Daily     Adult Advanced Hemodynamics Last 24 Hrs  CVP(mm Hg): 7 (14 Apr 2025 11:45) (0 - 30)  CVP(cm H2O): --  CO: 6.6 (13 Apr 2025 15:30) (6.6 - 6.6)  CI: 3 (13 Apr 2025 15:30) (3 - 3)  PA: 62/29 (14 Apr 2025 11:45) (40/13 - 81/43)  PA(mean): 40 (14 Apr 2025 11:45) (21 - 53)  PCWP: --  SVR: 738 (13 Apr 2025 15:30) (738 - 738)  SVRI: 1624 (13 Apr 2025 15:30) (1624 - 1624)  PVR: --  PVRI: --    EKG/Telemetry Events:    MEDICATIONS  (STANDING):  albuterol/ipratropium for Nebulization 3 milliLiter(s) Nebulizer every 6 hours  aMIOdarone    Tablet   Oral   aMIOdarone    Tablet 200 milliGRAM(s) Oral daily  atorvastatin 40 milliGRAM(s) Oral at bedtime  buMETAnide Injectable 2 milliGRAM(s) IV Push two times a day  chlorhexidine 2% Cloths 1 Application(s) Topical daily  clopidogrel Tablet 75 milliGRAM(s) Oral daily  DOBUTamine Infusion 1.25 MICROgram(s)/kG/Min (3.97 mL/Hr) IV Continuous <Continuous>  famotidine    Tablet 20 milliGRAM(s) Oral every 48 hours  hemorrhoidal Ointment 1 Application(s) Rectal daily  heparin  Infusion 1000 Unit(s)/Hr (18 mL/Hr) IV Continuous <Continuous>  insulin lispro (ADMELOG) corrective regimen sliding scale   SubCutaneous at bedtime  insulin lispro (ADMELOG) corrective regimen sliding scale   SubCutaneous three times a day before meals  vasopressin Infusion 0.04 Unit(s)/Min (6 mL/Hr) IV Continuous <Continuous>    MEDICATIONS  (PRN):      PHYSICAL EXAM:  General: Well-appearing, NAD  HEENT: PERRL, EOMI  Neck: Suppleenlargement  Chest/Lungs: +nasal cannula. CTA bilaterally, no wheezing, rales, rhonchi or rub  Heart: RRR, normal S1, S2, no murmurs or gallops  Abdomen: Soft, ND, NTTP, normoactive bowel sounds  Extremities: +pitting edema. 2+ peripheral pulses b/l, no edema, clubbing or cyanosis  Neurological: A&Ox3, moves all extremities, no focal deficits    LABS:                        9.7    10.36 )-----------( 151      ( 14 Apr 2025 00:39 )             31.5     04-14    137  |  90[L]  |  x   ----------------------------<  197[H]  3.5   |  33[H]  |  3.06[H]    Ca    8.8      14 Apr 2025 12:05  Phos  2.9     04-14  Mg     2.1     04-14    TPro  7.5  /  Alb  3.2[L]  /  TBili  1.6[H]  /  DBili  x   /  AST  13  /  ALT  13  /  AlkPhos  85  04-14    LIVER FUNCTIONS - ( 14 Apr 2025 12:05 )  Alb: 3.2 g/dL / Pro: 7.5 g/dL / ALK PHOS: 85 U/L / ALT: 13 U/L / AST: 13 U/L / GGT: x           PT/INR - ( 14 Apr 2025 00:39 )   PT: 12.2 sec;   INR: 1.06 ratio         PTT - ( 14 Apr 2025 08:25 )  PTT:60.2 sec  CAPILLARY BLOOD GLUCOSE      POCT Blood Glucose.: 118 mg/dL (14 Apr 2025 08:02)  POCT Blood Glucose.: 123 mg/dL (13 Apr 2025 22:53)  POCT Blood Glucose.: 111 mg/dL (13 Apr 2025 18:19)    ABG - ( 14 Apr 2025 11:45 )  pH, Arterial: 7.44  pH, Blood: x     /  pCO2: 61    /  pO2: 92    / HCO3: 41    / Base Excess: 15.0  /  SaO2: 98.2                    Urinalysis Basic - ( 14 Apr 2025 12:05 )    Color: x / Appearance: x / SG: x / pH: x  Gluc: 197 mg/dL / Ketone: x  / Bili: x / Urobili: x   Blood: x / Protein: x / Nitrite: x   Leuk Esterase: x / RBC: x / WBC x   Sq Epi: x / Non Sq Epi: x / Bacteria: x          RADIOLOGY & ADDITIONAL TESTS:  CXR:        Care Discussed with Consultants/Other Providers [ x] YES  [ ] NO

## 2025-04-14 NOTE — SWALLOW FEES ASSESSMENT ADULT - COMMENTS
On arrival to ED, vitals were 97.3F, HR 71, BP 94/57, RR 22, 97% on 2L NC. Labs pertinent for WBC 10.68, INR 2.55, Na 132, K 5.5, bicarb 20, BUN/Cr 78/5.17 (baseline Cr appears ~1.5-1.8), trop 225, BNP 13663, VBG ph 7.3, pco2/po2 wnl. UA without evidence of infection. EKG  on admission 1st deg av block, low voltage, poor r wave progression, R axis deviation CXR with diffuse hazy opacities suggest mild pulmonary edema and small right and trace left pleural effusions. Pt was given IV Bumex 1mg x1, IV bumex 2mg x1, IV Bumex 4mg x1, and lokelma 5mg x1, then admitted for further management.  4/1 RRT for hypotension; CCU consulted and accepted as TTE notable for severe prosthetic aortic valve stenosis for which structural cardiology was already consulted. Nephrology consulted for management of ANGELICA on CKD. Pt became tachypneic to 38 and hypoxic requiring 6L NC w/ escalation to BiPAP. Decision made to intubate. 4/2 STANISLAW completed; pt extubated to HFNC. S/p TAVR 4/11. Extubated 4/12 (x1 day). Swallow re-evaluation ordered.    Swallow Hx: Pt seen for initial bedside swallow evaluation 4/3. Recommendations for NPO w/ alternative means of nutrition/hydration/medication and FEES for further assessment. FEES completed 4/4 w/ recommendations for regular diet/thin liquids. Oropharyngeal swallow intact. Baseline cough unrelated to swallowing. Pt seen for bedside swallow re-evaluation on 4/13 following TAVR (4/11). Recommendation for regular diet/mildly thick liquids and FEES to re-assess the swallow mechanism and r/o aspiration. +B/l granuloma tissue along posterior VC, R>L  +Omega-shaped epiglottis  +B/l vocal cords mobile  +Pooling of secretions--cleared over time w/ PO trials

## 2025-04-14 NOTE — SWALLOW FEES ASSESSMENT ADULT - ESOPHAGEAL PHASE
Belching noted following consecutive sips of thin liquids via straw; retrograde flow was not observed

## 2025-04-14 NOTE — SWALLOW FEES ASSESSMENT ADULT - SLP GENERAL OBSERVATIONS
Pt encountered awake and alert, positioned upright in bed (RN adjusted A-line), +4L/NC, A&Ox4, able to follow commands and make wants/needs known, intermittent cough at baseline (in absence of PO intake).
Encountered Pt sitting upright in bed on 2L O2 via NC. Pt is A&Ox4, verbally responsive, and able to follow commands for the purposes of this exam.

## 2025-04-14 NOTE — PROGRESS NOTE ADULT - ASSESSMENT
63 y/o M with hx of Afib s/p DCCV 3/25/25, CHF, Aortic stenosis s/p TAVR 11/2022, CAD s/p CABG, Aflutter s/p ablation and Micra implant, CKD 3, Obesity, venous stasis, HTN, HLD presented with 6 days of worsening SOB on exertion associated with orthopnea and chest pain.   Also reports several days decreased appetite. Denies fevers, chills, cough, abdominal pain, NVD, urinary sx, leg swelling. Patient had a cardioversion for atrial fibrillation at this hospital recently, which was reportedly unsuccessful. Patient has a scheduled appointment with Dr. Philip on 04/24/2025 to discuss possible ablation. His outpt cardiologist is Dr. Miranda. (Cardiology)  On arrival to ED, vitals were 97.3F, HR 71, BP 94/57, RR 22, 97% on 2L NC. Labs pertinent for WBC 10.68, INR 2.55, Na 132, K 5.5, bicarb 20, BUN/Cr 78/5.17 (baseline Cr appears ~1.5-1.8), trop 225, BNP 98493, VBG ph 7.3, pco2/po2 wnl. UA without evidence of infection.  nephrology was consulted for management of ANGELICA on CKD. Pt has baseline Scr of ~1.5- 1.8. Scr was 2.7 on 3/25/25. Pt is on Entresto ( started recently) and he mentioned that he was taking Motrin occasionally. No herbal medications/ supplements.  Scr at the time of admission was 5.17 3/31/25 and it further worsened to 5.45 4/1/25.    Pt follows Dr. Stewart Brown (Nephrology)  LHC was done on 4/8  Plan for TAVR on 4/11.     ANGELICA on CKD:  Pt has hx of CKD 3. Baseline Scr 1.5-1.8.   At the time of admission - Scr was 5.17 and it further worsened to 5.45--> 5.5. Scr elevated/improving to 3.54---> 3.48--> 3.09--> 2.8 4/12. Scr worsened  to 3.2---> 3.3 today.  - Likely LISA in setting of TAVR.   Pt has lopez cath in place. UOP was 2.3L with net negative 1.3L in last 24 hours.   Bumex gtt - restarted 4/4/25 - Briefly held on 4/8 for LHC and is restarted later in the day.  Now off of bumex gtt due to alkalosis ( given a dose of diamox on 4/12 with a plan for 3 doses qd - 500 IV)  Bicarb is elevated but pCO2 is also elevated. pt is on BiPAP at night. Management of WILFRED/OHS as per primary team.   UA showed trace LE, blood ( Likely due to lopez), casts 17. UPCR 0.7. No hx of DM.   Pt was on entresto.   BP was low.   On IV vasopressin.   ANGELICA on CKD likely in setting of HF exacerbation, entresto use and hypotension mediated ATN.  USG kidney and bladder - normal sized kidneys. No HN.     PLAN:  Bicarb is trending up.pCo2 is also elevated.   on BiPAP overnight. Management as per primary team.   Pt is started on Bumex 2mg q12 - Acetazolamide works well as a diuretic in alkalotic conditions. Consider increasing bumex 2mg to TID.   continue to hold entresto.   Monitor strict I/Os and daily wts.   No urgent need of HD for now.    Avoid nephrotoxins  Dose medications as per eGFR    hyperkalemia: resolved  Today Serum K is 4.1  Monitor closely - replete as needed.     Hyponatremia is likely in setting of impaired water clearance in setting of worsening renal function and volume overload 2/2 heart failure  resolved now.   Bumex as noted above      Pt is at high risk of LISA. Pt was explained in detail about the risk of LISA and possible need of dialysis if the kidney function gets worse.   Enoch score 26.1 %- Risk of post PCI - LISA and 1.09% risk of requiring dialysis.    TAVR on 4/11 done. Risk of LISA is upto 48- 72 hours. Monitor kidney function closely. Scr is trending up now. No urgent need of HD.     Wait for the final reccs as per the attending.

## 2025-04-14 NOTE — PROGRESS NOTE ADULT - ASSESSMENT
Assessment: 64M PMHx AFib (s/p dccv 3/25/25), HFrEF (likely mod red EF, sev TTE), aortic stenosis s/p TAVR (2022), a flutter s/p ablation  and s/p micra implant, CKD3, WILFRED, obesity, venous stasis, HTN, HLD, who presents with 6 days of worsening dyspnea on exertion, admitted for AHRF likely iso CHF exacerbation, also with ANGELICA on CKD c/f cardiorenal syndrome. RRT on floors for SBP 80s requiring pressors and has been admitted to the CCU. Now s/p TAVR .    Plan:  NEURO   A&O3   - s/p extubation  - continue to monitor mental status as per protocol     RESPIRATORY  #Acute hypoxemic hypercapnic respiratory failure  - extubated today to AVAPS  - Likely retaining 2/2 volume overload  - continue duonebs   - monitor ABGs  - continue to monitor SpO2 with goal >94%    CARDIO  #Obstructive shock 2/2 AS  - MAPs 70s -> will continue to wean vasopressin as BP tolerates    #Acute on chronic HF exacerbation  - preload reduction: bumex 1 IV BID  - inotropic support:  1.25  - continue vaso for pressor support  - Goal 1-2L net neg daily  - CVP goal 8-12  - Diuril, 3% hypertonic, diamox, and metolazone PRN for augmenting diuresis    #Aortic valve stenosis s/p prosthetic  - S/p STANISLAW showing severe AS stenosis, global hypokinesis and EF 15%  -  patent LIMA-LAD, closed SVG-OM1/D1, pending TAVR   - s/p TAVR   - heparin gtt    #Hx CABG  #PVD  - C/w Plavix    #HLD  - Increased Lipitor to 40mg from 20mg    RENAL/  #ANGELICA on CKD  #Cardiorenal syndrome  - Baseline 1.5-1.8  - Most likely i/s/o hypotension and fluid overload  - Renal sono showed parenchymal disease  - Nephro following c/w bumex gtt, monitor volume status. Stop sodium bicarb 650 TID for now     #Hyperkalemia  - Lokelma if K increases into 5s range  - Continue monitoring urine output, lytes, SCr/ BUN  - replete lytes prn with goal K >4 and Mg >2    GI  FEEST tomorrow with speech & swallow    ENDO  #Hyperglycemia  - ISS    HEME  - Monitor H/H and plts  - DVT PPX: Heparin    ID  Afebrile  - no leukocytosis  - monitor and trend WBC and temperature curve     Dispo: Maintain in ICU.   Assessment: 64M PMHx AFib (s/p dccv 3/25/25), HFrEF (likely mod red EF, sev TTE), aortic stenosis s/p TAVR (2022), a flutter s/p ablation  and s/p micra implant, CKD3, WILFRED, obesity, venous stasis, HTN, HLD, who presents with 6 days of worsening dyspnea on exertion, admitted for AHRF likely iso CHF exacerbation, also with ANGELICA on CKD c/f cardiorenal syndrome. RRT on floors for SBP 80s requiring pressors and has been admitted to the CCU. Now s/p TAVR .    Plan:  NEURO   A&O3   - s/p extubation  - continue to monitor mental status as per protocol     RESPIRATORY  #Acute hypoxemic hypercapnic respiratory failure  - extubated today to AVAPS  - Likely retaining 2/2 volume overload  - continue duonebs   - monitor ABGs  - continue to monitor SpO2 with goal >94%    CARDIO  #Obstructive shock 2/2 AS  - MAPs 70s -> will continue to wean vasopressin as BP tolerates    #Acute on chronic HF exacerbation  - preload reduction: bumex 1 IV BID  - inotropic support:  1.25, wean as tolerated  - continue vaso for pressor support  - Goal 1-2L net neg daily  - CVP goal 8-12  - Diuril, 3% hypertonic, diamox, and metolazone PRN for augmenting diuresis    #Aortic valve stenosis s/p prosthetic  - S/p STANISLAW showing severe AS stenosis, global hypokinesis and EF 15%  -  patent LIMA-LAD, closed SVG-OM1/D1, pending TAVR   - s/p TAVR   - heparin gtt    #Hx CABG  #PVD  - C/w Plavix    #HLD  - Increased Lipitor to 40mg from 20mg    RENAL/  #ANGELICA on CKD  #Cardiorenal syndrome  - Baseline 1.5-1.8  - Most likely i/s/o hypotension and fluid overload  - Renal sono showed parenchymal disease  - Nephro following c/w bumex gtt, monitor volume status. Stop sodium bicarb 650 TID for now     #Hyperkalemia  - Lokelma if K increases into 5s range  - Continue monitoring urine output, lytes, SCr/ BUN  - replete lytes prn with goal K >4 and Mg >2    GI  FEES with speech & swallow, regular diet w/ thin liquids    ENDO  #Hyperglycemia  - ISS    HEME  - Monitor H/H and plts  - DVT PPX: Heparin    ID  Afebrile  - no leukocytosis  - monitor and trend WBC and temperature curve     Dispo: Maintain in ICU.   Assessment: 64M PMHx AFib (s/p dccv 3/25/25), HFrEF (likely mod red EF, sev TTE), aortic stenosis s/p TAVR (2022), a flutter s/p ablation  and s/p micra implant, CKD3, WILFRED, obesity, venous stasis, HTN, HLD, who presents with 6 days of worsening dyspnea on exertion, admitted for AHRF likely iso CHF exacerbation, also with ANGELICA on CKD c/f cardiorenal syndrome. RRT on floors for SBP 80s requiring pressors and has been admitted to the CCU. Now s/p TAVR .    Plan:  NEURO   A&O3   - s/p extubation  - continue to monitor mental status as per protocol     RESPIRATORY  #Acute hypoxemic hypercapnic respiratory failure  - extubated today to AVAPS  - continue duonebs   - monitor ABGs  - continue to monitor SpO2 with goal >94%    CARDIO  #Obstructive shock 2/2 AS  - MAPs 70s -> will continue to wean vasopressin as BP tolerates    #Acute on chronic HF exacerbation  - preload reduction: bumex 1 IV BID  - inotropic support:  1.25, wean as tolerated  - continue vaso for pressor support  - Goal 1-2L net neg daily  - CVP goal 8-12  - Diuril, 3% hypertonic, diamox, and metolazone PRN for augmenting diuresis    #Aortic valve stenosis s/p prosthetic  - S/p STANISLAW showing severe AS stenosis, global hypokinesis and EF 15%  -  patent LIMA-LAD, closed SVG-OM1/D1, pending TAVR   - s/p TAVR   - heparin gtt    #Hx CABG  #PVD  - C/w Plavix    #HLD  - Increased Lipitor to 40mg from 20mg    RENAL/  #ANGELICA on CKD  #Cardiorenal syndrome  - Baseline 1.5-1.8  - Most likely i/s/o hypotension and fluid overload  - Renal sono showed parenchymal disease  - Nephro following c/w bumex gtt, monitor volume status. Stop sodium bicarb 650 TID for now     #Hyperkalemia  - Lokelma if K increases into 5s range  - Continue monitoring urine output, lytes, SCr/ BUN  - replete lytes prn with goal K >4 and Mg >2    GI  FEES with speech & swallow, regular diet w/ thin liquids    ENDO  #Hyperglycemia  - ISS    HEME  - Monitor H/H and plts  - DVT PPX: Heparin    ID  Afebrile  - no leukocytosis  - monitor and trend WBC and temperature curve     Dispo: Maintain in ICU.

## 2025-04-14 NOTE — PROGRESS NOTE ADULT - SUBJECTIVE AND OBJECTIVE BOX
Subjective  Events of transpired over last 24 hours were reviewed.  Patient on BiPAP overnight.  The patient is currently on vasopressin and heparin drip.  He reports that since the procedure he is clinically doing well.  Denies any chest pain/tightness discomfort, fevers, chills, sweats, symptoms, dizzy or palpitations.  He is happy with his progress.    Telemetry  Atrial fibrillation    Review of system  14 point review of systems otherwise unremarkable separate described above in history of present illness    Physical examination  No apparent distress, alert and oriented 3, appropriate affect  JVD is elevated, supple, no lymphadenopathy  Clear to auscultation bilaterally with no wheezing rhonchi crackles  Regular rate and rhythm with 3 out of 6 crescendo decrescendo murmur less on the right upper sternal rating to the carotids  Positive bowel sound, soft, nontender, nondistended, no mass and guarding or rebound tenderness  Right and left groin sites are soft with no ecchymosis/hematoma/bruit  Chronic venous stasis changes bilaterally, mild bilateral lower extremity edema  1+ DP/PT pulse  No focal deficits    Assessment/plan  64-year-old man with past medical history significant for    A-fib status post cardioversion on 3/25/2025  Ischemic cardiomyopathy  Coronary artery disease status post CABG  Aortic valve stenosis status post TAVR in 11/2022  Atrial flutter status post ablation 2019  Status post Micra implant  Chronic kidney disease stage III  Obesity  Hypertension  Hyperlipidemia  Venous stasis    Who presented with acute on chronic renal failure in the setting of CHF exacerbation rapid response called on the floor due to systolic blood pressure in the 80s.  Patient in cardiogenic shock.    --The patient on 4/11/2025 underwent percutaneous transcatheter mitral valve in valve of his bioprosthetic Cullen 3 26mm during which time a Medtronic Evolut FX+ 29mm valve was implanted.  The patient tolerated the procedure well.  Repeat TTE was reviewed which demonstrated appropriately positioned TAVR valve in valve.  --Recommend patient be seen by heart failure team to assist in medical optimization.  --Wean vasopressin as tolerated.  --Continue heparin drip.  Closely monitor for signs and symptoms of bleeding.  Patient agreeable to be started on Coumadin.  Timing of initiation of Coumadin as per CICU team.  Coumadin will be administered for at least the first 3 to 6 months following valve implantation instead of Eliquis which she was taking previously.   --Continue Bumex 2 mg IV twice a day.  Patient also getting acetazolamide 500 mg.  --Continue amiodarone 200 mg daily.  --Continue atorvastatin 40 mg daily.  --Continue clopidogrel 75 mg daily.  --Continue telemetry monitoring.    All questions and concerns of the patient's brother and best friend who was at his bedside were addressed.    Findings and plan were discussed with CICU team.    Time-based billing (NON-critical care).   35 minutes spent on total encounter. The necessity of the time spent during the encounter on this date of service was due to:   education, assessment and coordination of care

## 2025-04-14 NOTE — SWALLOW FEES ASSESSMENT ADULT - NS SWALLOW FEES REC ASPIR MON
change of breathing pattern/fever/pneumonia/upper respiratory infection
change of breathing pattern/cough/gurgly voice/fever/pneumonia/throat clearing/upper respiratory infection

## 2025-04-14 NOTE — PROGRESS NOTE ADULT - SUBJECTIVE AND OBJECTIVE BOX
[facility] Division of Kidney Diseases & Hypertension  FOLLOW UP NOTE  [phone]--------------------------------------------------------------------------------  Chief Complaint:Acute on chronic systolic congestive heart failure    24 hour events/subjective:  Pt was seen and examined earlier today. No acute overnight events. No fresh complaints. Pt reports feeling better.   Pt denies  worsening of SOB/ Constipation/ Diarrhea/ Nausea/ Vomiting/ abdominal pain/ chest pain/ tingling/ numbness.         PAST HISTORY  --------------------------------------------------------------------------------  No significant changes to PMH, PSH, FHx, SHx, unless otherwise noted    ALLERGIES & MEDICATIONS  --------------------------------------------------------------------------------  Allergies    metoprolol (Short breath)    Intolerances      Standing Inpatient Medications  acetaZOLAMIDE Injectable 500 milliGRAM(s) IV Push <User Schedule>  albuterol/ipratropium for Nebulization 3 milliLiter(s) Nebulizer every 6 hours  aMIOdarone    Tablet   Oral   aMIOdarone    Tablet 200 milliGRAM(s) Oral daily  atorvastatin 40 milliGRAM(s) Oral at bedtime  buMETAnide Injectable 2 milliGRAM(s) IV Push two times a day  chlorhexidine 2% Cloths 1 Application(s) Topical daily  clopidogrel Tablet 75 milliGRAM(s) Oral daily  DOBUTamine Infusion 1.25 MICROgram(s)/kG/Min IV Continuous <Continuous>  hemorrhoidal Ointment 1 Application(s) Rectal daily  heparin  Infusion 1000 Unit(s)/Hr IV Continuous <Continuous>  insulin lispro (ADMELOG) corrective regimen sliding scale   SubCutaneous at bedtime  insulin lispro (ADMELOG) corrective regimen sliding scale   SubCutaneous three times a day before meals  pantoprazole  Injectable 40 milliGRAM(s) IV Push daily  vasopressin Infusion 0.04 Unit(s)/Min IV Continuous <Continuous>    PRN Inpatient Medications      REVIEW OF SYSTEMS  --------------------------------------------------------------------------------  as noted above.     VITALS/PHYSICAL EXAM  --------------------------------------------------------------------------------  T(C): 36.5 (04-14-25 @ 07:00), Max: 36.6 (04-13-25 @ 11:00)  HR: 69 (04-14-25 @ 08:53) (52 - 78)  BP: 81/39 (04-14-25 @ 08:45) (81/39 - 146/64)  RR: 20 (04-14-25 @ 08:45) (18 - 32)  SpO2: 96% (04-14-25 @ 08:53) (87% - 100%)  Wt(kg): --        04-13-25 @ 07:01  -  04-14-25 @ 07:00  --------------------------------------------------------  IN: 1044 mL / OUT: 2350 mL / NET: -1306 mL    04-14-25 @ 07:01  -  04-14-25 @ 10:38  --------------------------------------------------------  IN: 44 mL / OUT: 250 mL / NET: -206 mL      Physical Exam:  Gen: extubated on 4/12. On supplemental oxygen.   Pulm: No crackles, lower zone decreased breath sounds.   CV: RRR, S1S2;  Abd: +BS, soft, nontender/nondistended  : Alba in place.   Extremities: Mild LE swelling  Skin: Warm, Lower extremity chronic changes.      LABS/STUDIES  --------------------------------------------------------------------------------              9.7    10.36 >-----------<  151      [04-14-25 @ 00:39]              31.5     138  |  91  |  99  ----------------------------<  110      [04-14-25 @ 00:40]  4.1   |  33  |  3.33        Ca     9.0     [04-14-25 @ 00:40]      Mg     2.2     [04-14-25 @ 00:40]      Phos  3.1     [04-14-25 @ 00:40]    TPro  7.5  /  Alb  3.3  /  TBili  1.7  /  DBili  x   /  AST  13  /  ALT  12  /  AlkPhos  82  [04-14-25 @ 00:40]    PT/INR: PT 12.2 , INR 1.06       [04-14-25 @ 00:39]  PTT: 60.2       [04-14-25 @ 08:25]      Creatinine Trend:  SCr 3.33 [04-14 @ 00:40]  SCr 3.35 [04-13 @ 18:32]  SCr 3.21 [04-13 @ 00:47]  SCr 2.87 [04-12 @ 10:50]  SCr 2.80 [04-12 @ 06:19]

## 2025-04-14 NOTE — CHART NOTE - NSCHARTNOTEFT_GEN_A_CORE
NUTRITION FOLLOW UP NOTE    PATIENT SEEN FOR: Nutrition Follow Up    SOURCE: [x] Patient  [x] Current Medical Record  [x] RN  [] Family/support person at bedside  [] Patient unavailable/inappropriate  [x] Other: interdisciplinary medical team    CHART REVIEWED/EVENTS NOTED.  [] No changes to nutrition care plan to note  [x] Nutrition Status:  -Presented with acute on chronic renal failure in setting of CHF exacerbation. On , pt underwent percutaneous transcatheter mitral valve of his bioprosthetic Cullen during which time a Medtronic Evolut FX +29mm valve was implanted. Extubated . Placed on BiPAP overnight.   -Continues on diuretic therapy as prescribed. Nephrology following. See MD note .   -S/P FEEST with SLP. Per RN, pt OK to receive Thin Liquids at this time. Order below still reflective of Thickened liquids.     DIET ORDER:   Diet, DASH/TLC:   Sodium & Cholesterol Restricted  Mildly Thick Liquids (MILDTHICKLIQS) (25)    CURRENT DIET ORDER IS:  [] Appropriate:  [] Inadequate:  [x] Other: Pending results of FEEST . S/P swallow evaluation ; see note for additional details.     NUTRITION INTAKE/PROVISION:  [x] PO: Pt reports good PO intake this morning. Consumed farina, eggs and all liquids provided on tray. Pt acknowledged importance of protein intake and specifically states he consumed eggs as protein source. Requesting to receive Ensure Max, chocolate or vanilla to optimize nutrient intake potential. Denies nausea, vomiting. Reports that diarrhea has improved.   [] Enteral Nutrition:  [] Parenteral Nutrition:    ANTHROPOMETRICS:  Drug Dosing Weight  Height (cm): 172.7 (2025 11:45)  Weight (kg): 105.8 (2025 11:45)  BMI (kg/m2): 35.5 (2025 11:45)  BSA (m2): 2.18 (2025 11:45)  Weights:   Daily Weight in k.3 (), Weight in k.8 (), Weight in k.2 (04-10), Weight in k.5 (), Weight in k.3 ()     NUTRITIONALLY PERTINENT MEDICATIONS:  MEDICATIONS  (STANDING):  acetaZOLAMIDE Injectable  aMIOdarone    Tablet  aMIOdarone    Tablet  atorvastatin  buMETAnide Injectable  DOBUTamine Infusion  famotidine    Tablet  insulin lispro (ADMELOG) corrective regimen sliding scale  insulin lispro (ADMELOG) corrective regimen sliding scale  vasopressin Infusion    NUTRITIONALLY PERTINENT LABS:   Na138 mmol/L Glu 110 mg/dL[H] K+ 4.1 mmol/L Cr  3.33 mg/dL[H] BUN 99 mg/dL[H]  Phos 3.1 mg/dL  Alb 3.3 g/dL  Chol 94 mg/dL LDL --    HDL 29 mg/dL[L] Trig 74 mg/dL ALT 12 U/L AST 13 U/L Alkaline Phosphatase 82 U/L  25 @ 18:01 a1c 5.8    A1C with Estimated Average Glucose Result: 5.8 % (25 @ 18:01)    Finger Sticks:  POCT Blood Glucose.: 118 mg/dL ( @ 08:02)  POCT Blood Glucose.: 123 mg/dL ( @ 22:53)  POCT Blood Glucose.: 111 mg/dL ( @ 18:19)    NUTRITIONALLY PERTINENT MEDICATIONS/LABS:  [x] Reviewed  [x] Relevant notes on medications/labs:  -Prescribed insulin lispro sliding scale to aid in management of BG. A1c 5.8%.   -On IV Bumex as prescribed; wt fluctuations and fluid shifts expected.   -Receiving vasopressin, infusing at 6ml/hr.    EDEMA:  [x] Reviewed - 1+ left leg;  right leg  [] Relevant notes:    GI/ I&O:  [x] Reviewed  [] Relevant notes:  [x] Other:  -last BM 4/13 x 2. Not on apparent bowel regimen. Prescribed Protonix.     SKIN:   [x] No pressure injuries documented, per nursing flowsheet  [] Pressure injury previously noted  [] Change in pressure injury documentation:  [] Other:    ESTIMATED NEEDS:  [x] No change:  [] Updated:  Energy: 1608-6810 kcal/day (33-38 kcal/kg)  Protein: 105-126g/day (1.5-1.8 g/kg)  Fluid:   ml/day or [x] defer to team  Based on: IBW 69.8kg    NUTRITION DIAGNOSIS:  [x] Prior Dx: Severe Acute Malnutrition, Overweight/Obesity  [] New Dx:    EDUCATION:  [x] Yes: Provided reinforcement/recommendations to optimize PO and protein intake, recommended small frequent meals by ordering nutrient-dense snacks and leaving non-perishable food away from tray for later consumption during the day or between meals, to start with protein, and sips of supplement throughout the day; reviewed foods with protein and menu order procedures in hospital.   [] Not appropriate/warranted    NUTRITION CARE PLAN:  1. Diet: Continue DASH diet as ordered. Defer fluid restriction to team. Defer consistency/texture to medical team, SLP   2. Supplements: Recommend Ensure Max 2x daily   3. Multivitamin/mineral supplementation: Consider multivitamin daily.   4: Provide encouragement with PO intake, menu selections, and assistance with meals as needed.    [] Achieved - Continue current nutrition intervention(s)  [] Current medical condition precludes nutrition intervention at this time.    MONITORING AND EVALUATION:   RD remains available upon request and will follow up per protocol.    Anita Malik RD, available via TEAMS NUTRITION FOLLOW UP NOTE    PATIENT SEEN FOR: Nutrition Follow Up    SOURCE: [x] Patient  [x] Current Medical Record  [x] RN  [] Family/support person at bedside  [] Patient unavailable/inappropriate  [x] Other: interdisciplinary medical team    CHART REVIEWED/EVENTS NOTED.  [] No changes to nutrition care plan to note  [x] Nutrition Status:  -Presented with acute on chronic renal failure in setting of CHF exacerbation. On , pt underwent percutaneous transcatheter mitral valve of his bioprosthetic Cullen during which time a Medtronic Evolut FX +29mm valve was implanted. Extubated . Placed on BiPAP overnight.   -Continues on diuretic therapy as prescribed. Nephrology following. See MD note .   -S/P FEEST with SLP. Per RN, pt OK to receive Thin Liquids at this time. Order below still reflective of Thickened liquids.     DIET ORDER:   Diet, DASH/TLC:   Sodium & Cholesterol Restricted  Mildly Thick Liquids (MILDTHICKLIQS) (25)    CURRENT DIET ORDER IS:  [] Appropriate:  [] Inadequate:  [x] Other: Pending results of FEEST . S/P swallow evaluation ; see note for additional details.     NUTRITION INTAKE/PROVISION:  [x] PO: Pt reports good PO intake this morning. Consumed farina, eggs and all liquids provided on tray. Pt acknowledged importance of protein intake and specifically states he consumed eggs as protein source. Requesting to receive Ensure Max, chocolate or vanilla to optimize nutrient intake potential. Denies nausea, vomiting. Reports that diarrhea has improved.   [] Enteral Nutrition:  [] Parenteral Nutrition:    ANTHROPOMETRICS:  Drug Dosing Weight  Height (cm): 172.7 (2025 11:45)  Weight (kg): 105.8 (2025 11:45)  BMI (kg/m2): 35.5 (2025 11:45)  BSA (m2): 2.18 (2025 11:45)  Weights:   Daily Weight in k.3 (), Weight in k.8 (), Weight in k.2 (04-10), Weight in k.5 (), Weight in k.3 ()     NUTRITIONALLY PERTINENT MEDICATIONS:  MEDICATIONS  (STANDING):  acetaZOLAMIDE Injectable  aMIOdarone    Tablet  aMIOdarone    Tablet  atorvastatin  buMETAnide Injectable  DOBUTamine Infusion  famotidine    Tablet  insulin lispro (ADMELOG) corrective regimen sliding scale  insulin lispro (ADMELOG) corrective regimen sliding scale  vasopressin Infusion    NUTRITIONALLY PERTINENT LABS:   Na138 mmol/L Glu 110 mg/dL[H] K+ 4.1 mmol/L Cr  3.33 mg/dL[H] BUN 99 mg/dL[H]  Phos 3.1 mg/dL  Alb 3.3 g/dL  Chol 94 mg/dL LDL --    HDL 29 mg/dL[L] Trig 74 mg/dL ALT 12 U/L AST 13 U/L Alkaline Phosphatase 82 U/L  25 @ 18:01 a1c 5.8    A1C with Estimated Average Glucose Result: 5.8 % (25 @ 18:01)    Finger Sticks:  POCT Blood Glucose.: 118 mg/dL ( @ 08:02)  POCT Blood Glucose.: 123 mg/dL ( @ 22:53)  POCT Blood Glucose.: 111 mg/dL ( @ 18:19)    NUTRITIONALLY PERTINENT MEDICATIONS/LABS:  [x] Reviewed  [x] Relevant notes on medications/labs:  -Prescribed insulin lispro sliding scale to aid in management of BG. A1c 5.8%.   -On IV Bumex as prescribed; wt fluctuations and fluid shifts expected.   -Receiving vasopressin, infusing at 6ml/hr.    EDEMA:  [x] Reviewed - 1+ left leg;  right leg  [] Relevant notes:    GI/ I&O:  [x] Reviewed  [] Relevant notes:  [x] Other:  -last BM 4/13 x 2. Not on apparent bowel regimen. Prescribed Protonix.     SKIN:   [x] No pressure injuries documented, per nursing flowsheet  [] Pressure injury previously noted  [] Change in pressure injury documentation:  [] Other:    ESTIMATED NEEDS:  [x] No change:  [] Updated:  Energy: 9233-4403 kcal/day (33-38 kcal/kg)  Protein: 105-126g/day (1.5-1.8 g/kg)  Fluid:   ml/day or [x] defer to team  Based on: IBW 69.8kg    NUTRITION DIAGNOSIS:  [x] Prior Dx: Severe Acute Malnutrition, Overweight/Obesity  [] New Dx:    EDUCATION:  [x] Yes: Provided reinforcement/recommendations to optimize PO and protein intake, recommended small frequent meals by ordering nutrient-dense snacks and leaving non-perishable food away from tray for later consumption during the day or between meals, to start with protein, and sips of supplement throughout the day; reviewed foods with protein and menu order procedures in hospital.   [] Not appropriate/warranted    NUTRITION CARE PLAN:  1. Diet: Continue DASH diet as ordered. Defer fluid restriction to team. Defer consistency/texture to medical team, SLP   2. Supplements: Recommend Ensure Max 2x daily.  3. Multivitamin/mineral supplementation: Consider multivitamin daily.   4: Provide encouragement with PO intake, menu selections, and assistance with meals as needed.    [] Achieved - Continue current nutrition intervention(s)  [] Current medical condition precludes nutrition intervention at this time.    MONITORING AND EVALUATION:   RD remains available upon request and will follow up per protocol.    Anita Malik RD, available via TEAMS

## 2025-04-14 NOTE — SWALLOW FEES ASSESSMENT ADULT - DIAGNOSTIC IMPRESSIONS
Patient presents with intact oropharyngeal swallow skills for a regular diet with thin liquids. The swallow is characterized by intermittent spillage into the pharynx prior to airway closure most notably with less viscous textures and mild post swallow pharyngeal residue that clears with spontaneous repeat swallows and/or liquid wash. Function judged to be a variant of normal. No evidence of laryngeal penetration or aspiration on exam. Of note, pt with baseline cough however is unrelated to swallowing.    Suspect mild reflux towards end of exam - recommend reflux precautions.
64y M who presented for worsening dyspnea, orthopnea, and chest pain; now s/p TAVR 4/11. Pt presents w/ a functional oral/pharyngeal swallow for a diet of regular solids/thin liquids. Current swallow profile is consistent w/ prior FEES 4/4. The swallow is characterized by intermittent spillage of thin liquids to the level of pyriform sinuses prior to and post-swallow; clears w/ a spontaneous repeat swallow. Deemed to be WNL. No evidence of laryngeal penetration/aspiration noted across PO trials. Intermittent cough observed w/ trials of thin liquids, however, is not related to laryngeal penetration/aspiration. Of note, Pt w/ episodes of post-prandial belching observed following consecutive sips of thin liquids via straw; no evidence of retrograde flow. This service will follow-up to monitor diet tolerance.

## 2025-04-14 NOTE — PROGRESS NOTE ADULT - ATTENDING COMMENTS
HF  intubated, now extubated   initially called for chon  #Baseline CKD3= pt reports baseline Renal fxn in 40s, followed by outpt nephrologist Dr Stringer  #  chon in setting of HF and ENtresto and decreased po intake and diarrhea  chon could be cardiorenal +  ATN  pt now s/p TAVR with increase in creatine which has now plateued   monitor daily cr trends  lytes stable  no urgent indication for RRT  check renal sono  #HF  on bumex, , vaso, diamox  # hyperkalemia  stable  #met acidosis--> met alk    monitor bicarb trend daily  #hypotension  monitor BP trends  on vaso,    #s/p cardiac cath    s/p TAR

## 2025-04-14 NOTE — SWALLOW FEES ASSESSMENT ADULT - RESIDUE IN PYRIFORM SINUSES
Trace/Mild Post-swallow residue spills to the pyriform sinuses and is cleared w/ a spontaneous repeat swallow/Trace/Mild

## 2025-04-15 LAB
ALBUMIN SERPL ELPH-MCNC: 3.2 G/DL — LOW (ref 3.3–5)
ALP SERPL-CCNC: 95 U/L — SIGNIFICANT CHANGE UP (ref 40–120)
ALT FLD-CCNC: 13 U/L — SIGNIFICANT CHANGE UP (ref 10–45)
ANION GAP SERPL CALC-SCNC: 15 MMOL/L — SIGNIFICANT CHANGE UP (ref 5–17)
APTT BLD: 54.7 SEC — HIGH (ref 24.5–35.6)
APTT BLD: 70.5 SEC — HIGH (ref 24.5–35.6)
APTT BLD: 71.5 SEC — HIGH (ref 24.5–35.6)
AST SERPL-CCNC: 15 U/L — SIGNIFICANT CHANGE UP (ref 10–40)
BASE EXCESS BLDMV CALC-SCNC: 12.1 MMOL/L — HIGH (ref -3–3)
BILIRUB SERPL-MCNC: 1.4 MG/DL — HIGH (ref 0.2–1.2)
BLD GP AB SCN SERPL QL: NEGATIVE — SIGNIFICANT CHANGE UP
BUN SERPL-MCNC: 104 MG/DL — HIGH (ref 7–23)
CALCIUM SERPL-MCNC: 8.6 MG/DL — SIGNIFICANT CHANGE UP (ref 8.4–10.5)
CHLORIDE SERPL-SCNC: 89 MMOL/L — LOW (ref 96–108)
CO2 BLDMV-SCNC: 42 MMOL/L — HIGH (ref 21–29)
CO2 SERPL-SCNC: 31 MMOL/L — SIGNIFICANT CHANGE UP (ref 22–31)
CREAT SERPL-MCNC: 3.09 MG/DL — HIGH (ref 0.5–1.3)
EGFR: 22 ML/MIN/1.73M2 — LOW
EGFR: 22 ML/MIN/1.73M2 — LOW
GAS PNL BLDA: SIGNIFICANT CHANGE UP
GAS PNL BLDMV: SIGNIFICANT CHANGE UP
GLUCOSE BLDC GLUCOMTR-MCNC: 111 MG/DL — HIGH (ref 70–99)
GLUCOSE BLDC GLUCOMTR-MCNC: 115 MG/DL — HIGH (ref 70–99)
GLUCOSE BLDC GLUCOMTR-MCNC: 121 MG/DL — HIGH (ref 70–99)
GLUCOSE BLDC GLUCOMTR-MCNC: 139 MG/DL — HIGH (ref 70–99)
GLUCOSE SERPL-MCNC: 94 MG/DL — SIGNIFICANT CHANGE UP (ref 70–99)
HCO3 BLDMV-SCNC: 40 MMOL/L — HIGH (ref 20–28)
HCT VFR BLD CALC: 29.6 % — LOW (ref 39–50)
HGB BLD-MCNC: 9.3 G/DL — LOW (ref 13–17)
INR BLD: 1.02 RATIO — SIGNIFICANT CHANGE UP (ref 0.85–1.16)
INR BLD: 1.04 RATIO — SIGNIFICANT CHANGE UP (ref 0.85–1.16)
INR BLD: 1.04 RATIO — SIGNIFICANT CHANGE UP (ref 0.85–1.16)
MAGNESIUM SERPL-MCNC: 2 MG/DL — SIGNIFICANT CHANGE UP (ref 1.6–2.6)
MCHC RBC-ENTMCNC: 31.1 PG — SIGNIFICANT CHANGE UP (ref 27–34)
MCHC RBC-ENTMCNC: 31.4 G/DL — LOW (ref 32–36)
MCV RBC AUTO: 99 FL — SIGNIFICANT CHANGE UP (ref 80–100)
NRBC BLD AUTO-RTO: 0 /100 WBCS — SIGNIFICANT CHANGE UP (ref 0–0)
O2 CT VFR BLD CALC: 36 MMHG — SIGNIFICANT CHANGE UP (ref 30–65)
PCO2 BLDMV: 66 MMHG — HIGH (ref 30–65)
PH BLDMV: 7.39 — SIGNIFICANT CHANGE UP (ref 7.32–7.45)
PHOSPHATE SERPL-MCNC: 3 MG/DL — SIGNIFICANT CHANGE UP (ref 2.5–4.5)
PLATELET # BLD AUTO: 155 K/UL — SIGNIFICANT CHANGE UP (ref 150–400)
POTASSIUM SERPL-MCNC: 3.7 MMOL/L — SIGNIFICANT CHANGE UP (ref 3.5–5.3)
POTASSIUM SERPL-SCNC: 3.7 MMOL/L — SIGNIFICANT CHANGE UP (ref 3.5–5.3)
PROT SERPL-MCNC: 7.4 G/DL — SIGNIFICANT CHANGE UP (ref 6–8.3)
PROTHROM AB SERPL-ACNC: 11.6 SEC — SIGNIFICANT CHANGE UP (ref 9.9–13.4)
PROTHROM AB SERPL-ACNC: 11.9 SEC — SIGNIFICANT CHANGE UP (ref 9.9–13.4)
PROTHROM AB SERPL-ACNC: 11.9 SEC — SIGNIFICANT CHANGE UP (ref 9.9–13.4)
RBC # BLD: 2.99 M/UL — LOW (ref 4.2–5.8)
RBC # FLD: 16.9 % — HIGH (ref 10.3–14.5)
RH IG SCN BLD-IMP: POSITIVE — SIGNIFICANT CHANGE UP
SAO2 % BLDMV: 66.6 — SIGNIFICANT CHANGE UP (ref 60–90)
SODIUM SERPL-SCNC: 135 MMOL/L — SIGNIFICANT CHANGE UP (ref 135–145)
WBC # BLD: 8.63 K/UL — SIGNIFICANT CHANGE UP (ref 3.8–10.5)
WBC # FLD AUTO: 8.63 K/UL — SIGNIFICANT CHANGE UP (ref 3.8–10.5)

## 2025-04-15 PROCEDURE — 99233 SBSQ HOSP IP/OBS HIGH 50: CPT | Mod: GC

## 2025-04-15 PROCEDURE — 99291 CRITICAL CARE FIRST HOUR: CPT

## 2025-04-15 PROCEDURE — 71045 X-RAY EXAM CHEST 1 VIEW: CPT | Mod: 26

## 2025-04-15 PROCEDURE — 93010 ELECTROCARDIOGRAM REPORT: CPT

## 2025-04-15 RX ORDER — CALCIUM GLUCONATE 20 MG/ML
1 INJECTION, SOLUTION INTRAVENOUS ONCE
Refills: 0 | Status: COMPLETED | OUTPATIENT
Start: 2025-04-15 | End: 2025-04-15

## 2025-04-15 RX ADMIN — IPRATROPIUM BROMIDE AND ALBUTEROL SULFATE 3 MILLILITER(S): .5; 2.5 SOLUTION RESPIRATORY (INHALATION) at 06:18

## 2025-04-15 RX ADMIN — CALCIUM GLUCONATE 100 GRAM(S): 20 INJECTION, SOLUTION INTRAVENOUS at 03:35

## 2025-04-15 RX ADMIN — PHENYLEPHRINE HYDROCHLORIDE AND FAT, HARD 1 APPLICATION(S): .00525; 1.86 SUPPOSITORY RECTAL at 13:02

## 2025-04-15 RX ADMIN — AMIODARONE HYDROCHLORIDE 200 MILLIGRAM(S): 50 INJECTION, SOLUTION INTRAVENOUS at 05:26

## 2025-04-15 RX ADMIN — HEPARIN SODIUM 19 UNIT(S)/HR: 1000 INJECTION INTRAVENOUS; SUBCUTANEOUS at 17:43

## 2025-04-15 RX ADMIN — Medication 100 MILLIEQUIVALENT(S): at 05:26

## 2025-04-15 RX ADMIN — Medication 1 APPLICATION(S): at 21:36

## 2025-04-15 RX ADMIN — IPRATROPIUM BROMIDE AND ALBUTEROL SULFATE 3 MILLILITER(S): .5; 2.5 SOLUTION RESPIRATORY (INHALATION) at 23:18

## 2025-04-15 RX ADMIN — IPRATROPIUM BROMIDE AND ALBUTEROL SULFATE 3 MILLILITER(S): .5; 2.5 SOLUTION RESPIRATORY (INHALATION) at 11:45

## 2025-04-15 RX ADMIN — IPRATROPIUM BROMIDE AND ALBUTEROL SULFATE 3 MILLILITER(S): .5; 2.5 SOLUTION RESPIRATORY (INHALATION) at 17:39

## 2025-04-15 RX ADMIN — Medication 100 MILLIEQUIVALENT(S): at 04:30

## 2025-04-15 RX ADMIN — HEPARIN SODIUM 19 UNIT(S)/HR: 1000 INJECTION INTRAVENOUS; SUBCUTANEOUS at 03:36

## 2025-04-15 RX ADMIN — Medication 100 MILLIEQUIVALENT(S): at 05:52

## 2025-04-15 RX ADMIN — BUMETANIDE 2 MILLIGRAM(S): 1 TABLET ORAL at 14:14

## 2025-04-15 RX ADMIN — BUMETANIDE 2 MILLIGRAM(S): 1 TABLET ORAL at 05:26

## 2025-04-15 RX ADMIN — HEPARIN SODIUM 19 UNIT(S)/HR: 1000 INJECTION INTRAVENOUS; SUBCUTANEOUS at 10:10

## 2025-04-15 RX ADMIN — CLOPIDOGREL BISULFATE 75 MILLIGRAM(S): 75 TABLET, FILM COATED ORAL at 13:02

## 2025-04-15 RX ADMIN — ATORVASTATIN CALCIUM 40 MILLIGRAM(S): 80 TABLET, FILM COATED ORAL at 21:32

## 2025-04-15 NOTE — PROGRESS NOTE ADULT - SUBJECTIVE AND OBJECTIVE BOX
[facility] Division of Kidney Diseases & Hypertension  FOLLOW UP NOTE  [phone]--------------------------------------------------------------------------------  Chief Complaint:Acute on chronic systolic congestive heart failure    24 hour events/subjective:  Pt was seen and examined earlier today. No acute overnight events. No fresh complaints. Pt reports feeling well.   Pt denies any worsening of SOB/ Constipation/ Diarrhea/ Nausea/ Vomiting/ abdominal pain/ chest pain/ tingling/ numbness.       PAST HISTORY  --------------------------------------------------------------------------------  No significant changes to PMH, PSH, FHx, SHx, unless otherwise noted    ALLERGIES & MEDICATIONS  --------------------------------------------------------------------------------  Allergies    metoprolol (Short breath)    Intolerances      Standing Inpatient Medications  albuterol/ipratropium for Nebulization 3 milliLiter(s) Nebulizer every 6 hours  aMIOdarone    Tablet   Oral   aMIOdarone    Tablet 200 milliGRAM(s) Oral daily  atorvastatin 40 milliGRAM(s) Oral at bedtime  buMETAnide Injectable 2 milliGRAM(s) IV Push two times a day  chlorhexidine 2% Cloths 1 Application(s) Topical daily  clopidogrel Tablet 75 milliGRAM(s) Oral daily  famotidine    Tablet 20 milliGRAM(s) Oral every 48 hours  hemorrhoidal Ointment 1 Application(s) Rectal daily  heparin  Infusion 1000 Unit(s)/Hr IV Continuous <Continuous>  insulin lispro (ADMELOG) corrective regimen sliding scale   SubCutaneous at bedtime  insulin lispro (ADMELOG) corrective regimen sliding scale   SubCutaneous three times a day before meals    PRN Inpatient Medications      REVIEW OF SYSTEMS  --------------------------------------------------------------------------------  as noted above.     VITALS/PHYSICAL EXAM  --------------------------------------------------------------------------------  T(C): 36.4 (04-15-25 @ 08:00), Max: 37 (04-14-25 @ 23:00)  HR: 65 (04-15-25 @ 10:00) (51 - 67)  BP: 81/51 (04-15-25 @ 10:00) (77/53 - 108/67)  RR: 27 (04-15-25 @ 10:00) (17 - 33)  SpO2: 93% (04-15-25 @ 10:00) (87% - 100%)  Wt(kg): --        04-14-25 @ 07:01  -  04-15-25 @ 07:00  --------------------------------------------------------  IN: 1497 mL / OUT: 2095 mL / NET: -598 mL    04-15-25 @ 07:01  -  04-15-25 @ 10:54  --------------------------------------------------------  IN: 158 mL / OUT: 325 mL / NET: -167 mL      Physical Exam:  Gen: extubated on 4/12. On supplemental oxygen.   Pulm: No crackles, lower zone decreased breath sounds.   CV: RRR, S1S2;  Abd: +BS, soft, nontender/nondistended  : Alba in place.   Extremities: Mild LE swelling  Skin: Warm, Lower extremity chronic changes.      LABS/STUDIES  --------------------------------------------------------------------------------              9.3    8.63  >-----------<  155      [04-15-25 @ 00:43]              29.6     135  |  89  |  104  ----------------------------<  94      [04-15-25 @ 00:43]  3.7   |  31  |  3.09        Ca     8.6     [04-15-25 @ 00:43]      Mg     2.0     [04-15-25 @ 00:43]      Phos  3.0     [04-15-25 @ 00:43]    TPro  7.4  /  Alb  3.2  /  TBili  1.4  /  DBili  x   /  AST  15  /  ALT  13  /  AlkPhos  95  [04-15-25 @ 00:43]    PT/INR: PT 11.9 , INR 1.04       [04-15-25 @ 09:48]  PTT: 71.5       [04-15-25 @ 09:48]      Creatinine Trend:  SCr 3.09 [04-15 @ 00:43]  SCr 3.06 [04-14 @ 12:05]  SCr 3.33 [04-14 @ 00:40]  SCr 3.35 [04-13 @ 18:32]  SCr 3.21 [04-13 @ 00:47]

## 2025-04-15 NOTE — PROGRESS NOTE ADULT - ASSESSMENT
Assessment: 64M PMHx AFib (s/p dccv 3/25/25), HFrEF (likely mod red EF, sev TTE), aortic stenosis s/p TAVR (2022), a flutter s/p ablation  and s/p micra implant, CKD3, WILFRED, obesity, venous stasis, HTN, HLD, who presents with 6 days of worsening dyspnea on exertion, admitted for AHRF likely iso CHF exacerbation, also with ANGELICA on CKD c/f cardiorenal syndrome. RRT on floors for SBP 80s requiring pressors and has been admitted to the CCU. Now s/p TAVR .    Plan:  NEURO   A&O3   - continue to monitor mental status as per protocol     RESPIRATORY  #Acute hypoxemic hypercapnic respiratory failure  - AVAPS at night  - continue duonebs   - monitor ABGs  - continue to monitor SpO2 with goal >94%    CARDIO  #Obstructive shock 2/ AS  - MAPs 70s -> will continue to wean vasopressin as BP tolerates    #Acute on chronic HF exacerbation  - preload reduction: bumex 1 IV BID  - inotropic support:  1.25, wean as tolerated  - continue vaso for pressor support  - Goal 1-2L net neg daily  - CVP goal 8-12  - Diuril, 3% hypertonic, diamox, and metolazone PRN for augmenting diuresis    #Aortic valve stenosis s/p prosthetic  - S/p STANISLAW showing severe AS stenosis, global hypokinesis and EF 15%  -  patent LIMA-LAD, closed SVG-OM1/D1, pending TAVR   - s/p TAVR   - heparin gtt    #Hx CABG  #PVD  - C/w Plavix    #HLD  - Increased Lipitor to 40mg from 20mg    RENAL/  #ANGELICA on CKD  #Cardiorenal syndrome  - Baseline 1.5-1.8  - Most likely i/s/o hypotension and fluid overload  - Renal sono showed parenchymal disease  - Nephro following, monitor volume status. Stop sodium bicarb 650 TID for now   - Bumex bid    #Hyperkalemia  - Lokelma if K increases into 5s range  - Continue monitoring urine output, lytes, SCr/ BUN  - replete lytes prn with goal K >4 and Mg >2    GI  FEES with speech & swallow, regular diet w/ thin liquids    ENDO  #Hyperglycemia  - ISS    HEME  - Monitor H/H and plts  - DVT PPX: Heparin    ID  Afebrile  - no leukocytosis  - monitor and trend WBC and temperature curve     Dispo: Maintain in ICU.   Assessment: 64M PMHx AFib (s/p dccv 3/25/25), HFrEF (likely mod red EF, sev TTE), aortic stenosis s/p TAVR (2022), a flutter s/p ablation  and s/p micra implant, CKD3, WILFRED, obesity, venous stasis, HTN, HLD, who presents with 6 days of worsening dyspnea on exertion, admitted for AHRF likely iso CHF exacerbation, also with ANGELICA on CKD c/f cardiorenal syndrome. RRT on floors for SBP 80s requiring pressors and has been admitted to the CCU. Now s/p TAVR .    Plan:  NEURO   A&O3   - continue to monitor mental status as per protocol     RESPIRATORY  #Acute hypoxemic hypercapnic respiratory failure  - AVAPS at night  - continue duonebs   - monitor ABGs  - continue to monitor SpO2 with goal >94%    CARDIO  #Obstructive shock 2/2 AS  - MAPs 70s -> will continue to wean vasopressin as BP tolerates    #Acute on chronic HF exacerbation  - preload reduction: bumex 1 IV BID  - inotropic support:  1.25, weaned   - continue vaso for pressor support  - Goal 1-2L net neg daily  - CVP goal 8-12    #Aortic valve stenosis s/p prosthetic  - S/p STANISLAW showing severe AS stenosis, global hypokinesis and EF 15%  -  patent LIMA-LAD, closed SVG-OM1/D1, pending TAVR   - s/p TAVR   - heparin gtt    #Hx CABG  #PVD  - C/w Plavix    #HLD  - Increased Lipitor to 40mg from 20mg    RENAL/  #ANGELICA on CKD  #Cardiorenal syndrome  - Baseline 1.5-1.8  - Most likely i/s/o hypotension and fluid overload  - Renal sono showed parenchymal disease  - Nephro following, monitor volume status. Stop sodium bicarb 650 TID for now   - Bumex bid    #Hyperkalemia  - Lokelma if K increases into 5s range  - Continue monitoring urine output, lytes, SCr/ BUN  - replete lytes prn with goal K >4 and Mg >2    GI  FEES with speech & swallow, regular diet w/ thin liquids    ENDO  #Hyperglycemia  - ISS    HEME  - Monitor H/H and plts  - DVT PPX: Heparin    ID  Afebrile  - no leukocytosis  - monitor and trend WBC and temperature curve     Dispo: Maintain in ICU.

## 2025-04-15 NOTE — PROGRESS NOTE ADULT - ATTENDING COMMENTS
HF  intubated, now extubated   initially called for chon  #Baseline CKD3= pt reports baseline Renal fxn in "40s", followed by outpt nephrologist Dr Stringer  #  chon initially in setting of HF and Entresto and decreased po intake and diarrhea==> cardiorenal +  ATN  pt now s/p TAVR with increase in creatinine post tavr which has now plateaued and is downtrending today   monitor daily cr trends  lytes stable  no urgent indication for RRT  #HF/hypotension  on bumex 2mg IV q12, , vaso   s/p diamox  # hyperkalemia  stable  #met acidosis--> met alk    monitor bicarb trend daily  diamox as needed  #s/p cardiac cath    s/p TAR

## 2025-04-15 NOTE — PROGRESS NOTE ADULT - SUBJECTIVE AND OBJECTIVE BOX
*******************************  Azalea Jimenes, PGY-1  Internal Medicine  Contact via [employer] TEAMS  *******************************    CCU PROGRESS NOTE:    BERTHA WARD  MRN-[id number]  Patient is a 64y old  Male who presents with a chief complaint of dyspnea , chest pain (15 Apr 2025 10:54)      INTERVAL HPI/OVERNIGHT EVENTS: No acute events overnight.    SUBJECTIVE: Patient seen and examined at bedside. No acute complaints. Denies chest pain, SOB, palpitations, dizziness/lightheadedness, LE edema.    MEDICATIONS  (STANDING):  albuterol/ipratropium for Nebulization 3 milliLiter(s) Nebulizer every 6 hours  aMIOdarone    Tablet   Oral   aMIOdarone    Tablet 200 milliGRAM(s) Oral daily  atorvastatin 40 milliGRAM(s) Oral at bedtime  buMETAnide Injectable 2 milliGRAM(s) IV Push two times a day  chlorhexidine 2% Cloths 1 Application(s) Topical daily  clopidogrel Tablet 75 milliGRAM(s) Oral daily  famotidine    Tablet 20 milliGRAM(s) Oral every 48 hours  hemorrhoidal Ointment 1 Application(s) Rectal daily  heparin  Infusion 1000 Unit(s)/Hr (19 mL/Hr) IV Continuous <Continuous>  insulin lispro (ADMELOG) corrective regimen sliding scale   SubCutaneous at bedtime  insulin lispro (ADMELOG) corrective regimen sliding scale   SubCutaneous three times a day before meals    MEDICATIONS  (PRN):      OBJECTIVE:  ICU Vital Signs Last 24 Hrs  T(C): 37.1 (15 Apr 2025 19:00), Max: 37.1 (15 Apr 2025 19:00)  T(F): 98.7 (15 Apr 2025 19:00), Max: 98.7 (15 Apr 2025 19:00)  HR: 66 (15 Apr 2025 20:00) (51 - 72)  BP: 84/52 (15 Apr 2025 20:00) (77/53 - 108/67)  BP(mean): 63 (15 Apr 2025 20:00) (60 - 82)  ABP: 101/54 (15 Apr 2025 17:00) (75/40 - 101/55)  ABP(mean): 69 (15 Apr 2025 17:00) (53 - 73)  RR: 24 (15 Apr 2025 20:00) (17 - 33)  SpO2: 94% (15 Apr 2025 20:00) (87% - 100%)    O2 Parameters below as of 15 Apr 2025 19:00  Patient On (Oxygen Delivery Method): nasal cannula  O2 Flow (L/min): 2          CVP(mm Hg): 28 (04-15-25 @ 13:15) (-1 - 30)  CO: --  CI: --  PA: 28/28 (04-15-25 @ 13:15) (26/26 - 81/43)  PA(mean): 28 (04-15-25 @ 13:15) (21 - 53)  PA(direct): --  PCWP: --  LA: --  RA: --  SVR: --  SVRI: --  PVR: --  PVRI: --  I&O's Summary    14 Apr 2025 07:01  -  15 Apr 2025 07:00  --------------------------------------------------------  IN: 1497 mL / OUT: 2095 mL / NET: -598 mL    15 Apr 2025 07:01  -  15 Apr 2025 20:38  --------------------------------------------------------  IN: 866 mL / OUT: 1105 mL / NET: -239 mL        CAPILLARY BLOOD GLUCOSE      GENERAL: NAD, lying in bed comfortably  NECK: no JVD  HEART: S1, S2, regular rate and rhythm, no murmurs, rubs, or gallops  LUNGS: Unlabored respirations.  Clear to auscultation bilaterally, no crackles, wheezing, or rhonchi  ABDOMEN: Soft, nontender, nondistended, +BS  EXTREMITIES: 2+ peripheral pulses bilaterally. No clubbing, cyanosis, or edema    ============================I/O===========================   I&O's Detail    14 Apr 2025 07:01  -  15 Apr 2025 07:00  --------------------------------------------------------  IN:    DOBUTamine: 8 mL    Heparin: 418 mL    IV PiggyBack: 535 mL    Oral Fluid: 500 mL    Vasopressin: 36 mL  Total IN: 1497 mL    OUT:    Indwelling Catheter - Urethral (mL): 2095 mL  Total OUT: 2095 mL    Total NET: -598 mL      15 Apr 2025 07:01  -  15 Apr 2025 20:38  --------------------------------------------------------  IN:    Heparin: 266 mL    Oral Fluid: 600 mL  Total IN: 866 mL    OUT:    Indwelling Catheter - Urethral (mL): 1105 mL  Total OUT: 1105 mL    Total NET: -239 mL        ============================ LABS =========================                        9.3    8.63  )-----------( 155      ( 15 Apr 2025 00:43 )             29.6     04-15    135  |  89[L]  |  104[H]  ----------------------------<  94  3.7   |  31  |  3.09[H]    Ca    8.6      15 Apr 2025 00:43  Phos  3.0     04-15  Mg     2.0     04-15    TPro  7.4  /  Alb  3.2[L]  /  TBili  1.4[H]  /  DBili  x   /  AST  15  /  ALT  13  /  AlkPhos  95  04-15                LIVER FUNCTIONS - ( 15 Apr 2025 00:43 )  Alb: 3.2 g/dL / Pro: 7.4 g/dL / ALK PHOS: 95 U/L / ALT: 13 U/L / AST: 15 U/L / GGT: x           PT/INR - ( 15 Apr 2025 16:19 )   PT: 11.9 sec;   INR: 1.04 ratio         PTT - ( 15 Apr 2025 16:19 )  PTT:70.5 sec  ABG - ( 15 Apr 2025 00:35 )  pH, Arterial: 7.44  pH, Blood: x     /  pCO2: 58    /  pO2: 110   / HCO3: 39    / Base Excess: 13.3  /  SaO2: 98.3              Blood Gas Arterial, Lactate: 0.6 mmol/L (04-15-25 @ 00:35)  Blood Gas Arterial, Lactate: 1.0 mmol/L (04-14-25 @ 11:45)  Blood Gas Arterial, Lactate: 0.7 mmol/L (04-14-25 @ 00:18)  Blood Gas Arterial, Lactate: 0.7 mmol/L (04-13-25 @ 15:15)  Blood Gas Arterial, Lactate: 1.1 mmol/L (04-13-25 @ 13:20)  Blood Gas Arterial, Lactate: 0.9 mmol/L (04-13-25 @ 00:01)    Urinalysis Basic - ( 15 Apr 2025 00:43 )    Color: x / Appearance: x / SG: x / pH: x  Gluc: 94 mg/dL / Ketone: x  / Bili: x / Urobili: x   Blood: x / Protein: x / Nitrite: x   Leuk Esterase: x / RBC: x / WBC x   Sq Epi: x / Non Sq Epi: x / Bacteria: x      ======================MICRO/RAD/CARDIO=================  Telemetry: Reviewed   EKG: Reviewed  CXR: Reviewed  Culture: Reviewed   Echo:   Cath:    Mine Salinas PA-C  Contact via [employer]    CCU PROGRESS NOTE:    BERTHA WARD  MRN-[id number]  Patient is a 64y old  Male who presents with a chief complaint of dyspnea , chest pain (15 Apr 2025 10:54)    INTERVAL HPI/OVERNIGHT EVENTS: No acute events overnight.    SUBJECTIVE: Patient seen and examined at bedside. No acute complaints. Denies chest pain, SOB, palpitations, dizziness/lightheadedness, LE edema.    MEDICATIONS  (STANDING):  albuterol/ipratropium for Nebulization 3 milliLiter(s) Nebulizer every 6 hours  aMIOdarone    Tablet   Oral   aMIOdarone    Tablet 200 milliGRAM(s) Oral daily  atorvastatin 40 milliGRAM(s) Oral at bedtime  buMETAnide Injectable 2 milliGRAM(s) IV Push two times a day  chlorhexidine 2% Cloths 1 Application(s) Topical daily  clopidogrel Tablet 75 milliGRAM(s) Oral daily  famotidine    Tablet 20 milliGRAM(s) Oral every 48 hours  hemorrhoidal Ointment 1 Application(s) Rectal daily  heparin  Infusion 1000 Unit(s)/Hr (19 mL/Hr) IV Continuous <Continuous>  insulin lispro (ADMELOG) corrective regimen sliding scale   SubCutaneous at bedtime  insulin lispro (ADMELOG) corrective regimen sliding scale   SubCutaneous three times a day before meals    MEDICATIONS  (PRN):      OBJECTIVE:  ICU Vital Signs Last 24 Hrs  T(C): 37.1 (15 Apr 2025 19:00), Max: 37.1 (15 Apr 2025 19:00)  T(F): 98.7 (15 Apr 2025 19:00), Max: 98.7 (15 Apr 2025 19:00)  HR: 66 (15 Apr 2025 20:00) (51 - 72)  BP: 84/52 (15 Apr 2025 20:00) (77/53 - 108/67)  BP(mean): 63 (15 Apr 2025 20:00) (60 - 82)  ABP: 101/54 (15 Apr 2025 17:00) (75/40 - 101/55)  ABP(mean): 69 (15 Apr 2025 17:00) (53 - 73)  RR: 24 (15 Apr 2025 20:00) (17 - 33)  SpO2: 94% (15 Apr 2025 20:00) (87% - 100%)    O2 Parameters below as of 15 Apr 2025 19:00  Patient On (Oxygen Delivery Method): nasal cannula  O2 Flow (L/min): 2    CVP(mm Hg): 28 (04-15-25 @ 13:15) (-1 - 30)    PA: 28/28 (04-15-25 @ 13:15) (26/26 - 81/43)  PA(mean): 28 (04-15-25 @ 13:15) (21 - 53)    I&O's Summary    14 Apr 2025 07:01  -  15 Apr 2025 07:00  --------------------------------------------------------  IN: 1497 mL / OUT: 2095 mL / NET: -598 mL    15 Apr 2025 07:01  -  15 Apr 2025 20:38  --------------------------------------------------------  IN: 866 mL / OUT: 1105 mL / NET: -239 mL      CAPILLARY BLOOD GLUCOSE    GENERAL: NAD, lying in bed comfortably  NECK: no JVD  HEART: S1, S2, regular rate and rhythm, no murmurs, rubs, or gallops  LUNGS: Unlabored respirations.  Clear to auscultation bilaterally, no crackles, wheezing, or rhonchi  ABDOMEN: Soft, nontender, nondistended, +BS  EXTREMITIES: 2+ peripheral pulses bilaterally. No clubbing, cyanosis, or edema    ============================I/O===========================   I&O's Detail    14 Apr 2025 07:01  -  15 Apr 2025 07:00  --------------------------------------------------------  IN:    DOBUTamine: 8 mL    Heparin: 418 mL    IV PiggyBack: 535 mL    Oral Fluid: 500 mL    Vasopressin: 36 mL  Total IN: 1497 mL    OUT:    Indwelling Catheter - Urethral (mL): 2095 mL  Total OUT: 2095 mL    Total NET: -598 mL      15 Apr 2025 07:01  -  15 Apr 2025 20:38  --------------------------------------------------------  IN:    Heparin: 266 mL    Oral Fluid: 600 mL  Total IN: 866 mL    OUT:    Indwelling Catheter - Urethral (mL): 1105 mL  Total OUT: 1105 mL    Total NET: -239 mL        ============================ LABS =========================                        9.3    8.63  )-----------( 155      ( 15 Apr 2025 00:43 )             29.6     04-15    135  |  89[L]  |  104[H]  ----------------------------<  94  3.7   |  31  |  3.09[H]    Ca    8.6      15 Apr 2025 00:43  Phos  3.0     04-15  Mg     2.0     04-15    TPro  7.4  /  Alb  3.2[L]  /  TBili  1.4[H]  /  DBili  x   /  AST  15  /  ALT  13  /  AlkPhos  95  04-15                LIVER FUNCTIONS - ( 15 Apr 2025 00:43 )  Alb: 3.2 g/dL / Pro: 7.4 g/dL / ALK PHOS: 95 U/L / ALT: 13 U/L / AST: 15 U/L / GGT: x           PT/INR - ( 15 Apr 2025 16:19 )   PT: 11.9 sec;   INR: 1.04 ratio         PTT - ( 15 Apr 2025 16:19 )  PTT:70.5 sec  ABG - ( 15 Apr 2025 00:35 )  pH, Arterial: 7.44  pH, Blood: x     /  pCO2: 58    /  pO2: 110   / HCO3: 39    / Base Excess: 13.3  /  SaO2: 98.3              Blood Gas Arterial, Lactate: 0.6 mmol/L (04-15-25 @ 00:35)  Blood Gas Arterial, Lactate: 1.0 mmol/L (04-14-25 @ 11:45)  Blood Gas Arterial, Lactate: 0.7 mmol/L (04-14-25 @ 00:18)  Blood Gas Arterial, Lactate: 0.7 mmol/L (04-13-25 @ 15:15)  Blood Gas Arterial, Lactate: 1.1 mmol/L (04-13-25 @ 13:20)  Blood Gas Arterial, Lactate: 0.9 mmol/L (04-13-25 @ 00:01)    Urinalysis Basic - ( 15 Apr 2025 00:43 )    Color: x / Appearance: x / SG: x / pH: x  Gluc: 94 mg/dL / Ketone: x  / Bili: x / Urobili: x   Blood: x / Protein: x / Nitrite: x   Leuk Esterase: x / RBC: x / WBC x   Sq Epi: x / Non Sq Epi: x / Bacteria: x      ======================MICRO/RAD/CARDIO=================  Telemetry: Reviewed   EKG: Reviewed  CXR: Reviewed  Culture: Reviewed   Echo:   Cath:

## 2025-04-15 NOTE — PROGRESS NOTE ADULT - ATTENDING COMMENTS
S/p TAVR in the setting of valvular shock  A+Ox3  Mixed shock requiring Vasopressin and Dobutamine, now off, CI 3.2 this am   on amio s/p DCCV for afib/aflutter   SpO2 high 90s on nasal cannula - wean to room air as tolerated, encouraged incentive tracy   DASH diet   Non-oliguric ANGELICA, continue to diurese with bumex, CVP 6-7 this am on rounds    H/H low but acceptable on Heparin drip for afib   Afebrile, no antibiotics  Sugars controlled  Alejandrina 4/1, [facility] PAC 4/13 dc  encouraged ambulation .

## 2025-04-15 NOTE — PROGRESS NOTE ADULT - ASSESSMENT
63 y/o M with hx of Afib s/p DCCV 3/25/25, CHF, Aortic stenosis s/p TAVR 11/2022, CAD s/p CABG, Aflutter s/p ablation and Micra implant, CKD 3, Obesity, venous stasis, HTN, HLD presented with 6 days of worsening SOB on exertion associated with orthopnea and chest pain.   Also reports several days decreased appetite. Denies fevers, chills, cough, abdominal pain, NVD, urinary sx, leg swelling. Patient had a cardioversion for atrial fibrillation at this hospital recently, which was reportedly unsuccessful. Patient has a scheduled appointment with Dr. Philip on 04/24/2025 to discuss possible ablation. His outpt cardiologist is Dr. Miranda. (Cardiology)  On arrival to ED, vitals were 97.3F, HR 71, BP 94/57, RR 22, 97% on 2L NC. Labs pertinent for WBC 10.68, INR 2.55, Na 132, K 5.5, bicarb 20, BUN/Cr 78/5.17 (baseline Cr appears ~1.5-1.8), trop 225, BNP 11899, VBG ph 7.3, pco2/po2 wnl. UA without evidence of infection.  nephrology was consulted for management of ANGELICA on CKD. Pt has baseline Scr of ~1.5- 1.8. Scr was 2.7 on 3/25/25. Pt is on Entresto ( started recently) and he mentioned that he was taking Motrin occasionally. No herbal medications/ supplements.  Scr at the time of admission was 5.17 3/31/25 and it further worsened to 5.45 4/1/25.    Pt follows Dr. Stewart Brown (Nephrology)  C was done on 4/8  Plan for TAVR on 4/11.     ANGELICA on CKD:  Pt has hx of CKD 3. Baseline Scr 1.5-1.8.   At the time of admission - Scr was 5.17 and it further worsened to 5.45--> 5.5. Scr elevated/improving to 3.54---> 3.48--> 3.09--> 2.8 4/12. Scr worsened  to 3.2---> 3.3 and improving ot  3.09 today.  - Likely LISA in setting of TAVR.   Pt has lopez cath in place. UOP was 2.09L with net negative 0.6L in last 24 hours. On Bumex 2 IV BID now.   Bumex gtt - restarted 4/4/25 - Briefly held on 4/8 for LHC and is restarted later in the day.  Now off of bumex gtt due to alkalosis ( given a dose of diamox on 4/12 with a plan for 3 doses qd - 500 IV)  Bicarb is elevated but pCO2 is also elevated. pt is on BiPAP at night. Management of WILFRED/OHS as per primary team.   UA showed trace LE, blood ( Likely due to lopez), casts 17. UPCR 0.7. No hx of DM.   Pt was on entresto.   BP was low.   On IV vasopressin.   ANGELICA on CKD likely in setting of HF exacerbation, entresto use and hypotension mediated ATN.  USG kidney and bladder - normal sized kidneys. No HN.     PLAN:  on BiPAP overnight. Management as per primary team.   Pt is started on Bumex 2mg q12 - Acetazolamide works well as a diuretic in alkalotic conditions. Consider increasing bumex 2mg to TID.   continue to hold entresto.   Monitor strict I/Os and daily wts.   No urgent need of HD for now.    Avoid nephrotoxins  Dose medications as per eGFR    hyperkalemia: resolved  Today Serum K is 3.7  Monitor closely - replete as needed.     Hyponatremia is likely in setting of impaired water clearance in setting of worsening renal function and volume overload 2/2 heart failure  resolved now.   Bumex as noted above      Pt is at high risk of LISA. Pt was explained in detail about the risk of LISA and possible need of dialysis if the kidney function gets worse.   Enoch score 26.1 %- Risk of post PCI - LISA and 1.09% risk of requiring dialysis.    TAVR on 4/11 done. Risk of ILSA is upto 48- 72 hours. Monitor kidney function closely. Scr is trending up now. No urgent need of HD.     Wait for the final reccs as per the attending.  63 y/o M with hx of Afib s/p DCCV 3/25/25, CHF, Aortic stenosis s/p TAVR 11/2022, CAD s/p CABG, Aflutter s/p ablation and Micra implant, CKD 3, Obesity, venous stasis, HTN, HLD presented with 6 days of worsening SOB on exertion associated with orthopnea and chest pain.   Also reports several days decreased appetite. Denies fevers, chills, cough, abdominal pain, NVD, urinary sx, leg swelling. Patient had a cardioversion for atrial fibrillation at this hospital recently, which was reportedly unsuccessful. Patient has a scheduled appointment with Dr. Philip on 04/24/2025 to discuss possible ablation. His outpt cardiologist is Dr. Miranda. (Cardiology)  On arrival to ED, vitals were 97.3F, HR 71, BP 94/57, RR 22, 97% on 2L NC. Labs pertinent for WBC 10.68, INR 2.55, Na 132, K 5.5, bicarb 20, BUN/Cr 78/5.17 (baseline Cr appears ~1.5-1.8), trop 225, BNP 08070, VBG ph 7.3, pco2/po2 wnl. UA without evidence of infection.  nephrology was consulted for management of ANGELICA on CKD. Pt has baseline Scr of ~1.5- 1.8. Scr was 2.7 on 3/25/25. Pt is on Entresto ( started recently) and he mentioned that he was taking Motrin occasionally. No herbal medications/ supplements.  Scr at the time of admission was 5.17 3/31/25 and it further worsened to 5.45 4/1/25.    Pt follows Dr. Stewart Brown (Nephrology)  C was done on 4/8  Plan for TAVR on 4/11.     ANGELICA on CKD:  Pt has hx of CKD 3. Baseline Scr 1.5-1.8.   At the time of admission - Scr was 5.17 and it further worsened to 5.45--> 5.5. Scr elevated/improving to 3.54---> 3.48--> 3.09--> 2.8 4/12. Scr worsened  to 3.2---> 3.3 and improving ot  3.09 today.  - Likely LISA in setting of TAVR.   Pt has lopez cath in place. UOP was 2.09L with net negative 0.6L in last 24 hours. On Bumex 2 IV BID now.   Bumex gtt - restarted 4/4/25 - Briefly held on 4/8 for LHC and is restarted later in the day.  Now off of bumex gtt due to alkalosis ( given a dose of diamox on 4/12 with a plan for 3 doses qd - 500 IV)  Bicarb is elevated but pCO2 is also elevated. pt is on BiPAP at night. Management of WILFRED/OHS as per primary team.   UA showed trace LE, blood ( Likely due to lopez), casts 17. UPCR 0.7. No hx of DM.   Pt was on entresto.   BP was low.   On IV vasopressin.   ANGELICA on CKD likely in setting of HF exacerbation, entresto use and hypotension mediated ATN.  USG kidney and bladder - normal sized kidneys. No HN.     PLAN:  on BiPAP overnight. Management as per primary team.   Pt is started on Bumex 2mg q12 - Acetazolamide works well as a diuretic in alkalotic conditions.    continue to hold entresto.   Monitor strict I/Os and daily wts.   No urgent need of HD for now.    Avoid nephrotoxins  Dose medications as per eGFR    hyperkalemia: resolved  Today Serum K is 3.7  Monitor closely - replete as needed.     Hyponatremia is likely in setting of impaired water clearance in setting of worsening renal function and volume overload 2/2 heart failure  resolved now.   Bumex as noted above      Pt is at high risk of LISA. Pt was explained in detail about the risk of LISA and possible need of dialysis if the kidney function gets worse.   Enoch score 26.1 %- Risk of post PCI - LISA and 1.09% risk of requiring dialysis.    TAVR on 4/11 done. Risk of LISA is upto 48- 72 hours. Monitor kidney function closely. Scr is trending up now. No urgent need of HD.     Wait for the final reccs as per the attending.

## 2025-04-15 NOTE — PROGRESS NOTE ADULT - SUBJECTIVE AND OBJECTIVE BOX
Patient is a 64y old  Male who presents with a chief complaint of dyspnea , chest pain (15 Apr 2025 10:54)    HPI:   63 yo M with a fib (s/p dccv 3/25/25), CHF (likely moderately reduced EF, several TTE with poor windows), aortic stenosis s/p TAVR (2022), CAD s/p CABG, a flutter s/p ablation  and s/p micra implant,  CKD3, obesity, venous stasis, HTN, HLD, who presents with 6 days of worsening dyspnea on exertion. Pt also reports associated orthopnea and chest pain on exertion but not at rest which is located in the center of his chest. Pain is described as burning. Also reports several days decreased appetite. Denies fevers, chills, cough, abdominal pain, NVD, urinary sx, leg swelling. Patient had a cardioversion for atrial fibrillation at this hospital recently, which was reportedly unsuccessful. Patient has a scheduled appointment with Dr. Philip on 2025 to discuss possible ablation. His outpt cardiologist is Dr. Paul.     On arrival to ED, vitals were 97.3F, HR 71, BP 94/57, RR 22, 97% on 2L NC. Labs pertinent for WBC 10.68, INR 2.55, Na 132, K 5.5, bicarb 20, BUN/Cr 78/5.17 (baseline Cr appears ~1.5-1.8), trop 225, BNP 29324, VBG ph 7.3, pco2/po2 wnl. UA without evidence of infection. EKG  on admission 1st deg av block, low voltage, poor r wave progression, R axis deviation CXR with diffuse hazy opacities suggest mild pulmonary edema and small right and trace left pleural effusions. Pt was given IV Bumex 1mg x1, IV bumex 2mg x1, IV Bumex 4mg x1, and lokelma 5mg x1, then admitted for further management.    (2025 01:59)       INTERVAL HPI/OVERNIGHT EVENTS:   No overnight events   Afebrile, hemodynamically stable     Subjective:    ICU Vital Signs Last 24 Hrs  T(C): 36.6 (15 Apr 2025 12:00), Max: 37 (2025 23:00)  T(F): 97.8 (15 Apr 2025 12:00), Max: 98.6 (2025 23:00)  HR: 60 (15 Apr 2025 12:00) (51 - 67)  BP: 92/56 (15 Apr 2025 12:00) (77/53 - 108/67)  BP(mean): 69 (15 Apr 2025 12:00) (60 - 82)  ABP: 95/48 (15 Apr 2025 12:00) (75/40 - 101/55)  ABP(mean): 64 (15 Apr 2025 12:) (53 - 73)  RR: 27 (15 Apr 2025 10:00) (17 - 33)  SpO2: 100% (15 Apr 2025 12:) (87% - 100%)    O2 Parameters below as of 15 Apr 2025 12  Patient On (Oxygen Delivery Method): room air  O2 Flow (L/min): 2        I&O's Summary    2025 07:01  -  15 Apr 2025 07:00  --------------------------------------------------------  IN: 1497 mL / OUT: 2095 mL / NET: -598 mL    15 Apr 2025 07:01  -  15 Apr 2025 13:11  --------------------------------------------------------  IN: 335 mL / OUT: 535 mL / NET: -200 mL          Daily     Daily Weight in k.6 (15 Apr 2025 06:00)    Adult Advanced Hemodynamics Last 24 Hrs  CVP(mm Hg): 11 (15 Apr 2025 09:00) (-1 - 17)  CVP(cm H2O): --  CO: --  CI: --  PA: 28/28 (15 Apr 2025 12:00) (26/26 - 68/25)  PA(mean): 28 (15 Apr 2025 12:00) (22 - 42)  PCWP: --  SVR: --  SVRI: --  PVR: --  PVRI: --    EKG/Telemetry Events:    MEDICATIONS  (STANDING):  albuterol/ipratropium for Nebulization 3 milliLiter(s) Nebulizer every 6 hours  aMIOdarone    Tablet   Oral   aMIOdarone    Tablet 200 milliGRAM(s) Oral daily  atorvastatin 40 milliGRAM(s) Oral at bedtime  buMETAnide Injectable 2 milliGRAM(s) IV Push two times a day  chlorhexidine 2% Cloths 1 Application(s) Topical daily  clopidogrel Tablet 75 milliGRAM(s) Oral daily  famotidine    Tablet 20 milliGRAM(s) Oral every 48 hours  hemorrhoidal Ointment 1 Application(s) Rectal daily  heparin  Infusion 1000 Unit(s)/Hr (19 mL/Hr) IV Continuous <Continuous>  insulin lispro (ADMELOG) corrective regimen sliding scale   SubCutaneous three times a day before meals  insulin lispro (ADMELOG) corrective regimen sliding scale   SubCutaneous at bedtime    MEDICATIONS  (PRN):      PHYSICAL EXAM:  General: Well-appearing, NAD  HEENT: PERRL, EOMI  Neck: Suppleenlargement  Chest/Lungs: +nasal cannula. CTA bilaterally, no wheezing, rales, rhonchi or rub  Heart: RRR, normal S1, S2, no murmurs or gallops  Abdomen: Soft, ND, NTTP, normoactive bowel sounds  Extremities: +pitting edema. 2+ peripheral pulses b/l, no edema, clubbing or cyanosis  Neurological: A&Ox3, moves all extremities, no focal deficits    LABS:                        9.3    8.63  )-----------( 155      ( 15 Apr 2025 00:43 )             29.6     04-15    135  |  89[L]  |  104[H]  ----------------------------<  94  3.7   |  31  |  3.09[H]    Ca    8.6      15 Apr 2025 00:43  Phos  3.0     04-15  Mg     2.0     04-15    TPro  7.4  /  Alb  3.2[L]  /  TBili  1.4[H]  /  DBili  x   /  AST  15  /  ALT  13  /  AlkPhos  95  04-15    LIVER FUNCTIONS - ( 15 Apr 2025 00:43 )  Alb: 3.2 g/dL / Pro: 7.4 g/dL / ALK PHOS: 95 U/L / ALT: 13 U/L / AST: 15 U/L / GGT: x           PT/INR - ( 15 Apr 2025 09:48 )   PT: 11.9 sec;   INR: 1.04 ratio         PTT - ( 15 Apr 2025 09:48 )  PTT:71.5 sec  CAPILLARY BLOOD GLUCOSE      POCT Blood Glucose.: 115 mg/dL (15 Apr 2025 11:50)  POCT Blood Glucose.: 111 mg/dL (15 Apr 2025 08:10)  POCT Blood Glucose.: 120 mg/dL (2025 21:49)  POCT Blood Glucose.: 116 mg/dL (2025 17:04)    ABG - ( 15 Apr 2025 00:35 )  pH, Arterial: 7.44  pH, Blood: x     /  pCO2: 58    /  pO2: 110   / HCO3: 39    / Base Excess: 13.3  /  SaO2: 98.3                    Urinalysis Basic - ( 15 Apr 2025 00:43 )    Color: x / Appearance: x / SG: x / pH: x  Gluc: 94 mg/dL / Ketone: x  / Bili: x / Urobili: x   Blood: x / Protein: x / Nitrite: x   Leuk Esterase: x / RBC: x / WBC x   Sq Epi: x / Non Sq Epi: x / Bacteria: x          RADIOLOGY & ADDITIONAL TESTS:  CXR:        Care Discussed with Consultants/Other Providers [ x] YES  [ ] NO           Patient is a 64y old  Male who presents with a chief complaint of dyspnea , chest pain (15 Apr 2025 10:54)    HPI:   63 yo M with a fib (s/p dccv 3/25/25), CHF (likely moderately reduced EF, several TTE with poor windows), aortic stenosis s/p TAVR (2022), CAD s/p CABG, a flutter s/p ablation  and s/p micra implant,  CKD3, obesity, venous stasis, HTN, HLD, who presents with 6 days of worsening dyspnea on exertion. Pt also reports associated orthopnea and chest pain on exertion but not at rest which is located in the center of his chest. Pain is described as burning. Also reports several days decreased appetite. Denies fevers, chills, cough, abdominal pain, NVD, urinary sx, leg swelling. Patient had a cardioversion for atrial fibrillation at this hospital recently, which was reportedly unsuccessful. Patient has a scheduled appointment with Dr. Philip on 2025 to discuss possible ablation. His outpt cardiologist is Dr. Paul.     On arrival to ED, vitals were 97.3F, HR 71, BP 94/57, RR 22, 97% on 2L NC. Labs pertinent for WBC 10.68, INR 2.55, Na 132, K 5.5, bicarb 20, BUN/Cr 78/5.17 (baseline Cr appears ~1.5-1.8), trop 225, BNP 82552, VBG ph 7.3, pco2/po2 wnl. UA without evidence of infection. EKG  on admission 1st deg av block, low voltage, poor r wave progression, R axis deviation CXR with diffuse hazy opacities suggest mild pulmonary edema and small right and trace left pleural effusions. Pt was given IV Bumex 1mg x1, IV bumex 2mg x1, IV Bumex 4mg x1, and lokelma 5mg x1, then admitted for further management.    (2025 01:59)       INTERVAL HPI/OVERNIGHT EVENTS:   No overnight events   Afebrile, hemodynamically stable     Subjective:    ICU Vital Signs Last 24 Hrs  T(C): 36.6 (15 Apr 2025 12:00), Max: 37 (2025 23:00)  T(F): 97.8 (15 Apr 2025 12:00), Max: 98.6 (2025 23:00)  HR: 60 (15 Apr 2025 12:00) (51 - 67)  BP: 92/56 (15 Apr 2025 12:00) (77/53 - 108/67)  BP(mean): 69 (15 Apr 2025 12:00) (60 - 82)  ABP: 95/48 (15 Apr 2025 12:00) (75/40 - 101/55)  ABP(mean): 64 (15 Apr 2025 12:) (53 - 73)  RR: 27 (15 Apr 2025 10:00) (17 - 33)  SpO2: 100% (15 Apr 2025 12:) (87% - 100%)    O2 Parameters below as of 15 Apr 2025 12  Patient On (Oxygen Delivery Method): room air  O2 Flow (L/min): 2        I&O's Summary    2025 07:01  -  15 Apr 2025 07:00  --------------------------------------------------------  IN: 1497 mL / OUT: 2095 mL / NET: -598 mL    15 Apr 2025 07:01  -  15 Apr 2025 13:11  --------------------------------------------------------  IN: 335 mL / OUT: 535 mL / NET: -200 mL          Daily     Daily Weight in k.6 (15 Apr 2025 06:00)    Adult Advanced Hemodynamics Last 24 Hrs  CVP(mm Hg): 11 (15 Apr 2025 09:00) (-1 - 17)  CVP(cm H2O): --  CO: --  CI: --  PA: 28/28 (15 Apr 2025 12:00) (26/26 - 68/25)  PA(mean): 28 (15 Apr 2025 12:00) (22 - 42)  PCWP: --  SVR: --  SVRI: --  PVR: --  PVRI: --    EKG/Telemetry Events:    MEDICATIONS  (STANDING):  albuterol/ipratropium for Nebulization 3 milliLiter(s) Nebulizer every 6 hours  aMIOdarone    Tablet   Oral   aMIOdarone    Tablet 200 milliGRAM(s) Oral daily  atorvastatin 40 milliGRAM(s) Oral at bedtime  buMETAnide Injectable 2 milliGRAM(s) IV Push two times a day  chlorhexidine 2% Cloths 1 Application(s) Topical daily  clopidogrel Tablet 75 milliGRAM(s) Oral daily  famotidine    Tablet 20 milliGRAM(s) Oral every 48 hours  hemorrhoidal Ointment 1 Application(s) Rectal daily  heparin  Infusion 1000 Unit(s)/Hr (19 mL/Hr) IV Continuous <Continuous>  insulin lispro (ADMELOG) corrective regimen sliding scale   SubCutaneous three times a day before meals  insulin lispro (ADMELOG) corrective regimen sliding scale   SubCutaneous at bedtime    MEDICATIONS  (PRN):      PHYSICAL EXAM:  General: Well-appearing, NAD  HEENT: PERRL, EOMI  Neck: Supple  Chest/Lungs: +nasal cannula. CTA bilaterally, no wheezing, rales, rhonchi or rub  Heart: RRR, normal S1, S2, no murmurs or gallops  Abdomen: Soft, ND, NTTP, normoactive bowel sounds  Extremities: +pitting edema. 2+ peripheral pulses b/l, no edema, clubbing or cyanosis  Neurological: A&Ox3, moves all extremities, no focal deficits    LABS:                        9.3    8.63  )-----------( 155      ( 15 Apr 2025 00:43 )             29.6     04-15    135  |  89[L]  |  104[H]  ----------------------------<  94  3.7   |  31  |  3.09[H]    Ca    8.6      15 Apr 2025 00:43  Phos  3.0     04-15  Mg     2.0     -15    TPro  7.4  /  Alb  3.2[L]  /  TBili  1.4[H]  /  DBili  x   /  AST  15  /  ALT  13  /  AlkPhos  95  04-15    LIVER FUNCTIONS - ( 15 Apr 2025 00:43 )  Alb: 3.2 g/dL / Pro: 7.4 g/dL / ALK PHOS: 95 U/L / ALT: 13 U/L / AST: 15 U/L / GGT: x           PT/INR - ( 15 Apr 2025 09:48 )   PT: 11.9 sec;   INR: 1.04 ratio         PTT - ( 15 Apr 2025 09:48 )  PTT:71.5 sec  CAPILLARY BLOOD GLUCOSE      POCT Blood Glucose.: 115 mg/dL (15 Apr 2025 11:50)  POCT Blood Glucose.: 111 mg/dL (15 Apr 2025 08:10)  POCT Blood Glucose.: 120 mg/dL (2025 21:49)  POCT Blood Glucose.: 116 mg/dL (2025 17:04)    ABG - ( 15 Apr 2025 00:35 )  pH, Arterial: 7.44  pH, Blood: x     /  pCO2: 58    /  pO2: 110   / HCO3: 39    / Base Excess: 13.3  /  SaO2: 98.3                    Urinalysis Basic - ( 15 Apr 2025 00:43 )    Color: x / Appearance: x / SG: x / pH: x  Gluc: 94 mg/dL / Ketone: x  / Bili: x / Urobili: x   Blood: x / Protein: x / Nitrite: x   Leuk Esterase: x / RBC: x / WBC x   Sq Epi: x / Non Sq Epi: x / Bacteria: x          RADIOLOGY & ADDITIONAL TESTS:  CXR:        Care Discussed with Consultants/Other Providers [ x] YES  [ ] NO

## 2025-04-15 NOTE — PROGRESS NOTE ADULT - ASSESSMENT
Assessment: 64M PMHx AFib (s/p dccv 3/25/25), HFrEF (likely mod red EF, sev TTE), aortic stenosis s/p TAVR (2022), a flutter s/p ablation  and s/p micra implant, CKD3, WILFRED, obesity, venous stasis, HTN, HLD, who presents with 6 days of worsening dyspnea on exertion, admitted for AHRF likely iso CHF exacerbation, also with ANGELICA on CKD c/f cardiorenal syndrome. RRT on floors for SBP 80s requiring pressors and has been admitted to the CCU. Now s/p TAVR .    Plan:  NEURO   A&O3   - continue to monitor mental status as per protocol     RESPIRATORY  #Acute hypoxemic hypercapnic respiratory failure  - AVAPS at night  - continue duonebs   - monitor ABGs  - continue to monitor SpO2 with goal >94%    CARDIO  #Obstructive shock 2/2 AS  - MAPs 70s -> will continue to wean vasopressin as BP tolerates    #Acute on chronic HF exacerbation  - preload reduction: bumex 1 IV BID  - inotropic support:  1.25, weaned   - continue vaso for pressor support  - Goal 1-2L net neg daily  - CVP goal 8-12    #Aortic valve stenosis s/p prosthetic  - S/p STANISLAW showing severe AS stenosis, global hypokinesis and EF 15%  -  patent LIMA-LAD, closed SVG-OM1/D1, pending TAVR   - s/p TAVR   - heparin gtt    #Hx CABG  #PVD  - C/w Plavix    #HLD  - Increased Lipitor to 40mg from 20mg    RENAL/  #ANGELICA on CKD  #Cardiorenal syndrome  - Baseline 1.5-1.8  - Most likely i/s/o hypotension and fluid overload  - Renal sono showed parenchymal disease  - Nephro following, monitor volume status. Stop sodium bicarb 650 TID for now   - Bumex bid    #Hyperkalemia  - Lokelma if K increases into 5s range  - Continue monitoring urine output, lytes, SCr/ BUN  - replete lytes prn with goal K >4 and Mg >2    GI  FEES with speech & swallow, regular diet w/ thin liquids    ENDO  #Hyperglycemia  - ISS    HEME  - Monitor H/H and plts  - DVT PPX: Heparin    ID  Afebrile  - no leukocytosis  - monitor and trend WBC and temperature curve     Dispo: Maintain in ICU.

## 2025-04-16 LAB
ALBUMIN SERPL ELPH-MCNC: 3.3 G/DL — SIGNIFICANT CHANGE UP (ref 3.3–5)
ALP SERPL-CCNC: 103 U/L — SIGNIFICANT CHANGE UP (ref 40–120)
ALT FLD-CCNC: 15 U/L — SIGNIFICANT CHANGE UP (ref 10–45)
ANION GAP SERPL CALC-SCNC: 13 MMOL/L — SIGNIFICANT CHANGE UP (ref 5–17)
APTT BLD: 68.1 SEC — HIGH (ref 24.5–35.6)
AST SERPL-CCNC: 17 U/L — SIGNIFICANT CHANGE UP (ref 10–40)
BILIRUB SERPL-MCNC: 1.1 MG/DL — SIGNIFICANT CHANGE UP (ref 0.2–1.2)
BUN SERPL-MCNC: 93 MG/DL — HIGH (ref 7–23)
CALCIUM SERPL-MCNC: 9 MG/DL — SIGNIFICANT CHANGE UP (ref 8.4–10.5)
CHLORIDE SERPL-SCNC: 91 MMOL/L — LOW (ref 96–108)
CO2 SERPL-SCNC: 33 MMOL/L — HIGH (ref 22–31)
CREAT SERPL-MCNC: 3.38 MG/DL — HIGH (ref 0.5–1.3)
EGFR: 19 ML/MIN/1.73M2 — LOW
EGFR: 19 ML/MIN/1.73M2 — LOW
GLUCOSE BLDC GLUCOMTR-MCNC: 131 MG/DL — HIGH (ref 70–99)
GLUCOSE BLDC GLUCOMTR-MCNC: 131 MG/DL — HIGH (ref 70–99)
GLUCOSE BLDC GLUCOMTR-MCNC: 132 MG/DL — HIGH (ref 70–99)
GLUCOSE BLDC GLUCOMTR-MCNC: 153 MG/DL — HIGH (ref 70–99)
GLUCOSE SERPL-MCNC: 112 MG/DL — HIGH (ref 70–99)
HCT VFR BLD CALC: 30.5 % — LOW (ref 39–50)
HGB BLD-MCNC: 9.5 G/DL — LOW (ref 13–17)
INR BLD: 1.04 RATIO — SIGNIFICANT CHANGE UP (ref 0.85–1.16)
MAGNESIUM SERPL-MCNC: 2 MG/DL — SIGNIFICANT CHANGE UP (ref 1.6–2.6)
MCHC RBC-ENTMCNC: 30.9 PG — SIGNIFICANT CHANGE UP (ref 27–34)
MCHC RBC-ENTMCNC: 31.1 G/DL — LOW (ref 32–36)
MCV RBC AUTO: 99.3 FL — SIGNIFICANT CHANGE UP (ref 80–100)
NRBC BLD AUTO-RTO: 0 /100 WBCS — SIGNIFICANT CHANGE UP (ref 0–0)
PHOSPHATE SERPL-MCNC: 3.4 MG/DL — SIGNIFICANT CHANGE UP (ref 2.5–4.5)
PLATELET # BLD AUTO: 164 K/UL — SIGNIFICANT CHANGE UP (ref 150–400)
POTASSIUM SERPL-MCNC: 4.1 MMOL/L — SIGNIFICANT CHANGE UP (ref 3.5–5.3)
POTASSIUM SERPL-SCNC: 4.1 MMOL/L — SIGNIFICANT CHANGE UP (ref 3.5–5.3)
PROT SERPL-MCNC: 7.7 G/DL — SIGNIFICANT CHANGE UP (ref 6–8.3)
PROTHROM AB SERPL-ACNC: 11.9 SEC — SIGNIFICANT CHANGE UP (ref 9.9–13.4)
RBC # BLD: 3.07 M/UL — LOW (ref 4.2–5.8)
RBC # FLD: 16.6 % — HIGH (ref 10.3–14.5)
SODIUM SERPL-SCNC: 137 MMOL/L — SIGNIFICANT CHANGE UP (ref 135–145)
WBC # BLD: 6.63 K/UL — SIGNIFICANT CHANGE UP (ref 3.8–10.5)
WBC # FLD AUTO: 6.63 K/UL — SIGNIFICANT CHANGE UP (ref 3.8–10.5)

## 2025-04-16 PROCEDURE — 99232 SBSQ HOSP IP/OBS MODERATE 35: CPT | Mod: GC

## 2025-04-16 PROCEDURE — 99291 CRITICAL CARE FIRST HOUR: CPT

## 2025-04-16 PROCEDURE — 93010 ELECTROCARDIOGRAM REPORT: CPT

## 2025-04-16 RX ORDER — PHENYLEPHRINE HYDROCHLORIDE AND FAT, HARD .00525; 1.86 G/1; G/1
1 SUPPOSITORY RECTAL
Refills: 0 | Status: DISCONTINUED | OUTPATIENT
Start: 2025-04-16 | End: 2025-04-28

## 2025-04-16 RX ADMIN — Medication 1 APPLICATION(S): at 22:34

## 2025-04-16 RX ADMIN — BUMETANIDE 2 MILLIGRAM(S): 1 TABLET ORAL at 15:05

## 2025-04-16 RX ADMIN — PHENYLEPHRINE HYDROCHLORIDE AND FAT, HARD 1 APPLICATION(S): .00525; 1.86 SUPPOSITORY RECTAL at 17:29

## 2025-04-16 RX ADMIN — Medication 20 MILLIGRAM(S): at 17:28

## 2025-04-16 RX ADMIN — PHENYLEPHRINE HYDROCHLORIDE AND FAT, HARD 1 APPLICATION(S): .00525; 1.86 SUPPOSITORY RECTAL at 08:45

## 2025-04-16 RX ADMIN — IPRATROPIUM BROMIDE AND ALBUTEROL SULFATE 3 MILLILITER(S): .5; 2.5 SOLUTION RESPIRATORY (INHALATION) at 12:29

## 2025-04-16 RX ADMIN — AMIODARONE HYDROCHLORIDE 200 MILLIGRAM(S): 50 INJECTION, SOLUTION INTRAVENOUS at 05:28

## 2025-04-16 RX ADMIN — ATORVASTATIN CALCIUM 40 MILLIGRAM(S): 80 TABLET, FILM COATED ORAL at 21:12

## 2025-04-16 RX ADMIN — INSULIN LISPRO 1: 100 INJECTION, SOLUTION INTRAVENOUS; SUBCUTANEOUS at 21:26

## 2025-04-16 RX ADMIN — CLOPIDOGREL BISULFATE 75 MILLIGRAM(S): 75 TABLET, FILM COATED ORAL at 12:05

## 2025-04-16 RX ADMIN — IPRATROPIUM BROMIDE AND ALBUTEROL SULFATE 3 MILLILITER(S): .5; 2.5 SOLUTION RESPIRATORY (INHALATION) at 23:45

## 2025-04-16 RX ADMIN — HEPARIN SODIUM 19 UNIT(S)/HR: 1000 INJECTION INTRAVENOUS; SUBCUTANEOUS at 07:41

## 2025-04-16 RX ADMIN — BUMETANIDE 2 MILLIGRAM(S): 1 TABLET ORAL at 05:29

## 2025-04-16 RX ADMIN — HEPARIN SODIUM 19 UNIT(S)/HR: 1000 INJECTION INTRAVENOUS; SUBCUTANEOUS at 05:28

## 2025-04-16 RX ADMIN — IPRATROPIUM BROMIDE AND ALBUTEROL SULFATE 3 MILLILITER(S): .5; 2.5 SOLUTION RESPIRATORY (INHALATION) at 18:25

## 2025-04-16 RX ADMIN — IPRATROPIUM BROMIDE AND ALBUTEROL SULFATE 3 MILLILITER(S): .5; 2.5 SOLUTION RESPIRATORY (INHALATION) at 05:37

## 2025-04-16 NOTE — PROGRESS NOTE ADULT - SUBJECTIVE AND OBJECTIVE BOX
Patient is a 64y old  Male who presents with a chief complaint of dyspnea , chest pain (2025 10:32)      INTERVAL HISTORY:  -    SUBJECTIVE  - Patient seen and evaluated at bedside.     MEDICATIONS:  MEDICATIONS  (STANDING):  albuterol/ipratropium for Nebulization 3 milliLiter(s) Nebulizer every 6 hours  aMIOdarone    Tablet   Oral   aMIOdarone    Tablet 200 milliGRAM(s) Oral daily  atorvastatin 40 milliGRAM(s) Oral at bedtime  buMETAnide Injectable 2 milliGRAM(s) IV Push two times a day  chlorhexidine 2% Cloths 1 Application(s) Topical daily  clopidogrel Tablet 75 milliGRAM(s) Oral daily  famotidine    Tablet 20 milliGRAM(s) Oral every 48 hours  hemorrhoidal Ointment 1 Application(s) Rectal two times a day  heparin  Infusion 1000 Unit(s)/Hr (19 mL/Hr) IV Continuous <Continuous>  insulin lispro (ADMELOG) corrective regimen sliding scale   SubCutaneous at bedtime  insulin lispro (ADMELOG) corrective regimen sliding scale   SubCutaneous three times a day before meals    MEDICATIONS  (PRN):      OBJECTIVE:  ICU Vital Signs Last 24 Hrs  T(C): 37.1 (2025 16:00), Max: 37.1 (2025 08:00)  T(F): 98.8 (2025 16:00), Max: 98.8 (2025 16:00)  HR: 75 (2025 19:00) (64 - 77)  BP: 103/55 (2025 19:00) (83/53 - 109/74)  BP(mean): 72 (2025 19:00) (64 - 88)  ABP: --  ABP(mean): --  RR: 25 (2025 19:00) (23 - 36)  SpO2: 94% (2025 19:00) (91% - 100%)    O2 Parameters below as of 2025 19:00  Patient On (Oxygen Delivery Method): nasal cannula w/ humidification  O2 Flow (L/min): 2          Adult Advanced Hemodynamics Last 24 Hrs  CVP(mm Hg): --  CVP(cm H2O): --  CO: --  CI: --  PA: --  PA(mean): --  PCWP: --  SVR: --  SVRI: --  PVR: --  PVRI: --  CAPILLARY BLOOD GLUCOSE      POCT Blood Glucose.: 132 mg/dL (2025 16:36)  POCT Blood Glucose.: 131 mg/dL (2025 11:37)  POCT Blood Glucose.: 131 mg/dL (2025 07:44)  POCT Blood Glucose.: 139 mg/dL (15 Apr 2025 21:31)    CAPILLARY BLOOD GLUCOSE      POCT Blood Glucose.: 132 mg/dL (2025 16:36)    I&O's Summary    15 Apr 2025 07:01  -  2025 07:00  --------------------------------------------------------  IN: 1536 mL / OUT: 2090 mL / NET: -554 mL    2025 07:01  -  2025 20:58  --------------------------------------------------------  IN: 1398 mL / OUT: 830 mL / NET: 568 mL      Daily     Daily Weight in k.6 (2025 06:00)    PHYSICAL EXAM:  General: NAD, well-groomed, well-developed  Eyes: Conjunctiva and sclera clear  ENMT: Moist mucous membranes  Neck: Supple  Chest: Clear to auscultation bilaterally; no rales, rhonchi, or wheezing  Heart: Regular rate and rhythm; normal S1 and S2  Abd: Soft, nontender, nondistended  Nervous System: AAOX3  Ext: no peripheral LE edema bilaterally    LABS:  ABG - ( 15 Apr 2025 00:35 )  pH, Arterial: 7.44  pH, Blood: x     /  pCO2: 58    /  pO2: 110   / HCO3: 39    / Base Excess: 13.3  /  SaO2: 98.3                                    9.5    6.63  )-----------( 164      ( 2025 00:32 )             30.5     04-16    137  |  91[L]  |  93[H]  ----------------------------<  112[H]  4.1   |  33[H]  |  3.38[H]    Ca    9.0      2025 00:32  Phos  3.4     04-16  Mg     2.0     04-16    TPro  7.7  /  Alb  3.3  /  TBili  1.1  /  DBili  x   /  AST  17  /  ALT  15  /  AlkPhos  103  04-16    LIVER FUNCTIONS - ( 2025 00:32 )  Alb: 3.3 g/dL / Pro: 7.7 g/dL / ALK PHOS: 103 U/L / ALT: 15 U/L / AST: 17 U/L / GGT: x           PT/INR - ( 2025 00:32 )   PT: 11.9 sec;   INR: 1.04 ratio         PTT - ( 2025 00:32 )  PTT:68.1 sec        Urinalysis Basic - ( 2025 00:32 )    Color: x / Appearance: x / SG: x / pH: x  Gluc: 112 mg/dL / Ketone: x  / Bili: x / Urobili: x   Blood: x / Protein: x / Nitrite: x   Leuk Esterase: x / RBC: x / WBC x   Sq Epi: x / Non Sq Epi: x / Bacteria: x          Plan:  NEURO  - continue to monitor mental status as per protocol     RESPIRATORY  - continue to monitor SpO2 with goal >94%    Mechanical Vent:     CARDIO      RENAL/  - Continue monitoring urine output, lytes, SCr/ BUN  - replete lytes prn with goal K >4 and Mg >2    GI      ENDO      HEME  - Monitor H/H and plts  - DVT PPX:     ID  - monitor and trend WBC and temperature curve     Dispo: Maintain in ICU.    Patient requires continuous monitoring with bedside rhythm monitoring, arterial line, pulse oximetry, ventilator monitoring and intermittent blood gas analysis.  Care plan discussed with ICU care team.  I have spent 35 minutes providing critical care, in addition to initial critical time provided by CICU attending Dr. Sanchez, re-evaluated multiple times during the day.    Dilma Stapleton PA-C Patient is a 64y old  Male who presents with a chief complaint of dyspnea , chest pain (2025 10:32)    INTERVAL HISTORY:  - no acute events during the day    SUBJECTIVE  - Patient seen and evaluated at bedside.     MEDICATIONS:  MEDICATIONS  (STANDING):  albuterol/ipratropium for Nebulization 3 milliLiter(s) Nebulizer every 6 hours  aMIOdarone    Tablet   Oral   aMIOdarone    Tablet 200 milliGRAM(s) Oral daily  atorvastatin 40 milliGRAM(s) Oral at bedtime  buMETAnide Injectable 2 milliGRAM(s) IV Push two times a day  chlorhexidine 2% Cloths 1 Application(s) Topical daily  clopidogrel Tablet 75 milliGRAM(s) Oral daily  famotidine    Tablet 20 milliGRAM(s) Oral every 48 hours  hemorrhoidal Ointment 1 Application(s) Rectal two times a day  heparin  Infusion 1000 Unit(s)/Hr (19 mL/Hr) IV Continuous <Continuous>  insulin lispro (ADMELOG) corrective regimen sliding scale   SubCutaneous at bedtime  insulin lispro (ADMELOG) corrective regimen sliding scale   SubCutaneous three times a day before meals    OBJECTIVE:  ICU Vital Signs Last 24 Hrs  T(C): 37.1 (2025 16:00), Max: 37.1 (2025 08:00)  T(F): 98.8 (2025 16:00), Max: 98.8 (2025 16:00)  HR: 75 (2025 19:00) (64 - 77)  BP: 103/55 (2025 19:00) (83/53 - 109/74)  BP(mean): 72 (2025 19:00) (64 - 88)  RR: 25 (2025 19:00) (23 - 36)  SpO2: 94% (2025 19:00) (91% - 100%)    O2 Parameters below as of 2025 19:00  Patient On (Oxygen Delivery Method): nasal cannula w/ humidification  O2 Flow (L/min): 2    CAPILLARY BLOOD GLUCOSE  POCT Blood Glucose.: 132 mg/dL (2025 16:36)  POCT Blood Glucose.: 131 mg/dL (2025 11:37)  POCT Blood Glucose.: 131 mg/dL (2025 07:44)  POCT Blood Glucose.: 139 mg/dL (15 Apr 2025 21:31)    CAPILLARY BLOOD GLUCOSE  POCT Blood Glucose.: 132 mg/dL (2025 16:36)    I&O's Summary  15 Apr 2025 07:01  -  2025 07:00  --------------------------------------------------------  IN: 1536 mL / OUT: 2090 mL / NET: -554 mL    2025 07:01  -  2025 20:58  --------------------------------------------------------  IN: 1398 mL / OUT: 830 mL / NET: 568 mL    Daily Weight in k.6 (2025 06:00)    LABS:  ABG - ( 15 Apr 2025 00:35 )  pH, Arterial: 7.44  pH, Blood: x     /  pCO2: 58    /  pO2: 110   / HCO3: 39    / Base Excess: 13.3  /  SaO2: 98.3                          9.5    6.63  )-----------( 164      ( 2025 00:32 )             30.5     04-16  137  |  91[L]  |  93[H]  ----------------------------<  112[H]  4.1   |  33[H]  |  3.38[H]    Ca    9.0      2025 00:32  Phos  3.4     04-16  Mg     2.0     04-16    TPro  7.7  /  Alb  3.3  /  TBili  1.1  /  DBili  x   /  AST  17  /  ALT  15  /  AlkPhos  103  04-16  LIVER FUNCTIONS - ( 2025 00:32 )  Alb: 3.3 g/dL / Pro: 7.7 g/dL / ALK PHOS: 103 U/L / ALT: 15 U/L / AST: 17 U/L / GGT: x         PT/INR - ( 2025 00:32 )   PT: 11.9 sec;   INR: 1.04 ratio    PTT - ( 2025 00:32 )  PTT:68.1 sec  Urinalysis Basic - ( 2025 00:32 )    Color: x / Appearance: x / SG: x / pH: x  Gluc: 112 mg/dL / Ketone: x  / Bili: x / Urobili: x   Blood: x / Protein: x / Nitrite: x   Leuk Esterase: x / RBC: x / WBC x   Sq Epi: x / Non Sq Epi: x / Bacteria: x      Assessment: 64M PMHx AFib (s/p dccv 3/25/25), HFrEF (likely mod red EF, sev TTE), aortic stenosis s/p TAVR (2022), a flutter s/p ablation  and s/p micra implant, CKD3, WILFRED, obesity, venous stasis, HTN, HLD, who presents with 6 days of worsening dyspnea on exertion, admitted for AHRF likely iso CHF exacerbation, also with ANGELICA on CKD c/f cardiorenal syndrome. RRT on floors for SBP 80s requiring pressors and has been admitted to the CCU. Now s/p TAVR .    Plan:  NEURO   A&O3   - continue to monitor mental status as per protocol     RESPIRATORY  #Acute hypoxemic hypercapnic respiratory failure  - s/p extubation  - continue to monitor SpO2 with goal >94%    CARDIO  #Obstructive shock 2/ AS  - MAPs 70s -> will continue to wean vasopressin as BP tolerates    #Acute on chronic HF exacerbation  - preload reduction: bumex 2 IV BID  - off vaso & dobutamine  - Goal 1-2L net neg daily    #Aortic valve stenosis s/p prosthetic  - S/p STANISLAW showing severe AS stenosis, global hypokinesis and EF 15%  -  patent LIMA-LAD, closed SVG-OM1/D1, pending TAVR   - s/p TAVR   - heparin gtt    #Hx CABG  #PVD  - C/w Plavix    #HLD  - Increased Lipitor to 40mg from 20mg    RENAL/  #ANGELICA on CKD  #Cardiorenal syndrome  - Baseline 1.5-1.8  - Most likely i/s/o hypotension and fluid overload  - Renal sono showed parenchymal disease  - Nephro following c/w bumex gtt, monitor volume status. Stop sodium bicarb 650 TID for now     #Hyperkalemia  - Lokelma if K increases into 5s range  - Continue monitoring urine output, lytes, SCr/ BUN  - replete lytes prn with goal K >4 and Mg >2    GI  FEEST tomorrow with speech & swallow    ENDO  #Hyperglycemia  - ISS    HEME  - Monitor H/H and plts  - DVT PPX: Heparin    ID  Afebrile  - no leukocytosis  - monitor and trend WBC and temperature curve     Dispo: Maintain in ICU.    Patient requires continuous monitoring with bedside rhythm monitoring, arterial line, pulse oximetry, ventilator monitoring and intermittent blood gas analysis.  Care plan discussed with ICU care team.  I have spent 35 minutes providing critical care, in addition to initial critical time provided by CICU attending Dr. Sanchez, re-evaluated multiple times during the day.    Dilma Stapleton PA-C

## 2025-04-16 NOTE — PROGRESS NOTE ADULT - ATTENDING COMMENTS
S/p TAVR in the setting of valvular shock  A+Ox3  Initially with mixed shock requiring Vasopressin and Dobutamine, remains off  on amio s/p DCCV for afib/aflutter   SpO2 high 90s on nasal cannula - wean to room air as tolerated, cont incentive tracy   DASH diet   Non-oliguric ANGELICA, continue to diurese with bumex  H/H low but acceptable on Heparin drip for afib, transition to coumadin    Afebrile, no antibiotics  Sugars controlled  encouraged ambulation

## 2025-04-16 NOTE — PROGRESS NOTE ADULT - ATTENDING COMMENTS
Consult for ANGELICA in a patient with heart failure     Baseline CKD3= pt reports baseline Renal fxn in "40s", followed by outpt nephrologist Dr Stringer  angelica initially in setting of HF and Entresto and decreased po intake and diarrhea==> cardiorenal +  ATN  pt now s/p TAVR 4/11 with increase in creatinine post TAVR which has now plateaued   volume overloaded- only ~ 500 cc net negative in a day. increase Bumex to 4 mg iv BID    tommy marks  nephrology attending   please contact me on TEAMS   Office- [phone]

## 2025-04-16 NOTE — PROGRESS NOTE ADULT - ASSESSMENT
65 y/o M with hx of Afib s/p DCCV 3/25/25, CHF, Aortic stenosis s/p TAVR 11/2022, CAD s/p CABG, Aflutter s/p ablation and Micra implant, CKD 3, Obesity, venous stasis, HTN, HLD presented with 6 days of worsening SOB on exertion associated with orthopnea and chest pain.   Also reports several days decreased appetite. Denies fevers, chills, cough, abdominal pain, NVD, urinary sx, leg swelling. Patient had a cardioversion for atrial fibrillation at this hospital recently, which was reportedly unsuccessful. Patient has a scheduled appointment with Dr. Philip on 04/24/2025 to discuss possible ablation. His outpt cardiologist is Dr. Miranda. (Cardiology)  On arrival to ED, vitals were 97.3F, HR 71, BP 94/57, RR 22, 97% on 2L NC. Labs pertinent for WBC 10.68, INR 2.55, Na 132, K 5.5, bicarb 20, BUN/Cr 78/5.17 (baseline Cr appears ~1.5-1.8), trop 225, BNP 57736, VBG ph 7.3, pco2/po2 wnl. UA without evidence of infection.  nephrology was consulted for management of ANGELICA on CKD. Pt has baseline Scr of ~1.5- 1.8. Scr was 2.7 on 3/25/25. Pt is on Entresto ( started recently) and he mentioned that he was taking Motrin occasionally. No herbal medications/ supplements.  Scr at the time of admission was 5.17 3/31/25 and it further worsened to 5.45 4/1/25.    Pt follows Dr. Stewart Brown (Nephrology)  C was done on 4/8  Plan for TAVR on 4/11.     ANGELICA on CKD:  Pt has hx of CKD 3. Baseline Scr 1.5-1.8.   At the time of admission - Scr was 5.17 and it further worsened to 5.45--> 5.5. Scr elevated/improving to 3.54---> 3.48--> 3.09--> 2.8 4/12. Scr worsened  to 3.2---> 3.3 and improving ot  3.09 4/15 and slight increase to 3.38 today.   - Likely LISA in setting of TAVR and volume overload 2/2 cardio-renal etiology.   Pt has lopez cath in place. UOP was ~2L with net negative 0.45L in last 24 hours. On Bumex 2 IV BID now.   Bumex gtt - restarted 4/4/25 - Briefly held on 4/8 for LHC and is restarted later in the day.  Now off of bumex gtt due to alkalosis ( given a dose of diamox on 4/12 with a plan for 3 doses qd - 500 IV)  Bicarb is elevated but pCO2 is also elevated. pt is on BiPAP at night. Management of WILFRED/OHS as per primary team.   UA showed trace LE, blood ( Likely due to lopez), casts 17. UPCR 0.7. No hx of DM.   Pt was on entresto.   BP was low.   On IV vasopressin.   ANGELICA on CKD likely in setting of HF exacerbation, entresto use and hypotension mediated ATN.  USG kidney and bladder - normal sized kidneys. No HN.     PLAN:  on BiPAP overnight. Management as per primary team.   Pt is on Bumex 2mg q12 - Consider increasing to 2TID. Monitor UOP and daily wts along with labs.   continue to hold entresto.   No need of HD for now.    Avoid nephrotoxins  Dose medications as per eGFR    hyperkalemia: resolved  Today Serum K is 4.1  Monitor closely - replete as needed.     Hyponatremia is likely in setting of impaired water clearance in setting of worsening renal function and volume overload 2/2 heart failure  resolved now.   Bumex as noted above     TAVR on 4/11 done. Initial rise in Scr could be attributed to LISA and now likely volume overload. May need more diuresis, as noted above.     Wait for the final reccs as per the attending.

## 2025-04-16 NOTE — PROGRESS NOTE ADULT - ASSESSMENT
Assessment: 64M PMHx AFib (s/p dccv 3/25/25), HFrEF (likely mod red EF, sev TTE), aortic stenosis s/p TAVR (2022), a flutter s/p ablation  and s/p micra implant, CKD3, WILFRED, obesity, venous stasis, HTN, HLD, who presents with 6 days of worsening dyspnea on exertion, admitted for AHRF likely iso CHF exacerbation, also with ANGELICA on CKD c/f cardiorenal syndrome. RRT on floors for SBP 80s requiring pressors and has been admitted to the CCU. Now s/p TAVR .    Plan:  NEURO   A&O3   - continue to monitor mental status as per protocol     RESPIRATORY  #Acute hypoxemic hypercapnic respiratory failure  - AVAPS at night  - continue duonebs   - monitor ABGs  - continue to monitor SpO2 with goal >94%    CARDIO  #Obstructive shock 2/2 AS  - MAPs 70s -> will continue to wean vasopressin as BP tolerates    #Acute on chronic HF exacerbation  - preload reduction: bumex 1 IV BID  - inotropic support:  1.25, weaned   - continue vaso for pressor support  - Goal 1-2L net neg daily  - CVP goal 8-12    #Aortic valve stenosis s/p prosthetic  - S/p STANISLAW showing severe AS stenosis, global hypokinesis and EF 15%  -  patent LIMA-LAD, closed SVG-OM1/D1, pending TAVR   - s/p TAVR   - heparin gtt    #Hx CABG  #PVD  - C/w Plavix    #HLD  - Increased Lipitor to 40mg from 20mg    RENAL/  #ANGELICA on CKD  #Cardiorenal syndrome  - Baseline 1.5-1.8  - Most likely i/s/o hypotension and fluid overload  - Renal sono showed parenchymal disease  - Nephro following, monitor volume status. Stop sodium bicarb 650 TID for now   - Bumex bid    #Hyperkalemia  - Lokelma if K increases into 5s range  - Continue monitoring urine output, lytes, SCr/ BUN  - replete lytes prn with goal K >4 and Mg >2    GI  FEES with speech & swallow, regular diet w/ thin liquids    ENDO  #Hyperglycemia  - ISS    HEME  - Monitor H/H and plts  - DVT PPX: Heparin    ID  Afebrile  - no leukocytosis  - monitor and trend WBC and temperature curve     Dispo: Maintain in ICU.   Assessment: 64M PMHx AFib (s/p dccv 3/25/25), HFrEF (likely mod red EF, sev TTE), aortic stenosis s/p TAVR (11/2022), a flutter s/p ablation 2019 and s/p micra implant, CKD3, WILFRED, obesity, venous stasis, HTN, HLD, who presents with 6 days of worsening dyspnea on exertion, admitted for AHRF likely iso CHF exacerbation, also with ANGELICA on CKD c/f cardiorenal syndrome. RRT on floors for SBP 80s requiring pressors and has been admitted to the CCU. Now s/p TAVR 4/11.    Plan:  NEURO   A&O3   - continue to monitor mental status as per protocol     RESPIRATORY  #Acute hypoxemic hypercapnic respiratory failure  - AVAPS at night  - continue duonebs   - monitor ABGs  - continue to monitor SpO2 with goal >94%  - IS 500cc    CARDIO  #Obstructive shock 2/2 AS  - MAPs 70s -> will continue to wean vasopressin as BP tolerates    #Acute on chronic HF exacerbation  - preload reduction: bumex 1 IV BID  - Goal 1-2L net neg daily    #Aortic valve stenosis s/p prosthetic  - S/p STANISLAW showing severe AS stenosis, global hypokinesis and EF 15%  - 4/8 patent LIMA-LAD, closed SVG-OM1/D1, pending TAVR 4/11  - s/p TAVR 4/11  - heparin gtt    #Hx CABG  #PVD  - C/w Plavix    #HLD  - Increased Lipitor to 40mg from 20mg    RENAL/  #ANGELICA on CKD  #Cardiorenal syndrome  - Baseline 1.5-1.8  - Most likely i/s/o hypotension and fluid overload  - Renal sono showed parenchymal disease  - Nephro following, monitor volume status. Stop sodium bicarb 650 TID for now 4/7  - Bumex bid    #Hyperkalemia  - Lokelma if K increases into 5s range  - Continue monitoring urine output, lytes, SCr/ BUN  - replete lytes prn with goal K >4 and Mg >2    GI  FEES with speech & swallow, regular diet w/ thin liquids    ENDO  #Hyperglycemia  - ISS    HEME  - Monitor H/H and plts  - DVT PPX: Heparin    ID  Afebrile  - no leukocytosis  - monitor and trend WBC and temperature curve     Dispo: Maintain in ICU.   Assessment: 64M PMHx AFib (s/p dccv 3/25/25), HFrEF (likely mod red EF, sev TTE), aortic stenosis s/p TAVR (11/2022), a flutter s/p ablation 2019 and s/p micra implant, CKD3, WILFRED, obesity, venous stasis, HTN, HLD, who presents with 6 days of worsening dyspnea on exertion, admitted for AHRF likely iso CHF exacerbation, also with ANGELICA on CKD c/f cardiorenal syndrome. RRT on floors for SBP 80s requiring pressors and has been admitted to the CCU. Now s/p TAVR 4/11.    Plan:  NEURO   A&O3   - continue to monitor mental status as per protocol     RESPIRATORY  #Acute hypoxemic hypercapnic respiratory failure  - AVAPS at night which he has been refusing   - continue duonebs   - monitor ABGs  - continue to monitor SpO2 with goal >94%  - IS 500cc    CARDIO  #Obstructive shock 2/2 AS  - MAPs 70s -> will continue to wean vasopressin as BP tolerates    #Acute on chronic HF exacerbation  - preload reduction: bumex 1 IV BID  - Goal 1-2L net neg daily    #Aortic valve stenosis s/p prosthetic  - S/p STANISLAW showing severe AS stenosis, global hypokinesis and EF 15%  - 4/8 patent LIMA-LAD, closed SVG-OM1/D1, pending TAVR 4/11  - s/p TAVR 4/11  - heparin gtt    #Hx CABG  #PVD  - C/w Plavix    #HLD  - Increased Lipitor to 40mg from 20mg    RENAL/  #ANGELICA on CKD  #Cardiorenal syndrome  - Baseline 1.5-1.8  - Most likely i/s/o hypotension and fluid overload  - Renal sono showed parenchymal disease  - Nephro following, monitor volume status. Stop sodium bicarb 650 TID for now 4/7  - Bumex bid    #Hyperkalemia  - Lokelma if K increases into 5s range  - Continue monitoring urine output, lytes, SCr/ BUN  - replete lytes prn with goal K >4 and Mg >2    GI  FEES with speech & swallow, regular diet w/ thin liquids    ENDO  #Hyperglycemia  - ISS    HEME  - Monitor H/H and plts  - DVT PPX: Heparin    ID  Afebrile  - no leukocytosis  - monitor and trend WBC and temperature curve     Dispo: Maintain in ICU.

## 2025-04-16 NOTE — PROGRESS NOTE ADULT - SUBJECTIVE AND OBJECTIVE BOX
[facility] Division of Kidney Diseases & Hypertension  FOLLOW UP NOTE  [phone]--------------------------------------------------------------------------------  Chief Complaint:Acute on chronic systolic congestive heart failure, ANGELICA on CKD.     24 hour events/subjective:  Pt was seen and examined earlier today. No acute overnight events. No fresh complaints. Pt reports feeling well.   Pt denies any worsening of  SOB/ Constipation/ Diarrhea/ Nausea/ Vomiting/ abdominal pain/ chest pain/ tingling/ numbness.         PAST HISTORY  --------------------------------------------------------------------------------  No significant changes to PMH, PSH, FHx, SHx, unless otherwise noted    ALLERGIES & MEDICATIONS  --------------------------------------------------------------------------------  Allergies    metoprolol (Short breath)    Intolerances      Standing Inpatient Medications  albuterol/ipratropium for Nebulization 3 milliLiter(s) Nebulizer every 6 hours  aMIOdarone    Tablet 200 milliGRAM(s) Oral daily  aMIOdarone    Tablet   Oral   atorvastatin 40 milliGRAM(s) Oral at bedtime  buMETAnide Injectable 2 milliGRAM(s) IV Push two times a day  chlorhexidine 2% Cloths 1 Application(s) Topical daily  clopidogrel Tablet 75 milliGRAM(s) Oral daily  famotidine    Tablet 20 milliGRAM(s) Oral every 48 hours  hemorrhoidal Ointment 1 Application(s) Rectal daily  heparin  Infusion 1000 Unit(s)/Hr IV Continuous <Continuous>  insulin lispro (ADMELOG) corrective regimen sliding scale   SubCutaneous at bedtime  insulin lispro (ADMELOG) corrective regimen sliding scale   SubCutaneous three times a day before meals    PRN Inpatient Medications      REVIEW OF SYSTEMS  --------------------------------------------------------------------------------  as noted above.     VITALS/PHYSICAL EXAM  --------------------------------------------------------------------------------  T(C): 37.1 (04-16-25 @ 08:00), Max: 37.1 (04-15-25 @ 19:00)  HR: 67 (04-16-25 @ 10:00) (59 - 75)  BP: 91/52 (04-16-25 @ 10:00) (81/54 - 102/68)  RR: 27 (04-16-25 @ 10:00) (23 - 32)  SpO2: 99% (04-16-25 @ 10:00) (90% - 100%)  Wt(kg): --        04-15-25 @ 07:01  -  04-16-25 @ 07:00  --------------------------------------------------------  IN: 1536 mL / OUT: 2090 mL / NET: -554 mL    04-16-25 @ 07:01  -  04-16-25 @ 10:32  --------------------------------------------------------  IN: 687 mL / OUT: 230 mL / NET: 457 mL      Physical Exam:  Gen: extubated on 4/12. On supplemental oxygen.   Pulm: No crackles, lower zone decreased breath sounds.   CV: RRR, S1S2;  Abd: +BS, soft, nontender/nondistended  : Alba in place.   Extremities: Mild LE swelling  Skin: Warm, Lower extremity chronic changes.    LABS/STUDIES  --------------------------------------------------------------------------------              9.5    6.63  >-----------<  164      [04-16-25 @ 00:32]              30.5     137  |  91  |  93  ----------------------------<  112      [04-16-25 @ 00:32]  4.1   |  33  |  3.38        Ca     9.0     [04-16-25 @ 00:32]      Mg     2.0     [04-16-25 @ 00:32]      Phos  3.4     [04-16-25 @ 00:32]    TPro  7.7  /  Alb  3.3  /  TBili  1.1  /  DBili  x   /  AST  17  /  ALT  15  /  AlkPhos  103  [04-16-25 @ 00:32]    PT/INR: PT 11.9 , INR 1.04       [04-16-25 @ 00:32]  PTT: 68.1       [04-16-25 @ 00:32]      Creatinine Trend:  SCr 3.38 [04-16 @ 00:32]  SCr 3.09 [04-15 @ 00:43]  SCr 3.06 [04-14 @ 12:05]  SCr 3.33 [04-14 @ 00:40]  SCr 3.35 [04-13 @ 18:32]

## 2025-04-16 NOTE — PROGRESS NOTE ADULT - SUBJECTIVE AND OBJECTIVE BOX
Patient is a 64y old  Male who presents with a chief complaint of dyspnea , chest pain (2025 10:32)    HPI:   65 yo M with a fib (s/p dccv 3/25/25), CHF (likely moderately reduced EF, several TTE with poor windows), aortic stenosis s/p TAVR (2022), CAD s/p CABG, a flutter s/p ablation  and s/p micra implant,  CKD3, obesity, venous stasis, HTN, HLD, who presents with 6 days of worsening dyspnea on exertion. Pt also reports associated orthopnea and chest pain on exertion but not at rest which is located in the center of his chest. Pain is described as burning. Also reports several days decreased appetite. Denies fevers, chills, cough, abdominal pain, NVD, urinary sx, leg swelling. Patient had a cardioversion for atrial fibrillation at this hospital recently, which was reportedly unsuccessful. Patient has a scheduled appointment with Dr. Philip on 2025 to discuss possible ablation. His outpt cardiologist is Dr. Paul.     On arrival to ED, vitals were 97.3F, HR 71, BP 94/57, RR 22, 97% on 2L NC. Labs pertinent for WBC 10.68, INR 2.55, Na 132, K 5.5, bicarb 20, BUN/Cr 78/5.17 (baseline Cr appears ~1.5-1.8), trop 225, BNP 54672, VBG ph 7.3, pco2/po2 wnl. UA without evidence of infection. EKG  on admission 1st deg av block, low voltage, poor r wave progression, R axis deviation CXR with diffuse hazy opacities suggest mild pulmonary edema and small right and trace left pleural effusions. Pt was given IV Bumex 1mg x1, IV bumex 2mg x1, IV Bumex 4mg x1, and lokelma 5mg x1, then admitted for further management.    (2025 01:59)       INTERVAL HPI/OVERNIGHT EVENTS:   No overnight events   Afebrile, hemodynamically stable     Subjective:    ICU Vital Signs Last 24 Hrs  T(C): 36.6 (2025 12:00), Max: 37.1 (15 Apr 2025 19:00)  T(F): 97.8 (2025 12:00), Max: 98.7 (15 Apr 2025 19:00)  HR: 70 (2025 12:22) (60 - 76)  BP: 95/56 (2025 12:00) (81/54 - 102/68)  BP(mean): 71 (2025 12:00) (62 - 80)  ABP: 101/54 (15 Apr 2025 17:00) (87/44 - 101/54)  ABP(mean): 69 (15 Apr 2025 17:00) (58 - 69)  RR: 33 (2025 11:00) (23 - 33)  SpO2: 100% (2025 12:22) (90% - 100%)    O2 Parameters below as of 2025 12:00  Patient On (Oxygen Delivery Method): nasal cannula w/ humidification  O2 Flow (L/min): 1        I&O's Summary    15 Apr 2025 07:01  -  2025 07:00  --------------------------------------------------------  IN: 1536 mL / OUT: 2090 mL / NET: -554 mL    2025 07:01  -  2025 12:54  --------------------------------------------------------  IN: 725 mL / OUT: 395 mL / NET: 330 mL          Daily     Daily Weight in k.6 (2025 06:00)      EKG/Telemetry Events:    MEDICATIONS  (STANDING):  albuterol/ipratropium for Nebulization 3 milliLiter(s) Nebulizer every 6 hours  aMIOdarone    Tablet   Oral   aMIOdarone    Tablet 200 milliGRAM(s) Oral daily  atorvastatin 40 milliGRAM(s) Oral at bedtime  buMETAnide Injectable 2 milliGRAM(s) IV Push two times a day  chlorhexidine 2% Cloths 1 Application(s) Topical daily  clopidogrel Tablet 75 milliGRAM(s) Oral daily  famotidine    Tablet 20 milliGRAM(s) Oral every 48 hours  hemorrhoidal Ointment 1 Application(s) Rectal two times a day  heparin  Infusion 1000 Unit(s)/Hr (19 mL/Hr) IV Continuous <Continuous>  insulin lispro (ADMELOG) corrective regimen sliding scale   SubCutaneous at bedtime  insulin lispro (ADMELOG) corrective regimen sliding scale   SubCutaneous three times a day before meals    MEDICATIONS  (PRN):      PHYSICAL EXAM:  General: Well-appearing, NAD  HEENT: PERRL, EOMI  Neck: Supple  Chest/Lungs: +nasal cannula. CTA bilaterally, no wheezing, rales, rhonchi or rub  Heart: RRR, normal S1, S2, no murmurs or gallops  Abdomen: Soft, ND, NTTP, normoactive bowel sounds  Extremities: +pitting edema. 2+ peripheral pulses b/l, no edema, clubbing or cyanosis  Neurological: A&Ox3, moves all extremities, no focal deficits    LABS:                        9.5    6.63  )-----------( 164      ( 2025 00:32 )             30.5     04-16    137  |  91[L]  |  93[H]  ----------------------------<  112[H]  4.1   |  33[H]  |  3.38[H]    Ca    9.0      2025 00:32  Phos  3.4     04-16  Mg     2.0     04-16    TPro  7.7  /  Alb  3.3  /  TBili  1.1  /  DBili  x   /  AST  17  /  ALT  15  /  AlkPhos  103  04-16    LIVER FUNCTIONS - ( 2025 00:32 )  Alb: 3.3 g/dL / Pro: 7.7 g/dL / ALK PHOS: 103 U/L / ALT: 15 U/L / AST: 17 U/L / GGT: x           PT/INR - ( 2025 00:32 )   PT: 11.9 sec;   INR: 1.04 ratio         PTT - ( 2025 00:32 )  PTT:68.1 sec  CAPILLARY BLOOD GLUCOSE      POCT Blood Glucose.: 131 mg/dL (2025 11:37)  POCT Blood Glucose.: 131 mg/dL (2025 07:44)  POCT Blood Glucose.: 139 mg/dL (15 Apr 2025 21:31)  POCT Blood Glucose.: 121 mg/dL (15 Apr 2025 16:24)    ABG - ( 15 Apr 2025 00:35 )  pH, Arterial: 7.44  pH, Blood: x     /  pCO2: 58    /  pO2: 110   / HCO3: 39    / Base Excess: 13.3  /  SaO2: 98.3                    Urinalysis Basic - ( 2025 00:32 )    Color: x / Appearance: x / SG: x / pH: x  Gluc: 112 mg/dL / Ketone: x  / Bili: x / Urobili: x   Blood: x / Protein: x / Nitrite: x   Leuk Esterase: x / RBC: x / WBC x   Sq Epi: x / Non Sq Epi: x / Bacteria: x          RADIOLOGY & ADDITIONAL TESTS:  CXR:        Care Discussed with Consultants/Other Providers [ x] YES  [ ] NO

## 2025-04-17 DIAGNOSIS — Z95.2 PRESENCE OF PROSTHETIC HEART VALVE: ICD-10-CM

## 2025-04-17 LAB
ALBUMIN SERPL ELPH-MCNC: 3.4 G/DL — SIGNIFICANT CHANGE UP (ref 3.3–5)
ALP SERPL-CCNC: 110 U/L — SIGNIFICANT CHANGE UP (ref 40–120)
ALT FLD-CCNC: 19 U/L — SIGNIFICANT CHANGE UP (ref 10–45)
ANION GAP SERPL CALC-SCNC: 16 MMOL/L — SIGNIFICANT CHANGE UP (ref 5–17)
APTT BLD: 59.7 SEC — HIGH (ref 24.5–35.6)
AST SERPL-CCNC: 23 U/L — SIGNIFICANT CHANGE UP (ref 10–40)
BILIRUB SERPL-MCNC: 1 MG/DL — SIGNIFICANT CHANGE UP (ref 0.2–1.2)
BUN SERPL-MCNC: 101 MG/DL — HIGH (ref 7–23)
CALCIUM SERPL-MCNC: 9.4 MG/DL — SIGNIFICANT CHANGE UP (ref 8.4–10.5)
CHLORIDE SERPL-SCNC: 91 MMOL/L — LOW (ref 96–108)
CO2 SERPL-SCNC: 29 MMOL/L — SIGNIFICANT CHANGE UP (ref 22–31)
CREAT SERPL-MCNC: 2.99 MG/DL — HIGH (ref 0.5–1.3)
EGFR: 22 ML/MIN/1.73M2 — LOW
EGFR: 22 ML/MIN/1.73M2 — LOW
GLUCOSE BLDC GLUCOMTR-MCNC: 112 MG/DL — HIGH (ref 70–99)
GLUCOSE BLDC GLUCOMTR-MCNC: 125 MG/DL — HIGH (ref 70–99)
GLUCOSE BLDC GLUCOMTR-MCNC: 136 MG/DL — HIGH (ref 70–99)
GLUCOSE BLDC GLUCOMTR-MCNC: 147 MG/DL — HIGH (ref 70–99)
GLUCOSE SERPL-MCNC: 94 MG/DL — SIGNIFICANT CHANGE UP (ref 70–99)
HCT VFR BLD CALC: 31.8 % — LOW (ref 39–50)
HGB BLD-MCNC: 10.1 G/DL — LOW (ref 13–17)
INR BLD: 0.99 RATIO — SIGNIFICANT CHANGE UP (ref 0.85–1.16)
MAGNESIUM SERPL-MCNC: 1.9 MG/DL — SIGNIFICANT CHANGE UP (ref 1.6–2.6)
MCHC RBC-ENTMCNC: 31.3 PG — SIGNIFICANT CHANGE UP (ref 27–34)
MCHC RBC-ENTMCNC: 31.8 G/DL — LOW (ref 32–36)
MCV RBC AUTO: 98.5 FL — SIGNIFICANT CHANGE UP (ref 80–100)
NRBC BLD AUTO-RTO: 0 /100 WBCS — SIGNIFICANT CHANGE UP (ref 0–0)
PHOSPHATE SERPL-MCNC: 2.9 MG/DL — SIGNIFICANT CHANGE UP (ref 2.5–4.5)
PLATELET # BLD AUTO: 194 K/UL — SIGNIFICANT CHANGE UP (ref 150–400)
POTASSIUM SERPL-MCNC: 3.9 MMOL/L — SIGNIFICANT CHANGE UP (ref 3.5–5.3)
POTASSIUM SERPL-SCNC: 3.9 MMOL/L — SIGNIFICANT CHANGE UP (ref 3.5–5.3)
PROT SERPL-MCNC: 7.8 G/DL — SIGNIFICANT CHANGE UP (ref 6–8.3)
PROTHROM AB SERPL-ACNC: 11.3 SEC — SIGNIFICANT CHANGE UP (ref 9.9–13.4)
RBC # BLD: 3.23 M/UL — LOW (ref 4.2–5.8)
RBC # FLD: 16.9 % — HIGH (ref 10.3–14.5)
SODIUM SERPL-SCNC: 136 MMOL/L — SIGNIFICANT CHANGE UP (ref 135–145)
WBC # BLD: 6.67 K/UL — SIGNIFICANT CHANGE UP (ref 3.8–10.5)
WBC # FLD AUTO: 6.67 K/UL — SIGNIFICANT CHANGE UP (ref 3.8–10.5)

## 2025-04-17 PROCEDURE — 99231 SBSQ HOSP IP/OBS SF/LOW 25: CPT

## 2025-04-17 PROCEDURE — 93010 ELECTROCARDIOGRAM REPORT: CPT

## 2025-04-17 PROCEDURE — 99291 CRITICAL CARE FIRST HOUR: CPT

## 2025-04-17 PROCEDURE — 99232 SBSQ HOSP IP/OBS MODERATE 35: CPT | Mod: GC

## 2025-04-17 RX ORDER — MAGNESIUM SULFATE 500 MG/ML
2 SYRINGE (ML) INJECTION ONCE
Refills: 0 | Status: COMPLETED | OUTPATIENT
Start: 2025-04-17 | End: 2025-04-17

## 2025-04-17 RX ADMIN — PHENYLEPHRINE HYDROCHLORIDE AND FAT, HARD 1 APPLICATION(S): .00525; 1.86 SUPPOSITORY RECTAL at 05:33

## 2025-04-17 RX ADMIN — BUMETANIDE 2 MILLIGRAM(S): 1 TABLET ORAL at 13:43

## 2025-04-17 RX ADMIN — IPRATROPIUM BROMIDE AND ALBUTEROL SULFATE 3 MILLILITER(S): .5; 2.5 SOLUTION RESPIRATORY (INHALATION) at 11:17

## 2025-04-17 RX ADMIN — IPRATROPIUM BROMIDE AND ALBUTEROL SULFATE 3 MILLILITER(S): .5; 2.5 SOLUTION RESPIRATORY (INHALATION) at 06:12

## 2025-04-17 RX ADMIN — Medication 5 MILLIGRAM(S): at 21:06

## 2025-04-17 RX ADMIN — IPRATROPIUM BROMIDE AND ALBUTEROL SULFATE 3 MILLILITER(S): .5; 2.5 SOLUTION RESPIRATORY (INHALATION) at 17:37

## 2025-04-17 RX ADMIN — BUMETANIDE 2 MILLIGRAM(S): 1 TABLET ORAL at 05:32

## 2025-04-17 RX ADMIN — Medication 25 GRAM(S): at 02:17

## 2025-04-17 RX ADMIN — Medication 20 MILLIEQUIVALENT(S): at 02:18

## 2025-04-17 RX ADMIN — PHENYLEPHRINE HYDROCHLORIDE AND FAT, HARD 1 APPLICATION(S): .00525; 1.86 SUPPOSITORY RECTAL at 16:59

## 2025-04-17 RX ADMIN — CLOPIDOGREL BISULFATE 75 MILLIGRAM(S): 75 TABLET, FILM COATED ORAL at 13:43

## 2025-04-17 RX ADMIN — HEPARIN SODIUM 19 UNIT(S)/HR: 1000 INJECTION INTRAVENOUS; SUBCUTANEOUS at 21:05

## 2025-04-17 RX ADMIN — ATORVASTATIN CALCIUM 40 MILLIGRAM(S): 80 TABLET, FILM COATED ORAL at 21:06

## 2025-04-17 RX ADMIN — AMIODARONE HYDROCHLORIDE 200 MILLIGRAM(S): 50 INJECTION, SOLUTION INTRAVENOUS at 05:32

## 2025-04-17 NOTE — PROGRESS NOTE ADULT - PROBLEM SELECTOR PLAN 1
Continue with Plavix 75mg   Continue with hep gtt   Coumadin 5 mg tonight   Bumex 2mg IVP BID   Encourage Chest PT / Pulmonary toileting and Incentive spirometry every 1 hour x 10 while awake.   D/C plan subacute rehab once medically cleared   Plan of care discussed with attending

## 2025-04-17 NOTE — PROGRESS NOTE ADULT - SUBJECTIVE AND OBJECTIVE BOX
[facility] DIVISION OF KIDNEY DISEASES AND HYPERTENSION --    Reason for consult: ANGELICA on CKD    24 hour events/subjective: Patient seen and examined at bedside.      PAST HISTORY  --------------------------------------------------------------------------------  No significant changes to PMH, PSH, FHx, SHx, unless otherwise noted    ALLERGIES & MEDICATIONS  --------------------------------------------------------------------------------  Allergies    metoprolol (Short breath)      Standing Inpatient Medications  albuterol/ipratropium for Nebulization 3 milliLiter(s) Nebulizer every 6 hours  aMIOdarone    Tablet   Oral   aMIOdarone    Tablet 200 milliGRAM(s) Oral daily  atorvastatin 40 milliGRAM(s) Oral at bedtime  buMETAnide Injectable 2 milliGRAM(s) IV Push two times a day  chlorhexidine 2% Cloths 1 Application(s) Topical daily  clopidogrel Tablet 75 milliGRAM(s) Oral daily  famotidine    Tablet 20 milliGRAM(s) Oral every 48 hours  hemorrhoidal Ointment 1 Application(s) Rectal two times a day  heparin  Infusion 1000 Unit(s)/Hr IV Continuous <Continuous>  insulin lispro (ADMELOG) corrective regimen sliding scale   SubCutaneous at bedtime  insulin lispro (ADMELOG) corrective regimen sliding scale   SubCutaneous three times a day before meals    PRN Inpatient Medications      REVIEW OF SYSTEMS  --------------------------------------------------------------------------------  Gen: No fever  Respiratory: No dyspnea  CV: No chest pain  GI: No abdominal pain, diarrhea, constipation, nausea, vomiting  : No increased frequency, dysuria, hematuria  Skin: No rashes  MSK: No joint pain/swelling; no back pain, no edema  Neuro: No dizziness/lightheadedness    All other systems were reviewed and are negative, except as noted.    VITALS/PHYSICAL EXAM  --------------------------------------------------------------------------------  T(C): 36.7 (04-17-25 @ 03:00), Max: 37.1 (04-16-25 @ 16:00)  HR: 84 (04-17-25 @ 08:00) (64 - 95)  BP: 84/54 (04-17-25 @ 08:00) (84/54 - 109/74)  RR: 34 (04-17-25 @ 08:00) (21 - 36)  SpO2: 98% (04-17-25 @ 08:00) (92% - 100%)  Wt(kg): --        04-16-25 @ 07:01  -  04-17-25 @ 07:00  --------------------------------------------------------  IN: 1828 mL / OUT: 1680 mL / NET: 148 mL    04-17-25 @ 07:01  -  04-17-25 @ 09:01  --------------------------------------------------------  IN: 19 mL / OUT: 260 mL / NET: -241 mL      PHYSICAL EXAM:  Gen: NAD  Neuro: non-focal  HEENT: Anicteric, MMM  Pulm: CTA B/L  CV: +S1S2  Abd: soft, non-tender/non-distended  : No suprapubic tenderness  Extremities: no edema  Back: no CVA tenderness  Skin: Warm  Dialysis access:    LABS/STUDIES  --------------------------------------------------------------------------------              10.1   6.67  >-----------<  194      [04-17-25 @ 00:24]              31.8     136  |  91  |  101  ----------------------------<  94      [04-17-25 @ 00:24]  3.9   |  29  |  2.99        Ca     9.4     [04-17-25 @ 00:24]      Mg     1.9     [04-17-25 @ 00:24]      Phos  2.9     [04-17-25 @ 00:24]    TPro  7.8  /  Alb  3.4  /  TBili  1.0  /  DBili  x   /  AST  23  /  ALT  19  /  AlkPhos  110  [04-17-25 @ 00:24]    PT/INR: PT 11.3 , INR 0.99       [04-17-25 @ 00:24]  PTT: 59.7       [04-17-25 @ 00:24]      Creatinine Trend:  SCr 2.99 [04-17 @ 00:24]  SCr 3.38 [04-16 @ 00:32]  SCr 3.09 [04-15 @ 00:43]  SCr 3.06 [04-14 @ 12:05]  SCr 3.33 [04-14 @ 00:40]      TSH 3.15      [04-01-25 @ 21:43]  Lipid: chol 94, TG 74, HDL 29, LDL --      [04-01-25 @ 06:18] [facility] DIVISION OF KIDNEY DISEASES AND HYPERTENSION --    Reason for consult: ANGELICA on CKD    24 hour events/subjective: Patient seen and examined at bedside. No overnight events and no acute complaints.      PAST HISTORY  --------------------------------------------------------------------------------  No significant changes to PMH, PSH, FHx, SHx, unless otherwise noted    ALLERGIES & MEDICATIONS  --------------------------------------------------------------------------------  Allergies    metoprolol (Short breath)      Standing Inpatient Medications  albuterol/ipratropium for Nebulization 3 milliLiter(s) Nebulizer every 6 hours  aMIOdarone    Tablet   Oral   aMIOdarone    Tablet 200 milliGRAM(s) Oral daily  atorvastatin 40 milliGRAM(s) Oral at bedtime  buMETAnide Injectable 2 milliGRAM(s) IV Push two times a day  chlorhexidine 2% Cloths 1 Application(s) Topical daily  clopidogrel Tablet 75 milliGRAM(s) Oral daily  famotidine    Tablet 20 milliGRAM(s) Oral every 48 hours  hemorrhoidal Ointment 1 Application(s) Rectal two times a day  heparin  Infusion 1000 Unit(s)/Hr IV Continuous <Continuous>  insulin lispro (ADMELOG) corrective regimen sliding scale   SubCutaneous at bedtime  insulin lispro (ADMELOG) corrective regimen sliding scale   SubCutaneous three times a day before meals    PRN Inpatient Medications      REVIEW OF SYSTEMS  --------------------------------------------------------------------------------  Gen: No fever  Respiratory: No dyspnea  CV: No chest pain  GI: No abdominal pain, diarrhea, nausea, vomiting  : No increased frequency, dysuria, hematuria  Neuro: No dizziness/lightheadedness    All other systems were reviewed and are negative, except as noted.    VITALS/PHYSICAL EXAM  --------------------------------------------------------------------------------  T(C): 36.7 (04-17-25 @ 03:00), Max: 37.1 (04-16-25 @ 16:00)  HR: 84 (04-17-25 @ 08:00) (64 - 95)  BP: 84/54 (04-17-25 @ 08:00) (84/54 - 109/74)  RR: 34 (04-17-25 @ 08:00) (21 - 36)  SpO2: 98% (04-17-25 @ 08:00) (92% - 100%)  Wt(kg): --        04-16-25 @ 07:01  -  04-17-25 @ 07:00  --------------------------------------------------------  IN: 1828 mL / OUT: 1680 mL / NET: 148 mL    04-17-25 @ 07:01  -  04-17-25 @ 09:01  --------------------------------------------------------  IN: 19 mL / OUT: 260 mL / NET: -241 mL      PHYSICAL EXAM:  Gen: NAD  Neuro: non-focal  HEENT: Anicteric, MMM  Pulm: CTA B/L, +NC  CV: +S1S2  Abd: soft, non-tender/non-distended  : +lopez  Extremities: +B/L LE edema, +venous stasis changes  Skin: Warm    LABS/STUDIES  --------------------------------------------------------------------------------              10.1   6.67  >-----------<  194      [04-17-25 @ 00:24]              31.8     136  |  91  |  101  ----------------------------<  94      [04-17-25 @ 00:24]  3.9   |  29  |  2.99        Ca     9.4     [04-17-25 @ 00:24]      Mg     1.9     [04-17-25 @ 00:24]      Phos  2.9     [04-17-25 @ 00:24]    TPro  7.8  /  Alb  3.4  /  TBili  1.0  /  DBili  x   /  AST  23  /  ALT  19  /  AlkPhos  110  [04-17-25 @ 00:24]    PT/INR: PT 11.3 , INR 0.99       [04-17-25 @ 00:24]  PTT: 59.7       [04-17-25 @ 00:24]      Creatinine Trend:  SCr 2.99 [04-17 @ 00:24]  SCr 3.38 [04-16 @ 00:32]  SCr 3.09 [04-15 @ 00:43]  SCr 3.06 [04-14 @ 12:05]  SCr 3.33 [04-14 @ 00:40]      TSH 3.15      [04-01-25 @ 21:43]  Lipid: chol 94, TG 74, HDL 29, LDL --      [04-01-25 @ 06:18]

## 2025-04-17 NOTE — PROGRESS NOTE ADULT - ATTENDING COMMENTS
Consult for ANGELICA in a patient with heart failure     Baseline CKD3= pt reports baseline Renal fxn in "40s", followed by outpt nephrologist Dr Stringer  angelica initially in setting of HF and Entresto and decreased po intake and diarrhea==> cardiorenal +  ATN  pt now s/p TAVR 4/11 with increase in creatinine post TAVR which is now improving   off pressors   continue Bumex 2 mg iv BID- Can increase if needed.     tommy marks  nephrology attending   please contact me on TEAMS   Office- [phone]

## 2025-04-17 NOTE — PROGRESS NOTE ADULT - NS MD NEURO CONDITIONS_RENAL
ANGELICA/CKD Stage 3
ANGELICA
ANGELICA/CKD Stage 3
ANGELICA/CKD Stage 1
ANGELICA

## 2025-04-17 NOTE — PROGRESS NOTE ADULT - PROBLEM SELECTOR PLAN 1
Non-oliguric ANGELICA on CKD likely in the setting of HF exacerbation (EF 15-20%), IV contrast exposure, Entresto use and hypotension-mediated ATN. Baseline Scr 1.5-1.8. On admission, Scr was 5.17 and it peaked to 5.5. UA showed trace LE, blood (Likely due to lopez), casts 17. UPCR 0.7g. Renal US showed no hydro.     Pt was on Bumex gtt with improvement in Scr to 2.8 (4/12). Subsequent worsening of Scr worsened  to 3.3 likely due to contrast-associated nephropathy during TAVR (4/12) and volume overload 2/2 cardio-renal syndrome.    Pt now on Bumex 2mg IV BID with improvement in Scr to 2.99 today (4/17) and UOP 1.6L/24h. Hyperkalemia and hyponatremia have resolved. Non-oliguric ANGELICA on CKD likely in the setting of HF exacerbation (EF 15-20%), IV contrast exposure, Entresto use and hypotension-mediated ATN. Baseline Scr 1.5-1.8. On admission, Scr was 5.17 and it peaked to 5.5. UA showed trace LE, blood (Likely due to lopez), casts 17. UPCR 0.7g. Renal US showed no hydro.     Pt was on Bumex gtt with improvement in Scr to 2.8 (4/12). Subsequent worsening of Scr worsened  to 3.3 likely due to contrast-associated nephropathy during TAVR (4/12) and volume overload 2/2 cardio-renal syndrome.    Pt now on Bumex 2mg IV BID with improvement in Scr to 2.99 today (4/17) and UOP 1.6L/24h. Hyperkalemia and hyponatremia have resolved. Continue current management. Monitor labs and UOP. Avoid nephrotoxins and dose meds per eGFR.

## 2025-04-17 NOTE — PROGRESS NOTE ADULT - ATTENDING COMMENTS
S/p TAVR in the setting of valvular shock  A+Ox3  Initially with mixed shock requiring Vasopressin and Dobutamine, remains off  on amio s/p DCCV for afib/aflutter, converted to rate controlled afib over night  SpO2 high 90s on nasal cannula - wean to room air as tolerated, cont incentive tracy   DASH diet   Non-oliguric ANGELICA, continue to diurese with bumex  H/H low but acceptable on Heparin drip for afib, start coumadin    Afebrile, no antibiotics  Sugars controlled  encouraged ambulation

## 2025-04-17 NOTE — PROGRESS NOTE ADULT - ASSESSMENT
Assessment: 64M PMHx AFib (s/p dccv 3/25/25), HFrEF (likely mod red EF, sev TTE), aortic stenosis s/p TAVR (11/2022), a flutter s/p ablation 2019 and s/p micra implant, CKD3, WILFRED, obesity, venous stasis, HTN, HLD, who presents with 6 days of worsening dyspnea on exertion, admitted for AHRF likely iso CHF exacerbation, also with ANGELICA on CKD c/f cardiorenal syndrome. RRT on floors for SBP 80s requiring pressors and has been admitted to the CCU. Now s/p TAVR 4/11.    Plan:  NEURO   A&O3   - continue to monitor mental status as per protocol     RESPIRATORY  #Acute hypoxemic hypercapnic respiratory failure  - AVAPS at night which he has been refusing   - continue duonebs   - monitor ABGs  - continue to monitor SpO2 with goal >94%  - IS 500cc    CARDIO  #Obstructive shock 2/2 AS  - MAPs 70s -> will continue to wean vasopressin as BP tolerates    #Acute on chronic HF exacerbation  - preload reduction: bumex 1 IV BID  - Goal 1-2L net neg daily    #Aortic valve stenosis s/p prosthetic  - S/p STANISLAW showing severe AS stenosis, global hypokinesis and EF 15%  - 4/8 patent LIMA-LAD, closed SVG-OM1/D1, pending TAVR 4/11  - s/p TAVR 4/11  - heparin gtt    #Hx CABG  #PVD  - C/w Plavix    #HLD  - Increased Lipitor to 40mg from 20mg    RENAL/  #ANGELICA on CKD  #Cardiorenal syndrome  - Baseline 1.5-1.8  - Most likely i/s/o hypotension and fluid overload  - Renal sono showed parenchymal disease  - Nephro following, monitor volume status. Stop sodium bicarb 650 TID for now 4/7  - Bumex bid    #Hyperkalemia  - Lokelma if K increases into 5s range  - Continue monitoring urine output, lytes, SCr/ BUN  - replete lytes prn with goal K >4 and Mg >2    GI  FEES with speech & swallow, regular diet w/ thin liquids    ENDO  #Hyperglycemia  - ISS    HEME  - Monitor H/H and plts  - DVT PPX: Heparin    ID  Afebrile  - no leukocytosis  - monitor and trend WBC and temperature curve     Dispo: Maintain in ICU.   Assessment: 64M PMHx AFib (s/p dccv 3/25/25), HFrEF (likely mod red EF, sev TTE), aortic stenosis s/p TAVR (11/2022), a flutter s/p ablation 2019 and s/p micra implant, CKD3, WILFRED, obesity, venous stasis, HTN, HLD, who presents with 6 days of worsening dyspnea on exertion, admitted for AHRF likely iso CHF exacerbation, also with ANGELICA on CKD c/f cardiorenal syndrome. RRT on floors for SBP 80s requiring pressors and has been admitted to the CCU. Now s/p TAVR 4/11.    Plan:  NEURO   A&O3   - continue to monitor mental status as per protocol     RESPIRATORY  #Acute hypoxemic hypercapnic respiratory failure  - AVAPS at night which he has been refusing   - continue duonebs   - monitor ABGs  - continue to monitor SpO2 with goal >94%  - IS 500cc    CARDIO  #Obstructive shock 2/2 AS  - MAPs 70s -> will continue to wean vasopressin as BP tolerates    #Acute on chronic HF exacerbation  - preload reduction: bumex 1 IV BID  - Goal 1-2L net neg daily    #Aortic valve stenosis s/p prosthetic  - S/p STANISLAW showing severe AS stenosis, global hypokinesis and EF 15%  - 4/8 patent LIMA-LAD, closed SVG-OM1/D1, pending TAVR 4/11  - s/p TAVR 4/11  - heparin gtt, plan to switch to coumadin    #Hx CABG  #PVD  - C/w Plavix    #HLD  - Increased Lipitor to 40mg from 20mg    RENAL/  #ANGELICA on CKD  #Cardiorenal syndrome  - Baseline 1.5-1.8  - Most likely i/s/o hypotension and fluid overload  - Renal sono showed parenchymal disease  - Nephro following, monitor volume status. Stop sodium bicarb 650 TID for now 4/7  - Bumex bid    #Hyperkalemia  - Lokelma if K increases into 5s range  - Continue monitoring urine output, lytes, SCr/ BUN  - replete lytes prn with goal K >4 and Mg >2    GI  FEES with speech & swallow, regular diet w/ thin liquids    ENDO  #Hyperglycemia  - ISS    HEME  - Monitor H/H and plts  - DVT PPX: Heparin    ID  Afebrile  - no leukocytosis  - monitor and trend WBC and temperature curve     Dispo: Maintain in ICU.

## 2025-04-17 NOTE — PROGRESS NOTE ADULT - NS MD NEURO CONDITIONS_SHOCK
Cardiogenic Shock
Obstructive
Cardiogenic Shock

## 2025-04-17 NOTE — PROGRESS NOTE ADULT - ASSESSMENT
65 y/o M with hx of Afib s/p DCCV 3/25/25, CHF, Aortic stenosis s/p TAVR 11/2022, CAD s/p CABG, Aflutter s/p ablation and Micra implant, CKD 3, HTN, HLD presented with 6 days of worsening SOB on exertion associated with orthopnea and chest pain and admitted for CHF exacerbation and TAVR eval. Now s/p LHC on 4/8 and TAVR on 4/11.    Nephrology was consulted for management of ANGELICA on CKD. Pt follows Dr. Stewart Brown (Nephrology).

## 2025-04-17 NOTE — PROGRESS NOTE ADULT - NS MD NEURO CONDITIONS_HEART1
Acute on Chronic
Acute on Chronic
Acute
Acute on Chronic

## 2025-04-17 NOTE — CHART NOTE - NSCHARTNOTEFT_GEN_A_CORE
BERTHA WARD  MRN-[id number]      Accepting Hospitalist: Dr. Wagner      ==========  HPI:   65 yo M with a fib (s/p dccv 3/25/25), CHF (likely moderately reduced EF, several TTE with poor windows), aortic stenosis s/p TAVR (11/2022), CAD s/p CABG, a flutter s/p ablation 2019 and s/p micra implant,  CKD3, obesity, venous stasis, HTN, HLD, who presents with 6 days of worsening dyspnea on exertion. Pt also reports associated orthopnea and chest pain on exertion but not at rest which is located in the center of his chest. Pain is described as burning. Also reports several days decreased appetite.  Patient had a cardioversion for atrial fibrillation at this hospital recently, which was reportedly unsuccessful.    On arrival to ED, vitals were 97.3F, HR 71, BP 94/57, RR 22, 97% on 2L NC. Labs pertinent for WBC 10.68, INR 2.55, Na 132, K 5.5, bicarb 20, BUN/Cr 78/5.17 (baseline Cr appears ~1.5-1.8), trop 225, BNP 12079, VBG ph 7.3, pco2/po2 wnl. UA without evidence of infection. EKG  on admission 1st deg av block, low voltage, poor r wave progression, R axis deviation CXR with diffuse hazy opacities suggest mild pulmonary edema and small right and trace left pleural effusions. Pt was given IV Bumex 1mg x1, IV bumex 2mg x1, IV Bumex 4mg x1, and lokelma 5mg x1, then admitted for further management. Pt was admitted for AHRF likely iso CHF exacerbation, also with ANGELICA on CKD c/f cardiorenal syndrome.    (01 Apr 2025 01:59)    ==========    INTERVAL HISTORY:    RRT was called on floors for SBP 80s requiring pressors and has been admitted to the CCU. Pt determined to be in obstructive shock w/ STANISLAW showing severe AS stenosis, global hypokinesis and EF 15%. On 4/8, pt received cath showing patent LIMA-LAD, closed SVG-OM1/D1. Pt is now s/p TAVR 4/11. Pt currently remains on heparin gtt w/ plans to transition to coumadin.         OBJECTIVE:     =============================  Vital Signs Last 24 Hrs  T(C): 36.7 (17 Apr 2025 17:00), Max: 36.8 (16 Apr 2025 19:00)  T(F): 98.1 (17 Apr 2025 17:00), Max: 98.2 (16 Apr 2025 19:00)  HR: 93 (17 Apr 2025 17:00) (69 - 109)  BP: 108/69 (17 Apr 2025 17:00) (79/50 - 108/69)  BP(mean): 83 (17 Apr 2025 17:00) (60 - 83)  RR: 22 (17 Apr 2025 17:00) (21 - 34)  SpO2: 92% (17 Apr 2025 17:00) (91% - 100%)    Parameters below as of 17 Apr 2025 17:00  Patient On (Oxygen Delivery Method): room air      ICU Vital Signs Last 24 Hrs  T(C): 36.7 (17 Apr 2025 17:00), Max: 36.8 (16 Apr 2025 19:00)  T(F): 98.1 (17 Apr 2025 17:00), Max: 98.2 (16 Apr 2025 19:00)  HR: 93 (17 Apr 2025 17:00) (69 - 109)  BP: 108/69 (17 Apr 2025 17:00) (79/50 - 108/69)  BP(mean): 83 (17 Apr 2025 17:00) (60 - 83)  ABP: --  ABP(mean): --  RR: 22 (17 Apr 2025 17:00) (21 - 34)  SpO2: 92% (17 Apr 2025 17:00) (91% - 100%)    O2 Parameters below as of 17 Apr 2025 17:00  Patient On (Oxygen Delivery Method): room air          ==============================      ============================================  MEDICATIONS  (STANDING):  albuterol/ipratropium for Nebulization 3 milliLiter(s) Nebulizer every 6 hours  aMIOdarone    Tablet   Oral   aMIOdarone    Tablet 200 milliGRAM(s) Oral daily  atorvastatin 40 milliGRAM(s) Oral at bedtime  buMETAnide Injectable 2 milliGRAM(s) IV Push two times a day  chlorhexidine 2% Cloths 1 Application(s) Topical daily  clopidogrel Tablet 75 milliGRAM(s) Oral daily  famotidine    Tablet 20 milliGRAM(s) Oral every 48 hours  hemorrhoidal Ointment 1 Application(s) Rectal two times a day  heparin  Infusion 1000 Unit(s)/Hr (19 mL/Hr) IV Continuous <Continuous>  insulin lispro (ADMELOG) corrective regimen sliding scale   SubCutaneous at bedtime  insulin lispro (ADMELOG) corrective regimen sliding scale   SubCutaneous three times a day before meals    MEDICATIONS  (PRN):    ============================================        ==========  TELEMETRY  ==========    ==========  FOLLOW UP:  ==========  -      Mine Storey PA-C BERTHA WARD  MRN-[id number]      Accepting Hospitalist: Dr. Wagner      ==========  HPI:   65 yo M with a fib (s/p dccv 3/25/25), CHF (likely moderately reduced EF, several TTE with poor windows), aortic stenosis s/p TAVR (11/2022), CAD s/p CABG, a flutter s/p ablation 2019 and s/p micra implant,  CKD3, obesity, venous stasis, HTN, HLD, who presents with 6 days of worsening dyspnea on exertion. Pt also reports associated orthopnea and chest pain on exertion but not at rest which is located in the center of his chest. Pain is described as burning. Also reports several days decreased appetite.  Patient had a cardioversion for atrial fibrillation at this hospital recently, which was reportedly unsuccessful.    On arrival to ED, vitals were 97.3F, HR 71, BP 94/57, RR 22, 97% on 2L NC. Labs pertinent for WBC 10.68, INR 2.55, Na 132, K 5.5, bicarb 20, BUN/Cr 78/5.17 (baseline Cr appears ~1.5-1.8), trop 225, BNP 63252, VBG ph 7.3, pco2/po2 wnl. UA without evidence of infection. EKG  on admission 1st deg av block, low voltage, poor r wave progression, R axis deviation CXR with diffuse hazy opacities suggest mild pulmonary edema and small right and trace left pleural effusions. Pt was given IV Bumex 1mg x1, IV bumex 2mg x1, IV Bumex 4mg x1, and lokelma 5mg x1, then admitted for further management. Pt was admitted for AHRF likely iso CHF exacerbation, also with ANGELICA on CKD c/f cardiorenal syndrome.    (01 Apr 2025 01:59)    ==========    INTERVAL HISTORY:    RRT was called on floors for SBP 80s requiring pressors and has been admitted to the CCU. Pt determined to be in obstructive shock w/ STANISLAW showing severe AS stenosis, global hypokinesis and EF 15%. On 4/8, pt received cath showing patent LIMA-LAD, closed SVG-OM1/D1. Pt is now s/p TAVR 4/11. Weaned off inotropes and vasopressors post TAVR. Pt currently remains on heparin gtt w/ plans to transition to coumadin.       OBJECTIVE:     =============================  Vital Signs Last 24 Hrs  T(C): 36.7 (17 Apr 2025 17:00), Max: 36.8 (16 Apr 2025 19:00)  T(F): 98.1 (17 Apr 2025 17:00), Max: 98.2 (16 Apr 2025 19:00)  HR: 93 (17 Apr 2025 17:00) (69 - 109)  BP: 108/69 (17 Apr 2025 17:00) (79/50 - 108/69)  BP(mean): 83 (17 Apr 2025 17:00) (60 - 83)  RR: 22 (17 Apr 2025 17:00) (21 - 34)  SpO2: 92% (17 Apr 2025 17:00) (91% - 100%)    Parameters below as of 17 Apr 2025 17:00  Patient On (Oxygen Delivery Method): room air      ICU Vital Signs Last 24 Hrs  T(C): 36.7 (17 Apr 2025 17:00), Max: 36.8 (16 Apr 2025 19:00)  T(F): 98.1 (17 Apr 2025 17:00), Max: 98.2 (16 Apr 2025 19:00)  HR: 93 (17 Apr 2025 17:00) (69 - 109)  BP: 108/69 (17 Apr 2025 17:00) (79/50 - 108/69)  BP(mean): 83 (17 Apr 2025 17:00) (60 - 83)  ABP: --  ABP(mean): --  RR: 22 (17 Apr 2025 17:00) (21 - 34)  SpO2: 92% (17 Apr 2025 17:00) (91% - 100%)    O2 Parameters below as of 17 Apr 2025 17:00  Patient On (Oxygen Delivery Method): room air    ==============================      ============================================  MEDICATIONS  (STANDING):  albuterol/ipratropium for Nebulization 3 milliLiter(s) Nebulizer every 6 hours  aMIOdarone    Tablet   Oral   aMIOdarone    Tablet 200 milliGRAM(s) Oral daily  atorvastatin 40 milliGRAM(s) Oral at bedtime  buMETAnide Injectable 2 milliGRAM(s) IV Push two times a day  chlorhexidine 2% Cloths 1 Application(s) Topical daily  clopidogrel Tablet 75 milliGRAM(s) Oral daily  famotidine    Tablet 20 milliGRAM(s) Oral every 48 hours  hemorrhoidal Ointment 1 Application(s) Rectal two times a day  heparin  Infusion 1000 Unit(s)/Hr (19 mL/Hr) IV Continuous <Continuous>  insulin lispro (ADMELOG) corrective regimen sliding scale   SubCutaneous at bedtime  insulin lispro (ADMELOG) corrective regimen sliding scale   SubCutaneous three times a day before meals    MEDICATIONS  (PRN):    ============================================        ==========  TELEMETRY  ==========    ==========  FOLLOW UP:     [] Bridge to coumadin  [] Dispo planning   ==========  -      ROZ Matson  ACP Fellow BERTHA WARD  MRN-[id number]      Accepting Hospitalist: Dr. Wagner  Sign out given to ACP Rusty     ==========  HPI:   65 yo M with a fib (s/p dccv 3/25/25), CHF (likely moderately reduced EF, several TTE with poor windows), aortic stenosis s/p TAVR (11/2022), CAD s/p CABG, a flutter s/p ablation 2019 and s/p micra implant,  CKD3, obesity, venous stasis, HTN, HLD, who presents with 6 days of worsening dyspnea on exertion. Pt also reports associated orthopnea and chest pain on exertion but not at rest which is located in the center of his chest. Pain is described as burning. Also reports several days decreased appetite.  Patient had a cardioversion for atrial fibrillation at this hospital recently, which was reportedly unsuccessful.    On arrival to ED, vitals were 97.3F, HR 71, BP 94/57, RR 22, 97% on 2L NC. Labs pertinent for WBC 10.68, INR 2.55, Na 132, K 5.5, bicarb 20, BUN/Cr 78/5.17 (baseline Cr appears ~1.5-1.8), trop 225, BNP 73069, VBG ph 7.3, pco2/po2 wnl. UA without evidence of infection. EKG  on admission 1st deg av block, low voltage, poor r wave progression, R axis deviation CXR with diffuse hazy opacities suggest mild pulmonary edema and small right and trace left pleural effusions. Pt was given IV Bumex 1mg x1, IV bumex 2mg x1, IV Bumex 4mg x1, and lokelma 5mg x1, then admitted for further management. Pt was admitted for AHRF likely iso CHF exacerbation, also with ANGELICA on CKD c/f cardiorenal syndrome.    (01 Apr 2025 01:59)    ==========    INTERVAL HISTORY:    RRT was called on floors for SBP 80s requiring pressors and has been admitted to the CCU. Pt determined to be in obstructive shock w/ STANISLAW showing severe AS stenosis, global hypokinesis and EF 15%. On 4/8, pt received cath showing patent LIMA-LAD, closed SVG-OM1/D1. Pt is now s/p TAVR 4/11. Weaned off inotropes and vasopressors post TAVR. Pt currently remains on heparin gtt w/ plans to transition to coumadin.       OBJECTIVE:   =============================  Vital Signs Last 24 Hrs  T(C): 36.7 (17 Apr 2025 17:00), Max: 36.8 (16 Apr 2025 19:00)  T(F): 98.1 (17 Apr 2025 17:00), Max: 98.2 (16 Apr 2025 19:00)  HR: 93 (17 Apr 2025 17:00) (69 - 109)  BP: 108/69 (17 Apr 2025 17:00) (79/50 - 108/69)  BP(mean): 83 (17 Apr 2025 17:00) (60 - 83)  RR: 22 (17 Apr 2025 17:00) (21 - 34)  SpO2: 92% (17 Apr 2025 17:00) (91% - 100%)    Parameters below as of 17 Apr 2025 17:00  Patient On (Oxygen Delivery Method): room air      ICU Vital Signs Last 24 Hrs  T(C): 36.7 (17 Apr 2025 17:00), Max: 36.8 (16 Apr 2025 19:00)  T(F): 98.1 (17 Apr 2025 17:00), Max: 98.2 (16 Apr 2025 19:00)  HR: 93 (17 Apr 2025 17:00) (69 - 109)  BP: 108/69 (17 Apr 2025 17:00) (79/50 - 108/69)  BP(mean): 83 (17 Apr 2025 17:00) (60 - 83)  ABP: --  ABP(mean): --  RR: 22 (17 Apr 2025 17:00) (21 - 34)  SpO2: 92% (17 Apr 2025 17:00) (91% - 100%)    O2 Parameters below as of 17 Apr 2025 17:00  Patient On (Oxygen Delivery Method): room air    ==============================      ============================================  MEDICATIONS  (STANDING):  albuterol/ipratropium for Nebulization 3 milliLiter(s) Nebulizer every 6 hours  aMIOdarone    Tablet   Oral   aMIOdarone    Tablet 200 milliGRAM(s) Oral daily  atorvastatin 40 milliGRAM(s) Oral at bedtime  buMETAnide Injectable 2 milliGRAM(s) IV Push two times a day  chlorhexidine 2% Cloths 1 Application(s) Topical daily  clopidogrel Tablet 75 milliGRAM(s) Oral daily  famotidine    Tablet 20 milliGRAM(s) Oral every 48 hours  hemorrhoidal Ointment 1 Application(s) Rectal two times a day  heparin  Infusion 1000 Unit(s)/Hr (19 mL/Hr) IV Continuous <Continuous>  insulin lispro (ADMELOG) corrective regimen sliding scale   SubCutaneous at bedtime  insulin lispro (ADMELOG) corrective regimen sliding scale   SubCutaneous three times a day before meals    MEDICATIONS  (PRN):    ============================================        ==========  TELEMETRY  ==========    ==========  FOLLOW UP:     [] Bridge to coumadin  [] Dispo planning   ==========  -      ROZ Matson  ACP Fellow

## 2025-04-17 NOTE — PROGRESS NOTE ADULT - NS MD NEURO CONDITIONS_HEART2
Systolic (HFrEF)

## 2025-04-17 NOTE — PROGRESS NOTE ADULT - SUBJECTIVE AND OBJECTIVE BOX
VITAL SIGNS    Subjective: "Hi" Denies CP, palpitation, SOB, VELÁSQUEZ, HA, dizziness, N/V/D, fever or chills.  No acute event noted overnight.      Telemetry:  Afib 92     Vital Signs Last 24 Hrs  T(C): 36.4 (25 @ 19:27), Max: 36.7 (25 @ 03:00)  T(F): 97.5 (25 @ 19:27), Max: 98.1 (25 @ 09:00)  HR: 81 (25 @ 19:27) (69 - 109)  BP: 93/62 (25 @ 19:27) (79/50 - 108/69)  RR: 19 (25 @ 19:27) (19 - 34)  SpO2: 89% (25 @ 19:27) (89% - 100%)                @ 07:01  -   @ 07:00  --------------------------------------------------------  IN: 1828 mL / OUT: 1680 mL / NET: 148 mL     @ 07:01  -   @ 19:36  --------------------------------------------------------  IN: 1149 mL / OUT: 885 mL / NET: 264 mL        LABS      136  |  91[L]  |  101[H]  ----------------------------<  94  3.9   |  29  |  2.99[H]    Ca    9.4      2025 00:24  Phos  2.9       Mg     1.9         TPro  7.8  /  Alb  3.4  /  TBili  1.0  /  DBili  x   /  AST  23  /  ALT  19  /  AlkPhos  110                                   10.1   6.67  )-----------( 194      ( 2025 00:24 )             31.8          PT/INR - ( 2025 00:24 )   PT: 11.3 sec;   INR: 0.99 ratio         PTT - ( 2025 00:24 )  PTT:59.7 sec        Daily     Daily Weight in k (2025 05:00)      CAPILLARY BLOOD GLUCOSE      POCT Blood Glucose.: 125 mg/dL (2025 16:52)  POCT Blood Glucose.: 147 mg/dL (2025 11:19)  POCT Blood Glucose.: 112 mg/dL (2025 07:50)  POCT Blood Glucose.: 153 mg/dL (2025 21:14)          Drains:     MS         [  ] Drainage:                 L Pleural  [  ]  Drainage:                R Pleural  [  ]  Drainage:    Pacing Wires        [  ]   Settings:                                  Isolated  [  ]    Coumadin    [ ] YES          [  ]      NO         Reason:           PHYSICAL EXAM      Neurology: alert and oriented x 3, nonfocal, no gross deficits    CV: +S1, S2. no heart murmurs heard    Sternal Wound: MSI -->CDI, sternum stable    Lungs: CTA B/L     Abdomen: soft, nontender, nondistended, positive bowel sounds, (+) Flatus; (+) BM     :  Voiding               Extremities:  B/L LE (+) no  edema; negative calf tenderness; (+) 2 DP palpable          albuterol/ipratropium for Nebulization 3 milliLiter(s) Nebulizer every 6 hours  aMIOdarone    Tablet   Oral   aMIOdarone    Tablet 200 milliGRAM(s) Oral daily  atorvastatin 40 milliGRAM(s) Oral at bedtime  buMETAnide Injectable 2 milliGRAM(s) IV Push two times a day  clopidogrel Tablet 75 milliGRAM(s) Oral daily  famotidine    Tablet 20 milliGRAM(s) Oral every 48 hours  hemorrhoidal Ointment 1 Application(s) Rectal two times a day  heparin  Infusion 1000 Unit(s)/Hr IV Continuous <Continuous>  insulin lispro (ADMELOG) corrective regimen sliding scale   SubCutaneous at bedtime  insulin lispro (ADMELOG) corrective regimen sliding scale   SubCutaneous three times a day before meals  warfarin 5 milliGRAM(s) Oral once                 Physical Therapy Rec:   Home  [ x ]   Home w/ PT  [  ]  Rehab  [  ]    Discussed with Cardiothoracic Team at AM rounds.     VITAL SIGNS    Subjective: "Hi" Denies CP, palpitation, SOB, VELÁSQUEZ, HA, dizziness, N/V/D, fever or chills.  No acute event noted overnight.      Telemetry:  Afib 92     Vital Signs Last 24 Hrs  T(C): 36.4 (25 @ 19:27), Max: 36.7 (25 @ 03:00)  T(F): 97.5 (25 @ 19:27), Max: 98.1 (25 @ 09:00)  HR: 81 (25 @ 19:27) (69 - 109)  BP: 93/62 (25 @ 19:27) (79/50 - 108/69)  RR: 19 (25 @ 19:27) (19 - 34)  SpO2: 89% (25 @ 19:27) (89% - 100%)                @ 07:01  -   @ 07:00  --------------------------------------------------------  IN: 1828 mL / OUT: 1680 mL / NET: 148 mL     @ 07:01  -   @ 19:36  --------------------------------------------------------  IN: 1149 mL / OUT: 885 mL / NET: 264 mL        LABS      136  |  91[L]  |  101[H]  ----------------------------<  94  3.9   |  29  |  2.99[H]    Ca    9.4      2025 00:24  Phos  2.9       Mg     1.9         TPro  7.8  /  Alb  3.4  /  TBili  1.0  /  DBili  x   /  AST  23  /  ALT  19  /  AlkPhos  110                                   10.1   6.67  )-----------( 194      ( 2025 00:24 )             31.8          PT/INR - ( 2025 00:24 )   PT: 11.3 sec;   INR: 0.99 ratio         PTT - ( 2025 00:24 )  PTT:59.7 sec        Daily     Daily Weight in k (2025 05:00)      CAPILLARY BLOOD GLUCOSE      POCT Blood Glucose.: 125 mg/dL (2025 16:52)  POCT Blood Glucose.: 147 mg/dL (2025 11:19)  POCT Blood Glucose.: 112 mg/dL (2025 07:50)  POCT Blood Glucose.: 153 mg/dL (2025 21:14)          Drains:     MS         [  ] Drainage:                 L Pleural  [  ]  Drainage:                R Pleural  [  ]  Drainage:    Pacing Wires        [  ]   Settings:                                  Isolated  [  ]    Coumadin    [ ] YES          [  ]      NO         Reason:           PHYSICAL EXAM      Neurology: alert and oriented x 3, nonfocal, no gross deficits    CV: +S1, S2. no heart murmurs heard    Sternal Wound: MSI -->CDI, sternum stable    Lungs: CTA B/L     Abdomen: soft, nontender, nondistended, positive bowel sounds, (+) Flatus; (+) BM     :  +lopez            Extremities:  B/L LE (+) 2   edema; ; (+) 2 DP palpable          albuterol/ipratropium for Nebulization 3 milliLiter(s) Nebulizer every 6 hours  aMIOdarone    Tablet   Oral   aMIOdarone    Tablet 200 milliGRAM(s) Oral daily  atorvastatin 40 milliGRAM(s) Oral at bedtime  buMETAnide Injectable 2 milliGRAM(s) IV Push two times a day  clopidogrel Tablet 75 milliGRAM(s) Oral daily  famotidine    Tablet 20 milliGRAM(s) Oral every 48 hours  hemorrhoidal Ointment 1 Application(s) Rectal two times a day  heparin  Infusion 1000 Unit(s)/Hr IV Continuous <Continuous>  insulin lispro (ADMELOG) corrective regimen sliding scale   SubCutaneous at bedtime  insulin lispro (ADMELOG) corrective regimen sliding scale   SubCutaneous three times a day before meals  warfarin 5 milliGRAM(s) Oral once                 Physical Therapy Rec:   Home  [ x ]   Home w/ PT  [  ]  Rehab  [  ]    Discussed with Cardiothoracic Team at AM rounds.

## 2025-04-17 NOTE — PROGRESS NOTE ADULT - SUBJECTIVE AND OBJECTIVE BOX
Patient is a 64y old  Male who presents with a chief complaint of dyspnea , chest pain (2025 09:00)    HPI:   63 yo M with a fib (s/p dccv 3/25/25), CHF (likely moderately reduced EF, several TTE with poor windows), aortic stenosis s/p TAVR (2022), CAD s/p CABG, a flutter s/p ablation 2019 and s/p micra implant,  CKD3, obesity, venous stasis, HTN, HLD, who presents with 6 days of worsening dyspnea on exertion. Pt also reports associated orthopnea and chest pain on exertion but not at rest which is located in the center of his chest. Pain is described as burning. Also reports several days decreased appetite. Denies fevers, chills, cough, abdominal pain, NVD, urinary sx, leg swelling. Patient had a cardioversion for atrial fibrillation at this hospital recently, which was reportedly unsuccessful. Patient has a scheduled appointment with Dr. Philip on 2025 to discuss possible ablation. His outpt cardiologist is Dr. Paul.     On arrival to ED, vitals were 97.3F, HR 71, BP 94/57, RR 22, 97% on 2L NC. Labs pertinent for WBC 10.68, INR 2.55, Na 132, K 5.5, bicarb 20, BUN/Cr 78/5.17 (baseline Cr appears ~1.5-1.8), trop 225, BNP 13975, VBG ph 7.3, pco2/po2 wnl. UA without evidence of infection. EKG  on admission 1st deg av block, low voltage, poor r wave progression, R axis deviation CXR with diffuse hazy opacities suggest mild pulmonary edema and small right and trace left pleural effusions. Pt was given IV Bumex 1mg x1, IV bumex 2mg x1, IV Bumex 4mg x1, and lokelma 5mg x1, then admitted for further management.    (2025 01:59)       INTERVAL HPI/OVERNIGHT EVENTS:   No overnight events   Afebrile, hemodynamically stable     Subjective:    ICU Vital Signs Last 24 Hrs  T(C): 36.7 (2025 09:00), Max: 37.1 (2025 16:00)  T(F): 98.1 (2025 09:00), Max: 98.8 (2025 16:00)  HR: 85 (2025 11:33) (64 - 95)  BP: 97/63 (2025 11:25) (84/54 - 109/74)  BP(mean): 74 (2025 11:25) (63 - 88)  ABP: --  ABP(mean): --  RR: 28 (2025 11:25) (21 - 36)  SpO2: 100% (2025 11:33) (92% - 100%)    O2 Parameters below as of 2025 11:33  Patient On (Oxygen Delivery Method): room air          I&O's Summary    2025 07:01  -  2025 07:00  --------------------------------------------------------  IN: 1828 mL / OUT: 1680 mL / NET: 148 mL    2025 07:01  -  2025 11:56  --------------------------------------------------------  IN: 396 mL / OUT: 260 mL / NET: 136 mL          Daily     Daily Weight in k (2025 05:00)      EKG/Telemetry Events:    MEDICATIONS  (STANDING):  albuterol/ipratropium for Nebulization 3 milliLiter(s) Nebulizer every 6 hours  aMIOdarone    Tablet   Oral   aMIOdarone    Tablet 200 milliGRAM(s) Oral daily  atorvastatin 40 milliGRAM(s) Oral at bedtime  buMETAnide Injectable 2 milliGRAM(s) IV Push two times a day  chlorhexidine 2% Cloths 1 Application(s) Topical daily  clopidogrel Tablet 75 milliGRAM(s) Oral daily  famotidine    Tablet 20 milliGRAM(s) Oral every 48 hours  hemorrhoidal Ointment 1 Application(s) Rectal two times a day  heparin  Infusion 1000 Unit(s)/Hr (19 mL/Hr) IV Continuous <Continuous>  insulin lispro (ADMELOG) corrective regimen sliding scale   SubCutaneous at bedtime  insulin lispro (ADMELOG) corrective regimen sliding scale   SubCutaneous three times a day before meals    MEDICATIONS  (PRN):    PHYSICAL EXAM:  General: Well-appearing, NAD  HEENT: PERRL, EOMI  Neck: Supple  Chest/Lungs: +nasal cannula. CTA bilaterally, no wheezing, rales, rhonchi or rub  Heart: RRR, normal S1, S2, no murmurs or gallops  Abdomen: Soft, ND, NTTP, normoactive bowel sounds  Extremities: +pitting edema. 2+ peripheral pulses b/l, no edema, clubbing or cyanosis  Neurological: A&Ox3, moves all extremities, no focal deficits    LABS:                        10.1   6.67  )-----------( 194      ( 2025 00:24 )             31.8     04-    136  |  91[L]  |  101[H]  ----------------------------<  94  3.9   |  29  |  2.99[H]    Ca    9.4      2025 00:24  Phos  2.9     04-17  Mg     1.9     04-17    TPro  7.8  /  Alb  3.4  /  TBili  1.0  /  DBili  x   /  AST  23  /  ALT  19  /  AlkPhos  110  04-17    LIVER FUNCTIONS - ( 2025 00:24 )  Alb: 3.4 g/dL / Pro: 7.8 g/dL / ALK PHOS: 110 U/L / ALT: 19 U/L / AST: 23 U/L / GGT: x           PT/INR - ( 2025 00:24 )   PT: 11.3 sec;   INR: 0.99 ratio         PTT - ( 2025 00:24 )  PTT:59.7 sec  CAPILLARY BLOOD GLUCOSE      POCT Blood Glucose.: 147 mg/dL (2025 11:19)  POCT Blood Glucose.: 112 mg/dL (2025 07:50)  POCT Blood Glucose.: 153 mg/dL (2025 21:14)  POCT Blood Glucose.: 132 mg/dL (2025 16:36)            Urinalysis Basic - ( 2025 00:24 )    Color: x / Appearance: x / SG: x / pH: x  Gluc: 94 mg/dL / Ketone: x  / Bili: x / Urobili: x   Blood: x / Protein: x / Nitrite: x   Leuk Esterase: x / RBC: x / WBC x   Sq Epi: x / Non Sq Epi: x / Bacteria: x          RADIOLOGY & ADDITIONAL TESTS:  CXR:        Care Discussed with Consultants/Other Providers [ x] YES  [ ] NO

## 2025-04-17 NOTE — PROGRESS NOTE ADULT - ASSESSMENT
65 yo M with a fib (s/p dccv 3/25/25), CHF (likely moderately reduced EF, several TTE with poor windows), aortic stenosis s/p TAVR (11/2022), CAD s/p CABG, a flutter s/p ablation 2019 and s/p micra implant,  CKD3, obesity, venous stasis, HTN, HLD, who presents with 6 days of worsening dyspnea on exertion. Pt also reports associated orthopnea and chest pain on exertion but not at rest which is located in the center of his chest. Pain is described as burning. Also reports several days decreased appetite.  Patient had a cardioversion for atrial fibrillation at this hospital recently, which was reportedly unsuccessful. On arrival to ED, vitals were 97.3F, HR 71, BP 94/57, RR 22, 97% on 2L NC. Labs pertinent for WBC 10.68, INR 2.55, Na 132, K 5.5, bicarb 20, BUN/Cr 78/5.17 (baseline Cr appears ~1.5-1.8), trop 225, BNP 01722, VBG ph 7.3, pco2/po2 wnl. UA without evidence of infection. EKG  on admission 1st deg av block, low voltage, poor r wave progression, R axis deviation CXR with diffuse hazy opacities suggest mild pulmonary edema and small right and trace left pleural effusions. Pt was given IV Bumex 1mg x1, IV bumex 2mg x1, IV Bumex 4mg x1, and lokelma 5mg x1, then admitted for further management. Pt was admitted for AHRF likely iso CHF exacerbation, also with ANGELICA on CKD c/f cardiorenal syndrome.       4/11 s/p yung TAVR - remained intubated, on pressors and bumex postop    4/12 extubated  4/14 S&S regular and thin liquids  4/17 move out to floors. AFib 90s, on amio 200 mg QD Continues on Plavix 75 and hep gtt for bridge to coumadin -->  first dose of coumadin 5mg tonight. UOP 1.6 L in 24 hrs, On Bumex 2mg IVP BID, neprhology following. Increase ambulation. Increase use of incentive spirometer.   Disposition: Sub- acute rehab

## 2025-04-18 DIAGNOSIS — I35.0 NONRHEUMATIC AORTIC (VALVE) STENOSIS: ICD-10-CM

## 2025-04-18 DIAGNOSIS — G47.33 OBSTRUCTIVE SLEEP APNEA (ADULT) (PEDIATRIC): ICD-10-CM

## 2025-04-18 LAB
ALBUMIN SERPL ELPH-MCNC: 3.3 G/DL — SIGNIFICANT CHANGE UP (ref 3.3–5)
ALBUMIN SERPL ELPH-MCNC: 3.3 G/DL — SIGNIFICANT CHANGE UP (ref 3.3–5)
ALP SERPL-CCNC: 101 U/L — SIGNIFICANT CHANGE UP (ref 40–120)
ALP SERPL-CCNC: 98 U/L — SIGNIFICANT CHANGE UP (ref 40–120)
ALT FLD-CCNC: 23 U/L — SIGNIFICANT CHANGE UP (ref 10–45)
ALT FLD-CCNC: 24 U/L — SIGNIFICANT CHANGE UP (ref 10–45)
ANION GAP SERPL CALC-SCNC: 14 MMOL/L — SIGNIFICANT CHANGE UP (ref 5–17)
ANION GAP SERPL CALC-SCNC: 17 MMOL/L — SIGNIFICANT CHANGE UP (ref 5–17)
APPEARANCE UR: CLEAR — SIGNIFICANT CHANGE UP
APTT BLD: 47.2 SEC — HIGH (ref 24.5–35.6)
APTT BLD: 47.8 SEC — HIGH (ref 24.5–35.6)
AST SERPL-CCNC: 22 U/L — SIGNIFICANT CHANGE UP (ref 10–40)
AST SERPL-CCNC: 23 U/L — SIGNIFICANT CHANGE UP (ref 10–40)
BACTERIA # UR AUTO: NEGATIVE /HPF — SIGNIFICANT CHANGE UP
BASOPHILS # BLD AUTO: 0.04 K/UL — SIGNIFICANT CHANGE UP (ref 0–0.2)
BASOPHILS NFR BLD AUTO: 0.6 % — SIGNIFICANT CHANGE UP (ref 0–2)
BILIRUB SERPL-MCNC: 0.9 MG/DL — SIGNIFICANT CHANGE UP (ref 0.2–1.2)
BILIRUB SERPL-MCNC: 0.9 MG/DL — SIGNIFICANT CHANGE UP (ref 0.2–1.2)
BILIRUB UR-MCNC: NEGATIVE — SIGNIFICANT CHANGE UP
BUN SERPL-MCNC: 103 MG/DL — HIGH (ref 7–23)
BUN SERPL-MCNC: 92 MG/DL — HIGH (ref 7–23)
CALCIUM SERPL-MCNC: 9.5 MG/DL — SIGNIFICANT CHANGE UP (ref 8.4–10.5)
CALCIUM SERPL-MCNC: 9.5 MG/DL — SIGNIFICANT CHANGE UP (ref 8.4–10.5)
CAST: 0 /LPF — SIGNIFICANT CHANGE UP (ref 0–4)
CHLORIDE SERPL-SCNC: 90 MMOL/L — LOW (ref 96–108)
CHLORIDE SERPL-SCNC: 90 MMOL/L — LOW (ref 96–108)
CO2 SERPL-SCNC: 30 MMOL/L — SIGNIFICANT CHANGE UP (ref 22–31)
CO2 SERPL-SCNC: 31 MMOL/L — SIGNIFICANT CHANGE UP (ref 22–31)
COLOR SPEC: YELLOW — SIGNIFICANT CHANGE UP
CREAT SERPL-MCNC: 2.5 MG/DL — HIGH (ref 0.5–1.3)
CREAT SERPL-MCNC: 2.6 MG/DL — HIGH (ref 0.5–1.3)
DIFF PNL FLD: NEGATIVE — SIGNIFICANT CHANGE UP
EGFR: 27 ML/MIN/1.73M2 — LOW
EGFR: 27 ML/MIN/1.73M2 — LOW
EGFR: 28 ML/MIN/1.73M2 — LOW
EGFR: 28 ML/MIN/1.73M2 — LOW
EOSINOPHIL # BLD AUTO: 0.17 K/UL — SIGNIFICANT CHANGE UP (ref 0–0.5)
EOSINOPHIL NFR BLD AUTO: 2.6 % — SIGNIFICANT CHANGE UP (ref 0–6)
GLUCOSE BLDC GLUCOMTR-MCNC: 111 MG/DL — HIGH (ref 70–99)
GLUCOSE BLDC GLUCOMTR-MCNC: 120 MG/DL — HIGH (ref 70–99)
GLUCOSE BLDC GLUCOMTR-MCNC: 122 MG/DL — HIGH (ref 70–99)
GLUCOSE BLDC GLUCOMTR-MCNC: 128 MG/DL — HIGH (ref 70–99)
GLUCOSE SERPL-MCNC: 128 MG/DL — HIGH (ref 70–99)
GLUCOSE SERPL-MCNC: 89 MG/DL — SIGNIFICANT CHANGE UP (ref 70–99)
GLUCOSE UR QL: NEGATIVE MG/DL — SIGNIFICANT CHANGE UP
HCT VFR BLD CALC: 29.5 % — LOW (ref 39–50)
HCT VFR BLD CALC: 30.1 % — LOW (ref 39–50)
HGB BLD-MCNC: 9.3 G/DL — LOW (ref 13–17)
HGB BLD-MCNC: 9.5 G/DL — LOW (ref 13–17)
IMM GRANULOCYTES NFR BLD AUTO: 0.9 % — SIGNIFICANT CHANGE UP (ref 0–0.9)
INR BLD: 1.04 RATIO — SIGNIFICANT CHANGE UP (ref 0.85–1.16)
KETONES UR-MCNC: NEGATIVE MG/DL — SIGNIFICANT CHANGE UP
LEUKOCYTE ESTERASE UR-ACNC: ABNORMAL
LYMPHOCYTES # BLD AUTO: 1 K/UL — SIGNIFICANT CHANGE UP (ref 1–3.3)
LYMPHOCYTES # BLD AUTO: 15.3 % — SIGNIFICANT CHANGE UP (ref 13–44)
MCHC RBC-ENTMCNC: 30.5 PG — SIGNIFICANT CHANGE UP (ref 27–34)
MCHC RBC-ENTMCNC: 30.8 PG — SIGNIFICANT CHANGE UP (ref 27–34)
MCHC RBC-ENTMCNC: 31.5 G/DL — LOW (ref 32–36)
MCHC RBC-ENTMCNC: 31.6 G/DL — LOW (ref 32–36)
MCV RBC AUTO: 96.8 FL — SIGNIFICANT CHANGE UP (ref 80–100)
MCV RBC AUTO: 97.7 FL — SIGNIFICANT CHANGE UP (ref 80–100)
MONOCYTES # BLD AUTO: 0.54 K/UL — SIGNIFICANT CHANGE UP (ref 0–0.9)
MONOCYTES NFR BLD AUTO: 8.2 % — SIGNIFICANT CHANGE UP (ref 2–14)
NEUTROPHILS # BLD AUTO: 4.74 K/UL — SIGNIFICANT CHANGE UP (ref 1.8–7.4)
NEUTROPHILS NFR BLD AUTO: 72.4 % — SIGNIFICANT CHANGE UP (ref 43–77)
NITRITE UR-MCNC: NEGATIVE — SIGNIFICANT CHANGE UP
NRBC BLD AUTO-RTO: 0 /100 WBCS — SIGNIFICANT CHANGE UP (ref 0–0)
NRBC BLD AUTO-RTO: 0 /100 WBCS — SIGNIFICANT CHANGE UP (ref 0–0)
PH UR: 7.5 — SIGNIFICANT CHANGE UP (ref 5–8)
PLATELET # BLD AUTO: 225 K/UL — SIGNIFICANT CHANGE UP (ref 150–400)
PLATELET # BLD AUTO: 229 K/UL — SIGNIFICANT CHANGE UP (ref 150–400)
POTASSIUM SERPL-MCNC: 3.7 MMOL/L — SIGNIFICANT CHANGE UP (ref 3.5–5.3)
POTASSIUM SERPL-MCNC: 3.7 MMOL/L — SIGNIFICANT CHANGE UP (ref 3.5–5.3)
POTASSIUM SERPL-SCNC: 3.7 MMOL/L — SIGNIFICANT CHANGE UP (ref 3.5–5.3)
POTASSIUM SERPL-SCNC: 3.7 MMOL/L — SIGNIFICANT CHANGE UP (ref 3.5–5.3)
PROT SERPL-MCNC: 7.5 G/DL — SIGNIFICANT CHANGE UP (ref 6–8.3)
PROT SERPL-MCNC: 7.6 G/DL — SIGNIFICANT CHANGE UP (ref 6–8.3)
PROT UR-MCNC: NEGATIVE MG/DL — SIGNIFICANT CHANGE UP
PROTHROM AB SERPL-ACNC: 11.8 SEC — SIGNIFICANT CHANGE UP (ref 9.9–13.4)
RBC # BLD: 3.02 M/UL — LOW (ref 4.2–5.8)
RBC # BLD: 3.11 M/UL — LOW (ref 4.2–5.8)
RBC # FLD: 16.9 % — HIGH (ref 10.3–14.5)
RBC # FLD: 17 % — HIGH (ref 10.3–14.5)
RBC CASTS # UR COMP ASSIST: 1 /HPF — SIGNIFICANT CHANGE UP (ref 0–4)
SODIUM SERPL-SCNC: 135 MMOL/L — SIGNIFICANT CHANGE UP (ref 135–145)
SODIUM SERPL-SCNC: 137 MMOL/L — SIGNIFICANT CHANGE UP (ref 135–145)
SP GR SPEC: 1.01 — SIGNIFICANT CHANGE UP (ref 1–1.03)
SQUAMOUS # UR AUTO: 0 /HPF — SIGNIFICANT CHANGE UP (ref 0–5)
UROBILINOGEN FLD QL: 1 MG/DL — SIGNIFICANT CHANGE UP (ref 0.2–1)
WBC # BLD: 6.42 K/UL — SIGNIFICANT CHANGE UP (ref 3.8–10.5)
WBC # BLD: 6.55 K/UL — SIGNIFICANT CHANGE UP (ref 3.8–10.5)
WBC # FLD AUTO: 6.42 K/UL — SIGNIFICANT CHANGE UP (ref 3.8–10.5)
WBC # FLD AUTO: 6.55 K/UL — SIGNIFICANT CHANGE UP (ref 3.8–10.5)
WBC UR QL: 1 /HPF — SIGNIFICANT CHANGE UP (ref 0–5)

## 2025-04-18 PROCEDURE — 99232 SBSQ HOSP IP/OBS MODERATE 35: CPT

## 2025-04-18 PROCEDURE — 99233 SBSQ HOSP IP/OBS HIGH 50: CPT | Mod: GC

## 2025-04-18 RX ORDER — MELATONIN 5 MG
3 TABLET ORAL AT BEDTIME
Refills: 0 | Status: DISCONTINUED | OUTPATIENT
Start: 2025-04-18 | End: 2025-04-28

## 2025-04-18 RX ADMIN — Medication 20 MILLIEQUIVALENT(S): at 17:10

## 2025-04-18 RX ADMIN — AMIODARONE HYDROCHLORIDE 200 MILLIGRAM(S): 50 INJECTION, SOLUTION INTRAVENOUS at 05:05

## 2025-04-18 RX ADMIN — BUMETANIDE 2 MILLIGRAM(S): 1 TABLET ORAL at 14:12

## 2025-04-18 RX ADMIN — BUMETANIDE 2 MILLIGRAM(S): 1 TABLET ORAL at 05:05

## 2025-04-18 RX ADMIN — IPRATROPIUM BROMIDE AND ALBUTEROL SULFATE 3 MILLILITER(S): .5; 2.5 SOLUTION RESPIRATORY (INHALATION) at 17:11

## 2025-04-18 RX ADMIN — CLOPIDOGREL BISULFATE 75 MILLIGRAM(S): 75 TABLET, FILM COATED ORAL at 14:11

## 2025-04-18 RX ADMIN — HEPARIN SODIUM 20 UNIT(S)/HR: 1000 INJECTION INTRAVENOUS; SUBCUTANEOUS at 21:08

## 2025-04-18 RX ADMIN — IPRATROPIUM BROMIDE AND ALBUTEROL SULFATE 3 MILLILITER(S): .5; 2.5 SOLUTION RESPIRATORY (INHALATION) at 05:09

## 2025-04-18 RX ADMIN — Medication 20 MILLIGRAM(S): at 17:10

## 2025-04-18 RX ADMIN — Medication 5 MILLIGRAM(S): at 21:08

## 2025-04-18 RX ADMIN — HEPARIN SODIUM 19 UNIT(S)/HR: 1000 INJECTION INTRAVENOUS; SUBCUTANEOUS at 14:12

## 2025-04-18 RX ADMIN — IPRATROPIUM BROMIDE AND ALBUTEROL SULFATE 3 MILLILITER(S): .5; 2.5 SOLUTION RESPIRATORY (INHALATION) at 14:11

## 2025-04-18 RX ADMIN — ATORVASTATIN CALCIUM 40 MILLIGRAM(S): 80 TABLET, FILM COATED ORAL at 21:08

## 2025-04-18 NOTE — CHART NOTE - NSCHARTNOTEFT_GEN_A_CORE
NUTRITION FOLLOW UP NOTE    PATIENT SEEN FOR: malnutrition PO follow up    SOURCE: [x] Patient  [x] Current Medical Record  [] RN  [] Family/support person at bedside  [] Patient unavailable/inappropriate  [] Other:    CHART REVIEWED/EVENTS NOTED.  [] No changes to nutrition care plan to note  [x] Nutrition Status:  - Presented with acute on chronic renal failure in setting of CHF exacerbation.   - Pt extubated ()  - Status post Speech Language Pathologist evaluation (), Recommend Regular/thin liquids     DIET ORDER:   Diet, DASH/TLC:   Sodium & Cholesterol Restricted  Supplement Feeding Modality:  Oral  Ensure Max Cans or Servings Per Day:  1       Frequency:  Two Times a day (25)    CURRENT DIET ORDER IS:  [] Appropriate:  [] Inadequate:  [x] Other: see recommendations below.     NUTRITION INTAKE/PROVISION:  [x] PO: Pt visited at the bedside. Pt reports improved appetite/PO intake in-house, reports had a peach, Ensure max 1x, scrambled eggs this AM. States was able to consume most of his meals on the tray. Drinks Ensure max 2x daily. Denies chewing/swallowing difficulties at this time. No other GI distress reported. Last bowel movement x 1 yesterday  per patient. Pt made aware RD remains available and will follow up for any additional questions/concerns and per protocol.    [] Enteral Nutrition:  [] Parenteral Nutrition:    ANTHROPOMETRICS:  Drug Dosing Weight  Height (cm): 172.7 (2025 11:45)  Weight (kg): 105.8 (2025 11:45)  BMI (kg/m2): 35.5 (2025 11:45)  BSA (m2): 2.18 (2025 11:45)    * Daily Weight in k (04-17), Weight in k.6 (-16), Weight in k.6 (-15), Weight in k.3 (-13), Weight in k.8 (-12)   Weight fluctuations likely in setting of fluid shifts and/or scale discrepancies. RD will continue to monitor weight trends as available/able.     MEDICATIONS:  MEDICATIONS  (STANDING):  albuterol/ipratropium for Nebulization 3 milliLiter(s) Nebulizer every 6 hours  aMIOdarone    Tablet   Oral   aMIOdarone    Tablet 200 milliGRAM(s) Oral daily  atorvastatin 40 milliGRAM(s) Oral at bedtime  buMETAnide Injectable 2 milliGRAM(s) IV Push two times a day  clopidogrel Tablet 75 milliGRAM(s) Oral daily  famotidine    Tablet 20 milliGRAM(s) Oral every 48 hours  hemorrhoidal Ointment 1 Application(s) Rectal two times a day  heparin  Infusion 1000 Unit(s)/Hr (19 mL/Hr) IV Continuous <Continuous>  insulin lispro (ADMELOG) corrective regimen sliding scale   SubCutaneous at bedtime  insulin lispro (ADMELOG) corrective regimen sliding scale   SubCutaneous three times a day before meals    MEDICATIONS  (PRN):    NUTRITIONALLY PERTINENT LABS:   Na135 mmol/L Glu 128 mg/dL[H] K+ 3.7 mmol/L Cr  2.50 mg/dL[H]  mg/dL[H]  Phos 2.9 mg/dL  Alb 3.3 g/dL  Chol 94 mg/dL LDL --    HDL 29 mg/dL[L] Trig 74 mg/dL ALT 24 U/L AST 22 U/L Alkaline Phosphatase 101 U/L  25 @ 18:01 a1c 5.8    A1C with Estimated Average Glucose Result: 5.8 % (25 @ 18:01)    Finger Sticks:  POCT Blood Glucose.: 111 mg/dL ( @ 08:07)  POCT Blood Glucose.: 136 mg/dL ( @ 21:11)  POCT Blood Glucose.: 125 mg/dL ( @ 16:52)  POCT Blood Glucose.: 147 mg/dL ( @ 11:19)    NUTRITIONALLY PERTINENT MEDICATIONS/LABS:  [x] Reviewed  [x] Relevant notes on medications/labs:  - Endo: Finger stick x 24hrs 112-147, ordered for insulin for glycemic control   - CV: ordered for bumex    EDEMA:  [x] Reviewed  [x] Relevant notes: +2 right leg, +3 left leg () per flowsheet.     GI/ I&O:  [x] Reviewed  [x] Relevant notes: Last bowel movement x 1 () per patient   [] Other:    SKIN:   [x] No pressure injuries documented, per nursing flowsheet  [] Pressure injury previously noted  [] Change in pressure injury documentation:  [] Other:    ESTIMATED NEEDS:  [x] No change:  [] Updated:  Energy: 9906-7807 kcal/day (33-38 kcal/kg)  Protein: 105-126g/day (1.5-1.8 g/kg)  Fluid:   ml/day or [x] defer to team  Based on: IBW 69.8kg    NUTRITION DIAGNOSIS:  [x] Prior Dx: 1) Severe Acute Malnutrition, 2) Overweight/Obesity  [] New Dx:    EDUCATION:  [x] Yes: Emphasized the importance of adequate kcal and protein intake; recommend to optimize nutritional intake in case of decreased appetite; recommended small frequent meals by ordering nutrient-dense snacks and leaving non-perishable food away from tray for later consumption during the day or between meals; to start with protein, and sips of supplement throughout the day; reviewed foods with protein and menu order procedures in hospital. Pt made aware RD remains available and will follow up for any additional questions/concerns and per protocol.   [] Not appropriate/warranted    NUTRITION CARE PLAN:  1. Diet: Continue diet free of therapeutic restrictions. Consistency deferred to Speech Language Pathologist and MD team   2. Supplements: Recommend adding Ensure max 2x daily (provides 160 kcal, 30 gm protein per serving) to optimize PO intake   3. Continue to monitor PO intake, weight trend, electrolytes, blood glucose, labs, BMs in-house     [] Achieved - Continue current nutrition intervention(s)  [] Current medical condition precludes nutrition intervention at this time.    MONITORING AND EVALUATION:   RD remains available upon request and will follow up per protocol.    Name  Polly Cartagena, KUSH, CDN / TEAMS Available on [facility] TEAMS

## 2025-04-18 NOTE — PROGRESS NOTE ADULT - ASSESSMENT
63 yo M with a fib (s/p dccv 3/25/25), CHF (likely moderately reduced EF, several TTE with poor windows), aortic stenosis s/p TAVR (11/2022), CAD s/p CABG, a flutter s/p ablation 2019 and s/p micra implant,  CKD3, obesity, venous stasis, HTN, HLD, who presents with 6 days of worsening dyspnea on exertion. Pt also reports associated orthopnea and chest pain on exertion but not at rest which is located in the center of his chest. Pain is described as burning. Also reports several days decreased appetite.  Patient had a cardioversion for atrial fibrillation at this hospital recently, which was reportedly unsuccessful. On arrival to ED, vitals were 97.3F, HR 71, BP 94/57, RR 22, 97% on 2L NC. Labs pertinent for WBC 10.68, INR 2.55, Na 132, K 5.5, bicarb 20, BUN/Cr 78/5.17 (baseline Cr appears ~1.5-1.8), trop 225, BNP 55204, VBG ph 7.3, pco2/po2 wnl. UA without evidence of infection. EKG  on admission 1st deg av block, low voltage, poor r wave progression, R axis deviation CXR with diffuse hazy opacities suggest mild pulmonary edema and small right and trace left pleural effusions. Pt was given IV Bumex 1mg x1, IV bumex 2mg x1, IV Bumex 4mg x1, and lokelma 5mg x1, then admitted for further management. Pt was admitted for AHRF likely iso CHF exacerbation, also with ANGELICA on CKD c/f cardiorenal syndrome.       4/11 s/p yung TAVR - remained intubated, on pressors and bumex postop    4/12 extubated  4/14 S&S regular and thin liquids  4/17 move out to floors. AFib 90s, on amio 200 mg QD Continues on Plavix 75 and hep gtt for bridge to coumadin -->  first dose of coumadin 5mg tonight. UOP 1.6 L in 24 hrs, On Bumex 2mg IVP BID, neprhology following. Increase ambulation. Increase use of incentive spirometer.   4/18 [facility] Medicine team will assume primary care> Contact if any concerns regarding post TAVR  Disposition: Sub- acute rehab

## 2025-04-18 NOTE — PROGRESS NOTE ADULT - SUBJECTIVE AND OBJECTIVE BOX
Patient is a 64y old  Male who presents with a chief complaint of dyspnea , chest pain  S/P TAVR (18 Apr 2025 11:06)      SUBJECTIVE / OVERNIGHT EVENTS:  Over 24hrs   - downgraded from CICU  - Coumadin 5mg x1    Pt seen and examined at bedside. Had no complaints. Denied cp, sob, d/n/v    MEDICATIONS  (STANDING):  albuterol/ipratropium for Nebulization 3 milliLiter(s) Nebulizer every 6 hours  aMIOdarone    Tablet   Oral   aMIOdarone    Tablet 200 milliGRAM(s) Oral daily  atorvastatin 40 milliGRAM(s) Oral at bedtime  buMETAnide Injectable 2 milliGRAM(s) IV Push two times a day  clopidogrel Tablet 75 milliGRAM(s) Oral daily  famotidine    Tablet 20 milliGRAM(s) Oral every 48 hours  hemorrhoidal Ointment 1 Application(s) Rectal two times a day  heparin  Infusion 1000 Unit(s)/Hr (19 mL/Hr) IV Continuous <Continuous>  insulin lispro (ADMELOG) corrective regimen sliding scale   SubCutaneous at bedtime  insulin lispro (ADMELOG) corrective regimen sliding scale   SubCutaneous three times a day before meals    MEDICATIONS  (PRN):  melatonin 3 milliGRAM(s) Oral at bedtime PRN Insomnia      CAPILLARY BLOOD GLUCOSE      POCT Blood Glucose.: 111 mg/dL (18 Apr 2025 08:07)  POCT Blood Glucose.: 136 mg/dL (17 Apr 2025 21:11)  POCT Blood Glucose.: 125 mg/dL (17 Apr 2025 16:52)    I&O's Summary    17 Apr 2025 07:01  -  18 Apr 2025 07:00  --------------------------------------------------------  IN: 1149 mL / OUT: 885 mL / NET: 264 mL    18 Apr 2025 07:01  -  18 Apr 2025 12:13  --------------------------------------------------------  IN: 240 mL / OUT: 0 mL / NET: 240 mL        Vital Signs Last 24 Hrs  T(C): 36.7 (18 Apr 2025 11:58), Max: 36.7 (17 Apr 2025 17:00)  T(F): 98 (18 Apr 2025 11:58), Max: 98.1 (17 Apr 2025 17:00)  HR: 64 (18 Apr 2025 11:58) (59 - 109)  BP: 101/67 (18 Apr 2025 04:26) (79/50 - 108/69)  BP(mean): 78 (17 Apr 2025 18:00) (60 - 83)  RR: 18 (18 Apr 2025 11:58) (18 - 34)  SpO2: 97% (18 Apr 2025 11:58) (89% - 98%)    Parameters below as of 18 Apr 2025 11:58  Patient On (Oxygen Delivery Method): room air        Physical Exam  CONSTITUTIONAL: NAD  EYES: EOMI, conjunctiva and sclera clear  ENMT: Moist oral mucosa  NECK: Supple  RESPIRATORY: Breathing unlabored, CTAB  CARDIOVASCULAR: S1S2 no MRG  ABDOMEN: Nontender to palpation, normoactive bowel sounds, no rebound/guarding  MUSCULOSKELETAL: no clubbing or cyanosis of digits  NEUROLOGY: No focal deficits   SKIN: No rashes or lesions    LABS:                        9.5    6.42  )-----------( 229      ( 18 Apr 2025 09:40 )             30.1      04-18    135  |  90[L]  |  103[H]  ----------------------------<  128[H]  3.7   |  31  |  2.50[H]    Ca    9.5      18 Apr 2025 09:40  Phos  2.9     04-17  Mg     1.9     04-17    TPro  7.5  /  Alb  3.3  /  TBili  0.9  /  DBili  x   /  AST  22  /  ALT  24  /  AlkPhos  101  04-18    PT/INR - ( 18 Apr 2025 09:40 )   PT: 11.8 sec;   INR: 1.04 ratio         PTT - ( 18 Apr 2025 09:40 )  PTT:47.8 sec      Urinalysis Basic - ( 18 Apr 2025 09:40 )    Color: x / Appearance: x / SG: x / pH: x  Gluc: 128 mg/dL / Ketone: x  / Bili: x / Urobili: x   Blood: x / Protein: x / Nitrite: x   Leuk Esterase: x / RBC: x / WBC x   Sq Epi: x / Non Sq Epi: x / Bacteria: x        RADIOLOGY & ADDITIONAL TESTS:    Imaging Personally Reviewed:    Consultant(s) Notes Reviewed:      Care Discussed with Consultants/Other Providers:   Patient is a 64y old  Male who presents with a chief complaint of dyspnea , chest pain  S/P TAVR (18 Apr 2025 11:06)      SUBJECTIVE / OVERNIGHT EVENTS:  Over 24hrs   - downgraded from CICU  - Coumadin 5mg x1    Pt seen and examined at bedside. Reported feeling well. Denied cp, sob, d/n/v    MEDICATIONS  (STANDING):  albuterol/ipratropium for Nebulization 3 milliLiter(s) Nebulizer every 6 hours  aMIOdarone    Tablet   Oral   aMIOdarone    Tablet 200 milliGRAM(s) Oral daily  atorvastatin 40 milliGRAM(s) Oral at bedtime  buMETAnide Injectable 2 milliGRAM(s) IV Push two times a day  clopidogrel Tablet 75 milliGRAM(s) Oral daily  famotidine    Tablet 20 milliGRAM(s) Oral every 48 hours  hemorrhoidal Ointment 1 Application(s) Rectal two times a day  heparin  Infusion 1000 Unit(s)/Hr (19 mL/Hr) IV Continuous <Continuous>  insulin lispro (ADMELOG) corrective regimen sliding scale   SubCutaneous at bedtime  insulin lispro (ADMELOG) corrective regimen sliding scale   SubCutaneous three times a day before meals    MEDICATIONS  (PRN):  melatonin 3 milliGRAM(s) Oral at bedtime PRN Insomnia      CAPILLARY BLOOD GLUCOSE      POCT Blood Glucose.: 111 mg/dL (18 Apr 2025 08:07)  POCT Blood Glucose.: 136 mg/dL (17 Apr 2025 21:11)  POCT Blood Glucose.: 125 mg/dL (17 Apr 2025 16:52)    I&O's Summary    17 Apr 2025 07:01  -  18 Apr 2025 07:00  --------------------------------------------------------  IN: 1149 mL / OUT: 885 mL / NET: 264 mL    18 Apr 2025 07:01  -  18 Apr 2025 12:13  --------------------------------------------------------  IN: 240 mL / OUT: 0 mL / NET: 240 mL        Vital Signs Last 24 Hrs  T(C): 36.7 (18 Apr 2025 11:58), Max: 36.7 (17 Apr 2025 17:00)  T(F): 98 (18 Apr 2025 11:58), Max: 98.1 (17 Apr 2025 17:00)  HR: 64 (18 Apr 2025 11:58) (59 - 109)  BP: 101/67 (18 Apr 2025 04:26) (79/50 - 108/69)  BP(mean): 78 (17 Apr 2025 18:00) (60 - 83)  RR: 18 (18 Apr 2025 11:58) (18 - 34)  SpO2: 97% (18 Apr 2025 11:58) (89% - 98%)    Parameters below as of 18 Apr 2025 11:58  Patient On (Oxygen Delivery Method): room air        Physical Exam  CONSTITUTIONAL: NAD  EYES: EOMI, conjunctiva and sclera clear  ENMT: Moist oral mucosa  NECK: Supple  RESPIRATORY: Breathing unlabored on 1L NC CTAB  CARDIOVASCULAR: irregular rhythm. no MRG  ABDOMEN: Nontender to palpation, normoactive bowel sounds, no rebound/guarding  MUSCULOSKELETAL: venous dermatitis b/l LEs  NEUROLOGY: No focal deficits   SKIN: No rashes or lesions    LABS:                        9.5    6.42  )-----------( 229      ( 18 Apr 2025 09:40 )             30.1      04-18    135  |  90[L]  |  103[H]  ----------------------------<  128[H]  3.7   |  31  |  2.50[H]    Ca    9.5      18 Apr 2025 09:40  Phos  2.9     04-17  Mg     1.9     04-17    TPro  7.5  /  Alb  3.3  /  TBili  0.9  /  DBili  x   /  AST  22  /  ALT  24  /  AlkPhos  101  04-18    PT/INR - ( 18 Apr 2025 09:40 )   PT: 11.8 sec;   INR: 1.04 ratio         PTT - ( 18 Apr 2025 09:40 )  PTT:47.8 sec      Urinalysis Basic - ( 18 Apr 2025 09:40 )    Color: x / Appearance: x / SG: x / pH: x  Gluc: 128 mg/dL / Ketone: x  / Bili: x / Urobili: x   Blood: x / Protein: x / Nitrite: x   Leuk Esterase: x / RBC: x / WBC x   Sq Epi: x / Non Sq Epi: x / Bacteria: x        RADIOLOGY & ADDITIONAL TESTS:    Imaging Personally Reviewed:    Consultant(s) Notes Reviewed:      Care Discussed with Consultants/Other Providers:   Patient is a 64y old  Male who presents with a chief complaint of dyspnea , chest pain  S/P TAVR (18 Apr 2025 11:06)      SUBJECTIVE / OVERNIGHT EVENTS:  Over 24hrs   - downgraded from CICU  - Coumadin 5mg x1    Pt seen and examined at bedside. Reported feeling well. Denied cp, sob, d/n/v    MEDICATIONS  (STANDING):  albuterol/ipratropium for Nebulization 3 milliLiter(s) Nebulizer every 6 hours  aMIOdarone    Tablet   Oral   aMIOdarone    Tablet 200 milliGRAM(s) Oral daily  atorvastatin 40 milliGRAM(s) Oral at bedtime  buMETAnide Injectable 2 milliGRAM(s) IV Push two times a day  clopidogrel Tablet 75 milliGRAM(s) Oral daily  famotidine    Tablet 20 milliGRAM(s) Oral every 48 hours  hemorrhoidal Ointment 1 Application(s) Rectal two times a day  heparin  Infusion 1000 Unit(s)/Hr (19 mL/Hr) IV Continuous <Continuous>  insulin lispro (ADMELOG) corrective regimen sliding scale   SubCutaneous at bedtime  insulin lispro (ADMELOG) corrective regimen sliding scale   SubCutaneous three times a day before meals    MEDICATIONS  (PRN):  melatonin 3 milliGRAM(s) Oral at bedtime PRN Insomnia      CAPILLARY BLOOD GLUCOSE      POCT Blood Glucose.: 111 mg/dL (18 Apr 2025 08:07)  POCT Blood Glucose.: 136 mg/dL (17 Apr 2025 21:11)  POCT Blood Glucose.: 125 mg/dL (17 Apr 2025 16:52)    I&O's Summary    17 Apr 2025 07:01  -  18 Apr 2025 07:00  --------------------------------------------------------  IN: 1149 mL / OUT: 885 mL / NET: 264 mL    18 Apr 2025 07:01  -  18 Apr 2025 12:13  --------------------------------------------------------  IN: 240 mL / OUT: 0 mL / NET: 240 mL        Vital Signs Last 24 Hrs  T(C): 36.7 (18 Apr 2025 11:58), Max: 36.7 (17 Apr 2025 17:00)  T(F): 98 (18 Apr 2025 11:58), Max: 98.1 (17 Apr 2025 17:00)  HR: 64 (18 Apr 2025 11:58) (59 - 109)  BP: 101/67 (18 Apr 2025 04:26) (79/50 - 108/69)  BP(mean): 78 (17 Apr 2025 18:00) (60 - 83)  RR: 18 (18 Apr 2025 11:58) (18 - 34)  SpO2: 97% (18 Apr 2025 11:58) (89% - 98%)    Parameters below as of 18 Apr 2025 11:58  Patient On (Oxygen Delivery Method): room air        Physical Exam  CONSTITUTIONAL: NAD  EYES: EOMI, conjunctiva and sclera clear  ENMT: Moist oral mucosa  NECK: Supple  RESPIRATORY: Breathing unlabored on 1L NC CTAB  CARDIOVASCULAR: irregular rhythm. no MRG  ABDOMEN: Nontender to palpation, normoactive bowel sounds, no rebound/guarding  MUSCULOSKELETAL: venous dermatitis b/l LEs  NEUROLOGY: No focal deficits   SKIN: b/l chronic venous changes    LABS:                        9.5    6.42  )-----------( 229      ( 18 Apr 2025 09:40 )             30.1      04-18    135  |  90[L]  |  103[H]  ----------------------------<  128[H]  3.7   |  31  |  2.50[H]    Ca    9.5      18 Apr 2025 09:40  Phos  2.9     04-17  Mg     1.9     04-17    TPro  7.5  /  Alb  3.3  /  TBili  0.9  /  DBili  x   /  AST  22  /  ALT  24  /  AlkPhos  101  04-18    PT/INR - ( 18 Apr 2025 09:40 )   PT: 11.8 sec;   INR: 1.04 ratio         PTT - ( 18 Apr 2025 09:40 )  PTT:47.8 sec      Urinalysis Basic - ( 18 Apr 2025 09:40 )    Color: x / Appearance: x / SG: x / pH: x  Gluc: 128 mg/dL / Ketone: x  / Bili: x / Urobili: x   Blood: x / Protein: x / Nitrite: x   Leuk Esterase: x / RBC: x / WBC x   Sq Epi: x / Non Sq Epi: x / Bacteria: x        RADIOLOGY & ADDITIONAL TESTS:    Imaging Personally Reviewed:    Consultant(s) Notes Reviewed:      Care Discussed with Consultants/Other Providers:

## 2025-04-18 NOTE — PROGRESS NOTE ADULT - ATTENDING COMMENTS
Patient seen and evaluated. Pertinent PMHx of severe AoS s/p TAVR (2022), atrial fibrillation, HFrEF admitted with cardiogenic shock 2/2 TAVR failure s/p valve-in-valve TAVR c/b volume overload state. Currently improving, off pressor support and on floors.  Patient with no acute complaints.     PE as above    #cardiogenic shock 2/2 valvular failure 2/2 valve-in-valve TAVR  -contnue with current meds  -discussed personally with CICU, coumadin instead of eliquis due TAVR valve thickening per team  -coumadin 5mg tonight, liekly 5:2 bridge goal INR 2-3    #HFrEF  -4/1 EF 40%  -continue bumex  -as BP allows will discuss with cardiology re: adding GDMT    #permanent atrial fibrillaito  -continue with amiodarone, hep gtt with coumadin bridge    #ANGELICA on CKDV  -caridogenic shock  -improving, continue with diuresis, maintain MAP>65    rest as above

## 2025-04-18 NOTE — PROGRESS NOTE ADULT - PROBLEM SELECTOR PLAN 1
s/p TAVR (11/2022)   Admitted to CCU for mixed shock likely from severe AS-induced LVOT requiring pressors - weaned off  TTE (4/1) - The prosthetic valve has reduced leaflet opening . Severe prosthetic aortic stenosis.   - s/p TAVR (4/11)  - Coumadin 5mg x1 (4/17)    Plan:  - c/w Hep gtt  - Transition to coumadin   - Trend INR (?goal 2-3)

## 2025-04-18 NOTE — PROGRESS NOTE ADULT - PROBLEM SELECTOR PLAN 3
CKD stage III, baseline Scr 1.5-1.8 - follows outpt nephrologist Dr Stringer  - presented with Scr 5.2 --> now slowly down trending  - ANGELICA/ATN i/s/o HF exacerbation, obstructive shock, contrast use  - Nephro following, appreciated recs    Plan  - Hold home Entresto  - c/w Bumex 2mg IVP bid  - Monitor labs and UOP. Avoid nephrotoxins and dose meds per eGFR.

## 2025-04-18 NOTE — PROGRESS NOTE ADULT - ASSESSMENT
Consult for ANGELICA in a patient with heart failure     Baseline CKD3= pt reports baseline Renal fxn in "40s", followed by outpt nephrologist Dr Stringer  angelica initially in setting of HF and Entresto and decreased po intake and diarrhea==> cardiorenal +  ATN  pt now s/p TAVR 4/11 with increase in creatinine post TAVR which is now improving   off pressors   continue diuretics per HF team   please repeat urinalysis with microscopy     tommy marks  nephrology attending   please contact me on TEAMS   Office- [phone]

## 2025-04-18 NOTE — PROGRESS NOTE ADULT - SUBJECTIVE AND OBJECTIVE BOX
Subjective:  "Hello"    Tele:     SR                            T(C): 36.5 (04-18-25 @ 04:26), Max: 36.7 (04-17-25 @ 17:00)  HR: 85 (04-18-25 @ 09:34) (59 - 109)  BP: 101/67 (04-18-25 @ 04:26) (79/50 - 108/69)  RR: 18 (04-18-25 @ 04:26) (18 - 34)  SpO2: 97% (04-18-25 @ 09:34) (89% - 100%)      04-18    135  |  90[L]  |  103[H]  ----------------------------<  128[H]  3.7   |  31  |  2.50[H]    Ca    9.5      18 Apr 2025 09:40  Phos  2.9     04-17  Mg     1.9     04-17    TPro  7.5  /  Alb  3.3  /  TBili  0.9  /  DBili  x   /  AST  22  /  ALT  24  /  AlkPhos  101  04-18                               9.5    6.42  )-----------( 229      ( 18 Apr 2025 09:40 )             30.1        PT/INR - ( 18 Apr 2025 09:40 )   PT: 11.8 sec;   INR: 1.04 ratio         PTT - ( 18 Apr 2025 09:40 )  PTT:47.8 sec    CAPILLARY BLOOD GLUCOSE      POCT Blood Glucose.: 111 mg/dL (18 Apr 2025 08:07)  POCT Blood Glucose.: 136 mg/dL (17 Apr 2025 21:11)  POCT Blood Glucose.: 125 mg/dL (17 Apr 2025 16:52)  POCT Blood Glucose.: 147 mg/dL (17 Apr 2025 11:19)           CXR:      Assessment    Neurology: alert and oriented x 3, nonfocal, no gross deficits    CV: +S1, S2. no heart murmurs heard    Lungs: CTA B/L     Abdomen: soft, nontender, nondistended, positive bowel sounds, (+) Flatus; (+) BM     :  +lopez            Extremities:  B/L LE (+) 2   edema; ; (+) 2 DP palpable            MEDICATIONS  (STANDING):  albuterol/ipratropium for Nebulization 3 milliLiter(s) Nebulizer every 6 hours  aMIOdarone    Tablet   Oral   aMIOdarone    Tablet 200 milliGRAM(s) Oral daily  atorvastatin 40 milliGRAM(s) Oral at bedtime  buMETAnide Injectable 2 milliGRAM(s) IV Push two times a day  clopidogrel Tablet 75 milliGRAM(s) Oral daily  famotidine    Tablet 20 milliGRAM(s) Oral every 48 hours  hemorrhoidal Ointment 1 Application(s) Rectal two times a day  heparin  Infusion 1000 Unit(s)/Hr (19 mL/Hr) IV Continuous <Continuous>  insulin lispro (ADMELOG) corrective regimen sliding scale   SubCutaneous at bedtime  insulin lispro (ADMELOG) corrective regimen sliding scale   SubCutaneous three times a day before meals       PAST MEDICAL & SURGICAL HISTORY:  CAD (coronary artery disease)  s/p CABG      Hypercholesteremia      Thrombus of left atrial appendage  2018      Ischemic cardiomyopathy      WILFRED (obstructive sleep apnea)  can not tolerate bipap at night      HTN (hypertension)      Atrial fibrillation  2018      CHF (congestive heart failure)  denies any recent exacerbation      Peripheral edema  chronic      Lichen planus  chronic ( b/L LE)      Atrial flutter  s/p ablation 2018      MI (myocardial infarction)  2012      Stented coronary artery  2019      AS (aortic stenosis)      Chronic kidney disease, unspecified CKD stage      ANGELICA (acute kidney injury)  4/2021      Morbid obesity      Status post placement of cardiac pacemaker  micraleadless PPM, [id number] last interrogation 7/6/2022      Osteoarthritis  shoulders, hips, knee      H/O scoliosis      Lower back pain      History of elbow surgery      S/P CABG (coronary artery bypass graft)  x3, 2012      Cardiac pacemaker  leadless 2018      History of coronary artery stent placement  2019 ( one more after cabg)      History of adenoidectomy      H/O atrioventricular karlee ablation  2018

## 2025-04-18 NOTE — PROGRESS NOTE ADULT - PROBLEM SELECTOR PLAN 1
Continue with Plavix 75mg   Continue with hep gtt   Coumadin 5 mg tonight   Bumex 2mg IVP BID   Encourage Chest PT / Pulmonary toileting and Incentive spirometry every 1 hour x 10 while awake.   D/C plan subacute rehab once medically cleared   Plan of care transitioned to Medicine team as per Dr Wagner

## 2025-04-18 NOTE — PROGRESS NOTE ADULT - SUBJECTIVE AND OBJECTIVE BOX
[facility] Division of Kidney Diseases & Hypertension  FOLLOW UP NOTE  --------------------------------------------------------------------------------  Chief Complaint:    24 hour events/subjective:    no complaints        PAST HISTORY  --------------------------------------------------------------------------------  No significant changes to PMH, PSH, FHx, SHx, unless otherwise noted    ALLERGIES & MEDICATIONS  --------------------------------------------------------------------------------  Allergies    metoprolol (Short breath)    Intolerances      Standing Inpatient Medications  albuterol/ipratropium for Nebulization 3 milliLiter(s) Nebulizer every 6 hours  aMIOdarone    Tablet   Oral   aMIOdarone    Tablet 200 milliGRAM(s) Oral daily  atorvastatin 40 milliGRAM(s) Oral at bedtime  buMETAnide Injectable 2 milliGRAM(s) IV Push two times a day  clopidogrel Tablet 75 milliGRAM(s) Oral daily  famotidine    Tablet 20 milliGRAM(s) Oral every 48 hours  hemorrhoidal Ointment 1 Application(s) Rectal two times a day  heparin  Infusion 1000 Unit(s)/Hr IV Continuous <Continuous>  insulin lispro (ADMELOG) corrective regimen sliding scale   SubCutaneous at bedtime  insulin lispro (ADMELOG) corrective regimen sliding scale   SubCutaneous three times a day before meals  warfarin 5 milliGRAM(s) Oral once    PRN Inpatient Medications  melatonin 3 milliGRAM(s) Oral at bedtime PRN    VITALS/PHYSICAL EXAM  --------------------------------------------------------------------------------  T(C): 36.7 (04-18-25 @ 11:58), Max: 36.7 (04-17-25 @ 17:00)  HR: 64 (04-18-25 @ 11:58) (59 - 109)  BP: 101/67 (04-18-25 @ 04:26) (88/58 - 108/69)  RR: 18 (04-18-25 @ 11:58) (18 - 28)  SpO2: 97% (04-18-25 @ 11:58) (89% - 98%)  Wt(kg): --        04-17-25 @ 07:01  -  04-18-25 @ 07:00  --------------------------------------------------------  IN: 1149 mL / OUT: 885 mL / NET: 264 mL    04-18-25 @ 07:01  -  04-18-25 @ 14:20  --------------------------------------------------------  IN: 440 mL / OUT: 0 mL / NET: 440 mL      Physical Exam:    Gen: NAD  Neuro: non-focal  HEENT: Anicteric, MMM  Pulm: CTA B/L, +NC  CV: +S1S2  Abd: soft, non-tender/non-distended  : +lopez  Extremities: +B/L LE edema, +venous stasis changes    Skin: Warm    LABS/STUDIES  --------------------------------------------------------------------------------              9.5    6.42  >-----------<  229      [04-18-25 @ 09:40]              30.1     135  |  90  |  103  ----------------------------<  128      [04-18-25 @ 09:40]  3.7   |  31  |  2.50        Ca     9.5     [04-18-25 @ 09:40]      Mg     1.9     [04-17-25 @ 00:24]      Phos  2.9     [04-17-25 @ 00:24]    TPro  7.5  /  Alb  3.3  /  TBili  0.9  /  DBili  x   /  AST  22  /  ALT  24  /  AlkPhos  101  [04-18-25 @ 09:40]    PT/INR: PT 11.8 , INR 1.04       [04-18-25 @ 09:40]  PTT: 47.8       [04-18-25 @ 09:40]      Creatinine Trend:  SCr 2.50 [04-18 @ 09:40]  SCr 2.60 [04-18 @ 06:33]  SCr 2.99 [04-17 @ 00:24]  SCr 3.38 [04-16 @ 00:32]  SCr 3.09 [04-15 @ 00:43]    Urinalysis - [04-18-25 @ 09:40]      Color  / Appearance  / SG  / pH       Gluc 128 / Ketone   / Bili  / Urobili        Blood  / Protein  / Leuk Est  / Nitrite       RBC  / WBC  / Hyaline  / Gran  / Sq Epi  / Non Sq Epi  / Bacteria

## 2025-04-18 NOTE — PROGRESS NOTE ADULT - PROBLEM SELECTOR PLAN 4
h/o CAGB  TTE (4/8) - Severe three vessel obstructive. Patent LIMA to LAD  - c/w Plavix  - c/w atorvastatin 40mg qd  - c/w hep gtt --> will transition to Coumadin (?INR 2-3)

## 2025-04-18 NOTE — PROGRESS NOTE ADULT - PROBLEM SELECTOR PLAN 5
cathy 4. Home meds: Eliquis 5, Amiodarone  - s/p ablation 2019; dccv 3/25/25; micra implant  - c/w hep gtt --> transition to coumadin  - c/w Amiodarone 200mg qd cathy 4. Home meds: Eliquis 5, Amiodarone  - s/p ablation 2019; dccv 3/25/25; micra implant  - c/w hep gtt --> transition to coumadin  - c/w Amiodarone 200mg qd  - Tele

## 2025-04-18 NOTE — PROGRESS NOTE ADULT - PROBLEM SELECTOR PLAN 2
presented initially with dyspnea, orthopnea x 6 days c/f acute on chronic HF   Home meds: Entresto 24/26, bumex 1mg/2mg qd alternate, hydralazine 50mg tid  - s/p Bumex gtt  TTE (4/12) -  LVEF 25%. Global LV hypokinesis.    Plan  - c/w Bumex 2mg IVP bid (goal net -1 to 2L daily)  - hold home meds  - Replete lytes prn for Mg>2 K>4  - Daily weight. Strict I&O presented initially with dyspnea, orthopnea x 6 days c/f acute on chronic HF   Home meds: Entresto 24/26, bumex 1mg/2mg qd alternate, hydralazine 50mg tid  - s/p Bumex gtt  TTE (4/12) -  LVEF 25%. Global LV hypokinesis.    Plan  - c/w Bumex 2mg IVP bid (goal net -1 to 2L daily)  - hold home meds  - repeat UA with microscopic  - Replete lytes prn for Mg>2 K>4  - Daily weight. Strict I&O

## 2025-04-18 NOTE — PROGRESS NOTE ADULT - ASSESSMENT
64M PMHx AFib (s/p dccv 3/25/25), HFrEF (likely mod red EF, sev TTE), aortic stenosis s/p TAVR (11/2022), a flutter s/p ablation 2019 and s/p micra implant, CKD3, WILFRED, obesity, venous stasis, HTN, HLD, who presents with 6 days of worsening dyspnea on exertion, admitted for AHRF likely iso CHF exacerbation, also with ANGELICA on CKD c/f cardiorenal syndrome. Admitted to CCU for obstructive shock requiring pressors. Patient s/p TAVR 4/11, off pressors, and medically stable. Downgraded to medicine floor for further management and transition to Coumadin

## 2025-04-19 LAB
ALBUMIN SERPL ELPH-MCNC: 3.2 G/DL — LOW (ref 3.3–5)
ALP SERPL-CCNC: 107 U/L — SIGNIFICANT CHANGE UP (ref 40–120)
ALT FLD-CCNC: 27 U/L — SIGNIFICANT CHANGE UP (ref 10–45)
ANION GAP SERPL CALC-SCNC: 12 MMOL/L — SIGNIFICANT CHANGE UP (ref 5–17)
APTT BLD: 77.4 SEC — HIGH (ref 24.5–35.6)
AST SERPL-CCNC: 22 U/L — SIGNIFICANT CHANGE UP (ref 10–40)
BILIRUB SERPL-MCNC: 0.9 MG/DL — SIGNIFICANT CHANGE UP (ref 0.2–1.2)
BUN SERPL-MCNC: 96 MG/DL — HIGH (ref 7–23)
CALCIUM SERPL-MCNC: 9.9 MG/DL — SIGNIFICANT CHANGE UP (ref 8.4–10.5)
CHLORIDE SERPL-SCNC: 94 MMOL/L — LOW (ref 96–108)
CO2 SERPL-SCNC: 32 MMOL/L — HIGH (ref 22–31)
CREAT SERPL-MCNC: 2.3 MG/DL — HIGH (ref 0.5–1.3)
EGFR: 31 ML/MIN/1.73M2 — LOW
EGFR: 31 ML/MIN/1.73M2 — LOW
GLUCOSE BLDC GLUCOMTR-MCNC: 104 MG/DL — HIGH (ref 70–99)
GLUCOSE BLDC GLUCOMTR-MCNC: 111 MG/DL — HIGH (ref 70–99)
GLUCOSE BLDC GLUCOMTR-MCNC: 124 MG/DL — HIGH (ref 70–99)
GLUCOSE BLDC GLUCOMTR-MCNC: 133 MG/DL — HIGH (ref 70–99)
GLUCOSE SERPL-MCNC: 101 MG/DL — HIGH (ref 70–99)
HCT VFR BLD CALC: 34.2 % — LOW (ref 39–50)
HGB BLD-MCNC: 10.7 G/DL — LOW (ref 13–17)
INR BLD: 1.04 RATIO — SIGNIFICANT CHANGE UP (ref 0.85–1.16)
MAGNESIUM SERPL-MCNC: 2.1 MG/DL — SIGNIFICANT CHANGE UP (ref 1.6–2.6)
MCHC RBC-ENTMCNC: 31.2 PG — SIGNIFICANT CHANGE UP (ref 27–34)
MCHC RBC-ENTMCNC: 31.3 G/DL — LOW (ref 32–36)
MCV RBC AUTO: 99.7 FL — SIGNIFICANT CHANGE UP (ref 80–100)
NRBC BLD AUTO-RTO: 0 /100 WBCS — SIGNIFICANT CHANGE UP (ref 0–0)
PHOSPHATE SERPL-MCNC: 3 MG/DL — SIGNIFICANT CHANGE UP (ref 2.5–4.5)
PLATELET # BLD AUTO: 265 K/UL — SIGNIFICANT CHANGE UP (ref 150–400)
POTASSIUM SERPL-MCNC: 3.8 MMOL/L — SIGNIFICANT CHANGE UP (ref 3.5–5.3)
POTASSIUM SERPL-SCNC: 3.8 MMOL/L — SIGNIFICANT CHANGE UP (ref 3.5–5.3)
PROT SERPL-MCNC: 8 G/DL — SIGNIFICANT CHANGE UP (ref 6–8.3)
PROTHROM AB SERPL-ACNC: 12 SEC — SIGNIFICANT CHANGE UP (ref 9.9–13.4)
RBC # BLD: 3.43 M/UL — LOW (ref 4.2–5.8)
RBC # FLD: 17.3 % — HIGH (ref 10.3–14.5)
SODIUM SERPL-SCNC: 138 MMOL/L — SIGNIFICANT CHANGE UP (ref 135–145)
WBC # BLD: 5.71 K/UL — SIGNIFICANT CHANGE UP (ref 3.8–10.5)
WBC # FLD AUTO: 5.71 K/UL — SIGNIFICANT CHANGE UP (ref 3.8–10.5)

## 2025-04-19 PROCEDURE — 99233 SBSQ HOSP IP/OBS HIGH 50: CPT

## 2025-04-19 PROCEDURE — 99232 SBSQ HOSP IP/OBS MODERATE 35: CPT

## 2025-04-19 RX ORDER — SENNA 187 MG
2 TABLET ORAL AT BEDTIME
Refills: 0 | Status: DISCONTINUED | OUTPATIENT
Start: 2025-04-19 | End: 2025-04-28

## 2025-04-19 RX ORDER — POLYETHYLENE GLYCOL 3350 17 G/17G
17 POWDER, FOR SOLUTION ORAL DAILY
Refills: 0 | Status: DISCONTINUED | OUTPATIENT
Start: 2025-04-19 | End: 2025-04-28

## 2025-04-19 RX ADMIN — IPRATROPIUM BROMIDE AND ALBUTEROL SULFATE 3 MILLILITER(S): .5; 2.5 SOLUTION RESPIRATORY (INHALATION) at 02:39

## 2025-04-19 RX ADMIN — Medication 2 TABLET(S): at 21:28

## 2025-04-19 RX ADMIN — Medication 7.5 MILLIGRAM(S): at 21:28

## 2025-04-19 RX ADMIN — BUMETANIDE 2 MILLIGRAM(S): 1 TABLET ORAL at 12:38

## 2025-04-19 RX ADMIN — CLOPIDOGREL BISULFATE 75 MILLIGRAM(S): 75 TABLET, FILM COATED ORAL at 12:44

## 2025-04-19 RX ADMIN — BUMETANIDE 2 MILLIGRAM(S): 1 TABLET ORAL at 05:32

## 2025-04-19 RX ADMIN — POLYETHYLENE GLYCOL 3350 17 GRAM(S): 17 POWDER, FOR SOLUTION ORAL at 12:37

## 2025-04-19 RX ADMIN — ATORVASTATIN CALCIUM 40 MILLIGRAM(S): 80 TABLET, FILM COATED ORAL at 21:28

## 2025-04-19 RX ADMIN — AMIODARONE HYDROCHLORIDE 200 MILLIGRAM(S): 50 INJECTION, SOLUTION INTRAVENOUS at 05:32

## 2025-04-19 NOTE — PROGRESS NOTE ADULT - SUBJECTIVE AND OBJECTIVE BOX
Subjective:        PAST MEDICAL & SURGICAL HISTORY:  CAD (coronary artery disease)  s/p CABG    Hypercholesteremia    Thrombus of left atrial appendage  2018    Ischemic cardiomyopathy    WILFRED (obstructive sleep apnea)  can not tolerate bipap at night    HTN (hypertension)    Atrial fibrillation  2018    CHF (congestive heart failure)  denies any recent exacerbation    Peripheral edema  chronic    Lichen planus  chronic ( b/L LE)    Atrial flutter  s/p ablation 2018    MI (myocardial infarction)  2012    Stented coronary artery  2019    AS (aortic stenosis)    Chronic kidney disease, unspecified CKD stage    ANGELICA (acute kidney injury)  4/2021    Morbid obesity    Status post placement of cardiac pacemaker  micraleadless PPM, [id number] last interrogation 7/6/2022    Osteoarthritis  shoulders, hips, knee    H/O scoliosis    Lower back pain    History of elbow surgery    S/P CABG (coronary artery bypass graft)  x3, 2012    Cardiac pacemaker  leadless 2018    History of coronary artery stent placement  2019 ( one more after cabg)    History of adenoidectomy    H/O atrioventricular karlee ablation  2018    T(C): 36.7 (04-19-25 @ 04:16), Max: 36.7 (04-18-25 @ 11:58)  HR: 77 (04-19-25 @ 09:03) (64 - 85)  BP: 99/66 (04-19-25 @ 04:16) (99/66 - 111/76)  RR: 18 (04-19-25 @ 04:16) (18 - 18)  SpO2: 100% (04-19-25 @ 09:03) (95% - 100%)  Wt(kg): --  04-18 @ 07:01  -  04-19 @ 07:00  --------------------------------------------------------  IN: 720 mL / OUT: 2250 mL / NET: -1530 mL    MEDICATIONS  (STANDING):  albuterol/ipratropium for Nebulization 3 milliLiter(s) Nebulizer every 6 hours  aMIOdarone    Tablet   Oral   aMIOdarone    Tablet 200 milliGRAM(s) Oral daily  atorvastatin 40 milliGRAM(s) Oral at bedtime  buMETAnide Injectable 2 milliGRAM(s) IV Push two times a day  clopidogrel Tablet 75 milliGRAM(s) Oral daily  famotidine    Tablet 20 milliGRAM(s) Oral every 48 hours  hemorrhoidal Ointment 1 Application(s) Rectal two times a day  heparin  Infusion 1000 Unit(s)/Hr (20 mL/Hr) IV Continuous <Continuous>  insulin lispro (ADMELOG) corrective regimen sliding scale   SubCutaneous at bedtime  insulin lispro (ADMELOG) corrective regimen sliding scale   SubCutaneous three times a day before meals    MEDICATIONS  (PRN):  melatonin 3 milliGRAM(s) Oral at bedtime PRN Insomnia    Home Medications:  allopurinol 100 mg oral tablet: 2 tab(s) orally once a day (01 Apr 2025 03:01)  atorvastatin 20 mg oral tablet: 1 tab(s) orally once a day (01 Apr 2025 03:01)  bumetanide 1 mg oral tablet: 1 milligram(s) orally once a day 1 mg and 2 mg alternate day (01 Apr 2025 03:01)  Eliquis 5 mg oral tablet: 1 tab(s) orally 2 times a day  LAST DOSE 11/14/22 (01 Apr 2025 03:01)  Entresto 24 mg-26 mg oral tablet: 1 tab(s) orally 2 times a day (01 Apr 2025 03:01)  famotidine 20 mg oral tablet: 1 tab(s) orally 2 times a day (01 Apr 2025 03:01)  hydrALAZINE 50 mg oral tablet: 1 tab(s) orally 3 times a day (01 Apr 2025 03:01)  Plavix 75 mg oral tablet: 1 tab(s) orally once a day (01 Apr 2025 03:01)                        9.5    6.42  )-----------( 229      ( 18 Apr 2025 09:40 )             30.1   04-18    135  |  90[L]  |  103[H]  ----------------------------<  128[H]  3.7   |  31  |  2.50[H]    Ca    9.5      18 Apr 2025 09:40    TPro  7.5  /  Alb  3.3  /  TBili  0.9  /  DBili  x   /  AST  22  /  ALT  24  /  AlkPhos  101  04-18  PT/INR - ( 18 Apr 2025 09:40 )   PT: 11.8 sec;   INR: 1.04 ratio    PTT - ( 18 Apr 2025 18:19 )  PTT:47.2 sec       Subjective  Events of transpired over last 24 hours were reviewed.  Patient on BiPAP overnight for around 3 hours.  He reports that his breathing is mildly improved compared to the prior day however he feels more fatigued.  His right arm tremor which she had prior to hospitalization is more pronounced.  Otherwise he denies any chest pain/tightness discomfort, fevers, chills, sweats, symptoms, dizzy or palpitations..    Telemetry  Atrial fibrillation    Review of system  14 point review of systems otherwise unremarkable separate described above in history of present illness    Physical examination  No apparent distress, alert and oriented 3, appropriate affect  JVD is elevated, supple, no lymphadenopathy  Clear to auscultation bilaterally with no wheezing rhonchi crackles  Regular rate and rhythm with 3 out of 6 crescendo decrescendo murmur less on the right upper sternal rating to the carotids  Positive bowel sound, soft, nontender, nondistended, no mass and guarding or rebound tenderness  Right and left groin sites are soft with no ecchymosis/hematoma/bruit  Chronic venous stasis changes bilaterally, mild bilateral lower extremity edema  1+ DP/PT pulse  No focal deficits    Assessment/plan  64-year-old man with past medical history significant for    A-fib status post cardioversion on 3/25/2025  Ischemic cardiomyopathy  Coronary artery disease status post CABG  Aortic valve stenosis status post TAVR in 11/2022  Atrial flutter status post ablation 2019  Status post Micra implant  Chronic kidney disease stage III  Obesity  Hypertension  Hyperlipidemia  Venous stasis    Who presented with acute on chronic renal failure in the setting of CHF exacerbation rapid response called on the floor due to systolic blood pressure in the 80s.  Patient in cardiogenic shock.    --The patient on 4/11/2025 underwent percutaneous transcatheter aortic valve in valve of his bioprosthetic Cullen 3 26mm TAVR valve during which time a Medtronic Evolut FX+ 29mm valve was implanted.  The patient tolerated the procedure well.  Repeat TTE was reviewed which demonstrated appropriately positioned TAVR valve in valve.  --Recommend patient be seen by heart failure team to assist in medical optimization.  --Wean vasopressin as tolerated.  --Continue heparin drip with Coumadin bridge.  Closely monitor for signs and symptoms of bleeding.  Goal INR between 2-3.  [id number] or 4.  --Continue Bumex 2 mg IV twice a day.  Patient appears that he is euvolemic on clinical examination.  Continue to closely monitor kidney function.  Baseline serum creatinine between 1.5-1.8.  As an outpatient he was also on Entresto.  Of avoiding all nephrotoxins at this time.  --Continue amiodarone 200 mg daily.  --Continue atorvastatin 40 mg daily.  --Continue clopidogrel 75 mg daily.  Closely monitor for signs and symptoms of bleeding.  --Recommend daily PT.  --Patient is being assessed for subacute/acute rehabilitation facility.  --Continue telemetry monitoring.    All questions and concerns of the patient were addressed.          PAST MEDICAL & SURGICAL HISTORY:  CAD (coronary artery disease)  s/p CABG    Hypercholesteremia    Thrombus of left atrial appendage  2018    Ischemic cardiomyopathy    WILFRED (obstructive sleep apnea)  can not tolerate bipap at night    HTN (hypertension)    Atrial fibrillation  2018    CHF (congestive heart failure)  denies any recent exacerbation    Peripheral edema  chronic    Lichen planus  chronic ( b/L LE)    Atrial flutter  s/p ablation 2018    MI (myocardial infarction)  2012    Stented coronary artery  2019    AS (aortic stenosis)    Chronic kidney disease, unspecified CKD stage    ANGELICA (acute kidney injury)  4/2021    Morbid obesity    Status post placement of cardiac pacemaker  micraleadless PPM, [id number] last interrogation 7/6/2022    Osteoarthritis  shoulders, hips, knee    H/O scoliosis    Lower back pain    History of elbow surgery    S/P CABG (coronary artery bypass graft)  x3, 2012    Cardiac pacemaker  leadless 2018    History of coronary artery stent placement  2019 ( one more after cabg)    History of adenoidectomy    H/O atrioventricular karlee ablation  2018    T(C): 36.7 (04-19-25 @ 04:16), Max: 36.7 (04-18-25 @ 11:58)  HR: 77 (04-19-25 @ 09:03) (64 - 85)  BP: 99/66 (04-19-25 @ 04:16) (99/66 - 111/76)  RR: 18 (04-19-25 @ 04:16) (18 - 18)  SpO2: 100% (04-19-25 @ 09:03) (95% - 100%)  Wt(kg): --  04-18 @ 07:01  -  04-19 @ 07:00  --------------------------------------------------------  IN: 720 mL / OUT: 2250 mL / NET: -1530 mL    MEDICATIONS  (STANDING):  albuterol/ipratropium for Nebulization 3 milliLiter(s) Nebulizer every 6 hours  aMIOdarone    Tablet   Oral   aMIOdarone    Tablet 200 milliGRAM(s) Oral daily  atorvastatin 40 milliGRAM(s) Oral at bedtime  buMETAnide Injectable 2 milliGRAM(s) IV Push two times a day  clopidogrel Tablet 75 milliGRAM(s) Oral daily  famotidine    Tablet 20 milliGRAM(s) Oral every 48 hours  hemorrhoidal Ointment 1 Application(s) Rectal two times a day  heparin  Infusion 1000 Unit(s)/Hr (20 mL/Hr) IV Continuous <Continuous>  insulin lispro (ADMELOG) corrective regimen sliding scale   SubCutaneous at bedtime  insulin lispro (ADMELOG) corrective regimen sliding scale   SubCutaneous three times a day before meals    MEDICATIONS  (PRN):  melatonin 3 milliGRAM(s) Oral at bedtime PRN Insomnia    Home Medications:  allopurinol 100 mg oral tablet: 2 tab(s) orally once a day (01 Apr 2025 03:01)  atorvastatin 20 mg oral tablet: 1 tab(s) orally once a day (01 Apr 2025 03:01)  bumetanide 1 mg oral tablet: 1 milligram(s) orally once a day 1 mg and 2 mg alternate day (01 Apr 2025 03:01)  Eliquis 5 mg oral tablet: 1 tab(s) orally 2 times a day  LAST DOSE 11/14/22 (01 Apr 2025 03:01)  Entresto 24 mg-26 mg oral tablet: 1 tab(s) orally 2 times a day (01 Apr 2025 03:01)  famotidine 20 mg oral tablet: 1 tab(s) orally 2 times a day (01 Apr 2025 03:01)  hydrALAZINE 50 mg oral tablet: 1 tab(s) orally 3 times a day (01 Apr 2025 03:01)  Plavix 75 mg oral tablet: 1 tab(s) orally once a day (01 Apr 2025 03:01)                        9.5    6.42  )-----------( 229      ( 18 Apr 2025 09:40 )             30.1   04-18    135  |  90[L]  |  103[H]  ----------------------------<  128[H]  3.7   |  31  |  2.50[H]    Ca    9.5      18 Apr 2025 09:40    TPro  7.5  /  Alb  3.3  /  TBili  0.9  /  DBili  x   /  AST  22  /  ALT  24  /  AlkPhos  101  04-18  PT/INR - ( 18 Apr 2025 09:40 )   PT: 11.8 sec;   INR: 1.04 ratio    PTT - ( 18 Apr 2025 18:19 )  PTT:47.2 sec

## 2025-04-19 NOTE — PROGRESS NOTE ADULT - ATTENDING COMMENTS
64M PMHx AFib (s/p dccv 3/25/25), HFrEF (likely mod red EF, sev TTE), aortic stenosis s/p TAVR (11/2022), a flutter s/p ablation 2019 and s/p micra implant, CKD3, WILFRED, obesity, venous stasis, HTN, HLD admitted for heart failure exacerbation, acute hypoxic resp failure, ANGELICA on CKD, downgraded from CCU after requiring pressors. s/p tavr 4/11. now stable. transitioning from heparin to warfarin.     - continue bridging from heparin to warfarin - daily INR  - appreciate cards recs - bumex. need to consult heart failure tomorrow

## 2025-04-19 NOTE — PROGRESS NOTE ADULT - PROBLEM SELECTOR PLAN 1
s/p TAVR (11/2022)   Admitted to CCU for mixed shock likely from severe AS-induced LVOT requiring pressors - weaned off  TTE (4/1) - The prosthetic valve has reduced leaflet opening . Severe prosthetic aortic stenosis.   - s/p TAVR (4/11)    Plan:  - c/w Hep gtt  - c/w Coumadin 5mg qhs (4/17 - )  - Trend INR daily (goal 2-3)

## 2025-04-19 NOTE — PROGRESS NOTE ADULT - PROBLEM SELECTOR PLAN 5
cathy 4. Home meds: Eliquis 5, Amiodarone  - s/p ablation 2019; dccv 3/25/25; micra implant  - c/w hep gtt --> transition to coumadin  - c/w Amiodarone 200mg qd  - Tele

## 2025-04-19 NOTE — PROGRESS NOTE ADULT - PROBLEM SELECTOR PLAN 2
presented initially with dyspnea, orthopnea x 6 days c/f acute on chronic HF   Home meds: Entresto 24/26, bumex 1mg/2mg qd alternate, hydralazine 50mg tid  - s/p Bumex gtt  TTE (4/12) -  LVEF 25%. Global LV hypokinesis.    Plan  - c/w Bumex 2mg IVP bid (goal net -1 to 2L daily)  - hold home meds  - repeat UA with microscopic  - Replete lytes prn for Mg>2 K>4  - Daily weight. Strict I&O

## 2025-04-19 NOTE — PROGRESS NOTE ADULT - SUBJECTIVE AND OBJECTIVE BOX
Patient is a 64y old  Male who presents with a chief complaint of dyspnea , chest pain (2025 14:19)      SUBJECTIVE / OVERNIGHT EVENTS:  No acute event overnight    Pt seen and examined at bedside. Had no complaints. Denied cp, sob, d/n/v    MEDICATIONS  (STANDING):  albuterol/ipratropium for Nebulization 3 milliLiter(s) Nebulizer every 6 hours  aMIOdarone    Tablet   Oral   aMIOdarone    Tablet 200 milliGRAM(s) Oral daily  atorvastatin 40 milliGRAM(s) Oral at bedtime  buMETAnide Injectable 2 milliGRAM(s) IV Push two times a day  clopidogrel Tablet 75 milliGRAM(s) Oral daily  famotidine    Tablet 20 milliGRAM(s) Oral every 48 hours  hemorrhoidal Ointment 1 Application(s) Rectal two times a day  heparin  Infusion 1000 Unit(s)/Hr (20 mL/Hr) IV Continuous <Continuous>  insulin lispro (ADMELOG) corrective regimen sliding scale   SubCutaneous at bedtime  insulin lispro (ADMELOG) corrective regimen sliding scale   SubCutaneous three times a day before meals    MEDICATIONS  (PRN):  melatonin 3 milliGRAM(s) Oral at bedtime PRN Insomnia      CAPILLARY BLOOD GLUCOSE      POCT Blood Glucose.: 120 mg/dL (2025 21:30)  POCT Blood Glucose.: 128 mg/dL (2025 17:05)  POCT Blood Glucose.: 122 mg/dL (2025 12:26)  POCT Blood Glucose.: 111 mg/dL (2025 08:07)    I&O's Summary    2025 07:  -  2025 07:00  --------------------------------------------------------  IN: 1149 mL / OUT: 885 mL / NET: 264 mL    2025 07:01  -  2025 06:57  --------------------------------------------------------  IN: 720 mL / OUT: 2250 mL / NET: -1530 mL        Vital Signs Last 24 Hrs  T(C): 36.7 (2025 04:16), Max: 36.7 (2025 11:58)  T(F): 98 (2025 04:16), Max: 98 (2025 11:58)  HR: 78 (2025 04:16) (64 - 85)  BP: 99/66 (2025 04:16) (99/66 - 111/76)  BP(mean): --  RR: 18 (2025 04:16) (18 - 18)  SpO2: 99% (2025 04:16) (95% - 100%)    Parameters below as of 2025 04:16  Patient On (Oxygen Delivery Method): nasal cannula  O2 Flow (L/min): 2      Physical Exam  CONSTITUTIONAL: NAD  EYES: EOMI, conjunctiva and sclera clear  ENMT: Moist oral mucosa  NECK: Supple  RESPIRATORY: Breathing unlabored, CTAB  CARDIOVASCULAR: S1S2 no MRG  ABDOMEN: Nontender to palpation, normoactive bowel sounds, no rebound/guarding  MUSCULOSKELETAL: no clubbing or cyanosis of digits  NEUROLOGY: No focal deficits   SKIN: No rashes or lesions    LABS:                        9.5    6.42  )-----------( 229      ( 2025 09:40 )             30.1      04-18    135  |  90[L]  |  103[H]  ----------------------------<  128[H]  3.7   |  31  |  2.50[H]    Ca    9.5      2025 09:40    TPro  7.5  /  Alb  3.3  /  TBili  0.9  /  DBili  x   /  AST  22  /  ALT  24  /  AlkPhos  101  04-18    PT/INR - ( 2025 09:40 )   PT: 11.8 sec;   INR: 1.04 ratio         PTT - ( 2025 18:19 )  PTT:47.2 sec      Urinalysis Basic - ( 2025 18:19 )    Color: Yellow / Appearance: Clear / S.010 / pH: x  Gluc: x / Ketone: Negative mg/dL  / Bili: Negative / Urobili: 1.0 mg/dL   Blood: x / Protein: Negative mg/dL / Nitrite: Negative   Leuk Esterase: Trace / RBC: 1 /HPF / WBC 1 /HPF   Sq Epi: x / Non Sq Epi: 0 /HPF / Bacteria: Negative /HPF        RADIOLOGY & ADDITIONAL TESTS:    Imaging Personally Reviewed:    Consultant(s) Notes Reviewed:      Care Discussed with Consultants/Other Providers:   Patient is a 64y old  Male who presents with a chief complaint of dyspnea , chest pain (2025 14:19)      SUBJECTIVE / OVERNIGHT EVENTS:  No acute event overnight    Pt seen and examined at bedside. Had no complaints. Reported being able to get up and walk to the bathroom with no sob. Denied cp, sob, d/n/v    MEDICATIONS  (STANDING):  albuterol/ipratropium for Nebulization 3 milliLiter(s) Nebulizer every 6 hours  aMIOdarone    Tablet   Oral   aMIOdarone    Tablet 200 milliGRAM(s) Oral daily  atorvastatin 40 milliGRAM(s) Oral at bedtime  buMETAnide Injectable 2 milliGRAM(s) IV Push two times a day  clopidogrel Tablet 75 milliGRAM(s) Oral daily  famotidine    Tablet 20 milliGRAM(s) Oral every 48 hours  hemorrhoidal Ointment 1 Application(s) Rectal two times a day  heparin  Infusion 1000 Unit(s)/Hr (20 mL/Hr) IV Continuous <Continuous>  insulin lispro (ADMELOG) corrective regimen sliding scale   SubCutaneous at bedtime  insulin lispro (ADMELOG) corrective regimen sliding scale   SubCutaneous three times a day before meals    MEDICATIONS  (PRN):  melatonin 3 milliGRAM(s) Oral at bedtime PRN Insomnia      CAPILLARY BLOOD GLUCOSE      POCT Blood Glucose.: 120 mg/dL (2025 21:30)  POCT Blood Glucose.: 128 mg/dL (2025 17:05)  POCT Blood Glucose.: 122 mg/dL (2025 12:26)  POCT Blood Glucose.: 111 mg/dL (2025 08:07)    I&O's Summary    2025 07:01  -  2025 07:00  --------------------------------------------------------  IN: 1149 mL / OUT: 885 mL / NET: 264 mL    2025 07:01  -  2025 06:57  --------------------------------------------------------  IN: 720 mL / OUT: 2250 mL / NET: -1530 mL        Vital Signs Last 24 Hrs  T(C): 36.7 (2025 04:16), Max: 36.7 (2025 11:58)  T(F): 98 (2025 04:16), Max: 98 (2025 11:58)  HR: 78 (2025 04:16) (64 - 85)  BP: 99/66 (2025 04:16) (99/66 - 111/76)  BP(mean): --  RR: 18 (2025 04:16) (18 - 18)  SpO2: 99% (2025 04:16) (95% - 100%)    Parameters below as of 2025 04:16  Patient On (Oxygen Delivery Method): nasal cannula  O2 Flow (L/min): 2      Physical Exam  CONSTITUTIONAL: NAD  EYES: EOMI, conjunctiva and sclera clear  ENMT: Moist oral mucosa  NECK: Supple  RESPIRATORY: Breathing unlabored, CTAB  CARDIOVASCULAR: S1S2 no MRG  ABDOMEN: Nontender to palpation, normoactive bowel sounds, no rebound/guarding  MUSCULOSKELETAL: +2 edema b/l LEs. Evidence of venous dermatitis  NEUROLOGY: No focal deficits. Moving all extremities  SKIN: No rashes or lesions    LABS:                        9.5    6.42  )-----------( 229      ( 2025 09:40 )             30.1      04-18    135  |  90[L]  |  103[H]  ----------------------------<  128[H]  3.7   |  31  |  2.50[H]    Ca    9.5      2025 09:40    TPro  7.5  /  Alb  3.3  /  TBili  0.9  /  DBili  x   /  AST  22  /  ALT  24  /  AlkPhos  101  04-18    PT/INR - ( 2025 09:40 )   PT: 11.8 sec;   INR: 1.04 ratio         PTT - ( 2025 18:19 )  PTT:47.2 sec      Urinalysis Basic - ( 2025 18:19 )    Color: Yellow / Appearance: Clear / S.010 / pH: x  Gluc: x / Ketone: Negative mg/dL  / Bili: Negative / Urobili: 1.0 mg/dL   Blood: x / Protein: Negative mg/dL / Nitrite: Negative   Leuk Esterase: Trace / RBC: 1 /HPF / WBC 1 /HPF   Sq Epi: x / Non Sq Epi: 0 /HPF / Bacteria: Negative /HPF        RADIOLOGY & ADDITIONAL TESTS:    Imaging Personally Reviewed:    Consultant(s) Notes Reviewed:      Care Discussed with Consultants/Other Providers:

## 2025-04-20 LAB
ANION GAP SERPL CALC-SCNC: 14 MMOL/L — SIGNIFICANT CHANGE UP (ref 5–17)
APTT BLD: 77.6 SEC — HIGH (ref 24.5–35.6)
BASOPHILS # BLD AUTO: 0.04 K/UL — SIGNIFICANT CHANGE UP (ref 0–0.2)
BASOPHILS NFR BLD AUTO: 0.6 % — SIGNIFICANT CHANGE UP (ref 0–2)
BUN SERPL-MCNC: 97 MG/DL — HIGH (ref 7–23)
CALCIUM SERPL-MCNC: 10 MG/DL — SIGNIFICANT CHANGE UP (ref 8.4–10.5)
CHLORIDE SERPL-SCNC: 93 MMOL/L — LOW (ref 96–108)
CO2 SERPL-SCNC: 29 MMOL/L — SIGNIFICANT CHANGE UP (ref 22–31)
CREAT SERPL-MCNC: 2.28 MG/DL — HIGH (ref 0.5–1.3)
EGFR: 31 ML/MIN/1.73M2 — LOW
EGFR: 31 ML/MIN/1.73M2 — LOW
EOSINOPHIL # BLD AUTO: 0.14 K/UL — SIGNIFICANT CHANGE UP (ref 0–0.5)
EOSINOPHIL NFR BLD AUTO: 2.2 % — SIGNIFICANT CHANGE UP (ref 0–6)
GAS PNL BLDV: SIGNIFICANT CHANGE UP
GLUCOSE BLDC GLUCOMTR-MCNC: 107 MG/DL — HIGH (ref 70–99)
GLUCOSE BLDC GLUCOMTR-MCNC: 122 MG/DL — HIGH (ref 70–99)
GLUCOSE BLDC GLUCOMTR-MCNC: 176 MG/DL — HIGH (ref 70–99)
GLUCOSE BLDC GLUCOMTR-MCNC: 96 MG/DL — SIGNIFICANT CHANGE UP (ref 70–99)
GLUCOSE SERPL-MCNC: 95 MG/DL — SIGNIFICANT CHANGE UP (ref 70–99)
HCT VFR BLD CALC: 31.5 % — LOW (ref 39–50)
HGB BLD-MCNC: 10 G/DL — LOW (ref 13–17)
IMM GRANULOCYTES NFR BLD AUTO: 0.6 % — SIGNIFICANT CHANGE UP (ref 0–0.9)
INR BLD: 1.17 RATIO — HIGH (ref 0.85–1.16)
LYMPHOCYTES # BLD AUTO: 0.92 K/UL — LOW (ref 1–3.3)
LYMPHOCYTES # BLD AUTO: 14.6 % — SIGNIFICANT CHANGE UP (ref 13–44)
MAGNESIUM SERPL-MCNC: 2.2 MG/DL — SIGNIFICANT CHANGE UP (ref 1.6–2.6)
MCHC RBC-ENTMCNC: 30.8 PG — SIGNIFICANT CHANGE UP (ref 27–34)
MCHC RBC-ENTMCNC: 31.7 G/DL — LOW (ref 32–36)
MCV RBC AUTO: 96.9 FL — SIGNIFICANT CHANGE UP (ref 80–100)
MONOCYTES # BLD AUTO: 0.59 K/UL — SIGNIFICANT CHANGE UP (ref 0–0.9)
MONOCYTES NFR BLD AUTO: 9.4 % — SIGNIFICANT CHANGE UP (ref 2–14)
NEUTROPHILS # BLD AUTO: 4.57 K/UL — SIGNIFICANT CHANGE UP (ref 1.8–7.4)
NEUTROPHILS NFR BLD AUTO: 72.6 % — SIGNIFICANT CHANGE UP (ref 43–77)
NRBC BLD AUTO-RTO: 0 /100 WBCS — SIGNIFICANT CHANGE UP (ref 0–0)
PHOSPHATE SERPL-MCNC: 3.4 MG/DL — SIGNIFICANT CHANGE UP (ref 2.5–4.5)
PLATELET # BLD AUTO: 264 K/UL — SIGNIFICANT CHANGE UP (ref 150–400)
POTASSIUM SERPL-MCNC: 3.8 MMOL/L — SIGNIFICANT CHANGE UP (ref 3.5–5.3)
POTASSIUM SERPL-SCNC: 3.8 MMOL/L — SIGNIFICANT CHANGE UP (ref 3.5–5.3)
PROTHROM AB SERPL-ACNC: 13.4 SEC — SIGNIFICANT CHANGE UP (ref 9.9–13.4)
RBC # BLD: 3.25 M/UL — LOW (ref 4.2–5.8)
RBC # FLD: 17.3 % — HIGH (ref 10.3–14.5)
SODIUM SERPL-SCNC: 136 MMOL/L — SIGNIFICANT CHANGE UP (ref 135–145)
WBC # BLD: 6.3 K/UL — SIGNIFICANT CHANGE UP (ref 3.8–10.5)
WBC # FLD AUTO: 6.3 K/UL — SIGNIFICANT CHANGE UP (ref 3.8–10.5)

## 2025-04-20 PROCEDURE — 99233 SBSQ HOSP IP/OBS HIGH 50: CPT

## 2025-04-20 RX ADMIN — HEPARIN SODIUM 20 UNIT(S)/HR: 1000 INJECTION INTRAVENOUS; SUBCUTANEOUS at 07:12

## 2025-04-20 RX ADMIN — Medication 20 MILLIGRAM(S): at 19:40

## 2025-04-20 RX ADMIN — INSULIN LISPRO 2: 100 INJECTION, SOLUTION INTRAVENOUS; SUBCUTANEOUS at 12:30

## 2025-04-20 RX ADMIN — AMIODARONE HYDROCHLORIDE 200 MILLIGRAM(S): 50 INJECTION, SOLUTION INTRAVENOUS at 06:22

## 2025-04-20 RX ADMIN — IPRATROPIUM BROMIDE AND ALBUTEROL SULFATE 3 MILLILITER(S): .5; 2.5 SOLUTION RESPIRATORY (INHALATION) at 16:41

## 2025-04-20 RX ADMIN — CLOPIDOGREL BISULFATE 75 MILLIGRAM(S): 75 TABLET, FILM COATED ORAL at 12:11

## 2025-04-20 RX ADMIN — Medication 7.5 MILLIGRAM(S): at 21:13

## 2025-04-20 RX ADMIN — BUMETANIDE 2 MILLIGRAM(S): 1 TABLET ORAL at 14:03

## 2025-04-20 RX ADMIN — IPRATROPIUM BROMIDE AND ALBUTEROL SULFATE 3 MILLILITER(S): .5; 2.5 SOLUTION RESPIRATORY (INHALATION) at 06:21

## 2025-04-20 RX ADMIN — IPRATROPIUM BROMIDE AND ALBUTEROL SULFATE 3 MILLILITER(S): .5; 2.5 SOLUTION RESPIRATORY (INHALATION) at 12:11

## 2025-04-20 RX ADMIN — POLYETHYLENE GLYCOL 3350 17 GRAM(S): 17 POWDER, FOR SOLUTION ORAL at 12:11

## 2025-04-20 RX ADMIN — ATORVASTATIN CALCIUM 40 MILLIGRAM(S): 80 TABLET, FILM COATED ORAL at 21:13

## 2025-04-20 RX ADMIN — BUMETANIDE 2 MILLIGRAM(S): 1 TABLET ORAL at 06:22

## 2025-04-20 RX ADMIN — PHENYLEPHRINE HYDROCHLORIDE AND FAT, HARD 1 APPLICATION(S): .00525; 1.86 SUPPOSITORY RECTAL at 16:35

## 2025-04-20 RX ADMIN — HEPARIN SODIUM 20 UNIT(S)/HR: 1000 INJECTION INTRAVENOUS; SUBCUTANEOUS at 08:07

## 2025-04-20 NOTE — PROGRESS NOTE ADULT - PROBLEM SELECTOR PLAN 1
s/p TAVR (11/2022)   Admitted to CCU for mixed shock likely from severe AS-induced LVOT requiring pressors - weaned off  TTE (4/1) - The prosthetic valve has reduced leaflet opening . Severe prosthetic aortic stenosis.   - s/p TAVR (4/11)    Plan:  - c/w Hep gtt  - c/w Coumadin 5mg qhs (4/17 - )  - Trend INR daily (goal 2-3) s/p TAVR (11/2022)   Admitted to CCU for mixed shock likely from severe AS-induced LVOT requiring pressors - weaned off  TTE (4/1) - The prosthetic valve has reduced leaflet opening . Severe prosthetic aortic stenosis.   - s/p TAVR (4/11)    Plan:  - c/w Hep gtt  - c/w Coumadin QHS for transition   - Trend INR daily (goal 2-3)

## 2025-04-20 NOTE — PROGRESS NOTE ADULT - ATTENDING COMMENTS
64M PMHx AFib (s/p DCCV 3/25/25), HFrEF, aortic stenosis s/p TAVR (11/2022), AFlutter s/p ablation 2019 and s/p Micra implant, CKD3, WILFRED, obesity, venous stasis, HTN, HLD admitted for heart failure exacerbation, acute hypoxic resp failure, ANGELICA on CKD, downgraded from CCU after requiring pressors, s/p TAVR 4/11, on heparin bridge to coumadin, currently undergoing diuresis given heart failure.     #AS  - S/p repeat TAVR 4/11  - INR 1.17  - C/w heparin drip bridge to coumadin. 0Dose 7.5 tonight    #HFrEF  - Appreciate HF recs - c/w bumetanide 2 IV BID, GDMT as tolerated    #Acute respiratory failure  - Likely due to acute HF  - C/w diuresis as above  - Wean oxygen    #AFib  - C/w anticoagulation  - C/w amiodarone  - Monitor on telemetry    #CKD3  - Cr 2.28 (downtrending)  - Trend BMP   - Avoid nephrotoxins, renally dose all medications    Rest of care as documented above    Discussed with HS1.

## 2025-04-20 NOTE — PROGRESS NOTE ADULT - SUBJECTIVE AND OBJECTIVE BOX
PROGRESS NOTE:   Authored by Dr. Tyron Pierce MD     Patient is a 64y old  Male who presents with a chief complaint of dyspnea , chest pain (19 Apr 2025 09:21)      SUBJECTIVE / OVERNIGHT EVENTS:  No acute events overnight.     MEDICATIONS  (STANDING):  albuterol/ipratropium for Nebulization 3 milliLiter(s) Nebulizer every 6 hours  aMIOdarone    Tablet   Oral   aMIOdarone    Tablet 200 milliGRAM(s) Oral daily  atorvastatin 40 milliGRAM(s) Oral at bedtime  buMETAnide Injectable 2 milliGRAM(s) IV Push two times a day  clopidogrel Tablet 75 milliGRAM(s) Oral daily  famotidine    Tablet 20 milliGRAM(s) Oral every 48 hours  hemorrhoidal Ointment 1 Application(s) Rectal two times a day  heparin  Infusion 1000 Unit(s)/Hr (20 mL/Hr) IV Continuous <Continuous>  insulin lispro (ADMELOG) corrective regimen sliding scale   SubCutaneous at bedtime  insulin lispro (ADMELOG) corrective regimen sliding scale   SubCutaneous three times a day before meals  polyethylene glycol 3350 17 Gram(s) Oral daily  senna 2 Tablet(s) Oral at bedtime    MEDICATIONS  (PRN):  melatonin 3 milliGRAM(s) Oral at bedtime PRN Insomnia      CAPILLARY BLOOD GLUCOSE      POCT Blood Glucose.: 124 mg/dL (19 Apr 2025 21:59)  POCT Blood Glucose.: 133 mg/dL (19 Apr 2025 16:47)  POCT Blood Glucose.: 111 mg/dL (19 Apr 2025 12:05)  POCT Blood Glucose.: 104 mg/dL (19 Apr 2025 08:33)    I&O's Summary    19 Apr 2025 07:01  -  20 Apr 2025 07:00  --------------------------------------------------------  IN: 600 mL / OUT: 2300 mL / NET: -1700 mL        PHYSICAL EXAM:  Vital Signs Last 24 Hrs  T(C): 36.6 (20 Apr 2025 04:30), Max: 36.6 (19 Apr 2025 21:43)  T(F): 97.8 (20 Apr 2025 04:30), Max: 97.9 (19 Apr 2025 21:43)  HR: 74 (20 Apr 2025 05:41) (66 - 94)  BP: 105/69 (20 Apr 2025 04:30) (105/69 - 111/70)  BP(mean): --  RR: 18 (20 Apr 2025 04:30) (17 - 18)  SpO2: 98% (20 Apr 2025 05:41) (92% - 100%)    Parameters below as of 20 Apr 2025 04:30  Patient On (Oxygen Delivery Method): nasal cannula        CONSTITUTIONAL: NAD  HEET: MMM, EOMI, PERRLA  NECK: supple  RESPIRATORY: Normal respiratory effort; lungs are clear to auscultation bilaterally  CARDIOVASCULAR: Regular rate and rhythm, normal S1 and S2, no murmur/rub/gallop; No lower extremity edema; Peripheral pulses are 2+ bilaterally  ABDOMEN: Nontender to palpation, normoactive bowel sounds, no rebound/guarding; No hepatosplenomegaly  MUSCULOSKELETAL: no clubbing or cyanosis of digits; no joint swelling or tenderness to palpation  PSYCH: A+O to person, place, and time; affect appropriate  SKIN: No rash    LABS:                        10.0   6.30  )-----------( 264      ( 20 Apr 2025 06:22 )             31.5     04-20    136  |  93[L]  |  97[H]  ----------------------------<  95  3.8   |  29  |  2.28[H]    Ca    10.0      20 Apr 2025 06:22  Phos  3.4     04-20  Mg     2.2     04-20    TPro  8.0  /  Alb  3.2[L]  /  TBili  0.9  /  DBili  x   /  AST  22  /  ALT  27  /  AlkPhos  107  04-19    PT/INR - ( 20 Apr 2025 06:23 )   PT: 13.4 sec;   INR: 1.17 ratio         PTT - ( 20 Apr 2025 06:23 )  PTT:77.6 sec      Urinalysis Basic - ( 20 Apr 2025 06:22 )    Color: x / Appearance: x / SG: x / pH: x  Gluc: 95 mg/dL / Ketone: x  / Bili: x / Urobili: x   Blood: x / Protein: x / Nitrite: x   Leuk Esterase: x / RBC: x / WBC x   Sq Epi: x / Non Sq Epi: x / Bacteria: x          Tele Reviewed:    RADIOLOGY & ADDITIONAL TESTS:  Results Reviewed:   Imaging Personally Reviewed:  Electrocardiogram Personally Reviewed:     PROGRESS NOTE:   Authored by Dr. Tyron Pierce MD     Patient is a 64y old  Male who presents with a chief complaint of dyspnea , chest pain (19 Apr 2025 09:21)      SUBJECTIVE / OVERNIGHT EVENTS:  No acute events overnight. Patient has no acute complaints this morning.     MEDICATIONS  (STANDING):  albuterol/ipratropium for Nebulization 3 milliLiter(s) Nebulizer every 6 hours  aMIOdarone    Tablet   Oral   aMIOdarone    Tablet 200 milliGRAM(s) Oral daily  atorvastatin 40 milliGRAM(s) Oral at bedtime  buMETAnide Injectable 2 milliGRAM(s) IV Push two times a day  clopidogrel Tablet 75 milliGRAM(s) Oral daily  famotidine    Tablet 20 milliGRAM(s) Oral every 48 hours  hemorrhoidal Ointment 1 Application(s) Rectal two times a day  heparin  Infusion 1000 Unit(s)/Hr (20 mL/Hr) IV Continuous <Continuous>  insulin lispro (ADMELOG) corrective regimen sliding scale   SubCutaneous at bedtime  insulin lispro (ADMELOG) corrective regimen sliding scale   SubCutaneous three times a day before meals  polyethylene glycol 3350 17 Gram(s) Oral daily  senna 2 Tablet(s) Oral at bedtime    MEDICATIONS  (PRN):  melatonin 3 milliGRAM(s) Oral at bedtime PRN Insomnia      CAPILLARY BLOOD GLUCOSE      POCT Blood Glucose.: 124 mg/dL (19 Apr 2025 21:59)  POCT Blood Glucose.: 133 mg/dL (19 Apr 2025 16:47)  POCT Blood Glucose.: 111 mg/dL (19 Apr 2025 12:05)  POCT Blood Glucose.: 104 mg/dL (19 Apr 2025 08:33)    I&O's Summary    19 Apr 2025 07:01  -  20 Apr 2025 07:00  --------------------------------------------------------  IN: 600 mL / OUT: 2300 mL / NET: -1700 mL        PHYSICAL EXAM:  Vital Signs Last 24 Hrs  T(C): 36.6 (20 Apr 2025 04:30), Max: 36.6 (19 Apr 2025 21:43)  T(F): 97.8 (20 Apr 2025 04:30), Max: 97.9 (19 Apr 2025 21:43)  HR: 74 (20 Apr 2025 05:41) (66 - 94)  BP: 105/69 (20 Apr 2025 04:30) (105/69 - 111/70)  BP(mean): --  RR: 18 (20 Apr 2025 04:30) (17 - 18)  SpO2: 98% (20 Apr 2025 05:41) (92% - 100%)    Parameters below as of 20 Apr 2025 04:30  Patient On (Oxygen Delivery Method): nasal cannula        CONSTITUTIONAL: NAD  RESPIRATORY: Normal respiratory effort; lungs are clear to auscultation bilaterally  CARDIOVASCULAR: Regular rate and rhythm,  ABDOMEN: Nontender to palpation,   MUSCULOSKELETAL: no clubbing or cyanosis of digits; no joint swelling or tenderness to palpation  PSYCH: A+O to person, place, and time; affect appropriate  SKIN: venostasis dermatitis noted LE bilaterally     LABS:                        10.0   6.30  )-----------( 264      ( 20 Apr 2025 06:22 )             31.5     04-20    136  |  93[L]  |  97[H]  ----------------------------<  95  3.8   |  29  |  2.28[H]    Ca    10.0      20 Apr 2025 06:22  Phos  3.4     04-20  Mg     2.2     04-20    TPro  8.0  /  Alb  3.2[L]  /  TBili  0.9  /  DBili  x   /  AST  22  /  ALT  27  /  AlkPhos  107  04-19    PT/INR - ( 20 Apr 2025 06:23 )   PT: 13.4 sec;   INR: 1.17 ratio         PTT - ( 20 Apr 2025 06:23 )  PTT:77.6 sec      Urinalysis Basic - ( 20 Apr 2025 06:22 )    Color: x / Appearance: x / SG: x / pH: x  Gluc: 95 mg/dL / Ketone: x  / Bili: x / Urobili: x   Blood: x / Protein: x / Nitrite: x   Leuk Esterase: x / RBC: x / WBC x   Sq Epi: x / Non Sq Epi: x / Bacteria: x          Tele Reviewed:    RADIOLOGY & ADDITIONAL TESTS:  Results Reviewed:   Imaging Personally Reviewed:  Electrocardiogram Personally Reviewed:

## 2025-04-21 LAB
ANION GAP SERPL CALC-SCNC: 15 MMOL/L — SIGNIFICANT CHANGE UP (ref 5–17)
APTT BLD: 103.2 SEC — HIGH (ref 24.5–35.6)
APTT BLD: 105.4 SEC — HIGH (ref 24.5–35.6)
APTT BLD: 97 SEC — HIGH (ref 24.5–35.6)
BASOPHILS # BLD AUTO: 0.04 K/UL — SIGNIFICANT CHANGE UP (ref 0–0.2)
BASOPHILS NFR BLD AUTO: 0.7 % — SIGNIFICANT CHANGE UP (ref 0–2)
BUN SERPL-MCNC: 94 MG/DL — HIGH (ref 7–23)
CALCIUM SERPL-MCNC: 9.9 MG/DL — SIGNIFICANT CHANGE UP (ref 8.4–10.5)
CHLORIDE SERPL-SCNC: 92 MMOL/L — LOW (ref 96–108)
CO2 SERPL-SCNC: 30 MMOL/L — SIGNIFICANT CHANGE UP (ref 22–31)
CREAT SERPL-MCNC: 2.24 MG/DL — HIGH (ref 0.5–1.3)
EGFR: 32 ML/MIN/1.73M2 — LOW
EGFR: 32 ML/MIN/1.73M2 — LOW
EOSINOPHIL # BLD AUTO: 0.14 K/UL — SIGNIFICANT CHANGE UP (ref 0–0.5)
EOSINOPHIL NFR BLD AUTO: 2.3 % — SIGNIFICANT CHANGE UP (ref 0–6)
GLUCOSE BLDC GLUCOMTR-MCNC: 119 MG/DL — HIGH (ref 70–99)
GLUCOSE BLDC GLUCOMTR-MCNC: 122 MG/DL — HIGH (ref 70–99)
GLUCOSE BLDC GLUCOMTR-MCNC: 128 MG/DL — HIGH (ref 70–99)
GLUCOSE BLDC GLUCOMTR-MCNC: 135 MG/DL — HIGH (ref 70–99)
GLUCOSE SERPL-MCNC: 94 MG/DL — SIGNIFICANT CHANGE UP (ref 70–99)
HCT VFR BLD CALC: 32.1 % — LOW (ref 39–50)
HGB BLD-MCNC: 10 G/DL — LOW (ref 13–17)
IMM GRANULOCYTES NFR BLD AUTO: 0.5 % — SIGNIFICANT CHANGE UP (ref 0–0.9)
INR BLD: 1.69 RATIO — HIGH (ref 0.85–1.16)
LYMPHOCYTES # BLD AUTO: 0.88 K/UL — LOW (ref 1–3.3)
LYMPHOCYTES # BLD AUTO: 14.3 % — SIGNIFICANT CHANGE UP (ref 13–44)
MAGNESIUM SERPL-MCNC: 2.1 MG/DL — SIGNIFICANT CHANGE UP (ref 1.6–2.6)
MCHC RBC-ENTMCNC: 31 PG — SIGNIFICANT CHANGE UP (ref 27–34)
MCHC RBC-ENTMCNC: 31.2 G/DL — LOW (ref 32–36)
MCV RBC AUTO: 99.4 FL — SIGNIFICANT CHANGE UP (ref 80–100)
MONOCYTES # BLD AUTO: 0.52 K/UL — SIGNIFICANT CHANGE UP (ref 0–0.9)
MONOCYTES NFR BLD AUTO: 8.5 % — SIGNIFICANT CHANGE UP (ref 2–14)
NEUTROPHILS # BLD AUTO: 4.54 K/UL — SIGNIFICANT CHANGE UP (ref 1.8–7.4)
NEUTROPHILS NFR BLD AUTO: 73.7 % — SIGNIFICANT CHANGE UP (ref 43–77)
NRBC BLD AUTO-RTO: 0 /100 WBCS — SIGNIFICANT CHANGE UP (ref 0–0)
PHOSPHATE SERPL-MCNC: 4 MG/DL — SIGNIFICANT CHANGE UP (ref 2.5–4.5)
PLATELET # BLD AUTO: 290 K/UL — SIGNIFICANT CHANGE UP (ref 150–400)
POTASSIUM SERPL-MCNC: 3.7 MMOL/L — SIGNIFICANT CHANGE UP (ref 3.5–5.3)
POTASSIUM SERPL-SCNC: 3.7 MMOL/L — SIGNIFICANT CHANGE UP (ref 3.5–5.3)
PROTHROM AB SERPL-ACNC: 19.2 SEC — HIGH (ref 9.9–13.4)
RBC # BLD: 3.23 M/UL — LOW (ref 4.2–5.8)
RBC # FLD: 17.4 % — HIGH (ref 10.3–14.5)
SODIUM SERPL-SCNC: 137 MMOL/L — SIGNIFICANT CHANGE UP (ref 135–145)
WBC # BLD: 6.15 K/UL — SIGNIFICANT CHANGE UP (ref 3.8–10.5)
WBC # FLD AUTO: 6.15 K/UL — SIGNIFICANT CHANGE UP (ref 3.8–10.5)

## 2025-04-21 PROCEDURE — 99232 SBSQ HOSP IP/OBS MODERATE 35: CPT

## 2025-04-21 PROCEDURE — 99232 SBSQ HOSP IP/OBS MODERATE 35: CPT | Mod: GC

## 2025-04-21 RX ORDER — BUMETANIDE 1 MG/1
2 TABLET ORAL EVERY 12 HOURS
Refills: 0 | Status: DISCONTINUED | OUTPATIENT
Start: 2025-04-21 | End: 2025-04-22

## 2025-04-21 RX ORDER — HEPARIN SODIUM 1000 [USP'U]/ML
1900 INJECTION INTRAVENOUS; SUBCUTANEOUS
Qty: 25000 | Refills: 0 | Status: DISCONTINUED | OUTPATIENT
Start: 2025-04-21 | End: 2025-04-22

## 2025-04-21 RX ADMIN — HEPARIN SODIUM 17 UNIT(S)/HR: 1000 INJECTION INTRAVENOUS; SUBCUTANEOUS at 23:12

## 2025-04-21 RX ADMIN — BUMETANIDE 2 MILLIGRAM(S): 1 TABLET ORAL at 05:12

## 2025-04-21 RX ADMIN — HEPARIN SODIUM 18 UNIT(S)/HR: 1000 INJECTION INTRAVENOUS; SUBCUTANEOUS at 14:55

## 2025-04-21 RX ADMIN — PHENYLEPHRINE HYDROCHLORIDE AND FAT, HARD 1 APPLICATION(S): .00525; 1.86 SUPPOSITORY RECTAL at 16:46

## 2025-04-21 RX ADMIN — BUMETANIDE 2 MILLIGRAM(S): 1 TABLET ORAL at 17:08

## 2025-04-21 RX ADMIN — CLOPIDOGREL BISULFATE 75 MILLIGRAM(S): 75 TABLET, FILM COATED ORAL at 11:36

## 2025-04-21 RX ADMIN — HEPARIN SODIUM 18 UNIT(S)/HR: 1000 INJECTION INTRAVENOUS; SUBCUTANEOUS at 21:46

## 2025-04-21 RX ADMIN — AMIODARONE HYDROCHLORIDE 200 MILLIGRAM(S): 50 INJECTION, SOLUTION INTRAVENOUS at 05:11

## 2025-04-21 RX ADMIN — IPRATROPIUM BROMIDE AND ALBUTEROL SULFATE 3 MILLILITER(S): .5; 2.5 SOLUTION RESPIRATORY (INHALATION) at 11:40

## 2025-04-21 RX ADMIN — ATORVASTATIN CALCIUM 40 MILLIGRAM(S): 80 TABLET, FILM COATED ORAL at 21:45

## 2025-04-21 RX ADMIN — Medication 5 MILLIGRAM(S): at 22:44

## 2025-04-21 RX ADMIN — Medication 2 TABLET(S): at 21:45

## 2025-04-21 RX ADMIN — IPRATROPIUM BROMIDE AND ALBUTEROL SULFATE 3 MILLILITER(S): .5; 2.5 SOLUTION RESPIRATORY (INHALATION) at 05:11

## 2025-04-21 RX ADMIN — HEPARIN SODIUM 19 UNIT(S)/HR: 1000 INJECTION INTRAVENOUS; SUBCUTANEOUS at 06:57

## 2025-04-21 RX ADMIN — IPRATROPIUM BROMIDE AND ALBUTEROL SULFATE 3 MILLILITER(S): .5; 2.5 SOLUTION RESPIRATORY (INHALATION) at 16:46

## 2025-04-21 RX ADMIN — HEPARIN SODIUM 18 UNIT(S)/HR: 1000 INJECTION INTRAVENOUS; SUBCUTANEOUS at 19:48

## 2025-04-21 RX ADMIN — HEPARIN SODIUM 18 UNIT(S)/HR: 1000 INJECTION INTRAVENOUS; SUBCUTANEOUS at 17:12

## 2025-04-21 RX ADMIN — POLYETHYLENE GLYCOL 3350 17 GRAM(S): 17 POWDER, FOR SOLUTION ORAL at 09:04

## 2025-04-21 NOTE — PROGRESS NOTE ADULT - PROBLEM SELECTOR PLAN 9
DVT ppx: Hep gtt  Diet: DASH/TLC  Dispo: [facility]  Full Code  GI: Famotidine 20mg qd DVT ppx: Hep gtt  Diet: DASH/TLC  Dispo: [facility] planning underway  Full Code  GI: Famotidine 20mg qd

## 2025-04-21 NOTE — PROGRESS NOTE ADULT - SUBJECTIVE AND OBJECTIVE BOX
Subjective  Events of transpired over last 24 hours were reviewed.  Patient complaining of consistent urge to defecate.  Suffering from constipation.  Nurse notes that he has hemorrhoids that are bleeding and that his stool is formed in nature.  He is currently laying flat in the bed.  He denies any cardiopulmonary complaints at rest or upon minimal exertion.  Denies any fevers, chills, sweats, symptoms, dizzy or palpitations..    Telemetry  Atrial fibrillation    Review of system  14 point review of systems otherwise unremarkable separate described above in history of present illness    Physical examination  No apparent distress, alert and oriented 3, appropriate affect  JVD is elevated, supple, no lymphadenopathy  Clear to auscultation bilaterally with no wheezing rhonchi crackles  Regular rate and rhythm with 3 out of 6 crescendo decrescendo murmur less on the right upper sternal rating to the carotids  Positive bowel sound, soft, nontender, nondistended, no mass and guarding or rebound tenderness  Right and left groin sites are soft with no ecchymosis/hematoma/bruit  Chronic venous stasis changes bilaterally, mild bilateral lower extremity edema  1+ DP/PT pulse  No focal deficits    Assessment/plan  64-year-old man with past medical history significant for    A-fib status post cardioversion on 3/25/2025  Ischemic cardiomyopathy  Coronary artery disease status post CABG  Aortic valve stenosis status post TAVR in 11/2022  Atrial flutter status post ablation 2019  Status post Micra implant  Chronic kidney disease stage III  Obesity  Hypertension  Hyperlipidemia  Venous stasis    Who presented with acute on chronic renal failure in the setting of CHF exacerbation rapid response called on the floor due to systolic blood pressure in the 80s.  Patient in cardiogenic shock.    --The patient on 4/11/2025 underwent percutaneous transcatheter aortic valve in valve of his bioprosthetic Cullen 3 26mm TAVR valve during which time a Medtronic Evolut FX+ 29mm valve was implanted.  The patient tolerated the procedure well.  Repeat TTE was reviewed which demonstrated appropriately positioned TAVR valve in valve.  --Patient suffering from symptomatic constipation/hemorrhoids.  He is receiving hemorrhoidal ointment and is on senna.  Further management as per medicine team.  --Continue heparin drip with Coumadin bridge.  Closely monitor for signs and symptoms of bleeding.  Goal INR between 2-3.  [id number] or 4.  --Continue Bumex 2 mg p.o. twice a day.  Patient appears that he is euvolemic on clinical examination.  Continue to closely monitor kidney function.  Baseline serum creatinine between 1.5-1.8.  As an outpatient he was also on Entresto.  Of avoiding all nephrotoxins at this time.  --Continue amiodarone 200 mg daily.  --Continue atorvastatin 40 mg daily.  --Continue clopidogrel 75 mg daily.  Closely monitor for signs and symptoms of bleeding.  --Recommend daily PT.  --Patient is being assessed for subacute/acute rehabilitation facility.  --Continue telemetry monitoring.    All questions and concerns of the patient were addressed.    Findings discussed with medicine team

## 2025-04-21 NOTE — PROGRESS NOTE ADULT - SUBJECTIVE AND OBJECTIVE BOX
************************  Rahul Jarquin MD-PhD  Internal Medicine PGY-3  ************************    Patient is a 64y old  Male who presents with a chief complaint of dyspnea , chest pain (20 Apr 2025 08:00)    No events overnight, no acute complaints this morning. Patient with appropriate PO intake and urinating well with BM(s). Denies CP, SOB, fevers/chills, N/V, or abdominal pain. Patient reminded of ongoing careplan.    MEDICATIONS  (STANDING):  albuterol/ipratropium for Nebulization 3 milliLiter(s) Nebulizer every 6 hours  aMIOdarone    Tablet   Oral   aMIOdarone    Tablet 200 milliGRAM(s) Oral daily  atorvastatin 40 milliGRAM(s) Oral at bedtime  buMETAnide Injectable 2 milliGRAM(s) IV Push two times a day  clopidogrel Tablet 75 milliGRAM(s) Oral daily  famotidine    Tablet 20 milliGRAM(s) Oral every 48 hours  hemorrhoidal Ointment 1 Application(s) Rectal two times a day  heparin  Infusion 1000 Unit(s)/Hr (20 mL/Hr) IV Continuous <Continuous>  insulin lispro (ADMELOG) corrective regimen sliding scale   SubCutaneous at bedtime  insulin lispro (ADMELOG) corrective regimen sliding scale   SubCutaneous three times a day before meals  polyethylene glycol 3350 17 Gram(s) Oral daily  senna 2 Tablet(s) Oral at bedtime    MEDICATIONS  (PRN):  melatonin 3 milliGRAM(s) Oral at bedtime PRN Insomnia      CAPILLARY BLOOD GLUCOSE      POCT Blood Glucose.: 122 mg/dL (20 Apr 2025 21:17)  POCT Blood Glucose.: 96 mg/dL (20 Apr 2025 16:46)  POCT Blood Glucose.: 176 mg/dL (20 Apr 2025 12:07)  POCT Blood Glucose.: 107 mg/dL (20 Apr 2025 08:05)    I&O's Summary    19 Apr 2025 07:01  -  20 Apr 2025 07:00  --------------------------------------------------------  IN: 600 mL / OUT: 2300 mL / NET: -1700 mL    20 Apr 2025 07:01  -  21 Apr 2025 06:29  --------------------------------------------------------  IN: 600 mL / OUT: 2150 mL / NET: -1550 mL        PHYSICAL EXAM:  Vital Signs Last 24 Hrs  T(C): 36.3 (21 Apr 2025 04:26), Max: 36.4 (20 Apr 2025 20:44)  T(F): 97.4 (21 Apr 2025 04:26), Max: 97.5 (20 Apr 2025 20:44)  HR: 84 (21 Apr 2025 05:30) (82 - 98)  BP: 102/68 (21 Apr 2025 04:26) (102/68 - 116/83)  BP(mean): --  RR: 18 (21 Apr 2025 04:26) (18 - 18)  SpO2: 97% (21 Apr 2025 05:30) (94% - 97%)    Parameters below as of 21 Apr 2025 04:26  Patient On (Oxygen Delivery Method): nasal cannula        T(C): 36.3 (04-21-25 @ 04:26), Max: 36.4 (04-20-25 @ 20:44)  HR: 84 (04-21-25 @ 05:30) (82 - 98)  BP: 102/68 (04-21-25 @ 04:26) (102/68 - 116/83)  RR: 18 (04-21-25 @ 04:26) (18 - 18)  SpO2: 97% (04-21-25 @ 05:30) (94% - 97%)    CONSTITUTIONAL: NAD  RESPIRATORY: Normal respiratory effort; lungs are clear to auscultation bilaterally  CARDIOVASCULAR: Regular rate and rhythm,  ABDOMEN: Nontender to palpation,   MUSCULOSKELETAL: no clubbing or cyanosis of digits; no joint swelling or tenderness to palpation  PSYCH: A+O to person, place, and time; affect appropriate  SKIN: venostasis dermatitis noted LE bilaterally     LABS:                        10.0   6.15  )-----------( 290      ( 21 Apr 2025 05:58 )             32.1     04-21    137  |  92[L]  |  94[H]  ----------------------------<  94  3.7   |  30  |  2.24[H]    Ca    9.9      21 Apr 2025 05:58  Phos  4.0     04-21  Mg     2.1     04-21    TPro  8.0  /  Alb  3.2[L]  /  TBili  0.9  /  DBili  x   /  AST  22  /  ALT  27  /  AlkPhos  107  04-19    PT/INR - ( 21 Apr 2025 05:58 )   PT: 19.2 sec;   INR: 1.69 ratio         PTT - ( 21 Apr 2025 05:58 )  PTT:103.2 sec      Urinalysis Basic - ( 21 Apr 2025 05:58 )    Color: x / Appearance: x / SG: x / pH: x  Gluc: 94 mg/dL / Ketone: x  / Bili: x / Urobili: x   Blood: x / Protein: x / Nitrite: x   Leuk Esterase: x / RBC: x / WBC x   Sq Epi: x / Non Sq Epi: x / Bacteria: x          IMAGING & OTHER TESTS:  NNI.   ************************  Rahul Jarquin MD-PhD  Internal Medicine PGY-3  ************************    Patient is a 64y old  Male who presents with a chief complaint of dyspnea , chest pain (20 Apr 2025 08:00)    No events overnight, no acute complaints this morning. Patient with appropriate PO intake and urinating well with BM(s). Denies CP, SOB, fevers/chills, N/V, or abdominal pain. Patient reminded of ongoing careplan.    MEDICATIONS  (STANDING):  albuterol/ipratropium for Nebulization 3 milliLiter(s) Nebulizer every 6 hours  aMIOdarone    Tablet   Oral   aMIOdarone    Tablet 200 milliGRAM(s) Oral daily  atorvastatin 40 milliGRAM(s) Oral at bedtime  buMETAnide Injectable 2 milliGRAM(s) IV Push two times a day  clopidogrel Tablet 75 milliGRAM(s) Oral daily  famotidine    Tablet 20 milliGRAM(s) Oral every 48 hours  hemorrhoidal Ointment 1 Application(s) Rectal two times a day  heparin  Infusion 1000 Unit(s)/Hr (20 mL/Hr) IV Continuous <Continuous>  insulin lispro (ADMELOG) corrective regimen sliding scale   SubCutaneous at bedtime  insulin lispro (ADMELOG) corrective regimen sliding scale   SubCutaneous three times a day before meals  polyethylene glycol 3350 17 Gram(s) Oral daily  senna 2 Tablet(s) Oral at bedtime    MEDICATIONS  (PRN):  melatonin 3 milliGRAM(s) Oral at bedtime PRN Insomnia      CAPILLARY BLOOD GLUCOSE      POCT Blood Glucose.: 122 mg/dL (20 Apr 2025 21:17)  POCT Blood Glucose.: 96 mg/dL (20 Apr 2025 16:46)  POCT Blood Glucose.: 176 mg/dL (20 Apr 2025 12:07)  POCT Blood Glucose.: 107 mg/dL (20 Apr 2025 08:05)    I&O's Summary    19 Apr 2025 07:01  -  20 Apr 2025 07:00  --------------------------------------------------------  IN: 600 mL / OUT: 2300 mL / NET: -1700 mL    20 Apr 2025 07:01  -  21 Apr 2025 06:29  --------------------------------------------------------  IN: 600 mL / OUT: 2150 mL / NET: -1550 mL        PHYSICAL EXAM:  Vital Signs Last 24 Hrs  T(C): 36.3 (21 Apr 2025 04:26), Max: 36.4 (20 Apr 2025 20:44)  T(F): 97.4 (21 Apr 2025 04:26), Max: 97.5 (20 Apr 2025 20:44)  HR: 84 (21 Apr 2025 05:30) (82 - 98)  BP: 102/68 (21 Apr 2025 04:26) (102/68 - 116/83)  BP(mean): --  RR: 18 (21 Apr 2025 04:26) (18 - 18)  SpO2: 97% (21 Apr 2025 05:30) (94% - 97%)    Parameters below as of 21 Apr 2025 04:26  Patient On (Oxygen Delivery Method): nasal cannula        T(C): 36.3 (04-21-25 @ 04:26), Max: 36.4 (04-20-25 @ 20:44)  HR: 84 (04-21-25 @ 05:30) (82 - 98)  BP: 102/68 (04-21-25 @ 04:26) (102/68 - 116/83)  RR: 18 (04-21-25 @ 04:26) (18 - 18)  SpO2: 97% (04-21-25 @ 05:30) (94% - 97%)    CONSTITUTIONAL: NAD  RESPIRATORY: Normal respiratory effort; lungs are clear to auscultation bilaterally  CARDIOVASCULAR: Regular rate and rhythm,  ABDOMEN: Nontender to palpation,   MUSCULOSKELETAL: no clubbing or cyanosis of digits; no joint swelling or tenderness to palpation  EXTREMITIES: bi/l lower extremity trace to 1+ pitting edema  PSYCH: A+O to person, place, and time; affect appropriate  SKIN: venostasis dermatitis noted LE bilaterally     LABS:                        10.0   6.15  )-----------( 290      ( 21 Apr 2025 05:58 )             32.1     04-21    137  |  92[L]  |  94[H]  ----------------------------<  94  3.7   |  30  |  2.24[H]    Ca    9.9      21 Apr 2025 05:58  Phos  4.0     04-21  Mg     2.1     04-21    TPro  8.0  /  Alb  3.2[L]  /  TBili  0.9  /  DBili  x   /  AST  22  /  ALT  27  /  AlkPhos  107  04-19    PT/INR - ( 21 Apr 2025 05:58 )   PT: 19.2 sec;   INR: 1.69 ratio         PTT - ( 21 Apr 2025 05:58 )  PTT:103.2 sec      Urinalysis Basic - ( 21 Apr 2025 05:58 )    Color: x / Appearance: x / SG: x / pH: x  Gluc: 94 mg/dL / Ketone: x  / Bili: x / Urobili: x   Blood: x / Protein: x / Nitrite: x   Leuk Esterase: x / RBC: x / WBC x   Sq Epi: x / Non Sq Epi: x / Bacteria: x          IMAGING & OTHER TESTS:  NNI.

## 2025-04-21 NOTE — PROGRESS NOTE ADULT - PROBLEM SELECTOR PLAN 1
s/p TAVR (11/2022)   Admitted to CCU for mixed shock likely from severe AS-induced LVOT requiring pressors - weaned off  TTE (4/1) - The prosthetic valve has reduced leaflet opening . Severe prosthetic aortic stenosis.   - s/p TAVR (4/11)    Plan:  - c/w Hep gtt  - c/w Coumadin QHS for transition   - Trend INR daily (goal 2-3) s/p TAVR (11/2022)   Admitted to CCU for mixed shock likely from severe AS-induced LVOT requiring pressors - weaned off  TTE (4/1) - The prosthetic valve has reduced leaflet opening . Severe prosthetic aortic stenosis.   - s/p TAVR (4/11)    Plan:  - c/w Hep gtt  - c/w Coumadin 5mg QHS for transition   - Trend INR daily (goal 2-3)

## 2025-04-21 NOTE — PROGRESS NOTE ADULT - PROBLEM SELECTOR PLAN 3
CKD stage III, baseline Scr 1.5-1.8 - follows outpt nephrologist Dr Stringer  - presented with Scr 5.2 --> now slowly down trending  - ANGELICA/ATN i/s/o HF exacerbation, obstructive shock, contrast use  - Nephro following, appreciated recs    Plan  - Hold home Entresto  - c/w Bumex 2mg IVP bid  - Monitor labs and UOP. Avoid nephrotoxins and dose meds per eGFR. CKD stage III, baseline Scr 1.5-1.8 - follows outpt nephrologist Dr Stringer  - presented with Scr 5.2 --> now slowly down trending  - ANGELICA/ATN i/s/o HF exacerbation, obstructive shock, contrast use  - Nephro following, appreciated recs    Plan  - Hold home Entresto  - c/w Bumex as above  - Monitor labs and UOP. Avoid nephrotoxins and dose meds per eGFR.

## 2025-04-21 NOTE — PROGRESS NOTE ADULT - ATTENDING COMMENTS
64M PMHx AFib (s/p DCCV 3/25/25), HFrEF, aortic stenosis s/p TAVR (11/2022), AFlutter s/p ablation 2019 and s/p Micra implant, CKD3, WILFRED, obesity, venous stasis, HTN, HLD admitted for heart failure exacerbation, acute hypoxic resp failure, ANGELICA on CKD, downgraded from CCU after requiring pressors, s/p TAVR 4/11, on heparin bridge to coumadin, currently undergoing diuresis given heart failure.     #AS  - S/p repeat TAVR 4/11  - INR 1.69  - C/w heparin drip bridge to coumadin. Dose 5mg tonight    #HFrEF  - Appreciate HF recs - c/w bumetanide 2mg BID, change to PO, GDMT as tolerated    #Acute respiratory failure  - Likely due to acute HF  - C/w diuresis as above  - now on room air    #AFib  - C/w anticoagulation  - C/w amiodarone  - Monitor on telemetry    #CKD3  - Cr 2.24, stable  - Trend BMP   - Avoid nephrotoxins, renally dose all medications    Pt requesting tap water enema, ordered. Rest of care as documented above    Discussed with HS1.

## 2025-04-21 NOTE — PROGRESS NOTE ADULT - SUBJECTIVE AND OBJECTIVE BOX
[facility] DIVISION OF KIDNEY DISEASES AND HYPERTENSION --    Reason for consult:    24 hour events/subjective: Patient seen and examined at bedside.      PAST HISTORY  --------------------------------------------------------------------------------  No significant changes to PMH, PSH, FHx, SHx, unless otherwise noted    ALLERGIES & MEDICATIONS  --------------------------------------------------------------------------------  Allergies    metoprolol (Short breath)      Standing Inpatient Medications  albuterol/ipratropium for Nebulization 3 milliLiter(s) Nebulizer every 6 hours  aMIOdarone    Tablet   Oral   aMIOdarone    Tablet 200 milliGRAM(s) Oral daily  atorvastatin 40 milliGRAM(s) Oral at bedtime  buMETAnide 2 milliGRAM(s) Oral every 12 hours  clopidogrel Tablet 75 milliGRAM(s) Oral daily  famotidine    Tablet 20 milliGRAM(s) Oral every 48 hours  hemorrhoidal Ointment 1 Application(s) Rectal two times a day  heparin  Infusion 1900 Unit(s)/Hr IV Continuous <Continuous>  insulin lispro (ADMELOG) corrective regimen sliding scale   SubCutaneous at bedtime  insulin lispro (ADMELOG) corrective regimen sliding scale   SubCutaneous three times a day before meals  polyethylene glycol 3350 17 Gram(s) Oral daily  senna 2 Tablet(s) Oral at bedtime  warfarin 5 milliGRAM(s) Oral every 24 hours    PRN Inpatient Medications  melatonin 3 milliGRAM(s) Oral at bedtime PRN      REVIEW OF SYSTEMS  --------------------------------------------------------------------------------  Gen: No fever  Respiratory: No dyspnea  CV: No chest pain  GI: No diarrhea, nausea, vomiting  : No dysuria, hematuria  Neuro: No dizziness/lightheadedness    All other systems were reviewed and are negative, except as noted.    VITALS/PHYSICAL EXAM  --------------------------------------------------------------------------------  T(C): 36.3 (04-21-25 @ 12:00), Max: 36.4 (04-20-25 @ 20:44)  HR: 70 (04-21-25 @ 12:00) (70 - 96)  BP: 96/60 (04-21-25 @ 12:00) (96/60 - 116/83)  RR: 18 (04-21-25 @ 12:00) (18 - 18)  SpO2: 92% (04-21-25 @ 12:00) (92% - 97%)  Wt(kg): --        04-20-25 @ 07:01  -  04-21-25 @ 07:00  --------------------------------------------------------  IN: 600 mL / OUT: 2450 mL / NET: -1850 mL    04-21-25 @ 07:01  -  04-21-25 @ 14:16  --------------------------------------------------------  IN: 0 mL / OUT: 300 mL / NET: -300 mL    PHYSICAL EXAM:  Gen: NAD  Neuro: non-focal  HEENT: Anicteric, MMM  Pulm: CTA B/L  CV: +S1S2  Abd: soft, non-tender/non-distended  Extremities: trace LE edema, +venous stasis changes  Skin: Warm    LABS/STUDIES  --------------------------------------------------------------------------------              10.0   6.15  >-----------<  290      [04-21-25 @ 05:58]              32.1     137  |  92  |  94  ----------------------------<  94      [04-21-25 @ 05:58]  3.7   |  30  |  2.24        Ca     9.9     [04-21-25 @ 05:58]      Mg     2.1     [04-21-25 @ 05:58]      Phos  4.0     [04-21-25 @ 05:58]      PT/INR: PT 19.2 , INR 1.69       [04-21-25 @ 05:58]  PTT: 105.4      [04-21-25 @ 13:19]      Creatinine Trend:  SCr 2.24 [04-21 @ 05:58]  SCr 2.28 [04-20 @ 06:22]  SCr 2.30 [04-19 @ 09:14]  SCr 2.50 [04-18 @ 09:40]  SCr 2.60 [04-18 @ 06:33]    TSH 3.15      [04-01-25 @ 21:43]  Lipid: chol 94, TG 74, HDL 29, LDL --      [04-01-25 @ 06:18]

## 2025-04-21 NOTE — PROGRESS NOTE ADULT - PROBLEM SELECTOR PLAN 1
Non-oliguric ANGELICA on CKD likely in the setting of HF exacerbation (EF 15-20%), IV contrast exposure, Entresto use and hypotension-mediated ATN. Baseline Scr 1.5-1.8. On admission, Scr was 5.17 and it peaked to 5.5. UA showed trace LE, blood (Likely due to lopez), casts 17. UPCR 0.7g. Renal US showed no hydro. Underwent TAVR (4/12).    Pt now on Bumex 2mg PO BID with improvement in Scr to 2.24 today (4/21). Continue current management. GDMT per HF team. Monitor labs and UOP. Avoid nephrotoxins and dose meds per eGFR.

## 2025-04-21 NOTE — PROGRESS NOTE ADULT - ASSESSMENT
63 y/o M with hx of Afib s/p DCCV 3/25/25, CHF, Aortic stenosis s/p TAVR 11/2022, CAD s/p CABG, Aflutter s/p ablation and Micra implant, CKD 3, HTN, HLD presented with 6 days of worsening SOB on exertion associated with orthopnea and chest pain and admitted for CHF exacerbation and TAVR eval. Now s/p LHC on 4/8 and TAVR on 4/11.    Nephrology was consulted for management of ANGELICA on CKD. Pt follows Dr. William (Nephrology).

## 2025-04-21 NOTE — PROGRESS NOTE ADULT - PROBLEM SELECTOR PLAN 4
h/o CAGB  TTE (4/8) - Severe three vessel obstructive. Patent LIMA to LAD  - c/w Plavix  - c/w atorvastatin 40mg qd  - c/w hep gtt --> will transition to Coumadin (?INR 2-3) h/o CAGB  TTE (4/8) - Severe three vessel obstructive. Patent LIMA to LAD  - c/w Plavix  - c/w atorvastatin 40mg qd  - c/w hep gtt --> will transition to Coumadin (INR 2-3) as above

## 2025-04-21 NOTE — PROGRESS NOTE ADULT - PROBLEM SELECTOR PLAN 5
cathy 4. Home meds: Eliquis 5, Amiodarone  - s/p ablation 2019; dccv 3/25/25; micra implant  - c/w hep gtt --> transition to coumadin  - c/w Amiodarone 200mg qd  - Tele cathy 4. Home meds: Eliquis 5, Amiodarone  - s/p ablation 2019; dccv 3/25/25; micra implant  - c/w hep gtt --> transition to coumadin as above  - c/w Amiodarone 200mg qd  - Tele

## 2025-04-21 NOTE — PHARMACOTHERAPY INTERVENTION NOTE - COMMENTS
Luis Waller is a 64 year old male on a heparin to warfarin bridge for afib s/p TAVR valve-in-valve on 4/11/25. Warfarin will be administered for at least the first 3 to 6 months following valve implantation instead of Eliquis which he was taking previously. Target INR is 2-3.     Warfarin doses:  4/17: 5 mg; INR 0.99  4/18: 5 mg; INR 1.04  4/19: 7.5 mg; INR 1.04  4/20: 7.5 mg; INR 1.17  4/21: 5 mg; INR 1.69    Today is day 5 of heparin bridge.   Delta of most recent 2 INRs is +0.52. Recommend reducing tonight's dose to 5 mg to prevent overshooting since INR went up quickly since yesterday.    Lester Day, PharmD, BCPS  Clinical Pharmacy Specialist  Available on [employer]  Cell: [phone] Luis Waller is a 64 year old male on a heparin to warfarin bridge after TAVR valve-in-valve on 4/11/25. Patient also has history of aflutter and was on home Eliquis. Warfarin will be administered for at least the first 3 to 6 months following valve implantation instead of Eliquis which he was taking previously. Target INR is 2-3.     Warfarin doses:  4/17: 5 mg; INR 0.99  4/18: 5 mg; INR 1.04  4/19: 7.5 mg; INR 1.04  4/20: 7.5 mg; INR 1.17  4/21: 5 mg; INR 1.69    Today is day 5 of heparin bridge.   Delta of most recent 2 INRs is +0.52. Recommend reducing tonight's dose to 5 mg to prevent overshooting since INR went up quickly since yesterday.    Lester Day, PharmD, BCPS  Clinical Pharmacy Specialist  Available on [employer]  Cell: [phone]

## 2025-04-21 NOTE — PROGRESS NOTE ADULT - PROBLEM SELECTOR PLAN 2
presented initially with dyspnea, orthopnea x 6 days c/f acute on chronic HF   Home meds: Entresto 24/26, bumex 1mg/2mg qd alternate, hydralazine 50mg tid  - s/p Bumex gtt  TTE (4/12) -  LVEF 25%. Global LV hypokinesis.    Plan  - c/w Bumex 2mg IVP bid (goal net -1 to 2L daily)  - hold home meds  - repeat UA with microscopic  - Replete lytes prn for Mg>2 K>4  - Daily weight. Strict I&O presented initially with dyspnea, orthopnea x 6 days c/f acute on chronic HF   Home meds: Entresto 24/26, bumex 1mg/2mg qd alternate, hydralazine 50mg tid  - s/p Bumex gtt  TTE (4/12) -  LVEF 25%. Global LV hypokinesis.    Plan  - c/w Bumex 2mg IVP bid (goal net -1 to 2L daily) --> transition to PO for dispo planning  - hold home meds  - Replete lytes prn for Mg>2 K>4  - Daily weight. Strict I&O

## 2025-04-22 LAB
ADD ON TEST-SPECIMEN IN LAB: SIGNIFICANT CHANGE UP
ALBUMIN SERPL ELPH-MCNC: 3.4 G/DL — SIGNIFICANT CHANGE UP (ref 3.3–5)
ALP SERPL-CCNC: 106 U/L — SIGNIFICANT CHANGE UP (ref 40–120)
ALT FLD-CCNC: 26 U/L — SIGNIFICANT CHANGE UP (ref 10–45)
ANION GAP SERPL CALC-SCNC: 11 MMOL/L — SIGNIFICANT CHANGE UP (ref 5–17)
APTT BLD: 85.1 SEC — HIGH (ref 24.5–35.6)
AST SERPL-CCNC: 17 U/L — SIGNIFICANT CHANGE UP (ref 10–40)
BASOPHILS # BLD AUTO: 0.03 K/UL — SIGNIFICANT CHANGE UP (ref 0–0.2)
BASOPHILS NFR BLD AUTO: 0.5 % — SIGNIFICANT CHANGE UP (ref 0–2)
BILIRUB DIRECT SERPL-MCNC: 0.3 MG/DL — SIGNIFICANT CHANGE UP (ref 0–0.3)
BILIRUB INDIRECT FLD-MCNC: 0.4 MG/DL — SIGNIFICANT CHANGE UP (ref 0.2–1)
BILIRUB SERPL-MCNC: 0.7 MG/DL — SIGNIFICANT CHANGE UP (ref 0.2–1.2)
BUN SERPL-MCNC: 98 MG/DL — HIGH (ref 7–23)
CALCIUM SERPL-MCNC: 9.7 MG/DL — SIGNIFICANT CHANGE UP (ref 8.4–10.5)
CHLORIDE SERPL-SCNC: 92 MMOL/L — LOW (ref 96–108)
CO2 SERPL-SCNC: 31 MMOL/L — SIGNIFICANT CHANGE UP (ref 22–31)
CREAT SERPL-MCNC: 2.53 MG/DL — HIGH (ref 0.5–1.3)
EGFR: 27 ML/MIN/1.73M2 — LOW
EGFR: 27 ML/MIN/1.73M2 — LOW
EOSINOPHIL # BLD AUTO: 0.13 K/UL — SIGNIFICANT CHANGE UP (ref 0–0.5)
EOSINOPHIL NFR BLD AUTO: 2.1 % — SIGNIFICANT CHANGE UP (ref 0–6)
GLUCOSE BLDC GLUCOMTR-MCNC: 105 MG/DL — HIGH (ref 70–99)
GLUCOSE BLDC GLUCOMTR-MCNC: 123 MG/DL — HIGH (ref 70–99)
GLUCOSE BLDC GLUCOMTR-MCNC: 129 MG/DL — HIGH (ref 70–99)
GLUCOSE BLDC GLUCOMTR-MCNC: 135 MG/DL — HIGH (ref 70–99)
GLUCOSE SERPL-MCNC: 98 MG/DL — SIGNIFICANT CHANGE UP (ref 70–99)
HCT VFR BLD CALC: 30.9 % — LOW (ref 39–50)
HGB BLD-MCNC: 9.8 G/DL — LOW (ref 13–17)
IMM GRANULOCYTES NFR BLD AUTO: 0.5 % — SIGNIFICANT CHANGE UP (ref 0–0.9)
INR BLD: 2.34 RATIO — HIGH (ref 0.85–1.16)
LYMPHOCYTES # BLD AUTO: 0.91 K/UL — LOW (ref 1–3.3)
LYMPHOCYTES # BLD AUTO: 14.8 % — SIGNIFICANT CHANGE UP (ref 13–44)
MAGNESIUM SERPL-MCNC: 2.2 MG/DL — SIGNIFICANT CHANGE UP (ref 1.6–2.6)
MCHC RBC-ENTMCNC: 30.8 PG — SIGNIFICANT CHANGE UP (ref 27–34)
MCHC RBC-ENTMCNC: 31.7 G/DL — LOW (ref 32–36)
MCV RBC AUTO: 97.2 FL — SIGNIFICANT CHANGE UP (ref 80–100)
MONOCYTES # BLD AUTO: 0.64 K/UL — SIGNIFICANT CHANGE UP (ref 0–0.9)
MONOCYTES NFR BLD AUTO: 10.4 % — SIGNIFICANT CHANGE UP (ref 2–14)
NEUTROPHILS # BLD AUTO: 4.42 K/UL — SIGNIFICANT CHANGE UP (ref 1.8–7.4)
NEUTROPHILS NFR BLD AUTO: 71.7 % — SIGNIFICANT CHANGE UP (ref 43–77)
NRBC BLD AUTO-RTO: 0 /100 WBCS — SIGNIFICANT CHANGE UP (ref 0–0)
PHOSPHATE SERPL-MCNC: 4.9 MG/DL — HIGH (ref 2.5–4.5)
PLATELET # BLD AUTO: 291 K/UL — SIGNIFICANT CHANGE UP (ref 150–400)
POTASSIUM SERPL-MCNC: 3.9 MMOL/L — SIGNIFICANT CHANGE UP (ref 3.5–5.3)
POTASSIUM SERPL-SCNC: 3.9 MMOL/L — SIGNIFICANT CHANGE UP (ref 3.5–5.3)
PROT SERPL-MCNC: 8 G/DL — SIGNIFICANT CHANGE UP (ref 6–8.3)
PROTHROM AB SERPL-ACNC: 26.7 SEC — HIGH (ref 9.9–13.4)
RBC # BLD: 3.18 M/UL — LOW (ref 4.2–5.8)
RBC # FLD: 17.7 % — HIGH (ref 10.3–14.5)
SODIUM SERPL-SCNC: 134 MMOL/L — LOW (ref 135–145)
WBC # BLD: 6.16 K/UL — SIGNIFICANT CHANGE UP (ref 3.8–10.5)
WBC # FLD AUTO: 6.16 K/UL — SIGNIFICANT CHANGE UP (ref 3.8–10.5)

## 2025-04-22 PROCEDURE — 99233 SBSQ HOSP IP/OBS HIGH 50: CPT

## 2025-04-22 PROCEDURE — 99223 1ST HOSP IP/OBS HIGH 75: CPT

## 2025-04-22 PROCEDURE — 99233 SBSQ HOSP IP/OBS HIGH 50: CPT | Mod: GC

## 2025-04-22 RX ORDER — ISOSORBDIE DINITRATE 30 MG/1
10 TABLET ORAL THREE TIMES A DAY
Refills: 0 | Status: DISCONTINUED | OUTPATIENT
Start: 2025-04-22 | End: 2025-04-23

## 2025-04-22 RX ORDER — ATORVASTATIN CALCIUM 80 MG/1
80 TABLET, FILM COATED ORAL AT BEDTIME
Refills: 0 | Status: DISCONTINUED | OUTPATIENT
Start: 2025-04-22 | End: 2025-04-28

## 2025-04-22 RX ORDER — BUMETANIDE 1 MG/1
3 TABLET ORAL EVERY 12 HOURS
Refills: 0 | Status: DISCONTINUED | OUTPATIENT
Start: 2025-04-22 | End: 2025-04-24

## 2025-04-22 RX ORDER — BISACODYL 5 MG
10 TABLET, DELAYED RELEASE (ENTERIC COATED) ORAL ONCE
Refills: 0 | Status: COMPLETED | OUTPATIENT
Start: 2025-04-22 | End: 2025-04-22

## 2025-04-22 RX ADMIN — BUMETANIDE 124 MILLIGRAM(S): 1 TABLET ORAL at 17:42

## 2025-04-22 RX ADMIN — CLOPIDOGREL BISULFATE 75 MILLIGRAM(S): 75 TABLET, FILM COATED ORAL at 11:37

## 2025-04-22 RX ADMIN — IPRATROPIUM BROMIDE AND ALBUTEROL SULFATE 3 MILLILITER(S): .5; 2.5 SOLUTION RESPIRATORY (INHALATION) at 04:54

## 2025-04-22 RX ADMIN — PHENYLEPHRINE HYDROCHLORIDE AND FAT, HARD 1 APPLICATION(S): .00525; 1.86 SUPPOSITORY RECTAL at 07:18

## 2025-04-22 RX ADMIN — PHENYLEPHRINE HYDROCHLORIDE AND FAT, HARD 1 APPLICATION(S): .00525; 1.86 SUPPOSITORY RECTAL at 16:43

## 2025-04-22 RX ADMIN — Medication 10 MILLIGRAM(S): at 22:38

## 2025-04-22 RX ADMIN — IPRATROPIUM BROMIDE AND ALBUTEROL SULFATE 3 MILLILITER(S): .5; 2.5 SOLUTION RESPIRATORY (INHALATION) at 11:45

## 2025-04-22 RX ADMIN — AMIODARONE HYDROCHLORIDE 200 MILLIGRAM(S): 50 INJECTION, SOLUTION INTRAVENOUS at 04:53

## 2025-04-22 RX ADMIN — Medication 2 TABLET(S): at 22:37

## 2025-04-22 RX ADMIN — BUMETANIDE 2 MILLIGRAM(S): 1 TABLET ORAL at 04:54

## 2025-04-22 RX ADMIN — Medication 3 MILLIGRAM(S): at 22:35

## 2025-04-22 RX ADMIN — Medication 20 MILLIGRAM(S): at 19:39

## 2025-04-22 RX ADMIN — ATORVASTATIN CALCIUM 80 MILLIGRAM(S): 80 TABLET, FILM COATED ORAL at 22:39

## 2025-04-22 RX ADMIN — POLYETHYLENE GLYCOL 3350 17 GRAM(S): 17 POWDER, FOR SOLUTION ORAL at 11:42

## 2025-04-22 RX ADMIN — HEPARIN SODIUM 17 UNIT(S)/HR: 1000 INJECTION INTRAVENOUS; SUBCUTANEOUS at 08:43

## 2025-04-22 RX ADMIN — ISOSORBDIE DINITRATE 10 MILLIGRAM(S): 30 TABLET ORAL at 17:42

## 2025-04-22 NOTE — PROGRESS NOTE ADULT - PROBLEM SELECTOR PLAN 9
DVT ppx: Hep gtt  Diet: DASH/TLC  Dispo: [facility] planning underway  Full Code  GI: Famotidine 20mg qd DVT ppx: Coumadin  Diet: DASH/TLC  Dispo: [facility] planning underway  Full Code  GI: Famotidine 20mg qd

## 2025-04-22 NOTE — PROGRESS NOTE ADULT - PROBLEM SELECTOR PLAN 2
presented initially with dyspnea, orthopnea x 6 days c/f acute on chronic HF   Home meds: Entresto 24/26, bumex 1mg/2mg qd alternate, hydralazine 50mg tid  - s/p Bumex gtt  TTE (4/12) -  LVEF 25%. Global LV hypokinesis.    Plan  - c/w Bumex 2mg IVP bid (goal net -1 to 2L daily) --> transition to PO for dispo planning  - hold home meds  - Replete lytes prn for Mg>2 K>4  - Daily weight. Strict I&O presented initially with dyspnea, orthopnea x 6 days c/f acute on chronic HF   Home meds: Entresto 24/26, bumex 1mg/2mg qd alternate, hydralazine 50mg tid  - s/p Bumex gtt  TTE (4/12) -  LVEF 25%. Global LV hypokinesis.    Plan  - HF consulted for GDMT recommendations on discharge  - c/w PO Bumex 2mg bid (goal net -1 to 2L daily)   - hold home meds  - Replete lytes prn for Mg>2 K>4  - Daily weight. Strict I&O

## 2025-04-22 NOTE — PROGRESS NOTE ADULT - PROBLEM SELECTOR PLAN 3
CKD stage III, baseline Scr 1.5-1.8 - follows outpt nephrologist Dr Stringer  - presented with Scr 5.2 --> now slowly down trending  - ANGELICA/ATN i/s/o HF exacerbation, obstructive shock, contrast use  - Nephro following, appreciated recs    Plan  - Hold home Entresto  - c/w Bumex as above  - Monitor labs and UOP. Avoid nephrotoxins and dose meds per eGFR.

## 2025-04-22 NOTE — CONSULT NOTE ADULT - ATTENDING COMMENTS
64/M with CAD s/p CABG, ICM, HFrEf  since 2013, h/o TAVR in 2022 with severe prosthetic stenosis, with LV EF 25%s/p Florence TAVR and [facility] with LIMA to LAD and occluded SVG, HF consulted for medical optimization.   TTE: LV EF 25%, normal AV function w/o paravalvular leak.     Imp:  ICM, s/p CABG  HFrEf EF 25%  bioprosthetic stenosis s/p Florence TAVR  CKD 4    Plan:  Start Bumex 3mg IV BID  will start low dose hydralazine/ isordil for afterload management  will cont monitor renal function  will start low dose BB once volume optimized  ? dig based on renal function  replete K and Mg    Cont antiplatelets and statin  cont coumadin    rest of care per primary team

## 2025-04-22 NOTE — PROGRESS NOTE ADULT - SUBJECTIVE AND OBJECTIVE BOX
HPI/Interval Hx    Lying in bed, continues to complain of lower abdominal fullness pointing to his left lower abdomen.   S/P Florence TAVR on 4/11    MEDICATIONS  (STANDING):  albuterol/ipratropium for Nebulization 3 milliLiter(s) Nebulizer every 6 hours  aMIOdarone    Tablet   Oral   aMIOdarone    Tablet 200 milliGRAM(s) Oral daily  atorvastatin 40 milliGRAM(s) Oral at bedtime  clopidogrel Tablet 75 milliGRAM(s) Oral daily  famotidine    Tablet 20 milliGRAM(s) Oral every 48 hours  hemorrhoidal Ointment 1 Application(s) Rectal two times a day  insulin lispro (ADMELOG) corrective regimen sliding scale   SubCutaneous at bedtime  insulin lispro (ADMELOG) corrective regimen sliding scale   SubCutaneous three times a day before meals  polyethylene glycol 3350 17 Gram(s) Oral daily  senna 2 Tablet(s) Oral at bedtime  warfarin 3 milliGRAM(s) Oral every 24 hours    MEDICATIONS  (PRN):  melatonin 3 milliGRAM(s) Oral at bedtime PRN Insomnia      PAST MEDICAL & SURGICAL HISTORY:  CAD (coronary artery disease)  s/p CABG      Hypercholesteremia      Thrombus of left atrial appendage  2018      Ischemic cardiomyopathy      WILFRED (obstructive sleep apnea)  can not tolerate bipap at night      HTN (hypertension)      Atrial fibrillation  2018      CHF (congestive heart failure)  denies any recent exacerbation      Peripheral edema  chronic      Lichen planus  chronic ( b/L LE)      Atrial flutter  s/p ablation 2018      MI (myocardial infarction)  2012      Stented coronary artery  2019      AS (aortic stenosis)      Chronic kidney disease, unspecified CKD stage      ANGELICA (acute kidney injury)  4/2021      Morbid obesity      Status post placement of cardiac pacemaker  micraleadless PPM, [id number] last interrogation 7/6/2022      Osteoarthritis  shoulders, hips, knee      H/O scoliosis      Lower back pain      History of elbow surgery      S/P CABG (coronary artery bypass graft)  x3, 2012      Cardiac pacemaker  leadless 2018      History of coronary artery stent placement  2019 ( one more after cabg)      History of adenoidectomy      H/O atrioventricular karlee ablation  2018          Review of Systems  CONSTITUTIONAL: No weakness, fevers or chills, drowsy, arousable to voice  EYES/ENT: No visual changes;  No vertigo or throat pain   NECK: No pain or stiffness  RESPIRATORY: No cough, wheezing, hemoptysis; No shortness of breath at rest  CARDIOVASCULAR: No chest pain or palpitations, PND, or orthopnea  GASTROINTESTINAL: (+) constipation, (+) hemorrhoidal pain, (+) abdominal discomfort  GENITOURINARY: No dysuria, frequency or hematuria, lopez in place  NEUROLOGICAL: No numbness or weakness  SKIN: No itching, burning, rashes, or lesions  All other review of systems is negative unless indicated above.    Physical Exam  General: A/ox 3, No acute Distress  Neck: Supple, NO JVD  Cardiac: S1 S2, No M/R/G  Pulmonary: CTAB, Breathing unlabored, No Rhonchi/Rales/Wheezing, on O2 via NC 2L  Abdomen: Soft, Non -tender, +BS x 4 quads  Extremities: No Rashes, No edema, venous stasis changes to BLE  Neuro: A/o x 3, No focal deficits    Vital Signs Last 24 Hrs  T(C): 36.4 (22 Apr 2025 04:00), Max: 36.4 (22 Apr 2025 04:00)  T(F): 97.6 (22 Apr 2025 04:00), Max: 97.6 (22 Apr 2025 04:00)  HR: 90 (22 Apr 2025 09:04) (76 - 90)  BP: 113/71 (22 Apr 2025 04:00) (111/72 - 121/79)  BP(mean): --  RR: 18 (22 Apr 2025 04:00) (18 - 18)  SpO2: 94% (22 Apr 2025 09:04) (93% - 98%)    Parameters below as of 22 Apr 2025 04:00  Patient On (Oxygen Delivery Method): nasal cannula  O2 Flow (L/min): 2                          9.8    6.16  )-----------( 291      ( 22 Apr 2025 05:22 )             30.9     04-22    134[L]  |  92[L]  |  98[H]  ----------------------------<  98  3.9   |  31  |  2.53[H]    Ca    9.7      22 Apr 2025 05:22  Phos  4.9     04-22  Mg     2.2     04-22        Telemetry: AFib 90    EKG: < from: 12 Lead ECG (04.16.25 @ 02:46) >  Systolic BP 95 mmHg    Diastolic BP 59 mmHg    Ventricular Rate 68 BPM    Atrial Rate 68 BPM    P-R Interval 270 ms    QRS Duration 166 ms    Q-T Interval 488 ms    QTC Calculation(Bazett) 518 ms    P Axis 75 degrees    R Axis 147 degrees    T Axis41 degrees    Diagnosis Line SINUS RHYTHM WITH 1ST DEGREE A-V BLOCK  RIGHT AXIS DEVIATION  LEFT BUNDLE BRANCH BLOCK  ABNORMAL ECG  WHEN COMPARED WITH ECG OF 4/15/25   Confirmed by MD BURCH ANDREW ([id number]) on 4/16/2025 10:48:17 AM    < end of copied text >      Other  < from: TTE Congenital Anomalies W or WO Ultrasound Enhancing Agent (04.12.25 @ 09:06) >  TRANSTHORACIC ECHOCARDIOGRAM REPORT  ________________________________________________________________________________                                      _______       Pt. Name:       BERTHA WARD Study Date:    4/12/2025  MRN:            [id number]        YOB: 1960  Accession #:    [id number]         Age:           64 years  Account#:       [id number]      Gender:        M  Heart Rate:     55 bpm            Height:        67.72 in (172.00 cm)  Rhythm:         sinus rhythm     Weight:        231.48 lb (105.00 kg)  Blood Pressure: 113/56 mmHg       BSA/BMI:       2.17 m² / 35.49 kg/m²  ________________________________________________________________________________________  Referring Physician:    [id number] Angel Fall  Interpreting Physician: Venkata Durbin MD  Primary Sonographer:    Elvira Lamas [facility]    CPT:                ECHO TTE WITH CON COMP W DOPP - .m;DEFINITY ECHO                      CONTRAST PER ML - .m;DEFINITY ECHO CONTRAST PER ML                 WASTED - .m  Indication(s):      Nonrheumatic aortic [valve] stenosis - I35.0  Procedure:          Transthoracic echocardiogram with 2-D, M-mode and complete                      spectral and color flow Doppler.  Ordering Location:  [facility]  Admission Status:   Inpatient  Contrast Injection: Verbal consent was obtained for injection of Ultrasonic                      Enhancing Agent following a discussion of risks and                      benefits.                      Endocardial visualization enhanced with 2 ml of Definity                      Ultrasound enhancing agent (Lot#:[id number] Exp.Date:12/2025                      Discarded Dose:8ml).  UEA Reaction:       Patient had no adverse reaction after injection of        Ultrasound Enhancing Agent.  Study Information:  Image quality for this study is technically difficult.    _______________________________________________________________________________________     CONCLUSIONS:      1. Technically difficult image quality.   2. Definity ultrasound enhancing agent was given for enhanced left ventricular opacification and improved delineation of the left ventricular endocardial borders.   3. Left ventricular cavity is severely dilated. Left ventricular systolic function is severely decreased with an ejection fraction visually estimated at 25 %. Global left ventricular hypokinesis.   4. 29 mm Evolut FX+ (valve-in-valve) is present in the aortic position, with normal function. No paravalvular aortic regurgitation is seen in this study.   5. The right ventricle is not well visualized.    < end of copied text >    < from: Cardiac Catheterization (04.11.25 @ 14:21) >  Conclusion:   Status post successful percutaneous transcatheter aortic valve in  valve using a CoreValve Evolut FXPLUS 29mm tissue valve  through the right common femoral artery     BNP 04694 on 3/31/2025   LVEDP Pre - 39mmHg // Post - 30mmHg   NYHA IV       < end of copied text >

## 2025-04-22 NOTE — PROVIDER CONTACT NOTE (OTHER) - ASSESSMENT
AOx4, pt able to verbalize needs.  Pt denies and headache, dizziness, shortness of breath, headache and chest pain. Pt verbalizes no pain, no acute distress noted.
AOx4, pt able to verbalize needs.  Pt denies and headache, dizziness, shortness of breath, headache and chest pain. Pt verbalizes no pain, no acute distress noted. aPTT=97, therapeutic range 58-99. No active bleeding noted

## 2025-04-22 NOTE — PROVIDER CONTACT NOTE (OTHER) - ACTION/TREATMENT ORDERED:
provider made aware, heparin gtt decreased from 18ml/hr to 17ml/hr
Provider made aware, awaiting orders/rate, Handoff given to GADIEL Ruiz

## 2025-04-22 NOTE — CONSULT NOTE ADULT - PROBLEM SELECTOR RECOMMENDATION 2
Likely in the setting of congestion, IV contrast but also long standing comorbidities.   Cr may be at baseline  -renally dose all medications   -diuretics as above  -renal recs appreciated

## 2025-04-22 NOTE — PROVIDER CONTACT NOTE (OTHER) - RECOMMENDATIONS
Awaiting provider orders
Awaiting provider orders
warm and dry/color normal/normal/no rashes/no ulcers

## 2025-04-22 NOTE — PROGRESS NOTE ADULT - PROBLEM SELECTOR PLAN 4
h/o CAGB  TTE (4/8) - Severe three vessel obstructive. Patent LIMA to LAD  - c/w Plavix  - c/w atorvastatin 40mg qd  - c/w hep gtt --> will transition to Coumadin (INR 2-3) as above h/o CAGB  TTE (4/8) - Severe three vessel obstructive. Patent LIMA to LAD  - c/w Plavix  - c/w atorvastatin 40mg qd  - c/w Coumadin as above

## 2025-04-22 NOTE — CONSULT NOTE ADULT - PROBLEM SELECTOR RECOMMENDATION 9
Stage C   Etiology: Ischemic   Remains volume overloaded. Warm and wet on exam.   GDMT titration limited by poor renal function.   -start hydralazine and isordil 10mg TID   -start bumex 3mg IV BID   -goal UOP net negative -1 to -2 L   -strict intake output   -trend lactate, LFTs  -daily standing weights   -trend BMP, Mag, Phos  -aggressive BM regimen
In the setting of severe aortic stenosis of a prior TAVR valve  Known CAD with prior CABG (patent LIMA-LAD), RCA stent in 2019  Course complicated by ANGELICA/CKD  Remains on Vasopressin and Levophed  Now extubated    Case discussed with Dr. Montelongo, will plan for [facility] as renal function allows early next week  Will review prior TAVR CT from 2022, would like to avoid repeating CTA given worsening renal function.   Final treatment recommendations pending clinical course over the next few days and repeat angiogram.     BRODERICK Gordon NP  TEAMS  [id number]

## 2025-04-22 NOTE — PROGRESS NOTE ADULT - SUBJECTIVE AND OBJECTIVE BOX
Patient is a 64y old  Male who presents with a chief complaint of dyspnea , chest pain (21 Apr 2025 14:16)      SUBJECTIVE / OVERNIGHT EVENTS:  No acute event overnight    Pt seen and examined at bedside. Had no complaints. Denied cp, sob, d/n/v    MEDICATIONS  (STANDING):  albuterol/ipratropium for Nebulization 3 milliLiter(s) Nebulizer every 6 hours  aMIOdarone    Tablet   Oral   aMIOdarone    Tablet 200 milliGRAM(s) Oral daily  atorvastatin 40 milliGRAM(s) Oral at bedtime  buMETAnide 2 milliGRAM(s) Oral every 12 hours  clopidogrel Tablet 75 milliGRAM(s) Oral daily  famotidine    Tablet 20 milliGRAM(s) Oral every 48 hours  hemorrhoidal Ointment 1 Application(s) Rectal two times a day  heparin  Infusion 1900 Unit(s)/Hr (17 mL/Hr) IV Continuous <Continuous>  insulin lispro (ADMELOG) corrective regimen sliding scale   SubCutaneous at bedtime  insulin lispro (ADMELOG) corrective regimen sliding scale   SubCutaneous three times a day before meals  polyethylene glycol 3350 17 Gram(s) Oral daily  senna 2 Tablet(s) Oral at bedtime    MEDICATIONS  (PRN):  melatonin 3 milliGRAM(s) Oral at bedtime PRN Insomnia      CAPILLARY BLOOD GLUCOSE      POCT Blood Glucose.: 128 mg/dL (21 Apr 2025 21:25)  POCT Blood Glucose.: 135 mg/dL (21 Apr 2025 16:55)  POCT Blood Glucose.: 119 mg/dL (21 Apr 2025 11:46)  POCT Blood Glucose.: 122 mg/dL (21 Apr 2025 08:09)    I&O's Summary    21 Apr 2025 07:01  -  22 Apr 2025 07:00  --------------------------------------------------------  IN: 800 mL / OUT: 700 mL / NET: 100 mL        Vital Signs Last 24 Hrs  T(C): 36.4 (22 Apr 2025 04:00), Max: 36.4 (22 Apr 2025 04:00)  T(F): 97.6 (22 Apr 2025 04:00), Max: 97.6 (22 Apr 2025 04:00)  HR: 82 (22 Apr 2025 04:00) (70 - 90)  BP: 113/71 (22 Apr 2025 04:00) (96/60 - 121/79)  BP(mean): --  RR: 18 (22 Apr 2025 04:00) (18 - 18)  SpO2: 98% (22 Apr 2025 04:00) (92% - 98%)    Parameters below as of 22 Apr 2025 04:00  Patient On (Oxygen Delivery Method): nasal cannula  O2 Flow (L/min): 2      Physical Exam  CONSTITUTIONAL: NAD  EYES: EOMI, conjunctiva and sclera clear  ENMT: Moist oral mucosa  NECK: Supple  RESPIRATORY: Breathing unlabored, CTAB  CARDIOVASCULAR: S1S2 no MRG  ABDOMEN: Nontender to palpation, normoactive bowel sounds, no rebound/guarding  MUSCULOSKELETAL: no clubbing or cyanosis of digits  NEUROLOGY: No focal deficits   SKIN: No rashes or lesions    LABS:                        9.8    6.16  )-----------( 291      ( 22 Apr 2025 05:22 )             30.9      04-22    134[L]  |  92[L]  |  98[H]  ----------------------------<  98  3.9   |  31  |  2.53[H]    Ca    9.7      22 Apr 2025 05:22  Phos  4.9     04-22  Mg     2.2     04-22      PT/INR - ( 22 Apr 2025 05:41 )   PT: 26.7 sec;   INR: 2.34 ratio         PTT - ( 22 Apr 2025 05:41 )  PTT:85.1 sec      Urinalysis Basic - ( 22 Apr 2025 05:22 )    Color: x / Appearance: x / SG: x / pH: x  Gluc: 98 mg/dL / Ketone: x  / Bili: x / Urobili: x   Blood: x / Protein: x / Nitrite: x   Leuk Esterase: x / RBC: x / WBC x   Sq Epi: x / Non Sq Epi: x / Bacteria: x        RADIOLOGY & ADDITIONAL TESTS:    Imaging Personally Reviewed:    Consultant(s) Notes Reviewed:      Care Discussed with Consultants/Other Providers:   Patient is a 64y old  Male who presents with a chief complaint of dyspnea , chest pain (21 Apr 2025 14:16)      SUBJECTIVE / OVERNIGHT EVENTS:  No acute event overnight    Pt seen and examined at bedside. Had no complaints. Reported feeling uncomfortable with his AVAPs at night. Denied cp, sob, d/n/v    MEDICATIONS  (STANDING):  albuterol/ipratropium for Nebulization 3 milliLiter(s) Nebulizer every 6 hours  aMIOdarone    Tablet   Oral   aMIOdarone    Tablet 200 milliGRAM(s) Oral daily  atorvastatin 40 milliGRAM(s) Oral at bedtime  buMETAnide 2 milliGRAM(s) Oral every 12 hours  clopidogrel Tablet 75 milliGRAM(s) Oral daily  famotidine    Tablet 20 milliGRAM(s) Oral every 48 hours  hemorrhoidal Ointment 1 Application(s) Rectal two times a day  heparin  Infusion 1900 Unit(s)/Hr (17 mL/Hr) IV Continuous <Continuous>  insulin lispro (ADMELOG) corrective regimen sliding scale   SubCutaneous at bedtime  insulin lispro (ADMELOG) corrective regimen sliding scale   SubCutaneous three times a day before meals  polyethylene glycol 3350 17 Gram(s) Oral daily  senna 2 Tablet(s) Oral at bedtime    MEDICATIONS  (PRN):  melatonin 3 milliGRAM(s) Oral at bedtime PRN Insomnia      CAPILLARY BLOOD GLUCOSE      POCT Blood Glucose.: 128 mg/dL (21 Apr 2025 21:25)  POCT Blood Glucose.: 135 mg/dL (21 Apr 2025 16:55)  POCT Blood Glucose.: 119 mg/dL (21 Apr 2025 11:46)  POCT Blood Glucose.: 122 mg/dL (21 Apr 2025 08:09)    I&O's Summary    21 Apr 2025 07:01  -  22 Apr 2025 07:00  --------------------------------------------------------  IN: 800 mL / OUT: 700 mL / NET: 100 mL        Vital Signs Last 24 Hrs  T(C): 36.4 (22 Apr 2025 04:00), Max: 36.4 (22 Apr 2025 04:00)  T(F): 97.6 (22 Apr 2025 04:00), Max: 97.6 (22 Apr 2025 04:00)  HR: 82 (22 Apr 2025 04:00) (70 - 90)  BP: 113/71 (22 Apr 2025 04:00) (96/60 - 121/79)  BP(mean): --  RR: 18 (22 Apr 2025 04:00) (18 - 18)  SpO2: 98% (22 Apr 2025 04:00) (92% - 98%)    Parameters below as of 22 Apr 2025 04:00  Patient On (Oxygen Delivery Method): nasal cannula  O2 Flow (L/min): 2      Physical Exam  CONSTITUTIONAL: NAD, sitting up eating breakfast  EYES: EOMI, conjunctiva and sclera clear  ENMT: Moist oral mucosa  NECK: Supple  RESPIRATORY: Breathing unlabored on 2L, CTAB  CARDIOVASCULAR: irregular rhythm. no MRG  ABDOMEN: Nontender to palpation, normoactive bowel sounds, no rebound/guarding  MUSCULOSKELETAL: venous insufficiency noted in b/l LEs  NEUROLOGY: No focal deficits   SKIN: No rashes or lesions    LABS:                        9.8    6.16  )-----------( 291      ( 22 Apr 2025 05:22 )             30.9      04-22    134[L]  |  92[L]  |  98[H]  ----------------------------<  98  3.9   |  31  |  2.53[H]    Ca    9.7      22 Apr 2025 05:22  Phos  4.9     04-22  Mg     2.2     04-22      PT/INR - ( 22 Apr 2025 05:41 )   PT: 26.7 sec;   INR: 2.34 ratio         PTT - ( 22 Apr 2025 05:41 )  PTT:85.1 sec      Urinalysis Basic - ( 22 Apr 2025 05:22 )    Color: x / Appearance: x / SG: x / pH: x  Gluc: 98 mg/dL / Ketone: x  / Bili: x / Urobili: x   Blood: x / Protein: x / Nitrite: x   Leuk Esterase: x / RBC: x / WBC x   Sq Epi: x / Non Sq Epi: x / Bacteria: x        RADIOLOGY & ADDITIONAL TESTS:    Imaging Personally Reviewed:    Consultant(s) Notes Reviewed:      Care Discussed with Consultants/Other Providers:

## 2025-04-22 NOTE — PROGRESS NOTE ADULT - ATTENDING COMMENTS
64M PMHx AFib (s/p DCCV 3/25/25), HFrEF, aortic stenosis s/p TAVR (11/2022), AFlutter s/p ablation 2019 and s/p Micra implant, CKD3, WILFRED, obesity, venous stasis, HTN, HLD admitted for heart failure exacerbation, acute hypoxic resp failure, ANGELICA on CKD, downgraded from CCU after requiring pressors, s/p TAVR 4/11, s/p hep to coum bridge, currently being diuresed.     #AS  - S/p repeat TAVR 4/11  - INR in therapeutic range 2-3, dose 3mg coumadin tonight. Hep gtt stopped     #HFrEF  - HF following, bumex increased to 3IV BID, started hydral 10 TID and isordil 10 TID for GDMT  -daily weights/strict I&Os  -trend CMP, lactate    #Acute respiratory failure  - Likely due to acute HF  - C/w diuresis as above  - now on room air    #AFib  - C/w anticoagulation  - C/w amiodarone  - Monitor on telemetry    #CKD3  - Cr 2.53 uptrending, diuresis as above   - Avoid nephrotoxins, renally dose all medications    Pt still constipated, stat dulcolax suppository ordered, if not effective, pt okay with SMOG enema. Rest of care as documented above    Discussed with HS1.

## 2025-04-22 NOTE — PROGRESS NOTE ADULT - ASSESSMENT
64M PMHx AFib (s/p dccv 3/25/25), HFrEF (likely mod red EF, sev TTE), aortic stenosis s/p TAVR (11/2022), a flutter s/p ablation 2019 and s/p micra implant, CKD3, WILFRED, obesity, venous stasis, HTN, HLD, who presents with 6 days of worsening dyspnea on exertion, admitted for AHRF likely iso CHF exacerbation, also with ANGELICA on CKD c/f cardiorenal syndrome. Admitted to CCU for obstructive shock requiring pressors. Patient s/p TAVR 4/11, off pressors, and medically stable. Downgraded to medicine floor for further management and transition to Coumadin 64M PMHx AFib (s/p dccv 3/25/25), HFrEF (likely mod red EF, sev TTE), aortic stenosis s/p TAVR (11/2022), a flutter s/p ablation 2019 and s/p micra implant, CKD3, WILFRED, obesity, venous stasis, HTN, HLD, who presents with 6 days of worsening dyspnea on exertion, admitted for AHRF likely iso CHF exacerbation, also with ANGELICA on CKD c/f cardiorenal syndrome. Admitted to CCU for obstructive shock requiring pressors. Patient s/p TAVR 4/11, off pressors, and medically stable. Downgraded to medicine floor for further management and transition to Coumadin. PT recommended for JASON, pending auth

## 2025-04-22 NOTE — PROGRESS NOTE ADULT - PROBLEM SELECTOR PLAN 1
s/p TAVR (11/2022)   Admitted to CCU for mixed shock likely from severe AS-induced LVOT requiring pressors - weaned off  TTE (4/1) - The prosthetic valve has reduced leaflet opening . Severe prosthetic aortic stenosis.   - s/p TAVR (4/11)    Plan:  - c/w Hep gtt  - c/w Coumadin 5mg QHS for transition   - Trend INR daily (goal 2-3) s/p TAVR (11/2022)   Admitted to CCU for mixed shock likely from severe AS-induced LVOT requiring pressors - weaned off  TTE (4/1) - The prosthetic valve has reduced leaflet opening . Severe prosthetic aortic stenosis.   - s/p TAVR (4/11)  - INR at goal today (4/22)    Plan:  - d/c Hep gtt  - decreased Coumadin 3mg (4/22) to prevent overshooting INR  - Trend INR daily (goal 2-3)

## 2025-04-22 NOTE — PROGRESS NOTE ADULT - ASSESSMENT
Assessment/plan  64-year-old man with past medical history significant for    A-fib status post cardioversion on 3/25/2025  Ischemic cardiomyopathy  Coronary artery disease status post CABG  Aortic valve stenosis status post TAVR in 11/2022  Atrial flutter status post ablation 2019  Status post Micra implant  Chronic kidney disease stage III  Obesity  Hypertension  Hyperlipidemia  Venous stasis    Who presented with acute on chronic renal failure in the setting of CHF exacerbation rapid response called on the floor due to systolic blood pressure in the 80s.  Patient in cardiogenic shock.    --The patient on 4/11/2025 underwent percutaneous transcatheter aortic valve in valve of his bioprosthetic Cullen 3 26mm TAVR valve during which time a Medtronic Evolut FX+ 29mm valve was implanted.  The patient tolerated the procedure well.  Repeat TTE was reviewed which demonstrated appropriately positioned TAVR valve in valve.  --Patient suffering from symptomatic constipation/hemorrhoids.  He is receiving hemorrhoidal ointment and is on senna.  Further management as per medicine team.   -- Pt. complaining of lower abdominal discomfort this morning, discussed with primary team, LFTs pending   -- INR therapeutic, heparin gtt discontinued. Closely monitor for signs and symptoms of bleeding.  Goal INR between 2-3.  [id number] or 4.  --Patient appears that he is euvolemic on clinical examination. Bumex was discontinued this morning due to rising creatinine. Continue to closely monitor kidney function.  Baseline serum creatinine between 1.5-1.8.  As an outpatient he was also on Entresto.  Of avoiding all nephrotoxins at this time.  --Continue amiodarone 200 mg daily.  --Continue atorvastatin 40 mg daily.  --Continue clopidogrel 75 mg daily.  Closely monitor for signs and symptoms of bleeding.  --Recommend daily PT.  --Patient is being assessed for subacute/acute rehabilitation facility.  --Continue telemetry monitoring.    All questions and concerns of the patient were addressed.    Findings discussed with medicine team

## 2025-04-22 NOTE — CONSULT NOTE ADULT - SUBJECTIVE AND OBJECTIVE BOX
HPI:   65 yo M with a fib (s/p dccv 3/25/25), CHF (likely moderately reduced EF, several TTE with poor windows), aortic stenosis s/p TAVR (11/2022), CAD s/p CABG, a flutter s/p ablation 2019 and s/p micra implant,  CKD3, obesity, venous stasis, HTN, HLD, who presents with 6 days of worsening dyspnea on exertion. Pt also reports associated orthopnea and chest pain on exertion but not at rest which is located in the center of his chest. Pain is described as burning. Also reports several days decreased appetite. Denies fevers, chills, cough, abdominal pain, NVD, urinary sx, leg swelling. Patient had a cardioversion for atrial fibrillation at this hospital recently, which was reportedly unsuccessful. Patient has a scheduled appointment with Dr. Philip on 04/24/2025 to discuss possible ablation. His outpt cardiologist is Dr. Paul.     On arrival to ED, vitals were 97.3F, HR 71, BP 94/57, RR 22, 97% on 2L NC. Labs pertinent for WBC 10.68, INR 2.55, Na 132, K 5.5, bicarb 20, BUN/Cr 78/5.17 (baseline Cr appears ~1.5-1.8), trop 225, BNP 95970, VBG ph 7.3, pco2/po2 wnl. UA without evidence of infection. EKG  on admission 1st deg av block, low voltage, poor r wave progression, R axis deviation CXR with diffuse hazy opacities suggest mild pulmonary edema and small right and trace left pleural effusions. Pt was given IV Bumex 1mg x1, IV bumex 2mg x1, IV Bumex 4mg x1, and lokelma 5mg x1, then admitted for further management.    (01 Apr 2025 01:59)    RRT was called on floors for SBP 80s requiring pressors and has been admitted to the CCU. Pt determined to be in obstructive shock w/ STANISLAW showing severe AS stenosis, global hypokinesis and EF 15%. On 4/8, pt received cath showing patent LIMA-LAD, closed SVG-OM1/D1. Pt is now s/p TAVR 4/11. Weaned off inotropes and vasopressors post TAVR. Pt currently remains on heparin gtt w/ plans to transition to coumadin.       PMHx:   CAD (coronary artery disease)  H/O heart artery stent  Obesity  Hypercholesteremia  Thrombus of left atrial appendage  Ischemic cardiomyopathy  WILFRED (obstructive sleep apnea)  HTN (hypertension)  Atrial fibrillation  CHF (congestive heart failure)  Peripheral edema  Lichen planus  Atrial flutter  MI (myocardial infarction)  Stented coronary artery  AS (aortic stenosis)  Chronic kidney disease, unspecified CKD stage  ANGELICA (acute kidney injury)  Morbid obesity  Status post placement of cardiac pacemaker  Osteoarthritis  H/O scoliosis  Lower back pain      PSHx:   History of tonsillectomy  History of elbow surgery  Atrial fibrillation  Cardiomyopathy  Morbid obesity  Obstructive sleep apnea syndrome  S/P CABG (coronary artery bypass graft)  Cardiac pacemaker  History of coronary artery stent placement  History of adenoidectomy  H/O atrioventricular karlee ablation      Allergies:  metoprolol (Short breath)      Current Medications:   albuterol/ipratropium for Nebulization 3 milliLiter(s) Nebulizer every 6 hours  aMIOdarone    Tablet   Oral   aMIOdarone    Tablet 200 milliGRAM(s) Oral daily  atorvastatin 40 milliGRAM(s) Oral at bedtime  clopidogrel Tablet 75 milliGRAM(s) Oral daily  famotidine    Tablet 20 milliGRAM(s) Oral every 48 hours  hemorrhoidal Ointment 1 Application(s) Rectal two times a day  insulin lispro (ADMELOG) corrective regimen sliding scale   SubCutaneous at bedtime  insulin lispro (ADMELOG) corrective regimen sliding scale   SubCutaneous three times a day before meals  melatonin 3 milliGRAM(s) Oral at bedtime PRN  polyethylene glycol 3350 17 Gram(s) Oral daily  senna 2 Tablet(s) Oral at bedtime  warfarin 3 milliGRAM(s) Oral every 24 hours      Physical Exam:  T(F): 97.6 (04-22), Max: 97.6 (04-22)  HR: 90 (04-22) (76 - 90)  BP: 113/71 (04-22) (111/72 - 121/79)  RR: 18 (04-22)  SpO2: 94% (04-22)    GENERAL: No acute distress, well-developed  HEAD:  Atraumatic, Normocephalic  ENT: EOMI, PERRLA, conjunctiva and sclera clear, Neck supple, No JVD, moist mucosa  CHEST/LUNG: Clear to auscultation bilaterally; No wheeze, equal breath sounds bilaterally   BACK: No spinal tenderness  HEART: Regular rate and rhythm; No murmurs, rubs, or gallops  ABDOMEN: Soft, Nontender, Nondistended; Bowel sounds present  EXTREMITIES:  No clubbing, cyanosis, or edema  PSYCH: Nl behavior, nl affect  NEUROLOGY: AAOx3, non-focal, cranial nerves intact  SKIN: Normal color, No rashes or lesions  LINES:      Imaging:    CXR: Personally reviewed    Labs: Personally reviewed                        9.8    6.16  )-----------( 291      ( 22 Apr 2025 05:22 )             30.9     04-22    134[L]  |  92[L]  |  98[H]  ----------------------------<  98  3.9   |  31  |  2.53[H]    Ca    9.7      22 Apr 2025 05:22  Phos  4.9     04-22  Mg     2.2     04-22      PT/INR - ( 22 Apr 2025 05:41 )   PT: 26.7 sec;   INR: 2.34 ratio         PTT - ( 22 Apr 2025 05:41 )  PTT:85.1 sec

## 2025-04-22 NOTE — PHARMACOTHERAPY INTERVENTION NOTE - COMMENTS
Luis Waller is a 64 year old male on a heparin to warfarin bridge after TAVR valve-in-valve on 4/11/25. Patient also has history of aflutter and was on home Eliquis. Warfarin will be administered for at least the first 3 to 6 months following valve implantation instead of Eliquis which he was taking previously. Target INR is 2-3.     Warfarin doses:  4/17: 5 mg; INR 0.99  4/18: 5 mg; INR 1.04  4/19: 7.5 mg; INR 1.04  4/20: 7.5 mg; INR 1.17  4/21: 5 mg; INR 1.69  4/22: 3 mg; INR 2.34    Patient completed 6 days of heparin bridge today.   Delta of most recent 2 INRs is +0.65. Recommend reducing tonight's dose to 3 mg to prevent overshooting since INR went up quickly since yesterday.    Lester Day, PharmD, BCPS  Clinical Pharmacy Specialist  Available on [employer]  Cell: [phone]

## 2025-04-22 NOTE — PROGRESS NOTE ADULT - PROBLEM SELECTOR PLAN 5
cathy 4. Home meds: Eliquis 5, Amiodarone  - s/p ablation 2019; dccv 3/25/25; micra implant  - c/w hep gtt --> transition to coumadin as above  - c/w Amiodarone 200mg qd  - Tele cathy 4. Home meds: Eliquis 5, Amiodarone  - s/p ablation 2019; dccv 3/25/25; micra implant  - c/w Coumadin  - c/w Amiodarone 200mg qd  - Tele

## 2025-04-22 NOTE — CONSULT NOTE ADULT - ASSESSMENT
58 yo man with history of CAD s/p CABG (2013), atrial fibrillation, ICM (EF 25%, LVIDd 5.9cm), severe AS s/p TAVR 11/2022 with severe prosthetic stenosis, atrial flutter s/p ablation, CKD, WILFRED, venous stasis, HTN, HLD admitted with AHRF secondary to ADHF. Was initially in the CICU on pressors with ANGELICA on CKD as well. Now s/p TAVR valve in valve on 4/11. CHF consulted for GDMT titration.     At present, patient remains volume overloaded. He is hemodynamically stable and will need titration of GDMT in the setting of renal dysfunction.     Cardiac Studies:   TTE 4/12/25: LVEF 25%. LVIdd 5.9cm. Evolut valve placed in aortic position without paravalvular regurg. Biatrial enlargement. severe mitral leaflet calcification.   TTE 4/1/25: LVEF 40%. Regional WMAs in LAD and LCx territory. Severe prosthetic aortic stenosis.   TTE 6/11/13: LVEF 34%.   LHC 4/11/25: LIMA to LAD patent. Occluded SVGs to OM1 and D1. Severe native vessel CAD. RCA with nonobstructive disease.

## 2025-04-22 NOTE — CONSULT NOTE ADULT - TIME BILLING
education, assessment and coordination of care.
- Review of records, telemetry, vital signs and daily labs.   - General and cardiovascular physical examination.  - Generation of cardiovascular treatment plan.  - Coordination of care with primary team.

## 2025-04-23 ENCOUNTER — APPOINTMENT (OUTPATIENT)
Dept: ELECTROPHYSIOLOGY | Facility: CLINIC | Age: 65
End: 2025-04-23

## 2025-04-23 LAB
ALBUMIN SERPL ELPH-MCNC: 3.4 G/DL — SIGNIFICANT CHANGE UP (ref 3.3–5)
ALP SERPL-CCNC: 105 U/L — SIGNIFICANT CHANGE UP (ref 40–120)
ALT FLD-CCNC: 21 U/L — SIGNIFICANT CHANGE UP (ref 10–45)
ANION GAP SERPL CALC-SCNC: 14 MMOL/L — SIGNIFICANT CHANGE UP (ref 5–17)
ANION GAP SERPL CALC-SCNC: 21 MMOL/L — HIGH (ref 5–17)
APTT BLD: 33.4 SEC — SIGNIFICANT CHANGE UP (ref 26.1–36.8)
AST SERPL-CCNC: 16 U/L — SIGNIFICANT CHANGE UP (ref 10–40)
BILIRUB SERPL-MCNC: 0.8 MG/DL — SIGNIFICANT CHANGE UP (ref 0.2–1.2)
BUN SERPL-MCNC: 100 MG/DL — HIGH (ref 7–23)
BUN SERPL-MCNC: 102 MG/DL — HIGH (ref 7–23)
CALCIUM SERPL-MCNC: 9.8 MG/DL — SIGNIFICANT CHANGE UP (ref 8.4–10.5)
CALCIUM SERPL-MCNC: 9.8 MG/DL — SIGNIFICANT CHANGE UP (ref 8.4–10.5)
CHLORIDE SERPL-SCNC: 94 MMOL/L — LOW (ref 96–108)
CHLORIDE SERPL-SCNC: 96 MMOL/L — SIGNIFICANT CHANGE UP (ref 96–108)
CO2 SERPL-SCNC: 24 MMOL/L — SIGNIFICANT CHANGE UP (ref 22–31)
CO2 SERPL-SCNC: 30 MMOL/L — SIGNIFICANT CHANGE UP (ref 22–31)
CREAT SERPL-MCNC: 2.56 MG/DL — HIGH (ref 0.5–1.3)
CREAT SERPL-MCNC: 2.6 MG/DL — HIGH (ref 0.5–1.3)
EGFR: 27 ML/MIN/1.73M2 — LOW
GLUCOSE BLDC GLUCOMTR-MCNC: 116 MG/DL — HIGH (ref 70–99)
GLUCOSE BLDC GLUCOMTR-MCNC: 122 MG/DL — HIGH (ref 70–99)
GLUCOSE BLDC GLUCOMTR-MCNC: 133 MG/DL — HIGH (ref 70–99)
GLUCOSE BLDC GLUCOMTR-MCNC: 133 MG/DL — HIGH (ref 70–99)
GLUCOSE SERPL-MCNC: 130 MG/DL — HIGH (ref 70–99)
GLUCOSE SERPL-MCNC: 90 MG/DL — SIGNIFICANT CHANGE UP (ref 70–99)
HCT VFR BLD CALC: 32.6 % — LOW (ref 39–50)
HGB BLD-MCNC: 10.4 G/DL — LOW (ref 13–17)
INR BLD: 2.49 RATIO — HIGH (ref 0.85–1.16)
LACTATE SERPL-SCNC: 0.9 MMOL/L — SIGNIFICANT CHANGE UP (ref 0.5–2)
MAGNESIUM SERPL-MCNC: 2.2 MG/DL — SIGNIFICANT CHANGE UP (ref 1.6–2.6)
MCHC RBC-ENTMCNC: 31.2 PG — SIGNIFICANT CHANGE UP (ref 27–34)
MCHC RBC-ENTMCNC: 31.9 G/DL — LOW (ref 32–36)
MCV RBC AUTO: 97.9 FL — SIGNIFICANT CHANGE UP (ref 80–100)
NRBC BLD AUTO-RTO: 0 /100 WBCS — SIGNIFICANT CHANGE UP (ref 0–0)
PHOSPHATE SERPL-MCNC: 4.8 MG/DL — HIGH (ref 2.5–4.5)
PLATELET # BLD AUTO: 269 K/UL — SIGNIFICANT CHANGE UP (ref 150–400)
POTASSIUM SERPL-MCNC: 3.9 MMOL/L — SIGNIFICANT CHANGE UP (ref 3.5–5.3)
POTASSIUM SERPL-MCNC: 4.4 MMOL/L — SIGNIFICANT CHANGE UP (ref 3.5–5.3)
POTASSIUM SERPL-SCNC: 3.9 MMOL/L — SIGNIFICANT CHANGE UP (ref 3.5–5.3)
POTASSIUM SERPL-SCNC: 4.4 MMOL/L — SIGNIFICANT CHANGE UP (ref 3.5–5.3)
PROT SERPL-MCNC: 7.8 G/DL — SIGNIFICANT CHANGE UP (ref 6–8.3)
PROTHROM AB SERPL-ACNC: 28.1 SEC — HIGH (ref 9.9–13.4)
RBC # BLD: 3.33 M/UL — LOW (ref 4.2–5.8)
RBC # FLD: 17.6 % — HIGH (ref 10.3–14.5)
SODIUM SERPL-SCNC: 138 MMOL/L — SIGNIFICANT CHANGE UP (ref 135–145)
SODIUM SERPL-SCNC: 141 MMOL/L — SIGNIFICANT CHANGE UP (ref 135–145)
WBC # BLD: 5.97 K/UL — SIGNIFICANT CHANGE UP (ref 3.8–10.5)
WBC # FLD AUTO: 5.97 K/UL — SIGNIFICANT CHANGE UP (ref 3.8–10.5)

## 2025-04-23 PROCEDURE — 99232 SBSQ HOSP IP/OBS MODERATE 35: CPT | Mod: GC

## 2025-04-23 RX ORDER — ISOSORBDIE DINITRATE 30 MG/1
10 TABLET ORAL THREE TIMES A DAY
Refills: 0 | Status: DISCONTINUED | OUTPATIENT
Start: 2025-04-23 | End: 2025-04-28

## 2025-04-23 RX ADMIN — IPRATROPIUM BROMIDE AND ALBUTEROL SULFATE 3 MILLILITER(S): .5; 2.5 SOLUTION RESPIRATORY (INHALATION) at 12:06

## 2025-04-23 RX ADMIN — BUMETANIDE 124 MILLIGRAM(S): 1 TABLET ORAL at 17:34

## 2025-04-23 RX ADMIN — ISOSORBDIE DINITRATE 10 MILLIGRAM(S): 30 TABLET ORAL at 06:14

## 2025-04-23 RX ADMIN — Medication 3 MILLIGRAM(S): at 21:32

## 2025-04-23 RX ADMIN — BUMETANIDE 124 MILLIGRAM(S): 1 TABLET ORAL at 06:15

## 2025-04-23 RX ADMIN — IPRATROPIUM BROMIDE AND ALBUTEROL SULFATE 3 MILLILITER(S): .5; 2.5 SOLUTION RESPIRATORY (INHALATION) at 06:14

## 2025-04-23 RX ADMIN — Medication 10 MILLIGRAM(S): at 06:14

## 2025-04-23 RX ADMIN — Medication 10 MILLIGRAM(S): at 13:24

## 2025-04-23 RX ADMIN — AMIODARONE HYDROCHLORIDE 200 MILLIGRAM(S): 50 INJECTION, SOLUTION INTRAVENOUS at 06:14

## 2025-04-23 RX ADMIN — CLOPIDOGREL BISULFATE 75 MILLIGRAM(S): 75 TABLET, FILM COATED ORAL at 12:03

## 2025-04-23 RX ADMIN — IPRATROPIUM BROMIDE AND ALBUTEROL SULFATE 3 MILLILITER(S): .5; 2.5 SOLUTION RESPIRATORY (INHALATION) at 17:39

## 2025-04-23 RX ADMIN — ISOSORBDIE DINITRATE 10 MILLIGRAM(S): 30 TABLET ORAL at 17:33

## 2025-04-23 RX ADMIN — ATORVASTATIN CALCIUM 80 MILLIGRAM(S): 80 TABLET, FILM COATED ORAL at 21:32

## 2025-04-23 RX ADMIN — PHENYLEPHRINE HYDROCHLORIDE AND FAT, HARD 1 APPLICATION(S): .00525; 1.86 SUPPOSITORY RECTAL at 21:32

## 2025-04-23 RX ADMIN — ISOSORBDIE DINITRATE 10 MILLIGRAM(S): 30 TABLET ORAL at 12:03

## 2025-04-23 RX ADMIN — Medication 10 MILLIGRAM(S): at 21:32

## 2025-04-23 RX ADMIN — PHENYLEPHRINE HYDROCHLORIDE AND FAT, HARD 1 APPLICATION(S): .00525; 1.86 SUPPOSITORY RECTAL at 09:00

## 2025-04-23 NOTE — PROGRESS NOTE ADULT - PROBLEM SELECTOR PLAN 1
s/p TAVR (11/2022)   Admitted to CCU for mixed shock likely from severe AS-induced LVOT requiring pressors - weaned off  TTE (4/1) - The prosthetic valve has reduced leaflet opening . Severe prosthetic aortic stenosis.   - s/p TAVR (4/11)  - INR at goal today (4/22)    Plan:  - d/c Hep gtt  - decreased Coumadin 3mg (4/22) to prevent overshooting INR  - Trend INR daily (goal 2-3) s/p TAVR (11/2022)   Admitted to CCU for mixed shock likely from severe AS-induced LVOT requiring pressors - weaned off  TTE (4/1) - The prosthetic valve has reduced leaflet opening . Severe prosthetic aortic stenosis.   - s/p TAVR (4/11)  - INR at goal (2-3)    Plan:  - d/c Hep gtt  - c/w Coumadin 3mg (4/23)  - Trend INR daily (goal 2-3)

## 2025-04-23 NOTE — PROGRESS NOTE ADULT - PROBLEM SELECTOR PLAN 5
cathy 4. Home meds: Eliquis 5, Amiodarone  - s/p ablation 2019; dccv 3/25/25; micra implant  - c/w Coumadin  - c/w Amiodarone 200mg qd  - Tele

## 2025-04-23 NOTE — PROGRESS NOTE ADULT - PROBLEM SELECTOR PLAN 4
h/o CAGB  TTE (4/8) - Severe three vessel obstructive. Patent LIMA to LAD  - c/w Plavix  - c/w atorvastatin 40mg qd  - c/w Coumadin as above

## 2025-04-23 NOTE — PROGRESS NOTE ADULT - ATTENDING COMMENTS
64M PMHx AFib (s/p DCCV 3/25/25), HFrEF, aortic stenosis s/p TAVR (11/2022), AFlutter s/p ablation 2019 and s/p Micra implant, CKD3, WILFRED, obesity, venous stasis, HTN, HLD admitted for heart failure exacerbation, acute hypoxic resp failure, ANGELICA on CKD, downgraded from CCU after requiring pressors, s/p TAVR 4/11, s/p hep to coum bridge, currently being diuresed.     #AS  - S/p repeat TAVR 4/11  - INR in therapeutic range 2-3, dose 3mg coumadin tonight.    #HFrEF  - HF following, c/w bumex 3IV BID, hydral 10 TID and isordil 10 TID for GDMT  -daily weights/strict I&Os  -trend CMP, lactate    #Acute respiratory failure  - Likely due to acute HF  - C/w diuresis as above  - wean O2 as tolerated     #AFib  - C/w anticoagulation  - C/w amiodarone  - Monitor on telemetry    #CKD3  - Cr 2.6 uptrending but stable, diuresis as above   - Avoid nephrotoxins, renally dose all medications    Pt feeling relieved after BM with enema. Rest of care as documented above

## 2025-04-23 NOTE — PROGRESS NOTE ADULT - ASSESSMENT
64M PMHx AFib (s/p dccv 3/25/25), HFrEF (likely mod red EF, sev TTE), aortic stenosis s/p TAVR (11/2022), a flutter s/p ablation 2019 and s/p micra implant, CKD3, WILFRED, obesity, venous stasis, HTN, HLD, who presents with 6 days of worsening dyspnea on exertion, admitted for AHRF likely iso CHF exacerbation, also with ANGELICA on CKD c/f cardiorenal syndrome. Admitted to CCU for obstructive shock requiring pressors. Patient s/p TAVR 4/11, off pressors, and medically stable. Downgraded to medicine floor for further management and transition to Coumadin. PT recommended for JASON, pending auth

## 2025-04-23 NOTE — CHART NOTE - NSCHARTNOTESELECT_GEN_ALL_CORE
Nephrology Attending Note/Event Note
Nutrition Services
Nutrition Services
Speech & Swallow
Transfer Note
Accept note/Transfer Note
Femoral Sheath Removal/Event Note
Nutrition Services
Nutrition Services
Structural Heart Team/Event Note

## 2025-04-23 NOTE — PROGRESS NOTE ADULT - PROVIDER SPECIALTY LIST ADULT
[facility] PHARMACY REFILL CONSULTATION     Situation  Writer is following up with patient for 90-day assessment of Tivicay and Descovy prescription.      Background of Drug Therapy (include start date)  Patient has been receiving Tivicay and Descovy from [facility] since 06/2019 for treatment of HIV.      Assessment Questionnaires    A. What changes do you have on your medication regimen (ie. new medications, dose changes etc)?  none  • Does change result in:  i.           New drug-drug interactions?  No  ii. New drug-food interactions?  No  iii. Therapy modifications?  No       Medication reconciliation completed today? Yes    B. What kind of side effects are you experiencing?  none    C. How do you feel this medication is working for you? What did the doctor tell you about the effectiveness of this medication?   Patient is stable on medication    D. How many doses have you missed since your last refill?   none    E. Education Materials or supplies given today   Drug specific information:       Recommendations and pharmacist's care plan including follow up (including clinic contact instructions)  1. Recommend to continue current medication as prescribed. Patient is not having any side effects, is adherent, and is stable on therapy.  2. Desires/movitation of the patient: to treat HIV  3. Problems/needs identified from LAST assessment: none  a.   Strategies/Interventions implemented to address problems/needs: n/a  b.   Progress towards treatment goals: n/a  4. NEW problems and/or needs identified upon today's assessment: none  a.   Strategies to address problems and/or needs: n/a  b.   Measurable goals and timeframe for each: n/a  5. No changes to the goals of care:  a.   Ensure adherence  b.   Minimize side effects  c.   Maximize patient’s response to therapy  6. Patient was counseled on proper use of additional supplies provided, if any, as detailed above to help with common side effects and  promote adherence.  7. Resources available to implement this care plan: verbal education by pharmacist, written education materials, pharmacist follow-up assessments.   8. Patient’s medical records (EPIC SmartChart) will be reviewed once every month or every cycle, whichever is less, to evaluate refill appropriateness and make interventions based on changes in their profile (ie. new medications, recent lab results etc).  9. An [facility] caregiver will follow up for refill delivery in 4 weeks, and determine treatment compliance and presence of adverse effects to treatment.  10. [facility] 90-day assessment due in 06/2020 to evaluate patient’s response to therapy, side effects, changes in their medications/allergies, drug interactions and adherence.  11. Medication will be shipped 03/05/2020 via [employer] and estimated delivery on 03/06/2020 to store [address] for patient .  12. Patient has ASP phone number, [phone], for questions and concerns.   13. Patient acknowledges and agrees with plan of care.     Caty López, PharmD.  [facility] Coordinator  [id number] [place] [address]  T: [phone] F: [phone]  [email or web address]                           Internal Medicine

## 2025-04-23 NOTE — PROGRESS NOTE ADULT - PROBLEM SELECTOR PLAN 9
DVT ppx: Coumadin  Diet: DASH/TLC  Dispo: JASON planning underway  Full Code  GI: Famotidine 20mg qd

## 2025-04-23 NOTE — PROGRESS NOTE ADULT - PROBLEM SELECTOR PLAN 2
presented initially with dyspnea, orthopnea x 6 days c/f acute on chronic HF   Home meds: Entresto 24/26, bumex 1mg/2mg qd alternate, hydralazine 50mg tid  - s/p Bumex gtt  TTE (4/12) -  LVEF 25%. Global LV hypokinesis.  - HF following, appreciated recs    Plan  - c/w IVP Bumex 3mg bid (goal net -1 to 2L daily)   - c/w Hydral 10mg tid  - c/w isordil 10mg tid  - Replete lytes prn for Mg>2 K>4  - Daily weight. Strict I&O presented initially with dyspnea, orthopnea x 6 days c/f acute on chronic HF   Home meds: Entresto 24/26, bumex 1mg/2mg qd alternate, hydralazine 50mg tid  - s/p Bumex gtt  TTE (4/12) -  LVEF 25%. Global LV hypokinesis.  - HF following, appreciated recs    Plan  - c/w IVP Bumex 3mg bid (goal net -1 to 2L daily)  - will reach out to HF regarding switching to PO bumex  - c/w Hydral 10mg tid  - c/w isordil 10mg tid  - Replete lytes prn for Mg>2 K>4  - Daily weight. Strict I&O

## 2025-04-23 NOTE — PROGRESS NOTE ADULT - SUBJECTIVE AND OBJECTIVE BOX
Patient is a 64y old  Male who presents with a chief complaint of dyspnea , chest pain (22 Apr 2025 14:20)      SUBJECTIVE / OVERNIGHT EVENTS:  Over 24hrs   - resumed IVP Bumex  - started Isordil and hydral    Pt seen and examined at bedside. Had no complaints. Denied cp, sob, d/n/v    MEDICATIONS  (STANDING):  albuterol/ipratropium for Nebulization 3 milliLiter(s) Nebulizer every 6 hours  aMIOdarone    Tablet   Oral   aMIOdarone    Tablet 200 milliGRAM(s) Oral daily  atorvastatin 80 milliGRAM(s) Oral at bedtime  buMETAnide IVPB 3 milliGRAM(s) IV Intermittent every 12 hours  clopidogrel Tablet 75 milliGRAM(s) Oral daily  famotidine    Tablet 20 milliGRAM(s) Oral every 48 hours  hemorrhoidal Ointment 1 Application(s) Rectal two times a day  hydrALAZINE 10 milliGRAM(s) Oral three times a day  insulin lispro (ADMELOG) corrective regimen sliding scale   SubCutaneous at bedtime  insulin lispro (ADMELOG) corrective regimen sliding scale   SubCutaneous three times a day before meals  isosorbide   dinitrate Tablet (ISORDIL) 10 milliGRAM(s) Oral three times a day  polyethylene glycol 3350 17 Gram(s) Oral daily  senna 2 Tablet(s) Oral at bedtime    MEDICATIONS  (PRN):  melatonin 3 milliGRAM(s) Oral at bedtime PRN Insomnia      CAPILLARY BLOOD GLUCOSE      POCT Blood Glucose.: 135 mg/dL (22 Apr 2025 21:26)  POCT Blood Glucose.: 123 mg/dL (22 Apr 2025 14:18)  POCT Blood Glucose.: 129 mg/dL (22 Apr 2025 11:35)  POCT Blood Glucose.: 105 mg/dL (22 Apr 2025 08:35)    I&O's Summary    22 Apr 2025 07:01  -  23 Apr 2025 07:00  --------------------------------------------------------  IN: 1190 mL / OUT: 2950 mL / NET: -1760 mL        Vital Signs Last 24 Hrs  T(C): 36.4 (23 Apr 2025 05:00), Max: 36.4 (22 Apr 2025 20:14)  T(F): 97.5 (23 Apr 2025 05:00), Max: 97.6 (22 Apr 2025 20:14)  HR: 84 (23 Apr 2025 05:00) (81 - 100)  BP: 107/70 (23 Apr 2025 05:00) (96/55 - 111/78)  BP(mean): --  RR: 18 (23 Apr 2025 05:00) (18 - 18)  SpO2: 98% (23 Apr 2025 05:00) (94% - 98%)    Parameters below as of 22 Apr 2025 20:14  Patient On (Oxygen Delivery Method): nasal cannula  O2 Flow (L/min): 4      Physical Exam  CONSTITUTIONAL: NAD  EYES: EOMI, conjunctiva and sclera clear  ENMT: Moist oral mucosa  NECK: Supple  RESPIRATORY: Breathing unlabored, CTAB  CARDIOVASCULAR: S1S2 no MRG  ABDOMEN: Nontender to palpation, normoactive bowel sounds, no rebound/guarding  MUSCULOSKELETAL: no clubbing or cyanosis of digits  NEUROLOGY: No focal deficits   SKIN: No rashes or lesions    LABS:                        10.4   5.97  )-----------( 269      ( 23 Apr 2025 06:32 )             32.6      04-22    134[L]  |  92[L]  |  98[H]  ----------------------------<  98  3.9   |  31  |  2.53[H]    Ca    9.7      22 Apr 2025 05:22  Phos  4.9     04-22  Mg     2.2     04-22    TPro  8.0  /  Alb  3.4  /  TBili  0.7  /  DBili  0.3  /  AST  17  /  ALT  26  /  AlkPhos  106  04-22    PT/INR - ( 23 Apr 2025 06:32 )   PT: 28.1 sec;   INR: 2.49 ratio         PTT - ( 23 Apr 2025 06:32 )  PTT:33.4 sec      Urinalysis Basic - ( 22 Apr 2025 05:22 )    Color: x / Appearance: x / SG: x / pH: x  Gluc: 98 mg/dL / Ketone: x  / Bili: x / Urobili: x   Blood: x / Protein: x / Nitrite: x   Leuk Esterase: x / RBC: x / WBC x   Sq Epi: x / Non Sq Epi: x / Bacteria: x        RADIOLOGY & ADDITIONAL TESTS:    Imaging Personally Reviewed:    Consultant(s) Notes Reviewed:      Care Discussed with Consultants/Other Providers:   Patient is a 64y old  Male who presents with a chief complaint of dyspnea , chest pain (22 Apr 2025 14:20)      SUBJECTIVE / OVERNIGHT EVENTS:  Over 24hrs   - resumed IVP Bumex  - started Isordil and hydral    Pt seen and examined at bedside. Had no complaints. Reported having bm after smog. Reported abd pain resolved. Denied cp, sob, d/n/v    MEDICATIONS  (STANDING):  albuterol/ipratropium for Nebulization 3 milliLiter(s) Nebulizer every 6 hours  aMIOdarone    Tablet   Oral   aMIOdarone    Tablet 200 milliGRAM(s) Oral daily  atorvastatin 80 milliGRAM(s) Oral at bedtime  buMETAnide IVPB 3 milliGRAM(s) IV Intermittent every 12 hours  clopidogrel Tablet 75 milliGRAM(s) Oral daily  famotidine    Tablet 20 milliGRAM(s) Oral every 48 hours  hemorrhoidal Ointment 1 Application(s) Rectal two times a day  hydrALAZINE 10 milliGRAM(s) Oral three times a day  insulin lispro (ADMELOG) corrective regimen sliding scale   SubCutaneous at bedtime  insulin lispro (ADMELOG) corrective regimen sliding scale   SubCutaneous three times a day before meals  isosorbide   dinitrate Tablet (ISORDIL) 10 milliGRAM(s) Oral three times a day  polyethylene glycol 3350 17 Gram(s) Oral daily  senna 2 Tablet(s) Oral at bedtime    MEDICATIONS  (PRN):  melatonin 3 milliGRAM(s) Oral at bedtime PRN Insomnia      CAPILLARY BLOOD GLUCOSE      POCT Blood Glucose.: 135 mg/dL (22 Apr 2025 21:26)  POCT Blood Glucose.: 123 mg/dL (22 Apr 2025 14:18)  POCT Blood Glucose.: 129 mg/dL (22 Apr 2025 11:35)  POCT Blood Glucose.: 105 mg/dL (22 Apr 2025 08:35)    I&O's Summary    22 Apr 2025 07:01  -  23 Apr 2025 07:00  --------------------------------------------------------  IN: 1190 mL / OUT: 2950 mL / NET: -1760 mL        Vital Signs Last 24 Hrs  T(C): 36.4 (23 Apr 2025 05:00), Max: 36.4 (22 Apr 2025 20:14)  T(F): 97.5 (23 Apr 2025 05:00), Max: 97.6 (22 Apr 2025 20:14)  HR: 84 (23 Apr 2025 05:00) (81 - 100)  BP: 107/70 (23 Apr 2025 05:00) (96/55 - 111/78)  BP(mean): --  RR: 18 (23 Apr 2025 05:00) (18 - 18)  SpO2: 98% (23 Apr 2025 05:00) (94% - 98%)    Parameters below as of 22 Apr 2025 20:14  Patient On (Oxygen Delivery Method): nasal cannula  O2 Flow (L/min): 4      Physical Exam  CONSTITUTIONAL: NAD  EYES: EOMI, conjunctiva and sclera clear  ENMT: Moist oral mucosa  NECK: Supple  RESPIRATORY: Breathing unlabored, CTAB  CARDIOVASCULAR: S1S2 no MRG  ABDOMEN: Nontender to palpation, normoactive bowel sounds, no rebound/guarding  MUSCULOSKELETAL: no clubbing or cyanosis of digits. +2 edema in LEs b/l   SKIN: No rashes or lesions    LABS:                        10.4   5.97  )-----------( 269      ( 23 Apr 2025 06:32 )             32.6      04-22    134[L]  |  92[L]  |  98[H]  ----------------------------<  98  3.9   |  31  |  2.53[H]    Ca    9.7      22 Apr 2025 05:22  Phos  4.9     04-22  Mg     2.2     04-22    TPro  8.0  /  Alb  3.4  /  TBili  0.7  /  DBili  0.3  /  AST  17  /  ALT  26  /  AlkPhos  106  04-22    PT/INR - ( 23 Apr 2025 06:32 )   PT: 28.1 sec;   INR: 2.49 ratio         PTT - ( 23 Apr 2025 06:32 )  PTT:33.4 sec      Urinalysis Basic - ( 22 Apr 2025 05:22 )    Color: x / Appearance: x / SG: x / pH: x  Gluc: 98 mg/dL / Ketone: x  / Bili: x / Urobili: x   Blood: x / Protein: x / Nitrite: x   Leuk Esterase: x / RBC: x / WBC x   Sq Epi: x / Non Sq Epi: x / Bacteria: x        RADIOLOGY & ADDITIONAL TESTS:    Imaging Personally Reviewed:    Consultant(s) Notes Reviewed:      Care Discussed with Consultants/Other Providers:

## 2025-04-23 NOTE — CHART NOTE - NSCHARTNOTEFT_GEN_A_CORE
NUTRITION FOLLOW UP NOTE    PATIENT SEEN FOR: malnutrition follow up     SOURCE: [x] Patient  [x] Current Medical Record  [] RN  [] Family/support person at bedside  [] Patient unavailable/inappropriate  [] Other:    CHART REVIEWED/EVENTS NOTED.  [] No changes to nutrition care plan to note  [x] Nutrition Status:  - "64y old  Male who presents with a chief complaint of dyspnea , chest pain"   - Admitted to CCU for mixed shock likely from severe AS-induced LVOT requiring pressors - weaned off; s/p TAVR () per chart. downgraded, Pt seen on 2DSU   - Acute on chronic systolic congestive heart failure  - ANGELICA on CKD   - s/p FEES  recommending "Regular/thin liquids"     DIET ORDER:   Diet, DASH/TLC:   Sodium & Cholesterol Restricted  Supplement Feeding Modality:  Oral  Ensure Max Cans or Servings Per Day:  1       Frequency:  Two Times a day (25)      CURRENT DIET ORDER IS:  [] Appropriate:  [] Inadequate:  [x] Other: see recommendations below     NUTRITION INTAKE/PROVISION:  [x] PO: Pt reported fair-good appetite, % PO intake per flowsheets with 0-25% x 1 throughout admission. Pt denied issues chewing/swallowing. Reported drinking Ensure Max.   [] Enteral Nutrition:  [] Parenteral Nutrition:    ANTHROPOMETRICS:  Drug Dosing Weight  Height (cm): 172.7 (2025 11:45)  Weight (kg): 105.8 (2025 11:45)  BMI (kg/m2): 35.5 (2025 11:45)  BSA (m2): 2.18 (2025 11:45)  Weights:   Daily Weight in k.7 (-23), Weight in k (-22), Weight in k (-21), Weight in k.7 (-20), Weight in k.8 (-19), Weight in k (-17)   weight variance noted. Weight variance possibly due to fluid shifts with edema present per flowsheets, diuretics ordered, heart failure. Also noted scale variance during admission (bed vs standing). RD will continue to monitor weight trends as able/available.     MEDICATIONS:  MEDICATIONS  (STANDING):  albuterol/ipratropium for Nebulization 3 milliLiter(s) Nebulizer every 6 hours  aMIOdarone    Tablet   Oral   aMIOdarone    Tablet 200 milliGRAM(s) Oral daily  atorvastatin 80 milliGRAM(s) Oral at bedtime  buMETAnide IVPB 3 milliGRAM(s) IV Intermittent every 12 hours  clopidogrel Tablet 75 milliGRAM(s) Oral daily  famotidine    Tablet 20 milliGRAM(s) Oral every 48 hours  hemorrhoidal Ointment 1 Application(s) Rectal two times a day  hydrALAZINE 10 milliGRAM(s) Oral three times a day  insulin lispro (ADMELOG) corrective regimen sliding scale   SubCutaneous at bedtime  insulin lispro (ADMELOG) corrective regimen sliding scale   SubCutaneous three times a day before meals  isosorbide   dinitrate Tablet (ISORDIL) 10 milliGRAM(s) Oral three times a day  polyethylene glycol 3350 17 Gram(s) Oral daily  senna 2 Tablet(s) Oral at bedtime  warfarin 3 milliGRAM(s) Oral every 24 hours    MEDICATIONS  (PRN):  melatonin 3 milliGRAM(s) Oral at bedtime PRN Insomnia      NUTRITIONALLY PERTINENT LABS:   Na138 mmol/L Glu 90 mg/dL K+ 3.9 mmol/L Cr  2.60 mg/dL[H]  mg/dL[H]  Phos 4.8 mg/dL[H]  Alb 3.4 g/dL  Chol 94 mg/dL LDL --    HDL 29 mg/dL[L] Trig 74 mg/dL ALT 21 U/L AST 16 U/L Alkaline Phosphatase 105 U/L    A1C with Estimated Average Glucose Result: 5.8 % (25 @ 18:01)          Finger Sticks:  POCT Blood Glucose.: 116 mg/dL ( @ 11:38)  POCT Blood Glucose.: 122 mg/dL ( @ 07:45)  POCT Blood Glucose.: 135 mg/dL ( @ 21:26)      NUTRITIONALLY PERTINENT MEDICATIONS/LABS:  [x] Reviewed  [x] Relevant notes on medications/labs:  - insulin ordered, no hyperglycemia noted, fingerstick range -: 105-135 mg/dL   - bumex    EDEMA:  [x] Reviewed  [x] Relevant notes: 4+ edema left/right legs per flowsheets     GI/ I&O:  [x] Reviewed  [x] Relevant notes: Pt denies nausea, vomiting, diarrhea, endorsed constipation. bowel movement reported  s/p enema per Pt   [] Other:    SKIN:   [x] No pressure injuries documented, per nursing flowsheet  [] Pressure injury previously noted  [] Change in pressure injury documentation:  [] Other:    ESTIMATED NEEDS:  [x] No change:  [] Updated:  Energy: 7768-8909 kcal/day (33-38 kcal/kg)  Protein: 105-126g/day (1.5-1.8 g/kg)  Fluid:   ml/day or [x] defer to team  Based on: IBW 69.8kg    NUTRITION DIAGNOSIS:  [x] Prior Dx: 1) Severe Acute Malnutrition, 2) Overweight/Obesity  [] New Dx:      EDUCATION:  [x] Yes: encouraged PO intake as tolerated, encouraged continued consumption of Ensure Max. Discussed adequate protein intake. Pt was made aware RD remains available and he expressed understanding.   [] Not appropriate/warranted    NUTRITION CARE PLAN:  1. Diet: Consider liberalizing current DASH restriction to Low Sodium. Defer fluid restriction to medical team discretion   2. Supplements: Continue Ensure Max to provide 160 kcal, 30 grams protein per 10 oz   3. Monitor PO intake, PO diet tolerance, skin, weight, nutrition related labs, GI function, goals of care     [] Achieved - Continue current nutrition intervention(s)  [] Current medical condition precludes nutrition intervention at this time.    MONITORING AND EVALUATION:   RD remains available upon request and will follow up per protocol.    Suzanne Miramontes MS, RDN, CDN; available on Teams NUTRITION FOLLOW UP NOTE    PATIENT SEEN FOR: malnutrition follow up     SOURCE: [x] Patient  [x] Current Medical Record  [] RN  [] Family/support person at bedside  [] Patient unavailable/inappropriate  [] Other:    CHART REVIEWED/EVENTS NOTED.  [] No changes to nutrition care plan to note  [x] Nutrition Status:  - "64y old  Male who presents with a chief complaint of dyspnea , chest pain"   - Admitted to CCU for mixed shock likely from severe AS-induced LVOT requiring pressors - weaned off; s/p TAVR () per chart. downgraded, Pt seen on 2DSU   - Acute on chronic systolic congestive heart failure  - ANGELICA on CKD   - s/p FEES  recommending "Regular/thin liquids"     DIET ORDER:   Diet, DASH/TLC:   Sodium & Cholesterol Restricted  Supplement Feeding Modality:  Oral  Ensure Max Cans or Servings Per Day:  1       Frequency:  Two Times a day (25)      CURRENT DIET ORDER IS:  [] Appropriate:  [] Inadequate:  [x] Other: see recommendations below     NUTRITION INTAKE/PROVISION:  [x] PO: Pt reported fair-good appetite, % PO intake per flowsheets with 0-25% x 1 throughout admission. Pt denied issues chewing/swallowing. Reported drinking Ensure Max.   [] Enteral Nutrition:  [] Parenteral Nutrition:    ANTHROPOMETRICS:  Drug Dosing Weight  Height (cm): 172.7 (2025 11:45)  Weight (kg): 105.8 (2025 11:45)  BMI (kg/m2): 35.5 (2025 11:45)  BSA (m2): 2.18 (2025 11:45)  Weights:   Daily Weight in k.7 (-23), Weight in k (-22), Weight in k (-21), Weight in k.7 (-20), Weight in k.8 (-19), Weight in k (-17)   weight variance noted. Weight variance possibly due to fluid shifts with edema present per flowsheets, diuretics ordered, heart failure. Also noted scale variance during admission (bed vs standing). RD will continue to monitor weight trends as able/available.     MEDICATIONS:  MEDICATIONS  (STANDING):  albuterol/ipratropium for Nebulization 3 milliLiter(s) Nebulizer every 6 hours  aMIOdarone    Tablet   Oral   aMIOdarone    Tablet 200 milliGRAM(s) Oral daily  atorvastatin 80 milliGRAM(s) Oral at bedtime  buMETAnide IVPB 3 milliGRAM(s) IV Intermittent every 12 hours  clopidogrel Tablet 75 milliGRAM(s) Oral daily  famotidine    Tablet 20 milliGRAM(s) Oral every 48 hours  hemorrhoidal Ointment 1 Application(s) Rectal two times a day  hydrALAZINE 10 milliGRAM(s) Oral three times a day  insulin lispro (ADMELOG) corrective regimen sliding scale   SubCutaneous at bedtime  insulin lispro (ADMELOG) corrective regimen sliding scale   SubCutaneous three times a day before meals  isosorbide   dinitrate Tablet (ISORDIL) 10 milliGRAM(s) Oral three times a day  polyethylene glycol 3350 17 Gram(s) Oral daily  senna 2 Tablet(s) Oral at bedtime  warfarin 3 milliGRAM(s) Oral every 24 hours    MEDICATIONS  (PRN):  melatonin 3 milliGRAM(s) Oral at bedtime PRN Insomnia      NUTRITIONALLY PERTINENT LABS:   Na138 mmol/L Glu 90 mg/dL K+ 3.9 mmol/L Cr  2.60 mg/dL[H]  mg/dL[H]  Phos 4.8 mg/dL[H]  Alb 3.4 g/dL  Chol 94 mg/dL LDL --    HDL 29 mg/dL[L] Trig 74 mg/dL ALT 21 U/L AST 16 U/L Alkaline Phosphatase 105 U/L    A1C with Estimated Average Glucose Result: 5.8 % (25 @ 18:01)          Finger Sticks:  POCT Blood Glucose.: 116 mg/dL ( @ 11:38)  POCT Blood Glucose.: 122 mg/dL ( @ 07:45)  POCT Blood Glucose.: 135 mg/dL ( @ 21:26)      NUTRITIONALLY PERTINENT MEDICATIONS/LABS:  [x] Reviewed  [x] Relevant notes on medications/labs:  - insulin ordered, no hyperglycemia noted, fingerstick range -: 105-135 mg/dL   - bumex    EDEMA:  [x] Reviewed  [x] Relevant notes: 4+ edema left/right legs per flowsheets     GI/ I&O:  [x] Reviewed  [x] Relevant notes: Pt denies nausea, vomiting, diarrhea, endorsed constipation. bowel movement reported  s/p enema per Pt. Noted miralax, senna ordered   [] Other:    SKIN:   [x] No pressure injuries documented, per nursing flowsheet  [] Pressure injury previously noted  [] Change in pressure injury documentation:  [] Other:    ESTIMATED NEEDS:  [x] No change:  [] Updated:  Energy: 3095-7634 kcal/day (33-38 kcal/kg)  Protein: 105-126g/day (1.5-1.8 g/kg)  Fluid:   ml/day or [x] defer to team  Based on: IBW 69.8kg    NUTRITION DIAGNOSIS:  [x] Prior Dx: 1) Severe Acute Malnutrition, 2) Overweight/Obesity  [] New Dx:      EDUCATION:  [x] Yes: encouraged PO intake as tolerated, encouraged continued consumption of Ensure Max. Discussed adequate protein intake. Pt was made aware RD remains available and he expressed understanding.   [] Not appropriate/warranted    NUTRITION CARE PLAN:  1. Diet: Consider liberalizing current DASH restriction to Low Sodium. Defer fluid restriction to medical team discretion   2. Supplements: Continue Ensure Max to provide 160 kcal, 30 grams protein per 10 oz   3. Monitor PO intake, PO diet tolerance, skin, weight, nutrition related labs, GI function, goals of care     [] Achieved - Continue current nutrition intervention(s)  [] Current medical condition precludes nutrition intervention at this time.    MONITORING AND EVALUATION:   RD remains available upon request and will follow up per protocol.    Suzanne Miramontes MS, RDN, CDN; available on Teams

## 2025-04-24 ENCOUNTER — APPOINTMENT (OUTPATIENT)
Facility: CLINIC | Age: 65
End: 2025-04-24

## 2025-04-24 ENCOUNTER — TRANSCRIPTION ENCOUNTER (OUTPATIENT)
Age: 65
End: 2025-04-24

## 2025-04-24 LAB
ALBUMIN SERPL ELPH-MCNC: 3.2 G/DL — LOW (ref 3.3–5)
ALP SERPL-CCNC: 97 U/L — SIGNIFICANT CHANGE UP (ref 40–120)
ALT FLD-CCNC: 21 U/L — SIGNIFICANT CHANGE UP (ref 10–45)
ANION GAP SERPL CALC-SCNC: 12 MMOL/L — SIGNIFICANT CHANGE UP (ref 5–17)
AST SERPL-CCNC: 15 U/L — SIGNIFICANT CHANGE UP (ref 10–40)
BILIRUB SERPL-MCNC: 0.7 MG/DL — SIGNIFICANT CHANGE UP (ref 0.2–1.2)
BUN SERPL-MCNC: 105 MG/DL — HIGH (ref 7–23)
CALCIUM SERPL-MCNC: 9.5 MG/DL — SIGNIFICANT CHANGE UP (ref 8.4–10.5)
CHLORIDE SERPL-SCNC: 95 MMOL/L — LOW (ref 96–108)
CO2 SERPL-SCNC: 29 MMOL/L — SIGNIFICANT CHANGE UP (ref 22–31)
CREAT SERPL-MCNC: 2.6 MG/DL — HIGH (ref 0.5–1.3)
EGFR: 27 ML/MIN/1.73M2 — LOW
EGFR: 27 ML/MIN/1.73M2 — LOW
GLUCOSE BLDC GLUCOMTR-MCNC: 119 MG/DL — HIGH (ref 70–99)
GLUCOSE BLDC GLUCOMTR-MCNC: 120 MG/DL — HIGH (ref 70–99)
GLUCOSE BLDC GLUCOMTR-MCNC: 124 MG/DL — HIGH (ref 70–99)
GLUCOSE BLDC GLUCOMTR-MCNC: 130 MG/DL — HIGH (ref 70–99)
GLUCOSE SERPL-MCNC: 81 MG/DL — SIGNIFICANT CHANGE UP (ref 70–99)
HCT VFR BLD CALC: 30.2 % — LOW (ref 39–50)
HGB BLD-MCNC: 9.5 G/DL — LOW (ref 13–17)
INR BLD: 2.51 RATIO — HIGH (ref 0.85–1.16)
MAGNESIUM SERPL-MCNC: 2.2 MG/DL — SIGNIFICANT CHANGE UP (ref 1.6–2.6)
MCHC RBC-ENTMCNC: 31.4 PG — SIGNIFICANT CHANGE UP (ref 27–34)
MCHC RBC-ENTMCNC: 31.5 G/DL — LOW (ref 32–36)
MCV RBC AUTO: 99.7 FL — SIGNIFICANT CHANGE UP (ref 80–100)
NRBC BLD AUTO-RTO: 0 /100 WBCS — SIGNIFICANT CHANGE UP (ref 0–0)
PHOSPHATE SERPL-MCNC: 4.5 MG/DL — SIGNIFICANT CHANGE UP (ref 2.5–4.5)
PLATELET # BLD AUTO: 265 K/UL — SIGNIFICANT CHANGE UP (ref 150–400)
POTASSIUM SERPL-MCNC: 4.3 MMOL/L — SIGNIFICANT CHANGE UP (ref 3.5–5.3)
POTASSIUM SERPL-SCNC: 4.3 MMOL/L — SIGNIFICANT CHANGE UP (ref 3.5–5.3)
PROT SERPL-MCNC: 7.5 G/DL — SIGNIFICANT CHANGE UP (ref 6–8.3)
PROTHROM AB SERPL-ACNC: 28.6 SEC — HIGH (ref 9.9–13.4)
RBC # BLD: 3.03 M/UL — LOW (ref 4.2–5.8)
RBC # FLD: 17.6 % — HIGH (ref 10.3–14.5)
SODIUM SERPL-SCNC: 136 MMOL/L — SIGNIFICANT CHANGE UP (ref 135–145)
WBC # BLD: 6.01 K/UL — SIGNIFICANT CHANGE UP (ref 3.8–10.5)
WBC # FLD AUTO: 6.01 K/UL — SIGNIFICANT CHANGE UP (ref 3.8–10.5)

## 2025-04-24 PROCEDURE — 99232 SBSQ HOSP IP/OBS MODERATE 35: CPT

## 2025-04-24 PROCEDURE — 99232 SBSQ HOSP IP/OBS MODERATE 35: CPT | Mod: GC

## 2025-04-24 RX ORDER — BUMETANIDE 1 MG/1
3 TABLET ORAL EVERY 12 HOURS
Refills: 0 | Status: DISCONTINUED | OUTPATIENT
Start: 2025-04-24 | End: 2025-04-28

## 2025-04-24 RX ADMIN — ISOSORBDIE DINITRATE 10 MILLIGRAM(S): 30 TABLET ORAL at 17:14

## 2025-04-24 RX ADMIN — Medication 3 MILLIGRAM(S): at 21:14

## 2025-04-24 RX ADMIN — Medication 20 MILLIGRAM(S): at 17:20

## 2025-04-24 RX ADMIN — BUMETANIDE 124 MILLIGRAM(S): 1 TABLET ORAL at 05:23

## 2025-04-24 RX ADMIN — AMIODARONE HYDROCHLORIDE 200 MILLIGRAM(S): 50 INJECTION, SOLUTION INTRAVENOUS at 05:26

## 2025-04-24 RX ADMIN — ISOSORBDIE DINITRATE 10 MILLIGRAM(S): 30 TABLET ORAL at 05:24

## 2025-04-24 RX ADMIN — BUMETANIDE 3 MILLIGRAM(S): 1 TABLET ORAL at 17:14

## 2025-04-24 RX ADMIN — IPRATROPIUM BROMIDE AND ALBUTEROL SULFATE 3 MILLILITER(S): .5; 2.5 SOLUTION RESPIRATORY (INHALATION) at 17:16

## 2025-04-24 RX ADMIN — Medication 10 MILLIGRAM(S): at 21:14

## 2025-04-24 RX ADMIN — Medication 10 MILLIGRAM(S): at 05:24

## 2025-04-24 RX ADMIN — ISOSORBDIE DINITRATE 10 MILLIGRAM(S): 30 TABLET ORAL at 12:07

## 2025-04-24 RX ADMIN — PHENYLEPHRINE HYDROCHLORIDE AND FAT, HARD 1 APPLICATION(S): .00525; 1.86 SUPPOSITORY RECTAL at 17:16

## 2025-04-24 RX ADMIN — CLOPIDOGREL BISULFATE 75 MILLIGRAM(S): 75 TABLET, FILM COATED ORAL at 12:07

## 2025-04-24 RX ADMIN — POLYETHYLENE GLYCOL 3350 17 GRAM(S): 17 POWDER, FOR SOLUTION ORAL at 12:07

## 2025-04-24 RX ADMIN — IPRATROPIUM BROMIDE AND ALBUTEROL SULFATE 3 MILLILITER(S): .5; 2.5 SOLUTION RESPIRATORY (INHALATION) at 05:24

## 2025-04-24 RX ADMIN — PHENYLEPHRINE HYDROCHLORIDE AND FAT, HARD 1 APPLICATION(S): .00525; 1.86 SUPPOSITORY RECTAL at 05:23

## 2025-04-24 RX ADMIN — IPRATROPIUM BROMIDE AND ALBUTEROL SULFATE 3 MILLILITER(S): .5; 2.5 SOLUTION RESPIRATORY (INHALATION) at 12:07

## 2025-04-24 RX ADMIN — ATORVASTATIN CALCIUM 80 MILLIGRAM(S): 80 TABLET, FILM COATED ORAL at 21:14

## 2025-04-24 NOTE — DISCHARGE NOTE PROVIDER - DETAILS OF MALNUTRITION DIAGNOSIS/DIAGNOSES
This patient has been assessed with a concern for Malnutrition and was treated during this hospitalization for the following Nutrition diagnosis/diagnoses:     -  04/03/2025: Severe protein-calorie malnutrition   -  04/03/2025: Morbid obesity (BMI > 40)

## 2025-04-24 NOTE — DISCHARGE NOTE PROVIDER - NSDCMRMEDTOKEN_GEN_ALL_CORE_FT
allopurinol 100 mg oral tablet: 2 tab(s) orally once a day  amiodarone 200 mg oral tablet: 1 tab(s) orally once a day start from April 4th  amiodarone 400 mg oral tablet: 1 tab(s) orally 3 times a day stop after 10 days  atorvastatin 20 mg oral tablet: 1 tab(s) orally once a day  bumetanide 1 mg oral tablet: 1 milligram(s) orally once a day 1 mg and 2 mg alternate day  Eliquis 5 mg oral tablet: 1 tab(s) orally 2 times a day  LAST DOSE 11/14/22  Entresto 24 mg-26 mg oral tablet: 1 tab(s) orally 2 times a day  famotidine 20 mg oral tablet: 1 tab(s) orally 2 times a day  hydrALAZINE 50 mg oral tablet: 1 tab(s) orally 3 times a day  Plavix 75 mg oral tablet: 1 tab(s) orally once a day   allopurinol 100 mg oral tablet: 2 tab(s) orally once a day  amiodarone 200 mg oral tablet: 1 tab(s) orally once a day start from April 4th  amiodarone 400 mg oral tablet: 1 tab(s) orally 3 times a day stop after 10 days  bumetanide 1 mg oral tablet: 1 milligram(s) orally once a day 1 mg and 2 mg alternate day  Entresto 24 mg-26 mg oral tablet: 1 tab(s) orally 2 times a day  famotidine 20 mg oral tablet: 1 tab(s) orally 2 times a day  hydrALAZINE 50 mg oral tablet: 1 tab(s) orally 3 times a day  Plavix 75 mg oral tablet: 1 tab(s) orally once a day  polyethylene glycol 3350 oral powder for reconstitution: 17 gram(s) orally once a day  senna leaf extract oral tablet: 2 tab(s) orally once a day (at bedtime)   allopurinol 100 mg oral tablet: 2 tab(s) orally once a day  amiodarone 200 mg oral tablet: 1 tab(s) orally once a day start from April 4th  atorvastatin 80 mg oral tablet: 1 tab(s) orally once a day (at bedtime)  bumetanide 1 mg oral tablet: 3 tab(s) orally every 12 hours  dapagliflozin 10 mg oral tablet: 1 tab(s) orally once a day  famotidine 20 mg oral tablet: 1 tab(s) orally 2 times a day  hydrALAZINE 10 mg oral tablet: 2 tab(s) orally 3 times a day  isosorbide dinitrate 10 mg oral tablet: 1 tab(s) orally 3 times a day  Plavix 75 mg oral tablet: 1 tab(s) orally once a day  polyethylene glycol 3350 oral powder for reconstitution: 17 gram(s) orally once a day  senna leaf extract oral tablet: 2 tab(s) orally once a day (at bedtime)  warfarin 3 mg oral tablet: 1 tab(s) orally once a day

## 2025-04-24 NOTE — DISCHARGE NOTE PROVIDER - CARE PROVIDERS DIRECT ADDRESSES
,[email or web address]

## 2025-04-24 NOTE — DISCHARGE NOTE PROVIDER - NSDCCAREPROVSEEN_GEN_ALL_CORE_FT
[facility] Team 1 [facility] Team 1  Dr. Deanne Wolfe [facility] Team 1

## 2025-04-24 NOTE — PROGRESS NOTE ADULT - ATTENDING COMMENTS
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   TTE: LV EF 25%, normal AV function w/o paravalvular leak.     Imp:  ICM, s/p CABG  HFrEf EF 25%  bioprosthetic stenosis s/p Florence TAVR  CKD 4    Plan:  switch Bumex 3mg po BID  will cont hydralazine/ isordil for afterload management  will cont monitor renal function  will start low dose BB once volume optimized  replete K and Mg    Cont antiplatelets and statin  cont coumadin    rest of care per primary team

## 2025-04-24 NOTE — PROGRESS NOTE ADULT - ASSESSMENT
60 yo man with history of CAD s/p CABG (2013), atrial fibrillation, ICM (EF 25%, LVIDd 5.9cm), severe AS s/p TAVR 11/2022 with severe prosthetic stenosis, atrial flutter s/p ablation, CKD, WILFRED, venous stasis, HTN, HLD admitted with AHRF secondary to ADHF. Was initially in the CICU on pressors with ANGELICA on CKD as well. Now s/p TAVR valve in valve on 4/11. CHF consulted for GDMT titration.     At present, patient remains volume overloaded. He is hemodynamically stable and will need titration of GDMT in the setting of renal dysfunction.     Cardiac Studies:   TTE 4/12/25: LVEF 25%. LVIdd 5.9cm. Evolut valve placed in aortic position without paravalvular regurg. Biatrial enlargement. severe mitral leaflet calcification.   TTE 4/1/25: LVEF 40%. Regional WMAs in LAD and LCx territory. Severe prosthetic aortic stenosis.   TTE 6/11/13: LVEF 34%.   LHC 4/11/25: LIMA to LAD patent. Occluded SVGs to OM1 and D1. Severe native vessel CAD. RCA with nonobstructive disease.

## 2025-04-24 NOTE — DISCHARGE NOTE PROVIDER - NSDCCPTREATMENT_GEN_ALL_CORE_FT
PRINCIPAL PROCEDURE  Procedure: Complete transthoracic echocardiography (TTE)  Findings and Treatment: CONCLUSIONS:      1. TTE imaging and guidance were provided during transcatheter aortic valve replacement (TAVR).   2. 29mm Evolut FX+-in-26mm+2cc YAHAIRA THV (THV-in-THV) (valve-in-valve) is present in the aortic position. The prosthetic valve is well seated with normal function. The peak transaortic velocity is 2.84 m/s, peak transaortic gradient is 32.2 mmHg and mean transaortic gradient is 15.9 mmHg with an LVOT/aortic valve VTI ratio of 0.33. The aortic valve acceleration time is 113 msec. The effective orifice area is estimated at 1.37 cm² by the continuity equation. There is no intravalvular regurgitation. There is mild paravalvular regurgitation, originating at 2 o'clock.   3. Left ventricular cavity is severely dilated. Left ventricular systolic function is severely decreased with an ejection fraction visually estimated at 20 to 25 %.   4. The right ventricle is not well visualized.

## 2025-04-24 NOTE — DISCHARGE NOTE PROVIDER - HOSPITAL COURSE
For full details, please see H&P, progress notes, consult notes and ancillary notes.    A 64-year-old male with a history of atrial fibrillation (status post unsuccessful direct current cardioversion on 3/25/25), congestive heart failure (CHF) with likely moderately reduced ejection fraction, aortic stenosis (status post transcatheter aortic valve replacement in November 2022), coronary artery disease (status post coronary artery bypass grafting), atrial flutter (status post ablation in 2019 and Micra pacemaker implantation), chronic kidney disease stage 3, obesity, venous stasis, hypertension, and hyperlipidemia presented with six days of worsening dyspnea on exertion.  He also reported associated orthopnea, exertional chest pain (described as burning, central location, and not present at rest), and decreased appetite for several days.    On arrival to the Emergency Department, he was tachycardic, hypotensive, and tachypneic, requiring 2L of supplemental oxygen.  Laboratory findings were notable for leukocytosis, elevated INR, hyponatremia, hyperkalemia, metabolic acidosis, significantly elevated troponin and BNP, and acute kidney injury (ANGELICA) on chronic kidney disease, consistent with cardiorenal syndrome. Urinalysis was negative for infection.  EKG showed first-degree AV block, low voltage, poor R wave progression, and right axis deviation.  Chest X-ray revealed diffuse hazy opacities suggestive of mild pulmonary edema and small bilateral pleural effusions. He received multiple doses of IV bumex and lokelma for diuresis and potassium management.  He was admitted for acute hypoxic respiratory failure (AHRF) secondary to CHF exacerbation and ANGELICA.    On 4/11, he underwent valve-in-valve transcatheter aortic valve replacement (Florence-TAVR).  Postoperatively, he remained intubated and required pressor support and continued diuresis. He was extubated on 4/12 and advanced to a regular and thin liquid diet on 4/14. By 4/17, he was transferred to a telemetry floor. His atrial fibrillation was rate-controlled with amiodarone.  He was bridged from heparin to warfarin for anticoagulation, continued on Plavix, and his urine output improved with continued bumex. On 4/18, he was downgraded to the medicine floor. His Coumadin dosing was subsequently adjusted per his INR, which is now within therapeutic range. The heart failure and structural teams recommended transitioning him to oral bumex and starting him on hydralazine and isosorbide dinitrate. He will be discharged to a subacute rehabilitation facility on guideline-directed medical therapy (GDMT) with close follow-up with his primary care physician, cardiology, and nephrology teams to further manage his symptoms      On day of discharge, patient is clinically stable with no new exam findings or acute symptoms compared to prior. The patient was seen by the attending physician on the date of discharge and deemed stable and acceptable for discharge. The patient's chronic medical conditions were treated accordingly per the patient's home medication regimen. The patient's medication reconciliation (with changes made to chronic medications), follow up appointments, discharge orders, instructions, and significant lab and diagnostic studies are as noted.     Discharge follow up action items:     1. Follow up with PCP in 1-2 weeks. Follow up with Heart Failure, Structural, Nephrology subspecialists in 1-2 weeks.  2. Follow up labs: Scr, AST, ALT  3. Medication changes: ***  4. On hold medications: ***  5. Incidental findings: ***  6. Diagnoses: Cardiogenic shock 2/2 Aortic Insufficiency    Patient's ordered code status: [X] Full code  [ ] DNR  [ ] Hospice  Patient disposition: [facility]    Patient will be discharged to [facility] with close follow up.   For full details, please see H&P, progress notes, consult notes and ancillary notes.    A 64-year-old male with a history of atrial fibrillation (status post unsuccessful direct current cardioversion on 3/25/25), congestive heart failure (CHF) with likely moderately reduced ejection fraction, aortic stenosis (status post transcatheter aortic valve replacement in November 2022), coronary artery disease (status post coronary artery bypass grafting), atrial flutter (status post ablation in 2019 and Micra pacemaker implantation), chronic kidney disease stage 3, obesity, venous stasis, hypertension, and hyperlipidemia presented with six days of worsening dyspnea on exertion.  He also reported associated orthopnea, exertional chest pain (described as burning, central location, and not present at rest), and decreased appetite for several days.    On arrival to the Emergency Department, he was tachycardic, hypotensive, and tachypneic, requiring 2L of supplemental oxygen.  Laboratory findings were notable for leukocytosis, elevated INR, hyponatremia, hyperkalemia, metabolic acidosis, significantly elevated troponin and BNP, and acute kidney injury (ANGELICA) on chronic kidney disease, consistent with cardiorenal syndrome. Urinalysis was negative for infection.  EKG showed first-degree AV block, low voltage, poor R wave progression, and right axis deviation.  Chest X-ray revealed diffuse hazy opacities suggestive of mild pulmonary edema and small bilateral pleural effusions. He received multiple doses of IV bumex and lokelma for diuresis and potassium management.  He was admitted for acute hypoxic respiratory failure (AHRF) secondary to CHF exacerbation and ANGELICA.    On 4/11, he underwent valve-in-valve transcatheter aortic valve replacement (Florence-TAVR).  Postoperatively, he remained intubated and required pressor support and continued diuresis. He was extubated on 4/12 and advanced to a regular and thin liquid diet on 4/14. By 4/17, he was transferred to a telemetry floor. His atrial fibrillation was rate-controlled with amiodarone.  He was bridged from heparin to warfarin for anticoagulation, continued on Plavix, and his urine output improved with continued bumex. On 4/18, he was downgraded to the medicine floor. His Coumadin dosing was subsequently adjusted per his INR, which is now within therapeutic range. The heart failure and structural teams recommended transitioning him to oral bumex and starting him on hydralazine and isosorbide dinitrate. He will be discharged to a subacute rehabilitation facility on guideline-directed medical therapy (GDMT) with close follow-up with his primary care physician, cardiology, and nephrology teams to further manage his symptoms      On day of discharge, patient is clinically stable with no new exam findings or acute symptoms compared to prior. The patient was seen by the attending physician on the date of discharge and deemed stable and acceptable for discharge. The patient's chronic medical conditions were treated accordingly per the patient's home medication regimen. The patient's medication reconciliation (with changes made to chronic medications), follow up appointments, discharge orders, instructions, and significant lab and diagnostic studies are as noted.     Discharge follow up action items:     1. Follow up with PCP in 1-2 weeks. Follow up with Heart Failure, Structural, Nephrology subspecialists in 1-2 weeks.  2. Follow up labs: Scr, AST, ALT    3. Medication changes: ****please confirm with HF team prior to discharge****  #TAVR  - START Coumadin ******    #GDMT  - START hydralazine to 20mg TID   - START isordil 10mg TID   - START bumex 3mg po BID   - START metoprolol succinate 12.5mg daily   - START farxiga 10mg daily     4. On hold medications:   - STOP Entresto    5. Incidental findings: ***  6. Diagnoses: Cardiogenic shock 2/2 Aortic Insufficiency    Patient's ordered code status: [X] Full code  [ ] DNR  [ ] Hospice  Patient disposition: [facility]    Patient will be discharged to [facility] with close follow up.   For full details, please see H&P, progress notes, consult notes and ancillary notes.    A 64-year-old male with a history of atrial fibrillation (status post unsuccessful direct current cardioversion on 3/25/25), congestive heart failure (CHF) with likely moderately reduced ejection fraction, aortic stenosis (status post transcatheter aortic valve replacement in November 2022), coronary artery disease (status post coronary artery bypass grafting), atrial flutter (status post ablation in 2019 and Micra pacemaker implantation), chronic kidney disease stage 3, obesity, venous stasis, hypertension, and hyperlipidemia presented with six days of worsening dyspnea on exertion.  He also reported associated orthopnea, exertional chest pain (described as burning, central location, and not present at rest), and decreased appetite for several days.    On arrival to the Emergency Department, he was tachycardic, hypotensive, and tachypneic, requiring 2L of supplemental oxygen.  Laboratory findings were notable for leukocytosis, elevated INR, hyponatremia, hyperkalemia, metabolic acidosis, significantly elevated troponin and BNP, and acute kidney injury (ANGELICA) on chronic kidney disease, consistent with cardiorenal syndrome. Urinalysis was negative for infection.  EKG showed first-degree AV block, low voltage, poor R wave progression, and right axis deviation.  Chest X-ray revealed diffuse hazy opacities suggestive of mild pulmonary edema and small bilateral pleural effusions. He received multiple doses of IV bumex and lokelma for diuresis and potassium management.  He was admitted for acute hypoxic respiratory failure (AHRF) secondary to CHF exacerbation and ANGELICA.    On 4/11, he underwent valve-in-valve transcatheter aortic valve replacement (Florence-TAVR).  Postoperatively, he remained intubated and required pressor support and continued diuresis. He was extubated on 4/12 and advanced to a regular and thin liquid diet on 4/14. By 4/17, he was transferred to a telemetry floor. His atrial fibrillation was rate-controlled with amiodarone.  He was bridged from heparin to warfarin for anticoagulation, continued on Plavix, and his urine output improved with continued bumex. On 4/18, he was downgraded to the medicine floor. His Coumadin dosing was subsequently adjusted per his INR, which is now within therapeutic range. The heart failure and structural teams recommended transitioning him to oral bumex and starting him on hydralazine and isosorbide dinitrate. He will be discharged to a subacute rehabilitation facility on guideline-directed medical therapy (GDMT) with close follow-up with his primary care physician, cardiology, and nephrology teams to further manage his symptoms      On day of discharge, patient is clinically stable with no new exam findings or acute symptoms compared to prior. The patient was seen by the attending physician on the date of discharge and deemed stable and acceptable for discharge. The patient's chronic medical conditions were treated accordingly per the patient's home medication regimen. The patient's medication reconciliation (with changes made to chronic medications), follow up appointments, discharge orders, instructions, and significant lab and diagnostic studies are as noted.     Discharge follow up action items:     1. Follow up with PCP in 1-2 weeks. Follow up with Heart Failure, Structural, Nephrology subspecialists in 1-2 weeks.  2. Follow up labs: Scr, AST, ALT    3. Medication changes: ****please confirm with HF team prior to discharge****  #TAVR  - START Coumadin 3mg nightly    #GDMT  - START hydralazine to 20mg TID   - START isordil 10mg TID   - START bumex 3mg po BID   - START metoprolol succinate 12.5mg daily   - START farxiga 10mg daily     4. On hold medications:   - STOP Entresto    5. Incidental findings: ***  6. Diagnoses: Cardiogenic shock 2/2 Aortic Insufficiency    Patient's ordered code status: [X] Full code  [ ] DNR  [ ] Hospice  Patient disposition: [facility]    Patient will be discharged to [facility] with close follow up.   For full details, please see H&P, progress notes, consult notes and ancillary notes.    A 64-year-old male with a history of atrial fibrillation (status post unsuccessful direct current cardioversion on 3/25/25), congestive heart failure (CHF) with likely moderately reduced ejection fraction, aortic stenosis (status post transcatheter aortic valve replacement in November 2022), coronary artery disease (status post coronary artery bypass grafting), atrial flutter (status post ablation in 2019 and Micra pacemaker implantation), chronic kidney disease stage 3, obesity, venous stasis, hypertension, and hyperlipidemia presented with six days of worsening dyspnea on exertion.  He also reported associated orthopnea, exertional chest pain (described as burning, central location, and not present at rest), and decreased appetite for several days.    On arrival to the Emergency Department, he was tachycardic, hypotensive, and tachypneic, requiring 2L of supplemental oxygen.  Laboratory findings were notable for leukocytosis, elevated INR, hyponatremia, hyperkalemia, metabolic acidosis, significantly elevated troponin and BNP, and acute kidney injury (ANGELICA) on chronic kidney disease, consistent with cardiorenal syndrome. Urinalysis was negative for infection.  EKG showed first-degree AV block, low voltage, poor R wave progression, and right axis deviation.  Chest X-ray revealed diffuse hazy opacities suggestive of mild pulmonary edema and small bilateral pleural effusions. He received multiple doses of IV bumex and lokelma for diuresis and potassium management.  He was admitted for acute hypoxic respiratory failure (AHRF) secondary to CHF exacerbation and ANGELICA.    On 4/11, he underwent valve-in-valve transcatheter aortic valve replacement (Florence-TAVR).  Postoperatively, he remained intubated and required pressor support and continued diuresis. He was extubated on 4/12 and advanced to a regular and thin liquid diet on 4/14. By 4/17, he was transferred to a telemetry floor. His atrial fibrillation was rate-controlled with amiodarone.  He was bridged from heparin to warfarin for anticoagulation, continued on Plavix, and his urine output improved with continued bumex. On 4/18, he was downgraded to the medicine floor. His Coumadin dosing was subsequently adjusted per his INR, which is now within therapeutic range. The heart failure and structural teams recommended transitioning him to oral bumex and starting him on hydralazine and isosorbide dinitrate. He will be discharged to a subacute rehabilitation facility on guideline-directed medical therapy (GDMT) with close follow-up with his primary care physician, cardiology, and nephrology teams to further manage his symptoms      On day of discharge, patient is clinically stable with no new exam findings or acute symptoms compared to prior. The patient was seen by the attending physician on the date of discharge and deemed stable and acceptable for discharge. The patient's chronic medical conditions were treated accordingly per the patient's home medication regimen. The patient's medication reconciliation (with changes made to chronic medications), follow up appointments, discharge orders, instructions, and significant lab and diagnostic studies are as noted.     Discharge follow up action items:     1. Follow up with PCP in 1-2 weeks. Follow up with Heart Failure, Structural, Nephrology subspecialists in 1-2 weeks.  2. Follow up labs: Scr, AST, ALT    3. Medication changes:  #TAVR  - START Coumadin 3mg nightly    #GDMT  - START hydralazine to 20mg TID   - START isordil 10mg TID   - START bumex 3mg po BID   - START metoprolol succinate 12.5mg daily   - START farxiga 10mg daily     4. On hold medications:   - STOP Entresto    5. Diagnoses: Cardiogenic shock 2/2 Aortic Insufficiency    Patient's ordered code status: [X] Full code  [ ] DNR  [ ] Hospice  Patient disposition: [facility]    Patient will be discharged to [facility] with close follow up.

## 2025-04-24 NOTE — PROGRESS NOTE ADULT - PROBLEM SELECTOR PLAN 2
Likely in the setting of congestion, IV contrast but also long standing comorbidities.   Cr may be at baseline  -renally dose all medications   -diuretics as above  -renal recs appreciated.

## 2025-04-24 NOTE — DISCHARGE NOTE PROVIDER - NSFOLLOWUPCLINICS_GEN_ALL_ED_FT
[facility] Card[facility]  Cardiology  [place]   Phone:   Fax:   Follow Up Time: 2 weeks    [facility] - HF  Cardiology  [facility] office, [address]  [place], [place]   Phone:   Fax:   Follow Up Time: 2 weeks    [facility]  Nephrology  [address] [place] [address]  Phone: [phone]  Fax:   Follow Up Time: 2 weeks     [facility]  Cardiology  [place]   Phone:   Fax:   Follow Up Time: 2 weeks    [facility] - HF  Cardiology  [facility], [address] [place] [address]   Phone:   Fax:   Follow Up Time: 2 weeks

## 2025-04-24 NOTE — DISCHARGE NOTE PROVIDER - NSDCCPCAREPLAN_GEN_ALL_CORE_FT
PRINCIPAL DISCHARGE DIAGNOSIS  Diagnosis: Aortic valve stenosis  Assessment and Plan of Treatment: You came to the hospital because you were experiencing shortness of breath with activity (exertional dyspnea), difficulty breathing while lying flat (orthopnea), chest pain with exertion, and a decreased appetite for several days.  Tests in the ER showed your heart was beating fast (tachycardia), your blood pressure was low (hypotension), and you were breathing rapidly (tachypnea). You needed supplemental oxygen.  Lab work revealed some electrolyte imbalances, signs of stress on your heart, and some reduced kidney function. Your heart failure was causing extra strain on your kidneys.  A chest x-ray showed fluid in your lungs. We gave you medications to help remove the excess fluid and manage your potassium levels.  You had a procedure to replace your aortic valve (Florence-TAVR). After the procedure, you needed a breathing tube and medication to support your blood pressure for a short time. You were able to come off the breathing tube fairly quickly and your diet was advanced.  Your irregular heart rhythm (atrial fibrillation) was controlled with medication. You were also started on a blood thinner (Coumadin) and medicines to help your heart function better (bumex, hydralazine, and isosorbide dinitrate).  You will be going to a rehab facility to help you regain your strength.  It's important to follow up with your heart doctor, kidney doctor, and primary care physician. If you experience worsening shortness of breath, chest pain, lightheadedness, or any other new or concerning symptoms, return to the hospital immediately.       PRINCIPAL DISCHARGE DIAGNOSIS  Diagnosis: Aortic valve stenosis  Assessment and Plan of Treatment: You came to the hospital because you were experiencing shortness of breath with activity (exertional dyspnea), difficulty breathing while lying flat (orthopnea), chest pain with exertion, and a decreased appetite for several days.  Tests in the ER showed your heart was beating fast (tachycardia), your blood pressure was low (hypotension), and you were breathing rapidly (tachypnea). You needed supplemental oxygen.  Lab work revealed some electrolyte imbalances, signs of stress on your heart, and some reduced kidney function. Your heart failure was causing extra strain on your kidneys.  A chest x-ray showed fluid in your lungs. We gave you medications to help remove the excess fluid and manage your potassium levels.  You had a procedure to replace your aortic valve (Florence-TAVR). After the procedure, you needed a breathing tube and medication to support your blood pressure for a short time. You were able to come off the breathing tube fairly quickly and your diet was advanced.  Your irregular heart rhythm (atrial fibrillation) was controlled with medication. You were also started on a blood thinner (Coumadin) and medicines to help your heart function better. You will be going to a rehab facility to help you regain your strength.  It's important to follow up with your heart doctor, kidney doctor, and primary care physician. If you experience worsening shortness of breath, chest pain, lightheadedness, or any other new or concerning symptoms, return to the hospital immediately.  Please take below medications as instructed:  - START Coumadin _____  - START hydralazine to 20mg TID   - START isordil 10mg TID   - START bumex 3mg po BID   - START metoprolol succinate 12.5mg daily   - START farxiga 10mg daily  - STOP home Entresto       PRINCIPAL DISCHARGE DIAGNOSIS  Diagnosis: Aortic valve stenosis  Assessment and Plan of Treatment: You came to the hospital because you were experiencing shortness of breath with activity (exertional dyspnea), difficulty breathing while lying flat (orthopnea), chest pain with exertion, and a decreased appetite for several days.  Tests in the ER showed your heart was beating fast (tachycardia), your blood pressure was low (hypotension), and you were breathing rapidly (tachypnea). You needed supplemental oxygen.  Lab work revealed some electrolyte imbalances, signs of stress on your heart, and some reduced kidney function. Your heart failure was causing extra strain on your kidneys.  A chest x-ray showed fluid in your lungs. We gave you medications to help remove the excess fluid and manage your potassium levels.  You had a procedure to replace your aortic valve (Florence-TAVR). After the procedure, you needed a breathing tube and medication to support your blood pressure for a short time. You were able to come off the breathing tube fairly quickly and your diet was advanced.  Your irregular heart rhythm (atrial fibrillation) was controlled with medication. You were also started on a blood thinner (Coumadin) and medicines to help your heart function better. You will be going to a rehab facility to help you regain your strength.  It's important to follow up with your heart doctor, kidney doctor, and primary care physician. If you experience worsening shortness of breath, chest pain, lightheadedness, or any other new or concerning symptoms, return to the hospital immediately.  Please take below medications as instructed:  - START Coumadin 3mg daily  - START hydralazine to 20mg TID   - START isordil 10mg TID   - START bumex 3mg po BID   - START metoprolol succinate 12.5mg daily   - START farxiga 10mg daily  - STOP home Entresto

## 2025-04-24 NOTE — PROGRESS NOTE ADULT - SUBJECTIVE AND OBJECTIVE BOX
Subjective  Events of transpired over last 24 hours were reviewed.  The patient reports that he is clinically doing well.  Denies any cardiopulmonary plaints at rest upon minimal exertion.  Denies any fevers, chills, sweats, light sensation, dizzy or palpitation.  Continues to suffer from constipation.  Denies any fevers, chills or sweats.    Telemetry  Atrial fibrillation    Review of system  14 point review of systems otherwise unremarkable separate described above in history of present illness    Physical examination  No apparent distress, alert and oriented 3, appropriate affect  JVD is elevated, supple, no lymphadenopathy  Clear to auscultation bilaterally with no wheezing rhonchi crackles  Regular rate and rhythm with 3 out of 6 crescendo decrescendo murmur less on the right upper sternal rating to the carotids  Positive bowel sound, soft, nontender, nondistended, no mass and guarding or rebound tenderness  Right and left groin sites are soft with no ecchymosis/hematoma/bruit  Chronic venous stasis changes bilaterally, mild bilateral lower extremity edema  1+ DP/PT pulse  No focal deficits    Assessment/plan  64-year-old man with past medical history significant for    A-fib status post cardioversion on 3/25/2025  Ischemic cardiomyopathy  Coronary artery disease status post CABG  Aortic valve stenosis status post TAVR in 11/2022  Atrial flutter status post ablation 2019  Status post Micra implant  Chronic kidney disease stage III  Obesity  Hypertension  Hyperlipidemia  Venous stasis    Who presented with acute on chronic renal failure in the setting of CHF exacerbation rapid response called on the floor due to systolic blood pressure in the 80s.  Patient in cardiogenic shock.    --Constipation as per primary medical team.  --The patient on 4/11/2025 underwent percutaneous transcatheter aortic valve in valve of his bioprosthetic Cullen 3 26mm TAVR valve during which time a Medtronic Evolut FX+ 29mm valve was implanted.  The patient tolerated the procedure well.  Repeat TTE was reviewed which demonstrated appropriately positioned TAVR valve in valve.  --Patient suffering from symptomatic constipation/hemorrhoids.  He is receiving hemorrhoidal ointment and is on senna.  Further management as per medicine team.  -- Continue Coumadin.  Goal INR between 2-3.  [id number] or 4.  Closely monitor for signs incisional bleeding.  --The patient appears that he is approaching an euvolemic state.  Please discuss with heart failure team about transitioning the patient to oral Bumex.  Currently on Bumex 3 mg IV every 12 hours. Continue to closely monitor kidney function.  Baseline serum creatinine between 1.5-1.8.  As an outpatient he was also on Entresto.  Of avoiding all nephrotoxins at this time.  --Continue hydralazine 10 mg 3 times a day and isosorbide dinitrate 10 mg 3 times a day.  --Continue amiodarone 200 mg daily.  --Continue atorvastatin 40 mg daily.  --Continue clopidogrel 75 mg daily.  Closely monitor for signs and symptoms of bleeding.  --Recommend daily PT.  --Patient is being assessed for subacute/acute rehabilitation facility.  --Continue telemetry monitoring.    All questions and concerns of the patient were addressed.      Time-based billing (NON-critical care).   35 minutes spent on total encounter. The necessity of the time spent during the encounter on this date of service was due to:   education, assessment and coordination of care.

## 2025-04-24 NOTE — DISCHARGE NOTE PROVIDER - PROVIDER TOKENS
PROVIDER:[TOKEN:[[id number]],FOLLOWUP:[2 weeks],ESTABLISHEDPATIENT:[T]],PROVIDER:[TOKEN:[[id number]],FOLLOWUP:[2 weeks]] PROVIDER:[TOKEN:[[id number]],FOLLOWUP:[2 weeks],ESTABLISHEDPATIENT:[T]],PROVIDER:[TOKEN:[[id number]],FOLLOWUP:[2 weeks]],PROVIDER:[TOKEN:[[id number]],FOLLOWUP:[2 weeks],ESTABLISHEDPATIENT:[T]]

## 2025-04-24 NOTE — PROGRESS NOTE ADULT - ATTENDING COMMENTS
64M PMHx AFib (s/p DCCV 3/25/25), HFrEF, aortic stenosis s/p TAVR (11/2022), AFlutter s/p ablation 2019 and s/p Micra implant, CKD3, WILFRED, obesity, venous stasis, HTN, HLD admitted for heart failure exacerbation, acute hypoxic resp failure, ANGELICA on CKD, downgraded from CCU after requiring pressors, s/p TAVR 4/11, s/p hep to coum bridge, currently being diuresed.     #AS  - S/p repeat TAVR 4/11  - INR in therapeutic range 2-3, dose 3mg coumadin tonight.    #HFrEF  - HF following, bumex 3mg PO BID, hydral 10 TID and isordil 10 TID for GDMT  -daily weights/strict I&Os---net -510cc   -trend CMP, lactate  -f/u HF recs     #Acute respiratory failure  - Likely due to acute HF  - C/w diuresis as above  - wean O2 as tolerated     #AFib  - C/w anticoagulation  - C/w amiodarone  - Monitor on telemetry    #CKD3  - Cr 2.6 stable, diuresis as above   - Avoid nephrotoxins, renally dose all medications    Rest of care as documented above . d/w HS 1

## 2025-04-24 NOTE — DISCHARGE NOTE PROVIDER - NSDCFUADDAPPT_GEN_ALL_CORE_FT
APPTS ARE READY TO BE MADE: [X] YES    Best Family or Patient Contact (if needed): Self    Additional Information about above appointments (if needed):    1: Cardiology (HF and Structural)  2: Nephrology  3: PCP    Other comments or requests:    APPTS ARE READY TO BE MADE: [X] YES    Best Family or Patient Contact (if needed): Self    Additional Information about above appointments (if needed):    1: Cardiology (HF and Structural)  2: Nephrology  3: PCP    Other comments or requests:     Patient is being discharged to rehab. Caregiver will arrange follow up.

## 2025-04-24 NOTE — DISCHARGE NOTE PROVIDER - NSDCHOSPICE_GEN_A_CORE
Made Miky aware  They will put in the orders  These will likely be drawn tomorrow and will be sent over once resulted  No

## 2025-04-24 NOTE — PROGRESS NOTE ADULT - PROBLEM SELECTOR PLAN 1
Stage C   Etiology: Ischemic   Remains volume overloaded. Warm and wet on exam.   GDMT titration limited by poor renal function.   -continue hydralazine and isordil 10mg TID   -switch to bumex 3mg po BID   -goal UOP net negative -1 to -2 L   -strict intake output   -trend lactate, LFTs  -daily standing weights   -trend BMP, Mag, Phos  -aggressive BM regimen

## 2025-04-24 NOTE — PROGRESS NOTE ADULT - SUBJECTIVE AND OBJECTIVE BOX
Patient seen and examined at bedside.    Overnight Events:   SYLVIE   Diuresing well with bumex   Denies acute complaints  Reports improvement in breathing     Current Meds:  albuterol/ipratropium for Nebulization 3 milliLiter(s) Nebulizer every 6 hours  aMIOdarone    Tablet   Oral   aMIOdarone    Tablet 200 milliGRAM(s) Oral daily  atorvastatin 80 milliGRAM(s) Oral at bedtime  buMETAnide IVPB 3 milliGRAM(s) IV Intermittent every 12 hours  clopidogrel Tablet 75 milliGRAM(s) Oral daily  famotidine    Tablet 20 milliGRAM(s) Oral every 48 hours  hemorrhoidal Ointment 1 Application(s) Rectal two times a day  hydrALAZINE 10 milliGRAM(s) Oral three times a day  insulin lispro (ADMELOG) corrective regimen sliding scale   SubCutaneous at bedtime  insulin lispro (ADMELOG) corrective regimen sliding scale   SubCutaneous three times a day before meals  isosorbide   dinitrate Tablet (ISORDIL) 10 milliGRAM(s) Oral three times a day  melatonin 3 milliGRAM(s) Oral at bedtime PRN  polyethylene glycol 3350 17 Gram(s) Oral daily  senna 2 Tablet(s) Oral at bedtime  warfarin 3 milliGRAM(s) Oral every 24 hours      Vitals:  T(F): 98.1 (04-24), Max: 98.1 (04-24)  HR: 80 (04-24) (69 - 94)  BP: 100/66 (04-24) (100/66 - 109/71)  RR: 18 (04-24)  SpO2: 96% (04-24)  I&O's Summary    23 Apr 2025 07:01  -  24 Apr 2025 07:00  --------------------------------------------------------  IN: 840 mL / OUT: 1350 mL / NET: -510 mL        Physical Exam:  Appearance: No acute distress; well appearing  Cardiovascular: RRR, S1, S2, 3/6 systolic murmur; JVP to mid neck   Respiratory: Clear to auscultation bilaterally  Gastrointestinal: soft, non-tender, non-distended with normal bowel sounds  Musculoskeletal: CVS changes   Neurologic: Non-focal  Lymphatic: No lymphadenopathy  Psychiatry: AAOx3, mood & affect appropriate  Skin: No rashes, ecchymoses, or cyanosis                          9.5    6.01  )-----------( 265      ( 24 Apr 2025 06:34 )             30.2     04-24    136  |  95[L]  |  105[H]  ----------------------------<  81  4.3   |  29  |  2.60[H]    Ca    9.5      24 Apr 2025 06:34  Phos  4.5     04-24  Mg     2.2     04-24    TPro  7.5  /  Alb  3.2[L]  /  TBili  0.7  /  DBili  x   /  AST  15  /  ALT  21  /  AlkPhos  97  04-24    PT/INR - ( 24 Apr 2025 06:34 )   PT: 28.6 sec;   INR: 2.51 ratio         PTT - ( 23 Apr 2025 06:32 )  PTT:33.4 sec

## 2025-04-24 NOTE — DISCHARGE NOTE PROVIDER - NSDCFUSCHEDAPPT_GEN_ALL_CORE_FT
[facility]  ECHOCARD [address]  Scheduled Appointment: 05/15/2025    Manish Potts  [facility]  CARDIOLOGY [address]  Scheduled Appointment: 07/02/2025

## 2025-04-24 NOTE — PROGRESS NOTE ADULT - SUBJECTIVE AND OBJECTIVE BOX
Patient is a 64y old  Male who presents with a chief complaint of dyspnea , chest pain (23 Apr 2025 07:06)      SUBJECTIVE / OVERNIGHT EVENTS:  - no acute event overnight    Pt seen and examined at bedside. Had no complaints. Denied cp, sob, d/n/v    MEDICATIONS  (STANDING):  albuterol/ipratropium for Nebulization 3 milliLiter(s) Nebulizer every 6 hours  aMIOdarone    Tablet   Oral   aMIOdarone    Tablet 200 milliGRAM(s) Oral daily  atorvastatin 80 milliGRAM(s) Oral at bedtime  buMETAnide IVPB 3 milliGRAM(s) IV Intermittent every 12 hours  clopidogrel Tablet 75 milliGRAM(s) Oral daily  famotidine    Tablet 20 milliGRAM(s) Oral every 48 hours  hemorrhoidal Ointment 1 Application(s) Rectal two times a day  hydrALAZINE 10 milliGRAM(s) Oral three times a day  insulin lispro (ADMELOG) corrective regimen sliding scale   SubCutaneous at bedtime  insulin lispro (ADMELOG) corrective regimen sliding scale   SubCutaneous three times a day before meals  isosorbide   dinitrate Tablet (ISORDIL) 10 milliGRAM(s) Oral three times a day  polyethylene glycol 3350 17 Gram(s) Oral daily  senna 2 Tablet(s) Oral at bedtime    MEDICATIONS  (PRN):  melatonin 3 milliGRAM(s) Oral at bedtime PRN Insomnia      CAPILLARY BLOOD GLUCOSE      POCT Blood Glucose.: 133 mg/dL (23 Apr 2025 22:47)  POCT Blood Glucose.: 133 mg/dL (23 Apr 2025 16:54)  POCT Blood Glucose.: 116 mg/dL (23 Apr 2025 11:38)  POCT Blood Glucose.: 122 mg/dL (23 Apr 2025 07:45)    I&O's Summary    23 Apr 2025 07:01  -  24 Apr 2025 07:00  --------------------------------------------------------  IN: 840 mL / OUT: 1350 mL / NET: -510 mL        Vital Signs Last 24 Hrs  T(C): 36.4 (24 Apr 2025 04:19), Max: 36.4 (23 Apr 2025 11:22)  T(F): 97.5 (24 Apr 2025 04:19), Max: 97.6 (23 Apr 2025 11:22)  HR: 69 (24 Apr 2025 04:19) (69 - 100)  BP: 109/71 (24 Apr 2025 04:19) (97/63 - 109/71)  BP(mean): --  RR: 18 (24 Apr 2025 04:19) (18 - 18)  SpO2: 94% (24 Apr 2025 04:19) (94% - 98%)    Parameters below as of 24 Apr 2025 04:19  Patient On (Oxygen Delivery Method): nasal cannula        Physical Exam  CONSTITUTIONAL: NAD  EYES: EOMI, conjunctiva and sclera clear  ENMT: Moist oral mucosa  NECK: Supple  RESPIRATORY: Breathing unlabored, CTAB  CARDIOVASCULAR: S1S2 no MRG  ABDOMEN: Nontender to palpation, normoactive bowel sounds, no rebound/guarding  MUSCULOSKELETAL: no clubbing or cyanosis of digits  NEUROLOGY: No focal deficits   SKIN: No rashes or lesions    LABS:                        9.5    6.01  )-----------( 265      ( 24 Apr 2025 06:34 )             30.2      04-23    141  |  96  |  100[H]  ----------------------------<  130[H]  4.4   |  24  |  2.56[H]    Ca    9.8      23 Apr 2025 14:15  Phos  4.8     04-23  Mg     2.2     04-23    TPro  7.8  /  Alb  3.4  /  TBili  0.8  /  DBili  x   /  AST  16  /  ALT  21  /  AlkPhos  105  04-23    PT/INR - ( 24 Apr 2025 06:34 )   PT: 28.6 sec;   INR: 2.51 ratio         PTT - ( 23 Apr 2025 06:32 )  PTT:33.4 sec      Urinalysis Basic - ( 23 Apr 2025 14:15 )    Color: x / Appearance: x / SG: x / pH: x  Gluc: 130 mg/dL / Ketone: x  / Bili: x / Urobili: x   Blood: x / Protein: x / Nitrite: x   Leuk Esterase: x / RBC: x / WBC x   Sq Epi: x / Non Sq Epi: x / Bacteria: x        RADIOLOGY & ADDITIONAL TESTS:    Imaging Personally Reviewed:    Consultant(s) Notes Reviewed:      Care Discussed with Consultants/Other Providers:   Patient is a 64y old  Male who presents with a chief complaint of dyspnea , chest pain (23 Apr 2025 07:06)      SUBJECTIVE / OVERNIGHT EVENTS:  - no acute event overnight    Pt seen and examined at bedside. Had no complaints. Denied cp, sob, d/n/v    MEDICATIONS  (STANDING):  albuterol/ipratropium for Nebulization 3 milliLiter(s) Nebulizer every 6 hours  aMIOdarone    Tablet   Oral   aMIOdarone    Tablet 200 milliGRAM(s) Oral daily  atorvastatin 80 milliGRAM(s) Oral at bedtime  buMETAnide IVPB 3 milliGRAM(s) IV Intermittent every 12 hours  clopidogrel Tablet 75 milliGRAM(s) Oral daily  famotidine    Tablet 20 milliGRAM(s) Oral every 48 hours  hemorrhoidal Ointment 1 Application(s) Rectal two times a day  hydrALAZINE 10 milliGRAM(s) Oral three times a day  insulin lispro (ADMELOG) corrective regimen sliding scale   SubCutaneous at bedtime  insulin lispro (ADMELOG) corrective regimen sliding scale   SubCutaneous three times a day before meals  isosorbide   dinitrate Tablet (ISORDIL) 10 milliGRAM(s) Oral three times a day  polyethylene glycol 3350 17 Gram(s) Oral daily  senna 2 Tablet(s) Oral at bedtime    MEDICATIONS  (PRN):  melatonin 3 milliGRAM(s) Oral at bedtime PRN Insomnia      CAPILLARY BLOOD GLUCOSE      POCT Blood Glucose.: 133 mg/dL (23 Apr 2025 22:47)  POCT Blood Glucose.: 133 mg/dL (23 Apr 2025 16:54)  POCT Blood Glucose.: 116 mg/dL (23 Apr 2025 11:38)  POCT Blood Glucose.: 122 mg/dL (23 Apr 2025 07:45)    I&O's Summary    23 Apr 2025 07:01  -  24 Apr 2025 07:00  --------------------------------------------------------  IN: 840 mL / OUT: 1350 mL / NET: -510 mL        Vital Signs Last 24 Hrs  T(C): 36.4 (24 Apr 2025 04:19), Max: 36.4 (23 Apr 2025 11:22)  T(F): 97.5 (24 Apr 2025 04:19), Max: 97.6 (23 Apr 2025 11:22)  HR: 69 (24 Apr 2025 04:19) (69 - 100)  BP: 109/71 (24 Apr 2025 04:19) (97/63 - 109/71)  BP(mean): --  RR: 18 (24 Apr 2025 04:19) (18 - 18)  SpO2: 94% (24 Apr 2025 04:19) (94% - 98%)    Parameters below as of 24 Apr 2025 04:19  Patient On (Oxygen Delivery Method): nasal cannula        Physical Exam  CONSTITUTIONAL: NAD  EYES: EOMI, conjunctiva and sclera clear  ENMT: Moist oral mucosa  NECK: Supple  RESPIRATORY: Breathing unlabored, CTAB  CARDIOVASCULAR: S1S2 no MRG  ABDOMEN: Nontender to palpation, normoactive bowel sounds, no rebound/guarding  MUSCULOSKELETAL: +2 edema in LEs b/l, improved from prior  NEUROLOGY: No focal deficits   SKIN: No rashes or lesions    LABS:                        9.5    6.01  )-----------( 265      ( 24 Apr 2025 06:34 )             30.2      04-23    141  |  96  |  100[H]  ----------------------------<  130[H]  4.4   |  24  |  2.56[H]    Ca    9.8      23 Apr 2025 14:15  Phos  4.8     04-23  Mg     2.2     04-23    TPro  7.8  /  Alb  3.4  /  TBili  0.8  /  DBili  x   /  AST  16  /  ALT  21  /  AlkPhos  105  04-23    PT/INR - ( 24 Apr 2025 06:34 )   PT: 28.6 sec;   INR: 2.51 ratio         PTT - ( 23 Apr 2025 06:32 )  PTT:33.4 sec      Urinalysis Basic - ( 23 Apr 2025 14:15 )    Color: x / Appearance: x / SG: x / pH: x  Gluc: 130 mg/dL / Ketone: x  / Bili: x / Urobili: x   Blood: x / Protein: x / Nitrite: x   Leuk Esterase: x / RBC: x / WBC x   Sq Epi: x / Non Sq Epi: x / Bacteria: x        RADIOLOGY & ADDITIONAL TESTS:    Imaging Personally Reviewed:    Consultant(s) Notes Reviewed:      Care Discussed with Consultants/Other Providers:

## 2025-04-24 NOTE — DISCHARGE NOTE PROVIDER - NSFOLLOWUPCLINICSTOKEN_GEN_ALL_ED_FT
179245:2 weeks|| ||00\01||False;029607:2 weeks|| ||00\01||False;976084:2 weeks|| ||00\01||False; 183201:2 weeks|| ||00\01||False;181470:2 weeks|| ||00\01||False;

## 2025-04-24 NOTE — PROGRESS NOTE ADULT - PROBLEM SELECTOR PLAN 2
presented initially with dyspnea, orthopnea x 6 days c/f acute on chronic HF   Home meds: Entresto 24/26, bumex 1mg/2mg qd alternate, hydralazine 50mg tid  - s/p Bumex gtt  TTE (4/12) -  LVEF 25%. Global LV hypokinesis.  - HF following, appreciated recs    Plan  - c/w IVP Bumex 3mg bid (goal net -1 to 2L daily)  - will reach out to HF regarding switching to PO bumex  - c/w Hydral 10mg tid  - c/w isordil 10mg tid  - Replete lytes prn for Mg>2 K>4  - Daily weight. Strict I&O

## 2025-04-24 NOTE — PROGRESS NOTE ADULT - PROBLEM SELECTOR PLAN 1
s/p TAVR (11/2022)   Admitted to CCU for mixed shock likely from severe AS-induced LVOT requiring pressors - weaned off  TTE (4/1) - The prosthetic valve has reduced leaflet opening . Severe prosthetic aortic stenosis.   - s/p TAVR (4/11)  - INR at goal (2-3)    Plan:  - d/c Hep gtt  - c/w Coumadin 3mg (4/23)  - Trend INR daily (goal 2-3) s/p TAVR (11/2022)   Admitted to CCU for mixed shock likely from severe AS-induced LVOT requiring pressors - weaned off  TTE (4/1) - The prosthetic valve has reduced leaflet opening . Severe prosthetic aortic stenosis.   - s/p TAVR (4/11)  - INR at goal (2-3)    Plan:  - d/c Hep gtt  - c/w Coumadin 3mg (4/24)  - Trend INR daily (goal 2-3)

## 2025-04-24 NOTE — DISCHARGE NOTE PROVIDER - CARE PROVIDER_API CALL
Luis Johnson  Internal Medicine  [address] [place] [address]  Phone: [phone]  Fax: [phone]  Established Patient  Follow Up Time: 2 weeks    Suman Montelongo  Interventional Cardiology  [address] [place] [id number]  Phone: [phone]  Fax: [phone]  Follow Up Time: 2 weeks   Luis Johnson  Internal Medicine  [address] [place] [address]  Phone: [phone]  Fax: [phone]  Established Patient  Follow Up Time: 2 weeks    Suman Montelongo  Interventional Cardiology  [address] [place] [address]  Phone: [phone]  Fax: [phone]  Follow Up Time: 2 weeks    Kevin William  Nephrology  [address] [place] [address], [facility] [address]  [place] [address]  Phone: [phone]  Fax: [phone]  Established Patient  Follow Up Time: 2 weeks

## 2025-04-25 LAB
ALBUMIN SERPL ELPH-MCNC: 3.3 G/DL — SIGNIFICANT CHANGE UP (ref 3.3–5)
ALP SERPL-CCNC: 103 U/L — SIGNIFICANT CHANGE UP (ref 40–120)
ALT FLD-CCNC: 21 U/L — SIGNIFICANT CHANGE UP (ref 10–45)
ANION GAP SERPL CALC-SCNC: 14 MMOL/L — SIGNIFICANT CHANGE UP (ref 5–17)
AST SERPL-CCNC: 13 U/L — SIGNIFICANT CHANGE UP (ref 10–40)
BILIRUB SERPL-MCNC: 0.6 MG/DL — SIGNIFICANT CHANGE UP (ref 0.2–1.2)
BUN SERPL-MCNC: 91 MG/DL — HIGH (ref 7–23)
CALCIUM SERPL-MCNC: 9.4 MG/DL — SIGNIFICANT CHANGE UP (ref 8.4–10.5)
CHLORIDE SERPL-SCNC: 95 MMOL/L — LOW (ref 96–108)
CO2 SERPL-SCNC: 28 MMOL/L — SIGNIFICANT CHANGE UP (ref 22–31)
CREAT SERPL-MCNC: 2.44 MG/DL — HIGH (ref 0.5–1.3)
EGFR: 29 ML/MIN/1.73M2 — LOW
EGFR: 29 ML/MIN/1.73M2 — LOW
GAS PNL BLDV: SIGNIFICANT CHANGE UP
GLUCOSE BLDC GLUCOMTR-MCNC: 150 MG/DL — HIGH (ref 70–99)
GLUCOSE BLDC GLUCOMTR-MCNC: 172 MG/DL — HIGH (ref 70–99)
GLUCOSE BLDC GLUCOMTR-MCNC: 207 MG/DL — HIGH (ref 70–99)
GLUCOSE BLDC GLUCOMTR-MCNC: 92 MG/DL — SIGNIFICANT CHANGE UP (ref 70–99)
GLUCOSE SERPL-MCNC: 85 MG/DL — SIGNIFICANT CHANGE UP (ref 70–99)
HCT VFR BLD CALC: 32.1 % — LOW (ref 39–50)
HGB BLD-MCNC: 9.8 G/DL — LOW (ref 13–17)
INR BLD: 2.44 RATIO — HIGH (ref 0.85–1.16)
LACTATE SERPL-SCNC: 0.6 MMOL/L — SIGNIFICANT CHANGE UP (ref 0.5–2)
MAGNESIUM SERPL-MCNC: 2.2 MG/DL — SIGNIFICANT CHANGE UP (ref 1.6–2.6)
MCHC RBC-ENTMCNC: 30.5 G/DL — LOW (ref 32–36)
MCHC RBC-ENTMCNC: 30.5 PG — SIGNIFICANT CHANGE UP (ref 27–34)
MCV RBC AUTO: 100 FL — SIGNIFICANT CHANGE UP (ref 80–100)
NRBC BLD AUTO-RTO: 0 /100 WBCS — SIGNIFICANT CHANGE UP (ref 0–0)
PHOSPHATE SERPL-MCNC: 4.3 MG/DL — SIGNIFICANT CHANGE UP (ref 2.5–4.5)
PLATELET # BLD AUTO: 261 K/UL — SIGNIFICANT CHANGE UP (ref 150–400)
POTASSIUM SERPL-MCNC: 3.9 MMOL/L — SIGNIFICANT CHANGE UP (ref 3.5–5.3)
POTASSIUM SERPL-SCNC: 3.9 MMOL/L — SIGNIFICANT CHANGE UP (ref 3.5–5.3)
PROT SERPL-MCNC: 7.9 G/DL — SIGNIFICANT CHANGE UP (ref 6–8.3)
PROTHROM AB SERPL-ACNC: 27.6 SEC — HIGH (ref 9.9–13.4)
RBC # BLD: 3.21 M/UL — LOW (ref 4.2–5.8)
RBC # FLD: 17.2 % — HIGH (ref 10.3–14.5)
SODIUM SERPL-SCNC: 137 MMOL/L — SIGNIFICANT CHANGE UP (ref 135–145)
WBC # BLD: 5.09 K/UL — SIGNIFICANT CHANGE UP (ref 3.8–10.5)
WBC # FLD AUTO: 5.09 K/UL — SIGNIFICANT CHANGE UP (ref 3.8–10.5)

## 2025-04-25 PROCEDURE — 99232 SBSQ HOSP IP/OBS MODERATE 35: CPT

## 2025-04-25 PROCEDURE — 99233 SBSQ HOSP IP/OBS HIGH 50: CPT | Mod: GC

## 2025-04-25 RX ORDER — METOPROLOL SUCCINATE 50 MG/1
12.5 TABLET, EXTENDED RELEASE ORAL DAILY
Refills: 0 | Status: DISCONTINUED | OUTPATIENT
Start: 2025-04-25 | End: 2025-04-28

## 2025-04-25 RX ORDER — SENNA 187 MG
2 TABLET ORAL
Qty: 0 | Refills: 0 | DISCHARGE
Start: 2025-04-25

## 2025-04-25 RX ORDER — BISACODYL 5 MG
10 TABLET, DELAYED RELEASE (ENTERIC COATED) ORAL ONCE
Refills: 0 | Status: COMPLETED | OUTPATIENT
Start: 2025-04-25 | End: 2025-04-25

## 2025-04-25 RX ORDER — POLYETHYLENE GLYCOL 3350 17 G/17G
17 POWDER, FOR SOLUTION ORAL
Qty: 0 | Refills: 0 | DISCHARGE
Start: 2025-04-25

## 2025-04-25 RX ORDER — DAPAGLIFLOZIN 5 MG/1
10 TABLET, FILM COATED ORAL DAILY
Refills: 0 | Status: DISCONTINUED | OUTPATIENT
Start: 2025-04-25 | End: 2025-04-28

## 2025-04-25 RX ADMIN — IPRATROPIUM BROMIDE AND ALBUTEROL SULFATE 3 MILLILITER(S): .5; 2.5 SOLUTION RESPIRATORY (INHALATION) at 12:07

## 2025-04-25 RX ADMIN — CLOPIDOGREL BISULFATE 75 MILLIGRAM(S): 75 TABLET, FILM COATED ORAL at 12:07

## 2025-04-25 RX ADMIN — Medication 10 MILLIGRAM(S): at 12:18

## 2025-04-25 RX ADMIN — INSULIN LISPRO 4: 100 INJECTION, SOLUTION INTRAVENOUS; SUBCUTANEOUS at 12:13

## 2025-04-25 RX ADMIN — IPRATROPIUM BROMIDE AND ALBUTEROL SULFATE 3 MILLILITER(S): .5; 2.5 SOLUTION RESPIRATORY (INHALATION) at 17:14

## 2025-04-25 RX ADMIN — ISOSORBDIE DINITRATE 10 MILLIGRAM(S): 30 TABLET ORAL at 12:07

## 2025-04-25 RX ADMIN — POLYETHYLENE GLYCOL 3350 17 GRAM(S): 17 POWDER, FOR SOLUTION ORAL at 12:07

## 2025-04-25 RX ADMIN — IPRATROPIUM BROMIDE AND ALBUTEROL SULFATE 3 MILLILITER(S): .5; 2.5 SOLUTION RESPIRATORY (INHALATION) at 05:19

## 2025-04-25 RX ADMIN — Medication 20 MILLIGRAM(S): at 21:40

## 2025-04-25 RX ADMIN — BUMETANIDE 3 MILLIGRAM(S): 1 TABLET ORAL at 17:12

## 2025-04-25 RX ADMIN — Medication 10 MILLIGRAM(S): at 12:12

## 2025-04-25 RX ADMIN — Medication 2 TABLET(S): at 21:43

## 2025-04-25 RX ADMIN — PHENYLEPHRINE HYDROCHLORIDE AND FAT, HARD 1 APPLICATION(S): .00525; 1.86 SUPPOSITORY RECTAL at 05:18

## 2025-04-25 RX ADMIN — PHENYLEPHRINE HYDROCHLORIDE AND FAT, HARD 1 APPLICATION(S): .00525; 1.86 SUPPOSITORY RECTAL at 17:14

## 2025-04-25 RX ADMIN — BUMETANIDE 3 MILLIGRAM(S): 1 TABLET ORAL at 05:19

## 2025-04-25 RX ADMIN — Medication 3 MILLIGRAM(S): at 21:41

## 2025-04-25 RX ADMIN — Medication 10 MILLIGRAM(S): at 05:20

## 2025-04-25 RX ADMIN — ISOSORBDIE DINITRATE 10 MILLIGRAM(S): 30 TABLET ORAL at 05:19

## 2025-04-25 RX ADMIN — AMIODARONE HYDROCHLORIDE 200 MILLIGRAM(S): 50 INJECTION, SOLUTION INTRAVENOUS at 05:19

## 2025-04-25 RX ADMIN — ISOSORBDIE DINITRATE 10 MILLIGRAM(S): 30 TABLET ORAL at 17:11

## 2025-04-25 RX ADMIN — ATORVASTATIN CALCIUM 80 MILLIGRAM(S): 80 TABLET, FILM COATED ORAL at 21:43

## 2025-04-25 NOTE — PROGRESS NOTE ADULT - NS ATTEND OPT1A GEN_ALL_CORE
History/Exam/Medical decision making
[employer] dose 1 and 2
History/Exam/Medical decision making

## 2025-04-25 NOTE — PROGRESS NOTE ADULT - ATTENDING COMMENTS
64/M with CAD s/p CABG, ICM, HFrEf  since 2013, h/o TAVR in 2022 with severe prosthetic stenosis, with LV EF 25%s/p Florence TAVR and [facility] with LIMA to LAD and occluded SVG, HF consulted for medical optimization.   TTE: LV EF 25%, normal AV function w/o paravalvular leak.     Imp:  ICM, s/p CABG  HFrEf EF 25%  bioprosthetic stenosis s/p Florence TAVR  CKD 4    Plan:  switch Bumex 3mg po BID  will cont hydralazine/ isordil for afterload management  will cont monitor renal function  will start low dose BB , Metoprolol xl 12.5mg po daily  start Farxiga 10mg po daily  replete K and Mg    Cont antiplatelets and statin  cont coumadin    rest of care per primary team  will sign off , pls call with questions or concernspls schedule HF follow up on d/c

## 2025-04-25 NOTE — PROGRESS NOTE ADULT - NS ATTEND AMEND GEN_ALL_CORE FT
No acute events reported overnight.  Patient denies any chest pain/tightness or discomfort, fevers, chills, sweats, sensation, dizziness or palpitation.  Agree with 14 point review of systems and physical examination as noted above. Mr. Waller 4/11/2025 underwent percutaneous transcatheter aortic valve in valve of his bioprosthetic Cullen 3 26mm TAVR valve during which time a Medtronic Evolut FX+ 29mm valve was implanted.  The patient tolerated the procedure well.  Repeat TTE was reviewed which demonstrated appropriately positioned TAVR valve in valve. Continue Coumadin.  Goal INR between 2-3.  [id number] or 4.  Closely monitor for signs incisional bleeding.  Appreciate assistance from heart failure team.  The importance of taking his medications as prescribed was gone over.  He appears to be approaching an euvolemic state.  Continue Bumex 3 mg every 12 hours. Continue hydralazine 10 mg 3 times a day and isosorbide dinitrate 10 mg 3 times a day. Continue amiodarone 200 mg daily. Continue atorvastatin 40 mg daily. Continue clopidogrel 75 mg daily.  Closely monitor for signs and symptoms of bleeding. Recommend daily PT. Patient is being assessed for subacute/acute rehabilitation facility. Continue telemetry monitoring.    All questions and concerns of the patient were addressed.    Time-based billing (NON-critical care).   25 minutes spent on total encounter. The necessity of the time spent during the encounter on this date of service was due to:   education, assessment and coordination of care.
Events that transpired over the last 24 hours were reviewed.  No change in cardiopulmonary complaints at rest.  Denies any fevers, chills, sweats, lighted sensation, dizziness or palpitations.  Currently on a Bumex and vasopressin drips.  Currently on heparin, Bumex and vasopressin drips.  Agree with 14 point review of systems and physical examination as otherwise noted above.     Assessment/plan  64-year-old man with past medical history significant for    A-fib status post cardioversion on 3/25/2025  Ischemic cardiomyopathy  Coronary artery disease status post CABG  Aortic valve stenosis status post TAVR in 11/2022  Atrial flutter status post ablation 2019  Status post Micra implant  Chronic kidney disease stage III  Obesity  Hypertension  Hyperlipidemia  Venous stasis    Who presented with acute on chronic renal failure in the setting of CHF exacerbation rapid response called on the floor due to systolic blood pressure in the 80s.  Patient in cardiogenic shock.    --Discussed with patient findings on transthoracic echocardiogram study that demonstrates severe bioprosthetic dysfunction/aortic stenosis of his 26 mm Sapine 3 valve.  Explained to the patient what is severe stenosis of the TAVR valve.  The associated signs and symptoms and the natural pathophysiology was gone over.  The various treatment options were discussed including medical therapy, TAVR valve in valve and surgery.  The pros/cons and risk/benefits of the various treatment options were gone over.  --The patient is currently hemodynamically unstable and continues to be on vasopressin (Levophed/Dobutamine have been discontinued).  He has been followed closely by nephrology team for acute on chronic kidney disease which may be exacerbated in the setting of Entresto, diarrhea and dehydration.    -- Based upon patient's clinical status and kidney function will determine appropriate timing of TAVR valve in valve, Due to patient's comorbidities he is felt to be a high risk individual and is being assessed for TAVR valve in valve.  Indications and details of the procedure were again reviewed.  Benefits and risks were discussed.  Risks include but not limited to infection, bleeding, arrhythmia, TIA/stroke, hemodynamic instability, vascular injury, worsening renal insufficiency/need for dialysis and death.  Patient is aware that he is at increased risk to need dialysis.  He is agreeable for dialysis to be initiated if clinically indicated.  This has been discussed with nephrology team. He was evaluated by the cardiac surgery team and is felt to be inoperable.  --Continue Bumex drip.  Closely monitor ins and outs.  --Continue amiodarone 200 mg daily.  --Continue telemetry monitoring.  --Continue atorvastatin 40 mg daily.  --Continue clopidogrel 75 mg daily.  --Patient underwent cardiac catheterization/8/2025.  No significant change compared to coronary angiogram performed in 2022.  Patent LIMA to the LAD and RCA.  Vein graft to diagonal and obtuse marginal artery are occluded.  Chronic total occlusion in proximal left circumflex and left anterior descending arteries.  --Continue heparin drip.  Closely monitor for signs and symptoms of bleeding.  Following the procedure once patient is clinically doing well recommend patient be transitioned to coumadin with heparin drip.  --Continue telemetry monitoring.    All questions and concerns of the patient were addressed.  Plan to make patient NPO past midnight.    Findings and plan were discussed with nephrology, cardiac surgery/Dr. Wagner and CICU/Dr. Maldonado.    Time-based billing (NON-critical care).   35 minutes spent on total encounter. The necessity of the time spent during the encounter on this date of service was due to:   education, assessment and coordination of care.
Events that transpired over last 24 hours were reviewed.  The patient is lying flat.  He is complaining of epigastric discomfort that he notes is relieved after he passes gas.  Denies any significant constipation or urgency to defecate.  Denies any cardiopulmonary complaints at rest or upon minimal exertion.  Agree with 14 point review of systems, physical examination and assessment/plan as noted above.    Patient is status post TAVR valve in valve on 4/11/2025.  The patient is currently therapeutic on Coumadin.  Goal INR between 2-3.  Closely monitor for signs and symptoms of bleeding.    Appreciate recommendations/assistance from heart failure team.  Patient has known ischemic cardiomyopathy.  The patient is being medically optimized with up titration of medications as tolerated.  Continue amiodarone.    All questions and concerns of the patient were addressed.    Time-based billing (NON-critical care).   35 minutes spent on total encounter. The necessity of the time spent during the encounter on this date of service was due to:   education, assessment and coordination of care.
Subjective  No acute events reported overnight.  He has been intermittently on BiPAP due to shortness of breath.  The patient reports that he is clinically doing better than he was over the last few days.  He feels his breathing is less labored.  He is currently laying at a 30 degree angle.  Denies any fevers, chills, sweats, lighted sensation, dizziness or palpitations.  Currently on a Bumex drip.  He has received metolazone for assisted diuresis.  Currently on heparin drip, Bumex drip and vasopressin drip.    Review of system  Patient appears to be more labored during examination.  Has more difficulty speaking in full sentences but denies any shortness of breath.  14 point review of systems otherwise unremarkable separate described above in history of present illness    Physical examination  No apparent distress, alert and oriented 3, appropriate affect  JVD is elevated, supple, no lymphadenopathy  Clear to auscultation bilaterally with no wheezing rhonchi crackles  Regular rate and rhythm with 3 out of 6 crescendo decrescendo murmur less on the right upper sternal rating to the carotids  Positive bowel sound, soft, nontender, nondistended, no mass and guarding or rebound tenderness  Chronic venous stasis changes bilaterally, mild bilateral lower extremity edema  1+ DP/PT pulse  No focal deficits    Assessment/plan  64-year-old man with past medical history significant for    A-fib status post cardioversion on 3/25/2025  Ischemic cardiomyopathy  Coronary artery disease status post CABG  Aortic valve stenosis status post TAVR in 11/2022  Atrial flutter status post ablation 2019  Status post Micra implant  Chronic kidney disease stage III  Obesity  Hypertension  Hyperlipidemia  Venous stasis    Who presented with acute on chronic renal failure in the setting of CHF exacerbation rapid response called on the floor due to systolic blood pressure in the 80s.  Patient in cardiogenic shock.    --Recommend chest x-ray to be performed along with close monitoring of weight/ins–outs.  --Discussed with patient findings on transthoracic echocardiogram study that demonstrates severe bioprosthetic dysfunction/aortic stenosis of his 26 mm Sapine 3 valve.  Explained to the patient what is severe stenosis of the TAVR valve.  The associated signs and symptoms and the natural pathophysiology was gone over.  The various treatment options were discussed including medical therapy, TAVR valve in valve and surgery.  The pros/cons and risk/benefits of the various treatment options were gone over.  --The patient is currently hemodynamically unstable and is on vasopressin (Levophed/Dobutamine have been discontinued).  He has been followed closely by nephrology team for acute on chronic kidney disease which may be exacerbated in the setting of Entresto, diarrhea and dehydration.    -- Based upon patient's clinical status and kidney function will determine appropriate timing of TAVR valve in valve, Due to patient's comorbidities he is felt to be a high risk individual and is being assessed for TAVR valve in valve.  Indications and details of the procedure were reviewed.  Benefits and risks were discussed.  Risks include but not limited to infection, bleeding, arrhythmia, TIA/stroke, hemodynamic instability, vascular injury, worsening renal insufficiency/need for dialysis and death.  --Continue Bumex drip.  Closely monitor ins and outs.  --Continue amiodarone 200 mg daily.  --Continue telemetry monitoring.  --Continue atorvastatin 40 mg daily.  --Continue clopidogrel 75 mg daily.  --Patient underwent cardiac catheterization/8/2025.  No significant change compared to coronary angiogram performed in 2022.  Patent LIMA to the LAD and RCA.  Vein graft to diagonal and obtuse marginal artery are occluded.  Chronic total occlusion in proximal left circumflex and left anterior descending arteries.  --Continue heparin drip.  Closely monitor for signs and symptoms of bleeding.  -- Continue telemetry monitoring.    All questions and concerns of the patient were addressed.    Findings and plan were discussed with nephrology, cardiac surgery/Dr. Wagner and CICU/Dr. Maldonado.    Time-based billing (NON-critical care).   35 minutes spent on total encounter. The necessity of the time spent during the encounter on this date of service was due to:   education, assessment and coordination of care.
No acute events reported overnight.  The patient reports that he is clinically doing better.  Currently laying flat.  He is on heparin, vasopressin and amiodarone drips.  Denies any cardiopulmonary complaints while laying or sitting upright.  Denies any palpitations.  Denies any fevers, chills, sweats, light sensation, dizzy or palpitations.  Agree with 14 point review of systems and physical examination as noted above.    --Discussed with patient findings on transthoracic echocardiogram study that demonstrates severe bioprosthetic dysfunction/aortic stenosis of his 26 mm Sapine 3 valve.  Explained to the patient what is severe stenosis of the TAVR valve.  The associated signs and symptoms and the natural pathophysiology was gone over.  The various treatment options were discussed including medical therapy, TAVR valve in valve and surgery.  The pros/cons and risk/benefits of the various treatment options were gone over.  --The patient is currently hemodynamically unstable and is on vasopressin (Levophed/Dobutamine have been discontinued).  He has been followed closely by nephrology team for acute on chronic kidney disease which may be exacerbated in the setting of Entresto, diarrhea and dehydration.  Patient also notes episodes of chest discomfort on exertion which started a few weeks prior to his presentation.  At this time it is not recommended the patient undergo renal replacement therapy.  Concern for rising BUN.  Creatinine appears to have plateaued.  --Based upon patient's clinical status and kidney function will determine appropriate timing of valvular intervention.  Appreciate assistance from nephrology team.  -- Due to patient's comorbidities he is felt to be a high risk individual and is being assessed for TAVR valve in valve.  Indications and details of the procedure were reviewed.  Benefits and risks were discussed.  Risks include but not limited to infection, bleeding, arrhythmia, TIA/stroke, hemodynamic instability, vascular injury, worsening renal insufficiency/need for dialysis and death.  -- Prior to TAVR valve in valve it is recommended the patient undergo repeat coronary angiogram.  He also notes in the weeks prior to his hospitalization he has been experiencing new chest discomfort. He has a patent LIMA to the LAD and RCA.  Saphenous vein graft to OM and PDA are occluded.  Indications and details of the procedure reviewed.  Benefits and risks were discussed.  Risks include but not limited to infection, bleeding, arrhythmia, TIA/stroke, hemodynamic instability, vascular injury, need for urgent surgery and death.  Goal will be to do this procedure in the next few days if his kidney function continues to gradually improve.    --Continue telemetry monitoring.  --Continue atorvastatin 40 mg daily.  --Continue clopidogrel 75 mg daily.  --Continue heparin drip.  Closely monitor for signs and symptoms of bleeding.    All questions and concerns of the patient were addressed.    Findings and plan were discussed with cardiac surgery/Dr. Wagner and CICU/Dr. Maldonado.    Time-based billing (NON-critical care).   35 minutes spent on total encounter. The necessity of the time spent during the encounter on this date of service was due to:   education, assessment and coordination of care.
Events that transpired over the last 24 hours were reviewed.  The patient denies any change in his cardiopulmonary complaints.  He is still not been out of bed.  He is fatigued.  Denies any sputum production.  Denies any fevers, chills, sweats, light sensation, dizzy or palpitations.  Agree with 14 point review of systems and physical examination as noted above.  Plan for patient to undergo cardiac catheterization later today.  Indications and details of the procedure were reviewed.  Benefits and risks were explained.  Risks include but not limited to infection, bleeding, arrhythmia, TIA/stroke, hemodynamic instability, vascular injury, need for urgent surgery and death.  Based upon patient's clinical status over the ensuing days we will determine appropriate timing for potential TAVR valve in valve.  Indications and details of the procedure were reviewed.    All questions and concerns of the patient, his brother who is at his bedside along with his best friend were addressed.    Findings and plan were discussed with CICU and nephrology teams.    Attestation Statements:   Time-based billing (NON-critical care).   35 minutes spent on total encounter. The necessity of the time spent during the encounter on this date of service was due to:   education, assessment and coordination of care.
0 (no pain/absence of nonverbal indicators of pain)

## 2025-04-25 NOTE — PROGRESS NOTE ADULT - SUBJECTIVE AND OBJECTIVE BOX
HPI/Interval Hx    Sitting up in bed, no acute events. Awaiting discharge to rehab.    MEDICATIONS  (STANDING):  albuterol/ipratropium for Nebulization 3 milliLiter(s) Nebulizer every 6 hours  aMIOdarone    Tablet   Oral   aMIOdarone    Tablet 200 milliGRAM(s) Oral daily  atorvastatin 80 milliGRAM(s) Oral at bedtime  buMETAnide 3 milliGRAM(s) Oral every 12 hours  clopidogrel Tablet 75 milliGRAM(s) Oral daily  famotidine    Tablet 20 milliGRAM(s) Oral every 48 hours  hemorrhoidal Ointment 1 Application(s) Rectal two times a day  hydrALAZINE 10 milliGRAM(s) Oral three times a day  insulin lispro (ADMELOG) corrective regimen sliding scale   SubCutaneous at bedtime  insulin lispro (ADMELOG) corrective regimen sliding scale   SubCutaneous three times a day before meals  isosorbide   dinitrate Tablet (ISORDIL) 10 milliGRAM(s) Oral three times a day  polyethylene glycol 3350 17 Gram(s) Oral daily  senna 2 Tablet(s) Oral at bedtime  warfarin 3 milliGRAM(s) Oral once    MEDICATIONS  (PRN):  melatonin 3 milliGRAM(s) Oral at bedtime PRN Insomnia      PAST MEDICAL & SURGICAL HISTORY:  CAD (coronary artery disease)  s/p CABG      Hypercholesteremia      Thrombus of left atrial appendage  2018      Ischemic cardiomyopathy      WILFRED (obstructive sleep apnea)  can not tolerate bipap at night      HTN (hypertension)      Atrial fibrillation  2018      CHF (congestive heart failure)  denies any recent exacerbation      Peripheral edema  chronic      Lichen planus  chronic ( b/L LE)      Atrial flutter  s/p ablation 2018      MI (myocardial infarction)  2012      Stented coronary artery  2019      AS (aortic stenosis)      Chronic kidney disease, unspecified CKD stage      ANGELICA (acute kidney injury)  4/2021      Morbid obesity      Status post placement of cardiac pacemaker  micraleadless PPM, [id number] last interrogation 7/6/2022      Osteoarthritis  shoulders, hips, knee      H/O scoliosis      Lower back pain      History of elbow surgery      S/P CABG (coronary artery bypass graft)  x3, 2012      Cardiac pacemaker  leadless 2018      History of coronary artery stent placement  2019 ( one more after cabg)      History of adenoidectomy      H/O atrioventricular karlee ablation  2018          Review of Systems  CONSTITUTIONAL: No weakness, fevers or chills  EYES/ENT: No visual changes;  No vertigo or throat pain   NECK: No pain or stiffness  RESPIRATORY: No cough, wheezing, hemoptysis; No shortness of breath  CARDIOVASCULAR: No chest pain or palpitations  GASTROINTESTINAL: No abdominal or epigastric pain. No nausea, vomiting, or hematemesis; No diarrhea. No melena or hematochezia. (+) constipation  GENITOURINARY: No dysuria, frequency or hematuria  NEUROLOGICAL: No numbness or weakness  SKIN: No itching, burning, rashes, or lesions   All other review of systems is negative unless indicated above.    Physical examination  No apparent distress, alert and oriented 3, appropriate affect  JVD is elevated, supple, no lymphadenopathy  Clear to auscultation bilaterally with no wheezing rhonchi crackles  Regular rate and rhythm with 3 out of 6 crescendo decrescendo murmur less on the right upper sternal rating to the carotids  Positive bowel sound, soft, nontender, nondistended, no mass and guarding or rebound tenderness  Right and left groin sites are soft with no ecchymosis/hematoma/bruit  Chronic venous stasis changes bilaterally, mild bilateral lower extremity edema  1+ DP/PT pulse  No focal deficits    Vital Signs Last 24 Hrs  T(C): 36.4 (25 Apr 2025 05:22), Max: 36.7 (24 Apr 2025 11:04)  T(F): 97.6 (25 Apr 2025 05:22), Max: 98.1 (24 Apr 2025 11:04)  HR: 72 (25 Apr 2025 05:22) (72 - 92)  BP: 115/71 (25 Apr 2025 05:22) (100/66 - 115/71)  BP(mean): --  RR: 16 (25 Apr 2025 05:22) (16 - 18)  SpO2: 98% (25 Apr 2025 05:22) (96% - 99%)    Parameters below as of 25 Apr 2025 05:22  Patient On (Oxygen Delivery Method): nasal cannula                            9.8    5.09  )-----------( 261      ( 25 Apr 2025 06:37 )             32.1     04-25    137  |  95[L]  |  91[H]  ----------------------------<  85  3.9   |  28  |  2.44[H]    Ca    9.4      25 Apr 2025 06:37  Phos  4.3     04-25  Mg     2.2     04-25    TPro  7.9  /  Alb  3.3  /  TBili  0.6  /  DBili  x   /  AST  13  /  ALT  21  /  AlkPhos  103  04-25      Telemetry: AFib 70's    EKG: < from: 12 Lead ECG (04.17.25 @ 00:05) >  Ventricular Rate 87 BPM    Atrial Rate 258 BPM    QRS Duration 170 ms    Q-T Interval 444 ms    QTC Calculation(Bazett) 534 ms    R Axis 153 degrees    T Axis -13 degrees    Diagnosis Line ATRIAL FLUTTER WITH VARIABLE A-V BLOCK  RIGHT AXIS DEVIATION  LEFT BUNDLE BRANCH BLOCK  ABNORMAL ECG  WHEN COMPARED WITH ECG OF 17-APR-2025 00:04,  PREVIOUS ECG HAS UNDETERMINED RHYTHM, NEEDS REVIEW  Confirmed by TRACI GOLDMAN PERWAIZ ([id number]) on 4/24/2025 1:52:04 AM    < end of copied text >      Other: < from: TTE Congenital Anomalies W or WO Ultrasound Enhancing Agent (04.12.25 @ 09:06) >  CONCLUSIONS:      1. Technically difficult image quality.   2. Definity ultrasound enhancing agent was given for enhanced left ventricular opacification and improved delineation of the left ventricular endocardial borders.   3. Left ventricular cavity is severely dilated. Left ventricular systolic function is severely decreased with an ejection fraction visually estimated at 25 %. Global left ventricular hypokinesis.   4. 29 mm Evolut FX+ (valve-in-valve) is present in the aortic position, with normal function. No paravalvular aortic regurgitation is seen in this study.   5. The right ventricle is not well visualized.    < end of copied text >

## 2025-04-25 NOTE — PROGRESS NOTE ADULT - SUBJECTIVE AND OBJECTIVE BOX
Patient seen and examined at bedside.    Overnight Events:   SYLVIE   Denies acute complaints   Feels well this AM     Current Meds:  albuterol/ipratropium for Nebulization 3 milliLiter(s) Nebulizer every 6 hours  aMIOdarone    Tablet   Oral   aMIOdarone    Tablet 200 milliGRAM(s) Oral daily  atorvastatin 80 milliGRAM(s) Oral at bedtime  buMETAnide 3 milliGRAM(s) Oral every 12 hours  clopidogrel Tablet 75 milliGRAM(s) Oral daily  famotidine    Tablet 20 milliGRAM(s) Oral every 48 hours  hemorrhoidal Ointment 1 Application(s) Rectal two times a day  hydrALAZINE 10 milliGRAM(s) Oral three times a day  insulin lispro (ADMELOG) corrective regimen sliding scale   SubCutaneous at bedtime  insulin lispro (ADMELOG) corrective regimen sliding scale   SubCutaneous three times a day before meals  isosorbide   dinitrate Tablet (ISORDIL) 10 milliGRAM(s) Oral three times a day  melatonin 3 milliGRAM(s) Oral at bedtime PRN  polyethylene glycol 3350 17 Gram(s) Oral daily  senna 2 Tablet(s) Oral at bedtime  warfarin 3 milliGRAM(s) Oral once      Vitals:  T(F): 97.5 (04-25), Max: 97.6 (04-25)  HR: 82 (04-25) (72 - 85)  BP: 103/70 (04-25) (103/70 - 115/71)  RR: 16 (04-25)  SpO2: 98% (04-25)  I&O's Summary    24 Apr 2025 07:01  -  25 Apr 2025 07:00  --------------------------------------------------------  IN: 780 mL / OUT: 1700 mL / NET: -920 mL    25 Apr 2025 07:01  -  25 Apr 2025 13:43  --------------------------------------------------------  IN: 240 mL / OUT: 150 mL / NET: 90 mL        Physical Exam:  Appearance: No acute distress; well appearing  Cardiovascular: RRR, S1, S2, 3/6 systolic murmur; JVP to mid neck   Respiratory: Clear to auscultation bilaterally  Gastrointestinal: soft, non-tender, non-distended with normal bowel sounds  Musculoskeletal: CVS changes   Neurologic: Non-focal  Lymphatic: No lymphadenopathy  Psychiatry: AAOx3, mood & affect appropriate  Skin: No rashes, ecchymoses, or cyanosis                          9.8    5.09  )-----------( 261      ( 25 Apr 2025 06:37 )             32.1     04-25    137  |  95[L]  |  91[H]  ----------------------------<  85  3.9   |  28  |  2.44[H]    Ca    9.4      25 Apr 2025 06:37  Phos  4.3     04-25  Mg     2.2     04-25    TPro  7.9  /  Alb  3.3  /  TBili  0.6  /  DBili  x   /  AST  13  /  ALT  21  /  AlkPhos  103  04-25    PT/INR - ( 25 Apr 2025 06:37 )   PT: 27.6 sec;   INR: 2.44 ratio

## 2025-04-25 NOTE — PROGRESS NOTE ADULT - PROBLEM SELECTOR PLAN 1
Non-oliguric ANGELICA on CKD likely in the setting of HF exacerbation (EF 15-20%), IV contrast exposure, Entresto use and hypotension-mediated ATN. Baseline Scr 1.5-1.8. On admission, Scr was 5.17 and it peaked to 5.5. UA showed trace LE, blood (Likely due to lopez), casts 17. UPCR 0.7g. Renal US showed no hydro. Underwent TAVR (4/12).    Creatinine now is ~2.4-2.6 mg/dl   stable on diuretics   urinalysis with minimal protein and no blood

## 2025-04-25 NOTE — PROGRESS NOTE ADULT - ASSESSMENT
64-year-old man with past medical history significant for    A-fib status post cardioversion on 3/25/2025  Ischemic cardiomyopathy  Coronary artery disease status post CABG  Aortic valve stenosis status post TAVR in 11/2022  Atrial flutter status post ablation 2019  Status post Micra implant  Chronic kidney disease stage III  Obesity  Hypertension  Hyperlipidemia  Venous stasis    Who presented with acute on chronic renal failure in the setting of CHF exacerbation rapid response called on the floor due to systolic blood pressure in the 80s.  Patient in cardiogenic shock.    --Constipation as per primary medical team.  --The patient on 4/11/2025 underwent percutaneous transcatheter aortic valve in valve of his bioprosthetic Cullen 3 26mm TAVR valve during which time a Medtronic Evolut FX+ 29mm valve was implanted.  The patient tolerated the procedure well.  Repeat TTE was reviewed which demonstrated appropriately positioned TAVR valve in valve.  --Patient suffering from symptomatic constipation/hemorrhoids.  He is receiving hemorrhoidal ointment and is on senna.  Further management as per medicine team.  -- Continue Coumadin.  Goal INR between 2-3.  [id number] or 4.  Closely monitor for signs incisional bleeding.  --He is euvolemic on exam. IV Bumex has been changed to PO 3mg BID. Continue to closely monitor kidney function.  Baseline serum creatinine between 1.5-1.8.  As an outpatient he was also on Entresto.    Of avoiding all nephrotoxins at this time.  --Continue hydralazine 10 mg 3 times a day and isosorbide dinitrate 10 mg 3 times a day.  --Continue amiodarone 200 mg daily.  --Continue atorvastatin 40 mg daily.  --Continue clopidogrel 75 mg daily.  Closely monitor for signs and symptoms of bleeding.  --Recommend daily PT.  --Patient is being awaiting discharge to subacute rehab.  --Continue telemetry monitoring.    All questions and concerns of the patient were addressed.      Time-based billing (NON-critical care).   35 minutes spent on total encounter. The necessity of the time spent during the encounter on this date of service was due to:   education, assessment and coordination of care.

## 2025-04-25 NOTE — PROGRESS NOTE ADULT - SUBJECTIVE AND OBJECTIVE BOX
Patient is a 64y old  Male who presents with a chief complaint of dyspnea , chest pain (24 Apr 2025 18:27)      SUBJECTIVE / OVERNIGHT EVENTS:    - No acute event overnight  Pt seen and examined at bedside. Had no complaints. Denied cp, sob, d/n/v    MEDICATIONS  (STANDING):  albuterol/ipratropium for Nebulization 3 milliLiter(s) Nebulizer every 6 hours  aMIOdarone    Tablet   Oral   aMIOdarone    Tablet 200 milliGRAM(s) Oral daily  atorvastatin 80 milliGRAM(s) Oral at bedtime  buMETAnide 3 milliGRAM(s) Oral every 12 hours  clopidogrel Tablet 75 milliGRAM(s) Oral daily  famotidine    Tablet 20 milliGRAM(s) Oral every 48 hours  hemorrhoidal Ointment 1 Application(s) Rectal two times a day  hydrALAZINE 10 milliGRAM(s) Oral three times a day  insulin lispro (ADMELOG) corrective regimen sliding scale   SubCutaneous at bedtime  insulin lispro (ADMELOG) corrective regimen sliding scale   SubCutaneous three times a day before meals  isosorbide   dinitrate Tablet (ISORDIL) 10 milliGRAM(s) Oral three times a day  polyethylene glycol 3350 17 Gram(s) Oral daily  senna 2 Tablet(s) Oral at bedtime    MEDICATIONS  (PRN):  melatonin 3 milliGRAM(s) Oral at bedtime PRN Insomnia      CAPILLARY BLOOD GLUCOSE      POCT Blood Glucose.: 119 mg/dL (24 Apr 2025 22:27)  POCT Blood Glucose.: 130 mg/dL (24 Apr 2025 16:46)  POCT Blood Glucose.: 124 mg/dL (24 Apr 2025 11:35)  POCT Blood Glucose.: 120 mg/dL (24 Apr 2025 07:36)    I&O's Summary    23 Apr 2025 07:01  -  24 Apr 2025 07:00  --------------------------------------------------------  IN: 840 mL / OUT: 1350 mL / NET: -510 mL    24 Apr 2025 07:01  -  25 Apr 2025 06:53  --------------------------------------------------------  IN: 780 mL / OUT: 1700 mL / NET: -920 mL        Vital Signs Last 24 Hrs  T(C): 36.4 (25 Apr 2025 05:22), Max: 36.7 (24 Apr 2025 11:04)  T(F): 97.6 (25 Apr 2025 05:22), Max: 98.1 (24 Apr 2025 11:04)  HR: 72 (25 Apr 2025 05:22) (72 - 92)  BP: 115/71 (25 Apr 2025 05:22) (100/66 - 115/71)  BP(mean): --  RR: 16 (25 Apr 2025 05:22) (16 - 18)  SpO2: 98% (25 Apr 2025 05:22) (96% - 99%)    Parameters below as of 25 Apr 2025 05:22  Patient On (Oxygen Delivery Method): nasal cannula        Physical Exam  CONSTITUTIONAL: NAD  EYES: EOMI, conjunctiva and sclera clear  ENMT: Moist oral mucosa  NECK: Supple  RESPIRATORY: Breathing unlabored, CTAB  CARDIOVASCULAR: S1S2 no MRG  ABDOMEN: Nontender to palpation, normoactive bowel sounds, no rebound/guarding  MUSCULOSKELETAL: no clubbing or cyanosis of digits  NEUROLOGY: No focal deficits   SKIN: No rashes or lesions    LABS:                        9.5    6.01  )-----------( 265      ( 24 Apr 2025 06:34 )             30.2      04-24    136  |  95[L]  |  105[H]  ----------------------------<  81  4.3   |  29  |  2.60[H]    Ca    9.5      24 Apr 2025 06:34  Phos  4.5     04-24  Mg     2.2     04-24    TPro  7.5  /  Alb  3.2[L]  /  TBili  0.7  /  DBili  x   /  AST  15  /  ALT  21  /  AlkPhos  97  04-24    PT/INR - ( 25 Apr 2025 06:37 )   PT: 27.6 sec;   INR: 2.44 ratio               Urinalysis Basic - ( 24 Apr 2025 06:34 )    Color: x / Appearance: x / SG: x / pH: x  Gluc: 81 mg/dL / Ketone: x  / Bili: x / Urobili: x   Blood: x / Protein: x / Nitrite: x   Leuk Esterase: x / RBC: x / WBC x   Sq Epi: x / Non Sq Epi: x / Bacteria: x        RADIOLOGY & ADDITIONAL TESTS:    Imaging Personally Reviewed:    Consultant(s) Notes Reviewed:      Care Discussed with Consultants/Other Providers:   Patient is a 64y old  Male who presents with a chief complaint of dyspnea , chest pain (24 Apr 2025 18:27)      SUBJECTIVE / OVERNIGHT EVENTS:    - No acute event overnight  Pt seen and examined at bedside. Had no complaints. Stated that he was on AVAPs throughout the night. Denied cp, sob, d/n/v    MEDICATIONS  (STANDING):  albuterol/ipratropium for Nebulization 3 milliLiter(s) Nebulizer every 6 hours  aMIOdarone    Tablet   Oral   aMIOdarone    Tablet 200 milliGRAM(s) Oral daily  atorvastatin 80 milliGRAM(s) Oral at bedtime  buMETAnide 3 milliGRAM(s) Oral every 12 hours  clopidogrel Tablet 75 milliGRAM(s) Oral daily  famotidine    Tablet 20 milliGRAM(s) Oral every 48 hours  hemorrhoidal Ointment 1 Application(s) Rectal two times a day  hydrALAZINE 10 milliGRAM(s) Oral three times a day  insulin lispro (ADMELOG) corrective regimen sliding scale   SubCutaneous at bedtime  insulin lispro (ADMELOG) corrective regimen sliding scale   SubCutaneous three times a day before meals  isosorbide   dinitrate Tablet (ISORDIL) 10 milliGRAM(s) Oral three times a day  polyethylene glycol 3350 17 Gram(s) Oral daily  senna 2 Tablet(s) Oral at bedtime    MEDICATIONS  (PRN):  melatonin 3 milliGRAM(s) Oral at bedtime PRN Insomnia      CAPILLARY BLOOD GLUCOSE      POCT Blood Glucose.: 119 mg/dL (24 Apr 2025 22:27)  POCT Blood Glucose.: 130 mg/dL (24 Apr 2025 16:46)  POCT Blood Glucose.: 124 mg/dL (24 Apr 2025 11:35)  POCT Blood Glucose.: 120 mg/dL (24 Apr 2025 07:36)    I&O's Summary    23 Apr 2025 07:01  -  24 Apr 2025 07:00  --------------------------------------------------------  IN: 840 mL / OUT: 1350 mL / NET: -510 mL    24 Apr 2025 07:01  -  25 Apr 2025 06:53  --------------------------------------------------------  IN: 780 mL / OUT: 1700 mL / NET: -920 mL        Vital Signs Last 24 Hrs  T(C): 36.4 (25 Apr 2025 05:22), Max: 36.7 (24 Apr 2025 11:04)  T(F): 97.6 (25 Apr 2025 05:22), Max: 98.1 (24 Apr 2025 11:04)  HR: 72 (25 Apr 2025 05:22) (72 - 92)  BP: 115/71 (25 Apr 2025 05:22) (100/66 - 115/71)  BP(mean): --  RR: 16 (25 Apr 2025 05:22) (16 - 18)  SpO2: 98% (25 Apr 2025 05:22) (96% - 99%)    Parameters below as of 25 Apr 2025 05:22  Patient On (Oxygen Delivery Method): nasal cannula        Physical Exam  CONSTITUTIONAL: NAD  EYES: EOMI, conjunctiva and sclera clear  ENMT: Moist oral mucosa  NECK: Supple  RESPIRATORY: Breathing unlabored, CTAB  CARDIOVASCULAR: irregular. no MRG  ABDOMEN: Nontender to palpation, normoactive bowel sounds, no rebound/guarding  MUSCULOSKELETAL: no clubbing or cyanosis of digits  NEUROLOGY: No focal deficits   SKIN: No rashes or lesions    LABS:                        9.5    6.01  )-----------( 265      ( 24 Apr 2025 06:34 )             30.2      04-24    136  |  95[L]  |  105[H]  ----------------------------<  81  4.3   |  29  |  2.60[H]    Ca    9.5      24 Apr 2025 06:34  Phos  4.5     04-24  Mg     2.2     04-24    TPro  7.5  /  Alb  3.2[L]  /  TBili  0.7  /  DBili  x   /  AST  15  /  ALT  21  /  AlkPhos  97  04-24    PT/INR - ( 25 Apr 2025 06:37 )   PT: 27.6 sec;   INR: 2.44 ratio               Urinalysis Basic - ( 24 Apr 2025 06:34 )    Color: x / Appearance: x / SG: x / pH: x  Gluc: 81 mg/dL / Ketone: x  / Bili: x / Urobili: x   Blood: x / Protein: x / Nitrite: x   Leuk Esterase: x / RBC: x / WBC x   Sq Epi: x / Non Sq Epi: x / Bacteria: x        RADIOLOGY & ADDITIONAL TESTS:    Imaging Personally Reviewed:    Consultant(s) Notes Reviewed:      Care Discussed with Consultants/Other Providers:

## 2025-04-25 NOTE — ED PROVIDER NOTE - PRINCIPAL DIAGNOSIS
A (CATHETER BLN EMERGE MNRL WORKHORSE 2.5MM 12MM 144CM 2 LUM) balloon was removed and did not cross the lesion. Hearing loss

## 2025-04-25 NOTE — PROGRESS NOTE ADULT - PROBLEM SELECTOR PLAN 1
s/p TAVR (11/2022)   Admitted to CCU for mixed shock likely from severe AS-induced LVOT requiring pressors - weaned off  TTE (4/1) - The prosthetic valve has reduced leaflet opening . Severe prosthetic aortic stenosis.   - s/p TAVR (4/11)  - INR at goal (2-3)    Plan:  - d/c Hep gtt  - c/w Coumadin 3mg (4/24)  - Trend INR daily (goal 2-3) s/p TAVR (11/2022)   Admitted to CCU for mixed shock likely from severe AS-induced LVOT requiring pressors - weaned off  TTE (4/1) - The prosthetic valve has reduced leaflet opening . Severe prosthetic aortic stenosis.   - s/p TAVR (4/11)  - INR at goal (2-3)    Plan:  - d/c Hep gtt  - c/w Coumadin 3mg (4/25)   - Trend INR daily (goal 2-3)

## 2025-04-25 NOTE — PROGRESS NOTE ADULT - ASSESSMENT
58 yo man with history of CAD s/p CABG (2013), atrial fibrillation, ICM (EF 25%, LVIDd 5.9cm), severe AS s/p TAVR 11/2022 with severe prosthetic stenosis, atrial flutter s/p ablation, CKD, WILFRED, venous stasis, HTN, HLD admitted with AHRF secondary to ADHF. Was initially in the CICU on pressors with ANGELICA on CKD as well. Now s/p TAVR valve in valve on 4/11. CHF consulted for GDMT titration.     At present, patients volume status is improved. He is hemodynamically stable and will need titration of GDMT in the setting of renal dysfunction.   Would recommend no longer continuing home entresto given poor renal function.   This appears to be his new baseline Cr.         Cardiac Studies:   TTE 4/12/25: LVEF 25%. LVIdd 5.9cm. Evolut valve placed in aortic position without paravalvular regurg. Biatrial enlargement. severe mitral leaflet calcification.   TTE 4/1/25: LVEF 40%. Regional WMAs in LAD and LCx territory. Severe prosthetic aortic stenosis.   TTE 6/11/13: LVEF 34%.   LHC 4/11/25: LIMA to LAD patent. Occluded SVGs to OM1 and D1. Severe native vessel CAD. RCA with nonobstructive disease.

## 2025-04-25 NOTE — PROGRESS NOTE ADULT - PROBLEM SELECTOR PLAN 1
Stage C   Etiology: Ischemic   Remains volume overloaded. Warm and wet on exam.   GDMT titration limited by poor renal function.   -increase hydralazine to 20mg TID   -continue with isordil 10mg TID   -continue bumex 3mg po BID   -start metoprolol succinate 12.5mg daily   -start farxiga 10mg daily   -no entresto on discharge   Will plan for follow up with HF. Please message when discharging.

## 2025-04-25 NOTE — PROGRESS NOTE ADULT - ATTENDING COMMENTS
64M PMHx AFib (s/p DCCV 3/25/25), HFrEF, aortic stenosis s/p TAVR (11/2022), AFlutter s/p ablation 2019 and s/p Micra implant, CKD3, WILFRED, obesity, venous stasis, HTN, HLD admitted for heart failure exacerbation, acute hypoxic resp failure, ANGELICA on CKD, downgraded from CCU after requiring pressors, s/p TAVR 4/11, s/p hep to coum bridge, currently being diuresed.     #AS  - S/p repeat TAVR 4/11  - INR in therapeutic range 2-3, dose 3mg coumadin tonight.    #HFrEF  - HF following, bumex 3mg PO BID, hydral increased to 20 TID and isordil 10 TID for GDMT. Added toprol and farxiga today  -daily weights/strict I&Os---net -510cc   -trend CMP, lactate  -appreciate HF recs    #Acute respiratory failure  - Likely due to acute HF  - C/w diuresis as above  - wean O2 as tolerated     #AFib  - C/w anticoagulation  - C/w amiodarone  - Monitor on telemetry    #CKD3  - Cr 2.6 stable, diuresis as above   - Avoid nephrotoxins, renally dose all medications    Rest of care as documented above . d/w HS 1 . Pending dc planning

## 2025-04-25 NOTE — PROGRESS NOTE ADULT - TIME BILLING
pt counseling lab and imaging interpretation med mgmt care coordination
- Review of records, telemetry, vital signs and daily labs.   - General and cardiovascular physical examination.  - Generation of cardiovascular treatment plan.  - Coordination of care with primary team.
- Review of records, telemetry, vital signs and daily labs.   - General and cardiovascular physical examination.  - Generation of cardiovascular treatment plan.  - Coordination of care with primary team.
review of labs, imaging, notes, discussion of plan with patient
time spent reviewing prior charts, meds, discussing plan with patient= 44 minutes. Time spent does not include time spent teaching
time spent reviewing prior charts, meds, discussing plan with patient= 36 minutes. Time spent does not include time spent teaching
time spent reviewing prior charts, meds, discussing plan with patient= 53 minutes. Time spent does not include time spent teaching
time spent reviewing prior charts, meds, discussing plan with patient= 46 minutes. Time spent does not include time spent teaching
time spent reviewing prior charts, meds, discussing plan with patient, HF= 52 minutes. Time spent does not include time spent teaching

## 2025-04-25 NOTE — PROGRESS NOTE ADULT - PROBLEM SELECTOR PLAN 2
presented initially with dyspnea, orthopnea x 6 days c/f acute on chronic HF   Home meds: Entresto 24/26, bumex 1mg/2mg qd alternate, hydralazine 50mg tid  - s/p Bumex gtt  TTE (4/12) -  LVEF 25%. Global LV hypokinesis.  - HF following, appreciated recs    Plan  - c/w IVP Bumex 3mg bid (goal net -1 to 2L daily)  - will reach out to HF regarding switching to PO bumex  - c/w Hydral 10mg tid  - c/w isordil 10mg tid  - Replete lytes prn for Mg>2 K>4  - Daily weight. Strict I&O presented initially with dyspnea, orthopnea x 6 days c/f acute on chronic HF   Home meds: Entresto 24/26, bumex 1mg/2mg qd alternate, hydralazine 50mg tid  - s/p Bumex gtt  TTE (4/12) -  LVEF 25%. Global LV hypokinesis.  - HF following, appreciated recs    Plan  - c/w PO Bumex 3mg bid (goal net -1 to 2L daily)    - c/w Hydral 10mg tid  - c/w isordil 10mg tid  - Replete lytes prn for Mg>2 K>4  - Daily weight. Strict I&O presented initially with dyspnea, orthopnea x 6 days c/f acute on chronic HF   Home meds: Entresto 24/26, bumex 1mg/2mg qd alternate, hydralazine 50mg tid  - s/p Bumex gtt  TTE (4/12) -  LVEF 25%. Global LV hypokinesis.  - HF following, appreciated recs    Plan  - c/w PO Bumex 3mg bid (goal net -1 to 2L daily)    - c/w Hydral 10mg tid  - c/w isordil 10mg tid  - Will reach out to HF for final recommendations prior to discharge re: GDMT  - Replete lytes prn for Mg>2 K>4  - Daily weight. Strict I&O

## 2025-04-25 NOTE — PROGRESS NOTE ADULT - SUBJECTIVE AND OBJECTIVE BOX
[facility] Division of Kidney Diseases & Hypertension  FOLLOW UP NOTE  --------------------------------------------------------------------------------  Chief Complaint:    24 hour events/subjective:    feels ok        PAST HISTORY  --------------------------------------------------------------------------------  No significant changes to PMH, PSH, FHx, SHx, unless otherwise noted    ALLERGIES & MEDICATIONS  --------------------------------------------------------------------------------  Allergies    metoprolol (Short breath)    Intolerances      Standing Inpatient Medications  albuterol/ipratropium for Nebulization 3 milliLiter(s) Nebulizer every 6 hours  aMIOdarone    Tablet   Oral   aMIOdarone    Tablet 200 milliGRAM(s) Oral daily  atorvastatin 80 milliGRAM(s) Oral at bedtime  buMETAnide 3 milliGRAM(s) Oral every 12 hours  clopidogrel Tablet 75 milliGRAM(s) Oral daily  dapagliflozin 10 milliGRAM(s) Oral daily  famotidine    Tablet 20 milliGRAM(s) Oral every 48 hours  hemorrhoidal Ointment 1 Application(s) Rectal two times a day  hydrALAZINE 20 milliGRAM(s) Oral three times a day  insulin lispro (ADMELOG) corrective regimen sliding scale   SubCutaneous three times a day before meals  insulin lispro (ADMELOG) corrective regimen sliding scale   SubCutaneous at bedtime  isosorbide   dinitrate Tablet (ISORDIL) 10 milliGRAM(s) Oral three times a day  metoprolol succinate ER 12.5 milliGRAM(s) Oral daily  polyethylene glycol 3350 17 Gram(s) Oral daily  senna 2 Tablet(s) Oral at bedtime  warfarin 3 milliGRAM(s) Oral once    PRN Inpatient Medications  melatonin 3 milliGRAM(s) Oral at bedtime PRN            VITALS/PHYSICAL EXAM  --------------------------------------------------------------------------------  T(C): 36.4 (04-25-25 @ 11:14), Max: 36.4 (04-25-25 @ 05:22)  HR: 82 (04-25-25 @ 11:14) (72 - 85)  BP: 103/70 (04-25-25 @ 11:14) (103/70 - 115/71)  RR: 16 (04-25-25 @ 11:14) (16 - 18)  SpO2: 98% (04-25-25 @ 11:14) (96% - 99%)  Wt(kg): --        04-24-25 @ 07:01  -  04-25-25 @ 07:00  --------------------------------------------------------  IN: 780 mL / OUT: 1700 mL / NET: -920 mL    04-25-25 @ 07:01  -  04-25-25 @ 14:56  --------------------------------------------------------  IN: 480 mL / OUT: 350 mL / NET: 130 mL      Physical Exam:  	  Cardiovascular: RRR, S1, S2, 3/6 HSM  Respiratory: Clear to auscultation bilaterally  Gastrointestinal: soft, non-tender, non-distended  Neurologic: Non-focal  Lymphatic: No lymphadenopathy  Psychiatry: AAOx3, mood & affect appropriate        LABS/STUDIES  --------------------------------------------------------------------------------              9.8    5.09  >-----------<  261      [04-25-25 @ 06:37]              32.1     137  |  95  |  91  ----------------------------<  85      [04-25-25 @ 06:37]  3.9   |  28  |  2.44        Ca     9.4     [04-25-25 @ 06:37]      Mg     2.2     [04-25-25 @ 06:37]      Phos  4.3     [04-25-25 @ 06:37]    TPro  7.9  /  Alb  3.3  /  TBili  0.6  /  DBili  x   /  AST  13  /  ALT  21  /  AlkPhos  103  [04-25-25 @ 06:37]    PT/INR: PT 27.6 , INR 2.44       [04-25-25 @ 06:37]      Creatinine Trend:  SCr 2.44 [04-25 @ 06:37]  SCr 2.60 [04-24 @ 06:34]  SCr 2.56 [04-23 @ 14:15]  SCr 2.60 [04-23 @ 06:32]  SCr 2.53 [04-22 @ 05:22]    Urinalysis - [04-25-25 @ 06:37]      Color  / Appearance  / SG  / pH       Gluc 85 / Ketone   / Bili  / Urobili        Blood  / Protein  / Leuk Est  / Nitrite       RBC  / WBC  / Hyaline  / Gran  / Sq Epi  / Non Sq Epi  / Bacteria

## 2025-04-25 NOTE — PROGRESS NOTE ADULT - ASSESSMENT
65 y/o M with hx of Afib s/p DCCV 3/25/25, CHF, Aortic stenosis s/p TAVR 11/2022, CAD s/p CABG, Aflutter s/p ablation and Micra implant, CKD 3, HTN, HLD presented with 6 days of worsening SOB on exertion associated with orthopnea and chest pain and admitted for CHF exacerbation and TAVR eval. Now s/p LHC on 4/8 and TAVR on 4/11.    Nephrology was consulted for management of ANGELICA on CKD. Pt follows Dr. William (Nephrology).

## 2025-04-26 LAB
ALBUMIN SERPL ELPH-MCNC: 3.4 G/DL — SIGNIFICANT CHANGE UP (ref 3.3–5)
ALP SERPL-CCNC: 115 U/L — SIGNIFICANT CHANGE UP (ref 40–120)
ALT FLD-CCNC: 24 U/L — SIGNIFICANT CHANGE UP (ref 10–45)
ANION GAP SERPL CALC-SCNC: 14 MMOL/L — SIGNIFICANT CHANGE UP (ref 5–17)
AST SERPL-CCNC: 24 U/L — SIGNIFICANT CHANGE UP (ref 10–40)
BILIRUB SERPL-MCNC: 0.6 MG/DL — SIGNIFICANT CHANGE UP (ref 0.2–1.2)
BUN SERPL-MCNC: 105 MG/DL — HIGH (ref 7–23)
CALCIUM SERPL-MCNC: 9.4 MG/DL — SIGNIFICANT CHANGE UP (ref 8.4–10.5)
CHLORIDE SERPL-SCNC: 95 MMOL/L — LOW (ref 96–108)
CO2 SERPL-SCNC: 27 MMOL/L — SIGNIFICANT CHANGE UP (ref 22–31)
CREAT SERPL-MCNC: 2.15 MG/DL — HIGH (ref 0.5–1.3)
EGFR: 33 ML/MIN/1.73M2 — LOW
EGFR: 33 ML/MIN/1.73M2 — LOW
GAS PNL BLDA: SIGNIFICANT CHANGE UP
GLUCOSE BLDC GLUCOMTR-MCNC: 131 MG/DL — HIGH (ref 70–99)
GLUCOSE BLDC GLUCOMTR-MCNC: 134 MG/DL — HIGH (ref 70–99)
GLUCOSE BLDC GLUCOMTR-MCNC: 156 MG/DL — HIGH (ref 70–99)
GLUCOSE BLDC GLUCOMTR-MCNC: 97 MG/DL — SIGNIFICANT CHANGE UP (ref 70–99)
GLUCOSE SERPL-MCNC: 86 MG/DL — SIGNIFICANT CHANGE UP (ref 70–99)
HCT VFR BLD CALC: 33 % — LOW (ref 39–50)
HGB BLD-MCNC: 10.4 G/DL — LOW (ref 13–17)
INR BLD: 2.66 RATIO — HIGH (ref 0.85–1.16)
MAGNESIUM SERPL-MCNC: 2.3 MG/DL — SIGNIFICANT CHANGE UP (ref 1.6–2.6)
MCHC RBC-ENTMCNC: 31.5 G/DL — LOW (ref 32–36)
MCHC RBC-ENTMCNC: 31.5 PG — SIGNIFICANT CHANGE UP (ref 27–34)
MCV RBC AUTO: 100 FL — SIGNIFICANT CHANGE UP (ref 80–100)
NRBC BLD AUTO-RTO: 0 /100 WBCS — SIGNIFICANT CHANGE UP (ref 0–0)
PHOSPHATE SERPL-MCNC: 4.5 MG/DL — SIGNIFICANT CHANGE UP (ref 2.5–4.5)
PLATELET # BLD AUTO: 254 K/UL — SIGNIFICANT CHANGE UP (ref 150–400)
POTASSIUM SERPL-MCNC: 4.6 MMOL/L — SIGNIFICANT CHANGE UP (ref 3.5–5.3)
POTASSIUM SERPL-SCNC: 4.6 MMOL/L — SIGNIFICANT CHANGE UP (ref 3.5–5.3)
PROT SERPL-MCNC: 8.1 G/DL — SIGNIFICANT CHANGE UP (ref 6–8.3)
PROTHROM AB SERPL-ACNC: 30.3 SEC — HIGH (ref 9.9–13.4)
RBC # BLD: 3.3 M/UL — LOW (ref 4.2–5.8)
RBC # FLD: 17.3 % — HIGH (ref 10.3–14.5)
SODIUM SERPL-SCNC: 136 MMOL/L — SIGNIFICANT CHANGE UP (ref 135–145)
WBC # BLD: 5.72 K/UL — SIGNIFICANT CHANGE UP (ref 3.8–10.5)
WBC # FLD AUTO: 5.72 K/UL — SIGNIFICANT CHANGE UP (ref 3.8–10.5)

## 2025-04-26 PROCEDURE — 99231 SBSQ HOSP IP/OBS SF/LOW 25: CPT | Mod: GC

## 2025-04-26 RX ORDER — BISACODYL 5 MG
10 TABLET, DELAYED RELEASE (ENTERIC COATED) ORAL ONCE
Refills: 0 | Status: COMPLETED | OUTPATIENT
Start: 2025-04-26 | End: 2025-04-26

## 2025-04-26 RX ADMIN — Medication 20 MILLIGRAM(S): at 12:31

## 2025-04-26 RX ADMIN — INSULIN LISPRO 1: 100 INJECTION, SOLUTION INTRAVENOUS; SUBCUTANEOUS at 21:32

## 2025-04-26 RX ADMIN — Medication 20 MILLIGRAM(S): at 19:53

## 2025-04-26 RX ADMIN — Medication 3 MILLIGRAM(S): at 21:07

## 2025-04-26 RX ADMIN — IPRATROPIUM BROMIDE AND ALBUTEROL SULFATE 3 MILLILITER(S): .5; 2.5 SOLUTION RESPIRATORY (INHALATION) at 12:33

## 2025-04-26 RX ADMIN — POLYETHYLENE GLYCOL 3350 17 GRAM(S): 17 POWDER, FOR SOLUTION ORAL at 12:33

## 2025-04-26 RX ADMIN — Medication 20 MILLIGRAM(S): at 05:10

## 2025-04-26 RX ADMIN — Medication 20 MILLIGRAM(S): at 21:07

## 2025-04-26 RX ADMIN — BUMETANIDE 3 MILLIGRAM(S): 1 TABLET ORAL at 18:10

## 2025-04-26 RX ADMIN — Medication 10 MILLIGRAM(S): at 12:30

## 2025-04-26 RX ADMIN — Medication 2 TABLET(S): at 21:07

## 2025-04-26 RX ADMIN — IPRATROPIUM BROMIDE AND ALBUTEROL SULFATE 3 MILLILITER(S): .5; 2.5 SOLUTION RESPIRATORY (INHALATION) at 23:38

## 2025-04-26 RX ADMIN — ISOSORBDIE DINITRATE 10 MILLIGRAM(S): 30 TABLET ORAL at 18:10

## 2025-04-26 RX ADMIN — ATORVASTATIN CALCIUM 80 MILLIGRAM(S): 80 TABLET, FILM COATED ORAL at 21:07

## 2025-04-26 RX ADMIN — BUMETANIDE 3 MILLIGRAM(S): 1 TABLET ORAL at 05:09

## 2025-04-26 RX ADMIN — CLOPIDOGREL BISULFATE 75 MILLIGRAM(S): 75 TABLET, FILM COATED ORAL at 12:30

## 2025-04-26 RX ADMIN — DAPAGLIFLOZIN 10 MILLIGRAM(S): 5 TABLET, FILM COATED ORAL at 12:30

## 2025-04-26 RX ADMIN — AMIODARONE HYDROCHLORIDE 200 MILLIGRAM(S): 50 INJECTION, SOLUTION INTRAVENOUS at 05:10

## 2025-04-26 RX ADMIN — METOPROLOL SUCCINATE 12.5 MILLIGRAM(S): 50 TABLET, EXTENDED RELEASE ORAL at 05:10

## 2025-04-26 RX ADMIN — IPRATROPIUM BROMIDE AND ALBUTEROL SULFATE 3 MILLILITER(S): .5; 2.5 SOLUTION RESPIRATORY (INHALATION) at 05:11

## 2025-04-26 RX ADMIN — ISOSORBDIE DINITRATE 10 MILLIGRAM(S): 30 TABLET ORAL at 12:31

## 2025-04-26 RX ADMIN — ISOSORBDIE DINITRATE 10 MILLIGRAM(S): 30 TABLET ORAL at 05:10

## 2025-04-26 RX ADMIN — IPRATROPIUM BROMIDE AND ALBUTEROL SULFATE 3 MILLILITER(S): .5; 2.5 SOLUTION RESPIRATORY (INHALATION) at 19:54

## 2025-04-26 NOTE — PROGRESS NOTE ADULT - SUBJECTIVE AND OBJECTIVE BOX
***************************************************************  Azalea Jimenes, PGY-1  Internal Medicine   Available on [employer] TEAMS  ***************************************************************    BERTHA WARD  64y  MRN: [id number]    Patient is a 64y old  Male who presents with a chief complaint of dyspnea , chest pain (25 Apr 2025 14:56)      Interval/Overnight Events: no events ON.     Subjective: Pt seen and examined at bedside. Denies fever, CP, SOB, abn pain, N/V, dysuria. Tolerating diet.      MEDICATIONS  (STANDING):  albuterol/ipratropium for Nebulization 3 milliLiter(s) Nebulizer every 6 hours  aMIOdarone    Tablet   Oral   aMIOdarone    Tablet 200 milliGRAM(s) Oral daily  atorvastatin 80 milliGRAM(s) Oral at bedtime  buMETAnide 3 milliGRAM(s) Oral every 12 hours  clopidogrel Tablet 75 milliGRAM(s) Oral daily  dapagliflozin 10 milliGRAM(s) Oral daily  famotidine    Tablet 20 milliGRAM(s) Oral every 48 hours  hemorrhoidal Ointment 1 Application(s) Rectal two times a day  hydrALAZINE 20 milliGRAM(s) Oral three times a day  insulin lispro (ADMELOG) corrective regimen sliding scale   SubCutaneous at bedtime  insulin lispro (ADMELOG) corrective regimen sliding scale   SubCutaneous three times a day before meals  isosorbide   dinitrate Tablet (ISORDIL) 10 milliGRAM(s) Oral three times a day  metoprolol succinate ER 12.5 milliGRAM(s) Oral daily  polyethylene glycol 3350 17 Gram(s) Oral daily  senna 2 Tablet(s) Oral at bedtime    MEDICATIONS  (PRN):  melatonin 3 milliGRAM(s) Oral at bedtime PRN Insomnia      Objective:    Vitals: Vital Signs Last 24 Hrs  T(C): 36.4 (04-26-25 @ 04:10), Max: 36.4 (04-25-25 @ 11:14)  T(F): 97.6 (04-26-25 @ 04:10), Max: 97.6 (04-26-25 @ 04:10)  HR: 78 (04-26-25 @ 04:10) (78 - 95)  BP: 102/68 (04-26-25 @ 04:10) (100/68 - 103/70)  BP(mean): --  RR: 18 (04-26-25 @ 04:10) (16 - 19)  SpO2: 99% (04-26-25 @ 04:10) (95% - 99%)                I&O's Summary    24 Apr 2025 07:01  -  25 Apr 2025 07:00  --------------------------------------------------------  IN: 780 mL / OUT: 1700 mL / NET: -920 mL    25 Apr 2025 07:01  -  26 Apr 2025 05:48  --------------------------------------------------------  IN: 840 mL / OUT: 1875 mL / NET: -1035 mL        PHYSICAL EXAM:  GENERAL: NAD  HEAD:  Atraumatic, Normocephalic  EYES: EOMI, conjunctiva and sclera clear  CHEST/LUNG: Clear to auscultation bilaterally; No rales, rhonchi, wheezing, or rubs  HEART: Regular rate and rhythm; No murmurs, rubs, or gallops  ABDOMEN: Soft, Nontender, Nondistended;   SKIN: No rashes or lesions  NERVOUS SYSTEM:  Alert & Oriented X3, no focal deficits    LABS:                        9.8    5.09  )-----------( 261      ( 25 Apr 2025 06:37 )             32.1                         9.5    6.01  )-----------( 265      ( 24 Apr 2025 06:34 )             30.2                         10.4   5.97  )-----------( 269      ( 23 Apr 2025 06:32 )             32.6     04-25    137  |  95[L]  |  91[H]  ----------------------------<  85  3.9   |  28  |  2.44[H]  04-24    136  |  95[L]  |  105[H]  ----------------------------<  81  4.3   |  29  |  2.60[H]  04-23    141  |  96  |  100[H]  ----------------------------<  130[H]  4.4   |  24  |  2.56[H]    Ca    9.4      25 Apr 2025 06:37  Ca    9.5      24 Apr 2025 06:34  Ca    9.8      23 Apr 2025 14:15  Phos  4.3     04-25  Mg     2.2     04-25    TPro  7.9  /  Alb  3.3  /  TBili  0.6  /  DBili  x   /  AST  13  /  ALT  21  /  AlkPhos  103  04-25  TPro  7.5  /  Alb  3.2[L]  /  TBili  0.7  /  DBili  x   /  AST  15  /  ALT  21  /  AlkPhos  97  04-24  TPro  7.8  /  Alb  3.4  /  TBili  0.8  /  DBili  x   /  AST  16  /  ALT  21  /  AlkPhos  105  04-23    CAPILLARY BLOOD GLUCOSE      POCT Blood Glucose.: 172 mg/dL (25 Apr 2025 21:37)  POCT Blood Glucose.: 150 mg/dL (25 Apr 2025 17:03)  POCT Blood Glucose.: 207 mg/dL (25 Apr 2025 11:49)  POCT Blood Glucose.: 92 mg/dL (25 Apr 2025 07:33)    PT/INR - ( 25 Apr 2025 06:37 )   PT: 27.6 sec;   INR: 2.44 ratio             Urinalysis Basic - ( 25 Apr 2025 06:37 )    Color: x / Appearance: x / SG: x / pH: x  Gluc: 85 mg/dL / Ketone: x  / Bili: x / Urobili: x   Blood: x / Protein: x / Nitrite: x   Leuk Esterase: x / RBC: x / WBC x   Sq Epi: x / Non Sq Epi: x / Bacteria: x          RADIOLOGY & ADDITIONAL TESTS:         ***************************************************************  Azalea Jimenes, PGY-1  Internal Medicine   Available on [employer] TEAMS  ***************************************************************    BERTHA WARD  64y  MRN: [id number]    Patient is a 64y old  Male who presents with a chief complaint of dyspnea , chest pain (25 Apr 2025 14:56)      Interval/Overnight Events: no events ON.     Subjective: Pt seen and examined at bedside. Feels well, no pain, was able to walk down the curran yesterday. Denies fever, CP, SOB, abn pain, N/V, dysuria. Tolerating diet.      MEDICATIONS  (STANDING):  albuterol/ipratropium for Nebulization 3 milliLiter(s) Nebulizer every 6 hours  aMIOdarone    Tablet   Oral   aMIOdarone    Tablet 200 milliGRAM(s) Oral daily  atorvastatin 80 milliGRAM(s) Oral at bedtime  buMETAnide 3 milliGRAM(s) Oral every 12 hours  clopidogrel Tablet 75 milliGRAM(s) Oral daily  dapagliflozin 10 milliGRAM(s) Oral daily  famotidine    Tablet 20 milliGRAM(s) Oral every 48 hours  hemorrhoidal Ointment 1 Application(s) Rectal two times a day  hydrALAZINE 20 milliGRAM(s) Oral three times a day  insulin lispro (ADMELOG) corrective regimen sliding scale   SubCutaneous at bedtime  insulin lispro (ADMELOG) corrective regimen sliding scale   SubCutaneous three times a day before meals  isosorbide   dinitrate Tablet (ISORDIL) 10 milliGRAM(s) Oral three times a day  metoprolol succinate ER 12.5 milliGRAM(s) Oral daily  polyethylene glycol 3350 17 Gram(s) Oral daily  senna 2 Tablet(s) Oral at bedtime    MEDICATIONS  (PRN):  melatonin 3 milliGRAM(s) Oral at bedtime PRN Insomnia      Objective:    Vitals: Vital Signs Last 24 Hrs  T(C): 36.4 (04-26-25 @ 04:10), Max: 36.4 (04-25-25 @ 11:14)  T(F): 97.6 (04-26-25 @ 04:10), Max: 97.6 (04-26-25 @ 04:10)  HR: 78 (04-26-25 @ 04:10) (78 - 95)  BP: 102/68 (04-26-25 @ 04:10) (100/68 - 103/70)  BP(mean): --  RR: 18 (04-26-25 @ 04:10) (16 - 19)  SpO2: 99% (04-26-25 @ 04:10) (95% - 99%)                I&O's Summary    24 Apr 2025 07:01  -  25 Apr 2025 07:00  --------------------------------------------------------  IN: 780 mL / OUT: 1700 mL / NET: -920 mL    25 Apr 2025 07:01  -  26 Apr 2025 05:48  --------------------------------------------------------  IN: 840 mL / OUT: 1875 mL / NET: -1035 mL        PHYSICAL EXAM:  CONSTITUTIONAL: NAD  EYES: EOMI, conjunctiva and sclera clear  ENMT: Moist oral mucosa  NECK: Supple  RESPIRATORY: Breathing unlabored, CTAB  CARDIOVASCULAR: irregular. no MRG  ABDOMEN: Nontender to palpation, normoactive bowel sounds, no rebound/guarding  MUSCULOSKELETAL: no clubbing or cyanosis of digits  NEUROLOGY: No focal deficits   SKIN: No rashes or lesions    LABS:                        9.8    5.09  )-----------( 261      ( 25 Apr 2025 06:37 )             32.1                         9.5    6.01  )-----------( 265      ( 24 Apr 2025 06:34 )             30.2                         10.4   5.97  )-----------( 269      ( 23 Apr 2025 06:32 )             32.6     04-25    137  |  95[L]  |  91[H]  ----------------------------<  85  3.9   |  28  |  2.44[H]  04-24    136  |  95[L]  |  105[H]  ----------------------------<  81  4.3   |  29  |  2.60[H]  04-23    141  |  96  |  100[H]  ----------------------------<  130[H]  4.4   |  24  |  2.56[H]    Ca    9.4      25 Apr 2025 06:37  Ca    9.5      24 Apr 2025 06:34  Ca    9.8      23 Apr 2025 14:15  Phos  4.3     04-25  Mg     2.2     04-25    TPro  7.9  /  Alb  3.3  /  TBili  0.6  /  DBili  x   /  AST  13  /  ALT  21  /  AlkPhos  103  04-25  TPro  7.5  /  Alb  3.2[L]  /  TBili  0.7  /  DBili  x   /  AST  15  /  ALT  21  /  AlkPhos  97  04-24  TPro  7.8  /  Alb  3.4  /  TBili  0.8  /  DBili  x   /  AST  16  /  ALT  21  /  AlkPhos  105  04-23    CAPILLARY BLOOD GLUCOSE      POCT Blood Glucose.: 172 mg/dL (25 Apr 2025 21:37)  POCT Blood Glucose.: 150 mg/dL (25 Apr 2025 17:03)  POCT Blood Glucose.: 207 mg/dL (25 Apr 2025 11:49)  POCT Blood Glucose.: 92 mg/dL (25 Apr 2025 07:33)    PT/INR - ( 25 Apr 2025 06:37 )   PT: 27.6 sec;   INR: 2.44 ratio             Urinalysis Basic - ( 25 Apr 2025 06:37 )    Color: x / Appearance: x / SG: x / pH: x  Gluc: 85 mg/dL / Ketone: x  / Bili: x / Urobili: x   Blood: x / Protein: x / Nitrite: x   Leuk Esterase: x / RBC: x / WBC x   Sq Epi: x / Non Sq Epi: x / Bacteria: x          RADIOLOGY & ADDITIONAL TESTS:

## 2025-04-26 NOTE — PROGRESS NOTE ADULT - ATTENDING COMMENTS
64M PMHx AFib (s/p DCCV 3/25/25), HFrEF, aortic stenosis s/p TAVR (11/2022), AFlutter s/p ablation 2019 and s/p Micra implant, CKD3, WILFRED, obesity, venous stasis, HTN, HLD admitted for heart failure exacerbation, acute hypoxic resp failure, ANGELICA on CKD, downgraded from CCU after requiring pressors, s/p TAVR 4/11, s/p hep to coum bridge, currently being diuresed.     #AS  - S/p repeat TAVR 4/11  - INR in therapeutic range 2-3, dose 3mg coumadin tonight. dc on this dose     #HFrEF  - HF following, bumex 3mg PO BID, hydral 20 TID, isordil 10 TID, toprol, and farxiga for GDMT.   -daily weights/strict I&Os---net -1.5L  -outpatient follow up    #Acute respiratory failure  - Likely due to acute HF  - C/w diuresis as above  - wean O2 as tolerated     #AFib  - C/w anticoagulation  - C/w amiodarone  - Monitor on telemetry    #CKD3  - Cr 2.15 stable/improving, diuresis as above   - Avoid nephrotoxins, renally dose all medications    Rest of care as documented above . d/w HS 1. Medically clear, pending auth for rehab

## 2025-04-26 NOTE — PROGRESS NOTE ADULT - PROBLEM SELECTOR PLAN 2
presented initially with dyspnea, orthopnea x 6 days c/f acute on chronic HF   Home meds: Entresto 24/26, bumex 1mg/2mg qd alternate, hydralazine 50mg tid  - s/p Bumex gtt  TTE (4/12) -  LVEF 25%. Global LV hypokinesis.  - HF following, appreciated recs    Plan  - c/w PO Bumex 3mg bid (goal net -1 to 2L daily)    - c/w Hydral 10mg tid  - c/w isordil 10mg tid  - Will reach out to HF for final recommendations prior to discharge re: GDMT  - Replete lytes prn for Mg>2 K>4  - Daily weight. Strict I&O presented initially with dyspnea, orthopnea x 6 days c/f acute on chronic HF   Home meds: Entresto 24/26, bumex 1mg/2mg qd alternate, hydralazine 50mg tid  - s/p Bumex gtt  TTE (4/12) -  LVEF 25%. Global LV hypokinesis.  - HF following, appreciated recs    Plan  - c/w PO Bumex 3mg bid (goal net -1 to 2L daily)    - c/w Hydral 10mg tid  - c/w isordil 10mg tid  - f/u with HF if medically cleared for d/c  - Will reach out to HF for final recommendations prior to discharge re: GDMT  - Replete lytes prn for Mg>2 K>4  - Daily weight. Strict I&O

## 2025-04-26 NOTE — PROGRESS NOTE ADULT - PROBLEM SELECTOR PLAN 9
DVT ppx: Coumadin  Diet: DASH/TLC  Dispo: [facility] planning underway  Full Code  GI: Famotidine 20mg qd DVT ppx: Coumadin  Diet: DASH/TLC  Dispo: [facility] planning underway  Full Code  GI: Famotidine 20mg q48h

## 2025-04-26 NOTE — PROGRESS NOTE ADULT - ASSESSMENT
64M PMHx AFib (s/p dccv 3/25/25), HFrEF (likely mod red EF, sev TTE), aortic stenosis s/p TAVR (11/2022), a flutter s/p ablation 2019 and s/p micra implant, CKD3, WILFRED, obesity, venous stasis, HTN, HLD, who presents with 6 days of worsening dyspnea on exertion, admitted for AHRF likely iso CHF exacerbation, also with ANGELICA on CKD c/f cardiorenal syndrome. Admitted to CCU for obstructive shock requiring pressors. Patient s/p TAVR 4/11, off pressors, and medically stable. Downgraded to medicine floor for further management and transition to Coumadin. PT recommended for [facility], pending auth 64M PMHx AFib (s/p dccv 3/25/25), HFrEF (likely mod red EF, sev TTE), aortic stenosis s/p TAVR (11/2022), a flutter s/p ablation 2019 and s/p micra implant, CKD3, WILFRED, obesity, venous stasis, HTN, HLD, who presents with 6 days of worsening dyspnea on exertion, admitted for AHRF likely iso CHF exacerbation, also with ANGELICA on CKD c/f cardiorenal syndrome. Admitted to CCU for obstructive shock requiring pressors. Patient s/p TAVR 4/11, off pressors, and medically stable. Downgraded to medicine floor for further management and transition to Coumadin. PT recommended for [facility], pending auth.

## 2025-04-26 NOTE — PROGRESS NOTE ADULT - PROBLEM SELECTOR PLAN 1
s/p TAVR (11/2022)   Admitted to CCU for mixed shock likely from severe AS-induced LVOT requiring pressors - weaned off  TTE (4/1) - The prosthetic valve has reduced leaflet opening . Severe prosthetic aortic stenosis.   - s/p TAVR (4/11)  - INR at goal (2-3)    Plan:  - d/c Hep gtt  - c/w Coumadin 3mg (4/25)   - Trend INR daily (goal 2-3) s/p TAVR (11/2022)   Admitted to CCU for mixed shock likely from severe AS-induced LVOT requiring pressors - weaned off  TTE (4/1) - The prosthetic valve has reduced leaflet opening . Severe prosthetic aortic stenosis.   - s/p TAVR (4/11)  - INR at goal (2-3)    Plan:  - c/w Coumadin 3mg  - Trend INR daily (goal 2-3)

## 2025-04-27 LAB
ALBUMIN SERPL ELPH-MCNC: 3.5 G/DL — SIGNIFICANT CHANGE UP (ref 3.3–5)
ALP SERPL-CCNC: 115 U/L — SIGNIFICANT CHANGE UP (ref 40–120)
ALT FLD-CCNC: 26 U/L — SIGNIFICANT CHANGE UP (ref 10–45)
ANION GAP SERPL CALC-SCNC: 13 MMOL/L — SIGNIFICANT CHANGE UP (ref 5–17)
AST SERPL-CCNC: 19 U/L — SIGNIFICANT CHANGE UP (ref 10–40)
BILIRUB SERPL-MCNC: 0.6 MG/DL — SIGNIFICANT CHANGE UP (ref 0.2–1.2)
BUN SERPL-MCNC: 102 MG/DL — HIGH (ref 7–23)
CALCIUM SERPL-MCNC: 9.4 MG/DL — SIGNIFICANT CHANGE UP (ref 8.4–10.5)
CHLORIDE SERPL-SCNC: 96 MMOL/L — SIGNIFICANT CHANGE UP (ref 96–108)
CO2 SERPL-SCNC: 27 MMOL/L — SIGNIFICANT CHANGE UP (ref 22–31)
CREAT SERPL-MCNC: 2.16 MG/DL — HIGH (ref 0.5–1.3)
EGFR: 33 ML/MIN/1.73M2 — LOW
EGFR: 33 ML/MIN/1.73M2 — LOW
GAS PNL BLDV: SIGNIFICANT CHANGE UP
GLUCOSE BLDC GLUCOMTR-MCNC: 110 MG/DL — HIGH (ref 70–99)
GLUCOSE BLDC GLUCOMTR-MCNC: 119 MG/DL — HIGH (ref 70–99)
GLUCOSE BLDC GLUCOMTR-MCNC: 121 MG/DL — HIGH (ref 70–99)
GLUCOSE BLDC GLUCOMTR-MCNC: 306 MG/DL — HIGH (ref 70–99)
GLUCOSE SERPL-MCNC: 88 MG/DL — SIGNIFICANT CHANGE UP (ref 70–99)
HCT VFR BLD CALC: 33.5 % — LOW (ref 39–50)
HGB BLD-MCNC: 10.4 G/DL — LOW (ref 13–17)
INR BLD: 2.6 RATIO — HIGH (ref 0.85–1.16)
MAGNESIUM SERPL-MCNC: 2.4 MG/DL — SIGNIFICANT CHANGE UP (ref 1.6–2.6)
MCHC RBC-ENTMCNC: 31 G/DL — LOW (ref 32–36)
MCHC RBC-ENTMCNC: 31 PG — SIGNIFICANT CHANGE UP (ref 27–34)
MCV RBC AUTO: 99.7 FL — SIGNIFICANT CHANGE UP (ref 80–100)
NRBC BLD AUTO-RTO: 0 /100 WBCS — SIGNIFICANT CHANGE UP (ref 0–0)
PHOSPHATE SERPL-MCNC: 4.8 MG/DL — HIGH (ref 2.5–4.5)
PLATELET # BLD AUTO: 253 K/UL — SIGNIFICANT CHANGE UP (ref 150–400)
POTASSIUM SERPL-MCNC: 4 MMOL/L — SIGNIFICANT CHANGE UP (ref 3.5–5.3)
POTASSIUM SERPL-SCNC: 4 MMOL/L — SIGNIFICANT CHANGE UP (ref 3.5–5.3)
PROT SERPL-MCNC: 8 G/DL — SIGNIFICANT CHANGE UP (ref 6–8.3)
PROTHROM AB SERPL-ACNC: 29.4 SEC — HIGH (ref 9.9–13.4)
RBC # BLD: 3.36 M/UL — LOW (ref 4.2–5.8)
RBC # FLD: 17.4 % — HIGH (ref 10.3–14.5)
SODIUM SERPL-SCNC: 136 MMOL/L — SIGNIFICANT CHANGE UP (ref 135–145)
WBC # BLD: 5.83 K/UL — SIGNIFICANT CHANGE UP (ref 3.8–10.5)
WBC # FLD AUTO: 5.83 K/UL — SIGNIFICANT CHANGE UP (ref 3.8–10.5)

## 2025-04-27 PROCEDURE — 99231 SBSQ HOSP IP/OBS SF/LOW 25: CPT | Mod: GC

## 2025-04-27 RX ORDER — BISACODYL 5 MG
10 TABLET, DELAYED RELEASE (ENTERIC COATED) ORAL DAILY
Refills: 0 | Status: DISCONTINUED | OUTPATIENT
Start: 2025-04-27 | End: 2025-04-28

## 2025-04-27 RX ADMIN — IPRATROPIUM BROMIDE AND ALBUTEROL SULFATE 3 MILLILITER(S): .5; 2.5 SOLUTION RESPIRATORY (INHALATION) at 05:48

## 2025-04-27 RX ADMIN — ISOSORBDIE DINITRATE 10 MILLIGRAM(S): 30 TABLET ORAL at 17:32

## 2025-04-27 RX ADMIN — ISOSORBDIE DINITRATE 10 MILLIGRAM(S): 30 TABLET ORAL at 05:48

## 2025-04-27 RX ADMIN — DAPAGLIFLOZIN 10 MILLIGRAM(S): 5 TABLET, FILM COATED ORAL at 12:20

## 2025-04-27 RX ADMIN — Medication 20 MILLIGRAM(S): at 05:47

## 2025-04-27 RX ADMIN — ISOSORBDIE DINITRATE 10 MILLIGRAM(S): 30 TABLET ORAL at 12:19

## 2025-04-27 RX ADMIN — ATORVASTATIN CALCIUM 80 MILLIGRAM(S): 80 TABLET, FILM COATED ORAL at 21:08

## 2025-04-27 RX ADMIN — POLYETHYLENE GLYCOL 3350 17 GRAM(S): 17 POWDER, FOR SOLUTION ORAL at 12:20

## 2025-04-27 RX ADMIN — BUMETANIDE 3 MILLIGRAM(S): 1 TABLET ORAL at 17:32

## 2025-04-27 RX ADMIN — Medication 20 MILLIGRAM(S): at 12:19

## 2025-04-27 RX ADMIN — BUMETANIDE 3 MILLIGRAM(S): 1 TABLET ORAL at 05:48

## 2025-04-27 RX ADMIN — Medication 20 MILLIGRAM(S): at 21:08

## 2025-04-27 RX ADMIN — PHENYLEPHRINE HYDROCHLORIDE AND FAT, HARD 1 APPLICATION(S): .00525; 1.86 SUPPOSITORY RECTAL at 06:08

## 2025-04-27 RX ADMIN — AMIODARONE HYDROCHLORIDE 200 MILLIGRAM(S): 50 INJECTION, SOLUTION INTRAVENOUS at 05:47

## 2025-04-27 RX ADMIN — Medication 2 TABLET(S): at 21:08

## 2025-04-27 RX ADMIN — IPRATROPIUM BROMIDE AND ALBUTEROL SULFATE 3 MILLILITER(S): .5; 2.5 SOLUTION RESPIRATORY (INHALATION) at 23:09

## 2025-04-27 RX ADMIN — INSULIN LISPRO 4: 100 INJECTION, SOLUTION INTRAVENOUS; SUBCUTANEOUS at 21:08

## 2025-04-27 RX ADMIN — METOPROLOL SUCCINATE 12.5 MILLIGRAM(S): 50 TABLET, EXTENDED RELEASE ORAL at 05:47

## 2025-04-27 RX ADMIN — CLOPIDOGREL BISULFATE 75 MILLIGRAM(S): 75 TABLET, FILM COATED ORAL at 12:20

## 2025-04-27 RX ADMIN — Medication 3 MILLIGRAM(S): at 21:07

## 2025-04-27 NOTE — PROGRESS NOTE ADULT - PROBLEM SELECTOR PLAN 9
DVT ppx: Coumadin  Diet: DASH/TLC  Dispo: [facility] planning underway  Full Code  GI: Famotidine 20mg q48h

## 2025-04-27 NOTE — PROGRESS NOTE ADULT - PROBLEM SELECTOR PLAN 2
presented initially with dyspnea, orthopnea x 6 days c/f acute on chronic HF   Home meds: Entresto 24/26, bumex 1mg/2mg qd alternate, hydralazine 50mg tid  - s/p Bumex gtt  TTE (4/12) -  LVEF 25%. Global LV hypokinesis.  - HF following, appreciated recs    Plan  - c/w PO Bumex 3mg bid (goal net -1 to 2L daily)    - c/w Hydral 10mg tid  - c/w isordil 10mg tid  - f/u with HF if medically cleared for d/c  - Will reach out to HF for final recommendations prior to discharge re: GDMT  - Replete lytes prn for Mg>2 K>4  - Daily weight. Strict I&O presented initially with dyspnea, orthopnea x 6 days c/f acute on chronic HF   Home meds: Entresto 24/26, bumex 1mg/2mg qd alternate, hydralazine 50mg tid  - s/p Bumex gtt  TTE (4/12) -  LVEF 25%. Global LV hypokinesis.  - HF following, appreciated recs    Plan  - Regimen: Hydral 20mg tid, isordil 10mg tid, metoprolol succinate 12.5mg qd, farxiga 10mg qd  - c/w PO Bumex 3mg bid (goal net -1 to 2L daily)    - f/u with HF if medically cleared for d/c  - Replete lytes prn for Mg>2 K>4  - Daily weight. Strict I&O

## 2025-04-27 NOTE — PROVIDER CONTACT NOTE (CRITICAL VALUE NOTIFICATION) - ASSESSMENT
Pt on heparin gtt. Heparin Assay >2.
pt a&o x4, VSS, no c/o cp or sob at this time. Pt was on noc avaps overnight and currently on 4L nc

## 2025-04-27 NOTE — PROGRESS NOTE ADULT - PROBLEM SELECTOR PLAN 3
CKD stage III, baseline Scr 1.5-1.8 - follows outpt nephrologist Dr Stringer  - presented with Scr 5.2 --> now slowly down trending  - ANGELICA/ATN i/s/o HF exacerbation, obstructive shock, contrast use  - Nephro following, appreciated recs    Plan  - Hold home Entresto  - c/w Bumex as above  - Monitor labs and UOP. Avoid nephrotoxins and dose meds per eGFR. CKD stage III, baseline Scr 1.5-1.8 - follows outpt nephrologist Dr Stringer  - presented with Scr 5.2 --> now slowly down trending  - ANGELICA/ATN i/s/o HF exacerbation, obstructive shock, contrast use  - Nephro following, appreciated recs    Plan  - Hold home Entresto - per HF, no Entresto on discharge  - c/w Bumex as above  - Monitor labs and UOP. Avoid nephrotoxins and dose meds per eGFR.

## 2025-04-27 NOTE — PROGRESS NOTE ADULT - PROBLEM SELECTOR PLAN 1
s/p TAVR (11/2022)   Admitted to CCU for mixed shock likely from severe AS-induced LVOT requiring pressors - weaned off  TTE (4/1) - The prosthetic valve has reduced leaflet opening . Severe prosthetic aortic stenosis.   - s/p TAVR (4/11)  - INR at goal (2-3)    Plan:  - c/w Coumadin 3mg  - Trend INR daily (goal 2-3)

## 2025-04-27 NOTE — PROGRESS NOTE ADULT - SUBJECTIVE AND OBJECTIVE BOX
Patient is a 64y old  Male who presents with a chief complaint of dyspnea , chest pain (26 Apr 2025 05:48)      SUBJECTIVE / OVERNIGHT EVENTS:     - no acute event overnight    Pt seen and examined at bedside. Had no complaints. Denied cp, sob, d/n/v    MEDICATIONS  (STANDING):  albuterol/ipratropium for Nebulization 3 milliLiter(s) Nebulizer every 6 hours  aMIOdarone    Tablet   Oral   aMIOdarone    Tablet 200 milliGRAM(s) Oral daily  atorvastatin 80 milliGRAM(s) Oral at bedtime  buMETAnide 3 milliGRAM(s) Oral every 12 hours  clopidogrel Tablet 75 milliGRAM(s) Oral daily  dapagliflozin 10 milliGRAM(s) Oral daily  famotidine    Tablet 20 milliGRAM(s) Oral every 48 hours  hemorrhoidal Ointment 1 Application(s) Rectal two times a day  hydrALAZINE 20 milliGRAM(s) Oral three times a day  insulin lispro (ADMELOG) corrective regimen sliding scale   SubCutaneous at bedtime  insulin lispro (ADMELOG) corrective regimen sliding scale   SubCutaneous three times a day before meals  isosorbide   dinitrate Tablet (ISORDIL) 10 milliGRAM(s) Oral three times a day  metoprolol succinate ER 12.5 milliGRAM(s) Oral daily  polyethylene glycol 3350 17 Gram(s) Oral daily  senna 2 Tablet(s) Oral at bedtime    MEDICATIONS  (PRN):  melatonin 3 milliGRAM(s) Oral at bedtime PRN Insomnia      CAPILLARY BLOOD GLUCOSE      POCT Blood Glucose.: 156 mg/dL (26 Apr 2025 21:16)  POCT Blood Glucose.: 134 mg/dL (26 Apr 2025 17:19)  POCT Blood Glucose.: 131 mg/dL (26 Apr 2025 11:58)  POCT Blood Glucose.: 97 mg/dL (26 Apr 2025 07:50)    I&O's Summary    25 Apr 2025 07:01  -  26 Apr 2025 07:00  --------------------------------------------------------  IN: 840 mL / OUT: 2325 mL / NET: -1485 mL    26 Apr 2025 07:01  -  27 Apr 2025 06:54  --------------------------------------------------------  IN: 720 mL / OUT: 2150 mL / NET: -1430 mL        Vital Signs Last 24 Hrs  T(C): 36.4 (27 Apr 2025 04:27), Max: 36.4 (26 Apr 2025 11:15)  T(F): 97.5 (27 Apr 2025 04:27), Max: 97.6 (26 Apr 2025 11:15)  HR: 80 (27 Apr 2025 06:42) (63 - 94)  BP: 110/76 (27 Apr 2025 04:27) (98/66 - 110/76)  BP(mean): 87 (26 Apr 2025 21:11) (87 - 87)  RR: 18 (27 Apr 2025 04:27) (18 - 18)  SpO2: 96% (27 Apr 2025 06:42) (96% - 100%)    Parameters below as of 27 Apr 2025 04:27  Patient On (Oxygen Delivery Method): BiPAP/CPAP        Physical Exam  CONSTITUTIONAL: NAD  EYES: EOMI, conjunctiva and sclera clear  ENMT: Moist oral mucosa  NECK: Supple  RESPIRATORY: Breathing unlabored, CTAB  CARDIOVASCULAR: S1S2 no MRG  ABDOMEN: Nontender to palpation, normoactive bowel sounds, no rebound/guarding  MUSCULOSKELETAL: no clubbing or cyanosis of digits  NEUROLOGY: No focal deficits   SKIN: No rashes or lesions    LABS:                        10.4   5.72  )-----------( 254      ( 26 Apr 2025 06:25 )             33.0      04-26    136  |  95[L]  |  105[H]  ----------------------------<  86  4.6   |  27  |  2.15[H]    Ca    9.4      26 Apr 2025 06:25  Phos  4.5     04-26  Mg     2.3     04-26    TPro  8.1  /  Alb  3.4  /  TBili  0.6  /  DBili  x   /  AST  24  /  ALT  24  /  AlkPhos  115  04-26    PT/INR - ( 26 Apr 2025 06:25 )   PT: 30.3 sec;   INR: 2.66 ratio               Urinalysis Basic - ( 26 Apr 2025 06:25 )    Color: x / Appearance: x / SG: x / pH: x  Gluc: 86 mg/dL / Ketone: x  / Bili: x / Urobili: x   Blood: x / Protein: x / Nitrite: x   Leuk Esterase: x / RBC: x / WBC x   Sq Epi: x / Non Sq Epi: x / Bacteria: x        RADIOLOGY & ADDITIONAL TESTS:    Imaging Personally Reviewed:    Consultant(s) Notes Reviewed:      Care Discussed with Consultants/Other Providers:   Patient is a 64y old  Male who presents with a chief complaint of dyspnea , chest pain (26 Apr 2025 05:48)      SUBJECTIVE / OVERNIGHT EVENTS:     - no acute event overnight    Pt seen and examined at bedside. Reported tolerating diet well. Reported UOP and bm. Denied cp, sob, d/n/v    MEDICATIONS  (STANDING):  albuterol/ipratropium for Nebulization 3 milliLiter(s) Nebulizer every 6 hours  aMIOdarone    Tablet   Oral   aMIOdarone    Tablet 200 milliGRAM(s) Oral daily  atorvastatin 80 milliGRAM(s) Oral at bedtime  buMETAnide 3 milliGRAM(s) Oral every 12 hours  clopidogrel Tablet 75 milliGRAM(s) Oral daily  dapagliflozin 10 milliGRAM(s) Oral daily  famotidine    Tablet 20 milliGRAM(s) Oral every 48 hours  hemorrhoidal Ointment 1 Application(s) Rectal two times a day  hydrALAZINE 20 milliGRAM(s) Oral three times a day  insulin lispro (ADMELOG) corrective regimen sliding scale   SubCutaneous at bedtime  insulin lispro (ADMELOG) corrective regimen sliding scale   SubCutaneous three times a day before meals  isosorbide   dinitrate Tablet (ISORDIL) 10 milliGRAM(s) Oral three times a day  metoprolol succinate ER 12.5 milliGRAM(s) Oral daily  polyethylene glycol 3350 17 Gram(s) Oral daily  senna 2 Tablet(s) Oral at bedtime    MEDICATIONS  (PRN):  melatonin 3 milliGRAM(s) Oral at bedtime PRN Insomnia      CAPILLARY BLOOD GLUCOSE      POCT Blood Glucose.: 156 mg/dL (26 Apr 2025 21:16)  POCT Blood Glucose.: 134 mg/dL (26 Apr 2025 17:19)  POCT Blood Glucose.: 131 mg/dL (26 Apr 2025 11:58)  POCT Blood Glucose.: 97 mg/dL (26 Apr 2025 07:50)    I&O's Summary    25 Apr 2025 07:01  -  26 Apr 2025 07:00  --------------------------------------------------------  IN: 840 mL / OUT: 2325 mL / NET: -1485 mL    26 Apr 2025 07:01  -  27 Apr 2025 06:54  --------------------------------------------------------  IN: 720 mL / OUT: 2150 mL / NET: -1430 mL        Vital Signs Last 24 Hrs  T(C): 36.4 (27 Apr 2025 04:27), Max: 36.4 (26 Apr 2025 11:15)  T(F): 97.5 (27 Apr 2025 04:27), Max: 97.6 (26 Apr 2025 11:15)  HR: 80 (27 Apr 2025 06:42) (63 - 94)  BP: 110/76 (27 Apr 2025 04:27) (98/66 - 110/76)  BP(mean): 87 (26 Apr 2025 21:11) (87 - 87)  RR: 18 (27 Apr 2025 04:27) (18 - 18)  SpO2: 96% (27 Apr 2025 06:42) (96% - 100%)    Parameters below as of 27 Apr 2025 04:27  Patient On (Oxygen Delivery Method): BiPAP/CPAP        Physical Exam  CONSTITUTIONAL: NAD, lying comfortably  EYES: EOMI, conjunctiva and sclera clear  ENMT: Moist oral mucosa  NECK: Supple  RESPIRATORY: Breathing unlabored on 2L NC, CTAB  CARDIOVASCULAR: irregular, no MRG  ABDOMEN: Nontender to palpation, normoactive bowel sounds, no rebound/guarding  MUSCULOSKELETAL: +2 edema in LEs b/l  NEUROLOGY: No focal deficits   SKIN: No rashes or lesions    LABS:                        10.4   5.72  )-----------( 254      ( 26 Apr 2025 06:25 )             33.0      04-26    136  |  95[L]  |  105[H]  ----------------------------<  86  4.6   |  27  |  2.15[H]    Ca    9.4      26 Apr 2025 06:25  Phos  4.5     04-26  Mg     2.3     04-26    TPro  8.1  /  Alb  3.4  /  TBili  0.6  /  DBili  x   /  AST  24  /  ALT  24  /  AlkPhos  115  04-26    PT/INR - ( 26 Apr 2025 06:25 )   PT: 30.3 sec;   INR: 2.66 ratio               Urinalysis Basic - ( 26 Apr 2025 06:25 )    Color: x / Appearance: x / SG: x / pH: x  Gluc: 86 mg/dL / Ketone: x  / Bili: x / Urobili: x   Blood: x / Protein: x / Nitrite: x   Leuk Esterase: x / RBC: x / WBC x   Sq Epi: x / Non Sq Epi: x / Bacteria: x        RADIOLOGY & ADDITIONAL TESTS:    Imaging Personally Reviewed:    Consultant(s) Notes Reviewed:      Care Discussed with Consultants/Other Providers:

## 2025-04-27 NOTE — PROGRESS NOTE ADULT - ATTENDING COMMENTS
64M PMHx AFib (s/p DCCV 3/25/25), HFrEF, aortic stenosis s/p TAVR (11/2022), AFlutter s/p ablation 2019 and s/p Micra implant, CKD3, WILFRED, obesity, venous stasis, HTN, HLD admitted for heart failure exacerbation, acute hypoxic resp failure, ANGELICA on CKD, downgraded from CCU after requiring pressors, s/p TAVR 4/11, s/p hep to coum bridge, currently being diuresed.     #AS  - S/p repeat TAVR 4/11  - INR in therapeutic range 2-3, dose 3mg coumadin tonight. dc on this dose     #HFrEF  - HF following, bumex 3mg PO BID, hydral 20 TID, isordil 10 TID, toprol, and farxiga for GDMT.   -daily weights/strict I&Os---net -1.4L  -outpatient follow up    #Acute respiratory failure  - Likely due to acute HF  - C/w diuresis as above  - wean O2 as tolerated     #AFib  - C/w anticoagulation  - C/w amiodarone  - Monitor on telemetry    #CKD3  - Cr 2.15 stable/improving, diuresis as above   - Avoid nephrotoxins, renally dose all medications    Rest of care as documented above . d/w HS 1. Medically clear, pending auth for rehab . Normal vision: sees adequately in most situations; can see medication labels, newsprint

## 2025-04-27 NOTE — PROGRESS NOTE ADULT - ASSESSMENT
64M PMHx AFib (s/p dccv 3/25/25), HFrEF (likely mod red EF, sev TTE), aortic stenosis s/p TAVR (11/2022), a flutter s/p ablation 2019 and s/p micra implant, CKD3, WILFRED, obesity, venous stasis, HTN, HLD, who presents with 6 days of worsening dyspnea on exertion, admitted for AHRF likely iso CHF exacerbation, also with ANGELICA on CKD c/f cardiorenal syndrome. Admitted to CCU for obstructive shock requiring pressors. Patient s/p TAVR 4/11, off pressors, and medically stable. Downgraded to medicine floor for further management and transition to Coumadin. PT recommended for [facility]ASON, pending auth.

## 2025-04-28 ENCOUNTER — TRANSCRIPTION ENCOUNTER (OUTPATIENT)
Age: 65
End: 2025-04-28

## 2025-04-28 VITALS — SYSTOLIC BLOOD PRESSURE: 107 MMHG | DIASTOLIC BLOOD PRESSURE: 70 MMHG | HEART RATE: 86 BPM

## 2025-04-28 LAB
ALBUMIN SERPL ELPH-MCNC: 3.5 G/DL — SIGNIFICANT CHANGE UP (ref 3.3–5)
ALP SERPL-CCNC: 111 U/L — SIGNIFICANT CHANGE UP (ref 40–120)
ALT FLD-CCNC: 23 U/L — SIGNIFICANT CHANGE UP (ref 10–45)
ANION GAP SERPL CALC-SCNC: 13 MMOL/L — SIGNIFICANT CHANGE UP (ref 5–17)
AST SERPL-CCNC: 17 U/L — SIGNIFICANT CHANGE UP (ref 10–40)
BILIRUB SERPL-MCNC: 0.7 MG/DL — SIGNIFICANT CHANGE UP (ref 0.2–1.2)
BUN SERPL-MCNC: 100 MG/DL — HIGH (ref 7–23)
CALCIUM SERPL-MCNC: 9.1 MG/DL — SIGNIFICANT CHANGE UP (ref 8.4–10.5)
CHLORIDE SERPL-SCNC: 93 MMOL/L — LOW (ref 96–108)
CO2 SERPL-SCNC: 28 MMOL/L — SIGNIFICANT CHANGE UP (ref 22–31)
CREAT SERPL-MCNC: 2.38 MG/DL — HIGH (ref 0.5–1.3)
EGFR: 30 ML/MIN/1.73M2 — LOW
EGFR: 30 ML/MIN/1.73M2 — LOW
GLUCOSE BLDC GLUCOMTR-MCNC: 101 MG/DL — HIGH (ref 70–99)
GLUCOSE BLDC GLUCOMTR-MCNC: 115 MG/DL — HIGH (ref 70–99)
GLUCOSE BLDC GLUCOMTR-MCNC: 117 MG/DL — HIGH (ref 70–99)
GLUCOSE BLDC GLUCOMTR-MCNC: 133 MG/DL — HIGH (ref 70–99)
GLUCOSE SERPL-MCNC: 79 MG/DL — SIGNIFICANT CHANGE UP (ref 70–99)
HCT VFR BLD CALC: 32.4 % — LOW (ref 39–50)
HGB BLD-MCNC: 10.2 G/DL — LOW (ref 13–17)
INR BLD: 2.66 RATIO — HIGH (ref 0.85–1.16)
MAGNESIUM SERPL-MCNC: 2.3 MG/DL — SIGNIFICANT CHANGE UP (ref 1.6–2.6)
MCHC RBC-ENTMCNC: 31.2 PG — SIGNIFICANT CHANGE UP (ref 27–34)
MCHC RBC-ENTMCNC: 31.5 G/DL — LOW (ref 32–36)
MCV RBC AUTO: 99.1 FL — SIGNIFICANT CHANGE UP (ref 80–100)
NRBC BLD AUTO-RTO: 0 /100 WBCS — SIGNIFICANT CHANGE UP (ref 0–0)
PHOSPHATE SERPL-MCNC: 3.8 MG/DL — SIGNIFICANT CHANGE UP (ref 2.5–4.5)
PLATELET # BLD AUTO: 234 K/UL — SIGNIFICANT CHANGE UP (ref 150–400)
POTASSIUM SERPL-MCNC: 4.3 MMOL/L — SIGNIFICANT CHANGE UP (ref 3.5–5.3)
POTASSIUM SERPL-SCNC: 4.3 MMOL/L — SIGNIFICANT CHANGE UP (ref 3.5–5.3)
PROT SERPL-MCNC: 7.7 G/DL — SIGNIFICANT CHANGE UP (ref 6–8.3)
PROTHROM AB SERPL-ACNC: 30.3 SEC — HIGH (ref 9.9–13.4)
RBC # BLD: 3.27 M/UL — LOW (ref 4.2–5.8)
RBC # FLD: 17.3 % — HIGH (ref 10.3–14.5)
SODIUM SERPL-SCNC: 134 MMOL/L — LOW (ref 135–145)
WBC # BLD: 5.95 K/UL — SIGNIFICANT CHANGE UP (ref 3.8–10.5)
WBC # FLD AUTO: 5.95 K/UL — SIGNIFICANT CHANGE UP (ref 3.8–10.5)

## 2025-04-28 PROCEDURE — 82803 BLOOD GASES ANY COMBINATION: CPT

## 2025-04-28 PROCEDURE — 82570 ASSAY OF URINE CREATININE: CPT

## 2025-04-28 PROCEDURE — 80076 HEPATIC FUNCTION PANEL: CPT

## 2025-04-28 PROCEDURE — P9045: CPT

## 2025-04-28 PROCEDURE — C1887: CPT

## 2025-04-28 PROCEDURE — 87086 URINE CULTURE/COLONY COUNT: CPT

## 2025-04-28 PROCEDURE — C1726: CPT

## 2025-04-28 PROCEDURE — 84484 ASSAY OF TROPONIN QUANT: CPT

## 2025-04-28 PROCEDURE — 92612 ENDOSCOPY SWALLOW (FEES) VID: CPT

## 2025-04-28 PROCEDURE — 84443 ASSAY THYROID STIM HORMONE: CPT

## 2025-04-28 PROCEDURE — C1769: CPT

## 2025-04-28 PROCEDURE — 99285 EMERGENCY DEPT VISIT HI MDM: CPT

## 2025-04-28 PROCEDURE — 96375 TX/PRO/DX INJ NEW DRUG ADDON: CPT

## 2025-04-28 PROCEDURE — 93308 TTE F-UP OR LMTD: CPT

## 2025-04-28 PROCEDURE — 85520 HEPARIN ASSAY: CPT

## 2025-04-28 PROCEDURE — 36415 COLL VENOUS BLD VENIPUNCTURE: CPT

## 2025-04-28 PROCEDURE — 85730 THROMBOPLASTIN TIME PARTIAL: CPT

## 2025-04-28 PROCEDURE — 71045 X-RAY EXAM CHEST 1 VIEW: CPT

## 2025-04-28 PROCEDURE — 82553 CREATINE MB FRACTION: CPT

## 2025-04-28 PROCEDURE — 82435 ASSAY OF BLOOD CHLORIDE: CPT

## 2025-04-28 PROCEDURE — 86923 COMPATIBILITY TEST ELECTRIC: CPT

## 2025-04-28 PROCEDURE — 80048 BASIC METABOLIC PNL TOTAL CA: CPT

## 2025-04-28 PROCEDURE — C1760: CPT

## 2025-04-28 PROCEDURE — 83036 HEMOGLOBIN GLYCOSYLATED A1C: CPT

## 2025-04-28 PROCEDURE — 86901 BLOOD TYPING SEROLOGIC RH(D): CPT

## 2025-04-28 PROCEDURE — 94010 BREATHING CAPACITY TEST: CPT

## 2025-04-28 PROCEDURE — 84156 ASSAY OF PROTEIN URINE: CPT

## 2025-04-28 PROCEDURE — 80061 LIPID PANEL: CPT

## 2025-04-28 PROCEDURE — C1889: CPT

## 2025-04-28 PROCEDURE — 76000 FLUOROSCOPY <1 HR PHYS/QHP: CPT

## 2025-04-28 PROCEDURE — C1894: CPT

## 2025-04-28 PROCEDURE — C1751: CPT

## 2025-04-28 PROCEDURE — 99239 HOSP IP/OBS DSCHRG MGMT >30: CPT | Mod: GC

## 2025-04-28 PROCEDURE — 83880 ASSAY OF NATRIURETIC PEPTIDE: CPT

## 2025-04-28 PROCEDURE — 85018 HEMOGLOBIN: CPT

## 2025-04-28 PROCEDURE — 97110 THERAPEUTIC EXERCISES: CPT

## 2025-04-28 PROCEDURE — 96374 THER/PROPH/DIAG INJ IV PUSH: CPT

## 2025-04-28 PROCEDURE — 83605 ASSAY OF LACTIC ACID: CPT

## 2025-04-28 PROCEDURE — 84132 ASSAY OF SERUM POTASSIUM: CPT

## 2025-04-28 PROCEDURE — 97116 GAIT TRAINING THERAPY: CPT

## 2025-04-28 PROCEDURE — 82533 TOTAL CORTISOL: CPT

## 2025-04-28 PROCEDURE — 94640 AIRWAY INHALATION TREATMENT: CPT

## 2025-04-28 PROCEDURE — 82330 ASSAY OF CALCIUM: CPT

## 2025-04-28 PROCEDURE — 93459 L HRT ART/GRFT ANGIO: CPT

## 2025-04-28 PROCEDURE — 93320 DOPPLER ECHO COMPLETE: CPT

## 2025-04-28 PROCEDURE — 85027 COMPLETE CBC AUTOMATED: CPT

## 2025-04-28 PROCEDURE — 85610 PROTHROMBIN TIME: CPT

## 2025-04-28 PROCEDURE — 36600 WITHDRAWAL OF ARTERIAL BLOOD: CPT

## 2025-04-28 PROCEDURE — 92526 ORAL FUNCTION THERAPY: CPT

## 2025-04-28 PROCEDURE — 92610 EVALUATE SWALLOWING FUNCTION: CPT

## 2025-04-28 PROCEDURE — 94660 CPAP INITIATION&MGMT: CPT

## 2025-04-28 PROCEDURE — 80053 COMPREHEN METABOLIC PANEL: CPT

## 2025-04-28 PROCEDURE — 97530 THERAPEUTIC ACTIVITIES: CPT

## 2025-04-28 PROCEDURE — 86850 RBC ANTIBODY SCREEN: CPT

## 2025-04-28 PROCEDURE — 83735 ASSAY OF MAGNESIUM: CPT

## 2025-04-28 PROCEDURE — 84100 ASSAY OF PHOSPHORUS: CPT

## 2025-04-28 PROCEDURE — 97162 PT EVAL MOD COMPLEX 30 MIN: CPT

## 2025-04-28 PROCEDURE — 81001 URINALYSIS AUTO W/SCOPE: CPT

## 2025-04-28 PROCEDURE — 93312 ECHO TRANSESOPHAGEAL: CPT

## 2025-04-28 PROCEDURE — 76377 3D RENDER W/INTRP POSTPROCES: CPT

## 2025-04-28 PROCEDURE — 82947 ASSAY GLUCOSE BLOOD QUANT: CPT

## 2025-04-28 PROCEDURE — 85014 HEMATOCRIT: CPT

## 2025-04-28 PROCEDURE — 93325 DOPPLER ECHO COLOR FLOW MAPG: CPT

## 2025-04-28 PROCEDURE — 84295 ASSAY OF SERUM SODIUM: CPT

## 2025-04-28 PROCEDURE — 94002 VENT MGMT INPAT INIT DAY: CPT

## 2025-04-28 PROCEDURE — 93005 ELECTROCARDIOGRAM TRACING: CPT

## 2025-04-28 PROCEDURE — 93306 TTE W/DOPPLER COMPLETE: CPT

## 2025-04-28 PROCEDURE — 85025 COMPLETE CBC W/AUTO DIFF WBC: CPT

## 2025-04-28 PROCEDURE — 94003 VENT MGMT INPAT SUBQ DAY: CPT

## 2025-04-28 PROCEDURE — 76770 US EXAM ABDO BACK WALL COMP: CPT

## 2025-04-28 PROCEDURE — 86900 BLOOD TYPING SEROLOGIC ABO: CPT

## 2025-04-28 PROCEDURE — 82962 GLUCOSE BLOOD TEST: CPT

## 2025-04-28 PROCEDURE — C8929: CPT

## 2025-04-28 PROCEDURE — 99232 SBSQ HOSP IP/OBS MODERATE 35: CPT | Mod: GC

## 2025-04-28 PROCEDURE — 84300 ASSAY OF URINE SODIUM: CPT

## 2025-04-28 RX ORDER — BUMETANIDE 1 MG/1
3 TABLET ORAL
Qty: 0 | Refills: 0 | DISCHARGE
Start: 2025-04-28

## 2025-04-28 RX ORDER — ATORVASTATIN CALCIUM 80 MG/1
1 TABLET, FILM COATED ORAL
Qty: 0 | Refills: 0 | DISCHARGE
Start: 2025-04-28

## 2025-04-28 RX ORDER — DAPAGLIFLOZIN 5 MG/1
1 TABLET, FILM COATED ORAL
Qty: 0 | Refills: 0 | DISCHARGE
Start: 2025-04-28

## 2025-04-28 RX ORDER — METOPROLOL SUCCINATE 50 MG/1
0.5 TABLET, EXTENDED RELEASE ORAL
Qty: 15 | Refills: 0
Start: 2025-04-28 | End: 2025-05-27

## 2025-04-28 RX ORDER — ISOSORBDIE DINITRATE 30 MG/1
1 TABLET ORAL
Qty: 0 | Refills: 0 | DISCHARGE
Start: 2025-04-28

## 2025-04-28 RX ADMIN — Medication 20 MILLIGRAM(S): at 11:52

## 2025-04-28 RX ADMIN — CLOPIDOGREL BISULFATE 75 MILLIGRAM(S): 75 TABLET, FILM COATED ORAL at 11:48

## 2025-04-28 RX ADMIN — AMIODARONE HYDROCHLORIDE 200 MILLIGRAM(S): 50 INJECTION, SOLUTION INTRAVENOUS at 05:10

## 2025-04-28 RX ADMIN — IPRATROPIUM BROMIDE AND ALBUTEROL SULFATE 3 MILLILITER(S): .5; 2.5 SOLUTION RESPIRATORY (INHALATION) at 11:49

## 2025-04-28 RX ADMIN — IPRATROPIUM BROMIDE AND ALBUTEROL SULFATE 3 MILLILITER(S): .5; 2.5 SOLUTION RESPIRATORY (INHALATION) at 05:10

## 2025-04-28 RX ADMIN — METOPROLOL SUCCINATE 12.5 MILLIGRAM(S): 50 TABLET, EXTENDED RELEASE ORAL at 05:09

## 2025-04-28 RX ADMIN — ISOSORBDIE DINITRATE 10 MILLIGRAM(S): 30 TABLET ORAL at 05:10

## 2025-04-28 RX ADMIN — POLYETHYLENE GLYCOL 3350 17 GRAM(S): 17 POWDER, FOR SOLUTION ORAL at 11:49

## 2025-04-28 RX ADMIN — ISOSORBDIE DINITRATE 10 MILLIGRAM(S): 30 TABLET ORAL at 17:23

## 2025-04-28 RX ADMIN — PHENYLEPHRINE HYDROCHLORIDE AND FAT, HARD 1 APPLICATION(S): .00525; 1.86 SUPPOSITORY RECTAL at 05:11

## 2025-04-28 RX ADMIN — ISOSORBDIE DINITRATE 10 MILLIGRAM(S): 30 TABLET ORAL at 11:48

## 2025-04-28 RX ADMIN — BUMETANIDE 3 MILLIGRAM(S): 1 TABLET ORAL at 05:09

## 2025-04-28 RX ADMIN — IPRATROPIUM BROMIDE AND ALBUTEROL SULFATE 3 MILLILITER(S): .5; 2.5 SOLUTION RESPIRATORY (INHALATION) at 17:23

## 2025-04-28 RX ADMIN — DAPAGLIFLOZIN 10 MILLIGRAM(S): 5 TABLET, FILM COATED ORAL at 11:48

## 2025-04-28 RX ADMIN — Medication 20 MILLIGRAM(S): at 05:10

## 2025-04-28 RX ADMIN — BUMETANIDE 3 MILLIGRAM(S): 1 TABLET ORAL at 17:23

## 2025-04-28 RX ADMIN — Medication 20 MILLIGRAM(S): at 17:26

## 2025-04-28 NOTE — DISCHARGE NOTE NURSING/CASE MANAGEMENT/SOCIAL WORK - NSDCFUADDAPPT_GEN_ALL_CORE_FT
APPTS ARE READY TO BE MADE: [X] YES    Best Family or Patient Contact (if needed): Self    Additional Information about above appointments (if needed):    1: Cardiology (HF and Structural)  2: Nephrology  3: PCP    Other comments or requests:     Patient is being discharged to rehab. Caregiver will arrange follow up.

## 2025-04-28 NOTE — PROGRESS NOTE ADULT - PROBLEM SELECTOR PLAN 6
TTE (4/8) showed severe pHTN  - c/w AVAPS qhs  - c/w Duoneb  - Chest PT / Pulmonary toileting/ Incentive spirometry
K 5.5 in ED s/p 5mg lokelma.   -trend bmp daily  -lokelma prn for hyperkalemia
TTE (4/8) showed severe pHTN  - c/w AVAPS qhs  - c/w Duoneb  - Chest PT / Pulmonary toileting/ Incentive spirometry

## 2025-04-28 NOTE — PROGRESS NOTE ADULT - SUBJECTIVE AND OBJECTIVE BOX
***************************************************************  Azalea Jimenes, PGY-1  Internal Medicine   Available on [employer] TEAMS  ***************************************************************    BERTHA WARD  64y  MRN: [id number]    Patient is a 64y old  Male who presents with a chief complaint of dyspnea , chest pain (27 Apr 2025 06:54)      Interval/Overnight Events: no events ON.     Subjective: Pt seen and examined at bedside. Denies fever, CP, SOB, abn pain, N/V, dysuria. Tolerating diet.      MEDICATIONS  (STANDING):  albuterol/ipratropium for Nebulization 3 milliLiter(s) Nebulizer every 6 hours  aMIOdarone    Tablet   Oral   aMIOdarone    Tablet 200 milliGRAM(s) Oral daily  atorvastatin 80 milliGRAM(s) Oral at bedtime  buMETAnide 3 milliGRAM(s) Oral every 12 hours  clopidogrel Tablet 75 milliGRAM(s) Oral daily  dapagliflozin 10 milliGRAM(s) Oral daily  famotidine    Tablet 20 milliGRAM(s) Oral every 48 hours  hemorrhoidal Ointment 1 Application(s) Rectal two times a day  hydrALAZINE 20 milliGRAM(s) Oral three times a day  insulin lispro (ADMELOG) corrective regimen sliding scale   SubCutaneous at bedtime  insulin lispro (ADMELOG) corrective regimen sliding scale   SubCutaneous three times a day before meals  isosorbide   dinitrate Tablet (ISORDIL) 10 milliGRAM(s) Oral three times a day  metoprolol succinate ER 12.5 milliGRAM(s) Oral daily  polyethylene glycol 3350 17 Gram(s) Oral daily  senna 2 Tablet(s) Oral at bedtime    MEDICATIONS  (PRN):  bisacodyl Suppository 10 milliGRAM(s) Rectal daily PRN Constipation  melatonin 3 milliGRAM(s) Oral at bedtime PRN Insomnia      Objective:    Vitals: Vital Signs Last 24 Hrs  T(C): 36.3 (04-28-25 @ 04:00), Max: 36.3 (04-27-25 @ 12:00)  T(F): 97.4 (04-28-25 @ 04:00), Max: 97.4 (04-27-25 @ 21:08)  HR: 82 (04-28-25 @ 05:10) (60 - 94)  BP: 97/60 (04-28-25 @ 04:00) (97/60 - 111/70)  BP(mean): --  RR: 18 (04-28-25 @ 04:00) (18 - 18)  SpO2: 98% (04-28-25 @ 05:10) (92% - 98%)                I&O's Summary    26 Apr 2025 07:01  -  27 Apr 2025 07:00  --------------------------------------------------------  IN: 720 mL / OUT: 2150 mL / NET: -1430 mL    27 Apr 2025 07:01  -  28 Apr 2025 06:05  --------------------------------------------------------  IN: 700 mL / OUT: 2500 mL / NET: -1800 mL        PHYSICAL EXAM:  GENERAL: NAD  HEAD:  Atraumatic, Normocephalic  EYES: EOMI, conjunctiva and sclera clear  CHEST/LUNG: Clear to auscultation bilaterally; No rales, rhonchi, wheezing, or rubs  HEART: Regular rate and rhythm; No murmurs, rubs, or gallops  ABDOMEN: Soft, Nontender, Nondistended;   SKIN: No rashes or lesions  NERVOUS SYSTEM:  Alert & Oriented X3, no focal deficits    LABS:                        10.4   5.83  )-----------( 253      ( 27 Apr 2025 06:58 )             33.5                         10.4   5.72  )-----------( 254      ( 26 Apr 2025 06:25 )             33.0                         9.8    5.09  )-----------( 261      ( 25 Apr 2025 06:37 )             32.1     04-27    136  |  96  |  102[H]  ----------------------------<  88  4.0   |  27  |  2.16[H]  04-26    136  |  95[L]  |  105[H]  ----------------------------<  86  4.6   |  27  |  2.15[H]  04-25    137  |  95[L]  |  91[H]  ----------------------------<  85  3.9   |  28  |  2.44[H]    Ca    9.4      27 Apr 2025 06:58  Ca    9.4      26 Apr 2025 06:25  Ca    9.4      25 Apr 2025 06:37  Phos  4.8     04-27  Mg     2.4     04-27    TPro  8.0  /  Alb  3.5  /  TBili  0.6  /  DBili  x   /  AST  19  /  ALT  26  /  AlkPhos  115  04-27  TPro  8.1  /  Alb  3.4  /  TBili  0.6  /  DBili  x   /  AST  24  /  ALT  24  /  AlkPhos  115  04-26  TPro  7.9  /  Alb  3.3  /  TBili  0.6  /  DBili  x   /  AST  13  /  ALT  21  /  AlkPhos  103  04-25    CAPILLARY BLOOD GLUCOSE      POCT Blood Glucose.: 115 mg/dL (28 Apr 2025 00:56)  POCT Blood Glucose.: 306 mg/dL (27 Apr 2025 21:03)  POCT Blood Glucose.: 121 mg/dL (27 Apr 2025 17:30)  POCT Blood Glucose.: 119 mg/dL (27 Apr 2025 12:05)  POCT Blood Glucose.: 110 mg/dL (27 Apr 2025 07:48)    PT/INR - ( 27 Apr 2025 06:58 )   PT: 29.4 sec;   INR: 2.60 ratio             Urinalysis Basic - ( 27 Apr 2025 06:58 )    Color: x / Appearance: x / SG: x / pH: x  Gluc: 88 mg/dL / Ketone: x  / Bili: x / Urobili: x   Blood: x / Protein: x / Nitrite: x   Leuk Esterase: x / RBC: x / WBC x   Sq Epi: x / Non Sq Epi: x / Bacteria: x          RADIOLOGY & ADDITIONAL TESTS:         ***************************************************************  Azalea Jimenes, PGY-1  Internal Medicine   Available on [employer] TEAMS  ***************************************************************    BERTHA WARD  64y  MRN: [id number]    Patient is a 64y old  Male who presents with a chief complaint of dyspnea , chest pain (27 Apr 2025 06:54)    Interval/Overnight Events:   - put out 2.1L overnight.    Subjective: Pt seen and examined at bedside. States he did not sleep well as his CPAP was uncomfortable. Was only able to keep on for 3 hours. Denies fever, CP, SOB, abn pain, N/V, dysuria. Tolerating diet.      MEDICATIONS  (STANDING):  albuterol/ipratropium for Nebulization 3 milliLiter(s) Nebulizer every 6 hours  aMIOdarone    Tablet   Oral   aMIOdarone    Tablet 200 milliGRAM(s) Oral daily  atorvastatin 80 milliGRAM(s) Oral at bedtime  buMETAnide 3 milliGRAM(s) Oral every 12 hours  clopidogrel Tablet 75 milliGRAM(s) Oral daily  dapagliflozin 10 milliGRAM(s) Oral daily  famotidine    Tablet 20 milliGRAM(s) Oral every 48 hours  hemorrhoidal Ointment 1 Application(s) Rectal two times a day  hydrALAZINE 20 milliGRAM(s) Oral three times a day  insulin lispro (ADMELOG) corrective regimen sliding scale   SubCutaneous at bedtime  insulin lispro (ADMELOG) corrective regimen sliding scale   SubCutaneous three times a day before meals  isosorbide   dinitrate Tablet (ISORDIL) 10 milliGRAM(s) Oral three times a day  metoprolol succinate ER 12.5 milliGRAM(s) Oral daily  polyethylene glycol 3350 17 Gram(s) Oral daily  senna 2 Tablet(s) Oral at bedtime    MEDICATIONS  (PRN):  bisacodyl Suppository 10 milliGRAM(s) Rectal daily PRN Constipation  melatonin 3 milliGRAM(s) Oral at bedtime PRN Insomnia      Objective:    Vitals: Vital Signs Last 24 Hrs  T(C): 36.3 (04-28-25 @ 04:00), Max: 36.3 (04-27-25 @ 12:00)  T(F): 97.4 (04-28-25 @ 04:00), Max: 97.4 (04-27-25 @ 21:08)  HR: 82 (04-28-25 @ 05:10) (60 - 94)  BP: 97/60 (04-28-25 @ 04:00) (97/60 - 111/70)  BP(mean): --  RR: 18 (04-28-25 @ 04:00) (18 - 18)  SpO2: 98% (04-28-25 @ 05:10) (92% - 98%)                I&O's Summary    26 Apr 2025 07:01  -  27 Apr 2025 07:00  --------------------------------------------------------  IN: 720 mL / OUT: 2150 mL / NET: -1430 mL    27 Apr 2025 07:01  -  28 Apr 2025 06:05  --------------------------------------------------------  IN: 700 mL / OUT: 2500 mL / NET: -1800 mL        PHYSICAL EXAM:  GENERAL: NAD  HEAD:  Atraumatic, Normocephalic  EYES: EOMI, conjunctiva and sclera clear  CHEST/LUNG: Clear to auscultation bilaterally; No rales, rhonchi, wheezing, or rubs  HEART: Regular rate and rhythm; No murmurs, rubs, or gallops  ABDOMEN: Soft, Nontender, Nondistended;   SKIN: Shins are dark purple bilaterally, no pitting edema  NERVOUS SYSTEM:  Alert & Oriented X3, no focal deficits    LABS:                        10.4   5.83  )-----------( 253      ( 27 Apr 2025 06:58 )             33.5                         10.4   5.72  )-----------( 254      ( 26 Apr 2025 06:25 )             33.0                         9.8    5.09  )-----------( 261      ( 25 Apr 2025 06:37 )             32.1     04-27    136  |  96  |  102[H]  ----------------------------<  88  4.0   |  27  |  2.16[H]  04-26    136  |  95[L]  |  105[H]  ----------------------------<  86  4.6   |  27  |  2.15[H]  04-25    137  |  95[L]  |  91[H]  ----------------------------<  85  3.9   |  28  |  2.44[H]    Ca    9.4      27 Apr 2025 06:58  Ca    9.4      26 Apr 2025 06:25  Ca    9.4      25 Apr 2025 06:37  Phos  4.8     04-27  Mg     2.4     04-27    TPro  8.0  /  Alb  3.5  /  TBili  0.6  /  DBili  x   /  AST  19  /  ALT  26  /  AlkPhos  115  04-27  TPro  8.1  /  Alb  3.4  /  TBili  0.6  /  DBili  x   /  AST  24  /  ALT  24  /  AlkPhos  115  04-26  TPro  7.9  /  Alb  3.3  /  TBili  0.6  /  DBili  x   /  AST  13  /  ALT  21  /  AlkPhos  103  04-25    CAPILLARY BLOOD GLUCOSE      POCT Blood Glucose.: 115 mg/dL (28 Apr 2025 00:56)  POCT Blood Glucose.: 306 mg/dL (27 Apr 2025 21:03)  POCT Blood Glucose.: 121 mg/dL (27 Apr 2025 17:30)  POCT Blood Glucose.: 119 mg/dL (27 Apr 2025 12:05)  POCT Blood Glucose.: 110 mg/dL (27 Apr 2025 07:48)    PT/INR - ( 27 Apr 2025 06:58 )   PT: 29.4 sec;   INR: 2.60 ratio             Urinalysis Basic - ( 27 Apr 2025 06:58 )    Color: x / Appearance: x / SG: x / pH: x  Gluc: 88 mg/dL / Ketone: x  / Bili: x / Urobili: x   Blood: x / Protein: x / Nitrite: x   Leuk Esterase: x / RBC: x / WBC x   Sq Epi: x / Non Sq Epi: x / Bacteria: x          RADIOLOGY & ADDITIONAL TESTS:

## 2025-04-28 NOTE — PROGRESS NOTE ADULT - PROBLEM SELECTOR PLAN 2
presented initially with dyspnea, orthopnea x 6 days c/f acute on chronic HF   Home meds: Entresto 24/26, bumex 1mg/2mg qd alternate, hydralazine 50mg tid  - s/p Bumex gtt  TTE (4/12) -  LVEF 25%. Global LV hypokinesis.  - HF following, appreciated recs    Plan  - Regimen: Hydral 20mg tid, isordil 10mg tid, metoprolol succinate 12.5mg qd, farxiga 10mg qd  - c/w PO Bumex 3mg bid (goal net -1 to 2L daily)    - f/u with HF if medically cleared for d/c  - Replete lytes prn for Mg>2 K>4  - Daily weight. Strict I&O

## 2025-04-28 NOTE — PROGRESS NOTE ADULT - ATTENDING SUPERVISION STATEMENT
Fellow
Resident
Resident
Fellow
Resident/Fellow
Resident/Fellow
Resident
Fellow
Resident
Resident/Fellow
Resident
Resident
Resident/Fellow
Resident
Fellow
Fellow
Resident

## 2025-04-28 NOTE — PROGRESS NOTE ADULT - PROBLEM SELECTOR PROBLEM 4
CAD (coronary artery disease)
Elevated troponin
CAD (coronary artery disease)

## 2025-04-28 NOTE — PROGRESS NOTE ADULT - PROBLEM SELECTOR PROBLEM 8
HLD (hyperlipidemia)
HTN (hypertension)
HLD (hyperlipidemia)

## 2025-04-28 NOTE — PROGRESS NOTE ADULT - PROBLEM SELECTOR PROBLEM 9
Need for prophylactic measure
HLD (hyperlipidemia)
Need for prophylactic measure

## 2025-04-28 NOTE — PROGRESS NOTE ADULT - PROBLEM SELECTOR PROBLEM 1
Acute kidney injury superimposed on CKD
Acute on chronic systolic congestive heart failure
Aortic valve stenosis
Acute kidney injury superimposed on CKD
Acute kidney injury superimposed on CKD
Acute on chronic systolic congestive heart failure
S/P TAVR (transcatheter aortic valve replacement)
S/P TAVR (transcatheter aortic valve replacement)
Acute kidney injury superimposed on CKD
Acute on chronic systolic congestive heart failure
Acute on chronic systolic congestive heart failure
Acute hypoxemic respiratory failure
Aortic valve stenosis

## 2025-04-28 NOTE — PROGRESS NOTE ADULT - PROBLEM SELECTOR PROBLEM 2
Acute on chronic systolic congestive heart failure
Acute kidney injury superimposed on CKD
Acute on chronic systolic congestive heart failure
Acute kidney injury superimposed on CKD
Acute on chronic systolic congestive heart failure

## 2025-04-28 NOTE — PROGRESS NOTE ADULT - PROBLEM SELECTOR PLAN 3
CKD stage III, baseline Scr 1.5-1.8 - follows outpt nephrologist Dr Stringer  - presented with Scr 5.2 --> now slowly down trending  - ANGELICA/ATN i/s/o HF exacerbation, obstructive shock, contrast use  - Nephro following, appreciated recs    Plan  - Hold home Entresto - per HF, no Entresto on discharge  - c/w Bumex as above  - Monitor labs and UOP. Avoid nephrotoxins and dose meds per eGFR.

## 2025-04-28 NOTE — PROGRESS NOTE ADULT - PROBLEM SELECTOR PROBLEM 7
HTN (hypertension)
CAD (coronary artery disease)
HTN (hypertension)

## 2025-04-28 NOTE — PROGRESS NOTE ADULT - ATTENDING COMMENTS
64M PMHx AFib (s/p DCCV 3/25/25), HFrEF, aortic stenosis s/p TAVR (11/2022), AFlutter s/p ablation 2019 and s/p Micra implant, CKD3, WILFRED, obesity, venous stasis, HTN, HLD admitted for heart failure exacerbation, acute hypoxic resp failure, ANGELICA on CKD, downgraded from CCU after requiring pressors, s/p TAVR 4/11, s/p hep to coum bridge.    #AS  - S/p repeat TAVR 4/11  - INR in therapeutic range 2-3, dose 3mg coumadin tonight. dc on this dose     #HFrEF  - HF following, bumex 3mg PO BID, hydral 20 TID, isordil 10 TID, toprol, and farxiga for GDMT.   -daily weights/strict I&Os---net -2.1L  -outpatient follow up with HF    #Acute respiratory failure  - Likely due to acute HF  - C/w diuresis as above  - resolved    #AFib  - C/w anticoagulation  - C/w amiodarone  - Monitor on telemetry    #CKD3  - Cr stable, diuresis as above   - Avoid nephrotoxins, renally dose all medications    Rest of care as documented above . d/w HS 1. Medically clear, pending auth for rehab . 64M PMHx AFib (s/p DCCV 3/25/25), HFrEF, aortic stenosis s/p TAVR (11/2022), AFlutter s/p ablation 2019 and s/p Micra implant, CKD3, WILFRED, obesity, venous stasis, HTN, HLD admitted for heart failure exacerbation, acute hypoxic resp failure, ANGELICA on CKD, downgraded from CCU after requiring pressors, s/p TAVR 4/11, s/p hep to coum bridge.    #AS  - S/p repeat TAVR 4/11  - INR in therapeutic range 2-3, dose 3mg coumadin tonight. dc on this dose     #HFrEF  - HF following, bumex 3mg PO BID, hydral 20 TID, isordil 10 TID, toprol, and farxiga for GDMT.   -daily weights/strict I&Os---net -2.1L  -outpatient follow up with HF    #Acute respiratory failure  - Likely due to acute HF  - C/w diuresis as above  - resolved    #AFib  - C/w anticoagulation  - C/w amiodarone  - Monitor on telemetry    #CKD3  - Cr stable, diuresis as above   - Avoid nephrotoxins, renally dose all medications    Rest of care as documented above . d/w HS 1. Medically clear, discharge to rehab today. Total time discharge planning 33 minutes, time spent does not include time spent teaching.

## 2025-04-28 NOTE — PROGRESS NOTE ADULT - PROBLEM SELECTOR PLAN 5
DISPLAY PLAN FREE TEXT cathy 4. Home meds: Eliquis 5, Amiodarone  - s/p ablation 2019; dccv 3/25/25; micra implant  - c/w Coumadin  - c/w Amiodarone 200mg qd  - Tele

## 2025-04-28 NOTE — PROGRESS NOTE ADULT - PROBLEM SELECTOR PROBLEM 6
WILFRED (obstructive sleep apnea)
Hyperkalemia
WILFRED (obstructive sleep apnea)

## 2025-04-28 NOTE — PROGRESS NOTE ADULT - PROBLEM SELECTOR PLAN 1
Non-oliguric ANGELICA on CKD likely in the setting of HF exacerbation (EF 15-20%), IV contrast exposure, Entresto use and hypotension-mediated ATN. Baseline Scr 1.5-1.8. On admission, Scr was 5.17 and it peaked to 5.5. UA showed trace LE, blood (Likely due to lopez), casts 17. UPCR 0.7g. Renal US showed no hydro. Underwent TAVR (4/12).    Creatinine now is ~2.4-2.6 mg/dl   stable on diuretics   urinalysis with minimal protein and no blood.  Continue current management. GDMT per HF team.   Monitor labs and UOP. Avoid nephrotoxins and dose meds per eGFR.  Has outpatient Nephrologist, Dr. Stringer. Will need f/u.

## 2025-04-28 NOTE — DISCHARGE NOTE NURSING/CASE MANAGEMENT/SOCIAL WORK - FINANCIAL ASSISTANCE
[facility] provides services at a reduced cost to those who are determined to be eligible through [facility]’s financial assistance program. Information regarding [facility]’s financial assistance program can be found by going to [email or web address] or by calling [phone].

## 2025-04-28 NOTE — PROGRESS NOTE ADULT - SUBJECTIVE AND OBJECTIVE BOX
[facility] DIVISION OF KIDNEY DISEASES AND HYPERTENSION --    Reason for consult: ANGELICA on CKD    24 hour events/subjective: Patient seen and examined at bedside. Has no acute complaints, awaiting dc to rehab.      PAST HISTORY  --------------------------------------------------------------------------------  No significant changes to PMH, PSH, FHx, SHx, unless otherwise noted    ALLERGIES & MEDICATIONS  --------------------------------------------------------------------------------  Allergies    metoprolol (Short breath)      Standing Inpatient Medications  albuterol/ipratropium for Nebulization 3 milliLiter(s) Nebulizer every 6 hours  aMIOdarone    Tablet   Oral   aMIOdarone    Tablet 200 milliGRAM(s) Oral daily  atorvastatin 80 milliGRAM(s) Oral at bedtime  buMETAnide 3 milliGRAM(s) Oral every 12 hours  clopidogrel Tablet 75 milliGRAM(s) Oral daily  dapagliflozin 10 milliGRAM(s) Oral daily  famotidine    Tablet 20 milliGRAM(s) Oral every 48 hours  hemorrhoidal Ointment 1 Application(s) Rectal two times a day  hydrALAZINE 20 milliGRAM(s) Oral three times a day  insulin lispro (ADMELOG) corrective regimen sliding scale   SubCutaneous at bedtime  insulin lispro (ADMELOG) corrective regimen sliding scale   SubCutaneous three times a day before meals  isosorbide   dinitrate Tablet (ISORDIL) 10 milliGRAM(s) Oral three times a day  metoprolol succinate ER 12.5 milliGRAM(s) Oral daily  polyethylene glycol 3350 17 Gram(s) Oral daily  senna 2 Tablet(s) Oral at bedtime  warfarin 3 milliGRAM(s) Oral once    PRN Inpatient Medications  bisacodyl Suppository 10 milliGRAM(s) Rectal daily PRN  melatonin 3 milliGRAM(s) Oral at bedtime PRN      REVIEW OF SYSTEMS  --------------------------------------------------------------------------------  Gen: No fever  Respiratory: No dyspnea  CV: No chest pain  GI: No diarrhea, nausea, or vomiting  : No dysuria or hematuria  Neuro: No dizziness/lightheadedness    All other systems were reviewed and are negative, except as noted.    VITALS/PHYSICAL EXAM  --------------------------------------------------------------------------------  T(C): 36.6 (04-28-25 @ 11:05), Max: 36.6 (04-28-25 @ 11:05)  HR: 99 (04-28-25 @ 11:05) (60 - 99)  BP: 94/64 (04-28-25 @ 11:05) (94/64 - 108/71)  RR: 18 (04-28-25 @ 11:05) (18 - 18)  SpO2: 94% (04-28-25 @ 11:05) (92% - 98%)  Wt(kg): --        04-27-25 @ 07:01  -  04-28-25 @ 07:00  --------------------------------------------------------  IN: 700 mL / OUT: 2800 mL / NET: -2100 mL    04-28-25 @ 07:01  -  04-28-25 @ 15:02  --------------------------------------------------------  IN: 600 mL / OUT: 650 mL / NET: -50 mL      PHYSICAL EXAM:  Gen: NAD  Neuro: non-focal  Pulm: CTA B/L  CV: +S1S2  Abd: soft, non-distended, non-tender  Extremities: B/L LE chronic venous stasis, non-pitting edema  Skin: Warm    LABS/STUDIES  --------------------------------------------------------------------------------              10.2   5.95  >-----------<  234      [04-28-25 @ 06:19]              32.4     134  |  93  |  100  ----------------------------<  79      [04-28-25 @ 06:19]  4.3   |  28  |  2.38        Ca     9.1     [04-28-25 @ 06:19]      Mg     2.3     [04-28-25 @ 06:19]      Phos  3.8     [04-28-25 @ 06:19]    TPro  7.7  /  Alb  3.5  /  TBili  0.7  /  DBili  x   /  AST  17  /  ALT  23  /  AlkPhos  111  [04-28-25 @ 06:19]    PT/INR: PT 30.3 , INR 2.66       [04-28-25 @ 06:19]    Creatinine Trend:  SCr 2.38 [04-28 @ 06:19]  SCr 2.16 [04-27 @ 06:58]  SCr 2.15 [04-26 @ 06:25]  SCr 2.44 [04-25 @ 06:37]  SCr 2.60 [04-24 @ 06:34]    TSH 3.15      [04-01-25 @ 21:43]  Lipid: chol 94, TG 74, HDL 29, LDL --      [04-01-25 @ 06:18]

## 2025-04-28 NOTE — PROGRESS NOTE ADULT - NUTRITIONAL ASSESSMENT
This patient has been assessed with a concern for Malnutrition and has been determined to have a diagnosis/diagnoses of Severe protein-calorie malnutrition and Morbid obesity (BMI > 40).    This patient is being managed with:   Diet DASH/TLC-  Sodium & Cholesterol Restricted  1500mL Fluid Restriction ([id number])  Entered: Apr 6 2025  8:42AM  
This patient has been assessed with a concern for Malnutrition and has been determined to have a diagnosis/diagnoses of Severe protein-calorie malnutrition and Morbid obesity (BMI > 40).    This patient is being managed with:   Diet DASH/TLC-  Sodium & Cholesterol Restricted  1500mL Fluid Restriction ([id number])  Entered: Apr 6 2025  8:42AM  
This patient has been assessed with a concern for Malnutrition and has been determined to have a diagnosis/diagnoses of Severe protein-calorie malnutrition and Morbid obesity (BMI > 40).    This patient is being managed with:   Diet DASH/TLC-  Sodium & Cholesterol Restricted  1500mL Fluid Restriction ([id number])  Entered: Apr 6 2025  8:42AM  
This patient has been assessed with a concern for Malnutrition and has been determined to have a diagnosis/diagnoses of Severe protein-calorie malnutrition and Morbid obesity (BMI > 40).    This patient is being managed with:   Diet DASH/TLC-  Sodium & Cholesterol Restricted  Mildly Thick Liquids ([id number])  Entered: Apr 13 2025  3:24PM  
This patient has been assessed with a concern for Malnutrition and has been determined to have a diagnosis/diagnoses of Severe protein-calorie malnutrition and Morbid obesity (BMI > 40).    This patient is being managed with:   Diet DASH/TLC-  Sodium & Cholesterol Restricted  Mildly Thick Liquids (MILDTHICKLIQS)  Entered: Apr 13 2025  3:24PM  
This patient has been assessed with a concern for Malnutrition and has been determined to have a diagnosis/diagnoses of Severe protein-calorie malnutrition and Morbid obesity (BMI > 40).    This patient is being managed with:   Diet DASH/TLC-  Sodium & Cholesterol Restricted  Supplement Feeding Modality:  Oral  Ensure Max Cans or Servings Per Day:  1       Frequency:  Two Times a day  Entered: Apr 14 2025 12:53PM  
This patient has been assessed with a concern for Malnutrition and has been determined to have a diagnosis/diagnoses of Severe protein-calorie malnutrition and Morbid obesity (BMI > 40).    This patient is being managed with:   Diet DASH/TLC-  Sodium & Cholesterol Restricted  No Concentrated Potassium  No Concentrated Phosphorus  Entered: Apr 4 2025 10:47AM  
This patient has been assessed with a concern for Malnutrition and has been determined to have a diagnosis/diagnoses of Severe protein-calorie malnutrition and Morbid obesity (BMI > 40).    This patient is being managed with:   Diet DASH/TLC-  Sodium & Cholesterol Restricted  Supplement Feeding Modality:  Oral  Ensure Max Cans or Servings Per Day:  1       Frequency:  Two Times a day  Entered: Apr 14 2025 12:53PM  
This patient has been assessed with a concern for Malnutrition and has been determined to have a diagnosis/diagnoses of Severe protein-calorie malnutrition and Morbid obesity (BMI > 40).    This patient is being managed with:   Diet DASH/TLC-  Sodium & Cholesterol Restricted  1500mL Fluid Restriction ([id number])  Entered: Apr 6 2025  8:42AM  
This patient has been assessed with a concern for Malnutrition and has been determined to have a diagnosis/diagnoses of Severe protein-calorie malnutrition and Morbid obesity (BMI > 40).    This patient is being managed with:   Diet DASH/TLC-  Sodium & Cholesterol Restricted  Supplement Feeding Modality:  Oral  Ensure Max Cans or Servings Per Day:  1       Frequency:  Two Times a day  Entered: Apr 14 2025 12:53PM  
This patient has been assessed with a concern for Malnutrition and has been determined to have a diagnosis/diagnoses of Severe protein-calorie malnutrition and Morbid obesity (BMI > 40).    This patient is being managed with:   Diet NPO-  Except Medications  Entered: Apr 1 2025  9:03PM  
This patient has been assessed with a concern for Malnutrition and has been determined to have a diagnosis/diagnoses of Severe protein-calorie malnutrition and Morbid obesity (BMI > 40).    This patient is being managed with:   Diet NPO-  Except Medications  Entered: Apr 1 2025  9:03PM  
This patient has been assessed with a concern for Malnutrition and has been determined to have a diagnosis/diagnoses of Severe protein-calorie malnutrition and Morbid obesity (BMI > 40).    This patient is being managed with:   Diet DASH/TLC-  Sodium & Cholesterol Restricted  1500mL Fluid Restriction ([id number])  Entered: Apr 6 2025  8:42AM  
This patient has been assessed with a concern for Malnutrition and has been determined to have a diagnosis/diagnoses of Severe protein-calorie malnutrition and Morbid obesity (BMI > 40).    This patient is being managed with:   Diet DASH/TLC-  Sodium & Cholesterol Restricted  1500mL Fluid Restriction ([id number])  Entered: Apr 6 2025  8:42AM  
This patient has been assessed with a concern for Malnutrition and has been determined to have a diagnosis/diagnoses of Severe protein-calorie malnutrition and Morbid obesity (BMI > 40).    This patient is being managed with:   Diet DASH/TLC-  Sodium & Cholesterol Restricted  1500mL Fluid Restriction ([id number])  Supplement Feeding Modality:  Oral  Ensure Max Cans or Servings Per Day:  1       Frequency:  Daily  Entered: Apr 8 2025  1:55PM  
This patient has been assessed with a concern for Malnutrition and has been determined to have a diagnosis/diagnoses of Severe protein-calorie malnutrition and Morbid obesity (BMI > 40).    This patient is being managed with:   Diet DASH/TLC-  Sodium & Cholesterol Restricted  Mildly Thick Liquids (MILDTHICKLIQS)  Entered: Apr 13 2025  3:24PM  
This patient has been assessed with a concern for Malnutrition and has been determined to have a diagnosis/diagnoses of Severe protein-calorie malnutrition and Morbid obesity (BMI > 40).    This patient is being managed with:   Diet DASH/TLC-  Sodium & Cholesterol Restricted  No Concentrated Potassium  No Concentrated Phosphorus  Entered: Apr 4 2025 10:47AM  
This patient has been assessed with a concern for Malnutrition and has been determined to have a diagnosis/diagnoses of Severe protein-calorie malnutrition and Morbid obesity (BMI > 40).    This patient is being managed with:   Diet DASH/TLC-  Sodium & Cholesterol Restricted  Supplement Feeding Modality:  Oral  Ensure Max Cans or Servings Per Day:  1       Frequency:  Two Times a day  Entered: Apr 14 2025 12:53PM  
This patient has been assessed with a concern for Malnutrition and has been determined to have a diagnosis/diagnoses of Severe protein-calorie malnutrition and Morbid obesity (BMI > 40).    This patient is being managed with:   Diet NPO-  Except Medications  Entered: Apr 5 2025  8:14PM  
This patient has been assessed with a concern for Malnutrition and has been determined to have a diagnosis/diagnoses of Severe protein-calorie malnutrition and Morbid obesity (BMI > 40).    This patient is being managed with:   Diet DASH/TLC-  Sodium & Cholesterol Restricted  Mildly Thick Liquids (MILDTHICKLIQS)  Entered: Apr 13 2025  3:24PM  
This patient has been assessed with a concern for Malnutrition and has been determined to have a diagnosis/diagnoses of Severe protein-calorie malnutrition and Morbid obesity (BMI > 40).    This patient is being managed with:   Diet DASH/TLC-  Sodium & Cholesterol Restricted  No Concentrated Potassium  No Concentrated Phosphorus  Entered: Apr 4 2025 10:47AM  
This patient has been assessed with a concern for Malnutrition and has been determined to have a diagnosis/diagnoses of Severe protein-calorie malnutrition and Morbid obesity (BMI > 40).    This patient is being managed with:   Diet NPO after Midnight-     NPO Start Date: 10-Apr-2025   NPO Start Time: 23:59  Entered: Apr 10 2025  4:43PM    Diet NPO after Midnight-     NPO Start Date: 10-Apr-2025   NPO Start Time: 23:59  Except Medications     Special Instructions for Nursing:  Except Medications  Entered: Apr 10 2025  8:06AM    Diet DASH/TLC-  Sodium & Cholesterol Restricted  1500mL Fluid Restriction ([id number])  Supplement Feeding Modality:  Oral  Ensure Max Cans or Servings Per Day:  1       Frequency:  Daily  Entered: Apr 8 2025  1:55PM  
This patient has been assessed with a concern for Malnutrition and has been determined to have a diagnosis/diagnoses of Severe protein-calorie malnutrition and Morbid obesity (BMI > 40).    This patient is being managed with:   Diet DASH/TLC-  Sodium & Cholesterol Restricted  1500mL Fluid Restriction ([id number])  Entered: Apr 6 2025  8:42AM  
This patient has been assessed with a concern for Malnutrition and has been determined to have a diagnosis/diagnoses of Severe protein-calorie malnutrition and Morbid obesity (BMI > 40).    This patient is being managed with:   Diet DASH/TLC-  Sodium & Cholesterol Restricted  Mildly Thick Liquids (MILDTHICKLIQS)  Entered: Apr 13 2025  3:24PM  
This patient has been assessed with a concern for Malnutrition and has been determined to have a diagnosis/diagnoses of Severe protein-calorie malnutrition and Morbid obesity (BMI > 40).    This patient is being managed with:   Diet DASH/TLC-  Sodium & Cholesterol Restricted  Supplement Feeding Modality:  Oral  Ensure Max Cans or Servings Per Day:  1       Frequency:  Two Times a day  Entered: Apr 14 2025 12:53PM  
This patient has been assessed with a concern for Malnutrition and has been determined to have a diagnosis/diagnoses of Severe protein-calorie malnutrition and Morbid obesity (BMI > 40).    This patient is being managed with:   Diet DASH/TLC-  Sodium & Cholesterol Restricted  Supplement Feeding Modality:  Oral  Ensure Max Cans or Servings Per Day:  1       Frequency:  Two Times a day  Entered: Apr 14 2025 12:53PM  
This patient has been assessed with a concern for Malnutrition and has been determined to have a diagnosis/diagnoses of Severe protein-calorie malnutrition and Morbid obesity (BMI > 40).    This patient is being managed with:   Diet DASH/TLC-  Sodium & Cholesterol Restricted  1500mL Fluid Restriction ([id number])  Supplement Feeding Modality:  Oral  Ensure Max Cans or Servings Per Day:  1       Frequency:  Daily  Entered: Apr 12 2025  4:27PM  
This patient has been assessed with a concern for Malnutrition and has been determined to have a diagnosis/diagnoses of Severe protein-calorie malnutrition and Morbid obesity (BMI > 40).    This patient is being managed with:   Diet DASH/TLC-  Sodium & Cholesterol Restricted  Supplement Feeding Modality:  Oral  Ensure Max Cans or Servings Per Day:  1       Frequency:  Two Times a day  Entered: Apr 14 2025 12:53PM  
This patient has been assessed with a concern for Malnutrition and has been determined to have a diagnosis/diagnoses of Severe protein-calorie malnutrition and Morbid obesity (BMI > 40).    This patient is being managed with:   Diet DASH/TLC-  Sodium & Cholesterol Restricted  Supplement Feeding Modality:  Oral  Ensure Max Cans or Servings Per Day:  1       Frequency:  Two Times a day  Entered: Apr 14 2025 12:53PM  
This patient has been assessed with a concern for Malnutrition and has been determined to have a diagnosis/diagnoses of Morbid obesity (BMI > 40) and Severe protein-calorie malnutrition.    This patient is being managed with:   Diet DASH/TLC-  Sodium & Cholesterol Restricted  Supplement Feeding Modality:  Oral  Ensure Max Cans or Servings Per Day:  1       Frequency:  Two Times a day  Entered: Apr 14 2025 12:53PM  
This patient has been assessed with a concern for Malnutrition and has been determined to have a diagnosis/diagnoses of Severe protein-calorie malnutrition and Morbid obesity (BMI > 40).    This patient is being managed with:   Diet DASH/TLC-  Sodium & Cholesterol Restricted  Supplement Feeding Modality:  Oral  Ensure Max Cans or Servings Per Day:  1       Frequency:  Two Times a day  Entered: Apr 14 2025 12:53PM  

## 2025-04-28 NOTE — DISCHARGE NOTE NURSING/CASE MANAGEMENT/SOCIAL WORK - PATIENT PORTAL LINK FT
You can access the FollowMyHealth Patient Portal offered by [facility] by registering at the following website: [email or web address]. By joining [facility]’s FollowMyHealth portal, you will also be able to view your health information using other applications (apps) compatible with our system.

## 2025-04-28 NOTE — PROGRESS NOTE ADULT - REASON FOR ADMISSION
dyspnea , chest pain
dyspnea , chest pain  S/P TAVR
dyspnea , chest pain

## 2025-04-28 NOTE — PROGRESS NOTE ADULT - PROBLEM SELECTOR PROBLEM 3
Acute kidney injury superimposed on CKD
Afib
Acute kidney injury superimposed on CKD
Afib
Acute kidney injury superimposed on CKD

## 2025-05-07 ENCOUNTER — TRANSCRIPTION ENCOUNTER (OUTPATIENT)
Age: 65
End: 2025-05-07

## 2025-05-08 ENCOUNTER — APPOINTMENT (OUTPATIENT)
Dept: HEART FAILURE | Facility: CLINIC | Age: 65
End: 2025-05-08

## 2025-05-15 ENCOUNTER — APPOINTMENT (OUTPATIENT)
Dept: CV DIAGNOSITCS | Facility: HOSPITAL | Age: 65
End: 2025-05-15

## 2025-05-21 ENCOUNTER — TRANSCRIPTION ENCOUNTER (OUTPATIENT)
Age: 65
End: 2025-05-21

## 2025-05-28 ENCOUNTER — TRANSCRIPTION ENCOUNTER (OUTPATIENT)
Age: 65
End: 2025-05-28

## 2025-06-02 ENCOUNTER — APPOINTMENT (OUTPATIENT)
Dept: HEART FAILURE | Facility: CLINIC | Age: 65
End: 2025-06-02

## 2025-06-23 ENCOUNTER — RESULT REVIEW (OUTPATIENT)
Age: 65
End: 2025-06-23

## 2025-06-23 ENCOUNTER — INPATIENT (INPATIENT)
Facility: HOSPITAL | Age: 65
LOS: 7 days | Discharge: ANOTHER HOSPITAL | DRG: 293 | End: 2025-07-01
Attending: STUDENT IN AN ORGANIZED HEALTH CARE EDUCATION/TRAINING PROGRAM | Admitting: INTERNAL MEDICINE
Payer: MEDICARE

## 2025-06-23 VITALS
WEIGHT: 259.04 LBS | TEMPERATURE: 97 F | RESPIRATION RATE: 30 BRPM | SYSTOLIC BLOOD PRESSURE: 116 MMHG | DIASTOLIC BLOOD PRESSURE: 77 MMHG | OXYGEN SATURATION: 98 % | HEART RATE: 121 BPM | HEIGHT: 68 IN

## 2025-06-23 DIAGNOSIS — Z90.89 ACQUIRED ABSENCE OF OTHER ORGANS: Chronic | ICD-10-CM

## 2025-06-23 DIAGNOSIS — I50.23 ACUTE ON CHRONIC SYSTOLIC (CONGESTIVE) HEART FAILURE: ICD-10-CM

## 2025-06-23 DIAGNOSIS — Z95.1 PRESENCE OF AORTOCORONARY BYPASS GRAFT: Chronic | ICD-10-CM

## 2025-06-23 DIAGNOSIS — Z95.5 PRESENCE OF CORONARY ANGIOPLASTY IMPLANT AND GRAFT: Chronic | ICD-10-CM

## 2025-06-23 DIAGNOSIS — Z95.0 PRESENCE OF CARDIAC PACEMAKER: Chronic | ICD-10-CM

## 2025-06-23 DIAGNOSIS — Z98.890 OTHER SPECIFIED POSTPROCEDURAL STATES: Chronic | ICD-10-CM

## 2025-06-23 LAB
ALBUMIN SERPL ELPH-MCNC: 2.6 G/DL — LOW (ref 3.3–5)
ALP SERPL-CCNC: 191 U/L — HIGH (ref 40–120)
ALT FLD-CCNC: 12 U/L — SIGNIFICANT CHANGE UP (ref 10–45)
ANION GAP SERPL CALC-SCNC: 2 MMOL/L — LOW (ref 5–17)
ANION GAP SERPL CALC-SCNC: 2 MMOL/L — LOW (ref 5–17)
ANION GAP SERPL CALC-SCNC: 4 MMOL/L — LOW (ref 5–17)
APPEARANCE UR: CLEAR — SIGNIFICANT CHANGE UP
APTT BLD: 41 SEC — HIGH (ref 26.1–36.8)
APTT BLD: 42.7 SEC — HIGH (ref 26.1–36.8)
AST SERPL-CCNC: 16 U/L — SIGNIFICANT CHANGE UP (ref 10–40)
BACTERIA # UR AUTO: ABNORMAL /HPF
BASE EXCESS BLDA CALC-SCNC: 11.8 MMOL/L — HIGH (ref -2–3)
BASE EXCESS BLDA CALC-SCNC: 18.7 MMOL/L — HIGH (ref -2–3)
BASE EXCESS BLDV CALC-SCNC: 13.5 MMOL/L — HIGH (ref -2–3)
BASOPHILS # BLD AUTO: 0.04 K/UL — SIGNIFICANT CHANGE UP (ref 0–0.2)
BASOPHILS NFR BLD AUTO: 0.4 % — SIGNIFICANT CHANGE UP (ref 0–2)
BILIRUB SERPL-MCNC: 0.6 MG/DL — SIGNIFICANT CHANGE UP (ref 0.2–1.2)
BILIRUB UR-MCNC: NEGATIVE — SIGNIFICANT CHANGE UP
BUN SERPL-MCNC: 71 MG/DL — HIGH (ref 7–23)
CALCIUM SERPL-MCNC: 8.8 MG/DL — SIGNIFICANT CHANGE UP (ref 8.4–10.5)
CALCIUM SERPL-MCNC: 8.8 MG/DL — SIGNIFICANT CHANGE UP (ref 8.4–10.5)
CALCIUM SERPL-MCNC: 9.2 MG/DL — SIGNIFICANT CHANGE UP (ref 8.4–10.5)
CHLORIDE SERPL-SCNC: 96 MMOL/L — SIGNIFICANT CHANGE UP (ref 96–108)
CHLORIDE SERPL-SCNC: 96 MMOL/L — SIGNIFICANT CHANGE UP (ref 96–108)
CHLORIDE SERPL-SCNC: 98 MMOL/L — SIGNIFICANT CHANGE UP (ref 96–108)
CO2 BLDA-SCNC: 38 MMOL/L — HIGH (ref 19–24)
CO2 BLDA-SCNC: 42 MMOL/L — HIGH (ref 19–24)
CO2 BLDV-SCNC: 42 MMOL/L — HIGH (ref 22–26)
CO2 SERPL-SCNC: 36 MMOL/L — HIGH (ref 22–31)
CO2 SERPL-SCNC: 38 MMOL/L — HIGH (ref 22–31)
CO2 SERPL-SCNC: 40 MMOL/L — HIGH (ref 22–31)
COLOR SPEC: YELLOW — SIGNIFICANT CHANGE UP
CREAT SERPL-MCNC: 2.2 MG/DL — HIGH (ref 0.5–1.3)
CREAT SERPL-MCNC: 2.34 MG/DL — HIGH (ref 0.5–1.3)
CREAT SERPL-MCNC: 2.54 MG/DL — HIGH (ref 0.5–1.3)
DIFF PNL FLD: NEGATIVE — SIGNIFICANT CHANGE UP
EGFR: 27 ML/MIN/1.73M2 — LOW
EGFR: 27 ML/MIN/1.73M2 — LOW
EGFR: 30 ML/MIN/1.73M2 — LOW
EGFR: 30 ML/MIN/1.73M2 — LOW
EGFR: 32 ML/MIN/1.73M2 — LOW
EGFR: 32 ML/MIN/1.73M2 — LOW
EOSINOPHIL # BLD AUTO: 0.03 K/UL — SIGNIFICANT CHANGE UP (ref 0–0.5)
EOSINOPHIL NFR BLD AUTO: 0.3 % — SIGNIFICANT CHANGE UP (ref 0–6)
EPI CELLS # UR: SIGNIFICANT CHANGE UP
FLUAV AG NPH QL: SIGNIFICANT CHANGE UP
FLUBV AG NPH QL: SIGNIFICANT CHANGE UP
GAS PNL BLDA: SIGNIFICANT CHANGE UP
GAS PNL BLDA: SIGNIFICANT CHANGE UP
GAS PNL BLDV: SIGNIFICANT CHANGE UP
GLUCOSE SERPL-MCNC: 139 MG/DL — HIGH (ref 70–99)
GLUCOSE SERPL-MCNC: 83 MG/DL — SIGNIFICANT CHANGE UP (ref 70–99)
GLUCOSE SERPL-MCNC: 83 MG/DL — SIGNIFICANT CHANGE UP (ref 70–99)
GLUCOSE UR QL: 100 MG/DL
HCO3 BLDA-SCNC: 36 MMOL/L — HIGH (ref 21–28)
HCO3 BLDA-SCNC: 41 MMOL/L — HIGH (ref 21–28)
HCO3 BLDV-SCNC: 40 MMOL/L — HIGH (ref 22–29)
HCT VFR BLD CALC: 41.4 % — SIGNIFICANT CHANGE UP (ref 39–50)
HGB BLD-MCNC: 12.5 G/DL — LOW (ref 13–17)
HOROWITZ INDEX BLDA+IHG-RTO: 30 — SIGNIFICANT CHANGE UP
HOROWITZ INDEX BLDA+IHG-RTO: 50 — SIGNIFICANT CHANGE UP
IMM GRANULOCYTES NFR BLD AUTO: 0.5 % — SIGNIFICANT CHANGE UP (ref 0–0.9)
INR BLD: 3.58 RATIO — HIGH (ref 0.85–1.16)
INR BLD: 4.11 RATIO — HIGH (ref 0.85–1.16)
KETONES UR QL: NEGATIVE MG/DL — SIGNIFICANT CHANGE UP
LACTATE SERPL-SCNC: 2 MMOL/L — SIGNIFICANT CHANGE UP (ref 0.7–2)
LEUKOCYTE ESTERASE UR-ACNC: NEGATIVE — SIGNIFICANT CHANGE UP
LYMPHOCYTES # BLD AUTO: 0.72 K/UL — LOW (ref 1–3.3)
LYMPHOCYTES # BLD AUTO: 6.9 % — LOW (ref 13–44)
MAGNESIUM SERPL-MCNC: 2.1 MG/DL — SIGNIFICANT CHANGE UP (ref 1.6–2.6)
MAGNESIUM SERPL-MCNC: 2.2 MG/DL — SIGNIFICANT CHANGE UP (ref 1.6–2.6)
MCHC RBC-ENTMCNC: 29.6 PG — SIGNIFICANT CHANGE UP (ref 27–34)
MCHC RBC-ENTMCNC: 30.2 G/DL — LOW (ref 32–36)
MCV RBC AUTO: 97.9 FL — SIGNIFICANT CHANGE UP (ref 80–100)
MONOCYTES # BLD AUTO: 0.86 K/UL — SIGNIFICANT CHANGE UP (ref 0–0.9)
MONOCYTES NFR BLD AUTO: 8.3 % — SIGNIFICANT CHANGE UP (ref 2–14)
NEUTROPHILS # BLD AUTO: 8.67 K/UL — HIGH (ref 1.8–7.4)
NEUTROPHILS NFR BLD AUTO: 83.6 % — HIGH (ref 43–77)
NITRITE UR-MCNC: NEGATIVE — SIGNIFICANT CHANGE UP
NRBC BLD AUTO-RTO: 0 /100 WBCS — SIGNIFICANT CHANGE UP (ref 0–0)
NT-PROBNP SERPL-SCNC: HIGH PG/ML (ref 0–300)
PCO2 BLDA: 47 MMHG — SIGNIFICANT CHANGE UP (ref 35–48)
PCO2 BLDA: 56 MMHG — HIGH (ref 35–48)
PCO2 BLDV: 81 MMHG — CRITICAL HIGH (ref 42–55)
PH BLDA: 7.42 — SIGNIFICANT CHANGE UP (ref 7.35–7.45)
PH BLDA: 7.55 — HIGH (ref 7.35–7.45)
PH BLDV: 7.3 — LOW (ref 7.32–7.43)
PH UR: 5 — SIGNIFICANT CHANGE UP (ref 5–8)
PHOSPHATE SERPL-MCNC: 3.6 MG/DL — SIGNIFICANT CHANGE UP (ref 2.5–4.5)
PHOSPHATE SERPL-MCNC: 4.3 MG/DL — SIGNIFICANT CHANGE UP (ref 2.5–4.5)
PLATELET # BLD AUTO: 280 K/UL — SIGNIFICANT CHANGE UP (ref 150–400)
PO2 BLDA: 170 MMHG — HIGH (ref 83–108)
PO2 BLDA: 84 MMHG — SIGNIFICANT CHANGE UP (ref 83–108)
PO2 BLDV: 43 MMHG — SIGNIFICANT CHANGE UP (ref 25–45)
POTASSIUM SERPL-MCNC: 4.2 MMOL/L — SIGNIFICANT CHANGE UP (ref 3.5–5.3)
POTASSIUM SERPL-MCNC: 4.3 MMOL/L — SIGNIFICANT CHANGE UP (ref 3.5–5.3)
POTASSIUM SERPL-MCNC: 4.8 MMOL/L — SIGNIFICANT CHANGE UP (ref 3.5–5.3)
POTASSIUM SERPL-SCNC: 4.2 MMOL/L — SIGNIFICANT CHANGE UP (ref 3.5–5.3)
POTASSIUM SERPL-SCNC: 4.3 MMOL/L — SIGNIFICANT CHANGE UP (ref 3.5–5.3)
POTASSIUM SERPL-SCNC: 4.8 MMOL/L — SIGNIFICANT CHANGE UP (ref 3.5–5.3)
PROCALCITONIN SERPL-MCNC: 0.21 NG/ML — HIGH
PROT SERPL-MCNC: 8.7 G/DL — HIGH (ref 6–8.3)
PROT UR-MCNC: 30 MG/DL
PROTHROM AB SERPL-ACNC: 41.7 SEC — HIGH (ref 9.9–13.4)
PROTHROM AB SERPL-ACNC: 47.8 SEC — HIGH (ref 9.9–13.4)
RBC # BLD: 4.23 M/UL — SIGNIFICANT CHANGE UP (ref 4.2–5.8)
RBC # FLD: 16.6 % — HIGH (ref 10.3–14.5)
RBC CASTS # UR COMP ASSIST: 0 /HPF — SIGNIFICANT CHANGE UP (ref 0–4)
RSV RNA NPH QL NAA+NON-PROBE: SIGNIFICANT CHANGE UP
SAO2 % BLDA: 97.2 % — SIGNIFICANT CHANGE UP (ref 94–98)
SAO2 % BLDA: 99.3 % — HIGH (ref 94–98)
SAO2 % BLDV: 68.1 % — SIGNIFICANT CHANGE UP (ref 67–88)
SARS-COV-2 RNA SPEC QL NAA+PROBE: SIGNIFICANT CHANGE UP
SODIUM SERPL-SCNC: 136 MMOL/L — SIGNIFICANT CHANGE UP (ref 135–145)
SODIUM SERPL-SCNC: 138 MMOL/L — SIGNIFICANT CHANGE UP (ref 135–145)
SODIUM SERPL-SCNC: 138 MMOL/L — SIGNIFICANT CHANGE UP (ref 135–145)
SOURCE RESPIRATORY: SIGNIFICANT CHANGE UP
SP GR SPEC: 1.01 — SIGNIFICANT CHANGE UP (ref 1–1.03)
TROPONIN I, HIGH SENSITIVITY RESULT: 201.8 NG/L — HIGH
TROPONIN I, HIGH SENSITIVITY RESULT: 204.3 NG/L — HIGH
TROPONIN I, HIGH SENSITIVITY RESULT: 215.1 NG/L — HIGH
UROBILINOGEN FLD QL: 0.2 MG/DL — SIGNIFICANT CHANGE UP (ref 0.2–1)
WBC # BLD: 10.37 K/UL — SIGNIFICANT CHANGE UP (ref 3.8–10.5)
WBC # FLD AUTO: 10.37 K/UL — SIGNIFICANT CHANGE UP (ref 3.8–10.5)
WBC UR QL: 2 /HPF — SIGNIFICANT CHANGE UP (ref 0–5)

## 2025-06-23 PROCEDURE — 93288 INTERROG EVL PM/LDLS PM IP: CPT | Mod: 26

## 2025-06-23 PROCEDURE — 71045 X-RAY EXAM CHEST 1 VIEW: CPT | Mod: 26

## 2025-06-23 PROCEDURE — 93010 ELECTROCARDIOGRAM REPORT: CPT

## 2025-06-23 PROCEDURE — 99222 1ST HOSP IP/OBS MODERATE 55: CPT

## 2025-06-23 PROCEDURE — 99291 CRITICAL CARE FIRST HOUR: CPT

## 2025-06-23 PROCEDURE — 93306 TTE W/DOPPLER COMPLETE: CPT | Mod: 26

## 2025-06-23 RX ORDER — BUMETANIDE 1 MG/1
2 TABLET ORAL ONCE
Refills: 0 | Status: DISCONTINUED | OUTPATIENT
Start: 2025-06-23 | End: 2025-06-23

## 2025-06-23 RX ORDER — BUMETANIDE 1 MG/1
0.25 TABLET ORAL
Qty: 20 | Refills: 0 | Status: DISCONTINUED | OUTPATIENT
Start: 2025-06-23 | End: 2025-06-25

## 2025-06-23 RX ORDER — FUROSEMIDE 10 MG/ML
40 INJECTION INTRAMUSCULAR; INTRAVENOUS ONCE
Refills: 0 | Status: DISCONTINUED | OUTPATIENT
Start: 2025-06-23 | End: 2025-06-23

## 2025-06-23 RX ORDER — AMIODARONE HYDROCHLORIDE 50 MG/ML
200 INJECTION, SOLUTION INTRAVENOUS DAILY
Refills: 0 | Status: DISCONTINUED | OUTPATIENT
Start: 2025-06-23 | End: 2025-07-01

## 2025-06-23 RX ORDER — HEPARIN SODIUM 1000 [USP'U]/ML
5000 INJECTION INTRAVENOUS; SUBCUTANEOUS EVERY 8 HOURS
Refills: 0 | Status: DISCONTINUED | OUTPATIENT
Start: 2025-06-23 | End: 2025-06-24

## 2025-06-23 RX ORDER — BUMETANIDE 1 MG/1
2 TABLET ORAL ONCE
Refills: 0 | Status: COMPLETED | OUTPATIENT
Start: 2025-06-23 | End: 2025-06-23

## 2025-06-23 RX ADMIN — Medication 40 MILLIGRAM(S): at 18:33

## 2025-06-23 RX ADMIN — HEPARIN SODIUM 5000 UNIT(S): 1000 INJECTION INTRAVENOUS; SUBCUTANEOUS at 21:54

## 2025-06-23 RX ADMIN — BUMETANIDE 1.25 MG/HR: 1 TABLET ORAL at 15:23

## 2025-06-23 RX ADMIN — Medication 1 APPLICATION(S): at 18:05

## 2025-06-23 RX ADMIN — BUMETANIDE 2 MILLIGRAM(S): 1 TABLET ORAL at 11:22

## 2025-06-23 NOTE — H&P ADULT - HISTORY OF PRESENT ILLNESS
Mr. Waller is a 65 year old male with a PMH of AFib (s/p dccv 3/25/25, unsuccessful), HFrEF (likely mod red EF, sev TTE), aortic stenosis s/p TAVR (11/2022, reop on 4/11), a flutter s/p ablation 2019 and s/p micra implant, CKD3, WILFRED, obesity, venous stasis, HTN, and HLD with recent hospitalization for heart failure exacerbation and cardiorenal syndrome in April presenting to [facility] from [facility] for worsening SOB x a few days as well as LE swelling, Pt was given increased doses of Bumex in attempts to diurese, however condition continued to worsen therefore patient was taken to the ED. He was placed on NIV for increased WOB, and given additional dose of Bumex. BNP noted to be elevated. Pt denies fever, chills, or sick contacts and ROS otherwise negative. Pt accepted to ICU for further management / care.

## 2025-06-23 NOTE — CONSULT NOTE ADULT - NS ATTEND AMEND GEN_ALL_CORE FT
I have seen and examined the patient. I have discussed case with ANDERSON Alaniz.    Luis Waller is a 65 year old man with past medical history of Persistent atrial fibrillation (s/p ablation of atrial flutter in 2019, DCCV in March 2025 with ERAF), Micra PPM, Coronary artery disease (s/p CABG with angiogram in April 2025 with severe triple vessel disease, patent LIMA-LAD and occluded SVG-OM1 and occluded SVG-1st Diagonal), Severe Aortic stenosis (s/p TAVR in 2022 and then TAVR in April 2025), HFrEF (LVEF 25-30% in 2018), Hypertension, Chronic kidney disease and WILFRED with recent hospitalization at [facility] in April for heart failure and cardiorenal syndrome s/p Florence-TAVR, now brought in by EMS from nursing facility due to progressive heart failure, found to have hypoxic respiratory failure secondary to acute on chronic systolic heart failure and cardiorenal syndrome.    ECG consistent with atrial fibrillation and IVCD, similar to ECG at [facility]. Troponins elevated, likely demand ischemia from heart failure and renal insufficiency. Pro BNP markedly elevated and CXR with worsening effusions. Chart review indicates patient has longstanding history of systolic heart failure since at least 2018. Most recent echo report from 4/12/25 consistent with LVEF 25%. The patient is currently fatigued and poorly responsive and receiving BiPAP.    Overall, recommend to start Bumex infusion to obtain net negative 1.5 L in 24 hours, monitor intake/output and renal function (appears Cr was much higher ~ 5 in last hospitalization). Recommend Nephrology consult. Currently hemodynamics are stable and there are no signs of cardiogenic shock, however would have low threshold for transfer to tertiary hospital if MAP drops < 65 for shock team evaluation. Check echo to re-evaluate LVEF. Would recommend EP evaluation for primary prevention ICD given chronic systolic heart failure. Check PPM interrogation. Please avoid using beta blockers if HR elevates. Not candidate for CHF GDMT at this time due to renal insufficiency. Continue BiPAP. For the AF, he appears to now have persistent atrial fibrillation upon recent hospital discharge, recommend to resume home coumadin when INR trends down.     Discussed with ICU attending, Dr. Mendoza. We will continue to follow along.    Saul Schafer MD  Cardiology

## 2025-06-23 NOTE — H&P ADULT - ASSESSMENT
65 year old male with a PMH of AFib (s/p dccv 3/25/25, unsuccessful), HFrEF (likely mod red EF, sev TTE), aortic stenosis s/p TAVR (11/2022, reop on 4/11), a flutter s/p ablation 2019 and s/p micra implant, CKD3, WILFRED, obesity, venous stasis, HTN, and HLD presenting to ICU for tx of:    Acute on chronic hypercarbic respiratory failure  Heart failure exacerbation  ANGELICA on CKD, likely cardiorenal syndrome      Plan  Admit to ICU, monitor vitals per policy  NIV previously on bipap setting changed to AVAPS by ICU team, abg prn, pt improving  Consider precedex gtt for sedation if needed for NIV compliance  Diuresis PRN, monitor lytes; pt on Bumex 3 mg BID at NH, will maintain dosage for now  TTE pending, need to evaluate Aortic valve in light of recent TAVR  Cardiology consult  Fup ptt/pt/inr for AC guidance  BNP elevated, will trend, troponin elevated; 12 lead EKG without ST segment changes, will trend  Pt is afebrile, wbc WNL, HDS, currently no need for antibiotics, will monitor for s/s of infectious process, fup cultures sent in ED; LE darkened without obvious cellulitic appearance  NPO for now given need for NIV, monitor blood glucose levels  Monitor bun / creatinine, u/o, renally dose meds as indicated, avoid nephrotoxic agents  Pending coags, will determine AC given, will likely restart home AC  PPI for GI prophylaxis  Home medication regimen as indicated / tolerated  Full code status

## 2025-06-23 NOTE — ED ADULT NURSE NOTE - NSICDXPASTMEDICALHX_GEN_ALL_CORE_FT
PAST MEDICAL HISTORY:  ANGELICA (acute kidney injury) 4/2021    AS (aortic stenosis)     Atrial fibrillation 2018    Atrial flutter s/p ablation 2018    CAD (coronary artery disease) s/p CABG    CHF (congestive heart failure) denies any recent exacerbation    Chronic kidney disease, unspecified CKD stage     H/O scoliosis     HTN (hypertension)     Hypercholesteremia     Ischemic cardiomyopathy     Lichen planus chronic ( b/L LE)    Lower back pain     MI (myocardial infarction) 2012    Morbid obesity     WILFRED (obstructive sleep apnea) can not tolerate bipap at night    Osteoarthritis shoulders, hips, knee    Peripheral edema chronic    Status post placement of cardiac pacemaker micraleadless PPM, [id number] last interrogation 7/6/2022    Stented coronary artery 2019    Thrombus of left atrial appendage 2018

## 2025-06-23 NOTE — H&P ADULT - NSICDXPASTMEDICALHX_GEN_ALL_CORE_FT
PAST MEDICAL HISTORY:  ANGELICA (acute kidney injury) 4/2021    AS (aortic stenosis)     Atrial fibrillation 2018    Atrial flutter s/p ablation 2018    CAD (coronary artery disease) s/p CABG    CHF (congestive heart failure) denies any recent exacerbation    Chronic kidney disease, unspecified CKD stage     H/O scoliosis     HTN (hypertension)     Hypercholesteremia     Ischemic cardiomyopathy     Lichen planus chronic ( b/L LE)    Lower back pain     MI (myocardial infarction) 2012    Morbid obesity     WILFRED (obstructive sleep apnea) can not tolerate bipap at night    Osteoarthritis shoulders, hips, knee    Peripheral edema chronic    Status post placement of cardiac pacemaker micraleadless PPM, [id number] last interrogation 7/6/2022    Stented coronary artery 2019    Thrombus of left atrial appendage 2018

## 2025-06-23 NOTE — ADVANCED PRACTICE NURSE CONSULT - ASSESSMENT
Patient admitted to ICU, NIV in place, labored breathing noted.  Lab 6/23/25: BNP 42416. BMI 39.4. Dietician consulted.  Patient on a [employer]. Pt's skin cool to touch.  B/L lower extremity with hemosiderin staining and scarring from previous wounds, noted. Bilateral capillary refill to toes >3 seconds, dorsalis pedis and posterior tibial pulses, +1, and cold to touch.  Bilateral calcaneous with hyperpigmentation, cannot exclude deep tissue injuries.  Sacral area cool to touch with hyperpigmentation, noted, cannot exclude evolving deep tissue injury. Applied Cavilon advanced, offloaded b/l heels.  Turn and position every two hours to offload sacrum.     Reason for Admission: Acute on chronic hypercarbic respiratory failure, Heart failure exacerbation, ANGELICA on CKD  History of Present Illness:   Mr. Waller is a 65 year old male with a PMH of AFib (s/p dccv 3/25/25, unsuccessful), HFrEF (likely mod red EF, sev TTE), aortic stenosis s/p TAVR (11/2022, reop on 4/11), a flutter s/p ablation 2019 and s/p micra implant, CKD3, WILFRED, obesity, venous stasis, HTN, and HLD with recent hospitalization for heart failure exacerbation and cardiorenal syndrome in April presenting to [facility] from [facility] for worsening SOB x a few days as well as LE swelling, Pt was given increased doses of Bumex in attempts to diurese, however condition continued to worsen therefore patient was taken to the ED. He was placed on NIV for increased WOB, and given additional dose of Bumex. BNP noted to be elevated. Pt denies fever, chills, or sick contacts and ROS otherwise negative. Pt accepted to ICU for further management / care.

## 2025-06-23 NOTE — ED PROVIDER NOTE - CARE PLAN
Principal Discharge DX:	Acute on chronic systolic congestive heart failure  Secondary Diagnosis:	Acute respiratory disease  Secondary Diagnosis:	Acute respiratory failure with hypercapnia   1

## 2025-06-23 NOTE — ED PROVIDER NOTE - PHYSICAL EXAMINATION
CONSTITUTIONAL: NAD  SKIN: Warm dry  HEAD: NCAT  EYES: NL inspection  ENT: MMM  CARD: tachycardic, irregular   RESP: Tachypneic, increased WOB, BL crackles   ABD: S/NT no R/G  EXT: +BL pedal edema, venous stasis   NEURO: Grossly unremarkable  PSYCH: Cooperative, appropriate.

## 2025-06-23 NOTE — ED PROVIDER NOTE - OBJECTIVE STATEMENT
66 yo m ho atrial fibrillation (status post unsuccessful direct current cardioversion on 3/25/25), congestive heart failure (CHF) with likely moderately reduced ejection fraction, aortic stenosis (status post transcatheter aortic valve replacement in November 2022), valve-in-valve transcatheter aortic valve replacement (Florence-TAVR) during April 2025 admission, coronary artery disease (status post coronary artery bypass grafting), atrial flutter (status post ablation in 2019 and Micra pacemaker implantation), chronic kidney disease stage 3, obesity, venous stasis, hypertension, and hyperlipidemia BIBEMS from [facility] for SOB x days.     As per pt and PCP Dr Rebollar for collateral pt with increased LE edema for days with worsening sob for days. Admits has been increasing PO bumex with minimal relief. Denies cp, nausea, vomiting, fevers, chills  Upon ED arrival increased WOB, placed on BIPAP

## 2025-06-23 NOTE — ED PROVIDER NOTE - CRITICAL CARE ATTENDING CONTRIBUTION TO CARE
Upon my evaluation, this patient had a high probability of imminent or life-threatening deterioration due to respiratory failure which required my direct attention, intervention, and personal management.  The patient has a  medical condition that impairs one or more vital organ systems.  Frequent personal assessment and adjustment of medical interventions was performed.      I have personally provided 45 minutes of critical care time exclusive of time spent on separately billable procedures. Time includes review of laboratory data, radiology results, discussion with consultants, patient and family; monitoring for potential decompensation, as well as time spent retrieving data and reviewing the chart and documenting the visit. Interventions were performed as documented above.

## 2025-06-23 NOTE — CONSULT NOTE ADULT - ASSESSMENT
65 year old male with a PMH of AFib (s/p dccv 3/25/25, unsuccessful), HFrEF (likely mod red EF, sev TTE), aortic stenosis s/p TAVR (11/2022, reop on 4/11), a flutter s/p ablation 2019 and s/p micra implant, CKD3, WILFRED, obesity, venous stasis, HTN, and HLD admitted for heart failure exacerbation    ekg and tele c/w AF. pt sp failed ablation 2025  rpt tte to re eval ef  Cr appears improved from prior, probnp worsening. continue iv diuresis with close Cr monitoring. consider bumex gtt  not a candidate for ace arb arni slgt2i or mra 2/2 ckd   65 year old male with PMH of AFib (s/p dccv 3/25/25, unsuccessful), HFrEF, Aortic stenosis s/p TAVR (11/2022, reop on 4/11/25), a flutter s/p ablation 2019, s/p micra implant, CKD3, WILFRED, obesity, venous stasis, HTN, and HLD admitted for heart failure exacerbation    ekg and tele c/w AF. pt sp failed dccv 2025  rpt tte to re eval ef  Cr appears improved from prior, probnp elevated. continue iv diuresis with close Cr monitoring. consider bumex gtt  not a candidate for ace arb arni slgt2i or mra 2/2 ckd

## 2025-06-23 NOTE — ED PROVIDER NOTE - CLINICAL SUMMARY MEDICAL DECISION MAKING FREE TEXT BOX
64 yo m ho atrial fibrillation (status post unsuccessful direct current cardioversion on 3/25/25), congestive heart failure (CHF) with likely moderately reduced ejection fraction, aortic stenosis (status post transcatheter aortic valve replacement in November 2022), valve-in-valve transcatheter aortic valve replacement ([place]-TAVR) during April 2025 admission, coronary artery disease (status post coronary artery bypass grafting), atrial flutter (status post ablation in 2019 and Micra pacemaker implantation), chronic kidney disease stage 3, obesity, venous stasis, hypertension, and hyperlipidemia BIBEMS from [facility] for SOB x days.     As per pt and PCP Dr Rebollar for collateral pt with increased LE edema for days with worsening sob for days. Admits has been increasing PO bumex with minimal relief. Denies cp, nausea, vomiting, fevers, chills  Upon ED arrival increased WOB, placed on BIPAP  XR found to be sig overload, BUMEX iv ordered  Spoke to ICU - plan to admit. Will change to AVAPS in setting of hypercapnic, poor TV

## 2025-06-23 NOTE — ED ADULT NURSE NOTE - CHIEF COMPLAINT QUOTE
Pt brought in by EMS for difficulty breathing from [facility]. Pt received 3 nitros at the facility.

## 2025-06-23 NOTE — H&P ADULT - NSHPPHYSICALEXAM_GEN_ALL_CORE
T(C): 36.1 (06-23-25 @ 10:10), Max: 36.1 (06-23-25 @ 10:10)  HR: 99 (06-23-25 @ 12:06) (90 - 121)  BP: 95/77 (06-23-25 @ 11:15) (95/77 - 116/77)  RR: 26 (06-23-25 @ 11:15) (26 - 35)  SpO2: 97% (06-23-25 @ 12:06) (95% - 99%)    CONSTITUTIONAL: obese male, lethargic but arousable with NIV on  EYES: PERRL and symmetric, EOMI, No conjunctival or scleral injection, non-icteric  ENMT: Oral mucosa with moist membranes. Normal dentition; no pharyngeal injection or exudates  NECK: Supple, symmetric and without tracheal deviation   RESP: Increased   CV: s1/s2 noted,  LE swelling noted 3+; b/l LE darkened area, + capillary refill  GI: Soft, NT, ND, no rebound, no guarding  LYMPH: No cervical LAD or tenderness; no axillary LAD or tenderness; no inguinal LAD or tenderness  MSK: MITCHELL x4, normal ROM noted  NEURO: non focal exam  PSYCH: REBA 2/2 clinical status

## 2025-06-23 NOTE — CONSULT NOTE ADULT - REASON FOR ADMISSION
Acute on chronic hypercarbic respiratory failure, Heart failure exacerbation, ANGELICA on CKD Acute on chronic respiratory failure, Heart failure exacerbation, ANGELICA on CKD

## 2025-06-23 NOTE — ADVANCED PRACTICE NURSE CONSULT - RECOMMEDATIONS
-Cleanse skin daily with pH balanced cleanser and pat dry skin.  -Apply Cavilon advanced to sacrum and b/l heels q shift.  -Apply Allevyn foam(s) to pressure points.  -Offload b/l heels.  -Use Hovermatt to reposition patient.

## 2025-06-23 NOTE — H&P ADULT - CRITICAL CARE ATTENDING COMMENT
65 year old male with a PMH of AFib (s/p dccv 3/25/25, unsuccessful), HFrEF (likely mod red EF, sev TTE), aortic stenosis s/p TAVR (11/2022, reop on 4/11), a flutter s/p ablation 2019 and s/p micra implant, CKD3, WILFRED, obesity, venous stasis, HTN, and HLD presenting for shortness of breath. Found to be hypercapnic on VBG. Placed on bipap support. Concern for volume overload.     Assessment:  1. Acute hypoxic and hypercapnic respriatory failure   2. Acute decompensation of CHF   3. Heart failure with reduced EF   4. Aortic stenosis s/p TAVR   5. Pulmonary edema   6. ANGELICA on CKD, likely cardiorenal syndrome  7. Chronic afib     Plan:  - Cont. NIV support. changed to AVAPS mode   - ABG showing improvement in hypercapnia   - Cont. diuresis and aim for negative fluid balance  - Monitor urine output and daily weights   - Cont. AC for now   - Cont. Amiodarone   - NPO while on NIV support  - Cardiology consult  - Close hemodynamic and cardiac monitoring  - Admit to ICU

## 2025-06-23 NOTE — ED ADULT NURSE NOTE - OBJECTIVE STATEMENT
Pt presents to ED Pt presents to ED from [facility] for difficulty breathing. Pt has been SOB x days with bilateral lower extremity edema. Recent increase in pt's diuretic mediation without improvement. Pt presents responsive to physical stimuli and A&Ox3 but falls asleep quickly. Increased work of breathing on arrival, RT at bedside for bipap. Pt placed on cardiac monitor with continuous pulse ox. Afebrile on arrival, no rectal temp needed as per Dr Douglass.

## 2025-06-23 NOTE — ED ADULT TRIAGE NOTE - CHIEF COMPLAINT QUOTE
Pt brought in by EMS for difficulty breathing from [facility]. Pt received 3 nitros at the facility.

## 2025-06-23 NOTE — CONSULT NOTE ADULT - NS_MD_PANP_GEN_ALL_CORE
facility-administered medications for this visit.        History reviewed. No pertinent past medical history.     History reviewed. No pertinent surgical history.     Social History:   Social Connections: Not on file        Patient Care Team:  No, Pcp as PCP - General    There is no problem list on file for this patient.           I have discussed the results, diagnosis and treatment plan with the patient. The patient also understands that early in the process of an illness, an urgent care workup can be falsely reassuring. Routine discharge counseling and specific return precautions discussed with patient and the patient understands that worsening, changing or persistent symptoms should prompt an immediate return to the urgent care or emergency department. Patient/Guardian expressed understanding and agrees with the discharge plan. No further questions at time of discharge.       An electronic signature was used to authenticate this note.  -- LAUREEN Pollock - CNP                      Attending and PA/NP shared services statement (NON-critical care):

## 2025-06-23 NOTE — CONSULT NOTE ADULT - SUBJECTIVE AND OBJECTIVE BOX
BERTHA WARD  [id number]      HPI:  65 year old male with a PMH of AFib (s/p dccv 3/25/25, unsuccessful), HFrEF (likely mod red EF, sev TTE), aortic stenosis s/p TAVR (11/2022, reop on 4/11), a flutter s/p ablation 2019 and s/p micra implant, CKD3, WILFRED, obesity, venous stasis, HTN, and HLD with recent hospitalization for heart failure exacerbation and cardiorenal syndrome in [facility] for worsening SOB x a few days as well as LE swelling, Pt was given increased doses of Bumex in attempts to diurese, however condition continued to worsen therefore patient was taken to the ED. He was placed on NIV for increased WOB, and given additional dose of Bumex. BNP noted to be elevated. Pt denies fever, chills, or sick contacts and ROS otherwise negative. Pt accepted to ICU for further management / care.  (23 Jun 2025 11:12)        ALLERGIES:  metoprolol (Short breath)      PAST MEDICAL & SURGICAL HISTORY:  CAD (coronary artery disease)  s/p CABG      Hypercholesteremia      Thrombus of left atrial appendage  2018      Ischemic cardiomyopathy      WILFRED (obstructive sleep apnea)  can not tolerate bipap at night      HTN (hypertension)      Atrial fibrillation  2018      CHF (congestive heart failure)  denies any recent exacerbation      Peripheral edema  chronic      Lichen planus  chronic ( b/L LE)      Atrial flutter  s/p ablation 2018      MI (myocardial infarction)  2012      Stented coronary artery  2019      AS (aortic stenosis)      Chronic kidney disease, unspecified CKD stage      ANGELICA (acute kidney injury)  4/2021      Morbid obesity      Status post placement of cardiac pacemaker  micraleadless PPM, [id number] last interrogation 7/6/2022      Osteoarthritis  shoulders, hips, knee      H/O scoliosis      Lower back pain      History of elbow surgery      S/P CABG (coronary artery bypass graft)  x3, 2012      Cardiac pacemaker  leadless 2018      History of coronary artery stent placement  2019 ( one more after cabg)      History of adenoidectomy      H/O atrioventricular karlee ablation  2018            CURRENT MEDICATIONS:  MEDICATIONS  (STANDING):  aMIOdarone    Tablet 200 milliGRAM(s) Oral daily  bumetanide Infusion 0.25 mG/Hr (1.25 mL/Hr) IV Continuous <Continuous>  chlorhexidine 2% Cloths 1 Application(s) Topical <User Schedule>  pantoprazole  Injectable 40 milliGRAM(s) IV Push daily    MEDICATIONS  (PRN):      SOCIAL HISTORY:  unable to obtain    FAMILY HISTORY:  unable to obtain    ROS:  All 10 systems reviewed and positives noted in HPI    OBJECTIVE:    VITAL SIGNS:  Vital Signs Last 24 Hrs  T(C): 37.4 (23 Jun 2025 12:40), Max: 37.4 (23 Jun 2025 12:40)  T(F): 99.3 (23 Jun 2025 12:40), Max: 99.3 (23 Jun 2025 12:40)  HR: 83 (23 Jun 2025 13:10) (79 - 121)  BP: 117/85 (23 Jun 2025 12:40) (95/77 - 117/85)  BP(mean): 96 (23 Jun 2025 12:40) (84 - 101)  RR: 12 (23 Jun 2025 12:40) (12 - 35)  SpO2: 98% (23 Jun 2025 13:10) (90% - 99%)    Parameters below as of 23 Jun 2025 12:40  Patient On (Oxygen Delivery Method): BiPAP/CPAP        PHYSICAL EXAM:  General: lethargic  HEENT: sclera anicteric  Neck: supple, no carotid bruits b/l  CVS: irregular  Chest: coarse breath sounds  Abdomen: obese  Extremities: +2 pitting lower extremity edema b/l      LABS:                        12.5   10.37 )-----------( 280      ( 23 Jun 2025 10:10 )             41.4     06-23    138  |  96  |  71[H]  ----------------------------<  139[H]  4.3   |  40[H]  |  2.54[H]    Ca    9.2      23 Jun 2025 10:10    TPro  8.7[H]  /  Alb  2.6[L]  /  TBili  0.6  /  DBili  x   /  AST  16  /  ALT  12  /  AlkPhos  191[H]  06-23        PT/INR - ( 23 Jun 2025 12:17 )   PT: 41.7 sec;   INR: 3.58 ratio         PTT - ( 23 Jun 2025 12:17 )  PTT:42.7 sec      ECG: Afib, IVCD      TTE:< from: TTE Congenital Anomalies W or WO Ultrasound Enhancing Agent (04.12.25 @ 09:06) >   1. Technically difficult image quality.   2. Definity ultrasound enhancing agent was given for enhanced left ventricular opacification and improved delineation of the left ventricular endocardial borders.   3. Left ventricular cavity is severely dilated. Left ventricular systolic function is severely decreased with an ejection fraction visually estimated at 25 %. Global left ventricular hypokinesis.   4. 29 mm Evolut FX+ (valve-in-valve) is present in the aortic position, with normal function. No paravalvular aortic regurgitation is seen in this study.   5. The right ventricle is not well visualized.    < from: Cardiac Catheterization (04.11.25 @ 14:21) >  Status post successful percutaneous transcatheter aortic valve in  valve using a CoreValve Evolut FXPLUS 29mm tissue valve  through the right common femoral artery       < from: Cardiac Catheterization (04.08.25 @ 08:34) >  Severe three vessel obstructive coronary artery disease   Patent LIMA to the left anterior descending artery   Chronic total occlusion of the proximal left anterior descending  artery  Chronic total occlusion of the proximal left circumflex artery   Occluded SVG to the 1st obtuse marginal artery   Occluded SVG to the 1st diagonal artery   Mild left main coronary artery disease   Right dominant system              BERTHA WARD  [id number]      HPI:  65 year old male wit PMH of AFib (s/p dccv 3/25/25, unsuccessful), HFrEF, aortic stenosis s/p TAVR (11/2022, reop on 4/11/25), a flutter s/p ablation 2019, s/p micra implant, CKD3, WILFRED, obesity,  HTN, and HLD with recent hospitalization for heart failure exacerbation and cardiorenal syndrome in April presenting to [facility] from [facility] for worsening SOB x a few days as well as LE swelling, Pt was given increased doses of Bumex in attempts to diurese, however condition continued to worsen therefore patient was taken to the ED. He was placed on NIV for increased WOB, and given additional dose of Bumex. BNP noted to be elevated. Pt denies fever, chills, or sick contacts and ROS otherwise negative. Pt accepted to ICU for further management     ALLERGIES:  metoprolol (Short breath)    CURRENT MEDICATIONS:  MEDICATIONS  (STANDING):  aMIOdarone    Tablet 200 milliGRAM(s) Oral daily  bumetanide Infusion 0.25 mG/Hr (1.25 mL/Hr) IV Continuous <Continuous>  chlorhexidine 2% Cloths 1 Application(s) Topical <User Schedule>  pantoprazole  Injectable 40 milliGRAM(s) IV Push daily    ROS:  All 10 systems reviewed and positives noted in HPI    OBJECTIVE:    VITAL SIGNS:  Vital Signs Last 24 Hrs  T(C): 37.4 (23 Jun 2025 12:40), Max: 37.4 (23 Jun 2025 12:40)  T(F): 99.3 (23 Jun 2025 12:40), Max: 99.3 (23 Jun 2025 12:40)  HR: 83 (23 Jun 2025 13:10) (79 - 121)  BP: 117/85 (23 Jun 2025 12:40) (95/77 - 117/85)  BP(mean): 96 (23 Jun 2025 12:40) (84 - 101)  RR: 12 (23 Jun 2025 12:40) (12 - 35)  SpO2: 98% (23 Jun 2025 13:10) (90% - 99%)    Parameters below as of 23 Jun 2025 12:40  Patient On (Oxygen Delivery Method): BiPAP/CPAP      PHYSICAL EXAM:  General: lethargic, on BiPAP  HEENT: sclera anicteric  Neck: supple  CVS: irregular  Chest: coarse breath sounds  Abdomen: obese  Extremities: +2 pitting lower extremity edema b/l      LABS:                        12.5   10.37 )-----------( 280      ( 23 Jun 2025 10:10 )             41.4     06-23    138  |  96  |  71[H]  ----------------------------<  139[H]  4.3   |  40[H]  |  2.54[H]    Ca    9.2      23 Jun 2025 10:10    TPro  8.7[H]  /  Alb  2.6[L]  /  TBili  0.6  /  DBili  x   /  AST  16  /  ALT  12  /  AlkPhos  191[H]  06-23        PT/INR - ( 23 Jun 2025 12:17 )   PT: 41.7 sec;   INR: 3.58 ratio         PTT - ( 23 Jun 2025 12:17 )  PTT:42.7 sec      ECG: Afib, IVCD      TTE:< from: TTE Congenital Anomalies W or WO Ultrasound Enhancing Agent (04.12.25 @ 09:06) >   1. Technically difficult image quality.   2. Definity ultrasound enhancing agent was given for enhanced left ventricular opacification and improved delineation of the left ventricular endocardial borders.   3. Left ventricular cavity is severely dilated. Left ventricular systolic function is severely decreased with an ejection fraction visually estimated at 25 %. Global left ventricular hypokinesis.   4. 29 mm Evolut FX+ (valve-in-valve) is present in the aortic position, with normal function. No paravalvular aortic regurgitation is seen in this study.   5. The right ventricle is not well visualized.    < from: Cardiac Catheterization (04.11.25 @ 14:21) >  Status post successful percutaneous transcatheter aortic valve in  valve using a CoreValve Evolut FXPLUS 29mm tissue valve  through the right common femoral artery       < from: Cardiac Catheterization (04.08.25 @ 08:34) >  Severe three vessel obstructive coronary artery disease   Patent LIMA to the left anterior descending artery   Chronic total occlusion of the proximal left anterior descending  artery  Chronic total occlusion of the proximal left circumflex artery   Occluded SVG to the 1st obtuse marginal artery   Occluded SVG to the 1st diagonal artery   Mild left main coronary artery disease   Right dominant system

## 2025-06-24 LAB
ALBUMIN SERPL ELPH-MCNC: 2.3 G/DL — LOW (ref 3.3–5)
ALP SERPL-CCNC: 161 U/L — HIGH (ref 40–120)
ALT FLD-CCNC: 11 U/L — SIGNIFICANT CHANGE UP (ref 10–45)
ANION GAP SERPL CALC-SCNC: 1 MMOL/L — LOW (ref 5–17)
ANION GAP SERPL CALC-SCNC: 4 MMOL/L — LOW (ref 5–17)
ANION GAP SERPL CALC-SCNC: 4 MMOL/L — LOW (ref 5–17)
APTT BLD: 45.2 SEC — HIGH (ref 26.1–36.8)
AST SERPL-CCNC: 16 U/L — SIGNIFICANT CHANGE UP (ref 10–40)
BILIRUB SERPL-MCNC: 0.7 MG/DL — SIGNIFICANT CHANGE UP (ref 0.2–1.2)
BUN SERPL-MCNC: 76 MG/DL — HIGH (ref 7–23)
BUN SERPL-MCNC: 77 MG/DL — HIGH (ref 7–23)
BUN SERPL-MCNC: 78 MG/DL — HIGH (ref 7–23)
CALCIUM SERPL-MCNC: 8.6 MG/DL — SIGNIFICANT CHANGE UP (ref 8.4–10.5)
CALCIUM SERPL-MCNC: 8.9 MG/DL — SIGNIFICANT CHANGE UP (ref 8.4–10.5)
CALCIUM SERPL-MCNC: 8.9 MG/DL — SIGNIFICANT CHANGE UP (ref 8.4–10.5)
CHLORIDE SERPL-SCNC: 97 MMOL/L — SIGNIFICANT CHANGE UP (ref 96–108)
CHLORIDE SERPL-SCNC: 97 MMOL/L — SIGNIFICANT CHANGE UP (ref 96–108)
CHLORIDE SERPL-SCNC: 98 MMOL/L — SIGNIFICANT CHANGE UP (ref 96–108)
CO2 SERPL-SCNC: 37 MMOL/L — HIGH (ref 22–31)
CO2 SERPL-SCNC: 39 MMOL/L — HIGH (ref 22–31)
CO2 SERPL-SCNC: 42 MMOL/L — HIGH (ref 22–31)
CREAT SERPL-MCNC: 2.14 MG/DL — HIGH (ref 0.5–1.3)
CREAT SERPL-MCNC: 2.41 MG/DL — HIGH (ref 0.5–1.3)
CREAT SERPL-MCNC: 2.65 MG/DL — HIGH (ref 0.5–1.3)
EGFR: 26 ML/MIN/1.73M2 — LOW
EGFR: 26 ML/MIN/1.73M2 — LOW
EGFR: 29 ML/MIN/1.73M2 — LOW
EGFR: 29 ML/MIN/1.73M2 — LOW
EGFR: 33 ML/MIN/1.73M2 — LOW
EGFR: 33 ML/MIN/1.73M2 — LOW
GLUCOSE SERPL-MCNC: 113 MG/DL — HIGH (ref 70–99)
GLUCOSE SERPL-MCNC: 146 MG/DL — HIGH (ref 70–99)
GLUCOSE SERPL-MCNC: 84 MG/DL — SIGNIFICANT CHANGE UP (ref 70–99)
HCT VFR BLD CALC: 37.2 % — LOW (ref 39–50)
HGB BLD-MCNC: 11.3 G/DL — LOW (ref 13–17)
INR BLD: 4.1 RATIO — HIGH (ref 0.85–1.16)
MAGNESIUM SERPL-MCNC: 2.2 MG/DL — SIGNIFICANT CHANGE UP (ref 1.6–2.6)
MAGNESIUM SERPL-MCNC: 2.3 MG/DL — SIGNIFICANT CHANGE UP (ref 1.6–2.6)
MAGNESIUM SERPL-MCNC: 2.3 MG/DL — SIGNIFICANT CHANGE UP (ref 1.6–2.6)
MCHC RBC-ENTMCNC: 29.5 PG — SIGNIFICANT CHANGE UP (ref 27–34)
MCHC RBC-ENTMCNC: 30.4 G/DL — LOW (ref 32–36)
MCV RBC AUTO: 97.1 FL — SIGNIFICANT CHANGE UP (ref 80–100)
NRBC BLD AUTO-RTO: 0 /100 WBCS — SIGNIFICANT CHANGE UP (ref 0–0)
NT-PROBNP SERPL-SCNC: HIGH PG/ML (ref 0–300)
PHOSPHATE SERPL-MCNC: 4.5 MG/DL — SIGNIFICANT CHANGE UP (ref 2.5–4.5)
PHOSPHATE SERPL-MCNC: 4.9 MG/DL — HIGH (ref 2.5–4.5)
PHOSPHATE SERPL-MCNC: 4.9 MG/DL — HIGH (ref 2.5–4.5)
PLATELET # BLD AUTO: 259 K/UL — SIGNIFICANT CHANGE UP (ref 150–400)
POTASSIUM SERPL-MCNC: 3.9 MMOL/L — SIGNIFICANT CHANGE UP (ref 3.5–5.3)
POTASSIUM SERPL-MCNC: 4.2 MMOL/L — SIGNIFICANT CHANGE UP (ref 3.5–5.3)
POTASSIUM SERPL-MCNC: 4.5 MMOL/L — SIGNIFICANT CHANGE UP (ref 3.5–5.3)
POTASSIUM SERPL-SCNC: 3.9 MMOL/L — SIGNIFICANT CHANGE UP (ref 3.5–5.3)
POTASSIUM SERPL-SCNC: 4.2 MMOL/L — SIGNIFICANT CHANGE UP (ref 3.5–5.3)
POTASSIUM SERPL-SCNC: 4.5 MMOL/L — SIGNIFICANT CHANGE UP (ref 3.5–5.3)
PROT SERPL-MCNC: 7.9 G/DL — SIGNIFICANT CHANGE UP (ref 6–8.3)
PROTHROM AB SERPL-ACNC: 47.7 SEC — HIGH (ref 9.9–13.4)
RBC # BLD: 3.83 M/UL — LOW (ref 4.2–5.8)
RBC # FLD: 16.7 % — HIGH (ref 10.3–14.5)
SODIUM SERPL-SCNC: 138 MMOL/L — SIGNIFICANT CHANGE UP (ref 135–145)
SODIUM SERPL-SCNC: 140 MMOL/L — SIGNIFICANT CHANGE UP (ref 135–145)
SODIUM SERPL-SCNC: 141 MMOL/L — SIGNIFICANT CHANGE UP (ref 135–145)
WBC # BLD: 8.55 K/UL — SIGNIFICANT CHANGE UP (ref 3.8–10.5)
WBC # FLD AUTO: 8.55 K/UL — SIGNIFICANT CHANGE UP (ref 3.8–10.5)

## 2025-06-24 PROCEDURE — 94660 CPAP INITIATION&MGMT: CPT

## 2025-06-24 PROCEDURE — 81001 URINALYSIS AUTO W/SCOPE: CPT

## 2025-06-24 PROCEDURE — 99232 SBSQ HOSP IP/OBS MODERATE 35: CPT

## 2025-06-24 PROCEDURE — 83880 ASSAY OF NATRIURETIC PEPTIDE: CPT

## 2025-06-24 PROCEDURE — 84100 ASSAY OF PHOSPHORUS: CPT

## 2025-06-24 PROCEDURE — 83735 ASSAY OF MAGNESIUM: CPT

## 2025-06-24 PROCEDURE — 83605 ASSAY OF LACTIC ACID: CPT

## 2025-06-24 PROCEDURE — 85025 COMPLETE CBC W/AUTO DIFF WBC: CPT

## 2025-06-24 PROCEDURE — 87040 BLOOD CULTURE FOR BACTERIA: CPT

## 2025-06-24 PROCEDURE — 80048 BASIC METABOLIC PNL TOTAL CA: CPT

## 2025-06-24 PROCEDURE — 85610 PROTHROMBIN TIME: CPT

## 2025-06-24 PROCEDURE — 80053 COMPREHEN METABOLIC PANEL: CPT

## 2025-06-24 PROCEDURE — 85730 THROMBOPLASTIN TIME PARTIAL: CPT

## 2025-06-24 PROCEDURE — 84484 ASSAY OF TROPONIN QUANT: CPT

## 2025-06-24 PROCEDURE — 85027 COMPLETE CBC AUTOMATED: CPT

## 2025-06-24 PROCEDURE — 71045 X-RAY EXAM CHEST 1 VIEW: CPT | Mod: 26

## 2025-06-24 PROCEDURE — 36600 WITHDRAWAL OF ARTERIAL BLOOD: CPT

## 2025-06-24 PROCEDURE — 93005 ELECTROCARDIOGRAM TRACING: CPT

## 2025-06-24 PROCEDURE — 93306 TTE W/DOPPLER COMPLETE: CPT

## 2025-06-24 PROCEDURE — 82803 BLOOD GASES ANY COMBINATION: CPT

## 2025-06-24 PROCEDURE — 36415 COLL VENOUS BLD VENIPUNCTURE: CPT

## 2025-06-24 PROCEDURE — 0241U: CPT

## 2025-06-24 PROCEDURE — 84145 PROCALCITONIN (PCT): CPT

## 2025-06-24 PROCEDURE — 71045 X-RAY EXAM CHEST 1 VIEW: CPT

## 2025-06-24 RX ORDER — DIGOXIN 250 UG/1
500 TABLET ORAL ONCE
Refills: 0 | Status: COMPLETED | OUTPATIENT
Start: 2025-06-24 | End: 2025-06-24

## 2025-06-24 RX ORDER — DIGOXIN 250 UG/1
250 TABLET ORAL EVERY 6 HOURS
Refills: 0 | Status: COMPLETED | OUTPATIENT
Start: 2025-06-25 | End: 2025-06-25

## 2025-06-24 RX ADMIN — AMIODARONE HYDROCHLORIDE 200 MILLIGRAM(S): 50 INJECTION, SOLUTION INTRAVENOUS at 05:28

## 2025-06-24 RX ADMIN — Medication 1 APPLICATION(S): at 05:41

## 2025-06-24 RX ADMIN — HEPARIN SODIUM 5000 UNIT(S): 1000 INJECTION INTRAVENOUS; SUBCUTANEOUS at 05:28

## 2025-06-24 RX ADMIN — DIGOXIN 500 MICROGRAM(S): 250 TABLET ORAL at 18:42

## 2025-06-24 RX ADMIN — Medication 40 MILLIGRAM(S): at 05:29

## 2025-06-24 NOTE — DIETITIAN INITIAL EVALUATION ADULT - NSICDXPASTMEDICALHX_GEN_ALL_CORE_FT
PAST MEDICAL HISTORY:  ANGELICA (acute kidney injury) 4/2021    AS (aortic stenosis)     Atrial fibrillation 2018    Atrial flutter s/p ablation 2018    CAD (coronary artery disease) s/p CABG    CHF (congestive heart failure) denies any recent exacerbation    Chronic kidney disease, unspecified CKD stage     H/O scoliosis     HTN (hypertension)     Hypercholesteremia     Ischemic cardiomyopathy     Lichen planus chronic ( b/L LE)    Lower back pain     MI (myocardial infarction) 2012    Morbid obesity     WILFRED (obstructive sleep apnea) can not tolerate bipap at night    Osteoarthritis shoulders, hips, knee    Peripheral edema chronic    Status post placement of cardiac pacemaker micraleadless PPM, [id number] last interrogation 7/6/2022    Stented coronary artery 2019    Thrombus of left atrial appendage 2018

## 2025-06-24 NOTE — DIETITIAN INITIAL EVALUATION ADULT - ETIOLOGY
related to increased demand of nutrients  related to suboptimal nutrient intake in the setting of chronic illness

## 2025-06-24 NOTE — DIETITIAN INITIAL EVALUATION ADULT - OTHER INFO
65 year old m with PMH of AFib (s/p dccv 3/25/25, unsuccessful), HFrEF, aortic stenosis s/p TAVR (11/2022, reop on 4/11/25), aflutter s/p ablation 2019, s/p micra implant, CKD, WILFRED, obesity, venous stasis, HTN, and HLD with recent hospitalization for heart failure exacerbation and cardiorenal syndrome in April presenting to [facility] from [facility] for worsening SOB x few days and LE swelling. Bumex infusion provided , renal indices noted improving . Patient with good appetite , (+3) foot edema noted , multiple pressure injuries noted, suggest add MVI Vit C to aid with wound healing . Patient reports UBW 315lbs , (1/2025) decrease weight due to diuresis & suboptimal nutrient intake  s/p surgery , Educated patient on DASH/TLC diet , understanding appeared good , patient aware of the relationship of sodium & fluid retention.

## 2025-06-24 NOTE — DIETITIAN INITIAL EVALUATION ADULT - WEIGHT FOR BMI (LBS)
Pt arrives c/o severe eczema since 2005 but states has not been as bad as it is now - worsened in April. Large dry patches to BLE pt states scattered smaller areas.
237.2

## 2025-06-24 NOTE — PROGRESS NOTE ADULT - ASSESSMENT
Assessment:  1. acute on chronic hypercapnic respiratory failure  2. decompensated heart failure  3. ANGELICA on CKD, likely cardiorenal syndrome    Plan:  NEURO:   -Monitor mental status closely, avoid neurosuppresants.   -Serial neurologic assessments.     CV:   -Maintain goal MAP >65.   -Keep K~4 and Mg>2 for optimal arrhythmia suppression.  -Amiodarone QD.   -Official TTE with LVEF 25-30%, intermediate LV diastolic dysfunction. Cards following, recs appreciated.  -Bumex Infusion, strict I/Os, daily weights.     PULM:   -Weaned off NIV to conventional NC, satting well. Utilize supplemental O2 PRN to maintain goal SpO2 >88%.   -Incentive spirometry, Chest PT/Pulmonary toilet, HOB >30'. NIV PRN and QHS.     GI:   -DASH/TLC Diet.   -Protonix QD.    RENAL:   -ANGELICA on CKD, likely cardiorenal, improving.   -trend lytes/Scr daily with BMP  -I's and O's, goal UOP 0.5 cc/kg/hr  -renal dose meds and avoid nephrotoxins     ENDO:   -Aggressive glycemic control to limit FS glucose to <180mg/dl.     ID:  -Afebrile, no concern for infectious process. Monitor off abx.     HEME:   -INR elevated yesterday, Coumadin held. Will repeat Coags this afternoon and redose Coumadin as necessary.     Case and plan discussed with MICU attending, Dr. Mendoza.     GOC: Full Code.

## 2025-06-24 NOTE — DIETITIAN INITIAL EVALUATION ADULT - SIGNS/SYMPTOMS
as evidence by 17% weight change x 6 months & mild muscle wasting /loss of body fat  as evidence by multiple pressure injuries (venous statis)

## 2025-06-24 NOTE — PROGRESS NOTE ADULT - ASSESSMENT
65 year old male with PMH of AFib (s/p dccv 3/25/25, unsuccessful), HFrEF, Aortic stenosis s/p TAVR (11/2022, reop on 4/11/25), a flutter s/p ablation 2019, s/p micra implant, CKD3, WILFRED, obesity, venous stasis, HTN, and HLD admitted for heart failure exacerbation    ekg and tele c/w AF. pt sp failed dccv 2025  rpt tte to re eval ef>> ef 25-30, severe global lv hypokinesis, well seated Florence- TAVR, dilated ivc. similar to prior  Cr and probnp appears improved. continue bumex gtt  not a candidate for ace arb arni slgt2i or mra 2/2 ckd. consider addition of hydral, imdur  pt would benefit from AICD as well, eventual EP eval  continue amio for arrythmias. ppm interrogated with no events thus far

## 2025-06-24 NOTE — PROGRESS NOTE ADULT - SUBJECTIVE AND OBJECTIVE BOX
BERTHA WARD  [id number]    Chief Complaint: HFrEF  Interval events: pt seen and examined, labs and chart reviewed. Improved breathing. AF 80-90s    ALLERGIES:  metoprolol (Short breath)    CURRENT MEDICATIONS:  MEDICATIONS  (STANDING):  aMIOdarone    Tablet 200 milliGRAM(s) Oral daily  bumetanide Infusion 0.25 mG/Hr (1.25 mL/Hr) IV Continuous <Continuous>  chlorhexidine 2% Cloths 1 Application(s) Topical <User Schedule>  pantoprazole  Injectable 40 milliGRAM(s) IV Push daily    ROS:  All 10 systems reviewed and positives noted in HPI    OBJECTIVE:    VITAL SIGNS:  Vital Signs Last 24 Hrs  T(C): 37.4 (23 Jun 2025 12:40), Max: 37.4 (23 Jun 2025 12:40)  T(F): 99.3 (23 Jun 2025 12:40), Max: 99.3 (23 Jun 2025 12:40)  HR: 83 (23 Jun 2025 13:10) (79 - 121)  BP: 117/85 (23 Jun 2025 12:40) (95/77 - 117/85)  BP(mean): 96 (23 Jun 2025 12:40) (84 - 101)  RR: 12 (23 Jun 2025 12:40) (12 - 35)  SpO2: 98% (23 Jun 2025 13:10) (90% - 99%)    Parameters below as of 23 Jun 2025 12:40  Patient On (Oxygen Delivery Method): BiPAP/CPAP      PHYSICAL EXAM:  General: no distress  HEENT: sclera anicteric  Neck: supple  CVS: irregular, + JVD  Chest: coarse breath sounds  Abdomen: obese  Extremities: +2 pitting lower extremity edema b/l      LABS:                        12.5   10.37 )-----------( 280      ( 23 Jun 2025 10:10 )             41.4     06-23    138  |  96  |  71[H]  ----------------------------<  139[H]  4.3   |  40[H]  |  2.54[H]    Ca    9.2      23 Jun 2025 10:10    TPro  8.7[H]  /  Alb  2.6[L]  /  TBili  0.6  /  DBili  x   /  AST  16  /  ALT  12  /  AlkPhos  191[H]  06-23        PT/INR - ( 23 Jun 2025 12:17 )   PT: 41.7 sec;   INR: 3.58 ratio         PTT - ( 23 Jun 2025 12:17 )  PTT:42.7 sec      ECG: Afib, IVCD      TTE: < from: TTE Echo Complete w/o Contrast w/ Doppler (06.23.25 @ 13:12) >   1. The left ventricle endocardium is not well visualized, consider use   of IV ultrasonic enhancing agent to better evaluate LVEF, endocardium and   apex, if clinically indicated. There appears to be severe global left   ventricle hypokinesis.   2. Left ventricular cavity is normal in size. Left ventricular wall   thickness is mildly increased. Left ventricular systolic function is   severely decreased with an ejection fraction of 30 % by Rivera's method   of disks with an ejection fraction visually estimated at 25 to 30 %.   3. The left ventricular diastolic function is indeterminate. Analysis of   left ventricular diastolic function and filling pressure is made   challenging by the presence of atrial fibrillation.   4. The right ventricle is not well visualized, appears enlarged.   5. Micra pacemaker is visualized in the right heart.  6. Left atrium is top normal sized.   7. Right atrium was not well visualized.   8. Mild mitral valve leaflet calcification.   9. There is mild calcification of the mitral valve annulus.  10. S/p Florence-TAVR, appears well seated, peak velocity within normal limits.  11. Mild aortic regurgitation.  12. Aortic root at the sinuses of Valsalva is normal in size.  13. The inferior vena cava is dilated (dilated >2.1cm) with abnormal   inspiratory collapse (abnormal <50%) consistent with elevated right   atrial pressure (  15, range 10-20mmHg).  14. No pericardial effusion seen.  15. Small right pleural effusion noted.      < from: TTE Congenital Anomalies W or WO Ultrasound Enhancing Agent (04.12.25 @ 09:06) >   1. Technically difficult image quality.   2. Definity ultrasound enhancing agent was given for enhanced left ventricular opacification and improved delineation of the left ventricular endocardial borders.   3. Left ventricular cavity is severely dilated. Left ventricular systolic function is severely decreased with an ejection fraction visually estimated at 25 %. Global left ventricular hypokinesis.   4. 29 mm Evolut FX+ (valve-in-valve) is present in the aortic position, with normal function. No paravalvular aortic regurgitation is seen in this study.   5. The right ventricle is not well visualized.    < from: Cardiac Catheterization (04.11.25 @ 14:21) >  Status post successful percutaneous transcatheter aortic valve in  valve using a CoreValve Evolut FXPLUS 29mm tissue valve  through the right common femoral artery       < from: Cardiac Catheterization (04.08.25 @ 08:34) >  Severe three vessel obstructive coronary artery disease   Patent LIMA to the left anterior descending artery   Chronic total occlusion of the proximal left anterior descending  artery  Chronic total occlusion of the proximal left circumflex artery   Occluded SVG to the 1st obtuse marginal artery   Occluded SVG to the 1st diagonal artery   Mild left main coronary artery disease   Right dominant system

## 2025-06-24 NOTE — CONSULT NOTE ADULT - SUBJECTIVE AND OBJECTIVE BOX
NEPHROLOGY CONSULTATION    CHIEF COMPLAINT: SOB    HPI:  Pt is 65 year old m with PMH of AFib (s/p dccv 3/25/25, unsuccessful), HFrEF, aortic stenosis s/p TAVR (11/2022, reop on 4/11/25), aflutter s/p ablation 2019, s/p micra implant, CKD, WILFRED, obesity, venous stasis, HTN, and HLD with recent hospitalization for heart failure exacerbation and cardiorenal syndrome in April presenting to [facility] from [facility] for worsening SOB x few days and LE swelling. Was on Bumex at . He was placed on NIV for increased WOB, and given additional dose of Bumex. BNP noted to be elevated. No fever, chills, or sick contacts and ROS otherwise negative. Pt accepted to ICU for further management.     ROS:  as above    Allergies:  metoprolol (Short breath)    PAST MEDICAL & SURGICAL HISTORY:  Hypercholesteremia  Thrombus of left atrial appendage  2018  WILFRED (obstructive sleep apnea)  can not tolerate bipap at night  HTN (hypertension)  CHF (congestive heart failure)  Peripheral edema  Lichen planus  chronic ( b/L LE)  Atrial flutter  s/p ablation 2018  MI (myocardial infarction)  2012  Stented coronary artery  2019  AS (aortic stenosis)  Chronic kidney disease  ANGELICA (acute kidney injury)  4/2021  obesity  Status post placement of cardiac pacemaker  micra leadless PPM, [id number] last interrogation 7/6/2022  Osteoarthritis  shoulders, hips, knee  H/O scoliosis  Lower back pain  History of elbow surgery  S/P CABG (coronary artery bypass graft)  x 3, 2012  Cardiac pacemaker  leadless 2018  History of coronary artery stent placement  2019 (one more after cabg)  History of adenoidectomy  H/O atrioventricular karlee ablation  2018    SOCIAL HISTORY:  negative    FAMILY HISTORY:  NC    MEDICATIONS  (STANDING):  aMIOdarone    Tablet 200 milliGRAM(s) Oral daily  bumetanide Infusion 0.25 mG/Hr (1.25 mL/Hr) IV Continuous <Continuous>  chlorhexidine 2% Cloths 1 Application(s) Topical <User Schedule>  pantoprazole    Tablet 40 milliGRAM(s) Oral before breakfast    Vital Signs Last 24 Hrs  T(C): 37 (06-24-25 @ 11:00), Max: 37.5 (06-23-25 @ 14:00)  T(F): 98.6 (06-24-25 @ 11:00), Max: 99.5 (06-23-25 @ 14:00)  HR: 94 (06-24-25 @ 11:00) (72 - 116)  BP: 99/75 (06-24-25 @ 11:00) (92/75 - 117/85)  BP(mean): 82 (06-24-25 @ 11:00) (81 - 106)  RR: 33 (06-24-25 @ 11:00) (12 - 36)  SpO2: 92% (06-24-25 @ 11:00) (90% - 100%)    I&O's Detail    23 Jun 2025 07:01  -  24 Jun 2025 07:00  --------------------------------------------------------  IN:    Bumetanide: 20.8 mL  Total IN: 20.8 mL    OUT:    Voided (mL): 800 mL  Total OUT: 800 mL    24 Jun 2025 07:01  -  24 Jun 2025 12:04  --------------------------------------------------------  IN:    Bumetanide: 3.9 mL  Total IN: 3.9 mL    OUT:    Voided (mL): 225 mL  Total OUT: 225 mL    LABS:                        11.3   8.55  )-----------( 259      ( 24 Jun 2025 05:15 )             37.2     06-24    140  |  97  |  76[H]  ----------------------------<  84  3.9   |  42[H]  |  2.14[H]    Ca    8.9      24 Jun 2025 05:15  Phos  4.9     06-24  Mg     2.2     06-24    TPro  7.9  /  Alb  2.3[L]  /  TBili  0.7  /  DBili  x   /  AST  16  /  ALT  11  /  AlkPhos  161[H]  06-24    LIVER FUNCTIONS - ( 24 Jun 2025 05:15 )  Alb: 2.3 g/dL / Pro: 7.9 g/dL / ALK PHOS: 161 U/L / ALT: 11 U/L / AST: 16 U/L / GGT: x           PT/INR - ( 23 Jun 2025 12:17 )   PT: 41.7 sec;   INR: 3.58 ratio      PTT - ( 23 Jun 2025 12:17 )  PTT:42.7 sec    A/P:    full consult to follow    [phone] NEPHROLOGY CONSULTATION    CHIEF COMPLAINT: SOB    HPI:  Pt is 64 yo m with PMH of AFib (s/p dccv 3/25/25, unsuccessful), HFrEF, aortic stenosis s/p TAVR (11/2022, reop on 4/11/25), aflutter s/p ablation 2019, s/p micra implant, CKD, WILFRED, obesity, venous stasis, HTN, and HLD with recent hospitalization for heart failure exacerbation and cardiorenal syndrome in April presenting to [facility] from [facility] for worsening SOB x few days and LE swelling. Was on Bumex at . He was placed on NIV for increased WOB, and given additional dose of Bumex. BNP noted to be elevated. Pt accepted to ICU for further management. Seen in ICU, on NC O2, denies CP, N/V/F/C.    ROS:  as above    Allergies:  metoprolol (Short breath)    PAST MEDICAL & SURGICAL HISTORY:  Hypercholesteremia  Thrombus of left atrial appendage  2018  WILFRED (obstructive sleep apnea)  can not tolerate bipap at night  HTN (hypertension)  CHF (congestive heart failure)  Peripheral edema  Lichen planus  chronic ( b/L LE)  Atrial flutter  s/p ablation 2018  MI (myocardial infarction)  2012  Stented coronary artery  2019  AS (aortic stenosis)  Chronic kidney disease  ANGELICA (acute kidney injury)  4/2021  obesity  Status post placement of cardiac pacemaker  micra leadless PPM, [id number] last interrogation 7/6/2022  Osteoarthritis  shoulders, hips, knee  H/O scoliosis  Lower back pain  History of elbow surgery  S/P CABG (coronary artery bypass graft)  x 3, 2012  Cardiac pacemaker  leadless 2018  History of coronary artery stent placement  2019 (one more after cabg)  History of adenoidectomy  H/O atrioventricular karlee ablation  2018    SOCIAL HISTORY:  negative    FAMILY HISTORY:  NC    MEDICATIONS  (STANDING):  aMIOdarone    Tablet 200 milliGRAM(s) Oral daily  bumetanide Infusion 0.25 mG/Hr (1.25 mL/Hr) IV Continuous <Continuous>  chlorhexidine 2% Cloths 1 Application(s) Topical <User Schedule>  pantoprazole    Tablet 40 milliGRAM(s) Oral before breakfast    Vital Signs Last 24 Hrs  T(C): 37 (06-24-25 @ 11:00), Max: 37.5 (06-23-25 @ 14:00)  T(F): 98.6 (06-24-25 @ 11:00), Max: 99.5 (06-23-25 @ 14:00)  HR: 94 (06-24-25 @ 11:00) (72 - 116)  BP: 99/75 (06-24-25 @ 11:00) (92/75 - 117/85)  BP(mean): 82 (06-24-25 @ 11:00) (81 - 106)  RR: 33 (06-24-25 @ 11:00) (12 - 36)  SpO2: 92% (06-24-25 @ 11:00) (90% - 100%)    I&O's Detail    23 Jun 2025 07:01  -  24 Jun 2025 07:00  --------------------------------------------------------  IN:    Bumetanide: 20.8 mL  Total IN: 20.8 mL    OUT:    Voided (mL): 800 mL  Total OUT: 800 mL    24 Jun 2025 07:01  -  24 Jun 2025 12:04  --------------------------------------------------------  IN:    Bumetanide: 3.9 mL  Total IN: 3.9 mL    OUT:    Voided (mL): 225 mL  Total OUT: 225 mL    s1s2  b/l air entry  soft, ND  edema     LABS:                        11.3   8.55  )-----------( 259      ( 24 Jun 2025 05:15 )             37.2     06-24    140  |  97  |  76[H]  ----------------------------<  84  3.9   |  42[H]  |  2.14[H]    Ca    8.9      24 Jun 2025 05:15  Phos  4.9     06-24  Mg     2.2     06-24    TPro  7.9  /  Alb  2.3[L]  /  TBili  0.7  /  DBili  x   /  AST  16  /  ALT  11  /  AlkPhos  161[H]  06-24    LIVER FUNCTIONS - ( 24 Jun 2025 05:15 )  Alb: 2.3 g/dL / Pro: 7.9 g/dL / ALK PHOS: 161 U/L / ALT: 11 U/L / AST: 16 U/L / GGT: x           PT/INR - ( 23 Jun 2025 12:17 )   PT: 41.7 sec;   INR: 3.58 ratio      PTT - ( 23 Jun 2025 12:17 )  PTT:42.7 sec    A/P:    CM, EF 25 - 30%, CHF, volume overload  Cardio-renal  No objection to Bumex qtt  Avoid nephrotoxins as able  F/u BMP, UO  UA w/pr 30, otherwise bland   SONO negative in April 2025  Will follow     [phone]

## 2025-06-24 NOTE — DIETITIAN INITIAL EVALUATION ADULT - PERTINENT MEDS FT
MEDICATIONS  (STANDING):  aMIOdarone    Tablet 200 milliGRAM(s) Oral daily  bumetanide Infusion 0.25 mG/Hr (1.25 mL/Hr) IV Continuous <Continuous>  chlorhexidine 2% Cloths 1 Application(s) Topical <User Schedule>  pantoprazole    Tablet 40 milliGRAM(s) Oral before breakfast    MEDICATIONS  (PRN):

## 2025-06-24 NOTE — PROGRESS NOTE ADULT - SUBJECTIVE AND OBJECTIVE BOX
Patient is a 65y old  Male who presents with a chief complaint of Acute on chronic respiratory failure, Heart failure exacerbation, ANGELICA on CKD (23 Jun 2025 13:15)    BRIEF HOSPITAL COURSE:   65 year old male with a PMH of AFib (s/p dccv 3/25/25, unsuccessful), HFrEF (likely mod red EF, sev TTE), aortic stenosis s/p TAVR (11/2022, reop on 4/11), a flutter s/p ablation 2019 and s/p micra implant, CKD3, WILFRED, obesity, venous stasis, HTN, and HLD admitted to MICU with acute on chronic hypercapnic respiratory failure, decompensated heart failure, ANGELICA on CKD likely cardiorenal syndrome.     Events last 24 hours:   More awake and oriented this AM.   Weaned off NIV to conventional NC, dyspnea improving.   Diuresing on Bumex Infusion.  Renal indices slightly downtrending.     PAST MEDICAL & SURGICAL HISTORY:  CAD (coronary artery disease)  s/p CABG      Hypercholesteremia      Thrombus of left atrial appendage  2018      Ischemic cardiomyopathy      WILFRED (obstructive sleep apnea)  can not tolerate bipap at night      HTN (hypertension)      Atrial fibrillation  2018      CHF (congestive heart failure)  denies any recent exacerbation      Peripheral edema  chronic      Lichen planus  chronic ( b/L LE)      Atrial flutter  s/p ablation 2018      MI (myocardial infarction)  2012      Stented coronary artery  2019      AS (aortic stenosis)      Chronic kidney disease, unspecified CKD stage      ANGELICA (acute kidney injury)  4/2021      Morbid obesity      Status post placement of cardiac pacemaker  micraleadless PPM, [id number] last interrogation 7/6/2022      Osteoarthritis  shoulders, hips, knee      H/O scoliosis      Lower back pain      History of elbow surgery      S/P CABG (coronary artery bypass graft)  x3, 2012      Cardiac pacemaker  leadless 2018      History of coronary artery stent placement  2019 ( one more after cabg)      History of adenoidectomy      H/O atrioventricular karlee ablation  2018          Review of Systems:  CONSTITUTIONAL: No fever, chills, or fatigue  EYES: No eye pain, visual disturbances, or discharge  ENMT:  No difficulty hearing, tinnitus, vertigo; No sinus or throat pain  NECK: No pain or stiffness  RESPIRATORY: No cough, wheezing, chills or hemoptysis; No shortness of breath  CARDIOVASCULAR: No chest pain, palpitations, dizziness, or leg swelling  GASTROINTESTINAL: No abdominal or epigastric pain. No nausea, vomiting, or hematemesis; No diarrhea or constipation. No melena or hematochezia.  GENITOURINARY: No dysuria, frequency, hematuria, or incontinence  NEUROLOGICAL: No headaches, memory loss, loss of strength, numbness, or tremors  SKIN: No itching, burning, rashes, or lesions   MUSCULOSKELETAL: No joint pain or swelling; No muscle, back, or extremity pain  PSYCHIATRIC: No depression, anxiety, mood swings, or difficulty sleeping      Medications:    aMIOdarone    Tablet 200 milliGRAM(s) Oral daily  bumetanide Infusion 0.25 mG/Hr IV Continuous <Continuous>            pantoprazole    Tablet 40 milliGRAM(s) Oral before breakfast            chlorhexidine 2% Cloths 1 Application(s) Topical <User Schedule>            ICU Vital Signs Last 24 Hrs  T(C): 37 (24 Jun 2025 08:00), Max: 37.5 (23 Jun 2025 14:00)  T(F): 98.6 (24 Jun 2025 08:00), Max: 99.5 (23 Jun 2025 14:00)  HR: 93 (24 Jun 2025 08:00) (72 - 121)  BP: 101/77 (24 Jun 2025 07:00) (92/75 - 117/85)  BP(mean): 86 (24 Jun 2025 07:00) (81 - 106)  ABP: --  ABP(mean): --  RR: 26 (24 Jun 2025 08:00) (12 - 36)  SpO2: 99% (24 Jun 2025 08:00) (90% - 100%)    O2 Parameters below as of 24 Jun 2025 08:00  Patient On (Oxygen Delivery Method): nasal cannula  O2 Flow (L/min): 5          ABG - ( 23 Jun 2025 21:55 )  pH, Arterial: 7.55  pH, Blood: x     /  pCO2: 47    /  pO2: 84    / HCO3: 41    / Base Excess: 18.7  /  SaO2: 97.2                I&O's Detail    23 Jun 2025 07:01  -  24 Jun 2025 07:00  --------------------------------------------------------  IN:    Bumetanide: 19.5 mL  Total IN: 19.5 mL    OUT:    Voided (mL): 725 mL  Total OUT: 725 mL    Total NET: -705.5 mL            LABS:                        11.3   8.55  )-----------( 259      ( 24 Jun 2025 05:15 )             37.2     06-24    140  |  97  |  76[H]  ----------------------------<  84  3.9   |  42[H]  |  2.14[H]    Ca    8.9      24 Jun 2025 05:15  Phos  4.9     06-24  Mg     2.2     06-24    TPro  7.9  /  Alb  2.3[L]  /  TBili  0.7  /  DBili  x   /  AST  16  /  ALT  11  /  AlkPhos  161[H]  06-24          CAPILLARY BLOOD GLUCOSE        PT/INR - ( 23 Jun 2025 12:17 )   PT: 41.7 sec;   INR: 3.58 ratio         PTT - ( 23 Jun 2025 12:17 )  PTT:42.7 sec  Urinalysis Basic - ( 24 Jun 2025 05:15 )    Color: x / Appearance: x / SG: x / pH: x  Gluc: 84 mg/dL / Ketone: x  / Bili: x / Urobili: x   Blood: x / Protein: x / Nitrite: x   Leuk Esterase: x / RBC: x / WBC x   Sq Epi: x / Non Sq Epi: x / Bacteria: x      CULTURES:    Physical Examination:  General: Chronically ill-appearing adult male.   HEENT: PERRL.  NECK: Supple. Enlarged neck circumference.   PULM: Course BS at bases bilaterally, no respiratory distress.   CVS: s1/s2.  ABD: Soft, nondistended, nontender, normoactive bowel sounds.  EXT: Bilateral LE edema, nontender.  SKIN: Warm.  NEURO: Alert, oriented, interactive, nonfocal.    RADIOLOGY:   < from: Xray Chest 1 View-PORTABLE IMMEDIATE (06.23.25 @ 10:38) >    ACC: [id number] EXAM:  XR CHEST PORTABLE IMMED 1V   ORDERED BY: AILEEN LYLE     PROCEDURE DATE:  06/23/2025      Impression:    Worsening CHF with effusions    < end of copied text >   Patient is a 65y old  Male who presents with a chief complaint of Acute on chronic respiratory failure, Heart failure exacerbation, ANGELICA on CKD (23 Jun 2025 13:15)    BRIEF HOSPITAL COURSE:   65M PMHx AFib (s/p dccv 3/25/25, unsuccessful), HFrEF, aortic stenosis s/p TAVR (11/2022, reop on 4/11), a flutter s/p ablation 2019 and s/p micra implant, CKD3, WILFRED, obesity, venous stasis, HTN, HLD admitted to MICU with acute on chronic hypercapnic respiratory failure, decompensated heart failure, ANGELICA on CKD likely cardiorenal syndrome.     Events last 24 hours:   More awake and oriented this AM.   Weaned off NIV to conventional NC, dyspnea improving.   Diuresing on Bumex Infusion.  Renal indices slightly downtrending.     PAST MEDICAL & SURGICAL HISTORY:  CAD (coronary artery disease)  s/p CABG      Hypercholesteremia      Thrombus of left atrial appendage  2018      Ischemic cardiomyopathy      WILFRED (obstructive sleep apnea)  can not tolerate bipap at night      HTN (hypertension)      Atrial fibrillation  2018      CHF (congestive heart failure)  denies any recent exacerbation      Peripheral edema  chronic      Lichen planus  chronic ( b/L LE)      Atrial flutter  s/p ablation 2018      MI (myocardial infarction)  2012      Stented coronary artery  2019      AS (aortic stenosis)      Chronic kidney disease, unspecified CKD stage      ANGELICA (acute kidney injury)  4/2021      Morbid obesity      Status post placement of cardiac pacemaker  micraleadless PPM, [id number] last interrogation 7/6/2022      Osteoarthritis  shoulders, hips, knee      H/O scoliosis      Lower back pain      History of elbow surgery      S/P CABG (coronary artery bypass graft)  x3, 2012      Cardiac pacemaker  leadless 2018      History of coronary artery stent placement  2019 ( one more after cabg)      History of adenoidectomy      H/O atrioventricular karlee ablation  2018          Review of Systems:  CONSTITUTIONAL: No fever, chills, or fatigue  EYES: No eye pain, visual disturbances, or discharge  ENMT:  No difficulty hearing, tinnitus, vertigo; No sinus or throat pain  NECK: No pain or stiffness  RESPIRATORY: No cough, wheezing, chills or hemoptysis; No shortness of breath  CARDIOVASCULAR: No chest pain, palpitations, dizziness, or leg swelling  GASTROINTESTINAL: No abdominal or epigastric pain. No nausea, vomiting, or hematemesis; No diarrhea or constipation. No melena or hematochezia.  GENITOURINARY: No dysuria, frequency, hematuria, or incontinence  NEUROLOGICAL: No headaches, memory loss, loss of strength, numbness, or tremors  SKIN: No itching, burning, rashes, or lesions   MUSCULOSKELETAL: No joint pain or swelling; No muscle, back, or extremity pain  PSYCHIATRIC: No depression, anxiety, mood swings, or difficulty sleeping      Medications:    aMIOdarone    Tablet 200 milliGRAM(s) Oral daily  bumetanide Infusion 0.25 mG/Hr IV Continuous <Continuous>            pantoprazole    Tablet 40 milliGRAM(s) Oral before breakfast            chlorhexidine 2% Cloths 1 Application(s) Topical <User Schedule>            ICU Vital Signs Last 24 Hrs  T(C): 37 (24 Jun 2025 08:00), Max: 37.5 (23 Jun 2025 14:00)  T(F): 98.6 (24 Jun 2025 08:00), Max: 99.5 (23 Jun 2025 14:00)  HR: 93 (24 Jun 2025 08:00) (72 - 121)  BP: 101/77 (24 Jun 2025 07:00) (92/75 - 117/85)  BP(mean): 86 (24 Jun 2025 07:00) (81 - 106)  ABP: --  ABP(mean): --  RR: 26 (24 Jun 2025 08:00) (12 - 36)  SpO2: 99% (24 Jun 2025 08:00) (90% - 100%)    O2 Parameters below as of 24 Jun 2025 08:00  Patient On (Oxygen Delivery Method): nasal cannula  O2 Flow (L/min): 5          ABG - ( 23 Jun 2025 21:55 )  pH, Arterial: 7.55  pH, Blood: x     /  pCO2: 47    /  pO2: 84    / HCO3: 41    / Base Excess: 18.7  /  SaO2: 97.2                I&O's Detail    23 Jun 2025 07:01  -  24 Jun 2025 07:00  --------------------------------------------------------  IN:    Bumetanide: 19.5 mL  Total IN: 19.5 mL    OUT:    Voided (mL): 725 mL  Total OUT: 725 mL    Total NET: -705.5 mL            LABS:                        11.3   8.55  )-----------( 259      ( 24 Jun 2025 05:15 )             37.2     06-24    140  |  97  |  76[H]  ----------------------------<  84  3.9   |  42[H]  |  2.14[H]    Ca    8.9      24 Jun 2025 05:15  Phos  4.9     06-24  Mg     2.2     06-24    TPro  7.9  /  Alb  2.3[L]  /  TBili  0.7  /  DBili  x   /  AST  16  /  ALT  11  /  AlkPhos  161[H]  06-24          CAPILLARY BLOOD GLUCOSE        PT/INR - ( 23 Jun 2025 12:17 )   PT: 41.7 sec;   INR: 3.58 ratio         PTT - ( 23 Jun 2025 12:17 )  PTT:42.7 sec  Urinalysis Basic - ( 24 Jun 2025 05:15 )    Color: x / Appearance: x / SG: x / pH: x  Gluc: 84 mg/dL / Ketone: x  / Bili: x / Urobili: x   Blood: x / Protein: x / Nitrite: x   Leuk Esterase: x / RBC: x / WBC x   Sq Epi: x / Non Sq Epi: x / Bacteria: x      CULTURES:    Physical Examination:  General: Chronically ill-appearing adult male.   HEENT: PERRL.  NECK: Supple. Enlarged neck circumference.   PULM: Course BS at bases bilaterally, no respiratory distress.   CVS: s1/s2.  ABD: Soft, nondistended, nontender, normoactive bowel sounds.  EXT: Bilateral LE edema, nontender.  SKIN: Warm.  NEURO: Alert, oriented, interactive, nonfocal.    RADIOLOGY:   < from: Xray Chest 1 View-PORTABLE IMMEDIATE (06.23.25 @ 10:38) >    ACC: [id number] EXAM:  XR CHEST PORTABLE IMMED 1V   ORDERED BY: AILEEN LYLE     PROCEDURE DATE:  06/23/2025      Impression:    Worsening CHF with effusions    < end of copied text >

## 2025-06-24 NOTE — PROGRESS NOTE ADULT - REASON FOR ADMISSION
Acute on chronic hypercarbic respiratory failure, Heart failure exacerbation, ANGELICA on CKD Acute on chronic respiratory failure, Heart failure exacerbation

## 2025-06-24 NOTE — DIETITIAN INITIAL EVALUATION ADULT - PERTINENT LABORATORY DATA
06-24    141  |  98  |  77[H]  ----------------------------<  113[H]  4.2   |  39[H]  |  2.41[H]    Ca    8.9      24 Jun 2025 11:50  Phos  4.5     06-24  Mg     2.3     06-24    TPro  7.9  /  Alb  2.3[L]  /  TBili  0.7  /  DBili  x   /  AST  16  /  ALT  11  /  AlkPhos  161[H]  06-24  A1C with Estimated Average Glucose Result: 5.8 % (03-31-25 @ 18:01)

## 2025-06-25 LAB
ALBUMIN SERPL ELPH-MCNC: 2.1 G/DL — LOW (ref 3.3–5)
ALP SERPL-CCNC: 188 U/L — HIGH (ref 40–120)
ALT FLD-CCNC: 12 U/L — SIGNIFICANT CHANGE UP (ref 10–45)
ANION GAP SERPL CALC-SCNC: 2 MMOL/L — LOW (ref 5–17)
ANION GAP SERPL CALC-SCNC: 4 MMOL/L — LOW (ref 5–17)
AST SERPL-CCNC: 15 U/L — SIGNIFICANT CHANGE UP (ref 10–40)
BASOPHILS # BLD AUTO: 0.03 K/UL — SIGNIFICANT CHANGE UP (ref 0–0.2)
BASOPHILS NFR BLD AUTO: 0.3 % — SIGNIFICANT CHANGE UP (ref 0–2)
BILIRUB SERPL-MCNC: 0.6 MG/DL — SIGNIFICANT CHANGE UP (ref 0.2–1.2)
BUN SERPL-MCNC: 75 MG/DL — HIGH (ref 7–23)
BUN SERPL-MCNC: 78 MG/DL — HIGH (ref 7–23)
CALCIUM SERPL-MCNC: 8.3 MG/DL — LOW (ref 8.4–10.5)
CALCIUM SERPL-MCNC: 8.5 MG/DL — SIGNIFICANT CHANGE UP (ref 8.4–10.5)
CHLORIDE SERPL-SCNC: 95 MMOL/L — LOW (ref 96–108)
CHLORIDE SERPL-SCNC: 95 MMOL/L — LOW (ref 96–108)
CO2 SERPL-SCNC: 39 MMOL/L — HIGH (ref 22–31)
CO2 SERPL-SCNC: 40 MMOL/L — HIGH (ref 22–31)
CREAT SERPL-MCNC: 2.22 MG/DL — HIGH (ref 0.5–1.3)
CREAT SERPL-MCNC: 2.41 MG/DL — HIGH (ref 0.5–1.3)
EGFR: 29 ML/MIN/1.73M2 — LOW
EGFR: 29 ML/MIN/1.73M2 — LOW
EGFR: 32 ML/MIN/1.73M2 — LOW
EGFR: 32 ML/MIN/1.73M2 — LOW
EOSINOPHIL # BLD AUTO: 0.16 K/UL — SIGNIFICANT CHANGE UP (ref 0–0.5)
EOSINOPHIL NFR BLD AUTO: 1.6 % — SIGNIFICANT CHANGE UP (ref 0–6)
GLUCOSE SERPL-MCNC: 108 MG/DL — HIGH (ref 70–99)
GLUCOSE SERPL-MCNC: 136 MG/DL — HIGH (ref 70–99)
HCT VFR BLD CALC: 34.2 % — LOW (ref 39–50)
HGB BLD-MCNC: 10.6 G/DL — LOW (ref 13–17)
IMM GRANULOCYTES NFR BLD AUTO: 0.3 % — SIGNIFICANT CHANGE UP (ref 0–0.9)
INR BLD: 4.05 RATIO — HIGH (ref 0.85–1.16)
LYMPHOCYTES # BLD AUTO: 0.86 K/UL — LOW (ref 1–3.3)
LYMPHOCYTES # BLD AUTO: 8.6 % — LOW (ref 13–44)
MAGNESIUM SERPL-MCNC: 1.9 MG/DL — SIGNIFICANT CHANGE UP (ref 1.6–2.6)
MAGNESIUM SERPL-MCNC: 2.1 MG/DL — SIGNIFICANT CHANGE UP (ref 1.6–2.6)
MCHC RBC-ENTMCNC: 29.9 PG — SIGNIFICANT CHANGE UP (ref 27–34)
MCHC RBC-ENTMCNC: 31 G/DL — LOW (ref 32–36)
MCV RBC AUTO: 96.3 FL — SIGNIFICANT CHANGE UP (ref 80–100)
MONOCYTES # BLD AUTO: 0.96 K/UL — HIGH (ref 0–0.9)
MONOCYTES NFR BLD AUTO: 9.6 % — SIGNIFICANT CHANGE UP (ref 2–14)
NEUTROPHILS # BLD AUTO: 7.98 K/UL — HIGH (ref 1.8–7.4)
NEUTROPHILS NFR BLD AUTO: 79.6 % — HIGH (ref 43–77)
NRBC BLD AUTO-RTO: 0 /100 WBCS — SIGNIFICANT CHANGE UP (ref 0–0)
PHOSPHATE SERPL-MCNC: 3.3 MG/DL — SIGNIFICANT CHANGE UP (ref 2.5–4.5)
PLATELET # BLD AUTO: 247 K/UL — SIGNIFICANT CHANGE UP (ref 150–400)
POTASSIUM SERPL-MCNC: 3.6 MMOL/L — SIGNIFICANT CHANGE UP (ref 3.5–5.3)
POTASSIUM SERPL-MCNC: 4.2 MMOL/L — SIGNIFICANT CHANGE UP (ref 3.5–5.3)
POTASSIUM SERPL-SCNC: 3.6 MMOL/L — SIGNIFICANT CHANGE UP (ref 3.5–5.3)
POTASSIUM SERPL-SCNC: 4.2 MMOL/L — SIGNIFICANT CHANGE UP (ref 3.5–5.3)
PROT SERPL-MCNC: 7.6 G/DL — SIGNIFICANT CHANGE UP (ref 6–8.3)
PROTHROM AB SERPL-ACNC: 47.1 SEC — HIGH (ref 9.9–13.4)
RBC # BLD: 3.55 M/UL — LOW (ref 4.2–5.8)
RBC # FLD: 16.6 % — HIGH (ref 10.3–14.5)
SODIUM SERPL-SCNC: 137 MMOL/L — SIGNIFICANT CHANGE UP (ref 135–145)
SODIUM SERPL-SCNC: 138 MMOL/L — SIGNIFICANT CHANGE UP (ref 135–145)
WBC # BLD: 10.02 K/UL — SIGNIFICANT CHANGE UP (ref 3.8–10.5)
WBC # FLD AUTO: 10.02 K/UL — SIGNIFICANT CHANGE UP (ref 3.8–10.5)

## 2025-06-25 PROCEDURE — 85027 COMPLETE CBC AUTOMATED: CPT

## 2025-06-25 PROCEDURE — 80048 BASIC METABOLIC PNL TOTAL CA: CPT

## 2025-06-25 PROCEDURE — 99232 SBSQ HOSP IP/OBS MODERATE 35: CPT | Mod: GC

## 2025-06-25 PROCEDURE — 85610 PROTHROMBIN TIME: CPT

## 2025-06-25 PROCEDURE — 93306 TTE W/DOPPLER COMPLETE: CPT

## 2025-06-25 PROCEDURE — 36415 COLL VENOUS BLD VENIPUNCTURE: CPT

## 2025-06-25 PROCEDURE — 0241U: CPT

## 2025-06-25 PROCEDURE — 85025 COMPLETE CBC W/AUTO DIFF WBC: CPT

## 2025-06-25 PROCEDURE — 84100 ASSAY OF PHOSPHORUS: CPT

## 2025-06-25 PROCEDURE — 81001 URINALYSIS AUTO W/SCOPE: CPT

## 2025-06-25 PROCEDURE — 83735 ASSAY OF MAGNESIUM: CPT

## 2025-06-25 PROCEDURE — 84484 ASSAY OF TROPONIN QUANT: CPT

## 2025-06-25 PROCEDURE — 83605 ASSAY OF LACTIC ACID: CPT

## 2025-06-25 PROCEDURE — 82803 BLOOD GASES ANY COMBINATION: CPT

## 2025-06-25 PROCEDURE — 87040 BLOOD CULTURE FOR BACTERIA: CPT

## 2025-06-25 PROCEDURE — 94660 CPAP INITIATION&MGMT: CPT

## 2025-06-25 PROCEDURE — 80053 COMPREHEN METABOLIC PANEL: CPT

## 2025-06-25 PROCEDURE — 93005 ELECTROCARDIOGRAM TRACING: CPT

## 2025-06-25 PROCEDURE — 84145 PROCALCITONIN (PCT): CPT

## 2025-06-25 PROCEDURE — 71045 X-RAY EXAM CHEST 1 VIEW: CPT

## 2025-06-25 PROCEDURE — 83880 ASSAY OF NATRIURETIC PEPTIDE: CPT

## 2025-06-25 PROCEDURE — 99232 SBSQ HOSP IP/OBS MODERATE 35: CPT | Mod: FS

## 2025-06-25 PROCEDURE — 36600 WITHDRAWAL OF ARTERIAL BLOOD: CPT

## 2025-06-25 PROCEDURE — 85730 THROMBOPLASTIN TIME PARTIAL: CPT

## 2025-06-25 RX ORDER — CLOPIDOGREL BISULFATE 75 MG/1
75 TABLET, FILM COATED ORAL DAILY
Refills: 0 | Status: DISCONTINUED | OUTPATIENT
Start: 2025-06-26 | End: 2025-07-01

## 2025-06-25 RX ORDER — METOPROLOL SUCCINATE 50 MG/1
1 TABLET, EXTENDED RELEASE ORAL
Refills: 0 | DISCHARGE

## 2025-06-25 RX ORDER — METOPROLOL SUCCINATE 50 MG/1
25 TABLET, EXTENDED RELEASE ORAL DAILY
Refills: 0 | Status: DISCONTINUED | OUTPATIENT
Start: 2025-06-26 | End: 2025-07-01

## 2025-06-25 RX ORDER — DIGOXIN 250 UG/1
125 TABLET ORAL DAILY
Refills: 0 | Status: DISCONTINUED | OUTPATIENT
Start: 2025-06-25 | End: 2025-07-01

## 2025-06-25 RX ORDER — ISOSORBDIE DINITRATE 30 MG/1
10 TABLET ORAL THREE TIMES A DAY
Refills: 0 | Status: DISCONTINUED | OUTPATIENT
Start: 2025-06-25 | End: 2025-07-01

## 2025-06-25 RX ORDER — BUMETANIDE 1 MG/1
2 TABLET ORAL EVERY 12 HOURS
Refills: 0 | Status: DISCONTINUED | OUTPATIENT
Start: 2025-06-25 | End: 2025-07-01

## 2025-06-25 RX ORDER — ATORVASTATIN CALCIUM 80 MG/1
80 TABLET, FILM COATED ORAL AT BEDTIME
Refills: 0 | Status: DISCONTINUED | OUTPATIENT
Start: 2025-06-25 | End: 2025-07-01

## 2025-06-25 RX ADMIN — Medication 1 APPLICATION(S): at 05:41

## 2025-06-25 RX ADMIN — Medication 40 MILLIEQUIVALENT(S): at 11:50

## 2025-06-25 RX ADMIN — DIGOXIN 125 MICROGRAM(S): 250 TABLET ORAL at 11:53

## 2025-06-25 RX ADMIN — DIGOXIN 250 MICROGRAM(S): 250 TABLET ORAL at 00:00

## 2025-06-25 RX ADMIN — AMIODARONE HYDROCHLORIDE 200 MILLIGRAM(S): 50 INJECTION, SOLUTION INTRAVENOUS at 05:38

## 2025-06-25 RX ADMIN — BUMETANIDE 2 MILLIGRAM(S): 1 TABLET ORAL at 17:23

## 2025-06-25 RX ADMIN — DIGOXIN 250 MICROGRAM(S): 250 TABLET ORAL at 06:01

## 2025-06-25 RX ADMIN — Medication 40 MILLIGRAM(S): at 06:01

## 2025-06-25 NOTE — PROGRESS NOTE ADULT - ASSESSMENT
65 year old male with PMH of AFib (s/p dccv 3/25/25, unsuccessful), HFrEF, Aortic stenosis s/p TAVR (11/2022, reop on 4/11/25), a flutter s/p ablation 2019, s/p micra implant, CKD3, WILFRED, obesity, venous stasis, HTN, and HLD admitted for heart failure exacerbation    ekg and tele c/w AF. pt sp failed dccv 2025  rpt tte to re eval ef>> ef 25-30, severe global lv hypokinesis, well seated Florence- TAVR, dilated ivc. similar to prior  pt s/p bumex gtt. transition to po. monitor UO, Cr  not a candidate for ace arb arni slgt2i or mra 2/2 ckd. consider addition of hydral, imdur if hemodynamics allow  pt would benefit from AICD as well, eventual EP eval  continue amio for arrythmias.  65 year old male with PMH of AFib (s/p dccv 3/25/25, unsuccessful), HFrEF, Aortic stenosis s/p TAVR (11/2022, reop on 4/11/25), a flutter s/p ablation 2019, s/p micra implant, CKD3, WILFRED, obesity, venous stasis, HTN, and HLD admitted for heart failure exacerbation    ekg and tele c/w AF. pt sp failed dccv 2025  rpt tte to re eval ef>> ef 25-30, severe global lv hypokinesis, well seated Florence- TAVR, dilated ivc. similar to prior  pt s/p bumex gtt. transition to ivp if agreeable with renal. monitor UO, Cr  not a candidate for ace arb arni slgt2i or mra 2/2 ckd. consider addition of hydral, imdur if hemodynamics allow  pt would benefit from AICD as well, eventual EP eval  continue amio for arrythmias.   resume coumadin when inr normalizes 65 year old male with PMH of AFib (s/p dccv 3/25/25, unsuccessful), HFrEF, Aortic stenosis s/p TAVR (11/2022, reop on 4/11/25), a flutter s/p ablation 2019, s/p micra implant, CKD3, WILFRED, obesity, venous stasis, HTN, and HLD admitted for heart failure exacerbation    ekg and tele c/w AF. pt sp failed dccv 2025  rpt tte to re eval ef>> ef 25-30, severe global lv hypokinesis, well seated Florence- TAVR, dilated ivc. similar to prior  pt s/p bumex gtt. transition to ivp if agreeable with renal. monitor UO, Cr  not a candidate for ace arb arni slgt2i or mra 2/2 ckd. consider addition of hydral, imdur if hemodynamics allow  pt would benefit from AICD as well, eventual EP eval  continue amio for arrythmias.   resume coumadin when inr normalizes        cardio attending  patient examined and chart reviewed.  hb 11 hct 34 inr 4.1 cr 2.41 bun 78 bs 108 asl 2.1 gfr 29  tn 202 /215  monitor wide complex tachycardia af wit aberrance vs VT non sustained favor the  latter due to wide complex. in the setting of cmp.   patient sluggish but responsive unable to identify that he had a pacer previously.     afib/wide complex tachycardia/ EF <.3   rate control afib. ? ICD candidate post GDMT    elevated tn   ? demand ischemia in the setting fo CHF and renal failure.     hfref  nitrates hydralazine in lieu of ACE ARB  ARNI carvedilol or metoprolol succinate. Farxiga adjusted for renal failure probably not a candidate for spironolactone due to renal failure     discussed with dr bill castillo and ms donovan

## 2025-06-25 NOTE — PROGRESS NOTE ADULT - SUBJECTIVE AND OBJECTIVE BOX
BERTHA WARD  [id number]    Chief Complaint: HFrEF  Interval events: pt seen and examined, labs and chart reviewed. Improved breathing. AF 70s, 6 beats VT    ALLERGIES:  metoprolol (Short breath)    CURRENT MEDICATIONS:  MEDICATIONS  (STANDING):  aMIOdarone    Tablet 200 milliGRAM(s) Oral daily  bumetanide Infusion 0.25 mG/Hr (1.25 mL/Hr) IV Continuous <Continuous>  chlorhexidine 2% Cloths 1 Application(s) Topical <User Schedule>  pantoprazole  Injectable 40 milliGRAM(s) IV Push daily    ROS:  All 10 systems reviewed and positives noted in HPI    OBJECTIVE:    VITAL SIGNS:  Vital Signs Last 24 Hrs  T(C): 37.4 (23 Jun 2025 12:40), Max: 37.4 (23 Jun 2025 12:40)  T(F): 99.3 (23 Jun 2025 12:40), Max: 99.3 (23 Jun 2025 12:40)  HR: 83 (23 Jun 2025 13:10) (79 - 121)  BP: 117/85 (23 Jun 2025 12:40) (95/77 - 117/85)  BP(mean): 96 (23 Jun 2025 12:40) (84 - 101)  RR: 12 (23 Jun 2025 12:40) (12 - 35)  SpO2: 98% (23 Jun 2025 13:10) (90% - 99%)    Parameters below as of 23 Jun 2025 12:40  Patient On (Oxygen Delivery Method): BiPAP/CPAP      PHYSICAL EXAM:  General: no distress  HEENT: sclera anicteric  Neck: supple  CVS: irregular, + JVD  Chest: clear breath sounds  Abdomen: obese  Extremities: +2 pitting lower extremity edema b/l      LABS:                        12.5   10.37 )-----------( 280      ( 23 Jun 2025 10:10 )             41.4     06-23    138  |  96  |  71[H]  ----------------------------<  139[H]  4.3   |  40[H]  |  2.54[H]    Ca    9.2      23 Jun 2025 10:10    TPro  8.7[H]  /  Alb  2.6[L]  /  TBili  0.6  /  DBili  x   /  AST  16  /  ALT  12  /  AlkPhos  191[H]  06-23        PT/INR - ( 23 Jun 2025 12:17 )   PT: 41.7 sec;   INR: 3.58 ratio         PTT - ( 23 Jun 2025 12:17 )  PTT:42.7 sec      ECG: Afib, IVCD      TTE: < from: TTE Echo Complete w/o Contrast w/ Doppler (06.23.25 @ 13:12) >   1. The left ventricle endocardium is not well visualized, consider use   of IV ultrasonic enhancing agent to better evaluate LVEF, endocardium and   apex, if clinically indicated. There appears to be severe global left   ventricle hypokinesis.   2. Left ventricular cavity is normal in size. Left ventricular wall   thickness is mildly increased. Left ventricular systolic function is   severely decreased with an ejection fraction of 30 % by Rivera's method   of disks with an ejection fraction visually estimated at 25 to 30 %.   3. The left ventricular diastolic function is indeterminate. Analysis of   left ventricular diastolic function and filling pressure is made   challenging by the presence of atrial fibrillation.   4. The right ventricle is not well visualized, appears enlarged.   5. Micra pacemaker is visualized in the right heart.  6. Left atrium is top normal sized.   7. Right atrium was not well visualized.   8. Mild mitral valve leaflet calcification.   9. There is mild calcification of the mitral valve annulus.  10. S/p Florence-TAVR, appears well seated, peak velocity within normal limits.  11. Mild aortic regurgitation.  12. Aortic root at the sinuses of Valsalva is normal in size.  13. The inferior vena cava is dilated (dilated >2.1cm) with abnormal   inspiratory collapse (abnormal <50%) consistent with elevated right   atrial pressure (  15, range 10-20mmHg).  14. No pericardial effusion seen.  15. Small right pleural effusion noted.      < from: TTE Congenital Anomalies W or WO Ultrasound Enhancing Agent (04.12.25 @ 09:06) >   1. Technically difficult image quality.   2. Definity ultrasound enhancing agent was given for enhanced left ventricular opacification and improved delineation of the left ventricular endocardial borders.   3. Left ventricular cavity is severely dilated. Left ventricular systolic function is severely decreased with an ejection fraction visually estimated at 25 %. Global left ventricular hypokinesis.   4. 29 mm Evolut FX+ (valve-in-valve) is present in the aortic position, with normal function. No paravalvular aortic regurgitation is seen in this study.   5. The right ventricle is not well visualized.    < from: Cardiac Catheterization (04.11.25 @ 14:21) >  Status post successful percutaneous transcatheter aortic valve in  valve using a CoreValve Evolut FXPLUS 29mm tissue valve  through the right common femoral artery       < from: Cardiac Catheterization (04.08.25 @ 08:34) >  Severe three vessel obstructive coronary artery disease   Patent LIMA to the left anterior descending artery   Chronic total occlusion of the proximal left anterior descending  artery  Chronic total occlusion of the proximal left circumflex artery   Occluded SVG to the 1st obtuse marginal artery   Occluded SVG to the 1st diagonal artery   Mild left main coronary artery disease   Right dominant system

## 2025-06-25 NOTE — PROGRESS NOTE ADULT - ASSESSMENT
65 year old male with a PMH of AFib (s/p dccv 3/25/25, unsuccessful), HFrEF (likely mod red EF, sev TTE), aortic stenosis s/p TAVR (11/2022, reop on 4/11), a flutter s/p ablation 2019 and s/p micra implant, CKD3, WILFRED, obesity, venous stasis, HTN, and HLD with recent hospitalization for heart failure exacerbation and cardiorenal syndrome in [facility] for worsening SOB x a few days as well as LE swelling, Pt was given increased doses of Bumex in attempts to diurese, however condition continued to worsen therefore patient was taken to the ED. He was placed on NIV for increased WOB, and given additional dose of Bumex. BNP noted to be elevated. Pt denies fever, chills, or sick contacts and ROS otherwise negative. Pt accepted to ICU for further management / care.   Patient was admitted for Acute on chronic hypercarbic respiratory failure, Heart failure exacerbation, ANGELICA on CKD.    #Acute on chronic hypercapnic respiratory failure  -s/p BIPAP in ICU  -now on NC, spO2: 90% 5L  -c/w incentive spirometer   -Chest PT/Pulm toilet   -HOB> 30  -NIV PRN + QHs    #Acute on chronic systolic HFrEF  -SOB and LE swelling on admission  -ECHO: 25-30% EF  -s/p Bumex ggt in IU  -c/w Bumex 2mg BID IV  -I+O  -Daily weights  -Cardio following  -Will need Hydral/Nitro for afterload and pre load reduction in lieu of ARB/ACEi  -Potential AICD pending GDMT optimization.   -Outpatinet EP     #ANGELICA on CKD, likely cardiorenal syndrome / Congestive nephropathy - resolved  -Cr: 2.22 (baseline)  -Avoid nephro toxic agents  -Nephro following recs appreciated     #Afib with rvr  -On Coumadin 2.5mg outpaitent  -Dig loaded  -c/w Digoxin 125 mcg QD  -c/w home dose amiodarone QD  -Tele monitor   -Cardiology following   -Mg>2, Phos>4.   -INR: 4.05  -INR daily. Re- dose once approaching normalization     #HTN  -c/w metoprolol with holding parameters     #HLD  -c/w with statin    #GERD  -c/w Pepcid     #DVT ppx  - SCD    Code status: Full code    Case dw Dr. [place]

## 2025-06-25 NOTE — PROGRESS NOTE ADULT - ASSESSMENT
Assessment:  1. acute on chronic hypercapnic respiratory failure  2. decompensated heart failure  3. ANGELICA on CKD, likely cardiorenal syndrome  4. afib with rvr    Plan:  NEURO:   -Monitor mental status closely, avoid neurosuppresants.   -Serial neurologic assessments.     CV:   -Maintain goal MAP >65.   -Keep K~4 and Mg>2 for optimal arrhythmia suppression.  -S/p Digoxin load for rAfib. Amiodarone QD.   -Official TTE with LVEF 25-30%, intermediate LV diastolic dysfunction. Cards following, recs appreciated. Will need EP f/u.   -Switch Bumex Infusion to 2mg IVP BID, strict I/Os, daily weights.     PULM:   -Ssatting well on NC. Utilize supplemental O2 PRN to maintain goal SpO2 >88%.   -Incentive spirometry, Chest PT/Pulmonary toilet, HOB >30'. NIV PRN and QHS.     GI:   -DASH/TLC Diet.   -Protonix QD.    RENAL:   -ANGELICA on CKD, likely cardiorenal.   -trend lytes/Scr daily with BMP  -I's and O's, goal UOP 0.5 cc/kg/hr  -renal dose meds and avoid nephrotoxins     ENDO:   -Aggressive glycemic control to limit FS glucose to <180mg/dl.     ID:  -Afebrile, no concern for infectious process. Monitor off abx.     HEME:   -INR elevated yesterday, Coumadin held. Will repeat Coags this afternoon and redose Coumadin as necessary.     Case and plan discussed with MICU attending, Dr. Mendoza.     GOC: Full Code.

## 2025-06-25 NOTE — PROGRESS NOTE ADULT - SUBJECTIVE AND OBJECTIVE BOX
Patient is a 65y old  Male who presents with a chief complaint of Acute on chronic hypercarbic respiratory failure, Heart failure exacerbation, ANGELICA on CKD (24 Jun 2025 13:16)    BRIEF HOSPITAL COURSE:   65M PMHx AFib (s/p dccv 3/25/25, unsuccessful), HFrEF, aortic stenosis s/p TAVR (11/2022, reop on 4/11), a flutter s/p ablation 2019 and s/p micra implant, CKD3, WILFRED, obesity, venous stasis, HTN, HLD admitted to MICU with acute on chronic hypercapnic respiratory failure, decompensated heart failure, ANGELICA on CKD likely cardiorenal syndrome.     Events last 24 hours:   S/p Digoxin load for rAfib yesterday evening.   Diuresing well on Bumex Infusion.  Satting well on NC.     PAST MEDICAL & SURGICAL HISTORY:  CAD (coronary artery disease)  s/p CABG      Hypercholesteremia      Thrombus of left atrial appendage  2018      Ischemic cardiomyopathy      WILFRED (obstructive sleep apnea)  can not tolerate bipap at night      HTN (hypertension)      Atrial fibrillation  2018      CHF (congestive heart failure)  denies any recent exacerbation      Peripheral edema  chronic      Lichen planus  chronic ( b/L LE)      Atrial flutter  s/p ablation 2018      MI (myocardial infarction)  2012      Stented coronary artery  2019      AS (aortic stenosis)      Chronic kidney disease, unspecified CKD stage      ANGELICA (acute kidney injury)  4/2021      Morbid obesity      Status post placement of cardiac pacemaker  micraleadless PPM, [id number] last interrogation 7/6/2022      Osteoarthritis  shoulders, hips, knee      H/O scoliosis      Lower back pain      History of elbow surgery      S/P CABG (coronary artery bypass graft)  x3, 2012      Cardiac pacemaker  leadless 2018      History of coronary artery stent placement  2019 ( one more after cabg)      History of adenoidectomy      H/O atrioventricular karlee ablation  2018          Review of Systems:  CONSTITUTIONAL: No fever, chills, or fatigue  EYES: No eye pain, visual disturbances, or discharge  ENMT:  No difficulty hearing, tinnitus, vertigo; No sinus or throat pain  NECK: No pain or stiffness  RESPIRATORY: No cough, wheezing, chills or hemoptysis; No shortness of breath  CARDIOVASCULAR: No chest pain, palpitations, dizziness, or leg swelling  GASTROINTESTINAL: No abdominal or epigastric pain. No nausea, vomiting, or hematemesis; No diarrhea or constipation. No melena or hematochezia.  GENITOURINARY: No dysuria, frequency, hematuria, or incontinence  NEUROLOGICAL: No headaches, memory loss, loss of strength, numbness, or tremors  SKIN: No itching, burning, rashes, or lesions   MUSCULOSKELETAL: No joint pain or swelling; No muscle, back, or extremity pain  PSYCHIATRIC: No depression, anxiety, mood swings, or difficulty sleeping      Medications:    aMIOdarone    Tablet 200 milliGRAM(s) Oral daily  bumetanide Injectable 2 milliGRAM(s) IV Push every 12 hours            pantoprazole    Tablet 40 milliGRAM(s) Oral before breakfast            chlorhexidine 2% Cloths 1 Application(s) Topical <User Schedule>            ICU Vital Signs Last 24 Hrs  T(C): 37.2 (25 Jun 2025 08:00), Max: 37.6 (24 Jun 2025 22:00)  T(F): 99 (25 Jun 2025 08:00), Max: 99.7 (24 Jun 2025 22:00)  HR: 89 (25 Jun 2025 08:00) (73 - 128)  BP: 109/85 (25 Jun 2025 08:00) (93/79 - 131/107)  BP(mean): 93 (25 Jun 2025 08:00) (82 - 115)  ABP: --  ABP(mean): --  RR: 37 (25 Jun 2025 08:00) (12 - 44)  SpO2: 93% (25 Jun 2025 08:00) (84% - 100%)    O2 Parameters below as of 25 Jun 2025 08:00  Patient On (Oxygen Delivery Method): nasal cannula  O2 Flow (L/min): 5          ABG - ( 23 Jun 2025 21:55 )  pH, Arterial: 7.55  pH, Blood: x     /  pCO2: 47    /  pO2: 84    / HCO3: 41    / Base Excess: 18.7  /  SaO2: 97.2                I&O's Detail    24 Jun 2025 07:01  -  25 Jun 2025 07:00  --------------------------------------------------------  IN:    Bumetanide: 29.9 mL    Oral Fluid: 480 mL  Total IN: 509.9 mL    OUT:    Incontinent per Condom Catheter (mL): 1450 mL    Voided (mL): 625 mL  Total OUT: 2075 mL    Total NET: -1565.1 mL            LABS:                        10.6   10.02 )-----------( 247      ( 25 Jun 2025 05:15 )             34.2     06-25    137  |  95[L]  |  78[H]  ----------------------------<  108[H]  3.6   |  40[H]  |  2.41[H]    Ca    8.5      25 Jun 2025 05:15  Phos  3.3     06-25  Mg     2.1     06-25    TPro  7.6  /  Alb  2.1[L]  /  TBili  0.6  /  DBili  x   /  AST  15  /  ALT  12  /  AlkPhos  188[H]  06-25          CAPILLARY BLOOD GLUCOSE        PT/INR - ( 24 Jun 2025 11:50 )   PT: 47.7 sec;   INR: 4.10 ratio         PTT - ( 24 Jun 2025 11:50 )  PTT:45.2 sec  Urinalysis Basic - ( 25 Jun 2025 05:15 )    Color: x / Appearance: x / SG: x / pH: x  Gluc: 108 mg/dL / Ketone: x  / Bili: x / Urobili: x   Blood: x / Protein: x / Nitrite: x   Leuk Esterase: x / RBC: x / WBC x   Sq Epi: x / Non Sq Epi: x / Bacteria: x      CULTURES:  Culture Results:   No growth at 24 hours (06-23-25 @ 10:10)  Culture Results:   No growth at 24 hours (06-23-25 @ 10:10)    Physical Examination:  General: Chronically ill-appearing adult male.   HEENT: PERRL.  NECK: Supple. Enlarged neck circumference.   PULM: Course BS at bases bilaterally, no respiratory distress.   CVS: s1/s2.  ABD: Soft, nondistended, nontender, normoactive bowel sounds.  EXT: Bilateral LE edema, nontender.  SKIN: Warm.  NEURO: Alert, oriented, interactive, nonfocal.    RADIOLOGY:   < from: Xray Chest 1 View-PORTABLE IMMEDIATE (06.23.25 @ 10:38) >    ACC: [id number] EXAM:  XR CHEST PORTABLE IMMED 1V   ORDERED BY: AILEEN LYLE     PROCEDURE DATE:  06/23/2025      Impression:    Worsening CHF with effusions

## 2025-06-25 NOTE — PROGRESS NOTE ADULT - CRITICAL CARE ATTENDING COMMENT
65 year old male with a PMH of AFib (s/p dccv 3/25/25, unsuccessful), HFrEF (likely mod red EF, sev TTE), aortic stenosis s/p TAVR (11/2022, reop on 4/11), a flutter s/p ablation 2019 and s/p micra implant, CKD3, WILFRED, obesity, venous stasis, HTN, and HLD presenting for shortness of breath. Found to be hypercapnic on VBG. Placed on bipap support. Concern for volume overload.     Assessment:  1. Acute hypoxic and hypercapnic respiratory failure   2. Acute decompensation of CHF   3. Heart failure with reduced EF   4. Aortic stenosis s/p TAVR   5. Pulmonary edema   6. ANGELICA on CKD, likely cardiorenal syndrome  7. Chronic afib     Plan:  - Recommend to continue NIV overnight as reports history of WILFRED  - Cont. diuresis and aim for negative fluid balance   - Transition to Bumex IVP BID   - Required digoxin overnight for rate control  - Monitor urine output and daily weights   - Creatinine is improving   - Cont. AC for now, dose coumadin once INR < 3  - Cont. Amiodarone   - Oral diet  - Cardiology consult appreciated  - Close hemodynamic and cardiac monitoring  - Nephrology consult appreciated  - PT evaluation  - OOB to chair
65 year old male with a PMH of AFib (s/p dccv 3/25/25, unsuccessful), HFrEF (likely mod red EF, sev TTE), aortic stenosis s/p TAVR (11/2022, reop on 4/11), a flutter s/p ablation 2019 and s/p micra implant, CKD3, WILFRED, obesity, venous stasis, HTN, and HLD presenting for shortness of breath. Found to be hypercapnic on VBG. Placed on bipap support. Concern for volume overload.     Assessment:  1. Acute hypoxic and hypercapnic respiratory failure   2. Acute decompensation of CHF   3. Heart failure with reduced EF   4. Aortic stenosis s/p TAVR   5. Pulmonary edema   6. ANGELICA on CKD, likely cardiorenal syndrome  7. Chronic afib     Plan:  - Off NIV this morning  - Recommend to continue NIV overnight as reports history of WILFRED and is supposed to be on bipap support   - Cont. diuresis and aim for negative fluid balance with bumex infusion  - Monitor urine output and daily weights   - Creatinine is improving   - Cont. AC for now   - Cont. Amiodarone   - Oral diet  - Cardiology consult appreciated  - Close hemodynamic and cardiac monitoring  - Nephrology consult  - PT evaluation  - OOB to chair  - Cont. ICU care

## 2025-06-25 NOTE — PROGRESS NOTE ADULT - SUBJECTIVE AND OBJECTIVE BOX
Date of entry of this note is equal to the date of services rendered     64 yo m with PMH of AFib (s/p dccv 3/25/25, unsuccessful), HFrEF, aortic stenosis s/p TAVR (11/2022, reop on 4/11/25), aflutter s/p ablation 2019, s/p micra implant, CKD, WILFRED, obesity, venous stasis, HTN, and HLD with recent hospitalization for heart failure exacerbation and cardiorenal syndrome in April presenting to [facility] from [facility] for worsening SOB x few days and LE swelling. Was on Bumex at . He was placed on NIV for increased WOB, and given additional dose of Bumex. BNP noted to be elevated. Pt accepted to ICU for further management. Seen in ICU, on NC O2, denies CP, N/V/F/C.    Events last 24 hours: Weened off NIV and Bumex gtt. Transitioned to NC and Bumex IVP 2mg BID. Dig loaded for afib RVR. Appears improved. Stable for downgrade to tele.     PAST MEDICAL & SURGICAL HISTORY:  CAD (coronary artery disease)  s/p CABG      Hypercholesteremia      Thrombus of left atrial appendage  2018      Ischemic cardiomyopathy      WILFRED (obstructive sleep apnea)  can not tolerate bipap at night      HTN (hypertension)      Atrial fibrillation  2018      CHF (congestive heart failure)  denies any recent exacerbation      Peripheral edema  chronic      Lichen planus  chronic ( b/L LE)      Atrial flutter  s/p ablation 2018      MI (myocardial infarction)  2012      Stented coronary artery  2019      AS (aortic stenosis)      Chronic kidney disease, unspecified CKD stage      ANGELICA (acute kidney injury)  4/2021      Morbid obesity      Status post placement of cardiac pacemaker  micraleadless PPM, [id number] last interrogation 7/6/2022      Osteoarthritis  shoulders, hips, knee      H/O scoliosis      Lower back pain      History of elbow surgery      S/P CABG (coronary artery bypass graft)  x3, 2012      Cardiac pacemaker  leadless 2018      History of coronary artery stent placement  2019 ( one more after cabg)      History of adenoidectomy      H/O atrioventricular karlee ablation  2018          Review of Systems:  Negative unless stated      Medications:    aMIOdarone    Tablet 200 milliGRAM(s) Oral daily  bumetanide Injectable 2 milliGRAM(s) IV Push every 12 hours  digoxin     Tablet 125 MICROGram(s) Oral daily            pantoprazole    Tablet 40 milliGRAM(s) Oral before breakfast            chlorhexidine 2% Cloths 1 Application(s) Topical <User Schedule>            ICU Vital Signs Last 24 Hrs  T(C): 37.4 (25 Jun 2025 20:00), Max: 37.6 (24 Jun 2025 22:00)  T(F): 99.3 (25 Jun 2025 20:00), Max: 99.7 (24 Jun 2025 22:00)  HR: 84 (25 Jun 2025 20:00) (69 - 125)  BP: 109/77 (25 Jun 2025 20:00) (98/87 - 135/94)  BP(mean): 87 (25 Jun 2025 20:00) (78 - 115)  ABP: --  ABP(mean): --  RR: 35 (25 Jun 2025 20:00) (12 - 46)  SpO2: 90% (25 Jun 2025 20:00) (84% - 100%)    O2 Parameters below as of 25 Jun 2025 18:00  Patient On (Oxygen Delivery Method): nasal cannula  O2 Flow (L/min): 5          ABG - ( 23 Jun 2025 21:55 )  pH, Arterial: 7.55  pH, Blood: x     /  pCO2: 47    /  pO2: 84    / HCO3: 41    / Base Excess: 18.7  /  SaO2: 97.2                I&O's Detail    24 Jun 2025 07:01  -  25 Jun 2025 07:00  --------------------------------------------------------  IN:    Bumetanide: 29.9 mL    Oral Fluid: 480 mL  Total IN: 509.9 mL    OUT:    Incontinent per Condom Catheter (mL): 1450 mL    Voided (mL): 625 mL  Total OUT: 2075 mL    Total NET: -1565.1 mL      25 Jun 2025 07:01  -  25 Jun 2025 20:13  --------------------------------------------------------  IN:  Total IN: 0 mL    OUT:    Voided (mL): 1400 mL  Total OUT: 1400 mL    Total NET: -1400 mL            LABS:                        10.6   10.02 )-----------( 247      ( 25 Jun 2025 05:15 )             34.2     06-25    138  |  95[L]  |  75[H]  ----------------------------<  136[H]  4.2   |  39[H]  |  2.22[H]    Ca    8.3[L]      25 Jun 2025 13:20  Phos  3.3     06-25  Mg     1.9     06-25    TPro  7.6  /  Alb  2.1[L]  /  TBili  0.6  /  DBili  x   /  AST  15  /  ALT  12  /  AlkPhos  188[H]  06-25          CAPILLARY BLOOD GLUCOSE        PT/INR - ( 25 Jun 2025 13:20 )   PT: 47.1 sec;   INR: 4.05 ratio         PTT - ( 24 Jun 2025 11:50 )  PTT:45.2 sec  Urinalysis Basic - ( 25 Jun 2025 13:20 )    Color: x / Appearance: x / SG: x / pH: x  Gluc: 136 mg/dL / Ketone: x  / Bili: x / Urobili: x   Blood: x / Protein: x / Nitrite: x   Leuk Esterase: x / RBC: x / WBC x   Sq Epi: x / Non Sq Epi: x / Bacteria: x      CULTURES:  Culture Results:   No growth at 48 Hours (06-23 @ 10:10)  Culture Results:   No growth at 48 Hours (06-23 @ 10:10)      Physical Examination:    General: No acute distress.      HEENT: Pupils equal, reactive to light.  Symmetric.    PULM: Distant breath sounds at bases    NECK: Supple, no lymphadenopathy, trachea midline    CVS: Regular rate. Afib    ABD: Soft, nondistended, nontender, normoactive bowel sounds, no masses    EXT: B/L LE edema     SKIN: Warm    NEURO: Alert, oriented, interactive, nonfocal      RADIOLOGY: ***

## 2025-06-25 NOTE — PROGRESS NOTE ADULT - SUBJECTIVE AND OBJECTIVE BOX
Patient is a 65y old  Male who presents with a chief complaint of Acute on chronic hypercarbic respiratory failure, Heart failure exacerbation, ANGELICA on CKD (25 Jun 2025 20:12)      INTERVAL HPI/OVERNIGHT EVENTS: Patient seen and examined at bedside. No overnight events occurred. Patient has no complaints at this time. Denies fevers, chills, headache, lightheadedness, chest pain, dyspnea, abdominal pain, n/v/d/c.    MEDICATIONS  (STANDING):  aMIOdarone    Tablet 200 milliGRAM(s) Oral daily  bumetanide Injectable 2 milliGRAM(s) IV Push every 12 hours  chlorhexidine 2% Cloths 1 Application(s) Topical <User Schedule>  digoxin     Tablet 125 MICROGram(s) Oral daily  pantoprazole    Tablet 40 milliGRAM(s) Oral before breakfast    MEDICATIONS  (PRN):      Allergies    metoprolol (Short breath)    Intolerances        REVIEW OF SYSTEMS:  CONSTITUTIONAL: No fever or chills  HEENT:  No headache, no sore throat  RESPIRATORY: No cough, wheezing, or shortness of breath  CARDIOVASCULAR: No chest pain, palpitations  GASTROINTESTINAL: No abd pain, nausea, vomiting, or diarrhea  GENITOURINARY: No dysuria, frequency, or hematuria  NEUROLOGICAL: no focal weakness or dizziness  MUSCULOSKELETAL: no myalgias     Vital Signs Last 24 Hrs  T(C): 37.4 (25 Jun 2025 20:00), Max: 37.6 (24 Jun 2025 22:00)  T(F): 99.3 (25 Jun 2025 20:00), Max: 99.7 (24 Jun 2025 22:00)  HR: 84 (25 Jun 2025 20:00) (69 - 125)  BP: 109/77 (25 Jun 2025 20:00) (98/87 - 135/94)  BP(mean): 87 (25 Jun 2025 20:00) (78 - 115)  RR: 35 (25 Jun 2025 20:00) (12 - 46)  SpO2: 90% (25 Jun 2025 20:00) (84% - 100%)    Parameters below as of 25 Jun 2025 18:00  Patient On (Oxygen Delivery Method): nasal cannula  O2 Flow (L/min): 5      PHYSICAL EXAM:  GENERAL: NAD  HEENT:  anicteric, moist mucous membranes  CHEST/LUNG:  CTA b/l, no rales, wheezes, or rhonchi  HEART:  RRR, S1, S2  ABDOMEN:  BS+, soft, nontender, nondistended  EXTREMITIES: no edema, cyanosis, or calf tenderness  NERVOUS SYSTEM: answers questions and follows commands appropriately    LABS:                        10.6   10.02 )-----------( 247      ( 25 Jun 2025 05:15 )             34.2     CBC Full  -  ( 25 Jun 2025 05:15 )  WBC Count : 10.02 K/uL  Hemoglobin : 10.6 g/dL  Hematocrit : 34.2 %  Platelet Count - Automated : 247 K/uL  Mean Cell Volume : 96.3 fl  Mean Cell Hemoglobin : 29.9 pg  Mean Cell Hemoglobin Concentration : 31.0 g/dL  Auto Neutrophil # : x  Auto Lymphocyte # : x  Auto Monocyte # : x  Auto Eosinophil # : x  Auto Basophil # : x  Auto Neutrophil % : x  Auto Lymphocyte % : x  Auto Monocyte % : x  Auto Eosinophil % : x  Auto Basophil % : x    25 Jun 2025 13:20    138    |  95     |  75     ----------------------------<  136    4.2     |  39     |  2.22     Ca    8.3        25 Jun 2025 13:20  Phos  3.3       25 Jun 2025 05:15  Mg     1.9       25 Jun 2025 13:20    TPro  7.6    /  Alb  2.1    /  TBili  0.6    /  DBili  x      /  AST  15     /  ALT  12     /  AlkPhos  188    25 Jun 2025 05:15    PT/INR - ( 25 Jun 2025 13:20 )   PT: 47.1 sec;   INR: 4.05 ratio         PTT - ( 24 Jun 2025 11:50 )  PTT:45.2 sec  Urinalysis Basic - ( 25 Jun 2025 13:20 )    Color: x / Appearance: x / SG: x / pH: x  Gluc: 136 mg/dL / Ketone: x  / Bili: x / Urobili: x   Blood: x / Protein: x / Nitrite: x   Leuk Esterase: x / RBC: x / WBC x   Sq Epi: x / Non Sq Epi: x / Bacteria: x      CAPILLARY BLOOD GLUCOSE            Culture - Blood (collected 06-23-25 @ 10:10)  Source: Blood Blood-Peripheral  Preliminary Report (06-25-25 @ 14:02):    No growth at 48 Hours    Culture - Blood (collected 06-23-25 @ 10:10)  Source: Blood Blood-Peripheral  Preliminary Report (06-25-25 @ 14:02):    No growth at 48 Hours        RADIOLOGY & ADDITIONAL TESTS:    Personally reviewed.     Consultant(s) Notes Reviewed:  [x] YES  [ ] NO     Mr. Waller is a 65 year old male with a PMH of AFib (s/p dccv 3/25/25, unsuccessful), HFrEF (likely mod red EF, sev TTE), aortic stenosis s/p TAVR (11/2022, reop on 4/11), a flutter s/p ablation 2019 and s/p micra implant, CKD3, WILFRED, obesity, venous stasis, HTN, and HLD with recent hospitalization for heart failure exacerbation and cardiorenal syndrome in April presenting to [facility] from [facility] for worsening SOB x a few days as well as LE swelling, Pt was given increased doses of Bumex in attempts to diurese, however condition continued to worsen therefore patient was taken to the ED. He was placed on NIV for increased WOB, and given additional dose of Bumex. BNP noted to be elevated. Pt denies fever, chills, or sick contacts and ROS otherwise negative. Pt accepted to ICU for further management / care.   Patient was admitted for Acute on chronic hypercarbic respiratory failure, Heart failure exacerbation, ANGELICA on CKD.  He was started on Bumex ggt, BIPAP. Patient now stable for NC and IV Bumex 2mg BID. While in the ICU, he was noted to have AFib with RVR, was dig loaded now on 125 mcg qd. Cardiology is following. ECHO showed EF: 25 to 30 %.        Patient is a 65y old  Male who presents with a chief complaint of Acute on chronic hypercarbic respiratory failure, Heart failure exacerbation, ANGELICA on CKD (25 Jun 2025 20:12)      INTERVAL HPI/OVERNIGHT EVENTS: Patient seen and examined at bedside. No overnight events occurred. Patient has no complaints at this time. Denies fevers, chills, headache, lightheadedness, chest pain, dyspnea, abdominal pain, n/v/d/c.    MEDICATIONS  (STANDING):  aMIOdarone    Tablet 200 milliGRAM(s) Oral daily  bumetanide Injectable 2 milliGRAM(s) IV Push every 12 hours  chlorhexidine 2% Cloths 1 Application(s) Topical <User Schedule>  digoxin     Tablet 125 MICROGram(s) Oral daily  pantoprazole    Tablet 40 milliGRAM(s) Oral before breakfast    MEDICATIONS  (PRN):      Allergies    metoprolol (Short breath)    Intolerances        REVIEW OF SYSTEMS:  CONSTITUTIONAL: No fever or chills  HEENT:  No headache, no sore throat  RESPIRATORY: No cough, wheezing, or shortness of breath  CARDIOVASCULAR: No chest pain, palpitations  GASTROINTESTINAL: No abd pain, nausea, vomiting, or diarrhea  GENITOURINARY: No dysuria, frequency, or hematuria  NEUROLOGICAL: no focal weakness or dizziness  MUSCULOSKELETAL: no myalgias     Vital Signs Last 24 Hrs  T(C): 37.4 (25 Jun 2025 20:00), Max: 37.6 (24 Jun 2025 22:00)  T(F): 99.3 (25 Jun 2025 20:00), Max: 99.7 (24 Jun 2025 22:00)  HR: 84 (25 Jun 2025 20:00) (69 - 125)  BP: 109/77 (25 Jun 2025 20:00) (98/87 - 135/94)  BP(mean): 87 (25 Jun 2025 20:00) (78 - 115)  RR: 35 (25 Jun 2025 20:00) (12 - 46)  SpO2: 90% (25 Jun 2025 20:00) (84% - 100%)    Parameters below as of 25 Jun 2025 18:00  Patient On (Oxygen Delivery Method): nasal cannula  O2 Flow (L/min): 5      PHYSICAL EXAM:  GENERAL: NAD  HEENT:  anicteric, moist mucous membranes  CHEST/LUNG:  CTA b/l, no rales, wheezes, or rhonchi  HEART:  RRR, S1, S2  ABDOMEN:  BS+, soft, nontender, nondistended  EXTREMITIES: no edema, cyanosis, or calf tenderness  NERVOUS SYSTEM: answers questions and follows commands appropriately    LABS:                        10.6   10.02 )-----------( 247      ( 25 Jun 2025 05:15 )             34.2     CBC Full  -  ( 25 Jun 2025 05:15 )  WBC Count : 10.02 K/uL  Hemoglobin : 10.6 g/dL  Hematocrit : 34.2 %  Platelet Count - Automated : 247 K/uL  Mean Cell Volume : 96.3 fl  Mean Cell Hemoglobin : 29.9 pg  Mean Cell Hemoglobin Concentration : 31.0 g/dL  Auto Neutrophil # : x  Auto Lymphocyte # : x  Auto Monocyte # : x  Auto Eosinophil # : x  Auto Basophil # : x  Auto Neutrophil % : x  Auto Lymphocyte % : x  Auto Monocyte % : x  Auto Eosinophil % : x  Auto Basophil % : x    25 Jun 2025 13:20    138    |  95     |  75     ----------------------------<  136    4.2     |  39     |  2.22     Ca    8.3        25 Jun 2025 13:20  Phos  3.3       25 Jun 2025 05:15  Mg     1.9       25 Jun 2025 13:20    TPro  7.6    /  Alb  2.1    /  TBili  0.6    /  DBili  x      /  AST  15     /  ALT  12     /  AlkPhos  188    25 Jun 2025 05:15    PT/INR - ( 25 Jun 2025 13:20 )   PT: 47.1 sec;   INR: 4.05 ratio         PTT - ( 24 Jun 2025 11:50 )  PTT:45.2 sec  Urinalysis Basic - ( 25 Jun 2025 13:20 )    Color: x / Appearance: x / SG: x / pH: x  Gluc: 136 mg/dL / Ketone: x  / Bili: x / Urobili: x   Blood: x / Protein: x / Nitrite: x   Leuk Esterase: x / RBC: x / WBC x   Sq Epi: x / Non Sq Epi: x / Bacteria: x      CAPILLARY BLOOD GLUCOSE            Culture - Blood (collected 06-23-25 @ 10:10)  Source: Blood Blood-Peripheral  Preliminary Report (06-25-25 @ 14:02):    No growth at 48 Hours    Culture - Blood (collected 06-23-25 @ 10:10)  Source: Blood Blood-Peripheral  Preliminary Report (06-25-25 @ 14:02):    No growth at 48 Hours        RADIOLOGY & ADDITIONAL TESTS:    Personally reviewed.     Consultant(s) Notes Reviewed:  [x] YES  [ ] NO     Mr. Waller is a 65 year old male with a PMH of AFib (s/p dccv 3/25/25, unsuccessful), HFrEF (likely mod red EF, sev TTE), aortic stenosis s/p TAVR (11/2022, reop on 4/11), a flutter s/p ablation 2019 and s/p micra implant, CKD3, WILFRED, obesity, venous stasis, HTN, and HLD with recent hospitalization for heart failure exacerbation and cardiorenal syndrome in April presenting to [facility] from [facility] for worsening SOB x a few days as well as LE swelling, Pt was given increased doses of Bumex in attempts to diurese, however condition continued to worsen therefore patient was taken to the ED. He was placed on NIV for increased WOB, and given additional dose of Bumex. BNP noted to be elevated. Pt denies fever, chills, or sick contacts and ROS otherwise negative. Pt accepted to ICU for further management / care.   Patient was admitted for Acute on chronic hypercarbic respiratory failure, Heart failure exacerbation, ANGELICA on CKD.  He was started on Bumex ggt, BIPAP. patient now stable for NC and IV Bumex 2mg BID. While in the ICU, he was noted to have AFib with RVR, was dig loaded, now on 125 mcg qd. Cardiology is following. ECHO showed EF: 25 to 30%.     INTERVAL HPI/OVERNIGHT EVENTS: Patient seen and examined at bedside. No overnight events occurred. Patient states SOB is improving. Denies fevers, chills, headache, lightheadedness, chest pain, dyspnea, abdominal pain, n/v/d/c.    MEDICATIONS  (STANDING):  aMIOdarone    Tablet 200 milliGRAM(s) Oral daily  bumetanide Injectable 2 milliGRAM(s) IV Push every 12 hours  chlorhexidine 2% Cloths 1 Application(s) Topical <User Schedule>  digoxin     Tablet 125 MICROGram(s) Oral daily  pantoprazole    Tablet 40 milliGRAM(s) Oral before breakfast    MEDICATIONS  (PRN):      Allergies    metoprolol (Short breath)    Intolerances        REVIEW OF SYSTEMS:  CONSTITUTIONAL: No fever or chills  HEENT:  No headache, no sore throat  RESPIRATORY: No cough, wheezing, or shortness of breath  CARDIOVASCULAR: No chest pain, palpitations  GASTROINTESTINAL: No abd pain, nausea, vomiting, or diarrhea  GENITOURINARY: No dysuria, frequency, or hematuria  NEUROLOGICAL: no focal weakness or dizziness  MUSCULOSKELETAL: no myalgias     Vital Signs Last 24 Hrs  T(C): 37.4 (25 Jun 2025 20:00), Max: 37.6 (24 Jun 2025 22:00)  T(F): 99.3 (25 Jun 2025 20:00), Max: 99.7 (24 Jun 2025 22:00)  HR: 84 (25 Jun 2025 20:00) (69 - 125)  BP: 109/77 (25 Jun 2025 20:00) (98/87 - 135/94)  BP(mean): 87 (25 Jun 2025 20:00) (78 - 115)  RR: 35 (25 Jun 2025 20:00) (12 - 46)  SpO2: 90% (25 Jun 2025 20:00) (84% - 100%)    Parameters below as of 25 Jun 2025 18:00  Patient On (Oxygen Delivery Method): nasal cannula  O2 Flow (L/min): 5      PHYSICAL EXAM:  GENERAL: NAD  HEENT:  anicteric, moist mucous membranes  CHEST/LUNG:  CTA b/l, no rales, wheezes, or rhonchi  HEART:  irregularly irregular, S1, S2  ABDOMEN: soft, nontender, nondistended  EXTREMITIES: +1 pitting edema, no cyanosis or calf tenderness  NERVOUS SYSTEM: AOx3, no slurred speech, no focal deficits, answers questions and follows commands appropriately    LABS:                        10.6   10.02 )-----------( 247      ( 25 Jun 2025 05:15 )             34.2     CBC Full  -  ( 25 Jun 2025 05:15 )  WBC Count : 10.02 K/uL  Hemoglobin : 10.6 g/dL  Hematocrit : 34.2 %  Platelet Count - Automated : 247 K/uL  Mean Cell Volume : 96.3 fl  Mean Cell Hemoglobin : 29.9 pg  Mean Cell Hemoglobin Concentration : 31.0 g/dL  Auto Neutrophil # : x  Auto Lymphocyte # : x  Auto Monocyte # : x  Auto Eosinophil # : x  Auto Basophil # : x  Auto Neutrophil % : x  Auto Lymphocyte % : x  Auto Monocyte % : x  Auto Eosinophil % : x  Auto Basophil % : x    25 Jun 2025 13:20    138    |  95     |  75     ----------------------------<  136    4.2     |  39     |  2.22     Ca    8.3        25 Jun 2025 13:20  Phos  3.3       25 Jun 2025 05:15  Mg     1.9       25 Jun 2025 13:20    TPro  7.6    /  Alb  2.1    /  TBili  0.6    /  DBili  x      /  AST  15     /  ALT  12     /  AlkPhos  188    25 Jun 2025 05:15    PT/INR - ( 25 Jun 2025 13:20 )   PT: 47.1 sec;   INR: 4.05 ratio         PTT - ( 24 Jun 2025 11:50 )  PTT:45.2 sec  Urinalysis Basic - ( 25 Jun 2025 13:20 )    Color: x / Appearance: x / SG: x / pH: x  Gluc: 136 mg/dL / Ketone: x  / Bili: x / Urobili: x   Blood: x / Protein: x / Nitrite: x   Leuk Esterase: x / RBC: x / WBC x   Sq Epi: x / Non Sq Epi: x / Bacteria: x      CAPILLARY BLOOD GLUCOSE            Culture - Blood (collected 06-23-25 @ 10:10)  Source: Blood Blood-Peripheral  Preliminary Report (06-25-25 @ 14:02):    No growth at 48 Hours    Culture - Blood (collected 06-23-25 @ 10:10)  Source: Blood Blood-Peripheral  Preliminary Report (06-25-25 @ 14:02):    No growth at 48 Hours        RADIOLOGY & ADDITIONAL TESTS:        Consultant(s) Notes Reviewed:  [x] YES  [ ] NO

## 2025-06-25 NOTE — PROGRESS NOTE ADULT - SUBJECTIVE AND OBJECTIVE BOX
Comfortable on NC O2    Vital Signs Last 24 Hrs  T(C): 37.2 (06-25-25 @ 22:00), Max: 37.6 (06-25-25 @ 02:00)  T(F): 99 (06-25-25 @ 22:00), Max: 99.7 (06-25-25 @ 02:00)  HR: 78 (06-25-25 @ 22:00) (69 - 102)  BP: 101/73 (06-25-25 @ 22:00) (98/87 - 135/94)  BP(mean): 82 (06-25-25 @ 22:00) (78 - 115)  RR: 35 (06-25-25 @ 22:00) (12 - 46)  SpO2: 89% (06-25-25 @ 22:00) (89% - 100%)    I&O's Detail    24 Jun 2025 07:01  -  25 Jun 2025 07:00  --------------------------------------------------------  IN:    Bumetanide: 29.9 mL    Oral Fluid: 480 mL  Total IN: 509.9 mL    OUT:    Incontinent per Condom Catheter (mL): 1450 mL    Voided (mL): 625 mL  Total OUT: 2075 mL    25 Jun 2025 07:01  -  25 Jun 2025 22:51  --------------------------------------------------------  OUT:    Voided (mL): 2000 mL  Total OUT: 2000 mL    s1s2  b/l air entry  soft, ND  edema                         10.6   10.02 )-----------( 247      ( 25 Jun 2025 05:15 )             34.2     25 Jun 2025 13:20    138    |  95     |  75     ----------------------------<  136    4.2     |  39     |  2.22     Ca    8.3        25 Jun 2025 13:20  Phos  3.3       25 Jun 2025 05:15  Mg     1.9       25 Jun 2025 13:20    TPro  7.6    /  Alb  2.1    /  TBili  0.6    /  DBili  x      /  AST  15     /  ALT  12     /  AlkPhos  188    25 Jun 2025 05:15    LIVER FUNCTIONS - ( 25 Jun 2025 05:15 )  Alb: 2.1 g/dL / Pro: 7.6 g/dL / ALK PHOS: 188 U/L / ALT: 12 U/L / AST: 15 U/L / GGT: x           PT/INR - ( 25 Jun 2025 13:20 )   PT: 47.1 sec;   INR: 4.05 ratio      Culture - Blood (collected 23 Jun 2025 10:10)  Source: Blood Blood-Peripheral  Preliminary Report (25 Jun 2025 14:02):    No growth at 48 Hours    Culture - Blood (collected 23 Jun 2025 10:10)  Source: Blood Blood-Peripheral  Preliminary Report (25 Jun 2025 14:02):    No growth at 48 Hours    aMIOdarone    Tablet 200 milliGRAM(s) Oral daily  bumetanide Injectable 2 milliGRAM(s) IV Push every 12 hours  chlorhexidine 2% Cloths 1 Application(s) Topical <User Schedule>  digoxin     Tablet 125 MICROGram(s) Oral daily  pantoprazole    Tablet 40 milliGRAM(s) Oral before breakfast    A/P:    CM, EF 25 - 30%, adm w/CHF, volume overload  Cardio-renal  No objection to Bumex IV  Avoid nephrotoxins as able  Overall stable renal fx   F/u BMP, UO  UA w/pr 30, otherwise bland   SONO negative in April 2025  Will follow     [phone]

## 2025-06-26 ENCOUNTER — TRANSCRIPTION ENCOUNTER (OUTPATIENT)
Age: 65
End: 2025-06-26

## 2025-06-26 LAB
-  BLOOD PCR PANEL: SIGNIFICANT CHANGE UP
ALBUMIN SERPL ELPH-MCNC: 1.9 G/DL — LOW (ref 3.3–5)
ALP SERPL-CCNC: 163 U/L — HIGH (ref 40–120)
ALT FLD-CCNC: 8 U/L — LOW (ref 10–45)
ANION GAP SERPL CALC-SCNC: 2 MMOL/L — LOW (ref 5–17)
APTT BLD: 40.5 SEC — HIGH (ref 26.1–36.8)
AST SERPL-CCNC: 15 U/L — SIGNIFICANT CHANGE UP (ref 10–40)
BASOPHILS # BLD AUTO: 0.04 K/UL — SIGNIFICANT CHANGE UP (ref 0–0.2)
BASOPHILS NFR BLD AUTO: 0.5 % — SIGNIFICANT CHANGE UP (ref 0–2)
BILIRUB SERPL-MCNC: 0.6 MG/DL — SIGNIFICANT CHANGE UP (ref 0.2–1.2)
BUN SERPL-MCNC: 72 MG/DL — HIGH (ref 7–23)
CALCIUM SERPL-MCNC: 8.5 MG/DL — SIGNIFICANT CHANGE UP (ref 8.4–10.5)
CHLORIDE SERPL-SCNC: 97 MMOL/L — SIGNIFICANT CHANGE UP (ref 96–108)
CO2 SERPL-SCNC: 38 MMOL/L — HIGH (ref 22–31)
CREAT SERPL-MCNC: 1.99 MG/DL — HIGH (ref 0.5–1.3)
EGFR: 36 ML/MIN/1.73M2 — LOW
EGFR: 36 ML/MIN/1.73M2 — LOW
EOSINOPHIL # BLD AUTO: 0.14 K/UL — SIGNIFICANT CHANGE UP (ref 0–0.5)
EOSINOPHIL NFR BLD AUTO: 1.6 % — SIGNIFICANT CHANGE UP (ref 0–6)
GLUCOSE SERPL-MCNC: 99 MG/DL — SIGNIFICANT CHANGE UP (ref 70–99)
GRAM STN FLD: ABNORMAL
GRAM STN FLD: ABNORMAL
HCT VFR BLD CALC: 34.2 % — LOW (ref 39–50)
HGB BLD-MCNC: 10.3 G/DL — LOW (ref 13–17)
IMM GRANULOCYTES NFR BLD AUTO: 0.6 % — SIGNIFICANT CHANGE UP (ref 0–0.9)
INR BLD: 3.31 RATIO — HIGH (ref 0.85–1.16)
LYMPHOCYTES # BLD AUTO: 0.71 K/UL — LOW (ref 1–3.3)
LYMPHOCYTES # BLD AUTO: 8 % — LOW (ref 13–44)
MAGNESIUM SERPL-MCNC: 2 MG/DL — SIGNIFICANT CHANGE UP (ref 1.6–2.6)
MCHC RBC-ENTMCNC: 29.4 PG — SIGNIFICANT CHANGE UP (ref 27–34)
MCHC RBC-ENTMCNC: 30.1 G/DL — LOW (ref 32–36)
MCV RBC AUTO: 97.7 FL — SIGNIFICANT CHANGE UP (ref 80–100)
METHOD TYPE: SIGNIFICANT CHANGE UP
MONOCYTES # BLD AUTO: 0.91 K/UL — HIGH (ref 0–0.9)
MONOCYTES NFR BLD AUTO: 10.3 % — SIGNIFICANT CHANGE UP (ref 2–14)
NEUTROPHILS # BLD AUTO: 6.97 K/UL — SIGNIFICANT CHANGE UP (ref 1.8–7.4)
NEUTROPHILS NFR BLD AUTO: 79 % — HIGH (ref 43–77)
NRBC BLD AUTO-RTO: 0 /100 WBCS — SIGNIFICANT CHANGE UP (ref 0–0)
PHOSPHATE SERPL-MCNC: 2.8 MG/DL — SIGNIFICANT CHANGE UP (ref 2.5–4.5)
PLATELET # BLD AUTO: 210 K/UL — SIGNIFICANT CHANGE UP (ref 150–400)
POTASSIUM SERPL-MCNC: 4 MMOL/L — SIGNIFICANT CHANGE UP (ref 3.5–5.3)
POTASSIUM SERPL-SCNC: 4 MMOL/L — SIGNIFICANT CHANGE UP (ref 3.5–5.3)
PROT SERPL-MCNC: 7 G/DL — SIGNIFICANT CHANGE UP (ref 6–8.3)
PROTHROM AB SERPL-ACNC: 38.6 SEC — HIGH (ref 9.9–13.4)
RBC # BLD: 3.5 M/UL — LOW (ref 4.2–5.8)
RBC # FLD: 16.5 % — HIGH (ref 10.3–14.5)
SODIUM SERPL-SCNC: 137 MMOL/L — SIGNIFICANT CHANGE UP (ref 135–145)
WBC # BLD: 8.82 K/UL — SIGNIFICANT CHANGE UP (ref 3.8–10.5)
WBC # FLD AUTO: 8.82 K/UL — SIGNIFICANT CHANGE UP (ref 3.8–10.5)

## 2025-06-26 PROCEDURE — 85610 PROTHROMBIN TIME: CPT

## 2025-06-26 PROCEDURE — 85025 COMPLETE CBC W/AUTO DIFF WBC: CPT

## 2025-06-26 PROCEDURE — 36600 WITHDRAWAL OF ARTERIAL BLOOD: CPT

## 2025-06-26 PROCEDURE — 83605 ASSAY OF LACTIC ACID: CPT

## 2025-06-26 PROCEDURE — 82803 BLOOD GASES ANY COMBINATION: CPT

## 2025-06-26 PROCEDURE — 36415 COLL VENOUS BLD VENIPUNCTURE: CPT

## 2025-06-26 PROCEDURE — 87040 BLOOD CULTURE FOR BACTERIA: CPT

## 2025-06-26 PROCEDURE — 99233 SBSQ HOSP IP/OBS HIGH 50: CPT | Mod: FS

## 2025-06-26 PROCEDURE — 80048 BASIC METABOLIC PNL TOTAL CA: CPT

## 2025-06-26 PROCEDURE — 85730 THROMBOPLASTIN TIME PARTIAL: CPT

## 2025-06-26 PROCEDURE — 93306 TTE W/DOPPLER COMPLETE: CPT

## 2025-06-26 PROCEDURE — 80053 COMPREHEN METABOLIC PANEL: CPT

## 2025-06-26 PROCEDURE — 81001 URINALYSIS AUTO W/SCOPE: CPT

## 2025-06-26 PROCEDURE — 99232 SBSQ HOSP IP/OBS MODERATE 35: CPT | Mod: FS

## 2025-06-26 PROCEDURE — 84145 PROCALCITONIN (PCT): CPT

## 2025-06-26 PROCEDURE — 0241U: CPT

## 2025-06-26 PROCEDURE — 71045 X-RAY EXAM CHEST 1 VIEW: CPT

## 2025-06-26 PROCEDURE — 83735 ASSAY OF MAGNESIUM: CPT

## 2025-06-26 PROCEDURE — 94660 CPAP INITIATION&MGMT: CPT

## 2025-06-26 PROCEDURE — 85027 COMPLETE CBC AUTOMATED: CPT

## 2025-06-26 PROCEDURE — 97161 PT EVAL LOW COMPLEX 20 MIN: CPT

## 2025-06-26 PROCEDURE — 87150 DNA/RNA AMPLIFIED PROBE: CPT

## 2025-06-26 PROCEDURE — 84100 ASSAY OF PHOSPHORUS: CPT

## 2025-06-26 PROCEDURE — 93005 ELECTROCARDIOGRAM TRACING: CPT

## 2025-06-26 PROCEDURE — 84484 ASSAY OF TROPONIN QUANT: CPT

## 2025-06-26 PROCEDURE — 83880 ASSAY OF NATRIURETIC PEPTIDE: CPT

## 2025-06-26 PROCEDURE — 71045 X-RAY EXAM CHEST 1 VIEW: CPT | Mod: 26

## 2025-06-26 RX ORDER — CEFTRIAXONE 500 MG/1
INJECTION, POWDER, FOR SOLUTION INTRAMUSCULAR; INTRAVENOUS
Refills: 0 | Status: DISCONTINUED | OUTPATIENT
Start: 2025-06-26 | End: 2025-06-29

## 2025-06-26 RX ORDER — CEFTRIAXONE 500 MG/1
2000 INJECTION, POWDER, FOR SOLUTION INTRAMUSCULAR; INTRAVENOUS EVERY 24 HOURS
Refills: 0 | Status: DISCONTINUED | OUTPATIENT
Start: 2025-06-27 | End: 2025-06-29

## 2025-06-26 RX ORDER — CEFTRIAXONE 500 MG/1
2000 INJECTION, POWDER, FOR SOLUTION INTRAMUSCULAR; INTRAVENOUS ONCE
Refills: 0 | Status: COMPLETED | OUTPATIENT
Start: 2025-06-26 | End: 2025-06-26

## 2025-06-26 RX ADMIN — Medication 1 APPLICATION(S): at 05:34

## 2025-06-26 RX ADMIN — ISOSORBDIE DINITRATE 10 MILLIGRAM(S): 30 TABLET ORAL at 17:52

## 2025-06-26 RX ADMIN — DIGOXIN 125 MICROGRAM(S): 250 TABLET ORAL at 05:28

## 2025-06-26 RX ADMIN — BUMETANIDE 2 MILLIGRAM(S): 1 TABLET ORAL at 05:29

## 2025-06-26 RX ADMIN — ATORVASTATIN CALCIUM 80 MILLIGRAM(S): 80 TABLET, FILM COATED ORAL at 22:19

## 2025-06-26 RX ADMIN — AMIODARONE HYDROCHLORIDE 200 MILLIGRAM(S): 50 INJECTION, SOLUTION INTRAVENOUS at 05:28

## 2025-06-26 RX ADMIN — BUMETANIDE 2 MILLIGRAM(S): 1 TABLET ORAL at 18:35

## 2025-06-26 RX ADMIN — ISOSORBDIE DINITRATE 10 MILLIGRAM(S): 30 TABLET ORAL at 05:30

## 2025-06-26 RX ADMIN — ISOSORBDIE DINITRATE 10 MILLIGRAM(S): 30 TABLET ORAL at 13:45

## 2025-06-26 RX ADMIN — Medication 40 MILLIGRAM(S): at 05:29

## 2025-06-26 RX ADMIN — CLOPIDOGREL BISULFATE 75 MILLIGRAM(S): 75 TABLET, FILM COATED ORAL at 13:44

## 2025-06-26 RX ADMIN — CEFTRIAXONE 100 MILLIGRAM(S): 500 INJECTION, POWDER, FOR SOLUTION INTRAMUSCULAR; INTRAVENOUS at 13:46

## 2025-06-26 NOTE — PHYSICAL THERAPY INITIAL EVALUATION ADULT - LEVEL OF INDEPENDENCE: GAIT, REHAB EVAL
minimum assist (75% patients effort) Finasteride Male Counseling: Finasteride Counseling:  I discussed with the patient the risks of use of finasteride including but not limited to decreased libido, decreased ejaculate volume, gynecomastia, and depression. Women should not handle medication.  All of the patient's questions and concerns were addressed.

## 2025-06-26 NOTE — PHYSICAL THERAPY INITIAL EVALUATION ADULT - ADDITIONAL COMMENTS
pt from  for nino 2/2 multiple cardiac issues. pt states he was ambulating w/rw in rehab and required assist w/most ADL's

## 2025-06-26 NOTE — DISCHARGE NOTE PROVIDER - DETAILS OF MALNUTRITION DIAGNOSIS/DIAGNOSES
This patient has been assessed with a concern for Malnutrition and was treated during this hospitalization for the following Nutrition diagnosis/diagnoses:     -  06/24/2025: Severe protein-calorie malnutrition   -  06/24/2025: Morbid obesity (BMI > 40)

## 2025-06-26 NOTE — PROGRESS NOTE ADULT - NS ATTEND AMEND GEN_ALL_CORE FT
Patient seen and examined independently. He is comfortable but radiographically still in significant chf. at the moment being evaluatedfor bacteremia.    no new recommendations today
I have seen and examined the patient. I have discussed case with ANDERSNO Alaniz.    Luis Waller is a 65 year old man with past medical history of Persistent atrial fibrillation (s/p ablation of atrial flutter in 2019, DCCV in March 2025 with ERAF), Micra PPM, Coronary artery disease (s/p CABG with angiogram in April 2025 with severe triple vessel disease, patent LIMA-LAD and occluded SVG-OM1 and occluded SVG-1st Diagonal), Severe Aortic stenosis (s/p TAVR in 2022 and then TAVR in April 2025), HFrEF (LVEF 25-30% in 2018), Hypertension, Chronic kidney disease and WILFRED with recent hospitalization at [facility] in April for heart failure and cardiorenal syndrome s/p Florence-TAVR, now brought in by EMS from nursing facility due to progressive heart failure, found to have hypoxic respiratory failure secondary to acute on chronic systolic heart failure and cardiorenal syndrome.    ECG consistent with atrial fibrillation and IVCD, similar to ECG at [facility]. Troponins elevated, likely demand ischemia from heart failure and renal insufficiency. Pro BNP markedly elevated and CXR with worsening effusions. Chart review indicates patient has longstanding history of systolic heart failure since at least 2018. Most recent echo report from 4/12/25 consistent with LVEF 25%. Echo consistent with LVEF 25-30%, Florence-TAVR well seated with peak velocity within normal limits, mild AR.     Overall, this morning the patient is much improved and off of BiPAP and able to provide history. He reports that his breathing has improved and he is urinating well. Continue Bumex drip to obtain net negative 1.5 L in 24 hours, monitor intake/output and renal function (appears Cr was much higher ~ 5 in last hospitalization), Cr improved today. Currently hemodynamics are stable and there are no signs of cardiogenic shock. PPM interrogated and unremarkable, would recommend EP evaluation for primary prevention ICD given chronic systolic heart failure. Please avoid using beta blockers if HR elevates. Not candidate for CHF GDMT at this time due to renal insufficiency. For the AF, he appears to now have persistent atrial fibrillation upon recent hospital discharge, recommend to resume home coumadin today, continue Amiodarone 200 mg daily.     Discussed with ICU attending, Dr. Mendoza. Will sign out this case to cardiologist to follow along tomorrow.    Saul Schafer MD  Cardiology

## 2025-06-26 NOTE — DISCHARGE NOTE PROVIDER - NSDCFUSCHEDAPPT_GEN_ALL_CORE_FT
Manish Potts Physician [facility]  CARDIOLOGY [address]  Scheduled Appointment: 07/02/2025

## 2025-06-26 NOTE — PHYSICAL THERAPY INITIAL EVALUATION ADULT - PERTINENT HX OF CURRENT PROBLEM, REHAB EVAL
Mr. Waller is a 65 year old male with a PMH of AFib (s/p dccv 3/25/25, unsuccessful), HFrEF (likely mod red EF, sev TTE), aortic stenosis s/p TAVR (11/2022, reop on 4/11), a flutter s/p ablation 2019 and s/p micra implant, CKD3, WILFRED, obesity, venous stasis, HTN, and HLD with recent hospitalization for heart failure exacerbation and cardiorenal syndrome in April presenting to [facility] from [facility] for worsening SOB x a few days as well as LE swelling, Pt was given increased doses of Bumex in attempts to diurese, however condition continued to worsen therefore patient was taken to the ED. He was placed on NIV for increased WOB, and given additional dose of Bumex. BNP noted to be elevated. Pt denies fever, chills, or sick contacts and ROS otherwise negative. Pt accepted to ICU for further management / care.

## 2025-06-26 NOTE — PROGRESS NOTE ADULT - SUBJECTIVE AND OBJECTIVE BOX
Comfortable on NC O2    Vital Signs Last 24 Hrs  T(C): 36.8 (06-26-25 @ 12:41), Max: 37.4 (06-25-25 @ 20:00)  T(F): 98.3 (06-26-25 @ 12:41), Max: 99.3 (06-25-25 @ 20:00)  HR: 83 (06-26-25 @ 14:57) (60 - 91)  BP: 111/70 (06-26-25 @ 18:00) (101/67 - 116/72)  BP(mean): 82 (06-25-25 @ 22:00) (82 - 89)  RR: 18 (06-26-25 @ 12:41) (18 - 35)  SpO2: 93% (06-26-25 @ 14:57) (89% - 98%)    I&O's Detail    25 Jun 2025 07:01  -  26 Jun 2025 07:00  --------------------------------------------------------  OUT:    Voided (mL): 3000 mL  Total OUT: 3000 mL    26 Jun 2025 07:01  -  26 Jun 2025 19:50  --------------------------------------------------------  OUT:    Voided (mL): 1500 mL  Total OUT: 1500 mL    s1s2  b/l air entry  soft, ND  edema                                 10.3   8.82  )-----------( 210      ( 26 Jun 2025 06:38 )             34.2     26 Jun 2025 06:38    137    |  97     |  72     ----------------------------<  99     4.0     |  38     |  1.99     Ca    8.5        26 Jun 2025 06:38  Phos  2.8       26 Jun 2025 06:38  Mg     2.0       26 Jun 2025 06:38    TPro  7.0    /  Alb  1.9    /  TBili  0.6    /  DBili  x      /  AST  15     /  ALT  8      /  AlkPhos  163    26 Jun 2025 06:38    LIVER FUNCTIONS - ( 26 Jun 2025 06:38 )  Alb: 1.9 g/dL / Pro: 7.0 g/dL / ALK PHOS: 163 U/L / ALT: 8 U/L / AST: 15 U/L / GGT: x           PT/INR - ( 26 Jun 2025 10:04 )   PT: 38.6 sec;   INR: 3.31 ratio      aMIOdarone    Tablet 200 milliGRAM(s) Oral daily  atorvastatin 80 milliGRAM(s) Oral at bedtime  bumetanide Injectable 2 milliGRAM(s) IV Push every 12 hours  cefTRIAXone   IVPB      chlorhexidine 2% Cloths 1 Application(s) Topical <User Schedule>  clopidogrel Tablet 75 milliGRAM(s) Oral daily  digoxin     Tablet 125 MICROGram(s) Oral daily  isosorbide   dinitrate Tablet (ISORDIL) 10 milliGRAM(s) Oral three times a day  metoprolol succinate ER 25 milliGRAM(s) Oral daily  pantoprazole    Tablet 40 milliGRAM(s) Oral before breakfast    A/P:    CM, EF 25 - 30%, adm w/CHF, volume overload  Cardio-renal ANGELICA/CKD 3   Continue Bumex IV  Avoid nephrotoxins as able  Cr is down-trending and is presently near prior baseline   F/u BMP, UO  UA w/pr 30, otherwise bland   SONO negative in April 2025  Avoid excessive fluid intake, no NSAID's, d/w pt     [phone] Comfortable on NC O2    Vital Signs Last 24 Hrs  T(C): 36.8 (06-26-25 @ 12:41), Max: 37.4 (06-25-25 @ 20:00)  T(F): 98.3 (06-26-25 @ 12:41), Max: 99.3 (06-25-25 @ 20:00)  HR: 83 (06-26-25 @ 14:57) (60 - 91)  BP: 111/70 (06-26-25 @ 18:00) (101/67 - 116/72)  BP(mean): 82 (06-25-25 @ 22:00) (82 - 89)  RR: 18 (06-26-25 @ 12:41) (18 - 35)  SpO2: 93% (06-26-25 @ 14:57) (89% - 98%)    I&O's Detail    25 Jun 2025 07:01  -  26 Jun 2025 07:00  --------------------------------------------------------  OUT:    Voided (mL): 3000 mL  Total OUT: 3000 mL    26 Jun 2025 07:01  -  26 Jun 2025 19:50  --------------------------------------------------------  OUT:    Voided (mL): 1500 mL  Total OUT: 1500 mL    s1s2  b/l air entry  soft, ND  edema                                 10.3   8.82  )-----------( 210      ( 26 Jun 2025 06:38 )             34.2     26 Jun 2025 06:38    137    |  97     |  72     ----------------------------<  99     4.0     |  38     |  1.99     Ca    8.5        26 Jun 2025 06:38  Phos  2.8       26 Jun 2025 06:38  Mg     2.0       26 Jun 2025 06:38    TPro  7.0    /  Alb  1.9    /  TBili  0.6    /  DBili  x      /  AST  15     /  ALT  8      /  AlkPhos  163    26 Jun 2025 06:38    LIVER FUNCTIONS - ( 26 Jun 2025 06:38 )  Alb: 1.9 g/dL / Pro: 7.0 g/dL / ALK PHOS: 163 U/L / ALT: 8 U/L / AST: 15 U/L / GGT: x           PT/INR - ( 26 Jun 2025 10:04 )   PT: 38.6 sec;   INR: 3.31 ratio      aMIOdarone    Tablet 200 milliGRAM(s) Oral daily  atorvastatin 80 milliGRAM(s) Oral at bedtime  bumetanide Injectable 2 milliGRAM(s) IV Push every 12 hours  cefTRIAXone   IVPB      chlorhexidine 2% Cloths 1 Application(s) Topical <User Schedule>  clopidogrel Tablet 75 milliGRAM(s) Oral daily  digoxin     Tablet 125 MICROGram(s) Oral daily  isosorbide   dinitrate Tablet (ISORDIL) 10 milliGRAM(s) Oral three times a day  metoprolol succinate ER 25 milliGRAM(s) Oral daily  pantoprazole    Tablet 40 milliGRAM(s) Oral before breakfast    A/P:    CM, EF 25 - 30%, adm w/CHF, volume overload  Cardio-renal ANGELICA/CKD 3   Continue Bumex IV  Avoid nephrotoxins as able  Cr is down-trending and is presently near prior baseline   F/u BMP, UO  UA w/pr 30, otherwise bland   SONO negative in April 2025  Avoid excessive fluid intake, no NSAID's, d/w pt   Pos bld cx, ID eval appr     [phone]

## 2025-06-26 NOTE — DISCHARGE NOTE PROVIDER - NSDCMRMEDTOKEN_GEN_ALL_CORE_FT
allopurinol 100 mg oral tablet: 2 tab(s) orally once a day  amiodarone 200 mg oral tablet: 1 tab(s) orally once a day start from April 4th  atorvastatin 80 mg oral tablet: 1 tab(s) orally once a day (at bedtime)  bumetanide 1 mg oral tablet: 3 tab(s) orally every 12 hours  dapagliflozin 10 mg oral tablet: 1 tab(s) orally once a day  famotidine 20 mg oral tablet: 1 tab(s) orally 2 times a day  hydrALAZINE 10 mg oral tablet: 2 tab(s) orally 3 times a day  isosorbide dinitrate 10 mg oral tablet: 1 tab(s) orally 3 times a day  Metoprolol Succinate ER 25 mg oral tablet, extended release: 0.5 tab(s) orally once a day  Plavix 75 mg oral tablet: 1 tab(s) orally once a day  polyethylene glycol 3350 oral powder for reconstitution: 17 gram(s) orally once a day  senna leaf extract oral tablet: 2 tab(s) orally once a day (at bedtime)  warfarin 2.5 mg oral tablet: 1 tab(s) orally once a day for 4 days   amiodarone 200 mg oral tablet: 1 tab(s) orally once a day  atorvastatin 80 mg oral tablet: 1 tab(s) orally once a day (at bedtime)  bumetanide 0.25 mg/mL injectable solution: 2 milligram(s) injectable every 12 hours  cefTRIAXone: 2 gram(s) intravenous every 24 hours  clopidogrel 75 mg oral tablet: 1 tab(s) orally once a day  digoxin 125 mcg (0.125 mg) oral tablet: 1 tab(s) orally once a day  ipratropium-albuterol 0.5 mg-2.5 mg/3 mL inhalation solution: 3 milliliter(s) inhaled every 6 hours As needed Shortness of Breath and/or Wheezing  isosorbide dinitrate 10 mg oral tablet: 1 tab(s) orally 3 times a day  metoprolol succinate 25 mg oral tablet, extended release: 1 tab(s) orally once a day  pantoprazole 40 mg oral delayed release tablet: 1 tab(s) orally once a day (before a meal)  warfarin 2.5 mg oral tablet: 1 tab(s) orally once a day (at bedtime)

## 2025-06-26 NOTE — PROGRESS NOTE ADULT - SUBJECTIVE AND OBJECTIVE BOX
BERTHA WARD  [id number]    Chief Complaint: HFrEF  Interval events: pt seen and examined, labs and chart reviewed. Improved breathing. AF 60s    ALLERGIES:  metoprolol (Short breath)    CURRENT MEDICATIONS:  MEDICATIONS  (STANDING):  aMIOdarone    Tablet 200 milliGRAM(s) Oral daily  bumetanide Infusion 0.25 mG/Hr (1.25 mL/Hr) IV Continuous <Continuous>  chlorhexidine 2% Cloths 1 Application(s) Topical <User Schedule>  pantoprazole  Injectable 40 milliGRAM(s) IV Push daily    ROS:  All 10 systems reviewed and positives noted in HPI    OBJECTIVE:    VITAL SIGNS:  Vital Signs Last 24 Hrs  T(C): 37.4 (23 Jun 2025 12:40), Max: 37.4 (23 Jun 2025 12:40)  T(F): 99.3 (23 Jun 2025 12:40), Max: 99.3 (23 Jun 2025 12:40)  HR: 83 (23 Jun 2025 13:10) (79 - 121)  BP: 117/85 (23 Jun 2025 12:40) (95/77 - 117/85)  BP(mean): 96 (23 Jun 2025 12:40) (84 - 101)  RR: 12 (23 Jun 2025 12:40) (12 - 35)  SpO2: 98% (23 Jun 2025 13:10) (90% - 99%)    Parameters below as of 23 Jun 2025 12:40  Patient On (Oxygen Delivery Method): BiPAP/CPAP      PHYSICAL EXAM:  General: no distress  HEENT: sclera anicteric  Neck: supple  CVS: irregular, + JVD  Chest: expiratory wheezing  Abdomen: obese  Extremities: +2 pitting lower extremity edema b/l      LABS:                        12.5   10.37 )-----------( 280      ( 23 Jun 2025 10:10 )             41.4     06-23    138  |  96  |  71[H]  ----------------------------<  139[H]  4.3   |  40[H]  |  2.54[H]    Ca    9.2      23 Jun 2025 10:10    TPro  8.7[H]  /  Alb  2.6[L]  /  TBili  0.6  /  DBili  x   /  AST  16  /  ALT  12  /  AlkPhos  191[H]  06-23        PT/INR - ( 23 Jun 2025 12:17 )   PT: 41.7 sec;   INR: 3.58 ratio         PTT - ( 23 Jun 2025 12:17 )  PTT:42.7 sec      ECG: Afib, IVCD      TTE: < from: TTE Echo Complete w/o Contrast w/ Doppler (06.23.25 @ 13:12) >   1. The left ventricle endocardium is not well visualized, consider use   of IV ultrasonic enhancing agent to better evaluate LVEF, endocardium and   apex, if clinically indicated. There appears to be severe global left   ventricle hypokinesis.   2. Left ventricular cavity is normal in size. Left ventricular wall   thickness is mildly increased. Left ventricular systolic function is   severely decreased with an ejection fraction of 30 % by Rivera's method   of disks with an ejection fraction visually estimated at 25 to 30 %.   3. The left ventricular diastolic function is indeterminate. Analysis of   left ventricular diastolic function and filling pressure is made   challenging by the presence of atrial fibrillation.   4. The right ventricle is not well visualized, appears enlarged.   5. Micra pacemaker is visualized in the right heart.  6. Left atrium is top normal sized.   7. Right atrium was not well visualized.   8. Mild mitral valve leaflet calcification.   9. There is mild calcification of the mitral valve annulus.  10. S/p Florence-TAVR, appears well seated, peak velocity within normal limits.  11. Mild aortic regurgitation.  12. Aortic root at the sinuses of Valsalva is normal in size.  13. The inferior vena cava is dilated (dilated >2.1cm) with abnormal   inspiratory collapse (abnormal <50%) consistent with elevated right   atrial pressure (  15, range 10-20mmHg).  14. No pericardial effusion seen.  15. Small right pleural effusion noted.      < from: TTE Congenital Anomalies W or WO Ultrasound Enhancing Agent (04.12.25 @ 09:06) >   1. Technically difficult image quality.   2. Definity ultrasound enhancing agent was given for enhanced left ventricular opacification and improved delineation of the left ventricular endocardial borders.   3. Left ventricular cavity is severely dilated. Left ventricular systolic function is severely decreased with an ejection fraction visually estimated at 25 %. Global left ventricular hypokinesis.   4. 29 mm Evolut FX+ (valve-in-valve) is present in the aortic position, with normal function. No paravalvular aortic regurgitation is seen in this study.   5. The right ventricle is not well visualized.    < from: Cardiac Catheterization (04.11.25 @ 14:21) >  Status post successful percutaneous transcatheter aortic valve in  valve using a CoreValve Evolut FXPLUS 29mm tissue valve  through the right common femoral artery       < from: Cardiac Catheterization (04.08.25 @ 08:34) >  Severe three vessel obstructive coronary artery disease   Patent LIMA to the left anterior descending artery   Chronic total occlusion of the proximal left anterior descending  artery  Chronic total occlusion of the proximal left circumflex artery   Occluded SVG to the 1st obtuse marginal artery   Occluded SVG to the 1st diagonal artery   Mild left main coronary artery disease   Right dominant system

## 2025-06-26 NOTE — DISCHARGE NOTE PROVIDER - ATTENDING DISCHARGE PHYSICAL EXAMINATION:
A+Ox3, no murmurs, lungs CTA b/l, abd soft/NT/ND, ileostomy and ostomy with output, trace lower extremity swelling

## 2025-06-26 NOTE — CONSULT NOTE ADULT - ASSESSMENT
Patient is a 65y male with a complicated PMH, recent admission to [facility](3/31-4/28) for decompensated CHF, d/c to a SNF, who was admitted to [facility] on 6/23 with CHF.  He has a history of A Fib, failed DCCV and ablation in past. He has CAD, prior CABG, TAVR in past and recently a TAVR in TAVR for severe AS.  He has a leadless PPM, HFrEF,WILFRED, as well as CKD and history of HTN/HLD.  He is mostly O2 dependent, was admitted from his SNF with hypoxia/hypercapnea and concerns of CHF.  Details of admission not entirely clear to patient but he had blood cultures sent on admission which are now growing GNR's, ID pending.  He is able to void, was unaware of any recent lopez, no dysuria and no abdominal pain or N/V.  No fever documented and he has been stabilized without use of antibiotics.  No infections noted during his 1 month admission to [facility] (3/31-4/28).  Hence, somewhat unusual situation where we are dealing with high grade GNR bacteremia with a real paucity of data to support systemic infection.  His UA is negative, abdominal exam is benign, no groin pain, and normal LFT's.  Nonetheless it is hard to discount this result.  Suggest:  1 CTX for now  2 Repeat blood cultures x 2 sets  3 Consider CT of C/A/P to exclude occult source of infection  4 If we do not find obvious source for bacteremia I would raise concerns about PVE. Unfortunately antibiotics started prior to sending repeat blood cultures. It will be helpful to know duration of bacteremia.     With a TAVR in TAVR imaging valve for infection will prove challenging, we may need to consider [place], Cardiac CT, as well as PET/CT, typiccally post op uptake resolves by 3 months.  5 [place] guarded, if he needs additional cardiac imaging I would advise sending back to [place] where heart failure service can evaluate

## 2025-06-26 NOTE — DISCHARGE NOTE PROVIDER - CARE PROVIDER_API CALL
[facility] [address] [place] [address]  Phone: [phone]  Fax: [phone]  Follow Up Time:     Manish Potts  Cardiovascular Disease  [address], [address]  [place] [address]  Phone: [phone]  Fax: [phone]  Follow Up Time:

## 2025-06-26 NOTE — PROGRESS NOTE ADULT - ASSESSMENT
65 year old male with a PMH of AFib (s/p dccv 3/25/25, unsuccessful), HFrEF (likely mod red EF, sev TTE), aortic stenosis s/p TAVR (11/2022, reop on 4/11), a flutter s/p ablation 2019 and s/p micra implant, CKD3, WILFRED, obesity, venous stasis, HTN, and HLD with recent hospitalization for heart failure exacerbation and cardiorenal syndrome in April presenting to [facility] from [facility] for worsening SOB x a few days as well as LE swelling, Pt was given increased doses of Bumex in attempts to diurese, however condition continued to worsen therefore patient was taken to the ED. He was placed on NIV for increased WOB, and given additional dose of Bumex. BNP noted to be elevated. Pt denies fever, chills, or sick contacts and ROS otherwise negative. Pt accepted to ICU for further management / care.   Patient was admitted for Acute on chronic hypercarbic respiratory failure, Heart failure exacerbation, ANGELICA on CKD.    #Acute on chronic hypercapnic respiratory failure  -s/p BIPAP in ICU  -on NC, spO2: 90% 5L  -c/w incentive spirometer   -Chest PT/Pulm toilet   -HOB> 30  -NIV PRN + QHs    #Acute on chronic systolic HFrEF  -SOB and LE swelling on admission  -ECHO: 25-30% EF  -s/p Bumex ggt in ICU  -c/w Bumex 2mg BID IV  -I+O  -Daily weights  -Cardio following  -Will need Hydral/Nitro for afterload and pre load reduction in lieu of ARB/ACEi  -Potential AICD pending GDMT optimization.   Outpatient EP     #ANGELICA on CKD, likely cardiorenal syndrome / Congestive nephropathy - resolved  -Cr: 1.99 (stable)   -Avoid nephro toxic agents  -Nephro following recs appreciated     #Afib with rvr  -On Coumadin 2.5mg outpaitent  -Dig loaded  -c/w Digoxin 125 mcg QD  -c/w home dose amiodarone QD  -Tele monitor   -Cardiology following   -Mg>2, Phos>4   -INR daily. Re- dose once approaching normalization     #HTN  -c/w metoprolol with holding parameters     #HLD  -c/w with statin    #GERD  -c/w Pepcid     #DVT ppx  - SCD    Code status: Full code    Discussed with Dr. Lord  65 year old male with a PMH of AFib (s/p dccv 3/25/25, unsuccessful), HFrEF (likely mod red EF, sev TTE), aortic stenosis s/p TAVR (11/2022, reop on 4/11), a flutter s/p ablation 2019 and s/p micra implant, CKD3, WILFRED, obesity, venous stasis, HTN, and HLD with recent hospitalization for heart failure exacerbation and cardiorenal syndrome in April presenting to [facility] from [facility] for worsening SOB x a few days as well as LE swelling, Pt was given increased doses of Bumex in attempts to diurese, however condition continued to worsen therefore patient was taken to the ED. He was placed on NIV for increased WOB, and given additional dose of Bumex. BNP noted to be elevated. Pt denies fever, chills, or sick contacts and ROS otherwise negative. Pt accepted to ICU for further management / care.   Patient was admitted for Acute on chronic hypercarbic respiratory failure, Heart failure exacerbation, ANGELICA on CKD.    #Acute on chronic hypercapnic respiratory failure  -s/p BIPAP in ICU  -on NC, spO2: 90% 5L  -c/w incentive spirometer   -Chest PT/Pulm toilet   -HOB> 30  -NIV PRN + QHs    #Bacteremia  - Blood Culture 6/23 positive E.coli   - Rocephin  - Infectious Disease Consult     #Acute on chronic systolic HFrEF  -SOB and LE swelling on admission  -ECHO: 25-30% EF  -s/p Bumex ggt in ICU  -c/w Bumex 2mg BID IV  -I+O  -Daily weights  -Cardio following  -Will need Hydral/Nitro for afterload and pre load reduction in lieu of ARB/ACEi  -Potential AICD pending GDMT optimization.   Outpatient EP     #ANGELICA on CKD, likely cardiorenal syndrome / Congestive nephropathy - resolved  -Cr: 1.99 (stable)   -Avoid nephro toxic agents  -Nephro following recs appreciated     #Afib with rvr  -On Coumadin 2.5mg outpaitent  -Dig loaded  -c/w Digoxin 125 mcg QD  -c/w home dose amiodarone QD  -Tele monitor   -Cardiology following   -Mg>2, Phos>4   -INR daily. Re- dose once approaching normalization     #HTN  -c/w metoprolol with holding parameters     #HLD  -c/w with statin    #GERD  -c/w Pepcid     #DVT ppx  - SCD    Code status: Full code    Discussed with Dr. Lord  65 year old male with a PMH of AFib (s/p dccv 3/25/25, unsuccessful), HFrEF (likely mod red EF, sev TTE), aortic stenosis s/p TAVR (11/2022, reop on 4/11), a flutter s/p ablation 2019 and s/p micra implant, CKD3, WILFRED, obesity, venous stasis, HTN, and HLD with recent hospitalization for heart failure exacerbation and cardiorenal syndrome in April presenting to [facility] from [facility] for worsening SOB x a few days as well as LE swelling, Pt was given increased doses of Bumex in attempts to diurese, however condition continued to worsen therefore patient was taken to the ED. He was placed on NIV for increased WOB, and given additional dose of Bumex. BNP noted to be elevated. Pt denies fever, chills, or sick contacts and ROS otherwise negative. Pt accepted to ICU for further management / care.   Patient was admitted for Acute on chronic hypercarbic respiratory failure, Heart failure exacerbation, ANGELICA on CKD.    #Acute on chronic hypercapnic respiratory failure  -s/p BIPAP in ICU  -on NC, spO2: 90% 5L  -c/w incentive spirometer   -Chest PT/Pulm toilet   -HOB> 30  -NIV PRN + QHs    #Bacteremia  - Blood Culture 6/23 positive E.coli   - Rocephin  - Infectious Disease Consult     #Acute on chronic systolic HFrEF  -SOB and LE swelling on admission  -ECHO: 25-30% EF  -s/p Bumex ggt in ICU  -c/w Bumex 2mg BID IV  -I+O  -Daily weights  -Cardio following  -Will need Hydral/Nitro for afterload and pre load reduction in lieu of ARB/ACEi  - not a candidate for ace arb arni slgt2i or mra 2/2 ckd. consider addition of hydral, imdur if hemodynamics allow  -Potential AICD pending GDMT optimization.   - Outpatient EP     #ANGELICA on CKD, likely cardiorenal syndrome / Congestive nephropathy - resolved  -Cr: 1.99 (stable)   -Avoid nephro toxic agents  -Nephro following recs appreciated     #Afib with rvr  -On Coumadin 2.5mg outpaitent  -Dig loaded  -c/w Digoxin 125 mcg QD  -c/w home dose amiodarone QD  -Tele monitor   -Cardiology following   -Mg>2, Phos>4   -INR daily. Re- dose once approaching normalization     #HTN  -c/w metoprolol with holding parameters     #HLD  -c/w with statin    #GERD  -c/w Pepcid     #DVT ppx  - SCD    Code status: Full code    Discussed with Dr. Lord

## 2025-06-26 NOTE — DISCHARGE NOTE PROVIDER - HOSPITAL COURSE
HPI:  Mr. Waller is a 65 year old male with a PMH of AFib (s/p dccv 3/25/25, unsuccessful), HFrEF (likely mod red EF, sev TTE), aortic stenosis s/p TAVR (11/2022, reop on 4/11), a flutter s/p ablation 2019 and s/p micra implant, CKD3, WILFRED, obesity, venous stasis, HTN, and HLD with recent hospitalization for heart failure exacerbation and cardiorenal syndrome in April presenting to [facility] from [facility] for worsening SOB x a few days as well as LE swelling, Pt was given increased doses of Bumex in attempts to diurese, however condition continued to worsen therefore patient was taken to the ED. He was placed on NIV for increased WOB, and given additional dose of Bumex. BNP noted to be elevated. Pt denies fever, chills, or sick contacts and ROS otherwise negative. Pt accepted to ICU for further management / care.  (23 Jun 2025 11:12)      Hospital Course Summary: Patient admitted with acute on chronic hypercarbic respiratory failure, herat failure exacerbation and ANGELICA on CKD. Patient was admitted to the ICU. Patient treated with AVAPs in ICU. Placed on bumex drip. Patient then on BIPAP in ICU. TTE showed LVEF 25-30%. Patient not a candidate for ACE, ARB, ARNI, SGLT2, or MRA 2/2 to CKD. Cardiology consulted and recommended potential AICD pending GDMT optimization, patient to follow up with EP on the outpatient. Nephrology was consulted patient's creatinine is stable. Patient's Coumadin was restarted once INR normalized. Blood cultures returned positive for gram negative rods. Infectious Disease was consulted and patient was started on Rocephin after repeat blood cultures were drawn. Repeat blood cultures showed **. Patient hemodynamically stable for discharge to **. Patient will require follow up with PCP, Nephrology, Cardiology.     ---  VITALS:   Vital Signs Last 24 Hrs  T(F): 98.3 (26 Jun 2025 12:41), Max: 99.5 (25 Jun 2025 14:00)  HR: 64 (26 Jun 2025 12:41) (60 - 99)  BP: 105/69 (26 Jun 2025 12:41) (100/70 - 135/94)  RR: 18 (26 Jun 2025 12:41) (18 - 46)  SpO2: 98% (26 Jun 2025 12:41) (89% - 98%)  ---  PHYSICAL EXAM:   General: Awake and alert, cooperative with exam. No acute distress.   Cardiology: Normal S1, S2. No murmurs. Regular rate and rhythm.   Respiratory: Lungs clear to ascultation bilaterally. No wheezes, rales, or rhonchi.   Gastrointestinal: Positive bowel sounds. Soft. Non-tender. Non-distended. No guarding, rigidity, or rebound tenderness.  Extremities: No peripheral edema bilaterally.  Neurological: A+Ox3. CN 2-12 intact. No focal neurological deficits. Normal speech. No facial droop.  ---    Indicate ongoing risks or concerns:    30 Day Supply through Meds to Beds: Completed or not. If not, please provide reason why.     GOC:   • Code Status   • Summary of Goals of Care Conversation/ what matters most    Source of Infection:  Antibiotic / Last Day:    Discharging Provider:  Cyndi Cunningham MD   Contact Info: [phone]    Outpatient Provider: Sign out given?  SNF Provider: Sign-out given?    PLEASE COPY AND PASTE INTO CARE PLAN USING CTRL V  You came to the hospital due to:   You were diagnosed with:   You were treated with:   You were prescribed the following new medications:    You will need to follow up with your primary care physician for further management.    Discharging Provider:  Cyndi Cunningham MD   Contact Info: [phone] - Please call with any questions or concerns.          HPI:  Mr. Waller is a 65 year old male with a PMH of AFib (s/p dccv 3/25/25, unsuccessful), HFrEF (likely mod red EF, sev TTE), aortic stenosis s/p TAVR (11/2022, reop on 4/11), a flutter s/p ablation 2019 and s/p micra implant, CKD3, WILFRED, obesity, venous stasis, HTN, and HLD with recent hospitalization for heart failure exacerbation and cardiorenal syndrome in April presenting to [facility] from [facility] for worsening SOB x a few days as well as LE swelling, Pt was given increased doses of Bumex in attempts to diurese, however condition continued to worsen therefore patient was taken to the ED. He was placed on NIV for increased WOB, and given additional dose of Bumex. BNP noted to be elevated. Pt denies fever, chills, or sick contacts and ROS otherwise negative. Pt accepted to ICU for further management / care.  (23 Jun 2025 11:12)      Hospital Course Summary: Patient admitted with acute on chronic hypercarbic respiratory failure, herat failure exacerbation and ANGELICA on CKD. Patient was admitted to the ICU. Patient treated with AVAPs in ICU. Placed on bumex drip. Patient then on BIPAP in ICU. TTE showed LVEF 25-30%. Patient not a candidate for ACE, ARB, ARNI, SGLT2, or MRA 2/2 to CKD. Cardiology consulted and recommended potential AICD pending GDMT optimization, patient to follow up with EP on the outpatient. Nephrology was consulted patient's creatinine is stable. Patient's Coumadin was restarted once INR normalized. Blood cultures returned positive for gram negative rods. Infectious Disease was consulted and patient was started on Rocephin after repeat blood cultures were drawn. Repeat blood cultures negative. Patient hemodynamically stable for discharge to **. Patient will require follow up with PCP, Nephrology, Cardiology.     ---  VITALS:   Vital Signs Last 24 Hrs  T(F): 98.3 (26 Jun 2025 12:41), Max: 99.5 (25 Jun 2025 14:00)  HR: 64 (26 Jun 2025 12:41) (60 - 99)  BP: 105/69 (26 Jun 2025 12:41) (100/70 - 135/94)  RR: 18 (26 Jun 2025 12:41) (18 - 46)  SpO2: 98% (26 Jun 2025 12:41) (89% - 98%)  ---  PHYSICAL EXAM:   General: Awake and alert, cooperative with exam. No acute distress.   Cardiology: Normal S1, S2. No murmurs. Regular rate and rhythm.   Respiratory: Lungs clear to ascultation bilaterally. No wheezes, rales, or rhonchi.   Gastrointestinal: Positive bowel sounds. Soft. Non-tender. Non-distended. No guarding, rigidity, or rebound tenderness.  Extremities: No peripheral edema bilaterally.  Neurological: A+Ox3. CN 2-12 intact. No focal neurological deficits. Normal speech. No facial droop.  ---    Indicate ongoing risks or concerns:    30 Day Supply through [employer] to Beds: Completed or not. If not, please provide reason why.     GOC:   • Code Status   • Summary of Goals of Care Conversation/ what matters most    Source of Infection:  Antibiotic / Last Day:    Discharging Provider:  Cyndi Cunningham MD   Contact Info: [phone]    Outpatient Provider: Sign out given?  SNF Provider: Sign-out given?    PLEASE COPY AND PASTE INTO CARE PLAN USING CTRL V  You came to the hospital due to:   You were diagnosed with:   You were treated with:   You were prescribed the following new medications:    You will need to follow up with your primary care physician for further management.    Discharging Provider:  Cyndi Cunningham MD   Contact Info: [phone] - Please call with any questions or concerns.          HPI:  Mr. Waller is a 65 year old male with a PMH of AFib (s/p dccv 3/25/25, unsuccessful), HFrEF (likely mod red EF, sev TTE), aortic stenosis s/p TAVR (11/2022, reop on 4/11), a flutter s/p ablation 2019 and s/p micra implant, CKD3, WILFRED, obesity, venous stasis, HTN, and HLD with recent hospitalization for heart failure exacerbation and cardiorenal syndrome in April presenting to [facility] from [facility] for worsening SOB x a few days as well as LE swelling, Pt was given increased doses of Bumex in attempts to diurese, however condition continued to worsen therefore patient was taken to the ED. He was placed on NIV for increased WOB, and given additional dose of Bumex. BNP noted to be elevated. Pt denies fever, chills, or sick contacts and ROS otherwise negative. Pt accepted to ICU for further management / care.  (23 Jun 2025 11:12)      Hospital Course Summary: Patient admitted with acute on chronic hypercarbic respiratory failure, acute on chronic heart failure, and cardio renal syndrome. Patient was admitted to the ICU. Patient treated with AVAPs and bumex drip in ICU. Patient's respiratory status improved and was later placed on BIPAP. Patient was downgraded to Medical floor on 6/25. TTE showed LVEF 25-30%. Cardiology consulted and recommended potential AICD pending GDMT optimization, patient to follow up with EP on the outpatient. Nephrology was consulted due to ANGELICA on CKD and cardiorenal syndrome. Patient's creatinine showed mild improvement. Patient not a candidate for ACE, ARB, ARNI, SGLT2, or MRA 2/2 to CKD. Patient is on coumadin due to Afib, TAVR and Left atrial appendage thrombus with goal INR 2-3. Patient's INR was supra-therapeutic and coumadin was restarted at 2.5mg qhs once INR normalized. Blood cultures 6/23 returned positive for gram negative rods . Infectious Disease was consulted and patient was started on Rocephin after repeat blood cultures were drawn. Repeat blood cultures 6/26 continued to grow gram neg rods. Pulmonary was consulted due to loculated pleural effusions and recommended IR drainage to r/o empyema with bacteremia. Cardio and ID recommended transfer to tertiary center due to possible prosthetic valve endocarditis and requires STANISLAW for rule out and further management due to presence of PPM, requiring EP. 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 Will need to follow up with vascular outpatient. Patient hemodynamically stable for discharge to [facility], accepting Dr. Brian Hutchinson. Patient will require follow up with PCP, EP, Nephrology, and Cardiology.     ---  VITALS:   Vital Signs Last 24 Hrs  T(C): 36.4 (30 Jun 2025 12:00), Max: 36.6 (29 Jun 2025 19:52)  T(F): 97.6 (30 Jun 2025 12:00), Max: 97.8 (29 Jun 2025 19:52)  HR: 107 (30 Jun 2025 12:00) (70 - 107)  BP: 103/64 (30 Jun 2025 12:00) (103/64 - 120/74)  BP(mean): --  RR: 18 (30 Jun 2025 12:00) (17 - 18)  SpO2: 97% (30 Jun 2025 12:00) (93% - 98%)    Parameters below as of 30 Jun 2025 12:00  Patient On (Oxygen Delivery Method): nasal cannula  O2 Flow (L/min): 5    ---  PHYSICAL EXAM:   General: Awake and alert, cooperative with exam. No acute distress.   Cardiology: Normal S1, S2. No murmurs. Regular rate, irregular rhythm.  Respiratory: diminished breath sound bilaterally, +b/l crackles at bases, No wheezes, or rhonchi.   Gastrointestinal: Positive bowel sounds. Soft. Non-tender. Non-distended. No guarding, rigidity, or rebound tenderness.  Extremities: No peripheral edema bilaterally.  Neurological: A+Ox3. CN 2-12 intact. No focal neurological deficits. Normal speech. No facial droop.  ---    Indicate ongoing risks or concerns: HFrEF, bacteremia r/o endocarditis    30 Day Supply through Meds to Beds: Completed or not. If not, please provide reason why. Patient will be transferred to [facility]    GOC:   • Code Status FULL CODE  • Summary of Goals of Care Conversation/ what matters most to get better    Source of Infection: bacteremia  Antibiotic / Last Day: CTX, transferred to [facility]    Discharging Provider:  Cyndi Cunningham MD   Contact Info: [phone]    Outpatient Provider: Dr. Rebollar            HPI:  Mr. Waller is a 65 year old male with a PMH of AFib (s/p dccv 3/25/25, unsuccessful), HFrEF (likely mod red EF, sev TTE), aortic stenosis s/p TAVR (11/2022, reop on 4/11), a flutter s/p ablation 2019 and s/p micra implant, CKD3, WILFRED, obesity, venous stasis, HTN, and HLD with recent hospitalization for heart failure exacerbation and cardiorenal syndrome in April presenting to [facility] from [facility] for worsening SOB x a few days as well as LE swelling, Pt was given increased doses of Bumex in attempts to diurese, however condition continued to worsen therefore patient was taken to the ED. He was placed on NIV for increased WOB, and given additional dose of Bumex. BNP noted to be elevated. Pt denies fever, chills, or sick contacts and ROS otherwise negative. Pt accepted to ICU for further management / care.  (23 Jun 2025 11:12)      Hospital Course Summary: Patient admitted with acute on chronic hypercarbic respiratory failure, acute on chronic heart failure, and cardio renal syndrome. Patient was admitted to the ICU. Patient treated with AVAPs and bumex drip in ICU. Patient's respiratory status improved and was later placed on BIPAP. Patient was downgraded to Medical floor on 6/25. TTE showed LVEF 25-30%. Cardiology consulted and recommended potential AICD pending GDMT optimization, patient to follow up with EP on the outpatient. Nephrology was consulted due to ANGELICA on CKD and cardiorenal syndrome. Patient's creatinine showed mild improvement. Patient not a candidate for ACE, ARB, ARNI, SGLT2, or MRA 2/2 to CKD. Patient is on coumadin due to Afib, TAVR and Left atrial appendage thrombus with goal INR 2-3. Patient's INR was supra-therapeutic and coumadin was restarted at 2.5mg qhs once INR normalized. Blood cultures 6/23 returned positive for gram negative rods . Infectious Disease was consulted and patient was started on Rocephin after repeat blood cultures were drawn. Repeat blood cultures 6/26 continued to grow gram neg rods. Pulmonary was consulted due to loculated pleural effusions and recommended IR drainage to r/o empyema with bacteremia. Cardio and ID recommended transfer to tertiary center due to possible prosthetic valve endocarditis and requires STANISLAW for rule out and further management due to presence of PPM, requiring EP. 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 Will need to follow up with vascular outpatient. 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 Brian Hutchinson. Patient will require follow up with PCP, EP, Nephrology, and Cardiology.     ---  VITALS:   Vital Signs Last 24 Hrs  T(C): 36.4 (30 Jun 2025 12:00), Max: 36.6 (29 Jun 2025 19:52)  T(F): 97.6 (30 Jun 2025 12:00), Max: 97.8 (29 Jun 2025 19:52)  HR: 107 (30 Jun 2025 12:00) (70 - 107)  BP: 103/64 (30 Jun 2025 12:00) (103/64 - 120/74)  BP(mean): --  RR: 18 (30 Jun 2025 12:00) (17 - 18)  SpO2: 97% (30 Jun 2025 12:00) (93% - 98%)    Parameters below as of 30 Jun 2025 12:00  Patient On (Oxygen Delivery Method): nasal cannula  O2 Flow (L/min): 5    ---  PHYSICAL EXAM:   General: Awake and alert, cooperative with exam. No acute distress.   Cardiology: Normal S1, S2. No murmurs. Regular rate, irregular rhythm.  Respiratory: diminished breath sound bilaterally, +b/l crackles at bases, No wheezes, or rhonchi.   Gastrointestinal: Positive bowel sounds. Soft. Non-tender. Non-distended. No guarding, rigidity, or rebound tenderness.  Extremities: No peripheral edema bilaterally.  Neurological: A+Ox3. CN 2-12 intact. No focal neurological deficits. Normal speech. No facial droop.  ---    Indicate ongoing risks or concerns: HFrEF, bacteremia r/o endocarditis    30 Day Supply through Meds to Beds: Completed or not. If not, please provide reason why. Patient will be transferred to [facility]    GOC:   • Code Status FULL CODE  • Summary of Goals of Care Conversation/ what matters most to get better    Source of Infection: bacteremia  Antibiotic / Last Day: CTX, transferred to [facility]    Discharging Provider:  Cyndi Cunningham MD   Contact Info: [phone]    Outpatient Provider: Dr. Rebollar (notified)

## 2025-06-26 NOTE — PROGRESS NOTE ADULT - ASSESSMENT
65 year old male with PMH of AFib (s/p dccv 3/25/25, unsuccessful), HFrEF, Aortic stenosis s/p TAVR (11/2022, reop on 4/11/25), a flutter s/p ablation 2019, s/p micra implant, CKD3, WILFRED, obesity, venous stasis, HTN, and HLD admitted for heart failure exacerbation    ekg and tele c/w AF. pt sp failed dccv 2025  rpt tte to re eval ef>> ef 25-30, severe global lv hypokinesis, well seated [place]- TAVR, dilated ivc. similar to prior  pt s/p bumex gtt. now on bumex ivp. Cr improving  not a candidate for ace arb arni slgt2i or mra 2/2 ckd. consider addition of hydral, imdur if hemodynamics allow  pt would benefit from AICD as well, eventual EP eval  continue amio for arrythmias.   resume coumadin when inr normalizes

## 2025-06-26 NOTE — DISCHARGE NOTE PROVIDER - CARE PROVIDERS DIRECT ADDRESSES
[email or web address]

## 2025-06-26 NOTE — PROGRESS NOTE ADULT - SUBJECTIVE AND OBJECTIVE BOX
Time of Visit:  Patient seen and examined. pat seen earlier today lying in bed comfortable  . O2 via NC     MEDICATIONS  (STANDING):  aMIOdarone    Tablet 200 milliGRAM(s) Oral daily  atorvastatin 80 milliGRAM(s) Oral at bedtime  bumetanide Injectable 2 milliGRAM(s) IV Push every 12 hours  cefTRIAXone   IVPB      chlorhexidine 2% Cloths 1 Application(s) Topical <User Schedule>  clopidogrel Tablet 75 milliGRAM(s) Oral daily  digoxin     Tablet 125 MICROGram(s) Oral daily  isosorbide   dinitrate Tablet (ISORDIL) 10 milliGRAM(s) Oral three times a day  metoprolol succinate ER 25 milliGRAM(s) Oral daily  pantoprazole    Tablet 40 milliGRAM(s) Oral before breakfast      MEDICATIONS  (PRN):       Medications up to date at time of exam.      PHYSICAL EXAMINATION:  Patient has no new complaints.  GENERAL: The patient  in no apparent distress.     Vital Signs Last 24 Hrs  T(C): 36.8 (26 Jun 2025 12:41), Max: 37.4 (25 Jun 2025 17:00)  T(F): 98.3 (26 Jun 2025 12:41), Max: 99.3 (25 Jun 2025 17:00)  HR: 83 (26 Jun 2025 14:57) (60 - 91)  BP: 101/67 (26 Jun 2025 14:57) (100/70 - 127/83)  BP(mean): 82 (25 Jun 2025 22:00) (78 - 94)  RR: 18 (26 Jun 2025 12:41) (18 - 35)  SpO2: 93% (26 Jun 2025 14:57) (89% - 98%)    Parameters below as of 26 Jun 2025 14:57  Patient On (Oxygen Delivery Method): nasal cannula  O2 Flow (L/min): 5     (if applicable)    Chest Tube (if applicable)    HEENT: Head is normocephalic and atraumatic. Extraocular muscles are intact. Mucous membranes are moist.  + NC     NECK: Supple, no palpable adenopathy.    LUNGS: Fair bilateral air entry   no wheezing, rales, or rhonchi.    HEART: Regular rate and rhythm without murmur.    ABDOMEN: Soft, nontender, and nondistended.  No hepatosplenomegaly is noted.    : No painful voiding, no pelvic pain    EXTREMITIES: Without any cyanosis, clubbing, rash, lesions or edema.    NEUROLOGIC: Awake, alert, oriented, grossly intact    SKIN: Warm, dry, good turgor.      LABS:                        10.3   8.82  )-----------( 210      ( 26 Jun 2025 06:38 )             34.2     06-26    137  |  97  |  72[H]  ----------------------------<  99  4.0   |  38[H]  |  1.99[H]    Ca    8.5      26 Jun 2025 06:38  Phos  2.8     06-26  Mg     2.0     06-26    TPro  7.0  /  Alb  1.9[L]  /  TBili  0.6  /  DBili  x   /  AST  15  /  ALT  8[L]  /  AlkPhos  163[H]  06-26    PT/INR - ( 26 Jun 2025 10:04 )   PT: 38.6 sec;   INR: 3.31 ratio         PTT - ( 26 Jun 2025 10:04 )  PTT:40.5 sec  Urinalysis Basic - ( 26 Jun 2025 06:38 )    Color: x / Appearance: x / SG: x / pH: x  Gluc: 99 mg/dL / Ketone: x  / Bili: x / Urobili: x   Blood: x / Protein: x / Nitrite: x   Leuk Esterase: x / RBC: x / WBC x   Sq Epi: x / Non Sq Epi: x / Bacteria: x        MICROBIOLOGY: (if applicable)    RADIOLOGY & ADDITIONAL STUDIES:  EKG:   CXR:  ECHO:    IMPRESSION: 65y Male PAST MEDICAL & SURGICAL HISTORY:  CAD (coronary artery disease)  s/p CABG      Hypercholesteremia      Thrombus of left atrial appendage  2018      Ischemic cardiomyopathy      WILFRED (obstructive sleep apnea)  can not tolerate bipap at night      HTN (hypertension)      Atrial fibrillation  2018      CHF (congestive heart failure)  denies any recent exacerbation      Peripheral edema  chronic      Lichen planus  chronic ( b/L LE)      Atrial flutter  s/p ablation 2018      MI (myocardial infarction)  2012      Stented coronary artery  2019      AS (aortic stenosis)      Chronic kidney disease, unspecified CKD stage      ANGELICA (acute kidney injury)  4/2021      Morbid obesity      Status post placement of cardiac pacemaker  micraleadless PPM, [id number] last interrogation 7/6/2022      Osteoarthritis  shoulders, hips, knee      H/O scoliosis      Lower back pain      History of elbow surgery      S/P CABG (coronary artery bypass graft)  x3, 2012      Cardiac pacemaker  leadless 2018      History of coronary artery stent placement  2019 ( one more after cabg)      History of adenoidectomy      H/O atrioventricular karlee ablation  2018       p/w       IMP: This is  a 65 yr old man with  CAD / AFib (s/p dccv 3/25/25, unsuccessful), HFrEF, aortic stenosis s/p TAVR (11/2022, reop on 4/11), a flutter s/p ablation 2019 and s/p micra implant, CKD3, WILFRED, obesity, venous stasis, HTN, HLD admitted to MICU with acute on chronic hypercapnic respiratory failure, decompensated heart failure, ANGELICA on CKD likely cardiorenal syndrome.           ASSESSMENT      -  acute on chronic hypercapnic respiratory failure  -  decompensated heart failure  -  ANGELICA on CKD, likely cardiorenal syndrome  -  Afib with rvr  - Bacteremia .. GNR     Sugg    - Continue to monitor pulse oximetry and supp as needed   - BiPAP at night  - Antibx as per ID   - Repeat BC   - STANISLAW and CT abd/pelvis for source   - DVT GI prophy   - Anticoag    Time of Visit:  Patient seen and examined. pat seen earlier today lying in bed comfortable  . O2 via NC     MEDICATIONS  (STANDING):  aMIOdarone    Tablet 200 milliGRAM(s) Oral daily  atorvastatin 80 milliGRAM(s) Oral at bedtime  bumetanide Injectable 2 milliGRAM(s) IV Push every 12 hours  cefTRIAXone   IVPB      chlorhexidine 2% Cloths 1 Application(s) Topical <User Schedule>  clopidogrel Tablet 75 milliGRAM(s) Oral daily  digoxin     Tablet 125 MICROGram(s) Oral daily  isosorbide   dinitrate Tablet (ISORDIL) 10 milliGRAM(s) Oral three times a day  metoprolol succinate ER 25 milliGRAM(s) Oral daily  pantoprazole    Tablet 40 milliGRAM(s) Oral before breakfast      MEDICATIONS  (PRN):       Medications up to date at time of exam.      PHYSICAL EXAMINATION:  Patient has no new complaints.  GENERAL: The patient  in no apparent distress.     Vital Signs Last 24 Hrs  T(C): 36.8 (26 Jun 2025 12:41), Max: 37.4 (25 Jun 2025 17:00)  T(F): 98.3 (26 Jun 2025 12:41), Max: 99.3 (25 Jun 2025 17:00)  HR: 83 (26 Jun 2025 14:57) (60 - 91)  BP: 101/67 (26 Jun 2025 14:57) (100/70 - 127/83)  BP(mean): 82 (25 Jun 2025 22:00) (78 - 94)  RR: 18 (26 Jun 2025 12:41) (18 - 35)  SpO2: 93% (26 Jun 2025 14:57) (89% - 98%)    Parameters below as of 26 Jun 2025 14:57  Patient On (Oxygen Delivery Method): nasal cannula  O2 Flow (L/min): 5     (if applicable)    Chest Tube (if applicable)    HEENT: Head is normocephalic and atraumatic. Extraocular muscles are intact. Mucous membranes are moist.  + NC     NECK: Supple, no palpable adenopathy.    LUNGS: Fair bilateral air entry   no wheezing, rales, or rhonchi.    HEART: Regular rate and rhythm without murmur.    ABDOMEN: Soft, nontender, and nondistended.  No hepatosplenomegaly is noted.    : No painful voiding, no pelvic pain    EXTREMITIES: Without any cyanosis, clubbing, rash, lesions or edema.    NEUROLOGIC: Awake, alert, oriented, grossly intact    SKIN: Warm, dry, good turgor.      LABS:                        10.3   8.82  )-----------( 210      ( 26 Jun 2025 06:38 )             34.2     06-26    137  |  97  |  72[H]  ----------------------------<  99  4.0   |  38[H]  |  1.99[H]    Ca    8.5      26 Jun 2025 06:38  Phos  2.8     06-26  Mg     2.0     06-26    TPro  7.0  /  Alb  1.9[L]  /  TBili  0.6  /  DBili  x   /  AST  15  /  ALT  8[L]  /  AlkPhos  163[H]  06-26    PT/INR - ( 26 Jun 2025 10:04 )   PT: 38.6 sec;   INR: 3.31 ratio         PTT - ( 26 Jun 2025 10:04 )  PTT:40.5 sec  Urinalysis Basic - ( 26 Jun 2025 06:38 )    Color: x / Appearance: x / SG: x / pH: x  Gluc: 99 mg/dL / Ketone: x  / Bili: x / Urobili: x   Blood: x / Protein: x / Nitrite: x   Leuk Esterase: x / RBC: x / WBC x   Sq Epi: x / Non Sq Epi: x / Bacteria: x        MICROBIOLOGY: (if applicable)    RADIOLOGY & ADDITIONAL STUDIES:  EKG:   CXR:  ECHO:    IMPRESSION: 65y Male PAST MEDICAL & SURGICAL HISTORY:  CAD (coronary artery disease)  s/p CABG      Hypercholesteremia      Thrombus of left atrial appendage  2018      Ischemic cardiomyopathy      WILFRED (obstructive sleep apnea)  can not tolerate bipap at night      HTN (hypertension)      Atrial fibrillation  2018      CHF (congestive heart failure)  denies any recent exacerbation      Peripheral edema  chronic      Lichen planus  chronic ( b/L LE)      Atrial flutter  s/p ablation 2018      MI (myocardial infarction)  2012      Stented coronary artery  2019      AS (aortic stenosis)      Chronic kidney disease, unspecified CKD stage      ANGELICA (acute kidney injury)  4/2021      Morbid obesity      Status post placement of cardiac pacemaker  micraleadless PPM, [id number] last interrogation 7/6/2022      Osteoarthritis  shoulders, hips, knee      H/O scoliosis      Lower back pain      History of elbow surgery      S/P CABG (coronary artery bypass graft)  x3, 2012      Cardiac pacemaker  leadless 2018      History of coronary artery stent placement  2019 ( one more after cabg)      History of adenoidectomy      H/O atrioventricular karlee ablation  2018       p/w       IMP: This is  a 65 yr old man with  CAD / AFib (s/p dccv 3/25/25, unsuccessful), HFrEF, aortic stenosis s/p TAVR (11/2022, reop on 4/11), a flutter s/p ablation 2019 and s/p micra implant, CKD3, WILFRED, obesity, venous stasis, HTN, HLD admitted to MICU with acute on chronic hypercapnic respiratory failure, decompensated heart failure, ANGELICA on CKD likely cardiorenal syndrome.           ASSESSMENT      -  acute on chronic hypercapnic respiratory failure  -  decompensated heart failure  -  ANGELICA on CKD, likely cardiorenal syndrome  -  Afib with rvr  - Bacteremia .. GNR     Sugg    - Continue to monitor pulse oximetry and supp as needed   - BiPAP at night  - Antibx as per ID   - Repeat BC   - CT  chest abd/pelvis for source   - DVT GI prophy   - Anticoag

## 2025-06-26 NOTE — PROGRESS NOTE ADULT - ATTENDING COMMENTS
Plan of care as above    # Acute on chronic hypercarbic resp failure  # Acute on chronic HFrEF  # Chronic afib  # Bacteremia  - Continue bumex, daily weights  - Continue coumadin  - SP TAVR  - Cardio and ID following  - Monitor 02, wean   - PT/OT  - Rocephin for bacteremia  - Repeat cultures  - Unknown source, ? Urine  - Consider CT if persistent bacteremia    Continue care

## 2025-06-26 NOTE — PROGRESS NOTE ADULT - SUBJECTIVE AND OBJECTIVE BOX
Subjective and Objective:     Overnight Events: None   Interval History: Patient was seen and examined this morning at bedside. Patient denies shortness of breath.     Vital Signs Last 24 Hrs  T(C): 36.8 (26 Jun 2025 05:00), Max: 37.5 (25 Jun 2025 14:00)  T(F): 98.2 (26 Jun 2025 05:00), Max: 99.5 (25 Jun 2025 14:00)  HR: 60 (26 Jun 2025 05:00) (60 - 99)  BP: 116/72 (26 Jun 2025 05:00) (100/70 - 135/94)  BP(mean): 82 (25 Jun 2025 22:00) (78 - 108)  RR: 18 (26 Jun 2025 05:00) (18 - 46)  SpO2: 95% (26 Jun 2025 05:00) (89% - 95%)    Parameters below as of 26 Jun 2025 05:00  Patient On (Oxygen Delivery Method): nasal cannula  O2 Flow (L/min): 5    I&O's Summary    25 Jun 2025 07:01  -  26 Jun 2025 07:00  --------------------------------------------------------  IN: 0 mL / OUT: 3000 mL / NET: -3000 mL    PHYSICAL EXAM:  GENERAL: NAD  HEENT:  anicteric, moist mucous membranes  CHEST/LUNG:  CTA b/l, no rales, wheezes, or rhonchi  HEART:  irregularly irregular, S1, S2  ABDOMEN: soft, nontender, nondistended  EXTREMITIES: +1 pitting edema, no cyanosis or calf tenderness  NERVOUS SYSTEM: AOx3, no slurred speech, no focal deficits, answers questions and follows commands appropriately    ALLERGIES:  metoprolol (Short breath)      MEDICATIONS:  MEDICATIONS  (STANDING):  aMIOdarone    Tablet 200 milliGRAM(s) Oral daily  atorvastatin 80 milliGRAM(s) Oral at bedtime  bumetanide Injectable 2 milliGRAM(s) IV Push every 12 hours  chlorhexidine 2% Cloths 1 Application(s) Topical <User Schedule>  clopidogrel Tablet 75 milliGRAM(s) Oral daily  digoxin     Tablet 125 MICROGram(s) Oral daily  isosorbide   dinitrate Tablet (ISORDIL) 10 milliGRAM(s) Oral three times a day  metoprolol succinate ER 25 milliGRAM(s) Oral daily  pantoprazole    Tablet 40 milliGRAM(s) Oral before breakfast    MEDICATIONS  (PRN):      LABS: (Personally Reviewed):                        10.3   8.82  )-----------( 210      ( 26 Jun 2025 06:38 )             34.2     06-26    137  |  97  |  72[H]  ----------------------------<  99  4.0   |  38[H]  |  1.99[H]    Ca    8.5      26 Jun 2025 06:38  Phos  2.8     06-26  Mg     2.0     06-26    TPro  7.0  /  Alb  1.9[L]  /  TBili  0.6  /  DBili  x   /  AST  15  /  ALT  8[L]  /  AlkPhos  163[H]  06-26    PT/INR - ( 25 Jun 2025 13:20 )   PT: 47.1 sec;   INR: 4.05 ratio         PTT - ( 24 Jun 2025 11:50 )  PTT:45.2 sec      Blood Culture: 06-23 @ 10:10  Organism --  Gram Stain Blood -- Gram Stain   Growth in aerobic bottle: Gram Negative Rods  Specimen Source Blood Blood-Peripheral  Culture-Blood --

## 2025-06-26 NOTE — CONSULT NOTE ADULT - SUBJECTIVE AND OBJECTIVE BOX
HPI:   Patient is a 65y male with a complicated PMH, recent admission to [facility](3/31-4/28) for decompensated CHF, d/c to a SNF, who was admitted to [facility] on 6/23 with CHF.  He has a history of A Fib, failed DCCV and ablation in past. He has CAD, prior CABG, TAVR in past and recently a TAVR in TAVR for severe AS.  He has a leadless PPM, HFrEF,WILFRED, as well as CKD and history of HTN/HLD.  He is mostly O2 dependent, was admitted from his SNF with hypoxia/hypercapnea and concerns of CHF.  Details of admission not entirely clear to patient but he had blood cultures sent on admission which are now growing GNR's, ID pending.  He is able to void, was unaware of any recent lopez, no dysuria and no abdominal pain or N/V.  No fever documented and he has been stabilized without use of antibiotics.  No infections noted during his 1 month admission to [facility] (3/31-4/28).    REVIEW OF SYSTEMS:  All other review of systems negative (Comprehensive ROS)    PAST MEDICAL & SURGICAL HISTORY:  CAD (coronary artery disease)  s/p CABG      Hypercholesteremia      Thrombus of left atrial appendage  2018      Ischemic cardiomyopathy      WILFRED (obstructive sleep apnea)  can not tolerate bipap at night      HTN (hypertension)      Atrial fibrillation  2018      CHF (congestive heart failure)  denies any recent exacerbation      Peripheral edema  chronic      Lichen planus  chronic ( b/L LE)      Atrial flutter  s/p ablation 2018      MI (myocardial infarction)  2012      Stented coronary artery  2019      AS (aortic stenosis)      Chronic kidney disease, unspecified CKD stage      ANGELICA (acute kidney injury)  4/2021      Morbid obesity      Status post placement of cardiac pacemaker  micraleadless PPM, [id number] last interrogation 7/6/2022      Osteoarthritis  shoulders, hips, knee      H/O scoliosis      Lower back pain      History of elbow surgery      S/P CABG (coronary artery bypass graft)  x3, 2012      Cardiac pacemaker  leadless 2018      History of coronary artery stent placement  2019 ( one more after cabg)      History of adenoidectomy      H/O atrioventricular karlee ablation  2018          Allergies    metoprolol (Short breath)    Intolerances        Antimicrobials Day #  :day 1  cefTRIAXone   IVPB 2000 milliGRAM(s) IV Intermittent once  cefTRIAXone   IVPB        Other Medications:  aMIOdarone    Tablet 200 milliGRAM(s) Oral daily  atorvastatin 80 milliGRAM(s) Oral at bedtime  bumetanide Injectable 2 milliGRAM(s) IV Push every 12 hours  chlorhexidine 2% Cloths 1 Application(s) Topical <User Schedule>  clopidogrel Tablet 75 milliGRAM(s) Oral daily  digoxin     Tablet 125 MICROGram(s) Oral daily  isosorbide   dinitrate Tablet (ISORDIL) 10 milliGRAM(s) Oral three times a day  metoprolol succinate ER 25 milliGRAM(s) Oral daily  pantoprazole    Tablet 40 milliGRAM(s) Oral before breakfast      FAMILY HISTORY:  NC    SOCIAL HISTORY:  Smoking:  x   ETOH:x     Drug Use: x    Single     T(F): 98.2 (06-26-25 @ 05:00), Max: 99.5 (06-25-25 @ 14:00)  HR: 60 (06-26-25 @ 05:00)  BP: 116/72 (06-26-25 @ 05:00)  RR: 18 (06-26-25 @ 05:00)  SpO2: 95% (06-26-25 @ 05:00)  Wt(kg): --    PHYSICAL EXAM:  General: alert, no acute distress  Eyes:  anicteric, no conjunctival injection, no discharge  Oropharynx: no lesions or injection 	  Neck: supple, without adenopathy  Lungs: clear to auscultation, decreased at bases  Heart: irregular rate and rhythm; no murmur,  Abdomen: soft, nondistended, nontender, without mass or organomegaly  Skin: no lesions  Extremities: no clubbing, cyanosis, trace  edema  Neurologic: alert, oriented, moves all extremities    LAB RESULTS:                        10.3   8.82  )-----------( 210      ( 26 Jun 2025 06:38 )             34.2     06-26    137  |  97  |  72[H]  ----------------------------<  99  4.0   |  38[H]  |  1.99[H]    Ca    8.5      26 Jun 2025 06:38  Phos  2.8     06-26  Mg     2.0     06-26    TPro  7.0  /  Alb  1.9[L]  /  TBili  0.6  /  DBili  x   /  AST  15  /  ALT  8[L]  /  AlkPhos  163[H]  06-26    LIVER FUNCTIONS - ( 26 Jun 2025 06:38 )  Alb: 1.9 g/dL / Pro: 7.0 g/dL / ALK PHOS: 163 U/L / ALT: 8 U/L / AST: 15 U/L / GGT: x           UA        MICROBIOLOGY:  RECENT CULTURES:  06-23 @ 10:10 Blood Blood-Peripheral     Growth in aerobic bottle: Gram Negative Rods    Growth in aerobic bottle: Gram Negative Rods          RADIOLOGY REVIEWED:  < from: Xray Chest 1 View- PORTABLE-Routine (Xray Chest 1 View- PORTABLE-Routine in AM.) (06.24.25 @ 09:00) >  IMPRESSION: Prior bibasilar CHF shows improvement with mild residual.    --- End of Report ---    < end of copied text >  < from: Xray Chest 1 View-PORTABLE IMMEDIATE (06.23.25 @ 10:38) >  Impression:    Worsening CHF with effusions    --- End of Report ---  < from: TTE Echo Complete w/o Contrast w/ Doppler (06.23.25 @ 13:12) >  CONCLUSIONS:     1. The left ventricle endocardium is not well visualized, consider use   of IV ultrasonic enhancing agent to better evaluate LVEF, endocardium and   apex, if clinically indicated. There appears to be severe global left   ventricle hypokinesis.   2. Left ventricular cavity is normal in size. Left ventricular wall   thickness is mildly increased. Left ventricular systolic function is   severely decreased with an ejection fraction of 30 % by Rivera's method   of disks with an ejection fraction visually estimated at 25 to 30 %.   3. The left ventricular diastolic function is indeterminate. Analysis of   left ventricular diastolic function and filling pressure is made   challenging by the presence of atrial fibrillation.   4. The right ventricle is not well visualized, appears enlarged.   5. Micra pacemaker is visualized in the right heart.  6. Left atrium is top normal sized.   7. Right atrium was not well visualized.   8. Mild mitral valve leaflet calcification.   9. There is mild calcification of the mitral valve annulus.  10. S/p [place]-TAVR, appears well seated, peak velocity within normal limits.  11. Mild aortic regurgitation.  12. Aortic root at the sinuses of Valsalva is normal in size.  13. The inferior vena cava is dilated (dilated >2.1cm) with abnormal     < end of copied text >

## 2025-06-26 NOTE — DISCHARGE NOTE PROVIDER - NSDCCPCAREPLAN_GEN_ALL_CORE_FT
PRINCIPAL DISCHARGE DIAGNOSIS  Diagnosis: Acute respiratory failure with hypercapnia  Assessment and Plan of Treatment: You came to the hospital due to: shortness of breath  You were diagnosed with: Acute hypercapnic respiratory failure   You were treated with: IV Diuresis and Bipap   You were prescribed the following new medications:   You will need to follow up with your primary care physician for further management.  Discharging Provider:  Cyndi Cunningham MD   Contact Info: [phone] - Please call with any questions or concerns.      SECONDARY DISCHARGE DIAGNOSES  Diagnosis: Bacteremia  Assessment and Plan of Treatment: You were treated with Rocephin.  You were seen by Infectious Disease.   Please follow up with your PCP.    Diagnosis: Acute on chronic systolic congestive heart failure  Assessment and Plan of Treatment:     Diagnosis: Acute kidney injury superimposed on CKD  Assessment and Plan of Treatment: You were seen by Nephrology and your creatinine returned to your baseline.   Please follow up with your PCP and Nephrologist.    Diagnosis: Atrial fibrillation with RVR  Assessment and Plan of Treatment: You were treated with Digoxin.   You were continued on Coumadin.   Please follow up with your cardiologist.    Diagnosis: Hypertension  Assessment and Plan of Treatment: Please follow up with your PCP.    Diagnosis: Hyperlipidemia  Assessment and Plan of Treatment: Please follow up with your PCP.    Diagnosis: Chronic GERD  Assessment and Plan of Treatment: Please follow up with your PCP.     PRINCIPAL DISCHARGE DIAGNOSIS  Diagnosis: Acute respiratory failure with hypercapnia  Assessment and Plan of Treatment: You came to the hospital due to: shortness of breath and BLE swelling  You were diagnosed with: Acute hypercapnic respiratory failure , HFrEF and bacteremia   You were treated with: IV lasix, IV bumex, antibiotics, and oxygen supplementation  You are being transferred to [facility] for further care.   You will need to follow up with your primary care physician for further management.  Discharging Provider:  Cyndi Cunningham MD   Contact Info: [phone] - Please call with any questions or concerns.  .      SECONDARY DISCHARGE DIAGNOSES  Diagnosis: Bacteremia  Assessment and Plan of Treatment:     Diagnosis: Acute kidney injury superimposed on CKD  Assessment and Plan of Treatment:     Diagnosis: Atrial fibrillation with RVR  Assessment and Plan of Treatment:     Diagnosis: Hypertension  Assessment and Plan of Treatment:     Diagnosis: Hyperlipidemia  Assessment and Plan of Treatment:     Diagnosis: Chronic GERD  Assessment and Plan of Treatment: Please follow up with your PCP.    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 Please follow up with vascular outpatient for work up.    Diagnosis: Acute on chronic HFrEF (heart failure with reduced ejection fraction)  Assessment and Plan of Treatment: You were found to have a reduced ejection fraction of 25-30% on TTE. Please follow up with your cardiologist for optimization and ICD placement.    Diagnosis: Cardiorenal syndrome  Assessment and Plan of Treatment:      PRINCIPAL DISCHARGE DIAGNOSIS  Diagnosis: Severe sepsis  Assessment and Plan of Treatment: You came to the hospital due to: shortness of breath and BLE swelling  You were diagnosed with: Acute hypercapnic respiratory failure , HFrEF and bacteremia   You were treated with: IV lasix, IV bumex, antibiotics, and oxygen supplementation  You are being transferred to [facility] for further care.   You will need to follow up with your primary care physician for further management.  Discharging Provider:  Cyndi Cunningham MD   Contact Info: [phone] - Please call with any questions or concerns.  .      SECONDARY DISCHARGE DIAGNOSES  Diagnosis: Bacteremia  Assessment and Plan of Treatment: Continue Ceftriaxone    Diagnosis: Acute on chronic HFrEF (heart failure with reduced ejection fraction)  Assessment and Plan of Treatment: You were found to have a reduced ejection fraction of 25-30% on TTE. Please follow up with your cardiologist for optimization and ICD placement.    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 Please follow up with vascular outpatient for work up.    Diagnosis: Cardiorenal syndrome  Assessment and Plan of Treatment:     Diagnosis: Hyperlipidemia  Assessment and Plan of Treatment:     Diagnosis: Chronic GERD  Assessment and Plan of Treatment: Please follow up with your PCP.

## 2025-06-26 NOTE — DISCHARGE NOTE PROVIDER - NSDCCAREPROVSEEN_GEN_ALL_CORE_FT
Ay, Breanna David, Melvin Wing, Mike Booker, Brent Avendaño, Jamaal Pederson, Elida Lord, Chu Mendoza, Jose Rivera, Evaristo Alaniz Ma. Jacinta Stanley, Pat Dangelo, Madelyn Van, Odell Rawls, Stephan Salvador, Christiano Moss, Nola David, Maria M Garcia, Manish Knox Ay, Breanna David, Melvin Wing, Mike Booker, Brent Avendaño, Jamaal Pederson, Elida Lord, Chu Mendoza, Jose Rivera, Evaristo Alaniz Ma. Jacinta Stanley, Pat Dangelo, Madelyn Van, Odell Rawls, Stephan Salvador, Christiano Moss, Nola aDvid, Maria M Garcia, Symone Potts, Manish Dickson, Willem

## 2025-06-27 LAB
ALBUMIN SERPL ELPH-MCNC: 2.2 G/DL — LOW (ref 3.3–5)
ALP SERPL-CCNC: 167 U/L — HIGH (ref 40–120)
ALT FLD-CCNC: 10 U/L — SIGNIFICANT CHANGE UP (ref 10–45)
ANION GAP SERPL CALC-SCNC: 1 MMOL/L — LOW (ref 5–17)
APTT BLD: 41.4 SEC — HIGH (ref 26.1–36.8)
AST SERPL-CCNC: 17 U/L — SIGNIFICANT CHANGE UP (ref 10–40)
BASOPHILS # BLD AUTO: 0.02 K/UL — SIGNIFICANT CHANGE UP (ref 0–0.2)
BASOPHILS NFR BLD AUTO: 0.3 % — SIGNIFICANT CHANGE UP (ref 0–2)
BILIRUB SERPL-MCNC: 0.5 MG/DL — SIGNIFICANT CHANGE UP (ref 0.2–1.2)
BUN SERPL-MCNC: 70 MG/DL — HIGH (ref 7–23)
CALCIUM SERPL-MCNC: 8.8 MG/DL — SIGNIFICANT CHANGE UP (ref 8.4–10.5)
CHLORIDE SERPL-SCNC: 96 MMOL/L — SIGNIFICANT CHANGE UP (ref 96–108)
CO2 SERPL-SCNC: 42 MMOL/L — HIGH (ref 22–31)
CREAT SERPL-MCNC: 2.21 MG/DL — HIGH (ref 0.5–1.3)
EGFR: 32 ML/MIN/1.73M2 — LOW
EGFR: 32 ML/MIN/1.73M2 — LOW
EOSINOPHIL # BLD AUTO: 0.17 K/UL — SIGNIFICANT CHANGE UP (ref 0–0.5)
EOSINOPHIL NFR BLD AUTO: 2.2 % — SIGNIFICANT CHANGE UP (ref 0–6)
GLUCOSE SERPL-MCNC: 103 MG/DL — HIGH (ref 70–99)
HCT VFR BLD CALC: 34.8 % — LOW (ref 39–50)
HGB BLD-MCNC: 10.5 G/DL — LOW (ref 13–17)
IMM GRANULOCYTES NFR BLD AUTO: 0.5 % — SIGNIFICANT CHANGE UP (ref 0–0.9)
INR BLD: 2.76 RATIO — HIGH (ref 0.85–1.16)
LYMPHOCYTES # BLD AUTO: 0.82 K/UL — LOW (ref 1–3.3)
LYMPHOCYTES # BLD AUTO: 10.4 % — LOW (ref 13–44)
MCHC RBC-ENTMCNC: 29.5 PG — SIGNIFICANT CHANGE UP (ref 27–34)
MCHC RBC-ENTMCNC: 30.2 G/DL — LOW (ref 32–36)
MCV RBC AUTO: 97.8 FL — SIGNIFICANT CHANGE UP (ref 80–100)
MONOCYTES # BLD AUTO: 0.81 K/UL — SIGNIFICANT CHANGE UP (ref 0–0.9)
MONOCYTES NFR BLD AUTO: 10.3 % — SIGNIFICANT CHANGE UP (ref 2–14)
NEUTROPHILS # BLD AUTO: 6.02 K/UL — SIGNIFICANT CHANGE UP (ref 1.8–7.4)
NEUTROPHILS NFR BLD AUTO: 76.3 % — SIGNIFICANT CHANGE UP (ref 43–77)
NRBC BLD AUTO-RTO: 0 /100 WBCS — SIGNIFICANT CHANGE UP (ref 0–0)
PLATELET # BLD AUTO: 209 K/UL — SIGNIFICANT CHANGE UP (ref 150–400)
POTASSIUM SERPL-MCNC: 4.1 MMOL/L — SIGNIFICANT CHANGE UP (ref 3.5–5.3)
POTASSIUM SERPL-SCNC: 4.1 MMOL/L — SIGNIFICANT CHANGE UP (ref 3.5–5.3)
PROT SERPL-MCNC: 7.6 G/DL — SIGNIFICANT CHANGE UP (ref 6–8.3)
PROTHROM AB SERPL-ACNC: 32.2 SEC — HIGH (ref 9.9–13.4)
RBC # BLD: 3.56 M/UL — LOW (ref 4.2–5.8)
RBC # FLD: 16.1 % — HIGH (ref 10.3–14.5)
SODIUM SERPL-SCNC: 139 MMOL/L — SIGNIFICANT CHANGE UP (ref 135–145)
WBC # BLD: 7.88 K/UL — SIGNIFICANT CHANGE UP (ref 3.8–10.5)
WBC # FLD AUTO: 7.88 K/UL — SIGNIFICANT CHANGE UP (ref 3.8–10.5)

## 2025-06-27 PROCEDURE — 84145 PROCALCITONIN (PCT): CPT

## 2025-06-27 PROCEDURE — 83880 ASSAY OF NATRIURETIC PEPTIDE: CPT

## 2025-06-27 PROCEDURE — 93926 LOWER EXTREMITY STUDY: CPT

## 2025-06-27 PROCEDURE — 71250 CT THORAX DX C-: CPT

## 2025-06-27 PROCEDURE — 85027 COMPLETE CBC AUTOMATED: CPT

## 2025-06-27 PROCEDURE — 84484 ASSAY OF TROPONIN QUANT: CPT

## 2025-06-27 PROCEDURE — 83605 ASSAY OF LACTIC ACID: CPT

## 2025-06-27 PROCEDURE — 93306 TTE W/DOPPLER COMPLETE: CPT

## 2025-06-27 PROCEDURE — 36600 WITHDRAWAL OF ARTERIAL BLOOD: CPT

## 2025-06-27 PROCEDURE — 85730 THROMBOPLASTIN TIME PARTIAL: CPT

## 2025-06-27 PROCEDURE — 97161 PT EVAL LOW COMPLEX 20 MIN: CPT

## 2025-06-27 PROCEDURE — 85610 PROTHROMBIN TIME: CPT

## 2025-06-27 PROCEDURE — 87077 CULTURE AEROBIC IDENTIFY: CPT

## 2025-06-27 PROCEDURE — 81001 URINALYSIS AUTO W/SCOPE: CPT

## 2025-06-27 PROCEDURE — 99233 SBSQ HOSP IP/OBS HIGH 50: CPT | Mod: GC

## 2025-06-27 PROCEDURE — 94660 CPAP INITIATION&MGMT: CPT

## 2025-06-27 PROCEDURE — 93926 LOWER EXTREMITY STUDY: CPT | Mod: 26,LT

## 2025-06-27 PROCEDURE — 71250 CT THORAX DX C-: CPT | Mod: 26

## 2025-06-27 PROCEDURE — 93005 ELECTROCARDIOGRAM TRACING: CPT

## 2025-06-27 PROCEDURE — 83735 ASSAY OF MAGNESIUM: CPT

## 2025-06-27 PROCEDURE — 74176 CT ABD & PELVIS W/O CONTRAST: CPT | Mod: 26

## 2025-06-27 PROCEDURE — 87150 DNA/RNA AMPLIFIED PROBE: CPT

## 2025-06-27 PROCEDURE — 85025 COMPLETE CBC W/AUTO DIFF WBC: CPT

## 2025-06-27 PROCEDURE — 84100 ASSAY OF PHOSPHORUS: CPT

## 2025-06-27 PROCEDURE — 87040 BLOOD CULTURE FOR BACTERIA: CPT

## 2025-06-27 PROCEDURE — 36415 COLL VENOUS BLD VENIPUNCTURE: CPT

## 2025-06-27 PROCEDURE — 71045 X-RAY EXAM CHEST 1 VIEW: CPT

## 2025-06-27 PROCEDURE — 82803 BLOOD GASES ANY COMBINATION: CPT

## 2025-06-27 PROCEDURE — 99233 SBSQ HOSP IP/OBS HIGH 50: CPT

## 2025-06-27 PROCEDURE — 80048 BASIC METABOLIC PNL TOTAL CA: CPT

## 2025-06-27 PROCEDURE — 80053 COMPREHEN METABOLIC PANEL: CPT

## 2025-06-27 PROCEDURE — 0241U: CPT

## 2025-06-27 PROCEDURE — 74176 CT ABD & PELVIS W/O CONTRAST: CPT

## 2025-06-27 RX ORDER — POLYETHYLENE GLYCOL 3350 17 G/17G
17 POWDER, FOR SOLUTION ORAL DAILY
Refills: 0 | Status: DISCONTINUED | OUTPATIENT
Start: 2025-06-27 | End: 2025-07-01

## 2025-06-27 RX ADMIN — BUMETANIDE 2 MILLIGRAM(S): 1 TABLET ORAL at 17:11

## 2025-06-27 RX ADMIN — DIGOXIN 125 MICROGRAM(S): 250 TABLET ORAL at 05:58

## 2025-06-27 RX ADMIN — POLYETHYLENE GLYCOL 3350 17 GRAM(S): 17 POWDER, FOR SOLUTION ORAL at 16:56

## 2025-06-27 RX ADMIN — BUMETANIDE 2 MILLIGRAM(S): 1 TABLET ORAL at 06:42

## 2025-06-27 RX ADMIN — Medication 2.5 MILLIGRAM(S): at 21:59

## 2025-06-27 RX ADMIN — ISOSORBDIE DINITRATE 10 MILLIGRAM(S): 30 TABLET ORAL at 12:53

## 2025-06-27 RX ADMIN — Medication 40 MILLIGRAM(S): at 05:58

## 2025-06-27 RX ADMIN — ATORVASTATIN CALCIUM 80 MILLIGRAM(S): 80 TABLET, FILM COATED ORAL at 21:59

## 2025-06-27 RX ADMIN — AMIODARONE HYDROCHLORIDE 200 MILLIGRAM(S): 50 INJECTION, SOLUTION INTRAVENOUS at 05:58

## 2025-06-27 RX ADMIN — CLOPIDOGREL BISULFATE 75 MILLIGRAM(S): 75 TABLET, FILM COATED ORAL at 12:53

## 2025-06-27 RX ADMIN — METOPROLOL SUCCINATE 25 MILLIGRAM(S): 50 TABLET, EXTENDED RELEASE ORAL at 09:58

## 2025-06-27 RX ADMIN — CEFTRIAXONE 100 MILLIGRAM(S): 500 INJECTION, POWDER, FOR SOLUTION INTRAMUSCULAR; INTRAVENOUS at 09:58

## 2025-06-27 RX ADMIN — Medication 1 APPLICATION(S): at 06:42

## 2025-06-27 RX ADMIN — ISOSORBDIE DINITRATE 10 MILLIGRAM(S): 30 TABLET ORAL at 05:57

## 2025-06-27 RX ADMIN — ISOSORBDIE DINITRATE 10 MILLIGRAM(S): 30 TABLET ORAL at 17:07

## 2025-06-27 NOTE — PROGRESS NOTE ADULT - ATTENDING COMMENTS
Please see resident note for full details regarding the service.    PE: A+Ox3, no murmurs, lungs CTA b/l, abd soft/NT/ND, 2+ lower extremity swelling    Assessment:  - Acute on chronic hypercarbic respiratory failure  - Acute on chronic CHFrEF  - Cardiorenal syndrome   - Chronic A fib  - Bacteremia  - Cardiorenal ANGELICA on CKD Stage 3  - History of AFib (s/p dccv 3/25/25, unsuccessful), HFrEF (likely mod red EF, sev TTE), aortic stenosis s/p TAVR (11/2022, reop on 4/11), a flutter s/p ablation 2019 and s/p micra implant, CKD3, WILFRED, obesity, venous stasis, HTN, and HLD    Plan:  - Wean O2 as tolerated  - Diuresis with Bumex 2 mg Q12H IVP -> will need to transition to PO   - Bipap QHS  - Ceftrixone (Day 2)  - BCx x 2 gram negative rods -> follow up sensitivities   - Ordered CT chest/abd/pelvis   - Consider STANISLAW, Cardiac CT, PET/CT  - Not a candidate for GDMT including ACE/ARB/ARNI given CKD Stage 3 (additionally BP is soft), will discuss Farxiga with cardiology/nephrology   - Spoke to Dr. Corea (ID) - continue Ceftriaxone for now, follow up BCx x 2, can monitor pt over the weekend and await sensitivities, STANISLAW may be indicated but pt already has hx of valve in valve TAVR, if no considerations for surgical intervention there may not be a role for transfer   - Spoke to Dr. Miranda (cardiology) - continue IV diuresis, ID follow up, continue Amiodarone/Digoxin/Metoprolol succinate/Nitrates/Anticoagulation, pt was on Eliquis previously but was switched to Coumadin as he had LA thrombus while on Eliquis, consider Hydralazine if BP stable, consider AICD, unlikely that patient is a candidate for further surgical intervention at this time, if pt fails Lasix can consider transfer to [facility], would await blood culture sensitivities at this time   - If the patient requires further cardiac imaging, will likely transfer to [facility]  - PT: JASON  - DVT ppx: Will give Warfarin dose today, follow up AM INR (goal 2-3), will hold doses of Warfarin for INR > 3   - Code status: Full code  - Dispo: Active    I spent a total of 50 minutes on the date of this encounter coordinating the patient's care, excluding resident teaching time. This includes reviewing results/imaging and discussions with specialists, nursing, case management/social work. Further tests, medications, and procedures have been ordered as indicated. Results and the plan of care were communicated to the patient and/or their family member. Supporting documentation was completed and added to the patient's chart.

## 2025-06-27 NOTE — PROGRESS NOTE ADULT - ASSESSMENT
65-year-old man admitted from [facility] rehabilitation [facility] for acute exacerbation of chronic systolic heart failure.  He has complex cardiac history.  Known history of HFrEF, status post recent Florence-TAVR, atrial fibrillation, status post unsuccessful cardioversion.  Positive blood cultures on this admission.  He has transitioned from the ICU to medical floor.  Respiratory status improved.  On physical examination, blood pressure is low normal.  He appears hypervolemic.  Telemetry and EKG consistent with atrial fibrillation.  Echocardiogram done during this admission is similar to prior echocardiograms demonstrating severe global left ventricular systolic dysfunction.    Recommendations  -  Appreciate ID follow-up, workup ongoing for positive blood cultures.  -. Continue with IV diuresis.  - Continue to monitor renal function.  - Continue amiodarone, digoxin and metoprolol succinate, nitrates and anticoagulation   - As noted previously, is not a candidate for further guideline directed medical therapy due to chronic kidney disease  - Consider hydralazine if hemodynamics allow   - He would benefit from prophylactic ICD.  - I will sign out for cardiology team to follow along tomorrow.  - Discussed with patient and with primary team.

## 2025-06-27 NOTE — PROGRESS NOTE ADULT - SUBJECTIVE AND OBJECTIVE BOX
SUBJ:  Patient is a 65y old  Male who presents with a chief complaint of Acute on chronic hypercarbic respiratory failure, Heart failure exacerbation, ANGELICA on CKD (26 Jun 2025 19:49)  The patient is seen and examined.  The chart is reviewed.  Case discussed with Dr. Potts.  Patient is known to me from office practice.  He is lying in bed, breathing comfortably with nasal cannula oxygen.  Feels fatigued but denies any cardiac complaints of shortness of breath.        PAST MEDICAL & SURGICAL HISTORY:  CAD (coronary artery disease)  s/p CABG      Hypercholesteremia      Thrombus of left atrial appendage  2018      Ischemic cardiomyopathy      WILFERD (obstructive sleep apnea)  can not tolerate bipap at night      HTN (hypertension)      Atrial fibrillation  2018      CHF (congestive heart failure)  denies any recent exacerbation      Peripheral edema  chronic      Lichen planus  chronic ( b/L LE)      Atrial flutter  s/p ablation 2018      MI (myocardial infarction)  2012      Stented coronary artery  2019      AS (aortic stenosis)      Chronic kidney disease, unspecified CKD stage      ANGELICA (acute kidney injury)  4/2021      Morbid obesity      Status post placement of cardiac pacemaker  micraleadless PPM, [id number] last interrogation 7/6/2022      Osteoarthritis  shoulders, hips, knee      H/O scoliosis      Lower back pain      History of elbow surgery      S/P CABG (coronary artery bypass graft)  x3, 2012      Cardiac pacemaker  leadless 2018      History of coronary artery stent placement  2019 ( one more after cabg)      History of adenoidectomy      H/O atrioventricular karlee ablation  2018          MEDICATIONS  (STANDING):  aMIOdarone    Tablet 200 milliGRAM(s) Oral daily  atorvastatin 80 milliGRAM(s) Oral at bedtime  bumetanide Injectable 2 milliGRAM(s) IV Push every 12 hours  cefTRIAXone   IVPB 2000 milliGRAM(s) IV Intermittent every 24 hours  cefTRIAXone   IVPB      chlorhexidine 2% Cloths 1 Application(s) Topical <User Schedule>  clopidogrel Tablet 75 milliGRAM(s) Oral daily  digoxin     Tablet 125 MICROGram(s) Oral daily  isosorbide   dinitrate Tablet (ISORDIL) 10 milliGRAM(s) Oral three times a day  metoprolol succinate ER 25 milliGRAM(s) Oral daily  pantoprazole    Tablet 40 milliGRAM(s) Oral before breakfast  warfarin 2.5 milliGRAM(s) Oral once    MEDICATIONS  (PRN):          Vital Signs Last 24 Hrs  T(C): 36.4 (27 Jun 2025 05:00), Max: 36.9 (26 Jun 2025 20:11)  T(F): 97.5 (27 Jun 2025 05:00), Max: 98.5 (26 Jun 2025 20:11)  HR: 73 (27 Jun 2025 09:56) (64 - 88)  BP: 107/63 (27 Jun 2025 09:56) (101/67 - 125/80)  BP(mean): --  RR: 18 (27 Jun 2025 05:00) (18 - 18)  SpO2: 95% (27 Jun 2025 09:56) (92% - 98%)    Parameters below as of 27 Jun 2025 09:56  Patient On (Oxygen Delivery Method): nasal cannula  O2 Flow (L/min): 3      REVIEW OF SYSTEMS:  CONSTITUTIONAL: Fatigue  RESPIRATORY: No cough, wheezing, chills or hemoptysis; No shortness of breath  CARDIOVASCULAR: No chest pain or chest pressure.  No shortness of breath or dyspnea on exertion.  No palpitations, dizziness, light headedness, syncope or near syncope.  No edema, no orthopnea.   NEUROLOGICAL: No headaches, memory loss, loss of strength, numbness, or tremors      PHYSICAL EXAM  Constitutional:  Obese, no apparent distress.  HEENT: normocephalic, atraumatic.  PERRLA. EOMI  Neck : No JVD. no carotid bruits  Lungs:  Decreased breath sounds at bases.  Heart:  S1 and S2. No S3, S4. I/VI systolic murmur.  Abdomen:  soft, non tender.  Extremities: No clubbing, cyanoisis or edema  Nuerologic:  A+O x 3. No focal deficits  Skin:  no rashes        LABS:                        10.5   7.88  )-----------( 209      ( 27 Jun 2025 07:18 )             34.8     06-27    139  |  96  |  70[H]  ----------------------------<  103[H]  4.1   |  42[H]  |  2.21[H]    Ca    8.8      27 Jun 2025 07:18  Phos  2.8     06-26  Mg     2.0     06-26    TPro  7.6  /  Alb  2.2[L]  /  TBili  0.5  /  DBili  x   /  AST  17  /  ALT  10  /  AlkPhos  167[H]  06-27    I&O's Summary    26 Jun 2025 07:01  -  27 Jun 2025 07:00  --------------------------------------------------------  IN: 125 mL / OUT: 2600 mL / NET: -2475 mL    tele atrial fibrillation    < from: 12 Lead ECG (06.23.25 @ 12:08) >    Diagnosis Line Atrial fibrillation  Nonspecific intraventricular block  Poor R wave progression  Abnormal ECG  When compared with ECG of 23-JUN-2025 10:11,  No significant change was found    < end of copied text >  < from: TTE Echo Complete w/o Contrast w/ Doppler (06.23.25 @ 13:12) >     1. The left ventricle endocardium is not well visualized, consider use   of IV ultrasonic enhancing agent to better evaluate LVEF, endocardium and   apex, if clinically indicated. There appears to be severe global left   ventricle hypokinesis.   2. Left ventricular cavity is normal in size. Left ventricular wall   thickness is mildly increased. Left ventricular systolic function is   severely decreased with an ejection fraction of 30 % by Rivera's method   of disks with an ejection fraction visually estimated at 25 to 30 %.   3. The left ventricular diastolic function is indeterminate. Analysis of   left ventricular diastolic function and filling pressure is made   challenging by the presence of atrial fibrillation.   4. The right ventricle is not well visualized, appears enlarged.   5. Micra pacemaker is visualized in the right heart.  6. Left atrium is top normal sized.   7. Right atrium was not well visualized.   8. Mild mitral valve leaflet calcification.   9. There is mild calcification of the mitral valve annulus.  10. S/p Florence-TAVR, appears well seated, peak velocity within normal limits.  11. Mild aortic regurgitation.  12. Aortic root at the sinuses of Valsalva is normal in size.  13. The inferior vena cava is dilated (dilated >2.1cm) with abnormal     < end of copied text >  < from: STANISLAW W or WO Ultrasound Enhancing Agent (04.02.25 @ 14:46) >   1. Multiple segmental abnormalities exist. See findings.   2. Left ventricular cavity is severely dilated.Global left ventricular hypokinesis.   3. Reduced right ventricular systolic function.   4. Left atrium is severely dilated.   5. The right atrium is dilated.   6. Mild to moderate mitral regurgitation.   7. No pericardial effusion seen.   8. No thrombus on TAVR valve. No significant paravalvular AR.   9. Compared to the transthoracic echocardiogram performed on 4/1/2025, there have been no significant interval changes. Findings were discussed with CCU team on 4/2/2025 at 3.40 pm.  10. Left pleural effusion noted.  11. Minimal (grade 1) spontaneous echo contrast located in the left atrial apendage and minimal (grade 1) spontaneous echo contrast located in the left atrium.  12. No evidence of left atrial or left atrial appendage thrombus. The left atrial appendage emptying velocity is low.  13. Severe pulmonary hypertension.    < end of copied text >

## 2025-06-27 NOTE — PHARMACOTHERAPY INTERVENTION NOTE - OUTCOME
[facility] - Endoscopy 1st Fl  Discharge Note  Short Stay    Colonoscopy      OUTCOME: Patient tolerated treatment/procedure well without complication and is now ready for discharge.    DISPOSITION: Home or Self Care    FINAL DIAGNOSIS:  <principal problem not specified>    FOLLOWUP: With primary care provider    DISCHARGE INSTRUCTIONS:  No discharge procedures on file.      Clinical Reference Documents Added to Patient Instructions         Document    COLON POLYPS (ENGLISH)            TIME SPENT ON DISCHARGE: 20 minutes  
accepted

## 2025-06-27 NOTE — PROGRESS NOTE ADULT - SUBJECTIVE AND OBJECTIVE BOX
Subjective and Objective:     Overnight Events: Patient did not want to use his Bipap overnight.   Interval History: Patient was seen and examined this morning at bedside.     Vital Signs Last 24 Hrs  T(C): 36.4 (27 Jun 2025 05:00), Max: 36.9 (26 Jun 2025 20:11)  T(F): 97.5 (27 Jun 2025 05:00), Max: 98.5 (26 Jun 2025 20:11)  HR: 73 (27 Jun 2025 09:56) (64 - 88)  BP: 107/63 (27 Jun 2025 09:56) (101/67 - 125/80)  BP(mean): --  RR: 18 (27 Jun 2025 05:00) (18 - 18)  SpO2: 95% (27 Jun 2025 09:56) (92% - 98%)    Parameters below as of 27 Jun 2025 09:56  Patient On (Oxygen Delivery Method): nasal cannula  O2 Flow (L/min): 3    I&O's Summary    26 Jun 2025 07:01  -  27 Jun 2025 07:00  --------------------------------------------------------  IN: 125 mL / OUT: 2600 mL / NET: -2475 mL        PHYSICAL EXAM:  Constitutional: Pt lying in bed, awake and alert, NAD  HEENT: EOMI, normocephalic, moist mucous membranes  Respiratory: Decreased breath sounds at the bases   Cardiovascular: S1S2+ +systolic murmur   Gastrointestinal: BS+, soft, NT/ND, no guarding, no rebound  Neurological: AAOx3 , no focal deficits  Skin: No significant new skin lesions or rashes    ALLERGIES:  metoprolol (Short breath)      MEDICATIONS:  MEDICATIONS  (STANDING):  aMIOdarone    Tablet 200 milliGRAM(s) Oral daily  atorvastatin 80 milliGRAM(s) Oral at bedtime  bumetanide Injectable 2 milliGRAM(s) IV Push every 12 hours  cefTRIAXone   IVPB 2000 milliGRAM(s) IV Intermittent every 24 hours  cefTRIAXone   IVPB      chlorhexidine 2% Cloths 1 Application(s) Topical <User Schedule>  clopidogrel Tablet 75 milliGRAM(s) Oral daily  digoxin     Tablet 125 MICROGram(s) Oral daily  isosorbide   dinitrate Tablet (ISORDIL) 10 milliGRAM(s) Oral three times a day  metoprolol succinate ER 25 milliGRAM(s) Oral daily  pantoprazole    Tablet 40 milliGRAM(s) Oral before breakfast  warfarin 2.5 milliGRAM(s) Oral once    MEDICATIONS  (PRN):      LABS: (Personally Reviewed):                        10.5   7.88  )-----------( 209      ( 27 Jun 2025 07:18 )             34.8     06-27    139  |  96  |  70[H]  ----------------------------<  103[H]  4.1   |  42[H]  |  2.21[H]    Ca    8.8      27 Jun 2025 07:18  Phos  2.8     06-26  Mg     2.0     06-26    TPro  7.6  /  Alb  2.2[L]  /  TBili  0.5  /  DBili  x   /  AST  17  /  ALT  10  /  AlkPhos  167[H]  06-27    PT/INR - ( 27 Jun 2025 07:18 )   PT: 32.2 sec;   INR: 2.76 ratio         PTT - ( 27 Jun 2025 07:18 )  PTT:41.4 sec      Blood Culture: 06-23 @ 10:10  Organism Blood Culture PCR  Gram Stain Blood -- Gram Stain   Growth in aerobic bottle: Gram Negative Rods  Specimen Source Blood Blood-Peripheral  Culture-Blood --

## 2025-06-27 NOTE — PROGRESS NOTE ADULT - ASSESSMENT
65 year old male with a PMH of AFib (s/p dccv 3/25/25, unsuccessful), HFrEF (likely mod red EF, sev TTE), aortic stenosis s/p TAVR (11/2022, reop on 4/11), a flutter s/p ablation 2019 and s/p micra implant, CKD3, WILFRED, obesity, venous stasis, HTN, and HLD with recent hospitalization for heart failure exacerbation and cardiorenal syndrome in April presenting to [facility] from [facility] for worsening SOB x a few days as well as LE swelling, Pt was given increased doses of Bumex in attempts to diurese, however condition continued to worsen therefore patient was taken to the ED. He was placed on NIV for increased WOB, and given additional dose of Bumex. BNP noted to be elevated. Pt denies fever, chills, or sick contacts and ROS otherwise negative. Pt accepted to ICU for further management / care.   Patient was admitted for Acute on chronic hypercarbic respiratory failure, Heart failure exacerbation, ANGELICA on CKD.    #Acute on chronic hypercapnic respiratory failure  -s/p BIPAP in ICU  -on NC, spO2: 90% 5L  -c/w incentive spirometer   -Chest PT/Pulm toilet   -HOB> 30  -NIV PRN + QHs    #Bacteremia  - Blood Culture 6/23 positive E.coli   - CT Chest/Abdomen/Pelvis ordered   - Rocephin  - Infectious Disease Consult, recs appreciated     #Acute on chronic systolic HFrEF  -SOB and LE swelling on admission  -ECHO: 25-30% EF  -s/p Bumex ggt in ICU  -c/w Bumex 2mg BID IV  -I+O  -Daily weights  -Cardio following  -Will need Hydral/Nitro for afterload and pre load reduction in lieu of ARB/ACEi  - not a candidate for ace arb arni slgt2i or mra 2/2 ckd. consider addition of hydral, imdur if hemodynamics allow  -Potential AICD pending GDMT optimization.   - Outpatient EP     #ANGELICA on CKD, likely cardiorenal syndrome / Congestive nephropathy - resolved  -Cr: 1.99 (stable)   -Avoid nephro toxic agents  -Nephro following recs appreciated     #Afib with rvr  -On Coumadin 2.5mg outpaitent  -Dig loaded  -c/w Digoxin 125 mcg QD  -c/w home dose amiodarone QD  -Tele monitor   -Cardiology following   -Mg>2, Phos>4   -INR daily. Re- dose once approaching normalization     #HTN  -c/w metoprolol with holding parameters     #HLD  -c/w with statin    #GERD  -c/w Pepcid     #DVT ppx  - SCD    Code status: Full code    Discussed with Dr. Cunningham  65 year old male with a PMH of AFib (s/p dccv 3/25/25, unsuccessful), HFrEF (likely mod red EF, sev TTE), aortic stenosis s/p TAVR (11/2022, reop on 4/11), a flutter s/p ablation 2019 and s/p micra implant, CKD3, WILFRED, obesity, venous stasis, HTN, and HLD with recent hospitalization for heart failure exacerbation and cardiorenal syndrome in April presenting to [facility] from [facility] for worsening SOB x a few days as well as LE swelling, Pt was given increased doses of Bumex in attempts to diurese, however condition continued to worsen therefore patient was taken to the ED. He was placed on NIV for increased WOB, and given additional dose of Bumex. BNP noted to be elevated. Pt denies fever, chills, or sick contacts and ROS otherwise negative. Pt accepted to ICU for further management / care.   Patient was admitted for Acute on chronic hypercarbic respiratory failure, Heart failure exacerbation, ANGELICA on CKD.    #Acute on chronic hypercapnic respiratory failure  -s/p BIPAP in ICU  -on NC, spO2: 90% 5L  -c/w incentive spirometer   -Chest PT/Pulm toilet   -HOB> 30  -NIV PRN + QHs    #Bacteremia  - Blood Culture 6/23 positive E.coli   - CT Chest/Abdomen/Pelvis ordered   - Rocephin  - Infectious Disease Consult, recs appreciated     #Acute on chronic systolic HFrEF  -SOB and LE swelling on admission  -ECHO: 25-30% EF  -s/p Bumex ggt in ICU  -c/w Bumex 2mg BID IV  -I+O  -Daily weights  -Cardio following  -Will need Hydral/Nitro for afterload and pre load reduction in lieu of ARB/ACEi  - not a candidate for ace arb arni slgt2i or mra 2/2 ckd. consider addition of hydral, imdur if hemodynamics allow  -Potential AICD pending GDMT optimization.   - Outpatient EP     #ANGELICA on CKD, likely cardiorenal syndrome / Congestive nephropathy - resolved  -Cr: 1.99 (stable)   -Avoid nephro toxic agents  -Nephro following recs appreciated     #Afib with rvr  -On Coumadin 2.5mg outpaitent  -Dig loaded  -c/w Digoxin 125 mcg QD  -c/w home dose amiodarone QD  -Tele monitor   -Cardiology following   -Mg>2, Phos>4   -INR daily  -Coumadin 2.5mg ordered for tonight     #HTN  -c/w metoprolol with holding parameters     #HLD  -c/w with statin    #GERD  -c/w Pepcid     #DVT ppx  - SCD    Code status: Full code    Discussed with Dr. Cunningham

## 2025-06-27 NOTE — PROGRESS NOTE ADULT - SUBJECTIVE AND OBJECTIVE BOX
Time of Visit:  Patient seen and examined. pat is lying inbed comfortable . o2 via NC     MEDICATIONS  (STANDING):  aMIOdarone    Tablet 200 milliGRAM(s) Oral daily  atorvastatin 80 milliGRAM(s) Oral at bedtime  bumetanide Injectable 2 milliGRAM(s) IV Push every 12 hours  cefTRIAXone   IVPB 2000 milliGRAM(s) IV Intermittent every 24 hours  cefTRIAXone   IVPB      chlorhexidine 2% Cloths 1 Application(s) Topical <User Schedule>  clopidogrel Tablet 75 milliGRAM(s) Oral daily  digoxin     Tablet 125 MICROGram(s) Oral daily  isosorbide   dinitrate Tablet (ISORDIL) 10 milliGRAM(s) Oral three times a day  metoprolol succinate ER 25 milliGRAM(s) Oral daily  pantoprazole    Tablet 40 milliGRAM(s) Oral before breakfast  polyethylene glycol 3350 17 Gram(s) Oral daily  warfarin 2.5 milliGRAM(s) Oral once      MEDICATIONS  (PRN):       Medications up to date at time of exam.      PHYSICAL EXAMINATION:  Patient has no new complaints.  GENERAL: The patient  in no apparent distress.     Vital Signs Last 24 Hrs  T(C): 36.4 (27 Jun 2025 05:00), Max: 36.9 (26 Jun 2025 20:11)  T(F): 97.5 (27 Jun 2025 05:00), Max: 98.5 (26 Jun 2025 20:11)  HR: 73 (27 Jun 2025 09:56) (73 - 88)  BP: 107/63 (27 Jun 2025 09:56) (105/65 - 125/80)  BP(mean): --  RR: 18 (27 Jun 2025 05:00) (18 - 18)  SpO2: 95% (27 Jun 2025 09:56) (92% - 95%)    Parameters below as of 27 Jun 2025 09:56  Patient On (Oxygen Delivery Method): nasal cannula  O2 Flow (L/min): 3     (if applicable)    Chest Tube (if applicable)    HEENT: Head is normocephalic and atraumatic. Extraocular muscles are intact. Mucous membranes are moist.     NECK: Supple, no palpable adenopathy.    LUNGS: Fair bilateral air entry   no wheezing, rales, or rhonchi.    HEART: Regular rate and rhythm without murmur.    ABDOMEN: Soft, nontender, and nondistended.  No hepatosplenomegaly is noted.    : No painful voiding, no pelvic pain    EXTREMITIES: Without any cyanosis, clubbing, rash, lesions or edema.    NEUROLOGIC: asleep     SKIN: Warm, dry, good turgor.      LABS:                        10.5   7.88  )-----------( 209      ( 27 Jun 2025 07:18 )             34.8     06-27    139  |  96  |  70[H]  ----------------------------<  103[H]  4.1   |  42[H]  |  2.21[H]    Ca    8.8      27 Jun 2025 07:18  Phos  2.8     06-26  Mg     2.0     06-26    TPro  7.6  /  Alb  2.2[L]  /  TBili  0.5  /  DBili  x   /  AST  17  /  ALT  10  /  AlkPhos  167[H]  06-27    PT/INR - ( 27 Jun 2025 07:18 )   PT: 32.2 sec;   INR: 2.76 ratio         PTT - ( 27 Jun 2025 07:18 )  PTT:41.4 sec  Urinalysis Basic - ( 27 Jun 2025 07:18 )    Color: x / Appearance: x / SG: x / pH: x  Gluc: 103 mg/dL / Ketone: x  / Bili: x / Urobili: x   Blood: x / Protein: x / Nitrite: x   Leuk Esterase: x / RBC: x / WBC x   Sq Epi: x / Non Sq Epi: x / Bacteria: x    MICROBIOLOGY: (if applicable)    RADIOLOGY & ADDITIONAL STUDIES:  EKG:   CXR:< from: CT Chest No Cont (06.27.25 @ 09:53) >  FINDINGS:  CHEST:  LUNGS/PLEURA: Increase in size and complexity of the thick-walled   loculated right-sided pleural fluid still relatively small in size.   Interval development of a small to moderate left pleural effusion. There   is associated compressive atelectasis at the lung bases. Groundglass   attenuation both lungs may be related to fluid overload.  VESSELS: Aortic calcifications. Coronary artery calcifications.  HEART: Cardiomegaly. Coronary artery calcifications. Status post TAVR.   Leadless pacemaker in right ventricle. CABG.  MEDIASTINUM AND ZAHEER: Prominent mediastinal and hilar lymph nodes   measuring up to 1.1 cm.  CHEST WALL AND LOWER NECK: Median sternotomy wires. Bilateral   gynecomastia.    ABDOMEN AND PELVIS:  LIVER: Within normal limits.  BILE DUCTS: Normal caliber.  GALLBLADDER: Mild nonspecific pericholecystic edema.  SPLEEN: Within normal limits.  PANCREAS: Within normal limits.  ADRENALS: Within normal limits.  KIDNEYS/URETERS: Left lower pole hemorrhagic cyst measuring 6 mm. No   hydronephrosis.    BLADDER: Minimally distended.  REPRODUCTIVE ORGANS: Prostate within normal limits.    BOWEL: No bowel obstruction. Colonic diverticulosis. Appendix is normal.  PERITONEUM/RETROPERITONEUM: Trace abdominopelvic ascites.  VESSELS: Moderate to severe atherosclerotic calcification of the   aortoiliac tree. No abdominal aortic aneurysm. Dilated tubular structure   measuring 2.2 cm just lateral to the proximal left profunda femoral   artery on series 2 image 160 raising the possibility of an aneurysm in   this region. Further evaluation with left lower extremity arterial   Doppler recommended.  LYMPH NODES: No lymphadenopathy.  ABDOMINAL WALL: Tiny fat-containing umbilical hernia.  BONES: Degenerative changes. Chronicleft sixth rib fracture. Endplate   irregularity at inferior endplate of L1 and superior endplate of L2 is   new.    IMPRESSION:  Increase in size and complexity of the small thick walled loculated right   pleural effusion. Infection is in the differential diagnosis.    Interval development of a small to moderate left pleural effusion.    Endplate irregularity L1-2 is new since the prior examination and may be   seen in the setting of discitis osteomyelitis. Recommend further   evaluation with MRI lumbar spine.    2.2 cm structure just lateral to the proximal left profunda femoral   artery in the left groin raising the possibility of an aneurysm in this   region. Correlation with left lower extremity arterial Doppler   recommended.      < end of copied text >    ECHO:    IMPRESSION: 65y Male PAST MEDICAL & SURGICAL HISTORY:  CAD (coronary artery disease)  s/p CABG      Hypercholesteremia      Thrombus of left atrial appendage  2018      Ischemic cardiomyopathy      WILFRED (obstructive sleep apnea)  can not tolerate bipap at night      HTN (hypertension)      Atrial fibrillation  2018      CHF (congestive heart failure)  denies any recent exacerbation      Peripheral edema  chronic      Lichen planus  chronic ( b/L LE)      Atrial flutter  s/p ablation 2018      MI (myocardial infarction)  2012      Stented coronary artery  2019      AS (aortic stenosis)      Chronic kidney disease, unspecified CKD stage      ANGELICA (acute kidney injury)  4/2021      Morbid obesity      Status post placement of cardiac pacemaker  micraleadless PPM, [id number] last interrogation 7/6/2022      Osteoarthritis  shoulders, hips, knee      H/O scoliosis      Lower back pain      History of elbow surgery      S/P CABG (coronary artery bypass graft)  x3, 2012      Cardiac pacemaker  leadless 2018      History of coronary artery stent placement  2019 ( one more after cabg)      History of adenoidectomy      H/O atrioventricular karlee ablation  2018       p/w            Time of Visit:  Patient seen and examined. pat is lying inbed comfortable . o2 via NC     MEDICATIONS  (STANDING):  aMIOdarone    Tablet 200 milliGRAM(s) Oral daily  atorvastatin 80 milliGRAM(s) Oral at bedtime  bumetanide Injectable 2 milliGRAM(s) IV Push every 12 hours  cefTRIAXone   IVPB 2000 milliGRAM(s) IV Intermittent every 24 hours  cefTRIAXone   IVPB      chlorhexidine 2% Cloths 1 Application(s) Topical <User Schedule>  clopidogrel Tablet 75 milliGRAM(s) Oral daily  digoxin     Tablet 125 MICROGram(s) Oral daily  isosorbide   dinitrate Tablet (ISORDIL) 10 milliGRAM(s) Oral three times a day  metoprolol succinate ER 25 milliGRAM(s) Oral daily  pantoprazole    Tablet 40 milliGRAM(s) Oral before breakfast  polyethylene glycol 3350 17 Gram(s) Oral daily  warfarin 2.5 milliGRAM(s) Oral once      MEDICATIONS  (PRN):       Medications up to date at time of exam.      PHYSICAL EXAMINATION:  Patient has no new complaints.  GENERAL: The patient  in no apparent distress.     Vital Signs Last 24 Hrs  T(C): 36.4 (27 Jun 2025 05:00), Max: 36.9 (26 Jun 2025 20:11)  T(F): 97.5 (27 Jun 2025 05:00), Max: 98.5 (26 Jun 2025 20:11)  HR: 73 (27 Jun 2025 09:56) (73 - 88)  BP: 107/63 (27 Jun 2025 09:56) (105/65 - 125/80)  BP(mean): --  RR: 18 (27 Jun 2025 05:00) (18 - 18)  SpO2: 95% (27 Jun 2025 09:56) (92% - 95%)    Parameters below as of 27 Jun 2025 09:56  Patient On (Oxygen Delivery Method): nasal cannula  O2 Flow (L/min): 3     (if applicable)    Chest Tube (if applicable)    HEENT: Head is normocephalic and atraumatic. Extraocular muscles are intact. Mucous membranes are moist.     NECK: Supple, no palpable adenopathy.    LUNGS: Fair bilateral air entry   no wheezing, rales, or rhonchi.    HEART: Regular rate and rhythm without murmur.    ABDOMEN: Soft, nontender, and nondistended.  No hepatosplenomegaly is noted.    : No painful voiding, no pelvic pain    EXTREMITIES: Without any cyanosis, clubbing, rash, lesions or edema.    NEUROLOGIC: asleep     SKIN: Warm, dry, good turgor.      LABS:                        10.5   7.88  )-----------( 209      ( 27 Jun 2025 07:18 )             34.8     06-27    139  |  96  |  70[H]  ----------------------------<  103[H]  4.1   |  42[H]  |  2.21[H]    Ca    8.8      27 Jun 2025 07:18  Phos  2.8     06-26  Mg     2.0     06-26    TPro  7.6  /  Alb  2.2[L]  /  TBili  0.5  /  DBili  x   /  AST  17  /  ALT  10  /  AlkPhos  167[H]  06-27    PT/INR - ( 27 Jun 2025 07:18 )   PT: 32.2 sec;   INR: 2.76 ratio         PTT - ( 27 Jun 2025 07:18 )  PTT:41.4 sec  Urinalysis Basic - ( 27 Jun 2025 07:18 )    Color: x / Appearance: x / SG: x / pH: x  Gluc: 103 mg/dL / Ketone: x  / Bili: x / Urobili: x   Blood: x / Protein: x / Nitrite: x   Leuk Esterase: x / RBC: x / WBC x   Sq Epi: x / Non Sq Epi: x / Bacteria: x    MICROBIOLOGY: (if applicable)    RADIOLOGY & ADDITIONAL STUDIES:  EKG:   CXR:< from: CT Chest No Cont (06.27.25 @ 09:53) >  FINDINGS:  CHEST:  LUNGS/PLEURA: Increase in size and complexity of the thick-walled   loculated right-sided pleural fluid still relatively small in size.   Interval development of a small to moderate left pleural effusion. There   is associated compressive atelectasis at the lung bases. Groundglass   attenuation both lungs may be related to fluid overload.  VESSELS: Aortic calcifications. Coronary artery calcifications.  HEART: Cardiomegaly. Coronary artery calcifications. Status post TAVR.   Leadless pacemaker in right ventricle. CABG.  MEDIASTINUM AND ZAHEER: Prominent mediastinal and hilar lymph nodes   measuring up to 1.1 cm.  CHEST WALL AND LOWER NECK: Median sternotomy wires. Bilateral   gynecomastia.    ABDOMEN AND PELVIS:  LIVER: Within normal limits.  BILE DUCTS: Normal caliber.  GALLBLADDER: Mild nonspecific pericholecystic edema.  SPLEEN: Within normal limits.  PANCREAS: Within normal limits.  ADRENALS: Within normal limits.  KIDNEYS/URETERS: Left lower pole hemorrhagic cyst measuring 6 mm. No   hydronephrosis.    BLADDER: Minimally distended.  REPRODUCTIVE ORGANS: Prostate within normal limits.    BOWEL: No bowel obstruction. Colonic diverticulosis. Appendix is normal.  PERITONEUM/RETROPERITONEUM: Trace abdominopelvic ascites.  VESSELS: Moderate to severe atherosclerotic calcification of the   aortoiliac tree. No abdominal aortic aneurysm. Dilated tubular structure   measuring 2.2 cm just lateral to the proximal left profunda femoral   artery on series 2 image 160 raising the possibility of an aneurysm in   this region. Further evaluation with left lower extremity arterial   Doppler recommended.  LYMPH NODES: No lymphadenopathy.  ABDOMINAL WALL: Tiny fat-containing umbilical hernia.  BONES: Degenerative changes. Chronicleft sixth rib fracture. Endplate   irregularity at inferior endplate of L1 and superior endplate of L2 is   new.    IMPRESSION:  Increase in size and complexity of the small thick walled loculated right   pleural effusion. Infection is in the differential diagnosis.    Interval development of a small to moderate left pleural effusion.    Endplate irregularity L1-2 is new since the prior examination and may be   seen in the setting of discitis osteomyelitis. Recommend further   evaluation with MRI lumbar spine.    2.2 cm structure just lateral to the proximal left profunda femoral   artery in the left groin raising the possibility of an aneurysm in this   region. Correlation with left lower extremity arterial Doppler   recommended.      < end of copied text >    ECHO:    IMPRESSION: 65y Male PAST MEDICAL & SURGICAL HISTORY:  CAD (coronary artery disease)  s/p CABG      Hypercholesteremia      Thrombus of left atrial appendage  2018      Ischemic cardiomyopathy      WILFRED (obstructive sleep apnea)  can not tolerate bipap at night      HTN (hypertension)      Atrial fibrillation  2018      CHF (congestive heart failure)  denies any recent exacerbation      Peripheral edema  chronic      Lichen planus  chronic ( b/L LE)      Atrial flutter  s/p ablation 2018      MI (myocardial infarction)  2012      Stented coronary artery  2019      AS (aortic stenosis)      Chronic kidney disease, unspecified CKD stage      ANGELICA (acute kidney injury)  4/2021      Morbid obesity      Status post placement of cardiac pacemaker  micraleadless PPM, [id number] last interrogation 7/6/2022      Osteoarthritis  shoulders, hips, knee      H/O scoliosis      Lower back pain      History of elbow surgery      S/P CABG (coronary artery bypass graft)  x3, 2012      Cardiac pacemaker  leadless 2018      History of coronary artery stent placement  2019 ( one more after cabg)      History of adenoidectomy      H/O atrioventricular karlee ablation  2018       p/w       IMP: This is  a 65 yr old man with  CAD / AFib (s/p dccv 3/25/25, unsuccessful), HFrEF, aortic stenosis s/p TAVR (11/2022, reop on 4/11), a flutter s/p ablation 2019 and s/p micra implant, CKD3, WILFRED, obesity, venous stasis, HTN, HLD admitted to MICU with acute on chronic hypercapnic respiratory failure, decompensated heart failure, ANGELICA on CKD likely cardiorenal syndrome.           ASSESSMENT      -  acute on chronic hypercapnic respiratory failure  -  decompensated heart failure  -  ANGELICA on CKD, likely cardiorenal syndrome  -  Afib with rvr  - Bacteremia .. GNR     Sugg    - Continue to monitor pulse oximetry and supp as needed   - BiPAP at night  - Antibx as per ID   - Repeat BC   - CT  chest abd/pelvis report as above   - DVT GI prophy   - Anticoag

## 2025-06-27 NOTE — PHARMACOTHERAPY INTERVENTION NOTE - COMMENTS
Counseled pt at bedside, all questions/concerns addressed, [id number] deferred 2/2 facility placement
INR
Recommend obtain maintenance digoxin level, pt on both amiodarone and digoxin, DDI amio can increase serum levels of digoxin
Weigh-in Wednesday Note     Dosing weight 117.5 kg (stated)  6/25: daily weight 109.9 kg (actual)    6.9 % difference.   C/w current dosing weight

## 2025-06-28 LAB
ALBUMIN SERPL ELPH-MCNC: 2.2 G/DL — LOW (ref 3.3–5)
ALP SERPL-CCNC: 162 U/L — HIGH (ref 40–120)
ALT FLD-CCNC: 9 U/L — LOW (ref 10–45)
ANION GAP SERPL CALC-SCNC: 0 MMOL/L — LOW (ref 5–17)
APTT BLD: 35.8 SEC — SIGNIFICANT CHANGE UP (ref 26.1–36.8)
AST SERPL-CCNC: 18 U/L — SIGNIFICANT CHANGE UP (ref 10–40)
BASOPHILS # BLD AUTO: 0.04 K/UL — SIGNIFICANT CHANGE UP (ref 0–0.2)
BASOPHILS NFR BLD AUTO: 0.6 % — SIGNIFICANT CHANGE UP (ref 0–2)
BILIRUB SERPL-MCNC: 0.4 MG/DL — SIGNIFICANT CHANGE UP (ref 0.2–1.2)
BUN SERPL-MCNC: 67 MG/DL — HIGH (ref 7–23)
CALCIUM SERPL-MCNC: 8.8 MG/DL — SIGNIFICANT CHANGE UP (ref 8.4–10.5)
CHLORIDE SERPL-SCNC: 98 MMOL/L — SIGNIFICANT CHANGE UP (ref 96–108)
CO2 SERPL-SCNC: 44 MMOL/L — HIGH (ref 22–31)
CREAT SERPL-MCNC: 1.6 MG/DL — HIGH (ref 0.5–1.3)
DIGOXIN SERPL-MCNC: 1.4 NG/ML — SIGNIFICANT CHANGE UP (ref 0.8–2)
EGFR: 47 ML/MIN/1.73M2 — LOW
EGFR: 47 ML/MIN/1.73M2 — LOW
EOSINOPHIL # BLD AUTO: 0.24 K/UL — SIGNIFICANT CHANGE UP (ref 0–0.5)
EOSINOPHIL NFR BLD AUTO: 3.4 % — SIGNIFICANT CHANGE UP (ref 0–6)
GLUCOSE SERPL-MCNC: 103 MG/DL — HIGH (ref 70–99)
HCT VFR BLD CALC: 37.3 % — LOW (ref 39–50)
HGB BLD-MCNC: 11.1 G/DL — LOW (ref 13–17)
IMM GRANULOCYTES NFR BLD AUTO: 0.3 % — SIGNIFICANT CHANGE UP (ref 0–0.9)
INR BLD: 2.45 RATIO — HIGH (ref 0.85–1.16)
LYMPHOCYTES # BLD AUTO: 0.83 K/UL — LOW (ref 1–3.3)
LYMPHOCYTES # BLD AUTO: 11.7 % — LOW (ref 13–44)
MCHC RBC-ENTMCNC: 29.4 PG — SIGNIFICANT CHANGE UP (ref 27–34)
MCHC RBC-ENTMCNC: 29.8 G/DL — LOW (ref 32–36)
MCV RBC AUTO: 98.7 FL — SIGNIFICANT CHANGE UP (ref 80–100)
MONOCYTES # BLD AUTO: 0.89 K/UL — SIGNIFICANT CHANGE UP (ref 0–0.9)
MONOCYTES NFR BLD AUTO: 12.5 % — SIGNIFICANT CHANGE UP (ref 2–14)
NEUTROPHILS # BLD AUTO: 5.09 K/UL — SIGNIFICANT CHANGE UP (ref 1.8–7.4)
NEUTROPHILS NFR BLD AUTO: 71.5 % — SIGNIFICANT CHANGE UP (ref 43–77)
NRBC BLD AUTO-RTO: 0 /100 WBCS — SIGNIFICANT CHANGE UP (ref 0–0)
PLATELET # BLD AUTO: 227 K/UL — SIGNIFICANT CHANGE UP (ref 150–400)
POTASSIUM SERPL-MCNC: 4.2 MMOL/L — SIGNIFICANT CHANGE UP (ref 3.5–5.3)
POTASSIUM SERPL-SCNC: 4.2 MMOL/L — SIGNIFICANT CHANGE UP (ref 3.5–5.3)
PROT SERPL-MCNC: 7.6 G/DL — SIGNIFICANT CHANGE UP (ref 6–8.3)
PROTHROM AB SERPL-ACNC: 28.7 SEC — HIGH (ref 9.9–13.4)
RBC # BLD: 3.78 M/UL — LOW (ref 4.2–5.8)
RBC # FLD: 16.2 % — HIGH (ref 10.3–14.5)
SODIUM SERPL-SCNC: 142 MMOL/L — SIGNIFICANT CHANGE UP (ref 135–145)
WBC # BLD: 7.11 K/UL — SIGNIFICANT CHANGE UP (ref 3.8–10.5)
WBC # FLD AUTO: 7.11 K/UL — SIGNIFICANT CHANGE UP (ref 3.8–10.5)

## 2025-06-28 PROCEDURE — 99497 ADVNCD CARE PLAN 30 MIN: CPT | Mod: GC

## 2025-06-28 PROCEDURE — 99232 SBSQ HOSP IP/OBS MODERATE 35: CPT

## 2025-06-28 PROCEDURE — 99233 SBSQ HOSP IP/OBS HIGH 50: CPT | Mod: GC,25

## 2025-06-28 RX ORDER — IPRATROPIUM BROMIDE AND ALBUTEROL SULFATE .5; 2.5 MG/3ML; MG/3ML
3 SOLUTION RESPIRATORY (INHALATION) EVERY 6 HOURS
Refills: 0 | Status: DISCONTINUED | OUTPATIENT
Start: 2025-06-28 | End: 2025-07-01

## 2025-06-28 RX ADMIN — CEFTRIAXONE 100 MILLIGRAM(S): 500 INJECTION, POWDER, FOR SOLUTION INTRAMUSCULAR; INTRAVENOUS at 09:24

## 2025-06-28 RX ADMIN — AMIODARONE HYDROCHLORIDE 200 MILLIGRAM(S): 50 INJECTION, SOLUTION INTRAVENOUS at 06:42

## 2025-06-28 RX ADMIN — POLYETHYLENE GLYCOL 3350 17 GRAM(S): 17 POWDER, FOR SOLUTION ORAL at 12:33

## 2025-06-28 RX ADMIN — IPRATROPIUM BROMIDE AND ALBUTEROL SULFATE 3 MILLILITER(S): .5; 2.5 SOLUTION RESPIRATORY (INHALATION) at 09:56

## 2025-06-28 RX ADMIN — BUMETANIDE 2 MILLIGRAM(S): 1 TABLET ORAL at 17:22

## 2025-06-28 RX ADMIN — DIGOXIN 125 MICROGRAM(S): 250 TABLET ORAL at 06:43

## 2025-06-28 RX ADMIN — Medication 2.5 MILLIGRAM(S): at 21:08

## 2025-06-28 RX ADMIN — CLOPIDOGREL BISULFATE 75 MILLIGRAM(S): 75 TABLET, FILM COATED ORAL at 12:32

## 2025-06-28 RX ADMIN — ISOSORBDIE DINITRATE 10 MILLIGRAM(S): 30 TABLET ORAL at 17:21

## 2025-06-28 RX ADMIN — Medication 40 MILLIGRAM(S): at 06:42

## 2025-06-28 RX ADMIN — ATORVASTATIN CALCIUM 80 MILLIGRAM(S): 80 TABLET, FILM COATED ORAL at 21:09

## 2025-06-28 RX ADMIN — BUMETANIDE 2 MILLIGRAM(S): 1 TABLET ORAL at 06:42

## 2025-06-28 RX ADMIN — ISOSORBDIE DINITRATE 10 MILLIGRAM(S): 30 TABLET ORAL at 06:42

## 2025-06-28 RX ADMIN — Medication 1 APPLICATION(S): at 06:41

## 2025-06-28 RX ADMIN — METOPROLOL SUCCINATE 25 MILLIGRAM(S): 50 TABLET, EXTENDED RELEASE ORAL at 06:43

## 2025-06-28 NOTE — PROGRESS NOTE ADULT - SUBJECTIVE AND OBJECTIVE BOX
Time of Visit:  Patient seen and examined. tolerated NIV for 4 hrs last night     MEDICATIONS  (STANDING):  aMIOdarone    Tablet 200 milliGRAM(s) Oral daily  atorvastatin 80 milliGRAM(s) Oral at bedtime  bumetanide Injectable 2 milliGRAM(s) IV Push every 12 hours  cefTRIAXone   IVPB 2000 milliGRAM(s) IV Intermittent every 24 hours  cefTRIAXone   IVPB      chlorhexidine 2% Cloths 1 Application(s) Topical <User Schedule>  clopidogrel Tablet 75 milliGRAM(s) Oral daily  digoxin     Tablet 125 MICROGram(s) Oral daily  isosorbide   dinitrate Tablet (ISORDIL) 10 milliGRAM(s) Oral three times a day  metoprolol succinate ER 25 milliGRAM(s) Oral daily  pantoprazole    Tablet 40 milliGRAM(s) Oral before breakfast  polyethylene glycol 3350 17 Gram(s) Oral daily      MEDICATIONS  (PRN):  albuterol/ipratropium for Nebulization 3 milliLiter(s) Nebulizer every 6 hours PRN Shortness of Breath and/or Wheezing       Medications up to date at time of exam.      PHYSICAL EXAMINATION:  Patient has no new complaints.  GENERAL: The patient  in no apparent distress.     Vital Signs Last 24 Hrs  T(C): 36.8 (28 Jun 2025 11:41), Max: 36.8 (28 Jun 2025 11:41)  T(F): 98.2 (28 Jun 2025 11:41), Max: 98.2 (28 Jun 2025 11:41)  HR: 80 (28 Jun 2025 11:41) (68 - 82)  BP: 98/65 (28 Jun 2025 11:41) (98/65 - 118/62)  BP(mean): 76 (28 Jun 2025 11:41) (76 - 84)  RR: 18 (28 Jun 2025 11:41) (18 - 19)  SpO2: 96% (28 Jun 2025 11:41) (93% - 97%)    Parameters below as of 28 Jun 2025 11:41  Patient On (Oxygen Delivery Method): nasal cannula  O2 Flow (L/min): 3     (if applicable)    Chest Tube (if applicable)    HEENT: Head is normocephalic and atraumatic. Extraocular muscles are intact. Mucous membranes are moist.  +NC     NECK: Supple, no palpable adenopathy.    LUNGS: Fair bilateral air entry   no wheezing, rales, or rhonchi.    HEART: Regular rate and rhythm without murmur.    ABDOMEN: Soft, nontender, and nondistended.  No hepatosplenomegaly is noted.    : No painful voiding, no pelvic pain    EXTREMITIES: + b/l LE chronic skin changes with few ulceration     NEUROLOGIC: Awake, alert, oriented, grossly intact    SKIN: Warm, dry, good turgor.      LABS:                        11.1   7.11  )-----------( 227      ( 28 Jun 2025 04:30 )             37.3     06-28    142  |  98  |  67[H]  ----------------------------<  103[H]  4.2   |  44[H]  |  1.60[H]    Ca    8.8      28 Jun 2025 04:30    TPro  7.6  /  Alb  2.2[L]  /  TBili  0.4  /  DBili  x   /  AST  18  /  ALT  9[L]  /  AlkPhos  162[H]  06-28    PT/INR - ( 27 Jun 2025 07:18 )   PT: 32.2 sec;   INR: 2.76 ratio         PTT - ( 27 Jun 2025 07:18 )  PTT:41.4 sec  Urinalysis Basic - ( 28 Jun 2025 04:30 )    Color: x / Appearance: x / SG: x / pH: x  Gluc: 103 mg/dL / Ketone: x  / Bili: x / Urobili: x   Blood: x / Protein: x / Nitrite: x   Leuk Esterase: x / RBC: x / WBC x   Sq Epi: x / Non Sq Epi: x / Bacteria: x        MICROBIOLOGY: (if applicable)    RADIOLOGY & ADDITIONAL STUDIES:  EKG:   CXR:  ECHO:    IMPRESSION: 65y Male PAST MEDICAL & SURGICAL HISTORY:  CAD (coronary artery disease)  s/p CABG      Hypercholesteremia      Thrombus of left atrial appendage  2018      Ischemic cardiomyopathy      WILFRED (obstructive sleep apnea)  can not tolerate bipap at night      HTN (hypertension)      Atrial fibrillation  2018      CHF (congestive heart failure)  denies any recent exacerbation      Peripheral edema  chronic      Lichen planus  chronic ( b/L LE)      Atrial flutter  s/p ablation 2018      MI (myocardial infarction)  2012      Stented coronary artery  2019      AS (aortic stenosis)      Chronic kidney disease, unspecified CKD stage      ANGELICA (acute kidney injury)  4/2021      Morbid obesity      Status post placement of cardiac pacemaker  micraleadless PPM, [id number] last interrogation 7/6/2022      Osteoarthritis  shoulders, hips, knee      H/O scoliosis      Lower back pain      History of elbow surgery      S/P CABG (coronary artery bypass graft)  x3, 2012      Cardiac pacemaker  leadless 2018      History of coronary artery stent placement  2019 ( one more after cabg)      History of adenoidectomy      H/O atrioventricular karlee ablation  2018       p/w         IMP: This is  a 65 yr old man with  CAD / AFib (s/p dccv 3/25/25, unsuccessful), HFrEF, aortic stenosis s/p TAVR (11/2022, reop on 4/11), a flutter s/p ablation 2019 and s/p micra implant, CKD3, WILFRED, obesity, venous stasis, HTN, HLD admitted to MICU with acute on chronic hypercapnic respiratory failure, decompensated heart failure, ANGELICA on CKD likely cardiorenal syndrome.           ASSESSMENT      -  acute on chronic hypercapnic respiratory failure  -  decompensated heart failure  -  ANGELICA on CKD, likely cardiorenal syndrome  -  Afib with rvr  - Bacteremia .. GNR     Sugg    - Continue to monitor pulse oximetry and supp as needed   - BiPAP at night  - Antibx as per ID   - Repeat BC   - CT  chest abd/pelvis report as above   - DVT GI prophy   - Anticoag   - Wound  care to skin       spoke with brother at bedside

## 2025-06-28 NOTE — PROGRESS NOTE ADULT - SUBJECTIVE AND OBJECTIVE BOX
Follow up for  SUBJ:  comfortable no current complaints offered    PMH  CAD (coronary artery disease)    H/O heart artery stent    Obesity    Hypercholesteremia    Thrombus of left atrial appendage    Ischemic cardiomyopathy    WILFRED (obstructive sleep apnea)    HTN (hypertension)    Atrial fibrillation    CHF (congestive heart failure)    Peripheral edema    Lichen planus    Atrial flutter    MI (myocardial infarction)    Stented coronary artery    AS (aortic stenosis)    Chronic kidney disease, unspecified CKD stage    ANGELICA (acute kidney injury)    Morbid obesity    Status post placement of cardiac pacemaker    Osteoarthritis    H/O scoliosis    Lower back pain        MEDICATIONS  (STANDING):  aMIOdarone    Tablet 200 milliGRAM(s) Oral daily  atorvastatin 80 milliGRAM(s) Oral at bedtime  bumetanide Injectable 2 milliGRAM(s) IV Push every 12 hours  cefTRIAXone   IVPB 2000 milliGRAM(s) IV Intermittent every 24 hours  cefTRIAXone   IVPB      chlorhexidine 2% Cloths 1 Application(s) Topical <User Schedule>  clopidogrel Tablet 75 milliGRAM(s) Oral daily  digoxin     Tablet 125 MICROGram(s) Oral daily  isosorbide   dinitrate Tablet (ISORDIL) 10 milliGRAM(s) Oral three times a day  metoprolol succinate ER 25 milliGRAM(s) Oral daily  pantoprazole    Tablet 40 milliGRAM(s) Oral before breakfast  polyethylene glycol 3350 17 Gram(s) Oral daily    MEDICATIONS  (PRN):  albuterol/ipratropium for Nebulization 3 milliLiter(s) Nebulizer every 6 hours PRN Shortness of Breath and/or Wheezing        PHYSICAL EXAM:  Vital Signs Last 24 Hrs  T(C): 36.8 (28 Jun 2025 11:41), Max: 36.8 (28 Jun 2025 11:41)  T(F): 98.2 (28 Jun 2025 11:41), Max: 98.2 (28 Jun 2025 11:41)  HR: 80 (28 Jun 2025 11:41) (68 - 82)  BP: 98/65 (28 Jun 2025 11:41) (98/65 - 118/62)  BP(mean): 76 (28 Jun 2025 11:41) (76 - 84)  RR: 18 (28 Jun 2025 11:41) (18 - 19)  SpO2: 96% (28 Jun 2025 11:41) (93% - 97%)    Parameters below as of 28 Jun 2025 11:41  Patient On (Oxygen Delivery Method): nasal cannula  O2 Flow (L/min): 3      GENERAL: NAD, well-groomed, well-developed moderately overweight  HEAD:  Atraumatic, Normocephalic  EYES: EOMI, PERRLA, conjunctiva and sclera clear  ENT: Moist mucous membranes,  NECK: Supple, No JVD, no bruits  CHEST/LUNG: Clear to percussion bilaterally; No rales, rhonchi, wheezing, or rubs  HEART: Regular rate and rhythm; No murmurs, rubs, or gallops PMI non displaced.  ABDOMEN: Soft, Nontender, Nondistended; Bowel sounds present  EXTREMITIES:  2+ Peripheral Pulses, No clubbing, cyanosis, or edema  SKIN: No rashes or lesions  NERVOUS SYSTEM:  Cranial Nerves II-XII intact      TELEMETRY:    ECG:    < from: 12 Lead ECG (06.23.25 @ 12:08) >  Diagnosis Line Atrial fibrillation  Nonspecific intraventricular block  Poor R wave progression  Abnormal ECG  When compared with ECG of 23-JUN-2025 10:11,  No significant change was found    < end of copied text >    ECHO:    < from: TTE Echo Complete w/o Contrast w/ Doppler (06.23.25 @ 13:12) >  CONCLUSIONS:     1. The left ventricle endocardium is not well visualized, consider use   of IV ultrasonic enhancing agent to better evaluate LVEF, endocardium and   apex, if clinically indicated. There appears to be severe global left   ventricle hypokinesis.   2. Left ventricular cavity is normal in size. Left ventricular wall   thickness is mildly increased. Left ventricular systolic function is   severely decreased with an ejection fraction of 30 % by Rivera's method   of disks with an ejection fraction visually estimated at 25 to 30 %.   3. The left ventricular diastolic function is indeterminate. Analysis of   left ventricular diastolic function and filling pressure is made   challenging by the presence of atrial fibrillation.   4. The right ventricle is not well visualized, appears enlarged.   5. Micra pacemaker is visualized in the right heart.  6. Left atrium is top normal sized.   7. Right atrium was not well visualized.   8. Mild mitral valve leaflet calcification.   9. There is mild calcification of the mitral valve annulus.  10. S/p Florence-TAVR, appears well seated, peak velocity within normal limits.  11. Mild aortic regurgitation.  12. Aortic root at the sinuses of Valsalva is normal in size.  13. The inferior vena cava is dilated (dilated >2.1cm) with abnormal   inspiratory collapse (abnormal <50%) consistent with elevated right   atrial pressure (  15, range 10-20mmHg).  14. No pericardial effusion seen.  15. Small right pleural effusion noted.    ___________________________________________________________________________  _____________  FINDINGS:    Left Ventricle:  The left ventricular cavity is normal in size. Left ventricular wall   thickness is mildly increased. Left ventricular systolic function is   severely decreased with a calculated ejection fraction of 30 % by the   Rivera's biplane method of disks with an ejection fraction visually   estimated at 25 to 30%. The left ventricle endocardium is not well   visualized, consider use of IV ultrasonic enhancing agent to better   evaluate LVEF, endocardium and apex, if clinically indicated. There   appears to be severe global left ventricle hypokinesis. The left   ventricular diastolic function is indeterminate. Analysis of left   ventricular diastolic function and filling pressure is made challenging   by the presence of atrial fibrillation.    Right Ventricle:  A Micra pacemaker is visualized in the right heart. The right ventricle   is not well visualized, appears enlarged.    Left Atrium:  The left atrium is top normal sized with an indexed volume of 34.43 ml/m².    Right Atrium:  The right atrium was notwell visualized.    Interatrial Septum:  The interatrial septum was not well visualized.    Aortic Valve:  S/p Florence-TAVR, appears well seated, peak velocity within normal limits.   There is mild aortic regurgitation.    Mitral Valve:  Mild mitral valve leaflet calcification. There is mild calcification of   the mitral valve annulus. There is trace mitral regurgitation.    Tricuspid Valve:  The tricuspid valve was not well visualized. There is trace tricuspid   regurgitation.    Pulmonic Valve:  The pulmonic valve was not well visualized.    Aorta:  The aortic root at the sinuses of Valsalva is normal in size.    Pericardium:  No pericardial effusion seen.    Pleura:  Small right pleural effusion noted.    Systemic Veins:  The inferior vena cava is dilated (dilated >2.1cm) with abnormal   inspiratory collapse (abnormal <50%) consistent with elevated right   atrial pressure (  15, range 10-20mmHg).  ____________________________________________________________________  QUANTITATIVE DATA:  Left Ventricle Measurements: (Indexed to BSA)    IVSd (2D):   1.1 cm  LVPWd (2D):  1.1 cm  LVIDd (2D):  5.1 cm  LVIDs (2D):  4.6 cm  LV Mass:     204 g  89.5 g/m²  LV Vol d, MOD A2C: 96.1 ml   42.15 ml/m²  LV Vol d, MOD A4C: 88.3 ml   38.73 ml/m²  LV Vold, MOD BP:  93.5 ml   41.01 ml/m²  LV Vol s, MOD A2C: 66.8 ml   29.30 ml/m²  LV Vol s, MOD A4C: 62.3 ml   27.32 ml/m²  LV Vol s, MOD BP:  65.1 ml   28.57 ml/m²  LVOT SV MOD BP:    28.4 ml  LV EF% MOD BP:     30 %  Visualized LV EF%: 25 to 30%    MV EVmax:    1.03 m/s  e' lateral:   7.81 cm/s  e' medial:    3.06 cm/s  E/e' lateral: 13.19  E/e' medial:  33.66  E/e' Average: 18.95  MV DT:        168 msec    Aorta Measurements: (Normal range) (Indexed to BSA)    Ao Root d     (3.1 - 3.7 cm)  Ao Asc d, 2D: 2.90          Left Atrium Measurements: (Indexed to BSA)  LA Diam 2D: 3.70 cm        Right Ventricle Measurements:    RV S' Vmax: 7.20 cm/s      LVOT / RVOT/ Qp/Qs Data: (Indexed to BSA)  LVOT Vmax:      0.71 m/s  LVOT Vmn:       0.493 m/s  LVOT VTI:       11.60 cm  LVOT peak grad: 2 mmHg  LVOT mean grad: 1.0 mmHg    Aortic Valve Measurements:  AV Vmax:                1.8 m/s  AV Peak Gradient:       12.7 mmHg  AV Mean Gradient:       6.7 mmHg  AV VTI:                 28.7 cm  AV VTI Ratio:          0.40  AoV Dimensionless Index 0.40  AR Vmax                 1.83 m/s  AR PHT                  523 msec  AR [place]                1.03 m/s²    Mitral Valve Measurements:    MV E Vmax: 1.0 m/s         MR Vmax:          2.38 m/s              MR Peak Gradient: 22.7 mmHg      Tricuspid Valve Measurements:    RA Pressure: 15 mmHg      Pulmonic Valve Measurments:  PV Vmax:          0.8 m/s  PV Peak Gradient: 2.6 mmHg    ___________________________________________________________________________  _____________  Electronically signed on 6/23/2025 at 3:22:50 PM by Saul Schafer MD        *** Final ***    < end of copied text >      LABS:                        11.1   7.11  )-----------( 227      ( 28 Jun 2025 04:30 )             37.3     06-28    142  |  98  |  67[H]  ----------------------------<  103[H]  4.2   |  44[H]  |  1.60[H]    Ca    8.8      28 Jun 2025 04:30    TPro  7.6  /  Alb  2.2[L]  /  TBili  0.4  /  DBili  x   /  AST  18  /  ALT  9[L]  /  AlkPhos  162[H]  06-28        PT/INR - ( 27 Jun 2025 07:18 )   PT: 32.2 sec;   INR: 2.76 ratio         PTT - ( 27 Jun 2025 07:18 )  PTT:41.4 sec    I&O's Summary    27 Jun 2025 07:01  -  28 Jun 2025 07:00  --------------------------------------------------------  IN: 125 mL / OUT: 1900 mL / NET: -1775 mL      BNP    RADIOLOGY & ADDITIONAL STUDIES:    ECHO:    impression  Complex gentleman with history of heart failure with reduced ejection fraction hemoglobin 11 hematocrit 37 creatinine 1.6 albumin 2.2 care coordinated with Dr. Kenny I will consider patient for advanced heart failure reevaluation Dr. Kerwin Carl on-call today previously seen by Dr. Moffett and seeing status post TAVR and TAVR now with gram-negative rods and blood atrial fibrillation Eliquis and Coumadin A-fib not currently with ICD although would need to be free of bacteremia and even consider and would need guideline directed medical therapy first prosthetic valve endocarditis is a consideration however not typically affected by gram-negative rods although indwelling lines may be a source of bacteremia current pacer is leadless awaiting sensitivity of cultures for antibiotic use previous seen by Dr. Suman Montelongo from invasive standpoint point Micra pacer [phone] care coordinated with Cyndi Cunningham and family practice team.  Guarded prognosis         Follow up for  SUBJ:  comfortable no current complaints offered    PMH  CAD (coronary artery disease)    H/O heart artery stent    Obesity    Hypercholesteremia    Thrombus of left atrial appendage    Ischemic cardiomyopathy    WILFRED (obstructive sleep apnea)    HTN (hypertension)    Atrial fibrillation    CHF (congestive heart failure)    Peripheral edema    Lichen planus    Atrial flutter    MI (myocardial infarction)    Stented coronary artery    AS (aortic stenosis)    Chronic kidney disease, unspecified CKD stage    ANGELICA (acute kidney injury)    Morbid obesity    Status post placement of cardiac pacemaker    Osteoarthritis    H/O scoliosis    Lower back pain        MEDICATIONS  (STANDING):  aMIOdarone    Tablet 200 milliGRAM(s) Oral daily  atorvastatin 80 milliGRAM(s) Oral at bedtime  bumetanide Injectable 2 milliGRAM(s) IV Push every 12 hours  cefTRIAXone   IVPB 2000 milliGRAM(s) IV Intermittent every 24 hours  cefTRIAXone   IVPB      chlorhexidine 2% Cloths 1 Application(s) Topical <User Schedule>  clopidogrel Tablet 75 milliGRAM(s) Oral daily  digoxin     Tablet 125 MICROGram(s) Oral daily  isosorbide   dinitrate Tablet (ISORDIL) 10 milliGRAM(s) Oral three times a day  metoprolol succinate ER 25 milliGRAM(s) Oral daily  pantoprazole    Tablet 40 milliGRAM(s) Oral before breakfast  polyethylene glycol 3350 17 Gram(s) Oral daily    MEDICATIONS  (PRN):  albuterol/ipratropium for Nebulization 3 milliLiter(s) Nebulizer every 6 hours PRN Shortness of Breath and/or Wheezing        PHYSICAL EXAM:  Vital Signs Last 24 Hrs  T(C): 36.8 (28 Jun 2025 11:41), Max: 36.8 (28 Jun 2025 11:41)  T(F): 98.2 (28 Jun 2025 11:41), Max: 98.2 (28 Jun 2025 11:41)  HR: 80 (28 Jun 2025 11:41) (68 - 82)  BP: 98/65 (28 Jun 2025 11:41) (98/65 - 118/62)  BP(mean): 76 (28 Jun 2025 11:41) (76 - 84)  RR: 18 (28 Jun 2025 11:41) (18 - 19)  SpO2: 96% (28 Jun 2025 11:41) (93% - 97%)    Parameters below as of 28 Jun 2025 11:41  Patient On (Oxygen Delivery Method): nasal cannula  O2 Flow (L/min): 3      GENERAL: NAD, well-groomed, well-developed moderately overweight  HEAD:  Atraumatic, Normocephalic  EYES: EOMI, PERRLA, conjunctiva and sclera clear  ENT: Moist mucous membranes,  NECK: Supple, No JVD, no bruits  CHEST/LUNG: Clear to percussion bilaterally; No rales, rhonchi, wheezing, or rubs  HEART: Regular rate and rhythm; No murmurs, rubs, or gallops PMI non displaced.  ABDOMEN: Soft, Nontender, Nondistended; Bowel sounds present  EXTREMITIES:  2+ Peripheral Pulses, No clubbing, cyanosis, or edema  SKIN: No rashes or lesions  NERVOUS SYSTEM:  Cranial Nerves II-XII intact      TELEMETRY:    ECG:    < from: 12 Lead ECG (06.23.25 @ 12:08) >  Diagnosis Line Atrial fibrillation  Nonspecific intraventricular block  Poor R wave progression  Abnormal ECG  When compared with ECG of 23-JUN-2025 10:11,  No significant change was found    < end of copied text >    ECHO:    < from: TTE Echo Complete w/o Contrast w/ Doppler (06.23.25 @ 13:12) >  CONCLUSIONS:     1. The left ventricle endocardium is not well visualized, consider use   of IV ultrasonic enhancing agent to better evaluate LVEF, endocardium and   apex, if clinically indicated. There appears to be severe global left   ventricle hypokinesis.   2. Left ventricular cavity is normal in size. Left ventricular wall   thickness is mildly increased. Left ventricular systolic function is   severely decreased with an ejection fraction of 30 % by Rivera's method   of disks with an ejection fraction visually estimated at 25 to 30 %.   3. The left ventricular diastolic function is indeterminate. Analysis of   left ventricular diastolic function and filling pressure is made   challenging by the presence of atrial fibrillation.   4. The right ventricle is not well visualized, appears enlarged.   5. Micra pacemaker is visualized in the right heart.  6. Left atrium is top normal sized.   7. Right atrium was not well visualized.   8. Mild mitral valve leaflet calcification.   9. There is mild calcification of the mitral valve annulus.  10. S/p Florence-TAVR, appears well seated, peak velocity within normal limits.  11. Mild aortic regurgitation.  12. Aortic root at the sinuses of Valsalva is normal in size.  13. The inferior vena cava is dilated (dilated >2.1cm) with abnormal   inspiratory collapse (abnormal <50%) consistent with elevated right   atrial pressure (  15, range 10-20mmHg).  14. No pericardial effusion seen.  15. Small right pleural effusion noted.    ___________________________________________________________________________  _____________  FINDINGS:    Left Ventricle:  The left ventricular cavity is normal in size. Left ventricular wall   thickness is mildly increased. Left ventricular systolic function is   severely decreased with a calculated ejection fraction of 30 % by the   Rivera's biplane method of disks with an ejection fraction visually   estimated at 25 to 30%. The left ventricle endocardium is not well   visualized, consider use of IV ultrasonic enhancing agent to better   evaluate LVEF, endocardium and apex, if clinically indicated. There   appears to be severe global left ventricle hypokinesis. The left   ventricular diastolic function is indeterminate. Analysis of left   ventricular diastolic function and filling pressure is made challenging   by the presence of atrial fibrillation.    Right Ventricle:  A Micra pacemaker is visualized in the right heart. The right ventricle   is not well visualized, appears enlarged.    Left Atrium:  The left atrium is top normal sized with an indexed volume of 34.43 ml/m².    Right Atrium:  The right atrium was notwell visualized.    Interatrial Septum:  The interatrial septum was not well visualized.    Aortic Valve:  S/p Florence-TAVR, appears well seated, peak velocity within normal limits.   There is mild aortic regurgitation.    Mitral Valve:  Mild mitral valve leaflet calcification. There is mild calcification of   the mitral valve annulus. There is trace mitral regurgitation.    Tricuspid Valve:  The tricuspid valve was not well visualized. There is trace tricuspid   regurgitation.    Pulmonic Valve:  The pulmonic valve was not well visualized.    Aorta:  The aortic root at the sinuses of Valsalva is normal in size.    Pericardium:  No pericardial effusion seen.    Pleura:  Small right pleural effusion noted.    Systemic Veins:  The inferior vena cava is dilated (dilated >2.1cm) with abnormal   inspiratory collapse (abnormal <50%) consistent with elevated right   atrial pressure (  15, range 10-20mmHg).  ____________________________________________________________________  QUANTITATIVE DATA:  Left Ventricle Measurements: (Indexed to BSA)    IVSd (2D):   1.1 cm  LVPWd (2D):  1.1 cm  LVIDd (2D):  5.1 cm  LVIDs (2D):  4.6 cm  LV Mass:     204 g  89.5 g/m²  LV Vol d, MOD A2C: 96.1 ml   42.15 ml/m²  LV Vol d, MOD A4C: 88.3 ml   38.73 ml/m²  LV Vold, MOD BP:  93.5 ml   41.01 ml/m²  LV Vol s, MOD A2C: 66.8 ml   29.30 ml/m²  LV Vol s, MOD A4C: 62.3 ml   27.32 ml/m²  LV Vol s, MOD BP:  65.1 ml   28.57 ml/m²  LVOT SV MOD BP:    28.4 ml  LV EF% MOD BP:     30 %  Visualized LV EF%: 25 to 30%    MV EVmax:    1.03 m/s  e' lateral:   7.81 cm/s  e' medial:    3.06 cm/s  E/e' lateral: 13.19  E/e' medial:  33.66  E/e' Average: 18.95  MV DT:        168 msec    Aorta Measurements: (Normal range) (Indexed to BSA)    Ao Root d     (3.1 - 3.7 cm)  Ao Asc d, 2D: 2.90          Left Atrium Measurements: (Indexed to BSA)  LA Diam 2D: 3.70 cm        Right Ventricle Measurements:    RV S' Vmax: 7.20 cm/s      LVOT / RVOT/ Qp/Qs Data: (Indexed to BSA)  LVOT Vmax:      0.71 m/s  LVOT Vmn:       0.493 m/s  LVOT VTI:       11.60 cm  LVOT peak grad: 2 mmHg  LVOT mean grad: 1.0 mmHg    Aortic Valve Measurements:  AV Vmax:                1.8 m/s  AV Peak Gradient:       12.7 mmHg  AV Mean Gradient:       6.7 mmHg  AV VTI:                 28.7 cm  AV VTI Ratio:          0.40  AoV Dimensionless Index 0.40  AR Vmax                 1.83 m/s  AR PHT                  523 msec  AR [place]                1.03 m/s²    Mitral Valve Measurements:    MV E Vmax: 1.0 m/s         MR Vmax:          2.38 m/s              MR Peak Gradient: 22.7 mmHg      Tricuspid Valve Measurements:    RA Pressure: 15 mmHg      Pulmonic Valve Measurments:  PV Vmax:          0.8 m/s  PV Peak Gradient: 2.6 mmHg    ___________________________________________________________________________  _____________  Electronically signed on 6/23/2025 at 3:22:50 PM by Saul Schafer MD        *** Final ***    < end of copied text >      LABS:                        11.1   7.11  )-----------( 227      ( 28 Jun 2025 04:30 )             37.3     06-28    142  |  98  |  67[H]  ----------------------------<  103[H]  4.2   |  44[H]  |  1.60[H]    Ca    8.8      28 Jun 2025 04:30    TPro  7.6  /  Alb  2.2[L]  /  TBili  0.4  /  DBili  x   /  AST  18  /  ALT  9[L]  /  AlkPhos  162[H]  06-28        PT/INR - ( 27 Jun 2025 07:18 )   PT: 32.2 sec;   INR: 2.76 ratio         PTT - ( 27 Jun 2025 07:18 )  PTT:41.4 sec    I&O's Summary    27 Jun 2025 07:01  -  28 Jun 2025 07:00  --------------------------------------------------------  IN: 125 mL / OUT: 1900 mL / NET: -1775 mL      BNP    RADIOLOGY & ADDITIONAL STUDIES:    ECHO:    impression  Complex gentleman with history of heart failure with reduced ejection fraction   hemoglobin 11 hematocrit 37 creatinine 1.6 albumin 2.2      hfref  will consider patient for advanced heart failure reevaluation Dr. Kerwin Carl on-call today previously seen by Dr. Moffett coordinate with [facility] hospitalist team.  nitrates and hydralazine in lieu of ACE ARB ARNI , carvedilol or metoprolol succinate. ? candidate with soft BP's    atrial fibrillation   Eliquis and Coumadin A-fib not currently with ICD although would need to be free of bacteremia to even consider and would need guideline directed medical therapy     GNR bacteremia/status post TAVR and TAVR   prosthetic valve endocarditis is a consideration however not typically affected by gram-negative rods although indwelling lines may be a source of bacteremia current pacer is leadless awaiting sensitivity of cultures for antibiotic use previous seen by Dr. Suman Montelongo from invasive standpoint point Micra pacer [phone]     care coordinated withDr. Cyndi Miranda and family practice team.  Guarded prognosis

## 2025-06-28 NOTE — PROGRESS NOTE ADULT - SUBJECTIVE AND OBJECTIVE BOX
Subjective and Objective:     Overnight Events: None   Interval History: Patient was seen and examined this morning at bedside.     Vital Signs Last 24 Hrs  T(C): 36.8 (28 Jun 2025 11:41), Max: 36.8 (28 Jun 2025 11:41)  T(F): 98.2 (28 Jun 2025 11:41), Max: 98.2 (28 Jun 2025 11:41)  HR: 80 (28 Jun 2025 11:41) (68 - 82)  BP: 98/65 (28 Jun 2025 11:41) (98/65 - 118/62)  BP(mean): 76 (28 Jun 2025 11:41) (76 - 84)  RR: 18 (28 Jun 2025 11:41) (18 - 19)  SpO2: 96% (28 Jun 2025 11:41) (93% - 97%)    Parameters below as of 28 Jun 2025 11:41  Patient On (Oxygen Delivery Method): nasal cannula  O2 Flow (L/min): 3    I&O's Summary    27 Jun 2025 07:01  -  28 Jun 2025 07:00  --------------------------------------------------------  IN: 125 mL / OUT: 1900 mL / NET: -1775 mL          PHYSICAL EXAM:  Constitutional: Pt lying in bed, awake and alert, NAD  HEENT: EOMI, normocephalic, moist mucous membranes  Respiratory: Decreased breath sounds at the bases +wheezing   Cardiovascular: S1S2+ +systolic murmur   Gastrointestinal: BS+, soft, NT/ND, no guarding, no rebound  Neurological: AAOx3 , no focal deficits  Extremities: venous stasis bilaterally   Skin: No significant new skin lesions or rashes    ALLERGIES:  metoprolol (Short breath)      MEDICATIONS:  MEDICATIONS  (STANDING):  aMIOdarone    Tablet 200 milliGRAM(s) Oral daily  atorvastatin 80 milliGRAM(s) Oral at bedtime  bumetanide Injectable 2 milliGRAM(s) IV Push every 12 hours  cefTRIAXone   IVPB 2000 milliGRAM(s) IV Intermittent every 24 hours  cefTRIAXone   IVPB      chlorhexidine 2% Cloths 1 Application(s) Topical <User Schedule>  clopidogrel Tablet 75 milliGRAM(s) Oral daily  digoxin     Tablet 125 MICROGram(s) Oral daily  isosorbide   dinitrate Tablet (ISORDIL) 10 milliGRAM(s) Oral three times a day  metoprolol succinate ER 25 milliGRAM(s) Oral daily  pantoprazole    Tablet 40 milliGRAM(s) Oral before breakfast  polyethylene glycol 3350 17 Gram(s) Oral daily    MEDICATIONS  (PRN):  albuterol/ipratropium for Nebulization 3 milliLiter(s) Nebulizer every 6 hours PRN Shortness of Breath and/or Wheezing      LABS: (Personally Reviewed):                        11.1   7.11  )-----------( 227      ( 28 Jun 2025 04:30 )             37.3     06-28    142  |  98  |  67[H]  ----------------------------<  103[H]  4.2   |  44[H]  |  1.60[H]    Ca    8.8      28 Jun 2025 04:30    TPro  7.6  /  Alb  2.2[L]  /  TBili  0.4  /  DBili  x   /  AST  18  /  ALT  9[L]  /  AlkPhos  162[H]  06-28    PT/INR - ( 27 Jun 2025 07:18 )   PT: 32.2 sec;   INR: 2.76 ratio         PTT - ( 27 Jun 2025 07:18 )  PTT:41.4 sec      Blood Culture: 06-26 @ 14:30  Organism --  Gram Stain Blood -- Gram Stain --  Specimen Source Blood Blood-Peripheral  Culture-Blood --    06-26 @ 14:22  Organism --  Gram Stain Blood -- Gram Stain --  Specimen Source Blood Blood-Venous  Culture-Blood --    RADIOLOGY:  < from: CT Abdomen and Pelvis No Cont (06.27.25 @ 09:54) >  IMPRESSION:  Increase in size and complexity of the small thick walled loculated right   pleural effusion. Infection is in the differential diagnosis.    Interval development of a small to moderate left pleural effusion.    Endplate irregularity L1-2 is new since the prior examination and may be   seen in the setting of discitis osteomyelitis. Recommend further   evaluation with MRI lumbar spine.    2.2 cm structure just lateral to the proximal left profunda femoral   artery in the left groin raising the possibility of an aneurysm in this   region. Correlation with left lower extremity arterial Doppler   recommended.    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    < end of copied text >

## 2025-06-28 NOTE — PROGRESS NOTE ADULT - ATTENDING COMMENTS
Please see resident note for full details regarding the service.    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  - Morbid obesity (BMI > 40)  - History of AFib (s/p dccv 3/25/25, unsuccessful), HFrEF (likely mod red EF, sev TTE), aortic stenosis s/p TAVR (11/2022, reop on 4/11), a flutter s/p ablation 2019 and s/p micra implant, CKD3, WILFRED, obesity, venous stasis, HTN, and HLD    Plan:  - Wean O2 as tolerated  - Diuresis with Bumex 2 mg Q12H IVP -> will need to transition to PO    - Bipap QHS  - Ceftriaxone (Day 3)  - BCx x 2 (6/23/25) gram negative rods -> follow up sensitivities   - BCx x 2 (6/25/25) no growth to date   - CT chest/abd/pelvis: Increase in size and complexity of the small thick walled loculated right pleural effusion. Infection is in the differential diagnosis. Interval development of a small to moderate left pleural effusion. Endplate irregularity L1-2 is new since the prior examination and may be seen in the setting of discitis osteomyelitis. Recommend further evaluation with MRI lumbar spine. 2.2 cm structure just lateral to the proximal left profunda femoral artery in the left groin raising the possibility of an aneurysm in this region. Correlation with left lower extremity arterial Doppler recommended.  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 Greenwood) -> continue current anticoagulation, follow up with vascular outpatient, no surgical intervention indicated at this time   - Not a candidate for GDMT including ACE/ARB/ARNI given CKD Stage 3 (additionally BP is soft), will discuss Farxiga with cardiology/nephrology   - Spoke to Dr. Corea (ID) - continue Ceftriaxone for now, follow up BCx x 2, can monitor pt over the weekend and await sensitivities, STANISLAW may be indicated but pt already has hx of valve in valve TAVR, if no considerations for surgical intervention there may not be a role for transfer   - Spoke to Yael (cardiology) - Will call for a referral for advanced heart failure specialist Dr. Kerwin Carl. The patient was previously seen by Dr. Hughes and Timo. Not a candidate for Hydralazine at this time. Hold off on Farxiga for now. Continue diuresis with IV lasix.   - PT: JASON  - DVT ppx: Will give Warfarin dose today, follow up AM INR (goal 2-3), will hold doses of Warfarin for INR > 3   - Code status: Full code  - Dispo: Active    I spent a total of 50 minutes on the date of this encounter coordinating the patient's care, excluding resident teaching time. This includes reviewing results/imaging and discussions with specialists, nursing, case management/social work. Further tests, medications, and procedures have been ordered as indicated. Results and the plan of care were communicated to the patient and/or their family member. Supporting documentation was completed and added to the patient's chart. Please see resident note for full details regarding the service.    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  - Morbid obesity (BMI > 40)  - History of AFib (s/p dccv 3/25/25, unsuccessful), HFrEF (likely mod red EF, sev TTE), aortic stenosis s/p TAVR (11/2022, reop on 4/11), a flutter s/p ablation 2019 and s/p micra implant, CKD3, WILFRED, obesity, venous stasis, HTN, and HLD    Plan:  - Wean O2 as tolerated  - Diuresis with Bumex 2 mg Q12H IVP -> will need to transition to PO    - Bipap QHS  - Ceftriaxone (Day 3)  - BCx x 2 (6/23/25) gram negative rods -> follow up sensitivities   - BCx x 2 (6/25/25) no growth to date   - CT chest/abd/pelvis: Increase in size and complexity of the small thick walled loculated right pleural effusion. Infection is in the differential diagnosis. Interval development of a small to moderate left pleural effusion. Endplate irregularity L1-2 is new since the prior examination and may be seen in the setting of discitis osteomyelitis. Recommend further evaluation with MRI lumbar spine. 2.2 cm structure just lateral to the proximal left profunda femoral artery in the left groin raising the possibility of an aneurysm in this region. Correlation with left lower extremity arterial Doppler recommended.  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 Greenwood) -> continue current anticoagulation, follow up with vascular outpatient, no surgical intervention indicated at this time   - Not a candidate for GDMT including ACE/ARB/ARNI given CKD Stage 3 (additionally BP is soft), will discuss Farxiga with cardiology/nephrology   - Spoke to Dr. Corea (ID) - continue Ceftriaxone for now, follow up BCx x 2, can monitor pt over the weekend and await sensitivities, STANISLAW may be indicated but pt already has hx of valve in valve TAVR, if no considerations for surgical intervention there may not be a role for transfer   - Spoke to Yael (cardiology) - Will call for a referral for advanced heart failure specialist Dr. Kerwin Carl. The patient was previously seen by Dr. Hughes and Timo. Not a candidate for Hydralazine at this time. Hold off on Farxiga for now. Continue diuresis with IV lasix.   - PT: JASON  - DVT ppx: follow up INR, if < 3 will give Warfarin dose   - Code status: Full code  - Dispo: Active    I spent a total of 50 minutes on the date of this encounter coordinating the patient's care, excluding resident teaching time. This includes reviewing results/imaging and discussions with specialists, nursing, case management/social work. Further tests, medications, and procedures have been ordered as indicated. Results and the plan of care were communicated to the patient and/or their family member. Supporting documentation was completed and added to the patient's chart.

## 2025-06-28 NOTE — PROGRESS NOTE ADULT - CONVERSATION DETAILS
I had a detailed discussion with the patient regarding their goals of care. We discussed that cardiopulmonary resuscitation (CPR) can be completed if the patient were to go into cardiac arrest. This includes chest compressions and delivering shocks if needed to restart their heart and intubation/mechanical ventilation to maintain their airway. The risks of CPR were discussed with the patient including rib fractures, damage to internal organs, and the possibility of anoxic brain injury or increased physical debility. At this time, the patient states that they are agreeable to chest compressions and intubation if needed to save their life in an emergency.
I had a detailed discussion with the patient regarding their goals of care. We discussed that cardiopulmonary resuscitation (CPR) can be completed if the patient were to go into cardiac arrest. This includes chest compressions and delivering shocks if needed to restart their heart and intubation/mechanical ventilation to maintain their airway. The risks of CPR were discussed with the patient including rib fractures, damage to internal organs, and the possibility of anoxic brain injury or increased physical debility. At this time, the patient states that they are agreeable to chest compressions and intubation if needed to save their life in an emergency.     I stated that the patient has a low EF which puts him at risk of sudden cardiac death. He would like to remain full code at this time. He has designated his brother Nathan Waller as his HCP (paperwork signed with me and in chart). Palliative care consult placed.

## 2025-06-28 NOTE — PROGRESS NOTE ADULT - ASSESSMENT
65 year old male with a PMH of AFib (s/p dccv 3/25/25, unsuccessful), HFrEF (likely mod red EF, sev TTE), aortic stenosis s/p TAVR (11/2022, reop on 4/11), a flutter s/p ablation 2019 and s/p micra implant, CKD3, WILFRED, obesity, venous stasis, HTN, and HLD with recent hospitalization for heart failure exacerbation and cardiorenal syndrome in April presenting to [facility] from [facility] for worsening SOB x a few days as well as LE swelling, Pt was given increased doses of Bumex in attempts to diurese, however condition continued to worsen therefore patient was taken to the ED. He was placed on NIV for increased WOB, and given additional dose of Bumex. BNP noted to be elevated. Pt denies fever, chills, or sick contacts and ROS otherwise negative. Pt accepted to ICU for further management / care.   Patient was admitted for Acute on chronic hypercarbic respiratory failure, Heart failure exacerbation, ANGELICA on CKD.    #Acute on chronic hypercapnic respiratory failure  -s/p BIPAP in ICU  -on NC, spO2: 90% 5L  -c/w incentive spirometer   -Chest PT/Pulm toilet   -HOB> 30  -NIV PRN + QHs    #Bacteremia  - Blood Culture 6/23 positive E.coli   - CT Chest/Abdomen/Pelvis: increased right/left pleural effusions  - endplate irregularity L1-2 is new ?discitis osteomyelitis - needs MRI lumbar spine - however patient has a pacemaker and MRI machine not compatible with pacemaker here   - 2.2 cm structure lateral to proximal left profunda femoral artery in left groin - confirmed saccular aneurysm via Doppler Artery - vascular consulted over the phone and recommended continue AC and follow up outpatient   - Continue Rocephin  - Infectious Disease Consult, recs appreciated     #Acute on chronic systolic HFrEF  -SOB and LE swelling on admission  -ECHO: 25-30% EF  -s/p Bumex ggt in ICU  -c/w Bumex 2mg BID IV  -I+O  -Daily weights  -Cardio following  -Will need Hydral/Nitro for afterload and pre load reduction in lieu of ARB/ACEi  - not a candidate for ace arb arni slgt2i or mra 2/2 ckd. consider addition of hydral, imdur if hemodynamics allow  - Potential AICD pending GDMT optimization.   - Outpatient EP     #ANGELICA on CKD, likely cardiorenal syndrome / Congestive nephropathy - resolved  -Cr: 1.99 (stable)   -Avoid nephro toxic agents  -Nephro following recs appreciated     #Afib with rvr  -On Coumadin 2.5mg outpaitent  -Dig loaded  -c/w Digoxin 125 mcg QD  -c/w home dose amiodarone QD  -Tele monitor   -Cardiology following   -Mg>2, Phos>4   -INR daily  -Coumadin 2.5mg ordered for tonight     #HTN  -c/w metoprolol with holding parameters     #HLD  -c/w with statin    #GERD  -c/w Pepcid     #DVT ppx  - SCD    Code status: Full code    Discussed with Dr. Cunningham  65 year old male with a PMH of AFib (s/p dccv 3/25/25, unsuccessful), HFrEF (likely mod red EF, sev TTE), aortic stenosis s/p TAVR (11/2022, reop on 4/11), a flutter s/p ablation 2019 and s/p micra implant, CKD3, WILFRED, obesity, venous stasis, HTN, and HLD with recent hospitalization for heart failure exacerbation and cardiorenal syndrome in April presenting to [facility] from [facility] for worsening SOB x a few days as well as LE swelling, Pt was given increased doses of Bumex in attempts to diurese, however condition continued to worsen therefore patient was taken to the ED. He was placed on NIV for increased WOB, and given additional dose of Bumex. BNP noted to be elevated. Pt denies fever, chills, or sick contacts and ROS otherwise negative. Pt accepted to ICU for further management / care.   Patient was admitted for Acute on chronic hypercarbic respiratory failure, Heart failure exacerbation, ANGELICA on CKD.    #Acute on chronic hypercapnic respiratory failure  -s/p BIPAP in ICU  -on NC, spO2: 90% 5L  -c/w incentive spirometer   -Chest PT/Pulm toilet   -HOB> 30  -NIV PRN + QHs  -Duonebs for mild wheezing     #Bacteremia  - Blood Culture 6/23 positive E.coli   - CT Chest/Abdomen/Pelvis: increased right/left pleural effusions  - endplate irregularity L1-2 is new ?discitis osteomyelitis - needs MRI lumbar spine - however patient has a pacemaker and MRI machine not compatible with pacemaker here   - 2.2 cm structure lateral to proximal left profunda femoral artery in left groin - confirmed saccular aneurysm via Doppler Artery - vascular consulted over the phone and recommended continue AC and follow up outpatient   - Continue Rocephin  - Infectious Disease Consult, recs appreciated     #Acute on chronic systolic HFrEF  -SOB and LE swelling on admission  -ECHO: 25-30% EF  -s/p Bumex ggt in ICU  -c/w Bumex 2mg BID IV  -I+O  -Daily weights  -Cardio following  -Will need Hydral/Nitro for afterload and pre load reduction in lieu of ARB/ACEi  - not a candidate for ace arb arni slgt2i or mra 2/2 ckd. consider addition of hydral, imdur if hemodynamics allow  - Potential AICD pending GDMT optimization.   - Outpatient EP     #ANGELICA on CKD, likely cardiorenal syndrome / Congestive nephropathy - resolved  -Cr: 1.99 (stable)   -Avoid nephro toxic agents  -Nephro following recs appreciated     #Afib with rvr  -On Coumadin 2.5mg outpaitent  -Dig loaded  -c/w Digoxin 125 mcg QD  -c/w home dose amiodarone QD  -Tele monitor   -Cardiology following   -Mg>2, Phos>4   -INR daily  -Coumadin 2.5mg ordered for tonight     #HTN  -c/w metoprolol with holding parameters     #HLD  -c/w with statin    #GERD  -c/w Pepcid     #DVT ppx  - SCD    Code status: Full code    Discussed with Dr. Cunningham  65 year old male with a PMH of AFib (s/p dccv 3/25/25, unsuccessful), HFrEF (likely mod red EF, sev TTE), aortic stenosis s/p TAVR (11/2022, reop on 4/11), a flutter s/p ablation 2019 and s/p micra implant, CKD3, WILFRED, obesity, venous stasis, HTN, and HLD with recent hospitalization for heart failure exacerbation and cardiorenal syndrome in April presenting to [facility] from [facility] for worsening SOB x a few days as well as LE swelling, Pt was given increased doses of Bumex in attempts to diurese, however condition continued to worsen therefore patient was taken to the ED. He was placed on NIV for increased WOB, and given additional dose of Bumex. BNP noted to be elevated. Pt denies fever, chills, or sick contacts and ROS otherwise negative. Pt accepted to ICU for further management / care.   Patient was admitted for Acute on chronic hypercarbic respiratory failure, Heart failure exacerbation, ANGELICA on CKD.    #Acute on chronic hypercapnic respiratory failure  -s/p BIPAP in ICU  -on NC, spO2: 90% 5L  -c/w incentive spirometer   -Chest PT/Pulm toilet   -HOB> 30  -NIV PRN + QHs  -Duonebs for mild wheezing     #Bacteremia  - Blood Culture 6/23 positive E.coli   - CT Chest/Abdomen/Pelvis: increased right/left pleural effusions  - endplate irregularity L1-2 is new ?discitis osteomyelitis - needs MRI lumbar spine - however patient has a pacemaker and MRI machine not compatible with pacemaker here   - 2.2 cm structure lateral to proximal left profunda femoral artery in left groin - confirmed saccular aneurysm via Doppler Artery - vascular consulted over the phone and recommended continue AC and follow up outpatient   - Continue Rocephin  - Infectious Disease Consult, recs appreciated     #Acute on chronic systolic HFrEF  -SOB and LE swelling on admission  -ECHO: 25-30% EF  -s/p Bumex ggt in ICU  -c/w Bumex 2mg BID IV  -I+O  -Daily weights  -Cardio following  -Will need Hydral/Nitro for afterload and pre load reduction in lieu of ARB/ACEi  - not a candidate for ace arb arni slgt2i or mra 2/2 ckd. consider addition of hydral, imdur if hemodynamics allow  - Potential AICD pending GDMT optimization.   - Outpatient EP     #ANGELICA on CKD, likely cardiorenal syndrome / Congestive nephropathy - resolved  -Cr: 1.99 (stable)   -Avoid nephro toxic agents  -Nephro following recs appreciated     #Afib with rvr  -On Coumadin 2.5mg outpaitent  -Dig loaded  -c/w Digoxin 125 mcg QD  -c/w home dose amiodarone QD  -Tele monitor   -Cardiology following   -Mg>2, Phos>4   -INR daily  -Coumadin 2.5mg ordered for tonight     #HTN  -c/w metoprolol with holding parameters     #HLD  -c/w with statin    #GERD  -c/w Pepcid     #DVT ppx  - SCD    Code status: Full code    Discussed with Dr. Cunningham     CHF Core measures:     CHF Type:  [  ]  Acute    [  ]Chronic    [ x ]Acute on Chronic                       [ x ] Systolic  [  ] Diastolic  [  ] Combined    EF: 25-30%  ECHO (6/23/25): There appears to be severe global left ventricle hypokinesis. Left ventricular wall thickness is mildly increased. Left ventricular systolic function is severely decreased with an ejection fraction of 30 % by Rivera's method of disks with an ejection fraction visually estimated at 25 to 30 %. Micra pacemaker is visualized in the right heart. S/p Florence-TAVR, appears well seated, peak velocity within normal limits. Mild aortic regurgitation. Small right pleural effusion noted.    ARNI [ preferred]/ACEI/ARBs for EF 40% or less  [   ] ACE or ARB OR ARNI ordered:  [ x  ] ACE or ARB or ARNI contraindicated or not ordered due to : CKD Stage 3   [   ] ACE  ARB or ARNI discretionary: HFpEF    LONG ACTING BETA BLOCKER  for EF 40 % or less  [  x ] Beta blocker ordered: Coreg/Toprol  [   ] Beta blocker contraindicated or not ordered due to :  [   ] Beta blocker discretionary: HFpEF    ALDOSTERONE ANTAGONIST for EF 40% or less  [   ] Aldosterone Antagonist ordered: aldactone/Eplerenon  [  x ] Aldosterone Antagonist contraindicated or not ordered due to:   CKD Stage 3   [   ] Aldosterone Antagonist is discretionary: HFpEF    SGLT2i for all CHF Patient:  [   ] SGLT2i ordered: Farxiga/Jardiance  [  x ] SGLT2i contraindicated or not ordered due to : CKD Stage 3     GOC:  [ x ] GOC Discussed  [  ] GOC Not diacussed due to:  [  x ] Palliaitve consulted  [   ]Palliative not consulted due to:     On DISCHARGE:  [  ] & days appoinment provided for Primary care provider/Cardiologist?:         if not: why?    [  ] 7 day appointment appointment with:                Date/Time:   [   ] 7 day appointment not provided due to: Transferred to acute care or acute rehab or SNF.   65 year old male with a PMH of AFib (s/p dccv 3/25/25, unsuccessful), HFrEF (likely mod red EF, sev TTE), aortic stenosis s/p TAVR (11/2022, reop on 4/11), a flutter s/p ablation 2019 and s/p micra implant, CKD3, WILFRED, obesity, venous stasis, HTN, and HLD with recent hospitalization for heart failure exacerbation and cardiorenal syndrome in April presenting to [facility] from [facility] for worsening SOB x a few days as well as LE swelling, Pt was given increased doses of Bumex in attempts to diurese, however condition continued to worsen therefore patient was taken to the ED. He was placed on NIV for increased WOB, and given additional dose of Bumex. BNP noted to be elevated. Pt denies fever, chills, or sick contacts and ROS otherwise negative. Pt accepted to ICU for further management / care.   Patient was admitted for Acute on chronic hypercarbic respiratory failure, Heart failure exacerbation, ANGELICA on CKD.    #Acute on chronic hypercapnic respiratory failure  -s/p BIPAP in ICU  -on NC, spO2: 90% 5L  -c/w incentive spirometer   -Chest PT/Pulm toilet   -HOB> 30  -NIV PRN + QHs  -Duonebs for mild wheezing     #Bacteremia  - Blood Culture 6/23 positive E.coli   - CT Chest/Abdomen/Pelvis: increased right/left pleural effusions  - endplate irregularity L1-2 is new ?discitis osteomyelitis - needs MRI lumbar spine - however patient has a pacemaker and MRI machine not compatible with pacemaker here   - 2.2 cm structure lateral to proximal left profunda femoral artery in left groin - confirmed saccular aneurysm via Doppler Artery - vascular consulted over the phone and recommended continue AC and follow up outpatient   - Continue Rocephin  - Infectious Disease Consult, recs appreciated     #Acute on chronic systolic HFrEF  -SOB and LE swelling on admission  -ECHO: 25-30% EF  -s/p Bumex ggt in ICU  -c/w Bumex 2mg BID IV  -I+O  -Daily weights  -Cardio following  -Will need Hydral/Nitro for afterload and pre load reduction in lieu of ARB/ACEi  - not a candidate for ace arb arni slgt2i or mra 2/2 ckd. consider addition of hydral, imdur if hemodynamics allow  - Potential AICD pending GDMT optimization.   - Outpatient EP     #ANGELICA on CKD, likely cardiorenal syndrome / Congestive nephropathy - resolved  -Cr: 1.60 today   -Avoid nephro toxic agents  -Nephro following recs appreciated     #Afib with rvr  -On Coumadin 2.5mg outpaitent  -Dig loaded  -c/w Digoxin 125 mcg QD  -c/w home dose amiodarone QD  -Tele monitor   -Cardiology following   -Mg>2, Phos>4   -INR daily  -Coumadin 2.5mg ordered for tonight     #HTN  -c/w metoprolol with holding parameters     #HLD  -c/w with statin    #GERD  -c/w Pepcid     #DVT ppx  - SCD    Code status: Full code    Discussed with Dr. Cunningham     CHF Core measures:     CHF Type:  [  ]  Acute    [  ]Chronic    [ x ]Acute on Chronic                       [ x ] Systolic  [  ] Diastolic  [  ] Combined    EF: 25-30%  ECHO (6/23/25): There appears to be severe global left ventricle hypokinesis. Left ventricular wall thickness is mildly increased. Left ventricular systolic function is severely decreased with an ejection fraction of 30 % by Rivera's method of disks with an ejection fraction visually estimated at 25 to 30 %. Micra pacemaker is visualized in the right heart. S/p Florence-TAVR, appears well seated, peak velocity within normal limits. Mild aortic regurgitation. Small right pleural effusion noted.    ARNI [ preferred]/ACEI/ARBs for EF 40% or less  [   ] ACE or ARB OR ARNI ordered:  [ x  ] ACE or ARB or ARNI contraindicated or not ordered due to : CKD Stage 3   [   ] ACE  ARB or ARNI discretionary: HFpEF    LONG ACTING BETA BLOCKER  for EF 40 % or less  [  x ] Beta blocker ordered: Coreg/Toprol  [   ] Beta blocker contraindicated or not ordered due to :  [   ] Beta blocker discretionary: HFpEF    ALDOSTERONE ANTAGONIST for EF 40% or less  [   ] Aldosterone Antagonist ordered: aldactone/Eplerenon  [  x ] Aldosterone Antagonist contraindicated or not ordered due to:   CKD Stage 3   [   ] Aldosterone Antagonist is discretionary: HFpEF    SGLT2i for all CHF Patient:  [   ] SGLT2i ordered: Farxiga/Jardiance  [  x ] SGLT2i contraindicated or not ordered due to : CKD Stage 3     GOC:  [ x ] GOC Discussed  [  ] GOC Not diacussed due to:  [  x ] Palliaitve consulted  [   ]Palliative not consulted due to:     On DISCHARGE:  [  ] & days appoinment provided for Primary care provider/Cardiologist?:         if not: why?    [  ] 7 day appointment appointment with:                Date/Time:   [   ] 7 day appointment not provided due to: Transferred to acute care or acute rehab or SNF.

## 2025-06-29 LAB
ALBUMIN SERPL ELPH-MCNC: 2.2 G/DL — LOW (ref 3.3–5)
ALP SERPL-CCNC: 157 U/L — HIGH (ref 40–120)
ALT FLD-CCNC: 13 U/L — SIGNIFICANT CHANGE UP (ref 10–45)
ANION GAP SERPL CALC-SCNC: -1 MMOL/L — LOW (ref 5–17)
APTT BLD: 37 SEC — HIGH (ref 26.1–36.8)
AST SERPL-CCNC: 16 U/L — SIGNIFICANT CHANGE UP (ref 10–40)
BASOPHILS # BLD AUTO: 0.05 K/UL — SIGNIFICANT CHANGE UP (ref 0–0.2)
BASOPHILS NFR BLD AUTO: 0.7 % — SIGNIFICANT CHANGE UP (ref 0–2)
BILIRUB SERPL-MCNC: 0.4 MG/DL — SIGNIFICANT CHANGE UP (ref 0.2–1.2)
BUN SERPL-MCNC: 68 MG/DL — HIGH (ref 7–23)
CALCIUM SERPL-MCNC: 8.9 MG/DL — SIGNIFICANT CHANGE UP (ref 8.4–10.5)
CHLORIDE SERPL-SCNC: 96 MMOL/L — SIGNIFICANT CHANGE UP (ref 96–108)
CO2 SERPL-SCNC: 43 MMOL/L — HIGH (ref 22–31)
CREAT SERPL-MCNC: 1.61 MG/DL — HIGH (ref 0.5–1.3)
CULTURE RESULTS: ABNORMAL
EGFR: 47 ML/MIN/1.73M2 — LOW
EGFR: 47 ML/MIN/1.73M2 — LOW
EOSINOPHIL # BLD AUTO: 0.28 K/UL — SIGNIFICANT CHANGE UP (ref 0–0.5)
EOSINOPHIL NFR BLD AUTO: 3.9 % — SIGNIFICANT CHANGE UP (ref 0–6)
GLUCOSE SERPL-MCNC: 91 MG/DL — SIGNIFICANT CHANGE UP (ref 70–99)
GRAM STN FLD: ABNORMAL
GRAM STN FLD: ABNORMAL
HCT VFR BLD CALC: 34.7 % — LOW (ref 39–50)
HGB BLD-MCNC: 10.4 G/DL — LOW (ref 13–17)
IMM GRANULOCYTES NFR BLD AUTO: 0.4 % — SIGNIFICANT CHANGE UP (ref 0–0.9)
INR BLD: 2.2 RATIO — HIGH (ref 0.85–1.16)
LYMPHOCYTES # BLD AUTO: 0.91 K/UL — LOW (ref 1–3.3)
LYMPHOCYTES # BLD AUTO: 12.6 % — LOW (ref 13–44)
MCHC RBC-ENTMCNC: 29.7 PG — SIGNIFICANT CHANGE UP (ref 27–34)
MCHC RBC-ENTMCNC: 30 G/DL — LOW (ref 32–36)
MCV RBC AUTO: 99.1 FL — SIGNIFICANT CHANGE UP (ref 80–100)
MONOCYTES # BLD AUTO: 0.82 K/UL — SIGNIFICANT CHANGE UP (ref 0–0.9)
MONOCYTES NFR BLD AUTO: 11.3 % — SIGNIFICANT CHANGE UP (ref 2–14)
NEUTROPHILS # BLD AUTO: 5.16 K/UL — SIGNIFICANT CHANGE UP (ref 1.8–7.4)
NEUTROPHILS NFR BLD AUTO: 71.1 % — SIGNIFICANT CHANGE UP (ref 43–77)
NRBC BLD AUTO-RTO: 0 /100 WBCS — SIGNIFICANT CHANGE UP (ref 0–0)
ORGANISM # SPEC MICROSCOPIC CNT: ABNORMAL
ORGANISM # SPEC MICROSCOPIC CNT: SIGNIFICANT CHANGE UP
PLATELET # BLD AUTO: 229 K/UL — SIGNIFICANT CHANGE UP (ref 150–400)
POTASSIUM SERPL-MCNC: 4.5 MMOL/L — SIGNIFICANT CHANGE UP (ref 3.5–5.3)
POTASSIUM SERPL-SCNC: 4.5 MMOL/L — SIGNIFICANT CHANGE UP (ref 3.5–5.3)
PROT SERPL-MCNC: 7.7 G/DL — SIGNIFICANT CHANGE UP (ref 6–8.3)
PROTHROM AB SERPL-ACNC: 25.8 SEC — HIGH (ref 9.9–13.4)
RBC # BLD: 3.5 M/UL — LOW (ref 4.2–5.8)
RBC # FLD: 16.4 % — HIGH (ref 10.3–14.5)
SODIUM SERPL-SCNC: 138 MMOL/L — SIGNIFICANT CHANGE UP (ref 135–145)
SPECIMEN SOURCE: SIGNIFICANT CHANGE UP
WBC # BLD: 7.25 K/UL — SIGNIFICANT CHANGE UP (ref 3.8–10.5)
WBC # FLD AUTO: 7.25 K/UL — SIGNIFICANT CHANGE UP (ref 3.8–10.5)

## 2025-06-29 PROCEDURE — 99232 SBSQ HOSP IP/OBS MODERATE 35: CPT

## 2025-06-29 PROCEDURE — 93010 ELECTROCARDIOGRAM REPORT: CPT

## 2025-06-29 PROCEDURE — 99233 SBSQ HOSP IP/OBS HIGH 50: CPT | Mod: GC

## 2025-06-29 PROCEDURE — 71045 X-RAY EXAM CHEST 1 VIEW: CPT | Mod: 26

## 2025-06-29 RX ORDER — MEROPENEM 1 G/30ML
INJECTION INTRAVENOUS
Refills: 0 | Status: DISCONTINUED | OUTPATIENT
Start: 2025-06-29 | End: 2025-06-30

## 2025-06-29 RX ORDER — MEROPENEM 1 G/30ML
1000 INJECTION INTRAVENOUS ONCE
Refills: 0 | Status: COMPLETED | OUTPATIENT
Start: 2025-06-29 | End: 2025-06-29

## 2025-06-29 RX ORDER — MEROPENEM 1 G/30ML
1000 INJECTION INTRAVENOUS EVERY 12 HOURS
Refills: 0 | Status: DISCONTINUED | OUTPATIENT
Start: 2025-06-29 | End: 2025-06-30

## 2025-06-29 RX ADMIN — BUMETANIDE 2 MILLIGRAM(S): 1 TABLET ORAL at 17:19

## 2025-06-29 RX ADMIN — ISOSORBDIE DINITRATE 10 MILLIGRAM(S): 30 TABLET ORAL at 11:20

## 2025-06-29 RX ADMIN — CLOPIDOGREL BISULFATE 75 MILLIGRAM(S): 75 TABLET, FILM COATED ORAL at 11:20

## 2025-06-29 RX ADMIN — Medication 2.5 MILLIGRAM(S): at 21:06

## 2025-06-29 RX ADMIN — AMIODARONE HYDROCHLORIDE 200 MILLIGRAM(S): 50 INJECTION, SOLUTION INTRAVENOUS at 06:09

## 2025-06-29 RX ADMIN — CEFTRIAXONE 100 MILLIGRAM(S): 500 INJECTION, POWDER, FOR SOLUTION INTRAMUSCULAR; INTRAVENOUS at 09:14

## 2025-06-29 RX ADMIN — ISOSORBDIE DINITRATE 10 MILLIGRAM(S): 30 TABLET ORAL at 06:10

## 2025-06-29 RX ADMIN — DIGOXIN 125 MICROGRAM(S): 250 TABLET ORAL at 06:09

## 2025-06-29 RX ADMIN — MEROPENEM 100 MILLIGRAM(S): 1 INJECTION INTRAVENOUS at 18:52

## 2025-06-29 RX ADMIN — BUMETANIDE 2 MILLIGRAM(S): 1 TABLET ORAL at 06:03

## 2025-06-29 RX ADMIN — ISOSORBDIE DINITRATE 10 MILLIGRAM(S): 30 TABLET ORAL at 17:19

## 2025-06-29 RX ADMIN — IPRATROPIUM BROMIDE AND ALBUTEROL SULFATE 3 MILLILITER(S): .5; 2.5 SOLUTION RESPIRATORY (INHALATION) at 10:06

## 2025-06-29 RX ADMIN — IPRATROPIUM BROMIDE AND ALBUTEROL SULFATE 3 MILLILITER(S): .5; 2.5 SOLUTION RESPIRATORY (INHALATION) at 04:04

## 2025-06-29 RX ADMIN — MEROPENEM 100 MILLIGRAM(S): 1 INJECTION INTRAVENOUS at 13:31

## 2025-06-29 RX ADMIN — Medication 1 APPLICATION(S): at 06:16

## 2025-06-29 RX ADMIN — METOPROLOL SUCCINATE 25 MILLIGRAM(S): 50 TABLET, EXTENDED RELEASE ORAL at 06:09

## 2025-06-29 RX ADMIN — ATORVASTATIN CALCIUM 80 MILLIGRAM(S): 80 TABLET, FILM COATED ORAL at 21:07

## 2025-06-29 NOTE — PROGRESS NOTE ADULT - SUBJECTIVE AND OBJECTIVE BOX
Follow up for    Gram-negative bacteremia and heart failure complicated gentleman with prosthetic valve now with gram-negative bacteremia care coordinated with Dr. Cunningham if recurrent bacteremia and discitis with consider prosthetic valve endocarditis in the differential transesophageal echo would be helpful here as indicated by ID service Dr. Nielsen hemoglobin 10 hematocrit 30 5 repeat blood cultures are pending previously discussed with Dr. Carl from heart failure team who advised continue diuretics for volume overload from a heart failure standpoint I would consider transesophageal echo here at [place] or at [place] based upon constraints of [phone] previously heart failure team atrial fibrillation anticoagulation hemoglobin 11 hematocrit 37 creatinine 1.6.  SUBJ:  PMH  CAD (coronary artery disease)    H/O heart artery stent    Obesity    Hypercholesteremia    Thrombus of left atrial appendage    Ischemic cardiomyopathy    WILFRED (obstructive sleep apnea)    HTN (hypertension)    Atrial fibrillation    CHF (congestive heart failure)    Peripheral edema    Lichen planus    Atrial flutter    MI (myocardial infarction)    Stented coronary artery    AS (aortic stenosis)    Chronic kidney disease, unspecified CKD stage    ANGELICA (acute kidney injury)    Morbid obesity    Status post placement of cardiac pacemaker    Osteoarthritis    H/O scoliosis    Lower back pain        MEDICATIONS  (STANDING):  aMIOdarone    Tablet 200 milliGRAM(s) Oral daily  atorvastatin 80 milliGRAM(s) Oral at bedtime  bumetanide Injectable 2 milliGRAM(s) IV Push every 12 hours  chlorhexidine 2% Cloths 1 Application(s) Topical <User Schedule>  clopidogrel Tablet 75 milliGRAM(s) Oral daily  digoxin     Tablet 125 MICROGram(s) Oral daily  isosorbide   dinitrate Tablet (ISORDIL) 10 milliGRAM(s) Oral three times a day  meropenem  IVPB      meropenem  IVPB 1000 milliGRAM(s) IV Intermittent every 12 hours  metoprolol succinate ER 25 milliGRAM(s) Oral daily  pantoprazole    Tablet 40 milliGRAM(s) Oral before breakfast  polyethylene glycol 3350 17 Gram(s) Oral daily  warfarin 2.5 milliGRAM(s) Oral once    MEDICATIONS  (PRN):  albuterol/ipratropium for Nebulization 3 milliLiter(s) Nebulizer every 6 hours PRN Shortness of Breath and/or Wheezing        PHYSICAL EXAM:  Vital Signs Last 24 Hrs  T(C): 36.7 (29 Jun 2025 12:00), Max: 36.7 (29 Jun 2025 12:00)  T(F): 98 (29 Jun 2025 12:00), Max: 98 (29 Jun 2025 12:00)  HR: 70 (29 Jun 2025 12:00) (65 - 92)  BP: 120/74 (29 Jun 2025 17:15) (112/71 - 120/74)  BP(mean): --  RR: 17 (29 Jun 2025 12:00) (17 - 18)  SpO2: 93% (29 Jun 2025 12:00) (93% - 97%)    Parameters below as of 29 Jun 2025 12:00  Patient On (Oxygen Delivery Method): room air        GENERAL: NAD, well-groomed, well-developed  HEAD:  Atraumatic, Normocephalic  EYES: EOMI, PERRLA, conjunctiva and sclera clear  ENT: Moist mucous membranes,  NECK: Supple, No JVD, no bruits  CHEST/LUNG: Clear to percussion bilaterally; No rales, rhonchi, wheezing, or rubs  HEART: Regular rate and rhythm; No murmurs, rubs, or gallops PMI non displaced.  ABDOMEN: Soft, Nontender, Nondistended; Bowel sounds present  EXTREMITIES:  2+ Peripheral Pulses, No clubbing, cyanosis, or edema  SKIN: No rashes or lesions  NERVOUS SYSTEM:  Cranial Nerves II-XII intact      TELEMETRY:    ECG:  ECHO:    LABS:                        10.4   7.25  )-----------( 229      ( 29 Jun 2025 06:20 )             34.7     06-29    138  |  96  |  68[H]  ----------------------------<  91  4.5   |  43[H]  |  1.61[H]    Ca    8.9      29 Jun 2025 06:20    TPro  7.7  /  Alb  2.2[L]  /  TBili  0.4  /  DBili  x   /  AST  16  /  ALT  13  /  AlkPhos  157[H]  06-29        PT/INR - ( 29 Jun 2025 06:20 )   PT: 25.8 sec;   INR: 2.20 ratio         PTT - ( 29 Jun 2025 06:20 )  PTT:37.0 sec    I&O's Summary    28 Jun 2025 07:01  -  29 Jun 2025 07:00  --------------------------------------------------------  IN: 200 mL / OUT: 1000 mL / NET: -800 mL    29 Jun 2025 07:01  -  29 Jun 2025 17:33  --------------------------------------------------------  IN: 0 mL / OUT: 950 mL / NET: -950 mL      BNP    RADIOLOGY & ADDITIONAL STUDIES:    ECHO:       Follow up for    Gram-negative bacteremia and heart failure   SUBJ:    pleasant non toxic appearing    PMH  CAD (coronary artery disease)    H/O heart artery stent    Obesity    Hypercholesteremia    Thrombus of left atrial appendage    Ischemic cardiomyopathy    WILFRED (obstructive sleep apnea)    HTN (hypertension)    Atrial fibrillation    CHF (congestive heart failure)    Peripheral edema    Lichen planus    Atrial flutter    MI (myocardial infarction)    Stented coronary artery    AS (aortic stenosis)    Chronic kidney disease, unspecified CKD stage    ANGELICA (acute kidney injury)    Morbid obesity    Status post placement of cardiac pacemaker    Osteoarthritis    H/O scoliosis    Lower back pain        MEDICATIONS  (STANDING):  aMIOdarone    Tablet 200 milliGRAM(s) Oral daily  atorvastatin 80 milliGRAM(s) Oral at bedtime  bumetanide Injectable 2 milliGRAM(s) IV Push every 12 hours  chlorhexidine 2% Cloths 1 Application(s) Topical <User Schedule>  clopidogrel Tablet 75 milliGRAM(s) Oral daily  digoxin     Tablet 125 MICROGram(s) Oral daily  isosorbide   dinitrate Tablet (ISORDIL) 10 milliGRAM(s) Oral three times a day  meropenem  IVPB      meropenem  IVPB 1000 milliGRAM(s) IV Intermittent every 12 hours  metoprolol succinate ER 25 milliGRAM(s) Oral daily  pantoprazole    Tablet 40 milliGRAM(s) Oral before breakfast  polyethylene glycol 3350 17 Gram(s) Oral daily  warfarin 2.5 milliGRAM(s) Oral once    MEDICATIONS  (PRN):  albuterol/ipratropium for Nebulization 3 milliLiter(s) Nebulizer every 6 hours PRN Shortness of Breath and/or Wheezing        PHYSICAL EXAM:  Vital Signs Last 24 Hrs  T(C): 36.7 (29 Jun 2025 12:00), Max: 36.7 (29 Jun 2025 12:00)  T(F): 98 (29 Jun 2025 12:00), Max: 98 (29 Jun 2025 12:00)  HR: 70 (29 Jun 2025 12:00) (65 - 92)  BP: 120/74 (29 Jun 2025 17:15) (112/71 - 120/74)  BP(mean): --  RR: 17 (29 Jun 2025 12:00) (17 - 18)  SpO2: 93% (29 Jun 2025 12:00) (93% - 97%)    Parameters below as of 29 Jun 2025 12:00  Patient On (Oxygen Delivery Method): room air        GENERAL: NAD, well-groomed, well-developed  HEAD:  Atraumatic, Normocephalic  EYES: EOMI, PERRLA, conjunctiva and sclera clear  ENT: Moist mucous membranes,  NECK: Supple, No JVD, no bruits  CHEST/LUNG: Clear to percussion bilaterally; No rales, rhonchi, wheezing, or rubs  HEART: Regular rate and rhythm; No murmurs, rubs, or gallops PMI non displaced.  ABDOMEN: Soft, Nontender, Nondistended; Bowel sounds present  EXTREMITIES:  2+ Peripheral Pulses, No clubbing, cyanosis, or edema  SKIN: No rashes or lesions  NERVOUS SYSTEM:  Cranial Nerves II-XII intact      TELEMETRY:    ECG:  ECHO:    < from: TTE Echo Complete w/o Contrast w/ Doppler (06.23.25 @ 13:12) >  ACC: [id number] EXAM:  ECHO TTE WO CON COMP W DOPP                          PROCEDURE DATE:  06/23/2025          INTERPRETATION:  TRANSTHORACIC ECHOCARDIOGRAM REPORT  ___________________________________________________________________________  _____                                      _______      Pt. Name:       BERTHA WARD Study Date:    6/23/2025  MRN:            [id number]        YOB: 1960  Accession #:    [id number]        Age:           65 years  Account#:       [id number]   Gender:        M  Heart Rate:                     Height:        68.11 in (173.00 cm)  Rhythm:                         Weight:        257.95 lb (117.00 kg)  Blood Pressure: 117/85 mmHg     BSA/BMI:       2.28 m² / 39.09 kg/m²  ___________________________________________________________________________  _____________  Referring Physician:    Marissa Clements  Interpreting Physician: Saul Schafer MD  Primary Sonographer:    Vesna Marshall    Indication(s):     I50.9 - Heart failure, unspecified  Procedure:         Transthoracic echocardiogram with 2-D, M-mode and   complete                     spectral and color flow Doppler. TAVR performed.  Ordering Location: [facility]  Admission Status:  Inpatient  Study Information: Image quality for this study is technically difficult.    ___________________________________________________________________________  ____________    CONCLUSIONS:     1. The left ventricle endocardium is not well visualized, consider use   of IV ultrasonic enhancing agent to better evaluate LVEF, endocardium and   apex, if clinically indicated. There appears to be severe global left   ventricle hypokinesis.   2. Left ventricular cavity is normal in size. Left ventricular wall   thickness is mildly increased. Left ventricular systolic function is   severely decreased with an ejection fraction of 30 % by Rivera's method   of disks with an ejection fraction visually estimated at 25 to 30 %.   3. The left ventricular diastolic function is indeterminate. Analysis of   left ventricular diastolic function and filling pressure is made   challenging by the presence of atrial fibrillation.   4. The right ventricle is not well visualized, appears enlarged.   5. Micra pacemaker is visualized in the right heart.  6. Left atrium is top normal sized.   7. Right atrium was not well visualized.   8. Mild mitral valve leaflet calcification.   9. There is mild calcification of the mitral valve annulus.  10. S/p Florence-TAVR, appears well seated, peak velocity within normal limits.  11. Mild aortic regurgitation.  12. Aortic root at the sinuses of Valsalva is normal in size.  13. The inferior vena cava is dilated (dilated >2.1cm) with abnormal   inspiratory collapse (abnormal <50%) consistent with elevated right   atrial pressure (  15, range 10-20mmHg).  14. No pericardial effusion seen.  15. Small right pleural effusion noted.    ___________________________________________________________________________  _____________  FINDINGS:    Left Ventricle:  The left ventricular cavity is normal in size. Left ventricular wall   thickness is mildly increased. Left ventricular systolic function is   severely decreased with a calculated ejection fraction of 30 % by the   Rivera's biplane method of disks with an ejection fraction visually   estimated at 25 to 30%. The left ventricle endocardium is not well   visualized, consider use of IV ultrasonic enhancing agent to better   evaluate LVEF, endocardium and apex, if clinically indicated. There   appears to be severe global left ventricle hypokinesis. The left   ventricular diastolic function is indeterminate. Analysis of left   ventricular diastolic function and filling pressure is made challenging   by the presence of atrial fibrillation.    Right Ventricle:  A Micra pacemaker is visualized in the right heart. The right ventricle   is not well visualized, appears enlarged.    Left Atrium:  The left atrium is top normal sized with an indexed volume of 34.43 ml/m².    Right Atrium:  The right atrium was notwell visualized.    Interatrial Septum:  The interatrial septum was not well visualized.    Aortic Valve:  S/p Florence-TAVR, appears well seated, peak velocity within normal limits.   There is mild aortic regurgitation.    Mitral Valve:  Mild mitral valve leaflet calcification. There is mild calcification of   the mitral valve annulus. There is trace mitral regurgitation.    Tricuspid Valve:  The tricuspid valve was not well visualized. There is trace tricuspid   regurgitation.    Pulmonic Valve:  The pulmonic valve was not well visualized.    Aorta:  The aortic root at the sinuses of Valsalva is normal in size.    Pericardium:  No pericardial effusion seen.    Pleura:  Small right pleural effusion noted.    Systemic Veins:  The inferior vena cava is dilated (dilated >2.1cm) with abnormal   inspiratory collapse (abnormal <50%) consistent with elevated right   atrial pressure (  15, range 10-20mmHg).  ____________________________________________________________________  QUANTITATIVE DATA:  Left Ventricle Measurements: (Indexed to BSA)    IVSd (2D):   1.1 cm  LVPWd (2D):  1.1 cm  LVIDd (2D):  5.1 cm  LVIDs (2D):  4.6 cm  LV Mass:     204 g  89.5 g/m²  LV Vol d, MOD A2C: 96.1 ml   42.15 ml/m²  LV Vol d, MOD A4C: 88.3 ml   38.73 ml/m²  LV Vold, MOD BP:  93.5 ml   41.01 ml/m²  LV Vol s, MOD A2C: 66.8 ml   29.30 ml/m²  LV Vol s, MOD A4C: 62.3 ml   27.32 ml/m²  LV Vol s, MOD BP:  65.1 ml   28.57 ml/m²  LVOT SV MOD BP:    28.4 ml  LV EF% MOD BP:     30 %  Visualized LV EF%: 25 to 30%    MV EVmax:    1.03 m/s  e' lateral:   7.81 cm/s  e' medial:    3.06 cm/s  E/e' lateral: 13.19  E/e' medial:  33.66  E/e' Average: 18.95  MV DT:        168 msec    Aorta Measurements: (Normal range) (Indexed to BSA)    Ao Root d     (3.1 - 3.7 cm)  Ao Asc d, 2D: 2.90          Left Atrium Measurements: (Indexed to BSA)  LA Diam 2D: 3.70 cm        Right Ventricle Measurements:    RV S' Vmax: 7.20 cm/s      LVOT / RVOT/ Qp/Qs Data: (Indexed to BSA)  LVOT Vmax:      0.71 m/s  LVOT Vmn:       0.493 m/s  LVOT VTI:       11.60 cm  LVOT peak grad: 2 mmHg  LVOT mean grad: 1.0 mmHg    Aortic Valve Measurements:  AV Vmax:                1.8 m/s  AV Peak Gradient:       12.7 mmHg  AV Mean Gradient:       6.7 mmHg  AV VTI:                 28.7 cm  AV VTI Ratio:          0.40  AoV Dimensionless Index 0.40  AR Vmax                 1.83 m/s  AR PHT                  523 msec  AR Wright                1.03 m/s²    Mitral Valve Measurements:    MV E Vmax: 1.0 m/s         MR Vmax:          2.38 m/s              MR Peak Gradient: 22.7 mmHg      Tricuspid Valve Measurements:    RA Pressure: 15 mmHg      Pulmonic Valve Measurments:  PV Vmax:          0.8 m/s  PV Peak Gradient: 2.6 mmHg    ___________________________________________________________________________  _____________  Electronically signed on 6/23/2025 at 3:22:50 PM by Saul Schafer MD        *** Final ***    < end of copied text >      LABS:                        10.4   7.25  )-----------( 229      ( 29 Jun 2025 06:20 )             34.7     06-29    138  |  96  |  68[H]  ----------------------------<  91  4.5   |  43[H]  |  1.61[H]    Ca    8.9      29 Jun 2025 06:20    TPro  7.7  /  Alb  2.2[L]  /  TBili  0.4  /  DBili  x   /  AST  16  /  ALT  13  /  AlkPhos  157[H]  06-29      PT/INR - ( 29 Jun 2025 06:20 )   PT: 25.8 sec;   INR: 2.20 ratio         PTT - ( 29 Jun 2025 06:20 )  PTT:37.0 sec    I&O's Summary    28 Jun 2025 07:01  -  29 Jun 2025 07:00  --------------------------------------------------------  IN: 200 mL / OUT: 1000 mL / NET: -800 mL    29 Jun 2025 07:01  -  29 Jun 2025 17:33  --------------------------------------------------------  IN: 0 mL / OUT: 950 mL / NET: -950 mL    BNP    RADIOLOGY & ADDITIONAL STUDIES:    ECHO:    complicated gentleman with prosthetic valve now with gram-negative bacteremia   hemoglobin 10 hematocrit 30 5  hemoglobin 11 hematocrit 37 creatinine 1.6.    gram neg bacteremia and discitis   if recurrent bacteremia would consider prosthetic valve endocarditis in the differential and a transesophageal echo would be helpful here as indicated by ID service Dr. Nielsen    here at [place] or at [place] based upon constraints  repeat blood cultures are pending     heart failure  previously seen by Dr. Carl from heart failure team who advised continue diuretics for volume overload from a heart failure standpoint [phone] previously heart failure team on nitrates consider hydralazine in lieu of ACE ARB ARNI continue nitrates metoprolol succinate amiodarone. ? avoid spironolactone Farxiga     atrial fibrillation   anticoagulation rate control Afib amiodarone warfarin target inr 2-3 tft's lfts cxr on amio periodically      care coordinated with Dr. Cunningham         Follow up for    Gram-negative bacteremia and heart failure   SUBJ:    pleasant non toxic appearing    PMH  CAD (coronary artery disease)    H/O heart artery stent    Obesity    Hypercholesteremia    Thrombus of left atrial appendage    Ischemic cardiomyopathy    WILFRED (obstructive sleep apnea)    HTN (hypertension)    Atrial fibrillation    CHF (congestive heart failure)    Peripheral edema    Lichen planus    Atrial flutter    MI (myocardial infarction)    Stented coronary artery    AS (aortic stenosis)    Chronic kidney disease, unspecified CKD stage    ANGELICA (acute kidney injury)    Morbid obesity    Status post placement of cardiac pacemaker    Osteoarthritis    H/O scoliosis    Lower back pain        MEDICATIONS  (STANDING):  aMIOdarone    Tablet 200 milliGRAM(s) Oral daily  atorvastatin 80 milliGRAM(s) Oral at bedtime  bumetanide Injectable 2 milliGRAM(s) IV Push every 12 hours  chlorhexidine 2% Cloths 1 Application(s) Topical <User Schedule>  clopidogrel Tablet 75 milliGRAM(s) Oral daily  digoxin     Tablet 125 MICROGram(s) Oral daily  isosorbide   dinitrate Tablet (ISORDIL) 10 milliGRAM(s) Oral three times a day  meropenem  IVPB      meropenem  IVPB 1000 milliGRAM(s) IV Intermittent every 12 hours  metoprolol succinate ER 25 milliGRAM(s) Oral daily  pantoprazole    Tablet 40 milliGRAM(s) Oral before breakfast  polyethylene glycol 3350 17 Gram(s) Oral daily  warfarin 2.5 milliGRAM(s) Oral once    MEDICATIONS  (PRN):  albuterol/ipratropium for Nebulization 3 milliLiter(s) Nebulizer every 6 hours PRN Shortness of Breath and/or Wheezing        PHYSICAL EXAM:  Vital Signs Last 24 Hrs  T(C): 36.7 (29 Jun 2025 12:00), Max: 36.7 (29 Jun 2025 12:00)  T(F): 98 (29 Jun 2025 12:00), Max: 98 (29 Jun 2025 12:00)  HR: 70 (29 Jun 2025 12:00) (65 - 92)  BP: 120/74 (29 Jun 2025 17:15) (112/71 - 120/74)  BP(mean): --  RR: 17 (29 Jun 2025 12:00) (17 - 18)  SpO2: 93% (29 Jun 2025 12:00) (93% - 97%)    Parameters below as of 29 Jun 2025 12:00  Patient On (Oxygen Delivery Method): room air        GENERAL: NAD, well-groomed, well-developed  HEAD:  Atraumatic, Normocephalic  EYES: EOMI, PERRLA, conjunctiva and sclera clear  ENT: Moist mucous membranes,  NECK: Supple, No JVD, no bruits  CHEST/LUNG: Clear to percussion bilaterally; No rales, rhonchi, wheezing, or rubs  HEART: Regular rate and rhythm; No murmurs, rubs, or gallops PMI non displaced.  ABDOMEN: Soft, Nontender, Nondistended; Bowel sounds present  EXTREMITIES:  2+ Peripheral Pulses, No clubbing, cyanosis, or edema  SKIN: No rashes or lesions  NERVOUS SYSTEM:  Cranial Nerves II-XII intact      TELEMETRY:    ECG:  ECHO:    < from: TTE Echo Complete w/o Contrast w/ Doppler (06.23.25 @ 13:12) >  ACC: [id number] EXAM:  ECHO TTE WO CON COMP W DOPP                          PROCEDURE DATE:  06/23/2025          INTERPRETATION:  TRANSTHORACIC ECHOCARDIOGRAM REPORT  ___________________________________________________________________________  _____                                      _______      Pt. Name:       BERTHA WARD Study Date:    6/23/2025  MRN:            [id number]        YOB: 1960  Accession #:    [id number]        Age:           65 years  Account#:       [id number]   Gender:        M  Heart Rate:                     Height:        68.11 in (173.00 cm)  Rhythm:                         Weight:        257.95 lb (117.00 kg)  Blood Pressure: 117/85 mmHg     BSA/BMI:       2.28 m² / 39.09 kg/m²  ___________________________________________________________________________  _____________  Referring Physician:    Marissa Clements  Interpreting Physician: Saul Schafer MD  Primary Sonographer:    Vesna Marshall    Indication(s):     I50.9 - Heart failure, unspecified  Procedure:         Transthoracic echocardiogram with 2-D, M-mode and   complete                     spectral and color flow Doppler. TAVR performed.  Ordering Location: [facility]  Admission Status:  Inpatient  Study Information: Image quality for this study is technically difficult.    ___________________________________________________________________________  ____________    CONCLUSIONS:     1. The left ventricle endocardium is not well visualized, consider use   of IV ultrasonic enhancing agent to better evaluate LVEF, endocardium and   apex, if clinically indicated. There appears to be severe global left   ventricle hypokinesis.   2. Left ventricular cavity is normal in size. Left ventricular wall   thickness is mildly increased. Left ventricular systolic function is   severely decreased with an ejection fraction of 30 % by Rivera's method   of disks with an ejection fraction visually estimated at 25 to 30 %.   3. The left ventricular diastolic function is indeterminate. Analysis of   left ventricular diastolic function and filling pressure is made   challenging by the presence of atrial fibrillation.   4. The right ventricle is not well visualized, appears enlarged.   5. Micra pacemaker is visualized in the right heart.  6. Left atrium is top normal sized.   7. Right atrium was not well visualized.   8. Mild mitral valve leaflet calcification.   9. There is mild calcification of the mitral valve annulus.  10. S/p Florence-TAVR, appears well seated, peak velocity within normal limits.  11. Mild aortic regurgitation.  12. Aortic root at the sinuses of Valsalva is normal in size.  13. The inferior vena cava is dilated (dilated >2.1cm) with abnormal   inspiratory collapse (abnormal <50%) consistent with elevated right   atrial pressure (  15, range 10-20mmHg).  14. No pericardial effusion seen.  15. Small right pleural effusion noted.    ___________________________________________________________________________  _____________  FINDINGS:    Left Ventricle:  The left ventricular cavity is normal in size. Left ventricular wall   thickness is mildly increased. Left ventricular systolic function is   severely decreased with a calculated ejection fraction of 30 % by the   Rivera's biplane method of disks with an ejection fraction visually   estimated at 25 to 30%. The left ventricle endocardium is not well   visualized, consider use of IV ultrasonic enhancing agent to better   evaluate LVEF, endocardium and apex, if clinically indicated. There   appears to be severe global left ventricle hypokinesis. The left   ventricular diastolic function is indeterminate. Analysis of left   ventricular diastolic function and filling pressure is made challenging   by the presence of atrial fibrillation.    Right Ventricle:  A Micra pacemaker is visualized in the right heart. The right ventricle   is not well visualized, appears enlarged.    Left Atrium:  The left atrium is top normal sized with an indexed volume of 34.43 ml/m².    Right Atrium:  The right atrium was notwell visualized.    Interatrial Septum:  The interatrial septum was not well visualized.    Aortic Valve:  S/p Florence-TAVR, appears well seated, peak velocity within normal limits.   There is mild aortic regurgitation.    Mitral Valve:  Mild mitral valve leaflet calcification. There is mild calcification of   the mitral valve annulus. There is trace mitral regurgitation.    Tricuspid Valve:  The tricuspid valve was not well visualized. There is trace tricuspid   regurgitation.    Pulmonic Valve:  The pulmonic valve was not well visualized.    Aorta:  The aortic root at the sinuses of Valsalva is normal in size.    Pericardium:  No pericardial effusion seen.    Pleura:  Small right pleural effusion noted.    Systemic Veins:  The inferior vena cava is dilated (dilated >2.1cm) with abnormal   inspiratory collapse (abnormal <50%) consistent with elevated right   atrial pressure (  15, range 10-20mmHg).  ____________________________________________________________________  QUANTITATIVE DATA:  Left Ventricle Measurements: (Indexed to BSA)    IVSd (2D):   1.1 cm  LVPWd (2D):  1.1 cm  LVIDd (2D):  5.1 cm  LVIDs (2D):  4.6 cm  LV Mass:     204 g  89.5 g/m²  LV Vol d, MOD A2C: 96.1 ml   42.15 ml/m²  LV Vol d, MOD A4C: 88.3 ml   38.73 ml/m²  LV Vold, MOD BP:  93.5 ml   41.01 ml/m²  LV Vol s, MOD A2C: 66.8 ml   29.30 ml/m²  LV Vol s, MOD A4C: 62.3 ml   27.32 ml/m²  LV Vol s, MOD BP:  65.1 ml   28.57 ml/m²  LVOT SV MOD BP:    28.4 ml  LV EF% MOD BP:     30 %  Visualized LV EF%: 25 to 30%    MV EVmax:    1.03 m/s  e' lateral:   7.81 cm/s  e' medial:    3.06 cm/s  E/e' lateral: 13.19  E/e' medial:  33.66  E/e' Average: 18.95  MV DT:        168 msec    Aorta Measurements: (Normal range) (Indexed to BSA)    Ao Root d     (3.1 - 3.7 cm)  Ao Asc d, 2D: 2.90          Left Atrium Measurements: (Indexed to BSA)  LA Diam 2D: 3.70 cm        Right Ventricle Measurements:    RV S' Vmax: 7.20 cm/s      LVOT / RVOT/ Qp/Qs Data: (Indexed to BSA)  LVOT Vmax:      0.71 m/s  LVOT Vmn:       0.493 m/s  LVOT VTI:       11.60 cm  LVOT peak grad: 2 mmHg  LVOT mean grad: 1.0 mmHg    Aortic Valve Measurements:  AV Vmax:                1.8 m/s  AV Peak Gradient:       12.7 mmHg  AV Mean Gradient:       6.7 mmHg  AV VTI:                 28.7 cm  AV VTI Ratio:          0.40  AoV Dimensionless Index 0.40  AR Vmax                 1.83 m/s  AR PHT                  523 msec  AR [place]                1.03 m/s²    Mitral Valve Measurements:    MV E Vmax: 1.0 m/s         MR Vmax:          2.38 m/s              MR Peak Gradient: 22.7 mmHg      Tricuspid Valve Measurements:    RA Pressure: 15 mmHg      Pulmonic Valve Measurments:  PV Vmax:          0.8 m/s  PV Peak Gradient: 2.6 mmHg    ___________________________________________________________________________  _____________  Electronically signed on 6/23/2025 at 3:22:50 PM by Saul Schafer MD        *** Final ***    < end of copied text >      LABS:                        10.4   7.25  )-----------( 229      ( 29 Jun 2025 06:20 )             34.7     06-29    138  |  96  |  68[H]  ----------------------------<  91  4.5   |  43[H]  |  1.61[H]    Ca    8.9      29 Jun 2025 06:20    TPro  7.7  /  Alb  2.2[L]  /  TBili  0.4  /  DBili  x   /  AST  16  /  ALT  13  /  AlkPhos  157[H]  06-29      PT/INR - ( 29 Jun 2025 06:20 )   PT: 25.8 sec;   INR: 2.20 ratio         PTT - ( 29 Jun 2025 06:20 )  PTT:37.0 sec    I&O's Summary    28 Jun 2025 07:01  -  29 Jun 2025 07:00  --------------------------------------------------------  IN: 200 mL / OUT: 1000 mL / NET: -800 mL    29 Jun 2025 07:01  -  29 Jun 2025 17:33  --------------------------------------------------------  IN: 0 mL / OUT: 950 mL / NET: -950 mL    BNP    RADIOLOGY & ADDITIONAL STUDIES:    ECHO:    complicated gentleman with prosthetic valve now with gram-negative bacteremia   hemoglobin 10 hematocrit 30 5  hemoglobin 11 hematocrit 37 creatinine 1.6.    gram neg bacteremia and discitis   if recurrent bacteremia would consider prosthetic valve endocarditis in the differential and a transesophageal echo would be helpful here as indicated by ID service Dr. Nielsen    here at [place] or at [place] based upon constraints  repeat blood cultures are pending     heart failure  previously seen by Dr. Carl from heart failure team who advised continue diuretics for volume overload from a heart failure standpoint [id number] on nitrates consider addition of  hydralazine in lieu of ACE ARB ARNI if BP allows.  continue nitrates metoprolol succinate ? avoid spironolactone Farxiga with renal failure    atrial fibrillation   anticoagulation rate control Afib amiodarone warfarin target inr 2-3 tft's lfts cxr on amio periodically      care coordinated with Dr. Cunningham

## 2025-06-29 NOTE — PROVIDER CONTACT NOTE (CRITICAL VALUE NOTIFICATION) - SITUATION
Blood culture collected on 06/23/25 shows growth in aerobic  bottle
Blood culture collected on 06/23/25 shows growth in aerobic bottle gram -ve rods
+ bacteremia

## 2025-06-29 NOTE — PROGRESS NOTE ADULT - ASSESSMENT
Assessment  65 year old man with  CAD / AFib (s/p dccv 3/25/25, unsuccessful), HFrEF, aortic stenosis s/p TAVR (11/2022, reop on 4/11), a flutter s/p ablation 2019 and s/p micra implant, CKD3, WILFRED not using bipap at home, obesity, venous stasis, HTN, HLD admitted to MICU with acute on chronic hypercapnic respiratory failure, decompensated heart failure, ANGELICA on CKD likely cardiorenal syndrome.   Workup found to have GNR bacteremia  CT showing loculated pl effusion        ASSESSMENT    -  acute on chronic hypercapnic respiratory failure on n/c o2 and acaps QHS  -  decompensated heart failure  -  ANGELICA on CKD, likely cardiorenal syndrome  -  Afib with rvr  -  Bacteremia .. GNR   -  Pl effusions with thickened pleura on ct    Plan  Check US Chest    eval for loculations, r/o empyema with bacteremia, may need IR drainage  N/C o2  AVAPS QHS  Antibx as per ID   f/u Repeat BC   d/w Dr. Cunningham

## 2025-06-29 NOTE — PROGRESS NOTE ADULT - ASSESSMENT
65 year old male with a PMH of AFib (s/p dccv 3/25/25, unsuccessful), HFrEF (likely mod red EF, sev TTE), aortic stenosis s/p TAVR (11/2022, reop on 4/11), a flutter s/p ablation 2019 and s/p micra implant, CKD3, WILFRED, obesity, venous stasis, HTN, and HLD with recent hospitalization for heart failure exacerbation and cardiorenal syndrome in April presenting to [facility] from [facility] for worsening SOB x a few days as well as LE swelling, Pt was given increased doses of Bumex in attempts to diurese, however condition continued to worsen therefore patient was taken to the ED. He was placed on NIV for increased WOB, and given additional dose of Bumex. BNP noted to be elevated. Pt denies fever, chills, or sick contacts and ROS otherwise negative. Pt accepted to ICU for further management / care.   Patient was admitted for Acute on chronic hypercarbic respiratory failure, Heart failure exacerbation, ANGELICA on CKD.    #Acute on chronic hypercapnic respiratory failure  -s/p BIPAP in ICU  -on NC, spO2: 90% 5L  -c/w incentive spirometer   -Chest PT/Pulm toilet   -HOB> 30  -NIV PRN + QHs  -Duonebs for mild wheezing     #Bacteremia  - Blood Culture 6/23 positive E.coli   - CT Chest/Abdomen/Pelvis: increased right/left pleural effusions  - endplate irregularity L1-2 is new ?discitis osteomyelitis - needs MRI lumbar spine - however patient has a pacemaker and MRI machine not compatible with pacemaker here   - 2.2 cm structure lateral to proximal left profunda femoral artery in left groin - confirmed saccular aneurysm via Doppler Artery - vascular consulted over the phone and recommended continue AC and follow up outpatient   - Continue Rocephin  - Infectious Disease Consult, recs appreciated     #Acute on chronic systolic HFrEF  -SOB and LE swelling on admission  -ECHO: 25-30% EF  -s/p Bumex ggt in ICU  -c/w Bumex 2mg BID IV  -I+O  -Daily weights  -Cardio following  -Will need Hydral/Nitro for afterload and pre load reduction in lieu of ARB/ACEi  - not a candidate for ace arb arni slgt2i or mra 2/2 ckd. consider addition of hydral, imdur if hemodynamics allow  - Potential AICD pending GDMT optimization.   - Outpatient EP     #ANGELICA on CKD, likely cardiorenal syndrome / Congestive nephropathy - resolved  -Cr: 1.60 today   -Avoid nephro toxic agents  -Nephro following recs appreciated     #Afib with rvr  -On Coumadin 2.5mg outpaitent  -Dig loaded  -c/w Digoxin 125 mcg QD  -c/w home dose amiodarone QD  -Tele monitor   -Cardiology following   -Mg>2, Phos>4   -INR daily  -Coumadin 2.5mg ordered for tonight     #HTN  -c/w metoprolol with holding parameters     #HLD  -c/w with statin    #GERD  -c/w Pepcid     #DVT ppx  - SCD    Code status: Full code    Discussed with Dr. Cunningham     CHF Core measures:     CHF Type:  [  ]  Acute    [  ]Chronic    [ x ]Acute on Chronic                       [ x ] Systolic  [  ] Diastolic  [  ] Combined    EF: 25-30%  ECHO (6/23/25): There appears to be severe global left ventricle hypokinesis. Left ventricular wall thickness is mildly increased. Left ventricular systolic function is severely decreased with an ejection fraction of 30 % by Rivera's method of disks with an ejection fraction visually estimated at 25 to 30 %. Micra pacemaker is visualized in the right heart. S/p Florence-TAVR, appears well seated, peak velocity within normal limits. Mild aortic regurgitation. Small right pleural effusion noted.    ARNI [ preferred]/ACEI/ARBs for EF 40% or less  [   ] ACE or ARB OR ARNI ordered:  [ x  ] ACE or ARB or ARNI contraindicated or not ordered due to : CKD Stage 3   [   ] ACE  ARB or ARNI discretionary: HFpEF    LONG ACTING BETA BLOCKER  for EF 40 % or less  [  x ] Beta blocker ordered: Coreg/Toprol  [   ] Beta blocker contraindicated or not ordered due to :  [   ] Beta blocker discretionary: HFpEF    ALDOSTERONE ANTAGONIST for EF 40% or less  [   ] Aldosterone Antagonist ordered: aldactone/Eplerenon  [  x ] Aldosterone Antagonist contraindicated or not ordered due to:   CKD Stage 3   [   ] Aldosterone Antagonist is discretionary: HFpEF    SGLT2i for all CHF Patient:  [   ] SGLT2i ordered: Farxiga/Jardiance  [  x ] SGLT2i contraindicated or not ordered due to : CKD Stage 3     GOC:  [ x ] GOC Discussed  [  ] GOC Not diacussed due to:  [  x ] Palliaitve consulted  [   ]Palliative not consulted due to:     On DISCHARGE:  [  ] & days appoinment provided for Primary care provider/Cardiologist?:         if not: why?    [  ] 7 day appointment appointment with:                Date/Time:   [   ] 7 day appointment not provided due to: Transferred to acute care or acute rehab or SNF.   65 year old male with a PMH of AFib (s/p dccv 3/25/25, unsuccessful), HFrEF (likely mod red EF, sev TTE), aortic stenosis s/p TAVR (11/2022, reop on 4/11), a flutter s/p ablation 2019 and s/p micra implant, CKD3, WILFRED, obesity, venous stasis, HTN, and HLD with recent hospitalization for heart failure exacerbation and cardiorenal syndrome in April presenting to [facility] from [facility] for worsening SOB x a few days as well as LE swelling, Pt was given increased doses of Bumex in attempts to diurese, however condition continued to worsen therefore patient was taken to the ED. He was placed on NIV for increased WOB, and given additional dose of Bumex. BNP noted to be elevated. Pt denies fever, chills, or sick contacts and ROS otherwise negative. Pt accepted to ICU for further management / care.   Patient was admitted for Acute on chronic hypercarbic respiratory failure, Heart failure exacerbation, ANGELICA on CKD.    #Severe sepsis present on admission secondary to bacteremia   - On admission: , RR 30, ANGELICA, hypoxia requiring 12L, INR 4.11   - Blood Culture 6/23 positive E.coli   - CT Chest/Abdomen/Pelvis: increased right/left pleural effusions  - endplate irregularity L1-2 is new ?discitis osteomyelitis - needs MRI lumbar spine - however patient has a pacemaker and MRI machine not compatible with pacemaker here   - 2.2 cm structure lateral to proximal left profunda femoral artery in left groin - confirmed saccular aneurysm via Doppler Artery - vascular consulted over the phone and recommended continue AC and follow up outpatient   - Continue Rocephin  - Infectious Disease Consult, recs appreciated     #Acute on chronic hypercapnic respiratory failure  -s/p BIPAP in ICU  -on NC, spO2: 90% 5L  -c/w incentive spirometer   -Chest PT/Pulm toilet   -HOB> 30  -NIV PRN + QHs  -Duonebs for mild wheezing     #Acute on chronic systolic HFrEF  -SOB and LE swelling on admission  -ECHO: 25-30% EF  -s/p Bumex ggt in ICU  -c/w Bumex 2mg BID IV  -I+O  -Daily weights  -Cardio following  -Will need Hydral/Nitro for afterload and pre load reduction in lieu of ARB/ACEi  - not a candidate for ace arb arni slgt2i or mra 2/2 ckd. consider addition of hydral, imdur if hemodynamics allow  - Potential AICD pending GDMT optimization.   - Outpatient EP     #ANGELICA on CKD, likely cardiorenal syndrome / Congestive nephropathy - resolved  -Cr: 1.60 today   -Avoid nephro toxic agents  -Nephro following recs appreciated     #Afib with rvr  -On Coumadin 2.5mg outpaitent  -Dig loaded  -c/w Digoxin 125 mcg QD  -c/w home dose amiodarone QD  -Tele monitor   -Cardiology following   -Mg>2, Phos>4   -INR daily  -Coumadin 2.5mg ordered for tonight     #HTN  -c/w metoprolol with holding parameters     #HLD  -c/w with statin    #GERD  -c/w Pepcid     #DVT ppx  - SCD    Code status: Full code    Discussed with Dr. Cunningham     CHF Core measures:     CHF Type:  [  ]  Acute    [  ]Chronic    [ x ]Acute on Chronic                       [ x ] Systolic  [  ] Diastolic  [  ] Combined    EF: 25-30%  ECHO (6/23/25): There appears to be severe global left ventricle hypokinesis. Left ventricular wall thickness is mildly increased. Left ventricular systolic function is severely decreased with an ejection fraction of 30 % by Rivera's method of disks with an ejection fraction visually estimated at 25 to 30 %. Micra pacemaker is visualized in the right heart. S/p [place]-TAVR, appears well seated, peak velocity within normal limits. Mild aortic regurgitation. Small right pleural effusion noted.    ARNI [ preferred]/ACEI/ARBs for EF 40% or less  [   ] ACE or ARB OR ARNI ordered:  [ x  ] ACE or ARB or ARNI contraindicated or not ordered due to : ANGELICA on CKD Stage 3   [   ] ACE  ARB or ARNI discretionary: HFpEF    LONG ACTING BETA BLOCKER  for EF 40 % or less  [  x ] Beta blocker ordered: Coreg/Toprol  [   ] Beta blocker contraindicated or not ordered due to :  [   ] Beta blocker discretionary: HFpEF    ALDOSTERONE ANTAGONIST for EF 40% or less  [   ] Aldosterone Antagonist ordered: aldactone/Eplerenon  [  x ] Aldosterone Antagonist contraindicated or not ordered due to: ANGELICA on CKD Stage 3   [   ] Aldosterone Antagonist is discretionary: HFpEF    SGLT2i for all CHF Patient:  [   ] SGLT2i ordered: Farxiga/Jardiance  [  x ] SGLT2i contraindicated or not ordered due to : ANGELICA on CKD Stage 3     GOC:  [ x ] GOC Discussed  [  ] GOC Not discussed due to:  [  x ] Palliative consulted  [   ]Palliative not consulted due to:     On DISCHARGE:  [  ] & days appoinment provided for Primary care provider/Cardiologist?:         if not: why?    [  ] 7 day appointment appointment with:                Date/Time:   [   ] 7 day appointment not provided due to: Transferred to acute care or acute rehab or SNF.   65 year old male with a PMH of AFib (s/p dccv 3/25/25, unsuccessful), HFrEF (likely mod red EF, sev TTE), aortic stenosis s/p TAVR (11/2022, reop on 4/11), a flutter s/p ablation 2019 and s/p micra implant, CKD3, WILFRED, obesity, venous stasis, HTN, and HLD with recent hospitalization for heart failure exacerbation and cardiorenal syndrome in April presenting to [facility] from [facility] for worsening SOB x a few days as well as LE swelling, Pt was given increased doses of Bumex in attempts to diurese, however condition continued to worsen therefore patient was taken to the ED. He was placed on NIV for increased WOB, and given additional dose of Bumex. BNP noted to be elevated. Pt denies fever, chills, or sick contacts and ROS otherwise negative. Pt accepted to ICU for further management / care.   Patient was admitted for Acute on chronic hypercarbic respiratory failure, Heart failure exacerbation, ANGELICA on CKD.    #Severe sepsis present on admission secondary to bacteremia   - On admission: , RR 30, ANGELICA, hypoxia requiring 12L, INR 4.11   - Blood Culture 6/23 positive E.coli   - CT Chest/Abdomen/Pelvis: increased right/left pleural effusions  - endplate irregularity L1-2 is new ?discitis osteomyelitis - needs MRI lumbar spine - however patient has a pacemaker and MRI machine not compatible with pacemaker here   - 2.2 cm structure lateral to proximal left profunda femoral artery in left groin - confirmed saccular aneurysm via Doppler Artery - vascular consulted over the phone and recommended continue AC and follow up outpatient   - Continue Rocephin  - Infectious Disease Consult, recs appreciated     #Acute on chronic hypercapnic respiratory failure  -s/p BIPAP in ICU  -on NC, spO2: 90% 5L  -c/w incentive spirometer   -Chest PT/Pulm toilet   -HOB> 30  -NIV PRN + QHs  -Duonebs for mild wheezing     #Acute on chronic systolic HFrEF  -SOB and LE swelling on admission  -ECHO: 25-30% EF  -s/p Bumex ggt in ICU  -c/w Bumex 2mg BID IV  -I+O  -Daily weights  -Cardio following  -Will need Hydral/Nitro for afterload and pre load reduction in lieu of ARB/ACEi  - not a candidate for ace arb arni slgt2i or mra 2/2 ckd. consider addition of hydral, imdur if hemodynamics allow  - Potential AICD pending GDMT optimization.   - Outpatient EP     #ANGELICA on CKD, likely cardiorenal syndrome / Congestive nephropathy - resolved  -Cr: 1.60 today   -Avoid nephro toxic agents  -Nephro following recs appreciated     #Afib with rvr  -On Coumadin 2.5mg outpaitent  -Dig loaded  -c/w Digoxin 125 mcg QD  -c/w home dose amiodarone QD  -Tele monitor   -Cardiology following   -Mg>2, Phos>4   -INR daily  -Coumadin 2.5mg ordered for tonight     #HTN  -c/w metoprolol with holding parameters     #HLD  -c/w with statin    #GERD  -c/w Pepcid     #DVT ppx  - SCD    Code status: Full code    Discussed with Dr. Cunningham     CHF Core measures:     CHF Type:  [  ]  Acute    [  ]Chronic    [ x ]Acute on Chronic                       [ x ] Systolic  [  ] Diastolic  [  ] Combined    EF: 25-30%  ECHO (6/23/25): There appears to be severe global left ventricle hypokinesis. Left ventricular wall thickness is mildly increased. Left ventricular systolic function is severely decreased with an ejection fraction of 30 % by Rivera's method of disks with an ejection fraction visually estimated at 25 to 30 %. Micra pacemaker is visualized in the right heart. S/p Florence-TAVR, appears well seated, peak velocity within normal limits. Mild aortic regurgitation. Small right pleural effusion noted.    ARNI [ preferred]/ACEI/ARBs for EF 40% or less  [   ] ACE or ARB OR ARNI ordered:  [ x  ] ACE or ARB or ARNI contraindicated or not ordered due to : ANGELICA on CKD Stage 3   [   ] ACE  ARB or ARNI discretionary: HFpEF    LONG ACTING BETA BLOCKER  for EF 40 % or less  [  x ] Beta blocker ordered: Coreg/Toprol  [   ] Beta blocker contraindicated or not ordered due to :  [   ] Beta blocker discretionary: HFpEF    ALDOSTERONE ANTAGONIST for EF 40% or less  [   ] Aldosterone Antagonist ordered: aldactone/Eplerenon  [  x ] Aldosterone Antagonist contraindicated or not ordered due to: ANGELICA on CKD Stage 3   [   ] Aldosterone Antagonist is discretionary: HFpEF    SGLT2i for all CHF Patient:  [   ] SGLT2i ordered: Farxiga/Jardiance  [  x ] SGLT2i contraindicated or not ordered due to : ANGELICA on CKD Stage 3     GOC:  [ x ] GOC Discussed  [  ] GOC Not discussed due to:  [  x ] Palliative consulted  [   ]Palliative not consulted due to:     On DISCHARGE:  [  ] & days appoinment provided for Primary care provider/Cardiologist?:         if not: why?    [  ] 7 day appointment appointment with:                Date/Time:   [   ] 7 day appointment not provided due to: Transferred to acute care or acute rehab or SNF.       65 year old male with a PMH of AFib (s/p dccv 3/25/25, unsuccessful), HFrEF (likely mod red EF, sev TTE), aortic stenosis s/p TAVR (11/2022, reop on 4/11), a flutter s/p ablation 2019 and s/p micra implant, CKD3, WILFRED, obesity, venous stasis, HTN, and HLD with recent hospitalization for heart failure exacerbation and cardiorenal syndrome in April presenting to [facility] from [facility] for worsening SOB x a few days as well as LE swelling, Pt was given increased doses of Bumex in attempts to diurese, however condition continued to worsen therefore patient was taken to the ED. He was placed on NIV for increased WOB, and given additional dose of Bumex. BNP noted to be elevated. Pt denies fever, chills, or sick contacts and ROS otherwise negative. Pt accepted to ICU for further management / care.   Patient was admitted for Acute on chronic hypercarbic respiratory failure, Heart failure exacerbation, ANGELICA on CKD.    #Severe sepsis present on admission secondary to bacteremia   - On admission: , RR 30, ANGELICA, hypoxia requiring 12L, INR 4.11   - Blood Culture 6/23 positive E.coli   - CT Chest/Abdomen/Pelvis: increased right/left pleural effusions  - endplate irregularity L1-2 is new ?discitis osteomyelitis - needs MRI lumbar spine - however patient has a pacemaker and MRI machine not compatible with pacemaker here   - 2.2 cm structure lateral to proximal left profunda femoral artery in left groin - confirmed saccular aneurysm via Doppler Artery - vascular consulted over the phone and recommended continue AC and follow up outpatient   - Continue Rocephin  - Infectious Disease Consult, recs appreciated, switch from CTX to meropenem q12 hr, and give 1 dose levaquin 750, repeat Bcx.   - Repeat Bcx positive, repeat Bcx sent 06/29    #Acute on chronic hypercapnic respiratory failure  -s/p BIPAP in ICU  -on NC, spO2: 90% 5L  -c/w incentive spirometer   -Chest PT/Pulm toilet   -HOB> 30  -NIV PRN + QHs  -Duonebs for mild wheezing     #Acute on chronic systolic HFrEF  -SOB and LE swelling on admission  -ECHO: 25-30% EF  -s/p Bumex ggt in ICU  -c/w Bumex 2mg BID IV  -I+O  -Daily weights  -Cardio following  -Will need Hydral/Nitro for afterload and pre load reduction in lieu of ARB/ACEi  - not a candidate for ace arb arni slgt2i or mra 2/2 ckd. consider addition of hydral, imdur if hemodynamics allow  - Potential AICD pending GDMT optimization.   - Outpatient EP     #ANGELICA on CKD, likely cardiorenal syndrome / Congestive nephropathy - resolved  -Cr downtrending   -Avoid nephro toxic agents  -Nephro following recs appreciated     #Afib with rvr  -On Coumadin 2.5mg outpatient  -Dig loaded  -c/w Digoxin 125 mcg QD  -c/w home dose amiodarone QD  -Tele monitor   -Cardiology following   -Mg>2, Phos>4   -INR daily (has been within target 2-3)  -Coumadin 2.5mg ordered for tonight     #HTN  -c/w metoprolol with holding parameters     #HLD  -c/w with statin    #GERD  -c/w Pepcid     #DVT ppx  - SCD    Code status: Full code    Discussed with Dr. Cunningham     CHF Core measures:     CHF Type:  [  ]  Acute    [  ]Chronic    [ x ]Acute on Chronic                       [ x ] Systolic  [  ] Diastolic  [  ] Combined    EF: 25-30%  ECHO (6/23/25): There appears to be severe global left ventricle hypokinesis. Left ventricular wall thickness is mildly increased. Left ventricular systolic function is severely decreased with an ejection fraction of 30 % by Rivera's method of disks with an ejection fraction visually estimated at 25 to 30 %. Micra pacemaker is visualized in the right heart. S/p Florence-TAVR, appears well seated, peak velocity within normal limits. Mild aortic regurgitation. Small right pleural effusion noted.    ARNI [ preferred]/ACEI/ARBs for EF 40% or less  [   ] ACE or ARB OR ARNI ordered:  [ x  ] ACE or ARB or ARNI contraindicated or not ordered due to : ANGELICA on CKD Stage 3   [   ] ACE  ARB or ARNI discretionary: HFpEF    LONG ACTING BETA BLOCKER  for EF 40 % or less  [  x ] Beta blocker ordered: Coreg/Toprol  [   ] Beta blocker contraindicated or not ordered due to :  [   ] Beta blocker discretionary: HFpEF    ALDOSTERONE ANTAGONIST for EF 40% or less  [   ] Aldosterone Antagonist ordered: aldactone/Eplerenon  [  x ] Aldosterone Antagonist contraindicated or not ordered due to: ANGELICA on CKD Stage 3   [   ] Aldosterone Antagonist is discretionary: HFpEF    SGLT2i for all CHF Patient:  [   ] SGLT2i ordered: Farxiga/Jardiance  [  x ] SGLT2i contraindicated or not ordered due to : ANGELICA on CKD Stage 3     GOC:  [ x ] GOC Discussed  [  ] GOC Not discussed due to:  [  x ] Palliative consulted  [   ]Palliative not consulted due to:     On DISCHARGE:  [  ] & days appoinment provided for Primary care provider/Cardiologist?:         if not: why?    [  ] 7 day appointment appointment with:                Date/Time:   [   ] 7 day appointment not provided due to: Transferred to acute care or acute rehab or SNF.

## 2025-06-29 NOTE — PROVIDER CONTACT NOTE (CRITICAL VALUE NOTIFICATION) - TEST AND RESULT REPORTED:
vbg pco2 [id number]
Blood culture
Blood culture
6/26 preliminary results Blood culture: growth in aerobic bottl gram negative rods
blood cultures collected 6/26 shows growth in aerobic bottle gram negative rods

## 2025-06-29 NOTE — PROGRESS NOTE ADULT - SUBJECTIVE AND OBJECTIVE BOX
Follow-up Pulmonary Progress Note  Chief Complaint : Acute on chronic systolic congestive heart failure        patient seen and examined  comfortable  on 5 L n/c  using niv qhs  no cp, palp, n/v        Allergies :metoprolol (Short breath)      PAST MEDICAL & SURGICAL HISTORY:  CAD (coronary artery disease) s/p CABG  Hypercholesteremia  Thrombus of left atrial appendage 2018  Ischemic cardiomyopathy  WILFRED (obstructive sleep apnea)  can not tolerate bipap at night  HTN (hypertension)  Atrial fibrillation 2018  CHF (congestive heart failure) denies any recent exacerbation  Peripheral edema chronic  Lichen planus chronic ( b/L LE)  Atrial flutter s/p ablation 2018 MI (myocardial infarction) 2012  Stented coronary artery 2019  AS (aortic stenosis)  Chronic kidney disease, unspecified CKD stage  ANGELICA (acute kidney injury) 4/2021  Morbid obesity  Status post placement of cardiac pacemaker micraleadless PPM, [id number] last interrogation 7/6/2022  Osteoarthritis shoulders, hips, knee  H/O scoliosis  Lower back pain  History of elbow surgery  S/P CABG (coronary artery bypass graft) x3, 2012  Cardiac pacemaker leadless 2018  History of coronary artery stent placement 2019 ( one more after cabg)  History of adenoidectomy  H/O atrioventricular karlee ablation 2018        Medications:  MEDICATIONS  (STANDING):  aMIOdarone    Tablet 200 milliGRAM(s) Oral daily  atorvastatin 80 milliGRAM(s) Oral at bedtime  bumetanide Injectable 2 milliGRAM(s) IV Push every 12 hours  chlorhexidine 2% Cloths 1 Application(s) Topical <User Schedule>  clopidogrel Tablet 75 milliGRAM(s) Oral daily  digoxin     Tablet 125 MICROGram(s) Oral daily  isosorbide   dinitrate Tablet (ISORDIL) 10 milliGRAM(s) Oral three times a day  meropenem  IVPB      meropenem  IVPB 1000 milliGRAM(s) IV Intermittent every 12 hours  metoprolol succinate ER 25 milliGRAM(s) Oral daily  pantoprazole    Tablet 40 milliGRAM(s) Oral before breakfast  polyethylene glycol 3350 17 Gram(s) Oral daily  warfarin 2.5 milliGRAM(s) Oral once    MEDICATIONS  (PRN):  albuterol/ipratropium for Nebulization 3 milliLiter(s) Nebulizer every 6 hours PRN Shortness of Breath and/or Wheezing      Antibiotics History  cefTRIAXone   IVPB 2000 milliGRAM(s) IV Intermittent once, 06-26-25 @ 09:37, Stop order after: 1 Doses  cefTRIAXone   IVPB 2000 milliGRAM(s) IV Intermittent every 24 hours, 06-27-25 @ 09:37, Stop order after: 8 Days  cefTRIAXone   IVPB    , 06-26-25 @ 09:37  levoFLOXacin IVPB 750 milliGRAM(s) IV Intermittent once, 06-29-25 @ 11:50, Stop order after: 1 Doses  levoFLOXacin IVPB 750 milliGRAM(s) IV Intermittent once, 06-29-25 @ 12:49, Stop order after: 1 Doses  meropenem  IVPB    , 06-29-25 @ 13:31  meropenem  IVPB 1000 milliGRAM(s) IV Intermittent every 12 hours, 06-29-25 @ 18:00, Stop order after: 8 Days  meropenem  IVPB 1000 milliGRAM(s) IV Intermittent once, 06-29-25 @ 11:55, Stop order after: 1 Doses      Heme Medications   clopidogrel Tablet 75 milliGRAM(s) Oral daily, 06-26-25 @ 00:00  warfarin 2.5 milliGRAM(s) Oral once, 06-29-25 @ 10:34      GI Medications  pantoprazole    Tablet 40 milliGRAM(s) Oral before breakfast, 06-24-25 @ 00:00, Routine  polyethylene glycol 3350 17 Gram(s) Oral daily, 06-27-25 @ 14:56, Routine        LABS:                        10.4   7.25  )-----------( 229      ( 29 Jun 2025 06:20 )             34.7     06-29    138  |  96  |  68[H]  ----------------------------<  91  4.5   |  43[H]  |  1.61[H]    Ca    8.9      29 Jun 2025 06:20    TPro  7.7  /  Alb  2.2[L]  /  TBili  0.4  /  DBili  x   /  AST  16  /  ALT  13  /  AlkPhos  157[H]  06-29                   CULTURES: (if applicable)    Culture - Blood (collected 06-26-25 @ 14:30)  Source: Blood Blood-Peripheral  Gram Stain (06-29-25 @ 04:12):    Growth in aerobic bottle: Gram Negative Rods  Preliminary Report (06-29-25 @ 04:13):    Growth in aerobic bottle: Gram Negative Rods    Culture - Blood (collected 06-26-25 @ 14:22)  Source: Blood Blood-Venous  Gram Stain (06-29-25 @ 10:03):    Growth in aerobic bottle: Gram Negative Rods  Preliminary Report (06-29-25 @ 10:03):    Growth in aerobic bottle: Gram Negative Rods    Culture - Blood (collected 06-23-25 @ 10:10)  Source: Blood Blood-Peripheral  Gram Stain (06-26-25 @ 09:28):    Growth in aerobic bottle: Gram Negative Rods  Preliminary Report (06-26-25 @ 09:28):    Growth in aerobic bottle: Gram Negative Rods    Culture - Blood (collected 06-23-25 @ 10:10)  Source: Blood Blood-Peripheral  Gram Stain (06-26-25 @ 07:22):    Growth in aerobic bottle: Gram Negative Rods  Final Report (06-29-25 @ 14:35):    Growth in aerobic bottle: Gram Negative Rods Most closely resembling    Cardiobacterium species "Susceptibilities not performed"    Direct identification is available within approximately 3-5    hours either by Blood Panel Multiplexed PCR or Direct    MALDI-TOF. Details: [email or web address]  Organism: Blood Culture PCR (06-29-25 @ 14:35)  Organism: Blood Culture PCR (06-29-25 @ 14:35)      Method Type: PCR      -  Blood PCR Panel: NEG    ABG Trend  06-23-25 @ 21:55   - 7.55[H]/47/84/97.2  06-23-25 @ 12:15   - 7.42/56[H]/170[H]/99.3[H]  04-26-25 @ 05:51   - 7.42/51[H]/58[L]/90.3[L]  04-15-25 @ 00:35   - 7.44/58[H]/110[H]/98.3[H]  04-14-25 @ 11:45   - 7.44/61[H]/92/98.2[H]  04-14-25 @ 00:18   - 7.44/62[H]/178[H]/100.0[H]  04-13-25 @ 15:15   - 7.49[H]/57[H]/153[H]/100.0[H]  04-13-25 @ 13:20   - 7.43/65[H]/85/99.2[H]  04-13-25 @ 00:01   - 7.48[H]/60[H]/162[H]/99.9[H]  04-12-25 @ 12:00   - 7.46[H]/68[H]/123[H]/99.0[H]  04-12-25 @ 10:25   - 7.48[H]/61[H]/98/98.8[H]  04-12-25 @ 06:13   - 7.52[H]/53[H]/137[H]/99.6[H]         RADIOLOGY  CXR:      CT:  < from: CT Abdomen and Pelvis No Cont (06.27.25 @ 09:54) >    FINDINGS:  CHEST:  LUNGS/PLEURA: Increase in size and complexity of the thick-walled   loculated right-sided pleural fluid still relatively small in size.   Interval development of a small to moderate left pleural effusion. There   is associated compressive atelectasis at the lung bases. Groundglass   attenuation both lungs may be related to fluid overload.  VESSELS: Aortic calcifications. Coronary artery calcifications.  HEART: Cardiomegaly. Coronary artery calcifications. Status post TAVR.   Leadless pacemaker in right ventricle. CABG.  MEDIASTINUM AND ZAHEER: Prominent mediastinal and hilar lymph nodes   measuring up to 1.1 cm.  CHEST WALL AND LOWER NECK: Median sternotomy wires. Bilateral   gynecomastia.    ABDOMEN AND PELVIS:  LIVER: Within normal limits.  BILE DUCTS: Normal caliber.  GALLBLADDER: Mild nonspecific pericholecystic edema.  SPLEEN: Within normal limits.  PANCREAS: Within normal limits.  ADRENALS: Within normal limits.  KIDNEYS/URETERS: Left lower pole hemorrhagic cyst measuring 6 mm. No   hydronephrosis.    BLADDER: Minimally distended.  REPRODUCTIVE ORGANS: Prostate within normal limits.    BOWEL: No bowel obstruction. Colonic diverticulosis. Appendix is normal.  PERITONEUM/RETROPERITONEUM: Trace abdominopelvic ascites.  VESSELS: Moderate to severe atherosclerotic calcification of the   aortoiliac tree. No abdominal aortic aneurysm. Dilated tubular structure   measuring 2.2 cm just lateral to the proximal left profunda femoral   artery on series 2 image 160 raising the possibility of an aneurysm in   this region. Further evaluation with left lower extremity arterial   Doppler recommended.  LYMPH NODES: No lymphadenopathy.  ABDOMINAL WALL: Tiny fat-containing umbilical hernia.  BONES: Degenerative changes. Chronicleft sixth rib fracture. Endplate   irregularity at inferior endplate of L1 and superior endplate of L2 is   new.    IMPRESSION:  Increase in size and complexity of the small thick walled loculated right   pleural effusion. Infection is in the differential diagnosis.    Interval development of a small to moderate left pleural effusion.    Endplate irregularity L1-2 is new since the prior examination and may be   seen in the setting of discitis osteomyelitis. Recommend further   evaluation with MRI lumbar spine.    2.2 cm structure just lateral to the proximal left profunda femoral   artery in the left groin raising the possibility of an aneurysm in this   region. Correlation with left lower extremity arterial Doppler   recommended.    --- Endof Report ---      < end of copied text >    ECHO:    < from: TTE Echo Complete w/o Contrast w/ Doppler (06.23.25 @ 13:12) >  CONCLUSIONS:     1. The left ventricle endocardium is not well visualized, consider use of IV ultrasonic enhancing agent to better evaluate LVEF, endocardium and apex, if clinically indicated. There appears to be severe global left ventricle hypokinesis.   2. Left ventricular cavity is normal in size. Left ventricular wall thickness is mildly increased. Left ventricular systolic function is severely decreased with an ejection fraction of 30 % by Rivera's method of disks with an ejection fraction visually estimated at 25 to 30 %.   3. The left ventricular diastolic function is indeterminate. Analysis of left ventricular diastolic function and filling pressure is made challenging by the presence of atrial fibrillation.   4. The right ventricle is not well visualized, appears enlarged.   5. Micra pacemaker is visualized in the right heart.  6. Left atrium is top normal sized.   7. Right atrium was not well visualized.   8. Mild mitral valve leaflet calcification.   9. There is mild calcification of the mitral valve annulus.  10. S/p Florence-TAVR, appears well seated, peak velocity within normal limits.  11. Mild aortic regurgitation.  12. Aortic root at the sinuses of Valsalva is normal in size.  13. The inferior vena cava is dilated (dilated >2.1cm) with abnormal inspiratory collapse (abnormal <50%) consistent with elevated right atrial pressure (15, range 10-20mmHg).  14. No pericardial effusion seen.  15. Small right pleural effusion noted.    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    --- End of Report ---    < end of copied text >  VITALS:  T(C): 36.7 (06-29-25 @ 12:00), Max: 36.7 (06-29-25 @ 12:00)  T(F): 98 (06-29-25 @ 12:00), Max: 98 (06-29-25 @ 12:00)  HR: 70 (06-29-25 @ 12:00) (65 - 92)  BP: 112/71 (06-29-25 @ 12:00) (107/68 - 115/72)  BP(mean): --  ABP: --  ABP(mean): --  RR: 17 (06-29-25 @ 12:00) (17 - 18)  SpO2: 93% (06-29-25 @ 12:00) (93% - 97%)  CVP(mm Hg): --  CVP(cm H2O): --    Ins and Outs     06-28-25 @ 07:01  -  06-29-25 @ 07:00  --------------------------------------------------------  IN: 200 mL / OUT: 1000 mL / NET: -800 mL    06-29-25 @ 07:01  -  06-29-25 @ 14:40  --------------------------------------------------------  IN: 0 mL / OUT: 950 mL / NET: -950 mL                I&O's Detail    28 Jun 2025 07:01  -  29 Jun 2025 07:00  --------------------------------------------------------  IN:    Oral Fluid: 200 mL  Total IN: 200 mL    OUT:    Voided (mL): 1000 mL  Total OUT: 1000 mL    Total NET: -800 mL      29 Jun 2025 07:01  -  29 Jun 2025 14:40  --------------------------------------------------------  IN:  Total IN: 0 mL    OUT:    Voided (mL): 950 mL  Total OUT: 950 mL    Total NET: -950 mL          Physical Examination:  GENERAL:               Alert, Oriented, No acute distress.    HEENT:                No JVD, Moist MM  PULM:                     Bilateral air entry, diminished to auscultation bilaterally, no significant sputum production, No Rales, No Rhonchi, No Wheezing  CVS:                         S1, S2,  No Murmur  ABD:                        Soft, nondistended, nontender, normoactive bowel sounds,   EXT:                         No edema, nontender, No Cyanosis or Clubbing    NEURO:                  Alert, oriented, interactive, nonfocal, follows commands  PSYC:                      Calm, + Insight.

## 2025-06-29 NOTE — PROVIDER CONTACT NOTE (CRITICAL VALUE NOTIFICATION) - PERSON GIVING RESULT:
[facility]
gael Hinton
brissa Vivas
Dorene /[facility]

## 2025-06-29 NOTE — CHART NOTE - NSCHARTNOTEFT_GEN_A_CORE
Health Care Proxy Formed filled out and placed into the chart. Naming Brother Nathan as primary health care proxy. Copy was placed in the chart, and another copy given to the patient's brother.
NUTRITION Follow Up Note    SOURCE: Patient [X]  Medical Record [X]  Nursing Staff [X]  Family Member []    DIET: Diet, DASH/TLC:   Sodium & Cholesterol Restricted  Rob(7 Gm Arginine/7 Gm Glut/1.2 Gm HMB     Qty per Day:  2 (25 @ 13:40) [Active]    Patient noted with good appetite, consuming >75% of meals at this time per nursing flowsheets. Tolerating current diet order. Continues with Rob BID to promote wound healing. Provided DASH/TLC nutrition therapy education; low sodium, cholesterol and saturated fat, w/ increased whole grains, fruits and vegetables/balanced meals, avoidance of take-out/processed foods. Electrolytes stable at this time.    EDEMA: 3+ b/l foot edema    LAST BM: 2025    SKIN: DTI @ right/left heel, SDTI @ right lateral malleolus, DTI @ coccyx    WEIGHTS:  Weight in k.9 (2025 05:00)  Weight in k.6 (2025 05:00)      PERTINENT MEDICATIONS: MEDICATIONS  (STANDING):  aMIOdarone    Tablet 200 milliGRAM(s) Oral daily  atorvastatin 80 milliGRAM(s) Oral at bedtime  bumetanide Injectable 2 milliGRAM(s) IV Push every 12 hours  cefTRIAXone   IVPB 2000 milliGRAM(s) IV Intermittent every 24 hours  cefTRIAXone   IVPB      chlorhexidine 2% Cloths 1 Application(s) Topical <User Schedule>  clopidogrel Tablet 75 milliGRAM(s) Oral daily  digoxin     Tablet 125 MICROGram(s) Oral daily  isosorbide   dinitrate Tablet (ISORDIL) 10 milliGRAM(s) Oral three times a day  metoprolol succinate ER 25 milliGRAM(s) Oral daily  pantoprazole    Tablet 40 milliGRAM(s) Oral before breakfast    MEDICATIONS  (PRN):      PERTINENT LABS:   Na139 mmol/L Glu 103 mg/dL[H] K+ 4.1 mmol/L Cr  2.21 mg/dL[H] BUN 70 mg/dL[H]  Phos 2.8 mg/dL  Alb 2.2 g/dL[L]        ESTIMATED NEEDS:   [X] No Change Since Previous Assessment    PREVIOUS NUTRITION DIAGNOSIS:  1. Severe Protein Calorie Malnutrition    NUTRITION DIAGNOSIS IS [X] Ongoing     NEW NUTRITION DIAGNOSIS: [X] Not Applicable    INTERVENTIONS:   1. Recommend continue current nutrition plan of care as tolerated     MONITORING & EVALUATION:   1. PO intakes   2. Skin integrity   3. Tolerance to diet prescription   4. Labs & POCT  5. Weights PRN  6. Follow up (per protocol)    Registered Dietitian/Nutritionist remains available  Odell Augustine RD, CDN    Contact: [phone] or via [place] TEAMS
Spoke to Dr. Kerwin Carl, heart failure team at [facility]. States that patient if patient is still volume overloaded, will just need a longer course of IV diuresis until able to get the fluid off. Patient's kidney functions have been improved. Did not recommend transfer at this time. Plan to continue IV diuresis for patient until reaching euvolemic status and try to start GDMT when able.
:    Tunii micra vr tcp [id number]               Mode:   VVI                                               Pacing                                           % OF TIME SINCE   TOTAL                                       <0.1  VS                                                99.9      IMPEDENCE:  RV-  550 ohms  SENSITIVITY: RV-  2 mV  LONGEVITY: >8 yrs     OBSERVATIONS  no events
Consulted Vascular Dr. Greenwood over the phone regarding new finding on US dupplex of LLE with Saccular aneurysm 3.1x1.8x2.9cm, partially thrombosed, arising from left profunda femoris artery. Recommended to follow up outpatient with vascular.
Informed patient's O2 sat dropped to 87%    Assessed patient at bedside, receiving neb treatment, O2 sat 100% on 5L. No acute changes in his breathing pattern/effort. Feels at baseline.     O2 at 100% on 5L NC  Unlabored, normal RR, lungs still with decreased breath sound b/l, unchanged from exam prior.      Will reassess patient as needed. Will continue with IV diuresis and supplemental O2, has Bipap for the night.

## 2025-06-29 NOTE — PROGRESS NOTE ADULT - ATTENDING COMMENTS
Please see resident note for full details regarding the service.    PE: A+Ox3, no murmurs, lungs CTA b/l, abd soft/NT/ND, 2+ lower extremity swelling    Assessment:  - Severe sepsis present on admission secondary to bacteremia possible endocarditis? vs. loculated right pleural effusion vs. discitis/osteomyelitis?   - Acute on chronic hypercarbic respiratory failure  - Acute on chronic CHFrEF (EF 25-30%)   - Cardiorenal syndrome   - Chronic A fib  - Bacteremia  - Cardiorenal ANGELICA on CKD Stage 3  - Endplate irregularity L1-2 possible discitis/osteomyelitis?  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  - Morbid obesity (BMI > 40)  - History of AFib (s/p dccv 3/25/25, unsuccessful), HFrEF (likely mod red EF, sev TTE), aortic stenosis s/p TAVR (11/2022, reop on 4/11), a flutter s/p ablation 2019 and s/p micra implant, CKD3, WILFRED, obesity, venous stasis, HTN, and HLD    Plan:  - Wean O2 as tolerated  - Diuresis with Bumex 2 mg Q12H IVP -> will need to transition to PO    - Bipap QHS  - BCx x 2 (6/23/25): gram negative rods -> follow up sensitivities   - BCx x 2 (6/26/25): gram negative rods -> follow up sensitivities   - Not a candidate for GDMT including ACE/ARB/ARNI given CKD Stage 3 (additionally BP is soft), will discuss Farxiga with cardiology/nephrology  - Spoke to ID (Dr. Torres) - still no sensitivities from initial blood cultures could suggest a more resistant organism -> will give 1 dose of Levaquin 750 mg STAT and start Meropenem, discontinue Ceftriaxone, repeat BCx x 2, may need STANISLAW/further cardiac workup and would need transfer to tertiary facility, STANISLAW may be indicated but pt already has hx of valve in valve TAVR, if no considerations for surgical intervention there may not be a role for transfer   - Spoke to Cardiology (Dr. Conley) - Spoke to structural heart failure team who recommended continued diuresis. The patient was previously seen by Dr. Hughes and Timo. Not a candidate for Hydralazine at this time. Hold off on Farxiga for now. Continue diuresis with IV lasix. May need to consider STANISLAW at tertiary facility if ID feels it is indicated.   - PT: JASON  - DVT ppx: INR 2.20 today -> will give Warfarin dose as INR < 3   - Code status: Full code  - Dispo: Active    I spent a total of 50 minutes on the date of this encounter coordinating the patient's care, excluding resident teaching time. This includes reviewing results/imaging and discussions with specialists, nursing, case management/social work. Further tests, medications, and procedures have been ordered as indicated. Results and the plan of care were communicated to the patient and/or their family member. Supporting documentation was completed and added to the patient's chart.

## 2025-06-29 NOTE — PROGRESS NOTE ADULT - SUBJECTIVE AND OBJECTIVE BOX
65 year old male with a PMH of AFib (s/p dccv 3/25/25, unsuccessful), HFrEF (likely mod red EF, sev TTE), aortic stenosis s/p TAVR (11/2022, reop on 4/11), a flutter s/p ablation 2019 and s/p micra implant, CKD3, WILFRED, obesity, venous stasis, HTN, and HLD with recent hospitalization for heart failure exacerbation and cardiorenal syndrome in April presenting to [facility] from [facility] for worsening SOB x a few days as well as LE swelling    Overnight Events: None  Interval HPI: Patient seen and examined at bedside.     REVIEW OF SYSTEMS:  CONSTITUTIONAL: (-) weakness, (-) fevers, (-) chills  EYES/ENT: (-) visual changes,  (-) vertigo,  (-) throat pain   NECK:  (-) pain, (-) stiffness  RESPIRATORY:  (-) shortness of breath, (-) cough,  (-) wheezing,  (-) hemoptysis   CARDIOVASCULAR:  (-) chest pain, (-) palpitations  GASTROINTESTINAL:  (-) abdominal or epigastric pain, (-) nausea, (-) vomiting, (-) diarrhea, (-) constipation, (-) melena,  (-) hematemesis,  (-) hematochezia  GENITOURINARY: (-) dysuria, (-) frequency, (-) hematuria  NEUROLOGICAL: (-) numbness, (-) weakness  SKIN: (-) itching, (-) rashes, (-) lesions    Vital Signs Last 24 Hrs  T(C): 36.4 (29 Jun 2025 04:39), Max: 36.8 (28 Jun 2025 11:41)  T(F): 97.5 (29 Jun 2025 04:39), Max: 98.2 (28 Jun 2025 11:41)  HR: 92 (29 Jun 2025 04:39) (65 - 92)  BP: 112/75 (29 Jun 2025 04:39) (98/65 - 115/72)  BP(mean): 76 (28 Jun 2025 11:41) (76 - 76)  RR: 18 (29 Jun 2025 04:39) (18 - 18)  SpO2: 97% (29 Jun 2025 05:00) (93% - 97%)    Parameters below as of 29 Jun 2025 04:39  Patient On (Oxygen Delivery Method): room air        PHYSICAL EXAM:  GENERAL: NAD, lying in bed comfortably  HEAD:  Atraumatic, Normocephalic  EYES: EOMI, conjunctiva and sclera clear  ENT: Moist mucous membranes  NECK: Supple, No JVD  CHEST/LUNG: Clear to auscultation bilaterally, good air entry bilaterally; No wheezing, rales, or rhonchi. Unlabored respirations  HEART: Regular rate and rhythm. S1 and S2. No murmurs, rubs, or gallops  ABDOMEN: Soft, Nontender, Nondistended. Bowel sounds present.   EXTREMITIES:  2+ Peripheral Pulses. No clubbing, cyanosis, or edema  NERVOUS SYSTEM:  Alert & Oriented X3, speech clear. No deficits.  MSK: FROM all 4 extremities, full and equal strength  SKIN: No rashes, bruises, or other lesions    LABS:   All Labs Personally Reviewed                         11.1   7.11  )-----------( 227      ( 28 Jun 2025 04:30 )             37.3     06-28    142  |  98  |  67[H]  ----------------------------<  103[H]  4.2   |  44[H]  |  1.60[H]    Ca    8.8      28 Jun 2025 04:30    TPro  7.6  /  Alb  2.2[L]  /  TBili  0.4  /  DBili  x   /  AST  18  /  ALT  9[L]  /  AlkPhos  162[H]  06-28    PT/INR - ( 28 Jun 2025 17:15 )   PT: 28.7 sec;   INR: 2.45 ratio         PTT - ( 28 Jun 2025 17:15 )  PTT:35.8 sec      Blood Culture: 06-26 @ 14:30  Organism --  Gram Stain Blood -- Gram Stain   Growth in aerobic bottle: Gram Negative Rods  Specimen Source Blood Blood-Peripheral  Culture-Blood --    06-26 @ 14:22  Organism --  Gram Stain Blood -- Gram Stain --  Specimen Source Blood Blood-Venous  Culture-Blood --      I&O's Summary    28 Jun 2025 07:01  -  29 Jun 2025 07:00  --------------------------------------------------------  IN: 200 mL / OUT: 1000 mL / NET: -800 mL      CAPILLARY BLOOD GLUCOSE          RADIOLOGY/EKG:  All Imaging and EKGs Personally Reviewed     MEDICATIONS:  MEDICATIONS  (STANDING):  aMIOdarone    Tablet 200 milliGRAM(s) Oral daily  atorvastatin 80 milliGRAM(s) Oral at bedtime  bumetanide Injectable 2 milliGRAM(s) IV Push every 12 hours  cefTRIAXone   IVPB 2000 milliGRAM(s) IV Intermittent every 24 hours  cefTRIAXone   IVPB      chlorhexidine 2% Cloths 1 Application(s) Topical <User Schedule>  clopidogrel Tablet 75 milliGRAM(s) Oral daily  digoxin     Tablet 125 MICROGram(s) Oral daily  isosorbide   dinitrate Tablet (ISORDIL) 10 milliGRAM(s) Oral three times a day  metoprolol succinate ER 25 milliGRAM(s) Oral daily  pantoprazole    Tablet 40 milliGRAM(s) Oral before breakfast  polyethylene glycol 3350 17 Gram(s) Oral daily     65 year old male with a PMH of AFib (s/p dccv 3/25/25, unsuccessful), HFrEF (likely mod red EF, sev TTE), aortic stenosis s/p TAVR (11/2022, reop on 4/11), a flutter s/p ablation 2019 and s/p micra implant, CKD3, WILFRED, obesity, venous stasis, HTN, and HLD with recent hospitalization for heart failure exacerbation and cardiorenal syndrome in April presenting to [facility] from [facility] for worsening SOB x a few days as well as LE swelling    Overnight Events: None  Interval HPI: Patient seen and examined at bedside. Patient feels better today, used machine at night which he feels helped. No new complaints.     REVIEW OF SYSTEMS:  CONSTITUTIONAL: (-) weakness, (-) fevers, (-) chills  EYES/ENT: (-) visual changes,  (-) vertigo,  (-) throat pain   NECK:  (-) pain, (-) stiffness  RESPIRATORY:  (-) shortness of breath, (-) cough,  (-) wheezing,  (-) hemoptysis   CARDIOVASCULAR:  (-) chest pain, (-) palpitations  GASTROINTESTINAL:  (-) abdominal or epigastric pain, (-) nausea, (-) vomiting, (-) diarrhea, (-) constipation, (-) melena,  (-) hematemesis,  (-) hematochezia  GENITOURINARY: (-) dysuria, (-) frequency, (-) hematuria  NEUROLOGICAL: (-) numbness, (-) weakness  SKIN: (-) itching, (-) rashes, (-) lesions    Vital Signs Last 24 Hrs  T(C): 36.4 (29 Jun 2025 04:39), Max: 36.8 (28 Jun 2025 11:41)  T(F): 97.5 (29 Jun 2025 04:39), Max: 98.2 (28 Jun 2025 11:41)  HR: 92 (29 Jun 2025 04:39) (65 - 92)  BP: 112/75 (29 Jun 2025 04:39) (98/65 - 115/72)  BP(mean): 76 (28 Jun 2025 11:41) (76 - 76)  RR: 18 (29 Jun 2025 04:39) (18 - 18)  SpO2: 97% (29 Jun 2025 05:00) (93% - 97%)    Parameters below as of 29 Jun 2025 04:39  Patient On (Oxygen Delivery Method): room air      PHYSICAL EXAM:  GENERAL: NAD, lying in bed comfortably  NECK: Supple, No JVD  CHEST/LUNG: decreased breath sounds b/l No wheezing, rales, or rhonchi. Unlabored respirations, saturation > 90% on 3L NC  HEART: Regular rate and rhythm. S1 and S2. No murmurs, rubs, or gallops  ABDOMEN: Soft, Nontender, Nondistended. Bowel sounds present.   EXTREMITIES:  2chronic venous stasis changes b/l LE  NERVOUS SYSTEM:  Alert & Oriented X3, speech clear      LABS:   All Labs Personally Reviewed                         11.1   7.11  )-----------( 227      ( 28 Jun 2025 04:30 )             37.3     06-28    142  |  98  |  67[H]  ----------------------------<  103[H]  4.2   |  44[H]  |  1.60[H]    Ca    8.8      28 Jun 2025 04:30    TPro  7.6  /  Alb  2.2[L]  /  TBili  0.4  /  DBili  x   /  AST  18  /  ALT  9[L]  /  AlkPhos  162[H]  06-28    PT/INR - ( 28 Jun 2025 17:15 )   PT: 28.7 sec;   INR: 2.45 ratio         PTT - ( 28 Jun 2025 17:15 )  PTT:35.8 sec      Blood Culture: 06-26 @ 14:30  Organism --  Gram Stain Blood -- Gram Stain   Growth in aerobic bottle: Gram Negative Rods  Specimen Source Blood Blood-Peripheral  Culture-Blood --    06-26 @ 14:22  Organism --  Gram Stain Blood -- Gram Stain --  Specimen Source Blood Blood-Venous  Culture-Blood --      I&O's Summary    28 Jun 2025 07:01  -  29 Jun 2025 07:00  --------------------------------------------------------  IN: 200 mL / OUT: 1000 mL / NET: -800 mL      CAPILLARY BLOOD GLUCOSE          RADIOLOGY/EKG:  All Imaging and EKGs Personally Reviewed     MEDICATIONS:  MEDICATIONS  (STANDING):  aMIOdarone    Tablet 200 milliGRAM(s) Oral daily  atorvastatin 80 milliGRAM(s) Oral at bedtime  bumetanide Injectable 2 milliGRAM(s) IV Push every 12 hours  cefTRIAXone   IVPB 2000 milliGRAM(s) IV Intermittent every 24 hours  cefTRIAXone   IVPB      chlorhexidine 2% Cloths 1 Application(s) Topical <User Schedule>  clopidogrel Tablet 75 milliGRAM(s) Oral daily  digoxin     Tablet 125 MICROGram(s) Oral daily  isosorbide   dinitrate Tablet (ISORDIL) 10 milliGRAM(s) Oral three times a day  metoprolol succinate ER 25 milliGRAM(s) Oral daily  pantoprazole    Tablet 40 milliGRAM(s) Oral before breakfast  polyethylene glycol 3350 17 Gram(s) Oral daily

## 2025-06-30 LAB
ALBUMIN SERPL ELPH-MCNC: 2.2 G/DL — LOW (ref 3.3–5)
ALP SERPL-CCNC: 154 U/L — HIGH (ref 40–120)
ALT FLD-CCNC: 18 U/L — SIGNIFICANT CHANGE UP (ref 10–45)
ANION GAP SERPL CALC-SCNC: 1 MMOL/L — LOW (ref 5–17)
AST SERPL-CCNC: 19 U/L — SIGNIFICANT CHANGE UP (ref 10–40)
BILIRUB SERPL-MCNC: 0.4 MG/DL — SIGNIFICANT CHANGE UP (ref 0.2–1.2)
BUN SERPL-MCNC: 68 MG/DL — HIGH (ref 7–23)
CALCIUM SERPL-MCNC: 9.1 MG/DL — SIGNIFICANT CHANGE UP (ref 8.4–10.5)
CHLORIDE SERPL-SCNC: 94 MMOL/L — LOW (ref 96–108)
CO2 SERPL-SCNC: 43 MMOL/L — HIGH (ref 22–31)
CREAT SERPL-MCNC: 1.86 MG/DL — HIGH (ref 0.5–1.3)
EGFR: 40 ML/MIN/1.73M2 — LOW
EGFR: 40 ML/MIN/1.73M2 — LOW
GLUCOSE SERPL-MCNC: 109 MG/DL — HIGH (ref 70–99)
HCT VFR BLD CALC: 35.2 % — LOW (ref 39–50)
HGB BLD-MCNC: 10.4 G/DL — LOW (ref 13–17)
INR BLD: 2.26 RATIO — HIGH (ref 0.85–1.16)
MAGNESIUM SERPL-MCNC: 2 MG/DL — SIGNIFICANT CHANGE UP (ref 1.6–2.6)
MCHC RBC-ENTMCNC: 29.2 PG — SIGNIFICANT CHANGE UP (ref 27–34)
MCHC RBC-ENTMCNC: 29.5 G/DL — LOW (ref 32–36)
MCV RBC AUTO: 98.9 FL — SIGNIFICANT CHANGE UP (ref 80–100)
NRBC BLD AUTO-RTO: 0 /100 WBCS — SIGNIFICANT CHANGE UP (ref 0–0)
PHOSPHATE SERPL-MCNC: 3.3 MG/DL — SIGNIFICANT CHANGE UP (ref 2.5–4.5)
PLATELET # BLD AUTO: 239 K/UL — SIGNIFICANT CHANGE UP (ref 150–400)
POTASSIUM SERPL-MCNC: 5.1 MMOL/L — SIGNIFICANT CHANGE UP (ref 3.5–5.3)
POTASSIUM SERPL-SCNC: 5.1 MMOL/L — SIGNIFICANT CHANGE UP (ref 3.5–5.3)
PROT SERPL-MCNC: 7.9 G/DL — SIGNIFICANT CHANGE UP (ref 6–8.3)
PROTHROM AB SERPL-ACNC: 26.5 SEC — HIGH (ref 9.9–13.4)
RBC # BLD: 3.56 M/UL — LOW (ref 4.2–5.8)
RBC # FLD: 16.2 % — HIGH (ref 10.3–14.5)
SODIUM SERPL-SCNC: 138 MMOL/L — SIGNIFICANT CHANGE UP (ref 135–145)
WBC # BLD: 7.53 K/UL — SIGNIFICANT CHANGE UP (ref 3.8–10.5)
WBC # FLD AUTO: 7.53 K/UL — SIGNIFICANT CHANGE UP (ref 3.8–10.5)

## 2025-06-30 PROCEDURE — 99232 SBSQ HOSP IP/OBS MODERATE 35: CPT

## 2025-06-30 PROCEDURE — 99497 ADVNCD CARE PLAN 30 MIN: CPT | Mod: 25

## 2025-06-30 PROCEDURE — 71045 X-RAY EXAM CHEST 1 VIEW: CPT | Mod: 26

## 2025-06-30 PROCEDURE — 76604 US EXAM CHEST: CPT | Mod: 26

## 2025-06-30 PROCEDURE — 99223 1ST HOSP IP/OBS HIGH 75: CPT

## 2025-06-30 PROCEDURE — 99233 SBSQ HOSP IP/OBS HIGH 50: CPT | Mod: GC

## 2025-06-30 PROCEDURE — 99498 ADVNCD CARE PLAN ADDL 30 MIN: CPT

## 2025-06-30 RX ORDER — AMIODARONE HYDROCHLORIDE 50 MG/ML
1 INJECTION, SOLUTION INTRAVENOUS
Qty: 0 | Refills: 0 | DISCHARGE
Start: 2025-06-30

## 2025-06-30 RX ORDER — IPRATROPIUM BROMIDE AND ALBUTEROL SULFATE .5; 2.5 MG/3ML; MG/3ML
3 SOLUTION RESPIRATORY (INHALATION)
Qty: 0 | Refills: 0 | DISCHARGE
Start: 2025-06-30

## 2025-06-30 RX ORDER — CEFTRIAXONE 500 MG/1
2 INJECTION, POWDER, FOR SOLUTION INTRAMUSCULAR; INTRAVENOUS
Qty: 0 | Refills: 0 | DISCHARGE
Start: 2025-06-30

## 2025-06-30 RX ORDER — BUMETANIDE 1 MG/1
2 TABLET ORAL
Qty: 0 | Refills: 0 | DISCHARGE
Start: 2025-06-30

## 2025-06-30 RX ORDER — METOPROLOL SUCCINATE 50 MG/1
1 TABLET, EXTENDED RELEASE ORAL
Qty: 0 | Refills: 0 | DISCHARGE
Start: 2025-06-30

## 2025-06-30 RX ORDER — CEFTRIAXONE 500 MG/1
2000 INJECTION, POWDER, FOR SOLUTION INTRAMUSCULAR; INTRAVENOUS EVERY 24 HOURS
Refills: 0 | Status: DISCONTINUED | OUTPATIENT
Start: 2025-06-30 | End: 2025-07-01

## 2025-06-30 RX ORDER — ATORVASTATIN CALCIUM 80 MG/1
1 TABLET, FILM COATED ORAL
Qty: 0 | Refills: 0 | DISCHARGE
Start: 2025-06-30

## 2025-06-30 RX ORDER — ISOSORBDIE DINITRATE 30 MG/1
1 TABLET ORAL
Qty: 0 | Refills: 0 | DISCHARGE
Start: 2025-06-30

## 2025-06-30 RX ORDER — DIGOXIN 250 UG/1
1 TABLET ORAL
Qty: 0 | Refills: 0 | DISCHARGE
Start: 2025-06-30

## 2025-06-30 RX ORDER — CLOPIDOGREL BISULFATE 75 MG/1
1 TABLET, FILM COATED ORAL
Qty: 0 | Refills: 0 | DISCHARGE
Start: 2025-06-30

## 2025-06-30 RX ORDER — METOPROLOL SUCCINATE 50 MG/1
0.5 TABLET, EXTENDED RELEASE ORAL
Refills: 0 | DISCHARGE

## 2025-06-30 RX ADMIN — MEROPENEM 100 MILLIGRAM(S): 1 INJECTION INTRAVENOUS at 05:21

## 2025-06-30 RX ADMIN — Medication 2.5 MILLIGRAM(S): at 21:52

## 2025-06-30 RX ADMIN — Medication 1 APPLICATION(S): at 05:22

## 2025-06-30 RX ADMIN — BUMETANIDE 2 MILLIGRAM(S): 1 TABLET ORAL at 18:52

## 2025-06-30 RX ADMIN — ISOSORBDIE DINITRATE 10 MILLIGRAM(S): 30 TABLET ORAL at 05:21

## 2025-06-30 RX ADMIN — AMIODARONE HYDROCHLORIDE 200 MILLIGRAM(S): 50 INJECTION, SOLUTION INTRAVENOUS at 05:21

## 2025-06-30 RX ADMIN — IPRATROPIUM BROMIDE AND ALBUTEROL SULFATE 3 MILLILITER(S): .5; 2.5 SOLUTION RESPIRATORY (INHALATION) at 06:38

## 2025-06-30 RX ADMIN — ISOSORBDIE DINITRATE 10 MILLIGRAM(S): 30 TABLET ORAL at 18:53

## 2025-06-30 RX ADMIN — DIGOXIN 125 MICROGRAM(S): 250 TABLET ORAL at 05:21

## 2025-06-30 RX ADMIN — CLOPIDOGREL BISULFATE 75 MILLIGRAM(S): 75 TABLET, FILM COATED ORAL at 12:24

## 2025-06-30 RX ADMIN — ISOSORBDIE DINITRATE 10 MILLIGRAM(S): 30 TABLET ORAL at 12:24

## 2025-06-30 RX ADMIN — METOPROLOL SUCCINATE 25 MILLIGRAM(S): 50 TABLET, EXTENDED RELEASE ORAL at 05:22

## 2025-06-30 RX ADMIN — BUMETANIDE 2 MILLIGRAM(S): 1 TABLET ORAL at 05:22

## 2025-06-30 RX ADMIN — CEFTRIAXONE 100 MILLIGRAM(S): 500 INJECTION, POWDER, FOR SOLUTION INTRAMUSCULAR; INTRAVENOUS at 12:24

## 2025-06-30 RX ADMIN — ATORVASTATIN CALCIUM 80 MILLIGRAM(S): 80 TABLET, FILM COATED ORAL at 21:52

## 2025-06-30 RX ADMIN — Medication 40 MILLIGRAM(S): at 05:21

## 2025-06-30 NOTE — CONSULT NOTE ADULT - CONVERSATION DETAILS
Met with pt bedside, he was amenable to conversation. I explained role of palliative care, and we discussed why he was here. Pt aware of his med conditions and med hx, he reviewed w/ me his past few years of his condition, spoke of his heart function,  multiple procedures, and  surgeries, including TAVR.  Says he follows up with his cardiologist Dr Stafford, and saw him recently. Pt also aware of his poor kidney function, says he has not needed any dialysis yet . Pt has support of his brother and one long time friend. His brother Nathan is his hcp. Pt discussed how he is only 64 yo and he wishes he had taken better care of himself. He discussed early on he did not follow up with his medical care as he should have. He realizes he cannot have a " re-do" and so he says he is now  doing what the doctors tell him. I asked him what is most important to him at this time, given all he has going on medically . Pt reports "life" is most important to him, saying " life is sweet" . We spoke about the chronic nature of his heart issue , and how it is coming with other issues and some of the treatments are hard for his body to handle. He stated understanding and knows that his kidney issues are also part of this problem .  But says , at this point, I would like to try whatever they are recommending. We discussed he may possibly have to go to another hospital for certain procedures , and pt stated he would be ok with that. Pt also hopeful they can clear up the present infection , saying he knows it has spread into his blood stream and is on antibiotics.  I spoke about importance of knowing his wishes,  if he became sicker, he says he has spoken to his brother about this, and if his mind is no longer working, then he would not want any interventions, and to let him go,  but as for now, he does wish for all interventions, and cpr if needed .

## 2025-06-30 NOTE — PROGRESS NOTE ADULT - SUBJECTIVE AND OBJECTIVE BOX
CC: f/u for  cardiobacter hominis endocarditis  Patient reports  feels ok  REVIEW OF SYSTEMS:  All other review of systems negative (Comprehensive ROS)    Antimicrobials Day #  :4  cefTRIAXone   IVPB 2000 milliGRAM(s) IV Intermittent every 24 hours    Other Medications Reviewed    T(F): 97.6 (06-30-25 @ 12:00), Max: 97.8 (06-29-25 @ 19:52)  HR: 107 (06-30-25 @ 12:00)  BP: 103/64 (06-30-25 @ 12:00)  RR: 18 (06-30-25 @ 12:00)  SpO2: 97% (06-30-25 @ 12:00)  Wt(kg): --    PHYSICAL EXAM:  General: alert, no acute distress  Eyes:  anicteric, no conjunctival injection, no discharge  Oropharynx: no lesions or injection 	  Neck: supple, without adenopathy  Lungs: basilar rales  to auscultation  Heart:s1s2  Abdomen: soft, nondistended, nontender, without mass or organomegaly  Skin: no lesions  Extremities: no clubbing, cyanosis,   Neurologic: alert, oriented, moves all extremities    LAB RESULTS:                        10.4   7.53  )-----------( 239      ( 30 Jun 2025 07:43 )             35.2     06-30    138  |  94[L]  |  68[H]  ----------------------------<  109[H]  5.1   |  43[H]  |  1.86[H]    Ca    9.1      30 Jun 2025 07:43  Phos  3.3     06-30  Mg     2.0     06-30    TPro  7.9  /  Alb  2.2[L]  /  TBili  0.4  /  DBili  x   /  AST  19  /  ALT  18  /  AlkPhos  154[H]  06-30    LIVER FUNCTIONS - ( 30 Jun 2025 07:43 )  Alb: 2.2 g/dL / Pro: 7.9 g/dL / ALK PHOS: 154 U/L / ALT: 18 U/L / AST: 19 U/L / GGT: x           Urinalysis Basic - ( 30 Jun 2025 07:43 )    Color: x / Appearance: x / SG: x / pH: x  Gluc: 109 mg/dL / Ketone: x  / Bili: x / Urobili: x   Blood: x / Protein: x / Nitrite: x   Leuk Esterase: x / RBC: x / WBC x   Sq Epi: x / Non Sq Epi: x / Bacteria: x      MICROBIOLOGY:  RECENT CULTURES:  06-26 @ 14:30 Blood Blood-Peripheral     Growth in aerobic bottle: Gram Negative Rods    Growth in aerobic bottle: Gram Negative Rods    06-26 @ 14:22 Blood Blood-Venous     Growth in aerobic bottle: Gram Negative Rods    Growth in aerobic bottle: Gram Negative Rods        RADIOLOGY REVIEWED:    < from: US Duplex Arterial Lower Ext Ltd, Left (06.27.25 @ 16:43) >    ACC: [id number] EXAM:  US DPLX LWR EXT ARTS LTD LT   ORDERED BY:  JOSH ACUÑA     PROCEDURE DATE:  06/27/2025          INTERPRETATION:  CLINICAL INFORMATION: 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   Biphasic and monophasic spectral redemonstrated. There is a partially   thrombosed saccular aneurysm  arising from the lateral aspect of the deep   femoral artery measuring 3.1 x 1.8 x 2.9 cm with a 4 mm neck.    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    < end of copied text >  < from: CT Abdomen and Pelvis No Cont (06.27.25 @ 09:54) >    ACC: [id number] EXAM:  CT CHEST   ORDERED BY:  JOSH ACUÑA     ACC: [id number] EXAM:  CT ABDOMEN AND PELVIS   ORDERED BY:  JOSH ACUÑA     *** ADDENDUM # 1 ***    Critical findings discussed with Dr Josh Acuña by Dr. Dyer at 4:28 PM on   6/27/2025.    --- End of Report ---    *** END OF ADDENDUM # 1 ***      PROCEDURE DATE:  06/27/2025          INTERPRETATION:  CLINICAL INFORMATION: Acute on chronic respiratory   failure, bacteremia.    COMPARISON: CT abdomen and pelvis 4/19/2021.    CONTRAST/COMPLICATIONS:  IV Contrast: NONE  Oral Contrast: NONE.    PROCEDURE:  CT of the Chest, Abdomen and Pelvis was performed.  Sagittal and coronal reformats were performed.    FINDINGS:  CHEST:  LUNGS/PLEURA: Increase in size and complexity of the thick-walled  loculated right-sided pleural fluid still relatively small in size.   Interval development of a small to moderate left pleural effusion. There   is associated compressive atelectasis at the lung bases. Groundglass   attenuation both lungs may be related to fluid overload.  VESSELS: Aortic calcifications. Coronary artery calcifications.  HEART: Cardiomegaly. Coronary artery calcifications. Status post TAVR.   Leadless pacemaker in right ventricle. CABG.  MEDIASTINUM AND ZAHEER: Prominent mediastinal and hilar lymph nodes   measuring up to 1.1 cm.  CHEST WALL AND LOWER NECK: Median sternotomy wires. Bilateral   gynecomastia.    ABDOMEN AND PELVIS:  LIVER: Within normal limits.  BILE DUCTS: Normal caliber.  GALLBLADDER: Mild nonspecific pericholecystic edema.  SPLEEN: Within normal limits.  PANCREAS: Within normal limits.  ADRENALS: Within normal limits.  KIDNEYS/URETERS: Left lower pole hemorrhagic cyst measuring 6 mm. No   hydronephrosis.    BLADDER: Minimally distended.  REPRODUCTIVE ORGANS: Prostate within normal limits.    BOWEL: No bowel obstruction. Colonic diverticulosis. Appendix is normal.  PERITONEUM/RETROPERITONEUM: Trace abdominopelvic ascites.  VESSELS: Moderate to severe atherosclerotic calcification of the   aortoiliac tree. No abdominal aortic aneurysm. Dilated tubular structure   measuring 2.2 cm just lateral to the proximal left profunda femoral   artery on series 2 image 160 raising the possibility of an aneurysm in   this region. Further evaluation with left lower extremity arterial   Doppler recommended.  LYMPH NODES: No lymphadenopathy.  ABDOMINAL WALL: Tiny fat-containing umbilical hernia.  BONES: Degenerative changes. Chronic left sixth rib fracture. Endplate   irregularity at inferior endplate of L1 and superior endplate of L2 is   new.    IMPRESSION:  Increase in size and complexity of the small thick walled loculated right   pleural effusion. Infection is in the differential diagnosis.    Interval development of a small to moderate left pleural effusion.    Endplate irregularity L1-2 is new since the prior examination and may be   seen in the setting of discitis osteomyelitis. Recommend further   evaluation with MRI lumbar spine.    2.2 cm structure just lateral to the proximal left profunda femoral   artery in the left groin raising the possibility of an aneurysm in this   region. Correlation with left lower extremity arterial Doppler   recommended.    --- End of Report ---    < end of copied text >            Assessment:  patient with h/o icm, cabg 12 years ago, micra ppm, tavr 3 years ago, stay March to april for chf, found to need a tavr in tavr which he had. He now returns to hospital from rehab for chf again, bc now showing sustained cardiobacter hominis bacteremia. Most likely has endocarditis , cannot exclude infection of the groin aneurysm too. This is on oral organism so will need further dental evaluation too though nothing looks grossly infected at present. He also has a thick walled loculated pleural effusion on right. That could be infected with this organism too  Plan:  changed back to ceftriaxone  repeat bc  sustained bacteremia concerning for endovascular focus though did not get abx until second set drawn so not uncontrolled documented as yet  agree should go to [facility] for tertiary care, not sure he is a cardiac surgery candidate but if he is , this is not an appropriate place for him for now  micra ppm so hopefully not likely infected  favor to at least sample right effusion to see if infected and see if needs a chest tube or dainage of some sort    will need dental eval  f/u latest bc

## 2025-06-30 NOTE — PROGRESS NOTE ADULT - SUBJECTIVE AND OBJECTIVE BOX
Resting, on NC O2    Vital Signs Last 24 Hrs  T(C): 36.3 (06-30-25 @ 19:00), Max: 36.4 (06-30-25 @ 05:00)  T(F): 97.3 (06-30-25 @ 19:00), Max: 97.6 (06-30-25 @ 12:00)  HR: 74 (06-30-25 @ 19:00) (74 - 107)  BP: 112/79 (06-30-25 @ 19:00) (103/64 - 116/80)  RR: 18 (06-30-25 @ 19:00) (18 - 18)  SpO2: 93% (06-30-25 @ 19:00) (93% - 98%)    I&O's Detail    29 Jun 2025 07:01  -  30 Jun 2025 07:00  --------------------------------------------------------  IN:    Oral Fluid: 300 mL  Total IN: 300 mL    OUT:    Voided (mL): 1850 mL  Total OUT: 1850 mL    30 Jun 2025 07:01  -  30 Jun 2025 21:02  --------------------------------------------------------  OUT:    Voided (mL): 800 mL  Total OUT: 800 mL    s1s2  b/l air entry  soft, ND  edema                                        10.4   7.53  )-----------( 239      ( 30 Jun 2025 07:43 )             35.2     30 Jun 2025 07:43    138    |  94     |  68     ----------------------------<  109    5.1     |  43     |  1.86     Ca    9.1        30 Jun 2025 07:43  Phos  3.3       30 Jun 2025 07:43  Mg     2.0       30 Jun 2025 07:43    TPro  7.9    /  Alb  2.2    /  TBili  0.4    /  DBili  x      /  AST  19     /  ALT  18     /  AlkPhos  154    30 Jun 2025 07:43    LIVER FUNCTIONS - ( 30 Jun 2025 07:43 )  Alb: 2.2 g/dL / Pro: 7.9 g/dL / ALK PHOS: 154 U/L / ALT: 18 U/L / AST: 19 U/L / GGT: x           PT/INR - ( 30 Jun 2025 07:43 )   PT: 26.5 sec;   INR: 2.26 ratio      albuterol/ipratropium for Nebulization 3 milliLiter(s) Nebulizer every 6 hours PRN  aMIOdarone    Tablet 200 milliGRAM(s) Oral daily  atorvastatin 80 milliGRAM(s) Oral at bedtime  bumetanide Injectable 2 milliGRAM(s) IV Push every 12 hours  cefTRIAXone   IVPB 2000 milliGRAM(s) IV Intermittent every 24 hours  chlorhexidine 2% Cloths 1 Application(s) Topical <User Schedule>  clopidogrel Tablet 75 milliGRAM(s) Oral daily  digoxin     Tablet 125 MICROGram(s) Oral daily  isosorbide   dinitrate Tablet (ISORDIL) 10 milliGRAM(s) Oral three times a day  metoprolol succinate ER 25 milliGRAM(s) Oral daily  pantoprazole    Tablet 40 milliGRAM(s) Oral before breakfast  polyethylene glycol 3350 17 Gram(s) Oral daily  warfarin 2.5 milliGRAM(s) Oral at bedtime    A/P:    CM, EF 25 - 30%, adm w/CHF, volume overload  Cardio-renal ANGELICA/CKD 3   Continue Bumex   Avoid nephrotoxins as able  Cr is not far from prior baseline   F/u BMP, UO  UA w/pr 30, otherwise bland   SONO negative in April 2025  Avoid excessive fluid intake, no NSAID's    [phone] Resting, on NC O2    Vital Signs Last 24 Hrs  T(C): 36.3 (06-30-25 @ 19:00), Max: 36.4 (06-30-25 @ 05:00)  T(F): 97.3 (06-30-25 @ 19:00), Max: 97.6 (06-30-25 @ 12:00)  HR: 74 (06-30-25 @ 19:00) (74 - 107)  BP: 112/79 (06-30-25 @ 19:00) (103/64 - 116/80)  RR: 18 (06-30-25 @ 19:00) (18 - 18)  SpO2: 93% (06-30-25 @ 19:00) (93% - 98%)    I&O's Detail    29 Jun 2025 07:01  -  30 Jun 2025 07:00  --------------------------------------------------------  IN:    Oral Fluid: 300 mL  Total IN: 300 mL    OUT:    Voided (mL): 1850 mL  Total OUT: 1850 mL    30 Jun 2025 07:01  -  30 Jun 2025 21:02  --------------------------------------------------------  OUT:    Voided (mL): 800 mL  Total OUT: 800 mL    s1s2  b/l air entry  soft, ND  edema                                        10.4   7.53  )-----------( 239      ( 30 Jun 2025 07:43 )             35.2     30 Jun 2025 07:43    138    |  94     |  68     ----------------------------<  109    5.1     |  43     |  1.86     Ca    9.1        30 Jun 2025 07:43  Phos  3.3       30 Jun 2025 07:43  Mg     2.0       30 Jun 2025 07:43    TPro  7.9    /  Alb  2.2    /  TBili  0.4    /  DBili  x      /  AST  19     /  ALT  18     /  AlkPhos  154    30 Jun 2025 07:43    LIVER FUNCTIONS - ( 30 Jun 2025 07:43 )  Alb: 2.2 g/dL / Pro: 7.9 g/dL / ALK PHOS: 154 U/L / ALT: 18 U/L / AST: 19 U/L / GGT: x           PT/INR - ( 30 Jun 2025 07:43 )   PT: 26.5 sec;   INR: 2.26 ratio      albuterol/ipratropium for Nebulization 3 milliLiter(s) Nebulizer every 6 hours PRN  aMIOdarone    Tablet 200 milliGRAM(s) Oral daily  atorvastatin 80 milliGRAM(s) Oral at bedtime  bumetanide Injectable 2 milliGRAM(s) IV Push every 12 hours  cefTRIAXone   IVPB 2000 milliGRAM(s) IV Intermittent every 24 hours  chlorhexidine 2% Cloths 1 Application(s) Topical <User Schedule>  clopidogrel Tablet 75 milliGRAM(s) Oral daily  digoxin     Tablet 125 MICROGram(s) Oral daily  isosorbide   dinitrate Tablet (ISORDIL) 10 milliGRAM(s) Oral three times a day  metoprolol succinate ER 25 milliGRAM(s) Oral daily  pantoprazole    Tablet 40 milliGRAM(s) Oral before breakfast  polyethylene glycol 3350 17 Gram(s) Oral daily  warfarin 2.5 milliGRAM(s) Oral at bedtime    A/P:    CM, EF 25 - 30%, adm w/CHF, volume overload  Cardio-renal ANGELICA/CKD 3   Continue Bumex   Avoid nephrotoxins as able  Cr is not far from prior baseline   F/u BMP, UO  UA w/pr 30, otherwise bland   SONO negative in April 2025  Avoid excessive fluid intake, no NSAID's  Cardiobacterium bacteremia in pt w/hx TAVR, micra PPM  ID f/u appr, Abx per ID  Plan for tx to [facility]     [phone]

## 2025-06-30 NOTE — CONSULT NOTE ADULT - SUBJECTIVE AND OBJECTIVE BOX
HPI: pt  is a 65 year old male with a PMH of AFib (s/p dccv 3/25/25, unsuccessful), HFrEF (likely mod red EF, sev TTE), aortic stenosis s/p TAVR (11/2022, reop on 4/11), a flutter s/p ablation 2019 and s/p micra implant, CKD3, WILFRED, obesity, venous stasis, HTN, and HLD with recent hospitalization for heart failure exacerbation and cardiorenal syndrome in April presenting to [facility] from [facility] for worsening SOB x a few days as well as LE swelling, Pt was given increased doses of Bumex in attempts to diurese, however condition continued to worsen therefore patient was taken to the ED. He was placed on NIV for increased WOB, and given additional dose of Bumex. BNP noted to be elevated. Pt denies fever, chills, or sick contacts and ROS otherwise negative. Pt accepted to ICU for further management / care.  (23 Jun 2025 11:12)      PAST MEDICAL & SURGICAL HISTORY:  CAD (coronary artery disease)  s/p CABG      Hypercholesteremia      Thrombus of left atrial appendage  2018      Ischemic cardiomyopathy      WILFRED (obstructive sleep apnea)  can not tolerate bipap at night      HTN (hypertension)      Atrial fibrillation  2018      CHF (congestive heart failure)  denies any recent exacerbation      Peripheral edema  chronic      Lichen planus  chronic ( b/L LE)      Atrial flutter  s/p ablation 2018      MI (myocardial infarction)  2012      Stented coronary artery  2019      AS (aortic stenosis)      Chronic kidney disease, unspecified CKD stage      ANGELICA (acute kidney injury)  4/2021      Morbid obesity      Status post placement of cardiac pacemaker  micraleadless PPM, [id number] last interrogation 7/6/2022      Osteoarthritis  shoulders, hips, knee      H/O scoliosis      Lower back pain      History of elbow surgery      S/P CABG (coronary artery bypass graft)  x3, 2012      Cardiac pacemaker  leadless 2018      History of coronary artery stent placement  2019 ( one more after cabg)      History of adenoidectomy      H/O atrioventricular karlee ablation  2018          SOCIAL HISTORY:    Admitted from:   [facility]   Substance abuse history:              Tobacco hx:                  Alcohol hx:              Home Opioid hx:  Pentecostal:                                    Preferred Language:    HCP/:  brother Nathan          Phone#: [phone]    FAMILY HISTORY:    Baseline ADLs (prior to admission):    Allergies    metoprolol (Short breath)    Intolerances      Present Symptoms:   Dyspnea: some   Nausea/Vomiting: no  Anxiety: no  Depressed   Fatigue: yes  Loss of appetite: no    Review of Systems: [All others negative]    Pain:      no                           QOL impact -   Location -                    Aggravating factors -  Quality -  Radiation -  Timing-  Severity (0-10 scale):  Minimal acceptable level (0-10 scale):             MEDICATIONS  (STANDING):  aMIOdarone    Tablet 200 milliGRAM(s) Oral daily  atorvastatin 80 milliGRAM(s) Oral at bedtime  bumetanide Injectable 2 milliGRAM(s) IV Push every 12 hours  cefTRIAXone   IVPB 2000 milliGRAM(s) IV Intermittent every 24 hours  chlorhexidine 2% Cloths 1 Application(s) Topical <User Schedule>  clopidogrel Tablet 75 milliGRAM(s) Oral daily  digoxin     Tablet 125 MICROGram(s) Oral daily  isosorbide   dinitrate Tablet (ISORDIL) 10 milliGRAM(s) Oral three times a day  metoprolol succinate ER 25 milliGRAM(s) Oral daily  pantoprazole    Tablet 40 milliGRAM(s) Oral before breakfast  polyethylene glycol 3350 17 Gram(s) Oral daily    MEDICATIONS  (PRN):  albuterol/ipratropium for Nebulization 3 milliLiter(s) Nebulizer every 6 hours PRN Shortness of Breath and/or Wheezing      PHYSICAL EXAM:    Vital Signs Last 24 Hrs  T(C): 36.4 (30 Jun 2025 05:00), Max: 36.7 (29 Jun 2025 12:00)  T(F): 97.5 (30 Jun 2025 05:00), Max: 98 (29 Jun 2025 12:00)  HR: 74 (30 Jun 2025 05:00) (70 - 74)  BP: 106/68 (30 Jun 2025 05:00) (106/68 - 120/74)  BP(mean): --  RR: 18 (30 Jun 2025 05:00) (17 - 18)  SpO2: 96% (30 Jun 2025 05:00) (93% - 98%)    Parameters below as of 30 Jun 2025 05:00  Patient On (Oxygen Delivery Method): nasal cannula  O2 Flow (L/min): 4      General: alert  oriented x 3, converses , on N/C  Karnofsky Performance Score/Palliative Performance Status Version2:   60  %  PPSV: 60%  Chaplaincy Referral: [ ] yes [ ] refused [ ] following [x ] Deferred     HEENT: normal  w/ dry mouth    Lungs:  few coarse, dim to bases, sl labored breathing w/ talking     CV: normal  rate   GI:  abd lrg., obese, soft , n/t    : normal   lopez  Musculoskeletal:  weakness and bi lat  edema   Skin: normal , w/d   Neuro: awake, alert, oriented, converses   Oral intake ability:  moderate full capability   Diet: reg as elian     LABS:                        10.4   7.53  )-----------( 239      ( 30 Jun 2025 07:43 )             35.2     06-30    138  |  94[L]  |  68[H]  ----------------------------<  109[H]  5.1   |  43[H]  |  1.86[H]    Ca    9.1      30 Jun 2025 07:43  Phos  3.3     06-30  Mg     2.0     06-30    TPro  7.9  /  Alb  2.2[L]  /  TBili  0.4  /  DBili  x   /  AST  19  /  ALT  18  /  AlkPhos  154[H]  06-30    Urinalysis Basic - ( 30 Jun 2025 07:43 )    Color: x / Appearance: x / SG: x / pH: x  Gluc: 109 mg/dL / Ketone: x  / Bili: x / Urobili: x   Blood: x / Protein: x / Nitrite: x   Leuk Esterase: x / RBC: x / WBC x   Sq Epi: x / Non Sq Epi: x / Bacteria: x        RADIOLOGY & ADDITIONAL STUDIES:     < from: CT Chest No Cont (06.27.25 @ 09:53) >    IMPRESSION:  Increase in size and complexity of the small thick walled loculated right   pleural effusion. Infection is in the differential diagnosis.    Interval development of a small to moderate left pleural effusion.    Endplate irregularity L1-2 is new since the prior examination and may be   seen in the setting of discitis osteomyelitis. Recommend further   evaluation with MRI lumbar spine.    2.2 cm structure just lateral to the proximal left profunda femoral   artery in the left groin raising the possibility of an aneurysm in this   region. Correlation with left lower extremity arterial Doppler   recommended.          ADVANCE DIRECTIVES: HCP  full code   Advanced Care Planning discussion total time spent:

## 2025-06-30 NOTE — PROGRESS NOTE ADULT - SUBJECTIVE AND OBJECTIVE BOX
Patient is a 65y old  Male who presents with a chief complaint of Acute on chronic hypercarbic respiratory failure, Heart failure exacerbation, ANGELICA on CKD (30 Jun 2025 12:43)      Overnight Events: None  Interval HPI: Patient seen and examined at bedside. Feels breathless. Started around 4am after coming off of bipap.    Vital Signs Last 24 Hrs  T(C): 36.4 (30 Jun 2025 12:00), Max: 36.6 (29 Jun 2025 19:52)  T(F): 97.6 (30 Jun 2025 12:00), Max: 97.8 (29 Jun 2025 19:52)  HR: 107 (30 Jun 2025 12:00) (70 - 107)  BP: 103/64 (30 Jun 2025 12:00) (103/64 - 120/74)  BP(mean): --  RR: 18 (30 Jun 2025 12:00) (17 - 18)  SpO2: 97% (30 Jun 2025 12:00) (93% - 98%)    Parameters below as of 30 Jun 2025 12:00  Patient On (Oxygen Delivery Method): nasal cannula  O2 Flow (L/min): 5    I&O's Summary    29 Jun 2025 07:01  -  30 Jun 2025 07:00  --------------------------------------------------------  IN: 300 mL / OUT: 1850 mL / NET: -1550 mL    30 Jun 2025 07:01  -  30 Jun 2025 16:47  --------------------------------------------------------  IN: 0 mL / OUT: 800 mL / NET: -800 mL          PHYSICAL EXAM:  GENERAL: NAD, lying in bed  HEAD: Atraumatic, Normocephalic  EYES: EOMI, conjunctiva and sclera clear  ENT: Moist mucous membranes  NECK: Supple, No JVD  CHEST/LUNG: Lower lung field crackles bilaterally  HEART: Regular rate and irregular rhythm. S1 and S2. No murmurs.  ABDOMEN: Soft, Nontender, Nondistended. Bowel sounds present.   EXTREMITIES: No clubbing, cyanosis, or edema  NERVOUS SYSTEM: Alert & Oriented X3, speech clear.     LABS:   CBC                        10.4   7.53  )-----------( 239      ( 30 Jun 2025 07:43 )             35.2     CMP  06-30    138  |  94  |  68  ----------------------------<  109  5.1   |  43  |  1.86    Ca    9.1      30 Jun 2025 07:43  Phos  3.3     06-30  Mg     2.0     06-30    TPro  7.9  /  Alb  2.2  /  TBili  0.4  /  DBili  x   /  AST  19  /  ALT  18  /  AlkPhos  154  06-30          PT/INR - ( 30 Jun 2025 07:43 )   PT: 26.5 sec;   INR: 2.26 ratio         PTT - ( 29 Jun 2025 06:20 )  PTT:37.0 sec                            Urinalysis Basic - ( 30 Jun 2025 07:43 )    Color: x / Appearance: x / SG: x / pH: x  Gluc: 109 mg/dL / Ketone: x  / Bili: x / Urobili: x   Blood: x / Protein: x / Nitrite: x   Leuk Esterase: x / RBC: x / WBC x   Sq Epi: x / Non Sq Epi: x / Bacteria: x        Culture - Blood (collected 26 Jun 2025 14:30)  Source: Blood Blood-Peripheral  Gram Stain (29 Jun 2025 04:12):    Growth in aerobic bottle: Gram Negative Rods  Preliminary Report (29 Jun 2025 04:13):    Growth in aerobic bottle: Gram Negative Rods    Culture - Blood (collected 26 Jun 2025 14:22)  Source: Blood Blood-Venous  Gram Stain (29 Jun 2025 10:03):    Growth in aerobic bottle: Gram Negative Rods  Preliminary Report (29 Jun 2025 10:03):    Growth in aerobic bottle: Gram Negative Rods          06-26 @ 14:30  Organism --  Gram Stain Blood -- Gram Stain   Growth in aerobic bottle: Gram Negative Rods  Specimen Source Blood Blood-Peripheral  Culture-Blood --    06-26 @ 14:22  Organism --  Gram Stain Blood -- Gram Stain   Growth in aerobic bottle: Gram Negative Rods  Specimen Source Blood Blood-Venous  Culture-Blood --        RADIOLOGY & ADDITIONAL TESTS:    MEDICATIONS:  MEDICATIONS  (STANDING):  aMIOdarone    Tablet 200 milliGRAM(s) Oral daily  atorvastatin 80 milliGRAM(s) Oral at bedtime  bumetanide Injectable 2 milliGRAM(s) IV Push every 12 hours  cefTRIAXone   IVPB 2000 milliGRAM(s) IV Intermittent every 24 hours  chlorhexidine 2% Cloths 1 Application(s) Topical <User Schedule>  clopidogrel Tablet 75 milliGRAM(s) Oral daily  digoxin     Tablet 125 MICROGram(s) Oral daily  isosorbide   dinitrate Tablet (ISORDIL) 10 milliGRAM(s) Oral three times a day  metoprolol succinate ER 25 milliGRAM(s) Oral daily  pantoprazole    Tablet 40 milliGRAM(s) Oral before breakfast  polyethylene glycol 3350 17 Gram(s) Oral daily    MEDICATIONS  (PRN):  albuterol/ipratropium for Nebulization 3 milliLiter(s) Nebulizer every 6 hours PRN Shortness of Breath and/or Wheezing      Allergies    metoprolol (Short breath)    Intolerances

## 2025-06-30 NOTE — PROGRESS NOTE ADULT - ASSESSMENT
65 year old male with a PMH of AFib (s/p dccv 3/25/25, unsuccessful), HFrEF (likely mod red EF, sev TTE), aortic stenosis s/p TAVR (11/2022, reop on 4/11), a flutter s/p ablation 2019 and s/p micra implant, CKD3, WILFRED, obesity, venous stasis, HTN, and HLD with recent hospitalization for heart failure exacerbation and cardiorenal syndrome in April presenting to [facility] from [facility] for worsening SOB x a few days as well as LE swelling, Pt was given increased doses of Bumex in attempts to diurese, however condition continued to worsen therefore patient was taken to the ED. He was placed on NIV for increased WOB, and given additional dose of Bumex. BNP noted to be elevated. Pt denies fever, chills, or sick contacts and ROS otherwise negative. Pt accepted to ICU for further management / care. Patient was admitted for Acute on chronic hypercarbic respiratory failure, Heart failure exacerbation, ANGELICA on CKD.    #Severe sepsis present on admission secondary to bacteremia   - On admission: , RR 30, ANGELICA, hypoxia requiring 12L, INR 4.11   - Blood culture (6/23) +cardiobacterium  - Blood culture (6/26) +gram negative rods  - CT Chest/Abdomen/Pelvis: increased right/left pleural effusions  - endplate irregularity L1-2 is new ?discitis osteomyelitis - needs MRI lumbar spine - however patient has a pacemaker and MRI machine not compatible with pacemaker here   - 2.2 cm structure lateral to proximal left profunda femoral artery in left groin - confirmed saccular aneurysm via Doppler Artery - vascular consulted over the phone and recommended continue AC and follow up outpatient   - Continue Rocephin  - Infectious Disease Consult, recs appreciated, switch from CTX to meropenem q12 hr, and give 1 dose levaquin 750, repeat Bcx.   - Repeat Bcx positive, repeat Bcx sent 06/29    #Acute on chronic hypercapnic respiratory failure  -s/p BIPAP in ICU  -on NC, spO2: 90% 5L  -c/w incentive spirometer   -Chest PT/Pulm toilet   -HOB> 30  -NIV PRN + QHs  -Duonebs for mild wheezing     #Acute on chronic systolic HFrEF  -SOB and LE swelling on admission  -ECHO: 25-30% EF  -s/p Bumex ggt in ICU  -c/w Bumex 2mg BID IV  -I+O  -Daily weights  -Cardio following  -Will need Hydral/Nitro for afterload and pre load reduction in lieu of ARB/ACEi  - not a candidate for ace arb arni slgt2i or mra 2/2 ckd. consider addition of hydral, imdur if hemodynamics allow  - Potential AICD pending GDMT optimization.   - Outpatient EP     #ANGELICA on CKD, likely cardiorenal syndrome / Congestive nephropathy - resolved  -Cr downtrending   -Avoid nephro toxic agents  -Nephro following recs appreciated     #Afib with rvr  -On Coumadin 2.5mg outpatient  -Dig loaded  -c/w Digoxin 125 mcg QD  -c/w home dose amiodarone QD  -Tele monitor   -Cardiology following   -Mg>2, Phos>4   -INR daily (has been within target 2-3)  -Coumadin 2.5mg ordered for tonight     #HTN  -c/w metoprolol with holding parameters     #HLD  -c/w with statin    #GERD  -c/w Pepcid     #DVT ppx  - SCD    Code status: Full code    Discussed with Dr. Cunningham     CHF Core measures:     CHF Type:  [  ]  Acute    [  ]Chronic    [ x ]Acute on Chronic                       [ x ] Systolic  [  ] Diastolic  [  ] Combined    EF: 25-30%  ECHO (6/23/25): There appears to be severe global left ventricle hypokinesis. Left ventricular wall thickness is mildly increased. Left ventricular systolic function is severely decreased with an ejection fraction of 30 % by Rivera's method of disks with an ejection fraction visually estimated at 25 to 30 %. Micra pacemaker is visualized in the right heart. S/p Florence-TAVR, appears well seated, peak velocity within normal limits. Mild aortic regurgitation. Small right pleural effusion noted.    ARNI [ preferred]/ACEI/ARBs for EF 40% or less  [   ] ACE or ARB OR ARNI ordered:  [ x  ] ACE or ARB or ARNI contraindicated or not ordered due to : ANGELICA on CKD Stage 3   [   ] ACE  ARB or ARNI discretionary: HFpEF    LONG ACTING BETA BLOCKER  for EF 40 % or less  [  x ] Beta blocker ordered: Coreg/Toprol  [   ] Beta blocker contraindicated or not ordered due to :  [   ] Beta blocker discretionary: HFpEF    ALDOSTERONE ANTAGONIST for EF 40% or less  [   ] Aldosterone Antagonist ordered: aldactone/Eplerenon  [  x ] Aldosterone Antagonist contraindicated or not ordered due to: ANGELICA on CKD Stage 3   [   ] Aldosterone Antagonist is discretionary: HFpEF    SGLT2i for all CHF Patient:  [   ] SGLT2i ordered: Farxiga/Jardiance  [  x ] SGLT2i contraindicated or not ordered due to : ANGELICA on CKD Stage 3     GOC:  [ x ] GOC Discussed  [  ] GOC Not discussed due to:  [  x ] Palliative consulted  [   ]Palliative not consulted due to:     On DISCHARGE:  [  ] & days appoinment provided for Primary care provider/Cardiologist?:         if not: why?    [  ] 7 day appointment appointment with:                Date/Time:   [   ] 7 day appointment not provided due to: Transferred to acute care or acute rehab or SNF.       65 year old male with a PMH of AFib (s/p dccv 3/25/25, unsuccessful), HFrEF (likely mod red EF, sev TTE), aortic stenosis s/p TAVR (11/2022, reop on 4/11), a flutter s/p ablation 2019 and s/p micra implant, CKD3, WILFRED, obesity, venous stasis, HTN, and HLD with recent hospitalization for heart failure exacerbation and cardiorenal syndrome in April presenting to [facility] from [facility] for worsening SOB x a few days as well as LE swelling, Pt was given increased doses of Bumex in attempts to diurese, however condition continued to worsen therefore patient was taken to the ED. He was placed on NIV for increased WOB, and given additional dose of Bumex. BNP noted to be elevated. Pt denies fever, chills, or sick contacts and ROS otherwise negative. Pt accepted to ICU for further management / care. Patient was admitted for Acute on chronic hypercarbic respiratory failure, Heart failure exacerbation, ANGELICA on CKD.    #Severe sepsis present on admission secondary bacteremia  #Small thick walled loculated right pleural effusion, increasing in size and complexity - seen on CT 6/27  -On admission: , RR 30, ANGELICA, hypoxia requiring 12L, INR 4.11   -Blood culture (6/23) +cardiobacterium  -Blood culture (6/26) +gram negative rods  -S/p levofloxacin (6/29) x1 and meropenem (6/29-6/30)  -CXR (6/30) Slow progressive improvement in congestive like findings. Stable cardiac findings.  -Continue CTX (6/26-)  -F/u repeat blood cultures  -ID consult: changed back to CTX, sustained bacteremia concerning for endovascular focus though did not get abx until second set drawn so not uncontrolled documented as yet, agree with transfer to [facility], favor at atleast sample right effusion to see if infected  -Cardio consult: prosthetic valve endocarditis may be on differential and patient may require transfer to tertiary hospital as he also has a pacemaker and would need EP consult if this would need to be removed if ID deems it necessary - although they are less susceptible to device infection    #Endplate irregularity L1-2 is new since the prior examination and may be seen in the setting of discitis osteomyelitis - seen on CT 6/27  -Needs MRI lumbar spine  -Cannot be done here since pt has pacemaker and MRI not compatible    #2.2 cm structure just lateral to the proximal left profunda femoral artery in the left groin - seen on CT 6/27  -US duplex arterial - saccular aneurysm, partially thrombosed, arising from left profunda femoris artery  -Vascular consulted over the phone, recommended continue AC and follow up outpatient    #Acute on chronic hypercapnic respiratory failure  -s/p BIPAP in ICU  -on NC, spO2: 90% 5L  -c/w incentive spirometer   -Chest PT/Pulm toilet   -HOB> 30  -NIV PRN + QHs  -Duonebs for mild wheezing     #Acute on chronic systolic HFrEF  -SOB and LE swelling on admission  -ECHO: 25-30% EF  -s/p Bumex ggt in ICU  -c/w IV Bumex 2mg BID  -I+O  -Daily weights  -Cardio consult: PPM interrogated and unremarkable, recommend eventual EP eval for primary prevention ICD when bacteremia resolves, not a candidate for GDMT at this time due to renal insufficiency, recommend hydralazine/isosorbide dinitrate    #ANGELICA on CKD, likely cardiorenal syndrome / Congestive nephropathy - resolved  -Cr downtrending   -Avoid nephro toxic agents  -Nephro following recs appreciated     #Afib with RVR  #HTN  -On Coumadin 2.5mg outpatient  -Dig loaded  -c/w Digoxin 125 mcg QD  -c/w home dose amiodarone 200 mg QD  -c/w metoprolol succinate 25 mg QD  -Tele monitor   -Cardiology following   -Mg>2, Phos>4   -INR daily (has been within target 2-3)  -Coumadin 2.5mg ordered for tonight     #HLD  -c/w statin    #GERD  -c/w Pepcid     #DVT ppx - Warfarin    Code status: Full code    Dispo: accepted to transfer to [facility]    Discussed with Dr. Cunningham    65 year old male with a PMH of AFib (s/p dccv 3/25/25, unsuccessful), HFrEF (likely mod red EF, sev TTE), aortic stenosis s/p TAVR (11/2022, reop on 4/11), a flutter s/p ablation 2019 and s/p micra implant, CKD3, WILFRED, obesity, venous stasis, HTN, and HLD with recent hospitalization for heart failure exacerbation and cardiorenal syndrome in [facility] for worsening SOB x a few days as well as LE swelling, Pt was given increased doses of Bumex in attempts to diurese, however condition continued to worsen therefore patient was taken to the ED. He was placed on NIV for increased WOB, and given additional dose of Bumex. BNP noted to be elevated. Pt denies fever, chills, or sick contacts and ROS otherwise negative. Pt accepted to ICU for further management / care. Patient was admitted for Acute on chronic hypercarbic respiratory failure, Heart failure exacerbation, ANGELICA on CKD.    #Severe sepsis present on admission secondary bacteremia  #Small thick walled loculated right pleural effusion, increasing in size and complexity - seen on CT 6/27  -On admission: , RR 30, ANGELICA, hypoxia requiring 12L, INR 4.11   -Blood culture (6/23) +cardiobacterium  -Blood culture (6/26) +gram negative rods  -S/p levofloxacin (6/29) x1 and meropenem (6/29-6/30)  -CXR (6/30) Slow progressive improvement in congestive like findings. Stable cardiac findings.  -Continue CTX (6/26-)  -F/u repeat blood cultures  -ID consult: changed back to CTX, sustained bacteremia concerning for endovascular focus though did not get abx until second set drawn so not uncontrolled documented as yet, agree with transfer to [facility], favor at atleast sample right effusion to see if infected  -Cardio consult: prosthetic valve endocarditis may be on differential and patient may require transfer to tertiary hospital as he also has a pacemaker and would need EP consult if this would need to be removed if ID deems it necessary - although they are less susceptible to device infection  -F/u US chest read  -Pulm consult: US chest eval for loculations, r/o empyema with bacteremia, may need IR drainage        #Endplate irregularity L1-2 is new since the prior examination and may be seen in the setting of discitis osteomyelitis - seen on CT 6/27  -Needs MRI lumbar spine  -Cannot be done here since pt has pacemaker and MRI not compatible    #2.2 cm structure just lateral to the proximal left profunda femoral artery in the left groin - seen on CT 6/27  -US duplex arterial - saccular aneurysm, partially thrombosed, arising from left profunda femoris artery  -Vascular consulted over the phone, recommended continue AC and follow up outpatient    #Acute on chronic hypercapnic respiratory failure  -s/p BIPAP in ICU  -on NC, spO2: 90% 5L  -c/w incentive spirometer   -Chest PT/Pulm toilet   -HOB> 30  -NIV PRN + QHs  -Duonebs for mild wheezing     #Acute on chronic systolic HFrEF  -SOB and LE swelling on admission  -ECHO: 25-30% EF  -s/p Bumex ggt in ICU  -c/w IV Bumex 2mg BID  -I+O  -Daily weights  -Cardio consult: PPM interrogated and unremarkable, recommend eventual EP eval for primary prevention ICD when bacteremia resolves, not a candidate for GDMT at this time due to renal insufficiency, recommend hydralazine/isosorbide dinitrate    #ANGELICA on CKD, likely cardiorenal syndrome / Congestive nephropathy - resolved  -Cr downtrending   -Avoid nephro toxic agents  -Nephro following recs appreciated     #Afib with RVR  #HTN  -On Coumadin 2.5mg outpatient  -Dig loaded  -c/w Digoxin 125 mcg QD  -c/w home dose amiodarone 200 mg QD  -c/w metoprolol succinate 25 mg QD  -Tele monitor   -Cardiology following   -Mg>2, Phos>4   -INR daily (has been within target 2-3)  -Coumadin 2.5mg ordered for tonight     #HLD  -c/w statin    #GERD  -c/w Pepcid     #DVT ppx - Warfarin    Code status: Full code    Dispo: accepted to transfer to [facility]    Discussed with Dr. Cunningham

## 2025-06-30 NOTE — PROGRESS NOTE ADULT - ATTENDING COMMENTS
Please see resident note for full details regarding the service.    PE: A+Ox3, no murmurs, crackles throughout the lung fields bilaterally, abd soft/NT/ND, 1+ lower extremity swelling    Assessment:  - Severe sepsis present on admission secondary to bacteremia possible endocarditis? vs. loculated right pleural effusion vs. discitis/osteomyelitis?   - Acute on chronic hypercarbic respiratory failure  - Acute on chronic CHFrEF (EF 25-30%)   - Cardiorenal syndrome   - Chronic A fib  - Bacteremia  - Cardiorenal ANGELICA on CKD Stage 3  - Endplate irregularity L1-2 possible discitis/osteomyelitis?  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  - Morbid obesity (BMI > 40)  - History of AFib (s/p dccv 3/25/25, unsuccessful), HFrEF (likely mod red EF, sev TTE), aortic stenosis s/p TAVR (11/2022, reop on 4/11), a flutter s/p ablation 2019 and s/p micra implant, CKD3, WILFRED, obesity, venous stasis, HTN, and HLD    Plan:  - Wean O2 as tolerated  - CXR: improved vascular congestion when compared to prior, sternotomy wires, no pneumothorax (personally reviewed).   - Diuresis with Bumex 2 mg Q12H IVP -> will need to transition to PO   - Bipap QHS  - BCx x 2 (6/23/25): cardiobacterium hominis   - BCx x 2 (6/26/25): gram negative rods -> follow up sensitivities   - Not a candidate for GDMT including ACE/ARB/ARNI given CKD Stage 3 (additionally BP is soft), will discuss Farxiga with cardiology/nephrology  - Spoke to ID (Dr. Torres) - sustained bacteremia is concerning for endovascular focus, transfer to tertiary care facility, dental evaluation, favor to sample right effusion, changed back to Ceftriaxone   - Spoke to Cardiology (Dr. Schafer) - Bumex 2 mg IVP Q12H, eventual EP evaluation for ACID, not a candidate for GDMT due to renal insufficiency, recommend transfer to tertiary care facility as pt has bacteremia, possible underlying endocarditis and may need PPM evaluated by EP   - PT: JASON  - DVT ppx: INR 2.20 today -> will give Warfarin dose as INR < 3   - Code status: Full code  - Dispo: Active    I spent a total of 50 minutes on the date of this encounter coordinating the patient's care, excluding resident teaching time. This includes reviewing results/imaging and discussions with specialists, nursing, case management/social work. Further tests, medications, and procedures have been ordered as indicated. Results and the plan of care were communicated to the patient and/or their family member. Supporting documentation was completed and added to the patient's chart.

## 2025-06-30 NOTE — PROGRESS NOTE ADULT - ASSESSMENT
Assessment:  Luis Waller is a 65 year old man with past medical history of Persistent atrial fibrillation (s/p ablation of atrial flutter in 2019, DCCV in March 2025 with ERAF), Micra PPM, Coronary artery disease (s/p CABG with angiogram in April 2025 with severe triple vessel disease, patent LIMA-LAD and occluded SVG-OM1 and occluded SVG-1st Diagonal), Severe Aortic stenosis (s/p TAVR in 2022 and then TAVR in April 2025), HFrEF (LVEF 25-30% in 2018), Hypertension, Chronic kidney disease and WILFRED with recent hospitalization at [facility] in April for heart failure and cardiorenal syndrome s/p Florence-TAVR, now brought in by EMS from nursing facility due to progressive heart failure, found to have hypoxic respiratory failure secondary to acute on chronic systolic heart failure and cardiorenal syndrome and now with development of gram negative bacteremia.    ECG consistent with atrial fibrillation and IVCD, similar to ECG at [facility]. Troponins elevated, likely demand ischemia from heart failure and renal insufficiency. Pro BNP markedly elevated and CXR with worsening effusions. Chart review indicates patient has longstanding history of systolic heart failure since at least 2018. Most recent echo report from 4/12/25 consistent with LVEF 25%. Echo consistent with LVEF 25-30%, Florence-TAVR well seated with peak velocity within normal limits, mild AR.     Recommendations:  [] Acute on chronic systolic heart failure: Continues to diurese, now receiving Bumex 2 mg IVP q12h, monitor renal function and urine output, follow up Renal consult. PPM interrogated and unremarkable, would recommend eventual EP evaluation for primary prevention ICD when bacteremia resolves, given chronic systolic heart failure. Not candidate for CHF GDMT at this time due to renal insufficiency, recommend Hydralazine/Isosorbide dinitrate.  [] Persistent atrial fibrillation: Rate controlled. Continue home Coumadin and Amiodarone 200 mg daily.   [] Gram negative bacteremia: Follow up ID consult, prosthetic valve endocarditis may be on differential and patient may require transfer to tertiary hospital as he also has a pacemaker and would need EP consult if this would need to be removed if ID deems it necessary - although they are less susceptible to device infection. Antibiotics per ID.     Discussed with Dr. Conley. We will continue to follow along.    Saul Schafer MD  Cardiology

## 2025-06-30 NOTE — PROGRESS NOTE ADULT - SUBJECTIVE AND OBJECTIVE BOX
BERTHA WARD  [id number]      Chief Complaint: Acute on chronic systolic heart failure/Cardiorenal syndrome/Gram negative bacteremia    Interval History: The patient reports difficulty breathing.     Tele: atrial fibrillation 60s BPM      Current meds:   albuterol/ipratropium for Nebulization 3 milliLiter(s) Nebulizer every 6 hours PRN  aMIOdarone    Tablet 200 milliGRAM(s) Oral daily  atorvastatin 80 milliGRAM(s) Oral at bedtime  bumetanide Injectable 2 milliGRAM(s) IV Push every 12 hours  cefTRIAXone   IVPB 2000 milliGRAM(s) IV Intermittent every 24 hours  chlorhexidine 2% Cloths 1 Application(s) Topical <User Schedule>  clopidogrel Tablet 75 milliGRAM(s) Oral daily  digoxin     Tablet 125 MICROGram(s) Oral daily  isosorbide   dinitrate Tablet (ISORDIL) 10 milliGRAM(s) Oral three times a day  metoprolol succinate ER 25 milliGRAM(s) Oral daily  pantoprazole    Tablet 40 milliGRAM(s) Oral before breakfast  polyethylene glycol 3350 17 Gram(s) Oral daily      Objective:     Vital Signs:   T(C): 36.4 (06-30-25 @ 05:00), Max: 36.7 (06-29-25 @ 12:00)  HR: 74 (06-30-25 @ 05:00) (70 - 74)  BP: 106/68 (06-30-25 @ 05:00) (106/68 - 120/74)  RR: 18 (06-30-25 @ 05:00) (17 - 18)  SpO2: 96% (06-30-25 @ 05:00) (93% - 98%)  Wt(kg): --    Physical Exam:   General: on nasal cannula  Neck: supple   CVS: JVP ~ 9 cm H20, irregularly irregular, s1, s2  Pulm: mildly labored respirations  Abd: obese  Ext: chronic venous skin changes  Neuro: awake, alert and oriented  Psych: normal affect      Labs:   30 Jun 2025 07:43    138    |  94     |  68     ----------------------------<  109    5.1     |  43     |  1.86     Ca    9.1        30 Jun 2025 07:43  Phos  3.3       30 Jun 2025 07:43  Mg     2.0       30 Jun 2025 07:43    TPro  7.9    /  Alb  2.2    /  TBili  0.4    /  DBili  x      /  AST  19     /  ALT  18     /  AlkPhos  154    30 Jun 2025 07:43                          10.4   7.53  )-----------( 239      ( 30 Jun 2025 07:43 )             35.2     PT/INR - ( 30 Jun 2025 07:43 )   PT: 26.5 sec;   INR: 2.26 ratio         PTT - ( 29 Jun 2025 06:20 )  PTT:37.0 sec        ECG (6/29/25): atrial flutter, poor R wave progression    TTE (6/2025):   1. The left ventricle endocardium is not well visualized, consider use of IV ultrasonic enhancing agent to better evaluate LVEF, endocardium and apex, if clinically indicated. There appears to be severe global left ventricle hypokinesis.   2. Left ventricular cavity is normal in size. Left ventricular wall   thickness is mildly increased. Left ventricular systolic function is   severely decreased with an ejection fraction of 30 % by Rivera's method of disks with an ejection fraction visually estimated at 25 to 30 %.   3. The left ventricular diastolic function is indeterminate. Analysis of left ventricular diastolic function and filling pressure is made   challenging by the presence of atrial fibrillation.   4. The right ventricle is not well visualized, appears enlarged.   5. Micra pacemaker is visualized in the right heart.  6. Left atrium is top normal sized.   7. Right atrium was not well visualized.   8. Mild mitral valve leaflet calcification.   9. There is mild calcification of the mitral valve annulus.  10. S/p Florence-TAVR, appears well seated, peak velocity within normal limits.  11. Mild aortic regurgitation.  12. Aortic root at the sinuses of Valsalva is normal in size.     mammogram

## 2025-06-30 NOTE — CONSULT NOTE ADULT - ASSESSMENT
A/P 65 year old male with a PMH of AFib (s/p dccv 3/25/25, unsuccessful), HFrEF (likely mod red EF, sev TTE), aortic stenosis s/p TAVR (11/2022, reop on 4/11), a flutter s/p ablation 2019 and s/p micra implant, CKD3, WILFRED, obesity, venous stasis, HTN, and HLD with recent hospitalization for heart failure exacerbation and cardiorenal syndrome in April presenting to [facility] from [facility] for worsening SOB x a few days as well as LE swelling.  Initially admitted to ICU  w/ sepsis           reports feeling  better today, he does use c-pap machine at night which he feels helps        Assessment/Plans:       Severe sepsis present on admission secondary to bacteremia   - On admission: , RR 30, ANGELICA, hypoxia requiring 12L, INR 4.11   - On  Rocephin  - Infectious Disease Consulted           , recs appreciated, switch from CTX to meropenem q12 hr, and give 1 dose levaquin 750, repeat Bcx.   - Repeat Bcx positive, repeat Bcx sent 06/29    Acute on chronic hypercapnic respiratory failure  -s/p BIPAP in ICU  -on NC, spO2: 90% 5L  -c/w incentive spirometer   -Chest PT/Pulm toilet  - Pulm consulted - noted - says may need IR drainage    -NIV PRN + QHs    Acute on chronic systolic HFrEF  -SOB and LE swelling on admission  -ECHO: 25-30% EF  -s/p Bumex ggt in ICU  -c/w Bumex 2mg BID IV  -I+O  -Daily weights  -Cardio following  -Will need Hydral/Nitro for afterload and pre load reduction in lieu of ARB/ACEi  - not a candidate for ace arb arni slgt2i or mra 2/2 ckd       ? addition of hydral, imdur if hemodynamics allow  - Potential AICD pending GDMT optimization.   - Outpatient EP       ANGELICA on CKD, likely cardiorenal syndrome / Congestive nephropathy - resolved  -Cr downtrending   -Avoid nephro toxic agents  -Nephro following-  appreciated       Afib with rvr  -On Coumadin 2.5mg outpatient  - on Digoxin 125 mcg QD  - on home dose amiodarone QD  -Tele monitor   -Cardiology following      structural heart failure team recommended continued diuresis. The patient was previously seen by Dr. Hughes and Timo. Not a candidate for Hydralazine at this time.         Hold off on Farxiga for now. Continue diuresis with IV lasix. May need to consider STANISLAW at tertiary facility if ID feels it is indicated.     Per Cards 6/30 :   Not candidate for CHF GDMT at this time due to renal insufficiency, recommend Hydralazine/Isosorbide dinitrate. Pt  may require transfer to tertiary hospital as he also has a pacemaker and would need EP consult if this would need to be removed if ID deems it necessary                                  although they are less susceptible to device infection. Antibiotics per ID.         Palliative :  asked for support, goc, heart failure pt. I reviewed pt chart for hx and hospital course.  Met with pt bedside this AM, explained role of palliative care, pt receptive to conversation.  See  GOC note above.   Pt aware of his med condition and hx.  At this time, he is amenable to any medical management that is offered,  and follows closely with his  Cardiologist, Dr Stafford .  He would be ok with transfer if needed for intervention.   He wishes for full  code at this time , and said he  has had d/w his brother,  who is his HCP about what he wants if his condition changes.  He was thankful  for the conversation, and to be listened to. Much support given .

## 2025-07-01 ENCOUNTER — INPATIENT (INPATIENT)
Facility: HOSPITAL | Age: 65
LOS: 30 days | Discharge: NURSING, REHAB OR LONG-TERM CARE | DRG: 290 | End: 2025-08-01
Attending: STUDENT IN AN ORGANIZED HEALTH CARE EDUCATION/TRAINING PROGRAM | Admitting: STUDENT IN AN ORGANIZED HEALTH CARE EDUCATION/TRAINING PROGRAM
Payer: MEDICARE

## 2025-07-01 ENCOUNTER — TRANSCRIPTION ENCOUNTER (OUTPATIENT)
Age: 65
End: 2025-07-01

## 2025-07-01 VITALS
HEART RATE: 69 BPM | DIASTOLIC BLOOD PRESSURE: 68 MMHG | OXYGEN SATURATION: 90 % | TEMPERATURE: 98 F | RESPIRATION RATE: 20 BRPM | SYSTOLIC BLOOD PRESSURE: 110 MMHG

## 2025-07-01 VITALS — OXYGEN SATURATION: 94 %

## 2025-07-01 DIAGNOSIS — Z29.9 ENCOUNTER FOR PROPHYLACTIC MEASURES, UNSPECIFIED: ICD-10-CM

## 2025-07-01 DIAGNOSIS — N17.9 ACUTE KIDNEY FAILURE, UNSPECIFIED: ICD-10-CM

## 2025-07-01 DIAGNOSIS — G47.33 OBSTRUCTIVE SLEEP APNEA (ADULT) (PEDIATRIC): ICD-10-CM

## 2025-07-01 DIAGNOSIS — Z90.89 ACQUIRED ABSENCE OF OTHER ORGANS: Chronic | ICD-10-CM

## 2025-07-01 DIAGNOSIS — Z95.5 PRESENCE OF CORONARY ANGIOPLASTY IMPLANT AND GRAFT: Chronic | ICD-10-CM

## 2025-07-01 DIAGNOSIS — N18.30 CHRONIC KIDNEY DISEASE, STAGE 3 UNSPECIFIED: ICD-10-CM

## 2025-07-01 DIAGNOSIS — I50.20 UNSPECIFIED SYSTOLIC (CONGESTIVE) HEART FAILURE: ICD-10-CM

## 2025-07-01 DIAGNOSIS — Z95.0 PRESENCE OF CARDIAC PACEMAKER: Chronic | ICD-10-CM

## 2025-07-01 DIAGNOSIS — Z95.1 PRESENCE OF AORTOCORONARY BYPASS GRAFT: Chronic | ICD-10-CM

## 2025-07-01 DIAGNOSIS — R93.7 ABNORMAL FINDINGS ON DIAGNOSTIC IMAGING OF OTHER PARTS OF MUSCULOSKELETAL SYSTEM: ICD-10-CM

## 2025-07-01 DIAGNOSIS — Z98.890 OTHER SPECIFIED POSTPROCEDURAL STATES: Chronic | ICD-10-CM

## 2025-07-01 DIAGNOSIS — I35.0 NONRHEUMATIC AORTIC (VALVE) STENOSIS: ICD-10-CM

## 2025-07-01 DIAGNOSIS — J90 PLEURAL EFFUSION, NOT ELSEWHERE CLASSIFIED: ICD-10-CM

## 2025-07-01 DIAGNOSIS — I33.9 ACUTE AND SUBACUTE ENDOCARDITIS, UNSPECIFIED: ICD-10-CM

## 2025-07-01 DIAGNOSIS — I48.20 CHRONIC ATRIAL FIBRILLATION, UNSPECIFIED: ICD-10-CM

## 2025-07-01 DIAGNOSIS — R78.81 BACTEREMIA: ICD-10-CM

## 2025-07-01 DIAGNOSIS — I72.4 ANEURYSM OF ARTERY OF LOWER EXTREMITY: ICD-10-CM

## 2025-07-01 LAB
ALBUMIN SERPL ELPH-MCNC: 2.3 G/DL — LOW (ref 3.3–5)
ALP SERPL-CCNC: 165 U/L — HIGH (ref 40–120)
ALT FLD-CCNC: 12 U/L — SIGNIFICANT CHANGE UP (ref 10–45)
ANION GAP SERPL CALC-SCNC: -1 MMOL/L — LOW (ref 5–17)
APTT BLD: 38.5 SEC — HIGH (ref 26.1–36.8)
AST SERPL-CCNC: 20 U/L — SIGNIFICANT CHANGE UP (ref 10–40)
BASOPHILS # BLD AUTO: 0.03 K/UL — SIGNIFICANT CHANGE UP (ref 0–0.2)
BASOPHILS NFR BLD AUTO: 0.4 % — SIGNIFICANT CHANGE UP (ref 0–2)
BILIRUB SERPL-MCNC: 0.4 MG/DL — SIGNIFICANT CHANGE UP (ref 0.2–1.2)
BUN SERPL-MCNC: 63 MG/DL — HIGH (ref 7–23)
CALCIUM SERPL-MCNC: 9 MG/DL — SIGNIFICANT CHANGE UP (ref 8.4–10.5)
CHLORIDE SERPL-SCNC: 94 MMOL/L — LOW (ref 96–108)
CO2 SERPL-SCNC: 44 MMOL/L — HIGH (ref 22–31)
CREAT SERPL-MCNC: 1.44 MG/DL — HIGH (ref 0.5–1.3)
CULTURE RESULTS: ABNORMAL
CULTURE RESULTS: ABNORMAL
EGFR: 54 ML/MIN/1.73M2 — LOW
EGFR: 54 ML/MIN/1.73M2 — LOW
EOSINOPHIL # BLD AUTO: 0.23 K/UL — SIGNIFICANT CHANGE UP (ref 0–0.5)
EOSINOPHIL NFR BLD AUTO: 2.8 % — SIGNIFICANT CHANGE UP (ref 0–6)
GLUCOSE SERPL-MCNC: 103 MG/DL — HIGH (ref 70–99)
HCT VFR BLD CALC: 35.6 % — LOW (ref 39–50)
HGB BLD-MCNC: 10.7 G/DL — LOW (ref 13–17)
IMM GRANULOCYTES NFR BLD AUTO: 0.4 % — SIGNIFICANT CHANGE UP (ref 0–0.9)
INR BLD: 2.45 RATIO — HIGH (ref 0.85–1.16)
LYMPHOCYTES # BLD AUTO: 0.77 K/UL — LOW (ref 1–3.3)
LYMPHOCYTES # BLD AUTO: 9.4 % — LOW (ref 13–44)
MAGNESIUM SERPL-MCNC: 2.3 MG/DL — SIGNIFICANT CHANGE UP (ref 1.6–2.6)
MCHC RBC-ENTMCNC: 29.6 PG — SIGNIFICANT CHANGE UP (ref 27–34)
MCHC RBC-ENTMCNC: 30.1 G/DL — LOW (ref 32–36)
MCV RBC AUTO: 98.3 FL — SIGNIFICANT CHANGE UP (ref 80–100)
MONOCYTES # BLD AUTO: 0.88 K/UL — SIGNIFICANT CHANGE UP (ref 0–0.9)
MONOCYTES NFR BLD AUTO: 10.8 % — SIGNIFICANT CHANGE UP (ref 2–14)
NEUTROPHILS # BLD AUTO: 6.24 K/UL — SIGNIFICANT CHANGE UP (ref 1.8–7.4)
NEUTROPHILS NFR BLD AUTO: 76.2 % — SIGNIFICANT CHANGE UP (ref 43–77)
NRBC BLD AUTO-RTO: 0 /100 WBCS — SIGNIFICANT CHANGE UP (ref 0–0)
PHOSPHATE SERPL-MCNC: 3.2 MG/DL — SIGNIFICANT CHANGE UP (ref 2.5–4.5)
PLATELET # BLD AUTO: 236 K/UL — SIGNIFICANT CHANGE UP (ref 150–400)
POTASSIUM SERPL-MCNC: 4.7 MMOL/L — SIGNIFICANT CHANGE UP (ref 3.5–5.3)
POTASSIUM SERPL-SCNC: 4.7 MMOL/L — SIGNIFICANT CHANGE UP (ref 3.5–5.3)
PROT SERPL-MCNC: 7.8 G/DL — SIGNIFICANT CHANGE UP (ref 6–8.3)
PROTHROM AB SERPL-ACNC: 28.6 SEC — HIGH (ref 9.9–13.4)
RBC # BLD: 3.62 M/UL — LOW (ref 4.2–5.8)
RBC # FLD: 16.3 % — HIGH (ref 10.3–14.5)
SODIUM SERPL-SCNC: 137 MMOL/L — SIGNIFICANT CHANGE UP (ref 135–145)
SPECIMEN SOURCE: SIGNIFICANT CHANGE UP
SPECIMEN SOURCE: SIGNIFICANT CHANGE UP
WBC # BLD: 8.18 K/UL — SIGNIFICANT CHANGE UP (ref 3.8–10.5)
WBC # FLD AUTO: 8.18 K/UL — SIGNIFICANT CHANGE UP (ref 3.8–10.5)

## 2025-07-01 PROCEDURE — 80048 BASIC METABOLIC PNL TOTAL CA: CPT

## 2025-07-01 PROCEDURE — 71250 CT THORAX DX C-: CPT

## 2025-07-01 PROCEDURE — 85025 COMPLETE CBC W/AUTO DIFF WBC: CPT

## 2025-07-01 PROCEDURE — 82803 BLOOD GASES ANY COMBINATION: CPT

## 2025-07-01 PROCEDURE — 76604 US EXAM CHEST: CPT

## 2025-07-01 PROCEDURE — 99291 CRITICAL CARE FIRST HOUR: CPT

## 2025-07-01 PROCEDURE — 85027 COMPLETE CBC AUTOMATED: CPT

## 2025-07-01 PROCEDURE — 87150 DNA/RNA AMPLIFIED PROBE: CPT

## 2025-07-01 PROCEDURE — 84100 ASSAY OF PHOSPHORUS: CPT

## 2025-07-01 PROCEDURE — 84145 PROCALCITONIN (PCT): CPT

## 2025-07-01 PROCEDURE — 87077 CULTURE AEROBIC IDENTIFY: CPT

## 2025-07-01 PROCEDURE — 83735 ASSAY OF MAGNESIUM: CPT

## 2025-07-01 PROCEDURE — 93306 TTE W/DOPPLER COMPLETE: CPT

## 2025-07-01 PROCEDURE — 71045 X-RAY EXAM CHEST 1 VIEW: CPT

## 2025-07-01 PROCEDURE — 36600 WITHDRAWAL OF ARTERIAL BLOOD: CPT

## 2025-07-01 PROCEDURE — 83880 ASSAY OF NATRIURETIC PEPTIDE: CPT

## 2025-07-01 PROCEDURE — 0241U: CPT

## 2025-07-01 PROCEDURE — 85730 THROMBOPLASTIN TIME PARTIAL: CPT

## 2025-07-01 PROCEDURE — 97161 PT EVAL LOW COMPLEX 20 MIN: CPT

## 2025-07-01 PROCEDURE — 84484 ASSAY OF TROPONIN QUANT: CPT

## 2025-07-01 PROCEDURE — 94640 AIRWAY INHALATION TREATMENT: CPT

## 2025-07-01 PROCEDURE — 83605 ASSAY OF LACTIC ACID: CPT

## 2025-07-01 PROCEDURE — 74176 CT ABD & PELVIS W/O CONTRAST: CPT

## 2025-07-01 PROCEDURE — 80053 COMPREHEN METABOLIC PANEL: CPT

## 2025-07-01 PROCEDURE — 36415 COLL VENOUS BLD VENIPUNCTURE: CPT

## 2025-07-01 PROCEDURE — 87040 BLOOD CULTURE FOR BACTERIA: CPT

## 2025-07-01 PROCEDURE — 93005 ELECTROCARDIOGRAM TRACING: CPT

## 2025-07-01 PROCEDURE — 85610 PROTHROMBIN TIME: CPT

## 2025-07-01 PROCEDURE — 81001 URINALYSIS AUTO W/SCOPE: CPT

## 2025-07-01 PROCEDURE — 99223 1ST HOSP IP/OBS HIGH 75: CPT | Mod: GC

## 2025-07-01 PROCEDURE — 93926 LOWER EXTREMITY STUDY: CPT

## 2025-07-01 PROCEDURE — 99239 HOSP IP/OBS DSCHRG MGMT >30: CPT

## 2025-07-01 PROCEDURE — 80162 ASSAY OF DIGOXIN TOTAL: CPT

## 2025-07-01 PROCEDURE — 94660 CPAP INITIATION&MGMT: CPT

## 2025-07-01 PROCEDURE — 87637 SARSCOV2&INF A&B&RSV AMP PRB: CPT

## 2025-07-01 PROCEDURE — 99232 SBSQ HOSP IP/OBS MODERATE 35: CPT

## 2025-07-01 RX ORDER — IPRATROPIUM BROMIDE AND ALBUTEROL SULFATE .5; 2.5 MG/3ML; MG/3ML
3 SOLUTION RESPIRATORY (INHALATION) EVERY 6 HOURS
Refills: 0 | Status: DISCONTINUED | OUTPATIENT
Start: 2025-07-01 | End: 2025-07-07

## 2025-07-01 RX ORDER — ATORVASTATIN CALCIUM 80 MG/1
80 TABLET, FILM COATED ORAL AT BEDTIME
Refills: 0 | Status: DISCONTINUED | OUTPATIENT
Start: 2025-07-01 | End: 2025-07-10

## 2025-07-01 RX ORDER — DIGOXIN 250 UG/1
125 TABLET ORAL DAILY
Refills: 0 | Status: DISCONTINUED | OUTPATIENT
Start: 2025-07-01 | End: 2025-07-10

## 2025-07-01 RX ORDER — ISOSORBDIE DINITRATE 30 MG/1
10 TABLET ORAL THREE TIMES A DAY
Refills: 0 | Status: DISCONTINUED | OUTPATIENT
Start: 2025-07-01 | End: 2025-07-10

## 2025-07-01 RX ORDER — ACETAMINOPHEN 500 MG/5ML
650 LIQUID (ML) ORAL EVERY 6 HOURS
Refills: 0 | Status: DISCONTINUED | OUTPATIENT
Start: 2025-07-01 | End: 2025-07-02

## 2025-07-01 RX ORDER — CLOPIDOGREL BISULFATE 75 MG/1
75 TABLET, FILM COATED ORAL DAILY
Refills: 0 | Status: DISCONTINUED | OUTPATIENT
Start: 2025-07-02 | End: 2025-07-10

## 2025-07-01 RX ORDER — MELATONIN 5 MG
3 TABLET ORAL AT BEDTIME
Refills: 0 | Status: DISCONTINUED | OUTPATIENT
Start: 2025-07-01 | End: 2025-07-10

## 2025-07-01 RX ORDER — BUMETANIDE 1 MG/1
2 TABLET ORAL
Refills: 0 | Status: DISCONTINUED | OUTPATIENT
Start: 2025-07-01 | End: 2025-07-03

## 2025-07-01 RX ORDER — CEFTRIAXONE 500 MG/1
2000 INJECTION, POWDER, FOR SOLUTION INTRAMUSCULAR; INTRAVENOUS EVERY 24 HOURS
Refills: 0 | Status: DISCONTINUED | OUTPATIENT
Start: 2025-07-02 | End: 2025-07-02

## 2025-07-01 RX ORDER — METOPROLOL SUCCINATE 50 MG/1
25 TABLET, EXTENDED RELEASE ORAL DAILY
Refills: 0 | Status: DISCONTINUED | OUTPATIENT
Start: 2025-07-01 | End: 2025-07-10

## 2025-07-01 RX ORDER — AMIODARONE HYDROCHLORIDE 50 MG/ML
200 INJECTION, SOLUTION INTRAVENOUS DAILY
Refills: 0 | Status: DISCONTINUED | OUTPATIENT
Start: 2025-07-01 | End: 2025-07-10

## 2025-07-01 RX ADMIN — BUMETANIDE 2 MILLIGRAM(S): 1 TABLET ORAL at 05:54

## 2025-07-01 RX ADMIN — DIGOXIN 125 MICROGRAM(S): 250 TABLET ORAL at 05:53

## 2025-07-01 RX ADMIN — IPRATROPIUM BROMIDE AND ALBUTEROL SULFATE 3 MILLILITER(S): .5; 2.5 SOLUTION RESPIRATORY (INHALATION) at 07:37

## 2025-07-01 RX ADMIN — IPRATROPIUM BROMIDE AND ALBUTEROL SULFATE 3 MILLILITER(S): .5; 2.5 SOLUTION RESPIRATORY (INHALATION) at 16:18

## 2025-07-01 RX ADMIN — CLOPIDOGREL BISULFATE 75 MILLIGRAM(S): 75 TABLET, FILM COATED ORAL at 12:04

## 2025-07-01 RX ADMIN — METOPROLOL SUCCINATE 25 MILLIGRAM(S): 50 TABLET, EXTENDED RELEASE ORAL at 05:53

## 2025-07-01 RX ADMIN — ISOSORBDIE DINITRATE 10 MILLIGRAM(S): 30 TABLET ORAL at 05:54

## 2025-07-01 RX ADMIN — Medication 2.5 MILLIGRAM(S): at 22:04

## 2025-07-01 RX ADMIN — CEFTRIAXONE 100 MILLIGRAM(S): 500 INJECTION, POWDER, FOR SOLUTION INTRAMUSCULAR; INTRAVENOUS at 12:05

## 2025-07-01 RX ADMIN — ISOSORBDIE DINITRATE 10 MILLIGRAM(S): 30 TABLET ORAL at 12:04

## 2025-07-01 RX ADMIN — Medication 40 MILLIGRAM(S): at 05:54

## 2025-07-01 RX ADMIN — AMIODARONE HYDROCHLORIDE 200 MILLIGRAM(S): 50 INJECTION, SOLUTION INTRAVENOUS at 05:53

## 2025-07-01 RX ADMIN — Medication 1 APPLICATION(S): at 05:54

## 2025-07-01 RX ADMIN — BUMETANIDE 2 MILLIGRAM(S): 1 TABLET ORAL at 18:25

## 2025-07-01 NOTE — PROGRESS NOTE ADULT - SUBJECTIVE AND OBJECTIVE BOX
Patient is a 65y old  Male who presents with a chief complaint of Acute on chronic hypercarbic respiratory failure, Heart failure exacerbation, ANGELICA on CKD (01 Jul 2025 10:29)      Overnight Events: None  Interval HPI: Patient seen and examined at bedside. No acute complaints. Just waiting for transfer.    Vital Signs Last 24 Hrs  T(C): 36.5 (01 Jul 2025 11:41), Max: 36.5 (01 Jul 2025 11:41)  T(F): 97.7 (01 Jul 2025 11:41), Max: 97.7 (01 Jul 2025 11:41)  HR: 68 (01 Jul 2025 11:41) (64 - 91)  BP: 109/72 (01 Jul 2025 11:41) (109/72 - 117/72)  BP(mean): --  RR: 16 (01 Jul 2025 11:41) (16 - 18)  SpO2: 93% (01 Jul 2025 11:41) (91% - 94%)    Parameters below as of 01 Jul 2025 11:41  Patient On (Oxygen Delivery Method): nasal cannula  O2 Flow (L/min): 5    I&O's Summary    30 Jun 2025 07:01  -  01 Jul 2025 07:00  --------------------------------------------------------  IN: 0 mL / OUT: 800 mL / NET: -800 mL    01 Jul 2025 07:01  -  01 Jul 2025 14:47  --------------------------------------------------------  IN: 0 mL / OUT: 700 mL / NET: -700 mL          PHYSICAL EXAM:  GENERAL: NAD, sitting in chair  HEAD: Atraumatic, Normocephalic  EYES: EOMI, conjunctiva and sclera clear  ENT: Moist mucous membranes  CHEST/LUNG: Bibasilar crackles, no wheezing, taking frequent pauses when speaking, on 5L NC  HEART: Regular rate and rhythm. S1 and S2. No murmurs.  ABDOMEN: Soft, Nontender, Nondistended. Bowel sounds present.   EXTREMITIES: Lower extremities dusky appearance with +1 pitting edema bilaterally  NERVOUS SYSTEM: Alert & Oriented X3, speech clear.     LABS:   CBC                        10.7   8.18  )-----------( 236      ( 01 Jul 2025 08:22 )             35.6     CMP  07-01    137  |  94  |  63  ----------------------------<  103  4.7   |  44  |  1.44    Ca    9.0      01 Jul 2025 08:22  Phos  3.2     07-01  Mg     2.3     07-01    TPro  7.8  /  Alb  2.3  /  TBili  0.4  /  DBili  x   /  AST  20  /  ALT  12  /  AlkPhos  165  07-01          PT/INR - ( 01 Jul 2025 08:22 )   PT: 28.6 sec;   INR: 2.45 ratio         PTT - ( 01 Jul 2025 08:22 )  PTT:38.5 sec                            Urinalysis Basic - ( 01 Jul 2025 08:22 )    Color: x / Appearance: x / SG: x / pH: x  Gluc: 103 mg/dL / Ketone: x  / Bili: x / Urobili: x   Blood: x / Protein: x / Nitrite: x   Leuk Esterase: x / RBC: x / WBC x   Sq Epi: x / Non Sq Epi: x / Bacteria: x        Culture - Blood (collected 29 Jun 2025 12:18)  Source: Blood Blood-Peripheral  Preliminary Report (01 Jul 2025 01:03):    No growth at 24 hours    Culture - Blood (collected 29 Jun 2025 12:12)  Source: Blood Blood-Peripheral  Preliminary Report (01 Jul 2025 01:03):    No growth at 24 hours    Culture - Blood (collected 26 Jun 2025 14:30)  Source: Blood Blood-Peripheral  Gram Stain (29 Jun 2025 04:12):    Growth in aerobic bottle: Gram Negative Rods  Final Report (01 Jul 2025 12:30):    Growth in aerobic bottle: Gram Negative Rods Most closely resembling    Cardiobacterium species "Susceptibilities not performed"    Culture - Blood (collected 26 Jun 2025 14:22)  Source: Blood Blood-Venous  Gram Stain (29 Jun 2025 10:03):    Growth in aerobic bottle: Gram Negative Rods  Preliminary Report (29 Jun 2025 10:03):    Growth in aerobic bottle: Gram Negative Rods          06-29 @ 12:18  Organism --  Gram Stain Blood -- Gram Stain --  Specimen Source Blood Blood-Peripheral  Culture-Blood --    06-29 @ 12:12  Organism --  Gram Stain Blood -- Gram Stain --  Specimen Source Blood Blood-Peripheral  Culture-Blood --        RADIOLOGY & ADDITIONAL TESTS:    MEDICATIONS:  MEDICATIONS  (STANDING):  aMIOdarone    Tablet 200 milliGRAM(s) Oral daily  atorvastatin 80 milliGRAM(s) Oral at bedtime  bumetanide Injectable 2 milliGRAM(s) IV Push every 12 hours  cefTRIAXone   IVPB 2000 milliGRAM(s) IV Intermittent every 24 hours  chlorhexidine 2% Cloths 1 Application(s) Topical <User Schedule>  clopidogrel Tablet 75 milliGRAM(s) Oral daily  digoxin     Tablet 125 MICROGram(s) Oral daily  isosorbide   dinitrate Tablet (ISORDIL) 10 milliGRAM(s) Oral three times a day  metoprolol succinate ER 25 milliGRAM(s) Oral daily  pantoprazole    Tablet 40 milliGRAM(s) Oral before breakfast  polyethylene glycol 3350 17 Gram(s) Oral daily  warfarin 2.5 milliGRAM(s) Oral at bedtime    MEDICATIONS  (PRN):  albuterol/ipratropium for Nebulization 3 milliLiter(s) Nebulizer every 6 hours PRN Shortness of Breath and/or Wheezing      Allergies    metoprolol (Short breath)    Intolerances

## 2025-07-01 NOTE — DISCHARGE NOTE NURSING/CASE MANAGEMENT/SOCIAL WORK - NSPROEXTENSIONSOFSELF_GEN_A_NUR
Brief op    Pt with severe dementia, NSTEMI, EF 49% (unchanged).  Dr Gomes had extensive d/w family re: conservative therapy vs repeat angiogram attempted PCI.  They preferred the later.  Angiogram unchanged from prior, but the SVG-OM (stented 6/2016) is now occluded.  We attempted wiring of the LM-LCx (stented) but we could not even pass wires.  We felt the risk was too high to continue, especially further contrast use.  Pt was hemodynamically/electrically stable.  Prior to the procedure, RNs on the floor noted he was high acuity due to dementia/delirium.  He was restless and not easily following commands murray-procedure.  We will place in the CICU.  Dr Cochran updated.  Will d/w CCU [facility] ICU team, CCU consult fellow will continue to manage.    Rodo Cobian, DO PGY 7  Cardiovascular medicine       none eyeglasses

## 2025-07-01 NOTE — DISCHARGE NOTE NURSING/CASE MANAGEMENT/SOCIAL WORK - NSDCFUADDAPPT_GEN_ALL_CORE_FT
transferred to [facility] due to poss endocarditis with Dr Brian Hutchinson accepting.

## 2025-07-01 NOTE — PROGRESS NOTE ADULT - NUTRITIONAL ASSESSMENT
This patient has been assessed with a concern for Malnutrition and has been determined to have a diagnosis/diagnoses of Severe protein-calorie malnutrition and Morbid obesity (BMI > 40).    This patient is being managed with:   Diet DASH/TLC-  Sodium & Cholesterol Restricted  Rob(7 Gm Arginine/7 Gm Glut/1.2 Gm HMB     Qty per Day:  2  Entered: Jun 24 2025  1:39PM  

## 2025-07-01 NOTE — PROGRESS NOTE ADULT - ATTENDING COMMENTS
Please see resident note for full details regarding the service.    PE: A+Ox3, no murmurs, crackles throughout the lung fields bilaterally, abd soft/NT/ND, 1+ lower extremity swelling    Assessment:  - Severe sepsis present on admission secondary to bacteremia possible endocarditis? vs. loculated right pleural effusion vs. discitis/osteomyelitis?   - Acute on chronic hypercarbic respiratory failure  - Acute on chronic CHFrEF (EF 25-30%)   - Cardiorenal syndrome   - Chronic A fib  - Bacteremia  - Cardiorenal ANGELICA on CKD Stage 3  - Endplate irregularity L1-2 possible discitis/osteomyelitis?  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  - Morbid obesity (BMI > 40)  - History of AFib (s/p dccv 3/25/25, unsuccessful), HFrEF (likely mod red EF, sev TTE), aortic stenosis s/p TAVR (11/2022, reop on 4/11), a flutter s/p ablation 2019 and s/p micra implant, CKD3, WILFRED, obesity, venous stasis, HTN, and HLD    Plan:  - Wean O2 as tolerated  - Diuresis with Bumex 2 mg Q12H IVP -> will need eventual transition to PO   - Bipap QHS  - BCx x 2 (6/23/25): cardiobacterium hominis   - BCx x 2 (6/26/25): gram negative rods -> follow up sensitivities   - BCx x 2 (6/29/25): no growth to date   - Not a candidate for GDMT including ACE/ARB/ARNI given CKD Stage 3 (additionally BP is soft)   - Spoke to ID (Dr. Torres) yesterday - sustained bacteremia is concerning for endovascular focus, transfer to tertiary care facility, dental evaluation, favor to sample right effusion, changed back to Ceftriaxone   - Spoke to Cardiology (Dr. Schafer) yesterday - Bumex 2 mg IVP Q12H, eventual EP evaluation for ACID, not a candidate for GDMT due to renal insufficiency, recommend transfer to tertiary care facility as pt has bacteremia, possible underlying endocarditis and may need PPM evaluated by EP   - PT: JASON  - DVT ppx: INR 2.45 today -> will give Warfarin dose as INR < 3   - Code status: Full code  - Dispo: Transfer to [facility] for further management pending     I spent a total of 50 minutes on the date of this encounter coordinating the patient's care, excluding resident teaching time. This includes reviewing results/imaging and discussions with specialists, nursing, case management/social work. Further tests, medications, and procedures have been ordered as indicated. Results and the plan of care were communicated to the patient and/or their family member. Supporting documentation was completed and added to the patient's chart.

## 2025-07-01 NOTE — PROGRESS NOTE ADULT - SUBJECTIVE AND OBJECTIVE BOX
BERTHA WARD  [id number]      Chief Complaint: Acute on chronic systolic heart failure/Cardiorenal syndrome/Gram negative bacteremia    Interval History: The patient reports breathing is about the same.     Tele: atrial fibrillation 60s BPM      Current meds:   albuterol/ipratropium for Nebulization 3 milliLiter(s) Nebulizer every 6 hours PRN  aMIOdarone    Tablet 200 milliGRAM(s) Oral daily  atorvastatin 80 milliGRAM(s) Oral at bedtime  bumetanide Injectable 2 milliGRAM(s) IV Push every 12 hours  cefTRIAXone   IVPB 2000 milliGRAM(s) IV Intermittent every 24 hours  chlorhexidine 2% Cloths 1 Application(s) Topical <User Schedule>  clopidogrel Tablet 75 milliGRAM(s) Oral daily  digoxin     Tablet 125 MICROGram(s) Oral daily  isosorbide   dinitrate Tablet (ISORDIL) 10 milliGRAM(s) Oral three times a day  metoprolol succinate ER 25 milliGRAM(s) Oral daily  pantoprazole    Tablet 40 milliGRAM(s) Oral before breakfast  polyethylene glycol 3350 17 Gram(s) Oral daily  warfarin 2.5 milliGRAM(s) Oral at bedtime      Objective:     Vital Signs:   T(C): 36.3 (07-01-25 @ 05:00), Max: 36.4 (06-30-25 @ 12:00)  HR: 64 (07-01-25 @ 07:44) (64 - 107)  BP: 117/72 (07-01-25 @ 05:00) (103/64 - 117/72)  RR: 17 (07-01-25 @ 05:00) (17 - 18)  SpO2: 91% (07-01-25 @ 07:44) (91% - 97%)  Wt(kg): --      Physical Exam:   General: on nasal cannula  Neck: supple   CVS: JVP ~ 9 cm H20, irregularly irregular, s1, s2  Pulm: mildly labored respirations  Abd: obese  Ext: chronic venous skin changes  Neuro: awake, alert and oriented  Psych: normal affect      Labs:   01 Jul 2025 08:22    137    |  94     |  63     ----------------------------<  103    4.7     |  44     |  1.44     Ca    9.0        01 Jul 2025 08:22  Phos  3.2       01 Jul 2025 08:22  Mg     2.3       01 Jul 2025 08:22    TPro  7.8    /  Alb  2.3    /  TBili  0.4    /  DBili  x      /  AST  20     /  ALT  12     /  AlkPhos  165    01 Jul 2025 08:22                          10.7   8.18  )-----------( 236      ( 01 Jul 2025 08:22 )             35.6     PT/INR - ( 01 Jul 2025 08:22 )   PT: 28.6 sec;   INR: 2.45 ratio         PTT - ( 01 Jul 2025 08:22 )  PTT:38.5 sec        ECG (6/29/25): atrial flutter, poor R wave progression    TTE (6/2025):   1. The left ventricle endocardium is not well visualized, consider use of IV ultrasonic enhancing agent to better evaluate LVEF, endocardium and apex, if clinically indicated. There appears to be severe global left ventricle hypokinesis.   2. Left ventricular cavity is normal in size. Left ventricular wall   thickness is mildly increased. Left ventricular systolic function is   severely decreased with an ejection fraction of 30 % by Rivera's method of disks with an ejection fraction visually estimated at 25 to 30 %.   3. The left ventricular diastolic function is indeterminate. Analysis of left ventricular diastolic function and filling pressure is made   challenging by the presence of atrial fibrillation.   4. The right ventricle is not well visualized, appears enlarged.   5. Micra pacemaker is visualized in the right heart.  6. Left atrium is top normal sized.   7. Right atrium was not well visualized.   8. Mild mitral valve leaflet calcification.   9. There is mild calcification of the mitral valve annulus.  10. S/p Florence-TAVR, appears well seated, peak velocity within normal limits.  11. Mild aortic regurgitation.  12. Aortic root at the sinuses of Valsalva is normal in size.

## 2025-07-01 NOTE — DISCHARGE NOTE NURSING/CASE MANAGEMENT/SOCIAL WORK - FINANCIAL ASSISTANCE
[facility] provides services at a reduced cost to those who are determined to be eligible through [facility]’s financial assistance program. Information regarding [facility]’s financial assistance program can be found by going to [email or web address] or by calling [phone].

## 2025-07-01 NOTE — PROGRESS NOTE ADULT - ASSESSMENT
Assessment:  Luis Waller is a 65 year old man with past medical history of Persistent atrial fibrillation (s/p ablation of atrial flutter in 2019, DCCV in March 2025 with ERAF), Micra PPM, Coronary artery disease (s/p CABG with angiogram in April 2025 with severe triple vessel disease, patent LIMA-LAD and occluded SVG-OM1 and occluded SVG-1st Diagonal), Severe Aortic stenosis (s/p TAVR in 2022 and then TAVR in April 2025), HFrEF (LVEF 25-30% in 2018), Hypertension, Chronic kidney disease and WILFRED with recent hospitalization at [facility] in April for heart failure and cardiorenal syndrome s/p Florence-TAVR, now brought in by EMS from nursing facility due to progressive heart failure, found to have hypoxic respiratory failure secondary to acute on chronic systolic heart failure and cardiorenal syndrome and now with development of gram negative bacteremia.    ECG consistent with atrial fibrillation and IVCD, similar to ECG at [facility]. Troponins elevated, likely demand ischemia from heart failure and renal insufficiency. Pro BNP markedly elevated and CXR with worsening effusions. Chart review indicates patient has longstanding history of systolic heart failure since at least 2018. Most recent echo report from 4/12/25 consistent with LVEF 25%. Echo consistent with LVEF 25-30%, Florence-TAVR well seated with peak velocity within normal limits, mild AR.     Recommendations:  [] Acute on chronic systolic heart failure: Continues to diurese, now receiving Bumex 2 mg IVP q12h, monitor renal function and urine output, follow up Renal consult. CXR improving. PPM interrogated and unremarkable, would recommend eventual EP evaluation for primary prevention ICD when bacteremia resolves, given chronic systolic heart failure. Not candidate for CHF GDMT at this time due to renal insufficiency, recommend Hydralazine/Isosorbide dinitrate.  [] Persistent atrial fibrillation: Rate controlled. Continue home Coumadin and Amiodarone 200 mg daily.   [] Gram negative bacteremia: Follow up ID consult, prosthetic valve endocarditis may be on differential and patient may require transfer to tertiary hospital as he also has a pacemaker and would need EP consult if this would need to be removed if ID deems it necessary - although they are less susceptible to device infection. Antibiotics per ID. Blood cultures repeated and now negative.    Will sign out this case to cardiologist to follow along tomorrow.    Saul Schafer MD  Cardiology

## 2025-07-01 NOTE — PROGRESS NOTE ADULT - PROVIDER SPECIALTY LIST ADULT
Cardiology
Critical Care
Family Medicine
Family Medicine
Nephrology
Pulmonology
Weeks 14 to 18 of Your Pregnancy: Care Instructions  Your Care Instructions    During this time, you may start to \"show,\" so that you look pregnant to people around you. You may also notice some changes in your skin, such as itchy spots on your palms or acne on your face. Your baby is now able to pass urine, and your baby's first stool (meconium) is starting to collect in his or her intestines. Hair is also beginning to grow on your baby's head. At your next visit, between weeks 18 and 20, your doctor may do an ultrasound test. The test allows your doctor to check for certain problems. Your doctor can also tell the sex of your baby. This is a good time to think about whether you want to know whether your baby is a boy or a girl. Talk to your doctor about getting a flu shot to help keep you healthy during your pregnancy. As your pregnancy moves along, it is common to worry or feel anxious. Your body is changing a lot. And you are thinking about giving birth, the health of your baby, and becoming a parent. You can learn to cope with any anxiety and stress you feel. Follow-up care is a key part of your treatment and safety. Be sure to make and go to all appointments, and call your doctor if you are having problems. It's also a good idea to know your test results and keep a list of the medicines you take. How can you care for yourself at home?   Reduce stress    · Ask for help with cooking and housekeeping.     · Figure out who or what causes your stress. Avoid these people or situations as much as possible.     · Relax every day. Taking 10- to 15-minute breaks can make a big difference. Take a walk, listen to music, or take a warm bath.     · Learn relaxation techniques at prenatal or yoga class. Or buy a relaxation tape.     · List your fears about having a baby and becoming a parent. Share the list with someone you trust. Decide which worries are really small, and try to let them go.    Exercise    · If you
Cardiology
Cardiology
Critical Care
Family Medicine
Nephrology
did not exercise much before pregnancy, start slowly. Walking is best. [employer], and do a little more every day.     · Brisk walking, easy jogging, low-impact aerobics, water aerobics, and yoga are good choices. Some sports, such as scuba diving, horseback riding, downhill skiing, gymnastics, and water skiing, are not a good idea.     · Try to do at least 2½ hours a week of moderate exercise, such as a fast walk. One way to do this is to be active 30 minutes a day, at least 5 days a week. It's fine to be active in blocks of 10 minutes or more throughout your day and week.     · Wear loose clothing. And wear shoes and a bra that provide good support.     · Warm up and cool down to start and finish your exercise.     · If you want to use weights, be sure to use light weights. They reduce stress on your joints.    Stay at the best weight for you    · Experts recommend that you gain about 1 pound a month during the first 3 months of your pregnancy.     · Experts recommend that you gain about 1 pound a week during your last 6 months of pregnancy, for a total weight gain of 25 to 35 pounds.     · If you are underweight, you will need to gain more weight (about 28 to 40 pounds).     · If you are overweight, you may not need to gain as much weight (about 15 to 25 pounds).     · If you are gaining weight too fast, use common sense. Exercise every day, and limit sweets, fast foods, and fats. Choose lean meats, fruits, and vegetables.     · If you are having twins or more, your doctor may refer you to a dietitian. Where can you learn more? Go to http://brooks-marci.info/. Enter L578 in the search box to learn more about \"Weeks 14 to 18 of Your Pregnancy: Care Instructions. \"  Current as of: May 29, 2019  Content Version: 12.2  © 3264-9974 [employer]. Care instructions adapted under license by [employer] (which disclaims liability or warranty for this information).  If you
Cardiology
Critical Care
Family Medicine
Infectious Disease
Pulmonology
have questions about a medical condition or this instruction, always ask your healthcare professional. [place] [address] any warranty or liability for your use of this information.
Cardiology
Family Medicine

## 2025-07-01 NOTE — DISCHARGE NOTE NURSING/CASE MANAGEMENT/SOCIAL WORK - PATIENT PORTAL LINK FT
You can access the FollowMyHealth Patient Portal offered by [facility] by registering at the following website: [email or web address]. By joining [employer]’s FollowMyHealth portal, you will also be able to view your health information using other applications (apps) compatible with our system.

## 2025-07-01 NOTE — PROGRESS NOTE ADULT - ASSESSMENT
65 year old male with a PMH of AFib (s/p dccv 3/25/25, unsuccessful), HFrEF (likely mod red EF, sev TTE), aortic stenosis s/p TAVR (11/2022, reop on 4/11), a flutter s/p ablation 2019 and s/p micra implant, CKD3, WILFRED, obesity, venous stasis, HTN, and HLD with recent hospitalization for heart failure exacerbation and cardiorenal syndrome in April presenting to [facility] from [facility] for worsening SOB x a few days as well as LE swelling, Pt was given increased doses of Bumex in attempts to diurese, however condition continued to worsen therefore patient was taken to the ED. He was placed on NIV for increased WOB, and given additional dose of Bumex. BNP noted to be elevated. Pt denies fever, chills, or sick contacts and ROS otherwise negative. Pt accepted to ICU for further management / care. Patient was admitted for Acute on chronic hypercarbic respiratory failure, Heart failure exacerbation, ANGELICA on CKD.    #Severe sepsis present on admission secondary bacteremia  #Small thick walled loculated right pleural effusion, increasing in size and complexity - seen on CT 6/27  -On admission: , RR 30, ANGELICA, hypoxia requiring 12L, INR 4.11   -Blood culture (6/23) x2 gram negative rods most closely resembling cardiobacterium  -Blood culture (6/26) x2 gram negative rods  -S/p levofloxacin (6/29) x1 and meropenem (6/29-6/30)  -CXR (6/30) Slow progressive improvement in congestive like findings. Stable cardiac findings.  -Continue CTX (6/26-6/29), (6/30-)   -F/u repeat blood cultures  -ID consult: changed back to CTX (on 6/30), sustained bacteremia concerning for endovascular focus though did not get abx until second set drawn so not uncontrolled documented as yet, agree with transfer to [facility], favor at atleast sample right effusion to see if infected  -Cardio consult: prosthetic valve endocarditis may be on differential and patient may require transfer to tertiary hospital as he also has a pacemaker and would need EP consult if this would need to be removed if ID deems it necessary - although they are less susceptible to device infection  -US chest read - per Dr. 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  -Vascular consulted over the phone, recommended continue AC and follow up outpatient    #Acute on chronic hypercapnic respiratory failure  -s/p BIPAP in ICU  -on NC, spO2: 90% 5L  -c/w incentive spirometer   -Chest PT/Pulm toilet   -HOB> 30  -NIV PRN + QHs  -Duonebs for mild wheezing     #Acute on chronic systolic HFrEF  -SOB and LE swelling on admission  -ECHO: 25-30% EF  -s/p Bumex ggt in ICU  -c/w IV Bumex 2mg BID  -I+O  -Daily weights  -Cardio consult: PPM interrogated and unremarkable, recommend eventual EP eval for primary prevention ICD when bacteremia resolves, not a candidate for GDMT at this time due to renal insufficiency, recommend hydralazine/isosorbide dinitrate    #ANGELICA on CKD, likely cardiorenal syndrome / Congestive nephropathy - resolved  -Cr downtrending   -Avoid nephro toxic agents  -Nephro following recs appreciated     #Afib with RVR  #HTN  -On Coumadin 2.5mg outpatient  -Dig loaded  -c/w Digoxin 125 mcg QD  -c/w home dose amiodarone 200 mg QD  -c/w metoprolol succinate 25 mg QD  -Tele monitor   -Cardiology following   -Mg>2, Phos>4   -INR daily (has been within target 2-3)  -Coumadin 2.5mg ordered for tonight     #HLD  -c/w statin    #GERD  -c/w Pepcid     #DVT ppx - Warfarin    Code status: Full code    Dispo: accepted to transfer to [facility], pending bed    Discussed with Dr. Cunningham

## 2025-07-01 NOTE — PROGRESS NOTE ADULT - SUBJECTIVE AND OBJECTIVE BOX
No distress    Vital Signs Last 24 Hrs  T(C): 36.5 (07-01-25 @ 11:41), Max: 36.5 (07-01-25 @ 11:41)  T(F): 97.7 (07-01-25 @ 11:41), Max: 97.7 (07-01-25 @ 11:41)  HR: 63 (07-01-25 @ 16:19) (63 - 91)  BP: 112/71 (07-01-25 @ 16:00) (109/72 - 117/72)  RR: 16 (07-01-25 @ 11:41) (16 - 18)  SpO2: 94% (07-01-25 @ 16:19) (91% - 94%)    I&O's Detail    30 Jun 2025 07:01  -  01 Jul 2025 07:00  --------------------------------------------------------  OUT:    Voided (mL): 800 mL  Total OUT: 800 mL    01 Jul 2025 07:01  -  01 Jul 2025 17:36  --------------------------------------------------------  OUT:    Voided (mL): 700 mL  Total OUT: 700 mL    s1s2  b/l air entry  soft, ND  edema                                                 10.7   8.18  )-----------( 236      ( 01 Jul 2025 08:22 )             35.6     01 Jul 2025 08:22    137    |  94     |  63     ----------------------------<  103    4.7     |  44     |  1.44     Ca    9.0        01 Jul 2025 08:22  Phos  3.2       01 Jul 2025 08:22  Mg     2.3       01 Jul 2025 08:22    TPro  7.8    /  Alb  2.3    /  TBili  0.4    /  DBili  x      /  AST  20     /  ALT  12     /  AlkPhos  165    01 Jul 2025 08:22    LIVER FUNCTIONS - ( 01 Jul 2025 08:22 )  Alb: 2.3 g/dL / Pro: 7.8 g/dL / ALK PHOS: 165 U/L / ALT: 12 U/L / AST: 20 U/L / GGT: x           PT/INR - ( 01 Jul 2025 08:22 )   PT: 28.6 sec;   INR: 2.45 ratio      Culture - Blood (collected 29 Jun 2025 12:18)  Source: Blood Blood-Peripheral  Preliminary Report (01 Jul 2025 01:03):    No growth at 24 hours    Culture - Blood (collected 29 Jun 2025 12:12)  Source: Blood Blood-Peripheral  Preliminary Report (01 Jul 2025 01:03):    No growth at 24 hours    albuterol/ipratropium for Nebulization 3 milliLiter(s) Nebulizer every 6 hours PRN  aMIOdarone    Tablet 200 milliGRAM(s) Oral daily  atorvastatin 80 milliGRAM(s) Oral at bedtime  bumetanide Injectable 2 milliGRAM(s) IV Push every 12 hours  cefTRIAXone   IVPB 2000 milliGRAM(s) IV Intermittent every 24 hours  chlorhexidine 2% Cloths 1 Application(s) Topical <User Schedule>  clopidogrel Tablet 75 milliGRAM(s) Oral daily  digoxin     Tablet 125 MICROGram(s) Oral daily  isosorbide   dinitrate Tablet (ISORDIL) 10 milliGRAM(s) Oral three times a day  metoprolol succinate ER 25 milliGRAM(s) Oral daily  pantoprazole    Tablet 40 milliGRAM(s) Oral before breakfast  polyethylene glycol 3350 17 Gram(s) Oral daily  warfarin 2.5 milliGRAM(s) Oral at bedtime    A/P:    CM, EF 25 - 30%, adm w/CHF, volume overload  Dx w/Cardiobacterium bacteremia, pt w/hx TAVR, micra PPM  ID f/u appr, Abx per ID  Cardio-renal/septic ANGELICA/CKD 3   Cr has improved and is presently at baseline   Continue Bumex   Avoid nephrotoxins as able  F/u BMP, UO  UA w/pr 30, otherwise bland   SONO negative in April 2025  Avoid excessive fluid intake, no NSAID's  Plan for tx to [facility]     [phone]

## 2025-07-02 ENCOUNTER — APPOINTMENT (OUTPATIENT)
Dept: CARDIOLOGY | Facility: CLINIC | Age: 65
End: 2025-07-02

## 2025-07-02 LAB
ADD ON TEST-SPECIMEN IN LAB: SIGNIFICANT CHANGE UP
ALBUMIN SERPL ELPH-MCNC: 3 G/DL — LOW (ref 3.3–5)
ALP SERPL-CCNC: 164 U/L — HIGH (ref 40–120)
ALT FLD-CCNC: 13 U/L — SIGNIFICANT CHANGE UP (ref 10–45)
ANION GAP SERPL CALC-SCNC: 14 MMOL/L — SIGNIFICANT CHANGE UP (ref 5–17)
APTT BLD: 36.2 SEC — SIGNIFICANT CHANGE UP (ref 26.1–36.8)
AST SERPL-CCNC: 15 U/L — SIGNIFICANT CHANGE UP (ref 10–40)
BASOPHILS # BLD AUTO: 0.05 K/UL — SIGNIFICANT CHANGE UP (ref 0–0.2)
BASOPHILS NFR BLD AUTO: 0.6 % — SIGNIFICANT CHANGE UP (ref 0–2)
BILIRUB SERPL-MCNC: 0.4 MG/DL — SIGNIFICANT CHANGE UP (ref 0.2–1.2)
BUN SERPL-MCNC: 67 MG/DL — HIGH (ref 7–23)
CALCIUM SERPL-MCNC: 9.4 MG/DL — SIGNIFICANT CHANGE UP (ref 8.4–10.5)
CHLORIDE SERPL-SCNC: 88 MMOL/L — LOW (ref 96–108)
CO2 SERPL-SCNC: 35 MMOL/L — HIGH (ref 22–31)
CREAT SERPL-MCNC: 1.52 MG/DL — HIGH (ref 0.5–1.3)
CRP SERPL-MCNC: 13 MG/L — HIGH (ref 0–4)
CULTURE RESULTS: ABNORMAL
EGFR: 50 ML/MIN/1.73M2 — LOW
EGFR: 50 ML/MIN/1.73M2 — LOW
EOSINOPHIL # BLD AUTO: 0.26 K/UL — SIGNIFICANT CHANGE UP (ref 0–0.5)
EOSINOPHIL NFR BLD AUTO: 3.2 % — SIGNIFICANT CHANGE UP (ref 0–6)
ERYTHROCYTE [SEDIMENTATION RATE] IN BLOOD: 72 MM/HR — HIGH (ref 0–15)
GAS PNL BLDV: SIGNIFICANT CHANGE UP
GGT SERPL-CCNC: 63 U/L — HIGH (ref 9–50)
GLUCOSE SERPL-MCNC: 84 MG/DL — SIGNIFICANT CHANGE UP (ref 70–99)
HCT VFR BLD CALC: 35.3 % — LOW (ref 39–50)
HGB BLD-MCNC: 10.4 G/DL — LOW (ref 13–17)
IMM GRANULOCYTES # BLD AUTO: 0.02 K/UL — SIGNIFICANT CHANGE UP (ref 0–0.07)
IMM GRANULOCYTES NFR BLD AUTO: 0.2 % — SIGNIFICANT CHANGE UP (ref 0–0.9)
INR BLD: 2.47 RATIO — HIGH (ref 0.85–1.16)
LACTATE SERPL-SCNC: 0.9 MMOL/L — SIGNIFICANT CHANGE UP (ref 0.5–2)
LYMPHOCYTES # BLD AUTO: 0.79 K/UL — LOW (ref 1–3.3)
LYMPHOCYTES NFR BLD AUTO: 9.6 % — LOW (ref 13–44)
MAGNESIUM SERPL-MCNC: 2.2 MG/DL — SIGNIFICANT CHANGE UP (ref 1.6–2.6)
MCHC RBC-ENTMCNC: 29 PG — SIGNIFICANT CHANGE UP (ref 27–34)
MCHC RBC-ENTMCNC: 29.5 G/DL — LOW (ref 32–36)
MCV RBC AUTO: 98.3 FL — SIGNIFICANT CHANGE UP (ref 80–100)
MONOCYTES # BLD AUTO: 0.9 K/UL — SIGNIFICANT CHANGE UP (ref 0–0.9)
MONOCYTES NFR BLD AUTO: 10.9 % — SIGNIFICANT CHANGE UP (ref 2–14)
NEUTROPHILS # BLD AUTO: 6.2 K/UL — SIGNIFICANT CHANGE UP (ref 1.8–7.4)
NEUTROPHILS NFR BLD AUTO: 75.5 % — SIGNIFICANT CHANGE UP (ref 43–77)
NRBC # BLD AUTO: 0 K/UL — SIGNIFICANT CHANGE UP (ref 0–0)
NRBC # FLD: 0 K/UL — SIGNIFICANT CHANGE UP (ref 0–0)
NRBC BLD AUTO-RTO: 0 /100 WBCS — SIGNIFICANT CHANGE UP (ref 0–0)
NT-PROBNP SERPL-SCNC: HIGH PG/ML (ref 0–300)
PHOSPHATE SERPL-MCNC: 3.1 MG/DL — SIGNIFICANT CHANGE UP (ref 2.5–4.5)
PLATELET # BLD AUTO: 253 K/UL — SIGNIFICANT CHANGE UP (ref 150–400)
PMV BLD: 10 FL — SIGNIFICANT CHANGE UP (ref 7–13)
POTASSIUM SERPL-MCNC: 4.5 MMOL/L — SIGNIFICANT CHANGE UP (ref 3.5–5.3)
POTASSIUM SERPL-SCNC: 4.5 MMOL/L — SIGNIFICANT CHANGE UP (ref 3.5–5.3)
PROCALCITONIN SERPL-MCNC: 0.07 NG/ML — SIGNIFICANT CHANGE UP (ref 0.02–0.1)
PROT SERPL-MCNC: 7.7 G/DL — SIGNIFICANT CHANGE UP (ref 6–8.3)
PROTHROM AB SERPL-ACNC: 27.9 SEC — HIGH (ref 9.9–13.4)
RBC # BLD: 3.59 M/UL — LOW (ref 4.2–5.8)
RBC # FLD: 16.3 % — HIGH (ref 10.3–14.5)
SODIUM SERPL-SCNC: 137 MMOL/L — SIGNIFICANT CHANGE UP (ref 135–145)
SPECIMEN SOURCE: SIGNIFICANT CHANGE UP
WBC # BLD: 8.22 K/UL — SIGNIFICANT CHANGE UP (ref 3.8–10.5)
WBC # FLD AUTO: 8.22 K/UL — SIGNIFICANT CHANGE UP (ref 3.8–10.5)

## 2025-07-02 PROCEDURE — 83605 ASSAY OF LACTIC ACID: CPT

## 2025-07-02 PROCEDURE — 80053 COMPREHEN METABOLIC PANEL: CPT

## 2025-07-02 PROCEDURE — 99223 1ST HOSP IP/OBS HIGH 75: CPT | Mod: GC

## 2025-07-02 PROCEDURE — 94640 AIRWAY INHALATION TREATMENT: CPT

## 2025-07-02 PROCEDURE — 84295 ASSAY OF SERUM SODIUM: CPT

## 2025-07-02 PROCEDURE — 82435 ASSAY OF BLOOD CHLORIDE: CPT

## 2025-07-02 PROCEDURE — 82977 ASSAY OF GGT: CPT

## 2025-07-02 PROCEDURE — 94660 CPAP INITIATION&MGMT: CPT

## 2025-07-02 PROCEDURE — 85014 HEMATOCRIT: CPT

## 2025-07-02 PROCEDURE — 85730 THROMBOPLASTIN TIME PARTIAL: CPT

## 2025-07-02 PROCEDURE — 99222 1ST HOSP IP/OBS MODERATE 55: CPT

## 2025-07-02 PROCEDURE — 86140 C-REACTIVE PROTEIN: CPT

## 2025-07-02 PROCEDURE — 85652 RBC SED RATE AUTOMATED: CPT

## 2025-07-02 PROCEDURE — 99222 1ST HOSP IP/OBS MODERATE 55: CPT | Mod: FS

## 2025-07-02 PROCEDURE — 99223 1ST HOSP IP/OBS HIGH 75: CPT

## 2025-07-02 PROCEDURE — 84100 ASSAY OF PHOSPHORUS: CPT

## 2025-07-02 PROCEDURE — 84132 ASSAY OF SERUM POTASSIUM: CPT

## 2025-07-02 PROCEDURE — 82330 ASSAY OF CALCIUM: CPT

## 2025-07-02 PROCEDURE — 82803 BLOOD GASES ANY COMBINATION: CPT

## 2025-07-02 PROCEDURE — 83735 ASSAY OF MAGNESIUM: CPT

## 2025-07-02 PROCEDURE — 85018 HEMOGLOBIN: CPT

## 2025-07-02 PROCEDURE — 84145 PROCALCITONIN (PCT): CPT

## 2025-07-02 PROCEDURE — G0545: CPT

## 2025-07-02 PROCEDURE — 85610 PROTHROMBIN TIME: CPT

## 2025-07-02 PROCEDURE — 82947 ASSAY GLUCOSE BLOOD QUANT: CPT

## 2025-07-02 PROCEDURE — 85025 COMPLETE CBC W/AUTO DIFF WBC: CPT

## 2025-07-02 PROCEDURE — 76604 US EXAM CHEST: CPT | Mod: 26,GC

## 2025-07-02 PROCEDURE — 99233 SBSQ HOSP IP/OBS HIGH 50: CPT | Mod: GC

## 2025-07-02 PROCEDURE — 83880 ASSAY OF NATRIURETIC PEPTIDE: CPT

## 2025-07-02 RX ORDER — OXYCODONE HYDROCHLORIDE 30 MG/1
2.5 TABLET ORAL EVERY 6 HOURS
Refills: 0 | Status: DISCONTINUED | OUTPATIENT
Start: 2025-07-02 | End: 2025-07-08

## 2025-07-02 RX ORDER — CEFTRIAXONE 500 MG/1
2000 INJECTION, POWDER, FOR SOLUTION INTRAMUSCULAR; INTRAVENOUS EVERY 12 HOURS
Refills: 0 | Status: DISCONTINUED | OUTPATIENT
Start: 2025-07-02 | End: 2025-07-08

## 2025-07-02 RX ORDER — SENNA 187 MG
2 TABLET ORAL AT BEDTIME
Refills: 0 | Status: DISCONTINUED | OUTPATIENT
Start: 2025-07-02 | End: 2025-07-10

## 2025-07-02 RX ORDER — POLYETHYLENE GLYCOL 3350 17 G/17G
17 POWDER, FOR SOLUTION ORAL DAILY
Refills: 0 | Status: DISCONTINUED | OUTPATIENT
Start: 2025-07-02 | End: 2025-07-19

## 2025-07-02 RX ORDER — BUMETANIDE 1 MG/1
2 TABLET ORAL ONCE
Refills: 0 | Status: COMPLETED | OUTPATIENT
Start: 2025-07-02 | End: 2025-07-02

## 2025-07-02 RX ORDER — ACETAMINOPHEN 500 MG/5ML
975 LIQUID (ML) ORAL EVERY 8 HOURS
Refills: 0 | Status: DISCONTINUED | OUTPATIENT
Start: 2025-07-02 | End: 2025-07-10

## 2025-07-02 RX ORDER — LIDOCAINE HYDROCHLORIDE 20 MG/ML
1 JELLY TOPICAL EVERY 24 HOURS
Refills: 0 | Status: DISCONTINUED | OUTPATIENT
Start: 2025-07-02 | End: 2025-07-10

## 2025-07-02 RX ADMIN — DIGOXIN 125 MICROGRAM(S): 250 TABLET ORAL at 05:05

## 2025-07-02 RX ADMIN — CEFTRIAXONE 100 MILLIGRAM(S): 500 INJECTION, POWDER, FOR SOLUTION INTRAMUSCULAR; INTRAVENOUS at 17:39

## 2025-07-02 RX ADMIN — IPRATROPIUM BROMIDE AND ALBUTEROL SULFATE 3 MILLILITER(S): .5; 2.5 SOLUTION RESPIRATORY (INHALATION) at 05:11

## 2025-07-02 RX ADMIN — ISOSORBDIE DINITRATE 10 MILLIGRAM(S): 30 TABLET ORAL at 05:06

## 2025-07-02 RX ADMIN — BUMETANIDE 2 MILLIGRAM(S): 1 TABLET ORAL at 13:06

## 2025-07-02 RX ADMIN — LIDOCAINE HYDROCHLORIDE 1 PATCH: 20 JELLY TOPICAL at 13:10

## 2025-07-02 RX ADMIN — METOPROLOL SUCCINATE 25 MILLIGRAM(S): 50 TABLET, EXTENDED RELEASE ORAL at 05:05

## 2025-07-02 RX ADMIN — LIDOCAINE HYDROCHLORIDE 1 PATCH: 20 JELLY TOPICAL at 19:10

## 2025-07-02 RX ADMIN — AMIODARONE HYDROCHLORIDE 200 MILLIGRAM(S): 50 INJECTION, SOLUTION INTRAVENOUS at 05:05

## 2025-07-02 RX ADMIN — BUMETANIDE 2 MILLIGRAM(S): 1 TABLET ORAL at 05:06

## 2025-07-02 RX ADMIN — IPRATROPIUM BROMIDE AND ALBUTEROL SULFATE 3 MILLILITER(S): .5; 2.5 SOLUTION RESPIRATORY (INHALATION) at 17:40

## 2025-07-02 RX ADMIN — ISOSORBDIE DINITRATE 10 MILLIGRAM(S): 30 TABLET ORAL at 13:08

## 2025-07-02 RX ADMIN — ISOSORBDIE DINITRATE 10 MILLIGRAM(S): 30 TABLET ORAL at 17:39

## 2025-07-02 RX ADMIN — CEFTRIAXONE 100 MILLIGRAM(S): 500 INJECTION, POWDER, FOR SOLUTION INTRAMUSCULAR; INTRAVENOUS at 05:06

## 2025-07-02 RX ADMIN — Medication 40 MILLIGRAM(S): at 05:07

## 2025-07-02 RX ADMIN — CLOPIDOGREL BISULFATE 75 MILLIGRAM(S): 75 TABLET, FILM COATED ORAL at 13:06

## 2025-07-02 RX ADMIN — BUMETANIDE 2 MILLIGRAM(S): 1 TABLET ORAL at 20:30

## 2025-07-03 DIAGNOSIS — J96.01 ACUTE RESPIRATORY FAILURE WITH HYPOXIA: ICD-10-CM

## 2025-07-03 LAB
ALBUMIN SERPL ELPH-MCNC: 2.8 G/DL — LOW (ref 3.3–5)
ALP SERPL-CCNC: 153 U/L — HIGH (ref 40–120)
ALT FLD-CCNC: 12 U/L — SIGNIFICANT CHANGE UP (ref 10–45)
ANION GAP SERPL CALC-SCNC: 9 MMOL/L — SIGNIFICANT CHANGE UP (ref 5–17)
APTT BLD: 36.6 SEC — SIGNIFICANT CHANGE UP (ref 26.1–36.8)
AST SERPL-CCNC: 11 U/L — SIGNIFICANT CHANGE UP (ref 10–40)
BASOPHILS # BLD AUTO: 0.04 K/UL — SIGNIFICANT CHANGE UP (ref 0–0.2)
BASOPHILS NFR BLD AUTO: 0.6 % — SIGNIFICANT CHANGE UP (ref 0–2)
BILIRUB SERPL-MCNC: 0.4 MG/DL — SIGNIFICANT CHANGE UP (ref 0.2–1.2)
BUN SERPL-MCNC: 64 MG/DL — HIGH (ref 7–23)
CALCIUM SERPL-MCNC: 9.2 MG/DL — SIGNIFICANT CHANGE UP (ref 8.4–10.5)
CHLORIDE SERPL-SCNC: 92 MMOL/L — LOW (ref 96–108)
CO2 SERPL-SCNC: 38 MMOL/L — HIGH (ref 22–31)
CREAT SERPL-MCNC: 1.62 MG/DL — HIGH (ref 0.5–1.3)
EGFR: 47 ML/MIN/1.73M2 — LOW
EGFR: 47 ML/MIN/1.73M2 — LOW
EOSINOPHIL # BLD AUTO: 0.24 K/UL — SIGNIFICANT CHANGE UP (ref 0–0.5)
EOSINOPHIL NFR BLD AUTO: 3.8 % — SIGNIFICANT CHANGE UP (ref 0–6)
GAS PNL BLDA: SIGNIFICANT CHANGE UP
GAS PNL BLDA: SIGNIFICANT CHANGE UP
GAS PNL BLDV: SIGNIFICANT CHANGE UP
GAS PNL BLDV: SIGNIFICANT CHANGE UP
GLUCOSE BLDC GLUCOMTR-MCNC: 130 MG/DL — HIGH (ref 70–99)
GLUCOSE SERPL-MCNC: 85 MG/DL — SIGNIFICANT CHANGE UP (ref 70–99)
HCT VFR BLD CALC: 35.7 % — LOW (ref 39–50)
HGB BLD-MCNC: 10.3 G/DL — LOW (ref 13–17)
IMM GRANULOCYTES # BLD AUTO: 0.02 K/UL — SIGNIFICANT CHANGE UP (ref 0–0.07)
IMM GRANULOCYTES NFR BLD AUTO: 0.3 % — SIGNIFICANT CHANGE UP (ref 0–0.9)
INR BLD: 3.01 RATIO — HIGH (ref 0.85–1.16)
LYMPHOCYTES # BLD AUTO: 0.7 K/UL — LOW (ref 1–3.3)
LYMPHOCYTES NFR BLD AUTO: 11.1 % — LOW (ref 13–44)
MAGNESIUM SERPL-MCNC: 2.2 MG/DL — SIGNIFICANT CHANGE UP (ref 1.6–2.6)
MCHC RBC-ENTMCNC: 28.6 PG — SIGNIFICANT CHANGE UP (ref 27–34)
MCHC RBC-ENTMCNC: 28.9 G/DL — LOW (ref 32–36)
MCV RBC AUTO: 99.2 FL — SIGNIFICANT CHANGE UP (ref 80–100)
MONOCYTES # BLD AUTO: 0.67 K/UL — SIGNIFICANT CHANGE UP (ref 0–0.9)
MONOCYTES NFR BLD AUTO: 10.6 % — SIGNIFICANT CHANGE UP (ref 2–14)
NEUTROPHILS # BLD AUTO: 4.64 K/UL — SIGNIFICANT CHANGE UP (ref 1.8–7.4)
NEUTROPHILS NFR BLD AUTO: 73.6 % — SIGNIFICANT CHANGE UP (ref 43–77)
NRBC # BLD AUTO: 0 K/UL — SIGNIFICANT CHANGE UP (ref 0–0)
NRBC # FLD: 0 K/UL — SIGNIFICANT CHANGE UP (ref 0–0)
NRBC BLD AUTO-RTO: 0 /100 WBCS — SIGNIFICANT CHANGE UP (ref 0–0)
PHOSPHATE SERPL-MCNC: 4.5 MG/DL — SIGNIFICANT CHANGE UP (ref 2.5–4.5)
PLATELET # BLD AUTO: 228 K/UL — SIGNIFICANT CHANGE UP (ref 150–400)
PMV BLD: 9.7 FL — SIGNIFICANT CHANGE UP (ref 7–13)
POTASSIUM SERPL-MCNC: 4.3 MMOL/L — SIGNIFICANT CHANGE UP (ref 3.5–5.3)
POTASSIUM SERPL-SCNC: 4.3 MMOL/L — SIGNIFICANT CHANGE UP (ref 3.5–5.3)
PROT SERPL-MCNC: 7.6 G/DL — SIGNIFICANT CHANGE UP (ref 6–8.3)
PROTHROM AB SERPL-ACNC: 33.9 SEC — HIGH (ref 9.9–13.4)
RBC # BLD: 3.6 M/UL — LOW (ref 4.2–5.8)
RBC # FLD: 16.1 % — HIGH (ref 10.3–14.5)
SODIUM SERPL-SCNC: 139 MMOL/L — SIGNIFICANT CHANGE UP (ref 135–145)
WBC # BLD: 6.31 K/UL — SIGNIFICANT CHANGE UP (ref 3.8–10.5)
WBC # FLD AUTO: 6.31 K/UL — SIGNIFICANT CHANGE UP (ref 3.8–10.5)

## 2025-07-03 PROCEDURE — G0545: CPT

## 2025-07-03 PROCEDURE — 71045 X-RAY EXAM CHEST 1 VIEW: CPT | Mod: 26

## 2025-07-03 PROCEDURE — 99233 SBSQ HOSP IP/OBS HIGH 50: CPT | Mod: GC

## 2025-07-03 PROCEDURE — 99291 CRITICAL CARE FIRST HOUR: CPT

## 2025-07-03 PROCEDURE — 99232 SBSQ HOSP IP/OBS MODERATE 35: CPT

## 2025-07-03 RX ORDER — BUMETANIDE 1 MG/1
3 TABLET ORAL
Refills: 0 | Status: DISCONTINUED | OUTPATIENT
Start: 2025-07-03 | End: 2025-07-03

## 2025-07-03 RX ORDER — B1/B2/B3/B5/B6/B12/VIT C/FOLIC 500-0.5 MG
1 TABLET ORAL DAILY
Refills: 0 | Status: DISCONTINUED | OUTPATIENT
Start: 2025-07-03 | End: 2025-07-10

## 2025-07-03 RX ORDER — ACETAZOLAMIDE 250 MG/1
250 TABLET ORAL ONCE
Refills: 0 | Status: COMPLETED | OUTPATIENT
Start: 2025-07-03 | End: 2025-07-03

## 2025-07-03 RX ORDER — BUMETANIDE 1 MG/1
2 TABLET ORAL
Refills: 0 | Status: COMPLETED | OUTPATIENT
Start: 2025-07-03 | End: 2025-07-07

## 2025-07-03 RX ORDER — ACETAZOLAMIDE 250 MG/1
250 TABLET ORAL ONCE
Refills: 0 | Status: DISCONTINUED | OUTPATIENT
Start: 2025-07-03 | End: 2025-07-03

## 2025-07-03 RX ADMIN — ISOSORBDIE DINITRATE 10 MILLIGRAM(S): 30 TABLET ORAL at 05:49

## 2025-07-03 RX ADMIN — CEFTRIAXONE 100 MILLIGRAM(S): 500 INJECTION, POWDER, FOR SOLUTION INTRAMUSCULAR; INTRAVENOUS at 18:30

## 2025-07-03 RX ADMIN — CEFTRIAXONE 100 MILLIGRAM(S): 500 INJECTION, POWDER, FOR SOLUTION INTRAMUSCULAR; INTRAVENOUS at 05:41

## 2025-07-03 RX ADMIN — Medication 40 MILLIGRAM(S): at 05:50

## 2025-07-03 RX ADMIN — ISOSORBDIE DINITRATE 10 MILLIGRAM(S): 30 TABLET ORAL at 18:54

## 2025-07-03 RX ADMIN — LIDOCAINE HYDROCHLORIDE 1 PATCH: 20 JELLY TOPICAL at 01:02

## 2025-07-03 RX ADMIN — ATORVASTATIN CALCIUM 80 MILLIGRAM(S): 80 TABLET, FILM COATED ORAL at 21:57

## 2025-07-03 RX ADMIN — CLOPIDOGREL BISULFATE 75 MILLIGRAM(S): 75 TABLET, FILM COATED ORAL at 18:54

## 2025-07-03 RX ADMIN — BUMETANIDE 2 MILLIGRAM(S): 1 TABLET ORAL at 05:41

## 2025-07-03 RX ADMIN — Medication 975 MILLIGRAM(S): at 06:47

## 2025-07-03 RX ADMIN — BUMETANIDE 2 MILLIGRAM(S): 1 TABLET ORAL at 15:03

## 2025-07-03 RX ADMIN — METOPROLOL SUCCINATE 25 MILLIGRAM(S): 50 TABLET, EXTENDED RELEASE ORAL at 05:50

## 2025-07-03 RX ADMIN — Medication 975 MILLIGRAM(S): at 21:57

## 2025-07-03 RX ADMIN — AMIODARONE HYDROCHLORIDE 200 MILLIGRAM(S): 50 INJECTION, SOLUTION INTRAVENOUS at 05:49

## 2025-07-03 RX ADMIN — ACETAZOLAMIDE 250 MILLIGRAM(S): 250 TABLET ORAL at 13:06

## 2025-07-03 RX ADMIN — Medication 975 MILLIGRAM(S): at 22:50

## 2025-07-03 RX ADMIN — Medication 975 MILLIGRAM(S): at 05:49

## 2025-07-04 LAB
ALBUMIN SERPL ELPH-MCNC: 2.6 G/DL — LOW (ref 3.3–5)
ALP SERPL-CCNC: 156 U/L — HIGH (ref 40–120)
ALT FLD-CCNC: 11 U/L — SIGNIFICANT CHANGE UP (ref 10–45)
ANION GAP SERPL CALC-SCNC: 14 MMOL/L — SIGNIFICANT CHANGE UP (ref 5–17)
APTT BLD: 38.3 SEC — HIGH (ref 26.1–36.8)
AST SERPL-CCNC: 13 U/L — SIGNIFICANT CHANGE UP (ref 10–40)
BASOPHILS # BLD AUTO: 0.04 K/UL — SIGNIFICANT CHANGE UP (ref 0–0.2)
BASOPHILS NFR BLD AUTO: 0.6 % — SIGNIFICANT CHANGE UP (ref 0–2)
BILIRUB SERPL-MCNC: 0.3 MG/DL — SIGNIFICANT CHANGE UP (ref 0.2–1.2)
BUN SERPL-MCNC: 70 MG/DL — HIGH (ref 7–23)
CALCIUM SERPL-MCNC: 8.9 MG/DL — SIGNIFICANT CHANGE UP (ref 8.4–10.5)
CHLORIDE SERPL-SCNC: 90 MMOL/L — LOW (ref 96–108)
CO2 SERPL-SCNC: 34 MMOL/L — HIGH (ref 22–31)
CREAT SERPL-MCNC: 2.13 MG/DL — HIGH (ref 0.5–1.3)
DIGOXIN SERPL-MCNC: 1.8 NG/ML — SIGNIFICANT CHANGE UP (ref 0.8–2)
EGFR: 34 ML/MIN/1.73M2 — LOW
EGFR: 34 ML/MIN/1.73M2 — LOW
EOSINOPHIL # BLD AUTO: 0.34 K/UL — SIGNIFICANT CHANGE UP (ref 0–0.5)
EOSINOPHIL NFR BLD AUTO: 5.2 % — SIGNIFICANT CHANGE UP (ref 0–6)
GAS PNL BLDA: SIGNIFICANT CHANGE UP
GLUCOSE SERPL-MCNC: 89 MG/DL — SIGNIFICANT CHANGE UP (ref 70–99)
HCT VFR BLD CALC: 33.3 % — LOW (ref 39–50)
HGB BLD-MCNC: 9.8 G/DL — LOW (ref 13–17)
IMM GRANULOCYTES # BLD AUTO: 0.02 K/UL — SIGNIFICANT CHANGE UP (ref 0–0.07)
IMM GRANULOCYTES NFR BLD AUTO: 0.3 % — SIGNIFICANT CHANGE UP (ref 0–0.9)
INR BLD: 3.5 RATIO — HIGH (ref 0.85–1.16)
LYMPHOCYTES # BLD AUTO: 0.7 K/UL — LOW (ref 1–3.3)
LYMPHOCYTES NFR BLD AUTO: 10.6 % — LOW (ref 13–44)
MAGNESIUM SERPL-MCNC: 2.3 MG/DL — SIGNIFICANT CHANGE UP (ref 1.6–2.6)
MCHC RBC-ENTMCNC: 28.9 PG — SIGNIFICANT CHANGE UP (ref 27–34)
MCHC RBC-ENTMCNC: 29.4 G/DL — LOW (ref 32–36)
MCV RBC AUTO: 98.2 FL — SIGNIFICANT CHANGE UP (ref 80–100)
MONOCYTES # BLD AUTO: 0.67 K/UL — SIGNIFICANT CHANGE UP (ref 0–0.9)
MONOCYTES NFR BLD AUTO: 10.2 % — SIGNIFICANT CHANGE UP (ref 2–14)
NEUTROPHILS # BLD AUTO: 4.83 K/UL — SIGNIFICANT CHANGE UP (ref 1.8–7.4)
NEUTROPHILS NFR BLD AUTO: 73.1 % — SIGNIFICANT CHANGE UP (ref 43–77)
NRBC # BLD AUTO: 0 K/UL — SIGNIFICANT CHANGE UP (ref 0–0)
NRBC # FLD: 0 K/UL — SIGNIFICANT CHANGE UP (ref 0–0)
NRBC BLD AUTO-RTO: 0 /100 WBCS — SIGNIFICANT CHANGE UP (ref 0–0)
PHOSPHATE SERPL-MCNC: 4.5 MG/DL — SIGNIFICANT CHANGE UP (ref 2.5–4.5)
PLATELET # BLD AUTO: 241 K/UL — SIGNIFICANT CHANGE UP (ref 150–400)
PMV BLD: 10.3 FL — SIGNIFICANT CHANGE UP (ref 7–13)
POTASSIUM SERPL-MCNC: 4.1 MMOL/L — SIGNIFICANT CHANGE UP (ref 3.5–5.3)
POTASSIUM SERPL-SCNC: 4.1 MMOL/L — SIGNIFICANT CHANGE UP (ref 3.5–5.3)
PROT SERPL-MCNC: 7.1 G/DL — SIGNIFICANT CHANGE UP (ref 6–8.3)
PROTHROM AB SERPL-ACNC: 39.8 SEC — HIGH (ref 9.9–13.4)
RBC # BLD: 3.39 M/UL — LOW (ref 4.2–5.8)
RBC # FLD: 16.2 % — HIGH (ref 10.3–14.5)
SODIUM SERPL-SCNC: 138 MMOL/L — SIGNIFICANT CHANGE UP (ref 135–145)
TSH SERPL-MCNC: 5.61 UIU/ML — HIGH (ref 0.27–4.2)
WBC # BLD: 6.6 K/UL — SIGNIFICANT CHANGE UP (ref 3.8–10.5)
WBC # FLD AUTO: 6.6 K/UL — SIGNIFICANT CHANGE UP (ref 3.8–10.5)

## 2025-07-04 PROCEDURE — 99233 SBSQ HOSP IP/OBS HIGH 50: CPT | Mod: GC

## 2025-07-04 RX ADMIN — ATORVASTATIN CALCIUM 80 MILLIGRAM(S): 80 TABLET, FILM COATED ORAL at 22:11

## 2025-07-04 RX ADMIN — ISOSORBDIE DINITRATE 10 MILLIGRAM(S): 30 TABLET ORAL at 05:50

## 2025-07-04 RX ADMIN — CLOPIDOGREL BISULFATE 75 MILLIGRAM(S): 75 TABLET, FILM COATED ORAL at 11:43

## 2025-07-04 RX ADMIN — LIDOCAINE HYDROCHLORIDE 1 PATCH: 20 JELLY TOPICAL at 13:49

## 2025-07-04 RX ADMIN — Medication 975 MILLIGRAM(S): at 05:50

## 2025-07-04 RX ADMIN — CEFTRIAXONE 100 MILLIGRAM(S): 500 INJECTION, POWDER, FOR SOLUTION INTRAMUSCULAR; INTRAVENOUS at 05:50

## 2025-07-04 RX ADMIN — METOPROLOL SUCCINATE 25 MILLIGRAM(S): 50 TABLET, EXTENDED RELEASE ORAL at 05:51

## 2025-07-04 RX ADMIN — Medication 975 MILLIGRAM(S): at 06:20

## 2025-07-04 RX ADMIN — Medication 1 TABLET(S): at 11:43

## 2025-07-04 RX ADMIN — BUMETANIDE 2 MILLIGRAM(S): 1 TABLET ORAL at 13:51

## 2025-07-04 RX ADMIN — Medication 40 MILLIGRAM(S): at 05:51

## 2025-07-04 RX ADMIN — Medication 500 MILLIGRAM(S): at 11:43

## 2025-07-04 RX ADMIN — ISOSORBDIE DINITRATE 10 MILLIGRAM(S): 30 TABLET ORAL at 18:04

## 2025-07-04 RX ADMIN — LIDOCAINE HYDROCHLORIDE 1 PATCH: 20 JELLY TOPICAL at 19:00

## 2025-07-04 RX ADMIN — CEFTRIAXONE 100 MILLIGRAM(S): 500 INJECTION, POWDER, FOR SOLUTION INTRAMUSCULAR; INTRAVENOUS at 18:04

## 2025-07-04 RX ADMIN — IPRATROPIUM BROMIDE AND ALBUTEROL SULFATE 3 MILLILITER(S): .5; 2.5 SOLUTION RESPIRATORY (INHALATION) at 15:58

## 2025-07-04 RX ADMIN — Medication 975 MILLIGRAM(S): at 23:32

## 2025-07-04 RX ADMIN — AMIODARONE HYDROCHLORIDE 200 MILLIGRAM(S): 50 INJECTION, SOLUTION INTRAVENOUS at 05:50

## 2025-07-04 RX ADMIN — ISOSORBDIE DINITRATE 10 MILLIGRAM(S): 30 TABLET ORAL at 11:43

## 2025-07-04 RX ADMIN — BUMETANIDE 2 MILLIGRAM(S): 1 TABLET ORAL at 05:50

## 2025-07-04 RX ADMIN — Medication 975 MILLIGRAM(S): at 22:11

## 2025-07-05 LAB
ADD ON TEST-SPECIMEN IN LAB: SIGNIFICANT CHANGE UP
ALBUMIN SERPL ELPH-MCNC: 3 G/DL — LOW (ref 3.3–5)
ALP SERPL-CCNC: 175 U/L — HIGH (ref 40–120)
ALT FLD-CCNC: 13 U/L — SIGNIFICANT CHANGE UP (ref 10–45)
ANION GAP SERPL CALC-SCNC: 8 MMOL/L — SIGNIFICANT CHANGE UP (ref 5–17)
APTT BLD: 36.4 SEC — SIGNIFICANT CHANGE UP (ref 26.1–36.8)
AST SERPL-CCNC: 14 U/L — SIGNIFICANT CHANGE UP (ref 10–40)
BASE EXCESS BLDA CALC-SCNC: 14.5 MMOL/L — HIGH (ref -2–3)
BASOPHILS # BLD AUTO: 0.02 K/UL — SIGNIFICANT CHANGE UP (ref 0–0.2)
BASOPHILS NFR BLD AUTO: 0.3 % — SIGNIFICANT CHANGE UP (ref 0–2)
BILIRUB SERPL-MCNC: 0.4 MG/DL — SIGNIFICANT CHANGE UP (ref 0.2–1.2)
BUN SERPL-MCNC: 69 MG/DL — HIGH (ref 7–23)
CALCIUM SERPL-MCNC: 8.9 MG/DL — SIGNIFICANT CHANGE UP (ref 8.4–10.5)
CHLORIDE SERPL-SCNC: 90 MMOL/L — LOW (ref 96–108)
CO2 BLDA-SCNC: 45 MMOL/L — CRITICAL HIGH (ref 19–24)
CO2 SERPL-SCNC: 37 MMOL/L — HIGH (ref 22–31)
CREAT SERPL-MCNC: 2.03 MG/DL — HIGH (ref 0.5–1.3)
CULTURE RESULTS: SIGNIFICANT CHANGE UP
CULTURE RESULTS: SIGNIFICANT CHANGE UP
DIGOXIN SERPL-MCNC: 1.4 NG/ML — SIGNIFICANT CHANGE UP (ref 0.8–2)
EGFR: 35 ML/MIN/1.73M2 — LOW
EGFR: 35 ML/MIN/1.73M2 — LOW
EOSINOPHIL # BLD AUTO: 0.24 K/UL — SIGNIFICANT CHANGE UP (ref 0–0.5)
EOSINOPHIL NFR BLD AUTO: 3.7 % — SIGNIFICANT CHANGE UP (ref 0–6)
GAS PNL BLDA: SIGNIFICANT CHANGE UP
GLUCOSE SERPL-MCNC: 98 MG/DL — SIGNIFICANT CHANGE UP (ref 70–99)
HCO3 BLDA-SCNC: 42 MMOL/L — HIGH (ref 21–28)
HCT VFR BLD CALC: 34.6 % — LOW (ref 39–50)
HGB BLD-MCNC: 10.3 G/DL — LOW (ref 13–17)
HOROWITZ INDEX BLDA+IHG-RTO: 50 — SIGNIFICANT CHANGE UP
IMM GRANULOCYTES # BLD AUTO: 0.03 K/UL — SIGNIFICANT CHANGE UP (ref 0–0.07)
IMM GRANULOCYTES NFR BLD AUTO: 0.5 % — SIGNIFICANT CHANGE UP (ref 0–0.9)
INR BLD: 2.63 RATIO — HIGH (ref 0.85–1.16)
LYMPHOCYTES # BLD AUTO: 0.74 K/UL — LOW (ref 1–3.3)
LYMPHOCYTES NFR BLD AUTO: 11.3 % — LOW (ref 13–44)
MAGNESIUM SERPL-MCNC: 2.2 MG/DL — SIGNIFICANT CHANGE UP (ref 1.6–2.6)
MCHC RBC-ENTMCNC: 28.8 PG — SIGNIFICANT CHANGE UP (ref 27–34)
MCHC RBC-ENTMCNC: 29.8 G/DL — LOW (ref 32–36)
MCV RBC AUTO: 96.6 FL — SIGNIFICANT CHANGE UP (ref 80–100)
MONOCYTES # BLD AUTO: 0.57 K/UL — SIGNIFICANT CHANGE UP (ref 0–0.9)
MONOCYTES NFR BLD AUTO: 8.7 % — SIGNIFICANT CHANGE UP (ref 2–14)
NEUTROPHILS # BLD AUTO: 4.97 K/UL — SIGNIFICANT CHANGE UP (ref 1.8–7.4)
NEUTROPHILS NFR BLD AUTO: 75.5 % — SIGNIFICANT CHANGE UP (ref 43–77)
NRBC # BLD AUTO: 0 K/UL — SIGNIFICANT CHANGE UP (ref 0–0)
NRBC # FLD: 0 K/UL — SIGNIFICANT CHANGE UP (ref 0–0)
NRBC BLD AUTO-RTO: 0 /100 WBCS — SIGNIFICANT CHANGE UP (ref 0–0)
PCO2 BLDA: 67 MMHG — HIGH (ref 35–48)
PH BLDA: 7.41 — SIGNIFICANT CHANGE UP (ref 7.35–7.45)
PHOSPHATE SERPL-MCNC: 3.8 MG/DL — SIGNIFICANT CHANGE UP (ref 2.5–4.5)
PLATELET # BLD AUTO: 225 K/UL — SIGNIFICANT CHANGE UP (ref 150–400)
PMV BLD: 9.9 FL — SIGNIFICANT CHANGE UP (ref 7–13)
PO2 BLDA: 67 MMHG — LOW (ref 83–108)
POTASSIUM SERPL-MCNC: 3.7 MMOL/L — SIGNIFICANT CHANGE UP (ref 3.5–5.3)
POTASSIUM SERPL-SCNC: 3.7 MMOL/L — SIGNIFICANT CHANGE UP (ref 3.5–5.3)
PROT SERPL-MCNC: 7.8 G/DL — SIGNIFICANT CHANGE UP (ref 6–8.3)
PROTHROM AB SERPL-ACNC: 29.7 SEC — HIGH (ref 9.9–13.4)
RBC # BLD: 3.58 M/UL — LOW (ref 4.2–5.8)
RBC # FLD: 16.1 % — HIGH (ref 10.3–14.5)
SAO2 % BLDA: 96.6 % — SIGNIFICANT CHANGE UP (ref 94–98)
SODIUM SERPL-SCNC: 135 MMOL/L — SIGNIFICANT CHANGE UP (ref 135–145)
SPECIMEN SOURCE: SIGNIFICANT CHANGE UP
SPECIMEN SOURCE: SIGNIFICANT CHANGE UP
WBC # BLD: 6.57 K/UL — SIGNIFICANT CHANGE UP (ref 3.8–10.5)
WBC # FLD AUTO: 6.57 K/UL — SIGNIFICANT CHANGE UP (ref 3.8–10.5)

## 2025-07-05 PROCEDURE — 99233 SBSQ HOSP IP/OBS HIGH 50: CPT

## 2025-07-05 PROCEDURE — 99233 SBSQ HOSP IP/OBS HIGH 50: CPT | Mod: GC

## 2025-07-05 RX ADMIN — METOPROLOL SUCCINATE 25 MILLIGRAM(S): 50 TABLET, EXTENDED RELEASE ORAL at 05:28

## 2025-07-05 RX ADMIN — ISOSORBDIE DINITRATE 10 MILLIGRAM(S): 30 TABLET ORAL at 12:09

## 2025-07-05 RX ADMIN — BUMETANIDE 2 MILLIGRAM(S): 1 TABLET ORAL at 14:18

## 2025-07-05 RX ADMIN — Medication 975 MILLIGRAM(S): at 05:58

## 2025-07-05 RX ADMIN — ISOSORBDIE DINITRATE 10 MILLIGRAM(S): 30 TABLET ORAL at 05:29

## 2025-07-05 RX ADMIN — Medication 1 TABLET(S): at 12:10

## 2025-07-05 RX ADMIN — CLOPIDOGREL BISULFATE 75 MILLIGRAM(S): 75 TABLET, FILM COATED ORAL at 12:09

## 2025-07-05 RX ADMIN — CEFTRIAXONE 100 MILLIGRAM(S): 500 INJECTION, POWDER, FOR SOLUTION INTRAMUSCULAR; INTRAVENOUS at 17:24

## 2025-07-05 RX ADMIN — DIGOXIN 125 MICROGRAM(S): 250 TABLET ORAL at 05:29

## 2025-07-05 RX ADMIN — Medication 975 MILLIGRAM(S): at 21:22

## 2025-07-05 RX ADMIN — Medication 40 MILLIGRAM(S): at 05:28

## 2025-07-05 RX ADMIN — ATORVASTATIN CALCIUM 80 MILLIGRAM(S): 80 TABLET, FILM COATED ORAL at 21:22

## 2025-07-05 RX ADMIN — ISOSORBDIE DINITRATE 10 MILLIGRAM(S): 30 TABLET ORAL at 17:24

## 2025-07-05 RX ADMIN — BUMETANIDE 2 MILLIGRAM(S): 1 TABLET ORAL at 05:29

## 2025-07-05 RX ADMIN — LIDOCAINE HYDROCHLORIDE 1 PATCH: 20 JELLY TOPICAL at 01:49

## 2025-07-05 RX ADMIN — Medication 975 MILLIGRAM(S): at 23:17

## 2025-07-05 RX ADMIN — AMIODARONE HYDROCHLORIDE 200 MILLIGRAM(S): 50 INJECTION, SOLUTION INTRAVENOUS at 05:29

## 2025-07-05 RX ADMIN — CEFTRIAXONE 100 MILLIGRAM(S): 500 INJECTION, POWDER, FOR SOLUTION INTRAMUSCULAR; INTRAVENOUS at 05:29

## 2025-07-05 RX ADMIN — Medication 975 MILLIGRAM(S): at 05:28

## 2025-07-05 RX ADMIN — Medication 500 MILLIGRAM(S): at 12:10

## 2025-07-06 LAB
ALBUMIN SERPL ELPH-MCNC: 3 G/DL — LOW (ref 3.3–5)
ALP SERPL-CCNC: 157 U/L — HIGH (ref 40–120)
ALT FLD-CCNC: 11 U/L — SIGNIFICANT CHANGE UP (ref 10–45)
ANION GAP SERPL CALC-SCNC: 11 MMOL/L — SIGNIFICANT CHANGE UP (ref 5–17)
APTT BLD: 33.5 SEC — SIGNIFICANT CHANGE UP (ref 26.1–36.8)
AST SERPL-CCNC: 13 U/L — SIGNIFICANT CHANGE UP (ref 10–40)
BASE EXCESS BLDA CALC-SCNC: 16.1 MMOL/L — HIGH (ref -2–3)
BASOPHILS # BLD AUTO: 0.04 K/UL — SIGNIFICANT CHANGE UP (ref 0–0.2)
BASOPHILS NFR BLD AUTO: 0.6 % — SIGNIFICANT CHANGE UP (ref 0–2)
BILIRUB SERPL-MCNC: 0.3 MG/DL — SIGNIFICANT CHANGE UP (ref 0.2–1.2)
BUN SERPL-MCNC: 65 MG/DL — HIGH (ref 7–23)
CALCIUM SERPL-MCNC: 9.1 MG/DL — SIGNIFICANT CHANGE UP (ref 8.4–10.5)
CHLORIDE SERPL-SCNC: 92 MMOL/L — LOW (ref 96–108)
CO2 BLDA-SCNC: 46 MMOL/L — CRITICAL HIGH (ref 19–24)
CO2 SERPL-SCNC: 34 MMOL/L — HIGH (ref 22–31)
CREAT SERPL-MCNC: 1.74 MG/DL — HIGH (ref 0.5–1.3)
EGFR: 43 ML/MIN/1.73M2 — LOW
EGFR: 43 ML/MIN/1.73M2 — LOW
EOSINOPHIL # BLD AUTO: 0.28 K/UL — SIGNIFICANT CHANGE UP (ref 0–0.5)
EOSINOPHIL NFR BLD AUTO: 4.4 % — SIGNIFICANT CHANGE UP (ref 0–6)
GAS PNL BLDA: SIGNIFICANT CHANGE UP
GLUCOSE SERPL-MCNC: 88 MG/DL — SIGNIFICANT CHANGE UP (ref 70–99)
HCO3 BLDA-SCNC: 44 MMOL/L — HIGH (ref 21–28)
HCT VFR BLD CALC: 32.4 % — LOW (ref 39–50)
HGB BLD-MCNC: 9.4 G/DL — LOW (ref 13–17)
HOROWITZ INDEX BLDA+IHG-RTO: 50 — SIGNIFICANT CHANGE UP
IMM GRANULOCYTES # BLD AUTO: 0.03 K/UL — SIGNIFICANT CHANGE UP (ref 0–0.07)
IMM GRANULOCYTES NFR BLD AUTO: 0.5 % — SIGNIFICANT CHANGE UP (ref 0–0.9)
INR BLD: 2.04 RATIO — HIGH (ref 0.85–1.16)
LYMPHOCYTES # BLD AUTO: 0.74 K/UL — LOW (ref 1–3.3)
LYMPHOCYTES NFR BLD AUTO: 11.5 % — LOW (ref 13–44)
MAGNESIUM SERPL-MCNC: 2.3 MG/DL — SIGNIFICANT CHANGE UP (ref 1.6–2.6)
MCHC RBC-ENTMCNC: 28.2 PG — SIGNIFICANT CHANGE UP (ref 27–34)
MCHC RBC-ENTMCNC: 29 G/DL — LOW (ref 32–36)
MCV RBC AUTO: 97.3 FL — SIGNIFICANT CHANGE UP (ref 80–100)
MONOCYTES # BLD AUTO: 0.66 K/UL — SIGNIFICANT CHANGE UP (ref 0–0.9)
MONOCYTES NFR BLD AUTO: 10.3 % — SIGNIFICANT CHANGE UP (ref 2–14)
NEUTROPHILS # BLD AUTO: 4.66 K/UL — SIGNIFICANT CHANGE UP (ref 1.8–7.4)
NEUTROPHILS NFR BLD AUTO: 72.7 % — SIGNIFICANT CHANGE UP (ref 43–77)
NRBC # BLD AUTO: 0 K/UL — SIGNIFICANT CHANGE UP (ref 0–0)
NRBC # FLD: 0 K/UL — SIGNIFICANT CHANGE UP (ref 0–0)
NRBC BLD AUTO-RTO: 0 /100 WBCS — SIGNIFICANT CHANGE UP (ref 0–0)
PCO2 BLDA: 70 MMHG — CRITICAL HIGH (ref 35–48)
PH BLDA: 7.41 — SIGNIFICANT CHANGE UP (ref 7.35–7.45)
PHOSPHATE SERPL-MCNC: 3.3 MG/DL — SIGNIFICANT CHANGE UP (ref 2.5–4.5)
PLATELET # BLD AUTO: 210 K/UL — SIGNIFICANT CHANGE UP (ref 150–400)
PMV BLD: 10.4 FL — SIGNIFICANT CHANGE UP (ref 7–13)
PO2 BLDA: 69 MMHG — LOW (ref 83–108)
POTASSIUM SERPL-MCNC: 3.6 MMOL/L — SIGNIFICANT CHANGE UP (ref 3.5–5.3)
POTASSIUM SERPL-SCNC: 3.6 MMOL/L — SIGNIFICANT CHANGE UP (ref 3.5–5.3)
PROT SERPL-MCNC: 7.4 G/DL — SIGNIFICANT CHANGE UP (ref 6–8.3)
PROTHROM AB SERPL-ACNC: 23.3 SEC — HIGH (ref 9.9–13.4)
RBC # BLD: 3.33 M/UL — LOW (ref 4.2–5.8)
RBC # FLD: 16 % — HIGH (ref 10.3–14.5)
SAO2 % BLDA: 95.6 % — SIGNIFICANT CHANGE UP (ref 94–98)
SODIUM SERPL-SCNC: 137 MMOL/L — SIGNIFICANT CHANGE UP (ref 135–145)
T3 SERPL-MCNC: 49 NG/DL — LOW (ref 80–200)
T4 FREE SERPL-MCNC: 1.1 NG/DL — SIGNIFICANT CHANGE UP (ref 0.9–1.8)
WBC # BLD: 6.41 K/UL — SIGNIFICANT CHANGE UP (ref 3.8–10.5)
WBC # FLD AUTO: 6.41 K/UL — SIGNIFICANT CHANGE UP (ref 3.8–10.5)

## 2025-07-06 PROCEDURE — 93308 TTE F-UP OR LMTD: CPT | Mod: 26

## 2025-07-06 PROCEDURE — 99233 SBSQ HOSP IP/OBS HIGH 50: CPT | Mod: GC

## 2025-07-06 RX ORDER — ENOXAPARIN SODIUM 100 MG/ML
100 INJECTION SUBCUTANEOUS EVERY 12 HOURS
Refills: 0 | Status: DISCONTINUED | OUTPATIENT
Start: 2025-07-06 | End: 2025-07-06

## 2025-07-06 RX ORDER — ENOXAPARIN SODIUM 100 MG/ML
110 INJECTION SUBCUTANEOUS EVERY 12 HOURS
Refills: 0 | Status: DISCONTINUED | OUTPATIENT
Start: 2025-07-06 | End: 2025-07-09

## 2025-07-06 RX ADMIN — CEFTRIAXONE 100 MILLIGRAM(S): 500 INJECTION, POWDER, FOR SOLUTION INTRAMUSCULAR; INTRAVENOUS at 05:37

## 2025-07-06 RX ADMIN — CLOPIDOGREL BISULFATE 75 MILLIGRAM(S): 75 TABLET, FILM COATED ORAL at 12:48

## 2025-07-06 RX ADMIN — POLYETHYLENE GLYCOL 3350 17 GRAM(S): 17 POWDER, FOR SOLUTION ORAL at 12:47

## 2025-07-06 RX ADMIN — ATORVASTATIN CALCIUM 80 MILLIGRAM(S): 80 TABLET, FILM COATED ORAL at 21:18

## 2025-07-06 RX ADMIN — AMIODARONE HYDROCHLORIDE 200 MILLIGRAM(S): 50 INJECTION, SOLUTION INTRAVENOUS at 05:36

## 2025-07-06 RX ADMIN — BUMETANIDE 2 MILLIGRAM(S): 1 TABLET ORAL at 14:32

## 2025-07-06 RX ADMIN — CEFTRIAXONE 100 MILLIGRAM(S): 500 INJECTION, POWDER, FOR SOLUTION INTRAMUSCULAR; INTRAVENOUS at 17:54

## 2025-07-06 RX ADMIN — Medication 975 MILLIGRAM(S): at 21:48

## 2025-07-06 RX ADMIN — ISOSORBDIE DINITRATE 10 MILLIGRAM(S): 30 TABLET ORAL at 12:48

## 2025-07-06 RX ADMIN — BUMETANIDE 2 MILLIGRAM(S): 1 TABLET ORAL at 05:36

## 2025-07-06 RX ADMIN — METOPROLOL SUCCINATE 25 MILLIGRAM(S): 50 TABLET, EXTENDED RELEASE ORAL at 05:36

## 2025-07-06 RX ADMIN — Medication 1 TABLET(S): at 12:48

## 2025-07-06 RX ADMIN — ISOSORBDIE DINITRATE 10 MILLIGRAM(S): 30 TABLET ORAL at 17:55

## 2025-07-06 RX ADMIN — Medication 975 MILLIGRAM(S): at 06:06

## 2025-07-06 RX ADMIN — ISOSORBDIE DINITRATE 10 MILLIGRAM(S): 30 TABLET ORAL at 05:36

## 2025-07-06 RX ADMIN — Medication 40 MILLIGRAM(S): at 05:36

## 2025-07-06 RX ADMIN — LIDOCAINE HYDROCHLORIDE 1 PATCH: 20 JELLY TOPICAL at 21:04

## 2025-07-06 RX ADMIN — ENOXAPARIN SODIUM 110 MILLIGRAM(S): 100 INJECTION SUBCUTANEOUS at 18:08

## 2025-07-06 RX ADMIN — Medication 975 MILLIGRAM(S): at 21:18

## 2025-07-06 RX ADMIN — Medication 500 MILLIGRAM(S): at 12:48

## 2025-07-06 RX ADMIN — LIDOCAINE HYDROCHLORIDE 1 PATCH: 20 JELLY TOPICAL at 12:48

## 2025-07-06 RX ADMIN — Medication 975 MILLIGRAM(S): at 05:36

## 2025-07-07 LAB
ALBUMIN SERPL ELPH-MCNC: 2.6 G/DL — LOW (ref 3.3–5)
ALP SERPL-CCNC: 141 U/L — HIGH (ref 40–120)
ALT FLD-CCNC: 11 U/L — SIGNIFICANT CHANGE UP (ref 10–45)
ANION GAP SERPL CALC-SCNC: 19 MMOL/L — HIGH (ref 5–17)
APTT BLD: 40.7 SEC — HIGH (ref 26.1–36.8)
AST SERPL-CCNC: 9 U/L — LOW (ref 10–40)
BASE EXCESS BLDV CALC-SCNC: 17.6 MMOL/L — HIGH (ref -2–3)
BASOPHILS # BLD AUTO: 0.04 K/UL — SIGNIFICANT CHANGE UP (ref 0–0.2)
BASOPHILS NFR BLD AUTO: 0.6 % — SIGNIFICANT CHANGE UP (ref 0–2)
BILIRUB SERPL-MCNC: 0.3 MG/DL — SIGNIFICANT CHANGE UP (ref 0.2–1.2)
BUN SERPL-MCNC: 62 MG/DL — HIGH (ref 7–23)
CALCIUM SERPL-MCNC: 8.2 MG/DL — LOW (ref 8.4–10.5)
CHLORIDE SERPL-SCNC: 85 MMOL/L — LOW (ref 96–108)
CO2 BLDV-SCNC: 50 MMOL/L — CRITICAL HIGH (ref 22–26)
CO2 SERPL-SCNC: 33 MMOL/L — HIGH (ref 22–31)
CREAT SERPL-MCNC: 1.78 MG/DL — HIGH (ref 0.5–1.3)
EGFR: 42 ML/MIN/1.73M2 — LOW
EGFR: 42 ML/MIN/1.73M2 — LOW
EOSINOPHIL # BLD AUTO: 0.26 K/UL — SIGNIFICANT CHANGE UP (ref 0–0.5)
EOSINOPHIL NFR BLD AUTO: 3.8 % — SIGNIFICANT CHANGE UP (ref 0–6)
GAS PNL BLDA: SIGNIFICANT CHANGE UP
GAS PNL BLDV: SIGNIFICANT CHANGE UP
GAS PNL BLDV: SIGNIFICANT CHANGE UP
GLUCOSE SERPL-MCNC: 329 MG/DL — HIGH (ref 70–99)
HCO3 BLDV-SCNC: 47 MMOL/L — CRITICAL HIGH (ref 22–29)
HCT VFR BLD CALC: 32.4 % — LOW (ref 39–50)
HGB BLD-MCNC: 9.6 G/DL — LOW (ref 13–17)
IMM GRANULOCYTES # BLD AUTO: 0.02 K/UL — SIGNIFICANT CHANGE UP (ref 0–0.07)
IMM GRANULOCYTES NFR BLD AUTO: 0.3 % — SIGNIFICANT CHANGE UP (ref 0–0.9)
INR BLD: 1.82 RATIO — HIGH (ref 0.85–1.16)
LYMPHOCYTES # BLD AUTO: 0.77 K/UL — LOW (ref 1–3.3)
LYMPHOCYTES NFR BLD AUTO: 11.4 % — LOW (ref 13–44)
MAGNESIUM SERPL-MCNC: 2.1 MG/DL — SIGNIFICANT CHANGE UP (ref 1.6–2.6)
MCHC RBC-ENTMCNC: 29.2 PG — SIGNIFICANT CHANGE UP (ref 27–34)
MCHC RBC-ENTMCNC: 29.6 G/DL — LOW (ref 32–36)
MCV RBC AUTO: 98.5 FL — SIGNIFICANT CHANGE UP (ref 80–100)
MONOCYTES # BLD AUTO: 0.66 K/UL — SIGNIFICANT CHANGE UP (ref 0–0.9)
MONOCYTES NFR BLD AUTO: 9.7 % — SIGNIFICANT CHANGE UP (ref 2–14)
NEUTROPHILS # BLD AUTO: 5.02 K/UL — SIGNIFICANT CHANGE UP (ref 1.8–7.4)
NEUTROPHILS NFR BLD AUTO: 74.2 % — SIGNIFICANT CHANGE UP (ref 43–77)
NRBC # BLD AUTO: 0 K/UL — SIGNIFICANT CHANGE UP (ref 0–0)
NRBC # FLD: 0 K/UL — SIGNIFICANT CHANGE UP (ref 0–0)
NRBC BLD AUTO-RTO: 0 /100 WBCS — SIGNIFICANT CHANGE UP (ref 0–0)
PCO2 BLDV: 82 MMHG — CRITICAL HIGH (ref 42–55)
PH BLDV: 7.37 — SIGNIFICANT CHANGE UP (ref 7.32–7.43)
PHOSPHATE SERPL-MCNC: 4.4 MG/DL — SIGNIFICANT CHANGE UP (ref 2.5–4.5)
PLATELET # BLD AUTO: 198 K/UL — SIGNIFICANT CHANGE UP (ref 150–400)
PMV BLD: 10.3 FL — SIGNIFICANT CHANGE UP (ref 7–13)
PO2 BLDV: 43 MMHG — SIGNIFICANT CHANGE UP (ref 25–45)
POTASSIUM SERPL-MCNC: 3.5 MMOL/L — SIGNIFICANT CHANGE UP (ref 3.5–5.3)
POTASSIUM SERPL-SCNC: 3.5 MMOL/L — SIGNIFICANT CHANGE UP (ref 3.5–5.3)
PROT SERPL-MCNC: 7.6 G/DL — SIGNIFICANT CHANGE UP (ref 6–8.3)
PROTHROM AB SERPL-ACNC: 20.8 SEC — HIGH (ref 9.9–13.4)
RBC # BLD: 3.29 M/UL — LOW (ref 4.2–5.8)
RBC # FLD: 15.9 % — HIGH (ref 10.3–14.5)
SAO2 % BLDV: 75.9 % — SIGNIFICANT CHANGE UP (ref 67–88)
SODIUM SERPL-SCNC: 137 MMOL/L — SIGNIFICANT CHANGE UP (ref 135–145)
WBC # BLD: 6.77 K/UL — SIGNIFICANT CHANGE UP (ref 3.8–10.5)
WBC # FLD AUTO: 6.77 K/UL — SIGNIFICANT CHANGE UP (ref 3.8–10.5)

## 2025-07-07 PROCEDURE — 71045 X-RAY EXAM CHEST 1 VIEW: CPT | Mod: 26

## 2025-07-07 PROCEDURE — 99233 SBSQ HOSP IP/OBS HIGH 50: CPT | Mod: GC

## 2025-07-07 PROCEDURE — 99232 SBSQ HOSP IP/OBS MODERATE 35: CPT

## 2025-07-07 PROCEDURE — G0545: CPT

## 2025-07-07 PROCEDURE — 99233 SBSQ HOSP IP/OBS HIGH 50: CPT

## 2025-07-07 RX ORDER — SODIUM CHLORIDE 0.65 %
1 AEROSOL, SPRAY (ML) NASAL
Refills: 0 | Status: CANCELLED | OUTPATIENT
Start: 2025-07-07 | End: 2025-08-01

## 2025-07-07 RX ORDER — BUMETANIDE 1 MG/1
2 TABLET ORAL EVERY 8 HOURS
Refills: 0 | Status: DISCONTINUED | OUTPATIENT
Start: 2025-07-08 | End: 2025-07-08

## 2025-07-07 RX ORDER — IPRATROPIUM BROMIDE AND ALBUTEROL SULFATE .5; 2.5 MG/3ML; MG/3ML
3 SOLUTION RESPIRATORY (INHALATION) EVERY 6 HOURS
Refills: 0 | Status: DISCONTINUED | OUTPATIENT
Start: 2025-07-07 | End: 2025-07-10

## 2025-07-07 RX ORDER — BUMETANIDE 1 MG/1
2 TABLET ORAL EVERY 8 HOURS
Refills: 0 | Status: DISCONTINUED | OUTPATIENT
Start: 2025-07-07 | End: 2025-07-07

## 2025-07-07 RX ORDER — BUMETANIDE 1 MG/1
2 TABLET ORAL ONCE
Refills: 0 | Status: COMPLETED | OUTPATIENT
Start: 2025-07-07 | End: 2025-07-07

## 2025-07-07 RX ADMIN — AMIODARONE HYDROCHLORIDE 200 MILLIGRAM(S): 50 INJECTION, SOLUTION INTRAVENOUS at 05:54

## 2025-07-07 RX ADMIN — ISOSORBDIE DINITRATE 10 MILLIGRAM(S): 30 TABLET ORAL at 16:46

## 2025-07-07 RX ADMIN — Medication 975 MILLIGRAM(S): at 21:11

## 2025-07-07 RX ADMIN — ISOSORBDIE DINITRATE 10 MILLIGRAM(S): 30 TABLET ORAL at 11:00

## 2025-07-07 RX ADMIN — IPRATROPIUM BROMIDE AND ALBUTEROL SULFATE 3 MILLILITER(S): .5; 2.5 SOLUTION RESPIRATORY (INHALATION) at 17:25

## 2025-07-07 RX ADMIN — BUMETANIDE 2 MILLIGRAM(S): 1 TABLET ORAL at 05:54

## 2025-07-07 RX ADMIN — ISOSORBDIE DINITRATE 10 MILLIGRAM(S): 30 TABLET ORAL at 05:55

## 2025-07-07 RX ADMIN — LIDOCAINE HYDROCHLORIDE 1 PATCH: 20 JELLY TOPICAL at 02:23

## 2025-07-07 RX ADMIN — Medication 975 MILLIGRAM(S): at 21:51

## 2025-07-07 RX ADMIN — CLOPIDOGREL BISULFATE 75 MILLIGRAM(S): 75 TABLET, FILM COATED ORAL at 11:00

## 2025-07-07 RX ADMIN — CEFTRIAXONE 100 MILLIGRAM(S): 500 INJECTION, POWDER, FOR SOLUTION INTRAMUSCULAR; INTRAVENOUS at 17:23

## 2025-07-07 RX ADMIN — BUMETANIDE 2 MILLIGRAM(S): 1 TABLET ORAL at 14:11

## 2025-07-07 RX ADMIN — ATORVASTATIN CALCIUM 80 MILLIGRAM(S): 80 TABLET, FILM COATED ORAL at 21:11

## 2025-07-07 RX ADMIN — CEFTRIAXONE 100 MILLIGRAM(S): 500 INJECTION, POWDER, FOR SOLUTION INTRAMUSCULAR; INTRAVENOUS at 05:54

## 2025-07-07 RX ADMIN — Medication 1 TABLET(S): at 11:00

## 2025-07-07 RX ADMIN — IPRATROPIUM BROMIDE AND ALBUTEROL SULFATE 3 MILLILITER(S): .5; 2.5 SOLUTION RESPIRATORY (INHALATION) at 23:06

## 2025-07-07 RX ADMIN — IPRATROPIUM BROMIDE AND ALBUTEROL SULFATE 3 MILLILITER(S): .5; 2.5 SOLUTION RESPIRATORY (INHALATION) at 09:23

## 2025-07-07 RX ADMIN — BUMETANIDE 2 MILLIGRAM(S): 1 TABLET ORAL at 10:55

## 2025-07-07 RX ADMIN — IPRATROPIUM BROMIDE AND ALBUTEROL SULFATE 3 MILLILITER(S): .5; 2.5 SOLUTION RESPIRATORY (INHALATION) at 11:01

## 2025-07-07 RX ADMIN — ENOXAPARIN SODIUM 110 MILLIGRAM(S): 100 INJECTION SUBCUTANEOUS at 17:24

## 2025-07-07 RX ADMIN — Medication 2 TABLET(S): at 21:11

## 2025-07-07 RX ADMIN — METOPROLOL SUCCINATE 25 MILLIGRAM(S): 50 TABLET, EXTENDED RELEASE ORAL at 05:54

## 2025-07-07 RX ADMIN — ENOXAPARIN SODIUM 110 MILLIGRAM(S): 100 INJECTION SUBCUTANEOUS at 05:55

## 2025-07-07 RX ADMIN — Medication 500 MILLIGRAM(S): at 11:00

## 2025-07-07 RX ADMIN — DIGOXIN 125 MICROGRAM(S): 250 TABLET ORAL at 05:54

## 2025-07-07 RX ADMIN — Medication 975 MILLIGRAM(S): at 05:54

## 2025-07-07 RX ADMIN — Medication 975 MILLIGRAM(S): at 06:56

## 2025-07-07 RX ADMIN — Medication 40 MILLIGRAM(S): at 05:55

## 2025-07-08 LAB
ADD ON TEST-SPECIMEN IN LAB: SIGNIFICANT CHANGE UP
ALBUMIN SERPL ELPH-MCNC: 3 G/DL — LOW (ref 3.3–5)
ALP SERPL-CCNC: 164 U/L — HIGH (ref 40–120)
ALT FLD-CCNC: 12 U/L — SIGNIFICANT CHANGE UP (ref 10–45)
ANION GAP SERPL CALC-SCNC: 10 MMOL/L — SIGNIFICANT CHANGE UP (ref 5–17)
AST SERPL-CCNC: 16 U/L — SIGNIFICANT CHANGE UP (ref 10–40)
BASE EXCESS BLDV CALC-SCNC: 16.4 MMOL/L — HIGH (ref -2–3)
BASOPHILS # BLD AUTO: 0.05 K/UL — SIGNIFICANT CHANGE UP (ref 0–0.2)
BASOPHILS NFR BLD AUTO: 0.8 % — SIGNIFICANT CHANGE UP (ref 0–2)
BILIRUB SERPL-MCNC: 0.3 MG/DL — SIGNIFICANT CHANGE UP (ref 0.2–1.2)
BUN SERPL-MCNC: 67 MG/DL — HIGH (ref 7–23)
CALCIUM SERPL-MCNC: 9.2 MG/DL — SIGNIFICANT CHANGE UP (ref 8.4–10.5)
CHLORIDE SERPL-SCNC: 91 MMOL/L — LOW (ref 96–108)
CO2 BLDV-SCNC: 49 MMOL/L — CRITICAL HIGH (ref 22–26)
CO2 SERPL-SCNC: 35 MMOL/L — HIGH (ref 22–31)
CREAT SERPL-MCNC: 1.75 MG/DL — HIGH (ref 0.5–1.3)
EGFR: 42 ML/MIN/1.73M2 — LOW
EGFR: 42 ML/MIN/1.73M2 — LOW
EOSINOPHIL # BLD AUTO: 0.26 K/UL — SIGNIFICANT CHANGE UP (ref 0–0.5)
EOSINOPHIL NFR BLD AUTO: 4 % — SIGNIFICANT CHANGE UP (ref 0–6)
GAS PNL BLDA: SIGNIFICANT CHANGE UP
GAS PNL BLDV: SIGNIFICANT CHANGE UP
GLUCOSE BLDC GLUCOMTR-MCNC: 107 MG/DL — HIGH (ref 70–99)
GLUCOSE SERPL-MCNC: 83 MG/DL — SIGNIFICANT CHANGE UP (ref 70–99)
HCO3 BLDV-SCNC: 46 MMOL/L — CRITICAL HIGH (ref 22–29)
HCT VFR BLD CALC: 33.9 % — LOW (ref 39–50)
HGB BLD-MCNC: 10 G/DL — LOW (ref 13–17)
IMM GRANULOCYTES # BLD AUTO: 0.02 K/UL — SIGNIFICANT CHANGE UP (ref 0–0.07)
IMM GRANULOCYTES NFR BLD AUTO: 0.3 % — SIGNIFICANT CHANGE UP (ref 0–0.9)
LYMPHOCYTES # BLD AUTO: 0.81 K/UL — LOW (ref 1–3.3)
LYMPHOCYTES NFR BLD AUTO: 12.5 % — LOW (ref 13–44)
MAGNESIUM SERPL-MCNC: 2.4 MG/DL — SIGNIFICANT CHANGE UP (ref 1.6–2.6)
MCHC RBC-ENTMCNC: 29.2 PG — SIGNIFICANT CHANGE UP (ref 27–34)
MCHC RBC-ENTMCNC: 29.5 G/DL — LOW (ref 32–36)
MCV RBC AUTO: 99.1 FL — SIGNIFICANT CHANGE UP (ref 80–100)
MONOCYTES # BLD AUTO: 0.58 K/UL — SIGNIFICANT CHANGE UP (ref 0–0.9)
MONOCYTES NFR BLD AUTO: 9 % — SIGNIFICANT CHANGE UP (ref 2–14)
NEUTROPHILS # BLD AUTO: 4.76 K/UL — SIGNIFICANT CHANGE UP (ref 1.8–7.4)
NEUTROPHILS NFR BLD AUTO: 73.4 % — SIGNIFICANT CHANGE UP (ref 43–77)
NRBC # BLD AUTO: 0 K/UL — SIGNIFICANT CHANGE UP (ref 0–0)
NRBC # FLD: 0 K/UL — SIGNIFICANT CHANGE UP (ref 0–0)
NRBC BLD AUTO-RTO: 0 /100 WBCS — SIGNIFICANT CHANGE UP (ref 0–0)
NT-PROBNP SERPL-SCNC: HIGH PG/ML (ref 0–300)
PCO2 BLDV: 84 MMHG — CRITICAL HIGH (ref 42–55)
PH BLDV: 7.35 — SIGNIFICANT CHANGE UP (ref 7.32–7.43)
PHOSPHATE SERPL-MCNC: 3.9 MG/DL — SIGNIFICANT CHANGE UP (ref 2.5–4.5)
PLATELET # BLD AUTO: 205 K/UL — SIGNIFICANT CHANGE UP (ref 150–400)
PMV BLD: 10.6 FL — SIGNIFICANT CHANGE UP (ref 7–13)
PO2 BLDV: 51 MMHG — HIGH (ref 25–45)
POTASSIUM SERPL-MCNC: 4 MMOL/L — SIGNIFICANT CHANGE UP (ref 3.5–5.3)
POTASSIUM SERPL-SCNC: 4 MMOL/L — SIGNIFICANT CHANGE UP (ref 3.5–5.3)
PROT SERPL-MCNC: 7.6 G/DL — SIGNIFICANT CHANGE UP (ref 6–8.3)
RBC # BLD: 3.42 M/UL — LOW (ref 4.2–5.8)
RBC # FLD: 16.2 % — HIGH (ref 10.3–14.5)
SAO2 % BLDV: 83.1 % — SIGNIFICANT CHANGE UP (ref 67–88)
SODIUM SERPL-SCNC: 136 MMOL/L — SIGNIFICANT CHANGE UP (ref 135–145)
WBC # BLD: 6.48 K/UL — SIGNIFICANT CHANGE UP (ref 3.8–10.5)
WBC # FLD AUTO: 6.48 K/UL — SIGNIFICANT CHANGE UP (ref 3.8–10.5)

## 2025-07-08 PROCEDURE — 99232 SBSQ HOSP IP/OBS MODERATE 35: CPT

## 2025-07-08 PROCEDURE — 99233 SBSQ HOSP IP/OBS HIGH 50: CPT | Mod: GC

## 2025-07-08 PROCEDURE — 99232 SBSQ HOSP IP/OBS MODERATE 35: CPT | Mod: GC

## 2025-07-08 PROCEDURE — G0545: CPT

## 2025-07-08 PROCEDURE — 99233 SBSQ HOSP IP/OBS HIGH 50: CPT

## 2025-07-08 RX ORDER — BUMETANIDE 1 MG/1
3 TABLET ORAL
Refills: 0 | Status: COMPLETED | OUTPATIENT
Start: 2025-07-08 | End: 2025-07-08

## 2025-07-08 RX ORDER — OXYCODONE HYDROCHLORIDE 30 MG/1
2.5 TABLET ORAL EVERY 6 HOURS
Refills: 0 | Status: DISCONTINUED | OUTPATIENT
Start: 2025-07-08 | End: 2025-07-10

## 2025-07-08 RX ORDER — BUMETANIDE 1 MG/1
3 TABLET ORAL EVERY 8 HOURS
Refills: 0 | Status: DISCONTINUED | OUTPATIENT
Start: 2025-07-09 | End: 2025-07-09

## 2025-07-08 RX ORDER — CEFTRIAXONE 500 MG/1
2000 INJECTION, POWDER, FOR SOLUTION INTRAMUSCULAR; INTRAVENOUS EVERY 12 HOURS
Refills: 0 | Status: DISCONTINUED | OUTPATIENT
Start: 2025-07-08 | End: 2025-07-09

## 2025-07-08 RX ORDER — CEFTRIAXONE 500 MG/1
2000 INJECTION, POWDER, FOR SOLUTION INTRAMUSCULAR; INTRAVENOUS EVERY 24 HOURS
Refills: 0 | Status: DISCONTINUED | OUTPATIENT
Start: 2025-07-08 | End: 2025-07-08

## 2025-07-08 RX ADMIN — ATORVASTATIN CALCIUM 80 MILLIGRAM(S): 80 TABLET, FILM COATED ORAL at 21:24

## 2025-07-08 RX ADMIN — CEFTRIAXONE 100 MILLIGRAM(S): 500 INJECTION, POWDER, FOR SOLUTION INTRAMUSCULAR; INTRAVENOUS at 17:43

## 2025-07-08 RX ADMIN — Medication 975 MILLIGRAM(S): at 14:04

## 2025-07-08 RX ADMIN — CEFTRIAXONE 100 MILLIGRAM(S): 500 INJECTION, POWDER, FOR SOLUTION INTRAMUSCULAR; INTRAVENOUS at 05:50

## 2025-07-08 RX ADMIN — ISOSORBDIE DINITRATE 10 MILLIGRAM(S): 30 TABLET ORAL at 17:44

## 2025-07-08 RX ADMIN — Medication 975 MILLIGRAM(S): at 13:04

## 2025-07-08 RX ADMIN — Medication 40 MILLIGRAM(S): at 05:51

## 2025-07-08 RX ADMIN — Medication 975 MILLIGRAM(S): at 23:00

## 2025-07-08 RX ADMIN — Medication 500 MILLIGRAM(S): at 12:25

## 2025-07-08 RX ADMIN — Medication 975 MILLIGRAM(S): at 05:51

## 2025-07-08 RX ADMIN — IPRATROPIUM BROMIDE AND ALBUTEROL SULFATE 3 MILLILITER(S): .5; 2.5 SOLUTION RESPIRATORY (INHALATION) at 17:43

## 2025-07-08 RX ADMIN — CLOPIDOGREL BISULFATE 75 MILLIGRAM(S): 75 TABLET, FILM COATED ORAL at 12:25

## 2025-07-08 RX ADMIN — DIGOXIN 125 MICROGRAM(S): 250 TABLET ORAL at 05:51

## 2025-07-08 RX ADMIN — BUMETANIDE 2 MILLIGRAM(S): 1 TABLET ORAL at 05:50

## 2025-07-08 RX ADMIN — ENOXAPARIN SODIUM 110 MILLIGRAM(S): 100 INJECTION SUBCUTANEOUS at 05:52

## 2025-07-08 RX ADMIN — ISOSORBDIE DINITRATE 10 MILLIGRAM(S): 30 TABLET ORAL at 12:24

## 2025-07-08 RX ADMIN — ISOSORBDIE DINITRATE 10 MILLIGRAM(S): 30 TABLET ORAL at 05:51

## 2025-07-08 RX ADMIN — Medication 975 MILLIGRAM(S): at 06:33

## 2025-07-08 RX ADMIN — AMIODARONE HYDROCHLORIDE 200 MILLIGRAM(S): 50 INJECTION, SOLUTION INTRAVENOUS at 05:52

## 2025-07-08 RX ADMIN — IPRATROPIUM BROMIDE AND ALBUTEROL SULFATE 3 MILLILITER(S): .5; 2.5 SOLUTION RESPIRATORY (INHALATION) at 05:50

## 2025-07-08 RX ADMIN — ENOXAPARIN SODIUM 110 MILLIGRAM(S): 100 INJECTION SUBCUTANEOUS at 17:44

## 2025-07-08 RX ADMIN — BUMETANIDE 124 MILLIGRAM(S): 1 TABLET ORAL at 14:41

## 2025-07-08 RX ADMIN — IPRATROPIUM BROMIDE AND ALBUTEROL SULFATE 3 MILLILITER(S): .5; 2.5 SOLUTION RESPIRATORY (INHALATION) at 12:24

## 2025-07-08 RX ADMIN — Medication 975 MILLIGRAM(S): at 21:24

## 2025-07-08 RX ADMIN — Medication 1 TABLET(S): at 12:25

## 2025-07-08 RX ADMIN — Medication 2 TABLET(S): at 21:24

## 2025-07-08 RX ADMIN — METOPROLOL SUCCINATE 25 MILLIGRAM(S): 50 TABLET, EXTENDED RELEASE ORAL at 05:51

## 2025-07-08 NOTE — PROGRESS NOTE ADULT - ASSESSMENT
[FreeTextEntry1] : mri left thigh 6/16/25 - bicep femoris strain    - We discussed their diagnosis and treatment options at length including the risks and benefits of both surgical and non-surgical options. Surgical risks include but are not limited to pain, infection, bleeding, vascular injury, numbness, tingling, nerve damage. - Due to risks of surgery, they will continue conservative treatment with PT, icing, and anti-inflammatory medications. - The patient was provided with a prescription for Physical Therapy. - The patient is to continue home exercises learned at physical therapy - The patient was advised to let pain guide the gradual advancement of activities.      65 yo M with a fib (s/p dccv 3/25/25), CHF (likely moderately reduced EF, several TTE with poor windows), aortic stenosis s/p TAVR (11/2022), a flutter s/p ablation 2019 and s/p micra implant,  CKD3, obesity, venous stasis, HTN, HLD, who presents with 6 days of worsening dyspnea on exertion, admitted for AHRF likely iso CHF exacerbation, also with ANGELICA on CKD c/f cardiorenal syndrome. RRT on floors for SBP 80s requiring pressors and has been admitted to the CCU. HF mostly likely i/so severe AS. Pt to get structural intervention.     Plan:  ===NEURO===  - AO x 3    ===RESPIRATORY===  #Acute hypoxemic respiratory failure  - Restarted bumex 2mg drip for elevated CVP and increased O2 req    ===CARDIOVASCULAR===  #Obstructive shock 2/2 AS  - Off levophed  - MAPs 70s -> will wean vasopressin  - If need more pressor support will switch to phenylephrine as pt has LVOT obstruction from aortic stenosis   - Wean as tolerated    #Acute on chronic HF exacerbation  - Bumex increased to 4mg drip overnight forlow urine output  - Got Dyril ON making -200cc now  - Will give 1 dose hypertonic  - Goal 1-2L net neg daily  - CVP goal 8-12  - Will be careful of overdiuresis given severe AS and preload dependant     #Afib  #First degree block  - Continue amio IV while NPO  - C/w heparin    #Aortic valve stenosis s/p prosthetic  - S/p STANISLAW showing severe AS stenosis, global hypokinesis and EF 15%  - Structural plans for valve in valve replacement  - Structural wants cath and CT w/ contrast before procedure  - Awaiting GFR improvement    #Troponemia  - No ischemic changes on EKG  - Most likely i/s/o ANGELICA on CKD    #Hx CABG  #PVD  - C/w Plavix    #HLD  - Increased Lipitor to 40mg from 20mg    ===/RENAL===  #ANGELICA on CKD  #Cardiorenal syndrome  - Baseline 1.5-1.8  - Most likely i/s/o hypotension and fluid overload  - Renal sono showed parenchymal disease  - Nephro following     #Hyperkalemia  - Lokelma if K increases into 5s range    ===GI/NUTRITION===  - Failed bedside SS  - S&S consulted -> recommends FEES    ===SKIN===  - Hx lichen planus  - No other concerns    ===INFECTIOUS DISEASE===  - Slightly elevated WBC on admission 10.5K  - Afebrile  - Urine culture -> neg    ===ENDOCRINE===  - No concerns  - A1c 5.8  - TSH wnl    ===HEMATOLOGIC/DVT PPx===  - On heparin ggt

## 2025-07-09 LAB
ALBUMIN SERPL ELPH-MCNC: 3.1 G/DL — LOW (ref 3.3–5)
ALBUMIN SERPL ELPH-MCNC: 3.1 G/DL — LOW (ref 3.3–5)
ALP SERPL-CCNC: 164 U/L — HIGH (ref 40–120)
ALP SERPL-CCNC: 182 U/L — HIGH (ref 40–120)
ALT FLD-CCNC: 13 U/L — SIGNIFICANT CHANGE UP (ref 10–45)
ALT FLD-CCNC: 15 U/L — SIGNIFICANT CHANGE UP (ref 10–45)
ANION GAP SERPL CALC-SCNC: 12 MMOL/L — SIGNIFICANT CHANGE UP (ref 5–17)
ANION GAP SERPL CALC-SCNC: 7 MMOL/L — SIGNIFICANT CHANGE UP (ref 5–17)
ANION GAP SERPL CALC-SCNC: 9 MMOL/L — SIGNIFICANT CHANGE UP (ref 5–17)
AST SERPL-CCNC: 16 U/L — SIGNIFICANT CHANGE UP (ref 10–40)
AST SERPL-CCNC: 19 U/L — SIGNIFICANT CHANGE UP (ref 10–40)
BASOPHILS # BLD AUTO: 0.04 K/UL — SIGNIFICANT CHANGE UP (ref 0–0.2)
BASOPHILS # BLD AUTO: 0.05 K/UL — SIGNIFICANT CHANGE UP (ref 0–0.2)
BASOPHILS NFR BLD AUTO: 0.5 % — SIGNIFICANT CHANGE UP (ref 0–2)
BASOPHILS NFR BLD AUTO: 0.6 % — SIGNIFICANT CHANGE UP (ref 0–2)
BILIRUB SERPL-MCNC: 0.3 MG/DL — SIGNIFICANT CHANGE UP (ref 0.2–1.2)
BILIRUB SERPL-MCNC: 0.4 MG/DL — SIGNIFICANT CHANGE UP (ref 0.2–1.2)
BUN SERPL-MCNC: 64 MG/DL — HIGH (ref 7–23)
BUN SERPL-MCNC: 66 MG/DL — HIGH (ref 7–23)
BUN SERPL-MCNC: 67 MG/DL — HIGH (ref 7–23)
CALCIUM SERPL-MCNC: 8.9 MG/DL — SIGNIFICANT CHANGE UP (ref 8.4–10.5)
CALCIUM SERPL-MCNC: 9.2 MG/DL — SIGNIFICANT CHANGE UP (ref 8.4–10.5)
CALCIUM SERPL-MCNC: 9.2 MG/DL — SIGNIFICANT CHANGE UP (ref 8.4–10.5)
CHLORIDE SERPL-SCNC: 89 MMOL/L — LOW (ref 96–108)
CHLORIDE SERPL-SCNC: 92 MMOL/L — LOW (ref 96–108)
CHLORIDE SERPL-SCNC: 92 MMOL/L — LOW (ref 96–108)
CO2 SERPL-SCNC: 35 MMOL/L — HIGH (ref 22–31)
CO2 SERPL-SCNC: 37 MMOL/L — HIGH (ref 22–31)
CO2 SERPL-SCNC: 38 MMOL/L — HIGH (ref 22–31)
CREAT SERPL-MCNC: 1.57 MG/DL — HIGH (ref 0.5–1.3)
CREAT SERPL-MCNC: 1.64 MG/DL — HIGH (ref 0.5–1.3)
CREAT SERPL-MCNC: 1.88 MG/DL — HIGH (ref 0.5–1.3)
CULTURE RESULTS: SIGNIFICANT CHANGE UP
CULTURE RESULTS: SIGNIFICANT CHANGE UP
EGFR: 39 ML/MIN/1.73M2 — LOW
EGFR: 39 ML/MIN/1.73M2 — LOW
EGFR: 46 ML/MIN/1.73M2 — LOW
EGFR: 46 ML/MIN/1.73M2 — LOW
EGFR: 48 ML/MIN/1.73M2 — LOW
EGFR: 48 ML/MIN/1.73M2 — LOW
EOSINOPHIL # BLD AUTO: 0.18 K/UL — SIGNIFICANT CHANGE UP (ref 0–0.5)
EOSINOPHIL # BLD AUTO: 0.19 K/UL — SIGNIFICANT CHANGE UP (ref 0–0.5)
EOSINOPHIL NFR BLD AUTO: 2.2 % — SIGNIFICANT CHANGE UP (ref 0–6)
EOSINOPHIL NFR BLD AUTO: 2.3 % — SIGNIFICANT CHANGE UP (ref 0–6)
GAS PNL BLDA: SIGNIFICANT CHANGE UP
GLUCOSE SERPL-MCNC: 125 MG/DL — HIGH (ref 70–99)
GLUCOSE SERPL-MCNC: 145 MG/DL — HIGH (ref 70–99)
GLUCOSE SERPL-MCNC: 94 MG/DL — SIGNIFICANT CHANGE UP (ref 70–99)
HCT VFR BLD CALC: 33.1 % — LOW (ref 39–50)
HCT VFR BLD CALC: 35.4 % — LOW (ref 39–50)
HCT VFR BLD CALC: 36.4 % — LOW (ref 39–50)
HGB BLD-MCNC: 10.1 G/DL — LOW (ref 13–17)
HGB BLD-MCNC: 10.5 G/DL — LOW (ref 13–17)
HGB BLD-MCNC: 9.6 G/DL — LOW (ref 13–17)
IMM GRANULOCYTES # BLD AUTO: 0.03 K/UL — SIGNIFICANT CHANGE UP (ref 0–0.07)
IMM GRANULOCYTES # BLD AUTO: 0.04 K/UL — SIGNIFICANT CHANGE UP (ref 0–0.07)
IMM GRANULOCYTES NFR BLD AUTO: 0.4 % — SIGNIFICANT CHANGE UP (ref 0–0.9)
IMM GRANULOCYTES NFR BLD AUTO: 0.5 % — SIGNIFICANT CHANGE UP (ref 0–0.9)
LYMPHOCYTES # BLD AUTO: 0.63 K/UL — LOW (ref 1–3.3)
LYMPHOCYTES # BLD AUTO: 0.64 K/UL — LOW (ref 1–3.3)
LYMPHOCYTES NFR BLD AUTO: 7.2 % — LOW (ref 13–44)
LYMPHOCYTES NFR BLD AUTO: 8.1 % — LOW (ref 13–44)
MAGNESIUM SERPL-MCNC: 2.3 MG/DL — SIGNIFICANT CHANGE UP (ref 1.6–2.6)
MAGNESIUM SERPL-MCNC: 2.3 MG/DL — SIGNIFICANT CHANGE UP (ref 1.6–2.6)
MAGNESIUM SERPL-MCNC: 2.4 MG/DL — SIGNIFICANT CHANGE UP (ref 1.6–2.6)
MCHC RBC-ENTMCNC: 28.5 G/DL — LOW (ref 32–36)
MCHC RBC-ENTMCNC: 28.6 PG — SIGNIFICANT CHANGE UP (ref 27–34)
MCHC RBC-ENTMCNC: 28.8 G/DL — LOW (ref 32–36)
MCHC RBC-ENTMCNC: 28.9 PG — SIGNIFICANT CHANGE UP (ref 27–34)
MCHC RBC-ENTMCNC: 28.9 PG — SIGNIFICANT CHANGE UP (ref 27–34)
MCHC RBC-ENTMCNC: 29 G/DL — LOW (ref 32–36)
MCV RBC AUTO: 100.3 FL — HIGH (ref 80–100)
MCV RBC AUTO: 100.3 FL — HIGH (ref 80–100)
MCV RBC AUTO: 99.7 FL — SIGNIFICANT CHANGE UP (ref 80–100)
MONOCYTES # BLD AUTO: 0.62 K/UL — SIGNIFICANT CHANGE UP (ref 0–0.9)
MONOCYTES # BLD AUTO: 0.79 K/UL — SIGNIFICANT CHANGE UP (ref 0–0.9)
MONOCYTES NFR BLD AUTO: 7.9 % — SIGNIFICANT CHANGE UP (ref 2–14)
MONOCYTES NFR BLD AUTO: 9.1 % — SIGNIFICANT CHANGE UP (ref 2–14)
NEUTROPHILS # BLD AUTO: 6.36 K/UL — SIGNIFICANT CHANGE UP (ref 1.8–7.4)
NEUTROPHILS # BLD AUTO: 7 K/UL — SIGNIFICANT CHANGE UP (ref 1.8–7.4)
NEUTROPHILS NFR BLD AUTO: 80.5 % — HIGH (ref 43–77)
NEUTROPHILS NFR BLD AUTO: 80.7 % — HIGH (ref 43–77)
NRBC # BLD AUTO: 0 K/UL — SIGNIFICANT CHANGE UP (ref 0–0)
NRBC # FLD: 0 K/UL — SIGNIFICANT CHANGE UP (ref 0–0)
NRBC BLD AUTO-RTO: 0 /100 WBCS — SIGNIFICANT CHANGE UP (ref 0–0)
NT-PROBNP SERPL-SCNC: HIGH PG/ML (ref 0–300)
PHOSPHATE SERPL-MCNC: 3.3 MG/DL — SIGNIFICANT CHANGE UP (ref 2.5–4.5)
PHOSPHATE SERPL-MCNC: 3.5 MG/DL — SIGNIFICANT CHANGE UP (ref 2.5–4.5)
PHOSPHATE SERPL-MCNC: 3.8 MG/DL — SIGNIFICANT CHANGE UP (ref 2.5–4.5)
PLATELET # BLD AUTO: 213 K/UL — SIGNIFICANT CHANGE UP (ref 150–400)
PLATELET # BLD AUTO: 216 K/UL — SIGNIFICANT CHANGE UP (ref 150–400)
PLATELET # BLD AUTO: 223 K/UL — SIGNIFICANT CHANGE UP (ref 150–400)
PMV BLD: 10.2 FL — SIGNIFICANT CHANGE UP (ref 7–13)
PMV BLD: 10.4 FL — SIGNIFICANT CHANGE UP (ref 7–13)
PMV BLD: 10.5 FL — SIGNIFICANT CHANGE UP (ref 7–13)
POTASSIUM SERPL-MCNC: 4.2 MMOL/L — SIGNIFICANT CHANGE UP (ref 3.5–5.3)
POTASSIUM SERPL-MCNC: 4.2 MMOL/L — SIGNIFICANT CHANGE UP (ref 3.5–5.3)
POTASSIUM SERPL-MCNC: 4.3 MMOL/L — SIGNIFICANT CHANGE UP (ref 3.5–5.3)
POTASSIUM SERPL-SCNC: 4.2 MMOL/L — SIGNIFICANT CHANGE UP (ref 3.5–5.3)
POTASSIUM SERPL-SCNC: 4.2 MMOL/L — SIGNIFICANT CHANGE UP (ref 3.5–5.3)
POTASSIUM SERPL-SCNC: 4.3 MMOL/L — SIGNIFICANT CHANGE UP (ref 3.5–5.3)
PROT SERPL-MCNC: 7.6 G/DL — SIGNIFICANT CHANGE UP (ref 6–8.3)
PROT SERPL-MCNC: 8.2 G/DL — SIGNIFICANT CHANGE UP (ref 6–8.3)
RBC # BLD: 3.32 M/UL — LOW (ref 4.2–5.8)
RBC # BLD: 3.53 M/UL — LOW (ref 4.2–5.8)
RBC # BLD: 3.63 M/UL — LOW (ref 4.2–5.8)
RBC # FLD: 16.1 % — HIGH (ref 10.3–14.5)
RBC # FLD: 16.1 % — HIGH (ref 10.3–14.5)
RBC # FLD: 16.2 % — HIGH (ref 10.3–14.5)
SODIUM SERPL-SCNC: 136 MMOL/L — SIGNIFICANT CHANGE UP (ref 135–145)
SODIUM SERPL-SCNC: 136 MMOL/L — SIGNIFICANT CHANGE UP (ref 135–145)
SODIUM SERPL-SCNC: 139 MMOL/L — SIGNIFICANT CHANGE UP (ref 135–145)
SPECIMEN SOURCE: SIGNIFICANT CHANGE UP
SPECIMEN SOURCE: SIGNIFICANT CHANGE UP
WBC # BLD: 7.88 K/UL — SIGNIFICANT CHANGE UP (ref 3.8–10.5)
WBC # BLD: 7.98 K/UL — SIGNIFICANT CHANGE UP (ref 3.8–10.5)
WBC # BLD: 8.69 K/UL — SIGNIFICANT CHANGE UP (ref 3.8–10.5)
WBC # FLD AUTO: 7.88 K/UL — SIGNIFICANT CHANGE UP (ref 3.8–10.5)
WBC # FLD AUTO: 7.98 K/UL — SIGNIFICANT CHANGE UP (ref 3.8–10.5)
WBC # FLD AUTO: 8.69 K/UL — SIGNIFICANT CHANGE UP (ref 3.8–10.5)

## 2025-07-09 PROCEDURE — G0545: CPT

## 2025-07-09 PROCEDURE — 99233 SBSQ HOSP IP/OBS HIGH 50: CPT | Mod: GC

## 2025-07-09 PROCEDURE — 99232 SBSQ HOSP IP/OBS MODERATE 35: CPT

## 2025-07-09 PROCEDURE — 99233 SBSQ HOSP IP/OBS HIGH 50: CPT

## 2025-07-09 RX ORDER — CEFTRIAXONE 500 MG/1
2000 INJECTION, POWDER, FOR SOLUTION INTRAMUSCULAR; INTRAVENOUS EVERY 24 HOURS
Refills: 0 | Status: DISCONTINUED | OUTPATIENT
Start: 2025-07-10 | End: 2025-08-01

## 2025-07-09 RX ORDER — BUMETANIDE 1 MG/1
4 TABLET ORAL ONCE
Refills: 0 | Status: COMPLETED | OUTPATIENT
Start: 2025-07-09 | End: 2025-07-09

## 2025-07-09 RX ORDER — BUMETANIDE 1 MG/1
4 TABLET ORAL
Qty: 20 | Refills: 0 | Status: DISCONTINUED | OUTPATIENT
Start: 2025-07-09 | End: 2025-07-11

## 2025-07-09 RX ADMIN — Medication 40 MILLIGRAM(S): at 06:46

## 2025-07-09 RX ADMIN — AMIODARONE HYDROCHLORIDE 200 MILLIGRAM(S): 50 INJECTION, SOLUTION INTRAVENOUS at 06:43

## 2025-07-09 RX ADMIN — IPRATROPIUM BROMIDE AND ALBUTEROL SULFATE 3 MILLILITER(S): .5; 2.5 SOLUTION RESPIRATORY (INHALATION) at 00:20

## 2025-07-09 RX ADMIN — ENOXAPARIN SODIUM 110 MILLIGRAM(S): 100 INJECTION SUBCUTANEOUS at 06:47

## 2025-07-09 RX ADMIN — IPRATROPIUM BROMIDE AND ALBUTEROL SULFATE 3 MILLILITER(S): .5; 2.5 SOLUTION RESPIRATORY (INHALATION) at 23:03

## 2025-07-09 RX ADMIN — Medication 500 MILLIGRAM(S): at 11:16

## 2025-07-09 RX ADMIN — ISOSORBDIE DINITRATE 10 MILLIGRAM(S): 30 TABLET ORAL at 11:16

## 2025-07-09 RX ADMIN — Medication 975 MILLIGRAM(S): at 06:44

## 2025-07-09 RX ADMIN — ISOSORBDIE DINITRATE 10 MILLIGRAM(S): 30 TABLET ORAL at 22:02

## 2025-07-09 RX ADMIN — ISOSORBDIE DINITRATE 10 MILLIGRAM(S): 30 TABLET ORAL at 06:46

## 2025-07-09 RX ADMIN — CLOPIDOGREL BISULFATE 75 MILLIGRAM(S): 75 TABLET, FILM COATED ORAL at 11:15

## 2025-07-09 RX ADMIN — Medication 975 MILLIGRAM(S): at 22:02

## 2025-07-09 RX ADMIN — BUMETANIDE 124 MILLIGRAM(S): 1 TABLET ORAL at 07:28

## 2025-07-09 RX ADMIN — Medication 975 MILLIGRAM(S): at 22:41

## 2025-07-09 RX ADMIN — IPRATROPIUM BROMIDE AND ALBUTEROL SULFATE 3 MILLILITER(S): .5; 2.5 SOLUTION RESPIRATORY (INHALATION) at 06:45

## 2025-07-09 RX ADMIN — Medication 975 MILLIGRAM(S): at 07:41

## 2025-07-09 RX ADMIN — METOPROLOL SUCCINATE 25 MILLIGRAM(S): 50 TABLET, EXTENDED RELEASE ORAL at 06:46

## 2025-07-09 RX ADMIN — Medication 1 TABLET(S): at 11:16

## 2025-07-09 RX ADMIN — IPRATROPIUM BROMIDE AND ALBUTEROL SULFATE 3 MILLILITER(S): .5; 2.5 SOLUTION RESPIRATORY (INHALATION) at 11:15

## 2025-07-09 RX ADMIN — ATORVASTATIN CALCIUM 80 MILLIGRAM(S): 80 TABLET, FILM COATED ORAL at 22:02

## 2025-07-09 RX ADMIN — ENOXAPARIN SODIUM 110 MILLIGRAM(S): 100 INJECTION SUBCUTANEOUS at 17:18

## 2025-07-09 RX ADMIN — IPRATROPIUM BROMIDE AND ALBUTEROL SULFATE 3 MILLILITER(S): .5; 2.5 SOLUTION RESPIRATORY (INHALATION) at 17:18

## 2025-07-09 RX ADMIN — DIGOXIN 125 MICROGRAM(S): 250 TABLET ORAL at 06:46

## 2025-07-09 RX ADMIN — CEFTRIAXONE 100 MILLIGRAM(S): 500 INJECTION, POWDER, FOR SOLUTION INTRAMUSCULAR; INTRAVENOUS at 06:49

## 2025-07-09 RX ADMIN — BUMETANIDE 10 MG/HR: 1 TABLET ORAL at 12:33

## 2025-07-09 RX ADMIN — BUMETANIDE 132 MILLIGRAM(S): 1 TABLET ORAL at 12:01

## 2025-07-09 RX ADMIN — Medication 2 TABLET(S): at 22:02

## 2025-07-10 DIAGNOSIS — R31.9 HEMATURIA, UNSPECIFIED: ICD-10-CM

## 2025-07-10 LAB
ALBUMIN SERPL ELPH-MCNC: 2.7 G/DL — LOW (ref 3.3–5)
ALBUMIN SERPL ELPH-MCNC: 2.7 G/DL — LOW (ref 3.3–5)
ALBUMIN SERPL ELPH-MCNC: 3 G/DL — LOW (ref 3.3–5)
ALP SERPL-CCNC: 154 U/L — HIGH (ref 40–120)
ALP SERPL-CCNC: 155 U/L — HIGH (ref 40–120)
ALP SERPL-CCNC: 156 U/L — HIGH (ref 40–120)
ALT FLD-CCNC: 12 U/L — SIGNIFICANT CHANGE UP (ref 10–45)
ALT FLD-CCNC: 13 U/L — SIGNIFICANT CHANGE UP (ref 10–45)
ALT FLD-CCNC: 13 U/L — SIGNIFICANT CHANGE UP (ref 10–45)
ANION GAP SERPL CALC-SCNC: 9 MMOL/L — SIGNIFICANT CHANGE UP (ref 5–17)
APTT BLD: 33 SEC — SIGNIFICANT CHANGE UP (ref 26.1–36.8)
AST SERPL-CCNC: 13 U/L — SIGNIFICANT CHANGE UP (ref 10–40)
AST SERPL-CCNC: 15 U/L — SIGNIFICANT CHANGE UP (ref 10–40)
AST SERPL-CCNC: 15 U/L — SIGNIFICANT CHANGE UP (ref 10–40)
BASOPHILS # BLD AUTO: 0.03 K/UL — SIGNIFICANT CHANGE UP (ref 0–0.2)
BASOPHILS # BLD AUTO: 0.04 K/UL — SIGNIFICANT CHANGE UP (ref 0–0.2)
BASOPHILS # BLD AUTO: 0.05 K/UL — SIGNIFICANT CHANGE UP (ref 0–0.2)
BASOPHILS NFR BLD AUTO: 0.4 % — SIGNIFICANT CHANGE UP (ref 0–2)
BASOPHILS NFR BLD AUTO: 0.6 % — SIGNIFICANT CHANGE UP (ref 0–2)
BASOPHILS NFR BLD AUTO: 0.7 % — SIGNIFICANT CHANGE UP (ref 0–2)
BILIRUB SERPL-MCNC: 0.3 MG/DL — SIGNIFICANT CHANGE UP (ref 0.2–1.2)
BILIRUB SERPL-MCNC: 0.3 MG/DL — SIGNIFICANT CHANGE UP (ref 0.2–1.2)
BILIRUB SERPL-MCNC: 0.4 MG/DL — SIGNIFICANT CHANGE UP (ref 0.2–1.2)
BLD GP AB SCN SERPL QL: NEGATIVE — SIGNIFICANT CHANGE UP
BUN SERPL-MCNC: 66 MG/DL — HIGH (ref 7–23)
BUN SERPL-MCNC: 68 MG/DL — HIGH (ref 7–23)
BUN SERPL-MCNC: 68 MG/DL — HIGH (ref 7–23)
CALCIUM SERPL-MCNC: 9 MG/DL — SIGNIFICANT CHANGE UP (ref 8.4–10.5)
CALCIUM SERPL-MCNC: 9.2 MG/DL — SIGNIFICANT CHANGE UP (ref 8.4–10.5)
CALCIUM SERPL-MCNC: 9.2 MG/DL — SIGNIFICANT CHANGE UP (ref 8.4–10.5)
CHLORIDE SERPL-SCNC: 92 MMOL/L — LOW (ref 96–108)
CO2 SERPL-SCNC: 38 MMOL/L — HIGH (ref 22–31)
CO2 SERPL-SCNC: 39 MMOL/L — HIGH (ref 22–31)
CO2 SERPL-SCNC: 40 MMOL/L — HIGH (ref 22–31)
CREAT SERPL-MCNC: 1.71 MG/DL — HIGH (ref 0.5–1.3)
CREAT SERPL-MCNC: 1.72 MG/DL — HIGH (ref 0.5–1.3)
CREAT SERPL-MCNC: 1.82 MG/DL — HIGH (ref 0.5–1.3)
EGFR: 40 ML/MIN/1.73M2 — LOW
EGFR: 40 ML/MIN/1.73M2 — LOW
EGFR: 43 ML/MIN/1.73M2 — LOW
EGFR: 43 ML/MIN/1.73M2 — LOW
EGFR: 44 ML/MIN/1.73M2 — LOW
EGFR: 44 ML/MIN/1.73M2 — LOW
EOSINOPHIL # BLD AUTO: 0.13 K/UL — SIGNIFICANT CHANGE UP (ref 0–0.5)
EOSINOPHIL # BLD AUTO: 0.19 K/UL — SIGNIFICANT CHANGE UP (ref 0–0.5)
EOSINOPHIL # BLD AUTO: 0.22 K/UL — SIGNIFICANT CHANGE UP (ref 0–0.5)
EOSINOPHIL NFR BLD AUTO: 1.8 % — SIGNIFICANT CHANGE UP (ref 0–6)
EOSINOPHIL NFR BLD AUTO: 2.8 % — SIGNIFICANT CHANGE UP (ref 0–6)
EOSINOPHIL NFR BLD AUTO: 3.1 % — SIGNIFICANT CHANGE UP (ref 0–6)
GAS PNL BLDA: SIGNIFICANT CHANGE UP
GLUCOSE SERPL-MCNC: 125 MG/DL — HIGH (ref 70–99)
GLUCOSE SERPL-MCNC: 81 MG/DL — SIGNIFICANT CHANGE UP (ref 70–99)
GLUCOSE SERPL-MCNC: 90 MG/DL — SIGNIFICANT CHANGE UP (ref 70–99)
HCT VFR BLD CALC: 30.1 % — LOW (ref 39–50)
HCT VFR BLD CALC: 33.3 % — LOW (ref 39–50)
HCT VFR BLD CALC: 34.4 % — LOW (ref 39–50)
HGB BLD-MCNC: 8.9 G/DL — LOW (ref 13–17)
HGB BLD-MCNC: 9.6 G/DL — LOW (ref 13–17)
HGB BLD-MCNC: 9.7 G/DL — LOW (ref 13–17)
IMM GRANULOCYTES # BLD AUTO: 0.03 K/UL — SIGNIFICANT CHANGE UP (ref 0–0.07)
IMM GRANULOCYTES NFR BLD AUTO: 0.4 % — SIGNIFICANT CHANGE UP (ref 0–0.9)
INR BLD: 1.35 RATIO — HIGH (ref 0.85–1.16)
LYMPHOCYTES # BLD AUTO: 0.49 K/UL — LOW (ref 1–3.3)
LYMPHOCYTES # BLD AUTO: 0.58 K/UL — LOW (ref 1–3.3)
LYMPHOCYTES # BLD AUTO: 0.7 K/UL — LOW (ref 1–3.3)
LYMPHOCYTES NFR BLD AUTO: 7.2 % — LOW (ref 13–44)
LYMPHOCYTES NFR BLD AUTO: 8 % — LOW (ref 13–44)
LYMPHOCYTES NFR BLD AUTO: 9.8 % — LOW (ref 13–44)
MAGNESIUM SERPL-MCNC: 2.2 MG/DL — SIGNIFICANT CHANGE UP (ref 1.6–2.6)
MAGNESIUM SERPL-MCNC: 2.3 MG/DL — SIGNIFICANT CHANGE UP (ref 1.6–2.6)
MAGNESIUM SERPL-MCNC: 2.3 MG/DL — SIGNIFICANT CHANGE UP (ref 1.6–2.6)
MCHC RBC-ENTMCNC: 28.2 G/DL — LOW (ref 32–36)
MCHC RBC-ENTMCNC: 28.3 PG — SIGNIFICANT CHANGE UP (ref 27–34)
MCHC RBC-ENTMCNC: 28.8 G/DL — LOW (ref 32–36)
MCHC RBC-ENTMCNC: 28.9 PG — SIGNIFICANT CHANGE UP (ref 27–34)
MCHC RBC-ENTMCNC: 29.3 PG — SIGNIFICANT CHANGE UP (ref 27–34)
MCHC RBC-ENTMCNC: 29.6 G/DL — LOW (ref 32–36)
MCV RBC AUTO: 100.3 FL — HIGH (ref 80–100)
MCV RBC AUTO: 100.3 FL — HIGH (ref 80–100)
MCV RBC AUTO: 99 FL — SIGNIFICANT CHANGE UP (ref 80–100)
MONOCYTES # BLD AUTO: 0.53 K/UL — SIGNIFICANT CHANGE UP (ref 0–0.9)
MONOCYTES # BLD AUTO: 0.55 K/UL — SIGNIFICANT CHANGE UP (ref 0–0.9)
MONOCYTES # BLD AUTO: 0.64 K/UL — SIGNIFICANT CHANGE UP (ref 0–0.9)
MONOCYTES NFR BLD AUTO: 7.7 % — SIGNIFICANT CHANGE UP (ref 2–14)
MONOCYTES NFR BLD AUTO: 7.8 % — SIGNIFICANT CHANGE UP (ref 2–14)
MONOCYTES NFR BLD AUTO: 8.8 % — SIGNIFICANT CHANGE UP (ref 2–14)
NEUTROPHILS # BLD AUTO: 5.49 K/UL — SIGNIFICANT CHANGE UP (ref 1.8–7.4)
NEUTROPHILS # BLD AUTO: 5.58 K/UL — SIGNIFICANT CHANGE UP (ref 1.8–7.4)
NEUTROPHILS # BLD AUTO: 5.82 K/UL — SIGNIFICANT CHANGE UP (ref 1.8–7.4)
NEUTROPHILS NFR BLD AUTO: 78.6 % — HIGH (ref 43–77)
NEUTROPHILS NFR BLD AUTO: 80.4 % — HIGH (ref 43–77)
NEUTROPHILS NFR BLD AUTO: 81.1 % — HIGH (ref 43–77)
NRBC # BLD AUTO: 0 K/UL — SIGNIFICANT CHANGE UP (ref 0–0)
NRBC # BLD AUTO: 0 K/UL — SIGNIFICANT CHANGE UP (ref 0–0)
NRBC # BLD AUTO: 0.02 K/UL — HIGH (ref 0–0)
NRBC # FLD: 0 K/UL — SIGNIFICANT CHANGE UP (ref 0–0)
NRBC # FLD: 0 K/UL — SIGNIFICANT CHANGE UP (ref 0–0)
NRBC # FLD: 0.02 K/UL — HIGH (ref 0–0)
NRBC BLD AUTO-RTO: 0 /100 WBCS — SIGNIFICANT CHANGE UP (ref 0–0)
PHOSPHATE SERPL-MCNC: 3.2 MG/DL — SIGNIFICANT CHANGE UP (ref 2.5–4.5)
PHOSPHATE SERPL-MCNC: 3.2 MG/DL — SIGNIFICANT CHANGE UP (ref 2.5–4.5)
PHOSPHATE SERPL-MCNC: 3.5 MG/DL — SIGNIFICANT CHANGE UP (ref 2.5–4.5)
PLATELET # BLD AUTO: 207 K/UL — SIGNIFICANT CHANGE UP (ref 150–400)
PLATELET # BLD AUTO: 215 K/UL — SIGNIFICANT CHANGE UP (ref 150–400)
PLATELET # BLD AUTO: 217 K/UL — SIGNIFICANT CHANGE UP (ref 150–400)
PMV BLD: 10.2 FL — SIGNIFICANT CHANGE UP (ref 7–13)
PMV BLD: 10.3 FL — SIGNIFICANT CHANGE UP (ref 7–13)
PMV BLD: 10.4 FL — SIGNIFICANT CHANGE UP (ref 7–13)
POTASSIUM SERPL-MCNC: 4 MMOL/L — SIGNIFICANT CHANGE UP (ref 3.5–5.3)
POTASSIUM SERPL-MCNC: 4.5 MMOL/L — SIGNIFICANT CHANGE UP (ref 3.5–5.3)
POTASSIUM SERPL-MCNC: 4.6 MMOL/L — SIGNIFICANT CHANGE UP (ref 3.5–5.3)
POTASSIUM SERPL-SCNC: 4 MMOL/L — SIGNIFICANT CHANGE UP (ref 3.5–5.3)
POTASSIUM SERPL-SCNC: 4.5 MMOL/L — SIGNIFICANT CHANGE UP (ref 3.5–5.3)
POTASSIUM SERPL-SCNC: 4.6 MMOL/L — SIGNIFICANT CHANGE UP (ref 3.5–5.3)
PROT SERPL-MCNC: 7.4 G/DL — SIGNIFICANT CHANGE UP (ref 6–8.3)
PROT SERPL-MCNC: 7.6 G/DL — SIGNIFICANT CHANGE UP (ref 6–8.3)
PROT SERPL-MCNC: 7.6 G/DL — SIGNIFICANT CHANGE UP (ref 6–8.3)
PROTHROM AB SERPL-ACNC: 15.3 SEC — HIGH (ref 9.9–13.4)
RBC # BLD: 3.04 M/UL — LOW (ref 4.2–5.8)
RBC # BLD: 3.32 M/UL — LOW (ref 4.2–5.8)
RBC # BLD: 3.43 M/UL — LOW (ref 4.2–5.8)
RBC # FLD: 16.3 % — HIGH (ref 10.3–14.5)
RBC # FLD: 16.3 % — HIGH (ref 10.3–14.5)
RBC # FLD: 16.4 % — HIGH (ref 10.3–14.5)
RH IG SCN BLD-IMP: POSITIVE — SIGNIFICANT CHANGE UP
SODIUM SERPL-SCNC: 139 MMOL/L — SIGNIFICANT CHANGE UP (ref 135–145)
SODIUM SERPL-SCNC: 140 MMOL/L — SIGNIFICANT CHANGE UP (ref 135–145)
SODIUM SERPL-SCNC: 141 MMOL/L — SIGNIFICANT CHANGE UP (ref 135–145)
UFH PPP CHRO-ACNC: 0.64 IU/ML — SIGNIFICANT CHANGE UP (ref 0.3–0.7)
WBC # BLD: 6.78 K/UL — SIGNIFICANT CHANGE UP (ref 3.8–10.5)
WBC # BLD: 7.11 K/UL — SIGNIFICANT CHANGE UP (ref 3.8–10.5)
WBC # BLD: 7.24 K/UL — SIGNIFICANT CHANGE UP (ref 3.8–10.5)
WBC # FLD AUTO: 6.78 K/UL — SIGNIFICANT CHANGE UP (ref 3.8–10.5)
WBC # FLD AUTO: 7.11 K/UL — SIGNIFICANT CHANGE UP (ref 3.8–10.5)
WBC # FLD AUTO: 7.24 K/UL — SIGNIFICANT CHANGE UP (ref 3.8–10.5)

## 2025-07-10 PROCEDURE — 85018 HEMOGLOBIN: CPT

## 2025-07-10 PROCEDURE — 86140 C-REACTIVE PROTEIN: CPT

## 2025-07-10 PROCEDURE — 86900 BLOOD TYPING SEROLOGIC ABO: CPT

## 2025-07-10 PROCEDURE — 84145 PROCALCITONIN (PCT): CPT

## 2025-07-10 PROCEDURE — 36600 WITHDRAWAL OF ARTERIAL BLOOD: CPT

## 2025-07-10 PROCEDURE — 84439 ASSAY OF FREE THYROXINE: CPT

## 2025-07-10 PROCEDURE — 85610 PROTHROMBIN TIME: CPT

## 2025-07-10 PROCEDURE — 99233 SBSQ HOSP IP/OBS HIGH 50: CPT

## 2025-07-10 PROCEDURE — 76770 US EXAM ABDO BACK WALL COMP: CPT | Mod: 26

## 2025-07-10 PROCEDURE — 82977 ASSAY OF GGT: CPT

## 2025-07-10 PROCEDURE — 99232 SBSQ HOSP IP/OBS MODERATE 35: CPT

## 2025-07-10 PROCEDURE — 76770 US EXAM ABDO BACK WALL COMP: CPT

## 2025-07-10 PROCEDURE — 87040 BLOOD CULTURE FOR BACTERIA: CPT

## 2025-07-10 PROCEDURE — 85025 COMPLETE CBC W/AUTO DIFF WBC: CPT

## 2025-07-10 PROCEDURE — 36620 INSERTION CATHETER ARTERY: CPT | Mod: RT

## 2025-07-10 PROCEDURE — 94660 CPAP INITIATION&MGMT: CPT

## 2025-07-10 PROCEDURE — 93308 TTE F-UP OR LMTD: CPT

## 2025-07-10 PROCEDURE — 71045 X-RAY EXAM CHEST 1 VIEW: CPT

## 2025-07-10 PROCEDURE — 80048 BASIC METABOLIC PNL TOTAL CA: CPT

## 2025-07-10 PROCEDURE — 36415 COLL VENOUS BLD VENIPUNCTURE: CPT

## 2025-07-10 PROCEDURE — 99233 SBSQ HOSP IP/OBS HIGH 50: CPT | Mod: GC

## 2025-07-10 PROCEDURE — G0545: CPT

## 2025-07-10 PROCEDURE — 94640 AIRWAY INHALATION TREATMENT: CPT

## 2025-07-10 PROCEDURE — 85730 THROMBOPLASTIN TIME PARTIAL: CPT

## 2025-07-10 PROCEDURE — 80162 ASSAY OF DIGOXIN TOTAL: CPT

## 2025-07-10 PROCEDURE — 82330 ASSAY OF CALCIUM: CPT

## 2025-07-10 PROCEDURE — 85652 RBC SED RATE AUTOMATED: CPT

## 2025-07-10 PROCEDURE — 84443 ASSAY THYROID STIM HORMONE: CPT

## 2025-07-10 PROCEDURE — 86901 BLOOD TYPING SEROLOGIC RH(D): CPT

## 2025-07-10 PROCEDURE — 80053 COMPREHEN METABOLIC PANEL: CPT

## 2025-07-10 PROCEDURE — 85027 COMPLETE CBC AUTOMATED: CPT

## 2025-07-10 PROCEDURE — 82803 BLOOD GASES ANY COMBINATION: CPT

## 2025-07-10 PROCEDURE — 71045 X-RAY EXAM CHEST 1 VIEW: CPT | Mod: 26

## 2025-07-10 PROCEDURE — 83880 ASSAY OF NATRIURETIC PEPTIDE: CPT

## 2025-07-10 PROCEDURE — 83735 ASSAY OF MAGNESIUM: CPT

## 2025-07-10 PROCEDURE — 86850 RBC ANTIBODY SCREEN: CPT

## 2025-07-10 PROCEDURE — 84132 ASSAY OF SERUM POTASSIUM: CPT

## 2025-07-10 PROCEDURE — 85520 HEPARIN ASSAY: CPT

## 2025-07-10 PROCEDURE — 82435 ASSAY OF BLOOD CHLORIDE: CPT

## 2025-07-10 PROCEDURE — 84100 ASSAY OF PHOSPHORUS: CPT

## 2025-07-10 PROCEDURE — 97162 PT EVAL MOD COMPLEX 30 MIN: CPT

## 2025-07-10 PROCEDURE — 82947 ASSAY GLUCOSE BLOOD QUANT: CPT

## 2025-07-10 PROCEDURE — 85014 HEMATOCRIT: CPT

## 2025-07-10 PROCEDURE — 82962 GLUCOSE BLOOD TEST: CPT

## 2025-07-10 PROCEDURE — 83605 ASSAY OF LACTIC ACID: CPT

## 2025-07-10 PROCEDURE — 94002 VENT MGMT INPAT INIT DAY: CPT

## 2025-07-10 PROCEDURE — 84295 ASSAY OF SERUM SODIUM: CPT

## 2025-07-10 PROCEDURE — 84480 ASSAY TRIIODOTHYRONINE (T3): CPT

## 2025-07-10 RX ORDER — B1/B2/B3/B5/B6/B12/VIT C/FOLIC 500-0.5 MG
1 TABLET ORAL DAILY
Refills: 0 | Status: DISCONTINUED | OUTPATIENT
Start: 2025-07-11 | End: 2025-08-01

## 2025-07-10 RX ORDER — PROPOFOL 10 MG/ML
60 INJECTION, EMULSION INTRAVENOUS
Qty: 500 | Refills: 0 | Status: DISCONTINUED | OUTPATIENT
Start: 2025-07-10 | End: 2025-07-11

## 2025-07-10 RX ORDER — ATORVASTATIN CALCIUM 80 MG/1
80 TABLET, FILM COATED ORAL AT BEDTIME
Refills: 0 | Status: DISCONTINUED | OUTPATIENT
Start: 2025-07-10 | End: 2025-08-01

## 2025-07-10 RX ORDER — ACETAZOLAMIDE 250 MG/1
500 TABLET ORAL EVERY 8 HOURS
Refills: 0 | Status: COMPLETED | OUTPATIENT
Start: 2025-07-10 | End: 2025-07-11

## 2025-07-10 RX ORDER — CLOPIDOGREL BISULFATE 75 MG/1
75 TABLET, FILM COATED ORAL DAILY
Refills: 0 | Status: DISCONTINUED | OUTPATIENT
Start: 2025-07-11 | End: 2025-08-01

## 2025-07-10 RX ORDER — HEPARIN SODIUM 1000 [USP'U]/ML
1200 INJECTION INTRAVENOUS; SUBCUTANEOUS
Qty: 25000 | Refills: 0 | Status: DISCONTINUED | OUTPATIENT
Start: 2025-07-10 | End: 2025-07-11

## 2025-07-10 RX ORDER — AMIODARONE HYDROCHLORIDE 50 MG/ML
200 INJECTION, SOLUTION INTRAVENOUS DAILY
Refills: 0 | Status: DISCONTINUED | OUTPATIENT
Start: 2025-07-11 | End: 2025-07-17

## 2025-07-10 RX ORDER — SODIUM CHLORIDE 3 G/100ML
150 INJECTION, SOLUTION INTRAVENOUS ONCE
Refills: 0 | Status: COMPLETED | OUTPATIENT
Start: 2025-07-10 | End: 2025-07-10

## 2025-07-10 RX ORDER — DIGOXIN 250 UG/1
125 TABLET ORAL DAILY
Refills: 0 | Status: DISCONTINUED | OUTPATIENT
Start: 2025-07-11 | End: 2025-07-14

## 2025-07-10 RX ORDER — SENNA 187 MG
2 TABLET ORAL AT BEDTIME
Refills: 0 | Status: DISCONTINUED | OUTPATIENT
Start: 2025-07-10 | End: 2025-08-01

## 2025-07-10 RX ORDER — NOREPINEPHRINE BITARTRATE 8 MG
0.05 SOLUTION INTRAVENOUS
Qty: 8 | Refills: 0 | Status: DISCONTINUED | OUTPATIENT
Start: 2025-07-10 | End: 2025-07-14

## 2025-07-10 RX ORDER — ACETAZOLAMIDE 250 MG/1
500 TABLET ORAL EVERY 8 HOURS
Refills: 0 | Status: DISCONTINUED | OUTPATIENT
Start: 2025-07-10 | End: 2025-07-10

## 2025-07-10 RX ADMIN — ISOSORBDIE DINITRATE 10 MILLIGRAM(S): 30 TABLET ORAL at 06:52

## 2025-07-10 RX ADMIN — ISOSORBDIE DINITRATE 10 MILLIGRAM(S): 30 TABLET ORAL at 17:13

## 2025-07-10 RX ADMIN — Medication 975 MILLIGRAM(S): at 08:13

## 2025-07-10 RX ADMIN — LIDOCAINE HYDROCHLORIDE 1 PATCH: 20 JELLY TOPICAL at 14:02

## 2025-07-10 RX ADMIN — Medication 1 APPLICATION(S): at 22:11

## 2025-07-10 RX ADMIN — ACETAZOLAMIDE 500 MILLIGRAM(S): 250 TABLET ORAL at 22:10

## 2025-07-10 RX ADMIN — Medication 40 MILLIGRAM(S): at 06:53

## 2025-07-10 RX ADMIN — ATORVASTATIN CALCIUM 80 MILLIGRAM(S): 80 TABLET, FILM COATED ORAL at 22:11

## 2025-07-10 RX ADMIN — BUMETANIDE 20 MG/HR: 1 TABLET ORAL at 22:12

## 2025-07-10 RX ADMIN — PROPOFOL 39.4 MICROGRAM(S)/KG/MIN: 10 INJECTION, EMULSION INTRAVENOUS at 20:30

## 2025-07-10 RX ADMIN — DIGOXIN 125 MICROGRAM(S): 250 TABLET ORAL at 06:52

## 2025-07-10 RX ADMIN — Medication 2 TABLET(S): at 22:11

## 2025-07-10 RX ADMIN — CEFTRIAXONE 100 MILLIGRAM(S): 500 INJECTION, POWDER, FOR SOLUTION INTRAMUSCULAR; INTRAVENOUS at 05:57

## 2025-07-10 RX ADMIN — SODIUM CHLORIDE 300 MILLILITER(S): 3 INJECTION, SOLUTION INTRAVENOUS at 17:10

## 2025-07-10 RX ADMIN — IPRATROPIUM BROMIDE AND ALBUTEROL SULFATE 3 MILLILITER(S): .5; 2.5 SOLUTION RESPIRATORY (INHALATION) at 14:00

## 2025-07-10 RX ADMIN — Medication 500 MILLIGRAM(S): at 14:00

## 2025-07-10 RX ADMIN — AMIODARONE HYDROCHLORIDE 200 MILLIGRAM(S): 50 INJECTION, SOLUTION INTRAVENOUS at 06:52

## 2025-07-10 RX ADMIN — BUMETANIDE 10 MG/HR: 1 TABLET ORAL at 08:00

## 2025-07-10 RX ADMIN — NOREPINEPHRINE BITARTRATE 10.3 MICROGRAM(S)/KG/MIN: 8 SOLUTION at 20:00

## 2025-07-10 RX ADMIN — ISOSORBDIE DINITRATE 10 MILLIGRAM(S): 30 TABLET ORAL at 14:01

## 2025-07-10 RX ADMIN — METOPROLOL SUCCINATE 25 MILLIGRAM(S): 50 TABLET, EXTENDED RELEASE ORAL at 06:52

## 2025-07-10 RX ADMIN — HEPARIN SODIUM 12 UNIT(S)/HR: 1000 INJECTION INTRAVENOUS; SUBCUTANEOUS at 22:10

## 2025-07-10 RX ADMIN — Medication 15 MILLILITER(S): at 22:12

## 2025-07-10 RX ADMIN — Medication 975 MILLIGRAM(S): at 06:53

## 2025-07-10 RX ADMIN — CLOPIDOGREL BISULFATE 75 MILLIGRAM(S): 75 TABLET, FILM COATED ORAL at 14:03

## 2025-07-10 RX ADMIN — LIDOCAINE HYDROCHLORIDE 1 PATCH: 20 JELLY TOPICAL at 19:30

## 2025-07-10 RX ADMIN — Medication 1 TABLET(S): at 14:01

## 2025-07-10 RX ADMIN — BUMETANIDE 20 MG/HR: 1 TABLET ORAL at 17:09

## 2025-07-11 ENCOUNTER — RESULT REVIEW (OUTPATIENT)
Age: 65
End: 2025-07-11

## 2025-07-11 LAB
A1C WITH ESTIMATED AVERAGE GLUCOSE RESULT: 5.4 % — SIGNIFICANT CHANGE UP (ref 4–5.6)
ALBUMIN SERPL ELPH-MCNC: 3 G/DL — LOW (ref 3.3–5)
ALP SERPL-CCNC: 168 U/L — HIGH (ref 40–120)
ALT FLD-CCNC: 13 U/L — SIGNIFICANT CHANGE UP (ref 10–45)
ANION GAP SERPL CALC-SCNC: 9 MMOL/L — SIGNIFICANT CHANGE UP (ref 5–17)
APPEARANCE UR: CLEAR — SIGNIFICANT CHANGE UP
APTT BLD: 37.7 SEC — HIGH (ref 26.1–36.8)
APTT BLD: 46.5 SEC — HIGH (ref 26.1–36.8)
APTT BLD: 55.5 SEC — HIGH (ref 26.1–36.8)
AST SERPL-CCNC: 15 U/L — SIGNIFICANT CHANGE UP (ref 10–40)
BACTERIA # UR AUTO: NEGATIVE /HPF — SIGNIFICANT CHANGE UP
BASE EXCESS BLDV CALC-SCNC: 22.1 MMOL/L — HIGH (ref -2–3)
BASOPHILS # BLD AUTO: 0.08 K/UL — SIGNIFICANT CHANGE UP (ref 0–0.2)
BASOPHILS NFR BLD AUTO: 0.7 % — SIGNIFICANT CHANGE UP (ref 0–2)
BILIRUB SERPL-MCNC: 0.5 MG/DL — SIGNIFICANT CHANGE UP (ref 0.2–1.2)
BILIRUB UR-MCNC: NEGATIVE — SIGNIFICANT CHANGE UP
BUN SERPL-MCNC: 68 MG/DL — HIGH (ref 7–23)
CALCIUM SERPL-MCNC: 9.5 MG/DL — SIGNIFICANT CHANGE UP (ref 8.4–10.5)
CAST: 5 /LPF — HIGH (ref 0–4)
CHLORIDE SERPL-SCNC: 89 MMOL/L — LOW (ref 96–108)
CHOLEST SERPL-MCNC: 68 MG/DL — SIGNIFICANT CHANGE UP
CO2 BLDV-SCNC: 55 MMOL/L — CRITICAL HIGH (ref 22–26)
CO2 SERPL-SCNC: 40 MMOL/L — HIGH (ref 22–31)
COLOR SPEC: ABNORMAL
CREAT SERPL-MCNC: 1.77 MG/DL — HIGH (ref 0.5–1.3)
DIFF PNL FLD: ABNORMAL
EGFR: 42 ML/MIN/1.73M2 — LOW
EGFR: 42 ML/MIN/1.73M2 — LOW
EOSINOPHIL # BLD AUTO: 0.3 K/UL — SIGNIFICANT CHANGE UP (ref 0–0.5)
EOSINOPHIL NFR BLD AUTO: 2.6 % — SIGNIFICANT CHANGE UP (ref 0–6)
ESTIMATED AVERAGE GLUCOSE: 108 MG/DL — SIGNIFICANT CHANGE UP (ref 68–114)
GAS PNL BLDA: SIGNIFICANT CHANGE UP
GAS PNL BLDV: SIGNIFICANT CHANGE UP
GLUCOSE BLDC GLUCOMTR-MCNC: 101 MG/DL — HIGH (ref 70–99)
GLUCOSE BLDC GLUCOMTR-MCNC: 107 MG/DL — HIGH (ref 70–99)
GLUCOSE BLDC GLUCOMTR-MCNC: 109 MG/DL — HIGH (ref 70–99)
GLUCOSE BLDC GLUCOMTR-MCNC: 121 MG/DL — HIGH (ref 70–99)
GLUCOSE SERPL-MCNC: 110 MG/DL — HIGH (ref 70–99)
GLUCOSE UR QL: NEGATIVE MG/DL — SIGNIFICANT CHANGE UP
HCO3 BLDV-SCNC: 53 MMOL/L — CRITICAL HIGH (ref 22–29)
HCT VFR BLD CALC: 34.4 % — LOW (ref 39–50)
HDLC SERPL-MCNC: 31 MG/DL — LOW
HGB BLD-MCNC: 10 G/DL — LOW (ref 13–17)
HOROWITZ INDEX BLDV+IHG-RTO: 50 — SIGNIFICANT CHANGE UP
HYALINE CASTS # UR AUTO: PRESENT
IMM GRANULOCYTES # BLD AUTO: 0.05 K/UL — SIGNIFICANT CHANGE UP (ref 0–0.07)
IMM GRANULOCYTES NFR BLD AUTO: 0.4 % — SIGNIFICANT CHANGE UP (ref 0–0.9)
INR BLD: 1.31 RATIO — HIGH (ref 0.85–1.16)
INR BLD: 1.32 RATIO — HIGH (ref 0.85–1.16)
INR BLD: 1.45 RATIO — HIGH (ref 0.85–1.16)
KETONES UR QL: NEGATIVE MG/DL — SIGNIFICANT CHANGE UP
LDLC SERPL-MCNC: 20 MG/DL — SIGNIFICANT CHANGE UP
LEUKOCYTE ESTERASE UR-ACNC: ABNORMAL
LIPID PNL WITH DIRECT LDL SERPL: 20 MG/DL — SIGNIFICANT CHANGE UP
LYMPHOCYTES # BLD AUTO: 1.13 K/UL — SIGNIFICANT CHANGE UP (ref 1–3.3)
LYMPHOCYTES NFR BLD AUTO: 9.9 % — LOW (ref 13–44)
MAGNESIUM SERPL-MCNC: 2.2 MG/DL — SIGNIFICANT CHANGE UP (ref 1.6–2.6)
MCHC RBC-ENTMCNC: 28.9 PG — SIGNIFICANT CHANGE UP (ref 27–34)
MCHC RBC-ENTMCNC: 29.1 G/DL — LOW (ref 32–36)
MCV RBC AUTO: 99.4 FL — SIGNIFICANT CHANGE UP (ref 80–100)
MONOCYTES # BLD AUTO: 0.94 K/UL — HIGH (ref 0–0.9)
MONOCYTES NFR BLD AUTO: 8.3 % — SIGNIFICANT CHANGE UP (ref 2–14)
MRSA PCR RESULT.: SIGNIFICANT CHANGE UP
NEUTROPHILS # BLD AUTO: 8.89 K/UL — HIGH (ref 1.8–7.4)
NEUTROPHILS NFR BLD AUTO: 78.1 % — HIGH (ref 43–77)
NITRITE UR-MCNC: NEGATIVE — SIGNIFICANT CHANGE UP
NONHDLC SERPL-MCNC: 37 MG/DL — SIGNIFICANT CHANGE UP
NRBC # BLD AUTO: 0 K/UL — SIGNIFICANT CHANGE UP (ref 0–0)
NRBC # FLD: 0 K/UL — SIGNIFICANT CHANGE UP (ref 0–0)
NRBC BLD AUTO-RTO: 0 /100 WBCS — SIGNIFICANT CHANGE UP (ref 0–0)
PCO2 BLDV: 89 MMHG — CRITICAL HIGH (ref 42–55)
PH BLDV: 7.38 — SIGNIFICANT CHANGE UP (ref 7.32–7.43)
PH UR: 6 — SIGNIFICANT CHANGE UP (ref 5–8)
PHOSPHATE SERPL-MCNC: 3.2 MG/DL — SIGNIFICANT CHANGE UP (ref 2.5–4.5)
PLATELET # BLD AUTO: 283 K/UL — SIGNIFICANT CHANGE UP (ref 150–400)
PMV BLD: 10.2 FL — SIGNIFICANT CHANGE UP (ref 7–13)
PO2 BLDV: 51 MMHG — HIGH (ref 25–45)
POTASSIUM SERPL-MCNC: 3.5 MMOL/L — SIGNIFICANT CHANGE UP (ref 3.5–5.3)
POTASSIUM SERPL-SCNC: 3.5 MMOL/L — SIGNIFICANT CHANGE UP (ref 3.5–5.3)
PROT SERPL-MCNC: 8.2 G/DL — SIGNIFICANT CHANGE UP (ref 6–8.3)
PROT UR-MCNC: 300 MG/DL
PROTHROM AB SERPL-ACNC: 15 SEC — HIGH (ref 9.9–13.4)
PROTHROM AB SERPL-ACNC: 15.1 SEC — HIGH (ref 9.9–13.4)
PROTHROM AB SERPL-ACNC: 16.5 SEC — HIGH (ref 9.9–13.4)
RBC # BLD: 3.46 M/UL — LOW (ref 4.2–5.8)
RBC # FLD: 16.5 % — HIGH (ref 10.3–14.5)
RBC CASTS # UR COMP ASSIST: 35 /HPF — HIGH (ref 0–4)
REVIEW: SIGNIFICANT CHANGE UP
S AUREUS DNA NOSE QL NAA+PROBE: SIGNIFICANT CHANGE UP
SAO2 % BLDV: 83.7 % — SIGNIFICANT CHANGE UP (ref 67–88)
SODIUM SERPL-SCNC: 138 MMOL/L — SIGNIFICANT CHANGE UP (ref 135–145)
SP GR SPEC: 1.01 — SIGNIFICANT CHANGE UP (ref 1–1.03)
SQUAMOUS # UR AUTO: 2 /HPF — SIGNIFICANT CHANGE UP (ref 0–5)
T3 SERPL-MCNC: 76 NG/DL — LOW (ref 80–200)
T4 AB SER-ACNC: 5.9 UG/DL — SIGNIFICANT CHANGE UP (ref 4.6–12)
TRIGL SERPL-MCNC: 79 MG/DL — SIGNIFICANT CHANGE UP
TSH SERPL-MCNC: 5.38 UIU/ML — HIGH (ref 0.27–4.2)
UROBILINOGEN FLD QL: 0.2 MG/DL — SIGNIFICANT CHANGE UP (ref 0.2–1)
WBC # BLD: 11.39 K/UL — HIGH (ref 3.8–10.5)
WBC # FLD AUTO: 11.39 K/UL — HIGH (ref 3.8–10.5)
WBC UR QL: 19 /HPF — HIGH (ref 0–5)

## 2025-07-11 PROCEDURE — 87040 BLOOD CULTURE FOR BACTERIA: CPT

## 2025-07-11 PROCEDURE — 87641 MR-STAPH DNA AMP PROBE: CPT

## 2025-07-11 PROCEDURE — 82977 ASSAY OF GGT: CPT

## 2025-07-11 PROCEDURE — 86140 C-REACTIVE PROTEIN: CPT

## 2025-07-11 PROCEDURE — 82962 GLUCOSE BLOOD TEST: CPT

## 2025-07-11 PROCEDURE — 36415 COLL VENOUS BLD VENIPUNCTURE: CPT

## 2025-07-11 PROCEDURE — 93308 TTE F-UP OR LMTD: CPT

## 2025-07-11 PROCEDURE — 84145 PROCALCITONIN (PCT): CPT

## 2025-07-11 PROCEDURE — 97162 PT EVAL MOD COMPLEX 30 MIN: CPT

## 2025-07-11 PROCEDURE — 71045 X-RAY EXAM CHEST 1 VIEW: CPT | Mod: 26

## 2025-07-11 PROCEDURE — 84436 ASSAY OF TOTAL THYROXINE: CPT

## 2025-07-11 PROCEDURE — 85027 COMPLETE CBC AUTOMATED: CPT

## 2025-07-11 PROCEDURE — G0545: CPT

## 2025-07-11 PROCEDURE — 87640 STAPH A DNA AMP PROBE: CPT

## 2025-07-11 PROCEDURE — 85018 HEMOGLOBIN: CPT

## 2025-07-11 PROCEDURE — 36600 WITHDRAWAL OF ARTERIAL BLOOD: CPT

## 2025-07-11 PROCEDURE — 85025 COMPLETE CBC W/AUTO DIFF WBC: CPT

## 2025-07-11 PROCEDURE — 82330 ASSAY OF CALCIUM: CPT

## 2025-07-11 PROCEDURE — 84480 ASSAY TRIIODOTHYRONINE (T3): CPT

## 2025-07-11 PROCEDURE — 86900 BLOOD TYPING SEROLOGIC ABO: CPT

## 2025-07-11 PROCEDURE — 93325 DOPPLER ECHO COLOR FLOW MAPG: CPT | Mod: 26

## 2025-07-11 PROCEDURE — 84295 ASSAY OF SERUM SODIUM: CPT

## 2025-07-11 PROCEDURE — 71045 X-RAY EXAM CHEST 1 VIEW: CPT

## 2025-07-11 PROCEDURE — 81001 URINALYSIS AUTO W/SCOPE: CPT

## 2025-07-11 PROCEDURE — 94660 CPAP INITIATION&MGMT: CPT

## 2025-07-11 PROCEDURE — 94640 AIRWAY INHALATION TREATMENT: CPT

## 2025-07-11 PROCEDURE — 82947 ASSAY GLUCOSE BLOOD QUANT: CPT

## 2025-07-11 PROCEDURE — 93312 ECHO TRANSESOPHAGEAL: CPT | Mod: 26

## 2025-07-11 PROCEDURE — 99232 SBSQ HOSP IP/OBS MODERATE 35: CPT

## 2025-07-11 PROCEDURE — 80053 COMPREHEN METABOLIC PANEL: CPT

## 2025-07-11 PROCEDURE — 80061 LIPID PANEL: CPT

## 2025-07-11 PROCEDURE — 84439 ASSAY OF FREE THYROXINE: CPT

## 2025-07-11 PROCEDURE — 71045 X-RAY EXAM CHEST 1 VIEW: CPT | Mod: 26,77

## 2025-07-11 PROCEDURE — 83036 HEMOGLOBIN GLYCOSYLATED A1C: CPT

## 2025-07-11 PROCEDURE — 85652 RBC SED RATE AUTOMATED: CPT

## 2025-07-11 PROCEDURE — 84443 ASSAY THYROID STIM HORMONE: CPT

## 2025-07-11 PROCEDURE — 85730 THROMBOPLASTIN TIME PARTIAL: CPT

## 2025-07-11 PROCEDURE — 82803 BLOOD GASES ANY COMBINATION: CPT

## 2025-07-11 PROCEDURE — 85014 HEMATOCRIT: CPT

## 2025-07-11 PROCEDURE — 93010 ELECTROCARDIOGRAM REPORT: CPT

## 2025-07-11 PROCEDURE — 82435 ASSAY OF BLOOD CHLORIDE: CPT

## 2025-07-11 PROCEDURE — 85610 PROTHROMBIN TIME: CPT

## 2025-07-11 PROCEDURE — 80162 ASSAY OF DIGOXIN TOTAL: CPT

## 2025-07-11 PROCEDURE — 86901 BLOOD TYPING SEROLOGIC RH(D): CPT

## 2025-07-11 PROCEDURE — 80048 BASIC METABOLIC PNL TOTAL CA: CPT

## 2025-07-11 PROCEDURE — 76376 3D RENDER W/INTRP POSTPROCES: CPT | Mod: 26

## 2025-07-11 PROCEDURE — 93005 ELECTROCARDIOGRAM TRACING: CPT

## 2025-07-11 PROCEDURE — 94003 VENT MGMT INPAT SUBQ DAY: CPT

## 2025-07-11 PROCEDURE — 76770 US EXAM ABDO BACK WALL COMP: CPT

## 2025-07-11 PROCEDURE — 85520 HEPARIN ASSAY: CPT

## 2025-07-11 PROCEDURE — 83735 ASSAY OF MAGNESIUM: CPT

## 2025-07-11 PROCEDURE — 86850 RBC ANTIBODY SCREEN: CPT

## 2025-07-11 PROCEDURE — 83880 ASSAY OF NATRIURETIC PEPTIDE: CPT

## 2025-07-11 PROCEDURE — 84132 ASSAY OF SERUM POTASSIUM: CPT

## 2025-07-11 PROCEDURE — 93320 DOPPLER ECHO COMPLETE: CPT | Mod: 26

## 2025-07-11 PROCEDURE — 84100 ASSAY OF PHOSPHORUS: CPT

## 2025-07-11 PROCEDURE — 99291 CRITICAL CARE FIRST HOUR: CPT

## 2025-07-11 PROCEDURE — 94002 VENT MGMT INPAT INIT DAY: CPT

## 2025-07-11 PROCEDURE — 83605 ASSAY OF LACTIC ACID: CPT

## 2025-07-11 RX ORDER — PHENYLEPHRINE HCL IN 0.9% NACL 0.5 MG/5ML
500 SYRINGE (ML) INTRAVENOUS ONCE
Refills: 0 | Status: DISCONTINUED | OUTPATIENT
Start: 2025-07-11 | End: 2025-07-12

## 2025-07-11 RX ORDER — PROPOFOL 10 MG/ML
40 INJECTION, EMULSION INTRAVENOUS
Qty: 500 | Refills: 0 | Status: DISCONTINUED | OUTPATIENT
Start: 2025-07-11 | End: 2025-07-11

## 2025-07-11 RX ORDER — HEPARIN SODIUM 1000 [USP'U]/ML
1400 INJECTION INTRAVENOUS; SUBCUTANEOUS
Qty: 25000 | Refills: 0 | Status: DISCONTINUED | OUTPATIENT
Start: 2025-07-11 | End: 2025-07-14

## 2025-07-11 RX ORDER — SODIUM CHLORIDE 3 G/100ML
150 INJECTION, SOLUTION INTRAVENOUS ONCE
Refills: 0 | Status: COMPLETED | OUTPATIENT
Start: 2025-07-11 | End: 2025-07-11

## 2025-07-11 RX ORDER — PROPOFOL 10 MG/ML
60 INJECTION, EMULSION INTRAVENOUS
Qty: 1000 | Refills: 0 | Status: DISCONTINUED | OUTPATIENT
Start: 2025-07-11 | End: 2025-07-13

## 2025-07-11 RX ORDER — INSULIN LISPRO 100 U/ML
INJECTION, SOLUTION INTRAVENOUS; SUBCUTANEOUS EVERY 6 HOURS
Refills: 0 | Status: DISCONTINUED | OUTPATIENT
Start: 2025-07-11 | End: 2025-07-19

## 2025-07-11 RX ADMIN — DIGOXIN 125 MICROGRAM(S): 250 TABLET ORAL at 05:27

## 2025-07-11 RX ADMIN — PROPOFOL 39.4 MICROGRAM(S)/KG/MIN: 10 INJECTION, EMULSION INTRAVENOUS at 19:12

## 2025-07-11 RX ADMIN — PROPOFOL 26.3 MICROGRAM(S)/KG/MIN: 10 INJECTION, EMULSION INTRAVENOUS at 00:18

## 2025-07-11 RX ADMIN — ACETAZOLAMIDE 500 MILLIGRAM(S): 250 TABLET ORAL at 05:27

## 2025-07-11 RX ADMIN — PROPOFOL 39.4 MICROGRAM(S)/KG/MIN: 10 INJECTION, EMULSION INTRAVENOUS at 03:08

## 2025-07-11 RX ADMIN — LIDOCAINE HYDROCHLORIDE 1 PATCH: 20 JELLY TOPICAL at 01:22

## 2025-07-11 RX ADMIN — NOREPINEPHRINE BITARTRATE 10.3 MICROGRAM(S)/KG/MIN: 8 SOLUTION at 19:12

## 2025-07-11 RX ADMIN — Medication 40 MILLIEQUIVALENT(S): at 07:46

## 2025-07-11 RX ADMIN — PROPOFOL 39.4 MICROGRAM(S)/KG/MIN: 10 INJECTION, EMULSION INTRAVENOUS at 05:24

## 2025-07-11 RX ADMIN — HEPARIN SODIUM 14 UNIT(S)/HR: 1000 INJECTION INTRAVENOUS; SUBCUTANEOUS at 07:15

## 2025-07-11 RX ADMIN — PROPOFOL 39.4 MICROGRAM(S)/KG/MIN: 10 INJECTION, EMULSION INTRAVENOUS at 07:45

## 2025-07-11 RX ADMIN — SODIUM CHLORIDE 300 MILLILITER(S): 3 INJECTION, SOLUTION INTRAVENOUS at 13:17

## 2025-07-11 RX ADMIN — Medication 20 MILLIEQUIVALENT(S): at 13:20

## 2025-07-11 RX ADMIN — ACETAZOLAMIDE 500 MILLIGRAM(S): 250 TABLET ORAL at 13:29

## 2025-07-11 RX ADMIN — Medication 40 MILLIGRAM(S): at 13:19

## 2025-07-11 RX ADMIN — Medication 1 TABLET(S): at 13:20

## 2025-07-11 RX ADMIN — AMIODARONE HYDROCHLORIDE 200 MILLIGRAM(S): 50 INJECTION, SOLUTION INTRAVENOUS at 05:27

## 2025-07-11 RX ADMIN — Medication 15 MILLILITER(S): at 05:27

## 2025-07-11 RX ADMIN — Medication 15 MILLILITER(S): at 18:29

## 2025-07-11 RX ADMIN — Medication 500 MILLIGRAM(S): at 13:20

## 2025-07-11 RX ADMIN — NOREPINEPHRINE BITARTRATE 10.3 MICROGRAM(S)/KG/MIN: 8 SOLUTION at 07:46

## 2025-07-11 RX ADMIN — Medication 1 APPLICATION(S): at 05:27

## 2025-07-11 RX ADMIN — CEFTRIAXONE 100 MILLIGRAM(S): 500 INJECTION, POWDER, FOR SOLUTION INTRAMUSCULAR; INTRAVENOUS at 05:27

## 2025-07-11 RX ADMIN — CLOPIDOGREL BISULFATE 75 MILLIGRAM(S): 75 TABLET, FILM COATED ORAL at 13:20

## 2025-07-11 RX ADMIN — ATORVASTATIN CALCIUM 80 MILLIGRAM(S): 80 TABLET, FILM COATED ORAL at 21:12

## 2025-07-11 RX ADMIN — Medication 2 TABLET(S): at 21:14

## 2025-07-12 LAB
ALBUMIN SERPL ELPH-MCNC: 2.8 G/DL — LOW (ref 3.3–5)
ALP SERPL-CCNC: 153 U/L — HIGH (ref 40–120)
ALT FLD-CCNC: 14 U/L — SIGNIFICANT CHANGE UP (ref 10–45)
ANION GAP SERPL CALC-SCNC: 14 MMOL/L — SIGNIFICANT CHANGE UP (ref 5–17)
APTT BLD: 49.2 SEC — HIGH (ref 26.1–36.8)
APTT BLD: 50.2 SEC — HIGH (ref 26.1–36.8)
APTT BLD: 57.1 SEC — HIGH (ref 26.1–36.8)
AST SERPL-CCNC: 17 U/L — SIGNIFICANT CHANGE UP (ref 10–40)
BASOPHILS # BLD AUTO: 0.07 K/UL — SIGNIFICANT CHANGE UP (ref 0–0.2)
BASOPHILS NFR BLD AUTO: 0.9 % — SIGNIFICANT CHANGE UP (ref 0–2)
BILIRUB SERPL-MCNC: 0.7 MG/DL — SIGNIFICANT CHANGE UP (ref 0.2–1.2)
BUN SERPL-MCNC: 67 MG/DL — HIGH (ref 7–23)
CALCIUM SERPL-MCNC: 9.7 MG/DL — SIGNIFICANT CHANGE UP (ref 8.4–10.5)
CHLORIDE SERPL-SCNC: 89 MMOL/L — LOW (ref 96–108)
CO2 SERPL-SCNC: 37 MMOL/L — HIGH (ref 22–31)
CREAT SERPL-MCNC: 1.7 MG/DL — HIGH (ref 0.5–1.3)
DIGOXIN SERPL-MCNC: 1.7 NG/ML — SIGNIFICANT CHANGE UP (ref 0.8–2)
EGFR: 44 ML/MIN/1.73M2 — LOW
EGFR: 44 ML/MIN/1.73M2 — LOW
EOSINOPHIL # BLD AUTO: 0.37 K/UL — SIGNIFICANT CHANGE UP (ref 0–0.5)
EOSINOPHIL NFR BLD AUTO: 4.9 % — SIGNIFICANT CHANGE UP (ref 0–6)
GAS PNL BLDA: SIGNIFICANT CHANGE UP
GLUCOSE BLDC GLUCOMTR-MCNC: 114 MG/DL — HIGH (ref 70–99)
GLUCOSE BLDC GLUCOMTR-MCNC: 135 MG/DL — HIGH (ref 70–99)
GLUCOSE BLDC GLUCOMTR-MCNC: 98 MG/DL — SIGNIFICANT CHANGE UP (ref 70–99)
GLUCOSE SERPL-MCNC: 93 MG/DL — SIGNIFICANT CHANGE UP (ref 70–99)
HCT VFR BLD CALC: 33.5 % — LOW (ref 39–50)
HGB BLD-MCNC: 9.6 G/DL — LOW (ref 13–17)
IMM GRANULOCYTES # BLD AUTO: 0.03 K/UL — SIGNIFICANT CHANGE UP (ref 0–0.07)
IMM GRANULOCYTES NFR BLD AUTO: 0.4 % — SIGNIFICANT CHANGE UP (ref 0–0.9)
INR BLD: 1.36 RATIO — HIGH (ref 0.85–1.16)
LYMPHOCYTES # BLD AUTO: 0.86 K/UL — LOW (ref 1–3.3)
LYMPHOCYTES NFR BLD AUTO: 11.3 % — LOW (ref 13–44)
MAGNESIUM SERPL-MCNC: 2.2 MG/DL — SIGNIFICANT CHANGE UP (ref 1.6–2.6)
MCHC RBC-ENTMCNC: 28.7 G/DL — LOW (ref 32–36)
MCHC RBC-ENTMCNC: 29 PG — SIGNIFICANT CHANGE UP (ref 27–34)
MCV RBC AUTO: 101.2 FL — HIGH (ref 80–100)
MONOCYTES # BLD AUTO: 0.78 K/UL — SIGNIFICANT CHANGE UP (ref 0–0.9)
MONOCYTES NFR BLD AUTO: 10.3 % — SIGNIFICANT CHANGE UP (ref 2–14)
NEUTROPHILS # BLD AUTO: 5.49 K/UL — SIGNIFICANT CHANGE UP (ref 1.8–7.4)
NEUTROPHILS NFR BLD AUTO: 72.2 % — SIGNIFICANT CHANGE UP (ref 43–77)
NRBC # BLD AUTO: 0 K/UL — SIGNIFICANT CHANGE UP (ref 0–0)
NRBC # FLD: 0 K/UL — SIGNIFICANT CHANGE UP (ref 0–0)
NRBC BLD AUTO-RTO: 0 /100 WBCS — SIGNIFICANT CHANGE UP (ref 0–0)
PHOSPHATE SERPL-MCNC: 4.2 MG/DL — SIGNIFICANT CHANGE UP (ref 2.5–4.5)
PLATELET # BLD AUTO: 246 K/UL — SIGNIFICANT CHANGE UP (ref 150–400)
PMV BLD: 10.4 FL — SIGNIFICANT CHANGE UP (ref 7–13)
POTASSIUM SERPL-MCNC: 3.6 MMOL/L — SIGNIFICANT CHANGE UP (ref 3.5–5.3)
POTASSIUM SERPL-SCNC: 3.6 MMOL/L — SIGNIFICANT CHANGE UP (ref 3.5–5.3)
PROT SERPL-MCNC: 7.8 G/DL — SIGNIFICANT CHANGE UP (ref 6–8.3)
PROTHROM AB SERPL-ACNC: 15.6 SEC — HIGH (ref 9.9–13.4)
RBC # BLD: 3.31 M/UL — LOW (ref 4.2–5.8)
RBC # FLD: 17.1 % — HIGH (ref 10.3–14.5)
SODIUM SERPL-SCNC: 140 MMOL/L — SIGNIFICANT CHANGE UP (ref 135–145)
UFH PPP CHRO-ACNC: 0.54 IU/ML — SIGNIFICANT CHANGE UP (ref 0.3–0.7)
UFH PPP CHRO-ACNC: 0.56 IU/ML — SIGNIFICANT CHANGE UP (ref 0.3–0.7)
WBC # BLD: 7.6 K/UL — SIGNIFICANT CHANGE UP (ref 3.8–10.5)
WBC # FLD AUTO: 7.6 K/UL — SIGNIFICANT CHANGE UP (ref 3.8–10.5)

## 2025-07-12 PROCEDURE — 71045 X-RAY EXAM CHEST 1 VIEW: CPT | Mod: 26

## 2025-07-12 PROCEDURE — 99291 CRITICAL CARE FIRST HOUR: CPT

## 2025-07-12 RX ORDER — DEXMEDETOMIDINE HYDROCHLORIDE IN SODIUM CHLORIDE 4 UG/ML
0.5 INJECTION INTRAVENOUS
Qty: 200 | Refills: 0 | Status: DISCONTINUED | OUTPATIENT
Start: 2025-07-12 | End: 2025-07-14

## 2025-07-12 RX ORDER — IPRATROPIUM BROMIDE AND ALBUTEROL SULFATE .5; 2.5 MG/3ML; MG/3ML
3 SOLUTION RESPIRATORY (INHALATION) EVERY 6 HOURS
Refills: 0 | Status: DISCONTINUED | OUTPATIENT
Start: 2025-07-12 | End: 2025-08-01

## 2025-07-12 RX ADMIN — Medication 500 MILLIGRAM(S): at 12:40

## 2025-07-12 RX ADMIN — AMIODARONE HYDROCHLORIDE 200 MILLIGRAM(S): 50 INJECTION, SOLUTION INTRAVENOUS at 05:28

## 2025-07-12 RX ADMIN — DEXMEDETOMIDINE HYDROCHLORIDE IN SODIUM CHLORIDE 13.7 MICROGRAM(S)/KG/HR: 4 INJECTION INTRAVENOUS at 21:23

## 2025-07-12 RX ADMIN — Medication 1 TABLET(S): at 12:41

## 2025-07-12 RX ADMIN — HEPARIN SODIUM 15 UNIT(S)/HR: 1000 INJECTION INTRAVENOUS; SUBCUTANEOUS at 08:34

## 2025-07-12 RX ADMIN — HEPARIN SODIUM 16 UNIT(S)/HR: 1000 INJECTION INTRAVENOUS; SUBCUTANEOUS at 21:06

## 2025-07-12 RX ADMIN — DEXMEDETOMIDINE HYDROCHLORIDE IN SODIUM CHLORIDE 13.7 MICROGRAM(S)/KG/HR: 4 INJECTION INTRAVENOUS at 12:55

## 2025-07-12 RX ADMIN — NOREPINEPHRINE BITARTRATE 10.3 MICROGRAM(S)/KG/MIN: 8 SOLUTION at 19:16

## 2025-07-12 RX ADMIN — Medication 15 MILLILITER(S): at 05:28

## 2025-07-12 RX ADMIN — HEPARIN SODIUM 15 UNIT(S)/HR: 1000 INJECTION INTRAVENOUS; SUBCUTANEOUS at 19:17

## 2025-07-12 RX ADMIN — Medication 15 MILLILITER(S): at 17:45

## 2025-07-12 RX ADMIN — Medication 40 MILLIGRAM(S): at 12:40

## 2025-07-12 RX ADMIN — HEPARIN SODIUM 15 UNIT(S)/HR: 1000 INJECTION INTRAVENOUS; SUBCUTANEOUS at 03:13

## 2025-07-12 RX ADMIN — IPRATROPIUM BROMIDE AND ALBUTEROL SULFATE 3 MILLILITER(S): .5; 2.5 SOLUTION RESPIRATORY (INHALATION) at 17:12

## 2025-07-12 RX ADMIN — IPRATROPIUM BROMIDE AND ALBUTEROL SULFATE 3 MILLILITER(S): .5; 2.5 SOLUTION RESPIRATORY (INHALATION) at 23:11

## 2025-07-12 RX ADMIN — Medication 50 MILLIEQUIVALENT(S): at 04:22

## 2025-07-12 RX ADMIN — ATORVASTATIN CALCIUM 80 MILLIGRAM(S): 80 TABLET, FILM COATED ORAL at 21:06

## 2025-07-12 RX ADMIN — Medication 1 APPLICATION(S): at 05:28

## 2025-07-12 RX ADMIN — DIGOXIN 125 MICROGRAM(S): 250 TABLET ORAL at 05:28

## 2025-07-12 RX ADMIN — CLOPIDOGREL BISULFATE 75 MILLIGRAM(S): 75 TABLET, FILM COATED ORAL at 12:41

## 2025-07-12 RX ADMIN — Medication 50 MILLIEQUIVALENT(S): at 06:54

## 2025-07-12 RX ADMIN — PROPOFOL 39.4 MICROGRAM(S)/KG/MIN: 10 INJECTION, EMULSION INTRAVENOUS at 08:34

## 2025-07-12 RX ADMIN — Medication 2 TABLET(S): at 21:06

## 2025-07-12 RX ADMIN — CEFTRIAXONE 100 MILLIGRAM(S): 500 INJECTION, POWDER, FOR SOLUTION INTRAMUSCULAR; INTRAVENOUS at 05:27

## 2025-07-13 LAB
ALBUMIN SERPL ELPH-MCNC: 2.6 G/DL — LOW (ref 3.3–5)
ALBUMIN SERPL ELPH-MCNC: 2.7 G/DL — LOW (ref 3.3–5)
ALP SERPL-CCNC: 164 U/L — HIGH (ref 40–120)
ALP SERPL-CCNC: 171 U/L — HIGH (ref 40–120)
ALT FLD-CCNC: 14 U/L — SIGNIFICANT CHANGE UP (ref 10–45)
ALT FLD-CCNC: 14 U/L — SIGNIFICANT CHANGE UP (ref 10–45)
ANION GAP SERPL CALC-SCNC: 10 MMOL/L — SIGNIFICANT CHANGE UP (ref 5–17)
ANION GAP SERPL CALC-SCNC: 10 MMOL/L — SIGNIFICANT CHANGE UP (ref 5–17)
APTT BLD: 60.2 SEC — HIGH (ref 26.1–36.8)
APTT BLD: 60.2 SEC — HIGH (ref 26.1–36.8)
AST SERPL-CCNC: 18 U/L — SIGNIFICANT CHANGE UP (ref 10–40)
AST SERPL-CCNC: 18 U/L — SIGNIFICANT CHANGE UP (ref 10–40)
BASOPHILS # BLD AUTO: 0.04 K/UL — SIGNIFICANT CHANGE UP (ref 0–0.2)
BASOPHILS NFR BLD AUTO: 0.5 % — SIGNIFICANT CHANGE UP (ref 0–2)
BILIRUB SERPL-MCNC: 0.4 MG/DL — SIGNIFICANT CHANGE UP (ref 0.2–1.2)
BILIRUB SERPL-MCNC: 0.6 MG/DL — SIGNIFICANT CHANGE UP (ref 0.2–1.2)
BLD GP AB SCN SERPL QL: NEGATIVE — SIGNIFICANT CHANGE UP
BUN SERPL-MCNC: 68 MG/DL — HIGH (ref 7–23)
BUN SERPL-MCNC: 68 MG/DL — HIGH (ref 7–23)
CALCIUM SERPL-MCNC: 9.1 MG/DL — SIGNIFICANT CHANGE UP (ref 8.4–10.5)
CALCIUM SERPL-MCNC: 9.2 MG/DL — SIGNIFICANT CHANGE UP (ref 8.4–10.5)
CHLORIDE SERPL-SCNC: 92 MMOL/L — LOW (ref 96–108)
CHLORIDE SERPL-SCNC: 94 MMOL/L — LOW (ref 96–108)
CO2 SERPL-SCNC: 37 MMOL/L — HIGH (ref 22–31)
CO2 SERPL-SCNC: 39 MMOL/L — HIGH (ref 22–31)
CREAT SERPL-MCNC: 1.47 MG/DL — HIGH (ref 0.5–1.3)
CREAT SERPL-MCNC: 1.53 MG/DL — HIGH (ref 0.5–1.3)
DIGOXIN SERPL-MCNC: 1.5 NG/ML — SIGNIFICANT CHANGE UP (ref 0.8–2)
EGFR: 50 ML/MIN/1.73M2 — LOW
EGFR: 50 ML/MIN/1.73M2 — LOW
EGFR: 52 ML/MIN/1.73M2 — LOW
EGFR: 52 ML/MIN/1.73M2 — LOW
EOSINOPHIL # BLD AUTO: 0.33 K/UL — SIGNIFICANT CHANGE UP (ref 0–0.5)
EOSINOPHIL NFR BLD AUTO: 3.8 % — SIGNIFICANT CHANGE UP (ref 0–6)
GAS PNL BLDA: SIGNIFICANT CHANGE UP
GLUCOSE BLDC GLUCOMTR-MCNC: 130 MG/DL — HIGH (ref 70–99)
GLUCOSE BLDC GLUCOMTR-MCNC: 131 MG/DL — HIGH (ref 70–99)
GLUCOSE BLDC GLUCOMTR-MCNC: 141 MG/DL — HIGH (ref 70–99)
GLUCOSE BLDC GLUCOMTR-MCNC: 142 MG/DL — HIGH (ref 70–99)
GLUCOSE BLDC GLUCOMTR-MCNC: 148 MG/DL — HIGH (ref 70–99)
GLUCOSE SERPL-MCNC: 131 MG/DL — HIGH (ref 70–99)
GLUCOSE SERPL-MCNC: 134 MG/DL — HIGH (ref 70–99)
HCT VFR BLD CALC: 32.5 % — LOW (ref 39–50)
HGB BLD-MCNC: 9.5 G/DL — LOW (ref 13–17)
IMM GRANULOCYTES # BLD AUTO: 0.03 K/UL — SIGNIFICANT CHANGE UP (ref 0–0.07)
IMM GRANULOCYTES NFR BLD AUTO: 0.3 % — SIGNIFICANT CHANGE UP (ref 0–0.9)
INR BLD: 1.21 RATIO — HIGH (ref 0.85–1.16)
LYMPHOCYTES # BLD AUTO: 1.04 K/UL — SIGNIFICANT CHANGE UP (ref 1–3.3)
LYMPHOCYTES NFR BLD AUTO: 12 % — LOW (ref 13–44)
MAGNESIUM SERPL-MCNC: 2.4 MG/DL — SIGNIFICANT CHANGE UP (ref 1.6–2.6)
MAGNESIUM SERPL-MCNC: 2.4 MG/DL — SIGNIFICANT CHANGE UP (ref 1.6–2.6)
MCHC RBC-ENTMCNC: 28.4 PG — SIGNIFICANT CHANGE UP (ref 27–34)
MCHC RBC-ENTMCNC: 29.2 G/DL — LOW (ref 32–36)
MCV RBC AUTO: 97.3 FL — SIGNIFICANT CHANGE UP (ref 80–100)
MONOCYTES # BLD AUTO: 0.76 K/UL — SIGNIFICANT CHANGE UP (ref 0–0.9)
MONOCYTES NFR BLD AUTO: 8.8 % — SIGNIFICANT CHANGE UP (ref 2–14)
NEUTROPHILS # BLD AUTO: 6.44 K/UL — SIGNIFICANT CHANGE UP (ref 1.8–7.4)
NEUTROPHILS NFR BLD AUTO: 74.6 % — SIGNIFICANT CHANGE UP (ref 43–77)
NRBC # BLD AUTO: 0 K/UL — SIGNIFICANT CHANGE UP (ref 0–0)
NRBC # FLD: 0 K/UL — SIGNIFICANT CHANGE UP (ref 0–0)
NRBC BLD AUTO-RTO: 0 /100 WBCS — SIGNIFICANT CHANGE UP (ref 0–0)
PHOSPHATE SERPL-MCNC: 3.9 MG/DL — SIGNIFICANT CHANGE UP (ref 2.5–4.5)
PHOSPHATE SERPL-MCNC: 4 MG/DL — SIGNIFICANT CHANGE UP (ref 2.5–4.5)
PLATELET # BLD AUTO: 249 K/UL — SIGNIFICANT CHANGE UP (ref 150–400)
PMV BLD: 10 FL — SIGNIFICANT CHANGE UP (ref 7–13)
POTASSIUM SERPL-MCNC: 3.7 MMOL/L — SIGNIFICANT CHANGE UP (ref 3.5–5.3)
POTASSIUM SERPL-MCNC: 3.8 MMOL/L — SIGNIFICANT CHANGE UP (ref 3.5–5.3)
POTASSIUM SERPL-SCNC: 3.7 MMOL/L — SIGNIFICANT CHANGE UP (ref 3.5–5.3)
POTASSIUM SERPL-SCNC: 3.8 MMOL/L — SIGNIFICANT CHANGE UP (ref 3.5–5.3)
PROT SERPL-MCNC: 7.8 G/DL — SIGNIFICANT CHANGE UP (ref 6–8.3)
PROT SERPL-MCNC: 7.8 G/DL — SIGNIFICANT CHANGE UP (ref 6–8.3)
PROTHROM AB SERPL-ACNC: 13.8 SEC — HIGH (ref 9.9–13.4)
RBC # BLD: 3.34 M/UL — LOW (ref 4.2–5.8)
RBC # FLD: 17.2 % — HIGH (ref 10.3–14.5)
RH IG SCN BLD-IMP: POSITIVE — SIGNIFICANT CHANGE UP
SODIUM SERPL-SCNC: 141 MMOL/L — SIGNIFICANT CHANGE UP (ref 135–145)
SODIUM SERPL-SCNC: 141 MMOL/L — SIGNIFICANT CHANGE UP (ref 135–145)
UFH PPP CHRO-ACNC: 0.52 IU/ML — SIGNIFICANT CHANGE UP (ref 0.3–0.7)
WBC # BLD: 8.64 K/UL — SIGNIFICANT CHANGE UP (ref 3.8–10.5)
WBC # FLD AUTO: 8.64 K/UL — SIGNIFICANT CHANGE UP (ref 3.8–10.5)

## 2025-07-13 PROCEDURE — 71045 X-RAY EXAM CHEST 1 VIEW: CPT | Mod: 26

## 2025-07-13 PROCEDURE — 99291 CRITICAL CARE FIRST HOUR: CPT

## 2025-07-13 PROCEDURE — 93010 ELECTROCARDIOGRAM REPORT: CPT

## 2025-07-13 RX ORDER — FENTANYL CITRATE-0.9 % NACL/PF 100MCG/2ML
50 SYRINGE (ML) INTRAVENOUS ONCE
Refills: 0 | Status: DISCONTINUED | OUTPATIENT
Start: 2025-07-13 | End: 2025-07-13

## 2025-07-13 RX ORDER — LACTULOSE 10 G/15ML
10 SOLUTION ORAL EVERY 8 HOURS
Refills: 0 | Status: COMPLETED | OUTPATIENT
Start: 2025-07-13 | End: 2025-07-15

## 2025-07-13 RX ORDER — ACETAZOLAMIDE 250 MG/1
500 TABLET ORAL EVERY 8 HOURS
Refills: 0 | Status: COMPLETED | OUTPATIENT
Start: 2025-07-13 | End: 2025-07-14

## 2025-07-13 RX ADMIN — Medication 15 MILLILITER(S): at 05:40

## 2025-07-13 RX ADMIN — CEFTRIAXONE 100 MILLIGRAM(S): 500 INJECTION, POWDER, FOR SOLUTION INTRAMUSCULAR; INTRAVENOUS at 05:41

## 2025-07-13 RX ADMIN — Medication 1 TABLET(S): at 13:10

## 2025-07-13 RX ADMIN — Medication 1 APPLICATION(S): at 05:41

## 2025-07-13 RX ADMIN — DIGOXIN 125 MICROGRAM(S): 250 TABLET ORAL at 05:41

## 2025-07-13 RX ADMIN — Medication 20 MILLIEQUIVALENT(S): at 17:35

## 2025-07-13 RX ADMIN — HEPARIN SODIUM 16 UNIT(S)/HR: 1000 INJECTION INTRAVENOUS; SUBCUTANEOUS at 19:00

## 2025-07-13 RX ADMIN — Medication 50 MICROGRAM(S): at 02:26

## 2025-07-13 RX ADMIN — NOREPINEPHRINE BITARTRATE 10.3 MICROGRAM(S)/KG/MIN: 8 SOLUTION at 19:00

## 2025-07-13 RX ADMIN — ATORVASTATIN CALCIUM 80 MILLIGRAM(S): 80 TABLET, FILM COATED ORAL at 22:32

## 2025-07-13 RX ADMIN — ACETAZOLAMIDE 500 MILLIGRAM(S): 250 TABLET ORAL at 09:03

## 2025-07-13 RX ADMIN — Medication 40 MILLIEQUIVALENT(S): at 05:40

## 2025-07-13 RX ADMIN — CLOPIDOGREL BISULFATE 75 MILLIGRAM(S): 75 TABLET, FILM COATED ORAL at 13:12

## 2025-07-13 RX ADMIN — Medication 500 MILLIGRAM(S): at 13:11

## 2025-07-13 RX ADMIN — ACETAZOLAMIDE 500 MILLIGRAM(S): 250 TABLET ORAL at 17:35

## 2025-07-13 RX ADMIN — Medication 2 TABLET(S): at 22:33

## 2025-07-13 RX ADMIN — Medication 50 MICROGRAM(S): at 02:51

## 2025-07-13 RX ADMIN — IPRATROPIUM BROMIDE AND ALBUTEROL SULFATE 3 MILLILITER(S): .5; 2.5 SOLUTION RESPIRATORY (INHALATION) at 11:33

## 2025-07-13 RX ADMIN — IPRATROPIUM BROMIDE AND ALBUTEROL SULFATE 3 MILLILITER(S): .5; 2.5 SOLUTION RESPIRATORY (INHALATION) at 23:33

## 2025-07-13 RX ADMIN — DEXMEDETOMIDINE HYDROCHLORIDE IN SODIUM CHLORIDE 13.7 MICROGRAM(S)/KG/HR: 4 INJECTION INTRAVENOUS at 08:24

## 2025-07-13 RX ADMIN — NOREPINEPHRINE BITARTRATE 10.3 MICROGRAM(S)/KG/MIN: 8 SOLUTION at 08:24

## 2025-07-13 RX ADMIN — Medication 15 MILLILITER(S): at 17:35

## 2025-07-13 RX ADMIN — AMIODARONE HYDROCHLORIDE 200 MILLIGRAM(S): 50 INJECTION, SOLUTION INTRAVENOUS at 05:41

## 2025-07-13 RX ADMIN — IPRATROPIUM BROMIDE AND ALBUTEROL SULFATE 3 MILLILITER(S): .5; 2.5 SOLUTION RESPIRATORY (INHALATION) at 17:33

## 2025-07-13 RX ADMIN — Medication 40 MILLIGRAM(S): at 13:16

## 2025-07-13 RX ADMIN — IPRATROPIUM BROMIDE AND ALBUTEROL SULFATE 3 MILLILITER(S): .5; 2.5 SOLUTION RESPIRATORY (INHALATION) at 05:23

## 2025-07-13 RX ADMIN — LACTULOSE 10 GRAM(S): 10 SOLUTION ORAL at 13:10

## 2025-07-14 LAB
ALBUMIN SERPL ELPH-MCNC: 2.8 G/DL — LOW (ref 3.3–5)
ALBUMIN SERPL ELPH-MCNC: 2.9 G/DL — LOW (ref 3.3–5)
ALP SERPL-CCNC: 147 U/L — HIGH (ref 40–120)
ALP SERPL-CCNC: 163 U/L — HIGH (ref 40–120)
ALT FLD-CCNC: 13 U/L — SIGNIFICANT CHANGE UP (ref 10–45)
ALT FLD-CCNC: 17 U/L — SIGNIFICANT CHANGE UP (ref 10–45)
ANION GAP SERPL CALC-SCNC: 11 MMOL/L — SIGNIFICANT CHANGE UP (ref 5–17)
ANION GAP SERPL CALC-SCNC: 12 MMOL/L — SIGNIFICANT CHANGE UP (ref 5–17)
APTT BLD: 63.2 SEC — HIGH (ref 26.1–36.8)
AST SERPL-CCNC: 15 U/L — SIGNIFICANT CHANGE UP (ref 10–40)
AST SERPL-CCNC: 19 U/L — SIGNIFICANT CHANGE UP (ref 10–40)
BASOPHILS # BLD AUTO: 0.05 K/UL — SIGNIFICANT CHANGE UP (ref 0–0.2)
BASOPHILS NFR BLD AUTO: 0.7 % — SIGNIFICANT CHANGE UP (ref 0–2)
BILIRUB SERPL-MCNC: 0.4 MG/DL — SIGNIFICANT CHANGE UP (ref 0.2–1.2)
BILIRUB SERPL-MCNC: 0.4 MG/DL — SIGNIFICANT CHANGE UP (ref 0.2–1.2)
BUN SERPL-MCNC: 73 MG/DL — HIGH (ref 7–23)
BUN SERPL-MCNC: 77 MG/DL — HIGH (ref 7–23)
CALCIUM SERPL-MCNC: 8.9 MG/DL — SIGNIFICANT CHANGE UP (ref 8.4–10.5)
CALCIUM SERPL-MCNC: 9.5 MG/DL — SIGNIFICANT CHANGE UP (ref 8.4–10.5)
CHLORIDE SERPL-SCNC: 92 MMOL/L — LOW (ref 96–108)
CHLORIDE SERPL-SCNC: 96 MMOL/L — SIGNIFICANT CHANGE UP (ref 96–108)
CO2 SERPL-SCNC: 37 MMOL/L — HIGH (ref 22–31)
CO2 SERPL-SCNC: 38 MMOL/L — HIGH (ref 22–31)
CREAT SERPL-MCNC: 1.68 MG/DL — HIGH (ref 0.5–1.3)
CREAT SERPL-MCNC: 1.74 MG/DL — HIGH (ref 0.5–1.3)
EGFR: 43 ML/MIN/1.73M2 — LOW
EGFR: 43 ML/MIN/1.73M2 — LOW
EGFR: 45 ML/MIN/1.73M2 — LOW
EGFR: 45 ML/MIN/1.73M2 — LOW
EOSINOPHIL # BLD AUTO: 0.2 K/UL — SIGNIFICANT CHANGE UP (ref 0–0.5)
EOSINOPHIL NFR BLD AUTO: 2.8 % — SIGNIFICANT CHANGE UP (ref 0–6)
GAS PNL BLDA: SIGNIFICANT CHANGE UP
GLUCOSE BLDC GLUCOMTR-MCNC: 142 MG/DL — HIGH (ref 70–99)
GLUCOSE BLDC GLUCOMTR-MCNC: 98 MG/DL — SIGNIFICANT CHANGE UP (ref 70–99)
GLUCOSE SERPL-MCNC: 109 MG/DL — HIGH (ref 70–99)
GLUCOSE SERPL-MCNC: 136 MG/DL — HIGH (ref 70–99)
HCT VFR BLD CALC: 31.7 % — LOW (ref 39–50)
HGB BLD-MCNC: 9.2 G/DL — LOW (ref 13–17)
IMM GRANULOCYTES # BLD AUTO: 0.04 K/UL — SIGNIFICANT CHANGE UP (ref 0–0.07)
IMM GRANULOCYTES NFR BLD AUTO: 0.6 % — SIGNIFICANT CHANGE UP (ref 0–0.9)
INR BLD: 1.1 RATIO — SIGNIFICANT CHANGE UP (ref 0.85–1.16)
LYMPHOCYTES # BLD AUTO: 0.72 K/UL — LOW (ref 1–3.3)
LYMPHOCYTES NFR BLD AUTO: 10.2 % — LOW (ref 13–44)
MAGNESIUM SERPL-MCNC: 2.6 MG/DL — SIGNIFICANT CHANGE UP (ref 1.6–2.6)
MAGNESIUM SERPL-MCNC: 2.6 MG/DL — SIGNIFICANT CHANGE UP (ref 1.6–2.6)
MCHC RBC-ENTMCNC: 29 G/DL — LOW (ref 32–36)
MCHC RBC-ENTMCNC: 29.2 PG — SIGNIFICANT CHANGE UP (ref 27–34)
MCV RBC AUTO: 100.6 FL — HIGH (ref 80–100)
MONOCYTES # BLD AUTO: 0.61 K/UL — SIGNIFICANT CHANGE UP (ref 0–0.9)
MONOCYTES NFR BLD AUTO: 8.6 % — SIGNIFICANT CHANGE UP (ref 2–14)
NEUTROPHILS # BLD AUTO: 5.46 K/UL — SIGNIFICANT CHANGE UP (ref 1.8–7.4)
NEUTROPHILS NFR BLD AUTO: 77.1 % — HIGH (ref 43–77)
NRBC # BLD AUTO: 0 K/UL — SIGNIFICANT CHANGE UP (ref 0–0)
NRBC # FLD: 0 K/UL — SIGNIFICANT CHANGE UP (ref 0–0)
NRBC BLD AUTO-RTO: 0 /100 WBCS — SIGNIFICANT CHANGE UP (ref 0–0)
PHOSPHATE SERPL-MCNC: 5 MG/DL — HIGH (ref 2.5–4.5)
PHOSPHATE SERPL-MCNC: 5.2 MG/DL — HIGH (ref 2.5–4.5)
PLATELET # BLD AUTO: 219 K/UL — SIGNIFICANT CHANGE UP (ref 150–400)
PMV BLD: 10.6 FL — SIGNIFICANT CHANGE UP (ref 7–13)
POTASSIUM SERPL-MCNC: 3.9 MMOL/L — SIGNIFICANT CHANGE UP (ref 3.5–5.3)
POTASSIUM SERPL-MCNC: 4 MMOL/L — SIGNIFICANT CHANGE UP (ref 3.5–5.3)
POTASSIUM SERPL-SCNC: 3.9 MMOL/L — SIGNIFICANT CHANGE UP (ref 3.5–5.3)
POTASSIUM SERPL-SCNC: 4 MMOL/L — SIGNIFICANT CHANGE UP (ref 3.5–5.3)
PROT SERPL-MCNC: 7.6 G/DL — SIGNIFICANT CHANGE UP (ref 6–8.3)
PROT SERPL-MCNC: 7.9 G/DL — SIGNIFICANT CHANGE UP (ref 6–8.3)
PROTHROM AB SERPL-ACNC: 12.6 SEC — SIGNIFICANT CHANGE UP (ref 9.9–13.4)
RBC # BLD: 3.15 M/UL — LOW (ref 4.2–5.8)
RBC # FLD: 17.4 % — HIGH (ref 10.3–14.5)
SODIUM SERPL-SCNC: 142 MMOL/L — SIGNIFICANT CHANGE UP (ref 135–145)
SODIUM SERPL-SCNC: 144 MMOL/L — SIGNIFICANT CHANGE UP (ref 135–145)
WBC # BLD: 7.08 K/UL — SIGNIFICANT CHANGE UP (ref 3.8–10.5)
WBC # FLD AUTO: 7.08 K/UL — SIGNIFICANT CHANGE UP (ref 3.8–10.5)

## 2025-07-14 PROCEDURE — G0545: CPT

## 2025-07-14 PROCEDURE — 99232 SBSQ HOSP IP/OBS MODERATE 35: CPT

## 2025-07-14 PROCEDURE — 99291 CRITICAL CARE FIRST HOUR: CPT

## 2025-07-14 PROCEDURE — 71045 X-RAY EXAM CHEST 1 VIEW: CPT | Mod: 26

## 2025-07-14 RX ORDER — HEPARIN SODIUM 1000 [USP'U]/ML
1600 INJECTION INTRAVENOUS; SUBCUTANEOUS
Qty: 25000 | Refills: 0 | Status: DISCONTINUED | OUTPATIENT
Start: 2025-07-14 | End: 2025-07-17

## 2025-07-14 RX ORDER — DIGOXIN 250 UG/1
125 TABLET ORAL EVERY OTHER DAY
Refills: 0 | Status: DISCONTINUED | OUTPATIENT
Start: 2025-07-16 | End: 2025-07-21

## 2025-07-14 RX ORDER — BUMETANIDE 1 MG/1
2 TABLET ORAL ONCE
Refills: 0 | Status: COMPLETED | OUTPATIENT
Start: 2025-07-14 | End: 2025-07-14

## 2025-07-14 RX ORDER — FENTANYL CITRATE-0.9 % NACL/PF 100MCG/2ML
50 SYRINGE (ML) INTRAVENOUS ONCE
Refills: 0 | Status: DISCONTINUED | OUTPATIENT
Start: 2025-07-14 | End: 2025-07-14

## 2025-07-14 RX ORDER — ACETAZOLAMIDE 250 MG/1
500 TABLET ORAL ONCE
Refills: 0 | Status: COMPLETED | OUTPATIENT
Start: 2025-07-14 | End: 2025-07-14

## 2025-07-14 RX ADMIN — DIGOXIN 125 MICROGRAM(S): 250 TABLET ORAL at 06:25

## 2025-07-14 RX ADMIN — IPRATROPIUM BROMIDE AND ALBUTEROL SULFATE 3 MILLILITER(S): .5; 2.5 SOLUTION RESPIRATORY (INHALATION) at 05:17

## 2025-07-14 RX ADMIN — Medication 15 MILLILITER(S): at 06:25

## 2025-07-14 RX ADMIN — Medication 4 MILLILITER(S): at 17:16

## 2025-07-14 RX ADMIN — ACETAZOLAMIDE 500 MILLIGRAM(S): 250 TABLET ORAL at 01:36

## 2025-07-14 RX ADMIN — CLOPIDOGREL BISULFATE 75 MILLIGRAM(S): 75 TABLET, FILM COATED ORAL at 11:53

## 2025-07-14 RX ADMIN — Medication 4 MILLILITER(S): at 11:21

## 2025-07-14 RX ADMIN — Medication 4 MILLILITER(S): at 23:05

## 2025-07-14 RX ADMIN — HEPARIN SODIUM 16 UNIT(S)/HR: 1000 INJECTION INTRAVENOUS; SUBCUTANEOUS at 19:00

## 2025-07-14 RX ADMIN — Medication 50 MICROGRAM(S): at 04:10

## 2025-07-14 RX ADMIN — BUMETANIDE 2 MILLIGRAM(S): 1 TABLET ORAL at 11:52

## 2025-07-14 RX ADMIN — Medication 50 MICROGRAM(S): at 03:40

## 2025-07-14 RX ADMIN — IPRATROPIUM BROMIDE AND ALBUTEROL SULFATE 3 MILLILITER(S): .5; 2.5 SOLUTION RESPIRATORY (INHALATION) at 23:04

## 2025-07-14 RX ADMIN — Medication 500 MILLIGRAM(S): at 11:54

## 2025-07-14 RX ADMIN — ACETAZOLAMIDE 500 MILLIGRAM(S): 250 TABLET ORAL at 11:53

## 2025-07-14 RX ADMIN — AMIODARONE HYDROCHLORIDE 200 MILLIGRAM(S): 50 INJECTION, SOLUTION INTRAVENOUS at 06:25

## 2025-07-14 RX ADMIN — IPRATROPIUM BROMIDE AND ALBUTEROL SULFATE 3 MILLILITER(S): .5; 2.5 SOLUTION RESPIRATORY (INHALATION) at 11:21

## 2025-07-14 RX ADMIN — Medication 1 APPLICATION(S): at 06:40

## 2025-07-14 RX ADMIN — CEFTRIAXONE 100 MILLIGRAM(S): 500 INJECTION, POWDER, FOR SOLUTION INTRAMUSCULAR; INTRAVENOUS at 06:25

## 2025-07-14 RX ADMIN — Medication 1 TABLET(S): at 11:54

## 2025-07-14 RX ADMIN — Medication 40 MILLIGRAM(S): at 11:53

## 2025-07-14 RX ADMIN — IPRATROPIUM BROMIDE AND ALBUTEROL SULFATE 3 MILLILITER(S): .5; 2.5 SOLUTION RESPIRATORY (INHALATION) at 17:16

## 2025-07-15 LAB
ALBUMIN SERPL ELPH-MCNC: 2.9 G/DL — LOW (ref 3.3–5)
ALBUMIN SERPL ELPH-MCNC: 3 G/DL — LOW (ref 3.3–5)
ALBUMIN SERPL ELPH-MCNC: 3.1 G/DL — LOW (ref 3.3–5)
ALP SERPL-CCNC: 149 U/L — HIGH (ref 40–120)
ALP SERPL-CCNC: 150 U/L — HIGH (ref 40–120)
ALP SERPL-CCNC: 151 U/L — HIGH (ref 40–120)
ALT FLD-CCNC: 13 U/L — SIGNIFICANT CHANGE UP (ref 10–45)
ALT FLD-CCNC: 14 U/L — SIGNIFICANT CHANGE UP (ref 10–45)
ALT FLD-CCNC: 14 U/L — SIGNIFICANT CHANGE UP (ref 10–45)
ANION GAP SERPL CALC-SCNC: 11 MMOL/L — SIGNIFICANT CHANGE UP (ref 5–17)
ANION GAP SERPL CALC-SCNC: 13 MMOL/L — SIGNIFICANT CHANGE UP (ref 5–17)
ANION GAP SERPL CALC-SCNC: 13 MMOL/L — SIGNIFICANT CHANGE UP (ref 5–17)
APTT BLD: 77.1 SEC — HIGH (ref 26.1–36.8)
APTT BLD: 77.5 SEC — HIGH (ref 26.1–36.8)
APTT BLD: 84.4 SEC — HIGH (ref 26.1–36.8)
AST SERPL-CCNC: 15 U/L — SIGNIFICANT CHANGE UP (ref 10–40)
AST SERPL-CCNC: 16 U/L — SIGNIFICANT CHANGE UP (ref 10–40)
AST SERPL-CCNC: 17 U/L — SIGNIFICANT CHANGE UP (ref 10–40)
BASOPHILS # BLD AUTO: 0.05 K/UL — SIGNIFICANT CHANGE UP (ref 0–0.2)
BASOPHILS NFR BLD AUTO: 0.7 % — SIGNIFICANT CHANGE UP (ref 0–2)
BILIRUB SERPL-MCNC: 0.4 MG/DL — SIGNIFICANT CHANGE UP (ref 0.2–1.2)
BLD GP AB SCN SERPL QL: NEGATIVE — SIGNIFICANT CHANGE UP
BUN SERPL-MCNC: 75 MG/DL — HIGH (ref 7–23)
BUN SERPL-MCNC: 76 MG/DL — HIGH (ref 7–23)
BUN SERPL-MCNC: 80 MG/DL — HIGH (ref 7–23)
CALCIUM SERPL-MCNC: 9.6 MG/DL — SIGNIFICANT CHANGE UP (ref 8.4–10.5)
CALCIUM SERPL-MCNC: 9.6 MG/DL — SIGNIFICANT CHANGE UP (ref 8.4–10.5)
CALCIUM SERPL-MCNC: 9.9 MG/DL — SIGNIFICANT CHANGE UP (ref 8.4–10.5)
CHLORIDE SERPL-SCNC: 94 MMOL/L — LOW (ref 96–108)
CHLORIDE SERPL-SCNC: 96 MMOL/L — SIGNIFICANT CHANGE UP (ref 96–108)
CHLORIDE SERPL-SCNC: 97 MMOL/L — SIGNIFICANT CHANGE UP (ref 96–108)
CO2 SERPL-SCNC: 37 MMOL/L — HIGH (ref 22–31)
CREAT SERPL-MCNC: 1.68 MG/DL — HIGH (ref 0.5–1.3)
CREAT SERPL-MCNC: 1.68 MG/DL — HIGH (ref 0.5–1.3)
CREAT SERPL-MCNC: 1.82 MG/DL — HIGH (ref 0.5–1.3)
EGFR: 40 ML/MIN/1.73M2 — LOW
EGFR: 40 ML/MIN/1.73M2 — LOW
EGFR: 45 ML/MIN/1.73M2 — LOW
EOSINOPHIL # BLD AUTO: 0.26 K/UL — SIGNIFICANT CHANGE UP (ref 0–0.5)
EOSINOPHIL NFR BLD AUTO: 3.7 % — SIGNIFICANT CHANGE UP (ref 0–6)
GAS PNL BLDA: SIGNIFICANT CHANGE UP
GAS PNL BLDA: SIGNIFICANT CHANGE UP
GAS PNL BLDV: SIGNIFICANT CHANGE UP
GLUCOSE BLDC GLUCOMTR-MCNC: 105 MG/DL — HIGH (ref 70–99)
GLUCOSE BLDC GLUCOMTR-MCNC: 106 MG/DL — HIGH (ref 70–99)
GLUCOSE BLDC GLUCOMTR-MCNC: 110 MG/DL — HIGH (ref 70–99)
GLUCOSE BLDC GLUCOMTR-MCNC: 112 MG/DL — HIGH (ref 70–99)
GLUCOSE SERPL-MCNC: 101 MG/DL — HIGH (ref 70–99)
GLUCOSE SERPL-MCNC: 103 MG/DL — HIGH (ref 70–99)
GLUCOSE SERPL-MCNC: 110 MG/DL — HIGH (ref 70–99)
HCT VFR BLD CALC: 30.7 % — LOW (ref 39–50)
HGB BLD-MCNC: 8.7 G/DL — LOW (ref 13–17)
IMM GRANULOCYTES # BLD AUTO: 0.04 K/UL — SIGNIFICANT CHANGE UP (ref 0–0.07)
IMM GRANULOCYTES NFR BLD AUTO: 0.6 % — SIGNIFICANT CHANGE UP (ref 0–0.9)
INR BLD: 1.07 RATIO — SIGNIFICANT CHANGE UP (ref 0.85–1.16)
INR BLD: 1.11 RATIO — SIGNIFICANT CHANGE UP (ref 0.85–1.16)
INR BLD: 1.12 RATIO — SIGNIFICANT CHANGE UP (ref 0.85–1.16)
LYMPHOCYTES # BLD AUTO: 0.81 K/UL — LOW (ref 1–3.3)
LYMPHOCYTES NFR BLD AUTO: 11.4 % — LOW (ref 13–44)
MAGNESIUM SERPL-MCNC: 2.7 MG/DL — HIGH (ref 1.6–2.6)
MAGNESIUM SERPL-MCNC: 2.7 MG/DL — HIGH (ref 1.6–2.6)
MAGNESIUM SERPL-MCNC: 2.8 MG/DL — HIGH (ref 1.6–2.6)
MCHC RBC-ENTMCNC: 28.3 G/DL — LOW (ref 32–36)
MCHC RBC-ENTMCNC: 28.9 PG — SIGNIFICANT CHANGE UP (ref 27–34)
MCV RBC AUTO: 102 FL — HIGH (ref 80–100)
MONOCYTES # BLD AUTO: 0.71 K/UL — SIGNIFICANT CHANGE UP (ref 0–0.9)
MONOCYTES NFR BLD AUTO: 10 % — SIGNIFICANT CHANGE UP (ref 2–14)
NEUTROPHILS # BLD AUTO: 5.22 K/UL — SIGNIFICANT CHANGE UP (ref 1.8–7.4)
NEUTROPHILS NFR BLD AUTO: 73.6 % — SIGNIFICANT CHANGE UP (ref 43–77)
NRBC # BLD AUTO: 0 K/UL — SIGNIFICANT CHANGE UP (ref 0–0)
NRBC # FLD: 0 K/UL — SIGNIFICANT CHANGE UP (ref 0–0)
NRBC BLD AUTO-RTO: 0 /100 WBCS — SIGNIFICANT CHANGE UP (ref 0–0)
PHOSPHATE SERPL-MCNC: 4.3 MG/DL — SIGNIFICANT CHANGE UP (ref 2.5–4.5)
PHOSPHATE SERPL-MCNC: 4.4 MG/DL — SIGNIFICANT CHANGE UP (ref 2.5–4.5)
PHOSPHATE SERPL-MCNC: 5.1 MG/DL — HIGH (ref 2.5–4.5)
PLATELET # BLD AUTO: 217 K/UL — SIGNIFICANT CHANGE UP (ref 150–400)
PMV BLD: 10.2 FL — SIGNIFICANT CHANGE UP (ref 7–13)
POTASSIUM SERPL-MCNC: 3.5 MMOL/L — SIGNIFICANT CHANGE UP (ref 3.5–5.3)
POTASSIUM SERPL-MCNC: 3.7 MMOL/L — SIGNIFICANT CHANGE UP (ref 3.5–5.3)
POTASSIUM SERPL-MCNC: 3.8 MMOL/L — SIGNIFICANT CHANGE UP (ref 3.5–5.3)
POTASSIUM SERPL-SCNC: 3.5 MMOL/L — SIGNIFICANT CHANGE UP (ref 3.5–5.3)
POTASSIUM SERPL-SCNC: 3.7 MMOL/L — SIGNIFICANT CHANGE UP (ref 3.5–5.3)
POTASSIUM SERPL-SCNC: 3.8 MMOL/L — SIGNIFICANT CHANGE UP (ref 3.5–5.3)
PROT SERPL-MCNC: 8 G/DL — SIGNIFICANT CHANGE UP (ref 6–8.3)
PROT SERPL-MCNC: 8.2 G/DL — SIGNIFICANT CHANGE UP (ref 6–8.3)
PROT SERPL-MCNC: 8.3 G/DL — SIGNIFICANT CHANGE UP (ref 6–8.3)
PROTHROM AB SERPL-ACNC: 12.3 SEC — SIGNIFICANT CHANGE UP (ref 9.9–13.4)
PROTHROM AB SERPL-ACNC: 12.7 SEC — SIGNIFICANT CHANGE UP (ref 9.9–13.4)
PROTHROM AB SERPL-ACNC: 12.8 SEC — SIGNIFICANT CHANGE UP (ref 9.9–13.4)
RBC # BLD: 3.01 M/UL — LOW (ref 4.2–5.8)
RBC # FLD: 17.3 % — HIGH (ref 10.3–14.5)
RH IG SCN BLD-IMP: POSITIVE — SIGNIFICANT CHANGE UP
SODIUM SERPL-SCNC: 142 MMOL/L — SIGNIFICANT CHANGE UP (ref 135–145)
SODIUM SERPL-SCNC: 146 MMOL/L — HIGH (ref 135–145)
SODIUM SERPL-SCNC: 147 MMOL/L — HIGH (ref 135–145)
WBC # BLD: 7.09 K/UL — SIGNIFICANT CHANGE UP (ref 3.8–10.5)
WBC # FLD AUTO: 7.09 K/UL — SIGNIFICANT CHANGE UP (ref 3.8–10.5)

## 2025-07-15 PROCEDURE — 76937 US GUIDE VASCULAR ACCESS: CPT | Mod: 26,59

## 2025-07-15 PROCEDURE — 99291 CRITICAL CARE FIRST HOUR: CPT

## 2025-07-15 PROCEDURE — 99232 SBSQ HOSP IP/OBS MODERATE 35: CPT

## 2025-07-15 PROCEDURE — 36573 INSJ PICC RS&I 5 YR+: CPT

## 2025-07-15 PROCEDURE — G0545: CPT

## 2025-07-15 RX ORDER — SODIUM CHLORIDE 3 G/100ML
150 INJECTION, SOLUTION INTRAVENOUS ONCE
Refills: 0 | Status: COMPLETED | OUTPATIENT
Start: 2025-07-15 | End: 2025-07-15

## 2025-07-15 RX ORDER — SODIUM CHLORIDE 9 G/1000ML
500 INJECTION, SOLUTION INTRAVENOUS
Refills: 0 | Status: DISCONTINUED | OUTPATIENT
Start: 2025-07-15 | End: 2025-07-17

## 2025-07-15 RX ORDER — ACETAZOLAMIDE 250 MG/1
500 TABLET ORAL ONCE
Refills: 0 | Status: COMPLETED | OUTPATIENT
Start: 2025-07-15 | End: 2025-07-15

## 2025-07-15 RX ORDER — NOREPINEPHRINE BITARTRATE 8 MG
0.02 SOLUTION INTRAVENOUS
Qty: 8 | Refills: 0 | Status: DISCONTINUED | OUTPATIENT
Start: 2025-07-15 | End: 2025-07-17

## 2025-07-15 RX ORDER — CHLOROTHIAZIDE 250 MG/1
500 TABLET ORAL ONCE
Refills: 0 | Status: COMPLETED | OUTPATIENT
Start: 2025-07-15 | End: 2025-07-15

## 2025-07-15 RX ORDER — BUMETANIDE 1 MG/1
4 TABLET ORAL
Qty: 20 | Refills: 0 | Status: DISCONTINUED | OUTPATIENT
Start: 2025-07-15 | End: 2025-07-27

## 2025-07-15 RX ORDER — BUMETANIDE 1 MG/1
2 TABLET ORAL ONCE
Refills: 0 | Status: COMPLETED | OUTPATIENT
Start: 2025-07-15 | End: 2025-07-15

## 2025-07-15 RX ADMIN — IPRATROPIUM BROMIDE AND ALBUTEROL SULFATE 3 MILLILITER(S): .5; 2.5 SOLUTION RESPIRATORY (INHALATION) at 23:17

## 2025-07-15 RX ADMIN — HEPARIN SODIUM 15.5 UNIT(S)/HR: 1000 INJECTION INTRAVENOUS; SUBCUTANEOUS at 20:08

## 2025-07-15 RX ADMIN — IPRATROPIUM BROMIDE AND ALBUTEROL SULFATE 3 MILLILITER(S): .5; 2.5 SOLUTION RESPIRATORY (INHALATION) at 11:25

## 2025-07-15 RX ADMIN — IPRATROPIUM BROMIDE AND ALBUTEROL SULFATE 3 MILLILITER(S): .5; 2.5 SOLUTION RESPIRATORY (INHALATION) at 17:24

## 2025-07-15 RX ADMIN — Medication 100 MILLIEQUIVALENT(S): at 17:57

## 2025-07-15 RX ADMIN — HEPARIN SODIUM 15.5 UNIT(S)/HR: 1000 INJECTION INTRAVENOUS; SUBCUTANEOUS at 09:25

## 2025-07-15 RX ADMIN — Medication 4 MILLILITER(S): at 05:08

## 2025-07-15 RX ADMIN — Medication 50 MILLIEQUIVALENT(S): at 05:02

## 2025-07-15 RX ADMIN — Medication 4 MILLILITER(S): at 11:25

## 2025-07-15 RX ADMIN — Medication 4 MILLILITER(S): at 23:17

## 2025-07-15 RX ADMIN — BUMETANIDE 2 MILLIGRAM(S): 1 TABLET ORAL at 10:46

## 2025-07-15 RX ADMIN — BUMETANIDE 20 MG/HR: 1 TABLET ORAL at 20:09

## 2025-07-15 RX ADMIN — BUMETANIDE 20 MG/HR: 1 TABLET ORAL at 12:48

## 2025-07-15 RX ADMIN — ACETAZOLAMIDE 500 MILLIGRAM(S): 250 TABLET ORAL at 12:45

## 2025-07-15 RX ADMIN — Medication 40 MILLIGRAM(S): at 11:29

## 2025-07-15 RX ADMIN — NOREPINEPHRINE BITARTRATE 4.11 MICROGRAM(S)/KG/MIN: 8 SOLUTION at 09:25

## 2025-07-15 RX ADMIN — Medication 4 MILLILITER(S): at 17:24

## 2025-07-15 RX ADMIN — Medication 1 APPLICATION(S): at 05:02

## 2025-07-15 RX ADMIN — CEFTRIAXONE 100 MILLIGRAM(S): 500 INJECTION, POWDER, FOR SOLUTION INTRAMUSCULAR; INTRAVENOUS at 05:02

## 2025-07-15 RX ADMIN — SODIUM CHLORIDE 300 MILLILITER(S): 3 INJECTION, SOLUTION INTRAVENOUS at 15:38

## 2025-07-15 RX ADMIN — BUMETANIDE 10 MG/HR: 1 TABLET ORAL at 11:29

## 2025-07-15 RX ADMIN — Medication 100 MILLIEQUIVALENT(S): at 20:09

## 2025-07-15 RX ADMIN — NOREPINEPHRINE BITARTRATE 4.11 MICROGRAM(S)/KG/MIN: 8 SOLUTION at 20:08

## 2025-07-15 RX ADMIN — SODIUM CHLORIDE 300 MILLILITER(S): 3 INJECTION, SOLUTION INTRAVENOUS at 11:29

## 2025-07-15 RX ADMIN — CHLOROTHIAZIDE 100 MILLIGRAM(S): 250 TABLET ORAL at 15:34

## 2025-07-15 RX ADMIN — Medication 100 MILLIEQUIVALENT(S): at 19:14

## 2025-07-15 RX ADMIN — IPRATROPIUM BROMIDE AND ALBUTEROL SULFATE 3 MILLILITER(S): .5; 2.5 SOLUTION RESPIRATORY (INHALATION) at 05:07

## 2025-07-16 LAB
ALBUMIN SERPL ELPH-MCNC: 3 G/DL — LOW (ref 3.3–5)
ALBUMIN SERPL ELPH-MCNC: 3.1 G/DL — LOW (ref 3.3–5)
ALBUMIN SERPL ELPH-MCNC: 3.2 G/DL — LOW (ref 3.3–5)
ALBUMIN SERPL ELPH-MCNC: 3.4 G/DL — SIGNIFICANT CHANGE UP (ref 3.3–5)
ALP SERPL-CCNC: 148 U/L — HIGH (ref 40–120)
ALP SERPL-CCNC: 148 U/L — HIGH (ref 40–120)
ALP SERPL-CCNC: 149 U/L — HIGH (ref 40–120)
ALP SERPL-CCNC: 163 U/L — HIGH (ref 40–120)
ALT FLD-CCNC: 12 U/L — SIGNIFICANT CHANGE UP (ref 10–45)
ALT FLD-CCNC: 13 U/L — SIGNIFICANT CHANGE UP (ref 10–45)
ANION GAP SERPL CALC-SCNC: 11 MMOL/L — SIGNIFICANT CHANGE UP (ref 5–17)
ANION GAP SERPL CALC-SCNC: 12 MMOL/L — SIGNIFICANT CHANGE UP (ref 5–17)
ANION GAP SERPL CALC-SCNC: 12 MMOL/L — SIGNIFICANT CHANGE UP (ref 5–17)
ANION GAP SERPL CALC-SCNC: 13 MMOL/L — SIGNIFICANT CHANGE UP (ref 5–17)
APTT BLD: 76.9 SEC — HIGH (ref 26.1–36.8)
APTT BLD: 77.7 SEC — HIGH (ref 26.1–36.8)
AST SERPL-CCNC: 16 U/L — SIGNIFICANT CHANGE UP (ref 10–40)
AST SERPL-CCNC: 16 U/L — SIGNIFICANT CHANGE UP (ref 10–40)
AST SERPL-CCNC: 17 U/L — SIGNIFICANT CHANGE UP (ref 10–40)
AST SERPL-CCNC: 17 U/L — SIGNIFICANT CHANGE UP (ref 10–40)
BASOPHILS # BLD AUTO: 0.04 K/UL — SIGNIFICANT CHANGE UP (ref 0–0.2)
BASOPHILS # BLD AUTO: 0.04 K/UL — SIGNIFICANT CHANGE UP (ref 0–0.2)
BASOPHILS NFR BLD AUTO: 0.5 % — SIGNIFICANT CHANGE UP (ref 0–2)
BASOPHILS NFR BLD AUTO: 0.7 % — SIGNIFICANT CHANGE UP (ref 0–2)
BILIRUB SERPL-MCNC: 0.4 MG/DL — SIGNIFICANT CHANGE UP (ref 0.2–1.2)
BILIRUB SERPL-MCNC: 0.5 MG/DL — SIGNIFICANT CHANGE UP (ref 0.2–1.2)
BUN SERPL-MCNC: 74 MG/DL — HIGH (ref 7–23)
BUN SERPL-MCNC: 74 MG/DL — HIGH (ref 7–23)
BUN SERPL-MCNC: 76 MG/DL — HIGH (ref 7–23)
BUN SERPL-MCNC: 77 MG/DL — HIGH (ref 7–23)
CALCIUM SERPL-MCNC: 10.2 MG/DL — SIGNIFICANT CHANGE UP (ref 8.4–10.5)
CALCIUM SERPL-MCNC: 9.8 MG/DL — SIGNIFICANT CHANGE UP (ref 8.4–10.5)
CALCIUM SERPL-MCNC: 9.8 MG/DL — SIGNIFICANT CHANGE UP (ref 8.4–10.5)
CALCIUM SERPL-MCNC: 9.9 MG/DL — SIGNIFICANT CHANGE UP (ref 8.4–10.5)
CHLORIDE SERPL-SCNC: 94 MMOL/L — LOW (ref 96–108)
CHLORIDE SERPL-SCNC: 94 MMOL/L — LOW (ref 96–108)
CHLORIDE SERPL-SCNC: 95 MMOL/L — LOW (ref 96–108)
CHLORIDE SERPL-SCNC: 96 MMOL/L — SIGNIFICANT CHANGE UP (ref 96–108)
CO2 SERPL-SCNC: 38 MMOL/L — HIGH (ref 22–31)
CO2 SERPL-SCNC: 39 MMOL/L — HIGH (ref 22–31)
CO2 SERPL-SCNC: 39 MMOL/L — HIGH (ref 22–31)
CO2 SERPL-SCNC: 40 MMOL/L — HIGH (ref 22–31)
CREAT SERPL-MCNC: 1.54 MG/DL — HIGH (ref 0.5–1.3)
CREAT SERPL-MCNC: 1.7 MG/DL — HIGH (ref 0.5–1.3)
CREAT SERPL-MCNC: 1.72 MG/DL — HIGH (ref 0.5–1.3)
CREAT SERPL-MCNC: 1.72 MG/DL — HIGH (ref 0.5–1.3)
EGFR: 43 ML/MIN/1.73M2 — LOW
EGFR: 44 ML/MIN/1.73M2 — LOW
EGFR: 44 ML/MIN/1.73M2 — LOW
EGFR: 49 ML/MIN/1.73M2 — LOW
EGFR: 49 ML/MIN/1.73M2 — LOW
EOSINOPHIL # BLD AUTO: 0.17 K/UL — SIGNIFICANT CHANGE UP (ref 0–0.5)
EOSINOPHIL # BLD AUTO: 0.31 K/UL — SIGNIFICANT CHANGE UP (ref 0–0.5)
EOSINOPHIL NFR BLD AUTO: 2.8 % — SIGNIFICANT CHANGE UP (ref 0–6)
EOSINOPHIL NFR BLD AUTO: 4.1 % — SIGNIFICANT CHANGE UP (ref 0–6)
GAS PNL BLDA: SIGNIFICANT CHANGE UP
GLUCOSE BLDC GLUCOMTR-MCNC: 107 MG/DL — HIGH (ref 70–99)
GLUCOSE BLDC GLUCOMTR-MCNC: 108 MG/DL — HIGH (ref 70–99)
GLUCOSE BLDC GLUCOMTR-MCNC: 115 MG/DL — HIGH (ref 70–99)
GLUCOSE BLDC GLUCOMTR-MCNC: 91 MG/DL — SIGNIFICANT CHANGE UP (ref 70–99)
GLUCOSE SERPL-MCNC: 101 MG/DL — HIGH (ref 70–99)
GLUCOSE SERPL-MCNC: 120 MG/DL — HIGH (ref 70–99)
GLUCOSE SERPL-MCNC: 95 MG/DL — SIGNIFICANT CHANGE UP (ref 70–99)
GLUCOSE SERPL-MCNC: 98 MG/DL — SIGNIFICANT CHANGE UP (ref 70–99)
HCT VFR BLD CALC: 31.2 % — LOW (ref 39–50)
HCT VFR BLD CALC: 34 % — LOW (ref 39–50)
HGB BLD-MCNC: 8.9 G/DL — LOW (ref 13–17)
HGB BLD-MCNC: 9.9 G/DL — LOW (ref 13–17)
IMM GRANULOCYTES # BLD AUTO: 0.03 K/UL — SIGNIFICANT CHANGE UP (ref 0–0.07)
IMM GRANULOCYTES # BLD AUTO: 0.04 K/UL — SIGNIFICANT CHANGE UP (ref 0–0.07)
IMM GRANULOCYTES NFR BLD AUTO: 0.4 % — SIGNIFICANT CHANGE UP (ref 0–0.9)
IMM GRANULOCYTES NFR BLD AUTO: 0.7 % — SIGNIFICANT CHANGE UP (ref 0–0.9)
INR BLD: 1.11 RATIO — SIGNIFICANT CHANGE UP (ref 0.85–1.16)
LYMPHOCYTES # BLD AUTO: 0.59 K/UL — LOW (ref 1–3.3)
LYMPHOCYTES # BLD AUTO: 0.83 K/UL — LOW (ref 1–3.3)
LYMPHOCYTES NFR BLD AUTO: 10.9 % — LOW (ref 13–44)
LYMPHOCYTES NFR BLD AUTO: 9.6 % — LOW (ref 13–44)
MAGNESIUM SERPL-MCNC: 2.5 MG/DL — SIGNIFICANT CHANGE UP (ref 1.6–2.6)
MAGNESIUM SERPL-MCNC: 2.5 MG/DL — SIGNIFICANT CHANGE UP (ref 1.6–2.6)
MAGNESIUM SERPL-MCNC: 2.7 MG/DL — HIGH (ref 1.6–2.6)
MAGNESIUM SERPL-MCNC: 2.7 MG/DL — HIGH (ref 1.6–2.6)
MCHC RBC-ENTMCNC: 28.5 G/DL — LOW (ref 32–36)
MCHC RBC-ENTMCNC: 29.1 G/DL — LOW (ref 32–36)
MCHC RBC-ENTMCNC: 29.3 PG — SIGNIFICANT CHANGE UP (ref 27–34)
MCHC RBC-ENTMCNC: 29.6 PG — SIGNIFICANT CHANGE UP (ref 27–34)
MCV RBC AUTO: 101.5 FL — HIGH (ref 80–100)
MCV RBC AUTO: 102.6 FL — HIGH (ref 80–100)
MONOCYTES # BLD AUTO: 0.53 K/UL — SIGNIFICANT CHANGE UP (ref 0–0.9)
MONOCYTES # BLD AUTO: 0.73 K/UL — SIGNIFICANT CHANGE UP (ref 0–0.9)
MONOCYTES NFR BLD AUTO: 8.6 % — SIGNIFICANT CHANGE UP (ref 2–14)
MONOCYTES NFR BLD AUTO: 9.6 % — SIGNIFICANT CHANGE UP (ref 2–14)
NEUTROPHILS # BLD AUTO: 4.76 K/UL — SIGNIFICANT CHANGE UP (ref 1.8–7.4)
NEUTROPHILS # BLD AUTO: 5.66 K/UL — SIGNIFICANT CHANGE UP (ref 1.8–7.4)
NEUTROPHILS NFR BLD AUTO: 74.5 % — SIGNIFICANT CHANGE UP (ref 43–77)
NEUTROPHILS NFR BLD AUTO: 77.6 % — HIGH (ref 43–77)
NRBC # BLD AUTO: 0 K/UL — SIGNIFICANT CHANGE UP (ref 0–0)
NRBC # BLD AUTO: 0 K/UL — SIGNIFICANT CHANGE UP (ref 0–0)
NRBC # FLD: 0 K/UL — SIGNIFICANT CHANGE UP (ref 0–0)
NRBC # FLD: 0 K/UL — SIGNIFICANT CHANGE UP (ref 0–0)
NRBC BLD AUTO-RTO: 0 /100 WBCS — SIGNIFICANT CHANGE UP (ref 0–0)
NRBC BLD AUTO-RTO: 0 /100 WBCS — SIGNIFICANT CHANGE UP (ref 0–0)
PHOSPHATE SERPL-MCNC: 3.6 MG/DL — SIGNIFICANT CHANGE UP (ref 2.5–4.5)
PHOSPHATE SERPL-MCNC: 3.9 MG/DL — SIGNIFICANT CHANGE UP (ref 2.5–4.5)
PHOSPHATE SERPL-MCNC: 4.2 MG/DL — SIGNIFICANT CHANGE UP (ref 2.5–4.5)
PHOSPHATE SERPL-MCNC: 4.2 MG/DL — SIGNIFICANT CHANGE UP (ref 2.5–4.5)
PLATELET # BLD AUTO: 256 K/UL — SIGNIFICANT CHANGE UP (ref 150–400)
PLATELET # BLD AUTO: 289 K/UL — SIGNIFICANT CHANGE UP (ref 150–400)
PMV BLD: 10.5 FL — SIGNIFICANT CHANGE UP (ref 7–13)
PMV BLD: 9.9 FL — SIGNIFICANT CHANGE UP (ref 7–13)
POTASSIUM SERPL-MCNC: 3.3 MMOL/L — LOW (ref 3.5–5.3)
POTASSIUM SERPL-MCNC: 3.7 MMOL/L — SIGNIFICANT CHANGE UP (ref 3.5–5.3)
POTASSIUM SERPL-MCNC: 3.8 MMOL/L — SIGNIFICANT CHANGE UP (ref 3.5–5.3)
POTASSIUM SERPL-MCNC: 4.7 MMOL/L — SIGNIFICANT CHANGE UP (ref 3.5–5.3)
POTASSIUM SERPL-SCNC: 3.3 MMOL/L — LOW (ref 3.5–5.3)
POTASSIUM SERPL-SCNC: 3.7 MMOL/L — SIGNIFICANT CHANGE UP (ref 3.5–5.3)
POTASSIUM SERPL-SCNC: 3.8 MMOL/L — SIGNIFICANT CHANGE UP (ref 3.5–5.3)
POTASSIUM SERPL-SCNC: 4.7 MMOL/L — SIGNIFICANT CHANGE UP (ref 3.5–5.3)
PROT SERPL-MCNC: 8.3 G/DL — SIGNIFICANT CHANGE UP (ref 6–8.3)
PROT SERPL-MCNC: 8.4 G/DL — HIGH (ref 6–8.3)
PROT SERPL-MCNC: 8.5 G/DL — HIGH (ref 6–8.3)
PROT SERPL-MCNC: 8.9 G/DL — HIGH (ref 6–8.3)
PROTHROM AB SERPL-ACNC: 12.7 SEC — SIGNIFICANT CHANGE UP (ref 9.9–13.4)
RBC # BLD: 3.04 M/UL — LOW (ref 4.2–5.8)
RBC # BLD: 3.35 M/UL — LOW (ref 4.2–5.8)
RBC # FLD: 17.1 % — HIGH (ref 10.3–14.5)
RBC # FLD: 17.3 % — HIGH (ref 10.3–14.5)
SODIUM SERPL-SCNC: 145 MMOL/L — SIGNIFICANT CHANGE UP (ref 135–145)
SODIUM SERPL-SCNC: 145 MMOL/L — SIGNIFICANT CHANGE UP (ref 135–145)
SODIUM SERPL-SCNC: 146 MMOL/L — HIGH (ref 135–145)
SODIUM SERPL-SCNC: 147 MMOL/L — HIGH (ref 135–145)
UFH PPP CHRO-ACNC: 0.67 IU/ML — SIGNIFICANT CHANGE UP (ref 0.3–0.7)
WBC # BLD: 6.13 K/UL — SIGNIFICANT CHANGE UP (ref 3.8–10.5)
WBC # BLD: 7.6 K/UL — SIGNIFICANT CHANGE UP (ref 3.8–10.5)
WBC # FLD AUTO: 6.13 K/UL — SIGNIFICANT CHANGE UP (ref 3.8–10.5)
WBC # FLD AUTO: 7.6 K/UL — SIGNIFICANT CHANGE UP (ref 3.8–10.5)

## 2025-07-16 PROCEDURE — 70496 CT ANGIOGRAPHY HEAD: CPT | Mod: 26

## 2025-07-16 PROCEDURE — 70498 CT ANGIOGRAPHY NECK: CPT | Mod: 26

## 2025-07-16 PROCEDURE — 70450 CT HEAD/BRAIN W/O DYE: CPT | Mod: 26,XU

## 2025-07-16 PROCEDURE — 99291 CRITICAL CARE FIRST HOUR: CPT

## 2025-07-16 PROCEDURE — 93010 ELECTROCARDIOGRAM REPORT: CPT

## 2025-07-16 RX ORDER — CHLOROTHIAZIDE 250 MG/1
500 TABLET ORAL ONCE
Refills: 0 | Status: COMPLETED | OUTPATIENT
Start: 2025-07-16 | End: 2025-07-16

## 2025-07-16 RX ORDER — SODIUM CHLORIDE 3 G/100ML
150 INJECTION, SOLUTION INTRAVENOUS ONCE
Refills: 0 | Status: COMPLETED | OUTPATIENT
Start: 2025-07-16 | End: 2025-07-16

## 2025-07-16 RX ORDER — ACETAZOLAMIDE 250 MG/1
500 TABLET ORAL ONCE
Refills: 0 | Status: COMPLETED | OUTPATIENT
Start: 2025-07-16 | End: 2025-07-16

## 2025-07-16 RX ORDER — ACETAMINOPHEN 500 MG/5ML
1000 LIQUID (ML) ORAL ONCE
Refills: 0 | Status: COMPLETED | OUTPATIENT
Start: 2025-07-16 | End: 2025-07-16

## 2025-07-16 RX ORDER — DROXIDOPA 300 MG/1
200 CAPSULE ORAL EVERY 8 HOURS
Refills: 0 | Status: DISCONTINUED | OUTPATIENT
Start: 2025-07-16 | End: 2025-07-17

## 2025-07-16 RX ADMIN — HEPARIN SODIUM 15.5 UNIT(S)/HR: 1000 INJECTION INTRAVENOUS; SUBCUTANEOUS at 07:38

## 2025-07-16 RX ADMIN — SODIUM CHLORIDE 100 MILLILITER(S): 9 INJECTION, SOLUTION INTRAVENOUS at 01:02

## 2025-07-16 RX ADMIN — Medication 40 MILLIGRAM(S): at 12:27

## 2025-07-16 RX ADMIN — IPRATROPIUM BROMIDE AND ALBUTEROL SULFATE 3 MILLILITER(S): .5; 2.5 SOLUTION RESPIRATORY (INHALATION) at 11:34

## 2025-07-16 RX ADMIN — Medication 50 MILLIEQUIVALENT(S): at 02:57

## 2025-07-16 RX ADMIN — Medication 500 MILLIGRAM(S): at 12:27

## 2025-07-16 RX ADMIN — Medication 4 MILLILITER(S): at 05:07

## 2025-07-16 RX ADMIN — Medication 50 MILLIEQUIVALENT(S): at 03:51

## 2025-07-16 RX ADMIN — CLOPIDOGREL BISULFATE 75 MILLIGRAM(S): 75 TABLET, FILM COATED ORAL at 12:27

## 2025-07-16 RX ADMIN — Medication 40 MILLIEQUIVALENT(S): at 14:06

## 2025-07-16 RX ADMIN — IPRATROPIUM BROMIDE AND ALBUTEROL SULFATE 3 MILLILITER(S): .5; 2.5 SOLUTION RESPIRATORY (INHALATION) at 23:47

## 2025-07-16 RX ADMIN — HEPARIN SODIUM 15.5 UNIT(S)/HR: 1000 INJECTION INTRAVENOUS; SUBCUTANEOUS at 19:41

## 2025-07-16 RX ADMIN — CHLOROTHIAZIDE 100 MILLIGRAM(S): 250 TABLET ORAL at 02:56

## 2025-07-16 RX ADMIN — ACETAZOLAMIDE 500 MILLIGRAM(S): 250 TABLET ORAL at 12:28

## 2025-07-16 RX ADMIN — DIGOXIN 125 MICROGRAM(S): 250 TABLET ORAL at 12:27

## 2025-07-16 RX ADMIN — BUMETANIDE 20 MG/HR: 1 TABLET ORAL at 07:38

## 2025-07-16 RX ADMIN — Medication 50 MILLIEQUIVALENT(S): at 01:02

## 2025-07-16 RX ADMIN — NOREPINEPHRINE BITARTRATE 4.11 MICROGRAM(S)/KG/MIN: 8 SOLUTION at 19:41

## 2025-07-16 RX ADMIN — Medication 1 TABLET(S): at 12:27

## 2025-07-16 RX ADMIN — Medication 1000 MILLIGRAM(S): at 07:44

## 2025-07-16 RX ADMIN — Medication 1 APPLICATION(S): at 06:23

## 2025-07-16 RX ADMIN — IPRATROPIUM BROMIDE AND ALBUTEROL SULFATE 3 MILLILITER(S): .5; 2.5 SOLUTION RESPIRATORY (INHALATION) at 17:46

## 2025-07-16 RX ADMIN — CEFTRIAXONE 100 MILLIGRAM(S): 500 INJECTION, POWDER, FOR SOLUTION INTRAMUSCULAR; INTRAVENOUS at 06:23

## 2025-07-16 RX ADMIN — Medication 40 MILLIEQUIVALENT(S): at 17:46

## 2025-07-16 RX ADMIN — SODIUM CHLORIDE 300 MILLILITER(S): 3 INJECTION, SOLUTION INTRAVENOUS at 12:27

## 2025-07-16 RX ADMIN — DROXIDOPA 200 MILLIGRAM(S): 300 CAPSULE ORAL at 16:22

## 2025-07-16 RX ADMIN — Medication 400 MILLIGRAM(S): at 07:14

## 2025-07-16 RX ADMIN — NOREPINEPHRINE BITARTRATE 4.11 MICROGRAM(S)/KG/MIN: 8 SOLUTION at 07:38

## 2025-07-16 RX ADMIN — BUMETANIDE 20 MG/HR: 1 TABLET ORAL at 02:57

## 2025-07-17 LAB
ALBUMIN SERPL ELPH-MCNC: 3.4 G/DL — SIGNIFICANT CHANGE UP (ref 3.3–5)
ALBUMIN SERPL ELPH-MCNC: 3.5 G/DL — SIGNIFICANT CHANGE UP (ref 3.3–5)
ALP SERPL-CCNC: 160 U/L — HIGH (ref 40–120)
ALP SERPL-CCNC: 161 U/L — HIGH (ref 40–120)
ALP SERPL-CCNC: 163 U/L — HIGH (ref 40–120)
ALP SERPL-CCNC: 164 U/L — HIGH (ref 40–120)
ALT FLD-CCNC: 11 U/L — SIGNIFICANT CHANGE UP (ref 10–45)
ALT FLD-CCNC: 9 U/L — LOW (ref 10–45)
ANION GAP SERPL CALC-SCNC: 11 MMOL/L — SIGNIFICANT CHANGE UP (ref 5–17)
ANION GAP SERPL CALC-SCNC: 12 MMOL/L — SIGNIFICANT CHANGE UP (ref 5–17)
ANION GAP SERPL CALC-SCNC: 12 MMOL/L — SIGNIFICANT CHANGE UP (ref 5–17)
ANION GAP SERPL CALC-SCNC: 14 MMOL/L — SIGNIFICANT CHANGE UP (ref 5–17)
APTT BLD: 33.2 SEC — SIGNIFICANT CHANGE UP (ref 26.1–36.8)
AST SERPL-CCNC: 16 U/L — SIGNIFICANT CHANGE UP (ref 10–40)
AST SERPL-CCNC: 17 U/L — SIGNIFICANT CHANGE UP (ref 10–40)
AST SERPL-CCNC: 18 U/L — SIGNIFICANT CHANGE UP (ref 10–40)
AST SERPL-CCNC: 19 U/L — SIGNIFICANT CHANGE UP (ref 10–40)
BASOPHILS # BLD AUTO: 0.04 K/UL — SIGNIFICANT CHANGE UP (ref 0–0.2)
BASOPHILS NFR BLD AUTO: 0.6 % — SIGNIFICANT CHANGE UP (ref 0–2)
BILIRUB SERPL-MCNC: 0.5 MG/DL — SIGNIFICANT CHANGE UP (ref 0.2–1.2)
BUN SERPL-MCNC: 72 MG/DL — HIGH (ref 7–23)
BUN SERPL-MCNC: 73 MG/DL — HIGH (ref 7–23)
CALCIUM SERPL-MCNC: 10.1 MG/DL — SIGNIFICANT CHANGE UP (ref 8.4–10.5)
CALCIUM SERPL-MCNC: 10.2 MG/DL — SIGNIFICANT CHANGE UP (ref 8.4–10.5)
CALCIUM SERPL-MCNC: 10.4 MG/DL — SIGNIFICANT CHANGE UP (ref 8.4–10.5)
CALCIUM SERPL-MCNC: 10.5 MG/DL — SIGNIFICANT CHANGE UP (ref 8.4–10.5)
CHLORIDE SERPL-SCNC: 92 MMOL/L — LOW (ref 96–108)
CHLORIDE SERPL-SCNC: 92 MMOL/L — LOW (ref 96–108)
CHLORIDE SERPL-SCNC: 95 MMOL/L — LOW (ref 96–108)
CHLORIDE SERPL-SCNC: 96 MMOL/L — SIGNIFICANT CHANGE UP (ref 96–108)
CO2 SERPL-SCNC: 34 MMOL/L — HIGH (ref 22–31)
CO2 SERPL-SCNC: 39 MMOL/L — HIGH (ref 22–31)
CO2 SERPL-SCNC: 40 MMOL/L — HIGH (ref 22–31)
CO2 SERPL-SCNC: 40 MMOL/L — HIGH (ref 22–31)
CREAT SERPL-MCNC: 1.46 MG/DL — HIGH (ref 0.5–1.3)
CREAT SERPL-MCNC: 1.49 MG/DL — HIGH (ref 0.5–1.3)
CREAT SERPL-MCNC: 1.54 MG/DL — HIGH (ref 0.5–1.3)
CREAT SERPL-MCNC: 1.55 MG/DL — HIGH (ref 0.5–1.3)
EGFR: 49 ML/MIN/1.73M2 — LOW
EGFR: 51 ML/MIN/1.73M2 — LOW
EGFR: 51 ML/MIN/1.73M2 — LOW
EGFR: 53 ML/MIN/1.73M2 — LOW
EGFR: 53 ML/MIN/1.73M2 — LOW
EOSINOPHIL # BLD AUTO: 0.21 K/UL — SIGNIFICANT CHANGE UP (ref 0–0.5)
EOSINOPHIL NFR BLD AUTO: 3.2 % — SIGNIFICANT CHANGE UP (ref 0–6)
GAS PNL BLDA: SIGNIFICANT CHANGE UP
GLUCOSE BLDC GLUCOMTR-MCNC: 96 MG/DL — SIGNIFICANT CHANGE UP (ref 70–99)
GLUCOSE SERPL-MCNC: 91 MG/DL — SIGNIFICANT CHANGE UP (ref 70–99)
GLUCOSE SERPL-MCNC: 96 MG/DL — SIGNIFICANT CHANGE UP (ref 70–99)
GLUCOSE SERPL-MCNC: 97 MG/DL — SIGNIFICANT CHANGE UP (ref 70–99)
GLUCOSE SERPL-MCNC: 97 MG/DL — SIGNIFICANT CHANGE UP (ref 70–99)
HCT VFR BLD CALC: 33.6 % — LOW (ref 39–50)
HGB BLD-MCNC: 9.6 G/DL — LOW (ref 13–17)
IMM GRANULOCYTES # BLD AUTO: 0.03 K/UL — SIGNIFICANT CHANGE UP (ref 0–0.07)
IMM GRANULOCYTES NFR BLD AUTO: 0.5 % — SIGNIFICANT CHANGE UP (ref 0–0.9)
INR BLD: 1.16 RATIO — SIGNIFICANT CHANGE UP (ref 0.85–1.16)
LYMPHOCYTES # BLD AUTO: 0.64 K/UL — LOW (ref 1–3.3)
LYMPHOCYTES NFR BLD AUTO: 9.7 % — LOW (ref 13–44)
MAGNESIUM SERPL-MCNC: 2.4 MG/DL — SIGNIFICANT CHANGE UP (ref 1.6–2.6)
MCHC RBC-ENTMCNC: 28.6 G/DL — LOW (ref 32–36)
MCHC RBC-ENTMCNC: 28.9 PG — SIGNIFICANT CHANGE UP (ref 27–34)
MCV RBC AUTO: 101.2 FL — HIGH (ref 80–100)
MONOCYTES # BLD AUTO: 0.67 K/UL — SIGNIFICANT CHANGE UP (ref 0–0.9)
MONOCYTES NFR BLD AUTO: 10.2 % — SIGNIFICANT CHANGE UP (ref 2–14)
NEUTROPHILS # BLD AUTO: 4.99 K/UL — SIGNIFICANT CHANGE UP (ref 1.8–7.4)
NEUTROPHILS NFR BLD AUTO: 75.8 % — SIGNIFICANT CHANGE UP (ref 43–77)
NRBC # BLD AUTO: 0 K/UL — SIGNIFICANT CHANGE UP (ref 0–0)
NRBC # FLD: 0 K/UL — SIGNIFICANT CHANGE UP (ref 0–0)
NRBC BLD AUTO-RTO: 0 /100 WBCS — SIGNIFICANT CHANGE UP (ref 0–0)
PHOSPHATE SERPL-MCNC: 3.6 MG/DL — SIGNIFICANT CHANGE UP (ref 2.5–4.5)
PHOSPHATE SERPL-MCNC: 3.8 MG/DL — SIGNIFICANT CHANGE UP (ref 2.5–4.5)
PHOSPHATE SERPL-MCNC: 3.8 MG/DL — SIGNIFICANT CHANGE UP (ref 2.5–4.5)
PHOSPHATE SERPL-MCNC: 3.9 MG/DL — SIGNIFICANT CHANGE UP (ref 2.5–4.5)
PLATELET # BLD AUTO: 284 K/UL — SIGNIFICANT CHANGE UP (ref 150–400)
PMV BLD: 10.2 FL — SIGNIFICANT CHANGE UP (ref 7–13)
POTASSIUM SERPL-MCNC: 3.7 MMOL/L — SIGNIFICANT CHANGE UP (ref 3.5–5.3)
POTASSIUM SERPL-MCNC: 3.9 MMOL/L — SIGNIFICANT CHANGE UP (ref 3.5–5.3)
POTASSIUM SERPL-MCNC: 4.1 MMOL/L — SIGNIFICANT CHANGE UP (ref 3.5–5.3)
POTASSIUM SERPL-MCNC: 4.2 MMOL/L — SIGNIFICANT CHANGE UP (ref 3.5–5.3)
POTASSIUM SERPL-SCNC: 3.7 MMOL/L — SIGNIFICANT CHANGE UP (ref 3.5–5.3)
POTASSIUM SERPL-SCNC: 3.9 MMOL/L — SIGNIFICANT CHANGE UP (ref 3.5–5.3)
POTASSIUM SERPL-SCNC: 4.1 MMOL/L — SIGNIFICANT CHANGE UP (ref 3.5–5.3)
POTASSIUM SERPL-SCNC: 4.2 MMOL/L — SIGNIFICANT CHANGE UP (ref 3.5–5.3)
PROT SERPL-MCNC: 8.9 G/DL — HIGH (ref 6–8.3)
PROT SERPL-MCNC: 8.9 G/DL — HIGH (ref 6–8.3)
PROT SERPL-MCNC: 9.2 G/DL — HIGH (ref 6–8.3)
PROT SERPL-MCNC: 9.2 G/DL — HIGH (ref 6–8.3)
PROTHROM AB SERPL-ACNC: 13.2 SEC — SIGNIFICANT CHANGE UP (ref 9.9–13.4)
RBC # BLD: 3.32 M/UL — LOW (ref 4.2–5.8)
RBC # FLD: 17.1 % — HIGH (ref 10.3–14.5)
SODIUM SERPL-SCNC: 144 MMOL/L — SIGNIFICANT CHANGE UP (ref 135–145)
SODIUM SERPL-SCNC: 145 MMOL/L — SIGNIFICANT CHANGE UP (ref 135–145)
UFH PPP CHRO-ACNC: 0.22 IU/ML — LOW (ref 0.3–0.7)
WBC # BLD: 6.58 K/UL — SIGNIFICANT CHANGE UP (ref 3.8–10.5)
WBC # FLD AUTO: 6.58 K/UL — SIGNIFICANT CHANGE UP (ref 3.8–10.5)

## 2025-07-17 PROCEDURE — 99222 1ST HOSP IP/OBS MODERATE 55: CPT

## 2025-07-17 PROCEDURE — 70450 CT HEAD/BRAIN W/O DYE: CPT | Mod: 26,76

## 2025-07-17 PROCEDURE — 99291 CRITICAL CARE FIRST HOUR: CPT

## 2025-07-17 PROCEDURE — 93010 ELECTROCARDIOGRAM REPORT: CPT

## 2025-07-17 RX ORDER — LEVETIRACETAM 10 MG/ML
500 INJECTION, SOLUTION INTRAVENOUS
Refills: 0 | Status: DISCONTINUED | OUTPATIENT
Start: 2025-07-17 | End: 2025-07-17

## 2025-07-17 RX ORDER — SODIUM CHLORIDE 3 G/100ML
150 INJECTION, SOLUTION INTRAVENOUS ONCE
Refills: 0 | Status: COMPLETED | OUTPATIENT
Start: 2025-07-17 | End: 2025-07-17

## 2025-07-17 RX ORDER — LEVETIRACETAM 10 MG/ML
500 INJECTION, SOLUTION INTRAVENOUS EVERY 12 HOURS
Refills: 0 | Status: DISCONTINUED | OUTPATIENT
Start: 2025-07-17 | End: 2025-07-22

## 2025-07-17 RX ORDER — SODIUM CHLORIDE 9 G/1000ML
1000 INJECTION, SOLUTION INTRAVENOUS
Refills: 0 | Status: DISCONTINUED | OUTPATIENT
Start: 2025-07-17 | End: 2025-07-17

## 2025-07-17 RX ORDER — NOREPINEPHRINE BITARTRATE 8 MG
0.02 SOLUTION INTRAVENOUS
Qty: 8 | Refills: 0 | Status: DISCONTINUED | OUTPATIENT
Start: 2025-07-17 | End: 2025-07-22

## 2025-07-17 RX ORDER — NICARDIPINE HCL 30 MG
5 CAPSULE ORAL
Qty: 40 | Refills: 0 | Status: DISCONTINUED | OUTPATIENT
Start: 2025-07-17 | End: 2025-07-17

## 2025-07-17 RX ADMIN — NOREPINEPHRINE BITARTRATE 4.11 MICROGRAM(S)/KG/MIN: 8 SOLUTION at 16:06

## 2025-07-17 RX ADMIN — SODIUM CHLORIDE 300 MILLILITER(S): 3 INJECTION, SOLUTION INTRAVENOUS at 16:06

## 2025-07-17 RX ADMIN — Medication 40 MILLIGRAM(S): at 11:27

## 2025-07-17 RX ADMIN — IPRATROPIUM BROMIDE AND ALBUTEROL SULFATE 3 MILLILITER(S): .5; 2.5 SOLUTION RESPIRATORY (INHALATION) at 11:12

## 2025-07-17 RX ADMIN — LEVETIRACETAM 500 MILLIGRAM(S): 10 INJECTION, SOLUTION INTRAVENOUS at 06:56

## 2025-07-17 RX ADMIN — IPRATROPIUM BROMIDE AND ALBUTEROL SULFATE 3 MILLILITER(S): .5; 2.5 SOLUTION RESPIRATORY (INHALATION) at 23:35

## 2025-07-17 RX ADMIN — Medication 100 MILLIEQUIVALENT(S): at 15:32

## 2025-07-17 RX ADMIN — CEFTRIAXONE 100 MILLIGRAM(S): 500 INJECTION, POWDER, FOR SOLUTION INTRAMUSCULAR; INTRAVENOUS at 05:45

## 2025-07-17 RX ADMIN — NOREPINEPHRINE BITARTRATE 4.11 MICROGRAM(S)/KG/MIN: 8 SOLUTION at 21:25

## 2025-07-17 RX ADMIN — IPRATROPIUM BROMIDE AND ALBUTEROL SULFATE 3 MILLILITER(S): .5; 2.5 SOLUTION RESPIRATORY (INHALATION) at 18:18

## 2025-07-17 RX ADMIN — Medication 1 APPLICATION(S): at 05:45

## 2025-07-17 RX ADMIN — SODIUM CHLORIDE 125 MILLILITER(S): 9 INJECTION, SOLUTION INTRAVENOUS at 00:39

## 2025-07-17 RX ADMIN — Medication 100 MILLIEQUIVALENT(S): at 16:37

## 2025-07-17 RX ADMIN — BUMETANIDE 20 MG/HR: 1 TABLET ORAL at 11:45

## 2025-07-17 RX ADMIN — Medication 100 MILLIEQUIVALENT(S): at 18:37

## 2025-07-17 RX ADMIN — Medication 100 MILLIEQUIVALENT(S): at 04:12

## 2025-07-17 RX ADMIN — IPRATROPIUM BROMIDE AND ALBUTEROL SULFATE 3 MILLILITER(S): .5; 2.5 SOLUTION RESPIRATORY (INHALATION) at 05:32

## 2025-07-17 RX ADMIN — LEVETIRACETAM 500 MILLIGRAM(S): 10 INJECTION, SOLUTION INTRAVENOUS at 18:38

## 2025-07-17 RX ADMIN — BUMETANIDE 20 MG/HR: 1 TABLET ORAL at 21:25

## 2025-07-17 RX ADMIN — SODIUM CHLORIDE 125 MILLILITER(S): 9 INJECTION, SOLUTION INTRAVENOUS at 11:38

## 2025-07-18 DIAGNOSIS — Z51.5 ENCOUNTER FOR PALLIATIVE CARE: ICD-10-CM

## 2025-07-18 DIAGNOSIS — Z71.89 OTHER SPECIFIED COUNSELING: ICD-10-CM

## 2025-07-18 LAB
ALBUMIN SERPL ELPH-MCNC: 3.3 G/DL — SIGNIFICANT CHANGE UP (ref 3.3–5)
ALBUMIN SERPL ELPH-MCNC: 3.5 G/DL — SIGNIFICANT CHANGE UP (ref 3.3–5)
ALBUMIN SERPL ELPH-MCNC: 3.6 G/DL — SIGNIFICANT CHANGE UP (ref 3.3–5)
ALP SERPL-CCNC: 145 U/L — HIGH (ref 40–120)
ALP SERPL-CCNC: 153 U/L — HIGH (ref 40–120)
ALP SERPL-CCNC: 156 U/L — HIGH (ref 40–120)
ALT FLD-CCNC: 12 U/L — SIGNIFICANT CHANGE UP (ref 10–45)
ALT FLD-CCNC: 12 U/L — SIGNIFICANT CHANGE UP (ref 10–45)
ALT FLD-CCNC: 14 U/L — SIGNIFICANT CHANGE UP (ref 10–45)
ANION GAP SERPL CALC-SCNC: 12 MMOL/L — SIGNIFICANT CHANGE UP (ref 5–17)
ANION GAP SERPL CALC-SCNC: 14 MMOL/L — SIGNIFICANT CHANGE UP (ref 5–17)
ANION GAP SERPL CALC-SCNC: 14 MMOL/L — SIGNIFICANT CHANGE UP (ref 5–17)
APTT BLD: 29.4 SEC — SIGNIFICANT CHANGE UP (ref 26.1–36.8)
APTT BLD: 47.1 SEC — HIGH (ref 26.1–36.8)
AST SERPL-CCNC: 19 U/L — SIGNIFICANT CHANGE UP (ref 10–40)
AST SERPL-CCNC: 21 U/L — SIGNIFICANT CHANGE UP (ref 10–40)
AST SERPL-CCNC: 21 U/L — SIGNIFICANT CHANGE UP (ref 10–40)
BASOPHILS # BLD AUTO: 0.06 K/UL — SIGNIFICANT CHANGE UP (ref 0–0.2)
BASOPHILS NFR BLD AUTO: 0.9 % — SIGNIFICANT CHANGE UP (ref 0–2)
BILIRUB SERPL-MCNC: 0.5 MG/DL — SIGNIFICANT CHANGE UP (ref 0.2–1.2)
BUN SERPL-MCNC: 78 MG/DL — HIGH (ref 7–23)
BUN SERPL-MCNC: 78 MG/DL — HIGH (ref 7–23)
BUN SERPL-MCNC: 79 MG/DL — HIGH (ref 7–23)
CALCIUM SERPL-MCNC: 10.2 MG/DL — SIGNIFICANT CHANGE UP (ref 8.4–10.5)
CALCIUM SERPL-MCNC: 10.5 MG/DL — SIGNIFICANT CHANGE UP (ref 8.4–10.5)
CALCIUM SERPL-MCNC: 10.7 MG/DL — HIGH (ref 8.4–10.5)
CHLORIDE SERPL-SCNC: 97 MMOL/L — SIGNIFICANT CHANGE UP (ref 96–108)
CHLORIDE SERPL-SCNC: 97 MMOL/L — SIGNIFICANT CHANGE UP (ref 96–108)
CHLORIDE SERPL-SCNC: 98 MMOL/L — SIGNIFICANT CHANGE UP (ref 96–108)
CO2 SERPL-SCNC: 33 MMOL/L — HIGH (ref 22–31)
CO2 SERPL-SCNC: 35 MMOL/L — HIGH (ref 22–31)
CO2 SERPL-SCNC: 38 MMOL/L — HIGH (ref 22–31)
CREAT SERPL-MCNC: 1.57 MG/DL — HIGH (ref 0.5–1.3)
CREAT SERPL-MCNC: 1.68 MG/DL — HIGH (ref 0.5–1.3)
CREAT SERPL-MCNC: 1.75 MG/DL — HIGH (ref 0.5–1.3)
DIGOXIN SERPL-MCNC: 1.7 NG/ML — SIGNIFICANT CHANGE UP (ref 0.8–2)
EGFR: 42 ML/MIN/1.73M2 — LOW
EGFR: 42 ML/MIN/1.73M2 — LOW
EGFR: 45 ML/MIN/1.73M2 — LOW
EGFR: 45 ML/MIN/1.73M2 — LOW
EGFR: 48 ML/MIN/1.73M2 — LOW
EGFR: 48 ML/MIN/1.73M2 — LOW
EOSINOPHIL # BLD AUTO: 0.23 K/UL — SIGNIFICANT CHANGE UP (ref 0–0.5)
EOSINOPHIL NFR BLD AUTO: 3.5 % — SIGNIFICANT CHANGE UP (ref 0–6)
FOLATE SERPL-MCNC: 9.6 NG/ML — SIGNIFICANT CHANGE UP
GAS PNL BLDA: SIGNIFICANT CHANGE UP
GLUCOSE BLDC GLUCOMTR-MCNC: 101 MG/DL — HIGH (ref 70–99)
GLUCOSE BLDC GLUCOMTR-MCNC: 133 MG/DL — HIGH (ref 70–99)
GLUCOSE BLDC GLUCOMTR-MCNC: 94 MG/DL — SIGNIFICANT CHANGE UP (ref 70–99)
GLUCOSE SERPL-MCNC: 120 MG/DL — HIGH (ref 70–99)
GLUCOSE SERPL-MCNC: 83 MG/DL — SIGNIFICANT CHANGE UP (ref 70–99)
GLUCOSE SERPL-MCNC: 92 MG/DL — SIGNIFICANT CHANGE UP (ref 70–99)
HCT VFR BLD CALC: 34.4 % — LOW (ref 39–50)
HGB BLD-MCNC: 10.1 G/DL — LOW (ref 13–17)
IMM GRANULOCYTES # BLD AUTO: 0.04 K/UL — SIGNIFICANT CHANGE UP (ref 0–0.07)
IMM GRANULOCYTES NFR BLD AUTO: 0.6 % — SIGNIFICANT CHANGE UP (ref 0–0.9)
INR BLD: 1.21 RATIO — HIGH (ref 0.85–1.16)
LYMPHOCYTES # BLD AUTO: 0.65 K/UL — LOW (ref 1–3.3)
LYMPHOCYTES NFR BLD AUTO: 10 % — LOW (ref 13–44)
MAGNESIUM SERPL-MCNC: 2.4 MG/DL — SIGNIFICANT CHANGE UP (ref 1.6–2.6)
MAGNESIUM SERPL-MCNC: 2.4 MG/DL — SIGNIFICANT CHANGE UP (ref 1.6–2.6)
MAGNESIUM SERPL-MCNC: 2.5 MG/DL — SIGNIFICANT CHANGE UP (ref 1.6–2.6)
MCHC RBC-ENTMCNC: 29.4 G/DL — LOW (ref 32–36)
MCHC RBC-ENTMCNC: 29.4 PG — SIGNIFICANT CHANGE UP (ref 27–34)
MCV RBC AUTO: 100 FL — SIGNIFICANT CHANGE UP (ref 80–100)
MONOCYTES # BLD AUTO: 0.7 K/UL — SIGNIFICANT CHANGE UP (ref 0–0.9)
MONOCYTES NFR BLD AUTO: 10.7 % — SIGNIFICANT CHANGE UP (ref 2–14)
NEUTROPHILS # BLD AUTO: 4.85 K/UL — SIGNIFICANT CHANGE UP (ref 1.8–7.4)
NEUTROPHILS NFR BLD AUTO: 74.3 % — SIGNIFICANT CHANGE UP (ref 43–77)
NRBC # BLD AUTO: 0 K/UL — SIGNIFICANT CHANGE UP (ref 0–0)
NRBC # FLD: 0 K/UL — SIGNIFICANT CHANGE UP (ref 0–0)
NRBC BLD AUTO-RTO: 0 /100 WBCS — SIGNIFICANT CHANGE UP (ref 0–0)
PHOSPHATE SERPL-MCNC: 3.7 MG/DL — SIGNIFICANT CHANGE UP (ref 2.5–4.5)
PHOSPHATE SERPL-MCNC: 3.7 MG/DL — SIGNIFICANT CHANGE UP (ref 2.5–4.5)
PHOSPHATE SERPL-MCNC: 4 MG/DL — SIGNIFICANT CHANGE UP (ref 2.5–4.5)
PLATELET # BLD AUTO: 294 K/UL — SIGNIFICANT CHANGE UP (ref 150–400)
PMV BLD: 9.8 FL — SIGNIFICANT CHANGE UP (ref 7–13)
POTASSIUM SERPL-MCNC: 3.5 MMOL/L — SIGNIFICANT CHANGE UP (ref 3.5–5.3)
POTASSIUM SERPL-MCNC: 3.8 MMOL/L — SIGNIFICANT CHANGE UP (ref 3.5–5.3)
POTASSIUM SERPL-MCNC: 3.9 MMOL/L — SIGNIFICANT CHANGE UP (ref 3.5–5.3)
POTASSIUM SERPL-SCNC: 3.5 MMOL/L — SIGNIFICANT CHANGE UP (ref 3.5–5.3)
POTASSIUM SERPL-SCNC: 3.8 MMOL/L — SIGNIFICANT CHANGE UP (ref 3.5–5.3)
POTASSIUM SERPL-SCNC: 3.9 MMOL/L — SIGNIFICANT CHANGE UP (ref 3.5–5.3)
PROT SERPL-MCNC: 9.1 G/DL — HIGH (ref 6–8.3)
PROT SERPL-MCNC: 9.2 G/DL — HIGH (ref 6–8.3)
PROT SERPL-MCNC: 9.6 G/DL — HIGH (ref 6–8.3)
PROTHROM AB SERPL-ACNC: 13.9 SEC — HIGH (ref 9.9–13.4)
RBC # BLD: 3.44 M/UL — LOW (ref 4.2–5.8)
RBC # FLD: 17.2 % — HIGH (ref 10.3–14.5)
SODIUM SERPL-SCNC: 145 MMOL/L — SIGNIFICANT CHANGE UP (ref 135–145)
SODIUM SERPL-SCNC: 146 MMOL/L — HIGH (ref 135–145)
SODIUM SERPL-SCNC: 147 MMOL/L — HIGH (ref 135–145)
UFH PPP CHRO-ACNC: 0.05 IU/ML — LOW (ref 0.3–0.7)
VIT B12 SERPL-MCNC: 851 PG/ML — SIGNIFICANT CHANGE UP (ref 232–1245)
WBC # BLD: 6.53 K/UL — SIGNIFICANT CHANGE UP (ref 3.8–10.5)
WBC # FLD AUTO: 6.53 K/UL — SIGNIFICANT CHANGE UP (ref 3.8–10.5)

## 2025-07-18 PROCEDURE — 85730 THROMBOPLASTIN TIME PARTIAL: CPT

## 2025-07-18 PROCEDURE — 80061 LIPID PANEL: CPT

## 2025-07-18 PROCEDURE — 83735 ASSAY OF MAGNESIUM: CPT

## 2025-07-18 PROCEDURE — 83036 HEMOGLOBIN GLYCOSYLATED A1C: CPT

## 2025-07-18 PROCEDURE — 70498 CT ANGIOGRAPHY NECK: CPT

## 2025-07-18 PROCEDURE — 99497 ADVNCD CARE PLAN 30 MIN: CPT | Mod: 25

## 2025-07-18 PROCEDURE — 85652 RBC SED RATE AUTOMATED: CPT

## 2025-07-18 PROCEDURE — 85610 PROTHROMBIN TIME: CPT

## 2025-07-18 PROCEDURE — 86900 BLOOD TYPING SEROLOGIC ABO: CPT

## 2025-07-18 PROCEDURE — 85018 HEMOGLOBIN: CPT

## 2025-07-18 PROCEDURE — 82330 ASSAY OF CALCIUM: CPT

## 2025-07-18 PROCEDURE — 84436 ASSAY OF TOTAL THYROXINE: CPT

## 2025-07-18 PROCEDURE — 99223 1ST HOSP IP/OBS HIGH 75: CPT

## 2025-07-18 PROCEDURE — 85025 COMPLETE CBC W/AUTO DIFF WBC: CPT

## 2025-07-18 PROCEDURE — 83605 ASSAY OF LACTIC ACID: CPT

## 2025-07-18 PROCEDURE — 97162 PT EVAL MOD COMPLEX 30 MIN: CPT

## 2025-07-18 PROCEDURE — 80053 COMPREHEN METABOLIC PANEL: CPT

## 2025-07-18 PROCEDURE — 86140 C-REACTIVE PROTEIN: CPT

## 2025-07-18 PROCEDURE — 82746 ASSAY OF FOLIC ACID SERUM: CPT

## 2025-07-18 PROCEDURE — 86901 BLOOD TYPING SEROLOGIC RH(D): CPT

## 2025-07-18 PROCEDURE — 84443 ASSAY THYROID STIM HORMONE: CPT

## 2025-07-18 PROCEDURE — 70496 CT ANGIOGRAPHY HEAD: CPT

## 2025-07-18 PROCEDURE — 94640 AIRWAY INHALATION TREATMENT: CPT

## 2025-07-18 PROCEDURE — 93320 DOPPLER ECHO COMPLETE: CPT

## 2025-07-18 PROCEDURE — 82607 VITAMIN B-12: CPT

## 2025-07-18 PROCEDURE — 93312 ECHO TRANSESOPHAGEAL: CPT

## 2025-07-18 PROCEDURE — 92610 EVALUATE SWALLOWING FUNCTION: CPT

## 2025-07-18 PROCEDURE — 87040 BLOOD CULTURE FOR BACTERIA: CPT

## 2025-07-18 PROCEDURE — 82947 ASSAY GLUCOSE BLOOD QUANT: CPT

## 2025-07-18 PROCEDURE — 84480 ASSAY TRIIODOTHYRONINE (T3): CPT

## 2025-07-18 PROCEDURE — 93325 DOPPLER ECHO COLOR FLOW MAPG: CPT

## 2025-07-18 PROCEDURE — 87640 STAPH A DNA AMP PROBE: CPT

## 2025-07-18 PROCEDURE — 86850 RBC ANTIBODY SCREEN: CPT

## 2025-07-18 PROCEDURE — 76376 3D RENDER W/INTRP POSTPROCES: CPT

## 2025-07-18 PROCEDURE — 82803 BLOOD GASES ANY COMBINATION: CPT

## 2025-07-18 PROCEDURE — G0545: CPT

## 2025-07-18 PROCEDURE — 97110 THERAPEUTIC EXERCISES: CPT

## 2025-07-18 PROCEDURE — 83880 ASSAY OF NATRIURETIC PEPTIDE: CPT

## 2025-07-18 PROCEDURE — 84439 ASSAY OF FREE THYROXINE: CPT

## 2025-07-18 PROCEDURE — 93005 ELECTROCARDIOGRAM TRACING: CPT

## 2025-07-18 PROCEDURE — 36600 WITHDRAWAL OF ARTERIAL BLOOD: CPT

## 2025-07-18 PROCEDURE — 82962 GLUCOSE BLOOD TEST: CPT

## 2025-07-18 PROCEDURE — 36415 COLL VENOUS BLD VENIPUNCTURE: CPT

## 2025-07-18 PROCEDURE — 97530 THERAPEUTIC ACTIVITIES: CPT

## 2025-07-18 PROCEDURE — 84100 ASSAY OF PHOSPHORUS: CPT

## 2025-07-18 PROCEDURE — 94002 VENT MGMT INPAT INIT DAY: CPT

## 2025-07-18 PROCEDURE — 84295 ASSAY OF SERUM SODIUM: CPT

## 2025-07-18 PROCEDURE — 84132 ASSAY OF SERUM POTASSIUM: CPT

## 2025-07-18 PROCEDURE — 93010 ELECTROCARDIOGRAM REPORT: CPT

## 2025-07-18 PROCEDURE — 85027 COMPLETE CBC AUTOMATED: CPT

## 2025-07-18 PROCEDURE — 94003 VENT MGMT INPAT SUBQ DAY: CPT

## 2025-07-18 PROCEDURE — 82435 ASSAY OF BLOOD CHLORIDE: CPT

## 2025-07-18 PROCEDURE — 82977 ASSAY OF GGT: CPT

## 2025-07-18 PROCEDURE — 80048 BASIC METABOLIC PNL TOTAL CA: CPT

## 2025-07-18 PROCEDURE — 85520 HEPARIN ASSAY: CPT

## 2025-07-18 PROCEDURE — 87641 MR-STAPH DNA AMP PROBE: CPT

## 2025-07-18 PROCEDURE — 81001 URINALYSIS AUTO W/SCOPE: CPT

## 2025-07-18 PROCEDURE — 99232 SBSQ HOSP IP/OBS MODERATE 35: CPT

## 2025-07-18 PROCEDURE — 76770 US EXAM ABDO BACK WALL COMP: CPT

## 2025-07-18 PROCEDURE — 93308 TTE F-UP OR LMTD: CPT

## 2025-07-18 PROCEDURE — 70450 CT HEAD/BRAIN W/O DYE: CPT

## 2025-07-18 PROCEDURE — 84145 PROCALCITONIN (PCT): CPT

## 2025-07-18 PROCEDURE — 80162 ASSAY OF DIGOXIN TOTAL: CPT

## 2025-07-18 PROCEDURE — 85014 HEMATOCRIT: CPT

## 2025-07-18 PROCEDURE — 99291 CRITICAL CARE FIRST HOUR: CPT | Mod: 25

## 2025-07-18 PROCEDURE — 94660 CPAP INITIATION&MGMT: CPT

## 2025-07-18 PROCEDURE — 71045 X-RAY EXAM CHEST 1 VIEW: CPT

## 2025-07-18 RX ORDER — HEPARIN SODIUM 1000 [USP'U]/ML
1300 INJECTION INTRAVENOUS; SUBCUTANEOUS
Qty: 25000 | Refills: 0 | Status: DISCONTINUED | OUTPATIENT
Start: 2025-07-18 | End: 2025-07-26

## 2025-07-18 RX ORDER — HEPARIN SODIUM 1000 [USP'U]/ML
1300 INJECTION INTRAVENOUS; SUBCUTANEOUS
Qty: 25000 | Refills: 0 | Status: DISCONTINUED | OUTPATIENT
Start: 2025-07-18 | End: 2025-07-18

## 2025-07-18 RX ORDER — CLOPIDOGREL BISULFATE 75 MG/1
75 TABLET, FILM COATED ORAL ONCE
Refills: 0 | Status: COMPLETED | OUTPATIENT
Start: 2025-07-18 | End: 2025-07-18

## 2025-07-18 RX ORDER — ACETYLCYSTEINE 200 MG/ML
4 INHALANT RESPIRATORY (INHALATION) THREE TIMES A DAY
Refills: 0 | Status: DISCONTINUED | OUTPATIENT
Start: 2025-07-18 | End: 2025-07-19

## 2025-07-18 RX ADMIN — LEVETIRACETAM 500 MILLIGRAM(S): 10 INJECTION, SOLUTION INTRAVENOUS at 05:20

## 2025-07-18 RX ADMIN — IPRATROPIUM BROMIDE AND ALBUTEROL SULFATE 3 MILLILITER(S): .5; 2.5 SOLUTION RESPIRATORY (INHALATION) at 22:34

## 2025-07-18 RX ADMIN — ATORVASTATIN CALCIUM 80 MILLIGRAM(S): 80 TABLET, FILM COATED ORAL at 21:12

## 2025-07-18 RX ADMIN — HEPARIN SODIUM 14 UNIT(S)/HR: 1000 INJECTION INTRAVENOUS; SUBCUTANEOUS at 19:33

## 2025-07-18 RX ADMIN — Medication 1 APPLICATION(S): at 05:12

## 2025-07-18 RX ADMIN — ACETYLCYSTEINE 4 MILLILITER(S): 200 INHALANT RESPIRATORY (INHALATION) at 22:32

## 2025-07-18 RX ADMIN — IPRATROPIUM BROMIDE AND ALBUTEROL SULFATE 3 MILLILITER(S): .5; 2.5 SOLUTION RESPIRATORY (INHALATION) at 05:42

## 2025-07-18 RX ADMIN — CEFTRIAXONE 100 MILLIGRAM(S): 500 INJECTION, POWDER, FOR SOLUTION INTRAMUSCULAR; INTRAVENOUS at 05:13

## 2025-07-18 RX ADMIN — BUMETANIDE 20 MG/HR: 1 TABLET ORAL at 12:52

## 2025-07-18 RX ADMIN — CLOPIDOGREL BISULFATE 75 MILLIGRAM(S): 75 TABLET, FILM COATED ORAL at 19:04

## 2025-07-18 RX ADMIN — HEPARIN SODIUM 13 UNIT(S)/HR: 1000 INJECTION INTRAVENOUS; SUBCUTANEOUS at 12:52

## 2025-07-18 RX ADMIN — IPRATROPIUM BROMIDE AND ALBUTEROL SULFATE 3 MILLILITER(S): .5; 2.5 SOLUTION RESPIRATORY (INHALATION) at 11:31

## 2025-07-18 RX ADMIN — DIGOXIN 125 MICROGRAM(S): 250 TABLET ORAL at 18:19

## 2025-07-18 RX ADMIN — Medication 100 MILLIEQUIVALENT(S): at 06:55

## 2025-07-18 RX ADMIN — IPRATROPIUM BROMIDE AND ALBUTEROL SULFATE 3 MILLILITER(S): .5; 2.5 SOLUTION RESPIRATORY (INHALATION) at 17:06

## 2025-07-18 RX ADMIN — NOREPINEPHRINE BITARTRATE 4.11 MICROGRAM(S)/KG/MIN: 8 SOLUTION at 12:53

## 2025-07-18 RX ADMIN — ACETYLCYSTEINE 4 MILLILITER(S): 200 INHALANT RESPIRATORY (INHALATION) at 11:31

## 2025-07-18 RX ADMIN — NOREPINEPHRINE BITARTRATE 4.11 MICROGRAM(S)/KG/MIN: 8 SOLUTION at 19:33

## 2025-07-18 RX ADMIN — LEVETIRACETAM 500 MILLIGRAM(S): 10 INJECTION, SOLUTION INTRAVENOUS at 18:59

## 2025-07-18 RX ADMIN — Medication 40 MILLIEQUIVALENT(S): at 21:12

## 2025-07-18 RX ADMIN — BUMETANIDE 20 MG/HR: 1 TABLET ORAL at 19:33

## 2025-07-18 RX ADMIN — Medication 40 MILLIGRAM(S): at 12:53

## 2025-07-18 RX ADMIN — Medication 2 TABLET(S): at 21:12

## 2025-07-19 LAB
ALBUMIN SERPL ELPH-MCNC: 3.2 G/DL — LOW (ref 3.3–5)
ALBUMIN SERPL ELPH-MCNC: 3.3 G/DL — SIGNIFICANT CHANGE UP (ref 3.3–5)
ALBUMIN SERPL ELPH-MCNC: 3.4 G/DL — SIGNIFICANT CHANGE UP (ref 3.3–5)
ALP SERPL-CCNC: 134 U/L — HIGH (ref 40–120)
ALP SERPL-CCNC: 138 U/L — HIGH (ref 40–120)
ALP SERPL-CCNC: 144 U/L — HIGH (ref 40–120)
ALT FLD-CCNC: 13 U/L — SIGNIFICANT CHANGE UP (ref 10–45)
ANION GAP SERPL CALC-SCNC: 12 MMOL/L — SIGNIFICANT CHANGE UP (ref 5–17)
ANION GAP SERPL CALC-SCNC: 14 MMOL/L — SIGNIFICANT CHANGE UP (ref 5–17)
ANION GAP SERPL CALC-SCNC: 15 MMOL/L — SIGNIFICANT CHANGE UP (ref 5–17)
APTT BLD: 54.3 SEC — HIGH (ref 26.1–36.8)
APTT BLD: 66.5 SEC — HIGH (ref 26.1–36.8)
APTT BLD: 79.4 SEC — HIGH (ref 26.1–36.8)
AST SERPL-CCNC: 20 U/L — SIGNIFICANT CHANGE UP (ref 10–40)
AST SERPL-CCNC: 21 U/L — SIGNIFICANT CHANGE UP (ref 10–40)
AST SERPL-CCNC: 22 U/L — SIGNIFICANT CHANGE UP (ref 10–40)
BASOPHILS # BLD AUTO: 0.06 K/UL — SIGNIFICANT CHANGE UP (ref 0–0.2)
BASOPHILS NFR BLD AUTO: 0.8 % — SIGNIFICANT CHANGE UP (ref 0–2)
BILIRUB SERPL-MCNC: 0.4 MG/DL — SIGNIFICANT CHANGE UP (ref 0.2–1.2)
BILIRUB SERPL-MCNC: 0.4 MG/DL — SIGNIFICANT CHANGE UP (ref 0.2–1.2)
BILIRUB SERPL-MCNC: 0.6 MG/DL — SIGNIFICANT CHANGE UP (ref 0.2–1.2)
BLD GP AB SCN SERPL QL: NEGATIVE — SIGNIFICANT CHANGE UP
BUN SERPL-MCNC: 71 MG/DL — HIGH (ref 7–23)
BUN SERPL-MCNC: 76 MG/DL — HIGH (ref 7–23)
BUN SERPL-MCNC: 76 MG/DL — HIGH (ref 7–23)
CALCIUM SERPL-MCNC: 10.2 MG/DL — SIGNIFICANT CHANGE UP (ref 8.4–10.5)
CALCIUM SERPL-MCNC: 10.2 MG/DL — SIGNIFICANT CHANGE UP (ref 8.4–10.5)
CALCIUM SERPL-MCNC: 9.8 MG/DL — SIGNIFICANT CHANGE UP (ref 8.4–10.5)
CHLORIDE SERPL-SCNC: 93 MMOL/L — LOW (ref 96–108)
CHLORIDE SERPL-SCNC: 94 MMOL/L — LOW (ref 96–108)
CHLORIDE SERPL-SCNC: 97 MMOL/L — SIGNIFICANT CHANGE UP (ref 96–108)
CO2 SERPL-SCNC: 33 MMOL/L — HIGH (ref 22–31)
CO2 SERPL-SCNC: 33 MMOL/L — HIGH (ref 22–31)
CO2 SERPL-SCNC: 34 MMOL/L — HIGH (ref 22–31)
CREAT SERPL-MCNC: 1.6 MG/DL — HIGH (ref 0.5–1.3)
CREAT SERPL-MCNC: 1.61 MG/DL — HIGH (ref 0.5–1.3)
CREAT SERPL-MCNC: 1.65 MG/DL — HIGH (ref 0.5–1.3)
DIGOXIN SERPL-MCNC: 1.7 NG/ML — SIGNIFICANT CHANGE UP (ref 0.8–2)
EGFR: 46 ML/MIN/1.73M2 — LOW
EGFR: 46 ML/MIN/1.73M2 — LOW
EGFR: 47 ML/MIN/1.73M2 — LOW
EOSINOPHIL # BLD AUTO: 0.33 K/UL — SIGNIFICANT CHANGE UP (ref 0–0.5)
EOSINOPHIL NFR BLD AUTO: 4.4 % — SIGNIFICANT CHANGE UP (ref 0–6)
GAS PNL BLDA: SIGNIFICANT CHANGE UP
GLUCOSE BLDC GLUCOMTR-MCNC: 119 MG/DL — HIGH (ref 70–99)
GLUCOSE BLDC GLUCOMTR-MCNC: 130 MG/DL — HIGH (ref 70–99)
GLUCOSE BLDC GLUCOMTR-MCNC: 134 MG/DL — HIGH (ref 70–99)
GLUCOSE BLDC GLUCOMTR-MCNC: 175 MG/DL — HIGH (ref 70–99)
GLUCOSE SERPL-MCNC: 111 MG/DL — HIGH (ref 70–99)
GLUCOSE SERPL-MCNC: 160 MG/DL — HIGH (ref 70–99)
GLUCOSE SERPL-MCNC: 188 MG/DL — HIGH (ref 70–99)
HCT VFR BLD CALC: 35.2 % — LOW (ref 39–50)
HGB BLD-MCNC: 10.3 G/DL — LOW (ref 13–17)
IMM GRANULOCYTES # BLD AUTO: 0.04 K/UL — SIGNIFICANT CHANGE UP (ref 0–0.07)
IMM GRANULOCYTES NFR BLD AUTO: 0.5 % — SIGNIFICANT CHANGE UP (ref 0–0.9)
INR BLD: 1.33 RATIO — HIGH (ref 0.85–1.16)
LYMPHOCYTES # BLD AUTO: 0.93 K/UL — LOW (ref 1–3.3)
LYMPHOCYTES NFR BLD AUTO: 12.4 % — LOW (ref 13–44)
MAGNESIUM SERPL-MCNC: 2 MG/DL — SIGNIFICANT CHANGE UP (ref 1.6–2.6)
MAGNESIUM SERPL-MCNC: 2.3 MG/DL — SIGNIFICANT CHANGE UP (ref 1.6–2.6)
MAGNESIUM SERPL-MCNC: 2.4 MG/DL — SIGNIFICANT CHANGE UP (ref 1.6–2.6)
MCHC RBC-ENTMCNC: 29.2 PG — SIGNIFICANT CHANGE UP (ref 27–34)
MCHC RBC-ENTMCNC: 29.3 G/DL — LOW (ref 32–36)
MCV RBC AUTO: 99.7 FL — SIGNIFICANT CHANGE UP (ref 80–100)
MONOCYTES # BLD AUTO: 0.9 K/UL — SIGNIFICANT CHANGE UP (ref 0–0.9)
MONOCYTES NFR BLD AUTO: 12 % — SIGNIFICANT CHANGE UP (ref 2–14)
NEUTROPHILS # BLD AUTO: 5.24 K/UL — SIGNIFICANT CHANGE UP (ref 1.8–7.4)
NEUTROPHILS NFR BLD AUTO: 69.9 % — SIGNIFICANT CHANGE UP (ref 43–77)
NRBC # BLD AUTO: 0 K/UL — SIGNIFICANT CHANGE UP (ref 0–0)
NRBC # FLD: 0 K/UL — SIGNIFICANT CHANGE UP (ref 0–0)
NRBC BLD AUTO-RTO: 0 /100 WBCS — SIGNIFICANT CHANGE UP (ref 0–0)
PHOSPHATE SERPL-MCNC: 3.1 MG/DL — SIGNIFICANT CHANGE UP (ref 2.5–4.5)
PHOSPHATE SERPL-MCNC: 3.1 MG/DL — SIGNIFICANT CHANGE UP (ref 2.5–4.5)
PHOSPHATE SERPL-MCNC: 3.6 MG/DL — SIGNIFICANT CHANGE UP (ref 2.5–4.5)
PLATELET # BLD AUTO: 341 K/UL — SIGNIFICANT CHANGE UP (ref 150–400)
PMV BLD: 9.7 FL — SIGNIFICANT CHANGE UP (ref 7–13)
POTASSIUM SERPL-MCNC: 3.5 MMOL/L — SIGNIFICANT CHANGE UP (ref 3.5–5.3)
POTASSIUM SERPL-MCNC: 3.9 MMOL/L — SIGNIFICANT CHANGE UP (ref 3.5–5.3)
POTASSIUM SERPL-MCNC: 3.9 MMOL/L — SIGNIFICANT CHANGE UP (ref 3.5–5.3)
POTASSIUM SERPL-SCNC: 3.5 MMOL/L — SIGNIFICANT CHANGE UP (ref 3.5–5.3)
POTASSIUM SERPL-SCNC: 3.9 MMOL/L — SIGNIFICANT CHANGE UP (ref 3.5–5.3)
POTASSIUM SERPL-SCNC: 3.9 MMOL/L — SIGNIFICANT CHANGE UP (ref 3.5–5.3)
PROT SERPL-MCNC: 8.9 G/DL — HIGH (ref 6–8.3)
PROT SERPL-MCNC: 9 G/DL — HIGH (ref 6–8.3)
PROT SERPL-MCNC: 9 G/DL — HIGH (ref 6–8.3)
PROTHROM AB SERPL-ACNC: 15.1 SEC — HIGH (ref 9.9–13.4)
RBC # BLD: 3.53 M/UL — LOW (ref 4.2–5.8)
RBC # FLD: 17.1 % — HIGH (ref 10.3–14.5)
RH IG SCN BLD-IMP: POSITIVE — SIGNIFICANT CHANGE UP
SODIUM SERPL-SCNC: 138 MMOL/L — SIGNIFICANT CHANGE UP (ref 135–145)
SODIUM SERPL-SCNC: 142 MMOL/L — SIGNIFICANT CHANGE UP (ref 135–145)
SODIUM SERPL-SCNC: 145 MMOL/L — SIGNIFICANT CHANGE UP (ref 135–145)
WBC # BLD: 7.5 K/UL — SIGNIFICANT CHANGE UP (ref 3.8–10.5)
WBC # FLD AUTO: 7.5 K/UL — SIGNIFICANT CHANGE UP (ref 3.8–10.5)

## 2025-07-19 PROCEDURE — 93308 TTE F-UP OR LMTD: CPT

## 2025-07-19 PROCEDURE — 82977 ASSAY OF GGT: CPT

## 2025-07-19 PROCEDURE — 84443 ASSAY THYROID STIM HORMONE: CPT

## 2025-07-19 PROCEDURE — 70450 CT HEAD/BRAIN W/O DYE: CPT | Mod: 26

## 2025-07-19 PROCEDURE — 84480 ASSAY TRIIODOTHYRONINE (T3): CPT

## 2025-07-19 PROCEDURE — 85025 COMPLETE CBC W/AUTO DIFF WBC: CPT

## 2025-07-19 PROCEDURE — 87040 BLOOD CULTURE FOR BACTERIA: CPT

## 2025-07-19 PROCEDURE — 70450 CT HEAD/BRAIN W/O DYE: CPT

## 2025-07-19 PROCEDURE — 85014 HEMATOCRIT: CPT

## 2025-07-19 PROCEDURE — 94003 VENT MGMT INPAT SUBQ DAY: CPT

## 2025-07-19 PROCEDURE — 86140 C-REACTIVE PROTEIN: CPT

## 2025-07-19 PROCEDURE — 83036 HEMOGLOBIN GLYCOSYLATED A1C: CPT

## 2025-07-19 PROCEDURE — 82962 GLUCOSE BLOOD TEST: CPT

## 2025-07-19 PROCEDURE — 84436 ASSAY OF TOTAL THYROXINE: CPT

## 2025-07-19 PROCEDURE — 97110 THERAPEUTIC EXERCISES: CPT

## 2025-07-19 PROCEDURE — 93325 DOPPLER ECHO COLOR FLOW MAPG: CPT

## 2025-07-19 PROCEDURE — 97162 PT EVAL MOD COMPLEX 30 MIN: CPT

## 2025-07-19 PROCEDURE — 85027 COMPLETE CBC AUTOMATED: CPT

## 2025-07-19 PROCEDURE — 84132 ASSAY OF SERUM POTASSIUM: CPT

## 2025-07-19 PROCEDURE — 82803 BLOOD GASES ANY COMBINATION: CPT

## 2025-07-19 PROCEDURE — 86850 RBC ANTIBODY SCREEN: CPT

## 2025-07-19 PROCEDURE — 94002 VENT MGMT INPAT INIT DAY: CPT

## 2025-07-19 PROCEDURE — 70498 CT ANGIOGRAPHY NECK: CPT

## 2025-07-19 PROCEDURE — 84100 ASSAY OF PHOSPHORUS: CPT

## 2025-07-19 PROCEDURE — 93320 DOPPLER ECHO COMPLETE: CPT

## 2025-07-19 PROCEDURE — 80061 LIPID PANEL: CPT

## 2025-07-19 PROCEDURE — 85610 PROTHROMBIN TIME: CPT

## 2025-07-19 PROCEDURE — 80048 BASIC METABOLIC PNL TOTAL CA: CPT

## 2025-07-19 PROCEDURE — 85730 THROMBOPLASTIN TIME PARTIAL: CPT

## 2025-07-19 PROCEDURE — 81001 URINALYSIS AUTO W/SCOPE: CPT

## 2025-07-19 PROCEDURE — 93312 ECHO TRANSESOPHAGEAL: CPT

## 2025-07-19 PROCEDURE — 82435 ASSAY OF BLOOD CHLORIDE: CPT

## 2025-07-19 PROCEDURE — 82746 ASSAY OF FOLIC ACID SERUM: CPT

## 2025-07-19 PROCEDURE — 92610 EVALUATE SWALLOWING FUNCTION: CPT

## 2025-07-19 PROCEDURE — 93005 ELECTROCARDIOGRAM TRACING: CPT

## 2025-07-19 PROCEDURE — 99291 CRITICAL CARE FIRST HOUR: CPT

## 2025-07-19 PROCEDURE — 82607 VITAMIN B-12: CPT

## 2025-07-19 PROCEDURE — 84439 ASSAY OF FREE THYROXINE: CPT

## 2025-07-19 PROCEDURE — 87640 STAPH A DNA AMP PROBE: CPT

## 2025-07-19 PROCEDURE — 82947 ASSAY GLUCOSE BLOOD QUANT: CPT

## 2025-07-19 PROCEDURE — 92526 ORAL FUNCTION THERAPY: CPT

## 2025-07-19 PROCEDURE — 94640 AIRWAY INHALATION TREATMENT: CPT

## 2025-07-19 PROCEDURE — 70496 CT ANGIOGRAPHY HEAD: CPT

## 2025-07-19 PROCEDURE — 83880 ASSAY OF NATRIURETIC PEPTIDE: CPT

## 2025-07-19 PROCEDURE — 93010 ELECTROCARDIOGRAM REPORT: CPT

## 2025-07-19 PROCEDURE — 94660 CPAP INITIATION&MGMT: CPT

## 2025-07-19 PROCEDURE — 86901 BLOOD TYPING SEROLOGIC RH(D): CPT

## 2025-07-19 PROCEDURE — 85652 RBC SED RATE AUTOMATED: CPT

## 2025-07-19 PROCEDURE — 85520 HEPARIN ASSAY: CPT

## 2025-07-19 PROCEDURE — 84145 PROCALCITONIN (PCT): CPT

## 2025-07-19 PROCEDURE — 80053 COMPREHEN METABOLIC PANEL: CPT

## 2025-07-19 PROCEDURE — 83735 ASSAY OF MAGNESIUM: CPT

## 2025-07-19 PROCEDURE — 36600 WITHDRAWAL OF ARTERIAL BLOOD: CPT

## 2025-07-19 PROCEDURE — 71045 X-RAY EXAM CHEST 1 VIEW: CPT

## 2025-07-19 PROCEDURE — 82330 ASSAY OF CALCIUM: CPT

## 2025-07-19 PROCEDURE — 80162 ASSAY OF DIGOXIN TOTAL: CPT

## 2025-07-19 PROCEDURE — 84295 ASSAY OF SERUM SODIUM: CPT

## 2025-07-19 PROCEDURE — 76376 3D RENDER W/INTRP POSTPROCES: CPT

## 2025-07-19 PROCEDURE — 97530 THERAPEUTIC ACTIVITIES: CPT

## 2025-07-19 PROCEDURE — 76770 US EXAM ABDO BACK WALL COMP: CPT

## 2025-07-19 PROCEDURE — 85018 HEMOGLOBIN: CPT

## 2025-07-19 PROCEDURE — 86900 BLOOD TYPING SEROLOGIC ABO: CPT

## 2025-07-19 PROCEDURE — 87641 MR-STAPH DNA AMP PROBE: CPT

## 2025-07-19 PROCEDURE — 36415 COLL VENOUS BLD VENIPUNCTURE: CPT

## 2025-07-19 PROCEDURE — 83605 ASSAY OF LACTIC ACID: CPT

## 2025-07-19 RX ORDER — POLYETHYLENE GLYCOL 3350 17 G/17G
17 POWDER, FOR SOLUTION ORAL
Refills: 0 | Status: DISCONTINUED | OUTPATIENT
Start: 2025-07-19 | End: 2025-08-01

## 2025-07-19 RX ORDER — INSULIN LISPRO 100 U/ML
INJECTION, SOLUTION INTRAVENOUS; SUBCUTANEOUS
Refills: 0 | Status: DISCONTINUED | OUTPATIENT
Start: 2025-07-19 | End: 2025-07-20

## 2025-07-19 RX ADMIN — Medication 2 TABLET(S): at 21:45

## 2025-07-19 RX ADMIN — LEVETIRACETAM 500 MILLIGRAM(S): 10 INJECTION, SOLUTION INTRAVENOUS at 18:43

## 2025-07-19 RX ADMIN — Medication 20 MILLIEQUIVALENT(S): at 06:25

## 2025-07-19 RX ADMIN — NOREPINEPHRINE BITARTRATE 4.11 MICROGRAM(S)/KG/MIN: 8 SOLUTION at 21:46

## 2025-07-19 RX ADMIN — Medication 40 MILLIGRAM(S): at 12:29

## 2025-07-19 RX ADMIN — LEVETIRACETAM 500 MILLIGRAM(S): 10 INJECTION, SOLUTION INTRAVENOUS at 05:54

## 2025-07-19 RX ADMIN — NOREPINEPHRINE BITARTRATE 4.11 MICROGRAM(S)/KG/MIN: 8 SOLUTION at 07:30

## 2025-07-19 RX ADMIN — Medication 1 APPLICATION(S): at 06:51

## 2025-07-19 RX ADMIN — IPRATROPIUM BROMIDE AND ALBUTEROL SULFATE 3 MILLILITER(S): .5; 2.5 SOLUTION RESPIRATORY (INHALATION) at 18:04

## 2025-07-19 RX ADMIN — NOREPINEPHRINE BITARTRATE 4.11 MICROGRAM(S)/KG/MIN: 8 SOLUTION at 18:44

## 2025-07-19 RX ADMIN — CEFTRIAXONE 100 MILLIGRAM(S): 500 INJECTION, POWDER, FOR SOLUTION INTRAMUSCULAR; INTRAVENOUS at 06:51

## 2025-07-19 RX ADMIN — ATORVASTATIN CALCIUM 80 MILLIGRAM(S): 80 TABLET, FILM COATED ORAL at 21:45

## 2025-07-19 RX ADMIN — ACETYLCYSTEINE 4 MILLILITER(S): 200 INHALANT RESPIRATORY (INHALATION) at 11:24

## 2025-07-19 RX ADMIN — Medication 500 MILLIGRAM(S): at 12:29

## 2025-07-19 RX ADMIN — HEPARIN SODIUM 14.5 UNIT(S)/HR: 1000 INJECTION INTRAVENOUS; SUBCUTANEOUS at 21:45

## 2025-07-19 RX ADMIN — HEPARIN SODIUM 14.5 UNIT(S)/HR: 1000 INJECTION INTRAVENOUS; SUBCUTANEOUS at 10:45

## 2025-07-19 RX ADMIN — Medication 20 MILLIEQUIVALENT(S): at 18:43

## 2025-07-19 RX ADMIN — IPRATROPIUM BROMIDE AND ALBUTEROL SULFATE 3 MILLILITER(S): .5; 2.5 SOLUTION RESPIRATORY (INHALATION) at 11:24

## 2025-07-19 RX ADMIN — Medication 1 TABLET(S): at 12:29

## 2025-07-19 RX ADMIN — POLYETHYLENE GLYCOL 3350 17 GRAM(S): 17 POWDER, FOR SOLUTION ORAL at 21:45

## 2025-07-19 RX ADMIN — ACETYLCYSTEINE 4 MILLILITER(S): 200 INHALANT RESPIRATORY (INHALATION) at 05:37

## 2025-07-19 RX ADMIN — HEPARIN SODIUM 14.5 UNIT(S)/HR: 1000 INJECTION INTRAVENOUS; SUBCUTANEOUS at 18:44

## 2025-07-19 RX ADMIN — BUMETANIDE 20 MG/HR: 1 TABLET ORAL at 21:46

## 2025-07-19 RX ADMIN — INSULIN LISPRO 2: 100 INJECTION, SOLUTION INTRAVENOUS; SUBCUTANEOUS at 12:36

## 2025-07-19 RX ADMIN — IPRATROPIUM BROMIDE AND ALBUTEROL SULFATE 3 MILLILITER(S): .5; 2.5 SOLUTION RESPIRATORY (INHALATION) at 05:37

## 2025-07-19 RX ADMIN — CLOPIDOGREL BISULFATE 75 MILLIGRAM(S): 75 TABLET, FILM COATED ORAL at 12:30

## 2025-07-19 RX ADMIN — POLYETHYLENE GLYCOL 3350 17 GRAM(S): 17 POWDER, FOR SOLUTION ORAL at 12:29

## 2025-07-19 RX ADMIN — HEPARIN SODIUM 15 UNIT(S)/HR: 1000 INJECTION INTRAVENOUS; SUBCUTANEOUS at 03:00

## 2025-07-19 RX ADMIN — BUMETANIDE 20 MG/HR: 1 TABLET ORAL at 07:30

## 2025-07-19 RX ADMIN — BUMETANIDE 20 MG/HR: 1 TABLET ORAL at 18:43

## 2025-07-20 LAB
ALBUMIN SERPL ELPH-MCNC: 3.3 G/DL — SIGNIFICANT CHANGE UP (ref 3.3–5)
ALBUMIN SERPL ELPH-MCNC: 3.5 G/DL — SIGNIFICANT CHANGE UP (ref 3.3–5)
ALP SERPL-CCNC: 127 U/L — HIGH (ref 40–120)
ALP SERPL-CCNC: 133 U/L — HIGH (ref 40–120)
ALT FLD-CCNC: 12 U/L — SIGNIFICANT CHANGE UP (ref 10–45)
ALT FLD-CCNC: 13 U/L — SIGNIFICANT CHANGE UP (ref 10–45)
ANION GAP SERPL CALC-SCNC: 11 MMOL/L — SIGNIFICANT CHANGE UP (ref 5–17)
ANION GAP SERPL CALC-SCNC: 11 MMOL/L — SIGNIFICANT CHANGE UP (ref 5–17)
ANION GAP SERPL CALC-SCNC: 14 MMOL/L — SIGNIFICANT CHANGE UP (ref 5–17)
APTT BLD: 61.9 SEC — HIGH (ref 26.1–36.8)
AST SERPL-CCNC: 19 U/L — SIGNIFICANT CHANGE UP (ref 10–40)
AST SERPL-CCNC: 20 U/L — SIGNIFICANT CHANGE UP (ref 10–40)
BASOPHILS # BLD AUTO: 0.03 K/UL — SIGNIFICANT CHANGE UP (ref 0–0.2)
BASOPHILS NFR BLD AUTO: 0.4 % — SIGNIFICANT CHANGE UP (ref 0–2)
BILIRUB SERPL-MCNC: 0.5 MG/DL — SIGNIFICANT CHANGE UP (ref 0.2–1.2)
BILIRUB SERPL-MCNC: 0.5 MG/DL — SIGNIFICANT CHANGE UP (ref 0.2–1.2)
BUN SERPL-MCNC: 67 MG/DL — HIGH (ref 7–23)
BUN SERPL-MCNC: 70 MG/DL — HIGH (ref 7–23)
BUN SERPL-MCNC: 72 MG/DL — HIGH (ref 7–23)
CALCIUM SERPL-MCNC: 10 MG/DL — SIGNIFICANT CHANGE UP (ref 8.4–10.5)
CALCIUM SERPL-MCNC: 10.3 MG/DL — SIGNIFICANT CHANGE UP (ref 8.4–10.5)
CALCIUM SERPL-MCNC: 9.9 MG/DL — SIGNIFICANT CHANGE UP (ref 8.4–10.5)
CHLORIDE SERPL-SCNC: 92 MMOL/L — LOW (ref 96–108)
CHLORIDE SERPL-SCNC: 93 MMOL/L — LOW (ref 96–108)
CHLORIDE SERPL-SCNC: 93 MMOL/L — LOW (ref 96–108)
CO2 SERPL-SCNC: 33 MMOL/L — HIGH (ref 22–31)
CO2 SERPL-SCNC: 34 MMOL/L — HIGH (ref 22–31)
CO2 SERPL-SCNC: 35 MMOL/L — HIGH (ref 22–31)
CREAT SERPL-MCNC: 1.39 MG/DL — HIGH (ref 0.5–1.3)
CREAT SERPL-MCNC: 1.52 MG/DL — HIGH (ref 0.5–1.3)
CREAT SERPL-MCNC: 1.58 MG/DL — HIGH (ref 0.5–1.3)
DIGOXIN SERPL-MCNC: 1.4 NG/ML — SIGNIFICANT CHANGE UP (ref 0.8–2)
EGFR: 48 ML/MIN/1.73M2 — LOW
EGFR: 48 ML/MIN/1.73M2 — LOW
EGFR: 50 ML/MIN/1.73M2 — LOW
EGFR: 50 ML/MIN/1.73M2 — LOW
EGFR: 56 ML/MIN/1.73M2 — LOW
EGFR: 56 ML/MIN/1.73M2 — LOW
EOSINOPHIL # BLD AUTO: 0.41 K/UL — SIGNIFICANT CHANGE UP (ref 0–0.5)
EOSINOPHIL NFR BLD AUTO: 5.1 % — SIGNIFICANT CHANGE UP (ref 0–6)
GAS PNL BLDA: SIGNIFICANT CHANGE UP
GLUCOSE BLDC GLUCOMTR-MCNC: 114 MG/DL — HIGH (ref 70–99)
GLUCOSE BLDC GLUCOMTR-MCNC: 128 MG/DL — HIGH (ref 70–99)
GLUCOSE SERPL-MCNC: 121 MG/DL — HIGH (ref 70–99)
GLUCOSE SERPL-MCNC: 126 MG/DL — HIGH (ref 70–99)
GLUCOSE SERPL-MCNC: 136 MG/DL — HIGH (ref 70–99)
HCT VFR BLD CALC: 34.8 % — LOW (ref 39–50)
HGB BLD-MCNC: 10.2 G/DL — LOW (ref 13–17)
IMM GRANULOCYTES # BLD AUTO: 0.04 K/UL — SIGNIFICANT CHANGE UP (ref 0–0.07)
IMM GRANULOCYTES NFR BLD AUTO: 0.5 % — SIGNIFICANT CHANGE UP (ref 0–0.9)
INR BLD: 1.21 RATIO — HIGH (ref 0.85–1.16)
LYMPHOCYTES # BLD AUTO: 0.98 K/UL — LOW (ref 1–3.3)
LYMPHOCYTES NFR BLD AUTO: 12.2 % — LOW (ref 13–44)
MAGNESIUM SERPL-MCNC: 2.1 MG/DL — SIGNIFICANT CHANGE UP (ref 1.6–2.6)
MAGNESIUM SERPL-MCNC: 2.2 MG/DL — SIGNIFICANT CHANGE UP (ref 1.6–2.6)
MAGNESIUM SERPL-MCNC: 2.2 MG/DL — SIGNIFICANT CHANGE UP (ref 1.6–2.6)
MCHC RBC-ENTMCNC: 28.6 PG — SIGNIFICANT CHANGE UP (ref 27–34)
MCHC RBC-ENTMCNC: 29.3 G/DL — LOW (ref 32–36)
MCV RBC AUTO: 97.5 FL — SIGNIFICANT CHANGE UP (ref 80–100)
MONOCYTES # BLD AUTO: 0.87 K/UL — SIGNIFICANT CHANGE UP (ref 0–0.9)
MONOCYTES NFR BLD AUTO: 10.8 % — SIGNIFICANT CHANGE UP (ref 2–14)
NEUTROPHILS # BLD AUTO: 5.73 K/UL — SIGNIFICANT CHANGE UP (ref 1.8–7.4)
NEUTROPHILS NFR BLD AUTO: 71 % — SIGNIFICANT CHANGE UP (ref 43–77)
NRBC # BLD AUTO: 0 K/UL — SIGNIFICANT CHANGE UP (ref 0–0)
NRBC # FLD: 0 K/UL — SIGNIFICANT CHANGE UP (ref 0–0)
NRBC BLD AUTO-RTO: 0 /100 WBCS — SIGNIFICANT CHANGE UP (ref 0–0)
PHOSPHATE SERPL-MCNC: 3.2 MG/DL — SIGNIFICANT CHANGE UP (ref 2.5–4.5)
PHOSPHATE SERPL-MCNC: 3.7 MG/DL — SIGNIFICANT CHANGE UP (ref 2.5–4.5)
PHOSPHATE SERPL-MCNC: 3.8 MG/DL — SIGNIFICANT CHANGE UP (ref 2.5–4.5)
PLATELET # BLD AUTO: 309 K/UL — SIGNIFICANT CHANGE UP (ref 150–400)
PMV BLD: 9.8 FL — SIGNIFICANT CHANGE UP (ref 7–13)
POTASSIUM SERPL-MCNC: 3.8 MMOL/L — SIGNIFICANT CHANGE UP (ref 3.5–5.3)
POTASSIUM SERPL-MCNC: 3.8 MMOL/L — SIGNIFICANT CHANGE UP (ref 3.5–5.3)
POTASSIUM SERPL-MCNC: 4.1 MMOL/L — SIGNIFICANT CHANGE UP (ref 3.5–5.3)
POTASSIUM SERPL-SCNC: 3.8 MMOL/L — SIGNIFICANT CHANGE UP (ref 3.5–5.3)
POTASSIUM SERPL-SCNC: 3.8 MMOL/L — SIGNIFICANT CHANGE UP (ref 3.5–5.3)
POTASSIUM SERPL-SCNC: 4.1 MMOL/L — SIGNIFICANT CHANGE UP (ref 3.5–5.3)
PROT SERPL-MCNC: 8.6 G/DL — HIGH (ref 6–8.3)
PROT SERPL-MCNC: 8.9 G/DL — HIGH (ref 6–8.3)
PROTHROM AB SERPL-ACNC: 13.9 SEC — HIGH (ref 9.9–13.4)
RBC # BLD: 3.57 M/UL — LOW (ref 4.2–5.8)
RBC # FLD: 16.8 % — HIGH (ref 10.3–14.5)
SODIUM SERPL-SCNC: 137 MMOL/L — SIGNIFICANT CHANGE UP (ref 135–145)
SODIUM SERPL-SCNC: 139 MMOL/L — SIGNIFICANT CHANGE UP (ref 135–145)
SODIUM SERPL-SCNC: 140 MMOL/L — SIGNIFICANT CHANGE UP (ref 135–145)
WBC # BLD: 8.06 K/UL — SIGNIFICANT CHANGE UP (ref 3.8–10.5)
WBC # FLD AUTO: 8.06 K/UL — SIGNIFICANT CHANGE UP (ref 3.8–10.5)

## 2025-07-20 PROCEDURE — 93010 ELECTROCARDIOGRAM REPORT: CPT

## 2025-07-20 PROCEDURE — 99291 CRITICAL CARE FIRST HOUR: CPT

## 2025-07-20 RX ORDER — LACTULOSE 10 G/15ML
20 SOLUTION ORAL EVERY 4 HOURS
Refills: 0 | Status: DISCONTINUED | OUTPATIENT
Start: 2025-07-20 | End: 2025-07-20

## 2025-07-20 RX ADMIN — NOREPINEPHRINE BITARTRATE 4.11 MICROGRAM(S)/KG/MIN: 8 SOLUTION at 21:35

## 2025-07-20 RX ADMIN — IPRATROPIUM BROMIDE AND ALBUTEROL SULFATE 3 MILLILITER(S): .5; 2.5 SOLUTION RESPIRATORY (INHALATION) at 23:45

## 2025-07-20 RX ADMIN — Medication 500 MILLIGRAM(S): at 12:00

## 2025-07-20 RX ADMIN — HEPARIN SODIUM 14.5 UNIT(S)/HR: 1000 INJECTION INTRAVENOUS; SUBCUTANEOUS at 21:35

## 2025-07-20 RX ADMIN — LEVETIRACETAM 500 MILLIGRAM(S): 10 INJECTION, SOLUTION INTRAVENOUS at 05:25

## 2025-07-20 RX ADMIN — CLOPIDOGREL BISULFATE 75 MILLIGRAM(S): 75 TABLET, FILM COATED ORAL at 12:00

## 2025-07-20 RX ADMIN — Medication 1 TABLET(S): at 12:00

## 2025-07-20 RX ADMIN — BUMETANIDE 20 MG/HR: 1 TABLET ORAL at 18:13

## 2025-07-20 RX ADMIN — IPRATROPIUM BROMIDE AND ALBUTEROL SULFATE 3 MILLILITER(S): .5; 2.5 SOLUTION RESPIRATORY (INHALATION) at 18:31

## 2025-07-20 RX ADMIN — NOREPINEPHRINE BITARTRATE 4.11 MICROGRAM(S)/KG/MIN: 8 SOLUTION at 08:00

## 2025-07-20 RX ADMIN — IPRATROPIUM BROMIDE AND ALBUTEROL SULFATE 3 MILLILITER(S): .5; 2.5 SOLUTION RESPIRATORY (INHALATION) at 11:53

## 2025-07-20 RX ADMIN — POLYETHYLENE GLYCOL 3350 17 GRAM(S): 17 POWDER, FOR SOLUTION ORAL at 05:25

## 2025-07-20 RX ADMIN — HEPARIN SODIUM 14.5 UNIT(S)/HR: 1000 INJECTION INTRAVENOUS; SUBCUTANEOUS at 08:00

## 2025-07-20 RX ADMIN — Medication 1 APPLICATION(S): at 05:26

## 2025-07-20 RX ADMIN — LEVETIRACETAM 500 MILLIGRAM(S): 10 INJECTION, SOLUTION INTRAVENOUS at 18:13

## 2025-07-20 RX ADMIN — Medication 20 MILLIEQUIVALENT(S): at 05:39

## 2025-07-20 RX ADMIN — BUMETANIDE 20 MG/HR: 1 TABLET ORAL at 21:35

## 2025-07-20 RX ADMIN — IPRATROPIUM BROMIDE AND ALBUTEROL SULFATE 3 MILLILITER(S): .5; 2.5 SOLUTION RESPIRATORY (INHALATION) at 00:02

## 2025-07-20 RX ADMIN — LACTULOSE 20 GRAM(S): 10 SOLUTION ORAL at 15:11

## 2025-07-20 RX ADMIN — LACTULOSE 20 GRAM(S): 10 SOLUTION ORAL at 11:59

## 2025-07-20 RX ADMIN — DIGOXIN 125 MICROGRAM(S): 250 TABLET ORAL at 12:00

## 2025-07-20 RX ADMIN — BUMETANIDE 20 MG/HR: 1 TABLET ORAL at 08:01

## 2025-07-20 RX ADMIN — Medication 20 MILLIEQUIVALENT(S): at 06:45

## 2025-07-20 RX ADMIN — IPRATROPIUM BROMIDE AND ALBUTEROL SULFATE 3 MILLILITER(S): .5; 2.5 SOLUTION RESPIRATORY (INHALATION) at 06:23

## 2025-07-20 RX ADMIN — BUMETANIDE 20 MG/HR: 1 TABLET ORAL at 15:11

## 2025-07-20 RX ADMIN — ATORVASTATIN CALCIUM 80 MILLIGRAM(S): 80 TABLET, FILM COATED ORAL at 21:35

## 2025-07-20 RX ADMIN — Medication 40 MILLIGRAM(S): at 12:03

## 2025-07-20 RX ADMIN — CEFTRIAXONE 100 MILLIGRAM(S): 500 INJECTION, POWDER, FOR SOLUTION INTRAMUSCULAR; INTRAVENOUS at 06:45

## 2025-07-21 LAB
ALBUMIN SERPL ELPH-MCNC: 3.3 G/DL — SIGNIFICANT CHANGE UP (ref 3.3–5)
ALBUMIN SERPL ELPH-MCNC: 3.3 G/DL — SIGNIFICANT CHANGE UP (ref 3.3–5)
ALP SERPL-CCNC: 124 U/L — HIGH (ref 40–120)
ALP SERPL-CCNC: 128 U/L — HIGH (ref 40–120)
ALT FLD-CCNC: 12 U/L — SIGNIFICANT CHANGE UP (ref 10–45)
ALT FLD-CCNC: 13 U/L — SIGNIFICANT CHANGE UP (ref 10–45)
ANION GAP SERPL CALC-SCNC: 13 MMOL/L — SIGNIFICANT CHANGE UP (ref 5–17)
ANION GAP SERPL CALC-SCNC: 14 MMOL/L — SIGNIFICANT CHANGE UP (ref 5–17)
ANION GAP SERPL CALC-SCNC: 14 MMOL/L — SIGNIFICANT CHANGE UP (ref 5–17)
APTT BLD: 58.2 SEC — HIGH (ref 26.1–36.8)
AST SERPL-CCNC: 16 U/L — SIGNIFICANT CHANGE UP (ref 10–40)
AST SERPL-CCNC: 20 U/L — SIGNIFICANT CHANGE UP (ref 10–40)
BASOPHILS # BLD AUTO: 0.07 K/UL — SIGNIFICANT CHANGE UP (ref 0–0.2)
BASOPHILS NFR BLD AUTO: 0.8 % — SIGNIFICANT CHANGE UP (ref 0–2)
BILIRUB SERPL-MCNC: 0.4 MG/DL — SIGNIFICANT CHANGE UP (ref 0.2–1.2)
BILIRUB SERPL-MCNC: 0.5 MG/DL — SIGNIFICANT CHANGE UP (ref 0.2–1.2)
BUN SERPL-MCNC: 61 MG/DL — HIGH (ref 7–23)
BUN SERPL-MCNC: 62 MG/DL — HIGH (ref 7–23)
BUN SERPL-MCNC: 66 MG/DL — HIGH (ref 7–23)
CALCIUM SERPL-MCNC: 10 MG/DL — SIGNIFICANT CHANGE UP (ref 8.4–10.5)
CALCIUM SERPL-MCNC: 9.8 MG/DL — SIGNIFICANT CHANGE UP (ref 8.4–10.5)
CALCIUM SERPL-MCNC: 9.8 MG/DL — SIGNIFICANT CHANGE UP (ref 8.4–10.5)
CHLORIDE SERPL-SCNC: 89 MMOL/L — LOW (ref 96–108)
CHLORIDE SERPL-SCNC: 89 MMOL/L — LOW (ref 96–108)
CHLORIDE SERPL-SCNC: 91 MMOL/L — LOW (ref 96–108)
CO2 SERPL-SCNC: 31 MMOL/L — SIGNIFICANT CHANGE UP (ref 22–31)
CO2 SERPL-SCNC: 32 MMOL/L — HIGH (ref 22–31)
CO2 SERPL-SCNC: 33 MMOL/L — HIGH (ref 22–31)
CREAT SERPL-MCNC: 1.27 MG/DL — SIGNIFICANT CHANGE UP (ref 0.5–1.3)
CREAT SERPL-MCNC: 1.34 MG/DL — HIGH (ref 0.5–1.3)
CREAT SERPL-MCNC: 1.36 MG/DL — HIGH (ref 0.5–1.3)
DIGOXIN SERPL-MCNC: 1.6 NG/ML — SIGNIFICANT CHANGE UP (ref 0.8–2)
EGFR: 57 ML/MIN/1.73M2 — LOW
EGFR: 57 ML/MIN/1.73M2 — LOW
EGFR: 58 ML/MIN/1.73M2 — LOW
EGFR: 58 ML/MIN/1.73M2 — LOW
EGFR: 62 ML/MIN/1.73M2 — SIGNIFICANT CHANGE UP
EGFR: 62 ML/MIN/1.73M2 — SIGNIFICANT CHANGE UP
EOSINOPHIL # BLD AUTO: 0.42 K/UL — SIGNIFICANT CHANGE UP (ref 0–0.5)
EOSINOPHIL NFR BLD AUTO: 4.9 % — SIGNIFICANT CHANGE UP (ref 0–6)
GAS PNL BLDA: SIGNIFICANT CHANGE UP
GLUCOSE SERPL-MCNC: 114 MG/DL — HIGH (ref 70–99)
GLUCOSE SERPL-MCNC: 121 MG/DL — HIGH (ref 70–99)
GLUCOSE SERPL-MCNC: 179 MG/DL — HIGH (ref 70–99)
HCT VFR BLD CALC: 34.3 % — LOW (ref 39–50)
HGB BLD-MCNC: 10.3 G/DL — LOW (ref 13–17)
IMM GRANULOCYTES # BLD AUTO: 0.06 K/UL — SIGNIFICANT CHANGE UP (ref 0–0.07)
IMM GRANULOCYTES NFR BLD AUTO: 0.7 % — SIGNIFICANT CHANGE UP (ref 0–0.9)
INR BLD: 1.16 RATIO — SIGNIFICANT CHANGE UP (ref 0.85–1.16)
LYMPHOCYTES # BLD AUTO: 0.89 K/UL — LOW (ref 1–3.3)
LYMPHOCYTES NFR BLD AUTO: 10.4 % — LOW (ref 13–44)
MAGNESIUM SERPL-MCNC: 2 MG/DL — SIGNIFICANT CHANGE UP (ref 1.6–2.6)
MAGNESIUM SERPL-MCNC: 2 MG/DL — SIGNIFICANT CHANGE UP (ref 1.6–2.6)
MCHC RBC-ENTMCNC: 29 PG — SIGNIFICANT CHANGE UP (ref 27–34)
MCHC RBC-ENTMCNC: 30 G/DL — LOW (ref 32–36)
MCV RBC AUTO: 96.6 FL — SIGNIFICANT CHANGE UP (ref 80–100)
MONOCYTES # BLD AUTO: 0.82 K/UL — SIGNIFICANT CHANGE UP (ref 0–0.9)
MONOCYTES NFR BLD AUTO: 9.6 % — SIGNIFICANT CHANGE UP (ref 2–14)
NEUTROPHILS # BLD AUTO: 6.31 K/UL — SIGNIFICANT CHANGE UP (ref 1.8–7.4)
NEUTROPHILS NFR BLD AUTO: 73.6 % — SIGNIFICANT CHANGE UP (ref 43–77)
NRBC # BLD AUTO: 0 K/UL — SIGNIFICANT CHANGE UP (ref 0–0)
NRBC # FLD: 0 K/UL — SIGNIFICANT CHANGE UP (ref 0–0)
NRBC BLD AUTO-RTO: 0 /100 WBCS — SIGNIFICANT CHANGE UP (ref 0–0)
PHOSPHATE SERPL-MCNC: 3.4 MG/DL — SIGNIFICANT CHANGE UP (ref 2.5–4.5)
PHOSPHATE SERPL-MCNC: 3.7 MG/DL — SIGNIFICANT CHANGE UP (ref 2.5–4.5)
PLATELET # BLD AUTO: 332 K/UL — SIGNIFICANT CHANGE UP (ref 150–400)
PMV BLD: 10.1 FL — SIGNIFICANT CHANGE UP (ref 7–13)
POTASSIUM SERPL-MCNC: 3.7 MMOL/L — SIGNIFICANT CHANGE UP (ref 3.5–5.3)
POTASSIUM SERPL-MCNC: 3.9 MMOL/L — SIGNIFICANT CHANGE UP (ref 3.5–5.3)
POTASSIUM SERPL-MCNC: 4 MMOL/L — SIGNIFICANT CHANGE UP (ref 3.5–5.3)
POTASSIUM SERPL-SCNC: 3.7 MMOL/L — SIGNIFICANT CHANGE UP (ref 3.5–5.3)
POTASSIUM SERPL-SCNC: 3.9 MMOL/L — SIGNIFICANT CHANGE UP (ref 3.5–5.3)
POTASSIUM SERPL-SCNC: 4 MMOL/L — SIGNIFICANT CHANGE UP (ref 3.5–5.3)
PROT SERPL-MCNC: 8.5 G/DL — HIGH (ref 6–8.3)
PROT SERPL-MCNC: 8.7 G/DL — HIGH (ref 6–8.3)
PROTHROM AB SERPL-ACNC: 13.2 SEC — SIGNIFICANT CHANGE UP (ref 9.9–13.4)
RBC # BLD: 3.55 M/UL — LOW (ref 4.2–5.8)
RBC # FLD: 16.7 % — HIGH (ref 10.3–14.5)
SODIUM SERPL-SCNC: 134 MMOL/L — LOW (ref 135–145)
SODIUM SERPL-SCNC: 135 MMOL/L — SIGNIFICANT CHANGE UP (ref 135–145)
SODIUM SERPL-SCNC: 137 MMOL/L — SIGNIFICANT CHANGE UP (ref 135–145)
UFH PPP CHRO-ACNC: 0.48 IU/ML — SIGNIFICANT CHANGE UP (ref 0.3–0.7)
WBC # BLD: 8.57 K/UL — SIGNIFICANT CHANGE UP (ref 3.8–10.5)
WBC # FLD AUTO: 8.57 K/UL — SIGNIFICANT CHANGE UP (ref 3.8–10.5)

## 2025-07-21 PROCEDURE — 82977 ASSAY OF GGT: CPT

## 2025-07-21 PROCEDURE — 99291 CRITICAL CARE FIRST HOUR: CPT

## 2025-07-21 PROCEDURE — 93010 ELECTROCARDIOGRAM REPORT: CPT

## 2025-07-21 PROCEDURE — 71045 X-RAY EXAM CHEST 1 VIEW: CPT

## 2025-07-21 PROCEDURE — 85610 PROTHROMBIN TIME: CPT

## 2025-07-21 PROCEDURE — 86140 C-REACTIVE PROTEIN: CPT

## 2025-07-21 PROCEDURE — 85027 COMPLETE CBC AUTOMATED: CPT

## 2025-07-21 PROCEDURE — 82330 ASSAY OF CALCIUM: CPT

## 2025-07-21 PROCEDURE — 84145 PROCALCITONIN (PCT): CPT

## 2025-07-21 PROCEDURE — 85520 HEPARIN ASSAY: CPT

## 2025-07-21 PROCEDURE — 92526 ORAL FUNCTION THERAPY: CPT

## 2025-07-21 PROCEDURE — 76376 3D RENDER W/INTRP POSTPROCES: CPT

## 2025-07-21 PROCEDURE — 83036 HEMOGLOBIN GLYCOSYLATED A1C: CPT

## 2025-07-21 PROCEDURE — 80048 BASIC METABOLIC PNL TOTAL CA: CPT

## 2025-07-21 PROCEDURE — 93308 TTE F-UP OR LMTD: CPT

## 2025-07-21 PROCEDURE — 80061 LIPID PANEL: CPT

## 2025-07-21 PROCEDURE — 86850 RBC ANTIBODY SCREEN: CPT

## 2025-07-21 PROCEDURE — 81001 URINALYSIS AUTO W/SCOPE: CPT

## 2025-07-21 PROCEDURE — 93005 ELECTROCARDIOGRAM TRACING: CPT

## 2025-07-21 PROCEDURE — 94660 CPAP INITIATION&MGMT: CPT

## 2025-07-21 PROCEDURE — 94002 VENT MGMT INPAT INIT DAY: CPT

## 2025-07-21 PROCEDURE — 87641 MR-STAPH DNA AMP PROBE: CPT

## 2025-07-21 PROCEDURE — 86901 BLOOD TYPING SEROLOGIC RH(D): CPT

## 2025-07-21 PROCEDURE — 82746 ASSAY OF FOLIC ACID SERUM: CPT

## 2025-07-21 PROCEDURE — 83605 ASSAY OF LACTIC ACID: CPT

## 2025-07-21 PROCEDURE — 93320 DOPPLER ECHO COMPLETE: CPT

## 2025-07-21 PROCEDURE — 36600 WITHDRAWAL OF ARTERIAL BLOOD: CPT

## 2025-07-21 PROCEDURE — 76770 US EXAM ABDO BACK WALL COMP: CPT

## 2025-07-21 PROCEDURE — 82607 VITAMIN B-12: CPT

## 2025-07-21 PROCEDURE — 70450 CT HEAD/BRAIN W/O DYE: CPT

## 2025-07-21 PROCEDURE — 85014 HEMATOCRIT: CPT

## 2025-07-21 PROCEDURE — 94003 VENT MGMT INPAT SUBQ DAY: CPT

## 2025-07-21 PROCEDURE — 83735 ASSAY OF MAGNESIUM: CPT

## 2025-07-21 PROCEDURE — 93325 DOPPLER ECHO COLOR FLOW MAPG: CPT

## 2025-07-21 PROCEDURE — 94640 AIRWAY INHALATION TREATMENT: CPT

## 2025-07-21 PROCEDURE — 97162 PT EVAL MOD COMPLEX 30 MIN: CPT

## 2025-07-21 PROCEDURE — 87640 STAPH A DNA AMP PROBE: CPT

## 2025-07-21 PROCEDURE — 82947 ASSAY GLUCOSE BLOOD QUANT: CPT

## 2025-07-21 PROCEDURE — 82435 ASSAY OF BLOOD CHLORIDE: CPT

## 2025-07-21 PROCEDURE — 97110 THERAPEUTIC EXERCISES: CPT

## 2025-07-21 PROCEDURE — 87040 BLOOD CULTURE FOR BACTERIA: CPT

## 2025-07-21 PROCEDURE — 92610 EVALUATE SWALLOWING FUNCTION: CPT

## 2025-07-21 PROCEDURE — 85652 RBC SED RATE AUTOMATED: CPT

## 2025-07-21 PROCEDURE — 36415 COLL VENOUS BLD VENIPUNCTURE: CPT

## 2025-07-21 PROCEDURE — 97530 THERAPEUTIC ACTIVITIES: CPT

## 2025-07-21 PROCEDURE — 93312 ECHO TRANSESOPHAGEAL: CPT

## 2025-07-21 PROCEDURE — 84480 ASSAY TRIIODOTHYRONINE (T3): CPT

## 2025-07-21 PROCEDURE — 84100 ASSAY OF PHOSPHORUS: CPT

## 2025-07-21 PROCEDURE — 84132 ASSAY OF SERUM POTASSIUM: CPT

## 2025-07-21 PROCEDURE — 70496 CT ANGIOGRAPHY HEAD: CPT

## 2025-07-21 PROCEDURE — 99231 SBSQ HOSP IP/OBS SF/LOW 25: CPT

## 2025-07-21 PROCEDURE — 84295 ASSAY OF SERUM SODIUM: CPT

## 2025-07-21 PROCEDURE — 84436 ASSAY OF TOTAL THYROXINE: CPT

## 2025-07-21 PROCEDURE — 80162 ASSAY OF DIGOXIN TOTAL: CPT

## 2025-07-21 PROCEDURE — 85018 HEMOGLOBIN: CPT

## 2025-07-21 PROCEDURE — 84443 ASSAY THYROID STIM HORMONE: CPT

## 2025-07-21 PROCEDURE — 85025 COMPLETE CBC W/AUTO DIFF WBC: CPT

## 2025-07-21 PROCEDURE — 82803 BLOOD GASES ANY COMBINATION: CPT

## 2025-07-21 PROCEDURE — G0545: CPT

## 2025-07-21 PROCEDURE — 85730 THROMBOPLASTIN TIME PARTIAL: CPT

## 2025-07-21 PROCEDURE — 83880 ASSAY OF NATRIURETIC PEPTIDE: CPT

## 2025-07-21 PROCEDURE — 80053 COMPREHEN METABOLIC PANEL: CPT

## 2025-07-21 PROCEDURE — 70498 CT ANGIOGRAPHY NECK: CPT

## 2025-07-21 PROCEDURE — 84439 ASSAY OF FREE THYROXINE: CPT

## 2025-07-21 PROCEDURE — 99232 SBSQ HOSP IP/OBS MODERATE 35: CPT

## 2025-07-21 PROCEDURE — 82962 GLUCOSE BLOOD TEST: CPT

## 2025-07-21 PROCEDURE — 86900 BLOOD TYPING SEROLOGIC ABO: CPT

## 2025-07-21 RX ADMIN — Medication 500 MILLIGRAM(S): at 12:54

## 2025-07-21 RX ADMIN — NOREPINEPHRINE BITARTRATE 4.11 MICROGRAM(S)/KG/MIN: 8 SOLUTION at 21:04

## 2025-07-21 RX ADMIN — Medication 1 APPLICATION(S): at 05:14

## 2025-07-21 RX ADMIN — Medication 40 MILLIEQUIVALENT(S): at 05:14

## 2025-07-21 RX ADMIN — IPRATROPIUM BROMIDE AND ALBUTEROL SULFATE 3 MILLILITER(S): .5; 2.5 SOLUTION RESPIRATORY (INHALATION) at 06:04

## 2025-07-21 RX ADMIN — HEPARIN SODIUM 14.5 UNIT(S)/HR: 1000 INJECTION INTRAVENOUS; SUBCUTANEOUS at 07:20

## 2025-07-21 RX ADMIN — IPRATROPIUM BROMIDE AND ALBUTEROL SULFATE 3 MILLILITER(S): .5; 2.5 SOLUTION RESPIRATORY (INHALATION) at 11:31

## 2025-07-21 RX ADMIN — CEFTRIAXONE 100 MILLIGRAM(S): 500 INJECTION, POWDER, FOR SOLUTION INTRAMUSCULAR; INTRAVENOUS at 05:14

## 2025-07-21 RX ADMIN — Medication 1 TABLET(S): at 12:55

## 2025-07-21 RX ADMIN — LEVETIRACETAM 500 MILLIGRAM(S): 10 INJECTION, SOLUTION INTRAVENOUS at 17:26

## 2025-07-21 RX ADMIN — Medication 20 MILLIEQUIVALENT(S): at 12:54

## 2025-07-21 RX ADMIN — CLOPIDOGREL BISULFATE 75 MILLIGRAM(S): 75 TABLET, FILM COATED ORAL at 12:54

## 2025-07-21 RX ADMIN — NOREPINEPHRINE BITARTRATE 4.11 MICROGRAM(S)/KG/MIN: 8 SOLUTION at 07:19

## 2025-07-21 RX ADMIN — IPRATROPIUM BROMIDE AND ALBUTEROL SULFATE 3 MILLILITER(S): .5; 2.5 SOLUTION RESPIRATORY (INHALATION) at 17:30

## 2025-07-21 RX ADMIN — BUMETANIDE 20 MG/HR: 1 TABLET ORAL at 21:03

## 2025-07-21 RX ADMIN — Medication 40 MILLIGRAM(S): at 12:54

## 2025-07-21 RX ADMIN — HEPARIN SODIUM 14.5 UNIT(S)/HR: 1000 INJECTION INTRAVENOUS; SUBCUTANEOUS at 21:05

## 2025-07-21 RX ADMIN — LEVETIRACETAM 500 MILLIGRAM(S): 10 INJECTION, SOLUTION INTRAVENOUS at 05:14

## 2025-07-21 RX ADMIN — BUMETANIDE 20 MG/HR: 1 TABLET ORAL at 07:20

## 2025-07-21 RX ADMIN — ATORVASTATIN CALCIUM 80 MILLIGRAM(S): 80 TABLET, FILM COATED ORAL at 21:03

## 2025-07-21 RX ADMIN — BUMETANIDE 20 MG/HR: 1 TABLET ORAL at 17:27

## 2025-07-21 RX ADMIN — IPRATROPIUM BROMIDE AND ALBUTEROL SULFATE 3 MILLILITER(S): .5; 2.5 SOLUTION RESPIRATORY (INHALATION) at 23:13

## 2025-07-22 DIAGNOSIS — D64.9 ANEMIA, UNSPECIFIED: ICD-10-CM

## 2025-07-22 DIAGNOSIS — I25.10 ATHEROSCLEROTIC HEART DISEASE OF NATIVE CORONARY ARTERY WITHOUT ANGINA PECTORIS: ICD-10-CM

## 2025-07-22 DIAGNOSIS — I35.0 NONRHEUMATIC AORTIC (VALVE) STENOSIS: ICD-10-CM

## 2025-07-22 DIAGNOSIS — I48.91 UNSPECIFIED ATRIAL FIBRILLATION: ICD-10-CM

## 2025-07-22 DIAGNOSIS — G93.40 ENCEPHALOPATHY, UNSPECIFIED: ICD-10-CM

## 2025-07-22 DIAGNOSIS — J96.01 ACUTE RESPIRATORY FAILURE WITH HYPOXIA: ICD-10-CM

## 2025-07-22 LAB
ALBUMIN SERPL ELPH-MCNC: 3 G/DL — LOW (ref 3.3–5)
ALBUMIN SERPL ELPH-MCNC: 3.1 G/DL — LOW (ref 3.3–5)
ALP SERPL-CCNC: 118 U/L — SIGNIFICANT CHANGE UP (ref 40–120)
ALP SERPL-CCNC: 122 U/L — HIGH (ref 40–120)
ALT FLD-CCNC: 11 U/L — SIGNIFICANT CHANGE UP (ref 10–45)
ALT FLD-CCNC: 12 U/L — SIGNIFICANT CHANGE UP (ref 10–45)
ANION GAP SERPL CALC-SCNC: 12 MMOL/L — SIGNIFICANT CHANGE UP (ref 5–17)
ANION GAP SERPL CALC-SCNC: 13 MMOL/L — SIGNIFICANT CHANGE UP (ref 5–17)
ANION GAP SERPL CALC-SCNC: 14 MMOL/L — SIGNIFICANT CHANGE UP (ref 5–17)
APTT BLD: 54.7 SEC — HIGH (ref 26.1–36.8)
AST SERPL-CCNC: 15 U/L — SIGNIFICANT CHANGE UP (ref 10–40)
AST SERPL-CCNC: 16 U/L — SIGNIFICANT CHANGE UP (ref 10–40)
BASOPHILS # BLD AUTO: 0.07 K/UL — SIGNIFICANT CHANGE UP (ref 0–0.2)
BASOPHILS NFR BLD AUTO: 0.9 % — SIGNIFICANT CHANGE UP (ref 0–2)
BILIRUB SERPL-MCNC: 0.3 MG/DL — SIGNIFICANT CHANGE UP (ref 0.2–1.2)
BILIRUB SERPL-MCNC: 0.5 MG/DL — SIGNIFICANT CHANGE UP (ref 0.2–1.2)
BUN SERPL-MCNC: 58 MG/DL — HIGH (ref 7–23)
BUN SERPL-MCNC: 60 MG/DL — HIGH (ref 7–23)
BUN SERPL-MCNC: 60 MG/DL — HIGH (ref 7–23)
CALCIUM SERPL-MCNC: 9.7 MG/DL — SIGNIFICANT CHANGE UP (ref 8.4–10.5)
CALCIUM SERPL-MCNC: 9.7 MG/DL — SIGNIFICANT CHANGE UP (ref 8.4–10.5)
CALCIUM SERPL-MCNC: 9.8 MG/DL — SIGNIFICANT CHANGE UP (ref 8.4–10.5)
CHLORIDE SERPL-SCNC: 89 MMOL/L — LOW (ref 96–108)
CO2 SERPL-SCNC: 30 MMOL/L — SIGNIFICANT CHANGE UP (ref 22–31)
CO2 SERPL-SCNC: 32 MMOL/L — HIGH (ref 22–31)
CO2 SERPL-SCNC: 32 MMOL/L — HIGH (ref 22–31)
CREAT SERPL-MCNC: 1.3 MG/DL — SIGNIFICANT CHANGE UP (ref 0.5–1.3)
CREAT SERPL-MCNC: 1.35 MG/DL — HIGH (ref 0.5–1.3)
CREAT SERPL-MCNC: 1.58 MG/DL — HIGH (ref 0.5–1.3)
EGFR: 48 ML/MIN/1.73M2 — LOW
EGFR: 48 ML/MIN/1.73M2 — LOW
EGFR: 58 ML/MIN/1.73M2 — LOW
EGFR: 58 ML/MIN/1.73M2 — LOW
EGFR: 61 ML/MIN/1.73M2 — SIGNIFICANT CHANGE UP
EGFR: 61 ML/MIN/1.73M2 — SIGNIFICANT CHANGE UP
EOSINOPHIL # BLD AUTO: 0.42 K/UL — SIGNIFICANT CHANGE UP (ref 0–0.5)
EOSINOPHIL NFR BLD AUTO: 5.5 % — SIGNIFICANT CHANGE UP (ref 0–6)
GAS PNL BLDA: SIGNIFICANT CHANGE UP
GAS PNL BLDA: SIGNIFICANT CHANGE UP
GLUCOSE SERPL-MCNC: 106 MG/DL — HIGH (ref 70–99)
GLUCOSE SERPL-MCNC: 115 MG/DL — HIGH (ref 70–99)
GLUCOSE SERPL-MCNC: 132 MG/DL — HIGH (ref 70–99)
HCT VFR BLD CALC: 34.2 % — LOW (ref 39–50)
HGB BLD-MCNC: 10.4 G/DL — LOW (ref 13–17)
IMM GRANULOCYTES # BLD AUTO: 0.06 K/UL — SIGNIFICANT CHANGE UP (ref 0–0.07)
IMM GRANULOCYTES NFR BLD AUTO: 0.8 % — SIGNIFICANT CHANGE UP (ref 0–0.9)
INR BLD: 1.13 RATIO — SIGNIFICANT CHANGE UP (ref 0.85–1.16)
LYMPHOCYTES # BLD AUTO: 0.72 K/UL — LOW (ref 1–3.3)
LYMPHOCYTES NFR BLD AUTO: 9.5 % — LOW (ref 13–44)
MAGNESIUM SERPL-MCNC: 2 MG/DL — SIGNIFICANT CHANGE UP (ref 1.6–2.6)
MAGNESIUM SERPL-MCNC: 2.1 MG/DL — SIGNIFICANT CHANGE UP (ref 1.6–2.6)
MCHC RBC-ENTMCNC: 29.2 PG — SIGNIFICANT CHANGE UP (ref 27–34)
MCHC RBC-ENTMCNC: 30.4 G/DL — LOW (ref 32–36)
MCV RBC AUTO: 96.1 FL — SIGNIFICANT CHANGE UP (ref 80–100)
MONOCYTES # BLD AUTO: 0.69 K/UL — SIGNIFICANT CHANGE UP (ref 0–0.9)
MONOCYTES NFR BLD AUTO: 9.1 % — SIGNIFICANT CHANGE UP (ref 2–14)
NEUTROPHILS # BLD AUTO: 5.65 K/UL — SIGNIFICANT CHANGE UP (ref 1.8–7.4)
NEUTROPHILS NFR BLD AUTO: 74.2 % — SIGNIFICANT CHANGE UP (ref 43–77)
NRBC # BLD AUTO: 0 K/UL — SIGNIFICANT CHANGE UP (ref 0–0)
NRBC # FLD: 0 K/UL — SIGNIFICANT CHANGE UP (ref 0–0)
NRBC BLD AUTO-RTO: 0 /100 WBCS — SIGNIFICANT CHANGE UP (ref 0–0)
PHOSPHATE SERPL-MCNC: 3.2 MG/DL — SIGNIFICANT CHANGE UP (ref 2.5–4.5)
PHOSPHATE SERPL-MCNC: 3.2 MG/DL — SIGNIFICANT CHANGE UP (ref 2.5–4.5)
PLATELET # BLD AUTO: 303 K/UL — SIGNIFICANT CHANGE UP (ref 150–400)
PMV BLD: 10.1 FL — SIGNIFICANT CHANGE UP (ref 7–13)
POTASSIUM SERPL-MCNC: 3.9 MMOL/L — SIGNIFICANT CHANGE UP (ref 3.5–5.3)
POTASSIUM SERPL-MCNC: 3.9 MMOL/L — SIGNIFICANT CHANGE UP (ref 3.5–5.3)
POTASSIUM SERPL-MCNC: 4.1 MMOL/L — SIGNIFICANT CHANGE UP (ref 3.5–5.3)
POTASSIUM SERPL-SCNC: 3.9 MMOL/L — SIGNIFICANT CHANGE UP (ref 3.5–5.3)
POTASSIUM SERPL-SCNC: 3.9 MMOL/L — SIGNIFICANT CHANGE UP (ref 3.5–5.3)
POTASSIUM SERPL-SCNC: 4.1 MMOL/L — SIGNIFICANT CHANGE UP (ref 3.5–5.3)
PROT SERPL-MCNC: 8.3 G/DL — SIGNIFICANT CHANGE UP (ref 6–8.3)
PROT SERPL-MCNC: 8.4 G/DL — HIGH (ref 6–8.3)
PROTHROM AB SERPL-ACNC: 13 SEC — SIGNIFICANT CHANGE UP (ref 9.9–13.4)
RBC # BLD: 3.56 M/UL — LOW (ref 4.2–5.8)
RBC # FLD: 16.8 % — HIGH (ref 10.3–14.5)
SODIUM SERPL-SCNC: 133 MMOL/L — LOW (ref 135–145)
SODIUM SERPL-SCNC: 133 MMOL/L — LOW (ref 135–145)
SODIUM SERPL-SCNC: 134 MMOL/L — LOW (ref 135–145)
UFH PPP CHRO-ACNC: 0.46 IU/ML — SIGNIFICANT CHANGE UP (ref 0.3–0.7)
WBC # BLD: 7.61 K/UL — SIGNIFICANT CHANGE UP (ref 3.8–10.5)
WBC # FLD AUTO: 7.61 K/UL — SIGNIFICANT CHANGE UP (ref 3.8–10.5)

## 2025-07-22 PROCEDURE — 93312 ECHO TRANSESOPHAGEAL: CPT

## 2025-07-22 PROCEDURE — 76770 US EXAM ABDO BACK WALL COMP: CPT

## 2025-07-22 PROCEDURE — 85018 HEMOGLOBIN: CPT

## 2025-07-22 PROCEDURE — 97162 PT EVAL MOD COMPLEX 30 MIN: CPT

## 2025-07-22 PROCEDURE — 85730 THROMBOPLASTIN TIME PARTIAL: CPT

## 2025-07-22 PROCEDURE — 99291 CRITICAL CARE FIRST HOUR: CPT

## 2025-07-22 PROCEDURE — 70450 CT HEAD/BRAIN W/O DYE: CPT

## 2025-07-22 PROCEDURE — 80053 COMPREHEN METABOLIC PANEL: CPT

## 2025-07-22 PROCEDURE — 84443 ASSAY THYROID STIM HORMONE: CPT

## 2025-07-22 PROCEDURE — 80162 ASSAY OF DIGOXIN TOTAL: CPT

## 2025-07-22 PROCEDURE — 36600 WITHDRAWAL OF ARTERIAL BLOOD: CPT

## 2025-07-22 PROCEDURE — 85652 RBC SED RATE AUTOMATED: CPT

## 2025-07-22 PROCEDURE — 85014 HEMATOCRIT: CPT

## 2025-07-22 PROCEDURE — 85610 PROTHROMBIN TIME: CPT

## 2025-07-22 PROCEDURE — 76376 3D RENDER W/INTRP POSTPROCES: CPT

## 2025-07-22 PROCEDURE — 94003 VENT MGMT INPAT SUBQ DAY: CPT

## 2025-07-22 PROCEDURE — 84436 ASSAY OF TOTAL THYROXINE: CPT

## 2025-07-22 PROCEDURE — 84295 ASSAY OF SERUM SODIUM: CPT

## 2025-07-22 PROCEDURE — 93325 DOPPLER ECHO COLOR FLOW MAPG: CPT

## 2025-07-22 PROCEDURE — 94002 VENT MGMT INPAT INIT DAY: CPT

## 2025-07-22 PROCEDURE — 84100 ASSAY OF PHOSPHORUS: CPT

## 2025-07-22 PROCEDURE — 85025 COMPLETE CBC W/AUTO DIFF WBC: CPT

## 2025-07-22 PROCEDURE — 70498 CT ANGIOGRAPHY NECK: CPT

## 2025-07-22 PROCEDURE — 86901 BLOOD TYPING SEROLOGIC RH(D): CPT

## 2025-07-22 PROCEDURE — 84132 ASSAY OF SERUM POTASSIUM: CPT

## 2025-07-22 PROCEDURE — 93320 DOPPLER ECHO COMPLETE: CPT

## 2025-07-22 PROCEDURE — 86850 RBC ANTIBODY SCREEN: CPT

## 2025-07-22 PROCEDURE — 86140 C-REACTIVE PROTEIN: CPT

## 2025-07-22 PROCEDURE — 82607 VITAMIN B-12: CPT

## 2025-07-22 PROCEDURE — 70496 CT ANGIOGRAPHY HEAD: CPT

## 2025-07-22 PROCEDURE — 87640 STAPH A DNA AMP PROBE: CPT

## 2025-07-22 PROCEDURE — 36415 COLL VENOUS BLD VENIPUNCTURE: CPT

## 2025-07-22 PROCEDURE — 94640 AIRWAY INHALATION TREATMENT: CPT

## 2025-07-22 PROCEDURE — 97110 THERAPEUTIC EXERCISES: CPT

## 2025-07-22 PROCEDURE — 82947 ASSAY GLUCOSE BLOOD QUANT: CPT

## 2025-07-22 PROCEDURE — 93010 ELECTROCARDIOGRAM REPORT: CPT

## 2025-07-22 PROCEDURE — 97530 THERAPEUTIC ACTIVITIES: CPT

## 2025-07-22 PROCEDURE — 82803 BLOOD GASES ANY COMBINATION: CPT

## 2025-07-22 PROCEDURE — 71045 X-RAY EXAM CHEST 1 VIEW: CPT

## 2025-07-22 PROCEDURE — 82962 GLUCOSE BLOOD TEST: CPT

## 2025-07-22 PROCEDURE — 83605 ASSAY OF LACTIC ACID: CPT

## 2025-07-22 PROCEDURE — 87040 BLOOD CULTURE FOR BACTERIA: CPT

## 2025-07-22 PROCEDURE — 85520 HEPARIN ASSAY: CPT

## 2025-07-22 PROCEDURE — 84480 ASSAY TRIIODOTHYRONINE (T3): CPT

## 2025-07-22 PROCEDURE — 84439 ASSAY OF FREE THYROXINE: CPT

## 2025-07-22 PROCEDURE — 80061 LIPID PANEL: CPT

## 2025-07-22 PROCEDURE — 86900 BLOOD TYPING SEROLOGIC ABO: CPT

## 2025-07-22 PROCEDURE — 80048 BASIC METABOLIC PNL TOTAL CA: CPT

## 2025-07-22 PROCEDURE — 92610 EVALUATE SWALLOWING FUNCTION: CPT

## 2025-07-22 PROCEDURE — 87641 MR-STAPH DNA AMP PROBE: CPT

## 2025-07-22 PROCEDURE — 83036 HEMOGLOBIN GLYCOSYLATED A1C: CPT

## 2025-07-22 PROCEDURE — 82977 ASSAY OF GGT: CPT

## 2025-07-22 PROCEDURE — 93308 TTE F-UP OR LMTD: CPT

## 2025-07-22 PROCEDURE — 83735 ASSAY OF MAGNESIUM: CPT

## 2025-07-22 PROCEDURE — 82746 ASSAY OF FOLIC ACID SERUM: CPT

## 2025-07-22 PROCEDURE — 85027 COMPLETE CBC AUTOMATED: CPT

## 2025-07-22 PROCEDURE — 81001 URINALYSIS AUTO W/SCOPE: CPT

## 2025-07-22 PROCEDURE — 84145 PROCALCITONIN (PCT): CPT

## 2025-07-22 PROCEDURE — 82330 ASSAY OF CALCIUM: CPT

## 2025-07-22 PROCEDURE — 82435 ASSAY OF BLOOD CHLORIDE: CPT

## 2025-07-22 PROCEDURE — 93005 ELECTROCARDIOGRAM TRACING: CPT

## 2025-07-22 PROCEDURE — 83880 ASSAY OF NATRIURETIC PEPTIDE: CPT

## 2025-07-22 PROCEDURE — 92526 ORAL FUNCTION THERAPY: CPT

## 2025-07-22 PROCEDURE — 71045 X-RAY EXAM CHEST 1 VIEW: CPT | Mod: 26

## 2025-07-22 PROCEDURE — 94660 CPAP INITIATION&MGMT: CPT

## 2025-07-22 RX ORDER — ONDANSETRON HCL/PF 4 MG/2 ML
4 VIAL (ML) INJECTION ONCE
Refills: 0 | Status: COMPLETED | OUTPATIENT
Start: 2025-07-22 | End: 2025-07-22

## 2025-07-22 RX ORDER — BISACODYL 5 MG
10 TABLET, DELAYED RELEASE (ENTERIC COATED) ORAL DAILY
Refills: 0 | Status: DISCONTINUED | OUTPATIENT
Start: 2025-07-22 | End: 2025-08-01

## 2025-07-22 RX ORDER — MAGNESIUM, ALUMINUM HYDROXIDE 200-200 MG
30 TABLET,CHEWABLE ORAL EVERY 4 HOURS
Refills: 0 | Status: DISCONTINUED | OUTPATIENT
Start: 2025-07-22 | End: 2025-08-01

## 2025-07-22 RX ORDER — MELATONIN 5 MG
3 TABLET ORAL AT BEDTIME
Refills: 0 | Status: DISCONTINUED | OUTPATIENT
Start: 2025-07-22 | End: 2025-08-01

## 2025-07-22 RX ORDER — LEVETIRACETAM 10 MG/ML
500 INJECTION, SOLUTION INTRAVENOUS EVERY 12 HOURS
Refills: 0 | Status: COMPLETED | OUTPATIENT
Start: 2025-07-22 | End: 2025-07-24

## 2025-07-22 RX ORDER — ACETAMINOPHEN 500 MG/5ML
650 LIQUID (ML) ORAL EVERY 6 HOURS
Refills: 0 | Status: DISCONTINUED | OUTPATIENT
Start: 2025-07-22 | End: 2025-08-01

## 2025-07-22 RX ADMIN — ATORVASTATIN CALCIUM 80 MILLIGRAM(S): 80 TABLET, FILM COATED ORAL at 21:19

## 2025-07-22 RX ADMIN — BUMETANIDE 20 MG/HR: 1 TABLET ORAL at 23:07

## 2025-07-22 RX ADMIN — IPRATROPIUM BROMIDE AND ALBUTEROL SULFATE 3 MILLILITER(S): .5; 2.5 SOLUTION RESPIRATORY (INHALATION) at 05:09

## 2025-07-22 RX ADMIN — Medication 4 MILLIGRAM(S): at 23:28

## 2025-07-22 RX ADMIN — BUMETANIDE 20 MG/HR: 1 TABLET ORAL at 21:16

## 2025-07-22 RX ADMIN — POLYETHYLENE GLYCOL 3350 17 GRAM(S): 17 POWDER, FOR SOLUTION ORAL at 05:43

## 2025-07-22 RX ADMIN — Medication 1 TABLET(S): at 11:35

## 2025-07-22 RX ADMIN — BUMETANIDE 20 MG/HR: 1 TABLET ORAL at 18:43

## 2025-07-22 RX ADMIN — LEVETIRACETAM 400 MILLIGRAM(S): 10 INJECTION, SOLUTION INTRAVENOUS at 18:10

## 2025-07-22 RX ADMIN — CLOPIDOGREL BISULFATE 75 MILLIGRAM(S): 75 TABLET, FILM COATED ORAL at 11:35

## 2025-07-22 RX ADMIN — CEFTRIAXONE 100 MILLIGRAM(S): 500 INJECTION, POWDER, FOR SOLUTION INTRAMUSCULAR; INTRAVENOUS at 05:43

## 2025-07-22 RX ADMIN — Medication 2 TABLET(S): at 21:19

## 2025-07-22 RX ADMIN — Medication 1 APPLICATION(S): at 05:43

## 2025-07-22 RX ADMIN — Medication 40 MILLIGRAM(S): at 11:34

## 2025-07-22 RX ADMIN — Medication 500 MILLIGRAM(S): at 11:35

## 2025-07-22 RX ADMIN — Medication 20 MILLIEQUIVALENT(S): at 05:43

## 2025-07-22 RX ADMIN — Medication 4 MILLIGRAM(S): at 17:04

## 2025-07-22 RX ADMIN — POLYETHYLENE GLYCOL 3350 17 GRAM(S): 17 POWDER, FOR SOLUTION ORAL at 18:18

## 2025-07-22 RX ADMIN — LEVETIRACETAM 500 MILLIGRAM(S): 10 INJECTION, SOLUTION INTRAVENOUS at 05:43

## 2025-07-22 RX ADMIN — HEPARIN SODIUM 14.5 UNIT(S)/HR: 1000 INJECTION INTRAVENOUS; SUBCUTANEOUS at 18:43

## 2025-07-22 RX ADMIN — HEPARIN SODIUM 14.5 UNIT(S)/HR: 1000 INJECTION INTRAVENOUS; SUBCUTANEOUS at 21:17

## 2025-07-22 RX ADMIN — IPRATROPIUM BROMIDE AND ALBUTEROL SULFATE 3 MILLILITER(S): .5; 2.5 SOLUTION RESPIRATORY (INHALATION) at 18:15

## 2025-07-22 RX ADMIN — IPRATROPIUM BROMIDE AND ALBUTEROL SULFATE 3 MILLILITER(S): .5; 2.5 SOLUTION RESPIRATORY (INHALATION) at 11:11

## 2025-07-23 LAB
ALBUMIN SERPL ELPH-MCNC: 3.3 G/DL — SIGNIFICANT CHANGE UP (ref 3.3–5)
ALP SERPL-CCNC: 122 U/L — HIGH (ref 40–120)
ALT FLD-CCNC: 13 U/L — SIGNIFICANT CHANGE UP (ref 10–45)
ANION GAP SERPL CALC-SCNC: 11 MMOL/L — SIGNIFICANT CHANGE UP (ref 5–17)
ANION GAP SERPL CALC-SCNC: 13 MMOL/L — SIGNIFICANT CHANGE UP (ref 5–17)
APTT BLD: 57.2 SEC — HIGH (ref 26.1–36.8)
AST SERPL-CCNC: 18 U/L — SIGNIFICANT CHANGE UP (ref 10–40)
BASOPHILS # BLD AUTO: 0.05 K/UL — SIGNIFICANT CHANGE UP (ref 0–0.2)
BASOPHILS NFR BLD AUTO: 0.8 % — SIGNIFICANT CHANGE UP (ref 0–2)
BILIRUB SERPL-MCNC: 0.4 MG/DL — SIGNIFICANT CHANGE UP (ref 0.2–1.2)
BUN SERPL-MCNC: 60 MG/DL — HIGH (ref 7–23)
BUN SERPL-MCNC: 62 MG/DL — HIGH (ref 7–23)
CALCIUM SERPL-MCNC: 9.8 MG/DL — SIGNIFICANT CHANGE UP (ref 8.4–10.5)
CALCIUM SERPL-MCNC: 9.9 MG/DL — SIGNIFICANT CHANGE UP (ref 8.4–10.5)
CHLORIDE SERPL-SCNC: 88 MMOL/L — LOW (ref 96–108)
CHLORIDE SERPL-SCNC: 89 MMOL/L — LOW (ref 96–108)
CO2 SERPL-SCNC: 33 MMOL/L — HIGH (ref 22–31)
CO2 SERPL-SCNC: 33 MMOL/L — HIGH (ref 22–31)
CREAT SERPL-MCNC: 1.42 MG/DL — HIGH (ref 0.5–1.3)
CREAT SERPL-MCNC: 1.56 MG/DL — HIGH (ref 0.5–1.3)
DIGOXIN SERPL-MCNC: 1.2 NG/ML — SIGNIFICANT CHANGE UP (ref 0.8–2)
EGFR: 49 ML/MIN/1.73M2 — LOW
EGFR: 49 ML/MIN/1.73M2 — LOW
EGFR: 54 ML/MIN/1.73M2 — LOW
EGFR: 54 ML/MIN/1.73M2 — LOW
EOSINOPHIL # BLD AUTO: 0.3 K/UL — SIGNIFICANT CHANGE UP (ref 0–0.5)
EOSINOPHIL NFR BLD AUTO: 5.1 % — SIGNIFICANT CHANGE UP (ref 0–6)
GAS PNL BLDA: SIGNIFICANT CHANGE UP
GAS PNL BLDA: SIGNIFICANT CHANGE UP
GLUCOSE BLDC GLUCOMTR-MCNC: 105 MG/DL — HIGH (ref 70–99)
GLUCOSE BLDC GLUCOMTR-MCNC: 126 MG/DL — HIGH (ref 70–99)
GLUCOSE BLDC GLUCOMTR-MCNC: 129 MG/DL — HIGH (ref 70–99)
GLUCOSE BLDC GLUCOMTR-MCNC: 163 MG/DL — HIGH (ref 70–99)
GLUCOSE SERPL-MCNC: 89 MG/DL — SIGNIFICANT CHANGE UP (ref 70–99)
GLUCOSE SERPL-MCNC: 99 MG/DL — SIGNIFICANT CHANGE UP (ref 70–99)
HCT VFR BLD CALC: 32.7 % — LOW (ref 39–50)
HGB BLD-MCNC: 10 G/DL — LOW (ref 13–17)
IMM GRANULOCYTES # BLD AUTO: 0.05 K/UL — SIGNIFICANT CHANGE UP (ref 0–0.07)
IMM GRANULOCYTES NFR BLD AUTO: 0.8 % — SIGNIFICANT CHANGE UP (ref 0–0.9)
LYMPHOCYTES # BLD AUTO: 0.6 K/UL — LOW (ref 1–3.3)
LYMPHOCYTES NFR BLD AUTO: 10.2 % — LOW (ref 13–44)
MAGNESIUM SERPL-MCNC: 2 MG/DL — SIGNIFICANT CHANGE UP (ref 1.6–2.6)
MCHC RBC-ENTMCNC: 29.8 PG — SIGNIFICANT CHANGE UP (ref 27–34)
MCHC RBC-ENTMCNC: 30.6 G/DL — LOW (ref 32–36)
MCV RBC AUTO: 97.3 FL — SIGNIFICANT CHANGE UP (ref 80–100)
MONOCYTES # BLD AUTO: 0.57 K/UL — SIGNIFICANT CHANGE UP (ref 0–0.9)
MONOCYTES NFR BLD AUTO: 9.6 % — SIGNIFICANT CHANGE UP (ref 2–14)
NEUTROPHILS # BLD AUTO: 4.34 K/UL — SIGNIFICANT CHANGE UP (ref 1.8–7.4)
NEUTROPHILS NFR BLD AUTO: 73.5 % — SIGNIFICANT CHANGE UP (ref 43–77)
NRBC # BLD AUTO: 0 K/UL — SIGNIFICANT CHANGE UP (ref 0–0)
NRBC # FLD: 0 K/UL — SIGNIFICANT CHANGE UP (ref 0–0)
NRBC BLD AUTO-RTO: 0 /100 WBCS — SIGNIFICANT CHANGE UP (ref 0–0)
PHOSPHATE SERPL-MCNC: 4.1 MG/DL — SIGNIFICANT CHANGE UP (ref 2.5–4.5)
PLATELET # BLD AUTO: 253 K/UL — SIGNIFICANT CHANGE UP (ref 150–400)
PMV BLD: 10.6 FL — SIGNIFICANT CHANGE UP (ref 7–13)
POTASSIUM SERPL-MCNC: 3.6 MMOL/L — SIGNIFICANT CHANGE UP (ref 3.5–5.3)
POTASSIUM SERPL-MCNC: 3.7 MMOL/L — SIGNIFICANT CHANGE UP (ref 3.5–5.3)
POTASSIUM SERPL-SCNC: 3.6 MMOL/L — SIGNIFICANT CHANGE UP (ref 3.5–5.3)
POTASSIUM SERPL-SCNC: 3.7 MMOL/L — SIGNIFICANT CHANGE UP (ref 3.5–5.3)
PROT SERPL-MCNC: 8.4 G/DL — HIGH (ref 6–8.3)
RBC # BLD: 3.36 M/UL — LOW (ref 4.2–5.8)
RBC # FLD: 16.6 % — HIGH (ref 10.3–14.5)
SODIUM SERPL-SCNC: 133 MMOL/L — LOW (ref 135–145)
SODIUM SERPL-SCNC: 134 MMOL/L — LOW (ref 135–145)
WBC # BLD: 5.91 K/UL — SIGNIFICANT CHANGE UP (ref 3.8–10.5)
WBC # FLD AUTO: 5.91 K/UL — SIGNIFICANT CHANGE UP (ref 3.8–10.5)

## 2025-07-23 PROCEDURE — 97530 THERAPEUTIC ACTIVITIES: CPT

## 2025-07-23 PROCEDURE — 83036 HEMOGLOBIN GLYCOSYLATED A1C: CPT

## 2025-07-23 PROCEDURE — 70498 CT ANGIOGRAPHY NECK: CPT

## 2025-07-23 PROCEDURE — 83880 ASSAY OF NATRIURETIC PEPTIDE: CPT

## 2025-07-23 PROCEDURE — 82435 ASSAY OF BLOOD CHLORIDE: CPT

## 2025-07-23 PROCEDURE — 85027 COMPLETE CBC AUTOMATED: CPT

## 2025-07-23 PROCEDURE — 36415 COLL VENOUS BLD VENIPUNCTURE: CPT

## 2025-07-23 PROCEDURE — 99232 SBSQ HOSP IP/OBS MODERATE 35: CPT

## 2025-07-23 PROCEDURE — 82607 VITAMIN B-12: CPT

## 2025-07-23 PROCEDURE — 82330 ASSAY OF CALCIUM: CPT

## 2025-07-23 PROCEDURE — 97162 PT EVAL MOD COMPLEX 30 MIN: CPT

## 2025-07-23 PROCEDURE — 93308 TTE F-UP OR LMTD: CPT

## 2025-07-23 PROCEDURE — 94640 AIRWAY INHALATION TREATMENT: CPT

## 2025-07-23 PROCEDURE — 97110 THERAPEUTIC EXERCISES: CPT

## 2025-07-23 PROCEDURE — 93312 ECHO TRANSESOPHAGEAL: CPT

## 2025-07-23 PROCEDURE — 84480 ASSAY TRIIODOTHYRONINE (T3): CPT

## 2025-07-23 PROCEDURE — 85520 HEPARIN ASSAY: CPT

## 2025-07-23 PROCEDURE — 86900 BLOOD TYPING SEROLOGIC ABO: CPT

## 2025-07-23 PROCEDURE — 85610 PROTHROMBIN TIME: CPT

## 2025-07-23 PROCEDURE — 80053 COMPREHEN METABOLIC PANEL: CPT

## 2025-07-23 PROCEDURE — 93320 DOPPLER ECHO COMPLETE: CPT

## 2025-07-23 PROCEDURE — 82746 ASSAY OF FOLIC ACID SERUM: CPT

## 2025-07-23 PROCEDURE — 94002 VENT MGMT INPAT INIT DAY: CPT

## 2025-07-23 PROCEDURE — 82947 ASSAY GLUCOSE BLOOD QUANT: CPT

## 2025-07-23 PROCEDURE — 85730 THROMBOPLASTIN TIME PARTIAL: CPT

## 2025-07-23 PROCEDURE — 84145 PROCALCITONIN (PCT): CPT

## 2025-07-23 PROCEDURE — 94660 CPAP INITIATION&MGMT: CPT

## 2025-07-23 PROCEDURE — 71045 X-RAY EXAM CHEST 1 VIEW: CPT

## 2025-07-23 PROCEDURE — 93325 DOPPLER ECHO COLOR FLOW MAPG: CPT

## 2025-07-23 PROCEDURE — 99233 SBSQ HOSP IP/OBS HIGH 50: CPT | Mod: GC

## 2025-07-23 PROCEDURE — 84439 ASSAY OF FREE THYROXINE: CPT

## 2025-07-23 PROCEDURE — 82803 BLOOD GASES ANY COMBINATION: CPT

## 2025-07-23 PROCEDURE — 86901 BLOOD TYPING SEROLOGIC RH(D): CPT

## 2025-07-23 PROCEDURE — 70450 CT HEAD/BRAIN W/O DYE: CPT

## 2025-07-23 PROCEDURE — 94003 VENT MGMT INPAT SUBQ DAY: CPT

## 2025-07-23 PROCEDURE — 82977 ASSAY OF GGT: CPT

## 2025-07-23 PROCEDURE — 70496 CT ANGIOGRAPHY HEAD: CPT

## 2025-07-23 PROCEDURE — 80048 BASIC METABOLIC PNL TOTAL CA: CPT

## 2025-07-23 PROCEDURE — 84436 ASSAY OF TOTAL THYROXINE: CPT

## 2025-07-23 PROCEDURE — 92526 ORAL FUNCTION THERAPY: CPT

## 2025-07-23 PROCEDURE — 83735 ASSAY OF MAGNESIUM: CPT

## 2025-07-23 PROCEDURE — 93005 ELECTROCARDIOGRAM TRACING: CPT

## 2025-07-23 PROCEDURE — 84443 ASSAY THYROID STIM HORMONE: CPT

## 2025-07-23 PROCEDURE — 84132 ASSAY OF SERUM POTASSIUM: CPT

## 2025-07-23 PROCEDURE — 82962 GLUCOSE BLOOD TEST: CPT

## 2025-07-23 PROCEDURE — 36600 WITHDRAWAL OF ARTERIAL BLOOD: CPT

## 2025-07-23 PROCEDURE — 81001 URINALYSIS AUTO W/SCOPE: CPT

## 2025-07-23 PROCEDURE — 87040 BLOOD CULTURE FOR BACTERIA: CPT

## 2025-07-23 PROCEDURE — 76770 US EXAM ABDO BACK WALL COMP: CPT

## 2025-07-23 PROCEDURE — 85014 HEMATOCRIT: CPT

## 2025-07-23 PROCEDURE — 85652 RBC SED RATE AUTOMATED: CPT

## 2025-07-23 PROCEDURE — 92610 EVALUATE SWALLOWING FUNCTION: CPT

## 2025-07-23 PROCEDURE — 86140 C-REACTIVE PROTEIN: CPT

## 2025-07-23 PROCEDURE — 80162 ASSAY OF DIGOXIN TOTAL: CPT

## 2025-07-23 PROCEDURE — G0545: CPT

## 2025-07-23 PROCEDURE — 87641 MR-STAPH DNA AMP PROBE: CPT

## 2025-07-23 PROCEDURE — 84295 ASSAY OF SERUM SODIUM: CPT

## 2025-07-23 PROCEDURE — 87640 STAPH A DNA AMP PROBE: CPT

## 2025-07-23 PROCEDURE — 83605 ASSAY OF LACTIC ACID: CPT

## 2025-07-23 PROCEDURE — 76376 3D RENDER W/INTRP POSTPROCES: CPT

## 2025-07-23 PROCEDURE — 86850 RBC ANTIBODY SCREEN: CPT

## 2025-07-23 PROCEDURE — 84100 ASSAY OF PHOSPHORUS: CPT

## 2025-07-23 PROCEDURE — 85025 COMPLETE CBC W/AUTO DIFF WBC: CPT

## 2025-07-23 PROCEDURE — 80061 LIPID PANEL: CPT

## 2025-07-23 PROCEDURE — 85018 HEMOGLOBIN: CPT

## 2025-07-23 RX ORDER — ONDANSETRON HCL/PF 4 MG/2 ML
4 VIAL (ML) INJECTION
Refills: 0 | Status: DISCONTINUED | OUTPATIENT
Start: 2025-07-23 | End: 2025-08-01

## 2025-07-23 RX ORDER — SODIUM CHLORIDE 3 G/100ML
250 INJECTION, SOLUTION INTRAVENOUS ONCE
Refills: 0 | Status: COMPLETED | OUTPATIENT
Start: 2025-07-23 | End: 2025-07-23

## 2025-07-23 RX ADMIN — BUMETANIDE 20 MG/HR: 1 TABLET ORAL at 14:48

## 2025-07-23 RX ADMIN — IPRATROPIUM BROMIDE AND ALBUTEROL SULFATE 3 MILLILITER(S): .5; 2.5 SOLUTION RESPIRATORY (INHALATION) at 18:29

## 2025-07-23 RX ADMIN — LEVETIRACETAM 400 MILLIGRAM(S): 10 INJECTION, SOLUTION INTRAVENOUS at 05:29

## 2025-07-23 RX ADMIN — IPRATROPIUM BROMIDE AND ALBUTEROL SULFATE 3 MILLILITER(S): .5; 2.5 SOLUTION RESPIRATORY (INHALATION) at 00:25

## 2025-07-23 RX ADMIN — SODIUM CHLORIDE 750 MILLILITER(S): 3 INJECTION, SOLUTION INTRAVENOUS at 18:57

## 2025-07-23 RX ADMIN — LEVETIRACETAM 400 MILLIGRAM(S): 10 INJECTION, SOLUTION INTRAVENOUS at 18:28

## 2025-07-23 RX ADMIN — POLYETHYLENE GLYCOL 3350 17 GRAM(S): 17 POWDER, FOR SOLUTION ORAL at 06:02

## 2025-07-23 RX ADMIN — POLYETHYLENE GLYCOL 3350 17 GRAM(S): 17 POWDER, FOR SOLUTION ORAL at 18:29

## 2025-07-23 RX ADMIN — CEFTRIAXONE 100 MILLIGRAM(S): 500 INJECTION, POWDER, FOR SOLUTION INTRAMUSCULAR; INTRAVENOUS at 05:46

## 2025-07-23 RX ADMIN — BUMETANIDE 20 MG/HR: 1 TABLET ORAL at 09:13

## 2025-07-23 RX ADMIN — Medication 650 MILLIGRAM(S): at 21:03

## 2025-07-23 RX ADMIN — Medication 10 MILLIGRAM(S): at 16:03

## 2025-07-23 RX ADMIN — BUMETANIDE 20 MG/HR: 1 TABLET ORAL at 03:53

## 2025-07-23 RX ADMIN — HEPARIN SODIUM 14.5 UNIT(S)/HR: 1000 INJECTION INTRAVENOUS; SUBCUTANEOUS at 07:13

## 2025-07-23 RX ADMIN — BUMETANIDE 20 MG/HR: 1 TABLET ORAL at 21:01

## 2025-07-23 RX ADMIN — IPRATROPIUM BROMIDE AND ALBUTEROL SULFATE 3 MILLILITER(S): .5; 2.5 SOLUTION RESPIRATORY (INHALATION) at 06:14

## 2025-07-23 RX ADMIN — Medication 3 MILLIGRAM(S): at 21:03

## 2025-07-23 RX ADMIN — HEPARIN SODIUM 14.5 UNIT(S)/HR: 1000 INJECTION INTRAVENOUS; SUBCUTANEOUS at 21:01

## 2025-07-23 RX ADMIN — Medication 2 TABLET(S): at 21:01

## 2025-07-23 RX ADMIN — BUMETANIDE 20 MG/HR: 1 TABLET ORAL at 18:27

## 2025-07-23 RX ADMIN — Medication 650 MILLIGRAM(S): at 22:00

## 2025-07-23 RX ADMIN — ATORVASTATIN CALCIUM 80 MILLIGRAM(S): 80 TABLET, FILM COATED ORAL at 21:01

## 2025-07-23 RX ADMIN — IPRATROPIUM BROMIDE AND ALBUTEROL SULFATE 3 MILLILITER(S): .5; 2.5 SOLUTION RESPIRATORY (INHALATION) at 12:32

## 2025-07-24 DIAGNOSIS — I60.9 NONTRAUMATIC SUBARACHNOID HEMORRHAGE, UNSPECIFIED: ICD-10-CM

## 2025-07-24 LAB
ALBUMIN SERPL ELPH-MCNC: 2.5 G/DL — LOW (ref 3.3–5)
ALBUMIN SERPL ELPH-MCNC: 2.9 G/DL — LOW (ref 3.3–5)
ALP SERPL-CCNC: 100 U/L — SIGNIFICANT CHANGE UP (ref 40–120)
ALP SERPL-CCNC: 120 U/L — SIGNIFICANT CHANGE UP (ref 40–120)
ALT FLD-CCNC: 13 U/L — SIGNIFICANT CHANGE UP (ref 10–45)
ALT FLD-CCNC: 14 U/L — SIGNIFICANT CHANGE UP (ref 10–45)
ANION GAP SERPL CALC-SCNC: 12 MMOL/L — SIGNIFICANT CHANGE UP (ref 5–17)
ANION GAP SERPL CALC-SCNC: 13 MMOL/L — SIGNIFICANT CHANGE UP (ref 5–17)
ANION GAP SERPL CALC-SCNC: 13 MMOL/L — SIGNIFICANT CHANGE UP (ref 5–17)
APTT BLD: 33.4 SEC — SIGNIFICANT CHANGE UP (ref 26.1–36.8)
APTT BLD: 56.8 SEC — HIGH (ref 26.1–36.8)
AST SERPL-CCNC: 18 U/L — SIGNIFICANT CHANGE UP (ref 10–40)
AST SERPL-CCNC: 18 U/L — SIGNIFICANT CHANGE UP (ref 10–40)
BASOPHILS # BLD AUTO: 0.04 K/UL — SIGNIFICANT CHANGE UP (ref 0–0.2)
BASOPHILS NFR BLD AUTO: 0.7 % — SIGNIFICANT CHANGE UP (ref 0–2)
BILIRUB SERPL-MCNC: 0.3 MG/DL — SIGNIFICANT CHANGE UP (ref 0.2–1.2)
BILIRUB SERPL-MCNC: 0.3 MG/DL — SIGNIFICANT CHANGE UP (ref 0.2–1.2)
BLD GP AB SCN SERPL QL: NEGATIVE — SIGNIFICANT CHANGE UP
BUN SERPL-MCNC: 58 MG/DL — HIGH (ref 7–23)
BUN SERPL-MCNC: 59 MG/DL — HIGH (ref 7–23)
BUN SERPL-MCNC: 60 MG/DL — HIGH (ref 7–23)
CALCIUM SERPL-MCNC: 8.1 MG/DL — LOW (ref 8.4–10.5)
CALCIUM SERPL-MCNC: 9.6 MG/DL — SIGNIFICANT CHANGE UP (ref 8.4–10.5)
CALCIUM SERPL-MCNC: 9.6 MG/DL — SIGNIFICANT CHANGE UP (ref 8.4–10.5)
CHLORIDE SERPL-SCNC: 91 MMOL/L — LOW (ref 96–108)
CHLORIDE SERPL-SCNC: 93 MMOL/L — LOW (ref 96–108)
CHLORIDE SERPL-SCNC: 96 MMOL/L — SIGNIFICANT CHANGE UP (ref 96–108)
CO2 SERPL-SCNC: 26 MMOL/L — SIGNIFICANT CHANGE UP (ref 22–31)
CO2 SERPL-SCNC: 31 MMOL/L — SIGNIFICANT CHANGE UP (ref 22–31)
CO2 SERPL-SCNC: 32 MMOL/L — HIGH (ref 22–31)
CREAT SERPL-MCNC: 1.24 MG/DL — SIGNIFICANT CHANGE UP (ref 0.5–1.3)
CREAT SERPL-MCNC: 1.41 MG/DL — HIGH (ref 0.5–1.3)
CREAT SERPL-MCNC: 1.53 MG/DL — HIGH (ref 0.5–1.3)
EGFR: 50 ML/MIN/1.73M2 — LOW
EGFR: 50 ML/MIN/1.73M2 — LOW
EGFR: 55 ML/MIN/1.73M2 — LOW
EGFR: 55 ML/MIN/1.73M2 — LOW
EGFR: 64 ML/MIN/1.73M2 — SIGNIFICANT CHANGE UP
EGFR: 64 ML/MIN/1.73M2 — SIGNIFICANT CHANGE UP
EOSINOPHIL # BLD AUTO: 0.21 K/UL — SIGNIFICANT CHANGE UP (ref 0–0.5)
EOSINOPHIL NFR BLD AUTO: 3.8 % — SIGNIFICANT CHANGE UP (ref 0–6)
GAS PNL BLDA: SIGNIFICANT CHANGE UP
GAS PNL BLDV: SIGNIFICANT CHANGE UP
GLUCOSE SERPL-MCNC: 144 MG/DL — HIGH (ref 70–99)
GLUCOSE SERPL-MCNC: 180 MG/DL — HIGH (ref 70–99)
GLUCOSE SERPL-MCNC: 88 MG/DL — SIGNIFICANT CHANGE UP (ref 70–99)
HCT VFR BLD CALC: 32.2 % — LOW (ref 39–50)
HGB BLD-MCNC: 9.8 G/DL — LOW (ref 13–17)
IMM GRANULOCYTES # BLD AUTO: 0.04 K/UL — SIGNIFICANT CHANGE UP (ref 0–0.07)
IMM GRANULOCYTES NFR BLD AUTO: 0.7 % — SIGNIFICANT CHANGE UP (ref 0–0.9)
INR BLD: 1.07 RATIO — SIGNIFICANT CHANGE UP (ref 0.85–1.16)
INR BLD: 1.09 RATIO — SIGNIFICANT CHANGE UP (ref 0.85–1.16)
LYMPHOCYTES # BLD AUTO: 0.71 K/UL — LOW (ref 1–3.3)
LYMPHOCYTES NFR BLD AUTO: 12.9 % — LOW (ref 13–44)
MAGNESIUM SERPL-MCNC: 1.7 MG/DL — SIGNIFICANT CHANGE UP (ref 1.6–2.6)
MAGNESIUM SERPL-MCNC: 2.1 MG/DL — SIGNIFICANT CHANGE UP (ref 1.6–2.6)
MCHC RBC-ENTMCNC: 29.4 PG — SIGNIFICANT CHANGE UP (ref 27–34)
MCHC RBC-ENTMCNC: 30.4 G/DL — LOW (ref 32–36)
MCV RBC AUTO: 96.7 FL — SIGNIFICANT CHANGE UP (ref 80–100)
MONOCYTES # BLD AUTO: 0.55 K/UL — SIGNIFICANT CHANGE UP (ref 0–0.9)
MONOCYTES NFR BLD AUTO: 10 % — SIGNIFICANT CHANGE UP (ref 2–14)
NEUTROPHILS # BLD AUTO: 3.96 K/UL — SIGNIFICANT CHANGE UP (ref 1.8–7.4)
NEUTROPHILS NFR BLD AUTO: 71.9 % — SIGNIFICANT CHANGE UP (ref 43–77)
NRBC # BLD AUTO: 0 K/UL — SIGNIFICANT CHANGE UP (ref 0–0)
NRBC # FLD: 0 K/UL — SIGNIFICANT CHANGE UP (ref 0–0)
NRBC BLD AUTO-RTO: 0 /100 WBCS — SIGNIFICANT CHANGE UP (ref 0–0)
PHOSPHATE SERPL-MCNC: 3.4 MG/DL — SIGNIFICANT CHANGE UP (ref 2.5–4.5)
PHOSPHATE SERPL-MCNC: 3.8 MG/DL — SIGNIFICANT CHANGE UP (ref 2.5–4.5)
PLATELET # BLD AUTO: 241 K/UL — SIGNIFICANT CHANGE UP (ref 150–400)
PMV BLD: 10.7 FL — SIGNIFICANT CHANGE UP (ref 7–13)
POTASSIUM SERPL-MCNC: 3.5 MMOL/L — SIGNIFICANT CHANGE UP (ref 3.5–5.3)
POTASSIUM SERPL-MCNC: 3.6 MMOL/L — SIGNIFICANT CHANGE UP (ref 3.5–5.3)
POTASSIUM SERPL-MCNC: 3.6 MMOL/L — SIGNIFICANT CHANGE UP (ref 3.5–5.3)
POTASSIUM SERPL-SCNC: 3.5 MMOL/L — SIGNIFICANT CHANGE UP (ref 3.5–5.3)
POTASSIUM SERPL-SCNC: 3.6 MMOL/L — SIGNIFICANT CHANGE UP (ref 3.5–5.3)
POTASSIUM SERPL-SCNC: 3.6 MMOL/L — SIGNIFICANT CHANGE UP (ref 3.5–5.3)
PROT SERPL-MCNC: 6.7 G/DL — SIGNIFICANT CHANGE UP (ref 6–8.3)
PROT SERPL-MCNC: 7.8 G/DL — SIGNIFICANT CHANGE UP (ref 6–8.3)
PROTHROM AB SERPL-ACNC: 12.3 SEC — SIGNIFICANT CHANGE UP (ref 9.9–13.4)
PROTHROM AB SERPL-ACNC: 12.4 SEC — SIGNIFICANT CHANGE UP (ref 9.9–13.4)
RBC # BLD: 3.33 M/UL — LOW (ref 4.2–5.8)
RBC # FLD: 16.7 % — HIGH (ref 10.3–14.5)
RH IG SCN BLD-IMP: POSITIVE — SIGNIFICANT CHANGE UP
SODIUM SERPL-SCNC: 135 MMOL/L — SIGNIFICANT CHANGE UP (ref 135–145)
SODIUM SERPL-SCNC: 135 MMOL/L — SIGNIFICANT CHANGE UP (ref 135–145)
SODIUM SERPL-SCNC: 137 MMOL/L — SIGNIFICANT CHANGE UP (ref 135–145)
WBC # BLD: 5.51 K/UL — SIGNIFICANT CHANGE UP (ref 3.8–10.5)
WBC # FLD AUTO: 5.51 K/UL — SIGNIFICANT CHANGE UP (ref 3.8–10.5)

## 2025-07-24 PROCEDURE — 82962 GLUCOSE BLOOD TEST: CPT

## 2025-07-24 PROCEDURE — 87641 MR-STAPH DNA AMP PROBE: CPT

## 2025-07-24 PROCEDURE — 70496 CT ANGIOGRAPHY HEAD: CPT

## 2025-07-24 PROCEDURE — 93308 TTE F-UP OR LMTD: CPT

## 2025-07-24 PROCEDURE — 83880 ASSAY OF NATRIURETIC PEPTIDE: CPT

## 2025-07-24 PROCEDURE — 84100 ASSAY OF PHOSPHORUS: CPT

## 2025-07-24 PROCEDURE — 76376 3D RENDER W/INTRP POSTPROCES: CPT

## 2025-07-24 PROCEDURE — 83735 ASSAY OF MAGNESIUM: CPT

## 2025-07-24 PROCEDURE — 81001 URINALYSIS AUTO W/SCOPE: CPT

## 2025-07-24 PROCEDURE — 97530 THERAPEUTIC ACTIVITIES: CPT

## 2025-07-24 PROCEDURE — 93312 ECHO TRANSESOPHAGEAL: CPT

## 2025-07-24 PROCEDURE — 93010 ELECTROCARDIOGRAM REPORT: CPT

## 2025-07-24 PROCEDURE — 80053 COMPREHEN METABOLIC PANEL: CPT

## 2025-07-24 PROCEDURE — 94640 AIRWAY INHALATION TREATMENT: CPT

## 2025-07-24 PROCEDURE — 84145 PROCALCITONIN (PCT): CPT

## 2025-07-24 PROCEDURE — 93320 DOPPLER ECHO COMPLETE: CPT

## 2025-07-24 PROCEDURE — G0545: CPT

## 2025-07-24 PROCEDURE — 82330 ASSAY OF CALCIUM: CPT

## 2025-07-24 PROCEDURE — 85018 HEMOGLOBIN: CPT

## 2025-07-24 PROCEDURE — 86850 RBC ANTIBODY SCREEN: CPT

## 2025-07-24 PROCEDURE — 99233 SBSQ HOSP IP/OBS HIGH 50: CPT | Mod: GC

## 2025-07-24 PROCEDURE — 36415 COLL VENOUS BLD VENIPUNCTURE: CPT

## 2025-07-24 PROCEDURE — 85520 HEPARIN ASSAY: CPT

## 2025-07-24 PROCEDURE — 87040 BLOOD CULTURE FOR BACTERIA: CPT

## 2025-07-24 PROCEDURE — 82435 ASSAY OF BLOOD CHLORIDE: CPT

## 2025-07-24 PROCEDURE — 80162 ASSAY OF DIGOXIN TOTAL: CPT

## 2025-07-24 PROCEDURE — 84480 ASSAY TRIIODOTHYRONINE (T3): CPT

## 2025-07-24 PROCEDURE — 92526 ORAL FUNCTION THERAPY: CPT

## 2025-07-24 PROCEDURE — 86901 BLOOD TYPING SEROLOGIC RH(D): CPT

## 2025-07-24 PROCEDURE — 85027 COMPLETE CBC AUTOMATED: CPT

## 2025-07-24 PROCEDURE — 76770 US EXAM ABDO BACK WALL COMP: CPT

## 2025-07-24 PROCEDURE — 85025 COMPLETE CBC W/AUTO DIFF WBC: CPT

## 2025-07-24 PROCEDURE — 83605 ASSAY OF LACTIC ACID: CPT

## 2025-07-24 PROCEDURE — 94660 CPAP INITIATION&MGMT: CPT

## 2025-07-24 PROCEDURE — 85014 HEMATOCRIT: CPT

## 2025-07-24 PROCEDURE — 97162 PT EVAL MOD COMPLEX 30 MIN: CPT

## 2025-07-24 PROCEDURE — 97110 THERAPEUTIC EXERCISES: CPT

## 2025-07-24 PROCEDURE — 80061 LIPID PANEL: CPT

## 2025-07-24 PROCEDURE — 86900 BLOOD TYPING SEROLOGIC ABO: CPT

## 2025-07-24 PROCEDURE — 87640 STAPH A DNA AMP PROBE: CPT

## 2025-07-24 PROCEDURE — 82746 ASSAY OF FOLIC ACID SERUM: CPT

## 2025-07-24 PROCEDURE — 80048 BASIC METABOLIC PNL TOTAL CA: CPT

## 2025-07-24 PROCEDURE — 82977 ASSAY OF GGT: CPT

## 2025-07-24 PROCEDURE — 93005 ELECTROCARDIOGRAM TRACING: CPT

## 2025-07-24 PROCEDURE — 94002 VENT MGMT INPAT INIT DAY: CPT

## 2025-07-24 PROCEDURE — 85610 PROTHROMBIN TIME: CPT

## 2025-07-24 PROCEDURE — 82803 BLOOD GASES ANY COMBINATION: CPT

## 2025-07-24 PROCEDURE — 85730 THROMBOPLASTIN TIME PARTIAL: CPT

## 2025-07-24 PROCEDURE — 93325 DOPPLER ECHO COLOR FLOW MAPG: CPT

## 2025-07-24 PROCEDURE — 82947 ASSAY GLUCOSE BLOOD QUANT: CPT

## 2025-07-24 PROCEDURE — 86140 C-REACTIVE PROTEIN: CPT

## 2025-07-24 PROCEDURE — 92610 EVALUATE SWALLOWING FUNCTION: CPT

## 2025-07-24 PROCEDURE — 36600 WITHDRAWAL OF ARTERIAL BLOOD: CPT

## 2025-07-24 PROCEDURE — 99232 SBSQ HOSP IP/OBS MODERATE 35: CPT

## 2025-07-24 PROCEDURE — 83036 HEMOGLOBIN GLYCOSYLATED A1C: CPT

## 2025-07-24 PROCEDURE — 85652 RBC SED RATE AUTOMATED: CPT

## 2025-07-24 PROCEDURE — 84439 ASSAY OF FREE THYROXINE: CPT

## 2025-07-24 PROCEDURE — 84443 ASSAY THYROID STIM HORMONE: CPT

## 2025-07-24 PROCEDURE — 70450 CT HEAD/BRAIN W/O DYE: CPT

## 2025-07-24 PROCEDURE — 94003 VENT MGMT INPAT SUBQ DAY: CPT

## 2025-07-24 PROCEDURE — 71045 X-RAY EXAM CHEST 1 VIEW: CPT

## 2025-07-24 PROCEDURE — 84295 ASSAY OF SERUM SODIUM: CPT

## 2025-07-24 PROCEDURE — 82607 VITAMIN B-12: CPT

## 2025-07-24 PROCEDURE — 84132 ASSAY OF SERUM POTASSIUM: CPT

## 2025-07-24 PROCEDURE — 70498 CT ANGIOGRAPHY NECK: CPT

## 2025-07-24 PROCEDURE — 84436 ASSAY OF TOTAL THYROXINE: CPT

## 2025-07-24 RX ADMIN — Medication 1 APPLICATION(S): at 17:31

## 2025-07-24 RX ADMIN — IPRATROPIUM BROMIDE AND ALBUTEROL SULFATE 3 MILLILITER(S): .5; 2.5 SOLUTION RESPIRATORY (INHALATION) at 01:00

## 2025-07-24 RX ADMIN — POLYETHYLENE GLYCOL 3350 17 GRAM(S): 17 POWDER, FOR SOLUTION ORAL at 05:39

## 2025-07-24 RX ADMIN — Medication 40 MILLIGRAM(S): at 11:40

## 2025-07-24 RX ADMIN — ATORVASTATIN CALCIUM 80 MILLIGRAM(S): 80 TABLET, FILM COATED ORAL at 22:34

## 2025-07-24 RX ADMIN — POLYETHYLENE GLYCOL 3350 17 GRAM(S): 17 POWDER, FOR SOLUTION ORAL at 17:31

## 2025-07-24 RX ADMIN — Medication 10 MILLIGRAM(S): at 13:14

## 2025-07-24 RX ADMIN — Medication 650 MILLIGRAM(S): at 22:34

## 2025-07-24 RX ADMIN — Medication 1 TABLET(S): at 11:40

## 2025-07-24 RX ADMIN — IPRATROPIUM BROMIDE AND ALBUTEROL SULFATE 3 MILLILITER(S): .5; 2.5 SOLUTION RESPIRATORY (INHALATION) at 11:39

## 2025-07-24 RX ADMIN — CLOPIDOGREL BISULFATE 75 MILLIGRAM(S): 75 TABLET, FILM COATED ORAL at 11:39

## 2025-07-24 RX ADMIN — Medication 2 TABLET(S): at 22:10

## 2025-07-24 RX ADMIN — HEPARIN SODIUM 14.5 UNIT(S)/HR: 1000 INJECTION INTRAVENOUS; SUBCUTANEOUS at 10:48

## 2025-07-24 RX ADMIN — Medication 500 MILLIGRAM(S): at 11:39

## 2025-07-24 RX ADMIN — BUMETANIDE 20 MG/HR: 1 TABLET ORAL at 10:48

## 2025-07-24 RX ADMIN — IPRATROPIUM BROMIDE AND ALBUTEROL SULFATE 3 MILLILITER(S): .5; 2.5 SOLUTION RESPIRATORY (INHALATION) at 17:31

## 2025-07-24 RX ADMIN — Medication 3 MILLIGRAM(S): at 22:34

## 2025-07-24 RX ADMIN — HEPARIN SODIUM 14.5 UNIT(S)/HR: 1000 INJECTION INTRAVENOUS; SUBCUTANEOUS at 17:30

## 2025-07-24 RX ADMIN — LEVETIRACETAM 400 MILLIGRAM(S): 10 INJECTION, SOLUTION INTRAVENOUS at 05:37

## 2025-07-24 RX ADMIN — HEPARIN SODIUM 14.5 UNIT(S)/HR: 1000 INJECTION INTRAVENOUS; SUBCUTANEOUS at 07:45

## 2025-07-24 RX ADMIN — BUMETANIDE 20 MG/HR: 1 TABLET ORAL at 07:45

## 2025-07-24 RX ADMIN — Medication 40 MILLIEQUIVALENT(S): at 10:49

## 2025-07-24 RX ADMIN — CEFTRIAXONE 100 MILLIGRAM(S): 500 INJECTION, POWDER, FOR SOLUTION INTRAMUSCULAR; INTRAVENOUS at 05:38

## 2025-07-24 RX ADMIN — Medication 650 MILLIGRAM(S): at 23:34

## 2025-07-24 RX ADMIN — BUMETANIDE 20 MG/HR: 1 TABLET ORAL at 17:30

## 2025-07-24 RX ADMIN — IPRATROPIUM BROMIDE AND ALBUTEROL SULFATE 3 MILLILITER(S): .5; 2.5 SOLUTION RESPIRATORY (INHALATION) at 05:38

## 2025-07-25 LAB
ALBUMIN SERPL ELPH-MCNC: 3.2 G/DL — LOW (ref 3.3–5)
ALBUMIN SERPL ELPH-MCNC: 3.2 G/DL — LOW (ref 3.3–5)
ALP SERPL-CCNC: 124 U/L — HIGH (ref 40–120)
ALP SERPL-CCNC: 131 U/L — HIGH (ref 40–120)
ALT FLD-CCNC: 14 U/L — SIGNIFICANT CHANGE UP (ref 10–45)
ALT FLD-CCNC: 16 U/L — SIGNIFICANT CHANGE UP (ref 10–45)
ANION GAP SERPL CALC-SCNC: 13 MMOL/L — SIGNIFICANT CHANGE UP (ref 5–17)
ANION GAP SERPL CALC-SCNC: 9 MMOL/L — SIGNIFICANT CHANGE UP (ref 5–17)
APTT BLD: 51.4 SEC — HIGH (ref 26.1–36.8)
AST SERPL-CCNC: 13 U/L — SIGNIFICANT CHANGE UP (ref 10–40)
AST SERPL-CCNC: 16 U/L — SIGNIFICANT CHANGE UP (ref 10–40)
BASOPHILS # BLD AUTO: 0.04 K/UL — SIGNIFICANT CHANGE UP (ref 0–0.2)
BASOPHILS NFR BLD AUTO: 0.6 % — SIGNIFICANT CHANGE UP (ref 0–2)
BILIRUB SERPL-MCNC: 0.3 MG/DL — SIGNIFICANT CHANGE UP (ref 0.2–1.2)
BILIRUB SERPL-MCNC: 0.4 MG/DL — SIGNIFICANT CHANGE UP (ref 0.2–1.2)
BUN SERPL-MCNC: 51 MG/DL — HIGH (ref 7–23)
BUN SERPL-MCNC: 54 MG/DL — HIGH (ref 7–23)
CALCIUM SERPL-MCNC: 9.7 MG/DL — SIGNIFICANT CHANGE UP (ref 8.4–10.5)
CALCIUM SERPL-MCNC: 9.7 MG/DL — SIGNIFICANT CHANGE UP (ref 8.4–10.5)
CHLORIDE SERPL-SCNC: 90 MMOL/L — LOW (ref 96–108)
CHLORIDE SERPL-SCNC: 91 MMOL/L — LOW (ref 96–108)
CO2 SERPL-SCNC: 31 MMOL/L — SIGNIFICANT CHANGE UP (ref 22–31)
CO2 SERPL-SCNC: 33 MMOL/L — HIGH (ref 22–31)
CREAT SERPL-MCNC: 1.29 MG/DL — SIGNIFICANT CHANGE UP (ref 0.5–1.3)
CREAT SERPL-MCNC: 1.38 MG/DL — HIGH (ref 0.5–1.3)
DIGOXIN SERPL-MCNC: 1 NG/ML — SIGNIFICANT CHANGE UP (ref 0.8–2)
EGFR: 56 ML/MIN/1.73M2 — LOW
EGFR: 56 ML/MIN/1.73M2 — LOW
EGFR: 61 ML/MIN/1.73M2 — SIGNIFICANT CHANGE UP
EGFR: 61 ML/MIN/1.73M2 — SIGNIFICANT CHANGE UP
EOSINOPHIL # BLD AUTO: 0.25 K/UL — SIGNIFICANT CHANGE UP (ref 0–0.5)
EOSINOPHIL NFR BLD AUTO: 3.7 % — SIGNIFICANT CHANGE UP (ref 0–6)
GAS PNL BLDA: SIGNIFICANT CHANGE UP
GLUCOSE SERPL-MCNC: 111 MG/DL — HIGH (ref 70–99)
GLUCOSE SERPL-MCNC: 88 MG/DL — SIGNIFICANT CHANGE UP (ref 70–99)
HCT VFR BLD CALC: 31.2 % — LOW (ref 39–50)
HGB BLD-MCNC: 9.4 G/DL — LOW (ref 13–17)
IMM GRANULOCYTES # BLD AUTO: 0.06 K/UL — SIGNIFICANT CHANGE UP (ref 0–0.07)
IMM GRANULOCYTES NFR BLD AUTO: 0.9 % — SIGNIFICANT CHANGE UP (ref 0–0.9)
INR BLD: 1.04 RATIO — SIGNIFICANT CHANGE UP (ref 0.85–1.16)
LYMPHOCYTES # BLD AUTO: 0.9 K/UL — LOW (ref 1–3.3)
LYMPHOCYTES NFR BLD AUTO: 13.3 % — SIGNIFICANT CHANGE UP (ref 13–44)
MAGNESIUM SERPL-MCNC: 2 MG/DL — SIGNIFICANT CHANGE UP (ref 1.6–2.6)
MCHC RBC-ENTMCNC: 29 PG — SIGNIFICANT CHANGE UP (ref 27–34)
MCHC RBC-ENTMCNC: 30.1 G/DL — LOW (ref 32–36)
MCV RBC AUTO: 96.3 FL — SIGNIFICANT CHANGE UP (ref 80–100)
MONOCYTES # BLD AUTO: 0.72 K/UL — SIGNIFICANT CHANGE UP (ref 0–0.9)
MONOCYTES NFR BLD AUTO: 10.6 % — SIGNIFICANT CHANGE UP (ref 2–14)
NEUTROPHILS # BLD AUTO: 4.8 K/UL — SIGNIFICANT CHANGE UP (ref 1.8–7.4)
NEUTROPHILS NFR BLD AUTO: 70.9 % — SIGNIFICANT CHANGE UP (ref 43–77)
NRBC # BLD AUTO: 0 K/UL — SIGNIFICANT CHANGE UP (ref 0–0)
NRBC # FLD: 0 K/UL — SIGNIFICANT CHANGE UP (ref 0–0)
NRBC BLD AUTO-RTO: 0 /100 WBCS — SIGNIFICANT CHANGE UP (ref 0–0)
PHOSPHATE SERPL-MCNC: 3.1 MG/DL — SIGNIFICANT CHANGE UP (ref 2.5–4.5)
PLATELET # BLD AUTO: 268 K/UL — SIGNIFICANT CHANGE UP (ref 150–400)
PMV BLD: 10.5 FL — SIGNIFICANT CHANGE UP (ref 7–13)
POTASSIUM SERPL-MCNC: 3.2 MMOL/L — LOW (ref 3.5–5.3)
POTASSIUM SERPL-MCNC: 3.2 MMOL/L — LOW (ref 3.5–5.3)
POTASSIUM SERPL-SCNC: 3.2 MMOL/L — LOW (ref 3.5–5.3)
POTASSIUM SERPL-SCNC: 3.2 MMOL/L — LOW (ref 3.5–5.3)
PROT SERPL-MCNC: 7.8 G/DL — SIGNIFICANT CHANGE UP (ref 6–8.3)
PROT SERPL-MCNC: 7.9 G/DL — SIGNIFICANT CHANGE UP (ref 6–8.3)
PROTHROM AB SERPL-ACNC: 11.9 SEC — SIGNIFICANT CHANGE UP (ref 9.9–13.4)
RBC # BLD: 3.24 M/UL — LOW (ref 4.2–5.8)
RBC # FLD: 16.8 % — HIGH (ref 10.3–14.5)
SODIUM SERPL-SCNC: 133 MMOL/L — LOW (ref 135–145)
SODIUM SERPL-SCNC: 134 MMOL/L — LOW (ref 135–145)
WBC # BLD: 6.77 K/UL — SIGNIFICANT CHANGE UP (ref 3.8–10.5)
WBC # FLD AUTO: 6.77 K/UL — SIGNIFICANT CHANGE UP (ref 3.8–10.5)

## 2025-07-25 PROCEDURE — 93286 PERI-PX EVAL PM/LDLS PM IP: CPT | Mod: 26,76

## 2025-07-25 PROCEDURE — 70496 CT ANGIOGRAPHY HEAD: CPT | Mod: 26

## 2025-07-25 PROCEDURE — 72158 MRI LUMBAR SPINE W/O & W/DYE: CPT | Mod: 26

## 2025-07-25 PROCEDURE — 70544 MR ANGIOGRAPHY HEAD W/O DYE: CPT | Mod: 26,XU

## 2025-07-25 PROCEDURE — 99233 SBSQ HOSP IP/OBS HIGH 50: CPT | Mod: GC

## 2025-07-25 PROCEDURE — 70553 MRI BRAIN STEM W/O & W/DYE: CPT | Mod: 26

## 2025-07-25 PROCEDURE — 70498 CT ANGIOGRAPHY NECK: CPT | Mod: 26

## 2025-07-25 RX ADMIN — IPRATROPIUM BROMIDE AND ALBUTEROL SULFATE 3 MILLILITER(S): .5; 2.5 SOLUTION RESPIRATORY (INHALATION) at 00:45

## 2025-07-25 RX ADMIN — Medication 500 MILLIGRAM(S): at 17:54

## 2025-07-25 RX ADMIN — Medication 1 TABLET(S): at 17:53

## 2025-07-25 RX ADMIN — CLOPIDOGREL BISULFATE 75 MILLIGRAM(S): 75 TABLET, FILM COATED ORAL at 17:53

## 2025-07-25 RX ADMIN — IPRATROPIUM BROMIDE AND ALBUTEROL SULFATE 3 MILLILITER(S): .5; 2.5 SOLUTION RESPIRATORY (INHALATION) at 17:53

## 2025-07-25 RX ADMIN — Medication 1 APPLICATION(S): at 17:54

## 2025-07-25 RX ADMIN — POLYETHYLENE GLYCOL 3350 17 GRAM(S): 17 POWDER, FOR SOLUTION ORAL at 05:12

## 2025-07-25 RX ADMIN — HEPARIN SODIUM 14.5 UNIT(S)/HR: 1000 INJECTION INTRAVENOUS; SUBCUTANEOUS at 05:09

## 2025-07-25 RX ADMIN — Medication 2 TABLET(S): at 21:22

## 2025-07-25 RX ADMIN — BUMETANIDE 20 MG/HR: 1 TABLET ORAL at 21:22

## 2025-07-25 RX ADMIN — BUMETANIDE 20 MG/HR: 1 TABLET ORAL at 05:09

## 2025-07-25 RX ADMIN — ATORVASTATIN CALCIUM 80 MILLIGRAM(S): 80 TABLET, FILM COATED ORAL at 21:22

## 2025-07-25 RX ADMIN — BUMETANIDE 20 MG/HR: 1 TABLET ORAL at 18:57

## 2025-07-25 RX ADMIN — HEPARIN SODIUM 14.5 UNIT(S)/HR: 1000 INJECTION INTRAVENOUS; SUBCUTANEOUS at 21:21

## 2025-07-25 RX ADMIN — IPRATROPIUM BROMIDE AND ALBUTEROL SULFATE 3 MILLILITER(S): .5; 2.5 SOLUTION RESPIRATORY (INHALATION) at 05:12

## 2025-07-25 RX ADMIN — Medication 40 MILLIGRAM(S): at 17:54

## 2025-07-25 RX ADMIN — CEFTRIAXONE 100 MILLIGRAM(S): 500 INJECTION, POWDER, FOR SOLUTION INTRAMUSCULAR; INTRAVENOUS at 05:12

## 2025-07-26 LAB
ALBUMIN SERPL ELPH-MCNC: 2.7 G/DL — LOW (ref 3.3–5)
ALP SERPL-CCNC: 124 U/L — HIGH (ref 40–120)
ALT FLD-CCNC: 17 U/L — SIGNIFICANT CHANGE UP (ref 10–45)
ANION GAP SERPL CALC-SCNC: 15 MMOL/L — SIGNIFICANT CHANGE UP (ref 5–17)
APTT BLD: 54.7 SEC — HIGH (ref 26.1–36.8)
AST SERPL-CCNC: 23 U/L — SIGNIFICANT CHANGE UP (ref 10–40)
BASOPHILS # BLD AUTO: 0.05 K/UL — SIGNIFICANT CHANGE UP (ref 0–0.2)
BASOPHILS NFR BLD AUTO: 0.8 % — SIGNIFICANT CHANGE UP (ref 0–2)
BILIRUB SERPL-MCNC: 0.4 MG/DL — SIGNIFICANT CHANGE UP (ref 0.2–1.2)
BUN SERPL-MCNC: 48 MG/DL — HIGH (ref 7–23)
CALCIUM SERPL-MCNC: 9.6 MG/DL — SIGNIFICANT CHANGE UP (ref 8.4–10.5)
CHLORIDE SERPL-SCNC: 90 MMOL/L — LOW (ref 96–108)
CO2 SERPL-SCNC: 26 MMOL/L — SIGNIFICANT CHANGE UP (ref 22–31)
CREAT SERPL-MCNC: 1.16 MG/DL — SIGNIFICANT CHANGE UP (ref 0.5–1.3)
EGFR: 69 ML/MIN/1.73M2 — SIGNIFICANT CHANGE UP
EGFR: 69 ML/MIN/1.73M2 — SIGNIFICANT CHANGE UP
EOSINOPHIL # BLD AUTO: 0.29 K/UL — SIGNIFICANT CHANGE UP (ref 0–0.5)
EOSINOPHIL NFR BLD AUTO: 4.5 % — SIGNIFICANT CHANGE UP (ref 0–6)
GAS PNL BLDA: SIGNIFICANT CHANGE UP
GLUCOSE SERPL-MCNC: 75 MG/DL — SIGNIFICANT CHANGE UP (ref 70–99)
HCT VFR BLD CALC: 30 % — LOW (ref 39–50)
HGB BLD-MCNC: 9.3 G/DL — LOW (ref 13–17)
IMM GRANULOCYTES # BLD AUTO: 0.04 K/UL — SIGNIFICANT CHANGE UP (ref 0–0.07)
IMM GRANULOCYTES NFR BLD AUTO: 0.6 % — SIGNIFICANT CHANGE UP (ref 0–0.9)
INR BLD: 1.06 RATIO — SIGNIFICANT CHANGE UP (ref 0.85–1.16)
LYMPHOCYTES # BLD AUTO: 0.93 K/UL — LOW (ref 1–3.3)
LYMPHOCYTES NFR BLD AUTO: 14.4 % — SIGNIFICANT CHANGE UP (ref 13–44)
MAGNESIUM SERPL-MCNC: 2 MG/DL — SIGNIFICANT CHANGE UP (ref 1.6–2.6)
MAGNESIUM SERPL-MCNC: 2 MG/DL — SIGNIFICANT CHANGE UP (ref 1.6–2.6)
MCHC RBC-ENTMCNC: 29.5 PG — SIGNIFICANT CHANGE UP (ref 27–34)
MCHC RBC-ENTMCNC: 31 G/DL — LOW (ref 32–36)
MCV RBC AUTO: 95.2 FL — SIGNIFICANT CHANGE UP (ref 80–100)
MONOCYTES # BLD AUTO: 0.76 K/UL — SIGNIFICANT CHANGE UP (ref 0–0.9)
MONOCYTES NFR BLD AUTO: 11.7 % — SIGNIFICANT CHANGE UP (ref 2–14)
NEUTROPHILS # BLD AUTO: 4.41 K/UL — SIGNIFICANT CHANGE UP (ref 1.8–7.4)
NEUTROPHILS NFR BLD AUTO: 68 % — SIGNIFICANT CHANGE UP (ref 43–77)
NRBC # BLD AUTO: 0 K/UL — SIGNIFICANT CHANGE UP (ref 0–0)
NRBC # FLD: 0 K/UL — SIGNIFICANT CHANGE UP (ref 0–0)
NRBC BLD AUTO-RTO: 0 /100 WBCS — SIGNIFICANT CHANGE UP (ref 0–0)
PHOSPHATE SERPL-MCNC: 3.2 MG/DL — SIGNIFICANT CHANGE UP (ref 2.5–4.5)
PLATELET # BLD AUTO: 273 K/UL — SIGNIFICANT CHANGE UP (ref 150–400)
PMV BLD: 10.4 FL — SIGNIFICANT CHANGE UP (ref 7–13)
POTASSIUM SERPL-MCNC: 3.7 MMOL/L — SIGNIFICANT CHANGE UP (ref 3.5–5.3)
POTASSIUM SERPL-MCNC: 3.8 MMOL/L — SIGNIFICANT CHANGE UP (ref 3.5–5.3)
POTASSIUM SERPL-SCNC: 3.7 MMOL/L — SIGNIFICANT CHANGE UP (ref 3.5–5.3)
POTASSIUM SERPL-SCNC: 3.8 MMOL/L — SIGNIFICANT CHANGE UP (ref 3.5–5.3)
PROT SERPL-MCNC: 7.6 G/DL — SIGNIFICANT CHANGE UP (ref 6–8.3)
PROTHROM AB SERPL-ACNC: 12.1 SEC — SIGNIFICANT CHANGE UP (ref 9.9–13.4)
RBC # BLD: 3.15 M/UL — LOW (ref 4.2–5.8)
RBC # FLD: 17 % — HIGH (ref 10.3–14.5)
SODIUM SERPL-SCNC: 131 MMOL/L — LOW (ref 135–145)
WBC # BLD: 6.48 K/UL — SIGNIFICANT CHANGE UP (ref 3.8–10.5)
WBC # FLD AUTO: 6.48 K/UL — SIGNIFICANT CHANGE UP (ref 3.8–10.5)

## 2025-07-26 PROCEDURE — 99233 SBSQ HOSP IP/OBS HIGH 50: CPT | Mod: GC

## 2025-07-26 PROCEDURE — 99232 SBSQ HOSP IP/OBS MODERATE 35: CPT

## 2025-07-26 RX ORDER — APIXABAN 5 MG/1
5 TABLET, FILM COATED ORAL EVERY 12 HOURS
Refills: 0 | Status: DISCONTINUED | OUTPATIENT
Start: 2025-07-26 | End: 2025-08-01

## 2025-07-26 RX ADMIN — CLOPIDOGREL BISULFATE 75 MILLIGRAM(S): 75 TABLET, FILM COATED ORAL at 11:51

## 2025-07-26 RX ADMIN — Medication 500 MILLIGRAM(S): at 11:52

## 2025-07-26 RX ADMIN — ATORVASTATIN CALCIUM 80 MILLIGRAM(S): 80 TABLET, FILM COATED ORAL at 21:24

## 2025-07-26 RX ADMIN — Medication 1 APPLICATION(S): at 11:52

## 2025-07-26 RX ADMIN — IPRATROPIUM BROMIDE AND ALBUTEROL SULFATE 3 MILLILITER(S): .5; 2.5 SOLUTION RESPIRATORY (INHALATION) at 05:22

## 2025-07-26 RX ADMIN — Medication 20 MILLIEQUIVALENT(S): at 00:00

## 2025-07-26 RX ADMIN — Medication 1 TABLET(S): at 11:50

## 2025-07-26 RX ADMIN — Medication 20 MILLIEQUIVALENT(S): at 08:21

## 2025-07-26 RX ADMIN — APIXABAN 5 MILLIGRAM(S): 5 TABLET, FILM COATED ORAL at 17:17

## 2025-07-26 RX ADMIN — Medication 3 MILLIGRAM(S): at 00:00

## 2025-07-26 RX ADMIN — Medication 40 MILLIGRAM(S): at 05:27

## 2025-07-26 RX ADMIN — BUMETANIDE 20 MG/HR: 1 TABLET ORAL at 21:22

## 2025-07-26 RX ADMIN — BUMETANIDE 20 MG/HR: 1 TABLET ORAL at 05:23

## 2025-07-26 RX ADMIN — CEFTRIAXONE 100 MILLIGRAM(S): 500 INJECTION, POWDER, FOR SOLUTION INTRAMUSCULAR; INTRAVENOUS at 05:22

## 2025-07-26 RX ADMIN — IPRATROPIUM BROMIDE AND ALBUTEROL SULFATE 3 MILLILITER(S): .5; 2.5 SOLUTION RESPIRATORY (INHALATION) at 00:17

## 2025-07-26 RX ADMIN — IPRATROPIUM BROMIDE AND ALBUTEROL SULFATE 3 MILLILITER(S): .5; 2.5 SOLUTION RESPIRATORY (INHALATION) at 11:51

## 2025-07-26 RX ADMIN — POLYETHYLENE GLYCOL 3350 17 GRAM(S): 17 POWDER, FOR SOLUTION ORAL at 05:22

## 2025-07-26 RX ADMIN — Medication 2 TABLET(S): at 21:24

## 2025-07-26 RX ADMIN — IPRATROPIUM BROMIDE AND ALBUTEROL SULFATE 3 MILLILITER(S): .5; 2.5 SOLUTION RESPIRATORY (INHALATION) at 17:17

## 2025-07-26 RX ADMIN — IPRATROPIUM BROMIDE AND ALBUTEROL SULFATE 3 MILLILITER(S): .5; 2.5 SOLUTION RESPIRATORY (INHALATION) at 23:10

## 2025-07-26 RX ADMIN — APIXABAN 5 MILLIGRAM(S): 5 TABLET, FILM COATED ORAL at 08:13

## 2025-07-27 LAB
ALBUMIN SERPL ELPH-MCNC: 3.4 G/DL — SIGNIFICANT CHANGE UP (ref 3.3–5)
ALP SERPL-CCNC: 135 U/L — HIGH (ref 40–120)
ALT FLD-CCNC: 16 U/L — SIGNIFICANT CHANGE UP (ref 10–45)
ANION GAP SERPL CALC-SCNC: 14 MMOL/L — SIGNIFICANT CHANGE UP (ref 5–17)
AST SERPL-CCNC: 20 U/L — SIGNIFICANT CHANGE UP (ref 10–40)
BASOPHILS # BLD AUTO: 0.05 K/UL — SIGNIFICANT CHANGE UP (ref 0–0.2)
BASOPHILS NFR BLD AUTO: 0.7 % — SIGNIFICANT CHANGE UP (ref 0–2)
BILIRUB SERPL-MCNC: 0.4 MG/DL — SIGNIFICANT CHANGE UP (ref 0.2–1.2)
BUN SERPL-MCNC: 49 MG/DL — HIGH (ref 7–23)
CALCIUM SERPL-MCNC: 10.1 MG/DL — SIGNIFICANT CHANGE UP (ref 8.4–10.5)
CHLORIDE SERPL-SCNC: 89 MMOL/L — LOW (ref 96–108)
CO2 SERPL-SCNC: 31 MMOL/L — SIGNIFICANT CHANGE UP (ref 22–31)
CREAT SERPL-MCNC: 1.34 MG/DL — HIGH (ref 0.5–1.3)
EGFR: 58 ML/MIN/1.73M2 — LOW
EGFR: 58 ML/MIN/1.73M2 — LOW
EOSINOPHIL # BLD AUTO: 0.25 K/UL — SIGNIFICANT CHANGE UP (ref 0–0.5)
EOSINOPHIL NFR BLD AUTO: 3.6 % — SIGNIFICANT CHANGE UP (ref 0–6)
GAS PNL BLDA: SIGNIFICANT CHANGE UP
GLUCOSE SERPL-MCNC: 87 MG/DL — SIGNIFICANT CHANGE UP (ref 70–99)
HCT VFR BLD CALC: 32.1 % — LOW (ref 39–50)
HGB BLD-MCNC: 10 G/DL — LOW (ref 13–17)
IMM GRANULOCYTES # BLD AUTO: 0.04 K/UL — SIGNIFICANT CHANGE UP (ref 0–0.07)
IMM GRANULOCYTES NFR BLD AUTO: 0.6 % — SIGNIFICANT CHANGE UP (ref 0–0.9)
LYMPHOCYTES # BLD AUTO: 0.88 K/UL — LOW (ref 1–3.3)
LYMPHOCYTES NFR BLD AUTO: 12.8 % — LOW (ref 13–44)
MAGNESIUM SERPL-MCNC: 2 MG/DL — SIGNIFICANT CHANGE UP (ref 1.6–2.6)
MCHC RBC-ENTMCNC: 29.5 PG — SIGNIFICANT CHANGE UP (ref 27–34)
MCHC RBC-ENTMCNC: 31.2 G/DL — LOW (ref 32–36)
MCV RBC AUTO: 94.7 FL — SIGNIFICANT CHANGE UP (ref 80–100)
MONOCYTES # BLD AUTO: 0.85 K/UL — SIGNIFICANT CHANGE UP (ref 0–0.9)
MONOCYTES NFR BLD AUTO: 12.4 % — SIGNIFICANT CHANGE UP (ref 2–14)
NEUTROPHILS # BLD AUTO: 4.78 K/UL — SIGNIFICANT CHANGE UP (ref 1.8–7.4)
NEUTROPHILS NFR BLD AUTO: 69.9 % — SIGNIFICANT CHANGE UP (ref 43–77)
NRBC # BLD AUTO: 0 K/UL — SIGNIFICANT CHANGE UP (ref 0–0)
NRBC # FLD: 0 K/UL — SIGNIFICANT CHANGE UP (ref 0–0)
NRBC BLD AUTO-RTO: 0 /100 WBCS — SIGNIFICANT CHANGE UP (ref 0–0)
PHOSPHATE SERPL-MCNC: 4.1 MG/DL — SIGNIFICANT CHANGE UP (ref 2.5–4.5)
PLATELET # BLD AUTO: 288 K/UL — SIGNIFICANT CHANGE UP (ref 150–400)
PMV BLD: 10.3 FL — SIGNIFICANT CHANGE UP (ref 7–13)
POTASSIUM SERPL-MCNC: 3.4 MMOL/L — LOW (ref 3.5–5.3)
POTASSIUM SERPL-SCNC: 3.4 MMOL/L — LOW (ref 3.5–5.3)
PROT SERPL-MCNC: 8.1 G/DL — SIGNIFICANT CHANGE UP (ref 6–8.3)
RBC # BLD: 3.39 M/UL — LOW (ref 4.2–5.8)
RBC # FLD: 17 % — HIGH (ref 10.3–14.5)
SODIUM SERPL-SCNC: 134 MMOL/L — LOW (ref 135–145)
WBC # BLD: 6.85 K/UL — SIGNIFICANT CHANGE UP (ref 3.8–10.5)
WBC # FLD AUTO: 6.85 K/UL — SIGNIFICANT CHANGE UP (ref 3.8–10.5)

## 2025-07-27 PROCEDURE — 99233 SBSQ HOSP IP/OBS HIGH 50: CPT | Mod: GC

## 2025-07-27 PROCEDURE — 99232 SBSQ HOSP IP/OBS MODERATE 35: CPT

## 2025-07-27 RX ORDER — ACETAZOLAMIDE 250 MG/1
250 TABLET ORAL ONCE
Refills: 0 | Status: COMPLETED | OUTPATIENT
Start: 2025-07-27 | End: 2025-07-27

## 2025-07-27 RX ORDER — ACETAZOLAMIDE 250 MG/1
250 TABLET ORAL ONCE
Refills: 0 | Status: DISCONTINUED | OUTPATIENT
Start: 2025-07-27 | End: 2025-07-27

## 2025-07-27 RX ORDER — BUMETANIDE 1 MG/1
4 TABLET ORAL EVERY 8 HOURS
Refills: 0 | Status: DISCONTINUED | OUTPATIENT
Start: 2025-07-27 | End: 2025-07-28

## 2025-07-27 RX ADMIN — Medication 40 MILLIEQUIVALENT(S): at 13:31

## 2025-07-27 RX ADMIN — Medication 1 TABLET(S): at 11:58

## 2025-07-27 RX ADMIN — IPRATROPIUM BROMIDE AND ALBUTEROL SULFATE 3 MILLILITER(S): .5; 2.5 SOLUTION RESPIRATORY (INHALATION) at 23:23

## 2025-07-27 RX ADMIN — Medication 500 MILLIGRAM(S): at 11:58

## 2025-07-27 RX ADMIN — Medication 40 MILLIGRAM(S): at 06:00

## 2025-07-27 RX ADMIN — Medication 40 MILLIEQUIVALENT(S): at 10:00

## 2025-07-27 RX ADMIN — APIXABAN 5 MILLIGRAM(S): 5 TABLET, FILM COATED ORAL at 17:13

## 2025-07-27 RX ADMIN — Medication 1 APPLICATION(S): at 11:59

## 2025-07-27 RX ADMIN — Medication 3 MILLIGRAM(S): at 21:53

## 2025-07-27 RX ADMIN — CEFTRIAXONE 100 MILLIGRAM(S): 500 INJECTION, POWDER, FOR SOLUTION INTRAMUSCULAR; INTRAVENOUS at 06:06

## 2025-07-27 RX ADMIN — IPRATROPIUM BROMIDE AND ALBUTEROL SULFATE 3 MILLILITER(S): .5; 2.5 SOLUTION RESPIRATORY (INHALATION) at 11:57

## 2025-07-27 RX ADMIN — CLOPIDOGREL BISULFATE 75 MILLIGRAM(S): 75 TABLET, FILM COATED ORAL at 11:58

## 2025-07-27 RX ADMIN — BUMETANIDE 132 MILLIGRAM(S): 1 TABLET ORAL at 21:11

## 2025-07-27 RX ADMIN — IPRATROPIUM BROMIDE AND ALBUTEROL SULFATE 3 MILLILITER(S): .5; 2.5 SOLUTION RESPIRATORY (INHALATION) at 06:00

## 2025-07-27 RX ADMIN — APIXABAN 5 MILLIGRAM(S): 5 TABLET, FILM COATED ORAL at 06:05

## 2025-07-27 RX ADMIN — ATORVASTATIN CALCIUM 80 MILLIGRAM(S): 80 TABLET, FILM COATED ORAL at 21:11

## 2025-07-27 RX ADMIN — BUMETANIDE 132 MILLIGRAM(S): 1 TABLET ORAL at 13:31

## 2025-07-27 RX ADMIN — ACETAZOLAMIDE 250 MILLIGRAM(S): 250 TABLET ORAL at 09:46

## 2025-07-27 RX ADMIN — IPRATROPIUM BROMIDE AND ALBUTEROL SULFATE 3 MILLILITER(S): .5; 2.5 SOLUTION RESPIRATORY (INHALATION) at 17:13

## 2025-07-28 LAB
ADD ON TEST-SPECIMEN IN LAB: SIGNIFICANT CHANGE UP
ALBUMIN SERPL ELPH-MCNC: 3.6 G/DL — SIGNIFICANT CHANGE UP (ref 3.3–5)
ALP SERPL-CCNC: 145 U/L — HIGH (ref 40–120)
ALT FLD-CCNC: 22 U/L — SIGNIFICANT CHANGE UP (ref 10–45)
ANION GAP SERPL CALC-SCNC: 16 MMOL/L — SIGNIFICANT CHANGE UP (ref 5–17)
AST SERPL-CCNC: 19 U/L — SIGNIFICANT CHANGE UP (ref 10–40)
BASOPHILS # BLD AUTO: 0.07 K/UL — SIGNIFICANT CHANGE UP (ref 0–0.2)
BASOPHILS NFR BLD AUTO: 0.9 % — SIGNIFICANT CHANGE UP (ref 0–2)
BILIRUB SERPL-MCNC: 0.4 MG/DL — SIGNIFICANT CHANGE UP (ref 0.2–1.2)
BUN SERPL-MCNC: 58 MG/DL — HIGH (ref 7–23)
CALCIUM SERPL-MCNC: 9.9 MG/DL — SIGNIFICANT CHANGE UP (ref 8.4–10.5)
CHLORIDE SERPL-SCNC: 87 MMOL/L — LOW (ref 96–108)
CO2 SERPL-SCNC: 31 MMOL/L — SIGNIFICANT CHANGE UP (ref 22–31)
CREAT SERPL-MCNC: 1.75 MG/DL — HIGH (ref 0.5–1.3)
DIGOXIN SERPL-MCNC: 1 NG/ML — SIGNIFICANT CHANGE UP (ref 0.8–2)
EGFR: 42 ML/MIN/1.73M2 — LOW
EGFR: 42 ML/MIN/1.73M2 — LOW
EOSINOPHIL # BLD AUTO: 0.23 K/UL — SIGNIFICANT CHANGE UP (ref 0–0.5)
EOSINOPHIL NFR BLD AUTO: 2.9 % — SIGNIFICANT CHANGE UP (ref 0–6)
GAS PNL BLDA: SIGNIFICANT CHANGE UP
GLUCOSE SERPL-MCNC: 108 MG/DL — HIGH (ref 70–99)
HCT VFR BLD CALC: 32.1 % — LOW (ref 39–50)
HGB BLD-MCNC: 10.1 G/DL — LOW (ref 13–17)
IMM GRANULOCYTES # BLD AUTO: 0.05 K/UL — SIGNIFICANT CHANGE UP (ref 0–0.07)
IMM GRANULOCYTES NFR BLD AUTO: 0.6 % — SIGNIFICANT CHANGE UP (ref 0–0.9)
LYMPHOCYTES # BLD AUTO: 0.96 K/UL — LOW (ref 1–3.3)
LYMPHOCYTES NFR BLD AUTO: 12.3 % — LOW (ref 13–44)
MAGNESIUM SERPL-MCNC: 2 MG/DL — SIGNIFICANT CHANGE UP (ref 1.6–2.6)
MCHC RBC-ENTMCNC: 29.8 PG — SIGNIFICANT CHANGE UP (ref 27–34)
MCHC RBC-ENTMCNC: 31.5 G/DL — LOW (ref 32–36)
MCV RBC AUTO: 94.7 FL — SIGNIFICANT CHANGE UP (ref 80–100)
MONOCYTES # BLD AUTO: 0.9 K/UL — SIGNIFICANT CHANGE UP (ref 0–0.9)
MONOCYTES NFR BLD AUTO: 11.5 % — SIGNIFICANT CHANGE UP (ref 2–14)
NEUTROPHILS # BLD AUTO: 5.59 K/UL — SIGNIFICANT CHANGE UP (ref 1.8–7.4)
NEUTROPHILS NFR BLD AUTO: 71.8 % — SIGNIFICANT CHANGE UP (ref 43–77)
NRBC # BLD AUTO: 0 K/UL — SIGNIFICANT CHANGE UP (ref 0–0)
NRBC # FLD: 0 K/UL — SIGNIFICANT CHANGE UP (ref 0–0)
NRBC BLD AUTO-RTO: 0 /100 WBCS — SIGNIFICANT CHANGE UP (ref 0–0)
NT-PROBNP SERPL-SCNC: 6696 PG/ML — HIGH (ref 0–300)
PHOSPHATE SERPL-MCNC: 5.1 MG/DL — HIGH (ref 2.5–4.5)
PLATELET # BLD AUTO: 306 K/UL — SIGNIFICANT CHANGE UP (ref 150–400)
PMV BLD: 10.3 FL — SIGNIFICANT CHANGE UP (ref 7–13)
POTASSIUM SERPL-MCNC: 4 MMOL/L — SIGNIFICANT CHANGE UP (ref 3.5–5.3)
POTASSIUM SERPL-SCNC: 4 MMOL/L — SIGNIFICANT CHANGE UP (ref 3.5–5.3)
PROT SERPL-MCNC: 8.5 G/DL — HIGH (ref 6–8.3)
RBC # BLD: 3.39 M/UL — LOW (ref 4.2–5.8)
RBC # FLD: 17 % — HIGH (ref 10.3–14.5)
SODIUM SERPL-SCNC: 134 MMOL/L — LOW (ref 135–145)
WBC # BLD: 7.8 K/UL — SIGNIFICANT CHANGE UP (ref 3.8–10.5)
WBC # FLD AUTO: 7.8 K/UL — SIGNIFICANT CHANGE UP (ref 3.8–10.5)

## 2025-07-28 PROCEDURE — G0545: CPT

## 2025-07-28 PROCEDURE — 70450 CT HEAD/BRAIN W/O DYE: CPT | Mod: 26

## 2025-07-28 PROCEDURE — 99233 SBSQ HOSP IP/OBS HIGH 50: CPT

## 2025-07-28 PROCEDURE — 99232 SBSQ HOSP IP/OBS MODERATE 35: CPT

## 2025-07-28 PROCEDURE — 99233 SBSQ HOSP IP/OBS HIGH 50: CPT | Mod: GC

## 2025-07-28 RX ORDER — BUMETANIDE 1 MG/1
4 TABLET ORAL DAILY
Refills: 0 | Status: DISCONTINUED | OUTPATIENT
Start: 2025-07-29 | End: 2025-07-31

## 2025-07-28 RX ADMIN — IPRATROPIUM BROMIDE AND ALBUTEROL SULFATE 3 MILLILITER(S): .5; 2.5 SOLUTION RESPIRATORY (INHALATION) at 17:49

## 2025-07-28 RX ADMIN — IPRATROPIUM BROMIDE AND ALBUTEROL SULFATE 3 MILLILITER(S): .5; 2.5 SOLUTION RESPIRATORY (INHALATION) at 05:41

## 2025-07-28 RX ADMIN — IPRATROPIUM BROMIDE AND ALBUTEROL SULFATE 3 MILLILITER(S): .5; 2.5 SOLUTION RESPIRATORY (INHALATION) at 12:39

## 2025-07-28 RX ADMIN — APIXABAN 5 MILLIGRAM(S): 5 TABLET, FILM COATED ORAL at 17:49

## 2025-07-28 RX ADMIN — ATORVASTATIN CALCIUM 80 MILLIGRAM(S): 80 TABLET, FILM COATED ORAL at 21:02

## 2025-07-28 RX ADMIN — CLOPIDOGREL BISULFATE 75 MILLIGRAM(S): 75 TABLET, FILM COATED ORAL at 12:41

## 2025-07-28 RX ADMIN — Medication 1 TABLET(S): at 12:40

## 2025-07-28 RX ADMIN — IPRATROPIUM BROMIDE AND ALBUTEROL SULFATE 3 MILLILITER(S): .5; 2.5 SOLUTION RESPIRATORY (INHALATION) at 23:44

## 2025-07-28 RX ADMIN — BUMETANIDE 132 MILLIGRAM(S): 1 TABLET ORAL at 05:40

## 2025-07-28 RX ADMIN — Medication 3 MILLIGRAM(S): at 21:01

## 2025-07-28 RX ADMIN — Medication 2 TABLET(S): at 21:02

## 2025-07-28 RX ADMIN — CEFTRIAXONE 100 MILLIGRAM(S): 500 INJECTION, POWDER, FOR SOLUTION INTRAMUSCULAR; INTRAVENOUS at 05:41

## 2025-07-28 RX ADMIN — Medication 1 APPLICATION(S): at 12:42

## 2025-07-28 RX ADMIN — Medication 10 MILLIGRAM(S): at 12:41

## 2025-07-28 RX ADMIN — Medication 500 MILLIGRAM(S): at 12:40

## 2025-07-28 RX ADMIN — POLYETHYLENE GLYCOL 3350 17 GRAM(S): 17 POWDER, FOR SOLUTION ORAL at 17:50

## 2025-07-28 RX ADMIN — APIXABAN 5 MILLIGRAM(S): 5 TABLET, FILM COATED ORAL at 05:41

## 2025-07-28 RX ADMIN — Medication 40 MILLIGRAM(S): at 05:41

## 2025-07-29 DIAGNOSIS — I48.92 UNSPECIFIED ATRIAL FLUTTER: ICD-10-CM

## 2025-07-29 LAB
ANION GAP SERPL CALC-SCNC: 16 MMOL/L — SIGNIFICANT CHANGE UP (ref 5–17)
BASE EXCESS BLDA CALC-SCNC: 8.8 MMOL/L — HIGH (ref -2–3)
BASOPHILS # BLD AUTO: 0.08 K/UL — SIGNIFICANT CHANGE UP (ref 0–0.2)
BASOPHILS NFR BLD AUTO: 1.3 % — SIGNIFICANT CHANGE UP (ref 0–2)
BUN SERPL-MCNC: 62 MG/DL — HIGH (ref 7–23)
CALCIUM SERPL-MCNC: 9.6 MG/DL — SIGNIFICANT CHANGE UP (ref 8.4–10.5)
CHLORIDE SERPL-SCNC: 87 MMOL/L — LOW (ref 96–108)
CO2 BLDA-SCNC: 36 MMOL/L — HIGH (ref 19–24)
CO2 SERPL-SCNC: 27 MMOL/L — SIGNIFICANT CHANGE UP (ref 22–31)
CREAT SERPL-MCNC: 1.73 MG/DL — HIGH (ref 0.5–1.3)
EGFR: 43 ML/MIN/1.73M2 — LOW
EGFR: 43 ML/MIN/1.73M2 — LOW
EOSINOPHIL # BLD AUTO: 0.23 K/UL — SIGNIFICANT CHANGE UP (ref 0–0.5)
EOSINOPHIL NFR BLD AUTO: 3.6 % — SIGNIFICANT CHANGE UP (ref 0–6)
GAS PNL BLDA: SIGNIFICANT CHANGE UP
GLUCOSE SERPL-MCNC: 87 MG/DL — SIGNIFICANT CHANGE UP (ref 70–99)
HCO3 BLDA-SCNC: 34 MMOL/L — HIGH (ref 21–28)
HCT VFR BLD CALC: 32.7 % — LOW (ref 39–50)
HGB BLD-MCNC: 10.3 G/DL — LOW (ref 13–17)
HOROWITZ INDEX BLDA+IHG-RTO: 32 — SIGNIFICANT CHANGE UP
IMM GRANULOCYTES # BLD AUTO: 0.03 K/UL — SIGNIFICANT CHANGE UP (ref 0–0.07)
IMM GRANULOCYTES NFR BLD AUTO: 0.5 % — SIGNIFICANT CHANGE UP (ref 0–0.9)
LYMPHOCYTES # BLD AUTO: 0.73 K/UL — LOW (ref 1–3.3)
LYMPHOCYTES NFR BLD AUTO: 11.5 % — LOW (ref 13–44)
MAGNESIUM SERPL-MCNC: 2.1 MG/DL — SIGNIFICANT CHANGE UP (ref 1.6–2.6)
MCHC RBC-ENTMCNC: 30.2 PG — SIGNIFICANT CHANGE UP (ref 27–34)
MCHC RBC-ENTMCNC: 31.5 G/DL — LOW (ref 32–36)
MCV RBC AUTO: 95.9 FL — SIGNIFICANT CHANGE UP (ref 80–100)
MONOCYTES # BLD AUTO: 0.76 K/UL — SIGNIFICANT CHANGE UP (ref 0–0.9)
MONOCYTES NFR BLD AUTO: 11.9 % — SIGNIFICANT CHANGE UP (ref 2–14)
NEUTROPHILS # BLD AUTO: 4.54 K/UL — SIGNIFICANT CHANGE UP (ref 1.8–7.4)
NEUTROPHILS NFR BLD AUTO: 71.2 % — SIGNIFICANT CHANGE UP (ref 43–77)
NRBC # BLD AUTO: 0 K/UL — SIGNIFICANT CHANGE UP (ref 0–0)
NRBC # FLD: 0 K/UL — SIGNIFICANT CHANGE UP (ref 0–0)
NRBC BLD AUTO-RTO: 0 /100 WBCS — SIGNIFICANT CHANGE UP (ref 0–0)
PCO2 BLDA: 48 MMHG — SIGNIFICANT CHANGE UP (ref 35–48)
PH BLDA: 7.46 — HIGH (ref 7.35–7.45)
PHOSPHATE SERPL-MCNC: 4.7 MG/DL — HIGH (ref 2.5–4.5)
PLATELET # BLD AUTO: 264 K/UL — SIGNIFICANT CHANGE UP (ref 150–400)
PMV BLD: 10.4 FL — SIGNIFICANT CHANGE UP (ref 7–13)
PO2 BLDA: 90 MMHG — SIGNIFICANT CHANGE UP (ref 83–108)
POTASSIUM SERPL-MCNC: 3.6 MMOL/L — SIGNIFICANT CHANGE UP (ref 3.5–5.3)
POTASSIUM SERPL-SCNC: 3.6 MMOL/L — SIGNIFICANT CHANGE UP (ref 3.5–5.3)
RBC # BLD: 3.41 M/UL — LOW (ref 4.2–5.8)
RBC # FLD: 16.8 % — HIGH (ref 10.3–14.5)
SAO2 % BLDA: 98.2 % — HIGH (ref 94–98)
SODIUM SERPL-SCNC: 130 MMOL/L — LOW (ref 135–145)
WBC # BLD: 6.37 K/UL — SIGNIFICANT CHANGE UP (ref 3.8–10.5)
WBC # FLD AUTO: 6.37 K/UL — SIGNIFICANT CHANGE UP (ref 3.8–10.5)

## 2025-07-29 PROCEDURE — G0545: CPT

## 2025-07-29 PROCEDURE — 99233 SBSQ HOSP IP/OBS HIGH 50: CPT | Mod: GC

## 2025-07-29 PROCEDURE — 99233 SBSQ HOSP IP/OBS HIGH 50: CPT

## 2025-07-29 PROCEDURE — 99232 SBSQ HOSP IP/OBS MODERATE 35: CPT

## 2025-07-29 PROCEDURE — 99223 1ST HOSP IP/OBS HIGH 75: CPT

## 2025-07-29 RX ORDER — METOPROLOL SUCCINATE 50 MG/1
25 TABLET, EXTENDED RELEASE ORAL THREE TIMES A DAY
Refills: 0 | Status: DISCONTINUED | OUTPATIENT
Start: 2025-07-29 | End: 2025-07-30

## 2025-07-29 RX ADMIN — ATORVASTATIN CALCIUM 80 MILLIGRAM(S): 80 TABLET, FILM COATED ORAL at 21:11

## 2025-07-29 RX ADMIN — APIXABAN 5 MILLIGRAM(S): 5 TABLET, FILM COATED ORAL at 18:02

## 2025-07-29 RX ADMIN — IPRATROPIUM BROMIDE AND ALBUTEROL SULFATE 3 MILLILITER(S): .5; 2.5 SOLUTION RESPIRATORY (INHALATION) at 18:02

## 2025-07-29 RX ADMIN — Medication 500 MILLIGRAM(S): at 13:11

## 2025-07-29 RX ADMIN — METOPROLOL SUCCINATE 25 MILLIGRAM(S): 50 TABLET, EXTENDED RELEASE ORAL at 21:12

## 2025-07-29 RX ADMIN — Medication 2 TABLET(S): at 21:11

## 2025-07-29 RX ADMIN — APIXABAN 5 MILLIGRAM(S): 5 TABLET, FILM COATED ORAL at 05:02

## 2025-07-29 RX ADMIN — Medication 3 MILLIGRAM(S): at 21:14

## 2025-07-29 RX ADMIN — BUMETANIDE 132 MILLIGRAM(S): 1 TABLET ORAL at 05:01

## 2025-07-29 RX ADMIN — POLYETHYLENE GLYCOL 3350 17 GRAM(S): 17 POWDER, FOR SOLUTION ORAL at 18:02

## 2025-07-29 RX ADMIN — IPRATROPIUM BROMIDE AND ALBUTEROL SULFATE 3 MILLILITER(S): .5; 2.5 SOLUTION RESPIRATORY (INHALATION) at 05:02

## 2025-07-29 RX ADMIN — CLOPIDOGREL BISULFATE 75 MILLIGRAM(S): 75 TABLET, FILM COATED ORAL at 13:11

## 2025-07-29 RX ADMIN — POLYETHYLENE GLYCOL 3350 17 GRAM(S): 17 POWDER, FOR SOLUTION ORAL at 05:02

## 2025-07-29 RX ADMIN — Medication 1 TABLET(S): at 13:10

## 2025-07-29 RX ADMIN — METOPROLOL SUCCINATE 25 MILLIGRAM(S): 50 TABLET, EXTENDED RELEASE ORAL at 18:02

## 2025-07-29 RX ADMIN — IPRATROPIUM BROMIDE AND ALBUTEROL SULFATE 3 MILLILITER(S): .5; 2.5 SOLUTION RESPIRATORY (INHALATION) at 13:10

## 2025-07-29 RX ADMIN — Medication 1 APPLICATION(S): at 13:12

## 2025-07-29 RX ADMIN — Medication 10 MILLIGRAM(S): at 13:11

## 2025-07-29 RX ADMIN — Medication 40 MILLIGRAM(S): at 05:02

## 2025-07-29 RX ADMIN — CEFTRIAXONE 100 MILLIGRAM(S): 500 INJECTION, POWDER, FOR SOLUTION INTRAMUSCULAR; INTRAVENOUS at 05:01

## 2025-07-30 ENCOUNTER — RESULT REVIEW (OUTPATIENT)
Age: 65
End: 2025-07-30

## 2025-07-30 LAB
ANION GAP SERPL CALC-SCNC: 16 MMOL/L — SIGNIFICANT CHANGE UP (ref 5–17)
BASE EXCESS BLDA CALC-SCNC: 9.5 MMOL/L — HIGH (ref -2–3)
BASOPHILS # BLD AUTO: 0.09 K/UL — SIGNIFICANT CHANGE UP (ref 0–0.2)
BASOPHILS NFR BLD AUTO: 1.2 % — SIGNIFICANT CHANGE UP (ref 0–2)
BLD GP AB SCN SERPL QL: NEGATIVE — SIGNIFICANT CHANGE UP
BUN SERPL-MCNC: 68 MG/DL — HIGH (ref 7–23)
CALCIUM SERPL-MCNC: 9.5 MG/DL — SIGNIFICANT CHANGE UP (ref 8.4–10.5)
CHLORIDE SERPL-SCNC: 87 MMOL/L — LOW (ref 96–108)
CO2 BLDA-SCNC: 36 MMOL/L — HIGH (ref 19–24)
CO2 SERPL-SCNC: 29 MMOL/L — SIGNIFICANT CHANGE UP (ref 22–31)
CREAT SERPL-MCNC: 1.69 MG/DL — HIGH (ref 0.5–1.3)
EGFR: 44 ML/MIN/1.73M2 — LOW
EGFR: 44 ML/MIN/1.73M2 — LOW
EOSINOPHIL # BLD AUTO: 0.29 K/UL — SIGNIFICANT CHANGE UP (ref 0–0.5)
EOSINOPHIL NFR BLD AUTO: 4 % — SIGNIFICANT CHANGE UP (ref 0–6)
GLUCOSE SERPL-MCNC: 99 MG/DL — SIGNIFICANT CHANGE UP (ref 70–99)
HCO3 BLDA-SCNC: 35 MMOL/L — HIGH (ref 21–28)
HCT VFR BLD CALC: 32 % — LOW (ref 39–50)
HGB BLD-MCNC: 10.3 G/DL — LOW (ref 13–17)
HOROWITZ INDEX BLDA+IHG-RTO: 32 — SIGNIFICANT CHANGE UP
IMM GRANULOCYTES # BLD AUTO: 0.04 K/UL — SIGNIFICANT CHANGE UP (ref 0–0.07)
IMM GRANULOCYTES NFR BLD AUTO: 0.6 % — SIGNIFICANT CHANGE UP (ref 0–0.9)
LYMPHOCYTES # BLD AUTO: 1.05 K/UL — SIGNIFICANT CHANGE UP (ref 1–3.3)
LYMPHOCYTES NFR BLD AUTO: 14.5 % — SIGNIFICANT CHANGE UP (ref 13–44)
MAGNESIUM SERPL-MCNC: 2.2 MG/DL — SIGNIFICANT CHANGE UP (ref 1.6–2.6)
MCHC RBC-ENTMCNC: 30 PG — SIGNIFICANT CHANGE UP (ref 27–34)
MCHC RBC-ENTMCNC: 32.2 G/DL — SIGNIFICANT CHANGE UP (ref 32–36)
MCV RBC AUTO: 93.3 FL — SIGNIFICANT CHANGE UP (ref 80–100)
MONOCYTES # BLD AUTO: 0.84 K/UL — SIGNIFICANT CHANGE UP (ref 0–0.9)
MONOCYTES NFR BLD AUTO: 11.6 % — SIGNIFICANT CHANGE UP (ref 2–14)
NEUTROPHILS # BLD AUTO: 4.91 K/UL — SIGNIFICANT CHANGE UP (ref 1.8–7.4)
NEUTROPHILS NFR BLD AUTO: 68.1 % — SIGNIFICANT CHANGE UP (ref 43–77)
NRBC # BLD AUTO: 0 K/UL — SIGNIFICANT CHANGE UP (ref 0–0)
NRBC # FLD: 0 K/UL — SIGNIFICANT CHANGE UP (ref 0–0)
NRBC BLD AUTO-RTO: 0 /100 WBCS — SIGNIFICANT CHANGE UP (ref 0–0)
PCO2 BLDA: 50 MMHG — HIGH (ref 35–48)
PH BLDA: 7.45 — SIGNIFICANT CHANGE UP (ref 7.35–7.45)
PHOSPHATE SERPL-MCNC: 4.7 MG/DL — HIGH (ref 2.5–4.5)
PLATELET # BLD AUTO: 300 K/UL — SIGNIFICANT CHANGE UP (ref 150–400)
PMV BLD: 9.7 FL — SIGNIFICANT CHANGE UP (ref 7–13)
PO2 BLDA: 80 MMHG — LOW (ref 83–108)
POTASSIUM SERPL-MCNC: 3.7 MMOL/L — SIGNIFICANT CHANGE UP (ref 3.5–5.3)
POTASSIUM SERPL-SCNC: 3.7 MMOL/L — SIGNIFICANT CHANGE UP (ref 3.5–5.3)
RBC # BLD: 3.43 M/UL — LOW (ref 4.2–5.8)
RBC # FLD: 16.4 % — HIGH (ref 10.3–14.5)
RH IG SCN BLD-IMP: POSITIVE — SIGNIFICANT CHANGE UP
SAO2 % BLDA: 97.4 % — SIGNIFICANT CHANGE UP (ref 94–98)
SODIUM SERPL-SCNC: 132 MMOL/L — LOW (ref 135–145)
WBC # BLD: 7.22 K/UL — SIGNIFICANT CHANGE UP (ref 3.8–10.5)
WBC # FLD AUTO: 7.22 K/UL — SIGNIFICANT CHANGE UP (ref 3.8–10.5)

## 2025-07-30 PROCEDURE — 93312 ECHO TRANSESOPHAGEAL: CPT | Mod: 26

## 2025-07-30 PROCEDURE — 92960 CARDIOVERSION ELECTRIC EXT: CPT

## 2025-07-30 PROCEDURE — 99233 SBSQ HOSP IP/OBS HIGH 50: CPT | Mod: FS

## 2025-07-30 PROCEDURE — 93279 PRGRMG DEV EVAL PM/LDLS PM: CPT | Mod: 26

## 2025-07-30 PROCEDURE — 93320 DOPPLER ECHO COMPLETE: CPT | Mod: 26

## 2025-07-30 PROCEDURE — 93325 DOPPLER ECHO COLOR FLOW MAPG: CPT | Mod: 26

## 2025-07-30 PROCEDURE — 93010 ELECTROCARDIOGRAM REPORT: CPT

## 2025-07-30 PROCEDURE — 99233 SBSQ HOSP IP/OBS HIGH 50: CPT

## 2025-07-30 PROCEDURE — 99233 SBSQ HOSP IP/OBS HIGH 50: CPT | Mod: GC

## 2025-07-30 RX ORDER — METOPROLOL SUCCINATE 50 MG/1
25 TABLET, EXTENDED RELEASE ORAL
Refills: 0 | Status: DISCONTINUED | OUTPATIENT
Start: 2025-07-30 | End: 2025-07-31

## 2025-07-30 RX ORDER — AMIODARONE HYDROCHLORIDE 50 MG/ML
400 INJECTION, SOLUTION INTRAVENOUS
Refills: 0 | Status: DISCONTINUED | OUTPATIENT
Start: 2025-07-30 | End: 2025-07-31

## 2025-07-30 RX ADMIN — Medication 500 MILLIGRAM(S): at 14:06

## 2025-07-30 RX ADMIN — IPRATROPIUM BROMIDE AND ALBUTEROL SULFATE 3 MILLILITER(S): .5; 2.5 SOLUTION RESPIRATORY (INHALATION) at 05:37

## 2025-07-30 RX ADMIN — AMIODARONE HYDROCHLORIDE 400 MILLIGRAM(S): 50 INJECTION, SOLUTION INTRAVENOUS at 16:58

## 2025-07-30 RX ADMIN — Medication 1 TABLET(S): at 14:05

## 2025-07-30 RX ADMIN — CEFTRIAXONE 100 MILLIGRAM(S): 500 INJECTION, POWDER, FOR SOLUTION INTRAMUSCULAR; INTRAVENOUS at 05:36

## 2025-07-30 RX ADMIN — IPRATROPIUM BROMIDE AND ALBUTEROL SULFATE 3 MILLILITER(S): .5; 2.5 SOLUTION RESPIRATORY (INHALATION) at 14:04

## 2025-07-30 RX ADMIN — POLYETHYLENE GLYCOL 3350 17 GRAM(S): 17 POWDER, FOR SOLUTION ORAL at 17:08

## 2025-07-30 RX ADMIN — IPRATROPIUM BROMIDE AND ALBUTEROL SULFATE 3 MILLILITER(S): .5; 2.5 SOLUTION RESPIRATORY (INHALATION) at 17:08

## 2025-07-30 RX ADMIN — Medication 40 MILLIGRAM(S): at 05:36

## 2025-07-30 RX ADMIN — ATORVASTATIN CALCIUM 80 MILLIGRAM(S): 80 TABLET, FILM COATED ORAL at 21:13

## 2025-07-30 RX ADMIN — Medication 10 MILLIGRAM(S): at 14:05

## 2025-07-30 RX ADMIN — METOPROLOL SUCCINATE 25 MILLIGRAM(S): 50 TABLET, EXTENDED RELEASE ORAL at 17:11

## 2025-07-30 RX ADMIN — BUMETANIDE 132 MILLIGRAM(S): 1 TABLET ORAL at 05:36

## 2025-07-30 RX ADMIN — APIXABAN 5 MILLIGRAM(S): 5 TABLET, FILM COATED ORAL at 05:36

## 2025-07-30 RX ADMIN — CLOPIDOGREL BISULFATE 75 MILLIGRAM(S): 75 TABLET, FILM COATED ORAL at 14:05

## 2025-07-30 RX ADMIN — Medication 1 APPLICATION(S): at 14:06

## 2025-07-30 RX ADMIN — METOPROLOL SUCCINATE 25 MILLIGRAM(S): 50 TABLET, EXTENDED RELEASE ORAL at 05:40

## 2025-07-30 RX ADMIN — POLYETHYLENE GLYCOL 3350 17 GRAM(S): 17 POWDER, FOR SOLUTION ORAL at 05:37

## 2025-07-30 RX ADMIN — APIXABAN 5 MILLIGRAM(S): 5 TABLET, FILM COATED ORAL at 17:12

## 2025-07-30 RX ADMIN — METOPROLOL SUCCINATE 25 MILLIGRAM(S): 50 TABLET, EXTENDED RELEASE ORAL at 14:08

## 2025-07-30 RX ADMIN — Medication 3 MILLIGRAM(S): at 23:02

## 2025-07-31 LAB
ANION GAP SERPL CALC-SCNC: 14 MMOL/L — SIGNIFICANT CHANGE UP (ref 5–17)
BUN SERPL-MCNC: 71 MG/DL — HIGH (ref 7–23)
CALCIUM SERPL-MCNC: 9.5 MG/DL — SIGNIFICANT CHANGE UP (ref 8.4–10.5)
CHLORIDE SERPL-SCNC: 87 MMOL/L — LOW (ref 96–108)
CO2 SERPL-SCNC: 29 MMOL/L — SIGNIFICANT CHANGE UP (ref 22–31)
CREAT SERPL-MCNC: 1.71 MG/DL — HIGH (ref 0.5–1.3)
EGFR: 44 ML/MIN/1.73M2 — LOW
EGFR: 44 ML/MIN/1.73M2 — LOW
GAS PNL BLDA: SIGNIFICANT CHANGE UP
GLUCOSE SERPL-MCNC: 98 MG/DL — SIGNIFICANT CHANGE UP (ref 70–99)
POTASSIUM SERPL-MCNC: 3.4 MMOL/L — LOW (ref 3.5–5.3)
POTASSIUM SERPL-SCNC: 3.4 MMOL/L — LOW (ref 3.5–5.3)
SODIUM SERPL-SCNC: 130 MMOL/L — LOW (ref 135–145)

## 2025-07-31 PROCEDURE — 99233 SBSQ HOSP IP/OBS HIGH 50: CPT

## 2025-07-31 PROCEDURE — 99231 SBSQ HOSP IP/OBS SF/LOW 25: CPT

## 2025-07-31 PROCEDURE — 99233 SBSQ HOSP IP/OBS HIGH 50: CPT | Mod: GC

## 2025-07-31 RX ORDER — FUROSEMIDE 10 MG/ML
1 INJECTION INTRAMUSCULAR; INTRAVENOUS
Qty: 30 | Refills: 0
Start: 2025-07-31 | End: 2025-08-29

## 2025-07-31 RX ORDER — BUMETANIDE 1 MG/1
2 TABLET ORAL ONCE
Refills: 0 | Status: COMPLETED | OUTPATIENT
Start: 2025-08-01 | End: 2025-08-01

## 2025-07-31 RX ORDER — BUMETANIDE 1 MG/1
1 TABLET ORAL
Qty: 30 | Refills: 0
Start: 2025-07-31 | End: 2025-08-29

## 2025-07-31 RX ORDER — AMIODARONE HYDROCHLORIDE 50 MG/ML
400 INJECTION, SOLUTION INTRAVENOUS
Refills: 0 | Status: DISCONTINUED | OUTPATIENT
Start: 2025-07-31 | End: 2025-08-01

## 2025-07-31 RX ORDER — DAPAGLIFLOZIN 5 MG/1
1 TABLET, FILM COATED ORAL
Qty: 30 | Refills: 0
Start: 2025-07-31 | End: 2025-08-29

## 2025-07-31 RX ORDER — TORSEMIDE 10 MG
1 TABLET ORAL
Qty: 30 | Refills: 0
Start: 2025-07-31 | End: 2025-08-29

## 2025-07-31 RX ORDER — METOPROLOL SUCCINATE 50 MG/1
25 TABLET, EXTENDED RELEASE ORAL DAILY
Refills: 0 | Status: DISCONTINUED | OUTPATIENT
Start: 2025-07-31 | End: 2025-08-01

## 2025-07-31 RX ORDER — DAPAGLIFLOZIN 5 MG/1
10 TABLET, FILM COATED ORAL ONCE
Refills: 0 | Status: COMPLETED | OUTPATIENT
Start: 2025-08-01 | End: 2025-08-01

## 2025-07-31 RX ADMIN — IPRATROPIUM BROMIDE AND ALBUTEROL SULFATE 3 MILLILITER(S): .5; 2.5 SOLUTION RESPIRATORY (INHALATION) at 06:10

## 2025-07-31 RX ADMIN — ATORVASTATIN CALCIUM 80 MILLIGRAM(S): 80 TABLET, FILM COATED ORAL at 22:00

## 2025-07-31 RX ADMIN — AMIODARONE HYDROCHLORIDE 400 MILLIGRAM(S): 50 INJECTION, SOLUTION INTRAVENOUS at 14:27

## 2025-07-31 RX ADMIN — APIXABAN 5 MILLIGRAM(S): 5 TABLET, FILM COATED ORAL at 17:16

## 2025-07-31 RX ADMIN — Medication 1 APPLICATION(S): at 11:32

## 2025-07-31 RX ADMIN — Medication 2 TABLET(S): at 22:20

## 2025-07-31 RX ADMIN — Medication 1 TABLET(S): at 11:29

## 2025-07-31 RX ADMIN — Medication 40 MILLIGRAM(S): at 06:10

## 2025-07-31 RX ADMIN — IPRATROPIUM BROMIDE AND ALBUTEROL SULFATE 3 MILLILITER(S): .5; 2.5 SOLUTION RESPIRATORY (INHALATION) at 17:16

## 2025-07-31 RX ADMIN — METOPROLOL SUCCINATE 25 MILLIGRAM(S): 50 TABLET, EXTENDED RELEASE ORAL at 14:26

## 2025-07-31 RX ADMIN — Medication 500 MILLIGRAM(S): at 11:31

## 2025-07-31 RX ADMIN — CLOPIDOGREL BISULFATE 75 MILLIGRAM(S): 75 TABLET, FILM COATED ORAL at 11:30

## 2025-07-31 RX ADMIN — POLYETHYLENE GLYCOL 3350 17 GRAM(S): 17 POWDER, FOR SOLUTION ORAL at 17:16

## 2025-07-31 RX ADMIN — Medication 40 MILLIEQUIVALENT(S): at 10:26

## 2025-07-31 RX ADMIN — BUMETANIDE 132 MILLIGRAM(S): 1 TABLET ORAL at 05:34

## 2025-07-31 RX ADMIN — CEFTRIAXONE 100 MILLIGRAM(S): 500 INJECTION, POWDER, FOR SOLUTION INTRAMUSCULAR; INTRAVENOUS at 05:02

## 2025-07-31 RX ADMIN — IPRATROPIUM BROMIDE AND ALBUTEROL SULFATE 3 MILLILITER(S): .5; 2.5 SOLUTION RESPIRATORY (INHALATION) at 11:30

## 2025-07-31 RX ADMIN — Medication 10 MILLIGRAM(S): at 11:31

## 2025-07-31 RX ADMIN — APIXABAN 5 MILLIGRAM(S): 5 TABLET, FILM COATED ORAL at 06:11

## 2025-08-01 ENCOUNTER — TRANSCRIPTION ENCOUNTER (OUTPATIENT)
Age: 65
End: 2025-08-01

## 2025-08-01 VITALS
OXYGEN SATURATION: 92 % | SYSTOLIC BLOOD PRESSURE: 100 MMHG | HEART RATE: 59 BPM | TEMPERATURE: 98 F | DIASTOLIC BLOOD PRESSURE: 65 MMHG | RESPIRATION RATE: 18 BRPM

## 2025-08-01 PROCEDURE — 85025 COMPLETE CBC W/AUTO DIFF WBC: CPT

## 2025-08-01 PROCEDURE — 85652 RBC SED RATE AUTOMATED: CPT

## 2025-08-01 PROCEDURE — 93320 DOPPLER ECHO COMPLETE: CPT

## 2025-08-01 PROCEDURE — 84295 ASSAY OF SERUM SODIUM: CPT

## 2025-08-01 PROCEDURE — 82962 GLUCOSE BLOOD TEST: CPT

## 2025-08-01 PROCEDURE — 70498 CT ANGIOGRAPHY NECK: CPT

## 2025-08-01 PROCEDURE — 93312 ECHO TRANSESOPHAGEAL: CPT

## 2025-08-01 PROCEDURE — 82330 ASSAY OF CALCIUM: CPT

## 2025-08-01 PROCEDURE — 84145 PROCALCITONIN (PCT): CPT

## 2025-08-01 PROCEDURE — 36600 WITHDRAWAL OF ARTERIAL BLOOD: CPT

## 2025-08-01 PROCEDURE — 99239 HOSP IP/OBS DSCHRG MGMT >30: CPT

## 2025-08-01 PROCEDURE — 70496 CT ANGIOGRAPHY HEAD: CPT

## 2025-08-01 PROCEDURE — 70544 MR ANGIOGRAPHY HEAD W/O DYE: CPT

## 2025-08-01 PROCEDURE — 94003 VENT MGMT INPAT SUBQ DAY: CPT

## 2025-08-01 PROCEDURE — 80048 BASIC METABOLIC PNL TOTAL CA: CPT

## 2025-08-01 PROCEDURE — 72158 MRI LUMBAR SPINE W/O & W/DYE: CPT

## 2025-08-01 PROCEDURE — 85027 COMPLETE CBC AUTOMATED: CPT

## 2025-08-01 PROCEDURE — 80162 ASSAY OF DIGOXIN TOTAL: CPT

## 2025-08-01 PROCEDURE — 87640 STAPH A DNA AMP PROBE: CPT

## 2025-08-01 PROCEDURE — 76376 3D RENDER W/INTRP POSTPROCES: CPT

## 2025-08-01 PROCEDURE — 82977 ASSAY OF GGT: CPT

## 2025-08-01 PROCEDURE — 84436 ASSAY OF TOTAL THYROXINE: CPT

## 2025-08-01 PROCEDURE — 84132 ASSAY OF SERUM POTASSIUM: CPT

## 2025-08-01 PROCEDURE — 82746 ASSAY OF FOLIC ACID SERUM: CPT

## 2025-08-01 PROCEDURE — 82803 BLOOD GASES ANY COMBINATION: CPT

## 2025-08-01 PROCEDURE — 76770 US EXAM ABDO BACK WALL COMP: CPT

## 2025-08-01 PROCEDURE — 80061 LIPID PANEL: CPT

## 2025-08-01 PROCEDURE — 83605 ASSAY OF LACTIC ACID: CPT

## 2025-08-01 PROCEDURE — 92526 ORAL FUNCTION THERAPY: CPT

## 2025-08-01 PROCEDURE — 84439 ASSAY OF FREE THYROXINE: CPT

## 2025-08-01 PROCEDURE — 83036 HEMOGLOBIN GLYCOSYLATED A1C: CPT

## 2025-08-01 PROCEDURE — 87040 BLOOD CULTURE FOR BACTERIA: CPT

## 2025-08-01 PROCEDURE — 85730 THROMBOPLASTIN TIME PARTIAL: CPT

## 2025-08-01 PROCEDURE — 97530 THERAPEUTIC ACTIVITIES: CPT

## 2025-08-01 PROCEDURE — 80053 COMPREHEN METABOLIC PANEL: CPT

## 2025-08-01 PROCEDURE — 83880 ASSAY OF NATRIURETIC PEPTIDE: CPT

## 2025-08-01 PROCEDURE — 83735 ASSAY OF MAGNESIUM: CPT

## 2025-08-01 PROCEDURE — 86140 C-REACTIVE PROTEIN: CPT

## 2025-08-01 PROCEDURE — 85610 PROTHROMBIN TIME: CPT

## 2025-08-01 PROCEDURE — 82947 ASSAY GLUCOSE BLOOD QUANT: CPT

## 2025-08-01 PROCEDURE — 94660 CPAP INITIATION&MGMT: CPT

## 2025-08-01 PROCEDURE — 92610 EVALUATE SWALLOWING FUNCTION: CPT

## 2025-08-01 PROCEDURE — 70553 MRI BRAIN STEM W/O & W/DYE: CPT

## 2025-08-01 PROCEDURE — 36415 COLL VENOUS BLD VENIPUNCTURE: CPT

## 2025-08-01 PROCEDURE — 82607 VITAMIN B-12: CPT

## 2025-08-01 PROCEDURE — 85520 HEPARIN ASSAY: CPT

## 2025-08-01 PROCEDURE — 84480 ASSAY TRIIODOTHYRONINE (T3): CPT

## 2025-08-01 PROCEDURE — 81001 URINALYSIS AUTO W/SCOPE: CPT

## 2025-08-01 PROCEDURE — 87641 MR-STAPH DNA AMP PROBE: CPT

## 2025-08-01 PROCEDURE — 85018 HEMOGLOBIN: CPT

## 2025-08-01 PROCEDURE — 84443 ASSAY THYROID STIM HORMONE: CPT

## 2025-08-01 PROCEDURE — 93308 TTE F-UP OR LMTD: CPT

## 2025-08-01 PROCEDURE — 86850 RBC ANTIBODY SCREEN: CPT

## 2025-08-01 PROCEDURE — A9585: CPT

## 2025-08-01 PROCEDURE — 97162 PT EVAL MOD COMPLEX 30 MIN: CPT

## 2025-08-01 PROCEDURE — 85014 HEMATOCRIT: CPT

## 2025-08-01 PROCEDURE — 84100 ASSAY OF PHOSPHORUS: CPT

## 2025-08-01 PROCEDURE — 93005 ELECTROCARDIOGRAM TRACING: CPT

## 2025-08-01 PROCEDURE — 93325 DOPPLER ECHO COLOR FLOW MAPG: CPT

## 2025-08-01 PROCEDURE — 82435 ASSAY OF BLOOD CHLORIDE: CPT

## 2025-08-01 PROCEDURE — 92960 CARDIOVERSION ELECTRIC EXT: CPT

## 2025-08-01 PROCEDURE — 86900 BLOOD TYPING SEROLOGIC ABO: CPT

## 2025-08-01 PROCEDURE — 70450 CT HEAD/BRAIN W/O DYE: CPT

## 2025-08-01 PROCEDURE — 71045 X-RAY EXAM CHEST 1 VIEW: CPT

## 2025-08-01 PROCEDURE — 97165 OT EVAL LOW COMPLEX 30 MIN: CPT

## 2025-08-01 PROCEDURE — 94002 VENT MGMT INPAT INIT DAY: CPT

## 2025-08-01 PROCEDURE — 94640 AIRWAY INHALATION TREATMENT: CPT

## 2025-08-01 PROCEDURE — 97110 THERAPEUTIC EXERCISES: CPT

## 2025-08-01 PROCEDURE — 86901 BLOOD TYPING SEROLOGIC RH(D): CPT

## 2025-08-01 RX ORDER — MELATONIN 5 MG
1 TABLET ORAL
Qty: 0 | Refills: 0 | DISCHARGE
Start: 2025-08-01

## 2025-08-01 RX ORDER — APIXABAN 5 MG/1
1 TABLET, FILM COATED ORAL
Qty: 0 | Refills: 0 | DISCHARGE
Start: 2025-08-01

## 2025-08-01 RX ORDER — METOPROLOL SUCCINATE 50 MG/1
1 TABLET, EXTENDED RELEASE ORAL
Qty: 0 | Refills: 0 | DISCHARGE
Start: 2025-08-01

## 2025-08-01 RX ORDER — DAPAGLIFLOZIN 5 MG/1
1 TABLET, FILM COATED ORAL
Qty: 0 | Refills: 0 | DISCHARGE
Start: 2025-08-01

## 2025-08-01 RX ORDER — AMIODARONE HYDROCHLORIDE 50 MG/ML
2 INJECTION, SOLUTION INTRAVENOUS
Qty: 0 | Refills: 0 | DISCHARGE
Start: 2025-08-01

## 2025-08-01 RX ORDER — SODIUM CHLORIDE 0.65 %
1 AEROSOL, SPRAY (ML) NASAL
Qty: 0 | Refills: 0 | DISCHARGE
Start: 2025-08-01

## 2025-08-01 RX ORDER — B1/B2/B3/B5/B6/B12/VIT C/FOLIC 500-0.5 MG
1 TABLET ORAL
Qty: 0 | Refills: 0 | DISCHARGE
Start: 2025-08-01

## 2025-08-01 RX ADMIN — IPRATROPIUM BROMIDE AND ALBUTEROL SULFATE 3 MILLILITER(S): .5; 2.5 SOLUTION RESPIRATORY (INHALATION) at 05:11

## 2025-08-01 RX ADMIN — Medication 1 TABLET(S): at 12:59

## 2025-08-01 RX ADMIN — IPRATROPIUM BROMIDE AND ALBUTEROL SULFATE 3 MILLILITER(S): .5; 2.5 SOLUTION RESPIRATORY (INHALATION) at 12:59

## 2025-08-01 RX ADMIN — IPRATROPIUM BROMIDE AND ALBUTEROL SULFATE 3 MILLILITER(S): .5; 2.5 SOLUTION RESPIRATORY (INHALATION) at 00:44

## 2025-08-01 RX ADMIN — POLYETHYLENE GLYCOL 3350 17 GRAM(S): 17 POWDER, FOR SOLUTION ORAL at 05:12

## 2025-08-01 RX ADMIN — METOPROLOL SUCCINATE 25 MILLIGRAM(S): 50 TABLET, EXTENDED RELEASE ORAL at 05:11

## 2025-08-01 RX ADMIN — DAPAGLIFLOZIN 10 MILLIGRAM(S): 5 TABLET, FILM COATED ORAL at 13:01

## 2025-08-01 RX ADMIN — Medication 40 MILLIGRAM(S): at 05:10

## 2025-08-01 RX ADMIN — BUMETANIDE 2 MILLIGRAM(S): 1 TABLET ORAL at 05:08

## 2025-08-01 RX ADMIN — APIXABAN 5 MILLIGRAM(S): 5 TABLET, FILM COATED ORAL at 05:11

## 2025-08-01 RX ADMIN — CEFTRIAXONE 100 MILLIGRAM(S): 500 INJECTION, POWDER, FOR SOLUTION INTRAMUSCULAR; INTRAVENOUS at 05:12

## 2025-08-01 RX ADMIN — AMIODARONE HYDROCHLORIDE 400 MILLIGRAM(S): 50 INJECTION, SOLUTION INTRAVENOUS at 05:10

## 2025-08-01 RX ADMIN — CLOPIDOGREL BISULFATE 75 MILLIGRAM(S): 75 TABLET, FILM COATED ORAL at 12:59

## 2025-08-01 RX ADMIN — Medication 500 MILLIGRAM(S): at 13:00

## 2025-09-11 ENCOUNTER — RESULT REVIEW (OUTPATIENT)
Age: 65
End: 2025-09-11

## 2025-09-17 ENCOUNTER — APPOINTMENT (OUTPATIENT)
Dept: ELECTROPHYSIOLOGY | Facility: CLINIC | Age: 65
End: 2025-09-17

## (undated) DEVICE — CONNECTOR STRAIGHT 3/8 X 1/2"

## (undated) DEVICE — SAW BLADE MICROAIRE STERNUM 1X34X9.4MM

## (undated) DEVICE — DRAPE TOWEL BLUE 17" X 24"

## (undated) DEVICE — SUT SOFSILK 0 30" TIES

## (undated) DEVICE — PACING CABLE TEMP MEDTRONIC WITH PAC-LOC

## (undated) DEVICE — BLADE SCALPEL SAFETYLOCK #15

## (undated) DEVICE — DRAPE 3/4 SHEET W REINFORCEMENT 56X77"

## (undated) DEVICE — Device

## (undated) DEVICE — VISITEC 4X4

## (undated) DEVICE — SUT TICRON 5 30" KV-40

## (undated) DEVICE — DRSG OPSITE 13.75 X 4"

## (undated) DEVICE — WARMING BLANKET FULL UNDERBODY

## (undated) DEVICE — SUT BIOSYN 4-0 18" P-12

## (undated) DEVICE — FOLEY TRAY 16FR 5CC LF LUBRISIL ADVANCE TEMP CLOSED

## (undated) DEVICE — DRSG OPSITE 2.5 X 2"

## (undated) DEVICE — PACK UNIVERSAL CARDIAC

## (undated) DEVICE — BLADE SCALPEL SAFETYLOCK #10

## (undated) DEVICE — GLV 8 PROTEGRITY

## (undated) DEVICE — SAW BLADE MICROAIRE STERNUM 1.1X50X42MM

## (undated) DEVICE — ELCTR REM POLYHESIVE ADULT PT RETURN 15FT

## (undated) DEVICE — WARMING BLANKET DUO-THERM HYPER/HYPOTHERM ADULT

## (undated) DEVICE — SUT SOFSILK 0 30" V-20

## (undated) DEVICE — DRAPE 1/2 SHEET 40X57"

## (undated) DEVICE — POSITIONER FOAM EGG CRATE ULNAR 2PCS (PINK)

## (undated) DEVICE — GOWN TRIMAX LG

## (undated) DEVICE — ELCTR BOVIE PENCIL HANDPIECE ROCKER SWITCH 15FT

## (undated) DEVICE — GLV 8.5 PROTEXIS (WHITE)

## (undated) DEVICE — TOURNIQUET SET 12FR (1 RED, 1 BLUE, 1 SNARE) 7"

## (undated) DEVICE — VESSEL LOOP MAXI-RED  0.120" X 16"

## (undated) DEVICE — DRSG TEGADERM 6"X8"

## (undated) DEVICE — DRAIN RESERVOIR FOR JACKSON PRATT 100CC CARDINAL

## (undated) DEVICE — VENODYNE/SCD SLEEVE CALF MEDIUM

## (undated) DEVICE — DRAPE FEMORAL ANGIOGRAPHY W TROUGH

## (undated) DEVICE — GLV 8 PROTEXIS (WHITE)

## (undated) DEVICE — LOADING SYSTEM EVOLUT FX 23-29MM

## (undated) DEVICE — DRAPE INSTRUMENT POUCH 6.75" X 11"

## (undated) DEVICE — SOL IRR POUR H2O 250ML

## (undated) DEVICE — SUCTION YANKAUER NO CONTROL VENT

## (undated) DEVICE — BLADE SCALPEL SAFETYLOCK #11

## (undated) DEVICE — PREP CHLORAPREP HI-LITE ORANGE 26ML

## (undated) DEVICE — ELCTR BOVIE TIP BLADE VALLEYLAB 6.5"

## (undated) DEVICE — SUT PROLENE 4-0 36" BB

## (undated) DEVICE — SOL NORMOSOL-R PH7.4 1000ML

## (undated) DEVICE — GLV 7.5 PROTEXIS (WHITE)

## (undated) DEVICE — SHIELD CATH CONTAMINATION 7.5-8.5FR TWISTLOCK ADAPT

## (undated) DEVICE — ELCTR HEX BLADE

## (undated) DEVICE — SUT SILK 0 30" TIES

## (undated) DEVICE — MNFLD SETUP

## (undated) DEVICE — SUT POLYSORB 2-0 30" GS-21 UNDYED

## (undated) DEVICE — GOWN TRIMAX XXL

## (undated) DEVICE — SOL IRR POUR NS 0.9% 500ML

## (undated) DEVICE — CONNECTOR STRAIGHT 3/8 X 3/8" W LUER LOCK

## (undated) DEVICE — GLV 7 PROTEXIS (WHITE)

## (undated) DEVICE — SPECIMEN CONTAINER 100ML

## (undated) DEVICE — SUT PROLENE 5-0 30" RB-2

## (undated) DEVICE — LAP PAD 18 X 18"

## (undated) DEVICE — DRSG TEGADERM 6 X 8"

## (undated) DEVICE — MEDICATION LABELS W MARKER

## (undated) DEVICE — ULTRASOUND TRANSDUCER COVER

## (undated) DEVICE — SUT PROLENE 5-0 36" RB-1

## (undated) DEVICE — GLV 8 RADIATION

## (undated) DEVICE — DRAPE C ARM UNIVERSAL

## (undated) DEVICE — SYR ASEPTO

## (undated) DEVICE — TUBING CONTRAST INJECTION HIGH PRESSURE 1200PSI 72"

## (undated) DEVICE — SAW BLADE STRYKER STERNUM 31MM X 6.27 X .79